# Patient Record
Sex: FEMALE | Race: BLACK OR AFRICAN AMERICAN | Employment: OTHER | URBAN - METROPOLITAN AREA
[De-identification: names, ages, dates, MRNs, and addresses within clinical notes are randomized per-mention and may not be internally consistent; named-entity substitution may affect disease eponyms.]

---

## 2017-04-13 ENCOUNTER — APPOINTMENT (OUTPATIENT)
Dept: GENERAL RADIOLOGY | Age: 52
End: 2017-04-13
Attending: EMERGENCY MEDICINE
Payer: MEDICARE

## 2017-04-13 LAB
ALBUMIN SERPL BCP-MCNC: 4 G/DL (ref 3.5–5)
ALBUMIN/GLOB SERPL: 1.1 {RATIO} (ref 1.2–3.5)
ALP SERPL-CCNC: 74 U/L (ref 50–136)
ALT SERPL-CCNC: 41 U/L (ref 12–65)
ANION GAP BLD CALC-SCNC: 10 MMOL/L (ref 7–16)
AST SERPL W P-5'-P-CCNC: 19 U/L (ref 15–37)
BASOPHILS # BLD AUTO: 0 K/UL (ref 0–0.2)
BASOPHILS # BLD: 0 % (ref 0–2)
BILIRUB SERPL-MCNC: 0.3 MG/DL (ref 0.2–1.1)
BUN SERPL-MCNC: 21 MG/DL (ref 6–23)
CALCIUM SERPL-MCNC: 9.2 MG/DL (ref 8.3–10.4)
CHLORIDE SERPL-SCNC: 103 MMOL/L (ref 98–107)
CO2 SERPL-SCNC: 27 MMOL/L (ref 21–32)
CREAT SERPL-MCNC: 1.57 MG/DL (ref 0.6–1)
DIFFERENTIAL METHOD BLD: ABNORMAL
EOSINOPHIL # BLD: 0.4 K/UL (ref 0–0.8)
EOSINOPHIL NFR BLD: 4 % (ref 0.5–7.8)
ERYTHROCYTE [DISTWIDTH] IN BLOOD BY AUTOMATED COUNT: 13.7 % (ref 11.9–14.6)
GLOBULIN SER CALC-MCNC: 3.5 G/DL (ref 2.3–3.5)
GLUCOSE SERPL-MCNC: 96 MG/DL (ref 65–100)
HCT VFR BLD AUTO: 41 % (ref 35.8–46.3)
HGB BLD-MCNC: 14.4 G/DL (ref 11.7–15.4)
IMM GRANULOCYTES # BLD: 0 K/UL (ref 0–0.5)
IMM GRANULOCYTES NFR BLD AUTO: 0.4 % (ref 0–5)
LYMPHOCYTES # BLD AUTO: 43 % (ref 13–44)
LYMPHOCYTES # BLD: 4.8 K/UL (ref 0.5–4.6)
MCH RBC QN AUTO: 30.6 PG (ref 26.1–32.9)
MCHC RBC AUTO-ENTMCNC: 35.1 G/DL (ref 31.4–35)
MCV RBC AUTO: 87.2 FL (ref 79.6–97.8)
MONOCYTES # BLD: 0.7 K/UL (ref 0.1–1.3)
MONOCYTES NFR BLD AUTO: 6 % (ref 4–12)
NEUTS SEG # BLD: 5.1 K/UL (ref 1.7–8.2)
NEUTS SEG NFR BLD AUTO: 47 % (ref 43–78)
PLATELET # BLD AUTO: 168 K/UL (ref 150–450)
PMV BLD AUTO: 11.8 FL (ref 10.8–14.1)
POTASSIUM SERPL-SCNC: 3.5 MMOL/L (ref 3.5–5.1)
PROT SERPL-MCNC: 7.5 G/DL (ref 6.3–8.2)
RBC # BLD AUTO: 4.7 M/UL (ref 4.05–5.25)
SODIUM SERPL-SCNC: 140 MMOL/L (ref 136–145)
TROPONIN I SERPL-MCNC: 0.07 NG/ML (ref 0.02–0.05)
WBC # BLD AUTO: 11.1 K/UL (ref 4.3–11.1)

## 2017-04-13 PROCEDURE — 94762 N-INVAS EAR/PLS OXIMTRY CONT: CPT | Performed by: EMERGENCY MEDICINE

## 2017-04-13 PROCEDURE — 96374 THER/PROPH/DIAG INJ IV PUSH: CPT | Performed by: EMERGENCY MEDICINE

## 2017-04-13 PROCEDURE — 85025 COMPLETE CBC W/AUTO DIFF WBC: CPT | Performed by: EMERGENCY MEDICINE

## 2017-04-13 PROCEDURE — 96361 HYDRATE IV INFUSION ADD-ON: CPT | Performed by: EMERGENCY MEDICINE

## 2017-04-13 PROCEDURE — 83880 ASSAY OF NATRIURETIC PEPTIDE: CPT | Performed by: INTERNAL MEDICINE

## 2017-04-13 PROCEDURE — 99285 EMERGENCY DEPT VISIT HI MDM: CPT | Performed by: EMERGENCY MEDICINE

## 2017-04-13 PROCEDURE — 71010 XR CHEST PORT: CPT

## 2017-04-13 PROCEDURE — 84484 ASSAY OF TROPONIN QUANT: CPT | Performed by: EMERGENCY MEDICINE

## 2017-04-13 PROCEDURE — 93005 ELECTROCARDIOGRAM TRACING: CPT | Performed by: EMERGENCY MEDICINE

## 2017-04-13 PROCEDURE — 80053 COMPREHEN METABOLIC PANEL: CPT | Performed by: EMERGENCY MEDICINE

## 2017-04-14 ENCOUNTER — HOSPITAL ENCOUNTER (EMERGENCY)
Age: 52
Discharge: HOME | End: 2017-04-14
Attending: EMERGENCY MEDICINE
Payer: MEDICARE

## 2017-04-14 VITALS
HEIGHT: 59 IN | TEMPERATURE: 98 F | WEIGHT: 234 LBS | RESPIRATION RATE: 17 BRPM | HEART RATE: 60 BPM | OXYGEN SATURATION: 96 % | SYSTOLIC BLOOD PRESSURE: 148 MMHG | BODY MASS INDEX: 47.17 KG/M2 | DIASTOLIC BLOOD PRESSURE: 71 MMHG

## 2017-04-14 DIAGNOSIS — R07.89 ATYPICAL CHEST PAIN: Primary | ICD-10-CM

## 2017-04-14 DIAGNOSIS — R77.8 ELEVATED TROPONIN: ICD-10-CM

## 2017-04-14 LAB
ATRIAL RATE: 62 BPM
ATRIAL RATE: 63 BPM
BNP SERPL-MCNC: 122 PG/ML
CALCULATED P AXIS, ECG09: 100 DEGREES
CALCULATED P AXIS, ECG09: 76 DEGREES
CALCULATED R AXIS, ECG10: 102 DEGREES
CALCULATED R AXIS, ECG10: 63 DEGREES
CALCULATED T AXIS, ECG11: -25 DEGREES
CALCULATED T AXIS, ECG11: 37 DEGREES
CRP SERPL-MCNC: <0.3 MG/DL (ref 0–0.9)
DIAGNOSIS, 93000: NORMAL
DIAGNOSIS, 93000: NORMAL
MAGNESIUM SERPL-MCNC: 1.6 MG/DL (ref 1.8–2.4)
P-R INTERVAL, ECG05: 186 MS
P-R INTERVAL, ECG05: 204 MS
Q-T INTERVAL, ECG07: 452 MS
Q-T INTERVAL, ECG07: 558 MS
QRS DURATION, ECG06: 116 MS
QRS DURATION, ECG06: 118 MS
QTC CALCULATION (BEZET), ECG08: 462 MS
QTC CALCULATION (BEZET), ECG08: 566 MS
TROPONIN I BLD-MCNC: 0 NG/ML (ref 0–0.08)
TROPONIN I BLD-MCNC: 0.02 NG/ML (ref 0–0.08)
TROPONIN I SERPL-MCNC: 0.05 NG/ML (ref 0.02–0.05)
TROPONIN I SERPL-MCNC: 0.06 NG/ML (ref 0.02–0.05)
VENTRICULAR RATE, ECG03: 62 BPM
VENTRICULAR RATE, ECG03: 63 BPM

## 2017-04-14 PROCEDURE — 86140 C-REACTIVE PROTEIN: CPT | Performed by: EMERGENCY MEDICINE

## 2017-04-14 PROCEDURE — 84484 ASSAY OF TROPONIN QUANT: CPT

## 2017-04-14 PROCEDURE — 84484 ASSAY OF TROPONIN QUANT: CPT | Performed by: EMERGENCY MEDICINE

## 2017-04-14 PROCEDURE — 83735 ASSAY OF MAGNESIUM: CPT | Performed by: EMERGENCY MEDICINE

## 2017-04-14 PROCEDURE — 93005 ELECTROCARDIOGRAM TRACING: CPT | Performed by: INTERNAL MEDICINE

## 2017-04-14 PROCEDURE — 74011250636 HC RX REV CODE- 250/636: Performed by: INTERNAL MEDICINE

## 2017-04-14 PROCEDURE — 74011250636 HC RX REV CODE- 250/636: Performed by: EMERGENCY MEDICINE

## 2017-04-14 PROCEDURE — 74011250637 HC RX REV CODE- 250/637: Performed by: INTERNAL MEDICINE

## 2017-04-14 RX ORDER — NITROGLYCERIN 0.4 MG/1
0.4 TABLET SUBLINGUAL
Qty: 25 TAB | Refills: 12 | Status: SHIPPED | OUTPATIENT
Start: 2017-04-14 | End: 2018-04-09

## 2017-04-14 RX ORDER — SODIUM CHLORIDE 9 MG/ML
150 INJECTION, SOLUTION INTRAVENOUS CONTINUOUS
Status: DISCONTINUED | OUTPATIENT
Start: 2017-04-14 | End: 2017-04-14 | Stop reason: HOSPADM

## 2017-04-14 RX ORDER — LANOLIN ALCOHOL/MO/W.PET/CERES
400 CREAM (GRAM) TOPICAL DAILY
Status: DISCONTINUED | OUTPATIENT
Start: 2017-04-14 | End: 2017-04-14 | Stop reason: HOSPADM

## 2017-04-14 RX ORDER — KETOROLAC TROMETHAMINE 30 MG/ML
15 INJECTION, SOLUTION INTRAMUSCULAR; INTRAVENOUS
Status: COMPLETED | OUTPATIENT
Start: 2017-04-14 | End: 2017-04-14

## 2017-04-14 RX ORDER — LANOLIN ALCOHOL/MO/W.PET/CERES
400 CREAM (GRAM) TOPICAL DAILY
Qty: 30 TAB | Refills: 11 | Status: SHIPPED | OUTPATIENT
Start: 2017-04-14 | End: 2017-04-17

## 2017-04-14 RX ADMIN — Medication 400 MG: at 08:55

## 2017-04-14 RX ADMIN — KETOROLAC TROMETHAMINE 15 MG: 30 INJECTION, SOLUTION INTRAMUSCULAR at 08:16

## 2017-04-14 RX ADMIN — SODIUM CHLORIDE 150 ML/HR: 900 INJECTION, SOLUTION INTRAVENOUS at 02:05

## 2017-04-14 NOTE — ED NOTES
I have reviewed discharge instructions with the patient. The patient verbalized understanding. The patient is ambulatory upon exit and is in no acute distress. The patient has discharge instructions in hand.

## 2017-04-14 NOTE — ED PROVIDER NOTES
HPI Comments: Patient presents for some complaining of dizziness onset the day before yesterday she describes the dizziness as a swimmy headedness that comes and goes that dizziness is currently absent yesterday she then developed aches and pains diffusely and also pain centering around the defibrillator in the left pectoralis area and pain radiating down the arms she denies any shortness of breath she denies fever or chills she denies syncope nausea or vomiting review of systems is otherwise negative    Patient is a 46 y.o. female presenting with dizziness. The history is provided by the patient. Dizziness   This is a new problem. The current episode started 2 days ago. The problem has been resolved. There was no focality noted. Pertinent negatives include no focal weakness, no loss of sensation, no loss of balance, no slurred speech, no speech difficulty, no memory loss, no movement disorder, no agitation, no visual change, no auditory change, no mental status change, no unresponsiveness and no disorientation. There has been no fever. Pertinent negatives include no shortness of breath, no chest pain, no vomiting, no altered mental status, no confusion, no headaches, no choking, no nausea, no bowel incontinence and no bladder incontinence. There were no medications administered prior to arrival.        Past Medical History:   Diagnosis Date    Arrhythmia     ablation for svt    Arthritis     Automatic implantable cardioverter-defibrillator in situ 3/30/2016    CAD in native artery 3/30/2016    Chronic systolic heart failure ([facility] 7/18/2013    Diabetes ([facility]     type 2 (oral med controlled)    Diabetes mellitus ([facility] 4/12/2010    Dyslipidemia 2/13/2013    Fibromyalgia     GERD (gastroesophageal reflux disease)     Heart failure (HCC)     chronic systolic with EF 26%; non-ischemic cardiomyopathy    HTN (hypertension) 4/12/2010    Ill-defined condition     Pt denies fibromyalgia.  Pt feels she has neuropathy    Morbid obesity ([facility]     NICM (nonischemic cardiomyopathy) ([facility] [facility] 2/15/2013    Obstructive sleep apnea (adult) (pediatric) 2014    Other ill-defined conditions     svt ablation    Sciatic pain 2016    Tobacco use disorder 2010       Past Surgical History:   Procedure Laterality Date    HX  SECTION      HX GYN      YEIMI    HX HERNIA REPAIR      mild incarceration, no bowel removal    HX IMPLANTABLE CARDIOVERTER DEFIBRILLATOR      HX OTHER SURGICAL      cardiac ablation for svt    HX OTHER SURGICAL      right shoulder surgery    HX PACEMAKER      HX TONSILLECTOMY      [place] LEFT HEART CATH,PERCUTANEOUS  2013    no intervention         Family History:   Problem Relation Age of Onset    Heart Attack Father 48     mi   [place] Stroke Father     Hypertension Father     Diabetes Other     Stroke Mother     Diabetes Brother     Heart Disease Brother     Hypertension Brother        Social History     Social History    Marital status: SINGLE     Spouse name: N/A    Number of children: N/A    Years of education: N/A     Occupational History    Not on file. Social History Main Topics    Smoking status: Light Tobacco Smoker     Packs/day: 0.00     Years: 27.00    Smokeless tobacco: Never Used      Comment: smokes occassionally    Alcohol use Yes      Comment: occaisonally    Drug use: Yes     Special: Marijuana      Comment: 14    Sexual activity: Not on file     Other Topics Concern    Not on file     Social History Narrative         ALLERGIES: Review of patient's allergies indicates no known allergies. Review of Systems   Respiratory: Negative for choking and shortness of breath. Cardiovascular: Negative for chest pain. Gastrointestinal: Negative for bowel incontinence, nausea and vomiting. Genitourinary: Negative for bladder incontinence. Neurological: Positive for dizziness.  Negative for focal weakness, speech difficulty, headaches and loss of balance. Psychiatric/Behavioral: Negative for agitation, confusion and memory loss. All other systems reviewed and are negative. Vitals:    04/13/17 2250   BP: 144/69   Pulse: 60   Resp: 17   Temp: 98 °F (36.7 °C)   SpO2: 97%   Weight: 106.1 kg (234 lb)   Height: 4' 11\" (1.499 m)            Physical Exam   Constitutional: She is oriented to person, place, and time. She appears well-developed and well-nourished. No distress. HENT:   Head: Normocephalic and atraumatic. Mouth/Throat: No oropharyngeal exudate. Eyes: Conjunctivae and EOM are normal. Pupils are equal, round, and reactive to light. Neck: Normal range of motion. Neck supple. Cardiovascular: Normal rate, regular rhythm, normal heart sounds and intact distal pulses. Pulmonary/Chest: Effort normal and breath sounds normal. She exhibits tenderness. Abdominal: Soft. Bowel sounds are normal.   Musculoskeletal: Normal range of motion. She exhibits no edema, tenderness or deformity. Neurological: She is alert and oriented to person, place, and time. Skin: Skin is warm and dry. Psychiatric: She has a normal mood and affect. Her behavior is normal.   Nursing note and vitals reviewed. MDM  Number of Diagnoses or Management Options     Amount and/or Complexity of Data Reviewed  Clinical lab tests: ordered and reviewed  Tests in the radiology section of CPT®: ordered and reviewed  Independent visualization of images, tracings, or specimens: yes (EKG at 2248 hrs. Since an atrial paced rhythm with a right axis deviation and pulmonary disease pattern no significant change from previous EKG)    Risk of Complications, Morbidity, and/or Mortality  Presenting problems: high  Diagnostic procedures: high  Management options: moderate    Patient Progress  Patient progress: stable    ED Course       Procedures    Patient continues to have pain located around her pacemaker/defibrillator.   This is reproducible however the repeat troponin and remains slightly elevated at 0.6 the previous being 0.7 we'll discuss case with cardiology in consultation in the department will be obtained.

## 2017-04-14 NOTE — CONSULTS
[facility] CARDIOLOGY CONSULT NOTE    4/14/2017 8:23 AM    Admit Date: 4/14/2017    Admit Diagnosis: There are no admission diagnoses documented for this encounter. Subjective:   47 yo AA female with known CM presents with 2 week hx of cp variable in occurrence with left shoulder and arm numbness. The pain is not exertionally related . There has been some positional pain as well. Dyspnea has been stable . She has had some weight gain    no orthopnea pnd     No Known Allergies    Past Medical History:   Diagnosis Date    Arrhythmia     ablation for svt    Arthritis     Automatic implantable cardioverter-defibrillator in situ 3/30/2016    CAD in native artery 3/30/2016    Chronic systolic heart failure ([address] 7/18/2013    Diabetes ([facility] [address]     type 2 (oral med controlled)    Diabetes mellitus ([address] 4/12/2010    Dyslipidemia 2/13/2013    Fibromyalgia     GERD (gastroesophageal reflux disease)     Heart failure (HCC)     chronic systolic with EF 03%; non-ischemic cardiomyopathy    HTN (hypertension) 4/12/2010    Ill-defined condition     Pt denies fibromyalgia. Pt feels she has neuropathy    Morbid obesity ([address]     NICM (nonischemic cardiomyopathy) ([facility] [address] 2/15/2013    Obstructive sleep apnea (adult) (pediatric) 7/14/2014    Other ill-defined conditions     svt ablation    Sciatic pain 5/23/2016    Tobacco use disorder 4/12/2010       Family History:  No family history of premature CAD or sudden cardiac death. Social History:  No alcohol, tobacco, or illicit drug use.     Review of Systems:  Constitutional- no recent fever, chills, abnormal weight loss  Eyes- no recent visual changes  Ears- no recent hearing loss  Cardiac- see HPI  Pulmonary- no wheezing or sputum production  Gastrointestinal- no diarrhea or constipation  Genitourinary- no hematuria or dysuria  Neurologic- no history of CVA or seizure disorder  Musculoskeletal- no arthritis or myalgia  Vascular- no claudication or nonhealing ulcers  Dermatologic- no rash or abnormal hair loss    Objective:      Vitals:    04/14/17 0738 04/14/17 0800 04/14/17 0804 04/14/17 0805   BP: 130/60 151/76 (!) 132/92 131/75   Pulse:  60 68 61   Resp:       Temp:       SpO2:  96%  97%   Weight:       Height:           Physical Exam:  Neuro:  Cranial nerves 2-12 intact. Skin: warm and dry  HEENT:  NC/AT, conjunctiva noninjected, sclera anicteric, oropharynx clear  Neck: supple without adenopathy or thyromegaly  CV: regular rate and rhythm, no murmurs, rubs, or gallops  Lungs: clear bilaterally  Abdomen: soft, nontender, nondistended, normal bowel sounds  Extremities: No cyanosis or edema, distal pulses 2+ and symmetric bilaterally  Psychiatric: alert and oriented x 3    [unfilled]  [unfilled]      Data Review:   Recent Labs      04/14/17   0253  04/13/17   2300   NA   --   140   K   --   3.5   MG  1.6*   --    BUN   --   21   CREA   --   1.57*   GLU   --   96   WBC   --   11.1   HGB   --   14.4   HCT   --   41.0   PLT   --   168       Assessment and Plan:     Principal Problem:    Atypical chest pain (5/13/2016)  patient with mm skeletal tenderness . she has hx of chest pain CM .  previous evaluation for cp  cardiac cath 2013 2016 large cors minimal disease      Despite equivocal troponin chest pain clearly non cardiac in nature.   The symptoms have been present a week and she has diffuse chest tenderness to palpation       Active Problems:    Diabetes mellitus ([address] (4/12/2010)  diet and exercise for weight loss     HTN (hypertension) (4/12/2010)      controlled       NICM (nonischemic cardiomyopathy) ([address] (2/15/2013)   stable will need f/u in clinic       Chronic systolic heart failure ([address] (7/18/2013)      Overview: NST 4/15:low risk      Echo 6/2014:EF 40-45%      Echo 5/2013:EF 30-35%      LHC 2/2013: minimal CAD      Automatic implantable cardioverter-defibrillator in situ (3/30/2016)      Overview: [employer] dual ICD 7/2013 1/5 lost brother,11/8 had VF episode with ATP,no shock    we will recheck EKG and bnp and troponin if stable will d/c for outpatient f/u depending on her response to  Toradol      Thank you. Will follow . Please call with any questions or suggestions.    Laci Quintana  [facility] Cardiology

## 2017-04-14 NOTE — ED TRIAGE NOTES
Pt states the past 2 weeks her BP has been high. Pt states it had improved. States she begin feeling lightheaded, mild chest pain, shortness of breath, and bilateral arm numbness x 1 day. Pt alert and oriented x 4. Respirations are even and unlabored. Pt appears in no acute distress at this time.

## 2017-05-02 ENCOUNTER — HOSPITAL ENCOUNTER (OUTPATIENT)
Dept: LAB | Age: 52
Discharge: HOME | End: 2017-05-02
Attending: INTERNAL MEDICINE
Payer: MEDICARE

## 2017-05-02 DIAGNOSIS — I50.22 CHRONIC SYSTOLIC HEART FAILURE (HCC): Chronic | ICD-10-CM

## 2017-05-02 LAB
ANION GAP BLD CALC-SCNC: 8 MMOL/L
BNP SERPL-MCNC: 178 PG/ML
BUN SERPL-MCNC: 18 MG/DL (ref 6–23)
CALCIUM SERPL-MCNC: 9.5 MG/DL (ref 8.3–10.4)
CHLORIDE SERPL-SCNC: 107 MMOL/L (ref 98–107)
CO2 SERPL-SCNC: 29 MMOL/L (ref 21–32)
CREAT SERPL-MCNC: 1.6 MG/DL (ref 0.6–1)
GLUCOSE SERPL-MCNC: 105 MG/DL (ref 65–100)
MAGNESIUM SERPL-MCNC: 1.7 MG/DL (ref 1.8–2.4)
POTASSIUM SERPL-SCNC: 3.8 MMOL/L (ref 3.5–5.1)
SODIUM SERPL-SCNC: 144 MMOL/L (ref 136–145)

## 2017-05-02 PROCEDURE — 83735 ASSAY OF MAGNESIUM: CPT | Performed by: INTERNAL MEDICINE

## 2017-05-02 PROCEDURE — 83880 ASSAY OF NATRIURETIC PEPTIDE: CPT | Performed by: INTERNAL MEDICINE

## 2017-05-02 PROCEDURE — 36415 COLL VENOUS BLD VENIPUNCTURE: CPT | Performed by: INTERNAL MEDICINE

## 2017-05-02 PROCEDURE — 80048 BASIC METABOLIC PNL TOTAL CA: CPT | Performed by: INTERNAL MEDICINE

## 2017-08-14 ENCOUNTER — HOSPITAL ENCOUNTER (OUTPATIENT)
Dept: LAB | Age: 52
Discharge: HOME | End: 2017-08-14
Attending: INTERNAL MEDICINE
Payer: MEDICARE

## 2017-08-14 LAB
ALBUMIN SERPL BCP-MCNC: 4.1 G/DL (ref 3.5–5)
ALBUMIN/GLOB SERPL: 1.1 {RATIO}
ALP SERPL-CCNC: 65 U/L (ref 50–136)
ALT SERPL-CCNC: 39 U/L (ref 12–65)
ANION GAP BLD CALC-SCNC: 8 MMOL/L
AST SERPL W P-5'-P-CCNC: 27 U/L (ref 15–37)
BASOPHILS # BLD AUTO: 0 K/UL (ref 0–0.2)
BASOPHILS # BLD: 1 % (ref 0–2)
BILIRUB SERPL-MCNC: 0.3 MG/DL (ref 0.2–1.1)
BUN SERPL-MCNC: 20 MG/DL (ref 6–23)
CALCIUM SERPL-MCNC: 9.4 MG/DL (ref 8.3–10.4)
CALCIUM SERPL-MCNC: 9.4 MG/DL (ref 8.3–10.4)
CHLORIDE SERPL-SCNC: 106 MMOL/L (ref 98–107)
CO2 SERPL-SCNC: 27 MMOL/L (ref 21–32)
CREAT SERPL-MCNC: 1.5 MG/DL (ref 0.6–1)
CREAT UR-MCNC: <13 MG/DL
DIFFERENTIAL METHOD BLD: ABNORMAL
EOSINOPHIL # BLD: 0.7 K/UL (ref 0–0.8)
EOSINOPHIL NFR BLD: 8 % (ref 0.5–7.8)
ERYTHROCYTE [DISTWIDTH] IN BLOOD BY AUTOMATED COUNT: 13.1 % (ref 11.9–14.6)
GLOBULIN SER CALC-MCNC: 3.9 G/DL
GLUCOSE SERPL-MCNC: 92 MG/DL (ref 65–100)
HCT VFR BLD AUTO: 40.2 % (ref 35.8–46.3)
HGB BLD-MCNC: 13.5 G/DL (ref 11.7–15.4)
LYMPHOCYTES # BLD AUTO: 34 % (ref 13–44)
LYMPHOCYTES # BLD: 3 K/UL (ref 0.5–4.6)
MAGNESIUM SERPL-MCNC: 2.2 MG/DL (ref 1.8–2.4)
MCH RBC QN AUTO: 31.7 PG (ref 26.1–32.9)
MCHC RBC AUTO-ENTMCNC: 33.6 G/DL (ref 31.4–35)
MCV RBC AUTO: 94.4 FL (ref 79.6–97.8)
MONOCYTES # BLD: 0.6 K/UL (ref 0.1–1.3)
MONOCYTES NFR BLD AUTO: 7 % (ref 4–12)
NEUTS SEG # BLD: 4.5 K/UL (ref 1.7–8.2)
NEUTS SEG NFR BLD AUTO: 50 % (ref 43–78)
PHOSPHATE SERPL-MCNC: 3.4 MG/DL (ref 2.4–4.5)
PLATELET # BLD AUTO: 152 K/UL (ref 150–450)
PMV BLD AUTO: 11.8 FL (ref 10.8–14.1)
POTASSIUM SERPL-SCNC: 3.9 MMOL/L (ref 3.5–5.1)
PROT SERPL-MCNC: 8 G/DL (ref 6.3–8.2)
PROT UR-MCNC: <5 MG/DL
PROT/CREAT UR-RTO: NORMAL
PTH-INTACT SERPL-MCNC: 166.4 PG/ML (ref 14–72)
RBC # BLD AUTO: 4.26 M/UL (ref 4.05–5.25)
SODIUM SERPL-SCNC: 141 MMOL/L (ref 136–145)
WBC # BLD AUTO: 8.8 K/UL (ref 4.3–11.1)

## 2017-08-14 PROCEDURE — 84100 ASSAY OF PHOSPHORUS: CPT | Performed by: INTERNAL MEDICINE

## 2017-08-14 PROCEDURE — 80053 COMPREHEN METABOLIC PANEL: CPT | Performed by: INTERNAL MEDICINE

## 2017-08-14 PROCEDURE — 85025 COMPLETE CBC W/AUTO DIFF WBC: CPT | Performed by: INTERNAL MEDICINE

## 2017-08-14 PROCEDURE — 82306 VITAMIN D 25 HYDROXY: CPT | Performed by: INTERNAL MEDICINE

## 2017-08-14 PROCEDURE — 84156 ASSAY OF PROTEIN URINE: CPT | Performed by: INTERNAL MEDICINE

## 2017-08-14 PROCEDURE — 36415 COLL VENOUS BLD VENIPUNCTURE: CPT | Performed by: INTERNAL MEDICINE

## 2017-08-14 PROCEDURE — 83735 ASSAY OF MAGNESIUM: CPT | Performed by: INTERNAL MEDICINE

## 2017-08-14 PROCEDURE — 83970 ASSAY OF PARATHORMONE: CPT | Performed by: INTERNAL MEDICINE

## 2017-08-15 LAB — 25(OH)D3+25(OH)D2 SERPL-MCNC: 27.8 NG/ML (ref 30–100)

## 2017-09-25 ENCOUNTER — HOSPITAL ENCOUNTER (OUTPATIENT)
Dept: MAMMOGRAPHY | Age: 52
Discharge: HOME | End: 2017-09-25
Attending: INTERNAL MEDICINE
Payer: MEDICARE

## 2017-09-25 DIAGNOSIS — Z12.31 VISIT FOR SCREENING MAMMOGRAM: ICD-10-CM

## 2017-09-25 PROCEDURE — 77063 BREAST TOMOSYNTHESIS BI: CPT

## 2017-11-04 ENCOUNTER — HOSPITAL ENCOUNTER (EMERGENCY)
Age: 52
Discharge: HOME | End: 2017-11-04
Attending: EMERGENCY MEDICINE
Payer: MEDICARE

## 2017-11-04 ENCOUNTER — APPOINTMENT (OUTPATIENT)
Dept: GENERAL RADIOLOGY | Age: 52
End: 2017-11-04
Attending: EMERGENCY MEDICINE
Payer: MEDICARE

## 2017-11-04 ENCOUNTER — APPOINTMENT (OUTPATIENT)
Dept: CT IMAGING | Age: 52
End: 2017-11-04
Attending: EMERGENCY MEDICINE
Payer: MEDICARE

## 2017-11-04 VITALS
BODY MASS INDEX: 46.57 KG/M2 | HEIGHT: 59 IN | OXYGEN SATURATION: 98 % | HEART RATE: 75 BPM | RESPIRATION RATE: 18 BRPM | WEIGHT: 231 LBS | DIASTOLIC BLOOD PRESSURE: 100 MMHG | TEMPERATURE: 99.1 F | SYSTOLIC BLOOD PRESSURE: 185 MMHG

## 2017-11-04 DIAGNOSIS — R06.02 SHORTNESS OF BREATH: ICD-10-CM

## 2017-11-04 DIAGNOSIS — I50.22 CHRONIC SYSTOLIC HEART FAILURE (HCC): Primary | ICD-10-CM

## 2017-11-04 LAB
ALBUMIN SERPL-MCNC: 3.7 G/DL (ref 3.5–5)
ALBUMIN/GLOB SERPL: 0.9 {RATIO} (ref 1.2–3.5)
ALP SERPL-CCNC: 59 U/L (ref 50–136)
ALT SERPL-CCNC: 73 U/L (ref 12–65)
ANION GAP SERPL CALC-SCNC: 8 MMOL/L (ref 7–16)
AST SERPL-CCNC: 84 U/L (ref 15–37)
ATRIAL RATE: 60 BPM
BASOPHILS # BLD: 0 K/UL (ref 0–0.2)
BASOPHILS NFR BLD: 1 % (ref 0–2)
BILIRUB SERPL-MCNC: 0.3 MG/DL (ref 0.2–1.1)
BNP SERPL-MCNC: 296 PG/ML
BUN SERPL-MCNC: 18 MG/DL (ref 6–23)
CALCIUM SERPL-MCNC: 8.8 MG/DL (ref 8.3–10.4)
CALCULATED P AXIS, ECG09: 71 DEGREES
CALCULATED R AXIS, ECG10: 75 DEGREES
CALCULATED T AXIS, ECG11: -28 DEGREES
CHLORIDE SERPL-SCNC: 113 MMOL/L (ref 98–107)
CO2 SERPL-SCNC: 23 MMOL/L (ref 21–32)
CREAT SERPL-MCNC: 1.32 MG/DL (ref 0.6–1)
DIAGNOSIS, 93000: NORMAL
DIFFERENTIAL METHOD BLD: ABNORMAL
EOSINOPHIL # BLD: 0.6 K/UL (ref 0–0.8)
EOSINOPHIL NFR BLD: 8 % (ref 0.5–7.8)
ERYTHROCYTE [DISTWIDTH] IN BLOOD BY AUTOMATED COUNT: 14.2 % (ref 11.9–14.6)
GLOBULIN SER CALC-MCNC: 4 G/DL (ref 2.3–3.5)
GLUCOSE SERPL-MCNC: 77 MG/DL (ref 65–100)
HCT VFR BLD AUTO: 37.8 % (ref 35.8–46.3)
HGB BLD-MCNC: 12.7 G/DL (ref 11.7–15.4)
IMM GRANULOCYTES # BLD: 0 K/UL (ref 0–0.5)
IMM GRANULOCYTES NFR BLD: 1 % (ref 0–5)
LYMPHOCYTES # BLD: 2.1 K/UL (ref 0.5–4.6)
LYMPHOCYTES NFR BLD: 26 % (ref 13–44)
MCH RBC QN AUTO: 31.1 PG (ref 26.1–32.9)
MCHC RBC AUTO-ENTMCNC: 33.6 G/DL (ref 31.4–35)
MCV RBC AUTO: 92.4 FL (ref 79.6–97.8)
MONOCYTES # BLD: 0.5 K/UL (ref 0.1–1.3)
MONOCYTES NFR BLD: 6 % (ref 4–12)
NEUTS SEG # BLD: 4.9 K/UL (ref 1.7–8.2)
NEUTS SEG NFR BLD: 58 % (ref 43–78)
P-R INTERVAL, ECG05: 194 MS
PLATELET # BLD AUTO: 159 K/UL (ref 150–450)
PMV BLD AUTO: 11.6 FL (ref 10.8–14.1)
POTASSIUM SERPL-SCNC: 5.6 MMOL/L (ref 3.5–5.1)
PROT SERPL-MCNC: 7.7 G/DL (ref 6.3–8.2)
Q-T INTERVAL, ECG07: 438 MS
QRS DURATION, ECG06: 112 MS
QTC CALCULATION (BEZET), ECG08: 438 MS
RBC # BLD AUTO: 4.09 M/UL (ref 4.05–5.25)
SODIUM SERPL-SCNC: 144 MMOL/L (ref 136–145)
TROPONIN I BLD-MCNC: 0 NG/ML (ref 0.02–0.05)
TROPONIN I SERPL-MCNC: 0.05 NG/ML (ref 0.02–0.05)
VENTRICULAR RATE, ECG03: 60 BPM
WBC # BLD AUTO: 8.2 K/UL (ref 4.3–11.1)

## 2017-11-04 PROCEDURE — 99285 EMERGENCY DEPT VISIT HI MDM: CPT | Performed by: EMERGENCY MEDICINE

## 2017-11-04 PROCEDURE — 71020 XR CHEST PA LAT: CPT

## 2017-11-04 PROCEDURE — 74011250636 HC RX REV CODE- 250/636: Performed by: EMERGENCY MEDICINE

## 2017-11-04 PROCEDURE — 96374 THER/PROPH/DIAG INJ IV PUSH: CPT | Performed by: EMERGENCY MEDICINE

## 2017-11-04 PROCEDURE — 84484 ASSAY OF TROPONIN QUANT: CPT | Performed by: EMERGENCY MEDICINE

## 2017-11-04 PROCEDURE — 74011636320 HC RX REV CODE- 636/320: Performed by: EMERGENCY MEDICINE

## 2017-11-04 PROCEDURE — 85025 COMPLETE CBC W/AUTO DIFF WBC: CPT | Performed by: EMERGENCY MEDICINE

## 2017-11-04 PROCEDURE — 80053 COMPREHEN METABOLIC PANEL: CPT | Performed by: EMERGENCY MEDICINE

## 2017-11-04 PROCEDURE — 74011000258 HC RX REV CODE- 258: Performed by: EMERGENCY MEDICINE

## 2017-11-04 PROCEDURE — 71260 CT THORAX DX C+: CPT

## 2017-11-04 PROCEDURE — 74011250637 HC RX REV CODE- 250/637: Performed by: EMERGENCY MEDICINE

## 2017-11-04 PROCEDURE — 93005 ELECTROCARDIOGRAM TRACING: CPT | Performed by: EMERGENCY MEDICINE

## 2017-11-04 PROCEDURE — 83880 ASSAY OF NATRIURETIC PEPTIDE: CPT | Performed by: EMERGENCY MEDICINE

## 2017-11-04 RX ORDER — FUROSEMIDE 10 MG/ML
60 INJECTION INTRAMUSCULAR; INTRAVENOUS
Status: COMPLETED | OUTPATIENT
Start: 2017-11-04 | End: 2017-11-04

## 2017-11-04 RX ORDER — GUAIFENESIN 100 MG/5ML
324 LIQUID (ML) ORAL
Status: COMPLETED | OUTPATIENT
Start: 2017-11-04 | End: 2017-11-04

## 2017-11-04 RX ORDER — SODIUM CHLORIDE 0.9 % (FLUSH) 0.9 %
10 SYRINGE (ML) INJECTION
Status: COMPLETED | OUTPATIENT
Start: 2017-11-04 | End: 2017-11-04

## 2017-11-04 RX ADMIN — Medication 10 ML: at 18:55

## 2017-11-04 RX ADMIN — FUROSEMIDE 60 MG: 10 INJECTION, SOLUTION INTRAMUSCULAR; INTRAVENOUS at 20:05

## 2017-11-04 RX ADMIN — IOPAMIDOL 100 ML: 755 INJECTION, SOLUTION INTRAVENOUS at 18:55

## 2017-11-04 RX ADMIN — ASPIRIN 81 MG 324 MG: 81 TABLET ORAL at 19:08

## 2017-11-04 RX ADMIN — SODIUM CHLORIDE 100 ML: 900 INJECTION, SOLUTION INTRAVENOUS at 18:55

## 2017-11-04 NOTE — ED PROVIDER NOTES
HPI Comments: 49-year-old female with history of CAD, ICD placement, CHF, HTN, & Fibromyalgia who presents w/ c/o shortness of breath x 2 days. Denies chest pain, fever, chills, nausea, vomiting, diaphoresis, dizziness, hemoptysis, abdominal pain. States he's noticed mild increased swelling of bilateral lower extremities. Denies hx of DVT or PE. Denies defibrillator going off. States she feels \"like her normal self\" currently. Patient is a 45 y.o. female presenting with shortness of breath. The history is provided by the patient. No  was used. Shortness of Breath   This is a new problem. The average episode lasts 2 days. The problem occurs intermittently. The current episode started 2 days ago. The problem has not changed since onset. Associated symptoms include rhinorrhea. Pertinent negatives include no fever, no headaches, no sore throat, no neck pain, no cough, no sputum production, no hemoptysis, no wheezing, no chest pain, no syncope, no vomiting, no abdominal pain, no rash and no leg swelling. She has tried nothing for the symptoms. The treatment provided no relief. Past Medical History:   Diagnosis Date    Arrhythmia     ablation for svt    Arthritis     Automatic implantable cardioverter-defibrillator in situ 3/30/2016    CAD in native artery 3/30/2016    Chronic systolic heart failure ([address] 7/18/2013    Diabetes ([address]     type 2 (oral med controlled)    Diabetes mellitus ([address] 4/12/2010    Dyslipidemia 2/13/2013    Fibromyalgia     GERD (gastroesophageal reflux disease)     Heart failure ([address]     chronic systolic with EF 27%; non-ischemic cardiomyopathy    HTN (hypertension) 4/12/2010    Ill-defined condition     Pt denies fibromyalgia.  Pt feels she has neuropathy    Morbid obesity ([address]     NICM (nonischemic cardiomyopathy) ([address] 2/15/2013    Obstructive sleep apnea (adult) (pediatric) 7/14/2014    Other ill-defined conditions(799.89)     svt ablation    Sciatic pain 2016    Tobacco use disorder 2010       Past Surgical History:   Procedure Laterality Date    HX  SECTION      HX GYN      YEIMI    HX HERNIA REPAIR      mild incarceration, no bowel removal    HX IMPLANTABLE CARDIOVERTER DEFIBRILLATOR      HX OTHER SURGICAL      cardiac ablation for svt    HX OTHER SURGICAL      right shoulder surgery    HX PACEMAKER      HX TONSILLECTOMY      [place] LEFT HEART CATH,PERCUTANEOUS  2013    no intervention         Family History:   Problem Relation Age of Onset    Heart Attack Father 48     mi   Juancarlos Omar Stroke Father     Hypertension Father     Diabetes Other     Stroke Mother     Diabetes Brother     Heart Disease Brother     Hypertension Brother     Breast Cancer Sister 37       Social History     Social History    Marital status: SINGLE     Spouse name: N/A    Number of children: N/A    Years of education: N/A     Occupational History    Not on file. Social History Main Topics    Smoking status: Current Every Day Smoker     Packs/day: 0.25     Years: 27.00    Smokeless tobacco: Never Used      Comment: smokes occassionally    Alcohol use Yes      Comment: occaisonally    Drug use: Yes     Special: Marijuana      Comment: 14    Sexual activity: Not on file     Other Topics Concern    Not on file     Social History Narrative         ALLERGIES: Review of patient's allergies indicates no known allergies. Review of Systems   Constitutional: Negative for chills, fatigue and fever. HENT: Positive for congestion and rhinorrhea. Negative for sore throat. Respiratory: Positive for shortness of breath. Negative for cough, hemoptysis, sputum production and wheezing. Cardiovascular: Negative for chest pain, palpitations, leg swelling and syncope. Gastrointestinal: Negative for abdominal distention, abdominal pain, diarrhea, nausea and vomiting. Genitourinary: Negative for dysuria, flank pain and pelvic pain. Musculoskeletal: Negative for back pain, gait problem, joint swelling, neck pain and neck stiffness. Skin: Negative for pallor and rash. Neurological: Negative for dizziness, syncope, weakness and headaches. Vitals:    11/04/17 1354 11/04/17 1822 11/04/17 1826 11/04/17 2003   BP: 163/84 177/81  (!) 185/100   Pulse: 65   75   Resp: 20   18   Temp: 99.1 °F (37.3 °C)      SpO2: 97%  98%    Weight: 104.8 kg (231 lb)      Height: 4' 11\" (1.499 m)               Physical Exam   Constitutional: She is oriented to person, place, and time. She appears well-developed and well-nourished. No distress. HENT:   Head: Normocephalic and atraumatic. Mouth/Throat: Oropharynx is clear and moist. No oropharyngeal exudate. Eyes: Conjunctivae and EOM are normal. Pupils are equal, round, and reactive to light. Neck: Normal range of motion. No JVD present. No tracheal deviation present. Cardiovascular: Normal rate, regular rhythm, normal heart sounds and intact distal pulses. No murmur heard. Radial pulses 2+ and equal bilaterally    Pulmonary/Chest: Effort normal and breath sounds normal. No respiratory distress. She has no wheezes. She has no rales. She exhibits no tenderness. Abdominal: Soft. She exhibits no distension. There is no tenderness. There is no rebound. Musculoskeletal: Normal range of motion. She exhibits no edema or tenderness. Neurological: She is alert and oriented to person, place, and time. No cranial nerve deficit. Coordination normal.   Skin: Skin is warm and dry. No erythema. Psychiatric: She has a normal mood and affect. Her behavior is normal.   Nursing note and vitals reviewed. MDM  Number of Diagnoses or Management Options  Chronic systolic heart failure ([address]): new and requires workup  Shortness of breath: new and requires workup  Diagnosis management comments: Vital signs stable. Patient well-appearing. CT chest negative for PE. BNP slightly elevated.   Patient given Lasix 60 mg IV. Patient follow up with PCP and cardiologist in 1-2 days. Amount and/or Complexity of Data Reviewed  Clinical lab tests: reviewed and ordered  Tests in the radiology section of CPT®: ordered and reviewed  Tests in the medicine section of CPT®: ordered and reviewed  Review and summarize past medical records: yes  Independent visualization of images, tracings, or specimens: yes    Risk of Complications, Morbidity, and/or Mortality  Presenting problems: moderate  Diagnostic procedures: moderate  Management options: moderate    Patient Progress  Patient progress: stable    ED Course   Comment By Time   CXR Impression: No acute cardiopulmonary abnormality. Melida Lux MD 11/04 1710   CT chest IMPRESSION:  1. No acute pulmonary embolus. 2. Cardiomegaly. Mild dependent atelectasis in the right middle lobe with  subsegmental atelectasis in the left upper lobe. 3. Nonspecific peribronchial interstitial thickening at the bilateral rosalva. Melida Lux MD 11/04 1917   K hemolyzed; confirmed with laboratory. Initial repeat troponin within normal limits. BNP slightly elevated at 296. We will administer Lasix 60 mg IV. Patient currently on Lasix 80 mg 3 times daily. Patient states she is ready for discharge home. Will have patient follow up with PCP and cardiologist within 2-3 days.  Claude Trevino MD 11/04 2000       EKG  Date/Time: 11/4/2017 8:00 PM  Performed by: Ever Rivera  Authorized by: Yajaira Jesus     ECG reviewed by ED Physician in the absence of a cardiologist: yes    Rate:     ECG rate:  60    ECG rate assessment: normal    Rhythm:     Rhythm: paced    Pacing:     Type of pacing:  Atrial  Ectopy:     Ectopy: none    QRS:     QRS axis:  Normal  ST segments:     ST segments:  Normal  T waves:     T waves: normal

## 2017-11-04 NOTE — DISCHARGE INSTRUCTIONS
Learning About a Pacemaker for Heart Failure  What is a pacemaker for heart failure? A pacemaker is a small device that is placed under the skin of your chest. It is powered by batteries. It has thin wires, called leads, that pass through a vein into your heart. A pacemaker for heart failure is a biventricular pacemaker (say \"seymour-oma-TRICK-joni-neelima\"). Treatment that uses this type of pacemaker is called cardiac resynchronization therapy (CRT). When you have heart failure, the lower chambers of your heart may not pump at the same time. The pacemaker sends painless electrical signals to your heart. These signals make the chambers pump at the same time. This can help your heart pump blood better and help you feel better. Your pacemaker may be combined with an ICD, or implantable cardioverter-defibrillator. It can control abnormal heart rhythms. This can prevent sudden death. You may feel worried about having a pacemaker. This is common. It can help if you learn about how the pacemaker helps your heart. Talk to your doctor about your concerns. How is a pacemaker put in place? You will get medicine before the procedure. This helps you relax and helps prevent pain. The doctor makes a cut in the skin just below your collarbone. The cut may be on either side of your chest. The doctor will put the pacemaker leads through the cut. The leads go into a large blood vessel in the upper chest. Then the doctor will guide the leads through the blood vessel into different chambers of the heart. The doctor will place the pacemaker under the skin of your chest. He or she will attach the leads to the pacemaker. Then the cut will be closed with stitches. The procedure usually takes 2 to 3 hours. You may need to spend the night in the hospital.  What can you expect when you have a pacemaker? A pacemaker can help your heart pump blood better. It may help you feel better so you can be more active.  It also may help keep you out of the hospital and help you live longer. You can live a normal, active life with a pacemaker. But you need to avoid strong magnetic and electrical fields. Your doctor or the maker of your pacemaker can give you a full list of things to avoid. But most everyday appliances are safe. Your doctor will check your pacemaker regularly to make sure it's working right. Pacemaker batteries usually last 5 to 15 years before they need to be replaced. Follow-up care is a key part of your treatment and safety. Be sure to make and go to all appointments, and call your doctor if you are having problems. It's also a good idea to know your test results and keep a list of the medicines you take. Where can you learn more? Go to http://jeff-claudine.info/. Enter Y169 in the search box to learn more about \"Learning About a Pacemaker for Heart Failure. \"  Current as of: September 21, 2016  Content Version: 11.4  © 8800-5509 [employer]. Care instructions adapted under license by [employer] (which disclaims liability or warranty for this information). If you have questions about a medical condition or this instruction, always ask your healthcare professional. [place] [address] any warranty or liability for your use of this information. Heart Failure: Care Instructions  Your Care Instructions    Heart failure occurs when your heart does not pump as much blood as the body needs. Failure does not mean that the heart has stopped pumping but rather that it is not pumping as well as it should. Over time, this causes fluid buildup in your lungs and other parts of your body. Fluid buildup can cause shortness of breath, fatigue, swollen ankles, and other problems. By taking medicines regularly, reducing sodium (salt) in your diet, checking your weight every day, and making lifestyle changes, you can feel better and live longer.   Follow-up care is a key part of your treatment and safety. Be sure to make and go to all appointments, and call your doctor if you are having problems. It's also a good idea to know your test results and keep a list of the medicines you take. How can you care for yourself at home? Medicines  ? · Be safe with medicines. Take your medicines exactly as prescribed. Call your doctor if you think you are having a problem with your medicine. ? · Do not take any vitamins, over-the-counter medicine, or herbal products without talking to your doctor first. Maryjo Tobias not take ibuprofen (Advil or Motrin) and naproxen (Aleve) without talking to your doctor first. They could make your heart failure worse. ? · You may be taking some of the following medicine. ¨ Beta-blockers can slow heart rate, decrease blood pressure, and improve your condition. Taking a beta-blocker may lower your chance of needing to be hospitalized. ¨ Angiotensin-converting enzyme inhibitors (ACEIs) reduce the heart's workload, lower blood pressure, and reduce swelling. Taking an ACEI may lower your chance of needing to be hospitalized again. ¨ Angiotensin II receptor blockers (ARBs) work like ACEIs. Your doctor may prescribe them instead of ACEIs. ¨ Diuretics, also called water pills, reduce swelling. ¨ Potassium supplements replace this important mineral, which is sometimes lost with diuretics. ¨ Aspirin and other blood thinners prevent blood clots, which can cause a stroke or heart attack. ? You will get more details on the specific medicines your doctor prescribes. Diet  ? · Your doctor may suggest that you limit sodium to 2,000 milligrams (mg) a day or less. That is less than 1 teaspoon of salt a day, including all the salt you eat in cooking or in packaged foods. People get most of their sodium from processed foods. Fast food and restaurant meals also tend to be very high in sodium. ? · Ask your doctor how much liquid you can drink each day. You may have to limit liquids. ?Weight  ? · Weigh yourself without clothing at the same time each day. Record your weight. Call your doctor if you have a sudden weight gain, such as more than 2 to 3 pounds in a day or 5 pounds in a week. (Your doctor may suggest a different range of weight gain.) A sudden weight gain may mean that your heart failure is getting worse. ? Activity level  ? · Start light exercise (if your doctor says it is okay). Even if you can only do a small amount, exercise will help you get stronger, have more energy, and manage your weight and your stress. Walking is an easy way to get exercise. Start out by walking a little more than you did before. Bit by bit, increase the amount you walk. ? · When you exercise, watch for signs that your heart is working too hard. You are pushing yourself too hard if you cannot talk while you are exercising. If you become short of breath or dizzy or have chest pain, stop, sit down, and rest.   ? · If you feel \"wiped out\" the day after you exercise, walk slower or for a shorter distance until you can work up to a better pace. ? · Get enough rest at night. Sleeping with 1 or 2 pillows under your upper body and head may help you breathe easier. ? Lifestyle changes  ? · Do not smoke. Smoking can make a heart condition worse. If you need help quitting, talk to your doctor about stop-smoking programs and medicines. These can increase your chances of quitting for good. Quitting smoking may be the most important step you can take to protect your heart. ? · Limit alcohol to 2 drinks a day for men and 1 drink a day for women. Too much alcohol can cause health problems. ? · Avoid getting sick from colds and the flu. Get a pneumococcal vaccine shot. If you have had one before, ask your doctor whether you need another dose. Get a flu shot each year. If you must be around people with colds or the flu, wash your hands often. When should you call for help?   Call 911 if you have symptoms of sudden heart failure such as:  ? · You have severe trouble breathing. ? · You cough up pink, foamy mucus. ? · You have a new irregular or rapid heartbeat. ?Call your doctor now or seek immediate medical care if:  ? · You have new or increased shortness of breath. ? · You are dizzy or lightheaded, or you feel like you may faint. ? · You have sudden weight gain, such as more than 2 to 3 pounds in a day or 5 pounds in a week. (Your doctor may suggest a different range of weight gain.)   ? · You have increased swelling in your legs, ankles, or feet. ? · You are suddenly so tired or weak that you cannot do your usual activities. ? Watch closely for changes in your health, and be sure to contact your doctor if you develop new symptoms. Where can you learn more? Go to http://jeff-claudine.info/. Enter W988 in the search box to learn more about \"Heart Failure: Care Instructions. \"  Current as of: September 21, 2016  Content Version: 11.4  © 4211-7927 [employer]. Care instructions adapted under license by [employer] (which disclaims liability or warranty for this information). If you have questions about a medical condition or this instruction, always ask your healthcare professional. [address] any warranty or liability for your use of this information.

## 2017-11-21 ENCOUNTER — HOSPITAL ENCOUNTER (OUTPATIENT)
Dept: LAB | Age: 52
Discharge: HOME | End: 2017-11-21
Payer: MEDICARE

## 2017-11-21 LAB
ALBUMIN SERPL-MCNC: 4 G/DL (ref 3.5–5)
ALBUMIN/GLOB SERPL: 1.2 {RATIO}
ALP SERPL-CCNC: 56 U/L (ref 50–136)
ALT SERPL-CCNC: 40 U/L (ref 12–65)
ANION GAP SERPL CALC-SCNC: 10 MMOL/L
AST SERPL-CCNC: 27 U/L (ref 15–37)
BASOPHILS # BLD: 0 K/UL (ref 0–0.2)
BASOPHILS NFR BLD: 0 % (ref 0–2)
BILIRUB SERPL-MCNC: 0.4 MG/DL (ref 0.2–1.1)
BUN SERPL-MCNC: 24 MG/DL (ref 6–23)
CALCIUM SERPL-MCNC: 9.1 MG/DL (ref 8.3–10.4)
CALCIUM SERPL-MCNC: 9.1 MG/DL (ref 8.3–10.4)
CHLORIDE SERPL-SCNC: 105 MMOL/L (ref 98–107)
CO2 SERPL-SCNC: 29 MMOL/L (ref 21–32)
CREAT SERPL-MCNC: 1.4 MG/DL (ref 0.6–1)
CREAT UR-MCNC: 125 MG/DL
DIFFERENTIAL METHOD BLD: ABNORMAL
EOSINOPHIL # BLD: 0.6 K/UL (ref 0–0.8)
EOSINOPHIL NFR BLD: 8 % (ref 0.5–7.8)
ERYTHROCYTE [DISTWIDTH] IN BLOOD BY AUTOMATED COUNT: 13.3 % (ref 11.9–14.6)
GLOBULIN SER CALC-MCNC: 3.3 G/DL
GLUCOSE SERPL-MCNC: 106 MG/DL (ref 65–100)
HCT VFR BLD AUTO: 40.5 % (ref 35.8–46.3)
HGB BLD-MCNC: 13.3 G/DL (ref 11.7–15.4)
LYMPHOCYTES # BLD: 2.4 K/UL (ref 0.5–4.6)
LYMPHOCYTES NFR BLD: 32 % (ref 13–44)
MAGNESIUM SERPL-MCNC: 2 MG/DL (ref 1.8–2.4)
MCH RBC QN AUTO: 31.2 PG (ref 26.1–32.9)
MCHC RBC AUTO-ENTMCNC: 32.8 G/DL (ref 31.4–35)
MCV RBC AUTO: 95.1 FL (ref 79.6–97.8)
MONOCYTES # BLD: 0.6 K/UL (ref 0.1–1.3)
MONOCYTES NFR BLD: 8 % (ref 4–12)
NEUTS SEG # BLD: 3.9 K/UL (ref 1.7–8.2)
NEUTS SEG NFR BLD: 52 % (ref 43–78)
PHOSPHATE SERPL-MCNC: 2.9 MG/DL (ref 2.4–4.5)
PLATELET # BLD AUTO: 157 K/UL (ref 150–450)
PMV BLD AUTO: 11.8 FL (ref 10.8–14.1)
POTASSIUM SERPL-SCNC: 3.7 MMOL/L (ref 3.5–5.1)
PROT SERPL-MCNC: 7.3 G/DL (ref 6.3–8.2)
PROT UR-MCNC: 27 MG/DL
PROT/CREAT UR-RTO: 0.2
PTH-INTACT SERPL-MCNC: 235.6 PG/ML (ref 14–72)
RBC # BLD AUTO: 4.26 M/UL (ref 4.05–5.25)
SODIUM SERPL-SCNC: 144 MMOL/L (ref 136–145)
WBC # BLD AUTO: 7.6 K/UL (ref 4.3–11.1)

## 2017-11-21 PROCEDURE — 83735 ASSAY OF MAGNESIUM: CPT | Performed by: INTERNAL MEDICINE

## 2017-11-21 PROCEDURE — 80053 COMPREHEN METABOLIC PANEL: CPT | Performed by: INTERNAL MEDICINE

## 2017-11-21 PROCEDURE — 36415 COLL VENOUS BLD VENIPUNCTURE: CPT | Performed by: INTERNAL MEDICINE

## 2017-11-21 PROCEDURE — 82306 VITAMIN D 25 HYDROXY: CPT | Performed by: INTERNAL MEDICINE

## 2017-11-21 PROCEDURE — 83970 ASSAY OF PARATHORMONE: CPT | Performed by: INTERNAL MEDICINE

## 2017-11-21 PROCEDURE — 84100 ASSAY OF PHOSPHORUS: CPT | Performed by: INTERNAL MEDICINE

## 2017-11-21 PROCEDURE — 85025 COMPLETE CBC W/AUTO DIFF WBC: CPT | Performed by: INTERNAL MEDICINE

## 2017-11-21 PROCEDURE — 84156 ASSAY OF PROTEIN URINE: CPT | Performed by: INTERNAL MEDICINE

## 2017-11-22 LAB — 25(OH)D3+25(OH)D2 SERPL-MCNC: 36.5 NG/ML (ref 30–100)

## 2018-02-02 ENCOUNTER — HOSPITAL ENCOUNTER (OUTPATIENT)
Dept: LAB | Age: 53
Discharge: HOME | End: 2018-02-02
Payer: MEDICARE

## 2018-02-02 DIAGNOSIS — I50.22 CHRONIC SYSTOLIC HEART FAILURE (HCC): Chronic | ICD-10-CM

## 2018-02-02 LAB
ALBUMIN SERPL-MCNC: 3.9 G/DL (ref 3.5–5)
ALBUMIN/GLOB SERPL: 1.1 {RATIO}
ALP SERPL-CCNC: 66 U/L (ref 50–136)
ALT SERPL-CCNC: 32 U/L (ref 12–65)
ANION GAP SERPL CALC-SCNC: 5 MMOL/L
AST SERPL-CCNC: 26 U/L (ref 15–37)
BILIRUB SERPL-MCNC: 0.5 MG/DL (ref 0.2–1.1)
BUN SERPL-MCNC: 23 MG/DL (ref 6–23)
CALCIUM SERPL-MCNC: 8.9 MG/DL (ref 8.3–10.4)
CHLORIDE SERPL-SCNC: 108 MMOL/L (ref 98–107)
CO2 SERPL-SCNC: 30 MMOL/L (ref 21–32)
CREAT SERPL-MCNC: 1.5 MG/DL (ref 0.6–1)
GLOBULIN SER CALC-MCNC: 3.7 G/DL
GLUCOSE SERPL-MCNC: 96 MG/DL (ref 65–100)
MAGNESIUM SERPL-MCNC: 2.1 MG/DL (ref 1.8–2.4)
POTASSIUM SERPL-SCNC: 4 MMOL/L (ref 3.5–5.1)
PROT SERPL-MCNC: 7.6 G/DL (ref 6.3–8.2)
SODIUM SERPL-SCNC: 143 MMOL/L (ref 136–145)
TSH SERPL DL<=0.005 MIU/L-ACNC: 0.91 UIU/ML (ref 0.36–3.74)

## 2018-02-02 PROCEDURE — 84443 ASSAY THYROID STIM HORMONE: CPT | Performed by: INTERNAL MEDICINE

## 2018-02-02 PROCEDURE — 36415 COLL VENOUS BLD VENIPUNCTURE: CPT | Performed by: INTERNAL MEDICINE

## 2018-02-02 PROCEDURE — 83735 ASSAY OF MAGNESIUM: CPT | Performed by: INTERNAL MEDICINE

## 2018-02-02 PROCEDURE — 80053 COMPREHEN METABOLIC PANEL: CPT | Performed by: INTERNAL MEDICINE

## 2018-03-02 ENCOUNTER — HOSPITAL ENCOUNTER (OUTPATIENT)
Dept: LAB | Age: 53
Discharge: HOME | End: 2018-03-02
Payer: MEDICARE

## 2018-03-02 LAB
ALBUMIN SERPL-MCNC: 4.2 G/DL (ref 3.5–5)
ALBUMIN/GLOB SERPL: 1 {RATIO}
ALP SERPL-CCNC: 61 U/L (ref 50–136)
ALT SERPL-CCNC: 43 U/L (ref 12–65)
ANION GAP SERPL CALC-SCNC: 6 MMOL/L
AST SERPL-CCNC: 26 U/L (ref 15–37)
BASOPHILS # BLD: 0 K/UL (ref 0–0.2)
BASOPHILS NFR BLD: 0 % (ref 0–2)
BILIRUB SERPL-MCNC: 0.4 MG/DL (ref 0.2–1.1)
BUN SERPL-MCNC: 30 MG/DL (ref 6–23)
CALCIUM SERPL-MCNC: 9.4 MG/DL (ref 8.3–10.4)
CALCIUM SERPL-MCNC: 9.4 MG/DL (ref 8.3–10.4)
CHLORIDE SERPL-SCNC: 105 MMOL/L (ref 98–107)
CO2 SERPL-SCNC: 32 MMOL/L (ref 21–32)
CREAT SERPL-MCNC: 1.6 MG/DL (ref 0.6–1)
CREAT UR-MCNC: 19 MG/DL
DIFFERENTIAL METHOD BLD: NORMAL
EOSINOPHIL # BLD: 0.6 K/UL (ref 0–0.8)
EOSINOPHIL NFR BLD: 7 % (ref 0.5–7.8)
ERYTHROCYTE [DISTWIDTH] IN BLOOD BY AUTOMATED COUNT: 14.3 % (ref 11.9–14.6)
GLOBULIN SER CALC-MCNC: 4.1 G/DL
GLUCOSE SERPL-MCNC: 83 MG/DL (ref 65–100)
HCT VFR BLD AUTO: 42.4 % (ref 35.8–46.3)
HGB BLD-MCNC: 14.2 G/DL (ref 11.7–15.4)
LYMPHOCYTES # BLD: 2.7 K/UL (ref 0.5–4.6)
LYMPHOCYTES NFR BLD: 29 % (ref 13–44)
MAGNESIUM SERPL-MCNC: 2.3 MG/DL (ref 1.8–2.4)
MCH RBC QN AUTO: 31.5 PG (ref 26.1–32.9)
MCHC RBC AUTO-ENTMCNC: 33.5 G/DL (ref 31.4–35)
MCV RBC AUTO: 94 FL (ref 79.6–97.8)
MONOCYTES # BLD: 0.8 K/UL (ref 0.1–1.3)
MONOCYTES NFR BLD: 9 % (ref 4–12)
NEUTS SEG # BLD: 5 K/UL (ref 1.7–8.2)
NEUTS SEG NFR BLD: 55 % (ref 43–78)
PHOSPHATE SERPL-MCNC: 4.9 MG/DL (ref 2.4–4.5)
PLATELET # BLD AUTO: 155 K/UL (ref 150–450)
PMV BLD AUTO: 11.4 FL (ref 10.8–14.1)
POTASSIUM SERPL-SCNC: 4 MMOL/L (ref 3.5–5.1)
PROT SERPL-MCNC: 8.3 G/DL (ref 6.3–8.2)
PROT UR-MCNC: <5 MG/DL
PROT/CREAT UR-RTO: NORMAL
PTH-INTACT SERPL-MCNC: NORMAL PG/ML (ref 14–72)
RBC # BLD AUTO: 4.51 M/UL (ref 4.05–5.25)
SODIUM SERPL-SCNC: 143 MMOL/L (ref 136–145)
WBC # BLD AUTO: 9.2 K/UL (ref 4.3–11.1)

## 2018-03-02 PROCEDURE — 82306 VITAMIN D 25 HYDROXY: CPT | Performed by: INTERNAL MEDICINE

## 2018-03-02 PROCEDURE — 83735 ASSAY OF MAGNESIUM: CPT | Performed by: INTERNAL MEDICINE

## 2018-03-02 PROCEDURE — 80053 COMPREHEN METABOLIC PANEL: CPT | Performed by: INTERNAL MEDICINE

## 2018-03-02 PROCEDURE — 84100 ASSAY OF PHOSPHORUS: CPT | Performed by: INTERNAL MEDICINE

## 2018-03-02 PROCEDURE — 84156 ASSAY OF PROTEIN URINE: CPT | Performed by: INTERNAL MEDICINE

## 2018-03-02 PROCEDURE — 83970 ASSAY OF PARATHORMONE: CPT | Performed by: INTERNAL MEDICINE

## 2018-03-02 PROCEDURE — 85025 COMPLETE CBC W/AUTO DIFF WBC: CPT | Performed by: INTERNAL MEDICINE

## 2018-03-02 PROCEDURE — 36415 COLL VENOUS BLD VENIPUNCTURE: CPT | Performed by: INTERNAL MEDICINE

## 2018-03-03 LAB — 25(OH)D3+25(OH)D2 SERPL-MCNC: 39.7 NG/ML (ref 30–100)

## 2018-04-02 PROBLEM — E11.21 TYPE 2 DIABETES WITH NEPHROPATHY (HCC): Status: ACTIVE | Noted: 2018-04-02

## 2018-05-04 ENCOUNTER — HOSPITAL ENCOUNTER (OUTPATIENT)
Dept: LAB | Age: 53
Discharge: HOME | End: 2018-05-04
Payer: MEDICARE

## 2018-05-04 DIAGNOSIS — I50.22 CHRONIC SYSTOLIC HEART FAILURE (HCC): Chronic | ICD-10-CM

## 2018-05-04 LAB
ALBUMIN SERPL-MCNC: 3.8 G/DL (ref 3.5–5)
ALBUMIN/GLOB SERPL: 1 {RATIO}
ALP SERPL-CCNC: 60 U/L (ref 50–136)
ALT SERPL-CCNC: 40 U/L (ref 12–65)
ANION GAP SERPL CALC-SCNC: 9 MMOL/L
AST SERPL-CCNC: 30 U/L (ref 15–37)
BILIRUB SERPL-MCNC: 0.4 MG/DL (ref 0.2–1.1)
BNP SERPL-MCNC: 198 PG/ML
BUN SERPL-MCNC: 29 MG/DL (ref 6–23)
CALCIUM SERPL-MCNC: 9.4 MG/DL (ref 8.3–10.4)
CHLORIDE SERPL-SCNC: 107 MMOL/L (ref 98–107)
CO2 SERPL-SCNC: 28 MMOL/L (ref 21–32)
CREAT SERPL-MCNC: 1.7 MG/DL (ref 0.6–1)
GLOBULIN SER CALC-MCNC: 3.8 G/DL
GLUCOSE SERPL-MCNC: 102 MG/DL (ref 65–100)
MAGNESIUM SERPL-MCNC: 2.1 MG/DL (ref 1.8–2.4)
POTASSIUM SERPL-SCNC: 3.8 MMOL/L (ref 3.5–5.1)
PROT SERPL-MCNC: 7.6 G/DL (ref 6.3–8.2)
SODIUM SERPL-SCNC: 144 MMOL/L (ref 136–145)
TSH SERPL DL<=0.005 MIU/L-ACNC: 2.31 UIU/ML (ref 0.36–3.74)

## 2018-05-04 PROCEDURE — 83880 ASSAY OF NATRIURETIC PEPTIDE: CPT | Performed by: INTERNAL MEDICINE

## 2018-05-04 PROCEDURE — 36415 COLL VENOUS BLD VENIPUNCTURE: CPT | Performed by: INTERNAL MEDICINE

## 2018-05-04 PROCEDURE — 84443 ASSAY THYROID STIM HORMONE: CPT | Performed by: INTERNAL MEDICINE

## 2018-05-04 PROCEDURE — 83735 ASSAY OF MAGNESIUM: CPT | Performed by: INTERNAL MEDICINE

## 2018-05-04 PROCEDURE — 80053 COMPREHEN METABOLIC PANEL: CPT | Performed by: INTERNAL MEDICINE

## 2018-06-24 ENCOUNTER — APPOINTMENT (OUTPATIENT)
Dept: GENERAL RADIOLOGY | Age: 53
End: 2018-06-24
Attending: EMERGENCY MEDICINE
Payer: MEDICARE

## 2018-06-24 ENCOUNTER — HOSPITAL ENCOUNTER (EMERGENCY)
Age: 53
Discharge: HOME | End: 2018-06-24
Attending: EMERGENCY MEDICINE
Payer: MEDICARE

## 2018-06-24 VITALS
RESPIRATION RATE: 18 BRPM | TEMPERATURE: 98.8 F | OXYGEN SATURATION: 95 % | HEART RATE: 64 BPM | WEIGHT: 218 LBS | HEIGHT: 59 IN | SYSTOLIC BLOOD PRESSURE: 122 MMHG | BODY MASS INDEX: 43.95 KG/M2 | DIASTOLIC BLOOD PRESSURE: 61 MMHG

## 2018-06-24 DIAGNOSIS — R11.2 NON-INTRACTABLE VOMITING WITH NAUSEA, UNSPECIFIED VOMITING TYPE: Primary | ICD-10-CM

## 2018-06-24 DIAGNOSIS — R07.89 ATYPICAL CHEST PAIN: ICD-10-CM

## 2018-06-24 LAB
ALBUMIN SERPL-MCNC: 3.6 G/DL (ref 3.5–5)
ALBUMIN/GLOB SERPL: 0.9 {RATIO} (ref 1.2–3.5)
ALP SERPL-CCNC: 57 U/L (ref 50–136)
ALT SERPL-CCNC: 45 U/L (ref 12–65)
ANION GAP SERPL CALC-SCNC: 11 MMOL/L (ref 7–16)
AST SERPL-CCNC: 40 U/L (ref 15–37)
ATRIAL RATE: 60 BPM
BASOPHILS # BLD: 0 K/UL (ref 0–0.2)
BASOPHILS NFR BLD: 0 % (ref 0–2)
BILIRUB SERPL-MCNC: 0.4 MG/DL (ref 0.2–1.1)
BNP SERPL-MCNC: 81 PG/ML
BUN SERPL-MCNC: 23 MG/DL (ref 6–23)
CALCIUM SERPL-MCNC: 9.3 MG/DL (ref 8.3–10.4)
CALCULATED P AXIS, ECG09: 79 DEGREES
CALCULATED R AXIS, ECG10: 50 DEGREES
CALCULATED T AXIS, ECG11: -6 DEGREES
CHLORIDE SERPL-SCNC: 107 MMOL/L (ref 98–107)
CO2 SERPL-SCNC: 28 MMOL/L (ref 21–32)
CREAT SERPL-MCNC: 1.42 MG/DL (ref 0.6–1)
DIAGNOSIS, 93000: NORMAL
DIFFERENTIAL METHOD BLD: NORMAL
EOSINOPHIL # BLD: 0.4 K/UL (ref 0–0.8)
EOSINOPHIL NFR BLD: 5 % (ref 0.5–7.8)
ERYTHROCYTE [DISTWIDTH] IN BLOOD BY AUTOMATED COUNT: 13.7 % (ref 11.9–14.6)
GLOBULIN SER CALC-MCNC: 4.1 G/DL (ref 2.3–3.5)
GLUCOSE SERPL-MCNC: 79 MG/DL (ref 65–100)
HCT VFR BLD AUTO: 38.8 % (ref 35.8–46.3)
HGB BLD-MCNC: 13.2 G/DL (ref 11.7–15.4)
IMM GRANULOCYTES # BLD: 0 K/UL (ref 0–0.5)
IMM GRANULOCYTES NFR BLD AUTO: 0 % (ref 0–5)
LIPASE SERPL-CCNC: 160 U/L (ref 73–393)
LYMPHOCYTES # BLD: 2 K/UL (ref 0.5–4.6)
LYMPHOCYTES NFR BLD: 26 % (ref 13–44)
MCH RBC QN AUTO: 32.4 PG (ref 26.1–32.9)
MCHC RBC AUTO-ENTMCNC: 34 G/DL (ref 31.4–35)
MCV RBC AUTO: 95.3 FL (ref 79.6–97.8)
MONOCYTES # BLD: 0.7 K/UL (ref 0.1–1.3)
MONOCYTES NFR BLD: 9 % (ref 4–12)
NEUTS SEG # BLD: 4.5 K/UL (ref 1.7–8.2)
NEUTS SEG NFR BLD: 60 % (ref 43–78)
P-R INTERVAL, ECG05: 218 MS
PLATELET # BLD AUTO: 152 K/UL (ref 150–450)
PMV BLD AUTO: 11.6 FL (ref 10.8–14.1)
POTASSIUM SERPL-SCNC: 3.3 MMOL/L (ref 3.5–5.1)
PROT SERPL-MCNC: 7.7 G/DL (ref 6.3–8.2)
Q-T INTERVAL, ECG07: 452 MS
QRS DURATION, ECG06: 120 MS
QTC CALCULATION (BEZET), ECG08: 452 MS
RBC # BLD AUTO: 4.07 M/UL (ref 4.05–5.25)
SODIUM SERPL-SCNC: 146 MMOL/L (ref 136–145)
TROPONIN I BLD-MCNC: 0 NG/ML (ref 0.02–0.05)
TROPONIN I SERPL-MCNC: <0.02 NG/ML (ref 0.02–0.05)
VENTRICULAR RATE, ECG03: 60 BPM
WBC # BLD AUTO: 7.5 K/UL (ref 4.3–11.1)

## 2018-06-24 PROCEDURE — 84484 ASSAY OF TROPONIN QUANT: CPT | Performed by: EMERGENCY MEDICINE

## 2018-06-24 PROCEDURE — 81003 URINALYSIS AUTO W/O SCOPE: CPT | Performed by: EMERGENCY MEDICINE

## 2018-06-24 PROCEDURE — 85025 COMPLETE CBC W/AUTO DIFF WBC: CPT | Performed by: EMERGENCY MEDICINE

## 2018-06-24 PROCEDURE — 96374 THER/PROPH/DIAG INJ IV PUSH: CPT | Performed by: EMERGENCY MEDICINE

## 2018-06-24 PROCEDURE — 96361 HYDRATE IV INFUSION ADD-ON: CPT | Performed by: EMERGENCY MEDICINE

## 2018-06-24 PROCEDURE — 99284 EMERGENCY DEPT VISIT MOD MDM: CPT | Performed by: EMERGENCY MEDICINE

## 2018-06-24 PROCEDURE — 83690 ASSAY OF LIPASE: CPT | Performed by: EMERGENCY MEDICINE

## 2018-06-24 PROCEDURE — 71046 X-RAY EXAM CHEST 2 VIEWS: CPT

## 2018-06-24 PROCEDURE — 93005 ELECTROCARDIOGRAM TRACING: CPT | Performed by: EMERGENCY MEDICINE

## 2018-06-24 PROCEDURE — 83880 ASSAY OF NATRIURETIC PEPTIDE: CPT | Performed by: EMERGENCY MEDICINE

## 2018-06-24 PROCEDURE — 74011250636 HC RX REV CODE- 250/636: Performed by: EMERGENCY MEDICINE

## 2018-06-24 PROCEDURE — 80053 COMPREHEN METABOLIC PANEL: CPT | Performed by: EMERGENCY MEDICINE

## 2018-06-24 RX ORDER — PROMETHAZINE HYDROCHLORIDE 25 MG/1
25 TABLET ORAL
Qty: 12 TAB | Refills: 0 | Status: SHIPPED | OUTPATIENT
Start: 2018-06-24 | End: 2019-01-24

## 2018-06-24 RX ORDER — ONDANSETRON 8 MG/1
8 TABLET, ORALLY DISINTEGRATING ORAL
Qty: 12 TAB | Refills: 1 | Status: SHIPPED | OUTPATIENT
Start: 2018-06-24 | End: 2019-01-25

## 2018-06-24 RX ORDER — ONDANSETRON 2 MG/ML
4 INJECTION INTRAMUSCULAR; INTRAVENOUS
Status: COMPLETED | OUTPATIENT
Start: 2018-06-24 | End: 2018-06-24

## 2018-06-24 RX ADMIN — SODIUM CHLORIDE 1000 ML: 900 INJECTION, SOLUTION INTRAVENOUS at 16:07

## 2018-06-24 RX ADMIN — ONDANSETRON 4 MG: 2 INJECTION INTRAMUSCULAR; INTRAVENOUS at 16:07

## 2018-06-24 NOTE — ED TRIAGE NOTES
Pt to ed with c.o chest tightness, arm pain, vomiting all morning - pt reports that she has a productive cough with white sputum - pt reports nausea - pt reports shob that is worse with movement

## 2018-06-24 NOTE — DISCHARGE INSTRUCTIONS
Nausea and Vomiting: Care Instructions  Your Care Instructions    When you are nauseated, you may feel weak and sweaty and notice a lot of saliva in your mouth. Nausea often leads to vomiting. Most of the time you do not need to worry about nausea and vomiting, but they can be signs of other illnesses. Two common causes of nausea and vomiting are stomach flu and food poisoning. Nausea and vomiting from viral stomach flu will usually start to improve within 24 hours. Nausea and vomiting from food poisoning may last from 12 to 48 hours. The doctor has checked you carefully, but problems can develop later. If you notice any problems or new symptoms, get medical treatment right away. Follow-up care is a key part of your treatment and safety. Be sure to make and go to all appointments, and call your doctor if you are having problems. It's also a good idea to know your test results and keep a list of the medicines you take. How can you care for yourself at home? · To prevent dehydration, drink plenty of fluids, enough so that your urine is light yellow or clear like water. Choose water and other caffeine-free clear liquids until you feel better. If you have kidney, heart, or liver disease and have to limit fluids, talk with your doctor before you increase the amount of fluids you drink. · Rest in bed until you feel better. · When you are able to eat, try clear soups, mild foods, and liquids until all symptoms are gone for 12 to 48 hours. Other good choices include dry toast, crackers, cooked cereal, and gelatin dessert, such as Jell-O. When should you call for help? Call 911 anytime you think you may need emergency care. For example, call if:  ? · You passed out (lost consciousness). ?Call your doctor now or seek immediate medical care if:  ? · You have symptoms of dehydration, such as:  ¨ Dry eyes and a dry mouth. ¨ Passing only a little dark urine.   ¨ Feeling thirstier than usual.   ? · You have new or worsening belly pain. ? · You have a new or higher fever. ? · You vomit blood or what looks like coffee grounds. ? Watch closely for changes in your health, and be sure to contact your doctor if:  ? · You have ongoing nausea and vomiting. ? · Your vomiting is getting worse. ? · Your vomiting lasts longer than 2 days. ? · You are not getting better as expected. Where can you learn more? Go to http://jeff-claudine.info/. Enter 25 [id number] in the search box to learn more about \"Nausea and Vomiting: Care Instructions. \"  Current as of: March 20, 2017  Content Version: 11.4  © 2863-1349 [employer]. Care instructions adapted under license by [employer] (which disclaims liability or warranty for this information). If you have questions about a medical condition or this instruction, always ask your healthcare professional. [place] [address] any warranty or liability for your use of this information. Chest Pain: Care Instructions  Your Care Instructions    There are many things that can cause chest pain. Some are not serious and will get better on their own in a few days. But some kinds of chest pain need more testing and treatment. Your doctor may have recommended a follow-up visit in the next 8 to 12 hours. If you are not getting better, you may need more tests or treatment. Even though your doctor has released you, you still need to watch for any problems. The doctor carefully checked you, but sometimes problems can develop later. If you have new symptoms or if your symptoms do not get better, get medical care right away. If you have worse or different chest pain or pressure that lasts more than 5 minutes or you passed out (lost consciousness), call 911 or seek other emergency help right away. A medical visit is only one step in your treatment.  Even if you feel better, you still need to do what your doctor recommends, such as going to all suggested follow-up appointments and taking medicines exactly as directed. This will help you recover and help prevent future problems. How can you care for yourself at home? · Rest until you feel better. · Take your medicine exactly as prescribed. Call your doctor if you think you are having a problem with your medicine. · Do not drive after taking a prescription pain medicine. When should you call for help? Call 911 if:  ? · You passed out (lost consciousness). ? · You have severe difficulty breathing. ? · You have symptoms of a heart attack. These may include:  ¨ Chest pain or pressure, or a strange feeling in your chest.  ¨ Sweating. ¨ Shortness of breath. ¨ Nausea or vomiting. ¨ Pain, pressure, or a strange feeling in your back, neck, jaw, or upper belly or in one or both shoulders or arms. ¨ Lightheadedness or sudden weakness. ¨ A fast or irregular heartbeat. After you call 911, the  may tell you to chew 1 adult-strength or 2 to 4 low-dose aspirin. Wait for an ambulance. Do not try to drive yourself. ?Call your doctor today if:  ? · You have any trouble breathing. ? · Your chest pain gets worse. ? · You are dizzy or lightheaded, or you feel like you may faint. ? · You are not getting better as expected. ? · You are having new or different chest pain. Where can you learn more? Go to http://jeff-claudine.info/. Enter A120 in the search box to learn more about \"Chest Pain: Care Instructions. \"  Current as of: March 20, 2017  Content Version: 11.4  © 4244-6183 [employer]. Care instructions adapted under license by [employer] (which disclaims liability or warranty for this information). If you have questions about a medical condition or this instruction, always ask your healthcare professional. [address] any warranty or liability for your use of this information.

## 2018-06-24 NOTE — ED PROVIDER NOTES
Patient is a 45 y.o. female presenting with vomiting. The history is provided by the patient. Vomiting    This is a new problem. Episode onset: 9 AM this morning, shortly after eating breakfast. Episode frequency: Vomited 4 or 5 times total is morning. The problem has been gradually improving. The emesis has an appearance of stomach contents. There has been no fever. Associated symptoms include chills. Pertinent negatives include no fever, no abdominal pain, no diarrhea, no headaches, no cough, no URI and no headaches. Associated symptoms comments: 30 minutes of sharp central to left-sided chest pain began after the first spell of vomiting. Mostly resolved at this point. . Her past medical history is significant for DM. Past medical history comments: heart failure with implantable defibrillator. .        Past Medical History:   Diagnosis Date    Arrhythmia     ablation for svt    Arthritis     Automatic implantable cardioverter-defibrillator in situ 3/30/2016    CAD in native artery 3/30/2016    Chronic systolic heart failure ([address] 2013    Diabetes ([address]     type 2 (oral med controlled)    Diabetes mellitus ([address] 2010    Dyslipidemia 2013    Fibromyalgia     GERD (gastroesophageal reflux disease)     Heart failure (HCC)     chronic systolic with EF 36%; non-ischemic cardiomyopathy    HTN (hypertension) 2010    Ill-defined condition     Pt denies fibromyalgia.  Pt feels she has neuropathy    Morbid obesity ([address]     NICM (nonischemic cardiomyopathy) ([address] 2/15/2013    Obstructive sleep apnea (adult) (pediatric) 2014    Other ill-defined conditions(799.89)     svt ablation    Sciatic pain 2016    Tobacco use disorder 2010       Past Surgical History:   Procedure Laterality Date    HX  SECTION      HX GYN      YEIMI-Left ovary intact per pt    HX HERNIA REPAIR      mild incarceration, no bowel removal    HX IMPLANTABLE CARDIOVERTER DEFIBRILLATOR      HX OTHER SURGICAL      cardiac ablation for svt    HX OTHER SURGICAL      right shoulder surgery    HX PACEMAKER      HX TONSILLECTOMY      ME LEFT HEART CATH,PERCUTANEOUS  2/13/2013    no intervention         Family History:   Problem Relation Age of Onset    Heart Attack Father 48     mi   Katiana Cid Stroke Father     Hypertension Father     Diabetes Other     Stroke Mother     Diabetes Brother     Heart Disease Brother     Hypertension Brother     Breast Cancer Sister 37       Social History     Social History    Marital status: SINGLE     Spouse name: N/A    Number of children: N/A    Years of education: N/A     Occupational History    Not on file. Social History Main Topics    Smoking status: Current Every Day Smoker     Packs/day: 0.25     Years: 27.00    Smokeless tobacco: Never Used      Comment: smokes occassionally    Alcohol use Yes      Comment: occaisonally    Drug use: Yes     Special: Marijuana      Comment: Every now and then    Sexual activity: No     Other Topics Concern    Seat Belt Yes     Social History Narrative    Denies physical or sexual abuse         ALLERGIES: Review of patient's allergies indicates no known allergies. Review of Systems   Constitutional: Positive for chills. Negative for fever. HENT: Negative for rhinorrhea and sore throat. Eyes: Negative for discharge and redness. Respiratory: Negative for cough and shortness of breath. Cardiovascular: Positive for chest pain. Negative for palpitations. Gastrointestinal: Positive for nausea and vomiting. Negative for abdominal pain and diarrhea. Genitourinary: Negative for difficulty urinating and frequency. Skin: Negative for rash. Neurological: Negative for dizziness and headaches. All other systems reviewed and are negative.       Vitals:    06/24/18 1321   BP: 130/84   Pulse: 62   Resp: 20   Temp: 98.7 °F (37.1 °C)   SpO2: 96%   Weight: 98.9 kg (218 lb)   Height: 4' 11\" (1.499 m)            Physical Exam   Constitutional: She is oriented to person, place, and time. She appears well-developed and well-nourished. No distress. HENT:   Head: Normocephalic and atraumatic. Eyes: Conjunctivae and EOM are normal. Pupils are equal, round, and reactive to light. Right eye exhibits no discharge. Left eye exhibits no discharge. No scleral icterus. Neck: Normal range of motion. Neck supple. Cardiovascular: Normal rate, regular rhythm and normal heart sounds. Exam reveals no gallop. No murmur heard. Pulmonary/Chest: Effort normal and breath sounds normal. No respiratory distress. She has no wheezes. She has no rales. Abdominal: Soft. Bowel sounds are normal. She exhibits no distension. There is no tenderness. There is no rebound and no guarding. Musculoskeletal: Normal range of motion. Neurological: She is alert and oriented to person, place, and time. She exhibits normal muscle tone. cni 2-12 grossly   Skin: Skin is warm and dry. She is not diaphoretic. Psychiatric: She has a normal mood and affect. Her behavior is normal.   Nursing note and vitals reviewed. MDM  Number of Diagnoses or Management Options  Diagnosis management comments: Medical decision making note:  Patient with fibromyalgia and congestive heart failure status post implantable defibrillator presents with nausea vomiting onset this morning. Patient did develop some chest pain following vomiting. Treatment the nausea which is largely resolved at this point, check EKG and enzymes and other labs. Differential diagnoses to include food poisoning, gastritis, acute MI, pancreatitis, cholecystitis  This concludes the \"medical decision making note\" part of this emergency department visit note.           ED Course       Procedures

## 2018-08-28 ENCOUNTER — APPOINTMENT (OUTPATIENT)
Dept: CT IMAGING | Age: 53
End: 2018-08-28
Payer: MEDICARE

## 2018-08-28 ENCOUNTER — HOSPITAL ENCOUNTER (EMERGENCY)
Age: 53
Discharge: HOME | End: 2018-08-28
Payer: MEDICARE

## 2018-08-28 ENCOUNTER — APPOINTMENT (OUTPATIENT)
Dept: GENERAL RADIOLOGY | Age: 53
End: 2018-08-28
Payer: MEDICARE

## 2018-08-28 VITALS
OXYGEN SATURATION: 96 % | HEART RATE: 61 BPM | TEMPERATURE: 98.1 F | DIASTOLIC BLOOD PRESSURE: 76 MMHG | WEIGHT: 205 LBS | BODY MASS INDEX: 41.33 KG/M2 | SYSTOLIC BLOOD PRESSURE: 133 MMHG | HEIGHT: 59 IN | RESPIRATION RATE: 16 BRPM

## 2018-08-28 DIAGNOSIS — R07.89 ATYPICAL CHEST PAIN: Primary | ICD-10-CM

## 2018-08-28 LAB
ALBUMIN SERPL-MCNC: 3.6 G/DL (ref 3.5–5)
ALBUMIN/GLOB SERPL: 0.9 {RATIO} (ref 1.2–3.5)
ALP SERPL-CCNC: 69 U/L (ref 50–136)
ALT SERPL-CCNC: 32 U/L (ref 12–65)
ANION GAP SERPL CALC-SCNC: 10 MMOL/L (ref 7–16)
AST SERPL-CCNC: 24 U/L (ref 15–37)
ATRIAL RATE: 60 BPM
BASOPHILS # BLD: 0 K/UL (ref 0–0.2)
BASOPHILS NFR BLD: 1 % (ref 0–2)
BILIRUB SERPL-MCNC: 0.3 MG/DL (ref 0.2–1.1)
BNP SERPL-MCNC: 176 PG/ML
BUN SERPL-MCNC: 21 MG/DL (ref 6–23)
CALCIUM SERPL-MCNC: 9.7 MG/DL (ref 8.3–10.4)
CALCULATED P AXIS, ECG09: 81 DEGREES
CALCULATED R AXIS, ECG10: 71 DEGREES
CALCULATED T AXIS, ECG11: 62 DEGREES
CHLORIDE SERPL-SCNC: 109 MMOL/L (ref 98–107)
CO2 SERPL-SCNC: 28 MMOL/L (ref 21–32)
CREAT SERPL-MCNC: 1.44 MG/DL (ref 0.6–1)
D DIMER PPP FEU-MCNC: 0.96 UG/ML(FEU)
DIAGNOSIS, 93000: NORMAL
DIFFERENTIAL METHOD BLD: ABNORMAL
EOSINOPHIL # BLD: 0.5 K/UL (ref 0–0.8)
EOSINOPHIL NFR BLD: 5 % (ref 0.5–7.8)
ERYTHROCYTE [DISTWIDTH] IN BLOOD BY AUTOMATED COUNT: 13.1 %
GLOBULIN SER CALC-MCNC: 3.9 G/DL (ref 2.3–3.5)
GLUCOSE SERPL-MCNC: 114 MG/DL (ref 65–100)
HCT VFR BLD AUTO: 38 % (ref 35.8–46.3)
HGB BLD-MCNC: 12.5 G/DL (ref 11.7–15.4)
IMM GRANULOCYTES # BLD: 0 K/UL (ref 0–0.5)
IMM GRANULOCYTES NFR BLD AUTO: 0 % (ref 0–5)
LYMPHOCYTES # BLD: 2.9 K/UL (ref 0.5–4.6)
LYMPHOCYTES NFR BLD: 35 % (ref 13–44)
MCH RBC QN AUTO: 31.6 PG (ref 26.1–32.9)
MCHC RBC AUTO-ENTMCNC: 32.9 G/DL (ref 31.4–35)
MCV RBC AUTO: 96.2 FL (ref 79.6–97.8)
MONOCYTES # BLD: 0.6 K/UL (ref 0.1–1.3)
MONOCYTES NFR BLD: 7 % (ref 4–12)
NEUTS SEG # BLD: 4.3 K/UL (ref 1.7–8.2)
NEUTS SEG NFR BLD: 52 % (ref 43–78)
NRBC # BLD: 0 K/UL (ref 0–0.2)
P-R INTERVAL, ECG05: 184 MS
PLATELET # BLD AUTO: 134 K/UL (ref 150–450)
PMV BLD AUTO: 12 FL (ref 9.4–12.3)
POTASSIUM SERPL-SCNC: 3.5 MMOL/L (ref 3.5–5.1)
PROT SERPL-MCNC: 7.5 G/DL (ref 6.3–8.2)
Q-T INTERVAL, ECG07: 648 MS
QRS DURATION, ECG06: 120 MS
QTC CALCULATION (BEZET), ECG08: 699 MS
RBC # BLD AUTO: 3.95 M/UL (ref 4.05–5.2)
SODIUM SERPL-SCNC: 147 MMOL/L (ref 136–145)
TROPONIN I BLD-MCNC: 0.01 NG/ML (ref 0.02–0.05)
TROPONIN I BLD-MCNC: 0.01 NG/ML (ref 0.02–0.05)
VENTRICULAR RATE, ECG03: 70 BPM
WBC # BLD AUTO: 8.4 K/UL (ref 4.3–11.1)

## 2018-08-28 PROCEDURE — 74011636320 HC RX REV CODE- 636/320

## 2018-08-28 PROCEDURE — 74011250637 HC RX REV CODE- 250/637

## 2018-08-28 PROCEDURE — 83880 ASSAY OF NATRIURETIC PEPTIDE: CPT

## 2018-08-28 PROCEDURE — 93005 ELECTROCARDIOGRAM TRACING: CPT

## 2018-08-28 PROCEDURE — 85379 FIBRIN DEGRADATION QUANT: CPT

## 2018-08-28 PROCEDURE — 99285 EMERGENCY DEPT VISIT HI MDM: CPT

## 2018-08-28 PROCEDURE — 96361 HYDRATE IV INFUSION ADD-ON: CPT

## 2018-08-28 PROCEDURE — 74011250636 HC RX REV CODE- 250/636

## 2018-08-28 PROCEDURE — 96374 THER/PROPH/DIAG INJ IV PUSH: CPT

## 2018-08-28 PROCEDURE — 74011000258 HC RX REV CODE- 258

## 2018-08-28 PROCEDURE — 84484 ASSAY OF TROPONIN QUANT: CPT

## 2018-08-28 PROCEDURE — 80053 COMPREHEN METABOLIC PANEL: CPT

## 2018-08-28 PROCEDURE — 85025 COMPLETE CBC W/AUTO DIFF WBC: CPT

## 2018-08-28 PROCEDURE — 71260 CT THORAX DX C+: CPT

## 2018-08-28 PROCEDURE — 71045 X-RAY EXAM CHEST 1 VIEW: CPT

## 2018-08-28 RX ORDER — SODIUM CHLORIDE 0.9 % (FLUSH) 0.9 %
10 SYRINGE (ML) INJECTION
Status: COMPLETED | OUTPATIENT
Start: 2018-08-28 | End: 2018-08-28

## 2018-08-28 RX ORDER — GUAIFENESIN 100 MG/5ML
324 LIQUID (ML) ORAL
Status: COMPLETED | OUTPATIENT
Start: 2018-08-28 | End: 2018-08-28

## 2018-08-28 RX ORDER — FUROSEMIDE 10 MG/ML
40 INJECTION INTRAMUSCULAR; INTRAVENOUS
Status: COMPLETED | OUTPATIENT
Start: 2018-08-28 | End: 2018-08-28

## 2018-08-28 RX ADMIN — SODIUM CHLORIDE 100 ML: 900 INJECTION, SOLUTION INTRAVENOUS at 03:36

## 2018-08-28 RX ADMIN — NITROGLYCERIN 1 INCH: 20 OINTMENT TOPICAL at 01:18

## 2018-08-28 RX ADMIN — SODIUM CHLORIDE 500 ML: 900 INJECTION, SOLUTION INTRAVENOUS at 03:22

## 2018-08-28 RX ADMIN — IOPAMIDOL 100 ML: 755 INJECTION, SOLUTION INTRAVENOUS at 03:36

## 2018-08-28 RX ADMIN — ASPIRIN 81 MG 324 MG: 81 TABLET ORAL at 01:18

## 2018-08-28 RX ADMIN — SODIUM CHLORIDE 125 ML/HR: 900 INJECTION, SOLUTION INTRAVENOUS at 03:03

## 2018-08-28 RX ADMIN — FUROSEMIDE 40 MG: 10 INJECTION, SOLUTION INTRAMUSCULAR; INTRAVENOUS at 01:18

## 2018-08-28 RX ADMIN — Medication 10 ML: at 03:36

## 2018-08-28 NOTE — ED TRIAGE NOTES
\"I am having this tightness in my chest and trouble breathing, its been like this for a couple of days\"

## 2018-08-28 NOTE — DISCHARGE INSTRUCTIONS
Chest Pain: Care Instructions  Your Care Instructions    There are many things that can cause chest pain. Some are not serious and will get better on their own in a few days. But some kinds of chest pain need more testing and treatment. Your doctor may have recommended a follow-up visit in the next 8 to 12 hours. If you are not getting better, you may need more tests or treatment. Even though your doctor has released you, you still need to watch for any problems. The doctor carefully checked you, but sometimes problems can develop later. If you have new symptoms or if your symptoms do not get better, get medical care right away. If you have worse or different chest pain or pressure that lasts more than 5 minutes or you passed out (lost consciousness), call 911 or seek other emergency help right away. A medical visit is only one step in your treatment. Even if you feel better, you still need to do what your doctor recommends, such as going to all suggested follow-up appointments and taking medicines exactly as directed. This will help you recover and help prevent future problems. How can you care for yourself at home? · Rest until you feel better. · Take your medicine exactly as prescribed. Call your doctor if you think you are having a problem with your medicine. · Do not drive after taking a prescription pain medicine. When should you call for help? Call 911 if:    · You passed out (lost consciousness).     · You have severe difficulty breathing.     · You have symptoms of a heart attack. These may include:  ¨ Chest pain or pressure, or a strange feeling in your chest.  ¨ Sweating. ¨ Shortness of breath. ¨ Nausea or vomiting. ¨ Pain, pressure, or a strange feeling in your back, neck, jaw, or upper belly or in one or both shoulders or arms. ¨ Lightheadedness or sudden weakness. ¨ A fast or irregular heartbeat.   After you call 911, the  may tell you to chew 1 adult-strength or 2 to 4 low-dose aspirin. Wait for an ambulance. Do not try to drive yourself.    Call your doctor today if:    · You have any trouble breathing.     · Your chest pain gets worse.     · You are dizzy or lightheaded, or you feel like you may faint.     · You are not getting better as expected.     · You are having new or different chest pain. Where can you learn more? Go to http://jeff-claudine.info/. Enter A120 in the search box to learn more about \"Chest Pain: Care Instructions. \"  Current as of: November 20, 2017  Content Version: 11.7  © 8783-0797 [employer]. Care instructions adapted under license by [employer] (which disclaims liability or warranty for this information). If you have questions about a medical condition or this instruction, always ask your healthcare professional. [address] any warranty or liability for your use of this information.

## 2018-08-28 NOTE — ED NOTES
I have reviewed discharge instructions with the patient. The patient verbalized understanding. Patient left ED via Discharge Method: ambulatory to Home with self. Opportunity for questions and clarification provided. Patient given 0 scripts. No e-sign. To continue your aftercare when you leave the hospital, you may receive an automated call from our care team to check in on how you are doing. This is a free service and part of our promise to provide the best care and service to meet your aftercare needs.  If you have questions, or wish to unsubscribe from this service please call [phone]. Thank you for Choosing our [facility] Emergency Department.

## 2018-08-28 NOTE — ED PROVIDER NOTES
HPI Comments: 49-year-old female complaining of chest pain shortness of breath. Patient states that the chest pain started tonight  But this shortness of breath now for over 2 days. Patient has a history of CHF  Cardiac disease with  Pacemaker defibrillator. Patient has an ejection fraction of 39% (modified Arriaza rule). Patient is a 45 y.o. female presenting with chest pain and shortness of breath. The history is provided by the patient. Chest Pain (Angina)    This is a recurrent problem. The current episode started 2 days ago. The problem has been gradually worsening. The problem occurs constantly. The pain is associated with normal activity. The pain is at a severity of 8/10. The pain is moderate. The quality of the pain is described as tightness. The pain does not radiate. Associated symptoms include shortness of breath. Shortness of Breath   Associated symptoms include chest pain. Past Medical History:   Diagnosis Date    Arrhythmia     ablation for svt    Arthritis     Automatic implantable cardioverter-defibrillator in situ 3/30/2016    CAD in native artery 3/30/2016    Chronic systolic heart failure ([address] 2013    Diabetes ([address]     type 2 (oral med controlled)    Diabetes mellitus ([address] 2010    Dyslipidemia 2013    Fibromyalgia     GERD (gastroesophageal reflux disease)     Heart failure ([address]     chronic systolic with EF 48%; non-ischemic cardiomyopathy    HTN (hypertension) 2010    Ill-defined condition     Pt denies fibromyalgia.  Pt feels she has neuropathy    Morbid obesity ([address]     NICM (nonischemic cardiomyopathy) ([address] 2/15/2013    Obstructive sleep apnea (adult) (pediatric) 2014    Other ill-defined conditions(799.89)     svt ablation    Sciatic pain 2016    Tobacco use disorder 2010       Past Surgical History:   Procedure Laterality Date    HX  SECTION      HX GYN      YEIMI-Left ovary intact per pt    HX HERNIA REPAIR      mild incarceration, no bowel removal    HX IMPLANTABLE CARDIOVERTER DEFIBRILLATOR      HX OTHER SURGICAL      cardiac ablation for svt    HX OTHER SURGICAL      right shoulder surgery    HX PACEMAKER      HX TONSILLECTOMY      [place] LEFT HEART CATH,PERCUTANEOUS  2/13/2013    no intervention         Family History:   Problem Relation Age of Onset    Heart Attack Father 48     mi   [de-identified] Stroke Father     Hypertension Father     Diabetes Other     Stroke Mother     Diabetes Brother     Heart Disease Brother     Hypertension Brother     Breast Cancer Sister 37       Social History     Social History    Marital status: SINGLE     Spouse name: N/A    Number of children: N/A    Years of education: N/A     Occupational History    Not on file. Social History Main Topics    Smoking status: Current Every Day Smoker     Packs/day: 0.25     Years: 27.00    Smokeless tobacco: Never Used      Comment: smokes occassionally    Alcohol use Yes      Comment: occaisonally    Drug use: Yes     Special: Marijuana      Comment: Every now and then    Sexual activity: No     Other Topics Concern    Seat Belt Yes     Social History Narrative    Denies physical or sexual abuse         ALLERGIES: Review of patient's allergies indicates no known allergies. Review of Systems   Constitutional: Negative. Negative for activity change. HENT: Negative. Eyes: Negative. Respiratory: Positive for shortness of breath. Cardiovascular: Positive for chest pain. Gastrointestinal: Negative. Genitourinary: Negative. Musculoskeletal: Negative. Skin: Negative. Neurological: Negative. Psychiatric/Behavioral: Negative. All other systems reviewed and are negative.       Vitals:    08/28/18 0042   BP: 151/78   Pulse: 60   Resp: 18   Temp: 98.3 °F (36.8 °C)   SpO2: 100%   Weight: 93 kg (205 lb)   Height: 4' 11\" (1.499 m)            Physical Exam   Constitutional: She is oriented to person, place, and time. She appears well-developed and well-nourished. No distress. HENT:   Head: Normocephalic and atraumatic. Right Ear: External ear normal.   Left Ear: External ear normal.   Nose: Nose normal.   Mouth/Throat: Oropharynx is clear and moist. No oropharyngeal exudate. Eyes: Conjunctivae and EOM are normal. Pupils are equal, round, and reactive to light. Right eye exhibits no discharge. Left eye exhibits no discharge. No scleral icterus. Neck: Normal range of motion. Neck supple. No JVD present. No tracheal deviation present. Cardiovascular: Normal rate, regular rhythm and intact distal pulses. Pulmonary/Chest: Effort normal. No accessory muscle usage or stridor. No respiratory distress. She has no wheezes. She has rales in the right lower field and the left lower field. She exhibits no tenderness. Abdominal: Soft. Bowel sounds are normal. She exhibits no distension and no mass. There is no tenderness. Musculoskeletal: Normal range of motion. She exhibits no edema or tenderness. Neurological: She is alert and oriented to person, place, and time. No cranial nerve deficit. Skin: Skin is warm and dry. No rash noted. She is not diaphoretic. No erythema. No pallor. Psychiatric: She has a normal mood and affect. Her behavior is normal. Thought content normal.   Nursing note and vitals reviewed. MDM  Number of Diagnoses or Management Options  Atypical chest pain: new and requires workup  Diagnosis management comments: CT chest negative labs normaltroponins negative follow-up with primary care physician.        Amount and/or Complexity of Data Reviewed  Clinical lab tests: ordered and reviewed  Tests in the radiology section of CPT®: ordered and reviewed  Tests in the medicine section of CPT®: ordered and reviewed          ED Course       Procedures

## 2018-09-18 ENCOUNTER — HOSPITAL ENCOUNTER (EMERGENCY)
Age: 53
Discharge: HOME | End: 2018-09-18
Attending: EMERGENCY MEDICINE
Payer: MEDICARE

## 2018-09-18 ENCOUNTER — APPOINTMENT (OUTPATIENT)
Dept: CT IMAGING | Age: 53
End: 2018-09-18
Attending: EMERGENCY MEDICINE
Payer: MEDICARE

## 2018-09-18 VITALS
HEART RATE: 67 BPM | SYSTOLIC BLOOD PRESSURE: 142 MMHG | BODY MASS INDEX: 43.14 KG/M2 | TEMPERATURE: 98.1 F | WEIGHT: 214 LBS | RESPIRATION RATE: 18 BRPM | HEIGHT: 59 IN | OXYGEN SATURATION: 98 % | DIASTOLIC BLOOD PRESSURE: 77 MMHG

## 2018-09-18 DIAGNOSIS — N39.0 URINARY TRACT INFECTION WITHOUT HEMATURIA, SITE UNSPECIFIED: ICD-10-CM

## 2018-09-18 DIAGNOSIS — R10.31 ABDOMINAL PAIN, RIGHT LOWER QUADRANT: Primary | ICD-10-CM

## 2018-09-18 LAB
ALBUMIN SERPL-MCNC: 3.6 G/DL (ref 3.5–5)
ALBUMIN/GLOB SERPL: 1 {RATIO} (ref 1.2–3.5)
ALP SERPL-CCNC: 74 U/L (ref 50–136)
ALT SERPL-CCNC: 41 U/L (ref 12–65)
ANION GAP SERPL CALC-SCNC: 6 MMOL/L (ref 7–16)
AST SERPL-CCNC: 33 U/L (ref 15–37)
BACTERIA URNS QL MICRO: NORMAL /HPF
BASOPHILS # BLD: 0 K/UL (ref 0–0.2)
BASOPHILS NFR BLD: 0 % (ref 0–2)
BILIRUB SERPL-MCNC: 0.2 MG/DL (ref 0.2–1.1)
BUN SERPL-MCNC: 18 MG/DL (ref 6–23)
CALCIUM SERPL-MCNC: 8.9 MG/DL (ref 8.3–10.4)
CASTS URNS QL MICRO: NORMAL /LPF
CHLORIDE SERPL-SCNC: 110 MMOL/L (ref 98–107)
CO2 SERPL-SCNC: 30 MMOL/L (ref 21–32)
CREAT SERPL-MCNC: 1.52 MG/DL (ref 0.6–1)
DIFFERENTIAL METHOD BLD: ABNORMAL
EOSINOPHIL # BLD: 0.3 K/UL (ref 0–0.8)
EOSINOPHIL NFR BLD: 4 % (ref 0.5–7.8)
EPI CELLS #/AREA URNS HPF: NORMAL /HPF
ERYTHROCYTE [DISTWIDTH] IN BLOOD BY AUTOMATED COUNT: 13.4 %
GLOBULIN SER CALC-MCNC: 3.7 G/DL (ref 2.3–3.5)
GLUCOSE SERPL-MCNC: 86 MG/DL (ref 65–100)
HCT VFR BLD AUTO: 39.8 % (ref 35.8–46.3)
HGB BLD-MCNC: 12.9 G/DL (ref 11.7–15.4)
IMM GRANULOCYTES # BLD: 0 K/UL (ref 0–0.5)
IMM GRANULOCYTES NFR BLD AUTO: 0 % (ref 0–5)
LIPASE SERPL-CCNC: 161 U/L (ref 73–393)
LYMPHOCYTES # BLD: 2.2 K/UL (ref 0.5–4.6)
LYMPHOCYTES NFR BLD: 27 % (ref 13–44)
MCH RBC QN AUTO: 31.5 PG (ref 26.1–32.9)
MCHC RBC AUTO-ENTMCNC: 32.4 G/DL (ref 31.4–35)
MCV RBC AUTO: 97.3 FL (ref 79.6–97.8)
MONOCYTES # BLD: 0.7 K/UL (ref 0.1–1.3)
MONOCYTES NFR BLD: 8 % (ref 4–12)
NEUTS SEG # BLD: 5 K/UL (ref 1.7–8.2)
NEUTS SEG NFR BLD: 61 % (ref 43–78)
NRBC # BLD: 0 K/UL (ref 0–0.2)
PLATELET # BLD AUTO: 138 K/UL (ref 150–450)
PMV BLD AUTO: 12.2 FL (ref 9.4–12.3)
POTASSIUM SERPL-SCNC: 3.6 MMOL/L (ref 3.5–5.1)
PROT SERPL-MCNC: 7.3 G/DL (ref 6.3–8.2)
RBC # BLD AUTO: 4.09 M/UL (ref 4.05–5.2)
RBC #/AREA URNS HPF: NORMAL /HPF
SODIUM SERPL-SCNC: 146 MMOL/L (ref 136–145)
WBC # BLD AUTO: 8.3 K/UL (ref 4.3–11.1)
WBC URNS QL MICRO: NORMAL /HPF

## 2018-09-18 PROCEDURE — 74176 CT ABD & PELVIS W/O CONTRAST: CPT

## 2018-09-18 PROCEDURE — 83690 ASSAY OF LIPASE: CPT

## 2018-09-18 PROCEDURE — 80053 COMPREHEN METABOLIC PANEL: CPT

## 2018-09-18 PROCEDURE — 74011250637 HC RX REV CODE- 250/637: Performed by: EMERGENCY MEDICINE

## 2018-09-18 PROCEDURE — 81003 URINALYSIS AUTO W/O SCOPE: CPT | Performed by: EMERGENCY MEDICINE

## 2018-09-18 PROCEDURE — 85025 COMPLETE CBC W/AUTO DIFF WBC: CPT

## 2018-09-18 PROCEDURE — 99284 EMERGENCY DEPT VISIT MOD MDM: CPT | Performed by: EMERGENCY MEDICINE

## 2018-09-18 PROCEDURE — 81015 MICROSCOPIC EXAM OF URINE: CPT

## 2018-09-18 RX ORDER — CEPHALEXIN 500 MG/1
500 CAPSULE ORAL 3 TIMES DAILY
Qty: 15 CAP | Refills: 0 | Status: SHIPPED | OUTPATIENT
Start: 2018-09-18 | End: 2018-09-23

## 2018-09-18 RX ORDER — CEPHALEXIN 500 MG/1
500 CAPSULE ORAL
Status: COMPLETED | OUTPATIENT
Start: 2018-09-18 | End: 2018-09-18

## 2018-09-18 RX ORDER — OXYCODONE AND ACETAMINOPHEN 5; 325 MG/1; MG/1
1 TABLET ORAL
Status: COMPLETED | OUTPATIENT
Start: 2018-09-18 | End: 2018-09-18

## 2018-09-18 RX ADMIN — OXYCODONE AND ACETAMINOPHEN 1 TABLET: 5; 325 TABLET ORAL at 19:08

## 2018-09-18 RX ADMIN — CEPHALEXIN 500 MG: 500 CAPSULE ORAL at 19:08

## 2018-09-18 NOTE — ED TRIAGE NOTES
Pt have RLQ pain. Been having pain for last 2 days. Went to urgent care last night and was instructed to come to ER but decided to take OTC med at home instead. Denies N/V/D.

## 2018-09-18 NOTE — ED PROVIDER NOTES
HPI Comments: Here with 2 dqays of mainly right lower abdominal soreness/ no anorexia/ no n-v-d/ no noted urinary sx. No hernia soreness. No fever or chills. No hematuria. Has some baseline renal insufficiency Patient is a 45 y.o. female presenting with abdominal pain. The history is provided by the patient. Abdominal Pain This is a new problem. The current episode started 2 days ago. The problem occurs constantly. The problem has not changed since onset. The pain is located in the RLQ. Pertinent negatives include no fever, no diarrhea, no hematochezia, no melena, no nausea, no vomiting and no constipation. Nothing worsens the pain. The pain is relieved by nothing. Past workup includes no CT scan. Her past medical history is significant for DM. Her past medical history does not include irritable bowel syndrome, pancreatitis or diverticulitis. The patient's surgical history includes hysterectomy. Past Medical History:  
Diagnosis Date  Arrhythmia   
 ablation for svt  Arthritis  Automatic implantable cardioverter-defibrillator in situ 3/30/2016  CAD in native artery 3/30/2016  Chronic systolic heart failure ([address] 2013  Diabetes ([address] type 2 (oral med controlled)  Diabetes mellitus ([address] 2010  Dyslipidemia 2013  Fibromyalgia  GERD (gastroesophageal reflux disease)  Heart failure ([address] chronic systolic with EF 19%; non-ischemic cardiomyopathy  HTN (hypertension) 2010  Ill-defined condition Pt denies fibromyalgia. Pt feels she has neuropathy  Morbid obesity ([address]  NICM (nonischemic cardiomyopathy) ([address] 2/15/2013  Obstructive sleep apnea (adult) (pediatric) 2014  Other ill-defined conditions(799.89)   
 svt ablation  Sciatic pain 2016  Tobacco use disorder 2010 Past Surgical History:  
Procedure Laterality Date  HX  SECTION    
 HX GYN    
 YEIMI-Left ovary intact per pt  HX HERNIA REPAIR    
 mild incarceration, no bowel removal  
 HX IMPLANTABLE CARDIOVERTER DEFIBRILLATOR  HX OTHER SURGICAL    
 cardiac ablation for svt  HX OTHER SURGICAL    
 right shoulder surgery  HX PACEMAKER    
 HX TONSILLECTOMY  [place] LEFT HEART CATH,PERCUTANEOUS  2/13/2013  
 no intervention Family History:  
Problem Relation Age of Onset  Heart Attack Father 48  
  mi  Stroke Father  Hypertension Father  Diabetes Other  Stroke Mother  Diabetes Brother  Heart Disease Brother  Hypertension Brother  Breast Cancer Sister 37 Social History Social History  Marital status: SINGLE Spouse name: N/A  
 Number of children: N/A  
 Years of education: N/A Occupational History  Not on file. Social History Main Topics  Smoking status: Current Every Day Smoker Packs/day: 0.25 Years: 27.00  Smokeless tobacco: Never Used Comment: smokes occassionally  Alcohol use Yes Comment: occaisonally  Drug use: Yes Special: Marijuana Comment: Every now and then  Sexual activity: No  
 
Other Topics Concern Cook Yes Social History Narrative Denies physical or sexual abuse ALLERGIES: Review of patient's allergies indicates no known allergies. Review of Systems Constitutional: Negative for appetite change, chills and fever. Respiratory: Negative. Gastrointestinal: Positive for abdominal pain. Negative for constipation, diarrhea, hematochezia, melena, nausea and vomiting. Genitourinary: Negative for flank pain. Neurological: Negative. All other systems reviewed and are negative. Vitals:  
 09/18/18 1606 BP: 130/77 Pulse: 78 Resp: 17 Temp: 98.1 °F (36.7 °C) SpO2: 92% Weight: 97.1 kg (214 lb) Height: 4' 11\" (1.499 m) Physical Exam  
Constitutional: She appears well-developed and well-nourished. No distress. Pleasant/ NAD HENT:  
 Head: Atraumatic. Eyes: No scleral icterus. Neck: Neck supple. Cardiovascular: Normal rate and intact distal pulses. Pulmonary/Chest: Effort normal. No respiratory distress. She has no wheezes. Abdominal: She exhibits no distension. There is tenderness. There is no rebound and no guarding. No abdominal wall issue No abdominal wall hernia acute issue Obese with slight right soreness poorly localized and lower(some lower than typical appendix) Musculoskeletal: Normal range of motion. Neurological: She is alert. Skin: Skin is warm and dry. No rash noted. No erythema. Psychiatric: Thought content normal.  
Nursing note and vitals reviewed. MDM Number of Diagnoses or Management Options Abdominal pain, right lower quadrant:  
Urinary tract infection without hematuria, site unspecified:  
Diagnosis management comments: Labs and CT done. No definite abnl but +/- some urinary changes but do not match with complaint. No acute surgical finding. Aware faisal ongoing care and work up with continued or worsening Amount and/or Complexity of Data Reviewed Clinical lab tests: ordered and reviewed Tests in the radiology section of CPT®: reviewed and ordered Decide to obtain previous medical records or to obtain history from someone other than the patient: yes Independent visualization of images, tracings, or specimens: yes Risk of Complications, Morbidity, and/or Mortality Presenting problems: high Diagnostic procedures: low Management options: moderate Patient Progress Patient progress: stable ED Course Procedures Recent Results (from the past 12 hour(s)) CBC WITH AUTOMATED DIFF Collection Time: 09/18/18  4:11 PM  
Result Value Ref Range WBC 8.3 4.3 - 11.1 K/uL  
 RBC 4.09 4.05 - 5.2 M/uL  
 HGB 12.9 11.7 - 15.4 g/dL HCT 39.8 35.8 - 46.3 % MCV 97.3 79.6 - 97.8 FL  
 MCH 31.5 26.1 - 32.9 PG  
 MCHC 32.4 31.4 - 35.0 g/dL  
 RDW 13.4 % PLATELET 112 (L) 341 - 450 K/uL MPV 12.2 9.4 - 12.3 FL ABSOLUTE NRBC 0.00 0.0 - 0.2 K/uL  
 DF AUTOMATED NEUTROPHILS 61 43 - 78 % LYMPHOCYTES 27 13 - 44 % MONOCYTES 8 4.0 - 12.0 % EOSINOPHILS 4 0.5 - 7.8 % BASOPHILS 0 0.0 - 2.0 % IMMATURE GRANULOCYTES 0 0.0 - 5.0 %  
 ABS. NEUTROPHILS 5.0 1.7 - 8.2 K/UL  
 ABS. LYMPHOCYTES 2.2 0.5 - 4.6 K/UL  
 ABS. MONOCYTES 0.7 0.1 - 1.3 K/UL  
 ABS. EOSINOPHILS 0.3 0.0 - 0.8 K/UL  
 ABS. BASOPHILS 0.0 0.0 - 0.2 K/UL  
 ABS. IMM. GRANS. 0.0 0.0 - 0.5 K/UL METABOLIC PANEL, COMPREHENSIVE Collection Time: 09/18/18  4:11 PM  
Result Value Ref Range Sodium 146 (H) 136 - 145 mmol/L Potassium 3.6 3.5 - 5.1 mmol/L Chloride 110 (H) 98 - 107 mmol/L  
 CO2 30 21 - 32 mmol/L Anion gap 6 (L) 7 - 16 mmol/L Glucose 86 65 - 100 mg/dL BUN 18 6 - 23 MG/DL Creatinine 1.52 (H) 0.6 - 1.0 MG/DL  
 GFR est AA 46 (L) >60 ml/min/1.73m2 GFR est non-AA 38 (L) >60 ml/min/1.73m2 Calcium 8.9 8.3 - 10.4 MG/DL Bilirubin, total 0.2 0.2 - 1.1 MG/DL  
 ALT (SGPT) 41 12 - 65 U/L  
 AST (SGOT) 33 15 - 37 U/L Alk. phosphatase 74 50 - 136 U/L Protein, total 7.3 6.3 - 8.2 g/dL Albumin 3.6 3.5 - 5.0 g/dL Globulin 3.7 (H) 2.3 - 3.5 g/dL A-G Ratio 1.0 (L) 1.2 - 3.5 LIPASE Collection Time: 09/18/18  4:11 PM  
Result Value Ref Range Lipase 161 73 - 393 U/L  
URINE MICROSCOPIC Collection Time: 09/18/18  6:02 PM  
Result Value Ref Range WBC 10-20 0 /hpf  
 RBC 0-3 0 /hpf Epithelial cells 0-3 0 /hpf Bacteria TRACE 0 /hpf Casts 3-5 0 /lpf  
 
 
 CT UROGRAM WO CONT (Final result) Result time: 09/18/18 17:19:58  
  Final result by Hardeep Kiran MD (09/18/18 17:19:58)  
  Impression:  
  IMPRESSION:  
 
1. No renal or ureteral calculi. 2. Diverticulosis.  
 
3. Multiple anterior abdominal wall hernias. 
 
  
  Narrative:  
  CT ABDOMEN AND PELVIS WITHOUT CONTRAST 9/18/2018 5:07 PM 
 
 History:  Right lower quadrant pain for 2 days. Technique: Noncontrast  axial images were obtained through the abdomen and 
pelvis per the renal stone protocol. Coronal reformatted images were generated. Dose reduction techniques were used such as automated exposure control, 
adjustment of the MA or KV according to patient size, and iterative 
reconstruction. Comparison:  January 15, 2013 Findings: Included portions of the lung bases are clear. ABDOMEN:    There are no renal or ureteral calculi.  There is no hydronephrosis.  A few periureteral phleboliths are present in the right lower quadrant. There 
is a upper pole 1.6 cm left renal cyst. 
 
Evaluation of the abdominal viscera is limited without IV contrast.  Multiple 
colonic diverticula are present. There are multiple ventral abdominal wall 
hernias containing nondilated loops of bowel. These are similar in appearance to 
the previous study. The unenhanced appearance of the gallbladder, liver, pancreas, spleen and 
adrenal glands is normal. 
 
The appendix is normal in appearance. PELVIS: Changes of a hysterectomy are present. The bladder is normal appearance. There is no free pelvic fluid.  Bone windows demonstrated no aggressive osseous 
lesions.  A right gluteal lipoma is present.

## 2018-09-18 NOTE — DISCHARGE INSTRUCTIONS
Abdominal Pain: Care Instructions  Your Care Instructions    Abdominal pain has many possible causes. Some aren't serious and get better on their own in a few days. Others need more testing and treatment. If your pain continues or gets worse, you need to be rechecked and may need more tests to find out what is wrong. You may need surgery to correct the problem. Don't ignore new symptoms, such as fever, nausea and vomiting, urination problems, pain that gets worse, and dizziness. These may be signs of a more serious problem. Your doctor may have recommended a follow-up visit in the next 8 to 12 hours. If you are not getting better, you may need more tests or treatment. The doctor has checked you carefully, but problems can develop later. If you notice any problems or new symptoms, get medical treatment right away. Follow-up care is a key part of your treatment and safety. Be sure to make and go to all appointments, and call your doctor if you are having problems. It's also a good idea to know your test results and keep a list of the medicines you take. How can you care for yourself at home? · Rest until you feel better. · To prevent dehydration, drink plenty of fluids, enough so that your urine is light yellow or clear like water. Choose water and other caffeine-free clear liquids until you feel better. If you have kidney, heart, or liver disease and have to limit fluids, talk with your doctor before you increase the amount of fluids you drink. · If your stomach is upset, eat mild foods, such as rice, dry toast or crackers, bananas, and applesauce. Try eating several small meals instead of two or three large ones. · Wait until 48 hours after all symptoms have gone away before you have spicy foods, alcohol, and drinks that contain caffeine. · Do not eat foods that are high in fat. · Avoid anti-inflammatory medicines such as aspirin, ibuprofen (Advil, Motrin), and naproxen (Aleve).  These can cause stomach upset. Talk to your doctor if you take daily aspirin for another health problem. When should you call for help? Call 911 anytime you think you may need emergency care. For example, call if:    · You passed out (lost consciousness).     · You pass maroon or very bloody stools.     · You vomit blood or what looks like coffee grounds.     · You have new, severe belly pain.    Call your doctor now or seek immediate medical care if:    · Your pain gets worse, especially if it becomes focused in one area of your belly.     · You have a new or higher fever.     · Your stools are black and look like tar, or they have streaks of blood.     · You have unexpected vaginal bleeding.     · You have symptoms of a urinary tract infection. These may include:  ¨ Pain when you urinate. ¨ Urinating more often than usual.  ¨ Blood in your urine.     · You are dizzy or lightheaded, or you feel like you may faint.    Watch closely for changes in your health, and be sure to contact your doctor if:    · You are not getting better after 1 day (24 hours). Where can you learn more? Go to http://jeffShareholder InSiteclaudine.info/. Enter H659 in the search box to learn more about \"Abdominal Pain: Care Instructions. \"  Current as of: November 20, 2017  Content Version: 11.7  © 9452-4248 [employer]. Care instructions adapted under license by [employer] software (which disclaims liability or warranty for this information). If you have questions about a medical condition or this instruction, always ask your healthcare professional. [place] [address] any warranty or liability for your use of this information. Urinary Tract Infection in Women: Care Instructions  Your Care Instructions    A urinary tract infection, or UTI, is a general term for an infection anywhere between the kidneys and the urethra (where urine comes out). Most UTIs are bladder infections.  They often cause pain or burning when you urinate. UTIs are caused by bacteria and can be cured with antibiotics. Be sure to complete your treatment so that the infection goes away. Follow-up care is a key part of your treatment and safety. Be sure to make and go to all appointments, and call your doctor if you are having problems. It's also a good idea to know your test results and keep a list of the medicines you take. How can you care for yourself at home? · Take your antibiotics as directed. Do not stop taking them just because you feel better. You need to take the full course of antibiotics. · Drink extra water and other fluids for the next day or two. This may help wash out the bacteria that are causing the infection. (If you have kidney, heart, or liver disease and have to limit fluids, talk with your doctor before you increase your fluid intake.)  · Avoid drinks that are carbonated or have caffeine. They can irritate the bladder. · Urinate often. Try to empty your bladder each time. · To relieve pain, take a hot bath or lay a heating pad set on low over your lower belly or genital area. Never go to sleep with a heating pad in place. To prevent UTIs  · Drink plenty of water each day. This helps you urinate often, which clears bacteria from your system. (If you have kidney, heart, or liver disease and have to limit fluids, talk with your doctor before you increase your fluid intake.)  · Urinate when you need to. · Urinate right after you have sex. · Change sanitary pads often. · Avoid douches, bubble baths, feminine hygiene sprays, and other feminine hygiene products that have deodorants. · After going to the bathroom, wipe from front to back. When should you call for help? Call your doctor now or seek immediate medical care if:    · Symptoms such as fever, chills, nausea, or vomiting get worse or appear for the first time.     · You have new pain in your back just below your rib cage.  This is called flank pain.     · There is new blood or pus in your urine.     · You have any problems with your antibiotic medicine.    Watch closely for changes in your health, and be sure to contact your doctor if:    · You are not getting better after taking an antibiotic for 2 days.     · Your symptoms go away but then come back. Where can you learn more? Go to http://jeff-claudine.info/. Enter E560 in the search box to learn more about \"Urinary Tract Infection in Women: Care Instructions. \"  Current as of: May 12, 2017  Content Version: 11.7  © 0851-8170 [employer] [place]. Care instructions adapted under license by [employer] (which disclaims liability or warranty for this information). If you have questions about a medical condition or this instruction, always ask your healthcare professional. [address] any warranty or liability for your use of this information.

## 2018-09-18 NOTE — ED NOTES
I have reviewed discharge instructions with the patient. The patient verbalized understanding. Patient left ED via Discharge Method: ambulatory to Home with family. Opportunity for questions and clarification provided. Patient given 1 scripts. To continue your aftercare when you leave the hospital, you may receive an automated call from our care team to check in on how you are doing. This is a free service and part of our promise to provide the best care and service to meet your aftercare needs.  If you have questions, or wish to unsubscribe from this service please call [phone]. Thank you for Choosing our [facility] Emergency Department.

## 2018-10-02 PROBLEM — K43.9 ABDOMINAL WALL HERNIA: Status: ACTIVE | Noted: 2018-10-02

## 2019-01-08 ENCOUNTER — HOSPITAL ENCOUNTER (OUTPATIENT)
Dept: LAB | Age: 54
Discharge: HOME | End: 2019-01-08
Attending: INTERNAL MEDICINE
Payer: MEDICARE

## 2019-01-08 LAB
ALBUMIN SERPL-MCNC: 3.7 G/DL (ref 3.5–5)
ALBUMIN/GLOB SERPL: 1.1 {RATIO}
ALP SERPL-CCNC: 66 U/L (ref 50–136)
ALT SERPL-CCNC: 35 U/L (ref 12–65)
ANION GAP SERPL CALC-SCNC: 8 MMOL/L
AST SERPL-CCNC: 26 U/L (ref 15–37)
BASOPHILS # BLD: 0 K/UL (ref 0–0.2)
BASOPHILS NFR BLD: 0 % (ref 0–2)
BILIRUB SERPL-MCNC: 0.4 MG/DL (ref 0.2–1.1)
BUN SERPL-MCNC: 21 MG/DL (ref 6–23)
CALCIUM SERPL-MCNC: 8.7 MG/DL (ref 8.3–10.4)
CALCIUM SERPL-MCNC: 8.7 MG/DL (ref 8.3–10.4)
CHLORIDE SERPL-SCNC: 108 MMOL/L (ref 98–107)
CO2 SERPL-SCNC: 28 MMOL/L (ref 21–32)
CREAT SERPL-MCNC: 1.4 MG/DL (ref 0.6–1)
CREAT UR-MCNC: 26 MG/DL
DIFFERENTIAL METHOD BLD: NORMAL
EOSINOPHIL # BLD: 0.3 K/UL (ref 0–0.8)
EOSINOPHIL NFR BLD: 3 % (ref 0.5–7.8)
ERYTHROCYTE [DISTWIDTH] IN BLOOD BY AUTOMATED COUNT: 14 % (ref 11.9–14.6)
GLOBULIN SER CALC-MCNC: 3.4 G/DL
GLUCOSE SERPL-MCNC: 101 MG/DL (ref 65–100)
HCT VFR BLD AUTO: 39.1 % (ref 35.8–46.3)
HGB BLD-MCNC: 12.9 G/DL (ref 11.7–15.4)
IMM GRANULOCYTES # BLD: 0 K/UL (ref 0–0.5)
IMM GRANULOCYTES NFR BLD AUTO: 0 % (ref 0–5)
LYMPHOCYTES # BLD: 2.7 K/UL (ref 0.5–4.6)
LYMPHOCYTES NFR BLD: 30 % (ref 13–44)
MAGNESIUM SERPL-MCNC: 2.1 MG/DL (ref 1.8–2.4)
MCH RBC QN AUTO: 31.2 PG (ref 26.1–32.9)
MCHC RBC AUTO-ENTMCNC: 33 G/DL (ref 31.4–35)
MCV RBC AUTO: 94.4 FL (ref 79.6–97.8)
MONOCYTES # BLD: 0.7 K/UL (ref 0.1–1.3)
MONOCYTES NFR BLD: 7 % (ref 4–12)
NEUTS SEG # BLD: 5.2 K/UL (ref 1.7–8.2)
NEUTS SEG NFR BLD: 59 % (ref 43–78)
NRBC # BLD: 0 K/UL (ref 0–0.2)
PHOSPHATE SERPL-MCNC: 3.6 MG/DL (ref 2.4–4.5)
PLATELET # BLD AUTO: 158 K/UL (ref 150–450)
PMV BLD AUTO: 11.7 FL (ref 9.4–12.3)
POTASSIUM SERPL-SCNC: 3.6 MMOL/L (ref 3.5–5.1)
PROT SERPL-MCNC: 7.1 G/DL (ref 6.3–8.2)
PROT UR-MCNC: <5 MG/DL
PROT/CREAT UR-RTO: NORMAL
PTH-INTACT SERPL-MCNC: 392.9 PG/ML (ref 18.5–88)
RBC # BLD AUTO: 4.14 M/UL (ref 4.05–5.2)
SODIUM SERPL-SCNC: 144 MMOL/L (ref 136–145)
WBC # BLD AUTO: 8.9 K/UL (ref 4.3–11.1)

## 2019-01-08 PROCEDURE — 83970 ASSAY OF PARATHORMONE: CPT

## 2019-01-08 PROCEDURE — 84156 ASSAY OF PROTEIN URINE: CPT

## 2019-01-08 PROCEDURE — 36415 COLL VENOUS BLD VENIPUNCTURE: CPT

## 2019-01-08 PROCEDURE — 83735 ASSAY OF MAGNESIUM: CPT

## 2019-01-08 PROCEDURE — 80053 COMPREHEN METABOLIC PANEL: CPT

## 2019-01-08 PROCEDURE — 82306 VITAMIN D 25 HYDROXY: CPT

## 2019-01-08 PROCEDURE — 85025 COMPLETE CBC W/AUTO DIFF WBC: CPT

## 2019-01-08 PROCEDURE — 84100 ASSAY OF PHOSPHORUS: CPT

## 2019-01-09 LAB — 25(OH)D3+25(OH)D2 SERPL-MCNC: 34.1 NG/ML (ref 30–100)

## 2019-01-24 ENCOUNTER — HOSPITAL ENCOUNTER (EMERGENCY)
Age: 54
Discharge: HOME | End: 2019-01-24
Attending: EMERGENCY MEDICINE
Payer: MEDICARE

## 2019-01-24 ENCOUNTER — APPOINTMENT (OUTPATIENT)
Dept: GENERAL RADIOLOGY | Age: 54
End: 2019-01-24
Attending: EMERGENCY MEDICINE
Payer: MEDICARE

## 2019-01-24 VITALS
RESPIRATION RATE: 20 BRPM | OXYGEN SATURATION: 100 % | HEART RATE: 79 BPM | SYSTOLIC BLOOD PRESSURE: 158 MMHG | HEIGHT: 59 IN | WEIGHT: 205 LBS | TEMPERATURE: 98.3 F | BODY MASS INDEX: 41.33 KG/M2 | DIASTOLIC BLOOD PRESSURE: 82 MMHG

## 2019-01-24 DIAGNOSIS — R11.2 NON-INTRACTABLE VOMITING WITH NAUSEA, UNSPECIFIED VOMITING TYPE: Primary | ICD-10-CM

## 2019-01-24 DIAGNOSIS — R19.7 DIARRHEA, UNSPECIFIED TYPE: ICD-10-CM

## 2019-01-24 LAB
ALBUMIN SERPL-MCNC: 3.8 G/DL (ref 3.5–5)
ALBUMIN/GLOB SERPL: 1.1 {RATIO} (ref 1.2–3.5)
ALP SERPL-CCNC: 65 U/L (ref 50–136)
ALT SERPL-CCNC: 65 U/L (ref 12–65)
ANION GAP SERPL CALC-SCNC: 7 MMOL/L (ref 7–16)
AST SERPL-CCNC: 53 U/L (ref 15–37)
ATRIAL RATE: 60 BPM
BASOPHILS # BLD: 0 K/UL (ref 0–0.2)
BASOPHILS NFR BLD: 0 % (ref 0–2)
BILIRUB SERPL-MCNC: 0.5 MG/DL (ref 0.2–1.1)
BUN SERPL-MCNC: 19 MG/DL (ref 6–23)
CALCIUM SERPL-MCNC: 9.3 MG/DL (ref 8.3–10.4)
CALCULATED P AXIS, ECG09: 85 DEGREES
CALCULATED R AXIS, ECG10: 73 DEGREES
CALCULATED T AXIS, ECG11: -70 DEGREES
CHLORIDE SERPL-SCNC: 110 MMOL/L (ref 98–107)
CO2 SERPL-SCNC: 26 MMOL/L (ref 21–32)
CREAT SERPL-MCNC: 1.3 MG/DL (ref 0.6–1)
DIAGNOSIS, 93000: NORMAL
DIFFERENTIAL METHOD BLD: ABNORMAL
EOSINOPHIL # BLD: 0.2 K/UL (ref 0–0.8)
EOSINOPHIL NFR BLD: 2 % (ref 0.5–7.8)
ERYTHROCYTE [DISTWIDTH] IN BLOOD BY AUTOMATED COUNT: 14.3 % (ref 11.9–14.6)
GLOBULIN SER CALC-MCNC: 3.5 G/DL (ref 2.3–3.5)
GLUCOSE SERPL-MCNC: 105 MG/DL (ref 65–100)
HCT VFR BLD AUTO: 40 % (ref 35.8–46.3)
HGB BLD-MCNC: 13.1 G/DL (ref 11.7–15.4)
IMM GRANULOCYTES # BLD AUTO: 0.1 K/UL (ref 0–0.5)
IMM GRANULOCYTES NFR BLD AUTO: 1 % (ref 0–5)
LIPASE SERPL-CCNC: 125 U/L (ref 73–393)
LYMPHOCYTES # BLD: 0.6 K/UL (ref 0.5–4.6)
LYMPHOCYTES NFR BLD: 9 % (ref 13–44)
MCH RBC QN AUTO: 31.6 PG (ref 26.1–32.9)
MCHC RBC AUTO-ENTMCNC: 32.8 G/DL (ref 31.4–35)
MCV RBC AUTO: 96.4 FL (ref 79.6–97.8)
MONOCYTES # BLD: 0.4 K/UL (ref 0.1–1.3)
MONOCYTES NFR BLD: 5 % (ref 4–12)
NEUTS SEG # BLD: 5.7 K/UL (ref 1.7–8.2)
NEUTS SEG NFR BLD: 83 % (ref 43–78)
NRBC # BLD: 0 K/UL (ref 0–0.2)
P-R INTERVAL, ECG05: 204 MS
PLATELET # BLD AUTO: 135 K/UL (ref 150–450)
PMV BLD AUTO: 11.5 FL (ref 9.4–12.3)
POTASSIUM SERPL-SCNC: 4 MMOL/L (ref 3.5–5.1)
PROT SERPL-MCNC: 7.3 G/DL (ref 6.3–8.2)
Q-T INTERVAL, ECG07: 444 MS
QRS DURATION, ECG06: 118 MS
QTC CALCULATION (BEZET), ECG08: 444 MS
RBC # BLD AUTO: 4.15 M/UL (ref 4.05–5.2)
SODIUM SERPL-SCNC: 143 MMOL/L (ref 136–145)
TROPONIN I SERPL-MCNC: 0.03 NG/ML (ref 0.02–0.05)
VENTRICULAR RATE, ECG03: 60 BPM
WBC # BLD AUTO: 7 K/UL (ref 4.3–11.1)

## 2019-01-24 PROCEDURE — 74011250636 HC RX REV CODE- 250/636: Performed by: EMERGENCY MEDICINE

## 2019-01-24 PROCEDURE — 80053 COMPREHEN METABOLIC PANEL: CPT

## 2019-01-24 PROCEDURE — 81003 URINALYSIS AUTO W/O SCOPE: CPT | Performed by: EMERGENCY MEDICINE

## 2019-01-24 PROCEDURE — 85025 COMPLETE CBC W/AUTO DIFF WBC: CPT

## 2019-01-24 PROCEDURE — 93005 ELECTROCARDIOGRAM TRACING: CPT | Performed by: EMERGENCY MEDICINE

## 2019-01-24 PROCEDURE — 83690 ASSAY OF LIPASE: CPT

## 2019-01-24 PROCEDURE — 99284 EMERGENCY DEPT VISIT MOD MDM: CPT | Performed by: EMERGENCY MEDICINE

## 2019-01-24 PROCEDURE — 96374 THER/PROPH/DIAG INJ IV PUSH: CPT | Performed by: EMERGENCY MEDICINE

## 2019-01-24 PROCEDURE — 74022 RADEX COMPL AQT ABD SERIES: CPT

## 2019-01-24 PROCEDURE — 84484 ASSAY OF TROPONIN QUANT: CPT

## 2019-01-24 RX ORDER — PROMETHAZINE HYDROCHLORIDE 25 MG/1
25 TABLET ORAL
Qty: 12 TAB | Refills: 0 | Status: SHIPPED | OUTPATIENT
Start: 2019-01-24 | End: 2019-03-19 | Stop reason: ALTCHOICE

## 2019-01-24 RX ORDER — ONDANSETRON 2 MG/ML
4 INJECTION INTRAMUSCULAR; INTRAVENOUS
Status: COMPLETED | OUTPATIENT
Start: 2019-01-24 | End: 2019-01-24

## 2019-01-24 RX ADMIN — SODIUM CHLORIDE 1000 ML: 900 INJECTION, SOLUTION INTRAVENOUS at 10:18

## 2019-01-24 RX ADMIN — ONDANSETRON 4 MG: 2 INJECTION INTRAMUSCULAR; INTRAVENOUS at 10:18

## 2019-01-24 NOTE — ED TRIAGE NOTES
Pt complains of generalized left sided pain. Reports vomiting diarrhea beginning at 0300 today. Also reports SOB

## 2019-01-24 NOTE — ED NOTES
I have reviewed discharge instructions with the patient. The patient verbalized understanding. Patient left ED via Discharge Method: ambulatory to Home with self Opportunity for questions and clarification provided. Patient given 1 scripts. To continue your aftercare when you leave the hospital, you may receive an automated call from our care team to check in on how you are doing. This is a free service and part of our promise to provide the best care and service to meet your aftercare needs.  If you have questions, or wish to unsubscribe from this service please call [phone]. Thank you for Choosing our [facility] Emergency Department.

## 2019-01-24 NOTE — LETTER
[address] [facility] EMERGENCY DEPT [address] [id number] 
[phone] Work/School Note Date: 1/24/2019 To Whom It May concern: 
 
Miguel Weiss was seen and treated today in the emergency room by the following provider(s): 
Attending Provider: Sofi Hastings MD. Miguel Weiss may return to work on 1/25/19. Sincerely, Colt Haney RN

## 2019-01-24 NOTE — ED PROVIDER NOTES
Patient is a 64-year-old female who is coming in with vomiting. She states it started about 3 AM.  She has had 3 episodes of this since. She also reports 3 episodes of nonbloody diarrhea. She states she's had some left leg pain for a while now started having a little bit of abdominal pain. Last few days and is complaining of left-sided chest pain as well. There is also some lower back pain that she's been having yesterday as well but none currently. She denies any sick contacts or any fever. Past Medical History:  
Diagnosis Date  Arrhythmia   
 ablation for svt  Arthritis  Automatic implantable cardioverter-defibrillator in situ 3/30/2016  CAD in native artery 3/30/2016  Chronic systolic heart failure ([address] 2013  Diabetes ([address] type 2 (oral med controlled)  Diabetes mellitus ([address] 2010  Dyslipidemia 2013  Fibromyalgia  GERD (gastroesophageal reflux disease)  Heart failure ([address] chronic systolic with EF 08%; non-ischemic cardiomyopathy  HTN (hypertension) 2010  Ill-defined condition Pt denies fibromyalgia. Pt feels she has neuropathy  Morbid obesity ([address]  NICM (nonischemic cardiomyopathy) ([address] 2/15/2013  Obstructive sleep apnea (adult) (pediatric) 2014  Other ill-defined conditions(799.89)   
 svt ablation  Sciatic pain 2016  Tobacco use disorder 2010 Past Surgical History:  
Procedure Laterality Date  HX  SECTION    
 HX GYN    
 YEIMI-Left ovary intact per pt  HX HERNIA REPAIR    
 mild incarceration, no bowel removal  
 HX IMPLANTABLE CARDIOVERTER DEFIBRILLATOR  HX OTHER SURGICAL    
 cardiac ablation for svt  HX OTHER SURGICAL    
 right shoulder surgery  HX PACEMAKER    
 HX TONSILLECTOMY  MD LEFT HEART CATH,PERCUTANEOUS  2013  
 no intervention Family History:  
Problem Relation Age of Onset  Heart Attack Father 48  
     mi  
  Stroke Father  Hypertension Father  Diabetes Other  Stroke Mother  Diabetes Brother  Heart Disease Brother  Hypertension Brother  Breast Cancer Sister 37 Social History Socioeconomic History  Marital status: SINGLE Spouse name: Not on file  Number of children: Not on file  Years of education: Not on file  Highest education level: Not on file Social Needs  Financial resource strain: Not on file  Food insecurity - worry: Not on file  Food insecurity - inability: Not on file  Transportation needs - medical: Not on file  Transportation needs - non-medical: Not on file Occupational History  Not on file Tobacco Use  Smoking status: Current Every Day Smoker Packs/day: 0.25 Years: 27.00 Pack years: 6.75  Smokeless tobacco: Never Used  Tobacco comment: smokes occassionally Substance and Sexual Activity  Alcohol use: Yes Comment: occaisonally  Drug use: Yes Types: Marijuana Comment: Every now and then  Sexual activity: No  
Other Topics Concern [address] Service Not Asked  Blood Transfusions Not Asked  Caffeine Concern Not Asked  Occupational Exposure Not Asked Simmie Ericka Hazards Not Asked  Sleep Concern Not Asked  Stress Concern Not Asked  Weight Concern Not Asked  Special Diet Not Asked  Back Care Not Asked  Exercise Not Asked  Bike Helmet Not Asked [address] Yes  Self-Exams Not Asked Social History Narrative Denies physical or sexual abuse ALLERGIES: Patient has no known allergies. Review of Systems Constitutional: Negative for chills and fever. Respiratory: Negative for chest tightness, shortness of breath, wheezing and stridor. Cardiovascular: Positive for chest pain. Negative for palpitations and leg swelling. Gastrointestinal: Positive for diarrhea, nausea and vomiting. Negative for abdominal distention and abdominal pain. Musculoskeletal: Positive for arthralgias. Negative for back pain. Skin: Negative for color change, pallor and wound. Neurological: Negative for weakness and numbness. All other systems reviewed and are negative. Vitals:  
 01/24/19 0943 01/24/19 1000 01/24/19 1002 BP: 160/89 165/74 Pulse: 70 Resp: 20 Temp: 98 °F (36.7 °C) SpO2: 95%  94% Weight: 93 kg (205 lb) Height: 4' 11\" (1.499 m) Physical Exam  
Constitutional: She is oriented to person, place, and time. She appears well-developed and well-nourished. No distress. HENT:  
Head: Normocephalic and atraumatic. Right Ear: External ear normal.  
Left Ear: External ear normal.  
Eyes: Conjunctivae and EOM are normal. Pupils are equal, round, and reactive to light. No scleral icterus. Neck: Normal range of motion. Cardiovascular: Normal rate, regular rhythm, normal heart sounds and intact distal pulses. Exam reveals no gallop and no friction rub. No murmur heard. Pulmonary/Chest: Effort normal and breath sounds normal. No stridor. No respiratory distress. She has no wheezes. She has no rales. She exhibits tenderness. Abdominal: Soft. She exhibits distension. She exhibits no mass. There is tenderness (mild diffuse tenderness). There is no rebound and no guarding. Hyperactive bowel sounds Neurological: She is alert and oriented to person, place, and time. Skin: Capillary refill takes less than 2 seconds. She is not diaphoretic. Psychiatric: She has a normal mood and affect. Her behavior is normal.  
Nursing note and vitals reviewed. MDM Number of Diagnoses or Management Options Diagnosis management comments: Patient feeling better on reevaluation her repeat exam is normal.  I am treating symptomatically. Paulo Hines MD; 1/24/2019 @11:31 AM Voice dictation software was used during the making of this note.   This software is not perfect and grammatical and other typographical errors may be present. This note has not been proofread for errors. 
=================================================================== Amount and/or Complexity of Data Reviewed Clinical lab tests: ordered and reviewed (Results for orders placed or performed during the hospital encounter of 01/24/19 
-CBC WITH AUTOMATED DIFF Result                      Value             Ref Range WBC                         7.0               4.3 - 11.1 K* 
     RBC                         4.15              4.05 - 5.2 M* HGB                         13.1              11.7 - 15.4 * HCT                         40.0              35.8 - 46.3 % MCV                         96.4              79.6 - 97.8 * MCH                         31.6              26.1 - 32.9 * MCHC                        32.8              31.4 - 35.0 * RDW                         14.3              11.9 - 14.6 % PLATELET                    135 (L)           150 - 450 K/* MPV                         11.5              9.4 - 12.3 FL ABSOLUTE NRBC               0.00              0.0 - 0.2 K/* DF                          AUTOMATED NEUTROPHILS                 83 (H)            43 - 78 % LYMPHOCYTES                 9 (L)             13 - 44 % MONOCYTES                   5                 4.0 - 12.0 % EOSINOPHILS                 2                 0.5 - 7.8 % BASOPHILS                   0                 0.0 - 2.0 % IMMATURE GRANULOCYTES       1                 0.0 - 5.0 %   
     ABS. NEUTROPHILS            5.7               1.7 - 8.2 K/* ABS. LYMPHOCYTES            0.6               0.5 - 4.6 K/* ABS. MONOCYTES              0.4               0.1 - 1.3 K/* ABS. EOSINOPHILS            0.2               0.0 - 0.8 K/* ABS. BASOPHILS              0.0               0.0 - 0.2 K/* ABS. IMM. GRANS.            0.1               0.0 - 0.5 K/* 
-METABOLIC PANEL, COMPREHENSIVE Result                      Value             Ref Range Sodium                      143               136 - 145 mm* Potassium                   4.0               3.5 - 5.1 mm* Chloride                    110 (H)           98 - 107 mmo* CO2                         26                21 - 32 mmol* Anion gap                   7                 7 - 16 mmol/L Glucose                     105 (H)           65 - 100 mg/* BUN                         19                6 - 23 MG/DL Creatinine                  1.30 (H)          0.6 - 1.0 MG* 
     GFR est AA                  55 (L)            >60 ml/min/1* GFR est non-AA              45 (L)            >60 ml/min/1* Calcium                     9.3               8.3 - 10.4 M* Bilirubin, total            0.5               0.2 - 1.1 MG* ALT (SGPT)                  65                12 - 65 U/L   
     AST (SGOT)                  53 (H)            15 - 37 U/L Alk. phosphatase            65                50 - 136 U/L Protein, total              7.3               6.3 - 8.2 g/* Albumin                     3.8               3.5 - 5.0 g/* Globulin                    3.5               2.3 - 3.5 g/* A-G Ratio                   1.1 (L)           1.2 - 3.5     
-TROPONIN I Result                      Value             Ref Range Troponin-I, Qt.             0.03              0.02 - 0.05 * 
-LIPASE Result                      Value             Ref Range Lipase                      125               73 - 393 U/L  
-EKG, 12 LEAD, INITIAL Result                      Value             Ref Range      Ventricular Rate            60                BPM           
 Atrial Rate                 60                BPM           
     P-R Interval                204               ms            
     QRS Duration                118               ms Q-T Interval                444               ms            
     QTC Calculation (Bezet)     444               ms            
     Calculated P Axis           85                degrees Calculated R Axis           73                degrees Calculated T Axis           -70               degrees Diagnosis Atrial-paced rhythm Non-specific intra-ventricular conduction delay T wave abnormality, consider inferolateral ischemia Abnormal ECG When compared with ECG of 28-AUG-2018 00:52, Electronic atrial pacemaker has replaced Sinus rhythm T wave inversion now evident in Inferior leads QT has shortened ) Tests in the radiology section of CPT®: ordered and reviewed (Xr Abd Acute W 1 V Chest 
 
Result Date: 1/24/2019 Chest and abdomen 3 view dated 1/24/2019 CLINICAL INFORMATION: Pain and vomiting Single view the chest shows dual-lead pacemaker. Heart is enlarged, mediastinum unremarkable. Lungs clear and pulmonary vascularity is normal. No pleural effusion. No free air under the diaphragm. 2 views the abdomen show a nonspecific gas pattern. No abnormal callus case in the abdomen. There are phleboliths in the pelvis. Mild degenerative changes are noted at the sacroiliac joints bilaterally. IMPRESSION: No acute abnormality 
 ) Independent visualization of images, tracings, or specimens: yes (Normal sinus rhythm, narrow QRS complex, no bundle branch blocks, and no ST segment elevation ) Procedures

## 2019-01-24 NOTE — DISCHARGE INSTRUCTIONS
Patient Education      Patient Education   Vomiting: After Your Visit to the Emergency Room  Your Care Instructions  You were seen in the emergency room because you have been vomiting. The treatment you need depends on the reason why you are vomiting and whether you have other symptoms. Most of the time, vomiting gets better on its own in a few days. But sometimes it can be a sign of a more serious problem. If you are not getting better, you may need more tests or treatment. Two common causes of vomiting are stomach flu caused by a virus and food poisoning. Vomiting from the stomach flu will usually start to get better within 24 hours. Vomiting from food poisoning may last from 12 to 48 hours. Even though you have been released from the emergency room, you still need to watch for any problems. The doctor carefully checked you. But sometimes problems can develop later. If you have new symptoms, or if your symptoms do not get better, return to the emergency room or call your doctor right away. A visit to the emergency room is only one step in your treatment. Even if you feel better, you still need to do what your doctor recommends, such as going to all suggested follow-up appointments and taking medicines exactly as directed. This will help you recover and help prevent future problems. How can you care for yourself at home? · To prevent dehydration, drink plenty of fluids, enough so that your urine is light yellow or clear like water. Choose water and other caffeine-free clear liquids until you feel better. · Rest until you feel better. · When you are able to eat, try clear soups, mild foods, and liquids until all symptoms are gone for 12 to 48 hours. Other good choices include dry toast, crackers, cooked cereal, and gelatin dessert, such as Jell-O. When should you call for help? Call 911 if:  · You passed out (lost consciousness).   Return to the emergency room now if:  · You have signs of needing more fluids. You have sunken eyes and a dry mouth, and you pass only a little dark urine. · You have vomiting with:  ¨ A very painful headache, sleepiness, or a stiff neck. ¨ Chest pain and a fever. · You have a fever and constant pain in your belly or back. · You have a fever with shaking chills. · You vomit blood or what looks like coffee grounds. · The vomiting and fever last longer than 2 days. · You vomit more than 10 times in 1 day or every time you take a drink for more than 12 hours. Call your doctor today if:  · You continue to vomit off and on for more than 1 week. Where can you learn more? Go to [employer]  Enter Z424 in the search box to learn more about \"Vomiting: After Your Visit to the Emergency Room. \"   © 0034-3961 [employer], [employer]. Care instructions adapted under license by [facility] (which disclaims liability or warranty for this information). This care instruction is for use with your licensed healthcare professional. If you have questions about a medical condition or this instruction, always ask your healthcare professional. [place] [address] any warranty or liability for your use of this information. Content Version: 9.3.06707; Last Revised: February 19, 2011         Diarrhea: Care Instructions  Your Care Instructions    Diarrhea is loose, watery stools (bowel movements). The exact cause is often hard to find. Sometimes diarrhea is your body's way of getting rid of what caused an upset stomach. Viruses, food poisoning, and many medicines can cause diarrhea. Some people get diarrhea in response to emotional stress, anxiety, or certain foods. Almost everyone has diarrhea now and then. It usually isn't serious, and your stools will return to normal soon. The important thing to do is replace the fluids you have lost, so you can prevent dehydration. The doctor has checked you carefully, but problems can develop later.  If you notice any problems or new symptoms, get medical treatment right away. Follow-up care is a key part of your treatment and safety. Be sure to make and go to all appointments, and call your doctor if you are having problems. It's also a good idea to know your test results and keep a list of the medicines you take. How can you care for yourself at home? · Watch for signs of dehydration, which means your body has lost too much water. Dehydration is a serious condition and should be treated right away. Signs of dehydration are:  ? Increasing thirst and dry eyes and mouth. ? Feeling faint or lightheaded. ? Darker urine, and a smaller amount of urine than normal.  · To prevent dehydration, drink plenty of fluids, enough so that your urine is light yellow or clear like water. Choose water and other caffeine-free clear liquids until you feel better. If you have kidney, heart, or liver disease and have to limit fluids, talk with your doctor before you increase the amount of fluids you drink. · Begin eating small amounts of mild foods the next day, if you feel like it. ? Try yogurt that has live cultures of Lactobacillus. (Check the label.)  ? Avoid spicy foods, fruits, alcohol, and caffeine until 48 hours after all symptoms are gone. ? Avoid chewing gum that contains sorbitol. ? Avoid dairy products (except for yogurt with Lactobacillus) while you have diarrhea and for 3 days after symptoms are gone. · The doctor may recommend that you take over-the-counter medicine, such as loperamide (Imodium), if you still have diarrhea after 6 hours. Read and follow all instructions on the label. Do not use this medicine if you have bloody diarrhea, a high fever, or other signs of serious illness. Call your doctor if you think you are having a problem with your medicine. When should you call for help? Call 911 anytime you think you may need emergency care.  For example, call if:    · You passed out (lost consciousness).     · Your stools are maroon or very bloody.    Call your doctor now or seek immediate medical care if:    · You are dizzy or lightheaded, or you feel like you may faint.     · Your stools are black and look like tar, or they have streaks of blood.     · You have new or worse belly pain.     · You have symptoms of dehydration, such as:  ? Dry eyes and a dry mouth. ? Passing only a little dark urine. ? Feeling thirstier than usual.     · You have a new or higher fever.    Watch closely for changes in your health, and be sure to contact your doctor if:    · Your diarrhea is getting worse.     · You see pus in the diarrhea.     · You are not getting better after 2 days (48 hours). Where can you learn more? Go to http://jeff-claudine.info/. Enter Z376 in the search box to learn more about \"Diarrhea: Care Instructions. \"  Current as of: September 23, 2018  Content Version: 11.9  © 0013-4623 [employer], [employer]. Care instructions adapted under license by [employer] (which disclaims liability or warranty for this information). If you have questions about a medical condition or this instruction, always ask your healthcare professional. [address] any warranty or liability for your use of this information.

## 2019-01-25 ENCOUNTER — HOSPITAL ENCOUNTER (EMERGENCY)
Age: 54
Discharge: HOME | End: 2019-01-25
Attending: EMERGENCY MEDICINE
Payer: MEDICARE

## 2019-01-25 ENCOUNTER — APPOINTMENT (OUTPATIENT)
Dept: ULTRASOUND IMAGING | Age: 54
End: 2019-01-25
Attending: EMERGENCY MEDICINE
Payer: MEDICARE

## 2019-01-25 VITALS
TEMPERATURE: 98.2 F | BODY MASS INDEX: 41.33 KG/M2 | SYSTOLIC BLOOD PRESSURE: 115 MMHG | HEIGHT: 59 IN | DIASTOLIC BLOOD PRESSURE: 71 MMHG | HEART RATE: 74 BPM | RESPIRATION RATE: 18 BRPM | WEIGHT: 205 LBS | OXYGEN SATURATION: 96 %

## 2019-01-25 DIAGNOSIS — R19.7 ABDOMINAL PAIN, VOMITING, AND DIARRHEA: Primary | ICD-10-CM

## 2019-01-25 DIAGNOSIS — R11.10 ABDOMINAL PAIN, VOMITING, AND DIARRHEA: Primary | ICD-10-CM

## 2019-01-25 DIAGNOSIS — R10.9 ABDOMINAL PAIN, VOMITING, AND DIARRHEA: Primary | ICD-10-CM

## 2019-01-25 LAB
ALBUMIN SERPL-MCNC: 3.8 G/DL (ref 3.5–5)
ALBUMIN/GLOB SERPL: 1.1 {RATIO} (ref 1.2–3.5)
ALP SERPL-CCNC: 76 U/L (ref 50–136)
ALT SERPL-CCNC: 153 U/L (ref 12–65)
ANION GAP SERPL CALC-SCNC: 7 MMOL/L (ref 7–16)
AST SERPL-CCNC: 183 U/L (ref 15–37)
BASOPHILS # BLD: 0 K/UL (ref 0–0.2)
BASOPHILS NFR BLD: 0 % (ref 0–2)
BILIRUB SERPL-MCNC: 0.4 MG/DL (ref 0.2–1.1)
BUN SERPL-MCNC: 12 MG/DL (ref 6–23)
CALCIUM SERPL-MCNC: 8.9 MG/DL (ref 8.3–10.4)
CHLORIDE SERPL-SCNC: 114 MMOL/L (ref 98–107)
CO2 SERPL-SCNC: 25 MMOL/L (ref 21–32)
CREAT SERPL-MCNC: 1.19 MG/DL (ref 0.6–1)
DIFFERENTIAL METHOD BLD: ABNORMAL
EOSINOPHIL # BLD: 0.1 K/UL (ref 0–0.8)
EOSINOPHIL NFR BLD: 2 % (ref 0.5–7.8)
ERYTHROCYTE [DISTWIDTH] IN BLOOD BY AUTOMATED COUNT: 14.6 % (ref 11.9–14.6)
FLUAV AG NPH QL IA: NEGATIVE
FLUBV AG NPH QL IA: NEGATIVE
GLOBULIN SER CALC-MCNC: 3.5 G/DL (ref 2.3–3.5)
GLUCOSE SERPL-MCNC: 99 MG/DL (ref 65–100)
HCT VFR BLD AUTO: 40.2 % (ref 35.8–46.3)
HGB BLD-MCNC: 13.1 G/DL (ref 11.7–15.4)
IMM GRANULOCYTES # BLD AUTO: 0 K/UL (ref 0–0.5)
IMM GRANULOCYTES NFR BLD AUTO: 0 % (ref 0–5)
LIPASE SERPL-CCNC: 172 U/L (ref 73–393)
LYMPHOCYTES # BLD: 0.8 K/UL (ref 0.5–4.6)
LYMPHOCYTES NFR BLD: 11 % (ref 13–44)
MCH RBC QN AUTO: 31.7 PG (ref 26.1–32.9)
MCHC RBC AUTO-ENTMCNC: 32.6 G/DL (ref 31.4–35)
MCV RBC AUTO: 97.3 FL (ref 79.6–97.8)
MONOCYTES # BLD: 0.6 K/UL (ref 0.1–1.3)
MONOCYTES NFR BLD: 8 % (ref 4–12)
NEUTS SEG # BLD: 5.7 K/UL (ref 1.7–8.2)
NEUTS SEG NFR BLD: 79 % (ref 43–78)
NRBC # BLD: 0 K/UL (ref 0–0.2)
PLATELET # BLD AUTO: 133 K/UL (ref 150–450)
PMV BLD AUTO: 12 FL (ref 9.4–12.3)
POTASSIUM SERPL-SCNC: 3.8 MMOL/L (ref 3.5–5.1)
PROT SERPL-MCNC: 7.3 G/DL (ref 6.3–8.2)
RBC # BLD AUTO: 4.13 M/UL (ref 4.05–5.2)
SODIUM SERPL-SCNC: 146 MMOL/L (ref 136–145)
SPECIMEN SOURCE: NORMAL
WBC # BLD AUTO: 7.3 K/UL (ref 4.3–11.1)

## 2019-01-25 PROCEDURE — 76705 ECHO EXAM OF ABDOMEN: CPT

## 2019-01-25 PROCEDURE — 74011250636 HC RX REV CODE- 250/636: Performed by: EMERGENCY MEDICINE

## 2019-01-25 PROCEDURE — 96375 TX/PRO/DX INJ NEW DRUG ADDON: CPT | Performed by: EMERGENCY MEDICINE

## 2019-01-25 PROCEDURE — 83690 ASSAY OF LIPASE: CPT

## 2019-01-25 PROCEDURE — 80053 COMPREHEN METABOLIC PANEL: CPT

## 2019-01-25 PROCEDURE — 96374 THER/PROPH/DIAG INJ IV PUSH: CPT | Performed by: EMERGENCY MEDICINE

## 2019-01-25 PROCEDURE — 85025 COMPLETE CBC W/AUTO DIFF WBC: CPT

## 2019-01-25 PROCEDURE — 99284 EMERGENCY DEPT VISIT MOD MDM: CPT | Performed by: EMERGENCY MEDICINE

## 2019-01-25 PROCEDURE — 87804 INFLUENZA ASSAY W/OPTIC: CPT

## 2019-01-25 RX ORDER — METOCLOPRAMIDE HYDROCHLORIDE 5 MG/ML
5 INJECTION INTRAMUSCULAR; INTRAVENOUS
Status: COMPLETED | OUTPATIENT
Start: 2019-01-25 | End: 2019-01-25

## 2019-01-25 RX ORDER — MORPHINE SULFATE 10 MG/ML
6 INJECTION, SOLUTION INTRAMUSCULAR; INTRAVENOUS
Status: COMPLETED | OUTPATIENT
Start: 2019-01-25 | End: 2019-01-25

## 2019-01-25 RX ORDER — ONDANSETRON 2 MG/ML
4 INJECTION INTRAMUSCULAR; INTRAVENOUS
Status: COMPLETED | OUTPATIENT
Start: 2019-01-25 | End: 2019-01-25

## 2019-01-25 RX ORDER — ONDANSETRON 4 MG/1
4 TABLET, ORALLY DISINTEGRATING ORAL
Qty: 20 TAB | Refills: 0 | Status: SHIPPED | OUTPATIENT
Start: 2019-01-25 | End: 2019-03-19 | Stop reason: ALTCHOICE

## 2019-01-25 RX ORDER — DICYCLOMINE HYDROCHLORIDE 20 MG/1
20 TABLET ORAL EVERY 6 HOURS
Qty: 30 TAB | Refills: 0 | Status: SHIPPED | OUTPATIENT
Start: 2019-01-25 | End: 2019-03-19 | Stop reason: ALTCHOICE

## 2019-01-25 RX ORDER — MORPHINE SULFATE 2 MG/ML
6 INJECTION, SOLUTION INTRAMUSCULAR; INTRAVENOUS
Status: DISCONTINUED | OUTPATIENT
Start: 2019-01-25 | End: 2019-01-25 | Stop reason: SDUPTHER

## 2019-01-25 RX ADMIN — SODIUM CHLORIDE 1000 ML: 900 INJECTION, SOLUTION INTRAVENOUS at 11:14

## 2019-01-25 RX ADMIN — MORPHINE SULFATE 6 MG: 10 INJECTION INTRAVENOUS at 11:15

## 2019-01-25 RX ADMIN — METOCLOPRAMIDE 5 MG: 5 INJECTION, SOLUTION INTRAMUSCULAR; INTRAVENOUS at 13:05

## 2019-01-25 RX ADMIN — ONDANSETRON 4 MG: 2 INJECTION INTRAMUSCULAR; INTRAVENOUS at 11:15

## 2019-01-25 NOTE — ED TRIAGE NOTES
Pt sts abd pain with n/v/d for several days. Pt sts that she was seen yesterday for same and dc'd with zofran. Pt sts continued pain.

## 2019-01-25 NOTE — ED PROVIDER NOTES
52-year-old female presents with 5 days of nausea, vomiting, diarrhea, and abdominal pain. She has had sweats without documented fevers. She reports more diarrhea than vomiting. She denies blood in vomit or stools. No known ill contacts or out of country travel. Symptoms started shortly after eating a sandwich that she thought the bread might be bad. She was seen in the emergency department yesterday for the same. She was prescribed Phenergan, but reports no improvement. She has only been able to eat a small amount of chicken soup. Denies urinary symptoms. She reports diffuse abdominal cramping and left-sided flank pain. The history is provided by the patient. Abdominal Pain Associated symptoms include diarrhea, nausea, vomiting and back pain. Pertinent negatives include no fever, no dysuria, no headaches and no chest pain. Vomiting Associated symptoms include abdominal pain and diarrhea. Pertinent negatives include no chills, no fever, no headaches, no cough and no headaches. Nausea Associated symptoms include abdominal pain and diarrhea. Pertinent negatives include no chills, no fever, no headaches, no cough and no headaches. Diarrhea Associated symptoms include diarrhea, nausea, vomiting and back pain. Pertinent negatives include no fever, no dysuria, no headaches and no chest pain. Past Medical History:  
Diagnosis Date  Arrhythmia   
 ablation for svt  Arthritis  Automatic implantable cardioverter-defibrillator in situ 3/30/2016  CAD in native artery 3/30/2016  Chronic systolic heart failure ([address] 7/18/2013  Diabetes ([address] type 2 (oral med controlled)  Diabetes mellitus ([address] 4/12/2010  Dyslipidemia 2/13/2013  Fibromyalgia  GERD (gastroesophageal reflux disease)  Heart failure ([address] chronic systolic with EF 54%; non-ischemic cardiomyopathy  HTN (hypertension) 4/12/2010  Ill-defined condition Pt denies fibromyalgia. Pt feels she has neuropathy  Morbid obesity [facility]  NICM (nonischemic cardiomyopathy) ([facility] 2/15/2013  Obstructive sleep apnea (adult) (pediatric) 2014  Other ill-defined conditions(799.89)   
 svt ablation  Sciatic pain 2016  Tobacco use disorder 2010 Past Surgical History:  
Procedure Laterality Date  HX  SECTION    
 HX GYN    
 YEIMI-Left ovary intact per pt  HX HERNIA REPAIR    
 mild incarceration, no bowel removal  
 HX IMPLANTABLE CARDIOVERTER DEFIBRILLATOR  HX OTHER SURGICAL    
 cardiac ablation for svt  HX OTHER SURGICAL    
 right shoulder surgery  HX PACEMAKER    
 HX TONSILLECTOMY  MD LEFT HEART CATH,PERCUTANEOUS  2013  
 no intervention Family History:  
Problem Relation Age of Onset  Heart Attack Father 48  
     mi  Stroke Father  Hypertension Father  Diabetes Other  Stroke Mother  Diabetes Brother  Heart Disease Brother  Hypertension Brother  Breast Cancer Sister 37 Social History Socioeconomic History  Marital status: SINGLE Spouse name: Not on file  Number of children: Not on file  Years of education: Not on file  Highest education level: Not on file Social Needs  Financial resource strain: Not on file  Food insecurity - worry: Not on file  Food insecurity - inability: Not on file  Transportation needs - medical: Not on file  Transportation needs - non-medical: Not on file Occupational History  Not on file Tobacco Use  Smoking status: Current Every Day Smoker Packs/day: 0.25 Years: 27.00 Pack years: 6.75  Smokeless tobacco: Never Used  Tobacco comment: smokes occassionally Substance and Sexual Activity  Alcohol use: Yes Comment: occaisonally  Drug use: Yes Types: Marijuana Comment: Every now and then  Sexual activity: No  
Other Topics Concern [address] Service Not Asked  Blood Transfusions Not Asked  Caffeine Concern Not Asked  Occupational Exposure Not Asked [place] Hazards Not Asked  Sleep Concern Not Asked  Stress Concern Not Asked  Weight Concern Not Asked  Special Diet Not Asked  Back Care Not Asked  Exercise Not Asked  Bike Helmet Not Asked [address] Yes  Self-Exams Not Asked Social History Narrative Denies physical or sexual abuse ALLERGIES: Patient has no known allergies. Review of Systems Constitutional: Positive for diaphoresis and fatigue. Negative for chills and fever. HENT: Negative for hearing loss. Eyes: Negative for visual disturbance. Respiratory: Negative for cough and shortness of breath. Cardiovascular: Negative for chest pain and palpitations. Gastrointestinal: Positive for abdominal pain, diarrhea, nausea and vomiting. Negative for blood in stool. Genitourinary: Positive for flank pain. Negative for dysuria. Musculoskeletal: Positive for back pain. Skin: Negative for rash. Neurological: Negative for weakness and headaches. Psychiatric/Behavioral: Negative for confusion. Vitals:  
 01/25/19 0322 BP: 141/90 Pulse: 69 Resp: 18 Temp: 98.2 °F (36.8 °C) SpO2: 96% Weight: 93 kg (205 lb) Height: 4' 11\" (1.499 m) Physical Exam  
Constitutional: She appears well-developed and well-nourished. HENT:  
Head: Normocephalic and atraumatic. Right Ear: External ear normal.  
Left Ear: External ear normal.  
Nose: Nose normal.  
Mouth/Throat: Oropharynx is clear and moist.  
Eyes: Conjunctivae are normal. Pupils are equal, round, and reactive to light. Neck: Normal range of motion. Neck supple. Cardiovascular: Regular rhythm, normal heart sounds and intact distal pulses. Pulmonary/Chest: Effort normal and breath sounds normal. No respiratory distress. She has no wheezes. Abdominal: Soft. Bowel sounds are normal. She exhibits no distension. There is generalized tenderness. Musculoskeletal: Normal range of motion. She exhibits no edema. Neurological: She is alert. Skin: Skin is warm and dry. Psychiatric: Judgment normal.  
Nursing note and vitals reviewed. MDM Number of Diagnoses or Management Options Diagnosis management comments: Parts of this document were created using dragon voice recognition software. The chart has been reviewed but errors may still be present. 12:40 PM 
Mild elevation in LFT's with normal bili. Had abdominal xray yesterday. No symptoms to suggest SBO or perforation. Benign abdominal exam. RUQ US unremarkable. She has persistent nausea. Possible viral. Will await urine and finish fluids. 2:06 PM 
ua negative for infection. Pt hungry. Will dc. I discussed the results of all labs, procedures, radiographs, and treatments with the patient and available family. Treatment plan is agreed upon and the patient is ready for discharge. Questions about treatment in the ED and differential diagnosis of presenting condition were answered. Patient was given verbal discharge instructions including, but not limited to, importance of returning to the emergency department for any concern of worsening or continued symptoms. Instructions were given to follow up with a primary care provider or specialist within 1-2 days. Adverse effects of medications, if prescribed, were discussed and patient was advised to refrain from significant physical activity until followed up by primary care physician and to not drive or operate heavy machinery after taking any sedating substances. Amount and/or Complexity of Data Reviewed Clinical lab tests: reviewed and ordered (Results for orders placed or performed during the hospital encounter of 01/25/19 -INFLUENZA A & B AG (RAPID TEST) Result                      Value             Ref Range Influenza A Ag              NEGATIVE          NEG Influenza B Ag              NEGATIVE          NEG Source NASOPHARYNGEAL 
-CBC WITH AUTOMATED DIFF Result                      Value             Ref Range WBC                         7.3               4.3 - 11.1 K* 
     RBC                         4.13              4.05 - 5.2 M* HGB                         13.1              11.7 - 15.4 * HCT                         40.2              35.8 - 46.3 % MCV                         97.3              79.6 - 97.8 * MCH                         31.7              26.1 - 32.9 * MCHC                        32.6              31.4 - 35.0 * RDW                         14.6              11.9 - 14.6 % PLATELET                    133 (L)           150 - 450 K/* MPV                         12.0              9.4 - 12.3 FL ABSOLUTE NRBC               0.00              0.0 - 0.2 K/* DF                          AUTOMATED NEUTROPHILS                 79 (H)            43 - 78 % LYMPHOCYTES                 11 (L)            13 - 44 % MONOCYTES                   8                 4.0 - 12.0 % EOSINOPHILS                 2                 0.5 - 7.8 % BASOPHILS                   0                 0.0 - 2.0 % IMMATURE GRANULOCYTES       0                 0.0 - 5.0 %   
     ABS. NEUTROPHILS            5.7               1.7 - 8.2 K/* ABS. LYMPHOCYTES            0.8               0.5 - 4.6 K/* ABS. MONOCYTES              0.6               0.1 - 1.3 K/* ABS. EOSINOPHILS            0.1               0.0 - 0.8 K/* ABS. BASOPHILS              0.0               0.0 - 0.2 K/* ABS. IMM.  GRANS.            0.0               0.0 - 0.5 K/* 
-METABOLIC PANEL, COMPREHENSIVE 
 Result                      Value             Ref Range Sodium                      146 (H)           136 - 145 mm* Potassium                   3.8               3.5 - 5.1 mm* Chloride                    114 (H)           98 - 107 mmo* CO2                         25                21 - 32 mmol* Anion gap                   7                 7 - 16 mmol/L Glucose                     99                65 - 100 mg/* BUN                         12                6 - 23 MG/DL Creatinine                  1.19 (H)          0.6 - 1.0 MG* 
     GFR est AA                  >60               >60 ml/min/1* GFR est non-AA              50 (L)            >60 ml/min/1* Calcium                     8.9               8.3 - 10.4 M* Bilirubin, total            0.4               0.2 - 1.1 MG* ALT (SGPT)                  153 (H)           12 - 65 U/L   
     AST (SGOT)                  183 (H)           15 - 37 U/L Alk. phosphatase            76                50 - 136 U/L Protein, total              7.3               6.3 - 8.2 g/* Albumin                     3.8               3.5 - 5.0 g/* Globulin                    3.5               2.3 - 3.5 g/* A-G Ratio                   1.1 (L)           1.2 - 3.5     
-LIPASE Result                      Value             Ref Range Lipase                      172               73 - 393 U/L  
) Tests in the radiology section of CPT®: ordered and reviewed ([address] Result Date: 1/25/2019 Limited ultrasound abdomen Indication: Right upper quadrant mild abdominal pain, elevated liver function tests Comparison: Renal ultrasound 5/23/2016 Technique: Sonographic grayscale imaging of the abdomen was performed. Findings: The liver is normal in echotexture without discrete lesion. The liver measures 18.2 cm. Flow is documented within the portal vein.  The gallbladder is mildly distended without stone or sludge. There is no evidence intra/extrahepatic biliary ductal dilatation identified. The common duct is normal in caliber measuring 3 mm. No pericholecystic fluid. Negative Sonographic Gan's. The right kidney measures 10.1 cm without hydronephrosis or mass. The pancreas is unremarkable. The IVC and aorta are unremarkable. No ascites. Impression: 1. Normal gallbladder ultrasound. ) Tests in the medicine section of CPT®: ordered and reviewed Procedures

## 2019-01-25 NOTE — DISCHARGE INSTRUCTIONS
Push fluids. Dickinson diet. Return for worsening or concerning symptoms. Follow-up with your doctor to recheck liver tests within 1 week.

## 2019-01-25 NOTE — ED NOTES
I have reviewed discharge instructions with the patient. The patient verbalized understanding. Patient left ED via Discharge Method: ambulatory to Home with self Opportunity for questions and clarification provided. Patient given 2 scripts. To continue your aftercare when you leave the hospital, you may receive an automated call from our care team to check in on how you are doing. This is a free service and part of our promise to provide the best care and service to meet your aftercare needs.  If you have questions, or wish to unsubscribe from this service please call [phone]. Thank you for Choosing our [facility] Emergency Department.

## 2019-01-25 NOTE — ED NOTES
Pt resting in bed,resp easy,VSS, belongs in reach. Offered restroom which she ambulated to without assistance and change in position.

## 2019-02-09 ENCOUNTER — HOSPITAL ENCOUNTER (OUTPATIENT)
Dept: MAMMOGRAPHY | Age: 54
Discharge: HOME | End: 2019-02-09
Attending: INTERNAL MEDICINE
Payer: MEDICARE

## 2019-02-09 DIAGNOSIS — Z12.31 VISIT FOR SCREENING MAMMOGRAM: ICD-10-CM

## 2019-02-09 PROCEDURE — 77063 BREAST TOMOSYNTHESIS BI: CPT

## 2019-02-26 ENCOUNTER — HOSPITAL ENCOUNTER (OUTPATIENT)
Dept: CT IMAGING | Age: 54
Discharge: HOME | End: 2019-02-26
Attending: INTERNAL MEDICINE
Payer: MEDICARE

## 2019-02-26 DIAGNOSIS — R10.30 LOWER ABDOMINAL PAIN: ICD-10-CM

## 2019-02-26 LAB — CREAT BLD-MCNC: 1.4 MG/DL (ref 0.8–1.5)

## 2019-02-26 PROCEDURE — 82565 ASSAY OF CREATININE: CPT

## 2019-02-26 PROCEDURE — 74177 CT ABD & PELVIS W/CONTRAST: CPT

## 2019-02-26 PROCEDURE — 74011636320 HC RX REV CODE- 636/320: Performed by: INTERNAL MEDICINE

## 2019-02-26 PROCEDURE — 74011000258 HC RX REV CODE- 258: Performed by: INTERNAL MEDICINE

## 2019-02-26 RX ORDER — SODIUM CHLORIDE 0.9 % (FLUSH) 0.9 %
10 SYRINGE (ML) INJECTION
Status: COMPLETED | OUTPATIENT
Start: 2019-02-26 | End: 2019-02-26

## 2019-02-26 RX ADMIN — IOPAMIDOL 100 ML: 755 INJECTION, SOLUTION INTRAVENOUS at 14:42

## 2019-02-26 RX ADMIN — DIATRIZOATE MEGLUMINE AND DIATRIZOATE SODIUM 15 ML: 660; 100 LIQUID ORAL; RECTAL at 14:42

## 2019-02-26 RX ADMIN — SODIUM CHLORIDE 100 ML: 900 INJECTION, SOLUTION INTRAVENOUS at 14:42

## 2019-02-26 RX ADMIN — Medication 10 ML: at 14:42

## 2019-02-26 NOTE — PROGRESS NOTES
Please call patient, CT scan of the abdomen is stable. Nevertheless, I would like to have surgery given opinion about the multiple hernias. Referral was made.

## 2019-03-12 ENCOUNTER — HOSPITAL ENCOUNTER (OUTPATIENT)
Dept: LAB | Age: 54
Discharge: HOME | End: 2019-03-12
Payer: MEDICARE

## 2019-03-12 DIAGNOSIS — E78.5 DYSLIPIDEMIA: ICD-10-CM

## 2019-03-12 LAB
CHOLEST SERPL-MCNC: 194 MG/DL
HDLC SERPL-MCNC: 84 MG/DL (ref 40–60)
HDLC SERPL: 2.3 {RATIO}
LDLC SERPL CALC-MCNC: 95.8 MG/DL
LIPID PROFILE,FLP: ABNORMAL
TRIGL SERPL-MCNC: 71 MG/DL (ref 35–150)
VLDLC SERPL CALC-MCNC: 14.2 MG/DL (ref 6–23)

## 2019-03-12 PROCEDURE — 36415 COLL VENOUS BLD VENIPUNCTURE: CPT

## 2019-03-12 PROCEDURE — 80061 LIPID PANEL: CPT

## 2019-03-19 ENCOUNTER — HOSPITAL ENCOUNTER (OUTPATIENT)
Dept: LAB | Age: 54
Discharge: HOME | End: 2019-03-19
Payer: MEDICARE

## 2019-03-19 DIAGNOSIS — I50.22 CHRONIC SYSTOLIC HEART FAILURE (HCC): Chronic | ICD-10-CM

## 2019-03-19 LAB
ALBUMIN SERPL-MCNC: 3.5 G/DL (ref 3.5–5)
ALBUMIN/GLOB SERPL: 1 {RATIO}
ALP SERPL-CCNC: 78 U/L (ref 50–136)
ALT SERPL-CCNC: 58 U/L (ref 12–65)
ANION GAP SERPL CALC-SCNC: 5 MMOL/L
AST SERPL-CCNC: 59 U/L (ref 15–37)
BILIRUB SERPL-MCNC: 0.3 MG/DL (ref 0.2–1.1)
BUN SERPL-MCNC: 22 MG/DL (ref 6–23)
CALCIUM SERPL-MCNC: 9.2 MG/DL (ref 8.3–10.4)
CHLORIDE SERPL-SCNC: 113 MMOL/L (ref 98–107)
CO2 SERPL-SCNC: 28 MMOL/L (ref 21–32)
CREAT SERPL-MCNC: 1.4 MG/DL (ref 0.6–1)
GLOBULIN SER CALC-MCNC: 3.5 G/DL
GLUCOSE SERPL-MCNC: 109 MG/DL (ref 65–100)
POTASSIUM SERPL-SCNC: 3.7 MMOL/L (ref 3.5–5.1)
PROT SERPL-MCNC: 7 G/DL (ref 6.3–8.2)
SODIUM SERPL-SCNC: 146 MMOL/L (ref 136–145)
TSH SERPL DL<=0.005 MIU/L-ACNC: 1.35 UIU/ML (ref 0.36–3.74)

## 2019-03-19 PROCEDURE — 36415 COLL VENOUS BLD VENIPUNCTURE: CPT

## 2019-03-19 PROCEDURE — 84443 ASSAY THYROID STIM HORMONE: CPT

## 2019-03-19 PROCEDURE — 80053 COMPREHEN METABOLIC PANEL: CPT

## 2019-03-19 NOTE — H&P (VIEW-ONLY)
Tamy Erazo MD, [address] [facility], [address] [place] Phone [phone]   Fax [phone] Date of visit: 3/19/2019 Primary/Requesting provider: Stanton Roy MD 
 
Chief Complaint Patient presents with  New Patient  
  abdominal wall hernias HISTORY OF PRESENT ILLNESS Freya Culver is a 52 y.o. female. New Patient Associated symptoms include abdominal pain, headaches and shortness of breath. Patient is a 52 y.o. female who presents for evaluation of a hernia. She is extremely pleasant, but is a limited historian. By her report, her abdominal surgical history includes a , then a hysterectomy. About 2 months after the hysterectomy she reports having an urgent/emergent operation for what sounds like an incarcerated and obstructing incisional hernia which was repaired and she does not recall ever being told that mesh was used. She reports noting this recurrent hernia about 3-4 years ago but it was only mildly troubling. It has enlarged somewhat, and for the past 3 months or so has become more symptomatic. She is noting intermittent episodes of \"balling up\" which seems to include hardening of one side of the anterior abdomen or the other, usually with nausea and sometimes vomiting. This happens at least daily. She also reports a sensation of needing to have a bowel movement but when she tries to go, sometimes she cannot. When she has a BM, however, it is unremarkable with no blood or melena. She is eating well, not losing weight. She does smoke, usually about 2 cigarettes per day, but does admit that since the hernia has worsened she has been smoking more due to her nerves. She is scheduled to see her cardiologist Mally Wilcox) later today and will discuss ASA with him. Medications:  
Current Outpatient Medications Medication Sig  
 metFORMIN (GLUCOPHAGE) 500 mg tablet Take 1 Tab by mouth two (2) times daily (with meals).  magnesium oxide (MAG-OX) 400 mg tablet Take 1 Tab by mouth two (2) times a day.  amiodarone (CORDARONE) 200 mg tablet Take 1 Tab by mouth two (2) times a day.  Insulin Needles, Disposable, (FABIOLA PEN NEEDLE) 32 gauge x 5/32\" ndle 1 Each by Does Not Apply route daily as needed. E11.9  
 lancets (ONETOUCH SURESOFT LANCING DEV) misc Check blood sugar daily, E11.9  pravastatin (PRAVACHOL) 80 mg tablet Take 1 Tab by mouth nightly.  isosorbide mononitrate ER (IMDUR) 30 mg tablet Take 1 Tab by mouth every morning.  carvedilol (COREG) 25 mg tablet Take 1 Tab by mouth two (2) times daily (with meals).  furosemide (LASIX) 80 mg tablet Taking 1 tablet in the morning and 1/2 tablet in the afternoon  insulin glargine (LANTUS SOLOSTAR U-100 INSULIN) 100 unit/mL (3 mL) inpn 35 Units by SubCUTAneous route daily.  glucose blood VI test strips (ONETOUCH ULTRA TEST) strip Check blood sugar daily, E11.9  
 nitroglycerin (NITROSTAT) 0.4 mg SL tablet 0.4 mg by SubLINGual route every five (5) minutes as needed for Chest Pain. Up to 3 doses.  DUPIXENT 300 mg/2 mL syrg syringe  triamcinolone acetonide (KENALOG) 0.1 % topical cream   
 albuterol (PROVENTIL HFA, VENTOLIN HFA, PROAIR HFA) 90 mcg/actuation inhaler Take 1 Puff by inhalation every four (4) hours as needed for Wheezing.  cholecalciferol (VITAMIN D3) 1,000 unit cap Take  by mouth daily.  Blood-Glucose Meter (ONETOUCH ULTRA2) monitoring kit Check blood sugar daily, E11.9  cpap machine kit by Does Not Apply route. 11 cm  aspirin (ASPIRIN) 325 mg tablet Take 1 Tab by mouth daily.  gabapentin (NEURONTIN) 300 mg capsule Take 1 Cap by mouth three (3) times daily.  clotrimazole-betamethasone (LOTRISONE) topical cream Apply  to affected area two (2) times a day. No current facility-administered medications for this visit. Allergies: No Known Allergies Past History: 
Past Medical History: Diagnosis Date  Arrhythmia   
 ablation for svt  Arthritis  Automatic implantable cardioverter-defibrillator in situ 3/30/2016  CAD in native artery 3/30/2016  Chronic systolic heart failure ([facility] 2013  Diabetes ([facility] type 2 (oral med controlled)  Diabetes mellitus ([address] 2010  Dyslipidemia 2013  Fibromyalgia  GERD (gastroesophageal reflux disease)  Headache   
 Heart failure ([facility] chronic systolic with EF 35%; non-ischemic cardiomyopathy  HTN (hypertension) 2010  Hypercholesterolemia  Ill-defined condition Pt denies fibromyalgia. Pt feels she has neuropathy  Morbid obesity ([facility]  NICM (nonischemic cardiomyopathy) ([facility] 2/15/2013  Obstructive sleep apnea (adult) (pediatric) 2014  Other ill-defined conditions(799.89)   
 svt ablation  Sciatic pain 2016  Tobacco use disorder 2010 Past Surgical History:  
Procedure Laterality Date  HX  SECTION    
 HX GYN    
 YEIMI-Left ovary intact per pt  HX HERNIA REPAIR    
 mild incarceration, no bowel removal  
 HX IMPLANTABLE CARDIOVERTER DEFIBRILLATOR  HX OTHER SURGICAL    
 cardiac ablation for svt  HX OTHER SURGICAL    
 right shoulder surgery  HX PACEMAKER    
 HX TONSILLECTOMY  MI LEFT HEART CATH,PERCUTANEOUS  2013  
 no intervention Family and Social History: 
Family History Problem Relation Age of Onset  Heart Attack Father 48  
     mi  Stroke Father  Hypertension Father  Heart Disease Father  Diabetes Other  Stroke Mother  Diabetes Brother  Heart Disease Brother  Hypertension Brother  Breast Cancer Sister 37 Social History Socioeconomic History  Marital status: SINGLE Spouse name: Not on file  Number of children: Not on file  Years of education: Not on file  Highest education level: Not on file Social Needs  Financial resource strain: Not on file  Food insecurity - worry: Not on file  Food insecurity - inability: Not on file  Transportation needs - medical: Not on file  Transportation needs - non-medical: Not on file Occupational History  Not on file Tobacco Use  Smoking status: Current Every Day Smoker Packs/day: 0.25 Years: 27.00 Pack years: 6.75  Smokeless tobacco: Never Used  Tobacco comment: smokes occassionally Substance and Sexual Activity  Alcohol use: Yes Comment: occaisonally  Drug use: Yes Types: Marijuana Comment: Every now and then  Sexual activity: No  
Other Topics Concern [address] Service Not Asked  Blood Transfusions Not Asked  Caffeine Concern Not Asked  Occupational Exposure Not Asked [employer] Hazards Not Asked  Sleep Concern Not Asked  Stress Concern Not Asked  Weight Concern Not Asked  Special Diet Not Asked  Back Care Not Asked  Exercise Not Asked  Bike Helmet Not Asked [address] Yes  Self-Exams Not Asked Social History Narrative Denies physical or sexual abuse Review of Systems Constitutional: Positive for chills. HENT: Negative. Eyes:  
     Wears glasses. Respiratory: Positive for shortness of breath and wheezing. Cardiovascular:  
     CAD, HTN, defibrillator, dyslipidemia Gastrointestinal: Positive for abdominal pain. Genitourinary: Negative. Musculoskeletal: Positive for back pain. Skin:  
     eczema Neurological: Positive for headaches. Endo/Heme/Allergies: Bruises/bleeds easily. Psychiatric/Behavioral: Negative. Physical Exam  
Constitutional: She is oriented to person, place, and time. She appears well-developed and well-nourished. No distress. Morbidly obese Eyes: No scleral icterus. Neck: No JVD present. No tracheal deviation present. Cardiovascular: Normal rate and normal heart sounds. No murmur heard. Pulmonary/Chest: Effort normal and breath sounds normal. No respiratory distress. She has no wheezes. She has no rales. Abdominal: Soft. Bowel sounds are normal. She exhibits no distension. There is tenderness. There is guarding. There is no rebound. Severe obesity Lower midline scar Tender, incompletely reducible mass at upper aspect of scar. Probable second site at/below umbilicus c/w incarcerated hernia Neurological: She is alert and oriented to person, place, and time. No cranial nerve deficit. Skin: Skin is warm and dry. Psychiatric: She has a normal mood and affect. Her behavior is normal.  
Vitals reviewed. CT Results (most recent): 
Results from [facility] Encounter encounter on 02/26/19 CT ABD PELV W CONT Narrative CT abdomen and pelvis dated 2/26/2019 CLINICAL INFORMATION: History of small bowel adhesions and hernias. Vomiting 
this morning Spiral images of the abdomen and pelvis were obtained during intravenous 
injection of 100 cc nonionic contrast. 
 
This study requested and performed no oral contrast. Please note this limits 
evaluation of bowel. CT ABDOMEN: 
Upper images show heart to be enlarged. There is mild atelectatic change right 
middle and left lower lobe. Spleen is normal. 5 mm and 3 mm cyst right lobe of the liver. No calcified 
stones in the gallbladder. The pancreas is unremarkable. Adrenals are normal. 
There are small bilateral renal cysts. No hydronephrosis. Abdominal aorta is 
normal in size. No adenopathy. There is a broad-based umbilical hernia containing multiple loops of 
nonobstructed small bowel. Second hernia is noted in the midline at the level of 
the umbilicus and also contains nonobstructed small bowel. CT PELVIS: 
No mass, adenopathy or abnormal fluid collection. There is colonic diverticulosis. No evidence diverticulitis. No evidence of 
bowel obstruction.  
  
 Impression IMPRESSION:  
 1. Anterior abdominal wall hernias containing nonobstructed small bowel. 2. Diverticulosis Recent Results (from the past 12 hour(s)) METABOLIC PANEL, COMPREHENSIVE Collection Time: 03/19/19  2:14 PM  
Result Value Ref Range Sodium 146 (H) 136 - 145 mmol/L Potassium 3.7 3.5 - 5.1 mmol/L Chloride 113 (H) 98 - 107 mmol/L  
 CO2 28 21 - 32 mmol/L Anion gap 5 mmol/L Glucose 109 (H) 65 - 100 mg/dL BUN 22 6 - 23 MG/DL Creatinine 1.40 (H) 0.6 - 1.0 MG/DL  
 GFR est AA 50 ml/min/1.73m2 GFR est non-AA 41 ml/min/1.73m2 Calcium 9.2 8.3 - 10.4 MG/DL Bilirubin, total 0.3 0.2 - 1.1 MG/DL  
 ALT (SGPT) 58 12 - 65 U/L  
 AST (SGOT) 59 (H) 15 - 37 U/L Alk. phosphatase 78 50 - 136 U/L Protein, total 7.0 6.3 - 8.2 g/dL Albumin 3.5 3.5 - 5.0 g/dL Globulin 3.5 g/dL A-G Ratio 1.0 TSH 3RD GENERATION Collection Time: 03/19/19  2:14 PM  
Result Value Ref Range TSH 1.350 0.358 - 3.740 uIU/mL  
  
ASSESSMENT and PLAN Encounter Diagnoses Name Primary?  Recurrent incisional hernia with incarceration Yes Given increasing symptoms, which may be consistent with low-grade obstruction from the hernia, repair is indicated. Her perioperative risks are definitely increased due to her comorbid conditions, including cardiac disease and diabetes. Will proceed with incarcerated, recurrent,  incisional hernia repair with mesh. Technical details of the procedure are reviewed. Risks reviewed include risks of anesthesia, bleeding, infection, visceral injury, persistent post-operative pain, and hernia recurrence. All questions are answered. She is counseled that this operation is usually associated with significant pain, which is likely to persist for at least several weeks.

## 2019-03-19 NOTE — PROGRESS NOTES
Please call her, good news. AST and ALT (liver) better on labs. TSH normal.  Needs to drink some more water. But, labs are better. Remain on meds, see me as planned.  Thanks

## 2019-03-20 ENCOUNTER — HOSPITAL ENCOUNTER (OUTPATIENT)
Dept: SURGERY | Age: 54
Discharge: HOME | End: 2019-03-20
Attending: SURGERY

## 2019-03-20 VITALS
RESPIRATION RATE: 14 BRPM | OXYGEN SATURATION: 97 % | TEMPERATURE: 97.4 F | SYSTOLIC BLOOD PRESSURE: 138 MMHG | HEIGHT: 59 IN | WEIGHT: 205.5 LBS | HEART RATE: 60 BPM | BODY MASS INDEX: 41.43 KG/M2 | DIASTOLIC BLOOD PRESSURE: 79 MMHG

## 2019-03-20 RX ORDER — CETIRIZINE HCL 10 MG
10 TABLET ORAL AS NEEDED
Status: ON HOLD | COMMUNITY
End: 2019-03-26

## 2019-03-20 NOTE — PERIOP NOTES
Patient verified name and     Order for consent NOT found in EMR- unable to determine if it matches case posting; patient verified. Type 2 surgery, PAT assessment complete. Labs per surgeon: no orders in EMR at this time  Labs per anesthesia protocol: no hgb needed per Dr Ladena Favre' anesthesia protocol; CMP collected yesterday 3/19/19- results within anesthesia guidelines except for elevated sodium- will have anesthesia review  EKG: done yesterday 3/19/19 at [facility]- will have anesthesia review per protocol    Pt with hx of CHF, NICM and SVT- presence of cardiac defibrillator since . Pt denies any \"shocks\" delivered recently or in past. Cardiac records in EMR for anesthesia review- ECHO 2017 (EF 39%), most recent remote pacemaker check 19, cardiac clearance telephone encounter 3/19/19 states: Can reduce ASA to 81mg, but remain on ASA. Fine for surgery, Avg risk. \" Cardiac office visit note 3/19/19 states:    1. CSHF/NICM: Doing well on meds. Remain on lasix - followed by renal every 3 mos. Reduce ASA to 81mg today. Remain on coreg, no Ace or aldactone with prior ARF, CRI. No echo needed now. Follow for more angina or HF.      The patient has been instructed to call with any angina or equivalent as reviewed today. All questions were answered with the patient voicing complete understanding.       2. VT:  Remain on amio and Mg for VT, follow for toxicity on these meds. AST and ALT higher, re-check today.      Stress seems to drive much of the VT. This is better now. Follow for toxicity per protocol on meds.      3. CRI:  Off aldactone and Ace now, seeing renal for CRI.         4. ICD: Follow for now. No VT. Remain on Amio, follow for toxicity on meds.       5. BA:  On CPAP, seems compliant now.          Call to [employer] pacemaker rep ([phone]) to report pt's upcoming surgery 3/26/19- rep states she will make a note in the chart and will call preop DOS to discuss possible need for rep to be present based on anesthesiologist's request.     Hibiclens and instructions given per hospital policy. Patient provided with and instructed on educational handouts including Guide to Surgery, Pain Management, Hand Hygiene, Blood Transfusion Education, and [facility] Brochure. Patient answered medical/surgical history questions at their best of ability. All prior to admission medications documented in [facility]. Original medication prescription bottles NOT visualized during patient appointment. Patient instructed to hold all vitamins 7 days prior to surgery and NSAIDS 5 days prior to surgery, patient verbalized understanding. Prescription medications to hold: lasix DOS, metformin DOS. Patient instructed to continue previous medications as prescribed prior to surgery and to take the following medications the day of surgery according to anesthesia guidelines with a small sip of water: albuterol inhaler PRN- bring DOS, amiodarone, asa 81mg, coreg, imdur; lantus insulin 28units night before surgery. Patient teach back successful and patient demonstrates knowledge of instructions.

## 2019-03-21 RX ORDER — CEFAZOLIN SODIUM 1 G/3ML
2-3 INJECTION, POWDER, FOR SOLUTION INTRAMUSCULAR; INTRAVENOUS
Status: CANCELLED | OUTPATIENT
Start: 2019-03-21 | End: 2019-03-21

## 2019-03-25 ENCOUNTER — ANESTHESIA EVENT (OUTPATIENT)
Dept: SURGERY | Age: 54
DRG: 353 | End: 2019-03-25
Payer: MEDICARE

## 2019-03-26 ENCOUNTER — ANESTHESIA (OUTPATIENT)
Dept: SURGERY | Age: 54
DRG: 353 | End: 2019-03-26
Payer: MEDICARE

## 2019-03-26 ENCOUNTER — HOSPITAL ENCOUNTER (INPATIENT)
Age: 54
LOS: 29 days | Discharge: NURSING, REHAB OR LONG-TERM CARE | DRG: 353 | End: 2019-04-26
Attending: SURGERY | Admitting: SURGERY
Payer: MEDICARE

## 2019-03-26 DIAGNOSIS — K43.9 ABDOMINAL WALL HERNIA: ICD-10-CM

## 2019-03-26 DIAGNOSIS — Z99.89 OSA ON CPAP: Chronic | ICD-10-CM

## 2019-03-26 DIAGNOSIS — E87.6 HYPOKALEMIA: ICD-10-CM

## 2019-03-26 DIAGNOSIS — F17.200 TOBACCO USE DISORDER: Chronic | ICD-10-CM

## 2019-03-26 DIAGNOSIS — B96.5 PSEUDOMONAS URINARY TRACT INFECTION: ICD-10-CM

## 2019-03-26 DIAGNOSIS — K43.0 INCARCERATED INCISIONAL HERNIA: ICD-10-CM

## 2019-03-26 DIAGNOSIS — R07.89 ATYPICAL CHEST PAIN: ICD-10-CM

## 2019-03-26 DIAGNOSIS — N39.0 PSEUDOMONAS URINARY TRACT INFECTION: ICD-10-CM

## 2019-03-26 DIAGNOSIS — E87.3: ICD-10-CM

## 2019-03-26 DIAGNOSIS — J80 ARDS (ADULT RESPIRATORY DISTRESS SYNDROME) (HCC): ICD-10-CM

## 2019-03-26 DIAGNOSIS — E66.01 MORBID OBESITY (HCC): Chronic | ICD-10-CM

## 2019-03-26 DIAGNOSIS — G47.33 OSA ON CPAP: Chronic | ICD-10-CM

## 2019-03-26 DIAGNOSIS — I50.22 CHRONIC SYSTOLIC HEART FAILURE (HCC): Chronic | ICD-10-CM

## 2019-03-26 DIAGNOSIS — J96.01 ACUTE HYPOXEMIC RESPIRATORY FAILURE (HCC): ICD-10-CM

## 2019-03-26 DIAGNOSIS — E11.21 TYPE 2 DIABETES WITH NEPHROPATHY (HCC): ICD-10-CM

## 2019-03-26 DIAGNOSIS — I42.8 NICM (NONISCHEMIC CARDIOMYOPATHY) (HCC): Chronic | ICD-10-CM

## 2019-03-26 PROCEDURE — 77030012414: Performed by: SURGERY

## 2019-03-26 PROCEDURE — 74011636637 HC RX REV CODE- 636/637: Performed by: SURGERY

## 2019-03-26 PROCEDURE — C1713 ANCHOR/SCREW BN/BN,TIS/BN: HCPCS | Performed by: SURGERY

## 2019-03-26 PROCEDURE — 76210000016 HC OR PH I REC 1 TO 1.5 HR: Performed by: SURGERY

## 2019-03-26 PROCEDURE — 74011250636 HC RX REV CODE- 250/636: Performed by: ANESTHESIOLOGY

## 2019-03-26 PROCEDURE — 76010000131 HC OR TIME 2 TO 2.5 HR: Performed by: SURGERY

## 2019-03-26 PROCEDURE — 74011000250 HC RX REV CODE- 250: Performed by: SURGERY

## 2019-03-26 PROCEDURE — 77030018836 HC SOL IRR NACL ICUM -A: Performed by: SURGERY

## 2019-03-26 PROCEDURE — 77030031139 HC SUT VCRL2 J&J -A: Performed by: SURGERY

## 2019-03-26 PROCEDURE — 77030008477 HC STYL SATN SLP COVD -A: Performed by: ANESTHESIOLOGY

## 2019-03-26 PROCEDURE — 77030013567 HC DRN WND RESERV BARD -A: Performed by: SURGERY

## 2019-03-26 PROCEDURE — 77030039425 HC BLD LARYNG TRULITE DISP TELE -A: Performed by: ANESTHESIOLOGY

## 2019-03-26 PROCEDURE — 74011000250 HC RX REV CODE- 250

## 2019-03-26 PROCEDURE — 99218 HC RM OBSERVATION: CPT

## 2019-03-26 PROCEDURE — 74011250637 HC RX REV CODE- 250/637: Performed by: SURGERY

## 2019-03-26 PROCEDURE — 77030002986 HC SUT PROL J&J -A: Performed by: SURGERY

## 2019-03-26 PROCEDURE — 74011250636 HC RX REV CODE- 250/636

## 2019-03-26 PROCEDURE — 64425 NJX AA&/STRD II IH NERVES: CPT | Performed by: SURGERY

## 2019-03-26 PROCEDURE — 77030008703 HC TU ET UNCUF COVD -A: Performed by: ANESTHESIOLOGY

## 2019-03-26 PROCEDURE — 76060000035 HC ANESTHESIA 2 TO 2.5 HR: Performed by: SURGERY

## 2019-03-26 PROCEDURE — 74011250636 HC RX REV CODE- 250/636: Performed by: SURGERY

## 2019-03-26 PROCEDURE — 77030020782 HC GWN BAIR PAWS FLX 3M -B: Performed by: ANESTHESIOLOGY

## 2019-03-26 PROCEDURE — 0WUF0JZ SUPPLEMENT ABDOMINAL WALL WITH SYNTHETIC SUBSTITUTE, OPEN APPROACH: ICD-10-PCS | Performed by: SURGERY

## 2019-03-26 PROCEDURE — 77030002916 HC SUT ETHLN J&J -A: Performed by: SURGERY

## 2019-03-26 PROCEDURE — C1781 MESH (IMPLANTABLE): HCPCS | Performed by: SURGERY

## 2019-03-26 PROCEDURE — 77030008468 HC STPLR SKN VISIS TELE -A: Performed by: SURGERY

## 2019-03-26 PROCEDURE — 74011250637 HC RX REV CODE- 250/637: Performed by: ANESTHESIOLOGY

## 2019-03-26 DEVICE — MESH HERN L W5.4XL7IN POLYPR EPTFE OVL SELF EXP PTCH FOR: Type: IMPLANTABLE DEVICE | Site: ABDOMEN | Status: FUNCTIONAL

## 2019-03-26 RX ORDER — NEOSTIGMINE METHYLSULFATE 1 MG/ML
INJECTION, SOLUTION INTRAVENOUS AS NEEDED
Status: DISCONTINUED | OUTPATIENT
Start: 2019-03-26 | End: 2019-03-26 | Stop reason: HOSPADM

## 2019-03-26 RX ORDER — DEXAMETHASONE SODIUM PHOSPHATE 4 MG/ML
INJECTION, SOLUTION INTRA-ARTICULAR; INTRALESIONAL; INTRAMUSCULAR; INTRAVENOUS; SOFT TISSUE
Status: DISCONTINUED | OUTPATIENT
Start: 2019-03-26 | End: 2019-03-26 | Stop reason: HOSPADM

## 2019-03-26 RX ORDER — OXYCODONE HYDROCHLORIDE 5 MG/1
10 TABLET ORAL
Status: DISCONTINUED | OUTPATIENT
Start: 2019-03-26 | End: 2019-03-26 | Stop reason: HOSPADM

## 2019-03-26 RX ORDER — CEFAZOLIN SODIUM/WATER 2 G/20 ML
2 SYRINGE (ML) INTRAVENOUS ONCE
Status: COMPLETED | OUTPATIENT
Start: 2019-03-26 | End: 2019-03-26

## 2019-03-26 RX ORDER — PROPOFOL 10 MG/ML
INJECTION, EMULSION INTRAVENOUS AS NEEDED
Status: DISCONTINUED | OUTPATIENT
Start: 2019-03-26 | End: 2019-03-26 | Stop reason: HOSPADM

## 2019-03-26 RX ORDER — LANOLIN ALCOHOL/MO/W.PET/CERES
400 CREAM (GRAM) TOPICAL 2 TIMES DAILY
Status: DISCONTINUED | OUTPATIENT
Start: 2019-03-26 | End: 2019-04-26 | Stop reason: HOSPADM

## 2019-03-26 RX ORDER — ROPIVACAINE HYDROCHLORIDE 2 MG/ML
INJECTION, SOLUTION EPIDURAL; INFILTRATION; PERINEURAL
Status: DISCONTINUED | OUTPATIENT
Start: 2019-03-26 | End: 2019-03-26 | Stop reason: HOSPADM

## 2019-03-26 RX ORDER — SODIUM CHLORIDE 0.9 % (FLUSH) 0.9 %
5-40 SYRINGE (ML) INJECTION EVERY 8 HOURS
Status: DISCONTINUED | OUTPATIENT
Start: 2019-03-26 | End: 2019-04-26 | Stop reason: HOSPADM

## 2019-03-26 RX ORDER — ISOSORBIDE MONONITRATE 30 MG/1
30 TABLET, EXTENDED RELEASE ORAL
Status: DISCONTINUED | OUTPATIENT
Start: 2019-03-27 | End: 2019-04-03

## 2019-03-26 RX ORDER — SODIUM CHLORIDE, SODIUM LACTATE, POTASSIUM CHLORIDE, CALCIUM CHLORIDE 600; 310; 30; 20 MG/100ML; MG/100ML; MG/100ML; MG/100ML
100 INJECTION, SOLUTION INTRAVENOUS CONTINUOUS
Status: DISCONTINUED | OUTPATIENT
Start: 2019-03-26 | End: 2019-03-26 | Stop reason: HOSPADM

## 2019-03-26 RX ORDER — ROCURONIUM BROMIDE 10 MG/ML
INJECTION, SOLUTION INTRAVENOUS AS NEEDED
Status: DISCONTINUED | OUTPATIENT
Start: 2019-03-26 | End: 2019-03-26 | Stop reason: HOSPADM

## 2019-03-26 RX ORDER — ALBUTEROL SULFATE 0.83 MG/ML
2.5 SOLUTION RESPIRATORY (INHALATION)
Status: DISCONTINUED | OUTPATIENT
Start: 2019-03-26 | End: 2019-04-26 | Stop reason: HOSPADM

## 2019-03-26 RX ORDER — AMIODARONE HYDROCHLORIDE 200 MG/1
200 TABLET ORAL DAILY
Status: DISCONTINUED | OUTPATIENT
Start: 2019-03-27 | End: 2019-04-03

## 2019-03-26 RX ORDER — KETOROLAC TROMETHAMINE 15 MG/ML
15 INJECTION, SOLUTION INTRAMUSCULAR; INTRAVENOUS EVERY 6 HOURS
Status: COMPLETED | OUTPATIENT
Start: 2019-03-26 | End: 2019-03-31

## 2019-03-26 RX ORDER — ONDANSETRON 2 MG/ML
INJECTION INTRAMUSCULAR; INTRAVENOUS AS NEEDED
Status: DISCONTINUED | OUTPATIENT
Start: 2019-03-26 | End: 2019-03-26 | Stop reason: HOSPADM

## 2019-03-26 RX ORDER — FUROSEMIDE 40 MG/1
40 TABLET ORAL 2 TIMES DAILY
Status: DISCONTINUED | OUTPATIENT
Start: 2019-03-26 | End: 2019-03-26

## 2019-03-26 RX ORDER — CARVEDILOL 6.25 MG/1
12.5 TABLET ORAL 2 TIMES DAILY WITH MEALS
Status: DISCONTINUED | OUTPATIENT
Start: 2019-03-26 | End: 2019-04-03

## 2019-03-26 RX ORDER — ACETAMINOPHEN 500 MG
1000 TABLET ORAL ONCE
Status: COMPLETED | OUTPATIENT
Start: 2019-03-26 | End: 2019-03-26

## 2019-03-26 RX ORDER — SODIUM CHLORIDE 0.9 % (FLUSH) 0.9 %
5-40 SYRINGE (ML) INJECTION AS NEEDED
Status: DISCONTINUED | OUTPATIENT
Start: 2019-03-26 | End: 2019-04-26 | Stop reason: HOSPADM

## 2019-03-26 RX ORDER — HYDROMORPHONE HYDROCHLORIDE 2 MG/ML
0.5 INJECTION, SOLUTION INTRAMUSCULAR; INTRAVENOUS; SUBCUTANEOUS
Status: COMPLETED | OUTPATIENT
Start: 2019-03-26 | End: 2019-03-26

## 2019-03-26 RX ORDER — AMIODARONE HYDROCHLORIDE 200 MG/1
200 TABLET ORAL 2 TIMES DAILY
Status: DISCONTINUED | OUTPATIENT
Start: 2019-03-26 | End: 2019-03-26

## 2019-03-26 RX ORDER — DEXTROSE, SODIUM CHLORIDE, AND POTASSIUM CHLORIDE 5; .45; .15 G/100ML; G/100ML; G/100ML
125 INJECTION INTRAVENOUS CONTINUOUS
Status: DISCONTINUED | OUTPATIENT
Start: 2019-03-26 | End: 2019-03-31

## 2019-03-26 RX ORDER — GLYCOPYRROLATE 0.2 MG/ML
INJECTION INTRAMUSCULAR; INTRAVENOUS AS NEEDED
Status: DISCONTINUED | OUTPATIENT
Start: 2019-03-26 | End: 2019-03-26 | Stop reason: HOSPADM

## 2019-03-26 RX ORDER — HYDROMORPHONE HYDROCHLORIDE 2 MG/ML
.5-2 INJECTION, SOLUTION INTRAMUSCULAR; INTRAVENOUS; SUBCUTANEOUS
Status: DISCONTINUED | OUTPATIENT
Start: 2019-03-26 | End: 2019-03-28

## 2019-03-26 RX ORDER — SUCCINYLCHOLINE CHLORIDE 20 MG/ML
INJECTION INTRAMUSCULAR; INTRAVENOUS AS NEEDED
Status: DISCONTINUED | OUTPATIENT
Start: 2019-03-26 | End: 2019-03-26 | Stop reason: HOSPADM

## 2019-03-26 RX ORDER — FENTANYL CITRATE 50 UG/ML
INJECTION, SOLUTION INTRAMUSCULAR; INTRAVENOUS AS NEEDED
Status: DISCONTINUED | OUTPATIENT
Start: 2019-03-26 | End: 2019-03-26 | Stop reason: HOSPADM

## 2019-03-26 RX ORDER — LIDOCAINE HYDROCHLORIDE 10 MG/ML
0.3 INJECTION INFILTRATION; PERINEURAL ONCE
Status: COMPLETED | OUTPATIENT
Start: 2019-03-26 | End: 2019-03-26

## 2019-03-26 RX ORDER — METFORMIN HYDROCHLORIDE 500 MG/1
500 TABLET ORAL 2 TIMES DAILY WITH MEALS
Status: DISCONTINUED | OUTPATIENT
Start: 2019-03-27 | End: 2019-03-31

## 2019-03-26 RX ORDER — ALBUTEROL SULFATE 90 UG/1
1 AEROSOL, METERED RESPIRATORY (INHALATION)
Status: DISCONTINUED | OUTPATIENT
Start: 2019-03-26 | End: 2019-03-26 | Stop reason: CLARIF

## 2019-03-26 RX ORDER — MIDAZOLAM HYDROCHLORIDE 1 MG/ML
2 INJECTION, SOLUTION INTRAMUSCULAR; INTRAVENOUS
Status: DISCONTINUED | OUTPATIENT
Start: 2019-03-26 | End: 2019-03-26 | Stop reason: HOSPADM

## 2019-03-26 RX ORDER — MELATONIN
1000 DAILY
Status: DISCONTINUED | OUTPATIENT
Start: 2019-03-27 | End: 2019-04-03

## 2019-03-26 RX ORDER — ACETAMINOPHEN 10 MG/ML
INJECTION, SOLUTION INTRAVENOUS AS NEEDED
Status: DISCONTINUED | OUTPATIENT
Start: 2019-03-26 | End: 2019-03-26 | Stop reason: HOSPADM

## 2019-03-26 RX ORDER — BUPIVACAINE HYDROCHLORIDE AND EPINEPHRINE 2.5; 5 MG/ML; UG/ML
INJECTION, SOLUTION EPIDURAL; INFILTRATION; INTRACAUDAL; PERINEURAL AS NEEDED
Status: DISCONTINUED | OUTPATIENT
Start: 2019-03-26 | End: 2019-03-26 | Stop reason: HOSPADM

## 2019-03-26 RX ORDER — ENOXAPARIN SODIUM 100 MG/ML
40 INJECTION SUBCUTANEOUS EVERY 12 HOURS
Status: DISCONTINUED | OUTPATIENT
Start: 2019-03-27 | End: 2019-04-26 | Stop reason: HOSPADM

## 2019-03-26 RX ORDER — NITROGLYCERIN 0.4 MG/1
0.4 TABLET SUBLINGUAL
Status: DISCONTINUED | OUTPATIENT
Start: 2019-03-26 | End: 2019-04-26 | Stop reason: HOSPADM

## 2019-03-26 RX ORDER — ROCURONIUM BROMIDE 10 MG/ML
INJECTION, SOLUTION INTRAVENOUS AS NEEDED
Status: DISCONTINUED | OUTPATIENT
Start: 2019-03-26 | End: 2019-03-26

## 2019-03-26 RX ORDER — PRAVASTATIN SODIUM 80 MG/1
80 TABLET ORAL
Status: DISCONTINUED | OUTPATIENT
Start: 2019-03-26 | End: 2019-04-17

## 2019-03-26 RX ORDER — INSULIN GLARGINE 100 [IU]/ML
35 INJECTION, SOLUTION SUBCUTANEOUS
Status: DISCONTINUED | OUTPATIENT
Start: 2019-03-26 | End: 2019-03-31

## 2019-03-26 RX ORDER — DIPHENHYDRAMINE HYDROCHLORIDE 50 MG/ML
12.5 INJECTION, SOLUTION INTRAMUSCULAR; INTRAVENOUS
Status: DISCONTINUED | OUTPATIENT
Start: 2019-03-26 | End: 2019-04-26 | Stop reason: HOSPADM

## 2019-03-26 RX ORDER — ONDANSETRON 2 MG/ML
4 INJECTION INTRAMUSCULAR; INTRAVENOUS
Status: DISCONTINUED | OUTPATIENT
Start: 2019-03-26 | End: 2019-04-26 | Stop reason: HOSPADM

## 2019-03-26 RX ORDER — LIDOCAINE HCL/PF 100 MG/5ML
SYRINGE (ML) INTRAVENOUS AS NEEDED
Status: DISCONTINUED | OUTPATIENT
Start: 2019-03-26 | End: 2019-03-26 | Stop reason: HOSPADM

## 2019-03-26 RX ORDER — FAMOTIDINE 20 MG/1
20 TABLET, FILM COATED ORAL ONCE
Status: COMPLETED | OUTPATIENT
Start: 2019-03-26 | End: 2019-03-26

## 2019-03-26 RX ORDER — FUROSEMIDE 20 MG/1
20 TABLET ORAL 2 TIMES DAILY
Status: DISCONTINUED | OUTPATIENT
Start: 2019-03-27 | End: 2019-04-03

## 2019-03-26 RX ADMIN — CARVEDILOL 12.5 MG: 25 TABLET, FILM COATED ORAL at 20:44

## 2019-03-26 RX ADMIN — HYDROMORPHONE HYDROCHLORIDE 0.5 MG: 2 INJECTION, SOLUTION INTRAMUSCULAR; INTRAVENOUS; SUBCUTANEOUS at 16:34

## 2019-03-26 RX ADMIN — INSULIN GLARGINE 35 UNITS: 100 INJECTION, SOLUTION SUBCUTANEOUS at 23:06

## 2019-03-26 RX ADMIN — FENTANYL CITRATE 50 MCG: 50 INJECTION, SOLUTION INTRAMUSCULAR; INTRAVENOUS at 14:31

## 2019-03-26 RX ADMIN — HYDROMORPHONE HYDROCHLORIDE 0.5 MG: 2 INJECTION, SOLUTION INTRAMUSCULAR; INTRAVENOUS; SUBCUTANEOUS at 16:35

## 2019-03-26 RX ADMIN — HYDROMORPHONE HYDROCHLORIDE 1 MG: 2 INJECTION, SOLUTION INTRAMUSCULAR; INTRAVENOUS; SUBCUTANEOUS at 22:18

## 2019-03-26 RX ADMIN — Medication 400 MG: at 19:00

## 2019-03-26 RX ADMIN — ROCURONIUM BROMIDE 25 MG: 10 INJECTION, SOLUTION INTRAVENOUS at 14:40

## 2019-03-26 RX ADMIN — ROPIVACAINE HYDROCHLORIDE 15 MG: 2 INJECTION, SOLUTION EPIDURAL; INFILTRATION; PERINEURAL at 17:24

## 2019-03-26 RX ADMIN — SODIUM CHLORIDE, SODIUM LACTATE, POTASSIUM CHLORIDE, AND CALCIUM CHLORIDE 100 ML/HR: 600; 310; 30; 20 INJECTION, SOLUTION INTRAVENOUS at 12:00

## 2019-03-26 RX ADMIN — ROCURONIUM BROMIDE 5 MG: 10 INJECTION, SOLUTION INTRAVENOUS at 14:31

## 2019-03-26 RX ADMIN — DEXAMETHASONE SODIUM PHOSPHATE 4 MG: 4 INJECTION, SOLUTION INTRA-ARTICULAR; INTRALESIONAL; INTRAMUSCULAR; INTRAVENOUS; SOFT TISSUE at 17:26

## 2019-03-26 RX ADMIN — LIDOCAINE HYDROCHLORIDE 0.3 ML: 10 INJECTION, SOLUTION INFILTRATION; PERINEURAL at 12:00

## 2019-03-26 RX ADMIN — KETOROLAC TROMETHAMINE 15 MG: 15 INJECTION, SOLUTION INTRAMUSCULAR; INTRAVENOUS at 19:52

## 2019-03-26 RX ADMIN — DEXAMETHASONE SODIUM PHOSPHATE 4 MG: 4 INJECTION, SOLUTION INTRA-ARTICULAR; INTRALESIONAL; INTRAMUSCULAR; INTRAVENOUS; SOFT TISSUE at 17:24

## 2019-03-26 RX ADMIN — ONDANSETRON 4 MG: 2 INJECTION INTRAMUSCULAR; INTRAVENOUS at 19:54

## 2019-03-26 RX ADMIN — ACETAMINOPHEN 1000 MG: 500 TABLET, FILM COATED ORAL at 11:58

## 2019-03-26 RX ADMIN — FENTANYL CITRATE 50 MCG: 50 INJECTION, SOLUTION INTRAMUSCULAR; INTRAVENOUS at 14:24

## 2019-03-26 RX ADMIN — HYDROMORPHONE HYDROCHLORIDE 0.5 MG: 2 INJECTION, SOLUTION INTRAMUSCULAR; INTRAVENOUS; SUBCUTANEOUS at 18:38

## 2019-03-26 RX ADMIN — DIPHENHYDRAMINE HYDROCHLORIDE 12.5 MG: 50 INJECTION, SOLUTION INTRAMUSCULAR; INTRAVENOUS at 19:54

## 2019-03-26 RX ADMIN — ROPIVACAINE HYDROCHLORIDE 15 MG: 2 INJECTION, SOLUTION EPIDURAL; INFILTRATION; PERINEURAL at 17:26

## 2019-03-26 RX ADMIN — Medication 10 ML: at 23:08

## 2019-03-26 RX ADMIN — Medication 60 MG: at 14:31

## 2019-03-26 RX ADMIN — HYDROMORPHONE HYDROCHLORIDE 0.5 MG: 2 INJECTION, SOLUTION INTRAMUSCULAR; INTRAVENOUS; SUBCUTANEOUS at 16:40

## 2019-03-26 RX ADMIN — PROPOFOL 150 MG: 10 INJECTION, EMULSION INTRAVENOUS at 14:31

## 2019-03-26 RX ADMIN — HYDROMORPHONE HYDROCHLORIDE 0.5 MG: 2 INJECTION, SOLUTION INTRAMUSCULAR; INTRAVENOUS; SUBCUTANEOUS at 17:12

## 2019-03-26 RX ADMIN — ROCURONIUM BROMIDE 20 MG: 10 INJECTION, SOLUTION INTRAVENOUS at 15:17

## 2019-03-26 RX ADMIN — ACETAMINOPHEN 1000 MG: 10 INJECTION, SOLUTION INTRAVENOUS at 16:14

## 2019-03-26 RX ADMIN — NEOSTIGMINE METHYLSULFATE 4 MG: 1 INJECTION, SOLUTION INTRAVENOUS at 16:10

## 2019-03-26 RX ADMIN — Medication 2 G: at 14:24

## 2019-03-26 RX ADMIN — FAMOTIDINE 20 MG: 20 TABLET, FILM COATED ORAL at 11:59

## 2019-03-26 RX ADMIN — DEXTROSE MONOHYDRATE, SODIUM CHLORIDE, AND POTASSIUM CHLORIDE 100 ML/HR: 50; 4.5; 1.49 INJECTION, SOLUTION INTRAVENOUS at 18:36

## 2019-03-26 RX ADMIN — ONDANSETRON 4 MG: 2 INJECTION INTRAMUSCULAR; INTRAVENOUS at 15:24

## 2019-03-26 RX ADMIN — HYDROMORPHONE HYDROCHLORIDE 0.5 MG: 2 INJECTION, SOLUTION INTRAMUSCULAR; INTRAVENOUS; SUBCUTANEOUS at 17:03

## 2019-03-26 RX ADMIN — GLYCOPYRROLATE 0.6 MG: 0.2 INJECTION INTRAMUSCULAR; INTRAVENOUS at 16:10

## 2019-03-26 RX ADMIN — HYDROMORPHONE HYDROCHLORIDE 0.5 MG: 2 INJECTION, SOLUTION INTRAMUSCULAR; INTRAVENOUS; SUBCUTANEOUS at 16:45

## 2019-03-26 RX ADMIN — PRAVASTATIN SODIUM 80 MG: 80 TABLET ORAL at 23:05

## 2019-03-26 RX ADMIN — SUCCINYLCHOLINE CHLORIDE 120 MG: 20 INJECTION INTRAMUSCULAR; INTRAVENOUS at 14:31

## 2019-03-26 NOTE — PROGRESS NOTES
Patient received from PACU. Vitals stable. Admission assessment completed. MD orders verified and initiated. Patient currently resting in bed. Will continue to monitor.

## 2019-03-26 NOTE — ANESTHESIA PROCEDURE NOTES
Peripheral Block Start time: 3/26/2019 5:22 PM 
End time: 3/26/2019 5:24 PM 
Performed by: Tomas Cline MD 
Authorized by: Tomas Cline MD  
 
 
Pre-procedure: Indications: at surgeon's request and post-op pain management Preanesthetic Checklist: patient identified, risks and benefits discussed, site marked, timeout performed, anesthesia consent given and patient being monitored Timeout Time: 17:22 Block Type:  
Block Type:  Rectus sheath Laterality:  Left Monitoring:  Standard ASA monitoring, responsive to questions, continuous pulse ox, oxygen, frequent vital sign checks and heart rate Injection Technique:  Single shot Procedures: ultrasound guided Patient Position: supine Prep: chlorhexidine Location:  Abdominal 
Needle Type:  Stimuplex Needle Gauge:  22 G Needle Localization:  Ultrasound guidance Assessment: 
Number of attempts:  1 Injection Assessment:  Incremental injection every 5 mL, negative aspiration for CSF, ultrasound image on chart, no paresthesia, local visualized surrounding nerve on ultrasound, negative aspiration for blood and no intravascular symptoms Patient tolerance:  Patient tolerated the procedure well with no immediate complications

## 2019-03-26 NOTE — ANESTHESIA PROCEDURE NOTES
Peripheral Block Start time: 3/26/2019 5:24 PM 
End time: 3/26/2019 5:26 PM 
Performed by: Maryellen Ayala MD 
Authorized by: Maryellen Ayala MD  
 
 
Pre-procedure: Indications: at surgeon's request and post-op pain management Preanesthetic Checklist: patient identified, risks and benefits discussed, site marked, timeout performed, anesthesia consent given and patient being monitored Timeout Time: 17:24 Block Type:  
Block Type:  Rectus sheath Laterality:  Right Monitoring:  Standard ASA monitoring, responsive to questions, continuous pulse ox, oxygen, frequent vital sign checks and heart rate Injection Technique:  Single shot Procedures: ultrasound guided Patient Position: supine Prep: chlorhexidine Location:  Abdominal 
Needle Type:  Stimuplex Needle Gauge:  22 G Needle Localization:  Ultrasound guidance Assessment: 
Number of attempts:  1 Injection Assessment:  Incremental injection every 5 mL, negative aspiration for CSF, ultrasound image on chart, no paresthesia, local visualized surrounding nerve on ultrasound, negative aspiration for blood and no intravascular symptoms Patient tolerance:  Patient tolerated the procedure well with no immediate complications

## 2019-03-26 NOTE — PROGRESS NOTES
03/26/19 1815 Dual Skin Pressure Injury Assessment Dual Skin Pressure Injury Assessment WDL Second Care Provider (Based on [address] [place] [address]) Daina Cardoso RN Skin Integumentary Skin Integumentary (WDL) X Skin Integrity Incision (comment) 
(abdominal incision )

## 2019-03-26 NOTE — ANESTHESIA POSTPROCEDURE EVALUATION
Procedure(s): OPEN INCARCERATED INCISIONAL HERNIA REPAIR. general 
 
Anesthesia Post Evaluation Multimodal analgesia: multimodal analgesia used between 6 hours prior to anesthesia start to PACU discharge Patient location during evaluation: bedside Patient participation: complete - patient participated Level of consciousness: awake and alert Pain score: 4 Pain management: adequate Airway patency: patent Anesthetic complications: no 
Cardiovascular status: acceptable Respiratory status: acceptable Hydration status: acceptable Comments: Patient doing well. Continue care on floor. Post anesthesia nausea and vomiting:  none Vitals Value Taken Time /59 3/26/2019  4:55 PM  
Temp 36.1 °C (97 °F) 3/26/2019  4:26 PM  
Pulse 60 3/26/2019  4:55 PM  
Resp 18 3/26/2019  4:55 PM  
SpO2 95 % 3/26/2019  4:55 PM

## 2019-03-26 NOTE — INTERVAL H&P NOTE
H&P Update: 
Calvin Lima was seen and examined. History and physical has been reviewed. The patient has been examined.  There have been no significant clinical changes since the completion of the originally dated History and Physical. 
 
Signed By: Renetta Mc MD   
 March 26, 2019 2:06 PM

## 2019-03-26 NOTE — ANESTHESIA PREPROCEDURE EVALUATION
Relevant Problems No relevant active problems Anesthetic History No history of anesthetic complications Review of Systems / Medical History Patient summary reviewed and pertinent labs reviewed Pulmonary Sleep apnea: CPAP Smoker (cigarettes and marijuana) Neuro/Psych Within defined limits Cardiovascular Hypertension Dysrhythmias : SVT Pacemaker (Icd that has never shocked her) Exercise tolerance: >4 METS Comments: Nonischemic cardiomyopathy history and ICD in 2013, cathed in 2016 with normal coronary arteries and more recent ECHO with normal LV function GI/Hepatic/Renal 
  
 
 
 
 
 
 Endo/Other Diabetes: well controlled, type 2, using insulin Morbid obesity Other Findings Physical Exam 
 
Airway Mallampati: II 
TM Distance: 4 - 6 cm Neck ROM: normal range of motion Mouth opening: Normal 
 
 Cardiovascular Rhythm: regular Rate: normal 
 
 
 
 Dental 
 
Dentition: Caps/crowns Pulmonary Breath sounds clear to auscultation Abdominal 
 
 
 
 Other Findings Anesthetic Plan ASA: 3 Anesthesia type: general 
 
 
 
 
Induction: Intravenous Anesthetic plan and risks discussed with: Patient

## 2019-03-26 NOTE — ROUTINE PROCESS
TRANSFER - OUT REPORT: 
 
Verbal report given to Chau Alejandro RN on Julita Fierro  being transferred to MED/SURG(unit) for routine post - op Report consisted of patients Situation, Background, Assessment and  
Recommendations(SBAR). Information from the following report(s) SBAR, Kardex, OR Summary, Procedure Summary, Intake/Output, MAR and Cardiac Rhythm SINUS RHYTHM, PACED was reviewed with the receiving nurse. Lines:  
Peripheral IV 03/26/19 Left;Posterior Hand (Active) Site Assessment Clean, dry, & intact 3/26/2019  4:27 PM  
Phlebitis Assessment 0 3/26/2019  4:27 PM  
Infiltration Assessment 0 3/26/2019  4:27 PM  
Dressing Status Clean, dry, & intact 3/26/2019  4:27 PM  
Dressing Type Transparent;Tape 3/26/2019  4:27 PM  
Hub Color/Line Status Green;Patent; Infusing 3/26/2019  4:27 PM  
  
 
Opportunity for questions and clarification was provided. Patient transported with: 
 O2 @ 3 liters

## 2019-03-26 NOTE — OP NOTES
Operative Report    Patient: Kala Gan MRN: [id number]     YOB: 1965  Age: 52 y.o. Sex: female       Date of Surgery: 3/26/2019     Preoperative Diagnosis: Incisional hernia, without obstruction or gangrene, incarcerated    Postoperative Diagnosis: Incisional hernia, without obstruction or gangrene, incarcerated    Procedure:  Procedure(s): HERNIA INCISIONAL REPAIR WITH MESH    Anesthesia: General     Complications: none    Indications:  As outlined in History and Physical.    Procedure Details   Informed consent was obtained and the patient was brought to the operating room and placed on the table in a supine position. After successful induction of anesthesia, the abdomen was prepped and draped in the standard fashion. A vertical midline incision was made in the old scar and was carried down through the subcutaneous tissues with cautery until the hernia sac was identified at the upper pole of the scar. The  sac was dissected from the surrounding structures with cautery and it was incised circumferentially at its base and entered. There were suprisingly few adhesions of the omentum to the midline fascia which were lysed with cautery. There were numerous additional fascial defects noted in the surrounding fascia, 8 defects in all. Interval fascial bridges were incised to create a single defect, essentially encompassing the entirety of the prior scar. The subcutaneous tissue was mobilized off of the fascia for several centimeters all the way around the defect allowing excision of scar and weakened and attenuated fascia. The final defect measured 11 by 10 cm. A  14 x 18cm Ventrio  mesh was brought to the field and inked along its major axes. It was inserted into the abdomen and unfurled, taking care to assure no entrapped omentum or bowel. The  positioning pouches were secured to the superior and inferior apices with a fire fo the [address] [place] [address].  A series of 0-prolene sutures were then placed in an interrupted, figure-of-eight fashion along the fascial defect and were pulled snug but not tied. The SecureStrap was then used to anchor the mesh to the anterior wall at approximately 1-1.5cm intervals around the periphery of the positioning pouches. The prolene sutures were then tied. The area was irrigated and confirmed to be hemostatic and was infiltrated with local.  A Mando drain was placed over the fascia. The wound was then closed with  3-0 vicryl in the deep space and the skin with staples. A gauze dressing was applied. The patient tolerated the procedure well and was awakened from anesthesia and taken to recovery in satisfactory condition. Sponge, needle, and instrument counts were correct as reported to me.     * No specimens in log *    Gayathri Kumar MD  March 26, 2019

## 2019-03-26 NOTE — PROGRESS NOTES
's pre-op visit and prayer with patient as requested. Neisha Subramanian MDiv, BS Board Certified [address] [place] [address]

## 2019-03-27 LAB
ANION GAP SERPL CALC-SCNC: 7 MMOL/L
BUN SERPL-MCNC: 15 MG/DL (ref 6–23)
CALCIUM SERPL-MCNC: 8.8 MG/DL (ref 8.3–10.4)
CHLORIDE SERPL-SCNC: 106 MMOL/L (ref 98–107)
CO2 SERPL-SCNC: 26 MMOL/L (ref 21–32)
CREAT SERPL-MCNC: 1.19 MG/DL (ref 0.6–1)
ERYTHROCYTE [DISTWIDTH] IN BLOOD BY AUTOMATED COUNT: 13.4 % (ref 11.9–14.6)
GLUCOSE BLD STRIP.AUTO-MCNC: 127 MG/DL (ref 65–100)
GLUCOSE SERPL-MCNC: 147 MG/DL (ref 65–100)
HCT VFR BLD AUTO: 34.2 % (ref 35.8–46.3)
HGB BLD-MCNC: 11.2 G/DL (ref 11.7–15.4)
MCH RBC QN AUTO: 32 PG (ref 26.1–32.9)
MCHC RBC AUTO-ENTMCNC: 32.7 G/DL (ref 31.4–35)
MCV RBC AUTO: 97.7 FL (ref 79.6–97.8)
NRBC # BLD: 0 K/UL (ref 0–0.2)
PLATELET # BLD AUTO: 128 K/UL (ref 150–450)
PMV BLD AUTO: 12 FL (ref 9.4–12.3)
POTASSIUM SERPL-SCNC: 4.4 MMOL/L (ref 3.5–5.1)
RBC # BLD AUTO: 3.5 M/UL (ref 4.05–5.2)
SODIUM SERPL-SCNC: 139 MMOL/L (ref 136–145)
WBC # BLD AUTO: 9.8 K/UL (ref 4.3–11.1)

## 2019-03-27 PROCEDURE — 80048 BASIC METABOLIC PNL TOTAL CA: CPT

## 2019-03-27 PROCEDURE — 82962 GLUCOSE BLOOD TEST: CPT

## 2019-03-27 PROCEDURE — 85027 COMPLETE CBC AUTOMATED: CPT

## 2019-03-27 PROCEDURE — 74011636637 HC RX REV CODE- 636/637: Performed by: SURGERY

## 2019-03-27 PROCEDURE — 36415 COLL VENOUS BLD VENIPUNCTURE: CPT

## 2019-03-27 PROCEDURE — 0T9B70Z DRAINAGE OF BLADDER WITH DRAINAGE DEVICE, VIA NATURAL OR ARTIFICIAL OPENING: ICD-10-PCS | Performed by: SURGERY

## 2019-03-27 PROCEDURE — 77010033678 HC OXYGEN DAILY

## 2019-03-27 PROCEDURE — 74011250636 HC RX REV CODE- 250/636: Performed by: SURGERY

## 2019-03-27 PROCEDURE — 77030034849

## 2019-03-27 PROCEDURE — 77030011943

## 2019-03-27 PROCEDURE — 94760 N-INVAS EAR/PLS OXIMETRY 1: CPT

## 2019-03-27 PROCEDURE — 97530 THERAPEUTIC ACTIVITIES: CPT

## 2019-03-27 PROCEDURE — 97161 PT EVAL LOW COMPLEX 20 MIN: CPT

## 2019-03-27 PROCEDURE — 99218 HC RM OBSERVATION: CPT

## 2019-03-27 PROCEDURE — 77030018846 HC SOL IRR STRL H20 ICUM -A

## 2019-03-27 PROCEDURE — 74011250637 HC RX REV CODE- 250/637: Performed by: SURGERY

## 2019-03-27 RX ORDER — ACETAMINOPHEN 500 MG
1000 TABLET ORAL EVERY 6 HOURS
Status: DISCONTINUED | OUTPATIENT
Start: 2019-03-27 | End: 2019-04-03

## 2019-03-27 RX ADMIN — VITAMIN D, TAB 1000IU (100/BT) 1000 UNITS: 25 TAB at 08:37

## 2019-03-27 RX ADMIN — KETOROLAC TROMETHAMINE 15 MG: 15 INJECTION, SOLUTION INTRAMUSCULAR; INTRAVENOUS at 17:11

## 2019-03-27 RX ADMIN — HYDROMORPHONE HYDROCHLORIDE 0.5 MG: 2 INJECTION, SOLUTION INTRAMUSCULAR; INTRAVENOUS; SUBCUTANEOUS at 01:34

## 2019-03-27 RX ADMIN — KETOROLAC TROMETHAMINE 15 MG: 15 INJECTION, SOLUTION INTRAMUSCULAR; INTRAVENOUS at 00:31

## 2019-03-27 RX ADMIN — INSULIN GLARGINE 35 UNITS: 100 INJECTION, SOLUTION SUBCUTANEOUS at 22:38

## 2019-03-27 RX ADMIN — HYDROMORPHONE HYDROCHLORIDE 1 MG: 2 INJECTION, SOLUTION INTRAMUSCULAR; INTRAVENOUS; SUBCUTANEOUS at 15:10

## 2019-03-27 RX ADMIN — ONDANSETRON 4 MG: 2 INJECTION INTRAMUSCULAR; INTRAVENOUS at 01:34

## 2019-03-27 RX ADMIN — Medication 5 ML: at 05:15

## 2019-03-27 RX ADMIN — HYDROMORPHONE HYDROCHLORIDE 2 MG: 2 INJECTION, SOLUTION INTRAMUSCULAR; INTRAVENOUS; SUBCUTANEOUS at 08:32

## 2019-03-27 RX ADMIN — ENOXAPARIN SODIUM 40 MG: 40 INJECTION SUBCUTANEOUS at 08:38

## 2019-03-27 RX ADMIN — DIPHENHYDRAMINE HYDROCHLORIDE 12.5 MG: 50 INJECTION, SOLUTION INTRAMUSCULAR; INTRAVENOUS at 01:34

## 2019-03-27 RX ADMIN — DEXTROSE MONOHYDRATE, SODIUM CHLORIDE, AND POTASSIUM CHLORIDE 100 ML/HR: 50; 4.5; 1.49 INJECTION, SOLUTION INTRAVENOUS at 05:11

## 2019-03-27 RX ADMIN — HYDROMORPHONE HYDROCHLORIDE 1 MG: 2 INJECTION, SOLUTION INTRAMUSCULAR; INTRAVENOUS; SUBCUTANEOUS at 05:35

## 2019-03-27 RX ADMIN — PRAVASTATIN SODIUM 80 MG: 80 TABLET ORAL at 21:15

## 2019-03-27 RX ADMIN — ENOXAPARIN SODIUM 40 MG: 40 INJECTION SUBCUTANEOUS at 21:15

## 2019-03-27 RX ADMIN — HYDROMORPHONE HYDROCHLORIDE 2 MG: 2 INJECTION, SOLUTION INTRAMUSCULAR; INTRAVENOUS; SUBCUTANEOUS at 18:42

## 2019-03-27 RX ADMIN — KETOROLAC TROMETHAMINE 15 MG: 15 INJECTION, SOLUTION INTRAMUSCULAR; INTRAVENOUS at 05:11

## 2019-03-27 RX ADMIN — HYDROMORPHONE HYDROCHLORIDE 1.5 MG: 2 INJECTION, SOLUTION INTRAMUSCULAR; INTRAVENOUS; SUBCUTANEOUS at 22:29

## 2019-03-27 RX ADMIN — KETOROLAC TROMETHAMINE 15 MG: 15 INJECTION, SOLUTION INTRAMUSCULAR; INTRAVENOUS at 11:58

## 2019-03-27 RX ADMIN — METFORMIN HYDROCHLORIDE 500 MG: 500 TABLET ORAL at 17:12

## 2019-03-27 RX ADMIN — METFORMIN HYDROCHLORIDE 500 MG: 500 TABLET ORAL at 08:37

## 2019-03-27 RX ADMIN — Medication 400 MG: at 17:12

## 2019-03-27 RX ADMIN — ACETAMINOPHEN 1000 MG: 500 TABLET, FILM COATED ORAL at 17:12

## 2019-03-27 RX ADMIN — DIPHENHYDRAMINE HYDROCHLORIDE 12.5 MG: 50 INJECTION, SOLUTION INTRAMUSCULAR; INTRAVENOUS at 21:10

## 2019-03-27 RX ADMIN — CARVEDILOL 12.5 MG: 25 TABLET, FILM COATED ORAL at 17:11

## 2019-03-27 RX ADMIN — FUROSEMIDE 20 MG: 20 TABLET ORAL at 08:37

## 2019-03-27 RX ADMIN — Medication 400 MG: at 08:37

## 2019-03-27 RX ADMIN — FUROSEMIDE 20 MG: 20 TABLET ORAL at 17:12

## 2019-03-27 RX ADMIN — AMIODARONE HYDROCHLORIDE 200 MG: 200 TABLET ORAL at 08:37

## 2019-03-27 RX ADMIN — CARVEDILOL 12.5 MG: 25 TABLET, FILM COATED ORAL at 08:37

## 2019-03-27 RX ADMIN — DIPHENHYDRAMINE HYDROCHLORIDE 12.5 MG: 50 INJECTION, SOLUTION INTRAMUSCULAR; INTRAVENOUS at 12:06

## 2019-03-27 RX ADMIN — DEXTROSE MONOHYDRATE, SODIUM CHLORIDE, AND POTASSIUM CHLORIDE 100 ML/HR: 50; 4.5; 1.49 INJECTION, SOLUTION INTRAVENOUS at 15:10

## 2019-03-27 RX ADMIN — Medication 10 ML: at 21:18

## 2019-03-27 NOTE — PROGRESS NOTES
Order to straight cath patient received due to two failed attempts to void post -op, if no improvement in 4-6 hours, reinsert denis. 700ml of yellow urine via straight cath. Pt tolerated well. Will continue to monitor.

## 2019-03-27 NOTE — PROGRESS NOTES
Pt c/o abdominal pain 9/10. PRN Dilaudid 1mg IVP slow administered per MD order. Will continue to monitor.

## 2019-03-27 NOTE — PROGRESS NOTES
Pt resting in bed and is alert and oriented x 4. She c/o abdominal pain 10/10, dilaudid 2 mg given  and is on 1 L NC. RR even and unlabored. IVF infusing. Abdominal drsg c/d/i with binder in place. JNENIFER patent and draining sanguinous drainage. Clarke patent and draining. IS at bedside. Call light in reach and pt instructed to call for assistance if needed. Will monitor.

## 2019-03-27 NOTE — PROGRESS NOTES
Patient coughed up a scant amount of bright red blood in her mucus. Dr. Tk brown. DELFINA brewer for general surgery returned page and ordered to continue to monitor and if she coughs up more or larger amounts to let them know.

## 2019-03-27 NOTE — PROGRESS NOTES
Problem: Mobility Impaired (Adult and Pediatric) Goal: *Acute Goals and Plan of Care (Insert Text) Description STG: 
(1.)Ms. Katie Sommer will move from supine to sit and sit to supine  with MINIMAL ASSIST within 3 treatment day(s). (2.)Ms. Katie Sommer will transfer from bed to chair and chair to bed with CONTACT GUARD ASSIST using the least restrictive device within 3 treatment day(s). (3.)Ms. Katie Sommer will ambulate with CONTACT GUARD ASSIST for 50 feet with the least restrictive device within 3 treatment day(s). LTG: 
(1.)Ms. Katie Sommer will move from supine to sit and sit to supine  in bed with STAND BY ASSIST within 7 treatment day(s). (2.)Ms. Katie Sommer will transfer from bed to chair and chair to bed with STAND BY ASSIST using the least restrictive device within 7 treatment day(s). (3.)Ms. Katie Sommer will ambulate with STAND BY ASSIST for 200 feet with the least restrictive device within 7 treatment day(s). (4)Ms. Katie Sommer will go up 4 steps min assist with device as needed in 7 days. (5)Ms. Katie Sommer will perform HEP with cues and assistance to increase safety on her feet in 7 days. ________________________________________________________________________________________________ Outcome: Progressing Towards Goal 
  
PHYSICAL THERAPY: Initial Assessment and PM 3/27/2019 OBSERVATION: PT Visit Days : 1 [place] / [place] B / Product Type: Medicare /   
  
NAME/AGE/GENDER: Reji Leigh is a 52 y.o. female PRIMARY DIAGNOSIS: Incisional hernia, without obstruction or gangrene [K43.2] Incarcerated incisional hernia [K43.0] Incarcerated incisional hernia Incarcerated incisional hernia Procedure(s) (LRB): OPEN INCARCERATED INCISIONAL HERNIA REPAIR (N/A) 1 Day Post-Op ICD-10: Treatment Diagnosis:  
 · Generalized Muscle Weakness (M62.81) · Other abnormalities of gait and mobility (R26.89) Precaution/Allergies: 
Grass pollen ASSESSMENT:  
 Ms. Edilberto Cortes presents with general decreased in functional mobility and gait s/p OPEN INCARCERATED [address] on 3/26/19. She needed assistance with bed mobility and transfers to the chair this afternoon. She rated her abdominal pain 9/10 and was tearful throughout. She plans to go home and may need HHPT. Will follow. This section established at most recent assessment PROBLEM LIST (Impairments causing functional limitations): 1. Decreased Strength 2. Decreased ADL/Functional Activities 3. Decreased Transfer Abilities 4. Decreased Ambulation Ability/Technique 5. Decreased Balance 6. Increased Pain 7. Decreased Activity Tolerance 8. Decreased Flexibility/Joint Mobility 9. Decreased Dixie with Home Exercise Program 
 INTERVENTIONS PLANNED: (Benefits and precautions of physical therapy have been discussed with the patient.) 1. Balance Exercise 2. Bed Mobility 3. Family Education 4. Gait Training 5. Home Exercise Program (HEP) 6. Therapeutic Activites 7. Therapeutic Exercise/Strengthening 8. Transfer Training TREATMENT PLAN: Frequency/Duration: daily for duration of hospital stay Rehabilitation Potential For Stated Goals: Good RECOMMENDED REHABILITATION/EQUIPMENT: (at time of discharge pending progress): Due to the probability of continued deficits (see above) this patient will likely need continued skilled physical therapy after discharge. Equipment: ? May need a RW   
    
 
 
 
HISTORY:  
History of Present Injury/Illness (Reason for Referral): S/p OPEN INCARCERATED INCISIONAL HERNIA REPAIR 3/26/19 Past Medical History/Comorbidities: Ms. Edilberto Cortes  has a past medical history of Allergic rhinitis, Arthritis, Automatic implantable cardioverter-defibrillator in situ (3/30/2016), CAD in native artery (7/84/5395), Chronic systolic heart failure ([facility] (07/18/2013), CKD (chronic kidney disease), Diabetes ([facility] Diabetes mellitus ([facility] (2010), Dyslipidemia (2013), Eczema, Fibromyalgia, GERD (gastroesophageal reflux disease), Headache, Heart failure ([facility] HTN (hypertension) (2010), Hypercholesterolemia, Morbid obesity ([facility] Neuropathy, N[facility] (nonischemic [facility]) [facility] (2/15/2013), Obesity, Obstructive sleep apnea (adult) (pediatric) (2014), Sciatic pain (2016), SVT (supraventricular tachycardia) ([facility] and Tobacco use disorder (2010). She also has no past medical history of Dementia, Gastrointestinal disorder, Infectious disease, or Unspecified adverse effect of anesthesia. Ms. Tori Paul  has a past surgical history that includes hx tonsillectomy; hx implantable cardioverter defibrillator (2013); hx  section; hx hernia repair; pr left heart cath,percutaneous (2013); hx svt ablation (\"years ago\"); hx rotator cuff repair (Right); and hx hysterectomy. Social History/Living Environment:  
Home Environment: Private residence # Steps to Enter: 4 Rails to Enter: No 
One/Two Story Residence: One story Living Alone: No 
Support Systems: Friends \ neighbors Patient Expects to be Discharged to[de-identified] Private residence Current DME Used/Available at Home: Glucometer, CPAP Prior Level of Function/Work/Activity: 
independent Number of Personal Factors/Comorbidities that affect the Plan of Care: 1-2: MODERATE COMPLEXITY EXAMINATION:  
Most Recent Physical Functioning:  
Gross Assessment: 
AROM: Generally decreased, functional(Ue and LE, limited by abdominal pain) Strength: Generally decreased, functional 
         
  
Posture: 
Posture (WDL): Exceptions to [facility] Posture Assessment: Trunk flexion, Rounded shoulders Balance: 
Sitting: Intact; High guard Standing: With support;Pull to stand Bed Mobility: 
Supine to Sit: Maximum assistance; Moderate assistance Wheelchair Mobility: 
  
Transfers: 
Sit to Stand: Minimum assistance Stand to Sit: Minimum assistance Bed to Chair: Minimum assistance Duration: 10 Minutes Gait: 
  
Speed/Cristy: Delayed Step Length: Left shortened;Right shortened Gait Abnormalities: Decreased step clearance Distance (ft): 5 Feet (ft) Assistive Device: Walker, rolling Ambulation - Level of Assistance: Minimal assistance Interventions: Manual cues; Safety awareness training; Tactile cues; Verbal cues Body Structures Involved: 1. Bones 2. Joints 3. Muscles 4. Ligaments Body Functions Affected: 1. Movement Related Activities and Participation Affected: 1. Mobility Number of elements that affect the Plan of Care: 3: MODERATE COMPLEXITY CLINICAL PRESENTATION:  
Presentation: Stable and uncomplicated: LOW COMPLEXITY CLINICAL DECISION MAKING:  
[facility] AM-PAC 6 Clicks Basic Mobility Inpatient Short Form How much difficulty does the patient currently have. .. Unable A Lot A Little None 1. Turning over in bed (including adjusting bedclothes, sheets and blankets)? ? 1   ? 2   ? 3   ? 4  
2. Sitting down on and standing up from a chair with arms ( e.g., wheelchair, bedside commode, etc.)   ? 1   ? 2   ? 3   ? 4  
3. Moving from lying on back to sitting on the side of the bed?   ? 1   ? 2   ? 3   ? 4 How much help from another person does the patient currently need. .. Total A Lot A Little None 4. Moving to and from a bed to a chair (including a wheelchair)? ? 1   ? 2   ? 3   ? 4  
5. Need to walk in hospital room? ? 1   ? 2   ? 3   ? 4  
6. Climbing 3-5 steps with a railing? ? 1   ? 2   ? 3   ? 4  
© 2007, Trustees of [facility], under license to [employer]. All rights reserved Score:  Initial: 14 Most Recent: X (Date: -- ) Interpretation of Tool:  Represents activities that are increasingly more difficult (i.e. Bed mobility, Transfers, Gait).  
 
Medical Necessity:    
· Patient is expected to demonstrate progress in strength, range of motion, balance, coordination and functional technique ·  to decrease assistance required with theraputic exercises and functional mobility · . Reason for Services/Other Comments: 
· Patient continues to require present interventions due to patient's inability to perform theraputic exercises and functional mobility · . Use of outcome tool(s) and clinical judgement create a POC that gives a: Clear prediction of patient's progress: LOW COMPLEXITY  
  
 
 
 
TREATMENT:  
(In addition to Assessment/Re-Assessment sessions the following treatments were rendered) Pre-treatment Symptoms/Complaints:  abdominal pain 
Pain: Initial: 9 Post Session:  9 Therapeutic Activity: (  10 Minutes ):  Therapeutic activities including Bed transfers, Chair transfers, Ambulation on level ground and gait to chair  to improve mobility and strength. Required minimal Manual cues; Safety awareness training; Tactile cues; Verbal cues to promote static and dynamic balance in standing. She did 10 reps gentle bilateral of ankle pumps, quad sets, glut sets Braces/Orthotics/Lines/Etc:  
· IV 
· denis catheter · drain celeste  
· O2 Device: Nasal cannula Treatment/Session Assessment:   
· Response to Treatment:  Pt. With limited mobility seemingly due to pain, unable to walk further. Instructed in importance of mobilizing · Interdisciplinary Collaboration:  
o Registered Nurse 
o Rehabilitation Attendant · After treatment position/precautions:  
o Up in chair 
o Bed/Chair-wheels locked 
o Bed in low position 
o Call light within reach 
o RN notified · Compliance with Program/Exercises: Will assess as treatment progresses  no questions. · Recommendations/Intent for next treatment session: \"Next visit will focus on advancements to more challenging activities and reduction in assistance provided\". Total Treatment Duration: PT Patient Time In/Time Out Time In: 1440 Time Out: 9919 Caroline Ramey, PT

## 2019-03-27 NOTE — PROGRESS NOTES
Shift assessment completed via doc flow sheet. Pt A & O x 4. Lungs CTA, HR WNL, NSR. Abdomen semi-soft and tender. c/o abdominal pain 10/10, nausea, and itching. Scheduled Toradol 15mg IVP administered. IVS c/d/i, no s/sx of infection/infiltration noted. Pt able to make needs known. No concerns at this time. Bed low and locked. Call light within reach. Will continue to monitor.

## 2019-03-27 NOTE — PROGRESS NOTES
Pt c/o abdominal pain 8/10. PRN Dilaudid 1mg IVP slow administered per MD order. Will continue to monitor.

## 2019-03-27 NOTE — PROGRESS NOTES
Pt c/o abdominal pain 8/10, nausea, and itching. PRN Dilaudid 0.5mg, Zofran 4mg, and Benadryl 12.5mg IVP administered per MD order. Will continue to monitor.

## 2019-03-27 NOTE — PROGRESS NOTES
Admit Date: 3/26/2019 POD 1 Day Post-Op Procedure:  Procedure(s): OPEN INCARCERATED INCISIONAL HERNIA REPAIR Subjective:  
 
Patient has complaints of pain. Pain control has been fair. Had bloody phlegm x 1 earlier. Objective:  
 
Visit Vitals /86 (BP 1 Location: Left arm, BP Patient Position: At rest) Pulse 60 Temp 97.5 °F (36.4 °C) Resp 18 Ht 4' 11\" (1.499 m) Wt 205 lb 8 oz (93.2 kg) SpO2 90% Breastfeeding? No  
BMI 41.51 kg/m² Temp (24hrs), Av.3 °F (36.3 °C), Min:96.8 °F (36 °C), Max:97.8 °F (36.6 °C) Mabeline Sink I&O reviewed as documented. Physical Exam:  
 General-in no distress. Frequent sniffing/snorting. Lungs- clear apices but decreased effort CV-reg Abdomen- Wound dresing intact. JENNIFER blood-tinged; large volumes today,  and pt has moderate to severe but appropriately distributed tenderness centrally. Labs:  
Recent Results (from the past 24 hour(s)) CBC W/O DIFF Collection Time: 19  5:48 AM  
Result Value Ref Range WBC 9.8 4.3 - 11.1 K/uL  
 RBC 3.50 (L) 4.05 - 5.2 M/uL  
 HGB 11.2 (L) 11.7 - 15.4 g/dL HCT 34.2 (L) 35.8 - 46.3 % MCV 97.7 79.6 - 97.8 FL  
 MCH 32.0 26.1 - 32.9 PG  
 MCHC 32.7 31.4 - 35.0 g/dL  
 RDW 13.4 11.9 - 14.6 % PLATELET 232 (L) 139 - 450 K/uL MPV 12.0 9.4 - 12.3 FL ABSOLUTE NRBC 0.00 0.0 - 0.2 K/uL METABOLIC PANEL, BASIC Collection Time: 19  5:48 AM  
Result Value Ref Range Sodium 139 136 - 145 mmol/L Potassium 4.4 3.5 - 5.1 mmol/L Chloride 106 98 - 107 mmol/L  
 CO2 26 21 - 32 mmol/L Anion gap 7 mmol/L Glucose 147 (H) 65 - 100 mg/dL BUN 15 6 - 23 MG/DL Creatinine 1.19 (H) 0.6 - 1.0 MG/DL  
 GFR est AA >60 >60 ml/min/1.73m2 GFR est non-AA 50 ml/min/1.73m2 Calcium 8.8 8.3 - 10.4 MG/DL Assessment:  
 
Principal Problem: 
  Incarcerated incisional hernia (3/26/2019) Plan/Recommendations/Medical Decision Making:  
 
Advance to full liquids Add tylenol; if analgesia still inadequate tomorrow may try switching dilaudid to morphine Needs to mobilize- high risk for DVT or pneumonia.

## 2019-03-28 LAB
ANION GAP SERPL CALC-SCNC: 8 MMOL/L
BUN SERPL-MCNC: 14 MG/DL (ref 6–23)
CALCIUM SERPL-MCNC: 8.9 MG/DL (ref 8.3–10.4)
CHLORIDE SERPL-SCNC: 105 MMOL/L (ref 98–107)
CO2 SERPL-SCNC: 26 MMOL/L (ref 21–32)
CREAT SERPL-MCNC: 1.36 MG/DL (ref 0.6–1)
ERYTHROCYTE [DISTWIDTH] IN BLOOD BY AUTOMATED COUNT: 13.6 % (ref 11.9–14.6)
GLUCOSE BLD STRIP.AUTO-MCNC: 106 MG/DL (ref 65–100)
GLUCOSE BLD STRIP.AUTO-MCNC: 127 MG/DL (ref 65–100)
GLUCOSE BLD STRIP.AUTO-MCNC: 158 MG/DL (ref 65–100)
GLUCOSE BLD STRIP.AUTO-MCNC: 93 MG/DL (ref 65–100)
GLUCOSE SERPL-MCNC: 89 MG/DL (ref 65–100)
HCT VFR BLD AUTO: 34.1 % (ref 35.8–46.3)
HGB BLD-MCNC: 10.9 G/DL (ref 11.7–15.4)
MCH RBC QN AUTO: 31.1 PG (ref 26.1–32.9)
MCHC RBC AUTO-ENTMCNC: 32 G/DL (ref 31.4–35)
MCV RBC AUTO: 97.2 FL (ref 79.6–97.8)
NRBC # BLD: 0 K/UL (ref 0–0.2)
PLATELET # BLD AUTO: 119 K/UL (ref 150–450)
PMV BLD AUTO: 11.8 FL (ref 9.4–12.3)
POTASSIUM SERPL-SCNC: 4.3 MMOL/L (ref 3.5–5.1)
RBC # BLD AUTO: 3.51 M/UL (ref 4.05–5.2)
SODIUM SERPL-SCNC: 139 MMOL/L (ref 136–145)
WBC # BLD AUTO: 6.6 K/UL (ref 4.3–11.1)

## 2019-03-28 PROCEDURE — 97530 THERAPEUTIC ACTIVITIES: CPT

## 2019-03-28 PROCEDURE — 97110 THERAPEUTIC EXERCISES: CPT

## 2019-03-28 PROCEDURE — 80048 BASIC METABOLIC PNL TOTAL CA: CPT

## 2019-03-28 PROCEDURE — 36415 COLL VENOUS BLD VENIPUNCTURE: CPT

## 2019-03-28 PROCEDURE — 99218 HC RM OBSERVATION: CPT

## 2019-03-28 PROCEDURE — 74011250636 HC RX REV CODE- 250/636: Performed by: SURGERY

## 2019-03-28 PROCEDURE — 85027 COMPLETE CBC AUTOMATED: CPT

## 2019-03-28 PROCEDURE — 74011636637 HC RX REV CODE- 636/637: Performed by: SURGERY

## 2019-03-28 PROCEDURE — 65270000029 HC RM PRIVATE

## 2019-03-28 PROCEDURE — 82962 GLUCOSE BLOOD TEST: CPT

## 2019-03-28 PROCEDURE — 74011250637 HC RX REV CODE- 250/637: Performed by: SURGERY

## 2019-03-28 RX ORDER — MORPHINE SULFATE 10 MG/ML
0.1 INJECTION, SOLUTION INTRAMUSCULAR; INTRAVENOUS
Status: DISCONTINUED | OUTPATIENT
Start: 2019-03-28 | End: 2019-03-29

## 2019-03-28 RX ADMIN — METFORMIN HYDROCHLORIDE 500 MG: 500 TABLET ORAL at 17:38

## 2019-03-28 RX ADMIN — HYDROMORPHONE HYDROCHLORIDE 2 MG: 2 INJECTION, SOLUTION INTRAMUSCULAR; INTRAVENOUS; SUBCUTANEOUS at 08:51

## 2019-03-28 RX ADMIN — VITAMIN D, TAB 1000IU (100/BT) 1000 UNITS: 25 TAB at 08:49

## 2019-03-28 RX ADMIN — ENOXAPARIN SODIUM 40 MG: 40 INJECTION SUBCUTANEOUS at 08:49

## 2019-03-28 RX ADMIN — ACETAMINOPHEN 1000 MG: 500 TABLET, FILM COATED ORAL at 12:02

## 2019-03-28 RX ADMIN — AMIODARONE HYDROCHLORIDE 200 MG: 200 TABLET ORAL at 08:49

## 2019-03-28 RX ADMIN — ONDANSETRON 4 MG: 2 INJECTION INTRAMUSCULAR; INTRAVENOUS at 15:26

## 2019-03-28 RX ADMIN — CARVEDILOL 12.5 MG: 25 TABLET, FILM COATED ORAL at 17:37

## 2019-03-28 RX ADMIN — ACETAMINOPHEN 1000 MG: 500 TABLET, FILM COATED ORAL at 05:45

## 2019-03-28 RX ADMIN — DIPHENHYDRAMINE HYDROCHLORIDE 12.5 MG: 50 INJECTION, SOLUTION INTRAMUSCULAR; INTRAVENOUS at 12:13

## 2019-03-28 RX ADMIN — ENOXAPARIN SODIUM 40 MG: 40 INJECTION SUBCUTANEOUS at 20:46

## 2019-03-28 RX ADMIN — ACETAMINOPHEN 1000 MG: 500 TABLET, FILM COATED ORAL at 00:39

## 2019-03-28 RX ADMIN — FUROSEMIDE 20 MG: 20 TABLET ORAL at 08:48

## 2019-03-28 RX ADMIN — DEXTROSE MONOHYDRATE, SODIUM CHLORIDE, AND POTASSIUM CHLORIDE 100 ML/HR: 50; 4.5; 1.49 INJECTION, SOLUTION INTRAVENOUS at 17:48

## 2019-03-28 RX ADMIN — Medication 10 ML: at 05:48

## 2019-03-28 RX ADMIN — KETOROLAC TROMETHAMINE 15 MG: 15 INJECTION, SOLUTION INTRAMUSCULAR; INTRAVENOUS at 00:40

## 2019-03-28 RX ADMIN — FUROSEMIDE 20 MG: 20 TABLET ORAL at 17:37

## 2019-03-28 RX ADMIN — KETOROLAC TROMETHAMINE 15 MG: 15 INJECTION, SOLUTION INTRAMUSCULAR; INTRAVENOUS at 12:02

## 2019-03-28 RX ADMIN — MORPHINE SULFATE 9.3 MG: 10 INJECTION, SOLUTION INTRAMUSCULAR; INTRAVENOUS at 22:29

## 2019-03-28 RX ADMIN — KETOROLAC TROMETHAMINE 15 MG: 15 INJECTION, SOLUTION INTRAMUSCULAR; INTRAVENOUS at 17:36

## 2019-03-28 RX ADMIN — CARVEDILOL 12.5 MG: 25 TABLET, FILM COATED ORAL at 08:49

## 2019-03-28 RX ADMIN — METFORMIN HYDROCHLORIDE 500 MG: 500 TABLET ORAL at 08:49

## 2019-03-28 RX ADMIN — Medication 400 MG: at 08:49

## 2019-03-28 RX ADMIN — MORPHINE SULFATE 9.3 MG: 10 INJECTION, SOLUTION INTRAMUSCULAR; INTRAVENOUS at 17:46

## 2019-03-28 RX ADMIN — ACETAMINOPHEN 1000 MG: 500 TABLET, FILM COATED ORAL at 17:37

## 2019-03-28 RX ADMIN — INSULIN GLARGINE 35 UNITS: 100 INJECTION, SOLUTION SUBCUTANEOUS at 22:43

## 2019-03-28 RX ADMIN — PRAVASTATIN SODIUM 80 MG: 80 TABLET ORAL at 22:30

## 2019-03-28 RX ADMIN — DEXTROSE MONOHYDRATE, SODIUM CHLORIDE, AND POTASSIUM CHLORIDE 100 ML/HR: 50; 4.5; 1.49 INJECTION, SOLUTION INTRAVENOUS at 02:49

## 2019-03-28 RX ADMIN — Medication 10 ML: at 22:46

## 2019-03-28 RX ADMIN — ISOSORBIDE MONONITRATE 30 MG: 30 TABLET, EXTENDED RELEASE ORAL at 08:48

## 2019-03-28 RX ADMIN — KETOROLAC TROMETHAMINE 15 MG: 15 INJECTION, SOLUTION INTRAMUSCULAR; INTRAVENOUS at 05:45

## 2019-03-28 RX ADMIN — Medication 400 MG: at 17:37

## 2019-03-28 NOTE — PROGRESS NOTES
Admit Date: 3/26/2019 POD 2 Days Post-Op Procedure:  Procedure(s): OPEN INCARCERATED INCISIONAL HERNIA REPAIR Subjective:  
 
Patient has complaints of pain and nausea. Pain control has been fair- dilaudid not lasting 4 hours. (+) flatus. Objective:  
 
Visit Vitals /74 (BP 1 Location: Right arm, BP Patient Position: At rest;Head of bed elevated (Comment degrees)) Pulse 80 Temp 98.6 °F (37 °C) Resp 20 Ht 4' 11\" (1.499 m) Wt 205 lb 8 oz (93.2 kg) SpO2 92% Breastfeeding? No  
BMI 41.51 kg/m² Temp (24hrs), Av.4 °F (36.9 °C), Min:97 °F (36.1 °C), Max:99.1 °F (37.3 °C) Shamar Armenta I&O reviewed as documented. Physical Exam:  
 General-in mild distress- looks uncomfortable Lungs-decreased effort, clear apices CV-reg Abdomen- Wound clean, dry and pt has moderate appropriately distributed tenderness centrally.  (+)BS. JENNIFER clearing. Labs:  
Recent Results (from the past 24 hour(s)) GLUCOSE, POC Collection Time: 19  9:59 PM  
Result Value Ref Range Glucose (POC) 127 (H) 65 - 100 mg/dL CBC W/O DIFF Collection Time: 19  6:24 AM  
Result Value Ref Range WBC 6.6 4.3 - 11.1 K/uL  
 RBC 3.51 (L) 4.05 - 5.2 M/uL  
 HGB 10.9 (L) 11.7 - 15.4 g/dL HCT 34.1 (L) 35.8 - 46.3 % MCV 97.2 79.6 - 97.8 FL  
 MCH 31.1 26.1 - 32.9 PG  
 MCHC 32.0 31.4 - 35.0 g/dL  
 RDW 13.6 11.9 - 14.6 % PLATELET 658 (L) 379 - 450 K/uL MPV 11.8 9.4 - 12.3 FL ABSOLUTE NRBC 0.00 0.0 - 0.2 K/uL METABOLIC PANEL, BASIC Collection Time: 19  6:24 AM  
Result Value Ref Range Sodium 139 136 - 145 mmol/L Potassium 4.3 3.5 - 5.1 mmol/L Chloride 105 98 - 107 mmol/L  
 CO2 26 21 - 32 mmol/L Anion gap 8 mmol/L Glucose 89 65 - 100 mg/dL BUN 14 6 - 23 MG/DL Creatinine 1.36 (H) 0.6 - 1.0 MG/DL  
 GFR est AA 52 (L) >60 ml/min/1.73m2 GFR est non-AA 43 ml/min/1.73m2 Calcium 8.9 8.3 - 10.4 MG/DL  
GLUCOSE, POC  Collection Time: 19  7:43 AM  
 Result Value Ref Range Glucose (POC) 93 65 - 100 mg/dL GLUCOSE, POC Collection Time: 03/28/19 11:19 AM  
Result Value Ref Range Glucose (POC) 106 (H) 65 - 100 mg/dL Assessment:  
 
Principal Problem: 
  Incarcerated incisional hernia (3/26/2019) Plan/Recommendations/Medical Decision Making:  
 
pt wants to try changing dilaudid to morphine She wants to get out of bed later Will bump diet back down to clear liquids due to nausea; could advance back to full liquids tomorrow if better

## 2019-03-28 NOTE — PROGRESS NOTES
Shift assessment complete via flowsheet. A&O. Resting quietly in bed. JENNIFER drain intact; charged and patent with sanguinous drainage. F/C in place and draining yellow urine to BSB. Encouraged to call for assistance/needs. Call light in reach.

## 2019-03-28 NOTE — PROGRESS NOTES
Shift assessment complete. Pt resting quietly in bed. No signs of acute distress. Resp even and unlabored. Abdominal binder in place. Lower abdominal incision is c/d/i. JENNIFER draining charged and draining. Call light within reach. Pt instructed to call for assistance.

## 2019-03-28 NOTE — PROGRESS NOTES
Shift assessment complete. Respirations present. Even and unlabored. No s/s of distress. Zero c/o pain at this time. Call light within reach. Encouraged patient to call for assistance. Patient verbalizes understanding. See Doc Flowsheet for assessment details. Patient resting in bed. Abdomen binder in place. Incision intact. . Saline well intact to left arm with fluids infusing. Nausea noted. Clarke to bedside drain. JENNIFER charged.

## 2019-03-28 NOTE — PHYSICIAN ADVISORY
Letter of Determination: Upgrade from Observation to Inpatient Status This patient was originally hospitalized as Outpatient Status with Observation Services on 3/26/2019 for scheduled incisional hernia repair. This patient now meets for Inpatient Admission in accordance with CMS regulation Section 43 .3. Specifically, patient's stay is now over Two Midnights and was medically necessary. The patient's stay was medically necessary based on continued decreased oral intake with clear liquid diet, and more than four doses of intravenous dilaudid for pain control every 24 hours. Consistent with CMS guidelines, patient meets for inpatient status. It is our recommendation that this patient's hospitalization status should be upgraded from OBSERVATION to INPATIENT status.  
  
The final decision regarding the patient's hospitalization status depends on the attending physician's judgement. Allison Davenport MD, JOVANNY, Physician Advisor [id number] [facility].

## 2019-03-29 LAB
ANION GAP SERPL CALC-SCNC: 7 MMOL/L
BUN SERPL-MCNC: 16 MG/DL (ref 6–23)
CALCIUM SERPL-MCNC: 8.8 MG/DL (ref 8.3–10.4)
CHLORIDE SERPL-SCNC: 107 MMOL/L (ref 98–107)
CO2 SERPL-SCNC: 23 MMOL/L (ref 21–32)
CREAT SERPL-MCNC: 1.28 MG/DL (ref 0.6–1)
GLUCOSE BLD STRIP.AUTO-MCNC: 113 MG/DL (ref 65–100)
GLUCOSE BLD STRIP.AUTO-MCNC: 120 MG/DL (ref 65–100)
GLUCOSE BLD STRIP.AUTO-MCNC: 140 MG/DL (ref 65–100)
GLUCOSE BLD STRIP.AUTO-MCNC: 149 MG/DL (ref 65–100)
GLUCOSE SERPL-MCNC: 106 MG/DL (ref 65–100)
POTASSIUM SERPL-SCNC: 4.7 MMOL/L (ref 3.5–5.1)
SODIUM SERPL-SCNC: 137 MMOL/L (ref 136–145)

## 2019-03-29 PROCEDURE — 74011636637 HC RX REV CODE- 636/637: Performed by: SURGERY

## 2019-03-29 PROCEDURE — 80048 BASIC METABOLIC PNL TOTAL CA: CPT

## 2019-03-29 PROCEDURE — 82962 GLUCOSE BLOOD TEST: CPT

## 2019-03-29 PROCEDURE — 65270000029 HC RM PRIVATE

## 2019-03-29 PROCEDURE — 74011250636 HC RX REV CODE- 250/636: Performed by: NURSE PRACTITIONER

## 2019-03-29 PROCEDURE — 74011250637 HC RX REV CODE- 250/637: Performed by: SURGERY

## 2019-03-29 PROCEDURE — 74011000250 HC RX REV CODE- 250: Performed by: NURSE PRACTITIONER

## 2019-03-29 PROCEDURE — 74011250636 HC RX REV CODE- 250/636: Performed by: SURGERY

## 2019-03-29 PROCEDURE — 36415 COLL VENOUS BLD VENIPUNCTURE: CPT

## 2019-03-29 RX ORDER — HYDROMORPHONE HYDROCHLORIDE 2 MG/ML
.5-2 INJECTION, SOLUTION INTRAMUSCULAR; INTRAVENOUS; SUBCUTANEOUS
Status: DISCONTINUED | OUTPATIENT
Start: 2019-03-29 | End: 2019-04-25

## 2019-03-29 RX ADMIN — METFORMIN HYDROCHLORIDE 500 MG: 500 TABLET ORAL at 10:56

## 2019-03-29 RX ADMIN — CARVEDILOL 12.5 MG: 25 TABLET, FILM COATED ORAL at 10:56

## 2019-03-29 RX ADMIN — HYDROMORPHONE HYDROCHLORIDE 2 MG: 2 INJECTION, SOLUTION INTRAMUSCULAR; INTRAVENOUS; SUBCUTANEOUS at 20:52

## 2019-03-29 RX ADMIN — ENOXAPARIN SODIUM 40 MG: 40 INJECTION SUBCUTANEOUS at 10:57

## 2019-03-29 RX ADMIN — KETOROLAC TROMETHAMINE 15 MG: 15 INJECTION, SOLUTION INTRAMUSCULAR; INTRAVENOUS at 07:06

## 2019-03-29 RX ADMIN — ACETAMINOPHEN 1000 MG: 500 TABLET, FILM COATED ORAL at 17:55

## 2019-03-29 RX ADMIN — KETOROLAC TROMETHAMINE 15 MG: 15 INJECTION, SOLUTION INTRAMUSCULAR; INTRAVENOUS at 17:54

## 2019-03-29 RX ADMIN — DEXTROSE MONOHYDRATE, SODIUM CHLORIDE, AND POTASSIUM CHLORIDE 100 ML/HR: 50; 4.5; 1.49 INJECTION, SOLUTION INTRAVENOUS at 10:55

## 2019-03-29 RX ADMIN — ONDANSETRON 4 MG: 2 INJECTION INTRAMUSCULAR; INTRAVENOUS at 07:08

## 2019-03-29 RX ADMIN — FUROSEMIDE 20 MG: 20 TABLET ORAL at 10:56

## 2019-03-29 RX ADMIN — KETOROLAC TROMETHAMINE 15 MG: 15 INJECTION, SOLUTION INTRAMUSCULAR; INTRAVENOUS at 13:14

## 2019-03-29 RX ADMIN — ACETAMINOPHEN 1000 MG: 500 TABLET, FILM COATED ORAL at 13:14

## 2019-03-29 RX ADMIN — PRAVASTATIN SODIUM 80 MG: 80 TABLET ORAL at 21:58

## 2019-03-29 RX ADMIN — MORPHINE SULFATE 9.3 MG: 10 INJECTION, SOLUTION INTRAMUSCULAR; INTRAVENOUS at 12:13

## 2019-03-29 RX ADMIN — ENOXAPARIN SODIUM 40 MG: 40 INJECTION SUBCUTANEOUS at 21:58

## 2019-03-29 RX ADMIN — ONDANSETRON 4 MG: 2 INJECTION INTRAMUSCULAR; INTRAVENOUS at 22:51

## 2019-03-29 RX ADMIN — AMIODARONE HYDROCHLORIDE 200 MG: 200 TABLET ORAL at 10:56

## 2019-03-29 RX ADMIN — KETOROLAC TROMETHAMINE 15 MG: 15 INJECTION, SOLUTION INTRAMUSCULAR; INTRAVENOUS at 00:35

## 2019-03-29 RX ADMIN — METFORMIN HYDROCHLORIDE 500 MG: 500 TABLET ORAL at 17:55

## 2019-03-29 RX ADMIN — MORPHINE SULFATE 9.3 MG: 10 INJECTION, SOLUTION INTRAMUSCULAR; INTRAVENOUS at 09:57

## 2019-03-29 RX ADMIN — PROMETHAZINE HYDROCHLORIDE 25 MG: 25 INJECTION INTRAMUSCULAR; INTRAVENOUS at 10:50

## 2019-03-29 RX ADMIN — INSULIN GLARGINE 35 UNITS: 100 INJECTION, SOLUTION SUBCUTANEOUS at 22:52

## 2019-03-29 RX ADMIN — PROMETHAZINE HYDROCHLORIDE 25 MG: 25 INJECTION INTRAMUSCULAR; INTRAVENOUS at 20:21

## 2019-03-29 RX ADMIN — CARVEDILOL 12.5 MG: 25 TABLET, FILM COATED ORAL at 17:55

## 2019-03-29 RX ADMIN — MORPHINE SULFATE 9.3 MG: 10 INJECTION, SOLUTION INTRAMUSCULAR; INTRAVENOUS at 15:34

## 2019-03-29 RX ADMIN — ONDANSETRON 4 MG: 2 INJECTION INTRAMUSCULAR; INTRAVENOUS at 15:34

## 2019-03-29 RX ADMIN — FUROSEMIDE 20 MG: 20 TABLET ORAL at 17:55

## 2019-03-29 RX ADMIN — ONDANSETRON 4 MG: 2 INJECTION INTRAMUSCULAR; INTRAVENOUS at 00:35

## 2019-03-29 RX ADMIN — DEXTROSE MONOHYDRATE, SODIUM CHLORIDE, AND POTASSIUM CHLORIDE 100 ML/HR: 50; 4.5; 1.49 INJECTION, SOLUTION INTRAVENOUS at 21:56

## 2019-03-29 NOTE — PROGRESS NOTES
Pt c/o nausea and 10/10 abdominal/back pain. Administered 9.3 mg Morphine IV and 4 mg Zofran IV per MD order. Will continue to monitor

## 2019-03-29 NOTE — PROGRESS NOTES
Pt requesting nausea medication. Upon entry into room, patient is projectile vomiting large amounts of orange emesis on bed, floor and wall. Charge RN asked to contact MD Ino Javed for nausea medication, as it was not time for Zofran. Patient moved to chair and cleaned up, bed sheets changed, housekeeping contacted. Pt c/o 10/10 abdominal and back pain, restless and requesting to move back and forth from chair to bed. Administered 9.3 mg Morphine IV per MD order. Awaiting phone call from MD Ino Javed to update on patient's condition. Meds held at this time and patient encouraged not to eat/drink at this time, as it increases nausea.

## 2019-03-29 NOTE — ROUTINE PROCESS
RN requested to hold PT today due to severe nausea & vomiting. Therapy will follow up as indicated.  Maurice Rodas, PT, TATE

## 2019-03-29 NOTE — PROGRESS NOTES
Pt with persistent vomiting despite antiemetics, called placed to Dr Renee Martel to notify. Waiting call back.

## 2019-03-29 NOTE — PROGRESS NOTES
Shift assessment completed. Pt alert and oriented x4. Lungs CTA with even and unlabored respirations. Heart sounds regular. Hypoactive bowel sounds with distended and tender abdomen. Low transverse incision with binder in place. JENNIFER drain patent and charged. Clarke catheter care completed and draining. IV c/d and infusing. No other needs at this time. Bed locked in lowest position with call light in reach.

## 2019-03-29 NOTE — PROGRESS NOTES
Shift assessment completed via doc flow sheet. Pt is alert and oriented x 4. No acute distress noted at this time. Low transverse abdominal incision intact with dry dressing and binder. JENNIFER patent and  intact draining serosanguinous fluid. Clarke cath intact draining clear yellow urine. Pt c/o pain of 8/10. Will medicate later. Call light within reach and pt is instructed to call for assistance.

## 2019-03-29 NOTE — PROGRESS NOTES
Admit Date: 3/26/2019 POD 3 Days Post-Op Procedure:  Procedure(s): OPEN INCARCERATED INCISIONAL HERNIA REPAIR Subjective:  
 
Patient has complaints of n/v. Much worse than yesterday.  (+)BMs earlier today. Pain not any better, also not worse. Objective:  
 
Visit Vitals /63 Pulse 60 Temp 98.1 °F (36.7 °C) Resp 20 Ht 4' 11\" (1.499 m) Wt 205 lb 8 oz (93.2 kg) SpO2 100% Breastfeeding? No  
BMI 41.51 kg/m² Temp (24hrs), Av.1 °F (36.7 °C), Min:97.5 °F (36.4 °C), Max:99.2 °F (37.3 °C) [place] Mellow I&O reviewed as documented. Physical Exam:  
 General-in mild distress. Lungs-CTA bilaterally without rales, rhonchi, or wheezes. Bases are diminished. Respiratory effort is Decreased Heart- Regular rate and rhythm Abdomen- Incision is/(trocar sites are) clean, dry, no drainage and pt has moderate appropriately distributed  hi-incisional tenderness. Bowel sounds are present and normal.   
 
Labs:  
Recent Results (from the past 24 hour(s)) GLUCOSE, POC Collection Time: 19  5:25 PM  
Result Value Ref Range Glucose (POC) 127 (H) 65 - 100 mg/dL GLUCOSE, POC Collection Time: 19  9:47 PM  
Result Value Ref Range Glucose (POC) 158 (H) 65 - 100 mg/dL METABOLIC PANEL, BASIC Collection Time: 19  6:25 AM  
Result Value Ref Range Sodium 137 136 - 145 mmol/L Potassium 4.7 3.5 - 5.1 mmol/L Chloride 107 98 - 107 mmol/L  
 CO2 23 21 - 32 mmol/L Anion gap 7 mmol/L Glucose 106 (H) 65 - 100 mg/dL BUN 16 6 - 23 MG/DL Creatinine 1.28 (H) 0.6 - 1.0 MG/DL  
 GFR est AA 56 (L) >60 ml/min/1.73m2 GFR est non-AA 46 ml/min/1.73m2 Calcium 8.8 8.3 - 10.4 MG/DL  
GLUCOSE, POC Collection Time: 19  7:37 AM  
Result Value Ref Range Glucose (POC) 120 (H) 65 - 100 mg/dL GLUCOSE, POC Collection Time: 19 12:11 PM  
Result Value Ref Range Glucose (POC) 140 (H) 65 - 100 mg/dL Data Review - Assessment: Principal Problem: 
  Incarcerated incisional hernia (3/26/2019) Plan/Recommendations/Medical Decision Making: Suspect worsening n/v is from switch to morphine, so will switch back to dilaudid However, given the type of surgical procedure, will proceed with CT in am to r/o acute SBO or a hernia failure- her obesity and pain limit a truly detailed abdominal wall exam.

## 2019-03-29 NOTE — PROGRESS NOTES
Dressing change to incision and JENNIFER drain completed. Gauze removed with old blood drainage and new gauze applied. Abdominal binder back in place. Patient tolerated well

## 2019-03-30 ENCOUNTER — APPOINTMENT (OUTPATIENT)
Dept: CT IMAGING | Age: 54
DRG: 353 | End: 2019-03-30
Attending: SURGERY
Payer: MEDICARE

## 2019-03-30 LAB
ANION GAP SERPL CALC-SCNC: 6 MMOL/L
BUN SERPL-MCNC: 13 MG/DL (ref 6–23)
CALCIUM SERPL-MCNC: 8.5 MG/DL (ref 8.3–10.4)
CHLORIDE SERPL-SCNC: 105 MMOL/L (ref 98–107)
CO2 SERPL-SCNC: 25 MMOL/L (ref 21–32)
CREAT SERPL-MCNC: 1.22 MG/DL (ref 0.6–1)
GLUCOSE BLD STRIP.AUTO-MCNC: 111 MG/DL (ref 65–100)
GLUCOSE BLD STRIP.AUTO-MCNC: 120 MG/DL (ref 65–100)
GLUCOSE BLD STRIP.AUTO-MCNC: 99 MG/DL (ref 65–100)
GLUCOSE SERPL-MCNC: 107 MG/DL (ref 65–100)
POTASSIUM SERPL-SCNC: 5.8 MMOL/L (ref 3.5–5.1)
SODIUM SERPL-SCNC: 136 MMOL/L (ref 136–145)

## 2019-03-30 PROCEDURE — 74011000258 HC RX REV CODE- 258: Performed by: SURGERY

## 2019-03-30 PROCEDURE — 97110 THERAPEUTIC EXERCISES: CPT

## 2019-03-30 PROCEDURE — 82962 GLUCOSE BLOOD TEST: CPT

## 2019-03-30 PROCEDURE — 65270000029 HC RM PRIVATE

## 2019-03-30 PROCEDURE — 80048 BASIC METABOLIC PNL TOTAL CA: CPT

## 2019-03-30 PROCEDURE — 94762 N-INVAS EAR/PLS OXIMTRY CONT: CPT

## 2019-03-30 PROCEDURE — 36415 COLL VENOUS BLD VENIPUNCTURE: CPT

## 2019-03-30 PROCEDURE — 74011250636 HC RX REV CODE- 250/636: Performed by: INTERNAL MEDICINE

## 2019-03-30 PROCEDURE — 74011250636 HC RX REV CODE- 250/636: Performed by: NURSE PRACTITIONER

## 2019-03-30 PROCEDURE — 74011250637 HC RX REV CODE- 250/637: Performed by: SURGERY

## 2019-03-30 PROCEDURE — 77030008771 HC TU NG SALEM SUMP -A

## 2019-03-30 PROCEDURE — 0D9670Z DRAINAGE OF STOMACH WITH DRAINAGE DEVICE, VIA NATURAL OR ARTIFICIAL OPENING: ICD-10-PCS | Performed by: SURGERY

## 2019-03-30 PROCEDURE — 74177 CT ABD & PELVIS W/CONTRAST: CPT

## 2019-03-30 PROCEDURE — 74011000250 HC RX REV CODE- 250: Performed by: NURSE PRACTITIONER

## 2019-03-30 PROCEDURE — 74011250636 HC RX REV CODE- 250/636: Performed by: SURGERY

## 2019-03-30 PROCEDURE — 74011636320 HC RX REV CODE- 636/320: Performed by: SURGERY

## 2019-03-30 RX ORDER — METOCLOPRAMIDE HYDROCHLORIDE 5 MG/ML
10 INJECTION INTRAMUSCULAR; INTRAVENOUS
Status: DISCONTINUED | OUTPATIENT
Start: 2019-03-30 | End: 2019-04-26 | Stop reason: HOSPADM

## 2019-03-30 RX ORDER — SODIUM CHLORIDE 0.9 % (FLUSH) 0.9 %
10 SYRINGE (ML) INJECTION
Status: COMPLETED | OUTPATIENT
Start: 2019-03-30 | End: 2019-03-30

## 2019-03-30 RX ORDER — METOCLOPRAMIDE HYDROCHLORIDE 5 MG/ML
10 INJECTION INTRAMUSCULAR; INTRAVENOUS EVERY 8 HOURS
Status: DISCONTINUED | OUTPATIENT
Start: 2019-03-30 | End: 2019-03-30

## 2019-03-30 RX ADMIN — Medication 10 ML: at 11:10

## 2019-03-30 RX ADMIN — AMIODARONE HYDROCHLORIDE 200 MG: 200 TABLET ORAL at 08:23

## 2019-03-30 RX ADMIN — ACETAMINOPHEN 1000 MG: 500 TABLET, FILM COATED ORAL at 00:29

## 2019-03-30 RX ADMIN — DEXTROSE MONOHYDRATE, SODIUM CHLORIDE, AND POTASSIUM CHLORIDE 100 ML/HR: 50; 4.5; 1.49 INJECTION, SOLUTION INTRAVENOUS at 21:49

## 2019-03-30 RX ADMIN — KETOROLAC TROMETHAMINE 15 MG: 15 INJECTION, SOLUTION INTRAMUSCULAR; INTRAVENOUS at 02:44

## 2019-03-30 RX ADMIN — METOCLOPRAMIDE 10 MG: 5 INJECTION, SOLUTION INTRAMUSCULAR; INTRAVENOUS at 11:36

## 2019-03-30 RX ADMIN — ONDANSETRON 4 MG: 2 INJECTION INTRAMUSCULAR; INTRAVENOUS at 20:54

## 2019-03-30 RX ADMIN — CARVEDILOL 12.5 MG: 25 TABLET, FILM COATED ORAL at 08:23

## 2019-03-30 RX ADMIN — DIATRIZOATE MEGLUMINE AND DIATRIZOATE SODIUM 15 ML: 660; 100 LIQUID ORAL; RECTAL at 10:02

## 2019-03-30 RX ADMIN — METFORMIN HYDROCHLORIDE 500 MG: 500 TABLET ORAL at 08:23

## 2019-03-30 RX ADMIN — ACETAMINOPHEN 1000 MG: 500 TABLET, FILM COATED ORAL at 07:29

## 2019-03-30 RX ADMIN — HYDROMORPHONE HYDROCHLORIDE 1 MG: 2 INJECTION, SOLUTION INTRAMUSCULAR; INTRAVENOUS; SUBCUTANEOUS at 10:15

## 2019-03-30 RX ADMIN — HYDROMORPHONE HYDROCHLORIDE 0.5 MG: 2 INJECTION, SOLUTION INTRAMUSCULAR; INTRAVENOUS; SUBCUTANEOUS at 20:54

## 2019-03-30 RX ADMIN — HYDROMORPHONE HYDROCHLORIDE 0.5 MG: 2 INJECTION, SOLUTION INTRAMUSCULAR; INTRAVENOUS; SUBCUTANEOUS at 05:47

## 2019-03-30 RX ADMIN — FUROSEMIDE 20 MG: 20 TABLET ORAL at 08:22

## 2019-03-30 RX ADMIN — HYDROMORPHONE HYDROCHLORIDE 0.5 MG: 2 INJECTION, SOLUTION INTRAMUSCULAR; INTRAVENOUS; SUBCUTANEOUS at 23:10

## 2019-03-30 RX ADMIN — PROMETHAZINE HYDROCHLORIDE 25 MG: 25 INJECTION INTRAMUSCULAR; INTRAVENOUS at 05:41

## 2019-03-30 RX ADMIN — IOPAMIDOL 100 ML: 755 INJECTION, SOLUTION INTRAVENOUS at 11:11

## 2019-03-30 RX ADMIN — ENOXAPARIN SODIUM 40 MG: 40 INJECTION SUBCUTANEOUS at 21:49

## 2019-03-30 RX ADMIN — ISOSORBIDE MONONITRATE 30 MG: 30 TABLET, EXTENDED RELEASE ORAL at 08:22

## 2019-03-30 RX ADMIN — KETOROLAC TROMETHAMINE 15 MG: 15 INJECTION, SOLUTION INTRAMUSCULAR; INTRAVENOUS at 22:13

## 2019-03-30 RX ADMIN — Medication 400 MG: at 08:22

## 2019-03-30 RX ADMIN — ONDANSETRON 4 MG: 2 INJECTION INTRAMUSCULAR; INTRAVENOUS at 08:22

## 2019-03-30 RX ADMIN — VITAMIN D, TAB 1000IU (100/BT) 1000 UNITS: 25 TAB at 08:22

## 2019-03-30 RX ADMIN — ENOXAPARIN SODIUM 40 MG: 40 INJECTION SUBCUTANEOUS at 08:22

## 2019-03-30 RX ADMIN — SODIUM CHLORIDE 100 ML: 900 INJECTION, SOLUTION INTRAVENOUS at 11:10

## 2019-03-30 RX ADMIN — KETOROLAC TROMETHAMINE 15 MG: 15 INJECTION, SOLUTION INTRAMUSCULAR; INTRAVENOUS at 15:28

## 2019-03-30 RX ADMIN — KETOROLAC TROMETHAMINE 15 MG: 15 INJECTION, SOLUTION INTRAMUSCULAR; INTRAVENOUS at 08:22

## 2019-03-30 RX ADMIN — DEXTROSE MONOHYDRATE, SODIUM CHLORIDE, AND POTASSIUM CHLORIDE 100 ML/HR: 50; 4.5; 1.49 INJECTION, SOLUTION INTRAVENOUS at 10:06

## 2019-03-30 NOTE — PROGRESS NOTES
Called [facility] again, message left with answering service.

## 2019-03-30 NOTE — PROGRESS NOTES
Pt continues to complain of nausea and vomiting. Expressed concern to hospitalist regarding inability to manage pts symptoms. Verbal orders received from Dr. Ritika Mantilla for Reglan 10 mg Q8 hours PRN.

## 2019-03-30 NOTE — PROGRESS NOTES
Shift assessment complete. Pt alert and oreinted x4. Respirations even and unlabored. Lung sounds diminished on supplemental 02 via NC. HR regular. Abdomen distended, tender, with hypoactive bowel sounds heard in all four quadrants. Abdominal dressing c-d-I. Abdominal binder in place. JENNIFER drain emptied, 100 mL serosanguinous fluid. JENNIFER patent and charged. Pt complains of nausea and vomiting, will medicate. Clarke catheter in place per MD order, draining analisa urine. Trace edema noted to BLE. Bilateral SCDs in place. IV patent and infusing. Jose fall total score 3. Fall risk bracelet, socks, and sign in place. Safety measures in place, call light within reach. Will continue to monitor.

## 2019-03-30 NOTE — PROGRESS NOTES
Spoke with Gurwinder Marc at [id number] ([place] [address]) answering service. NP to call back regarding CT results.

## 2019-03-30 NOTE — PROGRESS NOTES
[address], [address] [place], [address] PLAN:Await CT this morning. Continue current care ASSESSMENT:  Admit Date: 3/26/2019  
4 Days Post-Op  Procedure(s): OPEN INCARCERATED INCISIONAL HERNIA REPAIR Principal Problem: 
  Incarcerated incisional hernia (3/26/2019) SUBJECTIVE:Somnolent. No complaints. Denies BM but reports flatus. 2 BMs charted yesterday. CT pending. Emesis 50 cc yesterday. JENNIFER drain serous. Lizeth Emiliano in place. OBJECTIVE: 
Constitutional: Alert oriented cooperative patient in no acute distress; appears stated age Visit Vitals /75 Pulse 60 Temp 98.6 °F (37 °C) Resp 16 Ht 4' 11\" (1.499 m) Wt 205 lb 8 oz (93.2 kg) SpO2 96% Breastfeeding? No  
BMI 41.51 kg/m² Eyes:Sclera are clear. ENMT: no external lesions gross hearing normal; no obvious neck masses, no ear or lip lesions CV: RRR. Normal perfusion Resp: No JVD. Breathing is  non-labored; no audible wheezing. GI: Obese. Decreased BS. JENNIFER drain in place. Musculoskeletal: unremarkable with normal function. No embolic signs or cyanosis. Neuro:  Oriented; moves all 4; no focal deficits Psychiatric: normal affect and mood, no memory impairment Patient Vitals for the past 24 hrs: 
 BP Temp Pulse Resp SpO2  
03/30/19 0734 111/75 98.6 °F (37 °C) 60 16 96 % 03/30/19 0307 116/56 98.9 °F (37.2 °C) 60 16 99 % 03/29/19 2345 125/80 96.6 °F (35.9 °C) 69 18 90 % 03/29/19 2021     95 % 03/29/19 1958 155/80 99 °F (37.2 °C) 78 22 (!) 87 % 03/29/19 1642 125/63 98.1 °F (36.7 °C) 60 20 100 % 03/29/19 1144 138/69 97.7 °F (36.5 °C) 60 20 99 % 03/29/19 0813 131/75 97.5 °F (36.4 °C) 60 18 93 % Labs:   
Recent Labs  
  03/29/19 
7635 03/28/19 
6920 WBC  --  6.6 HGB  --  10.9* PLT  --  119*  139  
K 4.7 4.3  105 CO2 23 26 BUN 16 14 CREA 1.28* 1.36* * Roberth Solorio DO

## 2019-03-30 NOTE — PROGRESS NOTES
Spoke with Dr. Lionel Golden regarding NGT output, urinary output, and JENNIFER output, as well as temperature and BP. No new orders received at this time. Will continue to monitor.

## 2019-03-30 NOTE — PROGRESS NOTES
Spoke with Francisco Adkins at [id number] ([place] [address]) answering service. NP to call back regarding elevated temperature and BP.

## 2019-03-30 NOTE — PROGRESS NOTES
Problem: Mobility Impaired (Adult and Pediatric) Goal: *Acute Goals and Plan of Care (Insert Text) Description STG: 
(1.)Ms. Amanda Aguilera will move from supine to sit and sit to supine  with MINIMAL ASSIST within 3 treatment day(s). (2.)Ms. Amanda Aguilera will transfer from bed to chair and chair to bed with CONTACT GUARD ASSIST using the least restrictive device within 3 treatment day(s). (3.)Ms. Amanda Aguilera will ambulate with CONTACT GUARD ASSIST for 50 feet with the least restrictive device within 3 treatment day(s). LTG: 
(1.)Ms. Amanda Aguilera will move from supine to sit and sit to supine  in bed with STAND BY ASSIST within 7 treatment day(s). (2.)Ms. Amanda Aguilera will transfer from bed to chair and chair to bed with STAND BY ASSIST using the least restrictive device within 7 treatment day(s). (3.)Ms. Amanda Aguilera will ambulate with STAND BY ASSIST for 200 feet with the least restrictive device within 7 treatment day(s). (4)Ms. Amanda Aguilera will go up 4 steps min assist with device as needed in 7 days. (5)Ms. Amanda Aguilera will perform HEP with cues and assistance to increase safety on her feet in 7 days. ________________________________________________________________________________________________ Outcome: Progressing Towards Goal 
  
PHYSICAL THERAPY: Daily Note and AM 3/30/2019 INPATIENT: PT Visit Days : 2 [place] / [place] B / Product Type: Medicare /   
  
NAME/AGE/GENDER: Gustavo Hernandez is a 52 y.o. female PRIMARY DIAGNOSIS: Incisional hernia, without obstruction or gangrene [K43.2] Incarcerated incisional hernia [K43.0] Incarcerated incisional hernia [K43.0] Incarcerated incisional hernia Incarcerated incisional hernia Procedure(s) (LRB): OPEN INCARCERATED INCISIONAL HERNIA REPAIR (N/A) 4 Days Post-Op ICD-10: Treatment Diagnosis:  
 · Generalized Muscle Weakness (M62.81) · Other abnormalities of gait and mobility (R26.89) Precaution/Allergies: 
Grass pollen ASSESSMENT:  
 Ms. Taty Mclaughlin presents with general decreased in functional mobility and gait s/p OPEN INCARCERATED [address] on 3/26/19. Pt. Agreeable to get up this am. She reports pain 10 with rest and movement. She got out of bed with assistance and took some steps to the chair with RW. She did some theraputic exercises in the chair for LE's. Stressed to patient the importance of mobility and gait to prevent further issues. Pain limiting all therapeutic exercise, activities and gait this morning. Able to perform limited therapeutic exercise in bed but did not tolerate EOB or standing. This section established at most recent assessment PROBLEM LIST (Impairments causing functional limitations): 1. Decreased Strength 2. Decreased ADL/Functional Activities 3. Decreased Transfer Abilities 4. Decreased Ambulation Ability/Technique 5. Decreased Balance 6. Increased Pain 7. Decreased Activity Tolerance 8. Decreased Flexibility/Joint Mobility 9. Decreased [place] with Home Exercise Program 
 INTERVENTIONS PLANNED: (Benefits and precautions of physical therapy have been discussed with the patient.) 1. Balance Exercise 2. Bed Mobility 3. Family Education 4. Gait Training 5. Home Exercise Program (HEP) 6. Therapeutic Activites 7. Therapeutic Exercise/Strengthening 8. Transfer Training TREATMENT PLAN: Frequency/Duration: daily for duration of hospital stay Rehabilitation Potential For Stated Goals: Good RECOMMENDED REHABILITATION/EQUIPMENT: (at time of discharge pending progress): Due to the probability of continued deficits (see above) this patient will likely need continued skilled physical therapy after discharge. Equipment: ? May need a RW   
    
 
 
 
HISTORY:  
History of Present Injury/Illness (Reason for Referral): S/p OPEN INCARCERATED INCISIONAL HERNIA REPAIR 3/26/19 Past Medical History/Comorbidities: Ms. Tiffanie Stanley  has a past medical history of Allergic rhinitis, Arthritis, Automatic implantable cardioverter-defibrillator in situ (3/30/2016), CAD in native artery (6/15/4479), Chronic systolic heart failure ([address] (2013), CKD (chronic kidney disease), Diabetes ([address] Diabetes mellitus ([address] (2010), Dyslipidemia (2013), Eczema, Fibromyalgia, GERD (gastroesophageal reflux disease), Headache, Heart failure ([address] HTN (hypertension) (2010), Hypercholesterolemia, Morbid obesity ([address] Neuropathy, NICM (nonischemic cardiomyopathy) ([address] (2/15/2013), Obesity, Obstructive sleep apnea (adult) (pediatric) (2014), Sciatic pain (2016), SVT (supraventricular tachycardia) ([address] and Tobacco use disorder (2010). She also has no past medical history of Dementia, Gastrointestinal disorder, Infectious disease, or Unspecified adverse effect of anesthesia. Ms. Tiffanie Stanley  has a past surgical history that includes hx tonsillectomy; hx implantable cardioverter defibrillator (2013); hx  section; hx hernia repair; pr left heart cath,percutaneous (2013); hx svt ablation (\"years ago\"); hx rotator cuff repair (Right); and hx hysterectomy. Social History/Living Environment:  
Home Environment: Private residence # Steps to Enter: 4 Rails to Enter: No 
One/Two Story Residence: One story Living Alone: No 
Support Systems: Friends \ neighbors Patient Expects to be Discharged to[de-identified] Private residence Current DME Used/Available at Home: Glucometer, CPAP Prior Level of Function/Work/Activity: 
independent Number of Personal Factors/Comorbidities that affect the Plan of Care: 1-2: MODERATE COMPLEXITY EXAMINATION:  
Most Recent Physical Functioning:  
Gross Assessment: 
  
         
  
Posture: 
  
Balance: 
  Bed Mobility: 
  
Wheelchair Mobility: 
  
Transfers: 
  
Gait: 
  
   
  
Body Structures Involved: 1. Bones 2. Joints 3. Muscles 4. Ligaments Body Functions Affected: 1. Movement Related Activities and Participation Affected: 1. Mobility Number of elements that affect the Plan of Care: 3: MODERATE COMPLEXITY CLINICAL PRESENTATION:  
Presentation: Stable and uncomplicated: LOW COMPLEXITY CLINICAL DECISION MAKING:  
[employer] AM-PAC 6 Clicks Basic Mobility Inpatient Short Form How much difficulty does the patient currently have. .. Unable A Lot A Little None 1. Turning over in bed (including adjusting bedclothes, sheets and blankets)? ? 1   ? 2   ? 3   ? 4  
2. Sitting down on and standing up from a chair with arms ( e.g., wheelchair, bedside commode, etc.)   ? 1   ? 2   ? 3   ? 4  
3. Moving from lying on back to sitting on the side of the bed?   ? 1   ? 2   ? 3   ? 4 How much help from another person does the patient currently need. .. Total A Lot A Little None 4. Moving to and from a bed to a chair (including a wheelchair)? ? 1   ? 2   ? 3   ? 4  
5. Need to walk in hospital room? ? 1   ? 2   ? 3   ? 4  
6. Climbing 3-5 steps with a railing? ? 1   ? 2   ? 3   ? 4  
© 2007, Trustees of [facility] [employer], under license to [employer]. All rights reserved Score:  Initial: 14 Most Recent: X (Date: -- ) Interpretation of Tool:  Represents activities that are increasingly more difficult (i.e. Bed mobility, Transfers, Gait). Medical Necessity:    
· Patient is expected to demonstrate progress in strength, range of motion, balance, coordination and functional technique ·  to decrease assistance required with theraputic exercises and functional mobility · . Reason for Services/Other Comments: 
· Patient continues to require present interventions due to patient's inability to perform theraputic exercises and functional mobility · . Use of outcome tool(s) and clinical judgement create a POC that gives a: Clear prediction of patient's progress: LOW COMPLEXITY  
  
 
 
 
TREATMENT:  
 (In addition to Assessment/Re-Assessment sessions the following treatments were rendered) Pre-treatment Symptoms/Complaints:  abdominal pain 
Pain: Initial: 10 Pain Intensity 1: 7 Pain Location 1: (Abdomen, back)  Post Session:  10 Therapeutic Activity: (    ):  Therapeutic activities including Bed transfers, Chair transfers, Ambulation on level ground and gait to chair  to improve mobility and strength. Required minimal   to promote static and dynamic balance in standing. Therapeutic Exercise: (12 Minutes):  Exercises per grid below to improve mobility and strength. Required minimal visual, verbal and manual cues to promote proper body alignment, promote proper body posture, promote proper body mechanics and promote proper body breathing techniques. Progressed range and repetitions as indicated. Date: 
3/28 Date: 
03/30/19 Date: Activity/Exercise Parameters Parameters Parameters Ankle pumps 15 20 reps Quad sets 15 10x5 sec hold Glut sets 15 10x5 sec hold Hip abduction 10aarom 15 reps Heel slides  15 reps Braces/Orthotics/Lines/Etc:  
· IV 
· denis catheter · drain celeste  
· O2 Device: Nasal cannula Treatment/Session Assessment:   
· Response to Treatment:  Pt. Did not tolerate any standing · Interdisciplinary Collaboration:  
o Registered Nurse 
o Rehabilitation Attendant · After treatment position/precautions:  
o Up in chair 
o Bed/Chair-wheels locked 
o Bed in low position 
o Call light within reach 
o RN notified · Compliance with Program/Exercises: Will assess as treatment progresses  no questions. · Recommendations/Intent for next treatment session: \"Next visit will focus on advancements to more challenging activities and reduction in assistance provided\". Total Treatment Duration: PT Patient Time In/Time Out Time In: 0660 Time Out: 0672 Raghu Dotson PT

## 2019-03-30 NOTE — PROGRESS NOTES
Awake with c/o nausea, vomiting 50 ml liquid, yellow emesis. Phenergan 25 mg given IVP slowly. Cool, wet cloth to face, head of bed remaining elevated. Skin warm, dry, Resp even, unlab. Abdom soft with hypoactive bowels sounds, dsg clean, dry, intact, reinforced with 4 x 4 gauze over lightly shadowed drainage. JENNIFER intact, emptied, recharged, output serosanguinous. Assessment noted.

## 2019-03-30 NOTE — PROGRESS NOTES
Midline dressing saturated. Gown and sheets soiled. Dressing reinforced per MD order, abdominal binder remains in place. Pt tolerated well. Safety measures in place, call light within reach. Will continue to monitor.

## 2019-03-30 NOTE — PROGRESS NOTES
Pt vomiting, 100 mL yellow emesis noted. PRN Zofran 4 mg given slow, IVP. Safety measures in place, call light within reach. Will continue to monitor.

## 2019-03-30 NOTE — PROGRESS NOTES
New IV site placed by Mack Guillermo RN, after numerous failed attempts to restart by nurse and Jesu Schmitt RN Supervisor. Existing site left arm, infiltrated.

## 2019-03-30 NOTE — PROGRESS NOTES
2400 mL total NGT output since insertion at 1330. NGT to low intermittent suction. Pt resting quietly with no signs of pain or distress.

## 2019-03-30 NOTE — PROGRESS NOTES
Emptied additional 180 ml clear, yellow urine from denis. JENNIFER drain emptied of 95 ml serosanguinous drainage, recharged, dsg clean, dry, intact.

## 2019-03-30 NOTE — PROGRESS NOTES
Resting quietly, resp even, unlab. With O2 at 2L N/C. Eyes closed with relaxed facial expression. No distress noted.

## 2019-03-30 NOTE — PROGRESS NOTES
Dilaudid 2 mg given IVP slowly for c/o pain. Continuous pulse oximeter applied by RT Nohelia, per protocol r/t IV med dose.

## 2019-03-30 NOTE — PROGRESS NOTES
Pt complains of abdominal pain 8/10. PRN Dilaudid 1 mg given slow, IVP per MD order. Safety measures in place, call light within reach. Will continue to monitor.

## 2019-03-30 NOTE — PROGRESS NOTES
Pt continues to complain of nausea. PRN Reglan 10 mg given slow, IVP per MD order. Safety measures in place, call light within reach, door remaining open. Will continue to monitor.

## 2019-03-30 NOTE — PROGRESS NOTES
NGT inserted per MD order. Francois Arredondo and MELLO Feldman assisting at bedside. Pt poorly tolerated. NGT to low intermittent suction with tan output. Safety measures in place, call light within reach. Will continue to monitor.

## 2019-03-30 NOTE — PROGRESS NOTES
Pt continues to experience nausea and vomiting. 100 mL yellow emesis measured. Pt unable to finish PO contrast.  
Epimenio Ormond in CT notified, plan to attempt CT at 1100. Will continue to monitor.

## 2019-03-30 NOTE — PROGRESS NOTES
Spoke with Hiwot Carroll NP at [facility] regarding CT results. Per Hiwot Carroll, Dr. Chou Challenger to return to floor today, will wait for orders.

## 2019-03-30 NOTE — PROGRESS NOTES
NGT to low intermittent suction. 1000 mL yellow-tan fluid removed. Canister full, canister replaced. Will continue to monitor.

## 2019-03-31 ENCOUNTER — ANESTHESIA EVENT (OUTPATIENT)
Dept: SURGERY | Age: 54
DRG: 353 | End: 2019-03-31
Payer: MEDICARE

## 2019-03-31 ENCOUNTER — ANESTHESIA (OUTPATIENT)
Dept: SURGERY | Age: 54
DRG: 353 | End: 2019-03-31
Payer: MEDICARE

## 2019-03-31 LAB
ANION GAP SERPL CALC-SCNC: 6 MMOL/L
BUN SERPL-MCNC: 16 MG/DL (ref 6–23)
CALCIUM SERPL-MCNC: 8.8 MG/DL (ref 8.3–10.4)
CHLORIDE SERPL-SCNC: 107 MMOL/L (ref 98–107)
CO2 SERPL-SCNC: 25 MMOL/L (ref 21–32)
CREAT SERPL-MCNC: 1.32 MG/DL (ref 0.6–1)
ERYTHROCYTE [DISTWIDTH] IN BLOOD BY AUTOMATED COUNT: 13.3 % (ref 11.9–14.6)
GLUCOSE BLD STRIP.AUTO-MCNC: 113 MG/DL (ref 65–100)
GLUCOSE BLD STRIP.AUTO-MCNC: 88 MG/DL (ref 65–100)
GLUCOSE SERPL-MCNC: 94 MG/DL (ref 65–100)
HCT VFR BLD AUTO: 34.7 % (ref 35.8–46.3)
HGB BLD-MCNC: 11.2 G/DL (ref 11.7–15.4)
MCH RBC QN AUTO: 31.3 PG (ref 26.1–32.9)
MCHC RBC AUTO-ENTMCNC: 32.3 G/DL (ref 31.4–35)
MCV RBC AUTO: 96.9 FL (ref 79.6–97.8)
NRBC # BLD: 0 K/UL (ref 0–0.2)
PLATELET # BLD AUTO: 138 K/UL (ref 150–450)
PMV BLD AUTO: 12 FL (ref 9.4–12.3)
POTASSIUM SERPL-SCNC: 5 MMOL/L (ref 3.5–5.1)
RBC # BLD AUTO: 3.58 M/UL (ref 4.05–5.2)
SODIUM SERPL-SCNC: 138 MMOL/L (ref 136–145)
WBC # BLD AUTO: 7 K/UL (ref 4.3–11.1)

## 2019-03-31 PROCEDURE — 77030013567 HC DRN WND RESERV BARD -A: Performed by: SURGERY

## 2019-03-31 PROCEDURE — 76210000006 HC OR PH I REC 0.5 TO 1 HR: Performed by: SURGERY

## 2019-03-31 PROCEDURE — 77030002986 HC SUT PROL J&J -A: Performed by: SURGERY

## 2019-03-31 PROCEDURE — 82962 GLUCOSE BLOOD TEST: CPT

## 2019-03-31 PROCEDURE — 36415 COLL VENOUS BLD VENIPUNCTURE: CPT

## 2019-03-31 PROCEDURE — 74011250636 HC RX REV CODE- 250/636

## 2019-03-31 PROCEDURE — 74011250636 HC RX REV CODE- 250/636: Performed by: SURGERY

## 2019-03-31 PROCEDURE — 77030021917 HC STPL TCKR ABSRB COVD -E: Performed by: SURGERY

## 2019-03-31 PROCEDURE — 77030020256 HC SOL INJ NACL 0.9%  500ML

## 2019-03-31 PROCEDURE — 74011000250 HC RX REV CODE- 250

## 2019-03-31 PROCEDURE — 80048 BASIC METABOLIC PNL TOTAL CA: CPT

## 2019-03-31 PROCEDURE — 76060000034 HC ANESTHESIA 1.5 TO 2 HR: Performed by: SURGERY

## 2019-03-31 PROCEDURE — C1781 MESH (IMPLANTABLE): HCPCS | Performed by: SURGERY

## 2019-03-31 PROCEDURE — 77030018836 HC SOL IRR NACL ICUM -A: Performed by: SURGERY

## 2019-03-31 PROCEDURE — 94762 N-INVAS EAR/PLS OXIMTRY CONT: CPT

## 2019-03-31 PROCEDURE — 65270000029 HC RM PRIVATE

## 2019-03-31 PROCEDURE — 77030039425 HC BLD LARYNG TRULITE DISP TELE -A: Performed by: NURSE ANESTHETIST, CERTIFIED REGISTERED

## 2019-03-31 PROCEDURE — 85027 COMPLETE CBC AUTOMATED: CPT

## 2019-03-31 PROCEDURE — 77030012414: Performed by: SURGERY

## 2019-03-31 PROCEDURE — 74011250636 HC RX REV CODE- 250/636: Performed by: INTERNAL MEDICINE

## 2019-03-31 PROCEDURE — 77030031139 HC SUT VCRL2 J&J -A: Performed by: SURGERY

## 2019-03-31 PROCEDURE — 77030036554: Performed by: SURGERY

## 2019-03-31 PROCEDURE — 74011000250 HC RX REV CODE- 250: Performed by: SURGERY

## 2019-03-31 PROCEDURE — 77010033678 HC OXYGEN DAILY

## 2019-03-31 PROCEDURE — 0WUF0JZ SUPPLEMENT ABDOMINAL WALL WITH SYNTHETIC SUBSTITUTE, OPEN APPROACH: ICD-10-PCS | Performed by: SURGERY

## 2019-03-31 PROCEDURE — 77030008467 HC STPLR SKN COVD -B: Performed by: SURGERY

## 2019-03-31 PROCEDURE — 77030002916 HC SUT ETHLN J&J -A: Performed by: SURGERY

## 2019-03-31 PROCEDURE — 74011250636 HC RX REV CODE- 250/636: Performed by: ANESTHESIOLOGY

## 2019-03-31 PROCEDURE — 77030008703 HC TU ET UNCUF COVD -A: Performed by: NURSE ANESTHETIST, CERTIFIED REGISTERED

## 2019-03-31 PROCEDURE — 77030008477 HC STYL SATN SLP COVD -A: Performed by: NURSE ANESTHETIST, CERTIFIED REGISTERED

## 2019-03-31 PROCEDURE — 76010000162 HC OR TIME 1.5 TO 2 HR INTENSV-TIER 1: Performed by: SURGERY

## 2019-03-31 DEVICE — MESH HERN W6XL6IN INGUINAL POLYPR MFIL SQ NONABSORBABLE: Type: IMPLANTABLE DEVICE | Site: ABDOMEN | Status: FUNCTIONAL

## 2019-03-31 RX ORDER — NEOSTIGMINE METHYLSULFATE 1 MG/ML
INJECTION INTRAVENOUS AS NEEDED
Status: DISCONTINUED | OUTPATIENT
Start: 2019-03-31 | End: 2019-03-31 | Stop reason: HOSPADM

## 2019-03-31 RX ORDER — ROCURONIUM BROMIDE 10 MG/ML
INJECTION, SOLUTION INTRAVENOUS AS NEEDED
Status: DISCONTINUED | OUTPATIENT
Start: 2019-03-31 | End: 2019-03-31 | Stop reason: HOSPADM

## 2019-03-31 RX ORDER — NALOXONE HYDROCHLORIDE 0.4 MG/ML
0.2 INJECTION, SOLUTION INTRAMUSCULAR; INTRAVENOUS; SUBCUTANEOUS AS NEEDED
Status: DISCONTINUED | OUTPATIENT
Start: 2019-03-31 | End: 2019-03-31 | Stop reason: HOSPADM

## 2019-03-31 RX ORDER — SODIUM CHLORIDE 9 MG/ML
125 INJECTION, SOLUTION INTRAVENOUS CONTINUOUS
Status: DISCONTINUED | OUTPATIENT
Start: 2019-04-01 | End: 2019-04-01

## 2019-03-31 RX ORDER — FENTANYL CITRATE 50 UG/ML
INJECTION, SOLUTION INTRAMUSCULAR; INTRAVENOUS AS NEEDED
Status: DISCONTINUED | OUTPATIENT
Start: 2019-03-31 | End: 2019-03-31 | Stop reason: HOSPADM

## 2019-03-31 RX ORDER — GLYCOPYRROLATE 0.2 MG/ML
INJECTION INTRAMUSCULAR; INTRAVENOUS AS NEEDED
Status: DISCONTINUED | OUTPATIENT
Start: 2019-03-31 | End: 2019-03-31 | Stop reason: HOSPADM

## 2019-03-31 RX ORDER — SUCCINYLCHOLINE CHLORIDE 20 MG/ML
INJECTION INTRAMUSCULAR; INTRAVENOUS AS NEEDED
Status: DISCONTINUED | OUTPATIENT
Start: 2019-03-31 | End: 2019-03-31 | Stop reason: HOSPADM

## 2019-03-31 RX ORDER — PROPOFOL 10 MG/ML
INJECTION, EMULSION INTRAVENOUS AS NEEDED
Status: DISCONTINUED | OUTPATIENT
Start: 2019-03-31 | End: 2019-03-31 | Stop reason: HOSPADM

## 2019-03-31 RX ORDER — DEXAMETHASONE SODIUM PHOSPHATE 4 MG/ML
INJECTION, SOLUTION INTRA-ARTICULAR; INTRALESIONAL; INTRAMUSCULAR; INTRAVENOUS; SOFT TISSUE AS NEEDED
Status: DISCONTINUED | OUTPATIENT
Start: 2019-03-31 | End: 2019-03-31 | Stop reason: HOSPADM

## 2019-03-31 RX ORDER — SODIUM CHLORIDE, SODIUM LACTATE, POTASSIUM CHLORIDE, CALCIUM CHLORIDE 600; 310; 30; 20 MG/100ML; MG/100ML; MG/100ML; MG/100ML
INJECTION, SOLUTION INTRAVENOUS
Status: DISCONTINUED | OUTPATIENT
Start: 2019-03-31 | End: 2019-03-31 | Stop reason: HOSPADM

## 2019-03-31 RX ORDER — CEFAZOLIN SODIUM 1 G/3ML
INJECTION, POWDER, FOR SOLUTION INTRAMUSCULAR; INTRAVENOUS AS NEEDED
Status: DISCONTINUED | OUTPATIENT
Start: 2019-03-31 | End: 2019-03-31 | Stop reason: HOSPADM

## 2019-03-31 RX ORDER — SODIUM CHLORIDE, SODIUM LACTATE, POTASSIUM CHLORIDE, CALCIUM CHLORIDE 600; 310; 30; 20 MG/100ML; MG/100ML; MG/100ML; MG/100ML
75 INJECTION, SOLUTION INTRAVENOUS CONTINUOUS
Status: DISCONTINUED | OUTPATIENT
Start: 2019-03-31 | End: 2019-03-31 | Stop reason: HOSPADM

## 2019-03-31 RX ORDER — ONDANSETRON 2 MG/ML
INJECTION INTRAMUSCULAR; INTRAVENOUS AS NEEDED
Status: DISCONTINUED | OUTPATIENT
Start: 2019-03-31 | End: 2019-03-31 | Stop reason: HOSPADM

## 2019-03-31 RX ORDER — HYDROMORPHONE HYDROCHLORIDE 2 MG/ML
0.5 INJECTION, SOLUTION INTRAMUSCULAR; INTRAVENOUS; SUBCUTANEOUS
Status: COMPLETED | OUTPATIENT
Start: 2019-03-31 | End: 2019-03-31

## 2019-03-31 RX ORDER — ACETAMINOPHEN 10 MG/ML
INJECTION, SOLUTION INTRAVENOUS AS NEEDED
Status: DISCONTINUED | OUTPATIENT
Start: 2019-03-31 | End: 2019-03-31 | Stop reason: HOSPADM

## 2019-03-31 RX ORDER — BUPIVACAINE HYDROCHLORIDE AND EPINEPHRINE 2.5; 5 MG/ML; UG/ML
INJECTION, SOLUTION EPIDURAL; INFILTRATION; INTRACAUDAL; PERINEURAL AS NEEDED
Status: DISCONTINUED | OUTPATIENT
Start: 2019-03-31 | End: 2019-03-31 | Stop reason: HOSPADM

## 2019-03-31 RX ORDER — CEFAZOLIN SODIUM/WATER 2 G/20 ML
2 SYRINGE (ML) INTRAVENOUS EVERY 8 HOURS
Status: DISCONTINUED | OUTPATIENT
Start: 2019-04-01 | End: 2019-04-03

## 2019-03-31 RX ORDER — SODIUM CHLORIDE 9 MG/ML
INJECTION, SOLUTION INTRAVENOUS
Status: DISCONTINUED | OUTPATIENT
Start: 2019-03-31 | End: 2019-03-31 | Stop reason: HOSPADM

## 2019-03-31 RX ORDER — PHENYLEPHRINE HYDROCHLORIDE 10 MG/ML
INJECTION INTRAVENOUS AS NEEDED
Status: DISCONTINUED | OUTPATIENT
Start: 2019-03-31 | End: 2019-03-31 | Stop reason: HOSPADM

## 2019-03-31 RX ORDER — EPHEDRINE SULFATE 50 MG/ML
INJECTION, SOLUTION INTRAVENOUS AS NEEDED
Status: DISCONTINUED | OUTPATIENT
Start: 2019-03-31 | End: 2019-03-31 | Stop reason: HOSPADM

## 2019-03-31 RX ORDER — OXYCODONE HYDROCHLORIDE 5 MG/1
5 TABLET ORAL
Status: DISCONTINUED | OUTPATIENT
Start: 2019-03-31 | End: 2019-03-31 | Stop reason: HOSPADM

## 2019-03-31 RX ORDER — HYDROMORPHONE HYDROCHLORIDE 2 MG/ML
INJECTION, SOLUTION INTRAMUSCULAR; INTRAVENOUS; SUBCUTANEOUS AS NEEDED
Status: DISCONTINUED | OUTPATIENT
Start: 2019-03-31 | End: 2019-03-31 | Stop reason: HOSPADM

## 2019-03-31 RX ADMIN — SODIUM CHLORIDE: 9 INJECTION, SOLUTION INTRAVENOUS at 21:06

## 2019-03-31 RX ADMIN — SODIUM CHLORIDE 125 ML/HR: 900 INJECTION, SOLUTION INTRAVENOUS at 23:45

## 2019-03-31 RX ADMIN — HYDROMORPHONE HYDROCHLORIDE 1 MG: 2 INJECTION, SOLUTION INTRAMUSCULAR; INTRAVENOUS; SUBCUTANEOUS at 09:00

## 2019-03-31 RX ADMIN — SODIUM CHLORIDE 500 ML: 900 INJECTION, SOLUTION INTRAVENOUS at 17:33

## 2019-03-31 RX ADMIN — HYDROMORPHONE HYDROCHLORIDE 0.5 MG: 2 INJECTION, SOLUTION INTRAMUSCULAR; INTRAVENOUS; SUBCUTANEOUS at 22:27

## 2019-03-31 RX ADMIN — KETOROLAC TROMETHAMINE 15 MG: 15 INJECTION, SOLUTION INTRAMUSCULAR; INTRAVENOUS at 05:00

## 2019-03-31 RX ADMIN — METOCLOPRAMIDE 10 MG: 5 INJECTION, SOLUTION INTRAMUSCULAR; INTRAVENOUS at 15:52

## 2019-03-31 RX ADMIN — ROCURONIUM BROMIDE 10 MG: 10 INJECTION, SOLUTION INTRAVENOUS at 20:53

## 2019-03-31 RX ADMIN — NEOSTIGMINE METHYLSULFATE 4 MG: 1 INJECTION INTRAVENOUS at 21:50

## 2019-03-31 RX ADMIN — CEFAZOLIN SODIUM 2 G: 1 INJECTION, POWDER, FOR SOLUTION INTRAMUSCULAR; INTRAVENOUS at 20:26

## 2019-03-31 RX ADMIN — SODIUM CHLORIDE 500 ML: 900 INJECTION, SOLUTION INTRAVENOUS at 13:57

## 2019-03-31 RX ADMIN — KETOROLAC TROMETHAMINE 15 MG: 15 INJECTION, SOLUTION INTRAMUSCULAR; INTRAVENOUS at 17:02

## 2019-03-31 RX ADMIN — PROPOFOL 200 MG: 10 INJECTION, EMULSION INTRAVENOUS at 20:21

## 2019-03-31 RX ADMIN — ONDANSETRON 4 MG: 2 INJECTION INTRAMUSCULAR; INTRAVENOUS at 05:10

## 2019-03-31 RX ADMIN — PHENYLEPHRINE HYDROCHLORIDE 100 MCG: 10 INJECTION INTRAVENOUS at 20:56

## 2019-03-31 RX ADMIN — FENTANYL CITRATE 100 MCG: 50 INJECTION, SOLUTION INTRAMUSCULAR; INTRAVENOUS at 20:21

## 2019-03-31 RX ADMIN — ROCURONIUM BROMIDE 40 MG: 10 INJECTION, SOLUTION INTRAVENOUS at 20:25

## 2019-03-31 RX ADMIN — GLYCOPYRROLATE 0.6 MG: 0.2 INJECTION INTRAMUSCULAR; INTRAVENOUS at 21:51

## 2019-03-31 RX ADMIN — HYDROMORPHONE HYDROCHLORIDE 1 MG: 2 INJECTION, SOLUTION INTRAMUSCULAR; INTRAVENOUS; SUBCUTANEOUS at 12:37

## 2019-03-31 RX ADMIN — SODIUM CHLORIDE, SODIUM LACTATE, POTASSIUM CHLORIDE, CALCIUM CHLORIDE: 600; 310; 30; 20 INJECTION, SOLUTION INTRAVENOUS at 20:10

## 2019-03-31 RX ADMIN — SODIUM CHLORIDE 500 ML: 900 INJECTION, SOLUTION INTRAVENOUS at 00:45

## 2019-03-31 RX ADMIN — HYDROMORPHONE HYDROCHLORIDE 2 MG: 2 INJECTION, SOLUTION INTRAMUSCULAR; INTRAVENOUS; SUBCUTANEOUS at 05:16

## 2019-03-31 RX ADMIN — HYDROMORPHONE HYDROCHLORIDE 1.5 MG: 2 INJECTION, SOLUTION INTRAMUSCULAR; INTRAVENOUS; SUBCUTANEOUS at 15:52

## 2019-03-31 RX ADMIN — ONDANSETRON 4 MG: 2 INJECTION INTRAMUSCULAR; INTRAVENOUS at 20:26

## 2019-03-31 RX ADMIN — Medication 10 ML: at 23:45

## 2019-03-31 RX ADMIN — ONDANSETRON 4 MG: 2 INJECTION INTRAMUSCULAR; INTRAVENOUS at 09:00

## 2019-03-31 RX ADMIN — DEXTROSE MONOHYDRATE, SODIUM CHLORIDE, AND POTASSIUM CHLORIDE 100 ML/HR: 50; 4.5; 1.49 INJECTION, SOLUTION INTRAVENOUS at 09:03

## 2019-03-31 RX ADMIN — Medication 5 ML: at 05:09

## 2019-03-31 RX ADMIN — DIPHENHYDRAMINE HYDROCHLORIDE 12.5 MG: 50 INJECTION, SOLUTION INTRAMUSCULAR; INTRAVENOUS at 11:19

## 2019-03-31 RX ADMIN — ACETAMINOPHEN 1000 MG: 10 INJECTION, SOLUTION INTRAVENOUS at 21:32

## 2019-03-31 RX ADMIN — EPHEDRINE SULFATE 5 MG: 50 INJECTION, SOLUTION INTRAVENOUS at 20:58

## 2019-03-31 RX ADMIN — DEXAMETHASONE SODIUM PHOSPHATE 5 MG: 4 INJECTION, SOLUTION INTRA-ARTICULAR; INTRALESIONAL; INTRAMUSCULAR; INTRAVENOUS; SOFT TISSUE at 20:26

## 2019-03-31 RX ADMIN — ONDANSETRON 4 MG: 2 INJECTION INTRAMUSCULAR; INTRAVENOUS at 12:43

## 2019-03-31 RX ADMIN — EPHEDRINE SULFATE 5 MG: 50 INJECTION, SOLUTION INTRAVENOUS at 20:33

## 2019-03-31 RX ADMIN — HYDROMORPHONE HYDROCHLORIDE 0.4 MG: 2 INJECTION, SOLUTION INTRAMUSCULAR; INTRAVENOUS; SUBCUTANEOUS at 21:07

## 2019-03-31 RX ADMIN — KETOROLAC TROMETHAMINE 15 MG: 15 INJECTION, SOLUTION INTRAMUSCULAR; INTRAVENOUS at 10:39

## 2019-03-31 RX ADMIN — HYDROMORPHONE HYDROCHLORIDE 0.5 MG: 2 INJECTION, SOLUTION INTRAMUSCULAR; INTRAVENOUS; SUBCUTANEOUS at 22:32

## 2019-03-31 RX ADMIN — PHENYLEPHRINE HYDROCHLORIDE 100 MCG: 10 INJECTION INTRAVENOUS at 20:32

## 2019-03-31 RX ADMIN — HYDROMORPHONE HYDROCHLORIDE 0.6 MG: 2 INJECTION, SOLUTION INTRAMUSCULAR; INTRAVENOUS; SUBCUTANEOUS at 21:53

## 2019-03-31 RX ADMIN — SUCCINYLCHOLINE CHLORIDE 160 MG: 20 INJECTION INTRAMUSCULAR; INTRAVENOUS at 20:21

## 2019-03-31 NOTE — PROGRESS NOTES
Abdominal dressing saturated. Soiled dressing removed and reinforced per MD order. Abdominal binder remains in place. Pt fairly tolerated. Safety measures in place, call light within reach, door remaining open. Will continue to monitor.

## 2019-03-31 NOTE — ANESTHESIA PREPROCEDURE EVALUATION
Relevant Problems No relevant active problems Anesthetic History No history of anesthetic complications Review of Systems / Medical History Patient summary reviewed and pertinent labs reviewed Pulmonary Sleep apnea: CPAP Smoker (cigarettes and marijuana) Neuro/Psych Within defined limits Cardiovascular Hypertension Dysrhythmias : SVT Pacemaker (Icd that has never shocked her) Exercise tolerance: >4 METS Comments: Nonischemic cardiomyopathy history and ICD in 2013, cathed in 2016 with normal coronary arteries and more recent ECHO with normal LV function GI/Hepatic/Renal 
  
GERD: well controlled Endo/Other Diabetes: well controlled, type 2, using insulin Morbid obesity Other Findings Physical Exam 
 
Airway Mallampati: II 
TM Distance: 4 - 6 cm Neck ROM: normal range of motion Mouth opening: Normal 
 
 Cardiovascular Rhythm: regular Rate: normal 
 
 
 
 Dental 
 
Dentition: Caps/crowns Pulmonary Breath sounds clear to auscultation Abdominal 
 
 
 
 Other Findings Anesthetic Plan ASA: 3, emergent Anesthesia type: general 
 
 
 
 
Induction: Intravenous and RSI Anesthetic plan and risks discussed with: Patient

## 2019-03-31 NOTE — PROGRESS NOTES
Shift assessment complete. Pt alert and oriented x4. Respirations even and unlabored. Lung sounds diminished on 2L 02 via NC. HR regular. Abdomen obese, distended, rigid, tender. Bowel sounds not heard on auscultation. Abdominal dressing c-d-I. Abdominal binder in place per MD order. Pt complains of abdominal pain and nausea, will medicate. JENNIFER drain patent, charged, with 50 mL sanguinous output. Clarke catheter in place draining to gravity. Minimal analisa urine noted. NGT in place per MD order to LIS, canister at 800 mL of tan output. Trace edema noted to BLE. Bilateral SCDs in place. Jose fall total score 3. Fall risk bracelet, socks, and signs in place. Call light within reach, door remaining open. Will continue to monitor.

## 2019-03-31 NOTE — ROUTINE PROCESS
RN requested to hold therapy, pt is experiencing a failed hernia repair, currently on complete bed rest pending surgery in am 4/1/19. PT will follow up only with new Rx.  
Garry Lee, PT, TATE

## 2019-03-31 NOTE — PROGRESS NOTES
Reported to Dr. Rodrigo Samuels, pt's low uop less than 30 ml/hr, NG output 725 in last 6 hrs, resting with no distress, BP down from 149/93 to 106/47, fever 100.3, now 99.5; orders received.

## 2019-03-31 NOTE — PROGRESS NOTES
Awake with c/o left sided back pain and nausea. Scheduled Toradol given at 0500. Zofran 4 mg given IVP slowly for nausea, no emesis. Dilaudid 2 mg given IVP slowly for c/o continued pain.

## 2019-03-31 NOTE — PROGRESS NOTES
One time NS Bolus 500 mL complete. 150 mL brown, cloudy urine emptied from denis catheter. BP 98/63. Pt does not appear to be in distress. Will notify MD. Safety measures in place, call light within reach. Will continue to monitor.

## 2019-03-31 NOTE — PROGRESS NOTES
03/30/19 2121 Oxygen Therapy O2 Sat (%) 97 % Pulse via Oximetry 60 beats per minute O2 Device Nasal cannula O2 Flow Rate (L/min) 2 l/min Pt wears Home BIPAP. Left on NC 2lpm at this time. Pt has NG tube in,place. Continuous sat monitor on. O2 sat 97% and HR 60. Pt resting quietly.

## 2019-03-31 NOTE — PROGRESS NOTES
1237: Pt complains of back and abdominal pain 8/10. PRN Dilaudid 1 mg given slow, IVP per MD order. 1243: Pt complains of nausea. PRN Zofran 4 mg given slow, IVP. Safety measures in place, call light within reach, door remaining open. Will continue to monitor.

## 2019-03-31 NOTE — PROGRESS NOTES
Dr. Rodrick Reyes on floor. Informed MD of absent bowel sounds, decreased urine output, and chills. Verbal orders received to hold scheduled Lovenox, administer 500 cc NS bolus, and BNP scheduled for tomorrow morning. Will continue to monitor.

## 2019-03-31 NOTE — PROGRESS NOTES
Resting quietly, awake, resp even, unlab with O2 intact. NG intact to left nare to LIS, output brown, tubing re-taped to stabilize. Head of bed elevated. Abdom firm, distended, tender, moist abdom dsg removed, re-applying new 5 x 9 abd dsgs. JENNIFER intact, charged with sanguinous output noted, dsg clean, dry, intact. Assessed as noted. Extra blanket given for c/o feeling cold.

## 2019-03-31 NOTE — PROGRESS NOTES
Resting quietly, awake, resp even, unlab with O2 intact. Skin warm, moist. Head of bed adjusted to facilitate comfort during bedside report given to Francesca Hopkins RN.

## 2019-03-31 NOTE — PROGRESS NOTES
Continues to rest quietly, resp even, unlab with O2 intact, snoring audible.  ml bolus started. No distress noted.

## 2019-03-31 NOTE — PROGRESS NOTES
Pt complains of itching. PRN Benadryl 12.5 mg given slow, IVP. Safety measures in place, call light within reach. Will continue to monitor.

## 2019-03-31 NOTE — PROGRESS NOTES
Nutrition: 
Assessment based on LOS. Assessment: 52year old diabetic female s/p hernia repair on 3/26. After surgery was advanced to clear liquids on 3/26, then full liquids on 3/27 during which time pt experienced nausea and emesis. Diet was then down graded to clear liquids then NPO on 3/30/19 for CT. CT results showed that the repair did not hold together. NG placed yesterday for decompression, 1275ml output recorded. She remains NPO for redo surgery tomorrow. Pertinent Medications/Drips: 
D5 1/2 NS running at 100ml/hr, meeting hydration needs and providing 480kcal/ 120g CHO. On antiemetics, basal insulin held today. Anthropometrics: 
 Height : 4' 11\" (1.499 m). Weight 93.2 kg (205 lb 8 oz).; IBW (+10%)= ~105# BMI: 41.51 kg/m²; BMI classification obese class II. Macronutrient Needs: 
Estimated calorie needs  5629-2284 [place]/day; 15-18kcal/kg actual BW 
 
Estimated protein needs - 47-57gm pro/day (1-1.2 gm pro/kgIBW/day) Max CHO/day: 187.5 gm CHO/day Intake/Comparative Standards: 
Intake has not been documented since admission. Presently dextrose providing 34% of caloric needs/ 0% of protein needs. Diet: NPO (on 3/29/19) Pertinent Labs/Hemodynamic Parameters: 
3/31/19 , GFR 54 (L),  
 
Diagnosis (Nutrition): Inadequate energy intake related to NPO for hernia repair evidenced by nutrition assessment . Recommendation: if unable to advance diet to full liquids in 24-48 hours, place PICC line and consult clinical nutrition to initiate TPN. Coordination of Nutrition Care by a Nutrition Professional: collaboration with RN, and transfer of care to RD. Nutrition Discharge Plan: Too soon to determine. Monitoring/Evaluation:  
RD to Monitor clinical course,Follow-up based on standards of care.  
 
Jamie Vigil, MS MARCE LD

## 2019-03-31 NOTE — PROGRESS NOTES
Pt complains of abdominal and back pain 9/10. PRN Dilaudid 1.5 mg given slow, IVP per MD order. Pt complains of nausea. PRN Reglan 10 mg given slow, IVP. Safety measures in place, call light within reach, family at bedside. Will continue to monitor.

## 2019-03-31 NOTE — PROGRESS NOTES
Resting quietly, awake. During dsg change by Venkat Hernández RN and myself, abdom incision noted to be open, dehisced.   Call placed to Dr. Alek Govea.

## 2019-03-31 NOTE — PROGRESS NOTES
Pt complains of nausea. PRN Zofran 4 mg given slow, IVP. Pt complains of abdominal pain 8/10. PRN Dilaudid 1 mg given slow, IVP per MD order. Safety measures in place, call light within reach, door remaining open. Will continue to monitor.

## 2019-03-31 NOTE — PROGRESS NOTES
Reported to Dr. Steven Cline pt's abdom incision dehiscence. Site covered with sterile saline soaked dsgs by Alicia Ross RN and Milla Alcaraz RN. Michelle Sagastume RN Supervisor notified.

## 2019-03-31 NOTE — PROGRESS NOTES
NS bolus infusing. Total NGT output for shift: 425 mL tan fluid. Total JENNIFER output for shift: 110 mL sanguineous fluid. Total urine output: 200 mL dark analisa/brown cloudy.

## 2019-03-31 NOTE — PROGRESS NOTES
NGT to LIS per MD order. Canister full at 1000 mL tan output. Canister changed. 325 mL total NGT output since 0600. Will continue to monitor.

## 2019-03-31 NOTE — PROGRESS NOTES
[address] [facility], [address] [place], [address] PLAN: Continue n.p.o. Status Plans for surgery tomorrow to redo hernia repair 500 cc normal saline bolus Repeat CBC and BMP in the morning Hold Lovenox in case of wound dehiscence and evisceration until surgery tomorrow ASSESSMENT:  Admit Date: 3/26/2019  
5 Days Post-Op  Procedure(s): OPEN INCARCERATED INCISIONAL HERNIA REPAIR Principal Problem: 
  Incarcerated incisional hernia (3/26/2019) SUBJECTIVE: Patient is doing okay. She denies significant pain. She has had marginal urine output since yesterday. She has had 2 fluid boluses. Her creatinine is 1.3 which is slightly elevated from before. She has only had 50 cc out for today shift. She denies significant pain. She has had some low-grade temps but no significant fever. Her vital signs are stable and she remains normotensive. The NG tube is in place. I discussed the CAT scan findings with the patient today and informed her that her hernia repair had not stay together. I informed her that surgery was planned for tomorrow. OBJECTIVE: 
Constitutional: Alert oriented cooperative patient in no acute distress; appears stated age Visit Vitals /69 (BP 1 Location: Left arm, BP Patient Position: At rest) Pulse 61 Temp 97.5 °F (36.4 °C) Resp 18 Ht 4' 11\" (1.499 m) Wt 205 lb 8 oz (93.2 kg) SpO2 97% Breastfeeding? No  
BMI 41.51 kg/m² Eyes:Sclera are clear. ENMT: no external lesions gross hearing normal; no obvious neck masses, no ear or lip lesions CV: RRR. Normal perfusion Resp: No JVD. Breathing is  non-labored; no audible wheezing. GI: Abdominal binder is in place. There is no tenderness to palpation. Musculoskeletal: unremarkable with normal function. No embolic signs or cyanosis. Neuro:  Oriented; moves all 4; no focal deficits Psychiatric: normal affect and mood, no memory impairment Patient Vitals for the past 24 hrs: 
 BP Temp Pulse Resp SpO2  
03/31/19 1245  97.5 °F (36.4 °C)     
03/31/19 1211 134/69 98.1 °F (36.7 °C) 61 18 97 % 03/31/19 1035  97.9 °F (36.6 °C)     
03/31/19 0753     97 % 03/31/19 0743 112/64 98.8 °F (37.1 °C) 73 18 96 % 03/31/19 0315 109/50 99.8 °F (37.7 °C) 60 20 97 % 03/31/19 0000 106/47 99.5 °F (37.5 °C) 60 16 99 % 03/30/19 2238 119/56 98.9 °F (37.2 °C) 64 20 100 % 03/30/19 2121     97 % 03/30/19 1939 127/67 100.3 °F (37.9 °C) 64 20 98 % 03/30/19 1615 (!) 149/93 100 °F (37.8 °C) 60 20 99 % Labs:   
Recent Labs  
  03/31/19 
0554 WBC 7.0 HGB 11.2*  
*   
K 5.0  
 CO2 25 BUN 16  
CREA 1.32* GLU 94 Julia Mac, DO

## 2019-03-31 NOTE — PROGRESS NOTES
Aroused briefly, stating \"I was dreaming\". No c/o. No distress noted. NS bolus complete; D5 1/2 NS with KCL resumed.

## 2019-03-31 NOTE — PROGRESS NOTES
Cool, wet cloth to face, room temp adjusted for c/o feeling hot. Reports pain improved. Mouth swabs given, used, teeth brushed, remaining NPO. Head of bed elevated. NG remains intact to left nare to LIS, output brown. No distress noted.

## 2019-04-01 LAB
ANION GAP SERPL CALC-SCNC: 4 MMOL/L
BASOPHILS # BLD: 0 K/UL (ref 0–0.2)
BASOPHILS NFR BLD: 0 % (ref 0–2)
BNP SERPL-MCNC: 129 PG/ML
BUN SERPL-MCNC: 13 MG/DL (ref 6–23)
CALCIUM SERPL-MCNC: 8 MG/DL (ref 8.3–10.4)
CHLORIDE SERPL-SCNC: 111 MMOL/L (ref 98–107)
CO2 SERPL-SCNC: 24 MMOL/L (ref 21–32)
CREAT SERPL-MCNC: 1.12 MG/DL (ref 0.6–1)
DIFFERENTIAL METHOD BLD: ABNORMAL
EOSINOPHIL # BLD: 0.4 K/UL (ref 0–0.8)
EOSINOPHIL NFR BLD: 5 % (ref 0.5–7.8)
ERYTHROCYTE [DISTWIDTH] IN BLOOD BY AUTOMATED COUNT: 13.3 % (ref 11.9–14.6)
GLUCOSE BLD STRIP.AUTO-MCNC: 100 MG/DL (ref 65–100)
GLUCOSE BLD STRIP.AUTO-MCNC: 109 MG/DL (ref 65–100)
GLUCOSE BLD STRIP.AUTO-MCNC: 113 MG/DL (ref 65–100)
GLUCOSE BLD STRIP.AUTO-MCNC: 158 MG/DL (ref 65–100)
GLUCOSE BLD STRIP.AUTO-MCNC: 187 MG/DL (ref 65–100)
GLUCOSE BLD STRIP.AUTO-MCNC: 63 MG/DL (ref 65–100)
GLUCOSE BLD STRIP.AUTO-MCNC: 69 MG/DL (ref 65–100)
GLUCOSE BLD STRIP.AUTO-MCNC: 74 MG/DL (ref 65–100)
GLUCOSE BLD STRIP.AUTO-MCNC: 83 MG/DL (ref 65–100)
GLUCOSE SERPL-MCNC: 69 MG/DL (ref 65–100)
HCT VFR BLD AUTO: 36.3 % (ref 35.8–46.3)
HGB BLD-MCNC: 11.7 G/DL (ref 11.7–15.4)
IMM GRANULOCYTES # BLD AUTO: 0.2 K/UL (ref 0–0.5)
IMM GRANULOCYTES NFR BLD AUTO: 2 % (ref 0–5)
LYMPHOCYTES # BLD: 1 K/UL (ref 0.5–4.6)
LYMPHOCYTES NFR BLD: 12 % (ref 13–44)
MCH RBC QN AUTO: 31.2 PG (ref 26.1–32.9)
MCHC RBC AUTO-ENTMCNC: 32.2 G/DL (ref 31.4–35)
MCV RBC AUTO: 96.8 FL (ref 79.6–97.8)
MONOCYTES # BLD: 0.9 K/UL (ref 0.1–1.3)
MONOCYTES NFR BLD: 11 % (ref 4–12)
NEUTS SEG # BLD: 5.3 K/UL (ref 1.7–8.2)
NEUTS SEG NFR BLD: 69 % (ref 43–78)
NRBC # BLD: 0 K/UL (ref 0–0.2)
PLATELET # BLD AUTO: 145 K/UL (ref 150–450)
PMV BLD AUTO: 11.5 FL (ref 9.4–12.3)
POTASSIUM SERPL-SCNC: 5.3 MMOL/L (ref 3.5–5.1)
RBC # BLD AUTO: 3.75 M/UL (ref 4.05–5.2)
SODIUM SERPL-SCNC: 139 MMOL/L (ref 136–145)
WBC # BLD AUTO: 7.8 K/UL (ref 4.3–11.1)

## 2019-04-01 PROCEDURE — 74011250637 HC RX REV CODE- 250/637: Performed by: SURGERY

## 2019-04-01 PROCEDURE — 77030026884 HC HLDR DRN BLB BCRO -A

## 2019-04-01 PROCEDURE — 74011250636 HC RX REV CODE- 250/636: Performed by: SURGERY

## 2019-04-01 PROCEDURE — 74011000250 HC RX REV CODE- 250

## 2019-04-01 PROCEDURE — 85025 COMPLETE CBC W/AUTO DIFF WBC: CPT

## 2019-04-01 PROCEDURE — 77010033678 HC OXYGEN DAILY

## 2019-04-01 PROCEDURE — 94762 N-INVAS EAR/PLS OXIMTRY CONT: CPT

## 2019-04-01 PROCEDURE — 77030020263 HC SOL INJ SOD CL0.9% LFCR 1000ML

## 2019-04-01 PROCEDURE — 83880 ASSAY OF NATRIURETIC PEPTIDE: CPT

## 2019-04-01 PROCEDURE — 74011000258 HC RX REV CODE- 258: Performed by: NURSE PRACTITIONER

## 2019-04-01 PROCEDURE — 82962 GLUCOSE BLOOD TEST: CPT

## 2019-04-01 PROCEDURE — 74011000250 HC RX REV CODE- 250: Performed by: SURGERY

## 2019-04-01 PROCEDURE — 74011636637 HC RX REV CODE- 636/637: Performed by: SURGERY

## 2019-04-01 PROCEDURE — 77030020245 HC SOL INJ 5% D/0.9%NACL

## 2019-04-01 PROCEDURE — 80048 BASIC METABOLIC PNL TOTAL CA: CPT

## 2019-04-01 PROCEDURE — 65270000029 HC RM PRIVATE

## 2019-04-01 RX ORDER — DEXTROSE 50 % IN WATER (D50W) INTRAVENOUS SYRINGE
25 AS NEEDED
Status: DISCONTINUED | OUTPATIENT
Start: 2019-04-01 | End: 2019-04-26 | Stop reason: HOSPADM

## 2019-04-01 RX ORDER — DEXTROSE MONOHYDRATE AND SODIUM CHLORIDE 5; .9 G/100ML; G/100ML
100 INJECTION, SOLUTION INTRAVENOUS CONTINUOUS
Status: DISCONTINUED | OUTPATIENT
Start: 2019-04-01 | End: 2019-04-03

## 2019-04-01 RX ORDER — KETOROLAC TROMETHAMINE 15 MG/ML
15 INJECTION, SOLUTION INTRAMUSCULAR; INTRAVENOUS ONCE
Status: COMPLETED | OUTPATIENT
Start: 2019-04-01 | End: 2019-04-01

## 2019-04-01 RX ORDER — DEXTROSE 50 % IN WATER (D50W) INTRAVENOUS SYRINGE
Status: COMPLETED
Start: 2019-04-01 | End: 2019-04-01

## 2019-04-01 RX ADMIN — DEXTROSE MONOHYDRATE AND SODIUM CHLORIDE 100 ML/HR: 5; .9 INJECTION, SOLUTION INTRAVENOUS at 21:27

## 2019-04-01 RX ADMIN — ONDANSETRON 4 MG: 2 INJECTION INTRAMUSCULAR; INTRAVENOUS at 08:00

## 2019-04-01 RX ADMIN — Medication 2 G: at 10:13

## 2019-04-01 RX ADMIN — METOCLOPRAMIDE 10 MG: 5 INJECTION, SOLUTION INTRAMUSCULAR; INTRAVENOUS at 05:45

## 2019-04-01 RX ADMIN — HYDROMORPHONE HYDROCHLORIDE 2 MG: 2 INJECTION, SOLUTION INTRAMUSCULAR; INTRAVENOUS; SUBCUTANEOUS at 16:01

## 2019-04-01 RX ADMIN — ONDANSETRON 4 MG: 2 INJECTION INTRAMUSCULAR; INTRAVENOUS at 12:20

## 2019-04-01 RX ADMIN — ONDANSETRON 4 MG: 2 INJECTION INTRAMUSCULAR; INTRAVENOUS at 23:32

## 2019-04-01 RX ADMIN — HYDROMORPHONE HYDROCHLORIDE 2 MG: 2 INJECTION, SOLUTION INTRAMUSCULAR; INTRAVENOUS; SUBCUTANEOUS at 05:53

## 2019-04-01 RX ADMIN — DEXTROSE MONOHYDRATE 12.5 G: 500 INJECTION PARENTERAL at 02:49

## 2019-04-01 RX ADMIN — KETOROLAC TROMETHAMINE 15 MG: 15 INJECTION, SOLUTION INTRAMUSCULAR; INTRAVENOUS at 18:06

## 2019-04-01 RX ADMIN — PROMETHAZINE HYDROCHLORIDE 25 MG: 25 INJECTION INTRAMUSCULAR; INTRAVENOUS at 15:02

## 2019-04-01 RX ADMIN — METOCLOPRAMIDE 10 MG: 5 INJECTION, SOLUTION INTRAMUSCULAR; INTRAVENOUS at 18:11

## 2019-04-01 RX ADMIN — ONDANSETRON 4 MG: 2 INJECTION INTRAMUSCULAR; INTRAVENOUS at 02:16

## 2019-04-01 RX ADMIN — DIPHENHYDRAMINE HYDROCHLORIDE 12.5 MG: 50 INJECTION, SOLUTION INTRAMUSCULAR; INTRAVENOUS at 12:42

## 2019-04-01 RX ADMIN — ENOXAPARIN SODIUM 40 MG: 40 INJECTION SUBCUTANEOUS at 21:31

## 2019-04-01 RX ADMIN — Medication 2 G: at 02:18

## 2019-04-01 RX ADMIN — HYDROMORPHONE HYDROCHLORIDE 2 MG: 2 INJECTION, SOLUTION INTRAMUSCULAR; INTRAVENOUS; SUBCUTANEOUS at 09:16

## 2019-04-01 RX ADMIN — Medication 2 G: at 17:33

## 2019-04-01 RX ADMIN — Medication 5 ML: at 06:23

## 2019-04-01 RX ADMIN — DEXTROSE MONOHYDRATE 12.5 G: 500 INJECTION PARENTERAL at 09:42

## 2019-04-01 RX ADMIN — HYDROMORPHONE HYDROCHLORIDE 2 MG: 2 INJECTION, SOLUTION INTRAMUSCULAR; INTRAVENOUS; SUBCUTANEOUS at 19:53

## 2019-04-01 RX ADMIN — HYDROMORPHONE HYDROCHLORIDE 2 MG: 2 INJECTION, SOLUTION INTRAMUSCULAR; INTRAVENOUS; SUBCUTANEOUS at 12:20

## 2019-04-01 RX ADMIN — HYDROMORPHONE HYDROCHLORIDE 2 MG: 2 INJECTION, SOLUTION INTRAMUSCULAR; INTRAVENOUS; SUBCUTANEOUS at 23:32

## 2019-04-01 RX ADMIN — HYDROMORPHONE HYDROCHLORIDE 1 MG: 2 INJECTION, SOLUTION INTRAMUSCULAR; INTRAVENOUS; SUBCUTANEOUS at 02:08

## 2019-04-01 RX ADMIN — ONDANSETRON 4 MG: 2 INJECTION INTRAMUSCULAR; INTRAVENOUS at 19:53

## 2019-04-01 RX ADMIN — DEXTROSE MONOHYDRATE AND SODIUM CHLORIDE 100 ML/HR: 5; .9 INJECTION, SOLUTION INTRAVENOUS at 11:06

## 2019-04-01 RX ADMIN — HUMAN INSULIN 2 UNITS: 100 INJECTION, SOLUTION SUBCUTANEOUS at 17:33

## 2019-04-01 NOTE — PROGRESS NOTES
Nutrition follow-up: 
Reason for assessment: 
Assessment: Pt with abdominal wound dehiscence yesterday after initial RD visit necessitating scheduling urgently abdominal washout and closure of fascia. Initial hernia incisional repair with mesh repair 3/26/19. NGT 250ml out overnight, plan to clamp tonight noted. JENNIFER x 2. Diet order(s): NPO Food/Nutrition History:  Pt has been npo/cld majority of stay with brief period of full liquids. Overnight low blood glucose times 2 fluids changed to D5NS @100ml/hr today (NS @125ml/hr post op prior). Pt with two peripheral sites, one in each hand bilaterally. Anthropometrics: Height: 4' 11\" (149.9 cm), Weight: 93.2 kg (205 lb 8 oz), unspecified, Body mass index is 41.51 kg/m². BMI class of morbid obesity. Macronutrient needs: 93kg EER:  0265-0155 kcal /day (15-18 kcal/kg current BW) EPR:  74-93 grams protein/day (0.8-1 grams/kg current BW) Max CHO:  253 grams/day (4mg/kg IBW/min) Intake/Comparative Standards:  
Current NPO does not meet kcal or protein needs Current IVF provide 480 nonprotein kcal/day. Nutrition Diagnosis: Inadequate oral intake r/t inability to consume sufficient oral intake as evidenced by diet limited to nutritionally inadequate clear liquid diet or NPO status d/t wound dehiscence and reoperation, NPO/CLD day # 6. Intervention: 
Meals and snacks:  Await initiation and/or progression of p.o. diet as GI status allows. Nutrition supplement therapy: Ensure clear tid with clear liquids, then Glucerna tid once diet is progressed to at least full liquids. PN: If it is expected that patient will be unable to tolerate p.o. diet greater than 50% of full liquid diet or greater within 2-3 days, consider TPN. If TPN is pursued pt will require a central line, please order nutrition consult for TPN management. Coordination of Nutrition Care: Afshan Giron RN Discharge nutrition plan: Too soon to determine.  
 
Ross Enciso MA, RD, LD 
 [phone]

## 2019-04-01 NOTE — ANESTHESIA POSTPROCEDURE EVALUATION
Procedure(s): 
ABDOMINAL DEBRIDEMENT, Repair of Abdominal Wound. general 
 
Anesthesia Post Evaluation Multimodal analgesia: multimodal analgesia used between 6 hours prior to anesthesia start to PACU discharge Patient location during evaluation: bedside Patient participation: complete - patient participated Level of consciousness: awake and alert Pain score: 2 Pain management: adequate Airway patency: patent Anesthetic complications: no 
Cardiovascular status: acceptable Respiratory status: acceptable and nasal cannula Hydration status: acceptable Comments: Patient doing well. Continue care on floor. Vitals Value Taken Time /55 3/31/2019 10:36 PM  
Temp 38.1 °C (100.6 °F) 3/31/2019 10:06 PM  
Pulse 60 3/31/2019 10:36 PM  
Resp 16 3/31/2019 10:36 PM  
SpO2 97 % 3/31/2019 10:36 PM

## 2019-04-01 NOTE — PROGRESS NOTES
Pt c/o itching. Applied moisture barrier cream to itchy areas and administered 12.5 mg Benadryl IV per MD order. Will continue to monitor

## 2019-04-01 NOTE — PROGRESS NOTES
TRANSFER - IN REPORT: 
 
Verbal report received from Vasu Melgar RN on [employer]  being received from [facility]) for routine post - op Report consisted of patients Situation, Background, Assessment and  
Recommendations(SBAR). Information from the following report(s) Kardex, OR Summary, Procedure Summary and MAR was reviewed with the receiving nurse. Opportunity for questions and clarification was provided. Assessment to be completed upon patients arrival to unit and care to be assumed.

## 2019-04-01 NOTE — PROGRESS NOTES
Notified MD Jyoti Preston of pt c/o persistent pain after 2 mg Dilaudid IV @ 1601. One time order for 15 mg Toradol IV given

## 2019-04-01 NOTE — PROGRESS NOTES
Arrived from PACU via bed. Awake. Resp even, unlab with O2, skin warm, dry. NG intact to left nare to LIS, output brown. Mid abdom dsg clean, dry, intact. JPs right and left, charged, patent with serosanguinous output. Clarke intact with analisa urine noted. Rosario care given, repositioned to facilitate comfort. O2 sat 87% on 2L N/C; 92% on 5L N/C.

## 2019-04-01 NOTE — PROGRESS NOTES
Surgical findings and procedure reviewed. She endorses pain, but it feels significantly different than prior to re-operation. 2 episodes of hypoglycemia overnight- D5 added to ivf 
 
abd soft. Central tenderness as expected JPs both blood-tinged. Recent Results (from the past 12 hour(s)) GLUCOSE, POC Collection Time: 04/01/19  2:40 AM  
Result Value Ref Range Glucose (POC) 69 65 - 100 mg/dL GLUCOSE, POC Collection Time: 04/01/19  3:05 AM  
Result Value Ref Range Glucose (POC) 158 (H) 65 - 100 mg/dL METABOLIC PANEL, BASIC Collection Time: 04/01/19  6:09 AM  
Result Value Ref Range Sodium 139 136 - 145 mmol/L Potassium 5.3 (H) 3.5 - 5.1 mmol/L Chloride 111 (H) 98 - 107 mmol/L  
 CO2 24 21 - 32 mmol/L Anion gap 4 mmol/L Glucose 69 65 - 100 mg/dL BUN 13 6 - 23 MG/DL Creatinine 1.12 (H) 0.6 - 1.0 MG/DL  
 GFR est AA >60 >60 ml/min/1.73m2 GFR est non-AA 54 ml/min/1.73m2 Calcium 8.0 (L) 8.3 - 10.4 MG/DL  
BNP Collection Time: 04/01/19  6:10 AM  
Result Value Ref Range  pg/mL CBC WITH AUTOMATED DIFF Collection Time: 04/01/19  6:10 AM  
Result Value Ref Range WBC 7.8 4.3 - 11.1 K/uL  
 RBC 3.75 (L) 4.05 - 5.2 M/uL  
 HGB 11.7 11.7 - 15.4 g/dL HCT 36.3 35.8 - 46.3 % MCV 96.8 79.6 - 97.8 FL  
 MCH 31.2 26.1 - 32.9 PG  
 MCHC 32.2 31.4 - 35.0 g/dL  
 RDW 13.3 11.9 - 14.6 % PLATELET 471 (L) 589 - 450 K/uL MPV 11.5 9.4 - 12.3 FL ABSOLUTE NRBC 0.00 0.0 - 0.2 K/uL  
 DF AUTOMATED NEUTROPHILS 69 43 - 78 % LYMPHOCYTES 12 (L) 13 - 44 % MONOCYTES 11 4.0 - 12.0 % EOSINOPHILS 5 0.5 - 7.8 % BASOPHILS 0 0.0 - 2.0 % IMMATURE GRANULOCYTES 2 0.0 - 5.0 %  
 ABS. NEUTROPHILS 5.3 1.7 - 8.2 K/UL  
 ABS. LYMPHOCYTES 1.0 0.5 - 4.6 K/UL  
 ABS. MONOCYTES 0.9 0.1 - 1.3 K/UL  
 ABS. EOSINOPHILS 0.4 0.0 - 0.8 K/UL  
 ABS. BASOPHILS 0.0 0.0 - 0.2 K/UL  
 ABS. IMM. GRANS. 0.2 0.0 - 0.5 K/UL GLUCOSE, POC  Collection Time: 04/01/19  7:34 AM  
 Result Value Ref Range Glucose (POC) 74 65 - 100 mg/dL GLUCOSE, POC Collection Time: 04/01/19  9:39 AM  
Result Value Ref Range Glucose (POC) 63 (L) 65 - 100 mg/dL GLUCOSE, POC Collection Time: 04/01/19 10:12 AM  
Result Value Ref Range Glucose (POC) 113 (H) 65 - 100 mg/dL Plan- 
Start to mobilize Clamp NGT overnight

## 2019-04-01 NOTE — PROGRESS NOTES
Pt asked RN to re-take blood sugar due to shakiness. BS of 63 taken, D50W syringe 25 mL administered IVP. Will recheck BS in 15 minutes

## 2019-04-01 NOTE — PROGRESS NOTES
3-person assist patient to recliner. Pt de-satted from 73-96% during process. Pt reminded to breathe through pain and movement. O2 increased to 4L via NC. Notified respiratory of saturations, RT to send new probe to ensure accurate readings.  Pt is currently resting quietly in chair on her phone at SaO2 of 90% on 4L

## 2019-04-01 NOTE — PERIOP NOTES
TRANSFER - OUT REPORT: 
 
Verbal report given to [address] [place] [address] on Idris Weiss  being transferred to [facility] for routine progression of care Report consisted of patients Situation, Background, Assessment and  
Recommendations(SBAR). Information from the following report(s) SBAR was reviewed with the receiving nurse. Lines:  
Peripheral IV 03/30/19 Right Hand (Active) Site Assessment Clean, dry, & intact 3/31/2019  9:00 AM  
Phlebitis Assessment 0 3/31/2019  9:00 AM  
Infiltration Assessment 0 3/31/2019  9:00 AM  
Dressing Status Clean, dry, & intact 3/31/2019  9:00 AM  
Dressing Type Tape;Transparent 3/31/2019  9:00 AM  
Hub Color/Line Status Infusing 3/31/2019  9:00 AM  
   
Peripheral IV 03/31/19 Left Hand (Active) Site Assessment Clean, dry, & intact 3/31/2019 10:00 PM  
Phlebitis Assessment 0 3/31/2019 10:00 PM  
Infiltration Assessment 0 3/31/2019 10:00 PM  
Dressing Status Clean, dry, & intact 3/31/2019 10:00 PM  
Dressing Type Tape;Transparent 3/31/2019 10:00 PM  
  
 
Opportunity for questions and clarification was provided. Patient transported with: 
 O2 @ 2 liters

## 2019-04-01 NOTE — PROGRESS NOTES
Resting quietly, arousing easily with no c/o during bedside report given to Rebel Clay RN. No distress.

## 2019-04-01 NOTE — PROGRESS NOTES
Resting quietly, awake with no c/o. Resp even, unlab with O2 intact. Family at bedside. Waiting for transfer to OR.

## 2019-04-01 NOTE — PROGRESS NOTES
04/01/19 0818 Oxygen Therapy O2 Sat (%) 95 % Pulse via Oximetry 72 beats per minute O2 Device Nasal cannula O2 Flow Rate (L/min) 4 l/min 
(weaned to 2 ) Patient nauseated at this time and unable to do IS. Continuous sat at bedside for pain management.

## 2019-04-01 NOTE — PROGRESS NOTES
Notified DELFINA Esparza from general surgery of patient's hypoglycemic episodes. PA to put in orders

## 2019-04-01 NOTE — PROGRESS NOTES
Shift assessment completed. Pt alert and oriented x4. Lungs CTA with even and unlabored respirations, on 4L NC. Heart sounds regular. Active bowel sounds with semi-soft, distended and tender abdomen. Skin warm and dry. NGT to left nare to low intermittent suction with brown output. Midline abdominal incision c/d/i. JENNIFER drain sites c/d/i, charged, and patent. Denis catheter care completed and denis draining. IV c/d and infusing. Pt reports pain, however not time for pain medication. Pt states she can wait but will call if needed sooner. No other needs at this time. Bed locked in lowest position with call light in reach and no other needs at this time.

## 2019-04-01 NOTE — PROGRESS NOTES
Pt c/o 8/10 abdominal pain and nausea. Administered 1 mg Dilaudid and 4 mg Zofran IV per MD order. Will continue to monitor

## 2019-04-01 NOTE — PROGRESS NOTES
Received call from on call MD regarding being informed of wound dehiscence. Pt scheduled urgently for abdominal washout and closure of fascia. Failure of underlay mesh repair last week documented on CT 3/30, with tentative plan for repair tomorrow after reviewing images and clinical condition with on-call surgeon yesterday. However, wound dehiscence this evening renders urgent repair imperative. Discussed need for repair and conversion from underlay to onlay mesh with pt prior to her going into the OR. She expressed understanding and desire to proceed.

## 2019-04-01 NOTE — OP NOTES
Operative Report    Patient: Florentin Bang MRN: [id number]      YOB: 1965  Age: 52 y.o. Sex: female       Date of Surgery: 3/31/2019     Preoperative Diagnosis: Dehiscense of Abdominal wound     Postoperative Diagnosis: Dehiscense of Abdominal wound with hernia recurrence    Procedure: Incisional hernia repair with mesh    Anesthesia: General     Complications: none    Indications:  As outlined in History and Physical.    Procedure Details   Informed consent was urgently obtained and the patient was brought to the operating room and placed on the table in a supine position. After successful induction of anesthesia, the saline soaked gauze cover of the eviscerated omentum was removed, staples were removed, then the abdomen was prepped and draped in the standard fashion. The exposed viscera, almost entirely omentum with a few short small bowel segments, was washed then returned to the peritoneal cavity. The previously placed mesh underlay patch was identified still affixed to the left half of the fascia but completely  from the right half. In addition, all of the prolene fascial sutures had been ruptured- none were found intact as though pulled through the fascia. The mesh was pulled from its fixation to the left half of the abdominal wound then both sides of the anterior wall were examined to be sure all tacks from the [address] [place] [address] were removed. The fascia appeared to be in good health, at least as compared to the initial procedure 5 days earlier, with no evidence of ischemia, infection, or necrosis. It was thus closed in a running fashion with #1 prolene suture. A polypropylene onlay patch was placed using a 15cm square placed in a quincy configuration, which gave 4-5cm overlap at the upper and lower poles. The lateral apices were trimmed to allow a good fit over the previously cleared fascia where subcutaneous fat had been elevated.   Each quadrant was then sutured with running sutures of 0-prolene from apex to apex. The absorbatack device was then used to placed tacks within the body of the mesh to improve contact with the fascia where the natural convolutions produced gaps. The subcutaneous space was copiously irrigated. A second drain was placed so that one could be directed along each lateral aspect of the subcutaneous space. Again the area was irrigated and confirmed to be hemostatic and the fascia was infiltrated with local.  The wound was then closed with  2-0 vicryl in the deep space,  3-0 Vicryl  in the superficial subcutaneous layers and the skin with staples. A gauze dressing was applied. The patient tolerated the procedure well and was awakened from anesthesia and taken to recovery in satisfactory condition. Sponge, needle, and instrument counts were correct as reported to me.     Vero Valentin MD  March 31, 2019

## 2019-04-01 NOTE — PROGRESS NOTES
TRANSFER - OUT REPORT: 
 
Verbal report given to Rosales Solomon RN on Alethea Antoine  being transferred to OR(unit) for urgent transfer Report consisted of patients Situation, Background, Assessment and  
Recommendations(SBAR). Information from the following report(s) SBAR, Kardex, Intake/Output, MAR and Recent Results was reviewed with the receiving nurse. Lines:  
Peripheral IV 03/30/19 Right Hand (Active) Site Assessment Clean, dry, & intact 3/31/2019  9:00 AM  
Phlebitis Assessment 0 3/31/2019  9:00 AM  
Infiltration Assessment 0 3/31/2019  9:00 AM  
Dressing Status Clean, dry, & intact 3/31/2019  9:00 AM  
Dressing Type Tape;Transparent 3/31/2019  9:00 AM  
Hub Color/Line Status Infusing 3/31/2019  9:00 AM  
  
 
Opportunity for questions and clarification was provided. Patient transported with: 
 O2 @ 3 liters, NG tube.

## 2019-04-01 NOTE — PROGRESS NOTES
Dozing at intervals. C/o uncomfortable bed, repositioned with pillows to support. Head of bed remains elevated.

## 2019-04-01 NOTE — PROGRESS NOTES
Minimal urine output noted. 50 mL total urine output since start of shift. Urine dark analisa in color.  Will notify MD.

## 2019-04-02 ENCOUNTER — PATIENT OUTREACH (OUTPATIENT)
Dept: CASE MANAGEMENT | Age: 54
End: 2019-04-02

## 2019-04-02 LAB
APPEARANCE UR: ABNORMAL
BACTERIA URNS QL MICRO: ABNORMAL /HPF
BILIRUB UR QL: ABNORMAL
CASTS URNS QL MICRO: ABNORMAL /LPF
COLOR UR: ABNORMAL
GLUCOSE BLD STRIP.AUTO-MCNC: 124 MG/DL (ref 65–100)
GLUCOSE BLD STRIP.AUTO-MCNC: 127 MG/DL (ref 65–100)
GLUCOSE BLD STRIP.AUTO-MCNC: 135 MG/DL (ref 65–100)
GLUCOSE BLD STRIP.AUTO-MCNC: 137 MG/DL (ref 65–100)
GLUCOSE BLD STRIP.AUTO-MCNC: 291 MG/DL (ref 65–100)
GLUCOSE UR STRIP.AUTO-MCNC: NEGATIVE MG/DL
HGB UR QL STRIP: ABNORMAL
KETONES UR QL STRIP.AUTO: 15 MG/DL
LEUKOCYTE ESTERASE UR QL STRIP.AUTO: ABNORMAL
NITRITE UR QL STRIP.AUTO: POSITIVE
OTHER OBSERVATIONS,UCOM: ABNORMAL
PH UR STRIP: 6 [PH] (ref 5–9)
PROT UR STRIP-MCNC: 100 MG/DL
RBC #/AREA URNS HPF: >100 /HPF
SP GR UR REFRACTOMETRY: 1.03 (ref 1–1.02)
UROBILINOGEN UR QL STRIP.AUTO: 2 EU/DL (ref 0.2–1)
WBC URNS QL MICRO: ABNORMAL /HPF

## 2019-04-02 PROCEDURE — 87088 URINE BACTERIA CULTURE: CPT

## 2019-04-02 PROCEDURE — 77030021668 HC NEB PREFIL KT VYRM -A

## 2019-04-02 PROCEDURE — 82962 GLUCOSE BLOOD TEST: CPT

## 2019-04-02 PROCEDURE — 74011250636 HC RX REV CODE- 250/636: Performed by: SURGERY

## 2019-04-02 PROCEDURE — 65270000029 HC RM PRIVATE

## 2019-04-02 PROCEDURE — 94762 N-INVAS EAR/PLS OXIMTRY CONT: CPT

## 2019-04-02 PROCEDURE — 87086 URINE CULTURE/COLONY COUNT: CPT

## 2019-04-02 PROCEDURE — 74011636637 HC RX REV CODE- 636/637: Performed by: SURGERY

## 2019-04-02 PROCEDURE — 87186 SC STD MICRODIL/AGAR DIL: CPT

## 2019-04-02 PROCEDURE — 81001 URINALYSIS AUTO W/SCOPE: CPT

## 2019-04-02 PROCEDURE — 77010033678 HC OXYGEN DAILY

## 2019-04-02 PROCEDURE — 74011000258 HC RX REV CODE- 258: Performed by: NURSE PRACTITIONER

## 2019-04-02 RX ADMIN — HYDROMORPHONE HYDROCHLORIDE 1 MG: 2 INJECTION, SOLUTION INTRAMUSCULAR; INTRAVENOUS; SUBCUTANEOUS at 20:06

## 2019-04-02 RX ADMIN — ENOXAPARIN SODIUM 40 MG: 40 INJECTION SUBCUTANEOUS at 09:12

## 2019-04-02 RX ADMIN — HUMAN INSULIN 6 UNITS: 100 INJECTION, SOLUTION SUBCUTANEOUS at 11:30

## 2019-04-02 RX ADMIN — ONDANSETRON 4 MG: 2 INJECTION INTRAMUSCULAR; INTRAVENOUS at 08:14

## 2019-04-02 RX ADMIN — HYDROMORPHONE HYDROCHLORIDE 2 MG: 2 INJECTION, SOLUTION INTRAMUSCULAR; INTRAVENOUS; SUBCUTANEOUS at 16:14

## 2019-04-02 RX ADMIN — Medication 10 ML: at 05:37

## 2019-04-02 RX ADMIN — ONDANSETRON 4 MG: 2 INJECTION INTRAMUSCULAR; INTRAVENOUS at 20:07

## 2019-04-02 RX ADMIN — DIPHENHYDRAMINE HYDROCHLORIDE 12.5 MG: 50 INJECTION, SOLUTION INTRAMUSCULAR; INTRAVENOUS at 21:48

## 2019-04-02 RX ADMIN — HYDROMORPHONE HYDROCHLORIDE 2 MG: 2 INJECTION, SOLUTION INTRAMUSCULAR; INTRAVENOUS; SUBCUTANEOUS at 07:57

## 2019-04-02 RX ADMIN — DIPHENHYDRAMINE HYDROCHLORIDE 12.5 MG: 50 INJECTION, SOLUTION INTRAMUSCULAR; INTRAVENOUS at 04:20

## 2019-04-02 RX ADMIN — HYDROMORPHONE HYDROCHLORIDE 2 MG: 2 INJECTION, SOLUTION INTRAMUSCULAR; INTRAVENOUS; SUBCUTANEOUS at 03:19

## 2019-04-02 RX ADMIN — ONDANSETRON 4 MG: 2 INJECTION INTRAMUSCULAR; INTRAVENOUS at 16:24

## 2019-04-02 RX ADMIN — ENOXAPARIN SODIUM 40 MG: 40 INJECTION SUBCUTANEOUS at 21:48

## 2019-04-02 RX ADMIN — Medication 2 G: at 17:22

## 2019-04-02 RX ADMIN — Medication 2 G: at 03:19

## 2019-04-02 RX ADMIN — HYDROMORPHONE HYDROCHLORIDE 2 MG: 2 INJECTION, SOLUTION INTRAMUSCULAR; INTRAVENOUS; SUBCUTANEOUS at 12:04

## 2019-04-02 RX ADMIN — DEXTROSE MONOHYDRATE AND SODIUM CHLORIDE 100 ML/HR: 5; .9 INJECTION, SOLUTION INTRAVENOUS at 05:38

## 2019-04-02 RX ADMIN — Medication 2 G: at 08:04

## 2019-04-02 NOTE — PROGRESS NOTES
Pt stated that when I hooked her NGT back to intermittent suction pt states she felt relief from pressure and no more nausea. Pt is now in the chair.

## 2019-04-02 NOTE — PROGRESS NOTES
Patient ambulated from recliner to bed with staff assistance. Resting in bed. Respirations present, non-labored. 4 LPM oxygen via NC, continuous pulse ox in place. NGT with LIS - green output. Midline dressing to abdomen with binder in place, JENNIFER charged and draining. Clarke draining at bedside. PIV infusing w/o difficulty. C/O of pain, see EMAR. Will continue to monitor.

## 2019-04-02 NOTE — PROGRESS NOTES
Pt c/o nausea administered zofran 4 mg Iv per MD order, MD notified of nausea received orders to hold PO medications at this time and hook NGT back to intermittent suction. Will continue to monitor.

## 2019-04-02 NOTE — PROGRESS NOTES
Pt assessment completed. Alert and oriented. Lungs diminished bilaterally. Heart sounds regular. Abdomen soft and tender with active bowel sounds. IV present and infusing without difficulty. NGT present and clamped. Clarke present and draining brown cloudy drainage. Midline incision present to abdomen with ABD binder. 2 JENNIFER drains present and draining. Pt c/o pain and nausea, medications administered. All needs met, will continue to monitor.

## 2019-04-02 NOTE — PROGRESS NOTES
Had nausea earlier, NGT re-connected. She reports this wasn't really nausea but \"pressure\" and she got scared and so reported it to nurse. She currently denies nausea. C/o sore throat. Still quite sore, but insists she is better than prior to re-operation. No flatus. Visit Vitals /68 (BP 1 Location: Left arm, BP Patient Position: At rest;Supine) Pulse 85 Temp 99.4 °F (37.4 °C) Resp 16 Ht 4' 11\" (1.499 m) Wt 205 lb 8 oz (93.2 kg) SpO2 93% Breastfeeding? No  
BMI 41.51 kg/m² In NAD Lungs-clear apices CV-reg 
abd-no bowel sounds appreciated. JPs clearing. Tender centrally, consistent with mesh distribution. Urine in denis is dark brown, somewhat cloudy. Plan- 
reclamp NGT overnight tonight Check urine- she's on empiric Ancef after dehiscence (urine doesn't look like cephalosporin) Recheck lytes tomorrow.

## 2019-04-03 ENCOUNTER — APPOINTMENT (OUTPATIENT)
Dept: GENERAL RADIOLOGY | Age: 54
DRG: 353 | End: 2019-04-03
Attending: HOSPITALIST
Payer: MEDICARE

## 2019-04-03 ENCOUNTER — APPOINTMENT (OUTPATIENT)
Dept: CT IMAGING | Age: 54
DRG: 353 | End: 2019-04-03
Attending: INTERNAL MEDICINE
Payer: MEDICARE

## 2019-04-03 ENCOUNTER — APPOINTMENT (OUTPATIENT)
Dept: GENERAL RADIOLOGY | Age: 54
DRG: 353 | End: 2019-04-03
Attending: INTERNAL MEDICINE
Payer: MEDICARE

## 2019-04-03 PROBLEM — J80 ARDS (ADULT RESPIRATORY DISTRESS SYNDROME) (HCC): Status: ACTIVE | Noted: 2019-04-03

## 2019-04-03 PROBLEM — J96.01 ACUTE HYPOXEMIC RESPIRATORY FAILURE (HCC): Status: ACTIVE | Noted: 2019-04-03

## 2019-04-03 LAB
ALBUMIN SERPL-MCNC: 2 G/DL (ref 3.5–5)
ALBUMIN/GLOB SERPL: 0.5 {RATIO}
ALP SERPL-CCNC: 92 U/L (ref 50–130)
ALT SERPL-CCNC: 63 U/L (ref 12–65)
ANION GAP SERPL CALC-SCNC: 5 MMOL/L
ARTERIAL PATENCY WRIST A: YES
AST SERPL-CCNC: 131 U/L (ref 15–37)
BASE DEFICIT BLD-SCNC: 5 MMOL/L
BASOPHILS # BLD: 0 K/UL (ref 0–0.2)
BASOPHILS NFR BLD: 0 % (ref 0–2)
BDY SITE: ABNORMAL
BILIRUB DIRECT SERPL-MCNC: 0.2 MG/DL
BILIRUB SERPL-MCNC: 0.5 MG/DL (ref 0.2–1.1)
BNP SERPL-MCNC: 519 PG/ML
BODY TEMPERATURE: 98.6
BUN SERPL-MCNC: 13 MG/DL (ref 6–23)
CA-I BLD-MCNC: 1.26 MMOL/L (ref 1.12–1.32)
CALCIUM SERPL-MCNC: 8.5 MG/DL (ref 8.3–10.4)
CHLORIDE SERPL-SCNC: 110 MMOL/L (ref 98–107)
CO2 SERPL-SCNC: 24 MMOL/L (ref 21–32)
CREAT SERPL-MCNC: 1.09 MG/DL (ref 0.6–1)
DIFFERENTIAL METHOD BLD: ABNORMAL
EOSINOPHIL # BLD: 0.7 K/UL (ref 0–0.8)
EOSINOPHIL NFR BLD: 4 % (ref 0.5–7.8)
ERYTHROCYTE [DISTWIDTH] IN BLOOD BY AUTOMATED COUNT: 13.7 % (ref 11.9–14.6)
GAS FLOW.O2 O2 DELIVERY SYS: ABNORMAL L/MIN
GLOBULIN SER CALC-MCNC: 4.1 G/DL (ref 2.3–3.5)
GLUCOSE BLD STRIP.AUTO-MCNC: 143 MG/DL (ref 65–100)
GLUCOSE BLD STRIP.AUTO-MCNC: 143 MG/DL (ref 65–100)
GLUCOSE BLD STRIP.AUTO-MCNC: 166 MG/DL (ref 65–100)
GLUCOSE BLD STRIP.AUTO-MCNC: 225 MG/DL (ref 65–100)
GLUCOSE SERPL-MCNC: 146 MG/DL (ref 65–100)
HCO3 BLD-SCNC: 19.8 MMOL/L (ref 22–26)
HCT VFR BLD AUTO: 31.5 % (ref 35.8–46.3)
HGB BLD-MCNC: 10.2 G/DL (ref 11.7–15.4)
IMM GRANULOCYTES # BLD AUTO: 0.7 K/UL (ref 0–0.5)
IMM GRANULOCYTES NFR BLD AUTO: 4 % (ref 0–5)
LACTATE SERPL-SCNC: 1.3 MMOL/L (ref 0.4–2)
LYMPHOCYTES # BLD: 2.1 K/UL (ref 0.5–4.6)
LYMPHOCYTES NFR BLD: 12 % (ref 13–44)
MCH RBC QN AUTO: 31.8 PG (ref 26.1–32.9)
MCHC RBC AUTO-ENTMCNC: 32.4 G/DL (ref 31.4–35)
MCV RBC AUTO: 98.1 FL (ref 79.6–97.8)
MONOCYTES # BLD: 1.9 K/UL (ref 0.1–1.3)
MONOCYTES NFR BLD: 11 % (ref 4–12)
NEUTS SEG # BLD: 11.8 K/UL (ref 1.7–8.2)
NEUTS SEG NFR BLD: 69 % (ref 43–78)
NRBC # BLD: 0.02 K/UL (ref 0–0.2)
O2/TOTAL GAS SETTING VFR VENT: 100 %
PCO2 BLD: 33 MMHG (ref 35–45)
PH BLD: 7.38 [PH] (ref 7.35–7.45)
PLATELET # BLD AUTO: 187 K/UL (ref 150–450)
PLATELET COMMENTS,PCOM: ADEQUATE
PMV BLD AUTO: 11.3 FL (ref 9.4–12.3)
PO2 BLD: 67 MMHG (ref 75–100)
POTASSIUM BLD-SCNC: 4.2 MMOL/L (ref 3.5–5.1)
POTASSIUM SERPL-SCNC: 4.8 MMOL/L (ref 3.5–5.1)
PROCALCITONIN SERPL-MCNC: 0.6 NG/ML
PROT SERPL-MCNC: 6.1 G/DL
RBC # BLD AUTO: 3.21 M/UL (ref 4.05–5.2)
RBC MORPH BLD: ABNORMAL
SAO2 % BLD: 93 % (ref 95–98)
SERVICE CMNT-IMP: ABNORMAL
SODIUM BLD-SCNC: 138 MMOL/L (ref 136–145)
SODIUM SERPL-SCNC: 139 MMOL/L (ref 136–145)
SPECIMEN TYPE: ABNORMAL
WBC # BLD AUTO: 17.2 K/UL (ref 4.3–11.1)
WBC MORPH BLD: ABNORMAL

## 2019-04-03 PROCEDURE — 94002 VENT MGMT INPAT INIT DAY: CPT

## 2019-04-03 PROCEDURE — 77010033678 HC OXYGEN DAILY

## 2019-04-03 PROCEDURE — 65610000001 HC ROOM ICU GENERAL

## 2019-04-03 PROCEDURE — 36415 COLL VENOUS BLD VENIPUNCTURE: CPT

## 2019-04-03 PROCEDURE — 83605 ASSAY OF LACTIC ACID: CPT

## 2019-04-03 PROCEDURE — 99291 CRITICAL CARE FIRST HOUR: CPT | Performed by: INTERNAL MEDICINE

## 2019-04-03 PROCEDURE — 80076 HEPATIC FUNCTION PANEL: CPT

## 2019-04-03 PROCEDURE — 94660 CPAP INITIATION&MGMT: CPT

## 2019-04-03 PROCEDURE — 84145 PROCALCITONIN (PCT): CPT

## 2019-04-03 PROCEDURE — 86580 TB INTRADERMAL TEST: CPT | Performed by: SURGERY

## 2019-04-03 PROCEDURE — 31500 INSERT EMERGENCY AIRWAY: CPT | Performed by: INTERNAL MEDICINE

## 2019-04-03 PROCEDURE — 36600 WITHDRAWAL OF ARTERIAL BLOOD: CPT

## 2019-04-03 PROCEDURE — 5A1945Z RESPIRATORY VENTILATION, 24-96 CONSECUTIVE HOURS: ICD-10-PCS | Performed by: INTERNAL MEDICINE

## 2019-04-03 PROCEDURE — 74011000302 HC RX REV CODE- 302: Performed by: SURGERY

## 2019-04-03 PROCEDURE — 77030021668 HC NEB PREFIL KT VYRM -A

## 2019-04-03 PROCEDURE — 85025 COMPLETE CBC W/AUTO DIFF WBC: CPT

## 2019-04-03 PROCEDURE — 74011636637 HC RX REV CODE- 636/637: Performed by: INTERNAL MEDICINE

## 2019-04-03 PROCEDURE — 74011250636 HC RX REV CODE- 250/636: Performed by: SURGERY

## 2019-04-03 PROCEDURE — 80048 BASIC METABOLIC PNL TOTAL CA: CPT

## 2019-04-03 PROCEDURE — 77030020250 HC SOL INJ D 5% LFCR 1000ML BG LF

## 2019-04-03 PROCEDURE — P9047 ALBUMIN (HUMAN), 25%, 50ML: HCPCS | Performed by: INTERNAL MEDICINE

## 2019-04-03 PROCEDURE — 74011636637 HC RX REV CODE- 636/637: Performed by: SURGERY

## 2019-04-03 PROCEDURE — 74011000258 HC RX REV CODE- 258: Performed by: INTERNAL MEDICINE

## 2019-04-03 PROCEDURE — 82803 BLOOD GASES ANY COMBINATION: CPT

## 2019-04-03 PROCEDURE — 0BH17EZ INSERTION OF ENDOTRACHEAL AIRWAY INTO TRACHEA, VIA NATURAL OR ARTIFICIAL OPENING: ICD-10-PCS | Performed by: INTERNAL MEDICINE

## 2019-04-03 PROCEDURE — 74011250636 HC RX REV CODE- 250/636

## 2019-04-03 PROCEDURE — 94762 N-INVAS EAR/PLS OXIMTRY CONT: CPT

## 2019-04-03 PROCEDURE — 82962 GLUCOSE BLOOD TEST: CPT

## 2019-04-03 PROCEDURE — 77030020245 HC SOL INJ 5% D/0.9%NACL

## 2019-04-03 PROCEDURE — 74011250636 HC RX REV CODE- 250/636: Performed by: INTERNAL MEDICINE

## 2019-04-03 PROCEDURE — C1751 CATH, INF, PER/CENT/MIDLINE: HCPCS

## 2019-04-03 PROCEDURE — 74011250637 HC RX REV CODE- 250/637: Performed by: SURGERY

## 2019-04-03 PROCEDURE — 82947 ASSAY GLUCOSE BLOOD QUANT: CPT

## 2019-04-03 PROCEDURE — 93005 ELECTROCARDIOGRAM TRACING: CPT | Performed by: HOSPITALIST

## 2019-04-03 PROCEDURE — 77030031476 HC EXCH HEAT MOISTW FLTR HALY -A

## 2019-04-03 PROCEDURE — 83880 ASSAY OF NATRIURETIC PEPTIDE: CPT

## 2019-04-03 PROCEDURE — 77030015718 HC BLD LRYGSC MAC SIMS -A

## 2019-04-03 PROCEDURE — 71045 X-RAY EXAM CHEST 1 VIEW: CPT

## 2019-04-03 PROCEDURE — 74011000250 HC RX REV CODE- 250

## 2019-04-03 PROCEDURE — 87040 BLOOD CULTURE FOR BACTERIA: CPT

## 2019-04-03 PROCEDURE — 36556 INSERT NON-TUNNEL CV CATH: CPT | Performed by: INTERNAL MEDICINE

## 2019-04-03 PROCEDURE — 77030013032 HC MSK BPAP/CPAP FISP -B

## 2019-04-03 PROCEDURE — 02HV33Z INSERTION OF INFUSION DEVICE INTO SUPERIOR VENA CAVA, PERCUTANEOUS APPROACH: ICD-10-PCS | Performed by: INTERNAL MEDICINE

## 2019-04-03 PROCEDURE — 74011000250 HC RX REV CODE- 250: Performed by: INTERNAL MEDICINE

## 2019-04-03 RX ORDER — DEXTROSE MONOHYDRATE AND SODIUM CHLORIDE 5; .9 G/100ML; G/100ML
100 INJECTION, SOLUTION INTRAVENOUS CONTINUOUS
Status: DISCONTINUED | OUTPATIENT
Start: 2019-04-03 | End: 2019-04-03

## 2019-04-03 RX ORDER — SUCCINYLCHOLINE CHLORIDE 20 MG/ML INJECTION SOLUTION
SOLUTION
Status: COMPLETED
Start: 2019-04-03 | End: 2019-04-03

## 2019-04-03 RX ORDER — SUCCINYLCHOLINE CHLORIDE 20 MG/ML INJECTION SOLUTION
100 SOLUTION
Status: COMPLETED | OUTPATIENT
Start: 2019-04-03 | End: 2019-04-03

## 2019-04-03 RX ORDER — VANCOMYCIN/0.9 % SOD CHLORIDE 1.5G/250ML
1500 PLASTIC BAG, INJECTION (ML) INTRAVENOUS EVERY 12 HOURS
Status: DISCONTINUED | OUTPATIENT
Start: 2019-04-03 | End: 2019-04-05

## 2019-04-03 RX ORDER — SODIUM CHLORIDE 0.9 % (FLUSH) 0.9 %
10 SYRINGE (ML) INJECTION
Status: ACTIVE | OUTPATIENT
Start: 2019-04-03 | End: 2019-04-03

## 2019-04-03 RX ORDER — NOREPINEPHRINE BITARTRATE/D5W 4MG/250ML
0-16 PLASTIC BAG, INJECTION (ML) INTRAVENOUS
Status: DISCONTINUED | OUTPATIENT
Start: 2019-04-03 | End: 2019-04-06

## 2019-04-03 RX ORDER — MIDAZOLAM HYDROCHLORIDE 1 MG/ML
4 INJECTION, SOLUTION INTRAMUSCULAR; INTRAVENOUS ONCE
Status: COMPLETED | OUTPATIENT
Start: 2019-04-03 | End: 2019-04-03

## 2019-04-03 RX ORDER — VANCOMYCIN 2 GRAM/500 ML IN 0.9 % SODIUM CHLORIDE INTRAVENOUS
2000 ONCE
Status: COMPLETED | OUTPATIENT
Start: 2019-04-03 | End: 2019-04-03

## 2019-04-03 RX ORDER — AMIODARONE HYDROCHLORIDE 200 MG/1
200 TABLET ORAL DAILY
Status: DISCONTINUED | OUTPATIENT
Start: 2019-04-04 | End: 2019-04-26 | Stop reason: HOSPADM

## 2019-04-03 RX ORDER — FENTANYL CITRATE-0.9 % NACL/PF 25 MCG/ML
0-200 PLASTIC BAG, INJECTION (ML) INJECTION
Status: DISCONTINUED | OUTPATIENT
Start: 2019-04-03 | End: 2019-04-06

## 2019-04-03 RX ORDER — MIDAZOLAM HYDROCHLORIDE 1 MG/ML
INJECTION, SOLUTION INTRAMUSCULAR; INTRAVENOUS
Status: COMPLETED
Start: 2019-04-03 | End: 2019-04-03

## 2019-04-03 RX ORDER — ETOMIDATE 2 MG/ML
INJECTION INTRAVENOUS
Status: COMPLETED
Start: 2019-04-03 | End: 2019-04-03

## 2019-04-03 RX ORDER — FENTANYL CITRATE 50 UG/ML
100 INJECTION, SOLUTION INTRAMUSCULAR; INTRAVENOUS ONCE
Status: COMPLETED | OUTPATIENT
Start: 2019-04-03 | End: 2019-04-03

## 2019-04-03 RX ORDER — FENTANYL CITRATE-0.9 % NACL/PF 25 MCG/ML
0-200 PLASTIC BAG, INJECTION (ML) INJECTION
Status: DISCONTINUED | OUTPATIENT
Start: 2019-04-03 | End: 2019-04-03 | Stop reason: SDUPTHER

## 2019-04-03 RX ORDER — ETOMIDATE 2 MG/ML
20 INJECTION INTRAVENOUS ONCE
Status: COMPLETED | OUTPATIENT
Start: 2019-04-03 | End: 2019-04-03

## 2019-04-03 RX ORDER — DEXTROSE MONOHYDRATE 50 MG/ML
25 INJECTION, SOLUTION INTRAVENOUS CONTINUOUS
Status: DISCONTINUED | OUTPATIENT
Start: 2019-04-03 | End: 2019-04-06

## 2019-04-03 RX ORDER — ACETAMINOPHEN 325 MG/1
650 TABLET ORAL
Status: DISCONTINUED | OUTPATIENT
Start: 2019-04-03 | End: 2019-04-26 | Stop reason: HOSPADM

## 2019-04-03 RX ORDER — FUROSEMIDE 10 MG/ML
40 INJECTION INTRAMUSCULAR; INTRAVENOUS ONCE
Status: COMPLETED | OUTPATIENT
Start: 2019-04-03 | End: 2019-04-03

## 2019-04-03 RX ORDER — DEXTROSE MONOHYDRATE 50 MG/ML
25 INJECTION, SOLUTION INTRAVENOUS CONTINUOUS
Status: DISCONTINUED | OUTPATIENT
Start: 2019-04-03 | End: 2019-04-03

## 2019-04-03 RX ORDER — FENTANYL CITRATE 50 UG/ML
INJECTION, SOLUTION INTRAMUSCULAR; INTRAVENOUS
Status: COMPLETED
Start: 2019-04-03 | End: 2019-04-03

## 2019-04-03 RX ORDER — CARVEDILOL 6.25 MG/1
12.5 TABLET ORAL 2 TIMES DAILY WITH MEALS
Status: DISCONTINUED | OUTPATIENT
Start: 2019-04-03 | End: 2019-04-26 | Stop reason: HOSPADM

## 2019-04-03 RX ORDER — ALBUMIN HUMAN 250 G/1000ML
25 SOLUTION INTRAVENOUS 2 TIMES DAILY
Status: DISCONTINUED | OUTPATIENT
Start: 2019-04-03 | End: 2019-04-06

## 2019-04-03 RX ADMIN — HYDROMORPHONE HYDROCHLORIDE 2 MG: 2 INJECTION, SOLUTION INTRAMUSCULAR; INTRAVENOUS; SUBCUTANEOUS at 00:21

## 2019-04-03 RX ADMIN — ACETAMINOPHEN 1000 MG: 500 TABLET, FILM COATED ORAL at 06:25

## 2019-04-03 RX ADMIN — ETOMIDATE 20 MG: 2 INJECTION INTRAVENOUS at 09:45

## 2019-04-03 RX ADMIN — Medication 10 ML: at 00:29

## 2019-04-03 RX ADMIN — Medication 50 MCG/HR: at 10:21

## 2019-04-03 RX ADMIN — Medication 2 G: at 02:53

## 2019-04-03 RX ADMIN — DEXTROSE MONOHYDRATE AND SODIUM CHLORIDE 100 ML/HR: 5; .9 INJECTION, SOLUTION INTRAVENOUS at 10:24

## 2019-04-03 RX ADMIN — Medication 10 ML: at 13:30

## 2019-04-03 RX ADMIN — HUMAN INSULIN 4 UNITS: 100 INJECTION, SOLUTION SUBCUTANEOUS at 18:23

## 2019-04-03 RX ADMIN — ENOXAPARIN SODIUM 40 MG: 40 INJECTION SUBCUTANEOUS at 21:33

## 2019-04-03 RX ADMIN — ALBUMIN (HUMAN) 25 G: 0.25 INJECTION, SOLUTION INTRAVENOUS at 16:58

## 2019-04-03 RX ADMIN — FUROSEMIDE 40 MG: 10 INJECTION, SOLUTION INTRAMUSCULAR; INTRAVENOUS at 11:37

## 2019-04-03 RX ADMIN — VITAMIN D, TAB 1000IU (100/BT) 1000 UNITS: 25 TAB at 08:13

## 2019-04-03 RX ADMIN — HUMAN INSULIN 2 UNITS: 100 INJECTION, SOLUTION SUBCUTANEOUS at 08:12

## 2019-04-03 RX ADMIN — DEXMEDETOMIDINE HYDROCHLORIDE 0.4 MCG/KG/HR: 100 INJECTION, SOLUTION INTRAVENOUS at 10:13

## 2019-04-03 RX ADMIN — HYDROMORPHONE HYDROCHLORIDE 0.5 MG: 2 INJECTION, SOLUTION INTRAMUSCULAR; INTRAVENOUS; SUBCUTANEOUS at 06:24

## 2019-04-03 RX ADMIN — VANCOMYCIN HYDROCHLORIDE 1500 MG: 10 INJECTION, POWDER, LYOPHILIZED, FOR SOLUTION INTRAVENOUS at 21:38

## 2019-04-03 RX ADMIN — AMIODARONE HYDROCHLORIDE 200 MG: 200 TABLET ORAL at 08:13

## 2019-04-03 RX ADMIN — ISOSORBIDE MONONITRATE 30 MG: 30 TABLET, EXTENDED RELEASE ORAL at 06:25

## 2019-04-03 RX ADMIN — ENOXAPARIN SODIUM 40 MG: 40 INJECTION SUBCUTANEOUS at 08:12

## 2019-04-03 RX ADMIN — DEXMEDETOMIDINE HYDROCHLORIDE 0.4 MCG/KG/HR: 100 INJECTION, SOLUTION INTRAVENOUS at 18:24

## 2019-04-03 RX ADMIN — ONDANSETRON 4 MG: 2 INJECTION INTRAMUSCULAR; INTRAVENOUS at 00:28

## 2019-04-03 RX ADMIN — MIDAZOLAM HYDROCHLORIDE 4 MG: 1 INJECTION, SOLUTION INTRAMUSCULAR; INTRAVENOUS at 10:02

## 2019-04-03 RX ADMIN — FENTANYL CITRATE 100 MCG: 50 INJECTION, SOLUTION INTRAMUSCULAR; INTRAVENOUS at 09:45

## 2019-04-03 RX ADMIN — ACETAMINOPHEN 1000 MG: 500 TABLET, FILM COATED ORAL at 00:29

## 2019-04-03 RX ADMIN — FENTANYL CITRATE 100 MCG: 50 INJECTION INTRAMUSCULAR; INTRAVENOUS at 09:45

## 2019-04-03 RX ADMIN — PIPERACILLIN SODIUM,TAZOBACTAM SODIUM 4.5 G: 4; .5 INJECTION, POWDER, FOR SOLUTION INTRAVENOUS at 18:14

## 2019-04-03 RX ADMIN — FUROSEMIDE 20 MG: 20 TABLET ORAL at 08:12

## 2019-04-03 RX ADMIN — Medication 100 MG: at 09:45

## 2019-04-03 RX ADMIN — PIPERACILLIN SODIUM,TAZOBACTAM SODIUM 4.5 G: 4; .5 INJECTION, POWDER, FOR SOLUTION INTRAVENOUS at 12:07

## 2019-04-03 RX ADMIN — SUCCINYLCHOLINE CHLORIDE 20 MG/ML INJECTION SOLUTION 100 MG: SOLUTION at 09:45

## 2019-04-03 RX ADMIN — DEXTROSE MONOHYDRATE 25 ML/HR: 5 INJECTION, SOLUTION INTRAVENOUS at 19:57

## 2019-04-03 RX ADMIN — Medication 10 ML: at 21:34

## 2019-04-03 RX ADMIN — VANCOMYCIN HYDROCHLORIDE 2000 MG: 10 INJECTION, POWDER, LYOPHILIZED, FOR SOLUTION INTRAVENOUS at 10:21

## 2019-04-03 RX ADMIN — FENTANYL CITRATE 100 MCG: 50 INJECTION INTRAMUSCULAR; INTRAVENOUS at 10:14

## 2019-04-03 RX ADMIN — MIDAZOLAM 4 MG: 1 INJECTION INTRAMUSCULAR; INTRAVENOUS at 10:02

## 2019-04-03 RX ADMIN — FENTANYL CITRATE 100 MCG: 50 INJECTION, SOLUTION INTRAMUSCULAR; INTRAVENOUS at 10:14

## 2019-04-03 RX ADMIN — CARVEDILOL 12.5 MG: 25 TABLET, FILM COATED ORAL at 08:13

## 2019-04-03 RX ADMIN — TUBERCULIN PURIFIED PROTEIN DERIVATIVE 5 UNITS: 5 INJECTION, SOLUTION INTRADERMAL at 21:32

## 2019-04-03 NOTE — INTERVAL H&P NOTE
H&P Update: 
Neda Solano was seen and examined. History and physical has been reviewed. The patient has been examined.  There have been no significant clinical changes since the completion of the originally dated History and Physical. 
 
Signed By: Vita Multani MD   
 April 3, 2019 9:37 AM

## 2019-04-03 NOTE — PROGRESS NOTES
Patient awakens to voice, follows commands. Blood pressure trending with MAPs below 65 this afternoon. Dr. Nicole Cam notified via telephone and new orders received for Albumin and Levophed drip.

## 2019-04-03 NOTE — PROGRESS NOTES
Pt now in ICU, intubated Appears comfortable on vent. Denies pain, nods yes that she feels more relaxed and not SOB 
 
abd- 
Soft. Incision noncellulitic.  JPs both less blood-tinged, nonpurulent, nonfeculent Urine now clear, with increased UOP after lasix Labs c/w volume overload/CHF Pt has been started on vanc/zosyn, presumably due to a sepsis protocol. She has no signs or findings c/w an infectious source. While I will with much appreciation defer ICU and critical care mgmt to the intensivist service, I see no indication at this time for broad spectrum abx.

## 2019-04-03 NOTE — PROGRESS NOTES
Pharmacokinetic Consult to Pharmacist 
 
Wilda Walker is a 52 y.o. female being treated for HAP with zosyn and vancomycin. Height: 4' 11\" (149.9 cm)  Weight: 93.2 kg (205 lb 8 oz) Lab Results Component Value Date/Time BUN 13 04/03/2019 05:40 AM  
 Creatinine 1.09 (H) 04/03/2019 05:40 AM  
 WBC 7.8 04/01/2019 06:10 AM  
 Lactic acid 1.4 04/12/2010 12:20 PM  
  
Estimated Creatinine Clearance: 61.3 mL/min (A) (based on SCr of 1.09 mg/dL (H)). CULTURES: 
All Micro Results Procedure Component Value Units Date/Time CULTURE, RESPIRATORY/SPUTUM/BRONCH Araceli Zhu STAIN [[id number]] Order Status:  Sent Specimen:  Sputum CULTURE, BLOOD [[id number]] Order Status:  Sent Specimen:  Blood CULTURE, BLOOD [[id number]] Order Status:  Sent Specimen:  Blood CULTURE, URINE [[id number]] Collected:  04/02/19 1650 Order Status:  Completed Specimen:  Urine from Clarke Specimen Updated:  04/03/19 1090 Special Requests: URINE Culture result:    
  NO GROWTH AFTER SHORT PERIOD OF INCUBATION. FURTHER RESULTS TO FOLLOW AFTER OVERNIGHT INCUBATION. Lab Results Component Value Date/Time Vancomycin,trough 16.1 04/15/2010 09:03 AM  
 
 
Day 1 of vancomycin. Goal trough is 15-20. We will initiate therapy with vancomycin 2g x 1 followed by 1500mg q12h. . We will continue to follow the patient and adjust the dose as necessary per guidelines. Thank you, Arash Tsai, PharmD

## 2019-04-03 NOTE — PROGRESS NOTES
TRANSFER - OUT REPORT: 
 
Verbal report given to Elisha Cuba RN(name) on Wilda Walker  being transferred to ICU(unit) for urgent transfer Report consisted of patients Situation, Background, Assessment and  
Recommendations(SBAR). Information from the following report(s) SBAR was reviewed with the receiving nurse. Lines:    
 
Opportunity for questions and clarification was provided. Patient transported with: 
 O2 @ 12 liters

## 2019-04-03 NOTE — PROGRESS NOTES
Dual skin assessment complete. Abdominal incision midline with staples, 4x4s, abd pad, and abdominal binder changed. Wound cleanser and xeroform placed right of incision on skin tears. Ecchymosis to right flank area and left upper thigh. Allevyn placed to sacrum.

## 2019-04-03 NOTE — PROGRESS NOTES
Responded to Rapid Response. Staff working with patient. Patient transferred to ICU. Sister came. Chaplains remain available for support. Treva Haque M.Div.

## 2019-04-03 NOTE — INTERVAL H&P NOTE
H&P Update: 
Gila Kent was seen and examined. History and physical has been reviewed. The patient has been examined.  There have been no significant clinical changes since the completion of the originally dated History and Physical. 
 
Signed By: Camelia Liriano MD   
 April 3, 2019 9:54 AM

## 2019-04-03 NOTE — PROGRESS NOTES
Rapid response called Pt progressively hypoxic overnight with increasing FIO2 requirement. RN noted progressive tachypnea this morning. Pt denies chest pain, just dyspnea. Continues to want NGT out. 
 
abd exam difficult due to rapid respiration. JPs unchanges from yesterday afternoon NG reconnected to suction- scant output (<20cc) Urine remains analisa/brown, but clearer than yesterday afternoon Recent Results (from the past 12 hour(s)) GLUCOSE, POC Collection Time: 04/02/19  9:30 PM  
Result Value Ref Range Glucose (POC) 124 (H) 65 - 100 mg/dL METABOLIC PANEL, BASIC Collection Time: 04/03/19  5:40 AM  
Result Value Ref Range Sodium 139 136 - 145 mmol/L Potassium 4.8 3.5 - 5.1 mmol/L Chloride 110 (H) 98 - 107 mmol/L  
 CO2 24 21 - 32 mmol/L Anion gap 5 mmol/L Glucose 146 (H) 65 - 100 mg/dL BUN 13 6 - 23 MG/DL Creatinine 1.09 (H) 0.6 - 1.0 MG/DL  
 GFR est AA >60 >60 ml/min/1.73m2 GFR est non-AA 55 ml/min/1.73m2 Calcium 8.5 8.3 - 10.4 MG/DL  
GLUCOSE, POC Collection Time: 04/03/19  8:01 AM  
Result Value Ref Range Glucose (POC) 166 (H) 65 - 100 mg/dL POC CG8I Collection Time: 04/03/19  8:44 AM  
Result Value Ref Range Device: Non rebreather FIO2 (POC) 100 % pH (POC) 7.385 7.35 - 7.45    
 pCO2 (POC) 33.0 (L) 35 - 45 MMHG  
 pO2 (POC) 67 (L) 75 - 100 MMHG  
 HCO3 (POC) 19.8 (L) 22 - 26 MMOL/L  
 sO2 (POC) 93 (L) 95 - 98 % Base deficit (POC) 5 mmol/L Allens test (POC) YES Site LEFT RADIAL Patient temp. 98.6 Specimen type (POC) ARTERIAL Sodium,  136 - 145 MMOL/L Potassium, POC 4.2 3.5 - 5.1 MMOL/L Glucose,  (H) 65 - 100 MG/DL Calcium, ionized (POC) 1.26 1.12 - 1.32 mmol/L Performed by ReedJacquelineRT Plan- Hospitalist/pulmonary involved due to rapid response Defer hypoxia management to them. She is being transferred to ICU for further management. ?PE Will return NGT to suction until respiratory status controlled

## 2019-04-03 NOTE — H&P (VIEW-ONLY)
CONSULT NOTE Catalino Izaguirre 4/3/2019 Date of Admission:  3/26/2019 The patient's chart is reviewed and the patient is discussed with the staff. Subjective:  
 
Patient is a 52 y.o.  female seen and evaluated at the request of Dr. Ino Javed. She was admitted 3/26 for abdominal hernia repair, complicated by abdominal wall dehiscence and return to OR on 3/31 for hernia repair with mesh. She has continued to have abdominal pain and had multiple episodes of nausea yesterday and NGT had to be placed back to suction. She has had O2 requirement for several days, but worsened over the past 24 hours from 5L to HF 11 and then was hypoxemic on this and tachypneic to 40s RR this morning when a RRT was called. She was placed on NRB, moved to ICU and then placed on BIPAP. In ICU she had initial temp of 100.9. Urinalysis and Urine culture was sent yesterday due to analisa color with no growth to date. She reports she has been having more and more difficulty breathing overnight. Feels she is very dehydrated and mouth has been dry. She hasn't noted any fevers. She didn't want to speak much more due to dyspnea and RR on BIPAP was in mid 40s to 50s. Review of Systems A comprehensive review of systems was negative except for that written in the HPI. Patient Active Problem List  
Diagnosis Code  Long-term insulin use in type 2 diabetes ([facility] E11.9, Z79.4  Tobacco use disorder F17.200  
 HTN (hypertension) I10  
 Dyslipidemia E78.5  NICM (nonischemic cardiomyopathy) ([facility] I42.8  Chronic systolic heart failure (HCC) I50.22  
 BA on CPAP G47.33, Z99.89  
 Morbid obesity (HCC) E66.01  
 CAD in native artery I25.10  Automatic implantable cardioverter-defibrillator in situ Z95.810  Ventricular tachycardia (HCC) I47.2  Atypical chest pain R07.89  Chronic right-sided thoracic back pain M54.6, G89.29  
  Chronic left-sided low back pain with sciatica M54.40, G89.29  Type 2 diabetes with nephropathy (HCC) E11.21  
 Abdominal wall hernia K43.9  Incarcerated incisional hernia K43.0  Acute hypoxemic respiratory failure (HCC) J96.01  
 ARDS (adult respiratory distress syndrome) [facility] J80 Prior to Admission Medications Prescriptions Last Dose Informant Patient Reported? Taking? Blood-Glucose Meter (ONETOUCH ULTRA2) monitoring kit 3/26/2019 at Unknown time  No Yes Sig: Check blood sugar daily, E11.9 DUPIXENT 300 mg/2 mL syrg syringe 3/25/2019 at Unknown time  Yes Yes Sig: by SubCUTAneous route every fourteen (14) days. Insulin Needles, Disposable, (FABIOLA PEN NEEDLE) 32 gauge x 532\" ndle 3/25/2019 at Unknown time  No Yes Si Each by Does Not Apply route daily as needed. E11.9  
albuterol (PROVENTIL HFA, VENTOLIN HFA, PROAIR HFA) 90 mcg/actuation inhaler   No Yes Sig: Take 1 Puff by inhalation every four (4) hours as needed for Wheezing. amiodarone (CORDARONE) 200 mg tablet 3/26/2019 at Unknown time  No Yes Sig: Take 1 Tab by mouth two (2) times a day. Patient taking differently: Take 200 mg by mouth daily. aspirin (ASPIRIN) 325 mg tablet 3/25/2019 at Unknown time  No Yes Sig: Take 1 Tab by mouth daily. Patient taking differently: Take 81 mg by mouth daily. carvedilol (COREG) 25 mg tablet 3/26/2019 at Unknown time  No Yes Sig: Take 1 Tab by mouth two (2) times daily (with meals). Patient taking differently: Take 12.5 mg by mouth two (2) times daily (with meals). cholecalciferol (VITAMIN D3) 1,000 unit cap 3/19/2019 at Unknown time  Yes Yes Sig: Take  by mouth daily. clotrimazole-betamethasone (LOTRISONE) topical cream Not Taking at Unknown time  No No  
Sig: Apply  to affected area two (2) times a day. Patient not taking: Reported on 3/20/2019  
cpap machine kit 3/25/2019 at Unknown time  Yes Yes Sig: by Does Not Apply route.  11 cm  
 furosemide (LASIX) 80 mg tablet 3/19/2019 at Unknown time  No Yes Sig: Taking 1 tablet in the morning and 1/2 tablet in the afternoon Patient taking differently: Take 40 mg by mouth two (2) times a day. Taking 1 tablet in the morning and 1/2 tablet in the afternoon  
glucose blood VI test strips (ONETOUCH ULTRA TEST) strip 3/26/2019 at Unknown time  No Yes Sig: Check blood sugar daily, E11.9  
insulin glargine (LANTUS SOLOSTAR U-100 INSULIN) 100 unit/mL (3 mL) inpn 3/25/2019 at Unknown time  No Yes Si Units by SubCUTAneous route daily. Patient taking differently: 35 Units by SubCUTAneous route nightly. isosorbide mononitrate ER (IMDUR) 30 mg tablet 3/26/2019 at Unknown time  No Yes Sig: Take 1 Tab by mouth every morning. lancets (ONETOUCH SURESOFT LANCING DEV) misc 3/26/2019 at Unknown time  No Yes Sig: Check blood sugar daily, E11.9  
magnesium oxide (MAG-OX) 400 mg tablet 3/26/2019 at Unknown time  No Yes Sig: Take 1 Tab by mouth two (2) times a day. metFORMIN (GLUCOPHAGE) 500 mg tablet 3/19/2019 at Unknown time  No Yes Sig: Take 1 Tab by mouth two (2) times daily (with meals). nitroglycerin (NITROSTAT) 0.4 mg SL tablet   Yes Yes Si.4 mg by SubLINGual route every five (5) minutes as needed for Chest Pain. Up to 3 doses. pravastatin (PRAVACHOL) 80 mg tablet 3/19/2019 at Unknown time  No Yes Sig: Take 1 Tab by mouth nightly. triamcinolone acetonide (KENALOG) 0.1 % topical cream   Yes Yes Sig: as needed. Facility-Administered Medications: None Past Medical History:  
Diagnosis Date  Allergic rhinitis   
 followed by allergist; allergic to grass- has rescue inhaler PRN  
 Arthritis  Automatic implantable cardioverter-defibrillator in situ 3/30/2016  CAD in native artery 3/30/2016  Chronic systolic heart failure ([facility] 2013 ECHO 2017- EF 39%; managed with medications; followed by Dr Leonor Downs [facility] cardio)  CKD (chronic kidney disease) [facility] follows  Diabetes ([facility] type 2 (on insulin); FBS AM range- , hypo s/s <70, hgba1c- 5.8 (2018)  Diabetes mellitus ([facility] 2010  Dyslipidemia 2013  Eczema  Fibromyalgia  GERD (gastroesophageal reflux disease)   
 hx of- no meds currently  Headache   
 Heart failure ([facility] chronic systolic with EF 32%; non-ischemic cardiomyopathy  HTN (hypertension) 2010  Hypercholesterolemia  Morbid obesity ([facility]  Neuropathy   
 bilateral feet and tips of fingers  NICM (nonischemic cardiomyopathy) [facility] 2/15/2013  Obesity   
 bmi- 41  
 Obstructive sleep apnea (adult) (pediatric) 2014  
 uses cpap qhs  
 Sciatic pain 2016  SVT [facility]supraventricular tachycardia) ([facility])   
 ablation for svt  Tobacco use disorder 2010 Past Surgical History:  
Procedure Laterality Date  HX  SECTION    
 x1  
 HX HERNIA REPAIR    
 mild incarceration, no bowel removal  
 HX HYSTERECTOMY    
 YEIMI-Left ovary intact per pt  HX IMPLANTABLE CARDIOVERTER DEFIBRILLATOR  2013  HX ROTATOR CUFF REPAIR Right  HX SVT ABLATION  \"years ago\"  HX TONSILLECTOMY  MS LEFT HEART CATH,PERCUTANEOUS  2013  
 no intervention Social History Socioeconomic History  Marital status: SINGLE Spouse name: Not on file  Number of children: Not on file  Years of education: Not on file  Highest education level: Not on file Occupational History  Not on file Social Needs  Financial resource strain: Not on file  Food insecurity:  
  Worry: Not on file Inability: Not on file  Transportation needs:  
  Medical: Not on file Non-medical: Not on file Tobacco Use  Smoking status: Current Every Day Smoker Packs/day: 0.25 Years: 27.00 Pack years: 6.75  Smokeless tobacco: Never Used  Tobacco comment: \"every now and then\" Substance and Sexual Activity  Alcohol use:  Yes  
 Alcohol/week: 0.6 oz Types: 1 Glasses of wine per week Comment: occas  Drug use: Yes Types: Marijuana Comment: every day  Sexual activity: Never Lifestyle  Physical activity:  
  Days per week: Not on file Minutes per session: Not on file  Stress: Not on file Relationships  Social connections:  
  Talks on phone: Not on file Gets together: Not on file Attends Jain service: Not on file Active member of club or organization: Not on file Attends meetings of clubs or organizations: Not on file Relationship status: Not on file  Intimate partner violence:  
  Fear of current or ex partner: Not on file Emotionally abused: Not on file Physically abused: Not on file Forced sexual activity: Not on file Other Topics Concern [address] Service Not Asked  Blood Transfusions Not Asked  Caffeine Concern Not Asked  Occupational Exposure Not Asked Helyn Emily Hazards Not Asked  Sleep Concern Not Asked  Stress Concern Not Asked  Weight Concern Not Asked  Special Diet Not Asked  Back Care Not Asked  Exercise Not Asked  Bike Helmet Not Asked [address] Yes  Self-Exams Not Asked Social History Narrative Denies physical or sexual abuse Family History Problem Relation Age of Onset  Heart Attack Father 48  
     mi  Stroke Father  Hypertension Father  Heart Disease Father  Diabetes Other  Stroke Mother  Diabetes Brother  Heart Disease Brother  Hypertension Brother  Breast Cancer Sister 37 Allergies Allergen Reactions  Grass Pollen Hives and Shortness of Breath \"inhaler PRN\" Current Facility-Administered Medications Medication Dose Route Frequency  tuberculin injection 5 Units  5 Units IntraDERMal ONCE  piperacillin-tazobactam (ZOSYN) 4.5 g in 0.9% sodium chloride (MBP/ADV) 100 mL  4.5 g IntraVENous Q8H  
  dextrose 5% infusion  25 mL/hr IntraVENous CONTINUOUS  
 vancomycin (VANCOCIN) 2000 mg in  ml infusion  2,000 mg IntraVENous ONCE  
 vancomycin (VANCOCIN) 1500 mg in  ml infusion  1,500 mg IntraVENous Q12H  
 dextrose (D50W) injection syrg 12.5 g  25 mL IntraVENous PRN  
 insulin regular (NOVOLIN R, HUMULIN R) injection   SubCUTAneous AC&HS  
 metoclopramide HCl (REGLAN) injection 10 mg  10 mg IntraVENous Q8H PRN  promethazine (PHENERGAN) with saline injection 25 mg  25 mg IntraVENous Q6H PRN  
 HYDROmorphone (PF) (DILAUDID) injection 0.5-2 mg  0.5-2 mg IntraVENous Q3H PRN  
 acetaminophen (TYLENOL) tablet 1,000 mg  1,000 mg Oral Q6H  
 cholecalciferol (VITAMIN D3) tablet 1,000 Units  1,000 Units Oral DAILY  nitroglycerin (NITROSTAT) tablet 0.4 mg  0.4 mg SubLINGual Q5MIN PRN  
 isosorbide mononitrate ER (IMDUR) tablet 30 mg  30 mg Oral 7am  
 carvedilol (COREG) tablet 12.5 mg  12.5 mg Oral BID WITH MEALS  
 [Held by provider] pravastatin (PRAVACHOL) tablet 80 mg  80 mg Oral QHS  [Held by provider] magnesium oxide (MAG-OX) tablet 400 mg  400 mg Oral BID  sodium chloride (NS) flush 5-40 mL  5-40 mL IntraVENous Q8H  
 sodium chloride (NS) flush 5-40 mL  5-40 mL IntraVENous PRN  
 diphenhydrAMINE (BENADRYL) injection 12.5 mg  12.5 mg IntraVENous Q4H PRN  
 ondansetron (ZOFRAN) injection 4 mg  4 mg IntraVENous Q4H PRN  
 enoxaparin (LOVENOX) injection 40 mg  40 mg SubCUTAneous Q12H  
 albuterol (PROVENTIL VENTOLIN) nebulizer solution 2.5 mg  2.5 mg Nebulization Q4H PRN  
 dupilumab (DUPIXENT) 300 mg/2 mL syringe  300 mg SubCUTAneous Q7D  
 amiodarone (CORDARONE) tablet 200 mg  200 mg Oral DAILY  furosemide (LASIX) tablet 20 mg  20 mg Oral BID Objective:  
 
Vitals:  
 04/03/19 5411 04/03/19 7907 04/03/19 7350 04/03/19 8046 BP:  147/75  119/84 Pulse:  91  99 Resp: (!) 46   (!) 45 Temp:    (!) 100.9 °F (38.3 °C) SpO2:   100% 100% Weight:      
Height: PHYSICAL EXAM  
 
Constitutional:  the patient is well developed and in no acute distress EENMT:  Sclera clear, pupils equal, oral mucosa moist 
Respiratory: rales bilaterally mostly in bases Cardiovascular:  RRR without M,G,R 
Gastrointestinal: soft and non-tender; with positive bowel sounds. Musculoskeletal: warm without cyanosis. There is trace lower leg edema. Skin:  no jaundice or rashes, abdominal wound with foul drainage Neurologic: no gross neuro deficits Psychiatric:  alert and oriented x 3 CXR:  Low lung volumes, bilateral infiltrates Recent Labs 04/01/19 
2401 WBC 7.8 HGB 11.7 HCT 36.3 * Recent Labs 04/03/19 
0540 04/01/19 
7731  139  
K 4.8 5.3*  
* 111* * 69  
CO2 24 24 BUN 13 13 CREA 1.09* 1.12* CA 8.5 8.0* No results for input(s): PH, PCO2, PO2, HCO3 in the last 72 hours. No results for input(s): LCAD, LAC in the last 72 hours. Assessment:  (Medical Decision Making) Hospital Problems  Date Reviewed: 3/26/2019 Codes Class Noted POA Acute hypoxemic respiratory failure ([facility] ICD-10-CM: J96.01 
ICD-9-CM: 518.81  4/3/2019 Unknown ARDS (adult respiratory distress syndrome) ([facility] ICD-10-CM: Z33 
ICD-9-CM: 518.52  4/3/2019 Unknown * (Principal) Incarcerated incisional hernia ICD-10-CM: K43.0 ICD-9-CM: 552.21  3/26/2019 Yes BA on CPAP (Chronic) ICD-10-CM: G47.33, Z99.89 ICD-9-CM: 327.23, V46.8  7/14/2014 Yes Morbid obesity ([facility] (Chronic) ICD-10-CM: E66.01 
ICD-9-CM: 278.01  7/14/2014 Yes Chronic systolic heart failure (HCC) (Chronic) ICD-10-CM: C91.80 ICD-9-CM: 428.22  7/18/2013 Yes Overview Signed 3/30/2016  8:11 AM by Monie ROMERO 4/15:low risk Echo 6/2014:EF 40-45% Echo 5/2013:EF 30-35% LHC 2/2013: minimal CAD Long-term insulin use in type 2 diabetes ([facility] ICD-10-CM: E11.9, Z79.4 ICD-9-CM: 250.00, V58.67  4/12/2010 Yes 51 y/o female with incarcerated hernia and dehiscense, complicated hospital course now with acute hypoxemic respiratory failure which appears to be ARDS from pulmonary or abdominal source. Plan:  (Medical Decision Making) NEURO:  
1. Sedation: Precedex, wean down propofol for BP 2. Analgesia: Fentanyl gtt CV: 1. NICM: monitor I/O, will place central line and monitor CVP, appears dry clinically PULM:  
1. ARDS w/ Acute hypoxemic/hypercapneic respiratory failure: tolerating PRVC well, wean O2 and PEEP as able. Titrate Min Vent to pH/CO2. Low Tidal Volume ventilation. 2. Pneumonia vs ARDS: get respiratory culture, start broad antibiotics RENAL: 
1. Dark urine: urine culture pending, continue IVF, reportedly improved from yesterday GI: NPO, PPI prophy, 
1. Nutrition: remains NPO, per surgery 2. Hernia repair: per surgery 3. Abdominal drainage: ?abdominal source of infection, consider CT scan HEME: no acute issues ID: WBC elevated, afebrile. 1. Sepsis: lung vs abdomen. Cultures pending, broad spectrum antibiotics, source control ENDO: 
1. DM: had episodes of hypoglycemia. Continue D5 with fluid. Monitor FS Q6H Skin: no decub, turns, preventive care Prophy: Lovenox, PPI Full Code The patient is critically ill with respiratory failure, circulatory failure and requires high complexity decision making for assessment and support including frequent ventilator adjustment , frequent evaluation and titration of therapies , application of advanced monitoring technologies and extensive interpretation of multiple databases Time devoted to patient care services described in this note- 20 min face to face/ 25 min total evaluation time Cumulative time devoted to patient care services by me for day of service -45 min Ban Patiño MD

## 2019-04-03 NOTE — PROCEDURES
Procedure:  Central Line Insertion  CPT - 41363    Indication:  Acute hypoxemic respiratory failure, poor IV access, multiple IV meds, gtts    Chlorohexidine Skin Antiseptic: Yes  Hand Hygiene: Yes  Maximal Barrier Precautions: Yes  Optimal Catheter Site Selection: Yes  Sterile Ultrasound Technique: Yes    Location:  right  internal jugular    Time out done and correct patient/location for procedure. Area prepped and sterilized with chlorhexedine. Sterile drapes applied. Patient given local lidocane for anesthesia. Using Seldinger technique quad lumen lumen catheter inserted. Guidewire removed. All ports with blood return and lines flushed. Line sutured in place. Patient tolerated procedure well, no complications.    Estimated Blood loss: 2cc    Varun Vigil MD

## 2019-04-03 NOTE — PROGRESS NOTES
TRANSFER - IN REPORT: 
 
Verbal report received from [address] [place] [address] RN(name) on Shawn Heal  being received from 3rd(unit) for change in patient condition(Respiratory distress) Report consisted of patients Situation, Background, Assessment and  
Recommendations(SBAR). Information from the following report(s) SBAR, Kardex, OR Summary, Procedure Summary, Intake/Output, MAR, Accordion, Recent Results, Med Rec Status and Cardiac Rhythm SR was reviewed with the receiving nurse. Opportunity for questions and clarification was provided. Assessment completed upon patients arrival to unit and care assumed.

## 2019-04-03 NOTE — PROGRESS NOTES
During patient assessment and medication administration this AM at 0815 patient was noted to be tachypnic and her O2 was occasionally dropping into the 80's. Patient was sitting on the side of the bed. Assessed respirations to be 46/min. Asked patient if she was having chest pain and she stated she had had some overnight but that she wasn't having any right now. Lung sounds were clear but diminished. Assisted patient back into the bed and noted one JENNIFER bulb to be detached. Patient had thought she was laying on it so patient rolled to right and left side but JENNIFER drain was not found. Patient had increased dyspnea with this exertion and more frequent desaturations. I called for assistance and with Gaston SARKAR we repositioned and pulled patient up in the bed. Patient became increasingly short of breath and anxious upon laying flat to be pulled up in the bed and stated \"I can't breathe. \" Instructed patient to take slow deep breaths and she relaxed some and was pulled up and sat up in the bed. Requested nursing supervisor Severo Sport RN come to bedside to assist with JENNIFER drain and to bring supplies for IV access for patient. After several minutes patient's RR rate and work of breathing was not improving despite being at rest and patient began to Cubikal. RR called and patient transferred to ICU.

## 2019-04-03 NOTE — PROGRESS NOTES
Patient awakens to voice and follows commands. BP 91/43. Fentanyl drip paused for hypotension. Lasix given and patient diuresing.

## 2019-04-03 NOTE — PROGRESS NOTES
Bedside, verbal, and written report received from [facility]. Patient resting quietly in bed, writes legibly and nods appropriately on ventilator. Denies pain. Fentanyl gtt dual verified: 75 mcg/hr Precedex @ 0.4 mcg/kg/hr NGT to LIS. Abdominal dressing and binder c/d/i, dual skin assessment completed. Patient repositioned for comfort. Axillary temp 100.8

## 2019-04-03 NOTE — PROGRESS NOTES
Patient was intubated with a number 8.0 ET Tube. Tube placement verified by auscultation and ETCO2 monitor. ET Tube is secured at the 21 cm viola at the lip and on the right side. Patient was intubated by  on the 1 attempt. Breath sounds are clear. Patient is Negative for subcutaneous air and chest excursion is symmetric. Trachea is midline. Patient is also Negative for cyanosis and is Negative for pitting edema. Patient placed on ventilator on documented settings. All alarms are set and audible. Resuscitation bag is at the head of the bed. Ventilator Settings Mode FIO2 Rate Tidal Volume Pressure PEEP I:E Ratio PRVC  60 %   14 400 ml     8 cm H20 Peak airway pressure: 28 cm H2O Minute ventilation: 5.7 l/min ABG: No results for input(s): PH, PCO2, PO2, HCO3 in the last 72 hours.

## 2019-04-03 NOTE — CONSULTS
CONSULT NOTE    Jeffery Ruvalcaba    4/3/2019    Date of Admission:  3/26/2019    The patient's chart is reviewed and the patient is discussed with the staff. Subjective:     Patient is a 52 y.o.  female seen and evaluated at the request of Dr. Jason Gann. She was admitted 3/26 for abdominal hernia repair, complicated by abdominal wall dehiscence and return to OR on 3/31 for hernia repair with mesh. She has continued to have abdominal pain and had multiple episodes of nausea yesterday and NGT had to be placed back to suction. She has had O2 requirement for several days, but worsened over the past 24 hours from 5L to HF 11 and then was hypoxemic on this and tachypneic to 40s RR this morning when a RRT was called. She was placed on NRB, moved to ICU and then placed on BIPAP. In ICU she had initial temp of 100.9. Urinalysis and Urine culture was sent yesterday due to analisa color with no growth to date. She reports she has been having more and more difficulty breathing overnight. Feels she is very dehydrated and mouth has been dry. She hasn't noted any fevers. She didn't want to speak much more due to dyspnea and RR on BIPAP was in mid 40s to 50s. Review of Systems  A comprehensive review of systems was negative except for that written in the HPI.     Patient Active Problem List   Diagnosis Code    Long-term insulin use in type 2 diabetes ([facility] E11.9, Z79.4    Tobacco use disorder F17.200    HTN (hypertension) I10    Dyslipidemia E78.5    NICM (nonischemic cardiomyopathy) ([facility] I42.8    Chronic systolic heart failure (HCC) I50.22    BA on CPAP G47.33, Z99.89    Morbid obesity (HCC) E66.01    CAD in native artery I25.10    Automatic implantable cardioverter-defibrillator in situ Z95.810    Ventricular tachycardia (HCC) I47.2    Atypical chest pain R07.89    Chronic right-sided thoracic back pain M54.6, G89.29    Chronic left-sided low back pain with sciatica M54.40, G89.29    Type 2 diabetes with nephropathy (HCC) E11.21    Abdominal wall hernia K43.9    Incarcerated incisional hernia K43.0    Acute hypoxemic respiratory failure ([facility]) J96.01    ARDS (adult respiratory distress syndrome) ([facility] J80     Prior to Admission Medications   Prescriptions Last Dose Informant Patient Reported? Taking? Blood-Glucose Meter (ONETOUCH ULTRA2) monitoring kit 3/26/2019 at Unknown time  No Yes   Sig: Check blood sugar daily, E11.9   DUPIXENT 300 mg/2 mL syrg syringe 3/25/2019 at Unknown time  Yes Yes   Sig: by SubCUTAneous route every fourteen (14) days. Insulin Needles, Disposable, (FABIOLA PEN NEEDLE) 32 gauge x \" ndle 3/25/2019 at Unknown time  No Yes   Si Each by Does Not Apply route daily as needed. E11.9   albuterol (PROVENTIL HFA, VENTOLIN HFA, PROAIR HFA) 90 mcg/actuation inhaler   No Yes   Sig: Take 1 Puff by inhalation every four (4) hours as needed for Wheezing. amiodarone (CORDARONE) 200 mg tablet 3/26/2019 at Unknown time  No Yes   Sig: Take 1 Tab by mouth two (2) times a day. Patient taking differently: Take 200 mg by mouth daily. aspirin (ASPIRIN) 325 mg tablet 3/25/2019 at Unknown time  No Yes   Sig: Take 1 Tab by mouth daily. Patient taking differently: Take 81 mg by mouth daily. carvedilol (COREG) 25 mg tablet 3/26/2019 at Unknown time  No Yes   Sig: Take 1 Tab by mouth two (2) times daily (with meals). Patient taking differently: Take 12.5 mg by mouth two (2) times daily (with meals). cholecalciferol (VITAMIN D3) 1,000 unit cap 3/19/2019 at Unknown time  Yes Yes   Sig: Take  by mouth daily. clotrimazole-betamethasone (LOTRISONE) topical cream Not Taking at Unknown time  No No   Sig: Apply  to affected area two (2) times a day. Patient not taking: Reported on 3/20/2019   cpap machine kit 3/25/2019 at Unknown time  Yes Yes   Sig: by Does Not Apply route.  11 cm   furosemide (LASIX) 80 mg tablet 3/19/2019 at Unknown time  No Yes   Sig: Taking 1 tablet in the morning and 1/2 tablet in the afternoon   Patient taking differently: Take 40 mg by mouth two (2) times a day. Taking 1 tablet in the morning and 1/2 tablet in the afternoon   glucose blood VI test strips (ONETOUCH ULTRA TEST) strip 3/26/2019 at Unknown time  No Yes   Sig: Check blood sugar daily, E11.9   insulin glargine (LANTUS SOLOSTAR U-100 INSULIN) 100 unit/mL (3 mL) inpn 3/25/2019 at Unknown time  No Yes   Si Units by SubCUTAneous route daily. Patient taking differently: 35 Units by SubCUTAneous route nightly. isosorbide mononitrate ER (IMDUR) 30 mg tablet 3/26/2019 at Unknown time  No Yes   Sig: Take 1 Tab by mouth every morning. lancets (ONETOUCH SURESOFT LANCING DEV) misc 3/26/2019 at Unknown time  No Yes   Sig: Check blood sugar daily, E11.9   magnesium oxide (MAG-OX) 400 mg tablet 3/26/2019 at Unknown time  No Yes   Sig: Take 1 Tab by mouth two (2) times a day. metFORMIN (GLUCOPHAGE) 500 mg tablet 3/19/2019 at Unknown time  No Yes   Sig: Take 1 Tab by mouth two (2) times daily (with meals). nitroglycerin (NITROSTAT) 0.4 mg SL tablet   Yes Yes   Si.4 mg by SubLINGual route every five (5) minutes as needed for Chest Pain. Up to 3 doses. pravastatin (PRAVACHOL) 80 mg tablet 3/19/2019 at Unknown time  No Yes   Sig: Take 1 Tab by mouth nightly. triamcinolone acetonide (KENALOG) 0.1 % topical cream   Yes Yes   Sig: as needed.       Facility-Administered Medications: None       Past Medical History:   Diagnosis Date    Allergic rhinitis     followed by allergist; allergic to grass- has rescue inhaler PRN    Arthritis     Automatic implantable cardioverter-defibrillator in situ 3/30/2016    CAD in native artery 3/30/2016    Chronic systolic heart failure ([facility] 2013    ECHO 2017- EF 39%; managed with medications; [place] [facility]    CKD (chronic kidney disease)     [address] follows    Diabetes ([facility]     type 2 (on insulin); FBS AM range- , hypo s/s <70, hgba1c- 5.8 (2018)    Diabetes mellitus ([facility] 2010    Dyslipidemia 2013    Eczema     Fibromyalgia     GERD (gastroesophageal reflux disease)     hx of- no meds currently    Headache     Heart failure (HCC)     chronic systolic with EF 52%; non-ischemic cardiomyopathy    HTN (hypertension) 2010    Hypercholesterolemia     Morbid obesity (HCC)     Neuropathy     bilateral feet and tips of fingers    NICM (nonischemic cardiomyopathy) ([facility] 2/15/2013    Obesity     bmi- 39    Obstructive sleep apnea (adult) (pediatric) 2014    uses cpap qhs    Sciatic pain 2016    [facility]) [facility]     ablation for svt    Tobacco use disorder 2010     Past Surgical History:   Procedure Laterality Date    HX  SECTION      x1    HX HERNIA REPAIR      mild incarceration, no bowel removal    HX HYSTERECTOMY      YEIMI-Left ovary intact per pt    HX IMPLANTABLE CARDIOVERTER DEFIBRILLATOR  2013    HX ROTATOR CUFF REPAIR Right     HX SVT ABLATION  \"years ago\"    HX TONSILLECTOMY      [place] LEFT HEART CATH,PERCUTANEOUS  2013    no intervention     Social History     Socioeconomic History    Marital status: SINGLE     Spouse name: Not on file    Number of children: Not on file    Years of education: Not on file    Highest education level: Not on file   Occupational History    Not on file   Social Needs    Financial resource strain: Not on file    Food insecurity:     Worry: Not on file     Inability: Not on file    Transportation needs:     Medical: Not on file     Non-medical: Not on file   Tobacco Use    Smoking status: Current Every Day Smoker     Packs/day: 0.25     Years: 27.00     Pack years: 6.75    Smokeless tobacco: Never Used    Tobacco comment: \"every now and then\"   Substance and Sexual Activity    Alcohol use:  Yes     Alcohol/week: 0.6 oz     Types: 1 Glasses of wine per week Comment: occas    Drug use: Yes     Types: Marijuana     Comment: every day    Sexual activity: Never   Lifestyle    Physical activity:     Days per week: Not on file     Minutes per session: Not on file    Stress: Not on file   Relationships    Social connections:     Talks on phone: Not on file     Gets together: Not on file     Attends Mu-ism service: Not on file     Active member of club or organization: Not on file     Attends meetings of clubs or organizations: Not on file     Relationship status: Not on file    Intimate partner violence:     Fear of current or ex partner: Not on file     Emotionally abused: Not on file     Physically abused: Not on file     Forced sexual activity: Not on file   Other Topics Concern     Service Not Asked    Blood Transfusions Not Asked    Caffeine Concern Not Asked    Occupational Exposure Not Asked    Hobby Hazards Not Asked    Sleep Concern Not Asked    Stress Concern Not Asked    Weight Concern Not Asked    Special Diet Not Asked    Back Care Not Asked    Exercise Not Asked    Bike Helmet Not Asked   [address] Yes    Self-Exams Not Asked   Social History Narrative    Denies physical or sexual abuse     Family History   Problem Relation Age of Onset    Heart Attack Father 48        mi    Stroke Father     Hypertension Father     Heart Disease Father     Diabetes Other     Stroke Mother     Diabetes Brother     Heart Disease Brother     Hypertension Brother     Breast Cancer Sister 37     Allergies   Allergen Reactions    Grass Pollen Hives and Shortness of Breath     \"inhaler PRN\"     Current Facility-Administered Medications   Medication Dose Route Frequency    tuberculin injection 5 Units  5 Units IntraDERMal ONCE    piperacillin-tazobactam (ZOSYN) 4.5 g in 0.9% sodium chloride (MBP/ADV) 100 mL  4.5 g IntraVENous Q8H    dextrose 5% infusion  25 mL/hr IntraVENous CONTINUOUS    vancomycin (VANCOCIN) 2000 mg in  ml infusion 2,000 mg IntraVENous ONCE    vancomycin (VANCOCIN) 1500 mg in  ml infusion  1,500 mg IntraVENous Q12H    dextrose (D50W) injection syrg 12.5 g  25 mL IntraVENous PRN    insulin regular (NOVOLIN R, HUMULIN R) injection   SubCUTAneous AC&HS    metoclopramide HCl (REGLAN) injection 10 mg  10 mg IntraVENous Q8H PRN    promethazine (PHENERGAN) with saline injection 25 mg  25 mg IntraVENous Q6H PRN    HYDROmorphone (PF) (DILAUDID) injection 0.5-2 mg  0.5-2 mg IntraVENous Q3H PRN    acetaminophen (TYLENOL) tablet 1,000 mg  1,000 mg Oral Q6H    cholecalciferol (VITAMIN D3) tablet 1,000 Units  1,000 Units Oral DAILY    nitroglycerin (NITROSTAT) tablet 0.4 mg  0.4 mg SubLINGual Q5MIN PRN    isosorbide mononitrate ER (IMDUR) tablet 30 mg  30 mg Oral 7am    carvedilol (COREG) tablet 12.5 mg  12.5 mg Oral BID WITH MEALS    [Held by provider] pravastatin (PRAVACHOL) tablet 80 mg  80 mg Oral QHS    [Held by provider] magnesium oxide (MAG-OX) tablet 400 mg  400 mg Oral BID    sodium chloride (NS) flush 5-40 mL  5-40 mL IntraVENous Q8H    sodium chloride (NS) flush 5-40 mL  5-40 mL IntraVENous PRN    diphenhydrAMINE (BENADRYL) injection 12.5 mg  12.5 mg IntraVENous Q4H PRN    ondansetron (ZOFRAN) injection 4 mg  4 mg IntraVENous Q4H PRN    enoxaparin (LOVENOX) injection 40 mg  40 mg SubCUTAneous Q12H    albuterol (PROVENTIL VENTOLIN) nebulizer solution 2.5 mg  2.5 mg Nebulization Q4H PRN    dupilumab (DUPIXENT) 300 mg/2 mL syringe  300 mg SubCUTAneous Q7D    amiodarone (CORDARONE) tablet 200 mg  200 mg Oral DAILY    furosemide (LASIX) tablet 20 mg  20 mg Oral BID     Objective:     Vitals:    04/03/19 0836 04/03/19 0836 04/03/19 0858 04/03/19 0859   BP:  147/75  119/84   Pulse:  91  99   Resp: (!) 46   (!) 45   Temp:    (!) 100.9 °F (38.3 °C)   SpO2:   100% 100%   Weight:       Height:         PHYSICAL EXAM     Constitutional:  the patient is well developed and in no acute distress  EENMT:  Sclera clear, pupils equal, oral mucosa moist  Respiratory: rales bilaterally mostly in bases  Cardiovascular:  RRR without M,G,R  Gastrointestinal: soft and non-tender; with positive bowel sounds. Musculoskeletal: warm without cyanosis. There is trace lower leg edema. Skin:  no jaundice or rashes, abdominal wound with foul drainage   Neurologic: no gross neuro deficits     Psychiatric:  alert and oriented x 3    CXR:  Low lung volumes, bilateral infiltrates      Recent Labs     04/01/19  0610   WBC 7.8   HGB 11.7   HCT 36.3   *     Recent Labs     04/03/19  0540 04/01/19  0609    139   K 4.8 5.3*   * 111*   * 69   CO2 24 24   BUN 13 13   CREA 1.09* 1.12*   CA 8.5 8.0*     No results for input(s): PH, PCO2, PO2, HCO3 in the last 72 hours. No results for input(s): LCAD, LAC in the last 72 hours.     Assessment:  (Medical Decision Making)     Hospital Problems  Date Reviewed: 3/26/2019          Codes Class Noted POA    Acute hypoxemic respiratory failure [facility]) ICD-10-CM: J96.01  ICD-9-CM: 518.81  4/3/2019 Unknown        ARDS (adult respiratory distress syndrome) ([facility] ICD-10-CM: J80  ICD-9-CM: 518.52  4/3/2019 Unknown        * (Principal) Incarcerated incisional hernia ICD-10-CM: K43.0  ICD-9-CM: 552.21  3/26/2019 Yes        BA on CPAP (Chronic) ICD-10-CM: G47.33, Z99.89  ICD-9-CM: 327.23, V46.8  7/14/2014 Yes        Morbid obesity (HCC) (Chronic) ICD-10-CM: E66.01  ICD-9-CM: 278.01  7/14/2014 Yes        Chronic systolic heart failure (HCC) (Chronic) ICD-10-CM: Q52.01  ICD-9-CM: 428.22  7/18/2013 Yes    Overview Signed 3/30/2016  8:11 AM by Urban Rodriguez     NST 4/15:low risk  Echo 6/2014:EF 40-45%  Echo 5/2013:EF 30-35%  LHC 2/2013: minimal CAD             Long-term insulin use in type 2 diabetes [facility]) ICD-10-CM: E11.9, Z79.4  ICD-9-CM: 250.00, V58.67  4/12/2010 Yes            51 y/o female with incarcerated hernia and dehiscense, complicated hospital course now with acute hypoxemic respiratory failure which appears to be ARDS from pulmonary or abdominal source. Plan:  (Medical Decision Making)     NEURO:   1. Sedation: Precedex, wean down propofol for BP  2. Analgesia: Fentanyl gtt  CV:   1. NICM: monitor I/O, will place central line and monitor CVP, appears dry clinically  PULM:   1. ARDS w/ Acute hypoxemic/hypercapneic respiratory failure: tolerating PRVC well, wean O2 and PEEP as able. Titrate Min Vent to pH/CO2. Low Tidal Volume ventilation. 2. Pneumonia vs ARDS: get respiratory culture, start broad antibiotics, WBC elevated, temp climbing. Low suspicion for PE given the above, abnormal CXR and has been on Lovenox. RENAL:  1. Dark urine: urine culture pending, continue IVF, reportedly improved from yesterday  GI: NPO, PPI prophy,  1. Nutrition: remains NPO, per surgery  2. Hernia repair: per surgery  3. Abdominal drainage: ?abdominal source of infection, consider CT scan  HEME: no acute issues  ID: WBC elevated, afebrile. 1. Sepsis: lung vs abdomen. Cultures pending, broad spectrum antibiotics, source control  ENDO:  1. DM: had episodes of hypoglycemia. Continue D5 with fluid.  Monitor FS Q6H  Skin: no decub, turns, preventive care  Prophy: Lovenox, PPI    Full Code    The patient is critically ill with respiratory failure, circulatory failure and requires high complexity decision making for assessment and support including frequent ventilator adjustment , frequent evaluation and titration of therapies , application of advanced monitoring technologies and extensive interpretation of multiple databases  Time devoted to patient care services described in this note- 20 min face to face/ 25 min total evaluation time    Cumulative time devoted to patient care services by me for day of service -39 min     Tara Mcclellan MD

## 2019-04-03 NOTE — PROGRESS NOTES
Bedside shift change report received by Manoj Sanchez RN (offgoing nurse) Report included the following information SBAR. Patient currently sitting on side of bed. Continuous O2 SAT monitor in place. Patient door left open. Will continue to monitor with hourly rounding.

## 2019-04-03 NOTE — PROGRESS NOTES
Ventilator check complete; patient has a #8. 0 ET tube secured at the 21 at the teeth. Patient is not sedated. Patient is able to follow commands. Breath sounds are clear. Trachea is midline, Negative for subcutaneous air, and chest excursion is symmetric. Patient is also Negative for cyanosis and is Negative for pitting edema. All alarms are set and audible. Resuscitation bag is at the head of the bed. Ventilator Settings Mode FIO2 Rate Tidal Volume Pressure PEEP I:E Ratio PRVC  45 %    400 ml     8 cm H20 Peak airway pressure: 23 cm H2O Minute ventilation: 10.3 l/min ABG: No results for input(s): PH, PCO2, PO2, HCO3 in the last 72 hours.  
 
 
Larry Rascon, RT

## 2019-04-03 NOTE — PROGRESS NOTES
Care Management Interventions PCP Verified by CM: Yes Mode of Transport at Discharge: Other (see comment) Transition of Care Consult (CM Consult): Other Current Support Network: Own Home Confirm Follow Up Transport: Family Plan discussed with Pt/Family/Caregiver: Yes Freedom of Choice Offered: Yes Discharge Location Discharge Placement: Unable to determine at this time Pt transferred to the ICU for low O2 SAT's, per pt request pt sister was called and is on her way in. Will continue to follow and visit pt and family when her condition is more stable. PPD placed. [phone] with sister/[place], she was at pt's bedside and we stepped out to ICU lobby b/c Pulm was prepping to intubate the pt. Sister states that pt lives alone/has a boyfriend/wokrs in / she is inde/ if she needs to the pt can stay with the sister after d/c if she needs any help. 4/8 PT recommending STR, and pt is very agreeable, she lives alone and is interested [facility], [facility]. PPD order placed 4/9 Per [facility] note form 4/8 @ approx 2230. ... Continue present treatment She is on zosyn- she clearly has pseudomonal wound colonization; her previous UTI was also pseudomonas, in addition to E coli. Given persistent brownish drainage from wound and extrafascial position of mesh, I recommend wound debridement/washout tomorrow to prevent mesh infection. This was mentioned yesterday.   She is in agreement.

## 2019-04-03 NOTE — PROCEDURES
Procedure: Emergent intubation (CPT 00855)    Indication: acute respiratory failure with hypoxia    Anesthesia:  Fentanyl 100mcg, Etomidate 20mg, Succ 100mg     After assessing the airway, the patient underwent preoxygenation with 100% FiO2 for 5 min. Fentanyl and etomidate were given sequentially. After adequate sedation intubation was performed a 3.0 MAC blade laryngoscope was used to visualize the epiglottis and vocal chords. After positive identification of the vocal chords, a 8.0 ET tube was placed into the trachea with direct visualization. The tube was seen passing through the vocal chords without difficulty. CO2 colorimetry was employed immediately to verify tube in airway with appropriate color change indicating detection of CO2. Water vapor was seen within the ET tube, and auscultation of the abdomen revealed no bubbling sounds. Auscultation and inspection of the chest after intubation showed symmetric chest excursion and symmetric air entry bilaterally. The tube was secured at 21cm at the lip. Chest X-ray has been ordered and is pending. The patient has been placed on a mechanical ventilator. There were no complications.     Rubén Zhu MD

## 2019-04-03 NOTE — PROGRESS NOTES
Nutrition follow-up: 
Reason for initial assessment:  Length of stay. Assessment: 
Diet Order:  NPO; pt has been NPO / clear liquids majority of stay with brief period on full liquids. Pt admitted 3/26 for abdominal hernia repair, complicated by abdominal wall dehiscence and return to OR 3/31 for hernia repair with mesh. Pt with acute hypoxic respiratory failure 4/03; transferred to [address] intubation 4/03. Pt had a [employer] on 4/03-R IJ. Abdomen:  Obese, tender. Midline incision. Last documented BM 3/29. Extremities:   LLE 1+ edema, RLE 1+ edema. Pertinent Labs: Glucose 146 mg/dl on AM BMP. POC glucose range today:  166-143 mg/dl Pertinent Rx:  IVF: D5% 0.9% sodium chloride at 100 ml/hr provides 408 non protein kcal/day, insulin Fentanyl, precedex, Anthropometrics: Height: 4' 11\" (149.9 cm), Weight: 100.4 kg (221 lb 4.8 oz), unspecified, Body mass index is 44.7 kg/m². BMI class of morbid obesity. Macronutrient needs: EER:  8829-5901 kcal /day (11-14 kcal/kg BW) EPR:  ~111gm/day (2.5 gm/kg/IBW) GFR >60 Max CHO:  256 grams/day (4mg/kg IBW/min) Intake/Comparative Standards:  
Current NPO does not meet kcal or protein needs Current IVF provide 480 nonprotein kcal/day. Nutrition Diagnosis: Inadequate oral intake r/t inability to consume sufficient oral intake as evidenced by diet limited to nutritionally inadequate clear liquid diet or NPO status day 7, emergent intubation 4/03. Intervention: 
Meals and Snacks:  NPO It is reasonable to wait 7-10 days before initiating TPN in a patient with no or low nutrition risk. Will await start or progression of po diet as medical condition dictates. Otherwise, F/U will be per standard of care or by MD consult if prior. Discharge nutrition plan: Too soon to determine. Alfonso Byrne, MPH, [address] [place] [address],  
[phone]

## 2019-04-04 ENCOUNTER — APPOINTMENT (OUTPATIENT)
Dept: GENERAL RADIOLOGY | Age: 54
DRG: 353 | End: 2019-04-04
Attending: INTERNAL MEDICINE
Payer: MEDICARE

## 2019-04-04 LAB
ANION GAP SERPL CALC-SCNC: 7 MMOL/L
ARTERIAL PATENCY WRIST A: YES
BASE DEFICIT BLD-SCNC: 3 MMOL/L
BASOPHILS # BLD: 0 K/UL (ref 0–0.2)
BASOPHILS NFR BLD: 0 % (ref 0–2)
BDY SITE: ABNORMAL
BODY TEMPERATURE: 99.6
BUN SERPL-MCNC: 14 MG/DL (ref 6–23)
CALCIUM SERPL-MCNC: 8.6 MG/DL (ref 8.3–10.4)
CHLORIDE SERPL-SCNC: 111 MMOL/L (ref 98–107)
CO2 BLD-SCNC: 23 MMOL/L
CO2 SERPL-SCNC: 25 MMOL/L (ref 21–32)
COLLECT TIME,HTIME: 310
CREAT SERPL-MCNC: 1.01 MG/DL (ref 0.6–1)
DIFFERENTIAL METHOD BLD: ABNORMAL
EOSINOPHIL # BLD: 0.7 K/UL (ref 0–0.8)
EOSINOPHIL NFR BLD: 5 % (ref 0.5–7.8)
ERYTHROCYTE [DISTWIDTH] IN BLOOD BY AUTOMATED COUNT: 14.2 % (ref 11.9–14.6)
EXHALED MINUTE VOLUME, VE: 8 L/MIN
GAS FLOW.O2 O2 DELIVERY SYS: ABNORMAL L/MIN
GAS FLOW.O2 SETTING OXYMISER: 12 BPM
GLUCOSE BLD STRIP.AUTO-MCNC: 107 MG/DL (ref 65–100)
GLUCOSE BLD STRIP.AUTO-MCNC: 116 MG/DL (ref 65–100)
GLUCOSE BLD STRIP.AUTO-MCNC: 122 MG/DL (ref 65–100)
GLUCOSE BLD STRIP.AUTO-MCNC: 133 MG/DL (ref 65–100)
GLUCOSE SERPL-MCNC: 114 MG/DL (ref 65–100)
HCO3 BLD-SCNC: 21.7 MMOL/L (ref 22–26)
HCT VFR BLD AUTO: 25.2 % (ref 35.8–46.3)
HGB BLD-MCNC: 8.2 G/DL (ref 11.7–15.4)
IMM GRANULOCYTES # BLD AUTO: 0.4 K/UL (ref 0–0.5)
IMM GRANULOCYTES NFR BLD AUTO: 3 % (ref 0–5)
INSPIRATION.DURATION SETTING TIME VENT: 0.9 SEC
LYMPHOCYTES # BLD: 2.5 K/UL (ref 0.5–4.6)
LYMPHOCYTES NFR BLD: 17 % (ref 13–44)
MAGNESIUM SERPL-MCNC: 1.9 MG/DL (ref 1.8–2.4)
MCH RBC QN AUTO: 32 PG (ref 26.1–32.9)
MCHC RBC AUTO-ENTMCNC: 32.5 G/DL (ref 31.4–35)
MCV RBC AUTO: 98.4 FL (ref 79.6–97.8)
MM INDURATION POC: 0 MM (ref 0–5)
MONOCYTES # BLD: 1.6 K/UL (ref 0.1–1.3)
MONOCYTES NFR BLD: 11 % (ref 4–12)
NEUTS SEG # BLD: 9.3 K/UL (ref 1.7–8.2)
NEUTS SEG NFR BLD: 64 % (ref 43–78)
NRBC # BLD: 0.04 K/UL (ref 0–0.2)
O2/TOTAL GAS SETTING VFR VENT: 45 %
PCO2 BLD: 35.7 MMHG (ref 35–45)
PEEP RESPIRATORY: 8 CMH2O
PH BLD: 7.39 [PH] (ref 7.35–7.45)
PLATELET # BLD AUTO: 163 K/UL (ref 150–450)
PMV BLD AUTO: 11.5 FL (ref 9.4–12.3)
PO2 BLD: 100 MMHG (ref 75–100)
POTASSIUM SERPL-SCNC: 4 MMOL/L (ref 3.5–5.1)
PPD POC: NEGATIVE NEGATIVE
RBC # BLD AUTO: 2.56 M/UL (ref 4.05–5.2)
SAO2 % BLD: 98 % (ref 95–98)
SERVICE CMNT-IMP: ABNORMAL
SERVICE CMNT-IMP: ABNORMAL
SODIUM SERPL-SCNC: 143 MMOL/L (ref 136–145)
SPECIMEN TYPE: ABNORMAL
VANCOMYCIN TROUGH SERPL-MCNC: 20.5 UG/ML (ref 5–20)
VENTILATION MODE VENT: ABNORMAL
VT SETTING VENT: 400 ML
WBC # BLD AUTO: 14.5 K/UL (ref 4.3–11.1)

## 2019-04-04 PROCEDURE — 85025 COMPLETE CBC W/AUTO DIFF WBC: CPT

## 2019-04-04 PROCEDURE — P9047 ALBUMIN (HUMAN), 25%, 50ML: HCPCS | Performed by: INTERNAL MEDICINE

## 2019-04-04 PROCEDURE — 94003 VENT MGMT INPAT SUBQ DAY: CPT

## 2019-04-04 PROCEDURE — 82962 GLUCOSE BLOOD TEST: CPT

## 2019-04-04 PROCEDURE — 36600 WITHDRAWAL OF ARTERIAL BLOOD: CPT

## 2019-04-04 PROCEDURE — 74011250636 HC RX REV CODE- 250/636: Performed by: INTERNAL MEDICINE

## 2019-04-04 PROCEDURE — 74011000258 HC RX REV CODE- 258: Performed by: INTERNAL MEDICINE

## 2019-04-04 PROCEDURE — 74011250637 HC RX REV CODE- 250/637: Performed by: SURGERY

## 2019-04-04 PROCEDURE — 80202 ASSAY OF VANCOMYCIN: CPT

## 2019-04-04 PROCEDURE — 82803 BLOOD GASES ANY COMBINATION: CPT

## 2019-04-04 PROCEDURE — 99291 CRITICAL CARE FIRST HOUR: CPT | Performed by: INTERNAL MEDICINE

## 2019-04-04 PROCEDURE — 71045 X-RAY EXAM CHEST 1 VIEW: CPT

## 2019-04-04 PROCEDURE — 80048 BASIC METABOLIC PNL TOTAL CA: CPT

## 2019-04-04 PROCEDURE — 74011250636 HC RX REV CODE- 250/636: Performed by: SURGERY

## 2019-04-04 PROCEDURE — 65610000001 HC ROOM ICU GENERAL

## 2019-04-04 PROCEDURE — 74011000250 HC RX REV CODE- 250: Performed by: INTERNAL MEDICINE

## 2019-04-04 PROCEDURE — 94760 N-INVAS EAR/PLS OXIMETRY 1: CPT

## 2019-04-04 PROCEDURE — 83735 ASSAY OF MAGNESIUM: CPT

## 2019-04-04 RX ORDER — FUROSEMIDE 10 MG/ML
40 INJECTION INTRAMUSCULAR; INTRAVENOUS ONCE
Status: COMPLETED | OUTPATIENT
Start: 2019-04-04 | End: 2019-04-04

## 2019-04-04 RX ADMIN — VANCOMYCIN HYDROCHLORIDE 1500 MG: 10 INJECTION, POWDER, LYOPHILIZED, FOR SOLUTION INTRAVENOUS at 11:00

## 2019-04-04 RX ADMIN — PIPERACILLIN SODIUM,TAZOBACTAM SODIUM 4.5 G: 4; .5 INJECTION, POWDER, FOR SOLUTION INTRAVENOUS at 09:15

## 2019-04-04 RX ADMIN — DEXMEDETOMIDINE HYDROCHLORIDE 0.6 MCG/KG/HR: 100 INJECTION, SOLUTION INTRAVENOUS at 03:06

## 2019-04-04 RX ADMIN — CARVEDILOL 12.5 MG: 6.25 TABLET, FILM COATED ORAL at 09:13

## 2019-04-04 RX ADMIN — Medication 125 MCG/HR: at 12:40

## 2019-04-04 RX ADMIN — AMIODARONE HYDROCHLORIDE 200 MG: 200 TABLET ORAL at 09:13

## 2019-04-04 RX ADMIN — VANCOMYCIN HYDROCHLORIDE 1500 MG: 10 INJECTION, POWDER, LYOPHILIZED, FOR SOLUTION INTRAVENOUS at 21:16

## 2019-04-04 RX ADMIN — ALBUMIN (HUMAN) 25 G: 0.25 INJECTION, SOLUTION INTRAVENOUS at 18:08

## 2019-04-04 RX ADMIN — PIPERACILLIN SODIUM,TAZOBACTAM SODIUM 4.5 G: 4; .5 INJECTION, POWDER, FOR SOLUTION INTRAVENOUS at 18:09

## 2019-04-04 RX ADMIN — ENOXAPARIN SODIUM 40 MG: 40 INJECTION SUBCUTANEOUS at 09:13

## 2019-04-04 RX ADMIN — ENOXAPARIN SODIUM 40 MG: 40 INJECTION SUBCUTANEOUS at 21:05

## 2019-04-04 RX ADMIN — DEXMEDETOMIDINE HYDROCHLORIDE 0.6 MCG/KG/HR: 100 INJECTION, SOLUTION INTRAVENOUS at 11:56

## 2019-04-04 RX ADMIN — DEXMEDETOMIDINE HYDROCHLORIDE 0.6 MCG/KG/HR: 100 INJECTION, SOLUTION INTRAVENOUS at 19:33

## 2019-04-04 RX ADMIN — Medication 40 ML: at 18:28

## 2019-04-04 RX ADMIN — Medication 10 ML: at 06:08

## 2019-04-04 RX ADMIN — PIPERACILLIN SODIUM,TAZOBACTAM SODIUM 4.5 G: 4; .5 INJECTION, POWDER, FOR SOLUTION INTRAVENOUS at 03:08

## 2019-04-04 RX ADMIN — CARVEDILOL 12.5 MG: 6.25 TABLET, FILM COATED ORAL at 18:08

## 2019-04-04 RX ADMIN — FUROSEMIDE 40 MG: 10 INJECTION, SOLUTION INTRAMUSCULAR; INTRAVENOUS at 18:28

## 2019-04-04 NOTE — PROGRESS NOTES
PROGRESS NOTE Gustavo Hernandez 4/4/2019 Date of Admission:  3/26/2019 The patient's chart is reviewed and the patient is discussed with the staff. Patient is a 52 y.o.  female seen and evaluated at the request of Dr. Alexandra Daly. She was admitted 3/26 for abdominal hernia repair, complicated by abdominal wall dehiscence and return to OR on 3/31 for hernia repair with mesh. She has continued to have abdominal pain and had multiple episodes of nausea yesterday and NGT had to be placed back to suction. She has had O2 requirement for several days, but worsened over the past 24 hours from 5L to HF 11 and then was hypoxemic on this and tachypneic to 40s RR this morning when a RRT was called. She was placed on NRB, moved to ICU and then placed on BIPAP. In ICU she had initial temp of 100.9. Urinalysis and Urine culture was sent yesterday due to analisa color with no growth to date. She reports she has been having more and more difficulty breathing overnight. Feels she is very dehydrated and mouth has been dry. She hasn't noted any fevers. She didn't want to speak much more due to dyspnea and RR on BIPAP was in mid 40s to 50s. She was intubated on 4/3/19. Subjective: No events overnight, awake and alert on ventilator. Review of Systems A comprehensive review of systems was negative except for that written in the HPI. Patient Active Problem List  
Diagnosis Code  Long-term insulin use in type 2 diabetes ([facility] E11.9, Z79.4  Tobacco use disorder F17.200  
 HTN (hypertension) I10  
 Dyslipidemia E78.5  NICM (nonischemic cardiomyopathy) ([facility] I42.8  Chronic systolic heart failure (HCC) I50.22  
 BA on CPAP G47.33, Z99.89  
 Morbid obesity (HCC) E66.01  
 CAD in native artery I25.10  Automatic implantable cardioverter-defibrillator in situ Z95.810  Ventricular tachycardia (HCC) I47.2  Atypical chest pain R07.89  
  Chronic right-sided thoracic back pain M54.6, G89.29  Chronic left-sided low back pain with sciatica M54.40, G89.29  Type 2 diabetes with nephropathy (HCC) E11.21  
 Abdominal wall hernia K43.9  Incarcerated incisional hernia K43.0  Acute hypoxemic respiratory failure (HCC) J96.01  
 ARDS (adult respiratory distress syndrome) ([facility] J80 Prior to Admission Medications Prescriptions Last Dose Informant Patient Reported? Taking? Blood-Glucose Meter (ONETOUCH ULTRA2) monitoring kit 3/26/2019 at Unknown time  No Yes Sig: Check blood sugar daily, E11.9 DUPIXENT 300 mg/2 mL syrg syringe 3/25/2019 at Unknown time  Yes Yes Sig: by SubCUTAneous route every fourteen (14) days. Insulin Needles, Disposable, (FABIOLA PEN NEEDLE) 32 gauge x 5/32\" ndle 3/25/2019 at Unknown time  No Yes Si Each by Does Not Apply route daily as needed. E11.9  
albuterol (PROVENTIL HFA, VENTOLIN HFA, PROAIR HFA) 90 mcg/actuation inhaler   No Yes Sig: Take 1 Puff by inhalation every four (4) hours as needed for Wheezing. amiodarone (CORDARONE) 200 mg tablet 3/26/2019 at Unknown time  No Yes Sig: Take 1 Tab by mouth two (2) times a day. Patient taking differently: Take 200 mg by mouth daily. aspirin (ASPIRIN) 325 mg tablet 3/25/2019 at Unknown time  No Yes Sig: Take 1 Tab by mouth daily. Patient taking differently: Take 81 mg by mouth daily. carvedilol (COREG) 25 mg tablet 3/26/2019 at Unknown time  No Yes Sig: Take 1 Tab by mouth two (2) times daily (with meals). Patient taking differently: Take 12.5 mg by mouth two (2) times daily (with meals). cholecalciferol (VITAMIN D3) 1,000 unit cap 3/19/2019 at Unknown time  Yes Yes Sig: Take  by mouth daily. clotrimazole-betamethasone (LOTRISONE) topical cream Not Taking at Unknown time  No No  
Sig: Apply  to affected area two (2) times a day. Patient not taking: Reported on 3/20/2019  
cpap machine kit 3/25/2019 at Unknown time  Yes Yes Sig: by Does Not Apply route. 11 cm  
furosemide (LASIX) 80 mg tablet 3/19/2019 at Unknown time  No Yes Sig: Taking 1 tablet in the morning and 1/2 tablet in the afternoon Patient taking differently: Take 40 mg by mouth two (2) times a day. Taking 1 tablet in the morning and 1/2 tablet in the afternoon  
glucose blood VI test strips (ONETOUCH ULTRA TEST) strip 3/26/2019 at Unknown time  No Yes Sig: Check blood sugar daily, E11.9  
insulin glargine (LANTUS SOLOSTAR U-100 INSULIN) 100 unit/mL (3 mL) inpn 3/25/2019 at Unknown time  No Yes Si Units by SubCUTAneous route daily. Patient taking differently: 35 Units by SubCUTAneous route nightly. isosorbide mononitrate ER (IMDUR) 30 mg tablet 3/26/2019 at Unknown time  No Yes Sig: Take 1 Tab by mouth every morning. lancets (ONETOUCH SURESOFT LANCING DEV) mis 3/26/2019 at Unknown time  No Yes Sig: Check blood sugar daily, E11.9  
magnesium oxide (MAG-OX) 400 mg tablet 3/26/2019 at Unknown time  No Yes Sig: Take 1 Tab by mouth two (2) times a day. metFORMIN (GLUCOPHAGE) 500 mg tablet 3/19/2019 at Unknown time  No Yes Sig: Take 1 Tab by mouth two (2) times daily (with meals). nitroglycerin (NITROSTAT) 0.4 mg SL tablet   Yes Yes Si.4 mg by SubLINGual route every five (5) minutes as needed for Chest Pain. Up to 3 doses. pravastatin (PRAVACHOL) 80 mg tablet 3/19/2019 at Unknown time  No Yes Sig: Take 1 Tab by mouth nightly. triamcinolone acetonide (KENALOG) 0.1 % topical cream   Yes Yes Sig: as needed. Facility-Administered Medications: None Past Medical History:  
Diagnosis Date  Allergic rhinitis   
 followed by allergist; allergic to grass- has rescue inhaler PRN  
 Arthritis  Automatic implantable cardioverter-defibrillator in situ 3/30/2016  CAD in native artery 3/30/2016  Chronic systolic heart failure ([address] 2013  ECHO 2017- EF 39%; managed with medications; [place]Bll [place] [facility]  CKD (chronic kidney disease) [facility] follows  Diabetes ([facility] type 2 (on insulin); FBS AM range- , hypo s/s <70, hgba1c- 5.8 (2018)  Diabetes mellitus ([facility] 2010  Dyslipidemia 2013  Eczema  Fibromyalgia  GERD (gastroesophageal reflux disease)   
 hx of- no meds currently  Headache   
 Heart failure ([facility] chronic systolic with EF 97%; non-ischemic cardiomyopathy  HTN (hypertension) 2010  Hypercholesterolemia  Morbid obesity ([facility]  Neuropathy   
 bilateral feet and tips of fingers  NICM (nonischemic cardiomyopathy) ([facility] 2/15/2013  Obesity   
 bmi- 41  
 Obstructive sleep apnea (adult) (pediatric) 2014  
 uses cpap qhs  
 Sciatic pain 2016  SVT (supraventricular tachycardia) (HCC)   
 ablation for svt  Tobacco use disorder 2010 Past Surgical History:  
Procedure Laterality Date  HX  SECTION    
 x1  
 HX HERNIA REPAIR    
 mild incarceration, no bowel removal  
 HX HYSTERECTOMY    
 YEIMI-Left ovary intact per pt  HX IMPLANTABLE CARDIOVERTER DEFIBRILLATOR  2013 Biotronik dual chamber ICD by Dr. Gregory Sainz  HX IMPLANTABLE CARDIOVERTER DEFIBRILLATOR  2013 Biotronik dual chamber ICD by Dr. Gregory Sainz  HX ROTATOR CUFF REPAIR Right  HX SVT ABLATION  \"years ago\"  HX TONSILLECTOMY  MT LEFT HEART CATH,PERCUTANEOUS  2013  
 no intervention Social History Socioeconomic History  Marital status: SINGLE Spouse name: Not on file  Number of children: Not on file  Years of education: Not on file  Highest education level: Not on file Occupational History  Not on file Social Needs  Financial resource strain: Not on file  Food insecurity:  
  Worry: Not on file Inability: Not on file  Transportation needs:  
  Medical: Not on file Non-medical: Not on file Tobacco Use  
  Smoking status: Current Every Day Smoker Packs/day: 0.25 Years: 27.00 Pack years: 6.75  Smokeless tobacco: Never Used  Tobacco comment: \"every now and then\" Substance and Sexual Activity  Alcohol use: Yes Alcohol/week: 0.6 oz Types: 1 Glasses of wine per week Comment: occas  Drug use: Yes Types: Marijuana Comment: every day  Sexual activity: Never Lifestyle  Physical activity:  
  Days per week: Not on file Minutes per session: Not on file  Stress: Not on file Relationships  Social connections:  
  Talks on phone: Not on file Gets together: Not on file Attends Buddhist service: Not on file Active member of club or organization: Not on file Attends meetings of clubs or organizations: Not on file Relationship status: Not on file  Intimate partner violence:  
  Fear of current or ex partner: Not on file Emotionally abused: Not on file Physically abused: Not on file Forced sexual activity: Not on file Other Topics Concern [address] Service Not Asked  Blood Transfusions Not Asked  Caffeine Concern Not Asked  Occupational Exposure Not Asked Ellender Share Hazards Not Asked  Sleep Concern Not Asked  Stress Concern Not Asked  Weight Concern Not Asked  Special Diet Not Asked  Back Care Not Asked  Exercise Not Asked  Bike Helmet Not Asked [address] Yes  Self-Exams Not Asked Social History Narrative Denies physical or sexual abuse Family History Problem Relation Age of Onset  Heart Attack Father 48  
     mi  Stroke Father  Hypertension Father  Heart Disease Father  Diabetes Other  Stroke Mother  Diabetes Brother  Heart Disease Brother  Hypertension Brother  Breast Cancer Sister 37 Allergies Allergen Reactions  Grass Pollen Hives and Shortness of Breath \"inhaler PRN\" Current Facility-Administered Medications Medication Dose Route Frequency  piperacillin-tazobactam (ZOSYN) 4.5 g in 0.9% sodium chloride (MBP/ADV) 100 mL  4.5 g IntraVENous Q8H  
 vancomycin (VANCOCIN) 1500 mg in  ml infusion  1,500 mg IntraVENous Q12H  
 dexmedeTOMidine (PRECEDEX) 400 mcg in 0.9% sodium chloride 100 mL infusion  0.2-0.7 mcg/kg/hr IntraVENous TITRATE  fentaNYL in normal saline (pf) 25 mcg/mL infusion  0-200 mcg/hr IntraVENous TITRATE  insulin regular (NOVOLIN R, HUMULIN R) injection   SubCUTAneous Q6H  
 tuberculin injection 5 Units  5 Units IntraDERMal ONCE  
 acetaminophen (TYLENOL) tablet 650 mg  650 mg Per NG tube Q6H PRN  
 amiodarone (CORDARONE) tablet 200 mg  200 mg Per NG tube DAILY  carvedilol (COREG) tablet 12.5 mg  12.5 mg Per NG tube BID WITH MEALS  
 NOREPINephrine (LEVOPHED) 4 mg in 5% dextrose 250 mL infusion  0-16 mcg/min IntraVENous TITRATE  albumin human 25% (BUMINATE) solution 25 g  25 g IntraVENous BID  dextrose 5% infusion  25 mL/hr IntraVENous CONTINUOUS  
 dextrose (D50W) injection syrg 12.5 g  25 mL IntraVENous PRN  
 metoclopramide HCl (REGLAN) injection 10 mg  10 mg IntraVENous Q8H PRN  promethazine (PHENERGAN) with saline injection 25 mg  25 mg IntraVENous Q6H PRN  
 HYDROmorphone (PF) (DILAUDID) injection 0.5-2 mg  0.5-2 mg IntraVENous Q3H PRN  
 nitroglycerin (NITROSTAT) tablet 0.4 mg  0.4 mg SubLINGual Q5MIN PRN  
 [Held by provider] pravastatin (PRAVACHOL) tablet 80 mg  80 mg Oral QHS  [Held by provider] magnesium oxide (MAG-OX) tablet 400 mg  400 mg Oral BID  sodium chloride (NS) flush 5-40 mL  5-40 mL IntraVENous Q8H  
 sodium chloride (NS) flush 5-40 mL  5-40 mL IntraVENous PRN  
 diphenhydrAMINE (BENADRYL) injection 12.5 mg  12.5 mg IntraVENous Q4H PRN  
 ondansetron (ZOFRAN) injection 4 mg  4 mg IntraVENous Q4H PRN  
 enoxaparin (LOVENOX) injection 40 mg  40 mg SubCUTAneous Q12H  
 albuterol (PROVENTIL VENTOLIN) nebulizer solution 2.5 mg  2.5 mg Nebulization Q4H PRN  
 dupilumab (DUPIXENT) 300 mg/2 mL syringe  300 mg SubCUTAneous Q7D Objective:  
 
Vitals:  
 04/04/19 4347 04/04/19 0777 04/04/19 8985 04/04/19 0750 BP: 161/86  167/86 Pulse: 67 60 60 60 Resp: (!) 32 28 24 27 Temp:      
SpO2: 98% 99% 99% 99% Weight:      
Height: PHYSICAL EXAM  
Ventilator Settings Mode FIO2 Rate Tidal Volume Pressure PEEP Pressure support  40 %    0 ml  16 cm H2O  8 cm H20 Peak airway pressure: 25 cm H2O Minute ventilation: 12 l/min Constitutional:  the patient is well developed and in no acute distress on ventilator EENMT:  Sclera clear, pupils equal, oral mucosa moist 
Respiratory: CTA Cardiovascular:  RRR without M,G,R 
Gastrointestinal: soft and non-tender; with positive bowel sounds. Musculoskeletal: warm without cyanosis. There is 1+ lower leg edema. Skin:  no jaundice or rashes, abdominal wound with foul drainage Neurologic: no gross neuro deficits Psychiatric:  alert and oriented x 3 CXR:  Low lung volumes, bilateral infiltrates Recent Labs 04/04/19 
0150 04/03/19 
0935 WBC 14.5* 17.2* HGB 8.2* 10.2* HCT 25.2* 31.5*  187 Recent Labs 04/04/19 
1367 04/03/19 
0935 04/03/19 
0540   --  139  
K 4.0  --  4.8  
*  --  110* *  --  146* CO2 25  --  24 BUN 14  --  13  
CREA 1.01*  --  1.09* MG 1.9  --   --   
CA 8.6  --  8.5 ALB  --  2.0*  --   
TBILI  --  0.5  --   
ALT  --  63  --   
SGOT  --  131*  -- No results for input(s): PH, PCO2, PO2, HCO3 in the last 72 hours. Recent Labs 04/03/19 
6678 LAC 1.3 Assessment:  (Medical Decision Making) Hospital Problems  Date Reviewed: 3/26/2019 Codes Class Noted POA Acute hypoxemic respiratory failure ([facility] ICD-10-CM: J96.01 
ICD-9-CM: 518.81  4/3/2019 Unknown ARDS (adult respiratory distress syndrome) ([facility] ICD-10-CM: J56 
ICD-9-CM: 518.52  4/3/2019 Unknown * (Principal) Incarcerated incisional hernia ICD-10-CM: K43.0 ICD-9-CM: 552.21  3/26/2019 Yes BA on CPAP (Chronic) ICD-10-CM: G47.33, Z99.89 ICD-9-CM: 327.23, V46.8  7/14/2014 Yes Morbid obesity ([facility] (Chronic) ICD-10-CM: E66.01 
ICD-9-CM: 278.01  7/14/2014 Yes Chronic systolic heart failure (HCC) (Chronic) ICD-10-CM: V99.51 ICD-9-CM: 428.22  7/18/2013 Yes Overview Signed 3/30/2016  8:11 AM by Guerline Casey NST 4/15:low risk Echo 6/2014:EF 40-45% Echo 5/2013:EF 30-35% LHC 2/2013: minimal CAD Long-term insulin use in type 2 diabetes ([facility] ICD-10-CM: E11.9, Z79.4 ICD-9-CM: 250.00, V58.67  4/12/2010 Yes 51 y/o female with incarcerated hernia and dehiscense, complicated hospital course now with acute hypoxemic respiratory failure which appears to be ARDS from pulmonary or abdominal source. Plan:  (Medical Decision Making) NEURO:  
1. Sedation: Precedex, propofol- currently hypertensive 2. Analgesia: Fentanyl gtt Patient awake and alert and communicating via writing- minimize sedation as tolerated CV: 1. NICM: monitor I/O,  central line in place CVP-22- give one dose of lasix IV PULM:  
1. ARDS w/ Acute hypoxemic/hypercapneic respiratory failure: tolerating PRVC well, wean O2 and PEEP as able. Titrate Min Vent to pH/CO2. Low Tidal Volume ventilation. 2. Pneumonia vs ARDS: get respiratory culture, on Vanc and Zosyn day#2 WBC down T max in past 24h 100.8- afebrile this AM . Results for Cookie Solis (MRN [id number]) as of 4/4/2019 08:23 Ref. Range 4/4/2019 03:24 Exhaled minute volume Latest Units: L/min 8.00 Critical value read back Unknown 03:23  
pH (POC) Latest Ref Range: 7.35 - 7.45   7.393 pCO2 (POC) Latest Ref Range: 35 - 45 MMHG 35.7  
pO2 (POC) Latest Ref Range: 75 - 100 MMHG 100 HCO3 (POC) Latest Ref Range: 22 - 26 MMOL/L 21.7 (L)  
sO2 (POC) Latest Ref Range: 95 - 98 % 98 Base deficit (POC) Latest Units: mmol/L 3 FIO2 (POC) Latest Units: % 45 Patient temp. Latest Units:   99.6 Specimen type (POC) Latest Units:   ARTERIAL Set Rate Latest Units: bpm 12 Site Latest Units:   RIGHT RADIAL Device: Latest Units:   VENT Mode Latest Units:   Pressure regulate. .. Tidal volume Latest Units: ml 400 PEEP/CPAP (POC) Latest Units: cmH2O 8 Allens test (POC) Latest Units:   YES Inspiratory Time Latest Units: sec 0.9 Assess for extubation in AM 
 
RENAL: 
1. Dark urine: urine culture pending, continue IVF, reportedly improved from yesterday Creatinine normal 
GI: NPO, PPI prophy, 
1. Nutrition: remains NPO, per surgery 2. Hernia repair: per surgery 3. Abdominal drainage: ?abdominal source of infection, consider CT scan HEME: no acute issues ID: WBC elevated, afebrile. 1. Sepsis: lung vs abdomen. Cultures pending, broad spectrum antibiotics, source control- see above ENDO: 
1. DM: had episodes of hypoglycemia. Continue D5 with fluid. Monitor FS Q6H Skin: no decub, turns, preventive care Prophy: Lovenox, PPI Full Code Total cc time spent 35min Jaswant Charles MD.

## 2019-04-04 NOTE — PROGRESS NOTES
Weaned patient from [facility] to PS. The PS of 16 was chosen  to equal the same minute volume as when on PRVC. Patient is tolerating well.

## 2019-04-04 NOTE — PROGRESS NOTES
Seen in ICU Sedated-precedex, fentanyl drips 
arousable 
abd soft. JPs both clearing- nonpurulent Labs reviewed. On abx for presumed pneumonia. Note made of UA with >100k GNR Plan- 
Per intensivist;  Appreciate mgmt.

## 2019-04-04 NOTE — PROGRESS NOTES
Ventilator check complete; patient has a #8. 0 ET tube secured at the 21 at the teeth. Patient is not sedated. Patient is able to follow commands. Breath sounds are clear. Trachea is midline, Negative for subcutaneous air, and chest excursion is symmetric. Patient is also Negative for cyanosis and is Negative for pitting edema. All alarms are set and audible. Resuscitation bag is at the head of the bed. Ventilator Settings Mode FIO2 Rate Tidal Volume Pressure PEEP I:E Ratio PRVC  45 %    400 ml     8 cm H20  1:1.7 Peak airway pressure: 25 cm H2O Minute ventilation: 8 l/min ABG: No results for input(s): PH, PCO2, PO2, HCO3 in the last 72 hours. ABG Results: 7.39 / 34.9 / 97 / HCO3 21.7   Drawn at 03:10 Sheila Srinivasan RT

## 2019-04-04 NOTE — PROGRESS NOTES
Ventilator check complete; patient has a #8. 0 ET tube secured at the 21 at the teeth. Patient is not sedated. Patient is able to follow commands. Breath sounds are clear. Trachea is midline, Negative for subcutaneous air, and chest excursion is symmetric. Patient is also Negative for cyanosis and is Negative for pitting edema. All alarms are set and audible. Resuscitation bag is at the head of the bed. Ventilator Settings Mode FIO2 Rate Tidal Volume Pressure PEEP I:E Ratio PRVC  45 %    400 ml     8 cm H20 Peak airway pressure: 26 cm H2O Minute ventilation: 8.7 l/min ABG: No results for input(s): PH, PCO2, PO2, HCO3 in the last 72 hours.  
 
 
Roberta Clark, RT

## 2019-04-04 NOTE — PROGRESS NOTES
Bedside, Verbal and Written shift change report given to Jonnathan Fischer RN (oncoming nurse) by Vonnie Nicholson RN (offgoing nurse). Report included the following information SBAR, Kardex, ED Summary, Intake/Output, MAR, Recent Results, Cardiac Rhythm paced and Alarm Parameters .

## 2019-04-05 ENCOUNTER — APPOINTMENT (OUTPATIENT)
Dept: GENERAL RADIOLOGY | Age: 54
DRG: 353 | End: 2019-04-05
Attending: INTERNAL MEDICINE
Payer: MEDICARE

## 2019-04-05 PROBLEM — N39.0 PSEUDOMONAS URINARY TRACT INFECTION: Status: ACTIVE | Noted: 2019-04-05

## 2019-04-05 PROBLEM — B96.5 PSEUDOMONAS URINARY TRACT INFECTION: Status: ACTIVE | Noted: 2019-04-05

## 2019-04-05 LAB
ANION GAP SERPL CALC-SCNC: 7 MMOL/L
ARTERIAL PATENCY WRIST A: YES
ATRIAL RATE: 60 BPM
BASE DEFICIT BLD-SCNC: 1 MMOL/L
BASOPHILS # BLD: 0.1 K/UL (ref 0–0.2)
BASOPHILS NFR BLD: 0 % (ref 0–2)
BDY SITE: ABNORMAL
BODY TEMPERATURE: 98.6
BUN SERPL-MCNC: 12 MG/DL (ref 6–23)
CALCIUM SERPL-MCNC: 9 MG/DL (ref 8.3–10.4)
CALCULATED P AXIS, ECG09: 75 DEGREES
CALCULATED R AXIS, ECG10: 20 DEGREES
CALCULATED T AXIS, ECG11: 32 DEGREES
CHLORIDE SERPL-SCNC: 105 MMOL/L (ref 98–107)
CO2 BLD-SCNC: 25 MMOL/L
CO2 SERPL-SCNC: 27 MMOL/L (ref 21–32)
COLLECT TIME,HTIME: 500
CREAT SERPL-MCNC: 0.94 MG/DL (ref 0.6–1)
DIAGNOSIS, 93000: NORMAL
DIFFERENTIAL METHOD BLD: ABNORMAL
EOSINOPHIL # BLD: 0.8 K/UL (ref 0–0.8)
EOSINOPHIL NFR BLD: 5 % (ref 0.5–7.8)
ERYTHROCYTE [DISTWIDTH] IN BLOOD BY AUTOMATED COUNT: 13.7 % (ref 11.9–14.6)
EXHALED MINUTE VOLUME, VE: 8.2 L/MIN
GAS FLOW.O2 O2 DELIVERY SYS: ABNORMAL L/MIN
GAS FLOW.O2 SETTING OXYMISER: 14 BPM
GLUCOSE BLD STRIP.AUTO-MCNC: 117 MG/DL (ref 65–100)
GLUCOSE BLD STRIP.AUTO-MCNC: 129 MG/DL (ref 65–100)
GLUCOSE BLD STRIP.AUTO-MCNC: 130 MG/DL (ref 65–100)
GLUCOSE BLD STRIP.AUTO-MCNC: 189 MG/DL (ref 65–100)
GLUCOSE SERPL-MCNC: 131 MG/DL (ref 65–100)
HCO3 BLD-SCNC: 23.7 MMOL/L (ref 22–26)
HCT VFR BLD AUTO: 25.3 % (ref 35.8–46.3)
HGB BLD-MCNC: 8.2 G/DL (ref 11.7–15.4)
IMM GRANULOCYTES # BLD AUTO: 0.5 K/UL (ref 0–0.5)
IMM GRANULOCYTES NFR BLD AUTO: 3 % (ref 0–5)
INSPIRATION.DURATION SETTING TIME VENT: 0.9 SEC
LYMPHOCYTES # BLD: 2.4 K/UL (ref 0.5–4.6)
LYMPHOCYTES NFR BLD: 15 % (ref 13–44)
MAGNESIUM SERPL-MCNC: 1.7 MG/DL (ref 1.8–2.4)
MCH RBC QN AUTO: 31.4 PG (ref 26.1–32.9)
MCHC RBC AUTO-ENTMCNC: 32.4 G/DL (ref 31.4–35)
MCV RBC AUTO: 96.9 FL (ref 79.6–97.8)
MM INDURATION POC: 0 MM (ref 0–5)
MONOCYTES # BLD: 1.4 K/UL (ref 0.1–1.3)
MONOCYTES NFR BLD: 9 % (ref 4–12)
NEUTS SEG # BLD: 10.6 K/UL (ref 1.7–8.2)
NEUTS SEG NFR BLD: 67 % (ref 43–78)
NRBC # BLD: 0.04 K/UL (ref 0–0.2)
O2/TOTAL GAS SETTING VFR VENT: 45 %
P-R INTERVAL, ECG05: 150 MS
PCO2 BLD: 39.2 MMHG (ref 35–45)
PEEP RESPIRATORY: 8 CMH2O
PH BLD: 7.39 [PH] (ref 7.35–7.45)
PLATELET # BLD AUTO: 174 K/UL (ref 150–450)
PMV BLD AUTO: 11 FL (ref 9.4–12.3)
PO2 BLD: 106 MMHG (ref 75–100)
POTASSIUM SERPL-SCNC: 3.4 MMOL/L (ref 3.5–5.1)
PPD POC: NORMAL NEGATIVE
Q-T INTERVAL, ECG07: 490 MS
QRS DURATION, ECG06: 114 MS
QTC CALCULATION (BEZET), ECG08: 490 MS
RBC # BLD AUTO: 2.61 M/UL (ref 4.05–5.2)
SAO2 % BLD: 98 % (ref 95–98)
SERVICE CMNT-IMP: ABNORMAL
SODIUM SERPL-SCNC: 139 MMOL/L (ref 136–145)
SPECIMEN TYPE: ABNORMAL
VENTILATION MODE VENT: ABNORMAL
VENTRICULAR RATE, ECG03: 60 BPM
VT SETTING VENT: 400 ML
WBC # BLD AUTO: 15.8 K/UL (ref 4.3–11.1)

## 2019-04-05 PROCEDURE — 77030020256 HC SOL INJ NACL 0.9%  500ML

## 2019-04-05 PROCEDURE — 74011250636 HC RX REV CODE- 250/636: Performed by: INTERNAL MEDICINE

## 2019-04-05 PROCEDURE — 74011000250 HC RX REV CODE- 250: Performed by: INTERNAL MEDICINE

## 2019-04-05 PROCEDURE — 77030032490 HC SLV COMPR SCD KNE COVD -B

## 2019-04-05 PROCEDURE — 74011000258 HC RX REV CODE- 258: Performed by: INTERNAL MEDICINE

## 2019-04-05 PROCEDURE — 94664 DEMO&/EVAL PT USE INHALER: CPT

## 2019-04-05 PROCEDURE — 74011000250 HC RX REV CODE- 250: Performed by: SURGERY

## 2019-04-05 PROCEDURE — 71045 X-RAY EXAM CHEST 1 VIEW: CPT

## 2019-04-05 PROCEDURE — 83735 ASSAY OF MAGNESIUM: CPT

## 2019-04-05 PROCEDURE — 93005 ELECTROCARDIOGRAM TRACING: CPT | Performed by: INTERNAL MEDICINE

## 2019-04-05 PROCEDURE — 80048 BASIC METABOLIC PNL TOTAL CA: CPT

## 2019-04-05 PROCEDURE — 99291 CRITICAL CARE FIRST HOUR: CPT | Performed by: INTERNAL MEDICINE

## 2019-04-05 PROCEDURE — P9047 ALBUMIN (HUMAN), 25%, 50ML: HCPCS | Performed by: INTERNAL MEDICINE

## 2019-04-05 PROCEDURE — 74011250636 HC RX REV CODE- 250/636: Performed by: SURGERY

## 2019-04-05 PROCEDURE — 77030013794 HC KT TRNSDUC BLD EDWD -B

## 2019-04-05 PROCEDURE — 65610000001 HC ROOM ICU GENERAL

## 2019-04-05 PROCEDURE — 74011250637 HC RX REV CODE- 250/637: Performed by: SURGERY

## 2019-04-05 PROCEDURE — 74011636637 HC RX REV CODE- 636/637: Performed by: INTERNAL MEDICINE

## 2019-04-05 PROCEDURE — 74011250637 HC RX REV CODE- 250/637: Performed by: INTERNAL MEDICINE

## 2019-04-05 PROCEDURE — 36600 WITHDRAWAL OF ARTERIAL BLOOD: CPT

## 2019-04-05 PROCEDURE — 74011000250 HC RX REV CODE- 250

## 2019-04-05 PROCEDURE — 85025 COMPLETE CBC W/AUTO DIFF WBC: CPT

## 2019-04-05 PROCEDURE — 82803 BLOOD GASES ANY COMBINATION: CPT

## 2019-04-05 PROCEDURE — 82962 GLUCOSE BLOOD TEST: CPT

## 2019-04-05 PROCEDURE — 94640 AIRWAY INHALATION TREATMENT: CPT

## 2019-04-05 PROCEDURE — 87070 CULTURE OTHR SPECIMN AEROBIC: CPT

## 2019-04-05 PROCEDURE — 77030020250 HC SOL INJ D 5% LFCR 1000ML BG LF

## 2019-04-05 PROCEDURE — 94660 CPAP INITIATION&MGMT: CPT

## 2019-04-05 RX ORDER — ALPRAZOLAM 0.5 MG/1
0.5 TABLET ORAL
Status: DISCONTINUED | OUTPATIENT
Start: 2019-04-05 | End: 2019-04-25

## 2019-04-05 RX ORDER — POTASSIUM CHLORIDE 14.9 MG/ML
20 INJECTION INTRAVENOUS
Status: COMPLETED | OUTPATIENT
Start: 2019-04-05 | End: 2019-04-05

## 2019-04-05 RX ORDER — FUROSEMIDE 10 MG/ML
40 INJECTION INTRAMUSCULAR; INTRAVENOUS EVERY 12 HOURS
Status: COMPLETED | OUTPATIENT
Start: 2019-04-05 | End: 2019-04-06

## 2019-04-05 RX ORDER — DEXAMETHASONE SODIUM PHOSPHATE 100 MG/10ML
8 INJECTION INTRAMUSCULAR; INTRAVENOUS EVERY 6 HOURS
Status: COMPLETED | OUTPATIENT
Start: 2019-04-05 | End: 2019-04-06

## 2019-04-05 RX ORDER — VANCOMYCIN HYDROCHLORIDE
1250 EVERY 12 HOURS
Status: DISCONTINUED | OUTPATIENT
Start: 2019-04-05 | End: 2019-04-06

## 2019-04-05 RX ADMIN — ENOXAPARIN SODIUM 40 MG: 40 INJECTION SUBCUTANEOUS at 08:29

## 2019-04-05 RX ADMIN — Medication 125 MCG/HR: at 06:44

## 2019-04-05 RX ADMIN — POTASSIUM CHLORIDE 20 MEQ: 200 INJECTION, SOLUTION INTRAVENOUS at 05:17

## 2019-04-05 RX ADMIN — HYDROMORPHONE HYDROCHLORIDE 1 MG: 2 INJECTION, SOLUTION INTRAMUSCULAR; INTRAVENOUS; SUBCUTANEOUS at 13:41

## 2019-04-05 RX ADMIN — PRAVASTATIN SODIUM 80 MG: 80 TABLET ORAL at 21:30

## 2019-04-05 RX ADMIN — FUROSEMIDE 40 MG: 10 INJECTION, SOLUTION INTRAMUSCULAR; INTRAVENOUS at 09:41

## 2019-04-05 RX ADMIN — POTASSIUM CHLORIDE 20 MEQ: 200 INJECTION, SOLUTION INTRAVENOUS at 07:25

## 2019-04-05 RX ADMIN — FUROSEMIDE 40 MG: 10 INJECTION, SOLUTION INTRAMUSCULAR; INTRAVENOUS at 20:58

## 2019-04-05 RX ADMIN — HUMAN INSULIN 2 UNITS: 100 INJECTION, SOLUTION SUBCUTANEOUS at 23:38

## 2019-04-05 RX ADMIN — DEXAMETHASONE SODIUM PHOSPHATE 8 MG: 10 INJECTION INTRAMUSCULAR; INTRAVENOUS at 16:54

## 2019-04-05 RX ADMIN — ALBUMIN (HUMAN) 25 G: 0.25 INJECTION, SOLUTION INTRAVENOUS at 16:15

## 2019-04-05 RX ADMIN — DEXTROSE MONOHYDRATE 25 ML/HR: 5 INJECTION, SOLUTION INTRAVENOUS at 21:37

## 2019-04-05 RX ADMIN — ALBUTEROL SULFATE 2.5 MG: 2.5 SOLUTION RESPIRATORY (INHALATION) at 16:03

## 2019-04-05 RX ADMIN — PIPERACILLIN SODIUM,TAZOBACTAM SODIUM 4.5 G: 4; .5 INJECTION, POWDER, FOR SOLUTION INTRAVENOUS at 18:22

## 2019-04-05 RX ADMIN — HYDROMORPHONE HYDROCHLORIDE 0.5 MG: 2 INJECTION, SOLUTION INTRAMUSCULAR; INTRAVENOUS; SUBCUTANEOUS at 19:12

## 2019-04-05 RX ADMIN — VANCOMYCIN HYDROCHLORIDE 1500 MG: 10 INJECTION, POWDER, LYOPHILIZED, FOR SOLUTION INTRAVENOUS at 09:51

## 2019-04-05 RX ADMIN — AMIODARONE HYDROCHLORIDE 200 MG: 200 TABLET ORAL at 08:29

## 2019-04-05 RX ADMIN — Medication 20 ML: at 13:43

## 2019-04-05 RX ADMIN — CARVEDILOL 12.5 MG: 6.25 TABLET, FILM COATED ORAL at 16:15

## 2019-04-05 RX ADMIN — DEXMEDETOMIDINE HYDROCHLORIDE 0.5 MCG/KG/HR: 100 INJECTION, SOLUTION INTRAVENOUS at 12:03

## 2019-04-05 RX ADMIN — ALBUMIN (HUMAN) 25 G: 0.25 INJECTION, SOLUTION INTRAVENOUS at 05:17

## 2019-04-05 RX ADMIN — PIPERACILLIN SODIUM,TAZOBACTAM SODIUM 4.5 G: 4; .5 INJECTION, POWDER, FOR SOLUTION INTRAVENOUS at 09:41

## 2019-04-05 RX ADMIN — DEXAMETHASONE SODIUM PHOSPHATE 8 MG: 10 INJECTION INTRAMUSCULAR; INTRAVENOUS at 23:39

## 2019-04-05 RX ADMIN — HYDROMORPHONE HYDROCHLORIDE 0.5 MG: 2 INJECTION, SOLUTION INTRAMUSCULAR; INTRAVENOUS; SUBCUTANEOUS at 23:47

## 2019-04-05 RX ADMIN — PIPERACILLIN SODIUM,TAZOBACTAM SODIUM 4.5 G: 4; .5 INJECTION, POWDER, FOR SOLUTION INTRAVENOUS at 01:45

## 2019-04-05 RX ADMIN — Medication 40 ML: at 21:36

## 2019-04-05 RX ADMIN — RACEPINEPHRINE HYDROCHLORIDE 0.5 ML: 11.25 SOLUTION RESPIRATORY (INHALATION) at 16:04

## 2019-04-05 RX ADMIN — VANCOMYCIN HYDROCHLORIDE 1250 MG: 10 INJECTION, POWDER, LYOPHILIZED, FOR SOLUTION INTRAVENOUS at 21:31

## 2019-04-05 RX ADMIN — Medication 400 MG: at 18:23

## 2019-04-05 RX ADMIN — ENOXAPARIN SODIUM 40 MG: 40 INJECTION SUBCUTANEOUS at 20:58

## 2019-04-05 RX ADMIN — DEXMEDETOMIDINE HYDROCHLORIDE 0.6 MCG/KG/HR: 100 INJECTION, SOLUTION INTRAVENOUS at 02:34

## 2019-04-05 RX ADMIN — HYDROMORPHONE HYDROCHLORIDE 2 MG: 2 INJECTION, SOLUTION INTRAMUSCULAR; INTRAVENOUS; SUBCUTANEOUS at 08:17

## 2019-04-05 RX ADMIN — CARVEDILOL 12.5 MG: 6.25 TABLET, FILM COATED ORAL at 08:30

## 2019-04-05 RX ADMIN — ALPRAZOLAM 0.5 MG: 0.5 TABLET ORAL at 16:15

## 2019-04-05 NOTE — PROGRESS NOTES
Nutrition follow-up: 
Reason for initial assessment:  Length of stay. Assessment: 
Diet Order:  NPO; pt has been NPO / clear liquids majority of stay with brief period on full liquids. Pt admitted 3/26 for abdominal hernia repair, complicated by abdominal wall dehiscence and return to OR 3/31 for hernia repair with mesh. Pt with acute hypoxic respiratory failure 4/03; transferred to [facility] intubation 4/03. Pt had a [facility] [employer] on 4/03-R IJ. Pt extubated 4/05. Abdomen:  Obese, tender, semi soft. Midline incision.-abdominal binder in place Last documented BM 3/29. Currently -flatus, -BM Extremities:   Trace generalized edema. Pertinent Labs: Glucose 131 mg/dl on AM BMP. POC glucose range today:  1 30-129mg/dl, hypomagenesemia-bolus held per provider, hypokalemia-received K bolus Pertinent Rx:  IVF: D5% 25  ml/hr provides 102 non protein kcal/day, insulin-regular, buminate, Lasix, Vanc Anthropometrics: Height: 4' 11\" (149.9 cm), Weight: 100.4 kg (221 lb 4.8 oz), unspecified, Body mass index is 44.7 kg/m². BMI class of morbid obesity. Macronutrient needs: EER:  6114-7450 kcal /day (11-14 kcal/kg BW) EPR:  ~111gm/day (2.5 gm/kg/IBW) GFR >60 Max CHO:  256 grams/day (4mg/kg IBW/min) Intake/Comparative Standards:  
Current NPO does not meet kcal or protein needs Current IVF provide 102 nonprotein kcal/day. Intervention: 
Meals and Snacks:  NPO It is reasonable to wait 7-10 days before initiating TPN in a patient with no or low nutrition risk. Will await start or progression of po diet as medical condition dictates. Otherwise, F/U will be per standard of care or by MD consult if prior. Collaboration of Nutrition Services:  Spoke with Jazmyn Wilcox RN r/t plan of care; pt with minimal po intake past 10 days; states plan of care per surgeon; she will address with surgeon. Discharge nutrition plan: Too soon to determine. Froylan Velez, MPH, [facility],  
[phone]

## 2019-04-05 NOTE — PROGRESS NOTES
Ventilator check complete; patient has a #8. 0 ET tube secured at the 21 at the teeth. Patient is not sedated. Patient is able to follow commands. Breath sounds are clear. Trachea is midline, Negative for subcutaneous air, and chest excursion is symmetric. Patient is also Negative for cyanosis and is Negative for pitting edema. All alarms are set and audible. Resuscitation bag is at the head of the bed. Ventilator Settings Mode FIO2 Rate Tidal Volume Pressure PEEP I:E Ratio PRVC  40 %    400 ml  16 cm H2O  8 cm H20  1:1.7 Peak airway pressure: 24 cm H2O Minute ventilation: 6.6 l/min ABG: No results for input(s): PH, PCO2, PO2, HCO3 in the last 72 hours.  
 
 
Ashley Kim, RT

## 2019-04-05 NOTE — PROGRESS NOTES
Dr. Patterson updated post-extubation. VSS; SAT remains 90's-100's on 4L O2 via n/c; RR 20-30. MD notified of patient to severe anxiety. States she \"can't feel the oxygen. \" Emotional support provided. MD order for 0.5 mg Xanax Q8H PRN; will continue to monitor.

## 2019-04-05 NOTE — PROGRESS NOTES
Pt extubated per RT; RN at bedside; tolerated well. RR 22 post extubation; O2  on 4L O2 via n/c. Will continue to monitor.

## 2019-04-05 NOTE — PROGRESS NOTES
Bedside shift report received from Earl Mcqueen RN. Pt resting on ventilator at this time; FiO2 40%. Abdominal dressing saturated/ intact; changed at this time. R CVC dressing loose; changed at this time. NGT to suction started per orders. Bed alarm on, L/L, SR up x3, personal items/ call light within reach. Will continue to monitor.

## 2019-04-05 NOTE — PROGRESS NOTES
Seen in ICU Alert and sitting in recliner. Answers questions appropriately. Abd soft, Abd binder in place. Dressing c/d/i. JENNIFER drain x 2. Both with nonpurulent output. Left 80mL 24hr output, Right 15mL 24hr output NG tube patent - approx 200mL 24hour output 
-flatus/ -BM Clarke patent - 4050mL 24hr output Max Temp 101.2 WBC 15.8 CXR today - IMPRESSION: Bibasilar pneumonia unchanged. O2 @ 4L via NC Plan- 
Per intensivist;  Eyal Montemayor IV Abx - Vanc and Zosyn day#3 Jazmine Lewis NP

## 2019-04-05 NOTE — PROGRESS NOTES
Returned patient to [facility] for resting.

## 2019-04-05 NOTE — PROGRESS NOTES
Pt unable to tolerate PS mode on vent. Ventilator began alarming and went into back up mode. RT at bedside. Pt put back on PRVC. Will continue to wean sedation and monitor pt closely.

## 2019-04-05 NOTE — PROGRESS NOTES
Pt remains up in chair resting; 5L O2 n/c; VSS. Clarke draining and patent. 3500mL analisa UOP with sediment this shift. NGT clamped at this time post-med admin to be restarted to low int suction; night shift RN aware.  
 
15mL serosanguineous output from R JENNIFER; 45mL serosanguineous output from L JENNIFER; both charged and draining to gravity at this time. Central line dressing/ abdominal dressing remain cdi. Call light/ personal items within reach. Bedside shift report given to [facility], RN.

## 2019-04-05 NOTE — PROGRESS NOTES
PROGRESS NOTE Susan Dewitt 
 
4/5/2019 Date of Admission:  3/26/2019 The patient's chart is reviewed and the patient is discussed with the staff. Patient is a 52 y.o.  female seen and evaluated at the request of Dr. Sara Ragsdale. She was admitted 3/26 for abdominal hernia repair, complicated by abdominal wall dehiscence and return to OR on 3/31 for hernia repair with mesh. She has continued to have abdominal pain and had multiple episodes of nausea yesterday and NGT had to be placed back to suction. She has had O2 requirement for several days, but worsened over the past 24 hours from 5L to HF 11 and then was hypoxemic on this and tachypneic to 40s RR this morning when a RRT was called. She was placed on NRB, moved to ICU and then placed on BIPAP. In ICU she had initial temp of 100.9. Urinalysis and Urine culture was sent yesterday due to analisa color with no growth to date. She reports she has been having more and more difficulty breathing overnight. Feels she is very dehydrated and mouth has been dry. She hasn't noted any fevers. She didn't want to speak much more due to dyspnea and RR on BIPAP was in mid 40s to 50s. She was intubated on 4/3/19. Subjective: No events overnight, awake and alert on ventilator. Diuresed 1.6L with one dose of lasix yesterday, CXR today, more congested still with elevated Aa gradient, tachypneic on PSV 10/8. Review of Systems A comprehensive review of systems was negative except for that written in the HPI. Patient Active Problem List  
Diagnosis Code  Long-term insulin use in type 2 diabetes ([facility] E11.9, Z79.4  Tobacco use disorder F17.200  
 HTN (hypertension) I10  
 Dyslipidemia E78.5  NICM (nonischemic cardiomyopathy) ([facility] I42.8  Chronic systolic heart failure (HCC) I50.22  
 BA on CPAP G47.33, Z99.89  
 Morbid obesity (HCC) E66.01  
 CAD in native artery I25.10  Automatic implantable cardioverter-defibrillator in situ Z95.810  Ventricular tachycardia (HCC) I47.2  Atypical chest pain R07.89  Chronic right-sided thoracic back pain M54.6, G89.29  Chronic left-sided low back pain with sciatica M54.40, G89.29  Type 2 diabetes with nephropathy (HCC) E11.21  
 Abdominal wall hernia K43.9  Incarcerated incisional hernia K43.0  Acute hypoxemic respiratory failure (HCC) J96.01  
 ARDS (adult respiratory distress syndrome) ([facility] J80 Prior to Admission Medications Prescriptions Last Dose Informant Patient Reported? Taking? Blood-Glucose Meter (ONETOUCH ULTRA2) monitoring kit 3/26/2019 at Unknown time  No Yes Sig: Check blood sugar daily, E11.9 DUPIXENT 300 mg/2 mL syrg syringe 3/25/2019 at Unknown time  Yes Yes Sig: by SubCUTAneous route every fourteen (14) days. Insulin Needles, Disposable, (FABIOLA PEN NEEDLE) 32 gauge x 5/32\" ndle 3/25/2019 at Unknown time  No Yes Si Each by Does Not Apply route daily as needed. E11.9  
albuterol (PROVENTIL HFA, VENTOLIN HFA, PROAIR HFA) 90 mcg/actuation inhaler   No Yes Sig: Take 1 Puff by inhalation every four (4) hours as needed for Wheezing. amiodarone (CORDARONE) 200 mg tablet 3/26/2019 at Unknown time  No Yes Sig: Take 1 Tab by mouth two (2) times a day. Patient taking differently: Take 200 mg by mouth daily. aspirin (ASPIRIN) 325 mg tablet 3/25/2019 at Unknown time  No Yes Sig: Take 1 Tab by mouth daily. Patient taking differently: Take 81 mg by mouth daily. carvedilol (COREG) 25 mg tablet 3/26/2019 at Unknown time  No Yes Sig: Take 1 Tab by mouth two (2) times daily (with meals). Patient taking differently: Take 12.5 mg by mouth two (2) times daily (with meals). cholecalciferol (VITAMIN D3) 1,000 unit cap 3/19/2019 at Unknown time  Yes Yes Sig: Take  by mouth daily.   
clotrimazole-betamethasone (LOTRISONE) topical cream Not Taking at Unknown time  No No  
 Sig: Apply  to affected area two (2) times a day. Patient not taking: Reported on 3/20/2019  
cpap machine kit 3/25/2019 at Unknown time  Yes Yes Sig: by Does Not Apply route. 11 cm  
furosemide (LASIX) 80 mg tablet 3/19/2019 at Unknown time  No Yes Sig: Taking 1 tablet in the morning and 1/2 tablet in the afternoon Patient taking differently: Take 40 mg by mouth two (2) times a day. Taking 1 tablet in the morning and 1/2 tablet in the afternoon  
glucose blood VI test strips (ONETOUCH ULTRA TEST) strip 3/26/2019 at Unknown time  No Yes Sig: Check blood sugar daily, E11.9  
insulin glargine (LANTUS SOLOSTAR U-100 INSULIN) 100 unit/mL (3 mL) inpn 3/25/2019 at Unknown time  No Yes Si Units by SubCUTAneous route daily. Patient taking differently: 35 Units by SubCUTAneous route nightly. isosorbide mononitrate ER (IMDUR) 30 mg tablet 3/26/2019 at Unknown time  No Yes Sig: Take 1 Tab by mouth every morning. lancets (ONETOUCH SURESOFT LANCING DEV) misc 3/26/2019 at Unknown time  No Yes Sig: Check blood sugar daily, E11.9  
magnesium oxide (MAG-OX) 400 mg tablet 3/26/2019 at Unknown time  No Yes Sig: Take 1 Tab by mouth two (2) times a day. metFORMIN (GLUCOPHAGE) 500 mg tablet 3/19/2019 at Unknown time  No Yes Sig: Take 1 Tab by mouth two (2) times daily (with meals). nitroglycerin (NITROSTAT) 0.4 mg SL tablet   Yes Yes Si.4 mg by SubLINGual route every five (5) minutes as needed for Chest Pain. Up to 3 doses. pravastatin (PRAVACHOL) 80 mg tablet 3/19/2019 at Unknown time  No Yes Sig: Take 1 Tab by mouth nightly. triamcinolone acetonide (KENALOG) 0.1 % topical cream   Yes Yes Sig: as needed. Facility-Administered Medications: None Past Medical History:  
Diagnosis Date  Allergic rhinitis   
 followed by allergist; allergic to grass- has rescue inhaler PRN  
 Arthritis  Automatic implantable cardioverter-defibrillator in situ 3/30/2016  CAD in native artery 3/30/2016  Chronic systolic heart failure ([facility] 2013 ECHO 2017- EF 39%; managed with medications; followed by Dr Scott Ragsdale [facility])  CKD (chronic kidney disease) [facility] follows  Diabetes ([facility] [address] type 2 (on insulin); FBS AM range- , hypo s/s <70, hgba1c- 5.8 (2018)  Diabetes mellitus ([facility] [address] [facility] 2010  Dyslipidemia 2013  Eczema  Fibromyalgia  GERD (gastroesophageal reflux disease)   
 hx of- no meds currently  Headache   
 Heart failure ([facility] [address] [facility] chronic systolic with EF 79%; non-ischemic cardiomyopathy  HTN (hypertension) 2010  Hypercholesterolemia  Morbid obesity ([facility]  Neuropathy   
 bilateral feet and tips of fingers  NICM [facility]nonischemic cardiomyopathy) [facility] 2/15/2013  Obesity   
 bmi- 41  
 Obstructive sleep apnea (adult) (pediatric) 2014  
 uses cpap qhs  
 Sciatic pain 2016  SVT (supraventricular tachycardia) (HCC)   
 ablation for svt  Tobacco use disorder 2010 Past Surgical History:  
Procedure Laterality Date  HX  SECTION    
 x1  
 HX HERNIA REPAIR    
 mild incarceration, no bowel removal  
 HX HYSTERECTOMY    
 YEIMI-Left ovary intact per pt  HX IMPLANTABLE CARDIOVERTER DEFIBRILLATOR  2013 Biotronik dual chamber ICD by Dr. Mani Pereira  HX IMPLANTABLE CARDIOVERTER DEFIBRILLATOR  2013 Biotronik dual chamber ICD by Dr. Mani Pereira  HX ROTATOR CUFF REPAIR Right  HX SVT ABLATION  \"years ago\"  HX TONSILLECTOMY  CT LEFT HEART CATH,PERCUTANEOUS  2013  
 no intervention Social History Socioeconomic History  Marital status: SINGLE Spouse name: Not on file  Number of children: Not on file  Years of education: Not on file  Highest education level: Not on file Occupational History  Not on file Social Needs  Financial resource strain: Not on file  Food insecurity: Worry: Not on file Inability: Not on file  Transportation needs:  
  Medical: Not on file Non-medical: Not on file Tobacco Use  Smoking status: Current Every Day Smoker Packs/day: 0.25 Years: 27.00 Pack years: 6.75  Smokeless tobacco: Never Used  Tobacco comment: \"every now and then\" Substance and Sexual Activity  Alcohol use: Yes Alcohol/week: 0.6 oz Types: 1 Glasses of wine per week Comment: occas  Drug use: Yes Types: Marijuana Comment: every day  Sexual activity: Never Lifestyle  Physical activity:  
  Days per week: Not on file Minutes per session: Not on file  Stress: Not on file Relationships  Social connections:  
  Talks on phone: Not on file Gets together: Not on file Attends Zoroastrianism service: Not on file Active member of club or organization: Not on file Attends meetings of clubs or organizations: Not on file Relationship status: Not on file  Intimate partner violence:  
  Fear of current or ex partner: Not on file Emotionally abused: Not on file Physically abused: Not on file Forced sexual activity: Not on file Other Topics Concern [employer] Service Not Asked  Blood Transfusions Not Asked  Caffeine Concern Not Asked  Occupational Exposure Not Asked Dasie Hanks Hazards Not Asked  Sleep Concern Not Asked  Stress Concern Not Asked  Weight Concern Not Asked  Special Diet Not Asked  Back Care Not Asked  Exercise Not Asked  Bike Helmet Not Asked [address] Yes  Self-Exams Not Asked Social History Narrative Denies physical or sexual abuse Family History Problem Relation Age of Onset  Heart Attack Father 48  
     mi  Stroke Father  Hypertension Father  Heart Disease Father  Diabetes Other  Stroke Mother  Diabetes Brother  Heart Disease Brother  Hypertension Brother  Breast Cancer Sister 37 Allergies Allergen Reactions  Grass Pollen Hives and Shortness of Breath \"inhaler PRN\" Current Facility-Administered Medications Medication Dose Route Frequency  NUTRITIONAL SUPPORT ELECTROLYTE PRN ORDERS   Does Not Apply PRN  potassium chloride 20 mEq in 100 ml IVPB  20 mEq IntraVENous Q2H  piperacillin-tazobactam (ZOSYN) 4.5 g in 0.9% sodium chloride (MBP/ADV) 100 mL  4.5 g IntraVENous Q8H  
 vancomycin (VANCOCIN) 1500 mg in  ml infusion  1,500 mg IntraVENous Q12H  
 dexmedeTOMidine (PRECEDEX) 400 mcg in 0.9% sodium chloride 100 mL infusion  0.2-0.7 mcg/kg/hr IntraVENous TITRATE  fentaNYL in normal saline (pf) 25 mcg/mL infusion  0-200 mcg/hr IntraVENous TITRATE  insulin regular (NOVOLIN R, HUMULIN R) injection   SubCUTAneous Q6H  
 acetaminophen (TYLENOL) tablet 650 mg  650 mg Per NG tube Q6H PRN  
 amiodarone (CORDARONE) tablet 200 mg  200 mg Per NG tube DAILY  carvedilol (COREG) tablet 12.5 mg  12.5 mg Per NG tube BID WITH MEALS  
 NOREPINephrine (LEVOPHED) 4 mg in 5% dextrose 250 mL infusion  0-16 mcg/min IntraVENous TITRATE  albumin human 25% (BUMINATE) solution 25 g  25 g IntraVENous BID  dextrose 5% infusion  25 mL/hr IntraVENous CONTINUOUS  
 dextrose (D50W) injection syrg 12.5 g  25 mL IntraVENous PRN  
 metoclopramide HCl (REGLAN) injection 10 mg  10 mg IntraVENous Q8H PRN  promethazine (PHENERGAN) with saline injection 25 mg  25 mg IntraVENous Q6H PRN  
 HYDROmorphone (PF) (DILAUDID) injection 0.5-2 mg  0.5-2 mg IntraVENous Q3H PRN  
 nitroglycerin (NITROSTAT) tablet 0.4 mg  0.4 mg SubLINGual Q5MIN PRN  
 [Held by provider] pravastatin (PRAVACHOL) tablet 80 mg  80 mg Oral QHS  [Held by provider] magnesium oxide (MAG-OX) tablet 400 mg  400 mg Oral BID  sodium chloride (NS) flush 5-40 mL  5-40 mL IntraVENous Q8H  
 sodium chloride (NS) flush 5-40 mL  5-40 mL IntraVENous PRN  
 diphenhydrAMINE (BENADRYL) injection 12.5 mg  12.5 mg IntraVENous Q4H PRN  
  ondansetron (ZOFRAN) injection 4 mg  4 mg IntraVENous Q4H PRN  
 enoxaparin (LOVENOX) injection 40 mg  40 mg SubCUTAneous Q12H  
 albuterol (PROVENTIL VENTOLIN) nebulizer solution 2.5 mg  2.5 mg Nebulization Q4H PRN  
 dupilumab (DUPIXENT) 300 mg/2 mL syringe  300 mg SubCUTAneous Q7D Objective:  
 
Vitals:  
 04/05/19 1822 04/05/19 0958 04/05/19 0809 04/05/19 6593 BP:    158/74 Pulse: 60 60 69 60 Resp: 23 22 17 Temp:      
SpO2: 100% 100% 100% Weight:      
Height: PHYSICAL EXAM  
Ventilator Settings Mode FIO2 Rate Tidal Volume Pressure PEEP Pressure support  30 %    400 ml  10 cm H2O  8 cm H20 Peak airway pressure: 19 cm H2O Minute ventilation: 11.1 l/min RR 35 BPM 
 
Constitutional:  the patient is well developed and in no acute distress on ventilator awake and alert EENMT:  Sclera clear, pupils equal, oral mucosa moist 
Respiratory: CTA Cardiovascular:  RRR without M,G,R 
Gastrointestinal: soft and non-tender; with positive bowel sounds. Musculoskeletal: warm without cyanosis. There is 1+ lower leg edema. Skin:  no jaundice or rashes, abdominal wound with foul drainage Neurologic: no gross neuro deficits Psychiatric:  alert and oriented x 3 CXR:  Low lung volumes, bilateral infiltrates Recent Labs 04/05/19 
7678 04/04/19 
5611 04/03/19 
9707 WBC 15.8* 14.5* 17.2* HGB 8.2* 8.2* 10.2* HCT 25.3* 25.2* 31.5*  163 187 Recent Labs 04/05/19 
3072 04/04/19 
7223 04/03/19 
0935 04/03/19 
0540  143  --  139  
K 3.4* 4.0  --  4.8  
 111*  --  110* * 114*  --  146* CO2 27 25  --  24 BUN 12 14  --  13  
CREA 0.94 1.01*  --  1.09* MG 1.7* 1.9  --   --   
CA 9.0 8.6  --  8.5 ALB  --   --  2.0*  --   
TBILI  --   --  0.5  --   
ALT  --   --  63  --   
SGOT  --   --  131*  -- No results for input(s): PH, PCO2, PO2, HCO3 in the last 72 hours. Recent Labs 04/03/19 
9918 LAC 1.3 Results for Jovani Newman (MRN [id number]) as of 4/5/2019 08:39 Ref. Range 4/3/2019 08:44 4/4/2019 03:24 4/5/2019 05:08  
pH (POC) Latest Ref Range: 7.35 - 7.45   7.385 7.393 7.389  
pCO2 (POC) Latest Ref Range: 35 - 45 MMHG 33.0 (L) 35.7 39.2 pO2 (POC) Latest Ref Range: 75 - 100 MMHG 67 (L) 100 106 (H) HCO3 (POC) Latest Ref Range: 22 - 26 MMOL/L 19.8 (L) 21.7 (L) 23.7  
sO2 (POC) Latest Ref Range: 95 - 98 % 93 (L) 98 98 Base deficit (POC) Latest Units: mmol/L 5 3 1 FIO2 (POC) Latest Units: % 100 45 45 Patient temp. Latest Units:   98.6 99.6 98.6 Specimen type (POC) Latest Units:   ARTERIAL ARTERIAL ARTERIAL Set Rate Latest Units: bpm  12 14 Site Latest Units:   LEFT RADIAL RIGHT RADIAL RIGHT RADIAL Device: Latest Units:   Non rebreather VENT VENT Mode Latest Units:    Pressure regulate. .. Pressure regulate. .. Tidal volume Latest Units: ml  400 400 PEEP/CPAP (POC) Latest Units: cmH2O  8 8 Allens test (POC) Latest Units:   YES YES YES Inspiratory Time Latest Units: sec  0.9 0.9 Assessment:  (Medical Decision Making) Hospital Problems  Date Reviewed: 3/26/2019 Codes Class Noted POA Acute hypoxemic respiratory failure ([facility] ICD-10-CM: J96.01 
ICD-9-CM: 518.81  4/3/2019 Unknown ARDS (adult respiratory distress syndrome) ([facility] ICD-10-CM: U84 
ICD-9-CM: 518.52  4/3/2019 Unknown * (Principal) Incarcerated incisional hernia ICD-10-CM: K43.0 ICD-9-CM: 552.21  3/26/2019 Yes BA on CPAP (Chronic) ICD-10-CM: G47.33, Z99.89 ICD-9-CM: 327.23, V46.8  7/14/2014 Yes Morbid obesity ([facility] (Chronic) ICD-10-CM: E66.01 
ICD-9-CM: 278.01  7/14/2014 Yes Chronic systolic heart failure (HCC) (Chronic) ICD-10-CM: U10.51 ICD-9-CM: 428.22  7/18/2013 Yes Overview Signed 3/30/2016  8:11 AM by Benita Rodriguez NST 4/15:low risk Echo 6/2014:EF 40-45% Echo 5/2013:EF 30-35% LHC 2/2013: minimal CAD 
  
  
   
 Long-term insulin use in type 2 diabetes ([facility] ICD-10-CM: E11.9, Z79.4 ICD-9-CM: 250.00, V58.67  4/12/2010 Yes 51 y/o female with incarcerated hernia and dehiscense, complicated hospital course now with acute hypoxemic respiratory failure which appears to be ARDS from pulmonary or abdominal source. Plan:  (Medical Decision Making) NEURO:  
1. Sedation: Precedex, propofol- currently hypertensive 2. Analgesia: Fentanyl gtt Patient awake and alert and communicating via writing- minimize sedation as tolerated CV: 1. NICM: monitor I/O,  central line in place CVP- 14 today PULM:  
1. ARDS w/ Acute hypoxemic/hypercapneic respiratory failure: tolerating PRVC well, wean O2 and PEEP as able. Titrate Min Vent to pH/CO2. Low Tidal Volume ventilation. 2. Pneumonia vs ARDS: get respiratory culture, on Vanc and Zosyn day#3 WBC down T max in past 24h 100.8- afebrile this AM . Results for Prasanth Connor (MRN [id number]) as of 4/4/2019 08:23 Now seems volume overloaded - still with increased A a gradient- add lasix 40 mg IV q 12h x 4 doses. Lasix given yesterday x 1- 1.8L -ve in past 24h RENAL: 
1. Dark urine: urine culture pending, continue IVF, reportedly improved from yesterday Creatinine normal 
GI: NPO, PPI prophy, 
1. Nutrition: remains NPO, per surgery 2. Hernia repair: per surgery 3. Abdominal drainage: ?abdominal source of infection, consider CT scan HEME: no acute issues ID: WBC elevated, afebrile. 1. Sepsis: lung vs abdomen. Cultures pending, broad spectrum antibiotics, source control- see above ENDO: 
1. DM: had episodes of hypoglycemia. Continue D5 with fluid. Monitor FS Q6H Results for Prasanth Connor (MRN [id number]) as of 4/5/2019 08:39 Ref. Range 4/4/2019 18:35 4/4/2019 21:10 4/4/2019 23:26 4/5/2019 05:08 4/5/2019 05:35 GLUCOSE,FAST - POC Latest Ref Range: 65 - 100 mg/dL 122 (H)  133 (H)  130 (H) Skin: no decub, turns, preventive care Prophy: Lovenox, PPI 
 Full Code Total cc time spent 32 min Sheron Lora MD.

## 2019-04-05 NOTE — PROGRESS NOTES
Pt with audible inspiratory/ expiratory wheezing at rest on 4L n/c. Upper airway course; bilateral lung sounds remain clear, diminished. Possible stridor. Instructed on deep breathing. Rt notified and will see pt at bedside. Racemic Epi administered per RT at bedside at 1555.

## 2019-04-05 NOTE — PROGRESS NOTES
Ventilator check complete; patient has a #8. 0 ET tube secured at the 21 at the lip. Patient is not sedated. Patient is able to follow commands. Breath sounds are diminished. Trachea is midline, Negative for subcutaneous air, and chest excursion is symmetric. Patient is also Negative for cyanosis and is Negative for pitting edema. All alarms are set and audible. Resuscitation bag is at the head of the bed. Ventilator Settings Mode FIO2 Rate Tidal Volume Pressure PEEP I:E Ratio Pressure support  30 %    400 ml  10 cm H2O  8 cm H20  1:1.7 Peak airway pressure: 19 cm H2O Minute ventilation: 11.1 l/min ABG: No results for input(s): PH, PCO2, PO2, HCO3 in the last 72 hours.  
 
 
Deysi Leavitt, RT

## 2019-04-05 NOTE — PROGRESS NOTES
Audible wheezing improving post Racemic; no acute distress; upper airway sounds remain course. SAT WNL; RR 32. PRN Xanax administered for anxiety. Dr. Milan Marco notified; order for 8mg IV Decadron Q6H i2vgujo and CPAP if needed. Will continue to monitor.

## 2019-04-05 NOTE — PROGRESS NOTES
Respiratory Mechanics completed and are as follows: 
Weaning Parameters Spontaneous Breathing Trial Complete: Yes Resp Rate Observed: 27 Ve: 9.1 VT: 410 RSBI: 65 NIF: -20 Tabor Agitation Sedation Scale (RASS): Alert and calm Patient extubated to a 3L NC. Patient is able to communicate and is negative for stridor. Breath sounds are clear and diminished. No complications with extubation.   
 
Alysa Salazar, RT

## 2019-04-05 NOTE — PROGRESS NOTES
Vancomycin Consult MD ordering: Theresa CRUZ following? no Indication: HAP 
DOT:  7  days Goal level(s): 15 - 20 Ht Readings from Last 1 Encounters:  
19 4' 11\" (1.499 m) Wt Readings from Last 1 Encounters:  
19 100.4 kg (221 lb 4.8 oz) Temp (24hrs), Av.4 °F (38 °C), Min:99.9 °F (37.7 °C), Max:100.7 °F (38.2 °C) Dosing weight: 100 kg 
52 y.o. Date:  Dose/Freq Admin Times Level/Time:  
/3 2 g IV x 1 dose 1021   
/3 1500 mg IV q12h 2138 4/4 1500 mg IV q12h 1100, 2116 Tr @ 2105 = 20.5  
4/5 1500 mg IV q12h 0951   
4/5 1250 mg IV q12h (2200) 4/6 1250 mg IV q12h (1000) Recent Labs 19 
0347 19 
2957 19 
7575 19 
0935 19 
0540 BUN 12 14  --   --  13  
CREA 0.94 1.01*  --   --  1.09* WBC 15.8* 14.5*  --  17.2*  --   
PCT  --   --   --  0.6  --   
LAC  --   --  1.3  --   --   
 
Estimated Creatinine Clearance: 74.1 mL/min (based on SCr of 0.94 mg/dL). Day 3 Assessment and Plan: 
Based on trough level, changed dose to Vancomycin 1250 mg IV every 12 hours. Next dose is due tonight at 2200. Will continue to follow. Thank you, Shaheed St, PharmD

## 2019-04-05 NOTE — PROGRESS NOTES
Ventilator check complete; patient has a #8. 0 ET tube secured at the 21 at the teeth. Patient is not sedated. Patient is able to follow commands. Breath sounds are clear. Trachea is midline, Negative for subcutaneous air, and chest excursion is symmetric. Patient is also Negative for cyanosis and is Negative for pitting edema. All alarms are set and audible. Resuscitation bag is at the head of the bed. Ventilator Settings Mode FIO2 Rate Tidal Volume Pressure PEEP I:E Ratio PRVC  40 %    400 ml  16 cm H2O  8 cm H20  1:1.7 Peak airway pressure: 25 cm H2O Minute ventilation: 8.2 l/min ABG: No results for input(s): PH, PCO2, PO2, HCO3 in the last 72 hours.  
 
 
Avis Flaherty, RT

## 2019-04-05 NOTE — PROGRESS NOTES
Ventilator check complete; patient has a #8. 0 ET tube secured at the 21 at the teeth. Patient is not sedated. Patient is able to follow commands. Breath sounds are clear. Trachea is midline, Negative for subcutaneous air, and chest excursion is symmetric. Patient is also Negative for cyanosis and is Negative for pitting edema. All alarms are set and audible. Resuscitation bag is at the head of the bed. Ventilator Settings Mode FIO2 Rate Tidal Volume Pressure PEEP I:E Ratio Pressure support  40 %    0 ml  16 cm H2O  8 cm H20  0 Peak airway pressure: 24 cm H2O Minute ventilation: 7.9 l/min ABG: No results for input(s): PH, PCO2, PO2, HCO3 in the last 72 hours.  
 
 
Lemuel Calixto, RT

## 2019-04-06 LAB
ANION GAP SERPL CALC-SCNC: 11 MMOL/L
BUN SERPL-MCNC: 16 MG/DL (ref 6–23)
CALCIUM SERPL-MCNC: 9.3 MG/DL (ref 8.3–10.4)
CHLORIDE SERPL-SCNC: 99 MMOL/L (ref 98–107)
CO2 SERPL-SCNC: 29 MMOL/L (ref 21–32)
CREAT SERPL-MCNC: 0.95 MG/DL (ref 0.6–1)
GLUCOSE BLD STRIP.AUTO-MCNC: 197 MG/DL (ref 65–100)
GLUCOSE BLD STRIP.AUTO-MCNC: 250 MG/DL (ref 65–100)
GLUCOSE SERPL-MCNC: 174 MG/DL (ref 65–100)
MAGNESIUM SERPL-MCNC: 1.9 MG/DL (ref 1.8–2.4)
POTASSIUM SERPL-SCNC: 3.4 MMOL/L (ref 3.5–5.1)
SODIUM SERPL-SCNC: 139 MMOL/L (ref 136–145)

## 2019-04-06 PROCEDURE — 74011250636 HC RX REV CODE- 250/636: Performed by: INTERNAL MEDICINE

## 2019-04-06 PROCEDURE — 74011000258 HC RX REV CODE- 258: Performed by: INTERNAL MEDICINE

## 2019-04-06 PROCEDURE — 77030027138 HC INCENT SPIROMETER -A

## 2019-04-06 PROCEDURE — 94760 N-INVAS EAR/PLS OXIMETRY 1: CPT

## 2019-04-06 PROCEDURE — 74011250636 HC RX REV CODE- 250/636: Performed by: SURGERY

## 2019-04-06 PROCEDURE — 74011636637 HC RX REV CODE- 636/637: Performed by: INTERNAL MEDICINE

## 2019-04-06 PROCEDURE — 77010033678 HC OXYGEN DAILY

## 2019-04-06 PROCEDURE — P9047 ALBUMIN (HUMAN), 25%, 50ML: HCPCS | Performed by: INTERNAL MEDICINE

## 2019-04-06 PROCEDURE — 97164 PT RE-EVAL EST PLAN CARE: CPT

## 2019-04-06 PROCEDURE — 74011250637 HC RX REV CODE- 250/637: Performed by: SURGERY

## 2019-04-06 PROCEDURE — 97530 THERAPEUTIC ACTIVITIES: CPT

## 2019-04-06 PROCEDURE — 80048 BASIC METABOLIC PNL TOTAL CA: CPT

## 2019-04-06 PROCEDURE — 82962 GLUCOSE BLOOD TEST: CPT

## 2019-04-06 PROCEDURE — 65270000029 HC RM PRIVATE

## 2019-04-06 PROCEDURE — 74011000258 HC RX REV CODE- 258: Performed by: SURGERY

## 2019-04-06 PROCEDURE — 97535 SELF CARE MNGMENT TRAINING: CPT

## 2019-04-06 PROCEDURE — 83735 ASSAY OF MAGNESIUM: CPT

## 2019-04-06 PROCEDURE — 97166 OT EVAL MOD COMPLEX 45 MIN: CPT

## 2019-04-06 RX ORDER — SODIUM CHLORIDE, SODIUM LACTATE, POTASSIUM CHLORIDE, CALCIUM CHLORIDE 600; 310; 30; 20 MG/100ML; MG/100ML; MG/100ML; MG/100ML
50 INJECTION, SOLUTION INTRAVENOUS CONTINUOUS
Status: DISCONTINUED | OUTPATIENT
Start: 2019-04-06 | End: 2019-04-08

## 2019-04-06 RX ADMIN — SODIUM CHLORIDE, SODIUM LACTATE, POTASSIUM CHLORIDE, AND CALCIUM CHLORIDE 50 ML/HR: 600; 310; 30; 20 INJECTION, SOLUTION INTRAVENOUS at 16:45

## 2019-04-06 RX ADMIN — FUROSEMIDE 40 MG: 10 INJECTION, SOLUTION INTRAMUSCULAR; INTRAVENOUS at 10:27

## 2019-04-06 RX ADMIN — AMIODARONE HYDROCHLORIDE 200 MG: 200 TABLET ORAL at 10:27

## 2019-04-06 RX ADMIN — HYDROMORPHONE HYDROCHLORIDE 2 MG: 2 INJECTION, SOLUTION INTRAMUSCULAR; INTRAVENOUS; SUBCUTANEOUS at 12:26

## 2019-04-06 RX ADMIN — CHLORASEPTIC 1 SPRAY: 1.5 LIQUID ORAL at 20:11

## 2019-04-06 RX ADMIN — Medication 400 MG: at 10:27

## 2019-04-06 RX ADMIN — DEXAMETHASONE SODIUM PHOSPHATE 8 MG: 10 INJECTION INTRAMUSCULAR; INTRAVENOUS at 12:30

## 2019-04-06 RX ADMIN — FUROSEMIDE 40 MG: 10 INJECTION, SOLUTION INTRAMUSCULAR; INTRAVENOUS at 21:15

## 2019-04-06 RX ADMIN — PIPERACILLIN SODIUM,TAZOBACTAM SODIUM 4.5 G: 4; .5 INJECTION, POWDER, FOR SOLUTION INTRAVENOUS at 10:27

## 2019-04-06 RX ADMIN — PIPERACILLIN SODIUM,TAZOBACTAM SODIUM 4.5 G: 4; .5 INJECTION, POWDER, FOR SOLUTION INTRAVENOUS at 01:42

## 2019-04-06 RX ADMIN — CARVEDILOL 12.5 MG: 6.25 TABLET, FILM COATED ORAL at 11:48

## 2019-04-06 RX ADMIN — ENOXAPARIN SODIUM 40 MG: 40 INJECTION SUBCUTANEOUS at 10:27

## 2019-04-06 RX ADMIN — HUMAN INSULIN 2 UNITS: 100 INJECTION, SOLUTION SUBCUTANEOUS at 12:32

## 2019-04-06 RX ADMIN — HYDROMORPHONE HYDROCHLORIDE 1 MG: 2 INJECTION, SOLUTION INTRAMUSCULAR; INTRAVENOUS; SUBCUTANEOUS at 22:37

## 2019-04-06 RX ADMIN — HYDROMORPHONE HYDROCHLORIDE 2 MG: 2 INJECTION, SOLUTION INTRAMUSCULAR; INTRAVENOUS; SUBCUTANEOUS at 20:11

## 2019-04-06 RX ADMIN — Medication 10 ML: at 22:22

## 2019-04-06 RX ADMIN — VANCOMYCIN HYDROCHLORIDE 1250 MG: 10 INJECTION, POWDER, LYOPHILIZED, FOR SOLUTION INTRAVENOUS at 11:49

## 2019-04-06 RX ADMIN — PIPERACILLIN SODIUM,TAZOBACTAM SODIUM 4.5 G: 4; .5 INJECTION, POWDER, FOR SOLUTION INTRAVENOUS at 20:00

## 2019-04-06 RX ADMIN — ALBUMIN (HUMAN) 25 G: 0.25 INJECTION, SOLUTION INTRAVENOUS at 05:04

## 2019-04-06 RX ADMIN — ENOXAPARIN SODIUM 40 MG: 40 INJECTION SUBCUTANEOUS at 21:15

## 2019-04-06 RX ADMIN — DEXAMETHASONE SODIUM PHOSPHATE 8 MG: 10 INJECTION INTRAMUSCULAR; INTRAVENOUS at 05:01

## 2019-04-06 RX ADMIN — HUMAN INSULIN 2 UNITS: 100 INJECTION, SOLUTION SUBCUTANEOUS at 06:00

## 2019-04-06 RX ADMIN — Medication 40 ML: at 14:00

## 2019-04-06 RX ADMIN — HYDROMORPHONE HYDROCHLORIDE 1.5 MG: 2 INJECTION, SOLUTION INTRAMUSCULAR; INTRAVENOUS; SUBCUTANEOUS at 04:59

## 2019-04-06 RX ADMIN — Medication 400 MG: at 20:00

## 2019-04-06 RX ADMIN — PRAVASTATIN SODIUM 80 MG: 80 TABLET ORAL at 22:15

## 2019-04-06 RX ADMIN — Medication 10 ML: at 05:02

## 2019-04-06 RX ADMIN — CARVEDILOL 12.5 MG: 6.25 TABLET, FILM COATED ORAL at 20:00

## 2019-04-06 NOTE — PROGRESS NOTES
Patient received to room 362. Assessment completed as charted in flow sheet. Patient oriented to room and call light. IS at bedside, demonstrates proper use.

## 2019-04-06 NOTE — PROGRESS NOTES
Pt stated she wanted try to sit on commode. Assisted pt to [facility]. Pt only passed flatus. Stated she wanted to sleep in recliner. Assisted pt to chair. JENNIFER drain dsg loosed. Changed. Moderate amount of serosanguinous drainage on dsg. Drains patent and charged. Chair locked. Call light within reach.

## 2019-04-06 NOTE — PROGRESS NOTES
Problem: Self Care Deficits Care Plan (Adult) Goal: *Acute Goals and Plan of Care (Insert Text) Description Patient will complete lower body dressing with minimal assist to increase self care independence. Patient will complete bathing with minimal assist to increase self care independence. Patient will improve static standing at edge of bed for 5 minutes to improve independence with transfers and self cares. Patient will tolerate 38 minutes of OT treatment with self incorporated rest breaks to increase activity tolerance to enhance participation in hobbies. Patient will complete all functional transfers with contact guard assist using adaptive equipment as needed. Patient will complete UE exercises with supervision to increase overall activity tolerance and strength. Timeframe: 7 visits OCCUPATIONAL THERAPY: Initial Assessment and Daily Note 4/6/2019 INPATIENT:   
[place] / Product Type: Medicare /  
  
NAME/AGE/GENDER: Idris Weiss is a 52 y.o. female PRIMARY DIAGNOSIS:  Incisional hernia, without obstruction or gangrene [K43.2] Incarcerated incisional hernia [K43.0] Incarcerated incisional hernia [K43.0] Incarcerated incisional hernia Incarcerated incisional hernia Procedure(s) (LRB): ABDOMINAL DEBRIDEMENT, Repair of Abdominal Wound (N/A) 6 Days Post-Op ICD-10: Treatment Diagnosis:  
 Generalized Muscle Weakness (M62.81) Difficulty in walking, Not elsewhere classified (R26.2) Precautions/Allergies: 
   Grass pollen ASSESSMENT:  
Ms. Nancie Schwab presents in ICU s/p hernia repair with complications 4/16, intubated and now extubated 4/4/19. Just got to back to bed from recliner with nursing but still agreeable to evaluation. Patient limited by lines but able to complete bed mobility with moderate assist and stand with CGA using a RW. UE ROM WFL.  Lower body adl's are max assist at this time and complex transfers during ADL's are min/moderate assist. She was working part time, driving, and independent with ADL's IADL's, walking prior to hospitalization. Has potential for quick recovery due to high level of PLOF. Difficult at this time to predict home vs rehab. Initiate OT POC. This section established at most recent assessment PROBLEM LIST (Impairments causing functional limitations): 
Decreased Strength Decreased ADL/Functional Activities Decreased Transfer Abilities Decreased Balance Increased Pain Decreased Activity Tolerance INTERVENTIONS PLANNED: (Benefits and precautions of occupational therapy have been discussed with the patient.) Activities of daily living training Adaptive equipment training Balance training Clothing management Neuromuscular re-eduation Therapeutic activity Therapeutic exercise TREATMENT PLAN: Frequency/Duration: Follow patient 3x a week to address above goals. Rehabilitation Potential For Stated Goals: Good RECOMMENDED REHABILITATION/EQUIPMENT: (at time of discharge pending progress): Due to the probability of continued deficits (see above) this patient will not likely need continued skilled occupational therapy after discharge. Equipment:  
None at this time OCCUPATIONAL PROFILE AND HISTORY:  
History of Present Injury/Illness (Reason for Referral): 
See H&P. Past Medical History/Comorbidities: Ms. Juanjose Arora  has a past medical history of Allergic rhinitis, Arthritis, Automatic implantable cardioverter-defibrillator in situ (3/30/2016), CAD in native artery (4/10/6597), Chronic systolic heart failure ([address] (07/18/2013), CKD (chronic kidney disease), Diabetes ([address] Diabetes mellitus ([address] (4/12/2010), Dyslipidemia (2/13/2013), Eczema, Fibromyalgia, GERD (gastroesophageal reflux disease), Headache, Heart failure ([address] HTN (hypertension) (4/12/2010), Hypercholesterolemia, Morbid obesity ([facility] Neuropathy, NICM (nonischemic cardiomyopathy) ([facility] (2/15/2013), Obesity, Obstructive sleep apnea (adult) (pediatric) (2014), Pseudomonas urinary tract infection (2019), Sciatic pain (2016), SVT (supraventricular tachycardia) ([facility] and Tobacco use disorder (2010). She also has no past medical history of Dementia, Gastrointestinal disorder, or Unspecified adverse effect of anesthesia. Ms. Lev Perze  has a past surgical history that includes hx tonsillectomy; hx implantable cardioverter defibrillator (2013); hx  section; hx hernia repair; pr left heart cath,percutaneous (2013); hx svt ablation (\"years ago\"); hx rotator cuff repair (Right); hx hysterectomy; and hx implantable cardioverter defibrillator (2013). Social History/Living Environment:  
Home Environment: Private residence # Steps to Enter: 4(than an additional 3 inside) Rails to Enter: No 
One/Two Story Residence: One story Living Alone: No 
Support Systems: Friends \ neighbors Patient Expects to be Discharged to[de-identified] Private residence Current DME Used/Available at Home: Glucometer, CPAP Prior Level of Function/Work/Activity: She was working part time, driving, and independent with ADL's IADL's, walking prior to hospitalization. Number of Personal Factors/Comorbidities that affect the Plan of Care: Expanded review of therapy/medical records (1-2):  MODERATE COMPLEXITY ASSESSMENT OF OCCUPATIONAL PERFORMANCE[de-identified]  
Activities of Daily Living:  
Basic ADLs (From Assessment) Complex ADLs (From Assessment) Feeding: Supervision Oral Facial Hygiene/Grooming: Setup Bathing: Minimum assistance Upper Body Dressing: Minimum assistance Lower Body Dressing: Maximum assistance Toileting: Moderate assistance Grooming/Bathing/Dressing Activities of Daily Living Functional Transfers Toilet Transfer : Moderate assistance Shower Transfer: Moderate assistance Bed/Mat Mobility Supine to Sit: Moderate assistance Sit to Stand: Contact guard assistance Stand to Sit: Contact guard assistance Scooting: Minimum assistance Most Recent Physical Functioning:  
Gross Assessment: 
  
         
  
Posture: 
Posture (WDL): Exceptions to [facility] ([place], [place]) Posture Assessment: Trunk flexion, Rounded shoulders Balance: 
Sitting: Intact Standing: With support Bed Mobility: 
Supine to Sit: Moderate assistance Scooting: Minimum assistance Wheelchair Mobility: 
  
Transfers: 
Sit to Stand: Contact guard assistance Stand to Sit: Contact guard assistance Patient Vitals for the past 6 hrs: 
 BP SpO2 Pulse 19 0901 165/72 92 % 65  
19 0913 150/80 93 % 60  
19 1001 136/86 98 % 60  
19 1101 142/77 94 % (!) 59  
19 1201 163/70 93 % 60  
19 1301 143/68 95 % 62  
19 1407 114/70 94 % 73 Mental Status Neurologic State: Alert Orientation Level: Oriented X4 Cognition: Appropriate decision making Perception: Appears intact Physical Skills Involved: 
Range of Motion Balance Strength Activity Tolerance Gross Motor Control Pain (acute) Edema Cognitive Skills Affected (resulting in the inability to perform in a timely and safe manner): [facility]  Psychosocial Skills Affected: WFL Number of elements that affect the Plan of Care: 3-5:  MODERATE COMPLEXITY CLINICAL DECISION MAKIN [facility] [address] [address] AM-PAC? ?6 Clicks? Daily Activity Inpatient Short Form How much help from another person does the patient currently need. .. Total A Lot A Little None 1. Putting on and taking off regular lower body clothing? ? 1   ? 2   ? 3   ? 4  
2. Bathing (including washing, rinsing, drying)? ? 1   ? 2   ? 3   ? 4  
3. Toileting, which includes using toilet, bedpan or urinal?   ? 1   ? 2   ? 3   ? 4  
4. Putting on and taking off regular upper body clothing?    ? 1   ? 2   ? 3   ? 4  
 5. Taking care of personal grooming such as brushing teeth? ? 1   ? 2   ? 3   ? 4  
6. Eating meals? ? 1   ? 2   ? 3   ? 4  
© 2007, [employer] [place], under license to [employer]. All rights reserved Score:  Initial: 14 Most Recent: X (Date: -- ) Interpretation of Tool:  Represents activities that are increasingly more difficult (i.e. Bed mobility, Transfers, Gait). Medical Necessity:    
Skilled intervention continues to be required due to Deficits noted above. Reason for Services/Other Comments: 
Patient continues to require skilled intervention due to debility and s/p hernia repair Roberta Samson Use of outcome tool(s) and clinical judgement create a POC that gives a: MODERATE COMPLEXITY  
 
 
 
TREATMENT:  
(In addition to Assessment/Re-Assessment sessions the following treatments were rendered) Pre-treatment Symptoms/Complaints:   
Pain: Initial:  
Pain Intensity 1: 4  Post Session:  4 Self Care: (10): Procedure(s) (per grid) utilized to improve and/or restore self-care/home management as related to dressing, bathing, toileting and grooming. Required minimal verbal cueing to facilitate activities of daily living skills. Initial evaluation 10 minutes. Braces/Orthotics/Lines/Etc:  
O2 Device: Nasal cannula Treatment/Session Assessment:   
Response to Treatment:  Good, supine in bed Interdisciplinary Collaboration:  
Physical Therapist 
Occupational Therapist 
Registered Nurse After treatment position/precautions:  
Up in chair Bed/Chair-wheels locked Bed in low position Call light within reach RN notified Side rails x 3 Compliance with Program/Exercises: Compliant all of the time, Will assess as treatment progresses. Recommendations/Intent for next treatment session: \"Next visit will focus on advancements to more challenging activities\". Total Treatment Duration: OT Patient Time In/Time Out Time In: 1340 Time Out: 1400 Patrice Olson OT

## 2019-04-06 NOTE — PROGRESS NOTES
Speech therapy note Attempted to see pt this pm, however, pt unavailable.  
 
 Diana Bae MA/CCC/SLP

## 2019-04-06 NOTE — PROGRESS NOTES
During pt's bath noted excoriated areas on lower back in skin folds on R and L sides. Several open areas. Cleaned with soap and water. Applied zinc based cream. Added wounds to wound flow sheet.

## 2019-04-06 NOTE — PROGRESS NOTES
04/06/19 0801 Oxygen Therapy O2 Sat (%) 94 % Pulse via Oximetry 61 beats per minute O2 Device Nasal cannula O2 Flow Rate (L/min) 4 l/min O2 Temperature (no value) FIO2 (%) 36 %

## 2019-04-06 NOTE — PROGRESS NOTES
Patient assisted back to bed with minimal assistance. Tolerated well. Dilaudid 1 mg given for 8/10 pain. Will continue to monitor closely.

## 2019-04-06 NOTE — PROGRESS NOTES
Problem: Mobility Impaired (Adult and Pediatric) Goal: *Acute Goals and Plan of Care (Insert Text) Description STG: 
(1.)Ms. Lev Perez will move from supine to sit and sit to supine  with MINIMAL ASSIST within 3 treatment day(s). (2.)Ms. Lev Perez will transfer from bed to chair and chair to bed with CONTACT GUARD ASSIST using the least restrictive device within 3 treatment day(s). (3.)Ms. Lev Perez will ambulate with CONTACT GUARD ASSIST for 50 feet with the least restrictive device within 3 treatment day(s). LTG: 
(1.)Ms. Lev Perez will move from supine to sit and sit to supine  in bed with STAND BY ASSIST within 7 treatment day(s). (2.)Ms. Lev Perez will transfer from bed to chair and chair to bed with STAND BY ASSIST using the least restrictive device within 7 treatment day(s). (3.)Ms. Lev Perez will ambulate with STAND BY ASSIST for 200 feet with the least restrictive device within 7 treatment day(s). (4)Ms. Lev Perez will go up 4 steps min assist with device as needed in 7 days. (5)Ms. Lev Perez will perform HEP with cues and assistance to increase safety on her feet in 7 days. Above goals ongoing 4/6 
________________________________________________________________________________________________ Outcome: Progressing Towards Goal 
  
PHYSICAL THERAPY: Re-evaluation and AM 4/6/2019 INPATIENT: PT Visit Days : 2 [place] / Product Type: Medicare /   
  
NAME/AGE/GENDER: Julian Dorsey is a 52 y.o. female PRIMARY DIAGNOSIS: Incisional hernia, without obstruction or gangrene [K43.2] Incarcerated incisional hernia [K43.0] Incarcerated incisional hernia [K43.0] Incarcerated incisional hernia Incarcerated incisional hernia Procedure(s) (LRB): ABDOMINAL DEBRIDEMENT, Repair of Abdominal Wound (N/A) 6 Days Post-Op ICD-10: Treatment Diagnosis:  
 · Generalized Muscle Weakness (M62.81) · Other abnormalities of gait and mobility (R26.89) Precaution/Allergies: Grass pollen ASSESSMENT:  
Ms. Amanda Aguilera presents with general decreased in functional mobility and gait s/p OPEN INCARCERATED INCISIONAL HERNIA REPAIR on 3/26/19. Pt. Agreeable to get up this am. She reports pain 10 with rest and movement. She got out of bed with assistance and took some steps to the chair with RW. She did some theraputic exercises in the chair for LE's. Stressed to patient the importance of mobility and gait to prevent further issues. Pain limiting gait this am.  Will follow. 4/6--Pt up in bedside chair. Pt performed sit to stand. Pt sat to rest. Pt performed sit to stand again. Pt in bedside chair with needs in reach. This section established at most recent assessment PROBLEM LIST (Impairments causing functional limitations): 1. Decreased Strength 2. Decreased ADL/Functional Activities 3. Decreased Transfer Abilities 4. Decreased Ambulation Ability/Technique 5. Decreased Balance 6. Increased Pain 7. Decreased Activity Tolerance 8. Decreased Flexibility/Joint Mobility 9. Decreased [place] with Home Exercise Program 
 INTERVENTIONS PLANNED: (Benefits and precautions of physical therapy have been discussed with the patient.) 1. Balance Exercise 2. Bed Mobility 3. Family Education 4. Gait Training 5. Home Exercise Program (HEP) 6. Therapeutic Activites 7. Therapeutic Exercise/Strengthening 8. Transfer Training TREATMENT PLAN: Frequency/Duration: daily for duration of hospital stay Rehabilitation Potential For Stated Goals: Good RECOMMENDED REHABILITATION/EQUIPMENT: (at time of discharge pending progress): Due to the probability of continued deficits (see above) this patient will likely need continued skilled physical therapy after discharge. Equipment: ? May need a RW   
    
 
 
 
HISTORY:  
History of Present Injury/Illness (Reason for Referral): S/p OPEN INCARCERATED INCISIONAL HERNIA REPAIR 3/26/19 Past Medical History/Comorbidities: Ms. Kelly Gee  has a past medical history of Allergic rhinitis, Arthritis, Automatic implantable cardioverter-defibrillator in situ (3/30/2016), CAD in native artery (4/10/0037), Chronic systolic heart failure ([address] (2013), CKD (chronic kidney disease), Diabetes ([address] Diabetes mellitus ([address] (2010), Dyslipidemia (2013), Eczema, Fibromyalgia, GERD (gastroesophageal reflux disease), Headache, Heart failure ([address] HTN (hypertension) (2010), Hypercholesterolemia, Morbid obesity ([address] Neuropathy, NICM (nonischemic cardiomyopathy) ([address] (2/15/2013), Obesity, Obstructive sleep apnea (adult) (pediatric) (2014), Pseudomonas urinary tract infection (2019), Sciatic pain (2016), SVT (supraventricular tachycardia) ([address] and Tobacco use disorder (2010). She also has no past medical history of Dementia, Gastrointestinal disorder, or Unspecified adverse effect of anesthesia. Ms. Kelly Gee  has a past surgical history that includes hx tonsillectomy; hx implantable cardioverter defibrillator (2013); hx  section; hx hernia repair; pr left heart cath,percutaneous (2013); hx svt ablation (\"years ago\"); hx rotator cuff repair (Right); hx hysterectomy; and hx implantable cardioverter defibrillator (2013). Social History/Living Environment:  
Home Environment: Private residence # Steps to Enter: 4 Rails to Enter: No 
One/Two Story Residence: One story Living Alone: No 
Support Systems: Friends \ neighbors Patient Expects to be Discharged to[de-identified] Private residence Current DME Used/Available at Home: Glucometer, CPAP Prior Level of Function/Work/Activity: 
independent Number of Personal Factors/Comorbidities that affect the Plan of Care: 1-2: MODERATE COMPLEXITY EXAMINATION:  
Most Recent Physical Functioning:  
Gross Assessment: 
  
         
  
Posture: 
  
Balance: 
Sitting: Intact Standing: Pull to stand; With support Bed Mobility: 
  
Wheelchair Mobility: Transfers: 
Sit to Stand: Contact guard assistance Stand to Sit: Contact guard assistance Gait: 
  
Distance (ft): 1 Feet (ft) Assistive Device: Walker, rolling Ambulation - Level of Assistance: Contact guard assistance;Minimal assistance Interventions: Safety awareness training;Verbal cues Body Structures Involved: 1. Bones 2. Joints 3. Muscles 4. Ligaments Body Functions Affected: 1. Movement Related Activities and Participation Affected: 1. Mobility Number of elements that affect the Plan of Care: 3: MODERATE COMPLEXITY CLINICAL PRESENTATION:  
Presentation: Stable and uncomplicated: LOW COMPLEXITY CLINICAL DECISION MAKIN[address] [place] [address] AM-PAC 6 Clicks Basic Mobility Inpatient Short Form How much difficulty does the patient currently have. .. Unable A Lot A Little None 1. Turning over in bed (including adjusting bedclothes, sheets and blankets)? ? 1   ? 2   ? 3   ? 4  
2. Sitting down on and standing up from a chair with arms ( e.g., wheelchair, bedside commode, etc.)   ? 1   ? 2   ? 3   ? 4  
3. Moving from lying on back to sitting on the side of the bed?   ? 1   ? 2   ? 3   ? 4 How much help from another person does the patient currently need. .. Total A Lot A Little None 4. Moving to and from a bed to a chair (including a wheelchair)? ? 1   ? 2   ? 3   ? 4  
5. Need to walk in hospital room? ? 1   ? 2   ? 3   ? 4  
6. Climbing 3-5 steps with a railing? ? 1   ? 2   ? 3   ? 4  
© , Trustees of [address] [place] [address], under license to [employer]. All rights reserved Score:  Initial: 14 Most Recent: X (Date: -- ) Interpretation of Tool:  Represents activities that are increasingly more difficult (i.e. Bed mobility, Transfers, Gait). Medical Necessity:    
· Patient is expected to demonstrate progress in strength, range of motion, balance, coordination and functional technique ·  to decrease assistance required with theraputic exercises and functional mobility · . Reason for Services/Other Comments: 
· Patient continues to require present interventions due to patient's inability to perform theraputic exercises and functional mobility · . Use of outcome tool(s) and clinical judgement create a POC that gives a: Clear prediction of patient's progress: LOW COMPLEXITY  
  
 
 
 
TREATMENT:  
(In addition to Assessment/Re-Assessment sessions the following treatments were rendered) Pre-treatment Symptoms/Complaints:   
Pain: Initial: 
  pt states her head, stomach and back hurt Post Session:  pt states her head, stomach and back hurt Therapeutic Activity: (   15 minutes ):  Therapeutic activities including Bed transfers, Chair transfers, Ambulation on level ground and gait to chair  to improve mobility and strength. Required minimal Safety awareness training;Verbal cues to promote static and dynamic balance in standing. Therapeutic Exercise: ( ):  Exercises per grid below to improve mobility and strength. Required minimal visual, verbal and manual cues to promote proper body alignment, promote proper body posture, promote proper body mechanics and promote proper body breathing techniques. Progressed range and repetitions as indicated. Date: 
3/28 Date: 
 Date: Activity/Exercise Parameters Parameters Parameters Ankle pumps 15 Quad sets 15 Glut sets 15 Hip abduction 10aarom Braces/Orthotics/Lines/Etc:  
· IV 
· denis catheter · drain celeste  
· O2 Device: Nasal cannula Treatment/Session Assessment:   
· Response to Treatment:  Pt agreeable to therapy, despite pain. Ambulation limited by tubes. · Interdisciplinary Collaboration:  
o Physical Therapist 
o Registered Nurse · After treatment position/precautions:  
o Up in chair 
o Bed/Chair-wheels locked 
o Bed in low position 
o Call light within reach 
o RN notified · Compliance with Program/Exercises:  Will assess as treatment progresses no questions. · Recommendations/Intent for next treatment session: \"Next visit will focus on advancements to more challenging activities and reduction in assistance provided\". Total Treatment Duration: PT Patient Time In/Time Out Time In: 0830 Time Out: 6007 Tito Velazquez, PT

## 2019-04-06 NOTE — PROGRESS NOTES
TRANSFER - OUT REPORT: 
 
Verbal report given to Candis Melchor RN(name) on Yana Jimenez  being transferred to Med-Surg(unit) for routine progression of care Report consisted of patients Situation, Background, Assessment and  
Recommendations(SBAR). Information from the following report(s) SBAR, Kardex, Procedure Summary, Intake/Output and MAR was reviewed with the receiving nurse. Lines:  
Quad Lumen  Right Internal jugular (Active) Central Line Being Utilized Yes 4/6/2019  1:12 PM  
Criteria for Appropriate Use Hemodynamically unstable, requiring monitoring lines, vasopressors, or volume resuscitation 4/6/2019  1:12 PM  
Site Assessment Clean, dry, & intact 4/6/2019  1:12 PM  
Infiltration Assessment 0 4/6/2019  1:12 PM  
Affected Extremity/Extremities Color distal to insertion site pink (or appropriate for race) 4/6/2019  1:12 PM  
Date of Last Dressing Change 07/05/19 4/5/2019  7:12 PM  
Dressing Status Clean, dry, & intact 4/6/2019  1:12 PM  
Dressing Type Disk with Chlorhexadine gluconate (CHG) 4/5/2019  7:12 PM  
Proximal Hub Color/Line Status Infusing;Flushed 4/6/2019  1:12 PM  
Positive Blood Return (Medial Site) Yes 4/6/2019  1:12 PM  
Medial 1 Hub Color/Line Status Flushed 4/6/2019  1:12 PM  
Positive Blood Return (Lateral Site) No 4/5/2019 11:34 AM  
Medial 2 Hub Color/Line Status Flushed 4/6/2019  1:12 PM  
Positive Blood Return (Site #3) Yes 4/6/2019  1:12 PM  
Distal Hub Color/Line Status Flushed 4/6/2019  1:12 PM  
Positive Blood Return (Site #4) Yes 4/5/2019 11:34 AM  
  
 
Opportunity for questions and clarification was provided. Patient transported with: 
 O2 @ 4 liters

## 2019-04-06 NOTE — PROGRESS NOTES
Bedside report received from Patti Mcqueen, [facility]. Reviewed chart and assumed care of Pt. Pt is awake and oriented x 4. Voice is soft but clear. RR 24, shallow, and regular. Pt states she feels like her breathing is improved. Spo2 94% on 4L via NC. Lung sounds clear and diminished andin bases. Upper airways slightly coarse. Non-productive, weak cough. Pt unable to produce sputum so unable to send ordered culture. Abd inc CDI. JENNIFER drains patent  And charged with serosangunious drainage. C/o pain at [address] [place] [address] sites. Medicated per PRN order. See complete assessment as charted.

## 2019-04-07 LAB
BACTERIA SPEC CULT: ABNORMAL
BACTERIA SPEC CULT: ABNORMAL
BACTERIA SPEC CULT: NORMAL
BACTERIA SPEC CULT: NORMAL
GLUCOSE BLD STRIP.AUTO-MCNC: 217 MG/DL (ref 65–100)
GLUCOSE BLD STRIP.AUTO-MCNC: 218 MG/DL (ref 65–100)
GLUCOSE BLD STRIP.AUTO-MCNC: 294 MG/DL (ref 65–100)
GLUCOSE BLD STRIP.AUTO-MCNC: 309 MG/DL (ref 65–100)
GRAM STN SPEC: NORMAL
SERVICE CMNT-IMP: ABNORMAL
SERVICE CMNT-IMP: NORMAL

## 2019-04-07 PROCEDURE — 74011636637 HC RX REV CODE- 636/637: Performed by: SURGERY

## 2019-04-07 PROCEDURE — 92610 EVALUATE SWALLOWING FUNCTION: CPT

## 2019-04-07 PROCEDURE — 74011250636 HC RX REV CODE- 250/636: Performed by: SURGERY

## 2019-04-07 PROCEDURE — 74011250637 HC RX REV CODE- 250/637: Performed by: SURGERY

## 2019-04-07 PROCEDURE — 82962 GLUCOSE BLOOD TEST: CPT

## 2019-04-07 PROCEDURE — 77030020255 HC SOL INJ LR 1000ML BG

## 2019-04-07 PROCEDURE — 65270000029 HC RM PRIVATE

## 2019-04-07 PROCEDURE — 97530 THERAPEUTIC ACTIVITIES: CPT

## 2019-04-07 PROCEDURE — 74011000258 HC RX REV CODE- 258: Performed by: SURGERY

## 2019-04-07 RX ADMIN — HYDROMORPHONE HYDROCHLORIDE 1 MG: 2 INJECTION, SOLUTION INTRAMUSCULAR; INTRAVENOUS; SUBCUTANEOUS at 20:31

## 2019-04-07 RX ADMIN — CARVEDILOL 12.5 MG: 6.25 TABLET, FILM COATED ORAL at 09:20

## 2019-04-07 RX ADMIN — Medication 10 ML: at 06:27

## 2019-04-07 RX ADMIN — Medication 400 MG: at 09:21

## 2019-04-07 RX ADMIN — HUMAN INSULIN 6 UNITS: 100 INJECTION, SOLUTION SUBCUTANEOUS at 01:07

## 2019-04-07 RX ADMIN — CARVEDILOL 12.5 MG: 6.25 TABLET, FILM COATED ORAL at 16:11

## 2019-04-07 RX ADMIN — HUMAN INSULIN 4 UNITS: 100 INJECTION, SOLUTION SUBCUTANEOUS at 07:30

## 2019-04-07 RX ADMIN — PRAVASTATIN SODIUM 80 MG: 80 TABLET ORAL at 20:43

## 2019-04-07 RX ADMIN — ENOXAPARIN SODIUM 40 MG: 40 INJECTION SUBCUTANEOUS at 20:42

## 2019-04-07 RX ADMIN — ONDANSETRON 4 MG: 2 INJECTION INTRAMUSCULAR; INTRAVENOUS at 20:42

## 2019-04-07 RX ADMIN — PIPERACILLIN SODIUM,TAZOBACTAM SODIUM 4.5 G: 4; .5 INJECTION, POWDER, FOR SOLUTION INTRAVENOUS at 17:45

## 2019-04-07 RX ADMIN — ENOXAPARIN SODIUM 40 MG: 40 INJECTION SUBCUTANEOUS at 09:20

## 2019-04-07 RX ADMIN — HYDROMORPHONE HYDROCHLORIDE 2 MG: 2 INJECTION, SOLUTION INTRAMUSCULAR; INTRAVENOUS; SUBCUTANEOUS at 02:31

## 2019-04-07 RX ADMIN — Medication 400 MG: at 17:44

## 2019-04-07 RX ADMIN — Medication 10 ML: at 22:00

## 2019-04-07 RX ADMIN — PIPERACILLIN SODIUM,TAZOBACTAM SODIUM 4.5 G: 4; .5 INJECTION, POWDER, FOR SOLUTION INTRAVENOUS at 09:27

## 2019-04-07 RX ADMIN — PIPERACILLIN SODIUM,TAZOBACTAM SODIUM 4.5 G: 4; .5 INJECTION, POWDER, FOR SOLUTION INTRAVENOUS at 02:39

## 2019-04-07 RX ADMIN — HUMAN INSULIN 8 UNITS: 100 INJECTION, SOLUTION SUBCUTANEOUS at 18:00

## 2019-04-07 RX ADMIN — HUMAN INSULIN 6 UNITS: 100 INJECTION, SOLUTION SUBCUTANEOUS at 12:04

## 2019-04-07 RX ADMIN — AMIODARONE HYDROCHLORIDE 200 MG: 200 TABLET ORAL at 09:21

## 2019-04-07 RX ADMIN — HYDROMORPHONE HYDROCHLORIDE 2 MG: 2 INJECTION, SOLUTION INTRAMUSCULAR; INTRAVENOUS; SUBCUTANEOUS at 13:49

## 2019-04-07 RX ADMIN — HYDROMORPHONE HYDROCHLORIDE 2 MG: 2 INJECTION, SOLUTION INTRAMUSCULAR; INTRAVENOUS; SUBCUTANEOUS at 09:42

## 2019-04-07 NOTE — PROGRESS NOTES
Shift Assessment - Patient is alert and oriented x4. Respirations are even and unlabored. Lung sounds clear. Bowel sounds hypoactive x4. Currently resting in a low, locked bed. Call light in reach.

## 2019-04-07 NOTE — PROGRESS NOTES
No new events- sitting up in chair Tolerating clear liquids wiiht NGT clamped; flatus, but no BM Visit Vitals /68 (BP 1 Location: Right arm, BP Patient Position: At rest;Head of bed elevated (Comment degrees)) Pulse 60 Temp 96.3 °F (35.7 °C) Resp 20 Ht 4' 11\" (1.499 m) Wt 221 lb 4.8 oz (100.4 kg) SpO2 97% Breastfeeding? No  
BMI 44.70 kg/m² Lungs-clear apices, diminshed bases likely due to habitus 
cv-reg 
abd- hypoactive BS noted. Incision unchanged from yesterday with focus of skin necrosis below umbilicus on right. Some tan drainage, oily, consistent with necrosis of subcutaneous fat. JPs nonpurulent, also with liquified adipose Recent Results (from the past 12 hour(s)) GLUCOSE, POC Collection Time: 04/07/19  7:08 AM  
Result Value Ref Range Glucose (POC) 218 (H) 65 - 100 mg/dL GLUCOSE, POC Collection Time: 04/07/19  7:34 AM  
Result Value Ref Range Glucose (POC) 217 (H) 65 - 100 mg/dL GLUCOSE, POC Collection Time: 04/07/19 11:41 AM  
Result Value Ref Range Glucose (POC) 294 (H) 65 - 100 mg/dL Plan- 
Advance to full liquids Continue zosyn May need wound debridement/cleanout in OR, but will watch for at least another 24 hours; pt and sister understand rationale.

## 2019-04-07 NOTE — PROGRESS NOTES
Problem: Mobility Impaired (Adult and Pediatric) Goal: *Acute Goals and Plan of Care (Insert Text) Description STG: 
(1.)Ms. Noman Portillo will move from supine to sit and sit to supine  with MINIMAL ASSIST within 3 treatment day(s). (2.)Ms. Noman Portillo will transfer from bed to chair and chair to bed with CONTACT GUARD ASSIST using the least restrictive device within 3 treatment day(s). (3.)Ms. Noman Portillo will ambulate with CONTACT GUARD ASSIST for 50 feet with the least restrictive device within 3 treatment day(s). LTG: 
(1.)Ms. Noman Portillo will move from supine to sit and sit to supine  in bed with STAND BY ASSIST within 7 treatment day(s). (2.)Ms. Noman Portillo will transfer from bed to chair and chair to bed with STAND BY ASSIST using the least restrictive device within 7 treatment day(s). (3.)Ms. Noman Portillo will ambulate with STAND BY ASSIST for 200 feet with the least restrictive device within 7 treatment day(s). (4)Ms. Noman Portillo will go up 4 steps min assist with device as needed in 7 days. (5)Ms. Noman Portillo will perform HEP with cues and assistance to increase safety on her feet in 7 days. Above goals ongoing 4/6 
________________________________________________________________________________________________ Outcome: Progressing Towards Goal 
  
PHYSICAL THERAPY: Daily Note and PM 4/7/2019 INPATIENT: PT Visit Days : 2 [place] / [place] / Product Type: Medicare /   
  
NAME/AGE/GENDER: Holger Dumont is a 52 y.o. female PRIMARY DIAGNOSIS: Incisional hernia, without obstruction or gangrene [K43.2] Incarcerated incisional hernia [K43.0] Incarcerated incisional hernia [K43.0] Incarcerated incisional hernia Incarcerated incisional hernia Procedure(s) (LRB): ABDOMINAL DEBRIDEMENT, Repair of Abdominal Wound (N/A) 7 Days Post-Op ICD-10: Treatment Diagnosis:  
 · Generalized Muscle Weakness (M62.81) · Other abnormalities of gait and mobility (R26.89) Precaution/Allergies: Grass pollen ASSESSMENT:  
Ms. Lisy Tan presents with general decreased in functional mobility and gait s/p OPEN INCARCERATED INCISIONAL HERNIA REPAIR on 3/26/19. Patient underwent second pocedure 3/31/19 secondary to dehiscense and hernia recurrence. Patient participated well this afternoon. Anxious to get out of bed but mobility limited by pain. RN notified of patient's request for pain medications. Patient requiring most assist for bed mobility and unsteady of her feet with the walker. Patient was independent prior to surgery and would benefit from therapy to facilitate a return to prior level of function. Unsure of patient's discharge plans/needs at this time. She is motivated and works hard. May benefit from STR/SNF if discharged in next couple of days; otherwise may be able to return home if she continues to progress. This section established at most recent assessment PROBLEM LIST (Impairments causing functional limitations): 1. Decreased Strength 2. Decreased ADL/Functional Activities 3. Decreased Transfer Abilities 4. Decreased Ambulation Ability/Technique 5. Decreased Balance 6. Increased Pain 7. Decreased Activity Tolerance 8. Decreased Flexibility/Joint Mobility 9. Decreased [place] with Home Exercise Program 
 INTERVENTIONS PLANNED: (Benefits and precautions of physical therapy have been discussed with the patient.) 1. Balance Exercise 2. Bed Mobility 3. Family Education 4. Gait Training 5. Home Exercise Program (HEP) 6. Therapeutic Activites 7. Therapeutic Exercise/Strengthening 8. Transfer Training TREATMENT PLAN: Frequency/Duration: daily for duration of hospital stay Rehabilitation Potential For Stated Goals: Good RECOMMENDED REHABILITATION/EQUIPMENT: (at time of discharge pending progress): Due to the probability of continued deficits (see above) this patient will likely need continued skilled physical therapy after discharge. Equipment: ? May need a RW   
    
 
 
 
HISTORY:  
History of Present Injury/Illness (Reason for Referral): S/p OPEN INCARCERATED INCISIONAL HERNIA REPAIR 3/26/19 Past Medical History/Comorbidities: Ms. Costa Peralta  has a past medical history of Allergic rhinitis, Arthritis, Automatic implantable cardioverter-defibrillator in situ (3/30/2016), CAD in native artery (), Chronic systolic heart failure ([address] (2013), CKD (chronic kidney disease), Diabetes ([address] Diabetes mellitus ([address] (2010), Dyslipidemia (2013), Eczema, Fibromyalgia, GERD (gastroesophageal reflux disease), Headache, Heart failure ([address] HTN (hypertension) (2010), Hypercholesterolemia, Morbid obesity ([address] Neuropathy, NICM (nonischemic cardiomyopathy) ([address] (2/15/2013), Obesity, Obstructive sleep apnea (adult) (pediatric) (2014), Pseudomonas urinary tract infection (2019), Sciatic pain (2016), SVT (supraventricular tachycardia) ([address] and Tobacco use disorder (2010). She also has no past medical history of Dementia, Gastrointestinal disorder, or Unspecified adverse effect of anesthesia. Ms. Costa Peralta  has a past surgical history that includes hx tonsillectomy; hx implantable cardioverter defibrillator (2013); hx  section; hx hernia repair; pr left heart cath,percutaneous (2013); hx svt ablation (\"years ago\"); hx rotator cuff repair (Right); hx hysterectomy; and hx implantable cardioverter defibrillator (2013). Social History/Living Environment:  
Home Environment: Private residence # Steps to Enter: 4(than an additional 3 inside) Rails to Enter: No 
One/Two Story Residence: One story Living Alone: No 
Support Systems: Friends \ neighbors Patient Expects to be Discharged to[de-identified] Private residence Current DME Used/Available at Home: Glucometer, CPAP Prior Level of Function/Work/Activity: 
independent Number of Personal Factors/Comorbidities that affect the Plan of Care: 1-2: MODERATE COMPLEXITY EXAMINATION:  
Most Recent Physical Functioning:  
Gross Assessment: 
AROM: Generally decreased, functional 
Strength: Generally decreased, functional 
         
  
Posture: 
  
Balance: 
Sitting: Intact Standing: Pull to stand; With support Bed Mobility: 
Supine to Sit: Minimum assistance; Moderate assistance Scooting: Minimum assistance Wheelchair Mobility: 
  
Transfers: 
Sit to Stand: Contact guard assistance;Minimum assistance Stand to Sit: Contact guard assistance Bed to Chair: Contact guard assistance;Minimum assistance Gait: 
  
Speed/Cristy: Delayed Step Length: Left shortened;Right shortened Gait Abnormalities: Decreased step clearance Distance (ft): 4 Feet (ft) Assistive Device: Walker, rolling Ambulation - Level of Assistance: Contact guard assistance;Minimal assistance Interventions: Safety awareness training; Tactile cues; Verbal cues Body Structures Involved: 1. Bones 2. Joints 3. Muscles 4. Ligaments Body Functions Affected: 1. Movement Related Activities and Participation Affected: 1. Mobility Number of elements that affect the Plan of Care: 3: MODERATE COMPLEXITY CLINICAL PRESENTATION:  
Presentation: Stable and uncomplicated: LOW COMPLEXITY CLINICAL DECISION MAKING:  
[employer] [facility] 6 Clicks Basic Mobility Inpatient Short Form How much difficulty does the patient currently have. .. Unable A Lot A Little None 1. Turning over in bed (including adjusting bedclothes, sheets and blankets)? ? 1   ? 2   ? 3   ? 4  
2. Sitting down on and standing up from a chair with arms ( e.g., wheelchair, bedside commode, etc.)   ? 1   ? 2   ? 3   ? 4  
3. Moving from lying on back to sitting on the side of the bed?   ? 1   ? 2   ? 3   ? 4 How much help from another person does the patient currently need. .. Total A Lot A Little None 4. Moving to and from a bed to a chair (including a wheelchair)?    ? 1   ? 2   ? 3   ? 4  
 5.  Need to walk in hospital room? ? 1   ? 2   ? 3   ? 4  
6. Climbing 3-5 steps with a railing? ? 1   ? 2   ? 3   ? 4  
© 2007, Trustees of [address] [place] [address] [address] [id number], under license to [employer]. All rights reserved Score:  Initial: 14 Most Recent: X (Date: -- ) Interpretation of Tool:  Represents activities that are increasingly more difficult (i.e. Bed mobility, Transfers, Gait). Medical Necessity:    
· Patient is expected to demonstrate progress in strength, range of motion, balance, coordination and functional technique ·  to decrease assistance required with theraputic exercises and functional mobility · . Reason for Services/Other Comments: 
· Patient continues to require present interventions due to patient's inability to perform theraputic exercises and functional mobility · . Use of outcome tool(s) and clinical judgement create a POC that gives a: Clear prediction of patient's progress: LOW COMPLEXITY  
  
 
 
 
TREATMENT:  
(In addition to Assessment/Re-Assessment sessions the following treatments were rendered) Pre-treatment Symptoms/Complaints:  Patient ready to get out of bed. Pain: Initial: 
Pain Intensity 1: 9 Pain Location 1: Abdomen Pain Intervention(s) 1: Nurse notified, Repositioned  Post Session:  10/10 Therapeutic Activity: (   30 minutes ):  Therapeutic activities including supine to sit, scooting, sit <> stand, standing balance, ambulation with RW and chair transfers to improve mobility and strength. Required minimal Safety awareness training; Tactile cues; Verbal cues to promote static and dynamic balance in standing. Therapeutic Exercise: ( ):  Exercises per grid below to improve mobility and strength. Required minimal visual, verbal and manual cues to promote proper body alignment, promote proper body posture, promote proper body mechanics and promote proper body breathing techniques. Progressed range and repetitions as indicated. Date: 
3/28 Date: Date: Activity/Exercise Parameters Parameters Parameters Ankle pumps 15 Quad sets 15 Glut sets 15 Hip abduction 10aarom Braces/Orthotics/Lines/Etc:  
· IV 
· denis catheter · drain JENNIFER x 2 Treatment/Session Assessment:   
· Response to Treatment:  Patient participated well. Mobility limited by pain but motivated and should progress. MD requested PT remove NG tube with MD present. Patient tolerated well and happy to have NG tube removed. · Interdisciplinary Collaboration:  
o Physical Therapist 
o Registered Nurse 
o Physician · After treatment position/precautions:  
o Up in chair 
o Bed/Chair-wheels locked 
o Bed in low position 
o Call light within reach 
o RN notified 
o Family at bedside · Compliance with Program/Exercises: Will assess as treatment progresses. · Recommendations/Intent for next treatment session: \"Next visit will focus on advancements to more challenging activities and reduction in assistance provided\". Total Treatment Duration: PT Patient Time In/Time Out Time In: 1310 Time Out: 1340 Constantine Gopaling, PT

## 2019-04-07 NOTE — PROGRESS NOTES
Problem: Dysphagia (Adult) Goal: *Acute Goals and Plan of Care (Insert Text) Description STG: Pt will demonstrate zero signs/sx of aspiration with full liquids with 90% accuracy during all meals. STG Pt will tolerated trials of chewable textures with no overt signs/sx of aspiration. LTG: Pt will demonstrate zero signs/sx of aspiration with least restrictive diet with 100% accuracy during all meals. Outcome: Progressing Towards Goal 
 SPEECH LANGUAGE PATHOLOGY: BEDSIDE SWALLOW NOTE: Initial Assessment NAME/AGE/GENDER: Loren Paul is a 52 y.o. female DATE: 4/7/2019 PRIMARY DIAGNOSIS: Incisional hernia, without obstruction or gangrene [K43.2] Incarcerated incisional hernia [K43.0] Incarcerated incisional hernia [K43.0] Procedure(s) (LRB): ABDOMINAL DEBRIDEMENT, Repair of Abdominal Wound (N/A) 7 Days Post-Op ICD-10: Treatment Diagnosis: R13.12 oropharyngeal phase dysphagia INTERDISCIPLINARY COLLABORATION: Registered Nurse PRECAUTIONS/ALLERGIES: Grass pollen ASSESSMENT:  
Based on the objective data described below, Ms. Skip Skaggs presents with no overt signs/sx of aspiration with thin liquids via cup and straw and pureed. No other PO assessed on this date secondary to pt only being on full liquid per MD. Will follow for chewable PO trials when pt's diet is upgraded. Patient will benefit from skilled intervention to address the below impairments. ?????? ? ? This section established at most recent assessment?????????? 
PROBLEM LIST (Impairments causing functional limitations): 
dysphagia REHABILITATION POTENTIAL FOR STATED GOALS: Excellent PLAN OF CARE:  
Patient will benefit from skilled intervention to address the following impairments. RECOMMENDATIONS AND PLANNED INTERVENTIONS (Benefits and precautions of therapy have been discussed with the patient.): 
Liquids:  regular thin MEDICATIONS: 
With liquid ASPIRATION PRECAUTIONS: 
Slow rate of intake Small bites/sips Upright at 90 degrees during meal 
COMPENSATORY STRATEGIES/MODIFICATIONS INCLUDING: 
None OTHER RECOMMENDATIONS (including follow up treatment recommendations):  
Patient education RECOMMENDED DIET MODIFICATIONS DISCUSSED WITH: 
Nursing Patient FREQUENCY/DURATION: Continue to follow patient 1 time for diet tolerance when diet upgraded per MD  
RECOMMENDED REHABILITATION/EQUIPMENT: (at time of discharge pending progress): Due to the probability of continued deficits (see above) this patient will likely need continued skilled speech therapy after discharge. SUBJECTIVE:  
alert History of Present Injury/Illness: Ms. Kelly Gee  has a past medical history of Allergic rhinitis, Arthritis, Automatic implantable cardioverter-defibrillator in situ (3/30/2016), CAD in native artery (), Chronic systolic heart failure ([address] (2013), CKD (chronic kidney disease), Diabetes ([address] Diabetes mellitus ([address] (2010), Dyslipidemia (2013), Eczema, Fibromyalgia, GERD (gastroesophageal reflux disease), Headache, Heart failure ([address] HTN (hypertension) (2010), Hypercholesterolemia, Morbid obesity ([address] Neuropathy, NICM (nonischemic cardiomyopathy) ([address] (2/15/2013), Obesity, Obstructive sleep apnea (adult) (pediatric) (2014), Pseudomonas urinary tract infection (2019), Sciatic pain (2016), SVT (supraventricular tachycardia) ([address] and Tobacco use disorder (2010). She also has no past medical history of Dementia, Gastrointestinal disorder, or Unspecified adverse effect of anesthesia. She also  has a past surgical history that includes hx tonsillectomy; hx implantable cardioverter defibrillator (2013); hx  section; hx hernia repair; pr left heart cath,percutaneous (2013); hx svt ablation (\"years ago\"); hx rotator cuff repair (Right); hx hysterectomy; and hx implantable cardioverter defibrillator (2013).   
Present Symptoms:  
Pain Scale 1: Visual 
 Pain Intensity 1: 0 Pain Location 1: Abdomen Pain Intervention(s) 1: Medication (see MAR) Current Medications: No current facility-administered medications on file prior to encounter. Current Outpatient Medications on File Prior to Encounter Medication Sig Dispense Refill  
 metFORMIN (GLUCOPHAGE) 500 mg tablet Take 1 Tab by mouth two (2) times daily (with meals). 60 Tab 5  
 magnesium oxide (MAG-OX) 400 mg tablet Take 1 Tab by mouth two (2) times a day. 180 Tab 3  
 amiodarone (CORDARONE) 200 mg tablet Take 1 Tab by mouth two (2) times a day. (Patient taking differently: Take 200 mg by mouth daily.) 60 Tab 11 Insulin Needles, Disposable, (FABIOLA PEN NEEDLE) 32 gauge x 5/32\" ndle 1 Each by Does Not Apply route daily as needed. E11.9 90 Pen Needle 3  
 lancets (ONETOUCH SURESOFT LANCING DEV) misc Check blood sugar daily, E11.9 50 Each 11  
 pravastatin (PRAVACHOL) 80 mg tablet Take 1 Tab by mouth nightly. 90 Tab 3  
 isosorbide mononitrate ER (IMDUR) 30 mg tablet Take 1 Tab by mouth every morning. 30 Tab 11  
 carvedilol (COREG) 25 mg tablet Take 1 Tab by mouth two (2) times daily (with meals). (Patient taking differently: Take 12.5 mg by mouth two (2) times daily (with meals). ) 180 Tab 1  
 furosemide (LASIX) 80 mg tablet Taking 1 tablet in the morning and 1/2 tablet in the afternoon (Patient taking differently: Take 40 mg by mouth two (2) times a day. Taking 1 tablet in the morning and 1/2 tablet in the afternoon) 180 Tab 1  
 insulin glargine (LANTUS SOLOSTAR U-100 INSULIN) 100 unit/mL (3 mL) inpn 35 Units by SubCUTAneous route daily. (Patient taking differently: 35 Units by SubCUTAneous route nightly.) 15 mL 3  
 glucose blood VI test strips (ONETOUCH ULTRA TEST) strip Check blood sugar daily, E11.9 50 Strip 11  
 nitroglycerin (NITROSTAT) 0.4 mg SL tablet 0.4 mg by SubLINGual route every five (5) minutes as needed for Chest Pain. Up to 3 doses. DUPIXENT 300 mg/2 mL syrg syringe by SubCUTAneous route every fourteen (14) days. triamcinolone acetonide (KENALOG) 0.1 % topical cream as needed. 5  
 albuterol (PROVENTIL HFA, VENTOLIN HFA, PROAIR HFA) 90 mcg/actuation inhaler Take 1 Puff by inhalation every four (4) hours as needed for Wheezing. 1 Inhaler 0  
 cholecalciferol (VITAMIN D3) 1,000 unit cap Take  by mouth daily. Blood-Glucose Meter (ONETOUCH ULTRA2) monitoring kit Check blood sugar daily, E11.9 1 Kit 0  
 cpap machine kit by Does Not Apply route. 11 cm    
 aspirin (ASPIRIN) 325 mg tablet Take 1 Tab by mouth daily. (Patient taking differently: Take 81 mg by mouth daily.) 30 Tab 11  
 clotrimazole-betamethasone (LOTRISONE) topical cream Apply  to affected area two (2) times a day. (Patient not taking: Reported on 3/20/2019) 45 g 0 Current Dietary Status:   
Full liquid Social History/Home Situation:  
Home Environment: Private residence # Steps to Enter: 4(than an additional 3 inside) Rails to Enter: No 
One/Two Story Residence: One story Living Alone: No 
Support Systems: Friends \ neighbors Patient Expects to be Discharged to[de-identified] Private residence Current DME Used/Available at Home: Glucometer, CPAP OBJECTIVE:  
 
Oral Motor Structure/Speech:  Oral-Motor Structure/Motor Speech Labial: No impairment Oral Hygiene: adequate Lingual: No impairment Cognitive and Communication Status: 
Neurologic State: Alert Orientation Level: Oriented X4 Cognition: Appropriate decision making Perception: Appears intact Perseveration: No perseveration noted Safety/Judgement: Fall prevention BEDSIDE SWALLOW EVALUATION Oral Assessment: 
Oral Assessment Labial: No impairment Oral Hygiene: adequate Lingual: No impairment P.O. Trials: 
Patient Position: upright in bed The patient was given teaspoon amounts of the following:  
Consistency Presented: Puree How Presented: Spoon;Straw;Self-fed/presented;Cup/sip ORAL PHASE: 
 Bolus Acceptance: No impairment Bolus Formation/Control: No impairment Propulsion: No impairment Oral Residue: None PHARYNGEAL PHASE: 
Initiation of Swallow: No impairment Aspiration Signs/Symptoms: None Vocal Quality: No impairment Pharyngeal Phase Characteristics: No impairment, issues, or problems OTHER OBSERVATIONS: 
Rate/bite size: WNL Endurance: WNL Comments: Tool Used: Dysphagia Outcome and Severity Scale (TAWANDA) Score Comments Normal Diet  ? 7 With no strategies or extra time needed Functional Swallow  ? 6 May have mild oral or pharyngeal delay Mild Dysphagia ? 5 Which may require one diet consistency restricted (those who demonstrate penetration which is entirely cleared on MBS would be included) Mild-Moderate Dysphagia  ? 4 With 1-2 diet consistencies restricted Moderate Dysphagia  ? 3 With 2 or more diet consistencies restricted Moderately Severe Dysphagia  ? 2 With partial PO strategies (trials with ST only) Severe Dysphagia  ? 1 With inability to tolerate any PO safely Score:  Initial: 4 Most Recent: X (Date: --) Interpretation of Tool: The Dysphagia Outcome and Severity Scale (TAWANDA) is a simple, easy-to-use, 7-point scale developed to systematically rate the functional severity of dysphagia based on objective assessment and make recommendations for diet level, independence level, and type of nutrition. [place] / [place] B / Product Type: Medicare /  
 
TREATMENT:  
 (In addition to Assessment/Re-Assessment sessions the following treatments were rendered) Assessment/Reassessment only, no treatment provided today MODALITIES:  
 
ORAL MOTOR  EXERCISES: 
 
LARYNGEAL / PHARYNGEAL EXERCISES: 
 
                                __________________________________________________________________________________________________ Safety: After treatment position/precautions: 
Up in chair Call light within reach RN notified Family at bedside Treatment Assessment:  
Progression/Medical Necessity:  
Patient demonstrates good 
 rehab potential due to higher previous functional level. Compliance with Program/Exercises: Will assess as treatment progresses Reason for Continuation of Services/Other Comments: 
Patient continues to require skilled intervention due to dysphagia Mateo Bond Recommendations/Intent for next treatment session: \"Treatment next visit will focus on chewable trials when MD upgrades \". Total Treatment Duration: 
Time In: 5627 Time Out: 1523  Morales Granado MA/DAX/SLP

## 2019-04-08 ENCOUNTER — APPOINTMENT (OUTPATIENT)
Dept: GENERAL RADIOLOGY | Age: 54
DRG: 353 | End: 2019-04-08
Attending: INTERNAL MEDICINE
Payer: MEDICARE

## 2019-04-08 LAB
ERYTHROCYTE [DISTWIDTH] IN BLOOD BY AUTOMATED COUNT: 13.2 % (ref 11.9–14.6)
GLUCOSE BLD STRIP.AUTO-MCNC: 175 MG/DL (ref 65–100)
GLUCOSE BLD STRIP.AUTO-MCNC: 199 MG/DL (ref 65–100)
GLUCOSE BLD STRIP.AUTO-MCNC: 213 MG/DL (ref 65–100)
GLUCOSE BLD STRIP.AUTO-MCNC: 242 MG/DL (ref 65–100)
GLUCOSE BLD STRIP.AUTO-MCNC: 255 MG/DL (ref 65–100)
HCT VFR BLD AUTO: 29 % (ref 35.8–46.3)
HGB BLD-MCNC: 9.6 G/DL (ref 11.7–15.4)
MCH RBC QN AUTO: 31.2 PG (ref 26.1–32.9)
MCHC RBC AUTO-ENTMCNC: 33.1 G/DL (ref 31.4–35)
MCV RBC AUTO: 94.2 FL (ref 79.6–97.8)
NRBC # BLD: 0.08 K/UL (ref 0–0.2)
PLATELET # BLD AUTO: 280 K/UL (ref 150–450)
PMV BLD AUTO: 11.2 FL (ref 9.4–12.3)
RBC # BLD AUTO: 3.08 M/UL (ref 4.05–5.2)
WBC # BLD AUTO: 18 K/UL (ref 4.3–11.1)

## 2019-04-08 PROCEDURE — 74011000250 HC RX REV CODE- 250: Performed by: SURGERY

## 2019-04-08 PROCEDURE — 82962 GLUCOSE BLOOD TEST: CPT

## 2019-04-08 PROCEDURE — 74011250637 HC RX REV CODE- 250/637: Performed by: SURGERY

## 2019-04-08 PROCEDURE — C9399 UNCLASSIFIED DRUGS OR BIOLOG: HCPCS | Performed by: SURGERY

## 2019-04-08 PROCEDURE — 74011636637 HC RX REV CODE- 636/637: Performed by: SURGERY

## 2019-04-08 PROCEDURE — 99232 SBSQ HOSP IP/OBS MODERATE 35: CPT | Performed by: INTERNAL MEDICINE

## 2019-04-08 PROCEDURE — 86580 TB INTRADERMAL TEST: CPT | Performed by: SURGERY

## 2019-04-08 PROCEDURE — 85027 COMPLETE CBC AUTOMATED: CPT

## 2019-04-08 PROCEDURE — 74011250636 HC RX REV CODE- 250/636: Performed by: SURGERY

## 2019-04-08 PROCEDURE — 74011250636 HC RX REV CODE- 250/636: Performed by: INTERNAL MEDICINE

## 2019-04-08 PROCEDURE — 97110 THERAPEUTIC EXERCISES: CPT

## 2019-04-08 PROCEDURE — 74011000302 HC RX REV CODE- 302: Performed by: SURGERY

## 2019-04-08 PROCEDURE — 74011000258 HC RX REV CODE- 258: Performed by: SURGERY

## 2019-04-08 PROCEDURE — 65270000029 HC RM PRIVATE

## 2019-04-08 PROCEDURE — 36415 COLL VENOUS BLD VENIPUNCTURE: CPT

## 2019-04-08 PROCEDURE — 77030020255 HC SOL INJ LR 1000ML BG

## 2019-04-08 PROCEDURE — 71046 X-RAY EXAM CHEST 2 VIEWS: CPT

## 2019-04-08 RX ORDER — FUROSEMIDE 10 MG/ML
40 INJECTION INTRAMUSCULAR; INTRAVENOUS EVERY 12 HOURS
Status: COMPLETED | OUTPATIENT
Start: 2019-04-08 | End: 2019-04-09

## 2019-04-08 RX ADMIN — PIPERACILLIN SODIUM,TAZOBACTAM SODIUM 4.5 G: 4; .5 INJECTION, POWDER, FOR SOLUTION INTRAVENOUS at 02:59

## 2019-04-08 RX ADMIN — HUMAN INSULIN 4 UNITS: 100 INJECTION, SOLUTION SUBCUTANEOUS at 06:01

## 2019-04-08 RX ADMIN — Medication 400 MG: at 17:06

## 2019-04-08 RX ADMIN — HYDROMORPHONE HYDROCHLORIDE 1 MG: 2 INJECTION, SOLUTION INTRAMUSCULAR; INTRAVENOUS; SUBCUTANEOUS at 05:59

## 2019-04-08 RX ADMIN — ONDANSETRON 4 MG: 2 INJECTION INTRAMUSCULAR; INTRAVENOUS at 17:05

## 2019-04-08 RX ADMIN — PROMETHAZINE HYDROCHLORIDE 25 MG: 25 INJECTION INTRAMUSCULAR; INTRAVENOUS at 05:59

## 2019-04-08 RX ADMIN — DUPILUMAB 300 MG: 300 INJECTION, SOLUTION SUBCUTANEOUS at 09:00

## 2019-04-08 RX ADMIN — ENOXAPARIN SODIUM 40 MG: 40 INJECTION SUBCUTANEOUS at 08:27

## 2019-04-08 RX ADMIN — TUBERCULIN PURIFIED PROTEIN DERIVATIVE 5 UNITS: 5 INJECTION, SOLUTION INTRADERMAL at 20:55

## 2019-04-08 RX ADMIN — HYDROMORPHONE HYDROCHLORIDE 1.5 MG: 2 INJECTION, SOLUTION INTRAMUSCULAR; INTRAVENOUS; SUBCUTANEOUS at 12:18

## 2019-04-08 RX ADMIN — ONDANSETRON 4 MG: 2 INJECTION INTRAMUSCULAR; INTRAVENOUS at 09:27

## 2019-04-08 RX ADMIN — PRAVASTATIN SODIUM 80 MG: 80 TABLET ORAL at 20:56

## 2019-04-08 RX ADMIN — HYDROMORPHONE HYDROCHLORIDE 2 MG: 2 INJECTION, SOLUTION INTRAMUSCULAR; INTRAVENOUS; SUBCUTANEOUS at 21:50

## 2019-04-08 RX ADMIN — HUMAN INSULIN 2 UNITS: 100 INJECTION, SOLUTION SUBCUTANEOUS at 12:13

## 2019-04-08 RX ADMIN — FUROSEMIDE 40 MG: 10 INJECTION, SOLUTION INTRAMUSCULAR; INTRAVENOUS at 09:28

## 2019-04-08 RX ADMIN — DIPHENHYDRAMINE HYDROCHLORIDE 12.5 MG: 50 INJECTION, SOLUTION INTRAMUSCULAR; INTRAVENOUS at 20:50

## 2019-04-08 RX ADMIN — Medication 5 ML: at 20:56

## 2019-04-08 RX ADMIN — AMIODARONE HYDROCHLORIDE 200 MG: 200 TABLET ORAL at 08:28

## 2019-04-08 RX ADMIN — Medication 10 ML: at 06:09

## 2019-04-08 RX ADMIN — HYDROMORPHONE HYDROCHLORIDE 1 MG: 2 INJECTION, SOLUTION INTRAMUSCULAR; INTRAVENOUS; SUBCUTANEOUS at 09:27

## 2019-04-08 RX ADMIN — PIPERACILLIN SODIUM,TAZOBACTAM SODIUM 4.5 G: 4; .5 INJECTION, POWDER, FOR SOLUTION INTRAVENOUS at 09:28

## 2019-04-08 RX ADMIN — Medication 10 ML: at 14:00

## 2019-04-08 RX ADMIN — HUMAN INSULIN 6 UNITS: 100 INJECTION, SOLUTION SUBCUTANEOUS at 00:59

## 2019-04-08 RX ADMIN — ENOXAPARIN SODIUM 40 MG: 40 INJECTION SUBCUTANEOUS at 20:58

## 2019-04-08 RX ADMIN — Medication 400 MG: at 08:28

## 2019-04-08 RX ADMIN — HYDROMORPHONE HYDROCHLORIDE 2 MG: 2 INJECTION, SOLUTION INTRAMUSCULAR; INTRAVENOUS; SUBCUTANEOUS at 17:05

## 2019-04-08 RX ADMIN — FUROSEMIDE 40 MG: 10 INJECTION, SOLUTION INTRAMUSCULAR; INTRAVENOUS at 20:50

## 2019-04-08 RX ADMIN — PIPERACILLIN SODIUM,TAZOBACTAM SODIUM 4.5 G: 4; .5 INJECTION, POWDER, FOR SOLUTION INTRAVENOUS at 17:03

## 2019-04-08 NOTE — PROGRESS NOTES
Therapist check on pt this morning and she was very tearful and ask me to come back this afternoon. Therapist check on pt again and she was still tearful and not feeling well. Will check on pt tomorrow.

## 2019-04-08 NOTE — PROGRESS NOTES
PROGRESS NOTE Yana Jimenez 4/8/2019 Date of Admission:  3/26/2019 The patient's chart is reviewed and the patient is discussed with the staff. Patient is a 52 y.o.  female seen and evaluated at the request of Dr. Ko Cordon. She was admitted 3/26 for abdominal hernia repair, complicated by abdominal wall dehiscence and return to OR on 3/31 for hernia repair with mesh. She has continued to have abdominal pain and had multiple episodes of nausea yesterday and NGT had to be placed back to suction. She has had O2 requirement for several days, but worsened over the past 24 hours from 5L to HF 11 and then was hypoxemic on this and tachypneic to 40s RR this morning when a RRT was called. She was placed on NRB, moved to ICU and then placed on BIPAP. In ICU she had initial temp of 100.9. Urinalysis and Urine culture was sent yesterday due to analisa color with no growth to date. She reports she has been having more and more difficulty breathing overnight. Feels she is very dehydrated and mouth has been dry. She hasn't noted any fevers. She didn't want to speak much more due to dyspnea and RR on BIPAP was in mid 40s to 50s. She was intubated on 4/3/19. Subjective: No events overnight. Weaned off O2 over the weekend. Feels breathing is much improved. Passing gas, but having abdominal pain with straining for BMs. Eating this morning and tolerating. Afebrile. Diuresing rather well with 800cc negative. Review of Systems A comprehensive review of systems was negative except for that written in the HPI. Patient Active Problem List  
Diagnosis Code  Long-term insulin use in type 2 diabetes ([facility] E11.9, Z79.4  Tobacco use disorder F17.200  
 HTN (hypertension) I10  
 Dyslipidemia E78.5  NICM (nonischemic cardiomyopathy) ([facility] I42.8  Chronic systolic heart failure (HCC) I50.22  
 BA on CPAP G47.33, Z99.89  
 Morbid obesity (HCC) E66.01  
  CAD in native artery I25.10  Automatic implantable cardioverter-defibrillator in situ Z95.810  Ventricular tachycardia (HCC) I47.2  Atypical chest pain R07.89  Chronic right-sided thoracic back pain M54.6, G89.29  Chronic left-sided low back pain with sciatica M54.40, G89.29  Type 2 diabetes with nephropathy (HCC) E11.21  
 Abdominal wall hernia K43.9  Incarcerated incisional hernia K43.0  Acute hypoxemic respiratory failure (HCC) J96.01  
 ARDS (adult respiratory distress syndrome) ([facility] J80  Pseudomonas urinary tract infection N39.0, B96.5 Prior to Admission Medications Prescriptions Last Dose Informant Patient Reported? Taking? Blood-Glucose Meter (ONETOUCH ULTRA2) monitoring kit 3/26/2019 at Unknown time  No Yes Sig: Check blood sugar daily, E11.9 DUPIXENT 300 mg/2 mL syrg syringe 3/25/2019 at Unknown time  Yes Yes Sig: by SubCUTAneous route every fourteen (14) days. Insulin Needles, Disposable, (FABIOLA PEN NEEDLE) 32 gauge x 5/32\" ndle 3/25/2019 at Unknown time  No Yes Si Each by Does Not Apply route daily as needed. E11.9  
albuterol (PROVENTIL HFA, VENTOLIN HFA, PROAIR HFA) 90 mcg/actuation inhaler   No Yes Sig: Take 1 Puff by inhalation every four (4) hours as needed for Wheezing. amiodarone (CORDARONE) 200 mg tablet 3/26/2019 at Unknown time  No Yes Sig: Take 1 Tab by mouth two (2) times a day. Patient taking differently: Take 200 mg by mouth daily. aspirin (ASPIRIN) 325 mg tablet 3/25/2019 at Unknown time  No Yes Sig: Take 1 Tab by mouth daily. Patient taking differently: Take 81 mg by mouth daily. carvedilol (COREG) 25 mg tablet 3/26/2019 at Unknown time  No Yes Sig: Take 1 Tab by mouth two (2) times daily (with meals). Patient taking differently: Take 12.5 mg by mouth two (2) times daily (with meals). cholecalciferol (VITAMIN D3) 1,000 unit cap 3/19/2019 at Unknown time  Yes Yes Sig: Take  by mouth daily. clotrimazole-betamethasone (LOTRISONE) topical cream Not Taking at Unknown time  No No  
Sig: Apply  to affected area two (2) times a day. Patient not taking: Reported on 3/20/2019  
cpap machine kit 3/25/2019 at Unknown time  Yes Yes Sig: by Does Not Apply route. 11 cm  
furosemide (LASIX) 80 mg tablet 3/19/2019 at Unknown time  No Yes Sig: Taking 1 tablet in the morning and 1/2 tablet in the afternoon Patient taking differently: Take 40 mg by mouth two (2) times a day. Taking 1 tablet in the morning and 1/2 tablet in the afternoon  
glucose blood VI test strips (ONETOUCH ULTRA TEST) strip 3/26/2019 at Unknown time  No Yes Sig: Check blood sugar daily, E11.9  
insulin glargine (LANTUS SOLOSTAR U-100 INSULIN) 100 unit/mL (3 mL) inpn 3/25/2019 at Unknown time  No Yes Si Units by SubCUTAneous route daily. Patient taking differently: 35 Units by SubCUTAneous route nightly. isosorbide mononitrate ER (IMDUR) 30 mg tablet 3/26/2019 at Unknown time  No Yes Sig: Take 1 Tab by mouth every morning. lancets (ONETOUCH SURESOFT LANCING DEV) misc 3/26/2019 at Unknown time  No Yes Sig: Check blood sugar daily, E11.9  
magnesium oxide (MAG-OX) 400 mg tablet 3/26/2019 at Unknown time  No Yes Sig: Take 1 Tab by mouth two (2) times a day. metFORMIN (GLUCOPHAGE) 500 mg tablet 3/19/2019 at Unknown time  No Yes Sig: Take 1 Tab by mouth two (2) times daily (with meals). nitroglycerin (NITROSTAT) 0.4 mg SL tablet   Yes Yes Si.4 mg by SubLINGual route every five (5) minutes as needed for Chest Pain. Up to 3 doses. pravastatin (PRAVACHOL) 80 mg tablet 3/19/2019 at Unknown time  No Yes Sig: Take 1 Tab by mouth nightly. triamcinolone acetonide (KENALOG) 0.1 % topical cream   Yes Yes Sig: as needed. Facility-Administered Medications: None Past Medical History:  
Diagnosis Date  Allergic rhinitis   
 followed by allergist; allergic to grass- has rescue inhaler PRN  
 Arthritis  Automatic implantable cardioverter-defibrillator in situ 3/30/2016  CAD in native artery 3/30/2016  Chronic systolic heart failure ([facility] 2013 ECHO 2017- EF 39%; managed with medications; followed by Dr Sah[facility])  CKD (chronic kidney disease) [facility] follows  Diabetes ([facility] type 2 (on insulin); FBS AM range- , hypo s/s <70, hgba1c- 5.8 (2018)  Diabetes mellitus ([facility] 2010  Dyslipidemia 2013  Eczema  Fibromyalgia  GERD (gastroesophageal reflux disease)   
 hx of- no meds currently  Headache   
 Heart failure ([facility] chronic systolic with EF 18%; non-ischemic cardiomyopathy  HTN (hypertension) 2010  Hypercholesterolemia  Morbid obesity ([facility]  Neuropathy   
 bilateral feet and tips of fingers  NICM (nonischemic cardiomyopathy) ([facility] 2/15/2013  Obesity   
 bmi- 41  
 Obstructive sleep apnea (adult) (pediatric) 2014  
 uses cpap qhs  Pseudomonas urinary tract infection 2019  Sciatic pain 2016  SVT (supraventricular tachycardia) (HCC)   
 ablation for svt  Tobacco use disorder 2010 Past Surgical History:  
Procedure Laterality Date  HX  SECTION    
 x1  
 HX HERNIA REPAIR    
 mild incarceration, no bowel removal  
 HX HYSTERECTOMY    
 YEIMI-Left ovary intact per pt  HX IMPLANTABLE CARDIOVERTER DEFIBRILLATOR  2013 Biotronik dual chamber ICD by Dr. Halina Ortega  HX IMPLANTABLE CARDIOVERTER DEFIBRILLATOR  2013 Biotronik dual chamber ICD by Dr. Halina Ortega  HX ROTATOR CUFF REPAIR Right  HX SVT ABLATION  \"years ago\"  HX TONSILLECTOMY  MS LEFT HEART CATH,PERCUTANEOUS  2013  
 no intervention Social History Socioeconomic History  Marital status: SINGLE Spouse name: Not on file  Number of children: Not on file  Years of education: Not on file  Highest education level: Not on file Occupational History  Not on file Social Needs  Financial resource strain: Not on file  Food insecurity:  
  Worry: Not on file Inability: Not on file  Transportation needs:  
  Medical: Not on file Non-medical: Not on file Tobacco Use  Smoking status: Current Every Day Smoker Packs/day: 0.25 Years: 27.00 Pack years: 6.75  Smokeless tobacco: Never Used  Tobacco comment: \"every now and then\" Substance and Sexual Activity  Alcohol use: Yes Alcohol/week: 0.6 oz Types: 1 Glasses of wine per week Comment: occas  Drug use: Yes Types: Marijuana Comment: every day  Sexual activity: Never Lifestyle  Physical activity:  
  Days per week: Not on file Minutes per session: Not on file  Stress: Not on file Relationships  Social connections:  
  Talks on phone: Not on file Gets together: Not on file Attends Rastafari service: Not on file Active member of club or organization: Not on file Attends meetings of clubs or organizations: Not on file Relationship status: Not on file  Intimate partner violence:  
  Fear of current or ex partner: Not on file Emotionally abused: Not on file Physically abused: Not on file Forced sexual activity: Not on file Other Topics Concern [address] Service Not Asked  Blood Transfusions Not Asked  Caffeine Concern Not Asked  Occupational Exposure Not Asked Michele Backer Hazards Not Asked  Sleep Concern Not Asked  Stress Concern Not Asked  Weight Concern Not Asked  Special Diet Not Asked  Back Care Not Asked  Exercise Not Asked  Bike Helmet Not Asked [address] Yes  Self-Exams Not Asked Social History Narrative Denies physical or sexual abuse Family History Problem Relation Age of Onset  Heart Attack Father 48  
     mi  Stroke Father  Hypertension Father  Heart Disease Father  Diabetes Other  Stroke Mother  Diabetes Brother  Heart Disease Brother  Hypertension Brother  Breast Cancer Sister 37 Allergies Allergen Reactions  Grass Pollen Hives and Shortness of Breath \"inhaler PRN\" Current Facility-Administered Medications Medication Dose Route Frequency  lactated Ringers infusion  50 mL/hr IntraVENous CONTINUOUS  phenol throat spray (CHLORASEPTIC) 1 Spray  1 Spray Oral PRN  
 NUTRITIONAL SUPPORT ELECTROLYTE PRN ORDERS   Does Not Apply PRN  
 ALPRAZolam (XANAX) tablet 0.5 mg  0.5 mg Oral Q8H PRN  piperacillin-tazobactam (ZOSYN) 4.5 g in 0.9% sodium chloride (MBP/ADV) 100 mL  4.5 g IntraVENous Q8H  
 insulin regular (NOVOLIN R, HUMULIN R) injection   SubCUTAneous Q6H  
 acetaminophen (TYLENOL) tablet 650 mg  650 mg Per NG tube Q6H PRN  
 amiodarone (CORDARONE) tablet 200 mg  200 mg Per NG tube DAILY  carvedilol (COREG) tablet 12.5 mg  12.5 mg Per NG tube BID WITH MEALS  dextrose (D50W) injection syrg 12.5 g  25 mL IntraVENous PRN  
 metoclopramide HCl (REGLAN) injection 10 mg  10 mg IntraVENous Q8H PRN  promethazine (PHENERGAN) with saline injection 25 mg  25 mg IntraVENous Q6H PRN  
 HYDROmorphone (PF) (DILAUDID) injection 0.5-2 mg  0.5-2 mg IntraVENous Q3H PRN  
 nitroglycerin (NITROSTAT) tablet 0.4 mg  0.4 mg SubLINGual Q5MIN PRN  pravastatin (PRAVACHOL) tablet 80 mg  80 mg Oral QHS  magnesium oxide (MAG-OX) tablet 400 mg  400 mg Oral BID  sodium chloride (NS) flush 5-40 mL  5-40 mL IntraVENous Q8H  
 sodium chloride (NS) flush 5-40 mL  5-40 mL IntraVENous PRN  
 diphenhydrAMINE (BENADRYL) injection 12.5 mg  12.5 mg IntraVENous Q4H PRN  
 ondansetron (ZOFRAN) injection 4 mg  4 mg IntraVENous Q4H PRN  
 enoxaparin (LOVENOX) injection 40 mg  40 mg SubCUTAneous Q12H  
 albuterol (PROVENTIL VENTOLIN) nebulizer solution 2.5 mg  2.5 mg Nebulization Q4H PRN  
 dupilumab (DUPIXENT) 300 mg/2 mL syringe  300 mg SubCUTAneous Q7D Objective:  
 
Vitals: 04/08/19 2532 04/08/19 3795 04/08/19 0167 04/08/19 2295 BP: 126/76 120/70 111/67 Pulse: 66 60 60 (!) 58 Resp: 22 20 18 Temp: 97.5 °F (36.4 °C) 98 °F (36.7 °C) 97.1 °F (36.2 °C) SpO2: 96% 94% 94% Weight:      
Height: PHYSICAL EXAM  
 
Constitutional:  the patient is well developed and in no acute distress on ventilator awake and alert EENMT:  Sclera clear, pupils equal, oral mucosa moist 
Respiratory: CTA, diminished in bases Cardiovascular:  RRR without M,G,R 
Gastrointestinal: soft and non-tender; with positive bowel sounds. Musculoskeletal: warm without cyanosis. There is 1+ lower leg edema. Skin:  no jaundice or rashes, abdominal wound with foul drainage Neurologic: no gross neuro deficits Psychiatric:  alert and oriented x 3 CXR:  4/5: ongoing atelectasis, infiltrates or edema primarily in bases bilaterally Recent Labs 04/08/19 
3960 WBC 18.0* HGB 9.6* HCT 29.0*  
 Recent Labs 04/06/19 
0441   
K 3.4*  
CL 99 * CO2 29 BUN 16  
CREA 0.95  
MG 1.9  
CA 9.3 No results for input(s): PH, PCO2, PO2, HCO3 in the last 72 hours. No results for input(s): LCAD, LAC in the last 72 hours. Results for Yosef Kruger (MRN [id number]) as of 4/5/2019 08:39 Ref. Range 4/3/2019 08:44 4/4/2019 03:24 4/5/2019 05:08  
pH (POC) Latest Ref Range: 7.35 - 7.45   7.385 7.393 7.389  
pCO2 (POC) Latest Ref Range: 35 - 45 MMHG 33.0 (L) 35.7 39.2 pO2 (POC) Latest Ref Range: 75 - 100 MMHG 67 (L) 100 106 (H) HCO3 (POC) Latest Ref Range: 22 - 26 MMOL/L 19.8 (L) 21.7 (L) 23.7  
sO2 (POC) Latest Ref Range: 95 - 98 % 93 (L) 98 98 Base deficit (POC) Latest Units: mmol/L 5 3 1 FIO2 (POC) Latest Units: % 100 45 45 Patient temp. Latest Units:   98.6 99.6 98.6 Specimen type (POC) Latest Units:   ARTERIAL ARTERIAL ARTERIAL Set Rate Latest Units: bpm  12 14 Site Latest Units:   LEFT RADIAL RIGHT RADIAL RIGHT RADIAL  
 Device: Latest Units:   Non rebreather VENT VENT Mode Latest Units:    Pressure regulate. .. Pressure regulate. .. Tidal volume Latest Units: ml  400 400 PEEP/CPAP (POC) Latest Units: cmH2O  8 8 Allens test (POC) Latest Units:   YES YES YES Inspiratory Time Latest Units: sec  0.9 0.9 Assessment:  (Medical Decision Making) Hospital Problems  Date Reviewed: 3/26/2019 Codes Class Noted POA Pseudomonas urinary tract infection ICD-10-CM: N39.0, B96.5 ICD-9-CM: 599.0, 041.7  4/5/2019 Yes Acute hypoxemic respiratory failure ([facility] ICD-10-CM: J96.01 
ICD-9-CM: 518.81  4/3/2019 Unknown ARDS (adult respiratory distress syndrome) ([facility] ICD-10-CM: J57 
ICD-9-CM: 518.52  4/3/2019 Unknown * (Principal) Incarcerated incisional hernia ICD-10-CM: K43.0 ICD-9-CM: 552.21  3/26/2019 Yes BA on CPAP (Chronic) ICD-10-CM: G47.33, Z99.89 ICD-9-CM: 327.23, V46.8  7/14/2014 Yes Morbid obesity ([facility] (Chronic) ICD-10-CM: E66.01 
ICD-9-CM: 278.01  7/14/2014 Yes Chronic systolic heart failure (HCC) (Chronic) ICD-10-CM: U80.52 ICD-9-CM: 428.22  7/18/2013 Yes Overview Signed 3/30/2016  8:11 AM by Benita Rodriguez NST 4/15:low risk Echo 6/2014:EF 40-45% Echo 5/2013:EF 30-35% LHC 2/2013: minimal CAD Long-term insulin use in type 2 diabetes ([facility] ICD-10-CM: E11.9, Z79.4 ICD-9-CM: 250.00, V58.67  4/12/2010 Yes 51 y/o female with incarcerated hernia and dehiscense, complicated hospital course now with acute hypoxemic respiratory failure which appears to be ARDS from pulmonary or abdominal source. Plan:  (Medical Decision Making) --continue diuresis, tolerating well --Day 5/7 of Zosyn 
--abdomen and pain per surgery 
--off O2, continue IS and improve mobility 
--PA/Lat CXR in AM 
--stop LR 
--Lasix 40 IV Q12 x 4 doses Full Code Prasanth Goss MD

## 2019-04-08 NOTE — PROGRESS NOTES
Nutrition follow-up: 
Reason for initial assessment:  Length of stay. Assessment: DIET FULL LIQUID Pt admitted 3/26 for abdominal hernia repair, complicated by abdominal wall dehiscence and return to OR 3/31 for hernia repair with mesh. Pt with acute hypoxic respiratory failure 4/03; transferred to [facility] intubation 4/03. [facility] on 4/03-R IJ. Pt extubated 4/05. Diet advanced to full liquids 4/7, noted potential need to return to OR for wound debridement/washout. Sitting up in chair at RD visit. Tearful , reports she is tired and her abdomen hurts. Pt reports BM starting 4/7 c/o abdominal pain and discomfort. She recalls consuming majority of meals yesterday including ensure enlive. This morning she consumed ~50% plus one full serving of ensure enlive. At my visit she saved the things from her lunch tray she \"may eat\" which includes apple juice, gelatin and ensure enlive. She is agreeable to sip on ensure during my visit. Discussed the importance of nutrition and optimizing selections, consuming ensure enlive first for protein contributions. SLP assessed yesterday, cleared for current diet no solid foods tested.   
  
Abdomen:  +flatus, hypoactive bowel sounds, formed stool recorded 4/7. Extremities:   1+ generalized edema. Pertinent Labs: Glucose 174 mg/dl on AM BMP. POC glucose 4/7 218-309 mg/dl, 4/8 175-225 mg/dl, K 3.4. Pertinent Rx:  lasix, mg ox, dilaudid, SSI, zofran, zosyn, phenergan Anthropometrics: Height: 4' 11\" (149.9 cm), Weight: 100.4 kg (221 lb 4.8 oz), unspecified, Body mass index is 44.7 kg/m². BMI class of morbid obesity.  
 Macronutrient needs: EER:  3797-3229 kcal /day (11-14 kcal/kg BW) EPR:  ~111gm/day (2.5 gm/kg/IBW) GFR >60 Max CHO:  256 grams/day (4mg/kg IBW/min) Intake/Comparative Standards:  
  
Intervention: 
Meals and Snacks: Fulls per surgery, progress as medically appropriate. Nutrition Supplement Therapy:   Ensure Enlive TID - primary source of nutrition to provide 1050 kcal(95% EEN), 60 grams protein (54% EPN), 132 grams CHO. Consider change to Ensure High Protein for lower cho formula if blood glucose elevates. Consider nutrition support for primary needs if pt unable to meet >50% needs orally. Collaboration of Nutrition Services:  Jesi Lentz RN. Discharge nutrition plan: Too soon to determine. [place], [address], [facility]

## 2019-04-09 ENCOUNTER — ANESTHESIA (OUTPATIENT)
Dept: SURGERY | Age: 54
DRG: 353 | End: 2019-04-09
Payer: MEDICARE

## 2019-04-09 ENCOUNTER — ANESTHESIA EVENT (OUTPATIENT)
Dept: SURGERY | Age: 54
DRG: 353 | End: 2019-04-09
Payer: MEDICARE

## 2019-04-09 LAB
BACTERIA SPEC CULT: NORMAL
BACTERIA SPEC CULT: NORMAL
GLUCOSE BLD STRIP.AUTO-MCNC: 176 MG/DL (ref 65–100)
GLUCOSE BLD STRIP.AUTO-MCNC: 214 MG/DL (ref 65–100)
GLUCOSE BLD STRIP.AUTO-MCNC: 257 MG/DL (ref 65–100)
MM INDURATION POC: 0 MM (ref 0–5)
PPD POC: NEGATIVE NEGATIVE
SERVICE CMNT-IMP: NORMAL
SERVICE CMNT-IMP: NORMAL

## 2019-04-09 PROCEDURE — 74011250636 HC RX REV CODE- 250/636

## 2019-04-09 PROCEDURE — 99232 SBSQ HOSP IP/OBS MODERATE 35: CPT | Performed by: INTERNAL MEDICINE

## 2019-04-09 PROCEDURE — 77030031139 HC SUT VCRL2 J&J -A: Performed by: SURGERY

## 2019-04-09 PROCEDURE — 74011000250 HC RX REV CODE- 250

## 2019-04-09 PROCEDURE — 76010000138 HC OR TIME 0.5 TO 1 HR: Performed by: SURGERY

## 2019-04-09 PROCEDURE — 74011000258 HC RX REV CODE- 258: Performed by: SURGERY

## 2019-04-09 PROCEDURE — 77030039425 HC BLD LARYNG TRULITE DISP TELE -A: Performed by: ANESTHESIOLOGY

## 2019-04-09 PROCEDURE — 74011250636 HC RX REV CODE- 250/636: Performed by: SURGERY

## 2019-04-09 PROCEDURE — 0JB80ZZ EXCISION OF ABDOMEN SUBCUTANEOUS TISSUE AND FASCIA, OPEN APPROACH: ICD-10-PCS | Performed by: SURGERY

## 2019-04-09 PROCEDURE — 76210000006 HC OR PH I REC 0.5 TO 1 HR: Performed by: SURGERY

## 2019-04-09 PROCEDURE — 76060000033 HC ANESTHESIA 1 TO 1.5 HR: Performed by: SURGERY

## 2019-04-09 PROCEDURE — 94760 N-INVAS EAR/PLS OXIMETRY 1: CPT

## 2019-04-09 PROCEDURE — 77030008703 HC TU ET UNCUF COVD -A: Performed by: ANESTHESIOLOGY

## 2019-04-09 PROCEDURE — 77030008468 HC STPLR SKN VISIS TELE -A: Performed by: SURGERY

## 2019-04-09 PROCEDURE — 74011250636 HC RX REV CODE- 250/636: Performed by: INTERNAL MEDICINE

## 2019-04-09 PROCEDURE — 74011250636 HC RX REV CODE- 250/636: Performed by: ANESTHESIOLOGY

## 2019-04-09 PROCEDURE — 77030019605

## 2019-04-09 PROCEDURE — 74011000250 HC RX REV CODE- 250: Performed by: SURGERY

## 2019-04-09 PROCEDURE — 74011250637 HC RX REV CODE- 250/637: Performed by: SURGERY

## 2019-04-09 PROCEDURE — 77030008477 HC STYL SATN SLP COVD -A: Performed by: ANESTHESIOLOGY

## 2019-04-09 PROCEDURE — 77010033678 HC OXYGEN DAILY

## 2019-04-09 PROCEDURE — 82962 GLUCOSE BLOOD TEST: CPT

## 2019-04-09 PROCEDURE — 74011636637 HC RX REV CODE- 636/637: Performed by: SURGERY

## 2019-04-09 PROCEDURE — 65270000029 HC RM PRIVATE

## 2019-04-09 RX ORDER — SODIUM CHLORIDE, SODIUM LACTATE, POTASSIUM CHLORIDE, CALCIUM CHLORIDE 600; 310; 30; 20 MG/100ML; MG/100ML; MG/100ML; MG/100ML
75 INJECTION, SOLUTION INTRAVENOUS CONTINUOUS
Status: DISCONTINUED | OUTPATIENT
Start: 2019-04-09 | End: 2019-04-09 | Stop reason: HOSPADM

## 2019-04-09 RX ORDER — OXYCODONE HYDROCHLORIDE 5 MG/1
5 TABLET ORAL
Status: DISCONTINUED | OUTPATIENT
Start: 2019-04-09 | End: 2019-04-09 | Stop reason: HOSPADM

## 2019-04-09 RX ORDER — PROPOFOL 10 MG/ML
INJECTION, EMULSION INTRAVENOUS AS NEEDED
Status: DISCONTINUED | OUTPATIENT
Start: 2019-04-09 | End: 2019-04-09 | Stop reason: HOSPADM

## 2019-04-09 RX ORDER — LIDOCAINE HYDROCHLORIDE 20 MG/ML
INJECTION, SOLUTION EPIDURAL; INFILTRATION; INTRACAUDAL; PERINEURAL AS NEEDED
Status: DISCONTINUED | OUTPATIENT
Start: 2019-04-09 | End: 2019-04-09 | Stop reason: HOSPADM

## 2019-04-09 RX ORDER — ROCURONIUM BROMIDE 10 MG/ML
INJECTION, SOLUTION INTRAVENOUS AS NEEDED
Status: DISCONTINUED | OUTPATIENT
Start: 2019-04-09 | End: 2019-04-09 | Stop reason: HOSPADM

## 2019-04-09 RX ORDER — SODIUM CHLORIDE, SODIUM LACTATE, POTASSIUM CHLORIDE, CALCIUM CHLORIDE 600; 310; 30; 20 MG/100ML; MG/100ML; MG/100ML; MG/100ML
INJECTION, SOLUTION INTRAVENOUS
Status: DISCONTINUED | OUTPATIENT
Start: 2019-04-09 | End: 2019-04-09 | Stop reason: HOSPADM

## 2019-04-09 RX ORDER — ONDANSETRON 2 MG/ML
INJECTION INTRAMUSCULAR; INTRAVENOUS AS NEEDED
Status: DISCONTINUED | OUTPATIENT
Start: 2019-04-09 | End: 2019-04-09 | Stop reason: HOSPADM

## 2019-04-09 RX ORDER — SUCCINYLCHOLINE CHLORIDE 20 MG/ML
INJECTION INTRAMUSCULAR; INTRAVENOUS AS NEEDED
Status: DISCONTINUED | OUTPATIENT
Start: 2019-04-09 | End: 2019-04-09 | Stop reason: HOSPADM

## 2019-04-09 RX ORDER — HYDROMORPHONE HYDROCHLORIDE 2 MG/ML
0.5 INJECTION, SOLUTION INTRAMUSCULAR; INTRAVENOUS; SUBCUTANEOUS
Status: DISCONTINUED | OUTPATIENT
Start: 2019-04-09 | End: 2019-04-09 | Stop reason: HOSPADM

## 2019-04-09 RX ORDER — OXYCODONE HYDROCHLORIDE 5 MG/1
10 TABLET ORAL
Status: DISCONTINUED | OUTPATIENT
Start: 2019-04-09 | End: 2019-04-09 | Stop reason: HOSPADM

## 2019-04-09 RX ADMIN — DIPHENHYDRAMINE HYDROCHLORIDE 12.5 MG: 50 INJECTION, SOLUTION INTRAMUSCULAR; INTRAVENOUS at 21:51

## 2019-04-09 RX ADMIN — Medication 40 ML: at 05:44

## 2019-04-09 RX ADMIN — PROPOFOL 100 MG: 10 INJECTION, EMULSION INTRAVENOUS at 17:02

## 2019-04-09 RX ADMIN — ROCURONIUM BROMIDE 5 MG: 10 INJECTION, SOLUTION INTRAVENOUS at 17:02

## 2019-04-09 RX ADMIN — PROPOFOL 30 MG: 10 INJECTION, EMULSION INTRAVENOUS at 17:11

## 2019-04-09 RX ADMIN — PRAVASTATIN SODIUM 80 MG: 80 TABLET ORAL at 21:51

## 2019-04-09 RX ADMIN — LIDOCAINE HYDROCHLORIDE 100 MG: 20 INJECTION, SOLUTION EPIDURAL; INFILTRATION; INTRACAUDAL; PERINEURAL at 17:02

## 2019-04-09 RX ADMIN — FUROSEMIDE 40 MG: 10 INJECTION, SOLUTION INTRAMUSCULAR; INTRAVENOUS at 08:36

## 2019-04-09 RX ADMIN — HUMAN INSULIN 2 UNITS: 100 INJECTION, SOLUTION SUBCUTANEOUS at 12:00

## 2019-04-09 RX ADMIN — Medication 400 MG: at 08:35

## 2019-04-09 RX ADMIN — HUMAN INSULIN 4 UNITS: 100 INJECTION, SOLUTION SUBCUTANEOUS at 05:39

## 2019-04-09 RX ADMIN — SODIUM CHLORIDE, SODIUM LACTATE, POTASSIUM CHLORIDE, CALCIUM CHLORIDE: 600; 310; 30; 20 INJECTION, SOLUTION INTRAVENOUS at 16:44

## 2019-04-09 RX ADMIN — ONDANSETRON 4 MG: 2 INJECTION INTRAMUSCULAR; INTRAVENOUS at 17:20

## 2019-04-09 RX ADMIN — AMIODARONE HYDROCHLORIDE 200 MG: 200 TABLET ORAL at 08:35

## 2019-04-09 RX ADMIN — ENOXAPARIN SODIUM 40 MG: 40 INJECTION SUBCUTANEOUS at 20:59

## 2019-04-09 RX ADMIN — Medication 10 ML: at 22:34

## 2019-04-09 RX ADMIN — HUMAN INSULIN 6 UNITS: 100 INJECTION, SOLUTION SUBCUTANEOUS at 00:02

## 2019-04-09 RX ADMIN — PIPERACILLIN SODIUM,TAZOBACTAM SODIUM 4.5 G: 4; .5 INJECTION, POWDER, FOR SOLUTION INTRAVENOUS at 01:48

## 2019-04-09 RX ADMIN — SUCCINYLCHOLINE CHLORIDE 180 MG: 20 INJECTION INTRAMUSCULAR; INTRAVENOUS at 17:02

## 2019-04-09 RX ADMIN — HYDROMORPHONE HYDROCHLORIDE 1 MG: 2 INJECTION, SOLUTION INTRAMUSCULAR; INTRAVENOUS; SUBCUTANEOUS at 20:49

## 2019-04-09 RX ADMIN — HYDROMORPHONE HYDROCHLORIDE 0.5 MG: 2 INJECTION, SOLUTION INTRAMUSCULAR; INTRAVENOUS; SUBCUTANEOUS at 01:48

## 2019-04-09 RX ADMIN — HYDROMORPHONE HYDROCHLORIDE 1 MG: 2 INJECTION, SOLUTION INTRAMUSCULAR; INTRAVENOUS; SUBCUTANEOUS at 15:13

## 2019-04-09 RX ADMIN — HYDROMORPHONE HYDROCHLORIDE 0.5 MG: 2 INJECTION, SOLUTION INTRAMUSCULAR; INTRAVENOUS; SUBCUTANEOUS at 18:30

## 2019-04-09 RX ADMIN — FUROSEMIDE 40 MG: 10 INJECTION, SOLUTION INTRAMUSCULAR; INTRAVENOUS at 20:52

## 2019-04-09 RX ADMIN — HYDROMORPHONE HYDROCHLORIDE 0.5 MG: 2 INJECTION, SOLUTION INTRAMUSCULAR; INTRAVENOUS; SUBCUTANEOUS at 18:25

## 2019-04-09 RX ADMIN — PIPERACILLIN SODIUM,TAZOBACTAM SODIUM 4.5 G: 4; .5 INJECTION, POWDER, FOR SOLUTION INTRAVENOUS at 10:01

## 2019-04-09 RX ADMIN — HYDROMORPHONE HYDROCHLORIDE 1 MG: 2 INJECTION, SOLUTION INTRAMUSCULAR; INTRAVENOUS; SUBCUTANEOUS at 08:32

## 2019-04-09 NOTE — PROGRESS NOTES
TRANSFER - IN REPORT: 
 
Verbal report received from Yessica(name) on [employer]  being received from [employer]) for routine post - op Report consisted of patients Situation, Background, Assessment and  
Recommendations(SBAR). Information from the following report(s) SBAR was reviewed with the receiving nurse. Opportunity for questions and clarification was provided. Assessment completed upon patients arrival to unit and care assumed.

## 2019-04-09 NOTE — PROGRESS NOTES
Shift assessment complete. Patient A&O x 4. Respirations present, even and unlabored. Breath sounds dimished. Patient on 3L NC. Heart sounds regular. Abdomen soft and tender. Abdominal binder in place, Abdominal dressing noted to midline with old drainage. 2 JENNIFER drains noted to abdomen. Clarke in place and draining to gravity. Patient needs met. No distress noted. Bed low and locked. Call light within reach. Will continue to monitor hourly during shift.

## 2019-04-09 NOTE — OP NOTES
Operative Report    Patient: Holger Dumont MRN: [id number]     YOB: 1965  Age: 52 y.o. Sex: female       Date of Surgery: 4/9/2019     Preoperative Diagnosis: ABDOMINAL WOUND     Postoperative Diagnosis: ABDOMINAL WOUND     NAME OF PROCEDURE:  Procedure(s):  ABDOMINAL DEBRIDEMENT. SURGEON: Tiffanie James MD    Anesthesia: General     Complications: none      Procedure Details       Informed consent was obtained and the patient was brought to the operating room and placed supine. After induction of a general anesthetic, the abdomen was prepped and draped sterilely. The staples were removed and the midline wound opened. There was no purulence but nonviable subcutaneous fat was noted. The prior subcutaneous closure was fully opened. Scissors were used to excise a roughly 11 x 3cm wide section of necrotic infraumbilical skin on the right side of the wound. Cautery was then used to excise nonviable or marginally viable subcutaneous fat on the right, until at least some bleeding was encountered. The left side showed good adherence to the mesh with no significant sites of nonviable tissue. The wound was irrigated with  . 25% Dakin's solution then saline. The subcutaneous tissue was approximated in the midline with 2-0 vicryl, tacking it to the fascia. The skin was closed at wide spaces with staples, with the gaps packed with Dakins-soaked 1-inch vaginal pack. A sterile dressing was applied. The patient tolerated the procedure well with no apparent complications and was awakened from anesthesia and extubated in the operating room and taken to the recovery room in satisfactory condition.      Estimated Blood Loss: per anesthesia           Specimens: * No specimens in log *        Signed By:  Jae Flores MD     April 9, 2019

## 2019-04-09 NOTE — ANESTHESIA POSTPROCEDURE EVALUATION
Procedure(s): 
ABDOMINAL DEBRIDEMENT. general 
 
Anesthesia Post Evaluation Multimodal analgesia: multimodal analgesia not used between 6 hours prior to anesthesia start to PACU discharge Patient location during evaluation: PACU Patient participation: complete - patient participated Level of consciousness: awake and alert Pain management: adequate Airway patency: patent Anesthetic complications: no 
Cardiovascular status: hemodynamically stable Respiratory status: acceptable Hydration status: acceptable Vitals Value Taken Time /59 4/9/2019  6:52 PM  
Temp Pulse 60 4/9/2019  6:52 PM  
Resp 16 4/9/2019  6:52 PM  
SpO2 97 % 4/9/2019  6:52 PM

## 2019-04-09 NOTE — PROGRESS NOTES
Pt was supine in bed. Pt is waiting to have a procedure done and is NPO. Pt politely decline to work with OT this morning. Will attempt at another time date as schedule permits. Thank you JUSTYN Woo/TUCKER

## 2019-04-09 NOTE — PROGRESS NOTES
TRANSFER - OUT REPORT: 
 
Verbal report given to [facility] RN(name) on Kerrie Martell  being transferred to Preop(unit) for ordered procedure Report consisted of patients Situation, Background, Assessment and  
Recommendations(SBAR). Information from the following report(s) SBAR, Kardex and MAR was reviewed with the receiving nurse. Lines:  
Quad Lumen  Right Internal jugular (Active) Central Line Being Utilized Yes 4/9/2019  8:34 AM  
Criteria for Appropriate Use Limited/no vessel suitable for conventional peripheral access 4/9/2019  8:34 AM  
Site Assessment Clean, dry, & intact 4/9/2019  8:34 AM  
Infiltration Assessment 0 4/9/2019  8:34 AM  
Affected Extremity/Extremities Color distal to insertion site pink (or appropriate for race) 4/9/2019  8:34 AM  
Date of Last Dressing Change 04/05/19 4/9/2019  8:34 AM  
Dressing Status Clean, dry, & intact 4/9/2019  8:34 AM  
Dressing Type Disk with Chlorhexadine gluconate (CHG) 4/9/2019  8:34 AM  
Proximal Hub Color/Line Status White;Flushed 4/9/2019  8:34 AM  
Positive Blood Return (Medial Site) Yes 4/9/2019  8:34 AM  
Medial 1 Hub Color/Line Status Gray;Capped 4/9/2019  8:34 AM  
Positive Blood Return (Lateral Site) Yes 4/9/2019  8:34 AM  
Medial 2 Hub Color/Line Status Blue;Capped 4/9/2019  8:34 AM  
Positive Blood Return (Site #3) Yes 4/9/2019  8:34 AM  
Distal Hub Color/Line Status Brown;Capped 4/9/2019  8:34 AM  
Positive Blood Return (Site #4) Yes 4/9/2019  8:34 AM  
Alcohol Cap Used No 4/9/2019  5:45 AM  
  
 
Opportunity for questions and clarification was provided. Patient transported with: 
 O2 @ 4 liters

## 2019-04-09 NOTE — PROGRESS NOTES
Problem: Falls - Risk of 
Goal: *Absence of Falls Description Document Rylan Torres Fall Risk and appropriate interventions in the flowsheet. Outcome: Progressing Towards Goal 
  
Problem: Patient Education: Go to Patient Education Activity Goal: Patient/Family Education Outcome: Progressing Towards Goal 
  
Problem: Pressure Injury - Risk of 
Goal: *Prevention of pressure injury Description Document Juice Scale and appropriate interventions in the flowsheet. Outcome: Progressing Towards Goal 
  
Problem: Patient Education: Go to Patient Education Activity Goal: Patient/Family Education Outcome: Progressing Towards Goal 
  
Problem: Pain Goal: *Control of Pain Outcome: Progressing Towards Goal 
Goal: *PALLIATIVE CARE:  Alleviation of Pain Outcome: Progressing Towards Goal

## 2019-04-09 NOTE — PROGRESS NOTES
Admit Date: 3/26/2019 POD 8 Days Post-Op Procedure:  Procedure(s): 
ABDOMINAL DEBRIDEMENT, Repair of Abdominal Wound Subjective:  
 
Patient has complaints of gas pains earlier today, fairly severe. Pain control has been otherwise ok. Pains began as she was starting to have BMs. Has been up in chair most of day. No n/v.  
 
Objective:  
 
Visit Vitals BP 99/52 (BP Patient Position: Sitting) Pulse 60 Temp 98.1 °F (36.7 °C) Resp 16 Ht 4' 11\" (1.499 m) Wt 221 lb 4.8 oz (100.4 kg) SpO2 95% Breastfeeding? No  
BMI 44.70 kg/m² Temp (24hrs), Av.3 °F (36.3 °C), Min:96.1 °F (35.6 °C), Max:98.1 °F (36.7 °C) Lou Emmanuel I&O reviewed as documented. Physical Exam:  
 General-in no distress. Lungs-unlabored CV-reg Abdomen- Wound with mild brownish drainage on gauze with classic pyocyanin staining. No cellulitic appearance,  and pt has mild appropriately distributed tenderness. celeste c/w old blood and liquified fat. Labs:  
Recent Results (from the past 24 hour(s)) GLUCOSE, POC Collection Time: 19  1:13 AM  
Result Value Ref Range Glucose (POC) 255 (H) 65 - 100 mg/dL GLUCOSE, POC Collection Time: 19  5:46 AM  
Result Value Ref Range Glucose (POC) 213 (H) 65 - 100 mg/dL CBC W/O DIFF Collection Time: 19  6:58 AM  
Result Value Ref Range WBC 18.0 (H) 4.3 - 11.1 K/uL  
 RBC 3.08 (L) 4.05 - 5.2 M/uL HGB 9.6 (L) 11.7 - 15.4 g/dL HCT 29.0 (L) 35.8 - 46.3 % MCV 94.2 79.6 - 97.8 FL  
 MCH 31.2 26.1 - 32.9 PG  
 MCHC 33.1 31.4 - 35.0 g/dL  
 RDW 13.2 11.9 - 14.6 % PLATELET 211 163 - 392 K/uL MPV 11.2 9.4 - 12.3 FL ABSOLUTE NRBC 0.08 0.0 - 0.2 K/uL GLUCOSE, POC Collection Time: 19 11:56 AM  
Result Value Ref Range Glucose (POC) 175 (H) 65 - 100 mg/dL GLUCOSE, POC Collection Time: 19  6:36 PM  
Result Value Ref Range Glucose (POC) 199 (H) 65 - 100 mg/dL GLUCOSE, POC  Collection Time: 19 10:03 PM  
 Result Value Ref Range Glucose (POC) 242 (H) 65 - 100 mg/dL Assessment:  
 
Principal Problem: 
  Incarcerated incisional hernia (3/26/2019) Active Problems: 
  Long-term insulin use in type 2 diabetes ([address] (4/12/2010) Chronic systolic heart failure ([address] (7/18/2013) Overview: NST 4/15:low risk Echo 6/2014:EF 40-45% Echo 5/2013:EF 30-35% LHC 2/2013: minimal CAD 
 
  BA on CPAP (7/14/2014) Morbid obesity ([address] (7/14/2014) Acute hypoxemic respiratory failure ([address] (4/3/2019) ARDS (adult respiratory distress syndrome) ([address] (4/3/2019) Pseudomonas urinary tract infection (4/5/2019) Plan/Recommendations/Medical Decision Making:  
 
Continue present treatment She is on zosyn- she clearly has pseudomonal wound colonization; her previous UTI was also pseudomonas, in addition to E coli. Given persistent brownish drainage from wound and extrafascial position of mesh, I recommend wound debridement/washout tomorrow to prevent mesh infection. This was mentioned yesterday. She is in agreement.

## 2019-04-09 NOTE — ROUTINE PROCESS
TRANSFER - OUT REPORT: 
 
Verbal report given to david(name) on Idris Weiss  being transferred to med/surg(unit) for routine post - op Report consisted of patients Situation, Background, Assessment and  
Recommendations(SBAR). Information from the following report(s) SBAR, Kardex, OR Summary, Intake/Output, MAR and Cardiac Rhythm paced was reviewed with the receiving nurse. Lines:  
Quad Lumen  Right Internal jugular (Active) Central Line Being Utilized Yes 4/9/2019  5:54 PM  
Criteria for Appropriate Use Limited/no vessel suitable for conventional peripheral access 4/9/2019  5:54 PM  
Site Assessment Drainage (comment) 4/9/2019  5:54 PM  
Infiltration Assessment 0 4/9/2019  5:54 PM  
Affected Extremity/Extremities Color distal to insertion site pink (or appropriate for race); Range of motion performed 4/9/2019  5:54 PM  
Date of Last Dressing Change 04/05/19 4/9/2019  5:54 PM  
Dressing Status Clean, dry, & intact 4/9/2019  5:54 PM  
Dressing Type Disk with Chlorhexadine gluconate (CHG); Transparent 4/9/2019  5:54 PM  
Proximal Hub Color/Line Status Flushed;Patent 4/9/2019  5:54 PM  
Positive Blood Return (Medial Site) Yes 4/9/2019  5:54 PM  
Medial 1 Hub Color/Line Status Gray;Capped; Patent 4/9/2019  5:54 PM  
Positive Blood Return (Lateral Site) Yes 4/9/2019  5:54 PM  
Medial 2 Hub Color/Line Status Blue;Patent;Capped 4/9/2019  5:54 PM  
Positive Blood Return (Site #3) Yes 4/9/2019  5:54 PM  
Distal Hub Color/Line Status Brown;Patent;Capped 4/9/2019  5:54 PM  
Positive Blood Return (Site #4) Yes 4/9/2019  5:54 PM  
Alcohol Cap Used No 4/9/2019  5:54 PM  
  
 
Opportunity for questions and clarification was provided. Patient transported with: 
 O2 @ 4 liters

## 2019-04-09 NOTE — PERIOP NOTES
TRANSFER - IN REPORT: 
 
Verbal report received from [facility] (name) on Duricky Drop  being received from med surg (unit) for routine progression of care Report consisted of patients Situation, Background, Assessment and  
Recommendations(SBAR). Information from the following report(s) Intake/Output, MAR, Pre Procedure Checklist, Procedure Verification and Quality Measures was reviewed with the receiving nurse. Opportunity for questions and clarification was provided. Assessment completed upon patients arrival to unit and care assumed.

## 2019-04-09 NOTE — PROGRESS NOTES
Assessment done via doc flow sheet. Pt up in recliner at bedside, alert & oriented x4. Respirations easy & regular, lung sounds diminished,  O2 @ 4L per nasal cannula, sat 94%. Abdomen soft, with dressings intact and abdominal binder in place. Clarke patent, draining clear, yellow urine. Complained of abd pain 8/10. Dilaudid 1 mg IVP given as ordered. Will Monitor. Chair locked, call light within reach, pt instructed to call for assistance as needed.

## 2019-04-09 NOTE — PROGRESS NOTES
Cam Singh Admission Date: 3/26/2019 Daily Progress Note: 4/9/2019 The patient's chart is reviewed and the patient is discussed with the staff. Cam Singh is a 45yoF admitted 3/26 with abd hernia repair complicated by dehiscence and repair with mesh on 3/31 who has had abd pain/nausea and acute hypoxic respiratory failure complicating her course. Subjective: Today she requires 4lpm and is planned to return to OR for debridement per her report, at 2pm.  Her leukocytosis is rising. CXR yesterday suggested improvement in possible pneumonia. She is drawing 750ml with IS and has decreased bS, positive flatus and bowel movements, albeit painful. Fluid balance was +1.6L yesterday Not wearing CPAP consistently here Current Facility-Administered Medications Medication Dose Route Frequency  furosemide (LASIX) injection 40 mg  40 mg IntraVENous Q12H  
 tuberculin injection 5 Units  5 Units IntraDERMal ONCE  phenol throat spray (CHLORASEPTIC) 1 Spray  1 Spray Oral PRN  
 NUTRITIONAL SUPPORT ELECTROLYTE PRN ORDERS   Does Not Apply PRN  
 ALPRAZolam (XANAX) tablet 0.5 mg  0.5 mg Oral Q8H PRN  piperacillin-tazobactam (ZOSYN) 4.5 g in 0.9% sodium chloride (MBP/ADV) 100 mL  4.5 g IntraVENous Q8H  
 insulin regular (NOVOLIN R, HUMULIN R) injection   SubCUTAneous Q6H  
 acetaminophen (TYLENOL) tablet 650 mg  650 mg Per NG tube Q6H PRN  
 amiodarone (CORDARONE) tablet 200 mg  200 mg Per NG tube DAILY  carvedilol (COREG) tablet 12.5 mg  12.5 mg Per NG tube BID WITH MEALS  dextrose (D50W) injection syrg 12.5 g  25 mL IntraVENous PRN  
 metoclopramide HCl (REGLAN) injection 10 mg  10 mg IntraVENous Q8H PRN  promethazine (PHENERGAN) with saline injection 25 mg  25 mg IntraVENous Q6H PRN  
 HYDROmorphone (PF) (DILAUDID) injection 0.5-2 mg  0.5-2 mg IntraVENous Q3H PRN  
 nitroglycerin (NITROSTAT) tablet 0.4 mg  0.4 mg SubLINGual Q5MIN PRN  
  pravastatin (PRAVACHOL) tablet 80 mg  80 mg Oral QHS  magnesium oxide (MAG-OX) tablet 400 mg  400 mg Oral BID  sodium chloride (NS) flush 5-40 mL  5-40 mL IntraVENous Q8H  
 sodium chloride (NS) flush 5-40 mL  5-40 mL IntraVENous PRN  
 diphenhydrAMINE (BENADRYL) injection 12.5 mg  12.5 mg IntraVENous Q4H PRN  
 ondansetron (ZOFRAN) injection 4 mg  4 mg IntraVENous Q4H PRN  
 enoxaparin (LOVENOX) injection 40 mg  40 mg SubCUTAneous Q12H  
 albuterol (PROVENTIL VENTOLIN) nebulizer solution 2.5 mg  2.5 mg Nebulization Q4H PRN  
 dupilumab (DUPIXENT) 300 mg/2 mL syringe  300 mg SubCUTAneous Q7D Review of Systems Constitutional: negative for fever, chills, sweats Cardiovascular: negative for chest pain, palpitations, syncope, edema Gastrointestinal:  negative for dysphagia, reflux, vomiting, diarrhea, abdominal pain, or melena Neurologic:  negative for focal weakness, numbness, headache Objective:  
 
Vitals:  
 04/08/19 1946 04/09/19 0007 04/09/19 8850 04/09/19 6815 BP: 99/52 99/67 109/73 Pulse: 60 60 (!) 55 Resp: 16 17 16 Temp: 98.1 °F (36.7 °C) 98.2 °F (36.8 °C) 98.6 °F (37 °C) SpO2: 95% 99%  100% Weight:      
Height:      
 
Intake and Output:  
04/07 1901 - 04/09 0700 In: 2693 [I.V.:2693] Out: 1155 [Urine:950; Drains:205] No intake/output data recorded. Physical Exam:  
Constitution:  the patient is well developed and in no acute distress EENMT:  Sclera clear, pupils equal, oral mucosa moist 
Respiratory: clear anteriorly Cardiovascular:  RRR without M,G,R 
Gastrointestinal: soft and non-tender; with positive bowel sounds. Musculoskeletal: warm without cyanosis. There is no lower leg edema. Skin:  no jaundice or rashes, abd wound dressed under binder (not examineD) Neurologic: no gross neuro deficits Psychiatric:  alert and oriented x 3 CXR:  
 
 
LAB Recent Labs 04/09/19 
0539 04/09/19 
0001 04/08/19 
2203 04/08/19 
1836 04/08/19 
1156 GLUCPOC 214* 257* 242* 199* 175* Recent Labs 04/08/19 
6442 WBC 18.0* HGB 9.6* HCT 29.0*  
 No results for input(s): NA, K, CL, CO2, GLU, BUN, CREA, MG, CA, PHOS, TROIQ, ALB, TBIL, TBILI, GPT, ALT, SGOT, BNPP in the last 72 hours. No lab exists for component: TROIP No results for input(s): PH, PCO2, PO2, HCO3, PHI, PCO2I, PO2I, HCO3I in the last 72 hours. No results for input(s): LCAD, LAC in the last 72 hours. Assessment:  (Medical Decision Making) Hospital Problems  Date Reviewed: 3/26/2019 Codes Class Noted POA Pseudomonas urinary tract infection ICD-10-CM: N39.0, B96.5 ICD-9-CM: 599.0, 041.7  4/5/2019 Yes On zosyn Acute hypoxemic respiratory failure ([facility] ICD-10-CM: J96.01 
ICD-9-CM: 518.81  4/3/2019 Unknown Worsened today with positive fluid balance. ARDS (adult respiratory distress syndrome) ([facility] ICD-10-CM: D13 
ICD-9-CM: 518.52  4/3/2019 Unknown Vs noncardiogenic pulmonary edema * (Principal) Incarcerated incisional hernia ICD-10-CM: K43.0 ICD-9-CM: 552.21  3/26/2019 Yes BA on CPAP (Chronic) ICD-10-CM: G47.33, Z99.89 ICD-9-CM: 327.23, V46.8  7/14/2014 Yes Not wearing CPAP Morbid obesity ([facility] (Chronic) ICD-10-CM: E66.01 
ICD-9-CM: 278.01  7/14/2014 Yes NICM with Chronic systolic heart failure (HCC) (Chronic) ICD-10-CM: I67.36 ICD-9-CM: 428.22  7/18/2013 Yes Overview Signed 3/30/2016  8:11 AM by Nicole LOCKET 4/15:low risk Echo 6/2014:EF 40-45% Echo 5/2013:EF 30-35% LHC 2/2013: minimal CAD Continue diuretics Long-term insulin use in type 2 diabetes ([facility] ICD-10-CM: E11.9, Z79.4 ICD-9-CM: 250.00, V58.67  4/12/2010 Yes Plan:  (Medical Decision Making) --f/u after return to OR today for debridement 
--day 6/7 zosyn. Course to be determined depending upon OR findings.  
--Agree with continued diuresis in setting of positive fluid balance associated with worsening hypoxia. No s/sx of sepsis currently and if these develop due to intrabd infection may need to stop diuretics. --CPAP nightly is recommended More than 50% of the time documented was spent in face-to-face contact with the patient and in the care of the patient on the floor/unit where the patient is located.  
 
Ceferino Vargas MD

## 2019-04-10 LAB
GLUCOSE BLD STRIP.AUTO-MCNC: 175 MG/DL (ref 65–100)
GLUCOSE BLD STRIP.AUTO-MCNC: 234 MG/DL (ref 65–100)
GLUCOSE BLD STRIP.AUTO-MCNC: 238 MG/DL (ref 65–100)
GLUCOSE BLD STRIP.AUTO-MCNC: 239 MG/DL (ref 65–100)
MM INDURATION POC: 0 MM (ref 0–5)
PPD POC: NEGATIVE NEGATIVE

## 2019-04-10 PROCEDURE — 97110 THERAPEUTIC EXERCISES: CPT

## 2019-04-10 PROCEDURE — 82962 GLUCOSE BLOOD TEST: CPT

## 2019-04-10 PROCEDURE — 99232 SBSQ HOSP IP/OBS MODERATE 35: CPT | Performed by: INTERNAL MEDICINE

## 2019-04-10 PROCEDURE — 94760 N-INVAS EAR/PLS OXIMETRY 1: CPT

## 2019-04-10 PROCEDURE — 74011250636 HC RX REV CODE- 250/636: Performed by: SURGERY

## 2019-04-10 PROCEDURE — 77010033678 HC OXYGEN DAILY

## 2019-04-10 PROCEDURE — 92526 ORAL FUNCTION THERAPY: CPT

## 2019-04-10 PROCEDURE — 74011636637 HC RX REV CODE- 636/637: Performed by: SURGERY

## 2019-04-10 PROCEDURE — 74011250637 HC RX REV CODE- 250/637: Performed by: SURGERY

## 2019-04-10 PROCEDURE — 74011000258 HC RX REV CODE- 258: Performed by: SURGERY

## 2019-04-10 PROCEDURE — 74011250636 HC RX REV CODE- 250/636: Performed by: INTERNAL MEDICINE

## 2019-04-10 PROCEDURE — 65270000029 HC RM PRIVATE

## 2019-04-10 RX ORDER — FUROSEMIDE 10 MG/ML
20 INJECTION INTRAMUSCULAR; INTRAVENOUS ONCE
Status: COMPLETED | OUTPATIENT
Start: 2019-04-10 | End: 2019-04-10

## 2019-04-10 RX ORDER — HYDROCODONE BITARTRATE AND ACETAMINOPHEN 5; 325 MG/1; MG/1
1 TABLET ORAL
Status: DISCONTINUED | OUTPATIENT
Start: 2019-04-10 | End: 2019-04-13 | Stop reason: SDUPTHER

## 2019-04-10 RX ORDER — HYDROCODONE BITARTRATE AND ACETAMINOPHEN 5; 325 MG/1; MG/1
2 TABLET ORAL
Status: DISCONTINUED | OUTPATIENT
Start: 2019-04-10 | End: 2019-04-13 | Stop reason: SDUPTHER

## 2019-04-10 RX ADMIN — FUROSEMIDE 20 MG: 10 INJECTION, SOLUTION INTRAMUSCULAR; INTRAVENOUS at 08:33

## 2019-04-10 RX ADMIN — Medication 10 ML: at 14:14

## 2019-04-10 RX ADMIN — HYDROMORPHONE HYDROCHLORIDE 0.5 MG: 2 INJECTION, SOLUTION INTRAMUSCULAR; INTRAVENOUS; SUBCUTANEOUS at 17:10

## 2019-04-10 RX ADMIN — HYDROMORPHONE HYDROCHLORIDE 1 MG: 2 INJECTION, SOLUTION INTRAMUSCULAR; INTRAVENOUS; SUBCUTANEOUS at 08:37

## 2019-04-10 RX ADMIN — HUMAN INSULIN 2 UNITS: 100 INJECTION, SOLUTION SUBCUTANEOUS at 05:54

## 2019-04-10 RX ADMIN — Medication 400 MG: at 08:26

## 2019-04-10 RX ADMIN — HYDROMORPHONE HYDROCHLORIDE 1 MG: 2 INJECTION, SOLUTION INTRAMUSCULAR; INTRAVENOUS; SUBCUTANEOUS at 00:28

## 2019-04-10 RX ADMIN — PIPERACILLIN SODIUM,TAZOBACTAM SODIUM 4.5 G: 4; .5 INJECTION, POWDER, FOR SOLUTION INTRAVENOUS at 01:33

## 2019-04-10 RX ADMIN — ALPRAZOLAM 0.5 MG: 0.5 TABLET ORAL at 18:03

## 2019-04-10 RX ADMIN — Medication 400 MG: at 17:10

## 2019-04-10 RX ADMIN — ONDANSETRON 4 MG: 2 INJECTION INTRAMUSCULAR; INTRAVENOUS at 17:10

## 2019-04-10 RX ADMIN — ENOXAPARIN SODIUM 40 MG: 40 INJECTION SUBCUTANEOUS at 21:27

## 2019-04-10 RX ADMIN — Medication 10 ML: at 05:14

## 2019-04-10 RX ADMIN — PRAVASTATIN SODIUM 80 MG: 80 TABLET ORAL at 21:27

## 2019-04-10 RX ADMIN — ENOXAPARIN SODIUM 40 MG: 40 INJECTION SUBCUTANEOUS at 08:24

## 2019-04-10 RX ADMIN — CARVEDILOL 12.5 MG: 6.25 TABLET, FILM COATED ORAL at 16:59

## 2019-04-10 RX ADMIN — CARVEDILOL 12.5 MG: 6.25 TABLET, FILM COATED ORAL at 08:31

## 2019-04-10 RX ADMIN — HYDROMORPHONE HYDROCHLORIDE 1 MG: 2 INJECTION, SOLUTION INTRAMUSCULAR; INTRAVENOUS; SUBCUTANEOUS at 12:42

## 2019-04-10 RX ADMIN — HUMAN INSULIN 6 UNITS: 100 INJECTION, SOLUTION SUBCUTANEOUS at 00:28

## 2019-04-10 RX ADMIN — Medication 10 ML: at 21:27

## 2019-04-10 RX ADMIN — HUMAN INSULIN 4 UNITS: 100 INJECTION, SOLUTION SUBCUTANEOUS at 12:21

## 2019-04-10 RX ADMIN — HUMAN INSULIN 4 UNITS: 100 INJECTION, SOLUTION SUBCUTANEOUS at 18:02

## 2019-04-10 RX ADMIN — AMIODARONE HYDROCHLORIDE 200 MG: 200 TABLET ORAL at 08:26

## 2019-04-10 RX ADMIN — HYDROMORPHONE HYDROCHLORIDE 1 MG: 2 INJECTION, SOLUTION INTRAMUSCULAR; INTRAVENOUS; SUBCUTANEOUS at 21:55

## 2019-04-10 RX ADMIN — ONDANSETRON 4 MG: 2 INJECTION INTRAMUSCULAR; INTRAVENOUS at 21:55

## 2019-04-10 NOTE — PROGRESS NOTES
Problem: Dysphagia (Adult) Goal: *Acute Goals and Plan of Care (Insert Text) Description STG: Pt will demonstrate zero signs/sx of aspiration with full liquids with 90% accuracy during all meals. STG Pt will tolerated trials of chewable textures with no overt signs/sx of aspiration. LTG: Pt will demonstrate zero signs/sx of aspiration with least restrictive diet with 100% accuracy during all meals. Outcome: Progressing Towards Goal 
 SPEECH LANGUAGE PATHOLOGY: BEDSIDE SWALLOW NOTE: Daily Note and Discharge NAME/AGE/GENDER: Vivian Way is a 52 y.o. female DATE: 4/10/2019 PRIMARY DIAGNOSIS: Incisional hernia, without obstruction or gangrene [K43.2] Incarcerated incisional hernia [K43.0] Incarcerated incisional hernia [K43.0] Procedure(s) (LRB): ABDOMINAL DEBRIDEMENT (N/A) 1 Day Post-Op ICD-10: Treatment Diagnosis: R13.12 oropharyngeal phase dysphagia INTERDISCIPLINARY COLLABORATION: Registered Nurse PRECAUTIONS/ALLERGIES: Grass pollen ASSESSMENT:  
 Ms. Zander Keys presents with no overt signs/sx of aspiration with thin liquids via cup and straw, pureed, mixed and solid. No further ST indicated. · PO:  Regular · Liquids:  regular thin MEDICATIONS: 
· With liquid SUBJECTIVE:  
alert History of Present Injury/Illness: Ms. Zander Keys  has a past medical history of Allergic rhinitis, Arthritis, Automatic implantable cardioverter-defibrillator in situ (3/30/2016), CAD in native artery (1/26/1595), Chronic systolic heart failure ([address] (07/18/2013), CKD (chronic kidney disease), Diabetes ([address] Diabetes mellitus ([address] (4/12/2010), Dyslipidemia (2/13/2013), Eczema, Fibromyalgia, GERD (gastroesophageal reflux disease), Headache, Heart failure ([address] HTN (hypertension) (4/12/2010), Hypercholesterolemia, Morbid obesity ([address] Neuropathy, NICM (nonischemic cardiomyopathy) ([address] (2/15/2013), Obesity, Obstructive sleep apnea (adult) (pediatric) (07/14/2014), Pseudomonas urinary tract infection (2019), Sciatic pain (2016), SVT (supraventricular tachycardia) ([facility] and Tobacco use disorder (2010). She also has no past medical history of Dementia, Gastrointestinal disorder, or Unspecified adverse effect of anesthesia. She also  has a past surgical history that includes hx tonsillectomy; hx implantable cardioverter defibrillator (2013); hx  section; hx hernia repair; pr left heart cath,percutaneous (2013); hx svt ablation (\"years ago\"); hx rotator cuff repair (Right); hx hysterectomy; and hx implantable cardioverter defibrillator (2013). Present Symptoms:  
Pain Scale 1: Numeric (0 - 10) Pain Intensity 1: 8 Pain Location 1: Abdomen Pain Intervention(s) 1: Medication (see MAR) Current Medications: No current facility-administered medications on file prior to encounter. Current Outpatient Medications on File Prior to Encounter Medication Sig Dispense Refill  metFORMIN (GLUCOPHAGE) 500 mg tablet Take 1 Tab by mouth two (2) times daily (with meals). 60 Tab 5  
 magnesium oxide (MAG-OX) 400 mg tablet Take 1 Tab by mouth two (2) times a day. 180 Tab 3  
 amiodarone (CORDARONE) 200 mg tablet Take 1 Tab by mouth two (2) times a day. (Patient taking differently: Take 200 mg by mouth daily.) 60 Tab 11  Insulin Needles, Disposable, (FABIOLA PEN NEEDLE) 32 gauge x 5/32\" ndle 1 Each by Does Not Apply route daily as needed. E11.9 90 Pen Needle 3  
 lancets (ONETOUCH SURESOFT LANCING DEV) misc Check blood sugar daily, E11.9 50 Each 11  
 pravastatin (PRAVACHOL) 80 mg tablet Take 1 Tab by mouth nightly. 90 Tab 3  
 isosorbide mononitrate ER (IMDUR) 30 mg tablet Take 1 Tab by mouth every morning. 30 Tab 11  
 carvedilol (COREG) 25 mg tablet Take 1 Tab by mouth two (2) times daily (with meals). (Patient taking differently: Take 12.5 mg by mouth two (2) times daily (with meals). ) 180 Tab 1  
  furosemide (LASIX) 80 mg tablet Taking 1 tablet in the morning and 1/2 tablet in the afternoon (Patient taking differently: Take 40 mg by mouth two (2) times a day. Taking 1 tablet in the morning and 1/2 tablet in the afternoon) 180 Tab 1  
 insulin glargine (LANTUS SOLOSTAR U-100 INSULIN) 100 unit/mL (3 mL) inpn 35 Units by SubCUTAneous route daily. (Patient taking differently: 35 Units by SubCUTAneous route nightly.) 15 mL 3  
 glucose blood VI test strips (ONETOUCH ULTRA TEST) strip Check blood sugar daily, E11.9 50 Strip 11  
 nitroglycerin (NITROSTAT) 0.4 mg SL tablet 0.4 mg by SubLINGual route every five (5) minutes as needed for Chest Pain. Up to 3 doses.  DUPIXENT 300 mg/2 mL syrg syringe by SubCUTAneous route every fourteen (14) days.  triamcinolone acetonide (KENALOG) 0.1 % topical cream as needed. 5  
 albuterol (PROVENTIL HFA, VENTOLIN HFA, PROAIR HFA) 90 mcg/actuation inhaler Take 1 Puff by inhalation every four (4) hours as needed for Wheezing. 1 Inhaler 0  
 cholecalciferol (VITAMIN D3) 1,000 unit cap Take  by mouth daily.  Blood-Glucose Meter (ONETOUCH ULTRA2) monitoring kit Check blood sugar daily, E11.9 1 Kit 0  
 cpap machine kit by Does Not Apply route. 11 cm  aspirin (ASPIRIN) 325 mg tablet Take 1 Tab by mouth daily. (Patient taking differently: Take 81 mg by mouth daily.) 30 Tab 11  
 clotrimazole-betamethasone (LOTRISONE) topical cream Apply  to affected area two (2) times a day. (Patient not taking: Reported on 3/20/2019) 45 g 0 Current Dietary Status:   
Full liquid Social History/Home Situation:  
Home Environment: Private residence # Steps to Enter: 4(than an additional 3 inside) Rails to Enter: No 
One/Two Story Residence: One story Living Alone: No 
Support Systems: Friends \ neighbors Patient Expects to be Discharged to[de-identified] Private residence Current DME Used/Available at Home: Glucometer, CPAP OBJECTIVE:  
 
 Oral Motor Structure/Speech:  Oral-Motor Structure/Motor Speech Labial: No impairment Oral Hygiene: adequate Lingual: No impairment Cognitive and Communication Status: 
Neurologic State: Alert Orientation Level: Oriented X4 Cognition: Follows commands Perception: Appears intact Perseveration: No perseveration noted Safety/Judgement: Fall prevention BEDSIDE SWALLOW EVALUATION Oral Assessment: 
Oral Assessment Labial: No impairment Oral Hygiene: adequate Lingual: No impairment P.O. Trials: 
Patient Position: up in chair The patient was given teaspoon amounts of the following:  
Consistency Presented: Thin liquid;Mixed consistency; Solid;Puree How Presented: Self-fed/presented;Straw;Spoon;Cup/sip ORAL PHASE: 
Bolus Acceptance: No impairment Bolus Formation/Control: No impairment Propulsion: No impairment Oral Residue: None PHARYNGEAL PHASE: 
Initiation of Swallow: No impairment Aspiration Signs/Symptoms: None Vocal Quality: No impairment Pharyngeal Phase Characteristics: No impairment, issues, or problems OTHER OBSERVATIONS: 
Rate/bite size: WNL Endurance: WNL Comments: Tool Used: Dysphagia Outcome and Severity Scale (TAWANDA) Score Comments Normal Diet  ? 7 With no strategies or extra time needed Functional Swallow  ? 6 May have mild oral or pharyngeal delay Mild Dysphagia ? 5 Which may require one diet consistency restricted (those who demonstrate penetration which is entirely cleared on MBS would be included) Mild-Moderate Dysphagia  ? 4 With 1-2 diet consistencies restricted Moderate Dysphagia  ? 3 With 2 or more diet consistencies restricted Moderately Severe Dysphagia  ? 2 With partial PO strategies (trials with ST only) Severe Dysphagia  ? 1 With inability to tolerate any PO safely Score:  Initial: 4 Most Recent: X (Date: --) Interpretation of Tool: The Dysphagia Outcome and Severity Scale (TAWANDA) is a simple, easy-to-use, 7-point scale developed to systematically rate the functional severity of dysphagia based on objective assessment and make recommendations for diet level, independence level, and type of nutrition. [place] / [place] / Product Type: Medicare /  
 
TREATMENT:  
 (In addition to Assessment/Re-Assessment sessions the following treatments were rendered) Dysphagia Activities: Activities/Procedures listed utilized to improve progress in diet tolerance. Required no cueing to improve swallow safety. MODALITIES:  
 
ORAL MOTOR  EXERCISES: 
 
LARYNGEAL / PHARYNGEAL EXERCISES: 
 
__________________________________________________________________________________________________ Safety: After treatment position/precautions: · Up in chair · Call light within reach · RN notified No further ST indicated. Time In: 200 Time Out: 4046  Alirio Joyce MA/CCC/SLP

## 2019-04-10 NOTE — PROGRESS NOTES
Pt with no new complaints. States the pain is mostly right side, and the dilaudid has been helping every few hours. Appetite is fair; nimo some soft food and fluids. Continues to have loose bm['s. Dressings and binder intact. JENNIFER drains are draining but are not maintaining suction. Surgeon is aware of this.

## 2019-04-10 NOTE — PROGRESS NOTES
Surgical findings yesterday reviewed, including potential for further debridements She seems appropriately down Visit Vitals /63 (BP 1 Location: Right arm, BP Patient Position: At rest;Supine) Pulse 61 Temp 98.4 °F (36.9 °C) Resp 18 Ht 4' 11\" (1.499 m) Wt 221 lb 4.8 oz (100.4 kg) SpO2 95% Breastfeeding? No  
BMI 44.70 kg/m² Lungs-unlabored CV-reg 
abd-dressings intact JPs nonpurulent, scant new output as they are not holding suction Recent Results (from the past 12 hour(s)) GLUCOSE, POC Collection Time: 04/10/19  5:43 AM  
Result Value Ref Range Glucose (POC) 175 (H) 65 - 100 mg/dL GLUCOSE, POC Collection Time: 04/10/19 10:47 AM  
Result Value Ref Range Glucose (POC) 238 (H) 65 - 100 mg/dL Plan- 
Will keep denis at least overnight due to ongoing forced diuresis Hold diet at full liquids Continue zosyn day7 Change wound dressings tomorrow

## 2019-04-10 NOTE — PROGRESS NOTES
Problem: Falls - Risk of 
Goal: *Absence of Falls Description Document Doris Santillan Fall Risk and appropriate interventions in the flowsheet. Outcome: Progressing Towards Goal 
  
Problem: Patient Education: Go to Patient Education Activity Goal: Patient/Family Education Outcome: Progressing Towards Goal 
  
Problem: Patient Education: Go to Patient Education Activity Goal: Patient/Family Education Outcome: Progressing Towards Goal 
  
Problem: Nutrition Deficit Goal: *Optimize nutritional status Description Diet advancement and tolerance. Outcome: Progressing Towards Goal 
  
Problem: Pressure Injury - Risk of 
Goal: *Prevention of pressure injury Description Document Juice Scale and appropriate interventions in the flowsheet. Outcome: Progressing Towards Goal 
  
Problem: Patient Education: Go to Patient Education Activity Goal: Patient/Family Education Outcome: Progressing Towards Goal 
  
Problem: Pain Goal: *Control of Pain Outcome: Progressing Towards Goal 
Goal: *PALLIATIVE CARE:  Alleviation of Pain Outcome: Progressing Towards Goal 
  
Problem: Patient Education: Go to Patient Education Activity Goal: Patient/Family Education Outcome: Progressing Towards Goal

## 2019-04-10 NOTE — PROGRESS NOTES
Problem: Mobility Impaired (Adult and Pediatric) Goal: *Acute Goals and Plan of Care (Insert Text) Description STG: 
(1.)Ms. Tori Paul will move from supine to sit and sit to supine  with MINIMAL ASSIST within 3 treatment day(s). (2.)Ms. Tori Paul will transfer from bed to chair and chair to bed with CONTACT GUARD ASSIST using the least restrictive device within 3 treatment day(s). (3.)Ms. Tori Paul will ambulate with CONTACT GUARD ASSIST for 50 feet with the least restrictive device within 3 treatment day(s). LTG: 
(1.)Ms. Tori Paul will move from supine to sit and sit to supine  in bed with STAND BY ASSIST within 7 treatment day(s). (2.)Ms. Tori Paul will transfer from bed to chair and chair to bed with STAND BY ASSIST using the least restrictive device within 7 treatment day(s). (3.)Ms. Tori Paul will ambulate with STAND BY ASSIST for 200 feet with the least restrictive device within 7 treatment day(s). (4)Ms. Tori Paul will go up 4 steps min assist with device as needed in 7 days. (5)Ms. Tori Paul will perform HEP with cues and assistance to increase safety on her feet in 7 days. Above goals ongoing 4/6 
________________________________________________________________________________________________ Outcome: Progressing Towards Goal 
  
PHYSICAL THERAPY: Daily Note and AM 4/10/2019 INPATIENT: PT Visit Days : 3 [place] / Product Type: Medicare /   
  
NAME/AGE/GENDER: Ruben Rizo is a 52 y.o. female PRIMARY DIAGNOSIS: Incisional hernia, without obstruction or gangrene [K43.2] Incarcerated incisional hernia [K43.0] Incarcerated incisional hernia [K43.0] Incarcerated incisional hernia Incarcerated incisional hernia Procedure(s) (LRB): ABDOMINAL DEBRIDEMENT (N/A) 1 Day Post-Op ICD-10: Treatment Diagnosis:  
 · Generalized Muscle Weakness (M62.81) · Other abnormalities of gait and mobility (R26.89) Precaution/Allergies: 
Grass pollen ASSESSMENT:  
Ms. Harlo Nissen presents with general decreased in functional mobility and gait s/p OPEN INCARCERATED INCISIONAL HERNIA REPAIR on 3/26/19. Patient underwent second pocedure 3/31/19 secondary to dehiscense and hernia recurrence. Patient participated well this afternoon. Anxious to get out of bed but mobility limited by pain. RN notified of patient's request for pain medications. Patient requiring most assist for bed mobility and unsteady of her feet with the walker. Patient was independent prior to surgery and would benefit from therapy to facilitate a return to prior level of function. Unsure of patient's discharge plans/needs at this time. She is motivated and works hard. May benefit from STR/SNF if discharged in next couple of days; otherwise may be able to return home if she continues to progress. 4/10 she is sitting in the chair upon arrival.  She is feeling better. She agree to do exercises, but not walking. She remain in the chair with call light near. This section established at most recent assessment PROBLEM LIST (Impairments causing functional limitations): 1. Decreased Strength 2. Decreased ADL/Functional Activities 3. Decreased Transfer Abilities 4. Decreased Ambulation Ability/Technique 5. Decreased Balance 6. Increased Pain 7. Decreased Activity Tolerance 8. Decreased Flexibility/Joint Mobility 9. Decreased [place] with Home Exercise Program 
 INTERVENTIONS PLANNED: (Benefits and precautions of physical therapy have been discussed with the patient.) 1. Balance Exercise 2. Bed Mobility 3. Family Education 4. Gait Training 5. Home Exercise Program (HEP) 6. Therapeutic Activites 7. Therapeutic Exercise/Strengthening 8. Transfer Training TREATMENT PLAN: Frequency/Duration: daily for duration of hospital stay Rehabilitation Potential For Stated Goals: Good RECOMMENDED REHABILITATION/EQUIPMENT: (at time of discharge pending progress): Due to the probability of continued deficits (see above) this patient will likely need continued skilled physical therapy after discharge. Equipment: ? May need a RW   
    
 
 
 
HISTORY:  
History of Present Injury/Illness (Reason for Referral): S/p OPEN INCARCERATED INCISIONAL HERNIA REPAIR 3/26/19 Past Medical History/Comorbidities: Ms. Vandana Harry  has a past medical history of Allergic rhinitis, Arthritis, Automatic implantable cardioverter-defibrillator in situ (3/30/2016), CAD in native artery (5/15/9187), Chronic systolic heart failure ([address] (2013), CKD (chronic kidney disease), Diabetes ([address] Diabetes mellitus ([address] (2010), Dyslipidemia (2013), Eczema, Fibromyalgia, GERD (gastroesophageal reflux disease), Headache, Heart failure ([address] HTN (hypertension) (2010), Hypercholesterolemia, Morbid obesity ([address] Neuropathy, NICM (nonischemic cardiomyopathy) ([address] (2/15/2013), Obesity, Obstructive sleep apnea (adult) (pediatric) (2014), Pseudomonas urinary tract infection (2019), Sciatic pain (2016), SVT (supraventricular tachycardia) ([address] and Tobacco use disorder (2010). She also has no past medical history of Dementia, Gastrointestinal disorder, or Unspecified adverse effect of anesthesia. Ms. Vandana Harry  has a past surgical history that includes hx tonsillectomy; hx implantable cardioverter defibrillator (2013); hx  section; hx hernia repair; pr left heart cath,percutaneous (2013); hx svt ablation (\"years ago\"); hx rotator cuff repair (Right); hx hysterectomy; and hx implantable cardioverter defibrillator (2013). Social History/Living Environment:  
Home Environment: Private residence # Steps to Enter: 4(than an additional 3 inside) Rails to Enter: No 
One/Two Story Residence: One story Living Alone: No 
Support Systems: Friends \ neighbors Patient Expects to be Discharged to[de-identified] Private residence Current DME Used/Available at Home: Glucometer, CPAP Prior Level of Function/Work/Activity: 
independent Number of Personal Factors/Comorbidities that affect the Plan of Care: 1-2: MODERATE COMPLEXITY EXAMINATION:  
Most Recent Physical Functioning:  
Gross Assessment: 
  
         
  
Posture: 
  
Balance: 
  Bed Mobility: 
  
Wheelchair Mobility: 
  
Transfers: 
  
Gait: 
  
   
  
Body Structures Involved: 1. Bones 2. Joints 3. Muscles 4. Ligaments Body Functions Affected: 1. Movement Related Activities and Participation Affected: 1. Mobility Number of elements that affect the Plan of Care: 3: MODERATE COMPLEXITY CLINICAL PRESENTATION:  
Presentation: Stable and uncomplicated: LOW COMPLEXITY CLINICAL DECISION MAKING:  
[facility] AM-PAC 6 Clicks Basic Mobility Inpatient Short Form How much difficulty does the patient currently have. .. Unable A Lot A Little None 1. Turning over in bed (including adjusting bedclothes, sheets and blankets)? ? 1   ? 2   ? 3   ? 4  
2. Sitting down on and standing up from a chair with arms ( e.g., wheelchair, bedside commode, etc.)   ? 1   ? 2   ? 3   ? 4  
3. Moving from lying on back to sitting on the side of the bed?   ? 1   ? 2   ? 3   ? 4 How much help from another person does the patient currently need. .. Total A Lot A Little None 4. Moving to and from a bed to a chair (including a wheelchair)? ? 1   ? 2   ? 3   ? 4  
5. Need to walk in hospital room? ? 1   ? 2   ? 3   ? 4  
6. Climbing 3-5 steps with a railing? ? 1   ? 2   ? 3   ? 4  
© 2007, Trustees of [facility], under license to [employer]. All rights reserved Score:  Initial: 14 Most Recent: X (Date: -- ) Interpretation of Tool:  Represents activities that are increasingly more difficult (i.e. Bed mobility, Transfers, Gait).  
 
Medical Necessity:    
· Patient is expected to demonstrate progress in strength, range of motion, balance, coordination and functional technique ·  to decrease assistance required with theraputic exercises and functional mobility · . Reason for Services/Other Comments: 
· Patient continues to require present interventions due to patient's inability to perform theraputic exercises and functional mobility · . Use of outcome tool(s) and clinical judgement create a POC that gives a: Clear prediction of patient's progress: LOW COMPLEXITY  
  
 
 
 
TREATMENT:  
(In addition to Assessment/Re-Assessment sessions the following treatments were rendered) Pre-treatment Symptoms/Complaints:  I am better Pain: Initial: 
Pain Intensity 1: 5  Post Session:    
 
Therapeutic Activity: (    ):  Therapeutic activities including supine to sit, scooting, sit <> stand, standing balance, ambulation with RW and chair transfers to improve mobility and strength. Required minimal   to promote static and dynamic balance in standing. Therapeutic Exercise: (15 Minutes):  Exercises per grid below to improve mobility and strength. Required minimal visual, verbal and manual cues to promote proper body alignment, promote proper body posture, promote proper body mechanics and promote proper body breathing techniques. Progressed range and repetitions as indicated. Date: 
3/28 Date: 
4/10 Date: Activity/Exercise Parameters Parameters Parameters Ankle pumps 15 12 Quad sets 15 12 Glut sets 15 12 Hip abduction 10aarom 12 aa   
heelslides  12aa Braces/Orthotics/Lines/Etc:  
· IV 
· denis catheter · drain JENNIFER x 2 Treatment/Session Assessment:   
· Response to Treatment:  Patient continue to make slow and steady progress. · Interdisciplinary Collaboration:  
o Physical Therapist 
o Registered Nurse 
o Physician · After treatment position/precautions:  
o Up in chair 
o Bed/Chair-wheels locked 
o Bed in low position 
o Call light within reach 
o RN notified 
o Family at bedside · Compliance with Program/Exercises: Will assess as treatment progresses. · Recommendations/Intent for next treatment session: \"Next visit will focus on advancements to more challenging activities and reduction in assistance provided\". Total Treatment Duration: PT Patient Time In/Time Out Time In: 1130 Time Out: 1145 Olivia Judd, PTA

## 2019-04-11 ENCOUNTER — APPOINTMENT (OUTPATIENT)
Dept: GENERAL RADIOLOGY | Age: 54
DRG: 353 | End: 2019-04-11
Attending: INTERNAL MEDICINE
Payer: MEDICARE

## 2019-04-11 PROBLEM — E87.6 HYPOKALEMIA: Status: ACTIVE | Noted: 2019-04-11

## 2019-04-11 PROBLEM — E87.3 CHLORIDE-RESPONSIVE METABOLIC ALKALOSIS: Status: ACTIVE | Noted: 2019-04-11

## 2019-04-11 LAB
ALBUMIN SERPL-MCNC: 2.5 G/DL (ref 3.5–5)
ALBUMIN/GLOB SERPL: 0.7 {RATIO}
ALP SERPL-CCNC: 76 U/L (ref 50–136)
ALT SERPL-CCNC: 71 U/L (ref 12–65)
ANION GAP SERPL CALC-SCNC: 4 MMOL/L
AST SERPL-CCNC: 67 U/L (ref 15–37)
BASOPHILS # BLD: 0.1 K/UL (ref 0–0.2)
BASOPHILS NFR BLD: 0 % (ref 0–2)
BILIRUB SERPL-MCNC: 0.5 MG/DL (ref 0.2–1.1)
BUN SERPL-MCNC: 14 MG/DL (ref 6–23)
CALCIUM SERPL-MCNC: 8.7 MG/DL (ref 8.3–10.4)
CHLORIDE SERPL-SCNC: 93 MMOL/L (ref 98–107)
CO2 SERPL-SCNC: 37 MMOL/L (ref 21–32)
CREAT SERPL-MCNC: 0.96 MG/DL (ref 0.6–1)
DIFFERENTIAL METHOD BLD: ABNORMAL
EOSINOPHIL # BLD: 0.4 K/UL (ref 0–0.8)
EOSINOPHIL NFR BLD: 2 % (ref 0.5–7.8)
ERYTHROCYTE [DISTWIDTH] IN BLOOD BY AUTOMATED COUNT: 13.2 % (ref 11.9–14.6)
GLOBULIN SER CALC-MCNC: 3.8 G/DL (ref 2.3–3.5)
GLUCOSE BLD STRIP.AUTO-MCNC: 175 MG/DL (ref 65–100)
GLUCOSE BLD STRIP.AUTO-MCNC: 201 MG/DL (ref 65–100)
GLUCOSE BLD STRIP.AUTO-MCNC: 316 MG/DL (ref 65–100)
GLUCOSE BLD STRIP.AUTO-MCNC: 334 MG/DL (ref 65–100)
GLUCOSE SERPL-MCNC: 171 MG/DL (ref 65–100)
HCT VFR BLD AUTO: 29.1 % (ref 35.8–46.3)
HGB BLD-MCNC: 9.3 G/DL (ref 11.7–15.4)
IMM GRANULOCYTES # BLD AUTO: 0.4 K/UL (ref 0–0.5)
IMM GRANULOCYTES NFR BLD AUTO: 3 % (ref 0–5)
LYMPHOCYTES # BLD: 2.3 K/UL (ref 0.5–4.6)
LYMPHOCYTES NFR BLD: 13 % (ref 13–44)
MCH RBC QN AUTO: 30.4 PG (ref 26.1–32.9)
MCHC RBC AUTO-ENTMCNC: 32 G/DL (ref 31.4–35)
MCV RBC AUTO: 95.1 FL (ref 79.6–97.8)
MONOCYTES # BLD: 1.2 K/UL (ref 0.1–1.3)
MONOCYTES NFR BLD: 7 % (ref 4–12)
NEUTS SEG # BLD: 13.2 K/UL (ref 1.7–8.2)
NEUTS SEG NFR BLD: 75 % (ref 43–78)
NRBC # BLD: 0.09 K/UL (ref 0–0.2)
PLATELET # BLD AUTO: 249 K/UL (ref 150–450)
PMV BLD AUTO: 11.4 FL (ref 9.4–12.3)
POTASSIUM SERPL-SCNC: 2.8 MMOL/L (ref 3.5–5.1)
PROT SERPL-MCNC: 6.3 G/DL
RBC # BLD AUTO: 3.06 M/UL (ref 4.05–5.2)
SODIUM SERPL-SCNC: 134 MMOL/L (ref 136–145)
WBC # BLD AUTO: 17.6 K/UL (ref 4.3–11.1)

## 2019-04-11 PROCEDURE — 82962 GLUCOSE BLOOD TEST: CPT

## 2019-04-11 PROCEDURE — 80053 COMPREHEN METABOLIC PANEL: CPT

## 2019-04-11 PROCEDURE — 77010033678 HC OXYGEN DAILY

## 2019-04-11 PROCEDURE — 36415 COLL VENOUS BLD VENIPUNCTURE: CPT

## 2019-04-11 PROCEDURE — 85025 COMPLETE CBC W/AUTO DIFF WBC: CPT

## 2019-04-11 PROCEDURE — 77030019895 HC PCKNG STRP IODO -A

## 2019-04-11 PROCEDURE — 71046 X-RAY EXAM CHEST 2 VIEWS: CPT

## 2019-04-11 PROCEDURE — 97530 THERAPEUTIC ACTIVITIES: CPT

## 2019-04-11 PROCEDURE — 74011250636 HC RX REV CODE- 250/636: Performed by: SURGERY

## 2019-04-11 PROCEDURE — 99232 SBSQ HOSP IP/OBS MODERATE 35: CPT | Performed by: INTERNAL MEDICINE

## 2019-04-11 PROCEDURE — 74011250636 HC RX REV CODE- 250/636: Performed by: INTERNAL MEDICINE

## 2019-04-11 PROCEDURE — 65270000029 HC RM PRIVATE

## 2019-04-11 PROCEDURE — 74011636637 HC RX REV CODE- 636/637: Performed by: SURGERY

## 2019-04-11 PROCEDURE — 74011250637 HC RX REV CODE- 250/637: Performed by: INTERNAL MEDICINE

## 2019-04-11 PROCEDURE — 94760 N-INVAS EAR/PLS OXIMETRY 1: CPT

## 2019-04-11 PROCEDURE — 97110 THERAPEUTIC EXERCISES: CPT

## 2019-04-11 PROCEDURE — 74011250637 HC RX REV CODE- 250/637: Performed by: SURGERY

## 2019-04-11 RX ORDER — POTASSIUM CHLORIDE 1.5 G/1.77G
20 POWDER, FOR SOLUTION ORAL 2 TIMES DAILY WITH MEALS
Status: DISCONTINUED | OUTPATIENT
Start: 2019-04-11 | End: 2019-04-11

## 2019-04-11 RX ORDER — POTASSIUM CHLORIDE 14.9 MG/ML
20 INJECTION INTRAVENOUS ONCE
Status: COMPLETED | OUTPATIENT
Start: 2019-04-11 | End: 2019-04-11

## 2019-04-11 RX ORDER — POTASSIUM CHLORIDE 1.5 G/1.77G
40 POWDER, FOR SOLUTION ORAL
Status: DISCONTINUED | OUTPATIENT
Start: 2019-04-11 | End: 2019-04-13

## 2019-04-11 RX ADMIN — HYDROCODONE BITARTRATE AND ACETAMINOPHEN 2 TABLET: 5; 325 TABLET ORAL at 06:17

## 2019-04-11 RX ADMIN — Medication 10 ML: at 05:26

## 2019-04-11 RX ADMIN — ENOXAPARIN SODIUM 40 MG: 40 INJECTION SUBCUTANEOUS at 08:22

## 2019-04-11 RX ADMIN — Medication 400 MG: at 08:20

## 2019-04-11 RX ADMIN — PRAVASTATIN SODIUM 80 MG: 80 TABLET ORAL at 21:50

## 2019-04-11 RX ADMIN — Medication 400 MG: at 17:34

## 2019-04-11 RX ADMIN — POTASSIUM CHLORIDE 20 MEQ: 200 INJECTION, SOLUTION INTRAVENOUS at 10:17

## 2019-04-11 RX ADMIN — HUMAN INSULIN 2 UNITS: 100 INJECTION, SOLUTION SUBCUTANEOUS at 06:25

## 2019-04-11 RX ADMIN — HUMAN INSULIN 8 UNITS: 100 INJECTION, SOLUTION SUBCUTANEOUS at 12:02

## 2019-04-11 RX ADMIN — Medication 5 ML: at 21:57

## 2019-04-11 RX ADMIN — Medication 10 ML: at 13:36

## 2019-04-11 RX ADMIN — HYDROCODONE BITARTRATE AND ACETAMINOPHEN 2 TABLET: 5; 325 TABLET ORAL at 12:02

## 2019-04-11 RX ADMIN — HYDROMORPHONE HYDROCHLORIDE 0.5 MG: 2 INJECTION, SOLUTION INTRAMUSCULAR; INTRAVENOUS; SUBCUTANEOUS at 08:15

## 2019-04-11 RX ADMIN — ONDANSETRON 4 MG: 2 INJECTION INTRAMUSCULAR; INTRAVENOUS at 19:54

## 2019-04-11 RX ADMIN — POTASSIUM CHLORIDE 20 MEQ: 1.5 POWDER, FOR SOLUTION ORAL at 08:50

## 2019-04-11 RX ADMIN — CARVEDILOL 12.5 MG: 6.25 TABLET, FILM COATED ORAL at 08:20

## 2019-04-11 RX ADMIN — AMIODARONE HYDROCHLORIDE 200 MG: 200 TABLET ORAL at 08:20

## 2019-04-11 RX ADMIN — HUMAN INSULIN 4 UNITS: 100 INJECTION, SOLUTION SUBCUTANEOUS at 00:23

## 2019-04-11 RX ADMIN — HYDROMORPHONE HYDROCHLORIDE 0.5 MG: 2 INJECTION, SOLUTION INTRAMUSCULAR; INTRAVENOUS; SUBCUTANEOUS at 15:21

## 2019-04-11 RX ADMIN — CARVEDILOL 12.5 MG: 6.25 TABLET, FILM COATED ORAL at 17:34

## 2019-04-11 RX ADMIN — ENOXAPARIN SODIUM 40 MG: 40 INJECTION SUBCUTANEOUS at 21:50

## 2019-04-11 RX ADMIN — ONDANSETRON 4 MG: 2 INJECTION INTRAMUSCULAR; INTRAVENOUS at 08:16

## 2019-04-11 RX ADMIN — HUMAN INSULIN 8 UNITS: 100 INJECTION, SOLUTION SUBCUTANEOUS at 18:38

## 2019-04-11 RX ADMIN — POTASSIUM CHLORIDE 40 MEQ: 1.5 POWDER, FOR SOLUTION ORAL at 17:33

## 2019-04-11 RX ADMIN — HYDROCODONE BITARTRATE AND ACETAMINOPHEN 2 TABLET: 5; 325 TABLET ORAL at 21:50

## 2019-04-11 NOTE — PROGRESS NOTES
Problem: Mobility Impaired (Adult and Pediatric) Goal: *Acute Goals and Plan of Care (Insert Text) Description STG: 
(1.)Ms. Daphne Bustos will move from supine to sit and sit to supine  with MINIMAL ASSIST within 3 treatment day(s). (2.)Ms. Daphne Bustos will transfer from bed to chair and chair to bed with CONTACT GUARD ASSIST using the least restrictive device within 3 treatment day(s). (3.)Ms. Daphne Bustos will ambulate with CONTACT GUARD ASSIST for 50 feet with the least restrictive device within 3 treatment day(s). LTG: 
(1.)Ms. Daphne Bustos will move from supine to sit and sit to supine  in bed with STAND BY ASSIST within 7 treatment day(s). (2.)Ms. Dpahne Bustos will transfer from bed to chair and chair to bed with STAND BY ASSIST using the least restrictive device within 7 treatment day(s). (3.)Ms. Daphne Bustos will ambulate with STAND BY ASSIST for 200 feet with the least restrictive device within 7 treatment day(s). (4)Ms. Daphne Bustos will go up 4 steps min assist with device as needed in 7 days. (5)Ms. Daphne Bustos will perform HEP with cues and assistance to increase safety on her feet in 7 days. Above goals ongoing 4/6 
________________________________________________________________________________________________ Outcome: Progressing Towards Goal 
  
PHYSICAL THERAPY: Daily Note and AM 4/11/2019 INPATIENT: PT Visit Days : 4 [place] / Product Type: Medicare /   
  
NAME/AGE/GENDER: Florentin Bang is a 52 y.o. female PRIMARY DIAGNOSIS: Incisional hernia, without obstruction or gangrene [K43.2] Incarcerated incisional hernia [K43.0] Incarcerated incisional hernia [K43.0] Incarcerated incisional hernia Incarcerated incisional hernia Procedure(s) (LRB): ABDOMINAL DEBRIDEMENT (N/A) 2 Days Post-Op ICD-10: Treatment Diagnosis:  
 · Generalized Muscle Weakness (M62.81) · Other abnormalities of gait and mobility (R26.89) Precaution/Allergies: 
Grass pollen ASSESSMENT:  
Ms. Harlo Nissen presents with general decreased in functional mobility and gait s/p OPEN INCARCERATED INCISIONAL HERNIA REPAIR on 3/26/19. Patient underwent second pocedure 3/31/19 secondary to dehiscense and hernia recurrence. Patient participated well this afternoon. Anxious to get out of bed but mobility limited by pain. RN notified of patient's request for pain medications. Patient requiring most assist for bed mobility and unsteady of her feet with the walker. Patient was independent prior to surgery and would benefit from therapy to facilitate a return to prior level of function. Unsure of patient's discharge plans/needs at this time. She is motivated and works hard. May benefit from STR/SNF if discharged in next couple of days; otherwise may be able to return home if she continues to progress. 4/11 am she is sitting in the chair upon arrival.  Work on sit>stand x 3. Then ambulated 30 ft to restroom, therapist help pt get clean up. Then walk another 30 ft back to the chair. Left in chair with call light near. This section established at most recent assessment PROBLEM LIST (Impairments causing functional limitations): 1. Decreased Strength 2. Decreased ADL/Functional Activities 3. Decreased Transfer Abilities 4. Decreased Ambulation Ability/Technique 5. Decreased Balance 6. Increased Pain 7. Decreased Activity Tolerance 8. Decreased Flexibility/Joint Mobility 9. Decreased [place] with Home Exercise Program 
 INTERVENTIONS PLANNED: (Benefits and precautions of physical therapy have been discussed with the patient.) 1. Balance Exercise 2. Bed Mobility 3. Family Education 4. Gait Training 5. Home Exercise Program (HEP) 6. Therapeutic Activites 7. Therapeutic Exercise/Strengthening 8. Transfer Training TREATMENT PLAN: Frequency/Duration: daily for duration of hospital stay Rehabilitation Potential For Stated Goals: Good RECOMMENDED REHABILITATION/EQUIPMENT: (at time of discharge pending progress): Due to the probability of continued deficits (see above) this patient will likely need continued skilled physical therapy after discharge. Equipment: ? May need a RW   
    
 
 
 
HISTORY:  
History of Present Injury/Illness (Reason for Referral): S/p OPEN INCARCERATED INCISIONAL HERNIA REPAIR 3/26/19 Past Medical History/Comorbidities: Ms. Amanda Aguilera  has a past medical history of Allergic rhinitis, Arthritis, Automatic implantable cardioverter-defibrillator in situ (3/30/2016), CAD in native artery (), Chronic systolic heart failure ([address] (2013), CKD (chronic kidney disease), Diabetes ([address] Diabetes mellitus ([address] (2010), Dyslipidemia (2013), Eczema, Fibromyalgia, GERD (gastroesophageal reflux disease), Headache, Heart failure ([address] HTN (hypertension) (2010), Hypercholesterolemia, Morbid obesity ([address] Neuropathy, NICM (nonischemic cardiomyopathy) ([address] (2/15/2013), Obesity, Obstructive sleep apnea (adult) (pediatric) (2014), Pseudomonas urinary tract infection (2019), Sciatic pain (2016), SVT (supraventricular tachycardia) ([address] and Tobacco use disorder (2010). She also has no past medical history of Dementia, Gastrointestinal disorder, or Unspecified adverse effect of anesthesia. Ms. Amanda Aguilera  has a past surgical history that includes hx tonsillectomy; hx implantable cardioverter defibrillator (2013); hx  section; hx hernia repair; pr left heart cath,percutaneous (2013); hx svt ablation (\"years ago\"); hx rotator cuff repair (Right); hx hysterectomy; and hx implantable cardioverter defibrillator (2013). Social History/Living Environment:  
Home Environment: Private residence # Steps to Enter: 4(than an additional 3 inside) Rails to Enter: No 
One/Two Story Residence: One story Living Alone: No 
Support Systems: Friends \ neighbors Patient Expects to be Discharged to[de-identified] Private residence Current DME Used/Available at Home: Glucometer, CPAP Prior Level of Function/Work/Activity: 
independent Number of Personal Factors/Comorbidities that affect the Plan of Care: 1-2: MODERATE COMPLEXITY EXAMINATION:  
Most Recent Physical Functioning:  
Gross Assessment: 
  
         
  
Posture: 
  
Balance: 
  Bed Mobility: 
  
Wheelchair Mobility: 
  
Transfers: 
Sit to Stand: Contact guard assistance Stand to Sit: Contact guard assistance Bed to Chair: Contact guard assistance Duration: 23 Minutes Gait: 
  
Speed/Cristy: Delayed Step Length: Left shortened;Right shortened Gait Abnormalities: Decreased step clearance Distance (ft): 30 Feet (ft)(x 2) Assistive Device: Walker, rolling Ambulation - Level of Assistance: Contact guard assistance Interventions: Safety awareness training Body Structures Involved: 1. Bones 2. Joints 3. Muscles 4. Ligaments Body Functions Affected: 1. Movement Related Activities and Participation Affected: 1. Mobility Number of elements that affect the Plan of Care: 3: MODERATE COMPLEXITY CLINICAL PRESENTATION:  
Presentation: Stable and uncomplicated: LOW COMPLEXITY CLINICAL DECISION MAKING:  
[facility] 6 Clicks Basic Mobility Inpatient Short Form How much difficulty does the patient currently have. .. Unable A Lot A Little None 1. Turning over in bed (including adjusting bedclothes, sheets and blankets)? ? 1   ? 2   ? 3   ? 4  
2. Sitting down on and standing up from a chair with arms ( e.g., wheelchair, bedside commode, etc.)   ? 1   ? 2   ? 3   ? 4  
3. Moving from lying on back to sitting on the side of the bed?   ? 1   ? 2   ? 3   ? 4 How much help from another person does the patient currently need. .. Total A Lot A Little None 4. Moving to and from a bed to a chair (including a wheelchair)?    ? 1   ? 2   ? 3   ? 4  
 5.  Need to walk in hospital room? ? 1   ? 2   ? 3   ? 4  
6. Climbing 3-5 steps with a railing? ? 1   ? 2   ? 3   ? 4  
© 2007, Trustees of [address] [place] [address] [address] [id number], under license to [employer]. All rights reserved Score:  Initial: 14 Most Recent: X (Date: -- ) Interpretation of Tool:  Represents activities that are increasingly more difficult (i.e. Bed mobility, Transfers, Gait). Medical Necessity:    
· Patient is expected to demonstrate progress in strength, range of motion, balance, coordination and functional technique ·  to decrease assistance required with theraputic exercises and functional mobility · . Reason for Services/Other Comments: 
· Patient continues to require present interventions due to patient's inability to perform theraputic exercises and functional mobility · . Use of outcome tool(s) and clinical judgement create a POC that gives a: Clear prediction of patient's progress: LOW COMPLEXITY  
  
 
 
 
TREATMENT:  
(In addition to Assessment/Re-Assessment sessions the following treatments were rendered) Pre-treatment Symptoms/Complaints:  Hurting bad 
Pain: Initial: 
Pain Intensity 1: 5  Post Session:    
 
Therapeutic Activity: (  23 Minutes ):  Therapeutic activities including supine to sit, scooting, sit <> stand, standing balance, ambulation with RW and chair transfers to improve mobility and strength. Required minimal Safety awareness training to promote static and dynamic balance in standing. Therapeutic Exercise: (0 Minutes):  Exercises per grid below to improve mobility and strength. Required minimal visual, verbal and manual cues to promote proper body alignment, promote proper body posture, promote proper body mechanics and promote proper body breathing techniques. Progressed range and repetitions as indicated. Date: 
3/28 Date: 
4/10 Date: Activity/Exercise Parameters Parameters Parameters Ankle pumps 15 12 Quad sets 15 12 Glut sets 15 12 Hip abduction 10aarom 12 aa   
heelslides  12aa Braces/Orthotics/Lines/Etc:  
· IV 
· denis catheter · drain JENNIFER x 2 Treatment/Session Assessment:   
· Response to Treatment:  Patient still making slow and steady progress. · Interdisciplinary Collaboration:  
o Physical Therapy Assistant 
o Registered Nurse · After treatment position/precautions:  
o Up in chair 
o Bed/Chair-wheels locked 
o Bed in low position 
o Call light within reach 
o RN notified 
o Family at bedside · Compliance with Program/Exercises: Will assess as treatment progresses. · Recommendations/Intent for next treatment session: \"Next visit will focus on advancements to more challenging activities and reduction in assistance provided\". Total Treatment Duration: PT Patient Time In/Time Out Time In: 0745 Time Out: [id number] Olivia Judd, PTA

## 2019-04-11 NOTE — PROGRESS NOTES
Pt nimo po and IV potassium replacement. Drank ensure with much encouragement. Appetite fair, but reports intermittent pain and nausea as noted. Drains are still with brown-serosang drainage, sediment and very oily.

## 2019-04-11 NOTE — PROGRESS NOTES
Problem: Falls - Risk of 
Goal: *Absence of Falls Description Document Zafar Trinidad Fall Risk and appropriate interventions in the flowsheet. Outcome: Progressing Towards Goal 
  
Problem: Patient Education: Go to Patient Education Activity Goal: Patient/Family Education Outcome: Progressing Towards Goal 
  
Problem: Patient Education: Go to Patient Education Activity Goal: Patient/Family Education Outcome: Progressing Towards Goal 
  
Problem: Surgical Pathway Day of Surgery Goal: Off Pathway (Use only if patient is Off Pathway) Outcome: Progressing Towards Goal 
Goal: Respiratory Outcome: Progressing Towards Goal 
Goal: *Demonstrates progressive activity Outcome: Progressing Towards Goal 
  
Problem: Surgical Pathway Post-Op Day 1 Goal: Off Pathway (Use only if patient is Off Pathway) Outcome: Progressing Towards Goal 
Goal: Respiratory Outcome: Progressing Towards Goal 
Goal: *Hemodynamically stable Outcome: Progressing Towards Goal 
Goal: *Demonstrates progressive activity Outcome: Progressing Towards Goal 
Goal: *Lungs clear or at baseline Outcome: Progressing Towards Goal 
  
Problem: Nutrition Deficit Goal: *Optimize nutritional status Description Diet advancement and tolerance. Outcome: Progressing Towards Goal 
  
Problem: Pressure Injury - Risk of 
Goal: *Prevention of pressure injury Description Document Juice Scale and appropriate interventions in the flowsheet. Outcome: Progressing Towards Goal 
  
Problem: Patient Education: Go to Patient Education Activity Goal: Patient/Family Education Outcome: Progressing Towards Goal 
  
Problem: Pain Goal: *Control of Pain Outcome: Progressing Towards Goal 
Goal: *PALLIATIVE CARE:  Alleviation of Pain Outcome: Progressing Towards Goal 
  
Problem: Patient Education: Go to Patient Education Activity Goal: Patient/Family Education Outcome: Progressing Towards Goal

## 2019-04-11 NOTE — PROGRESS NOTES
Loren Paul Admission Date: 3/26/2019 Daily Progress Note: 4/11/2019 The patient's chart is reviewed and the patient is discussed with the staff. Loren Paul is a 45yoF admitted 3/26 with abd hernia repair complicated by dehiscence and repair with mesh on 3/31 who has had abd pain/nausea and acute hypoxic respiratory failure complicating her course. Subjective:  
 
Up in chair and off O2 Has surgical pain Not using CPAP with sleep Current Facility-Administered Medications Medication Dose Route Frequency  potassium chloride (KLOR-CON) packet for solution 20 mEq  20 mEq Oral BID WITH MEALS  
 HYDROcodone-acetaminophen (NORCO) 5-325 mg per tablet 1 Tab  1 Tab Oral Q6H PRN Or  
 HYDROcodone-acetaminophen (NORCO) 5-325 mg per tablet 2 Tab  2 Tab Oral Q6H PRN  phenol throat spray (CHLORASEPTIC) 1 Spray  1 Spray Oral PRN  
 NUTRITIONAL SUPPORT ELECTROLYTE PRN ORDERS   Does Not Apply PRN  
 ALPRAZolam (XANAX) tablet 0.5 mg  0.5 mg Oral Q8H PRN  
 insulin regular (NOVOLIN R, HUMULIN R) injection   SubCUTAneous Q6H  
 acetaminophen (TYLENOL) tablet 650 mg  650 mg Per NG tube Q6H PRN  
 amiodarone (CORDARONE) tablet 200 mg  200 mg Per NG tube DAILY  carvedilol (COREG) tablet 12.5 mg  12.5 mg Per NG tube BID WITH MEALS  dextrose (D50W) injection syrg 12.5 g  25 mL IntraVENous PRN  
 metoclopramide HCl (REGLAN) injection 10 mg  10 mg IntraVENous Q8H PRN  promethazine (PHENERGAN) with saline injection 25 mg  25 mg IntraVENous Q6H PRN  
 HYDROmorphone (PF) (DILAUDID) injection 0.5-2 mg  0.5-2 mg IntraVENous Q3H PRN  
 nitroglycerin (NITROSTAT) tablet 0.4 mg  0.4 mg SubLINGual Q5MIN PRN  pravastatin (PRAVACHOL) tablet 80 mg  80 mg Oral QHS  magnesium oxide (MAG-OX) tablet 400 mg  400 mg Oral BID  sodium chloride (NS) flush 5-40 mL  5-40 mL IntraVENous Q8H  
 sodium chloride (NS) flush 5-40 mL  5-40 mL IntraVENous PRN  
  diphenhydrAMINE (BENADRYL) injection 12.5 mg  12.5 mg IntraVENous Q4H PRN  
 ondansetron (ZOFRAN) injection 4 mg  4 mg IntraVENous Q4H PRN  
 enoxaparin (LOVENOX) injection 40 mg  40 mg SubCUTAneous Q12H  
 albuterol (PROVENTIL VENTOLIN) nebulizer solution 2.5 mg  2.5 mg Nebulization Q4H PRN  
 dupilumab (DUPIXENT) 300 mg/2 mL syringe  300 mg SubCUTAneous Q7D Review of Systems Constitutional: negative for fever, chills, sweats Cardiovascular: negative for chest pain, palpitations, syncope, edema Gastrointestinal:  negative for dysphagia, reflux, vomiting, diarrhea, abdominal pain, or melena Neurologic:  negative for focal weakness, numbness, headache Objective:  
 
Vitals:  
 04/10/19 2002 04/10/19 2036 04/11/19 0006 04/11/19 0401 BP:  106/65 115/73 110/62 Pulse:  62 60 65 Resp:  18 18 18 Temp:  98.6 °F (37 °C) 98.3 °F (36.8 °C) 98.5 °F (36.9 °C) SpO2: 95% 95% 94% 93% Weight:      
Height:      
 
Intake and Output:  
04/09 1901 - 04/11 0700 In: 700 [P.O.:700] Out: 9849 [Urine:1700; Drains:165] No intake/output data recorded. Physical Exam:  
Constitution:  the patient is well developed and in no acute distress EENMT:  Sclera clear, pupils equal, oral mucosa moist 
Respiratory: clear anteriorly Cardiovascular:  RRR without M,G,R 
Gastrointestinal: soft and non-tender; with positive bowel sounds. Musculoskeletal: warm without cyanosis. There is no lower leg edema. Skin:  no jaundice or rashes, abd wound dressed under binder (not examined) Neurologic: no gross neuro deficits Psychiatric:  alert and oriented x 3 CXR: none today LAB Recent Labs 04/11/19 
0622 04/11/19 
0018 04/10/19 
1630 04/10/19 
1047 04/10/19 
0543 GLUCPOC 175* 201* 234* 238* 175* Recent Labs 04/11/19 
5734 WBC 17.6* HGB 9.3* HCT 29.1*  
 Recent Labs 04/11/19 
4961 * K 2.8*  
CL 93* CO2 37* * BUN 14  
CREA 0.96  
CA 8.7 ALB 2.5*  
 TBILI 0.5 ALT 71* SGOT 67* No results for input(s): PH, PCO2, PO2, HCO3, PHI, PCO2I, PO2I, HCO3I in the last 72 hours. No results for input(s): LCAD, LAC in the last 72 hours. Assessment:  (Medical Decision Making) Hospital Problems  Date Reviewed: 3/26/2019 Codes Class Noted POA Pseudomonas urinary tract infection ICD-10-CM: N39.0, B96.5 ICD-9-CM: 599.0, 041.7  4/5/2019 Yes On zosyn Acute hypoxemic respiratory failure ([facility] ICD-10-CM: J96.01 
ICD-9-CM: 518.81  4/3/2019 Unknown Worsened today with positive fluid balance. ARDS (adult respiratory distress syndrome) ([facility] ICD-10-CM: B63 
ICD-9-CM: 518.52  4/3/2019 Unknown Vs noncardiogenic pulmonary edema * (Principal) Incarcerated incisional hernia ICD-10-CM: K43.0 ICD-9-CM: 552.21  3/26/2019 Yes BA on CPAP (Chronic) ICD-10-CM: G47.33, Z99.89 ICD-9-CM: 327.23, V46.8  7/14/2014 Yes Not wearing CPAP Morbid obesity ([facility] (Chronic) ICD-10-CM: E66.01 
ICD-9-CM: 278.01  7/14/2014 Yes NICM with Chronic systolic heart failure (HCC) (Chronic) ICD-10-CM: G31.60 ICD-9-CM: 428.22  7/18/2013 Yes Overview Signed 3/30/2016  8:11 AM by Juan ROMERO 4/15:low risk Echo 6/2014:EF 40-45% Echo 5/2013:EF 30-35% LHC 2/2013: minimal CAD Continue diuretics Long-term insulin use in type 2 diabetes ([facility] ICD-10-CM: E11.9, Z79.4 ICD-9-CM: 250.00, V58.67  4/12/2010 Yes Metabolic alkalosis with hypokalemia, Need to correct K to correct alkalosis Plan:  (Medical Decision Making) --completed antibiotics --cont. IS and pulmonary toilet 
--replace K orally and IV till K > 4.0 
--CPAP nightly and with naps ( she is on CPAP 12 cm ) -- CXR today 
--am labs BMP, Phos and Mag 
 
More than 50% of the time documented was spent in face-to-face contact with the patient and in the care of the patient on the floor/unit where the patient is located.  
 
Francisco Coronado MD

## 2019-04-11 NOTE — PROGRESS NOTES
Problem: Self Care Deficits Care Plan (Adult) Goal: *Acute Goals and Plan of Care (Insert Text) Description Patient will complete lower body dressing with minimal assist to increase self care independence. Patient will complete bathing with minimal assist to increase self care independence. Patient will improve static standing at edge of bed for 5 minutes to improve independence with transfers and self cares. Patient will tolerate 38 minutes of OT treatment with self incorporated rest breaks to increase activity tolerance to enhance participation in hobbies. Patient will complete all functional transfers with contact guard assist using adaptive equipment as needed. Patient will complete UE exercises with supervision to increase overall activity tolerance and strength. Timeframe: 7 visits OCCUPATIONAL THERAPY: Daily Note and AM 4/11/2019 INPATIENT: OT Visit Days: 3 [place] / [place] / Product Type: Medicare /  
  
NAME/AGE/GENDER: Loren Paul is a 52 y.o. female PRIMARY DIAGNOSIS:  Incisional hernia, without obstruction or gangrene [K43.2] Incarcerated incisional hernia [K43.0] Incarcerated incisional hernia [K43.0] Incarcerated incisional hernia Incarcerated incisional hernia Procedure(s) (LRB): ABDOMINAL DEBRIDEMENT (N/A) 2 Days Post-Op ICD-10: Treatment Diagnosis:  
 · Generalized Muscle Weakness (M62.81) · Difficulty in walking, Not elsewhere classified (R26.2) Precautions/Allergies: 
   Grass pollen ASSESSMENT:  
Ms. Skip Skaggs presents in chair. Pt completed the exercises below on B UE's with rest breaks. Pt is progressing towards goals. Continue POC. This section established at most recent assessment PROBLEM LIST (Impairments causing functional limitations): 1. Decreased Strength 2. Decreased ADL/Functional Activities 3. Decreased Transfer Abilities 4. Decreased Balance 5. Increased Pain 6. Decreased Activity Tolerance INTERVENTIONS PLANNED: (Benefits and precautions of occupational therapy have been discussed with the patient.) 1. Activities of daily living training 2. Adaptive equipment training 3. Balance training 4. Clothing management 5. Neuromuscular re-eduation 6. Therapeutic activity 7. Therapeutic exercise TREATMENT PLAN: Frequency/Duration: Follow patient 3x a week to address above goals. Rehabilitation Potential For Stated Goals: Good RECOMMENDED REHABILITATION/EQUIPMENT: (at time of discharge pending progress): Due to the probability of continued deficits (see above) this patient will not likely need continued skilled occupational therapy after discharge. Equipment:  
? None at this time OCCUPATIONAL PROFILE AND HISTORY:  
History of Present Injury/Illness (Reason for Referral): 
See H&P. Past Medical History/Comorbidities: Ms. Bentley Ng  has a past medical history of Allergic rhinitis, Arthritis, Automatic implantable cardioverter-defibrillator in situ (3/30/2016), CAD in native artery (9/16/1593), Chronic systolic heart failure ([address] (07/18/2013), CKD (chronic kidney disease), Diabetes ([address] Diabetes mellitus ([address] (4/12/2010), Dyslipidemia (2/13/2013), Eczema, Fibromyalgia, GERD (gastroesophageal reflux disease), Headache, Heart failure ([address] HTN (hypertension) (4/12/2010), Hypercholesterolemia, Morbid obesity ([address] Neuropathy, NICM (nonischemic cardiomyopathy) ([address] (2/15/2013), Obesity, Obstructive sleep apnea (adult) (pediatric) (07/14/2014), Pseudomonas urinary tract infection (4/5/2019), Sciatic pain (5/23/2016), SVT (supraventricular tachycardia) ([address] and Tobacco use disorder (4/12/2010). She also has no past medical history of Dementia, Gastrointestinal disorder, or Unspecified adverse effect of anesthesia.   Ms. Bentley Ng  has a past surgical history that includes hx tonsillectomy; hx implantable cardioverter defibrillator (2013); hx  section; hx hernia repair; pr left heart cath,percutaneous (2013); hx svt ablation (\"years ago\"); hx rotator cuff repair (Right); hx hysterectomy; and hx implantable cardioverter defibrillator (2013). Social History/Living Environment:  
Home Environment: Private residence # Steps to Enter: 4(than an additional 3 inside) Rails to Enter: No 
One/Two Story Residence: One story Living Alone: No 
Support Systems: Friends \ neighbors Patient Expects to be Discharged to[de-identified] Private residence Current DME Used/Available at Home: Glucometer, CPAP Prior Level of Function/Work/Activity: She was working part time, driving, and independent with ADL's IADL's, walking prior to hospitalization. Number of Personal Factors/Comorbidities that affect the Plan of Care: Expanded review of therapy/medical records (1-2):  MODERATE COMPLEXITY ASSESSMENT OF OCCUPATIONAL PERFORMANCE[de-identified]  
Activities of Daily Living:  
Basic ADLs (From Assessment) Complex ADLs (From Assessment) Feeding: Supervision Oral Facial Hygiene/Grooming: Setup Bathing: Minimum assistance Upper Body Dressing: Minimum assistance Lower Body Dressing: Maximum assistance Toileting: Moderate assistance Grooming/Bathing/Dressing Activities of Daily Living Cognitive Retraining Safety/Judgement: Fall prevention Bed/Mat Mobility Sit to Stand: Contact guard assistance Stand to Sit: Contact guard assistance Bed to Chair: Contact guard assistance Most Recent Physical Functioning:  
Gross Assessment: 
  
         
  
Posture: 
Posture (WDL): Exceptions to [facility] Posture Assessment: Trunk flexion, Rounded shoulders Balance: 
  Bed Mobility: 
  
Wheelchair Mobility: 
  
Transfers: 
Sit to Stand: Contact guard assistance Stand to Sit: Contact guard assistance Bed to Chair: Contact guard assistance Duration: 23 Minutes Patient Vitals for the past 6 hrs: 
 BP BP Patient Position SpO2 O2 Flow Rate (L/min) Pulse 04/11/19 0818 102/59 At rest;Supine 96 %  64  
04/11/19 0857   98 % 2 l/min  Mental Status Neurologic State: Alert Orientation Level: Oriented X4 Cognition: Appropriate decision making, Appropriate for age attention/concentration Perception: Appears intact Perseveration: No perseveration noted Safety/Judgement: Fall prevention Physical Skills Involved: 1. Range of Motion 2. Balance 3. Strength 4. Activity Tolerance 5. Gross Motor Control 6. Pain (acute) 7. Edema Cognitive Skills Affected (resulting in the inability to perform in a timely and safe manner): 
1. [facility]  Psychosocial Skills Affected: 
1. WFL Number of elements that affect the Plan of Care: 3-5:  MODERATE COMPLEXITY CLINICAL DECISION MAKING:  
[employer] AM-PAC 6 Clicks Daily Activity Inpatient Short Form How much help from another person does the patient currently need. .. Total A Lot A Little None 1. Putting on and taking off regular lower body clothing? ? 1   ? 2   ? 3   ? 4  
2. Bathing (including washing, rinsing, drying)? ? 1   ? 2   ? 3   ? 4  
3. Toileting, which includes using toilet, bedpan or urinal?   ? 1   ? 2   ? 3   ? 4  
4. Putting on and taking off regular upper body clothing? ? 1   ? 2   ? 3   ? 4  
5. Taking care of personal grooming such as brushing teeth? ? 1   ? 2   ? 3   ? 4  
6. Eating meals? ? 1   ? 2   ? 3   ? 4  
© 2007, [employer] of [facility] [employer], under license to [employer]. All rights reserved Score:  Initial: 14 Most Recent: X (Date: -- ) Interpretation of Tool:  Represents activities that are increasingly more difficult (i.e. Bed mobility, Transfers, Gait). Medical Necessity:    
· Skilled intervention continues to be required due to Deficits noted above. Reason for Services/Other Comments: · Patient continues to require skilled intervention due to debility and s/p hernia repair · . Use of outcome tool(s) and clinical judgement create a POC that gives a: MODERATE COMPLEXITY  
 
 
 
TREATMENT:  
(In addition to Assessment/Re-Assessment sessions the following treatments were rendered) Pre-treatment Symptoms/Complaints:   
Pain: Initial:  
   Post Session:  4 Therapeutic Exercise: (10 minutes):  Exercises per grid below to improve strength and coordination. Required minimal visual and verbal cues to promote proper body alignment, promote proper body posture and promote proper body mechanics. Progressed resistance, range and repetitions as indicated. B UE's  Date: 
4/8/19 Date: 
4/11/19 Date: Activity/Exercise Parameters Parameters Parameters Shoulder flexion/ex 1 set of 8 10 reps Shoulder abduction/add 1 set of 8  10 reps Elbow flexion/ex  10 reps Punches   10 reps Wrist flexion/ex   10 reps Braces/Orthotics/Lines/Etc:  
· O2 Device: Nasal cannula Treatment/Session Assessment:   
· Response to Treatment:  Good, supine in bed · Interdisciplinary Collaboration:  
o Physical Therapist 
o Certified Occupational Therapy Assistant 
o Registered Nurse · After treatment position/precautions:  
o Up in chair 
o Bed/Chair-wheels locked 
o Bed in low position 
o Call light within reach 
o RN notified · Compliance with Program/Exercises: Compliant all of the time, Will assess as treatment progresses. · Recommendations/Intent for next treatment session: \"Next visit will focus on advancements to more challenging activities\". Total Treatment Duration: OT Patient Time In/Time Out Time In: 1609 Time Out: 1006 Dante Ramirez

## 2019-04-11 NOTE — PROGRESS NOTES
Written report from nurse guzman which states pt had bed offer from [facility]. Per surgeon pt is not medically ready. DYANA spoke with [facility] in admissions at [facility] to make him aware pt will not be ready before Monday. [facility] states we can reeval on Monday. DYANA following until dc. Lionel Bell

## 2019-04-11 NOTE — PROGRESS NOTES
Admit Date: 3/26/2019 POD 2 Days Post-Op Procedure:  Procedure(s): 
ABDOMINAL DEBRIDEMENT Subjective:  
 
Patient has complaints of pain. Pain control has been adequate. Sullen affect. Objective:  
 
Visit Vitals /50 (BP 1 Location: Left arm, BP Patient Position: At rest;Supine) Pulse 60 Temp 98.2 °F (36.8 °C) Resp 16 Ht 4' 11\" (1.499 m) Wt 221 lb 4.8 oz (100.4 kg) SpO2 99% Breastfeeding? No  
BMI 44.70 kg/m² Temp (24hrs), Av.3 °F (36.8 °C), Min:97.4 °F (36.3 °C), Max:98.6 °F (37 °C) Supportie Westfall I&O reviewed as documented. Physical Exam:  
 General-in no distress. Abdomen- Wound clean and pt has moderate appropriately distributed tenderness in midline. JPs unchanged- old blood, liquified fat. Some early granulation along ruben. [place] Labs:  
Recent Results (from the past 24 hour(s)) GLUCOSE, POC Collection Time: 04/10/19  4:30 PM  
Result Value Ref Range Glucose (POC) 234 (H) 65 - 100 mg/dL PLEASE READ & DOCUMENT PPD TEST IN 48 HRS Collection Time: 04/10/19  9:00 PM  
Result Value Ref Range PPD Negative Negative  
 mm Induration 0 0 - 5 mm GLUCOSE, POC Collection Time: 19 12:18 AM  
Result Value Ref Range Glucose (POC) 201 (H) 65 - 100 mg/dL GLUCOSE, POC Collection Time: 19  6:22 AM  
Result Value Ref Range Glucose (POC) 175 (H) 65 - 100 mg/dL CBC WITH AUTOMATED DIFF Collection Time: 19  6:57 AM  
Result Value Ref Range WBC 17.6 (H) 4.3 - 11.1 K/uL  
 RBC 3.06 (L) 4.05 - 5.2 M/uL HGB 9.3 (L) 11.7 - 15.4 g/dL HCT 29.1 (L) 35.8 - 46.3 % MCV 95.1 79.6 - 97.8 FL  
 MCH 30.4 26.1 - 32.9 PG  
 MCHC 32.0 31.4 - 35.0 g/dL  
 RDW 13.2 11.9 - 14.6 % PLATELET 118 764 - 676 K/uL MPV 11.4 9.4 - 12.3 FL ABSOLUTE NRBC 0.09 0.0 - 0.2 K/uL  
 DF AUTOMATED NEUTROPHILS 75 43 - 78 % LYMPHOCYTES 13 13 - 44 % MONOCYTES 7 4.0 - 12.0 % EOSINOPHILS 2 0.5 - 7.8 %  BASOPHILS 0 0.0 - 2.0 %  
 IMMATURE GRANULOCYTES 3 0.0 - 5.0 %  
 ABS. NEUTROPHILS 13.2 (H) 1.7 - 8.2 K/UL  
 ABS. LYMPHOCYTES 2.3 0.5 - 4.6 K/UL  
 ABS. MONOCYTES 1.2 0.1 - 1.3 K/UL  
 ABS. EOSINOPHILS 0.4 0.0 - 0.8 K/UL  
 ABS. BASOPHILS 0.1 0.0 - 0.2 K/UL  
 ABS. IMM. GRANS. 0.4 0.0 - 0.5 K/UL METABOLIC PANEL, COMPREHENSIVE Collection Time: 04/11/19  6:57 AM  
Result Value Ref Range Sodium 134 (L) 136 - 145 mmol/L Potassium 2.8 (LL) 3.5 - 5.1 mmol/L Chloride 93 (L) 98 - 107 mmol/L  
 CO2 37 (H) 21 - 32 mmol/L Anion gap 4 mmol/L Glucose 171 (H) 65 - 100 mg/dL BUN 14 6 - 23 MG/DL Creatinine 0.96 0.6 - 1.0 MG/DL  
 GFR est AA >60 >60 ml/min/1.73m2 GFR est non-AA >60 ml/min/1.73m2 Calcium 8.7 8.3 - 10.4 MG/DL Bilirubin, total 0.5 0.2 - 1.1 MG/DL  
 ALT (SGPT) 71 (H) 12 - 65 U/L  
 AST (SGOT) 67 (H) 15 - 37 U/L Alk. phosphatase 76 50 - 136 U/L Protein, total 6.3 g/dL Albumin 2.5 (L) 3.5 - 5.0 g/dL Globulin 3.8 (H) 2.3 - 3.5 g/dL A-G Ratio 0.7 GLUCOSE, POC Collection Time: 04/11/19 11:47 AM  
Result Value Ref Range Glucose (POC) 316 (H) 65 - 100 mg/dL Assessment:  
 
Principal Problem: 
  Incarcerated incisional hernia (3/26/2019) Active Problems: 
  Long-term insulin use in type 2 diabetes ([facility] (4/12/2010) [facility] (7/18/2013) Overview: NST 4/15:low risk Echo 6/2014:EF 40-45% Echo 5/2013:EF 30-35% LHC 2/2013: minimal CAD 
 
  BA on CPAP (7/14/2014) Morbid obesity ([facility] (7/14/2014) Acute hypoxemic respiratory failure ([facility] (4/3/2019) ARDS (adult respiratory distress syndrome) ([facility] (4/3/2019) Pseudomonas urinary tract infection (4/5/2019) Chloride-responsive metabolic alkalosis (4/24/0851) Hypokalemia (4/11/2019) Plan/Recommendations/Medical Decision Making:  
 
Start changing wound ruben 
abx D/c denis Apparently, plan in place for SNF after discharge- this is not likely any sooner than early next week. Replace K- hypokalemia from lasix diuresis

## 2019-04-11 NOTE — PROGRESS NOTES
04/11/19 4612 Oxygen Therapy O2 Sat (%) 98 % Pulse via Oximetry 62 beats per minute O2 Device Nasal cannula O2 Flow Rate (L/min) 2 l/min  
o2 decreased to 2l , no distress noted

## 2019-04-11 NOTE — PROGRESS NOTES
Nutrition follow-up: 
Reason for initial assessment:  Length of stay. Assessment:  
Diet:  GI soft effective 4/10 Nutrition Supplement:  Glucerna Shake supplement 1 X daily; Ensure High Protein 1 X daily. Pt admitted 3/26 for abdominal hernia repair, complicated by abdominal wall dehiscence and return to OR 3/31 for hernia repair with mesh. Pt with acute hypoxic respiratory failure 4/03; transferred to [facility] intubation 4/03. Pt had a [facility] on 4/03-R IJ. Pt extubated 4/05. Diet advanced to full liquids 4/7. Pt 2 days post op abdominal debridement. Pt sitting in bed at RD visit; on phone ordering supper meal.  Diet advanced to GI soft on 4/10. Pt states she is tolerating solid food with c/o nausea due to surgery and post po intake. Pt drank the Ensure Enlive at breakfast and lunch today. Abdomen: obese, tender with active bowel sounds. Most recent documented BM 4/11. Pertinent Labs: Glucose 171 mg/dl on AM BMP. POC glucose 4/11:  201, 175, 316 mg/dl. Hypokalemia-lasix diuresis, hyponatremia Pertinent Rx:  KCl,  SSI, mag-ox Anthropometrics: Height: 4' 11\" (149.9 cm), Weight: 100.4 kg (221 lb 4.8 oz), Bed weight on 4/3, Body mass index is 44.7 kg/m². BMI class of morbid obesity.  
 Macronutrient needs: EER:  0127-6083 kcal /day (11-14 kcal/kg BW) EPR:   gm/day (2-2.5 gm/kg/IBW) GFR >60 Max CHO:  176 grams/day (50% estimated kcal/day) Intake/Comparative Standards: 4 recorded intake since initiation of GI soft diet with average intake of 20%. Pt verbalized >75% breakfast meal today and ~ 50% lunch meal today + Ensure Enlive at breakfast and lunch today. Pt potentially meets 100% estimated kcal needs and >80% estimated protein needs.  Intervention: 
Meals and Snacks:  GI soft diet; encouraged patient to choose sugar free soft drinks vs the high sugar drinks she has been ordering. Nutrition Supplement Therapy:   Discontinued Ensure Enlive. Initiated Ensure High Protein (vanilla flavored) 1 X day and Glucerna Shake supplement 1 X day (strawberry flavored) per patient preference Discharge nutrition plan: Too soon to determine. Court Cevallos, MPH, [facility],  
[phone]

## 2019-04-12 LAB
ANION GAP SERPL CALC-SCNC: 4 MMOL/L
BUN SERPL-MCNC: 12 MG/DL (ref 6–23)
CALCIUM SERPL-MCNC: 8.9 MG/DL (ref 8.3–10.4)
CHLORIDE SERPL-SCNC: 95 MMOL/L (ref 98–107)
CO2 SERPL-SCNC: 35 MMOL/L (ref 21–32)
CREAT SERPL-MCNC: 0.89 MG/DL (ref 0.6–1)
GLUCOSE BLD STRIP.AUTO-MCNC: 248 MG/DL (ref 65–100)
GLUCOSE BLD STRIP.AUTO-MCNC: 258 MG/DL (ref 65–100)
GLUCOSE BLD STRIP.AUTO-MCNC: 260 MG/DL (ref 65–100)
GLUCOSE SERPL-MCNC: 239 MG/DL (ref 65–100)
MAGNESIUM SERPL-MCNC: 1.9 MG/DL (ref 1.8–2.4)
PHOSPHATE SERPL-MCNC: 2.4 MG/DL (ref 2.5–4.5)
POTASSIUM SERPL-SCNC: 3.5 MMOL/L (ref 3.5–5.1)
SODIUM SERPL-SCNC: 134 MMOL/L (ref 136–145)

## 2019-04-12 PROCEDURE — 99232 SBSQ HOSP IP/OBS MODERATE 35: CPT | Performed by: INTERNAL MEDICINE

## 2019-04-12 PROCEDURE — 74011250637 HC RX REV CODE- 250/637: Performed by: SURGERY

## 2019-04-12 PROCEDURE — 74011250636 HC RX REV CODE- 250/636: Performed by: SURGERY

## 2019-04-12 PROCEDURE — 80048 BASIC METABOLIC PNL TOTAL CA: CPT

## 2019-04-12 PROCEDURE — 84100 ASSAY OF PHOSPHORUS: CPT

## 2019-04-12 PROCEDURE — 97530 THERAPEUTIC ACTIVITIES: CPT

## 2019-04-12 PROCEDURE — 82962 GLUCOSE BLOOD TEST: CPT

## 2019-04-12 PROCEDURE — 77030027688 HC DRSG MEPILEX 16-48IN NO BORD MOLN -A

## 2019-04-12 PROCEDURE — 94760 N-INVAS EAR/PLS OXIMETRY 1: CPT

## 2019-04-12 PROCEDURE — 36415 COLL VENOUS BLD VENIPUNCTURE: CPT

## 2019-04-12 PROCEDURE — 77010033678 HC OXYGEN DAILY

## 2019-04-12 PROCEDURE — 74011636637 HC RX REV CODE- 636/637: Performed by: SURGERY

## 2019-04-12 PROCEDURE — 74011250637 HC RX REV CODE- 250/637: Performed by: INTERNAL MEDICINE

## 2019-04-12 PROCEDURE — 83735 ASSAY OF MAGNESIUM: CPT

## 2019-04-12 PROCEDURE — 77030019895 HC PCKNG STRP IODO -A

## 2019-04-12 PROCEDURE — 65270000029 HC RM PRIVATE

## 2019-04-12 RX ORDER — BISACODYL 5 MG
5 TABLET, DELAYED RELEASE (ENTERIC COATED) ORAL DAILY PRN
Status: DISCONTINUED | OUTPATIENT
Start: 2019-04-12 | End: 2019-04-26 | Stop reason: HOSPADM

## 2019-04-12 RX ORDER — INSULIN GLARGINE 100 [IU]/ML
35 INJECTION, SOLUTION SUBCUTANEOUS DAILY
Status: DISCONTINUED | OUTPATIENT
Start: 2019-04-13 | End: 2019-04-26 | Stop reason: HOSPADM

## 2019-04-12 RX ORDER — GUAIFENESIN 100 MG/5ML
81 LIQUID (ML) ORAL DAILY
Status: DISCONTINUED | OUTPATIENT
Start: 2019-04-13 | End: 2019-04-26 | Stop reason: HOSPADM

## 2019-04-12 RX ORDER — ADHESIVE BANDAGE
30 BANDAGE TOPICAL DAILY PRN
Status: DISCONTINUED | OUTPATIENT
Start: 2019-04-12 | End: 2019-04-26 | Stop reason: HOSPADM

## 2019-04-12 RX ADMIN — HYDROMORPHONE HYDROCHLORIDE 2 MG: 2 INJECTION, SOLUTION INTRAMUSCULAR; INTRAVENOUS; SUBCUTANEOUS at 13:45

## 2019-04-12 RX ADMIN — POTASSIUM CHLORIDE 40 MEQ: 1.5 POWDER, FOR SOLUTION ORAL at 17:11

## 2019-04-12 RX ADMIN — ENOXAPARIN SODIUM 40 MG: 40 INJECTION SUBCUTANEOUS at 21:31

## 2019-04-12 RX ADMIN — HUMAN INSULIN 6 UNITS: 100 INJECTION, SOLUTION SUBCUTANEOUS at 06:26

## 2019-04-12 RX ADMIN — ONDANSETRON 4 MG: 2 INJECTION INTRAMUSCULAR; INTRAVENOUS at 03:05

## 2019-04-12 RX ADMIN — PRAVASTATIN SODIUM 80 MG: 80 TABLET ORAL at 21:30

## 2019-04-12 RX ADMIN — Medication 400 MG: at 12:04

## 2019-04-12 RX ADMIN — HYDROCODONE BITARTRATE AND ACETAMINOPHEN 2 TABLET: 5; 325 TABLET ORAL at 06:26

## 2019-04-12 RX ADMIN — Medication 40 ML: at 14:00

## 2019-04-12 RX ADMIN — POTASSIUM CHLORIDE 40 MEQ: 1.5 POWDER, FOR SOLUTION ORAL at 10:05

## 2019-04-12 RX ADMIN — ENOXAPARIN SODIUM 40 MG: 40 INJECTION SUBCUTANEOUS at 12:00

## 2019-04-12 RX ADMIN — Medication 5 ML: at 06:26

## 2019-04-12 RX ADMIN — HUMAN INSULIN 4 UNITS: 100 INJECTION, SOLUTION SUBCUTANEOUS at 18:00

## 2019-04-12 RX ADMIN — HYDROMORPHONE HYDROCHLORIDE 1 MG: 2 INJECTION, SOLUTION INTRAMUSCULAR; INTRAVENOUS; SUBCUTANEOUS at 22:36

## 2019-04-12 RX ADMIN — CARVEDILOL 12.5 MG: 6.25 TABLET, FILM COATED ORAL at 12:20

## 2019-04-12 RX ADMIN — POTASSIUM CHLORIDE 40 MEQ: 1.5 POWDER, FOR SOLUTION ORAL at 21:29

## 2019-04-12 RX ADMIN — Medication 400 MG: at 18:00

## 2019-04-12 RX ADMIN — DIPHENHYDRAMINE HYDROCHLORIDE 12.5 MG: 50 INJECTION, SOLUTION INTRAMUSCULAR; INTRAVENOUS at 22:43

## 2019-04-12 RX ADMIN — CARVEDILOL 12.5 MG: 6.25 TABLET, FILM COATED ORAL at 16:46

## 2019-04-12 RX ADMIN — AMIODARONE HYDROCHLORIDE 200 MG: 200 TABLET ORAL at 12:10

## 2019-04-12 RX ADMIN — Medication 5 ML: at 21:32

## 2019-04-12 RX ADMIN — MAGNESIUM HYDROXIDE 30 ML: 400 SUSPENSION ORAL at 12:09

## 2019-04-12 RX ADMIN — HUMAN INSULIN 4 UNITS: 100 INJECTION, SOLUTION SUBCUTANEOUS at 12:00

## 2019-04-12 NOTE — PROGRESS NOTES
Vivian Way Admission Date: 3/26/2019 Daily Progress Note: 4/12/2019 The patient's chart is reviewed and the patient is discussed with the staff. Vivian Way is a 45yoF admitted 3/26 with abd hernia repair complicated by dehiscence and repair with mesh on 3/31 who has had abd pain/nausea and acute hypoxic respiratory failure complicating her course. Subjective:  
 
Still complaining of post op pain. Did not wear CPAP last night again- states she could not reach it. Current Facility-Administered Medications Medication Dose Route Frequency  potassium chloride (KLOR-CON) packet for solution 40 mEq  40 mEq Oral TID WITH MEALS  
 HYDROcodone-acetaminophen (NORCO) 5-325 mg per tablet 1 Tab  1 Tab Oral Q6H PRN Or  
 HYDROcodone-acetaminophen (NORCO) 5-325 mg per tablet 2 Tab  2 Tab Oral Q6H PRN  phenol throat spray (CHLORASEPTIC) 1 Spray  1 Spray Oral PRN  
 NUTRITIONAL SUPPORT ELECTROLYTE PRN ORDERS   Does Not Apply PRN  
 ALPRAZolam (XANAX) tablet 0.5 mg  0.5 mg Oral Q8H PRN  
 insulin regular (NOVOLIN R, HUMULIN R) injection   SubCUTAneous Q6H  
 acetaminophen (TYLENOL) tablet 650 mg  650 mg Per NG tube Q6H PRN  
 amiodarone (CORDARONE) tablet 200 mg  200 mg Per NG tube DAILY  carvedilol (COREG) tablet 12.5 mg  12.5 mg Per NG tube BID WITH MEALS  dextrose (D50W) injection syrg 12.5 g  25 mL IntraVENous PRN  
 metoclopramide HCl (REGLAN) injection 10 mg  10 mg IntraVENous Q8H PRN  promethazine (PHENERGAN) with saline injection 25 mg  25 mg IntraVENous Q6H PRN  
 HYDROmorphone (PF) (DILAUDID) injection 0.5-2 mg  0.5-2 mg IntraVENous Q3H PRN  
 nitroglycerin (NITROSTAT) tablet 0.4 mg  0.4 mg SubLINGual Q5MIN PRN  pravastatin (PRAVACHOL) tablet 80 mg  80 mg Oral QHS  magnesium oxide (MAG-OX) tablet 400 mg  400 mg Oral BID  sodium chloride (NS) flush 5-40 mL  5-40 mL IntraVENous Q8H  
  sodium chloride (NS) flush 5-40 mL  5-40 mL IntraVENous PRN  
 diphenhydrAMINE (BENADRYL) injection 12.5 mg  12.5 mg IntraVENous Q4H PRN  
 ondansetron (ZOFRAN) injection 4 mg  4 mg IntraVENous Q4H PRN  
 enoxaparin (LOVENOX) injection 40 mg  40 mg SubCUTAneous Q12H  
 albuterol (PROVENTIL VENTOLIN) nebulizer solution 2.5 mg  2.5 mg Nebulization Q4H PRN  
 dupilumab (DUPIXENT) 300 mg/2 mL syringe  300 mg SubCUTAneous Q7D Review of Systems Constitutional: negative for fever, chills, sweats Cardiovascular: negative for chest pain, palpitations, syncope, edema Gastrointestinal:  negative for dysphagia, reflux, vomiting, diarrhea, abdominal pain, or melena Neurologic:  negative for focal weakness, numbness, headache Objective:  
 
Vitals:  
 04/12/19 0022 04/12/19 8776 04/12/19 0294 04/12/19 6423 BP: 111/58 98/52 106/63 Pulse: 61 63 62 Resp: 18 20 19 Temp: 99.2 °F (37.3 °C) 97.5 °F (36.4 °C) 99.5 °F (37.5 °C) SpO2: 92% 96% 95% 97% Weight:      
Height:      
 
Intake and Output:  
04/10 1901 - 04/12 0700 In: 480 [P.O.:480] Out: 0847 [Urine:1225; Drains:50] No intake/output data recorded. Physical Exam:  
Constitution:  the patient is morbidly obese and in no acute distress EENMT:  Sclera clear, pupils equal, oral mucosa moist 
Respiratory: clear anteriorly Cardiovascular:  RRR without M,G,R 
Gastrointestinal: soft and non-tender; with positive bowel sounds. Musculoskeletal: warm without cyanosis. There is no lower leg edema. Skin:  no jaundice or rashes, abd wound dressed under binder (not examined) Neurologic: no gross neuro deficits Psychiatric:  alert and oriented x 3 CXR: 
 
 
 
LAB Recent Labs 04/12/19 
9694 04/11/19 
1830 04/11/19 
1147 04/11/19 
0622 04/11/19 
0018 GLUCPOC 260* 334* 316* 175* 201* Recent Labs 04/11/19 
1147 WBC 17.6* HGB 9.3* HCT 29.1*  
 Recent Labs 04/12/19 
3334 04/11/19 
9556 * 134* K 3.5 2.8*  
 CL 95* 93* CO2 35* 37* * 171* BUN 12 14 CREA 0.89 0.96  
MG 1.9  --   
CA 8.9 8.7 PHOS 2.4*  --   
ALB  --  2.5* TBILI  --  0.5 ALT  --  71* SGOT  --  67* No results for input(s): PH, PCO2, PO2, HCO3, PHI, PCO2I, PO2I, HCO3I in the last 72 hours. No results for input(s): LCAD, LAC in the last 72 hours. Assessment:  (Medical Decision Making) Hospital Problems  Date Reviewed: 3/26/2019 Codes Class Noted POA Pseudomonas urinary tract infection ICD-10-CM: N39.0, B96.5 ICD-9-CM: 599.0, 041.7  4/5/2019 Yes On zosyn Acute hypoxemic respiratory failure ([facility] ICD-10-CM: J96.01 
ICD-9-CM: 518.81  4/3/2019 Unknown Better ARDS (adult respiratory distress syndrome) ([address] ICD-10-CM: N85 
ICD-9-CM: 518.52  4/3/2019 Unknown Atelectasis on CXR today- needs to use IS  
 * (Principal) Incarcerated incisional hernia ICD-10-CM: K43.0 ICD-9-CM: 552.21  3/26/2019 Yes BA on CPAP (Chronic) ICD-10-CM: G47.33, Z99.89 ICD-9-CM: 327.23, V46.8  7/14/2014 Yes Not wearing CPAP- counseled Morbid obesity ([facility] (Chronic) ICD-10-CM: E66.01 
ICD-9-CM: 278.01  7/14/2014 Yes NICM with Chronic systolic heart failure (HCC) (Chronic) ICD-10-CM: F07.79 ICD-9-CM: 428.22  7/18/2013 Yes Overview Signed 3/30/2016  8:11 AM by Nurys Machuca NST 4/15:low risk Echo 6/2014:EF 40-45% Echo 5/2013:EF 30-35% LHC 2/2013: minimal CAD Continue diuretics Long-term insulin use in type 2 diabetes ([facility] ICD-10-CM: E11.9, Z79.4 ICD-9-CM: 250.00, V58.67  4/12/2010 Yes Metabolic alkalosis with hypokalemia, 
 
resolved Plan:  (Medical Decision Making) --completed antibiotics --cont. IS and pulmonary toilet 
--CPAP nightly and with naps ( she is on CPAP 12 cm ) 
-- mobilize 
--control pain 
--patient complains of constipation- needs laxatives if approved by surgeon Nothing to add, CCRx will see as needed and sign off for now. More than 50% of the time documented was spent in face-to-face contact with the patient and in the care of the patient on the floor/unit where the patient is located.  
 
Edgar Gustafson MD

## 2019-04-12 NOTE — PROGRESS NOTES
Admit Date: 3/26/2019 POD 3 Days Post-Op Procedure:  Procedure(s): 
ABDOMINAL DEBRIDEMENT Subjective:  
 
Patient has complaints of gas. Pain control overall ok. Objective:  
 
Visit Vitals /67 (BP 1 Location: Right arm, BP Patient Position: At rest;Supine) Pulse 60 Temp 98.8 °F (37.1 °C) Resp 20 Ht 4' 11\" (1.499 m) Wt 221 lb 4.8 oz (100.4 kg) SpO2 97% Breastfeeding? No  
BMI 44.70 kg/m² Temp (24hrs), Av.7 °F (37.1 °C), Min:97.5 °F (36.4 °C), Max:99.5 °F (37.5 °C) [facility] I&O reviewed as documented. Physical Exam:  
 General-in no distress. Lungs-  Respiratory effort is Normal 
 
Heart- Regular rate and rhythm Abdomen-Incision is wicked. Pseudomonal staining. Drains with turbid, oily drainage c/w ongoing fat liquifaction. Does not appear purulent. Labs:  
Recent Results (from the past 24 hour(s)) GLUCOSE, POC Collection Time: 19  6:30 PM  
Result Value Ref Range Glucose (POC) 334 (H) 65 - 100 mg/dL METABOLIC PANEL, BASIC Collection Time: 19  6:18 AM  
Result Value Ref Range Sodium 134 (L) 136 - 145 mmol/L Potassium 3.5 3.5 - 5.1 mmol/L Chloride 95 (L) 98 - 107 mmol/L  
 CO2 35 (H) 21 - 32 mmol/L Anion gap 4 mmol/L Glucose 239 (H) 65 - 100 mg/dL BUN 12 6 - 23 MG/DL Creatinine 0.89 0.6 - 1.0 MG/DL  
 GFR est AA >60 >60 ml/min/1.73m2 GFR est non-AA >60 ml/min/1.73m2 Calcium 8.9 8.3 - 10.4 MG/DL  
PHOSPHORUS Collection Time: 19  6:18 AM  
Result Value Ref Range Phosphorus 2.4 (L) 2.5 - 4.5 MG/DL MAGNESIUM Collection Time: 19  6:18 AM  
Result Value Ref Range Magnesium 1.9 1.8 - 2.4 mg/dL GLUCOSE, POC Collection Time: 19  6:21 AM  
Result Value Ref Range Glucose (POC) 260 (H) 65 - 100 mg/dL GLUCOSE, POC Collection Time: 19 11:46 AM  
Result Value Ref Range Glucose (POC) 248 (H) 65 - 100 mg/dL Assessment:  
 
Principal Problem: Incarcerated incisional hernia (3/26/2019) Active Problems: 
  Long-term insulin use in type 2 diabetes ([address] (4/12/2010) Chronic systolic heart failure ([address] (7/18/2013) Overview: NST 4/15:low risk Echo 6/2014:EF 40-45% Echo 5/2013:EF 30-35% LHC 2/2013: minimal CAD 
 
  BA on CPAP (7/14/2014) Morbid obesity ([address] (7/14/2014) Acute hypoxemic respiratory failure ([address] (4/3/2019) ARDS (adult respiratory distress syndrome) ([address] (4/3/2019) Pseudomonas urinary tract infection (4/5/2019) Chloride-responsive metabolic alkalosis (2/35/5959) Hypokalemia (4/11/2019) Plan/Recommendations/Medical Decision Making:  
 
Continue present treatment Remove CVL if peripheral access available Increase frequency of wick changes to BID, need to ensure drains remain patent Continue zosyn Resume home lantus, in addition to SSI, with regular diet (diabetic) Laxative prn May need additional wound washout/debridement- I have informed pt about cross-coverage for the next 10 days

## 2019-04-12 NOTE — PROGRESS NOTES
Nutrition follow-up: 
Reason for initial assessment:  Length of stay. Assessment:  
Diet:  GI soft effective 4/10 Nutrition Supplement:  Glucerna Shake supplement 1 X daily; Ensure High Protein 2 X daily. Pt admitted 3/26 for abdominal hernia repair, complicated by abdominal wall dehiscence and return to OR 3/31 for hernia repair with mesh. Pt with acute hypoxic respiratory failure 4/03; transferred to [facility] [facility] intubation 4/03. Pt had a [facility] on 4/03-R IJ. Pt extubated 4/05. Diet advanced to full liquids 4/7. Pt 3 days post op abdominal debridement. Pt sitting in chair at RD visit; ate 100% breakfast of eggs, deluna and 2 apple juice; tolerated breakfast with no nausea; states the burger she ate last night \"got caught in her chest\"; states she doesn't tolerate beef very well; enjoyed the Glucerna Shake supplement. Abdomen: obese, tender with active bowel sounds. Most recent documented BM 4/11. Pertinent Labs: Glucose 239 mg/dl on AM BMP. POC glucose 4/12:  260 mg/dl. Hypokalemia-resolved, hyponatremia-stable Pertinent Rx:  KCl,  SSI, mag-ox Anthropometrics: Height: 4' 11\" (149.9 cm), Weight: 100.4 kg (221 lb 4.8 oz), Bed weight on 4/3, Body mass index is 44.7 kg/m². BMI class of morbid obesity.  
 Macronutrient needs: EER:  8716-4922 kcal /day (11-14 kcal/kg BW) EPR:   gm/day (2-2.5 gm/kg/IBW) GFR >60 Max CHO:  176 grams/day (50% estimated kcal/day) Intake/Comparative Standards: RD meal rounds: 100% breakfast this am. No recorded intake since last RD assessment. Pt potentially meets 100% estimated kcal needs and >90% estimated protein needs.  Intervention: 
Meals and Snacks:  GI soft diet; encouraged patient to choose sugar free soft drinks vs the high sugar drinks she has been ordering; avoid beef if she is intolerant to it. Check with MD r/t adding Consistent CHO to current GI soft diet. Nutrition Supplement Therapy:   Continue Ensure High Protein (vanilla flavored) 2 X day and Glucerna Shake supplement 1 X day (strawberry flavored) per patient preference Discharge nutrition plan: Too soon to determine. Kassidy Vizcarra, MPH, [address] [place] [address],  
[phone] Addendum:  MD to change diet to Consistent CHO; no longer needs GI soft restriction. Kassidy Vizcarra MPH, [address] [place] [address], [place] 
[phone]

## 2019-04-12 NOTE — PROGRESS NOTES
Problem: Mobility Impaired (Adult and Pediatric) Goal: *Acute Goals and Plan of Care (Insert Text) Description STG: 
(1.)Ms. Apple Traylor will move from supine to sit and sit to supine  with MINIMAL ASSIST within 3 treatment day(s). (2.)Ms. Apple Traylor will transfer from bed to chair and chair to bed with CONTACT GUARD ASSIST using the least restrictive device within 3 treatment day(s). Met 4/12 
(3.)Ms. Apple Traylor will ambulate with CONTACT GUARD ASSIST for 50 feet with the least restrictive device within 3 treatment day(s). Met 4/12 LTG: 
(1.)Ms. Apple Traylor will move from supine to sit and sit to supine  in bed with STAND BY ASSIST within 7 treatment day(s). (2.)Ms. Apple Traylor will transfer from bed to chair and chair to bed with STAND BY ASSIST using the least restrictive device within 7 treatment day(s). (3.)Ms. Apple Traylor will ambulate with STAND BY ASSIST for 200 feet with the least restrictive device within 7 treatment day(s). (4)Ms. Apple Traylor will go up 4 steps min assist with device as needed in 7 days. (5)Ms. Apple Traylor will perform HEP with cues and assistance to increase safety on her feet in 7 days. Above goals ongoing 4/6 
________________________________________________________________________________________________ Outcome: Progressing Towards Goal 
  
PHYSICAL THERAPY: Daily Note and AM 4/12/2019 INPATIENT: PT Visit Days : 5 [place] / [place] / Product Type: Medicare /   
  
NAME/AGE/GENDER: Monica Corey is a 52 y.o. female PRIMARY DIAGNOSIS: Incisional hernia, without obstruction or gangrene [K43.2] Incarcerated incisional hernia [K43.0] Incarcerated incisional hernia [K43.0] Incarcerated incisional hernia Incarcerated incisional hernia Procedure(s) (LRB): ABDOMINAL DEBRIDEMENT (N/A) 3 Days Post-Op ICD-10: Treatment Diagnosis:  
 · Generalized Muscle Weakness (M62.81) · Other abnormalities of gait and mobility (R26.89) Precaution/Allergies: Grass pollen ASSESSMENT:  
Ms. Juan C Hardin presents with general decreased in functional mobility and gait s/p OPEN INCARCERATED INCISIONAL HERNIA REPAIR on 3/26/19. Patient underwent second pocedure 3/31/19 secondary to dehiscense and hernia recurrence. This pt is steadily progressing with transfers & gait using a rolling walker. , currently at a CGA level. This pt still will need SNF rehab on DC This section established at most recent assessment PROBLEM LIST (Impairments causing functional limitations): 1. Decreased Strength 2. Decreased ADL/Functional Activities 3. Decreased Transfer Abilities 4. Decreased Ambulation Ability/Technique 5. Decreased Balance 6. Increased Pain 7. Decreased Activity Tolerance 8. Decreased Flexibility/Joint Mobility 9. Decreased [place] with Home Exercise Program 
 INTERVENTIONS PLANNED: (Benefits and precautions of physical therapy have been discussed with the patient.) 1. Balance Exercise 2. Bed Mobility 3. Family Education 4. Gait Training 5. Home Exercise Program (HEP) 6. Therapeutic Activites 7. Therapeutic Exercise/Strengthening 8. Transfer Training TREATMENT PLAN: Frequency/Duration: daily for duration of hospital stay Rehabilitation Potential For Stated Goals: Good RECOMMENDED REHABILITATION/EQUIPMENT: (at time of discharge pending progress): Due to the probability of continued deficits (see above) this patient will likely need continued skilled physical therapy after discharge. Equipment: ? May need a RW   
    
 
 
 
HISTORY:  
History of Present Injury/Illness (Reason for Referral): S/p OPEN INCARCERATED INCISIONAL HERNIA REPAIR 3/26/19 Past Medical History/Comorbidities: Ms. Juan C Hardin  has a past medical history of Allergic rhinitis, Arthritis, Automatic implantable cardioverter-defibrillator in situ (3/30/2016), CAD in native artery (5/95/7299), Chronic systolic heart failure ([facility] (2013), CKD (chronic kidney disease), Diabetes ([facility] Diabetes mellitus ([facility] (2010), Dyslipidemia (2013), Eczema, Fibromyalgia, GERD (gastroesophageal reflux disease), Headache, Heart failure ([facility] HTN (hypertension) (2010), Hypercholesterolemia, Morbid obesity ([facility] Neuropathy, NICM (nonischemic cardiomyopathy) ([facility] (2/15/2013), Obesity, Obstructive sleep apnea (adult) (pediatric) (2014), Pseudomonas urinary tract infection (2019), Sciatic pain (2016), SVT (supraventricular tachycardia) ([facility] and Tobacco use disorder (2010). She also has no past medical history of Dementia, Gastrointestinal disorder, or Unspecified adverse effect of anesthesia. Ms. Daphne Bustos  has a past surgical history that includes hx tonsillectomy; hx implantable cardioverter defibrillator (2013); hx  section; hx hernia repair; pr left heart cath,percutaneous (2013); hx svt ablation (\"years ago\"); hx rotator cuff repair (Right); hx hysterectomy; and hx implantable cardioverter defibrillator (2013). Social History/Living Environment:  
Home Environment: Private residence # Steps to Enter: 4(than an additional 3 inside) Rails to Enter: No 
One/Two Story Residence: One story Living Alone: No 
Support Systems: Friends \ neighbors Patient Expects to be Discharged to[de-identified] Private residence Current DME Used/Available at Home: Glucometer, CPAP Prior Level of Function/Work/Activity: 
independent Number of Personal Factors/Comorbidities that affect the Plan of Care: 1-2: MODERATE COMPLEXITY EXAMINATION:  
Most Recent Physical Functioning:  
Gross Assessment: 3 to 3-/5 throughout Balance: 
Sitting: Intact; Without support Standing: Impaired; With support(walker) Bed Mobility: 
Supine to Sit: (NT) Sit to Supine: (NT) Scooting: Contact guard assistance Transfers: 
Sit to Stand: Contact guard assistance Stand to Sit: Contact guard assistance Bed to Chair: Contact guard assistance Duration: 23 Minutes(extra time to work through activity noted) Gait: 
  
Speed/Cristy: Delayed Step Length: Left shortened;Right shortened Gait Abnormalities: Decreased step clearance;Shuffling gait Distance (ft): 80 Feet (ft) Assistive Device: Walker, rolling Ambulation - Level of Assistance: Contact guard assistance Interventions: Safety awareness training;Verbal cues Body Structures Involved: 1. Bones 2. Joints 3. Muscles 4. Ligaments Body Functions Affected: 1. Movement Related Activities and Participation Affected: 1. Mobility Number of elements that affect the Plan of Care: 3: MODERATE COMPLEXITY CLINICAL PRESENTATION:  
Presentation: Stable and uncomplicated: LOW COMPLEXITY CLINICAL DECISION MAKIN[address] [place] [address] AM-PAC 6 Clicks Basic Mobility Inpatient Short Form How much difficulty does the patient currently have. .. Unable A Lot A Little None 1. Turning over in bed (including adjusting bedclothes, sheets and blankets)? ? 1   ? 2   ? 3   ? 4  
2. Sitting down on and standing up from a chair with arms ( e.g., wheelchair, bedside commode, etc.)   ? 1   ? 2   ? 3   ? 4  
3. Moving from lying on back to sitting on the side of the bed?   ? 1   ? 2   ? 3   ? 4 How much help from another person does the patient currently need. .. Total A Lot A Little None 4. Moving to and from a bed to a chair (including a wheelchair)? ? 1   ? 2   ? 3   ? 4  
5. Need to walk in hospital room? ? 1   ? 2   ? 3   ? 4  
6. Climbing 3-5 steps with a railing? ? 1   ? 2   ? 3   ? 4  
© , Trustees of [address] [place] [address], under license to [employer]. All rights reserved Score:  Initial: 14 Most Recent: X (Date: -- ) Interpretation of Tool:  Represents activities that are increasingly more difficult (i.e. Bed mobility, Transfers, Gait).  
 
Medical Necessity:    
· Patient is expected to demonstrate progress in strength, range of motion, balance, coordination and functional technique ·  to decrease assistance required with theraputic exercises and functional mobility · . Reason for Services/Other Comments: 
· Patient continues to require present interventions due to patient's inability to perform theraputic exercises and functional mobility · . Use of outcome tool(s) and clinical judgement create a POC that gives a: Clear prediction of patient's progress: LOW COMPLEXITY  
  
 
 
 
TREATMENT:  
(In addition to Assessment/Re-Assessment sessions the following treatments were rendered) Pre-treatment Symptoms/Complaints:  Pt was agreeable Pain: Initial:numeric scale Pain Intensity 1: 3 Pain Location 1: Abdomen Pain Orientation 1: Mid 
Pain Intervention(s) 1: Ambulation/Increased Activity  Post Session:  3/10 Therapeutic Activity: (  23 Minutes(extra time to work through activity noted) ):  Therapeutic activities including supine to sit, scooting, sit <> stand, standing balance, ambulation with RW and chair transfers to improve mobility and strength. Required minimal Safety awareness training;Verbal cues to promote static and dynamic balance in standing. Braces/Orthotics/Lines/Etc:  
· IV 
· denis catheter · drain JENNIFER x 2 Treatment/Session Assessment:   
· Response to Treatment:  Patient is cooperative & hard working · Interdisciplinary Collaboration:  
o Registered Nurse 
o Rehabilitation Attendant · After treatment position/precautions:  
o Up in chair 
o Bed/Chair-wheels locked 
o Call light within reach 
o RN notified · Compliance with Program/Exercises: Will assess as treatment progresses. · Recommendations/Intent for next treatment session: \"Next visit will focus on advancements to more challenging activities and reduction in assistance provided\". Total Treatment Duration: PT Patient Time In/Time Out Time In: 1109 Time Out: 1132 Maria Nur PT

## 2019-04-12 NOTE — PROGRESS NOTES
OOB in recliner, SOB at rest and on exertion, no acute distress, neurovascular checks WDL, abd binder in place, distal end of incision line dark drainage.

## 2019-04-13 LAB
GLUCOSE BLD STRIP.AUTO-MCNC: 211 MG/DL (ref 65–100)
GLUCOSE BLD STRIP.AUTO-MCNC: 240 MG/DL (ref 65–100)
GLUCOSE BLD STRIP.AUTO-MCNC: 321 MG/DL (ref 65–100)
GLUCOSE BLD STRIP.AUTO-MCNC: 326 MG/DL (ref 65–100)
POTASSIUM SERPL-SCNC: 5.5 MMOL/L (ref 3.5–5.1)

## 2019-04-13 PROCEDURE — 94760 N-INVAS EAR/PLS OXIMETRY 1: CPT

## 2019-04-13 PROCEDURE — 74011636637 HC RX REV CODE- 636/637: Performed by: SURGERY

## 2019-04-13 PROCEDURE — 74011250637 HC RX REV CODE- 250/637: Performed by: SURGERY

## 2019-04-13 PROCEDURE — 97535 SELF CARE MNGMENT TRAINING: CPT

## 2019-04-13 PROCEDURE — 84132 ASSAY OF SERUM POTASSIUM: CPT

## 2019-04-13 PROCEDURE — 97530 THERAPEUTIC ACTIVITIES: CPT

## 2019-04-13 PROCEDURE — 36415 COLL VENOUS BLD VENIPUNCTURE: CPT

## 2019-04-13 PROCEDURE — 74011250636 HC RX REV CODE- 250/636: Performed by: NURSE PRACTITIONER

## 2019-04-13 PROCEDURE — 77010033678 HC OXYGEN DAILY

## 2019-04-13 PROCEDURE — 74011000258 HC RX REV CODE- 258: Performed by: NURSE PRACTITIONER

## 2019-04-13 PROCEDURE — 74011250636 HC RX REV CODE- 250/636: Performed by: SURGERY

## 2019-04-13 PROCEDURE — 93005 ELECTROCARDIOGRAM TRACING: CPT | Performed by: NURSE PRACTITIONER

## 2019-04-13 PROCEDURE — 77030019895 HC PCKNG STRP IODO -A

## 2019-04-13 PROCEDURE — 74011250637 HC RX REV CODE- 250/637: Performed by: INTERNAL MEDICINE

## 2019-04-13 PROCEDURE — 65270000029 HC RM PRIVATE

## 2019-04-13 PROCEDURE — 82962 GLUCOSE BLOOD TEST: CPT

## 2019-04-13 RX ORDER — HYDROCODONE BITARTRATE AND ACETAMINOPHEN 5; 325 MG/1; MG/1
1 TABLET ORAL
Status: DISCONTINUED | OUTPATIENT
Start: 2019-04-13 | End: 2019-04-26 | Stop reason: HOSPADM

## 2019-04-13 RX ORDER — HYDROCODONE BITARTRATE AND ACETAMINOPHEN 5; 325 MG/1; MG/1
2 TABLET ORAL
Status: DISCONTINUED | OUTPATIENT
Start: 2019-04-13 | End: 2019-04-26 | Stop reason: HOSPADM

## 2019-04-13 RX ORDER — DOCUSATE SODIUM 100 MG/1
100 CAPSULE, LIQUID FILLED ORAL 2 TIMES DAILY
Status: DISCONTINUED | OUTPATIENT
Start: 2019-04-13 | End: 2019-04-26 | Stop reason: HOSPADM

## 2019-04-13 RX ADMIN — HYDROMORPHONE HYDROCHLORIDE 0.5 MG: 2 INJECTION, SOLUTION INTRAMUSCULAR; INTRAVENOUS; SUBCUTANEOUS at 13:38

## 2019-04-13 RX ADMIN — PIPERACILLIN AND TAZOBACTAM 3.38 G: 3; .375 INJECTION, POWDER, FOR SOLUTION INTRAVENOUS at 15:46

## 2019-04-13 RX ADMIN — Medication 5 ML: at 06:49

## 2019-04-13 RX ADMIN — PIPERACILLIN AND TAZOBACTAM 3.38 G: 3; .375 INJECTION, POWDER, FOR SOLUTION INTRAVENOUS at 22:52

## 2019-04-13 RX ADMIN — HYDROCODONE BITARTRATE AND ACETAMINOPHEN 2 TABLET: 5; 325 TABLET ORAL at 17:53

## 2019-04-13 RX ADMIN — HUMAN INSULIN 4 UNITS: 100 INJECTION, SOLUTION SUBCUTANEOUS at 06:48

## 2019-04-13 RX ADMIN — ASPIRIN 81 MG 81 MG: 81 TABLET ORAL at 08:37

## 2019-04-13 RX ADMIN — ENOXAPARIN SODIUM 40 MG: 40 INJECTION SUBCUTANEOUS at 21:26

## 2019-04-13 RX ADMIN — CARVEDILOL 12.5 MG: 6.25 TABLET, FILM COATED ORAL at 08:37

## 2019-04-13 RX ADMIN — HYDROMORPHONE HYDROCHLORIDE 1 MG: 2 INJECTION, SOLUTION INTRAMUSCULAR; INTRAVENOUS; SUBCUTANEOUS at 09:54

## 2019-04-13 RX ADMIN — ONDANSETRON 4 MG: 2 INJECTION INTRAMUSCULAR; INTRAVENOUS at 09:54

## 2019-04-13 RX ADMIN — Medication 400 MG: at 08:36

## 2019-04-13 RX ADMIN — HYDROMORPHONE HYDROCHLORIDE 0.5 MG: 2 INJECTION, SOLUTION INTRAMUSCULAR; INTRAVENOUS; SUBCUTANEOUS at 20:59

## 2019-04-13 RX ADMIN — PRAVASTATIN SODIUM 80 MG: 80 TABLET ORAL at 22:39

## 2019-04-13 RX ADMIN — DIPHENHYDRAMINE HYDROCHLORIDE 12.5 MG: 50 INJECTION, SOLUTION INTRAMUSCULAR; INTRAVENOUS at 18:41

## 2019-04-13 RX ADMIN — HUMAN INSULIN 4 UNITS: 100 INJECTION, SOLUTION SUBCUTANEOUS at 00:09

## 2019-04-13 RX ADMIN — Medication 5 ML: at 21:03

## 2019-04-13 RX ADMIN — POTASSIUM CHLORIDE 40 MEQ: 1.5 POWDER, FOR SOLUTION ORAL at 08:37

## 2019-04-13 RX ADMIN — INSULIN GLARGINE 35 UNITS: 100 INJECTION, SOLUTION SUBCUTANEOUS at 08:41

## 2019-04-13 RX ADMIN — Medication 5 ML: at 03:20

## 2019-04-13 RX ADMIN — AMIODARONE HYDROCHLORIDE 200 MG: 200 TABLET ORAL at 08:36

## 2019-04-13 RX ADMIN — HYDROMORPHONE HYDROCHLORIDE 0.5 MG: 2 INJECTION, SOLUTION INTRAMUSCULAR; INTRAVENOUS; SUBCUTANEOUS at 03:12

## 2019-04-13 RX ADMIN — HUMAN INSULIN 8 UNITS: 100 INJECTION, SOLUTION SUBCUTANEOUS at 11:56

## 2019-04-13 RX ADMIN — Medication 400 MG: at 17:54

## 2019-04-13 RX ADMIN — DOCUSATE SODIUM 100 MG: 100 CAPSULE, LIQUID FILLED ORAL at 11:56

## 2019-04-13 RX ADMIN — ONDANSETRON 4 MG: 2 INJECTION INTRAMUSCULAR; INTRAVENOUS at 03:12

## 2019-04-13 RX ADMIN — ENOXAPARIN SODIUM 40 MG: 40 INJECTION SUBCUTANEOUS at 09:54

## 2019-04-13 RX ADMIN — HUMAN INSULIN 8 UNITS: 100 INJECTION, SOLUTION SUBCUTANEOUS at 17:44

## 2019-04-13 RX ADMIN — DOCUSATE SODIUM 100 MG: 100 CAPSULE, LIQUID FILLED ORAL at 17:53

## 2019-04-13 NOTE — PROGRESS NOTES
Pt assisted back to bed after BR. C/o chest pain. - \"more like tightness\". See flowsheet for VS.  Call placed to oncall MD/NP.  02 sat was 90-91% on RA. O2 placed on at 1l/nc.  02 sat increased to 94%

## 2019-04-13 NOTE — PROGRESS NOTES
Admit Date: 3/26/2019 POD 4 Days Post-Op Procedure:  Procedure(s): 
ABDOMINAL DEBRIDEMENT Subjective:  
 
Patient feels better than in the last 2 days. No vomiting. No BM. Objective:  
 
Visit Vitals /73 (BP 1 Location: Right arm, BP Patient Position: At rest) Pulse 62 Temp 98.7 °F (37.1 °C) Resp 18 Ht 4' 11\" (1.499 m) Wt 221 lb 4.8 oz (100.4 kg) SpO2 99% Breastfeeding? No  
BMI 44.70 kg/m² Temp (24hrs), Av.6 °F (37 °C), Min:98 °F (36.7 °C), Max:98.9 °F (37.2 °C) [facility] I&O reviewed as documented. Physical Exam:  
 General-in no distress. Lungs-  Respiratory effort is Normal 
 
Heart- Regular rate and rhythm Abdomen-Incision is wicked. Drains with turbid, oily drainage c/w ongoing fat liquifaction. Does not appear purulent. Labs:  
Recent Results (from the past 24 hour(s)) GLUCOSE, POC Collection Time: 19 11:46 AM  
Result Value Ref Range Glucose (POC) 248 (H) 65 - 100 mg/dL GLUCOSE, POC Collection Time: 19  5:03 PM  
Result Value Ref Range Glucose (POC) 258 (H) 65 - 100 mg/dL GLUCOSE, POC Collection Time: 19 11:48 PM  
Result Value Ref Range Glucose (POC) 240 (H) 65 - 100 mg/dL GLUCOSE, POC Collection Time: 19  6:42 AM  
Result Value Ref Range Glucose (POC) 211 (H) 65 - 100 mg/dL Assessment:  
 
Principal Problem: 
  Incarcerated incisional hernia (3/26/2019) Active Problems: 
  Long-term insulin use in type 2 diabetes ([address] (2010) Chronic systolic heart failure ([address] (2013) Overview: NST 4/15:low risk Echo 2014:EF 40-45% Echo 2013:EF 30-35% LHC 2013: minimal CAD 
 
  BA on CPAP (2014) Morbid obesity ([address] (2014) Acute hypoxemic respiratory failure ([facility] (4/3/2019) ARDS (adult respiratory distress syndrome) ([facility] (4/3/2019) Pseudomonas urinary tract infection (2019) Chloride-responsive metabolic alkalosis (3/39/3912) Hypokalemia (4/11/2019) Plan/Recommendations/Medical Decision Making:  
 
Continue present treatment CBC tomorrow, no fever Cont wick changes to BID, need to ensure drains remain patent Continue zosyn Tolerating diet. Resume home lantus, in addition to SSI, with regular diet (diabetic) Laxative prn. Colace BID. Will monitor wound closely in case additional debridement needed. Julita Price MD 
Bariatric & Minimally Invasive Surgery [facility] 4/13/2019 9:13 AM

## 2019-04-13 NOTE — PROGRESS NOTES
DSg change to abd. Old ribbons of dsg removed. Yellowish and bloody drainage noted. 3 open areas in between staples packed with Dakins solution soaked iodiform ribbon guaze. Covered with guaze 4 by 4's and abd pads secured with tape and abd pad. Pt tolerated well.

## 2019-04-13 NOTE — PROGRESS NOTES
Resting quietly in bed, resp even, unlab with O2 intact at 2L N/C. Skin warm, dry. AP 62, regular, lungs sounds clear. Abdom soft, tender with active bowel sounds. Mid abdom dsg changed, with purulent drainage noted. Wick dsgs removed. Staples intact with openings in between packed with iodaform gauze dsgs wet with 1/4% Dakins solution, covering with 4x4 sponge dsgs and large abd dsg, securing with tape, tolerating well. JPs x 2 with opaque, serosanguinous drainage noted. Right neck central line removed, catheter tip intact, site clear with 4 x 4 pressure dsg applied, securing with tape. Pressure held for 5 min with no bleeding noted. Up to bathroom with asst, voiding without difficulty and to recliner. Mepilex dsgs applied to open cracked areas to left and right skin folds, posteriorly.

## 2019-04-13 NOTE — PROGRESS NOTES
After returning from bathroom with asst, Dilaudid 0.5 mg given with Zofran 4 mg IVP slowly for c/o pain and nausea.

## 2019-04-13 NOTE — PROGRESS NOTES
Resting quietly, CPAP intact. Awoke easily. Regular insulin 4 units given SC for SQBS 240. No c/o. No distress.

## 2019-04-13 NOTE — PROGRESS NOTES
Amador Lei NP notified of Pt's JENNIFER drains not holding a charge, of pt not being on Zosyn though was mentioned in MD's progress noted. Also of pt's desire to not have to take Potassium supplement.

## 2019-04-13 NOTE — PROGRESS NOTES
Resting quietly, resp even, unlab with O2 intact  Eyes closed with relaxed facial expression. No distress noted.

## 2019-04-13 NOTE — PROGRESS NOTES
Pt resting in recliner. States that pain pills really didn't help her pain but can wait until later to get more medicine. Report given to oncoming RN.

## 2019-04-13 NOTE — PROGRESS NOTES
Resting quietly, resp even, unlab with O2, arousing easily during bedside report given to Eulalio Garcia RN. No c/o. No distress.

## 2019-04-13 NOTE — PROGRESS NOTES
Problem: Self Care Deficits Care Plan (Adult) Goal: *Acute Goals and Plan of Care (Insert Text) Description Patient will complete lower body dressing with minimal assist to increase self care independence. Patient will complete bathing with minimal assist to increase self care independence. Patient will improve static standing at edge of bed for 5 minutes to improve independence with transfers and self cares. Patient will tolerate 38 minutes of OT treatment with self incorporated rest breaks to increase activity tolerance to enhance participation in hobbies. Patient will complete all functional transfers with contact guard assist using adaptive equipment as needed. Patient will complete UE exercises with supervision to increase overall activity tolerance and strength. Timeframe: 7 visits OCCUPATIONAL THERAPY: Daily Note and AM 4/13/2019 INPATIENT: OT Visit Days: 4 [place] / Product Type: Medicare /  
  
NAME/AGE/GENDER: Donta Jaramillo is a 52 y.o. female PRIMARY DIAGNOSIS:  Incisional hernia, without obstruction or gangrene [K43.2] Incarcerated incisional hernia [K43.0] Incarcerated incisional hernia [K43.0] Incarcerated incisional hernia Incarcerated incisional hernia Procedure(s) (LRB): ABDOMINAL DEBRIDEMENT (N/A) 4 Days Post-Op ICD-10: Treatment Diagnosis:  
 · Generalized Muscle Weakness (M62.81) · Difficulty in walking, Not elsewhere classified (R26.2) Precautions/Allergies: 
   Grass pollen ASSESSMENT:  
Ms. Juanjose Arora presents in chair. Pt completed functional mobility with rolling walker (CGA) to sink to complete grooming with supervision. Pt is progressing towards goals. Continue POC. This section established at most recent assessment PROBLEM LIST (Impairments causing functional limitations): 1. Decreased Strength 2. Decreased ADL/Functional Activities 3. Decreased Transfer Abilities 4. Decreased Balance 5. Increased Pain 6. Decreased Activity Tolerance INTERVENTIONS PLANNED: (Benefits and precautions of occupational therapy have been discussed with the patient.) 1. Activities of daily living training 2. Adaptive equipment training 3. Balance training 4. Clothing management 5. Neuromuscular re-eduation 6. Therapeutic activity 7. Therapeutic exercise TREATMENT PLAN: Frequency/Duration: Follow patient 3x a week to address above goals. Rehabilitation Potential For Stated Goals: Good RECOMMENDED REHABILITATION/EQUIPMENT: (at time of discharge pending progress): Due to the probability of continued deficits (see above) this patient will not likely need continued skilled occupational therapy after discharge. Equipment:  
? None at this time OCCUPATIONAL PROFILE AND HISTORY:  
History of Present Injury/Illness (Reason for Referral): 
See H&P. Past Medical History/Comorbidities: Ms. Jeane Clark  has a past medical history of Allergic rhinitis, Arthritis, Automatic implantable cardioverter-defibrillator in situ (3/30/2016), CAD in native artery (1/40/5524), Chronic systolic heart failure ([address] (07/18/2013), CKD (chronic kidney disease), Diabetes ([address] Diabetes mellitus ([address] (4/12/2010), Dyslipidemia (2/13/2013), Eczema, Fibromyalgia, GERD (gastroesophageal reflux disease), Headache, Heart failure ([address] HTN (hypertension) (4/12/2010), Hypercholesterolemia, Morbid obesity ([address] Neuropathy, NICM (nonischemic cardiomyopathy) ([address] (2/15/2013), Obesity, Obstructive sleep apnea (adult) (pediatric) (07/14/2014), Pseudomonas urinary tract infection (4/5/2019), Sciatic pain (5/23/2016), SVT (supraventricular tachycardia) ([address] and Tobacco use disorder (4/12/2010). She also has no past medical history of Dementia, Gastrointestinal disorder, or Unspecified adverse effect of anesthesia.   Ms. Jeane Clark  has a past surgical history that includes hx tonsillectomy; hx implantable cardioverter defibrillator (2013); hx  section; hx hernia repair; pr left heart cath,percutaneous (2013); hx svt ablation (\"years ago\"); hx rotator cuff repair (Right); hx hysterectomy; and hx implantable cardioverter defibrillator (2013). Social History/Living Environment:  
Home Environment: Private residence # Steps to Enter: 4(than an additional 3 inside) Rails to Enter: No 
One/Two Story Residence: One story Living Alone: No 
Support Systems: Friends \ neighbors Patient Expects to be Discharged to[de-identified] Private residence Current DME Used/Available at Home: Glucometer, CPAP Prior Level of Function/Work/Activity: She was working part time, driving, and independent with ADL's IADL's, walking prior to hospitalization. Number of Personal Factors/Comorbidities that affect the Plan of Care: Expanded review of therapy/medical records (1-2):  MODERATE COMPLEXITY ASSESSMENT OF OCCUPATIONAL PERFORMANCE[de-identified]  
Activities of Daily Living:  
Basic ADLs (From Assessment) Complex ADLs (From Assessment) Feeding: Supervision Oral Facial Hygiene/Grooming: Setup Bathing: Minimum assistance Upper Body Dressing: Minimum assistance Lower Body Dressing: Maximum assistance Toileting: Moderate assistance Grooming/Bathing/Dressing Activities of Daily Living Grooming Washing Face: Supervision/set-up Brushing Teeth: Supervision/set-up Cognitive Retraining Safety/Judgement: Fall prevention Bed/Mat Mobility Sit to Stand: Supervision Stand to Sit: Supervision Most Recent Physical Functioning:  
Gross Assessment: 
  
         
  
Posture: 
Posture (WDL): Exceptions to [facility] Posture Assessment: Trunk flexion, Rounded shoulders Balance: 
Sitting: Intact Standing: With support Bed Mobility: 
  
Wheelchair Mobility: 
  
Transfers: 
Sit to Stand: Supervision Stand to Sit: Supervision Patient Vitals for the past 6 hrs: BP BP Patient Position SpO2 O2 Flow Rate (L/min) Pulse 04/13/19 0749 120/73 At rest 96 %  62  
04/13/19 0906   99 % 2 l/min  Mental Status Neurologic State: Alert Orientation Level: Oriented to person Cognition: Appropriate for age attention/concentration, Appropriate decision making Perception: Appears intact Perseveration: No perseveration noted Safety/Judgement: Fall prevention Physical Skills Involved: 1. Range of Motion 2. Balance 3. Strength 4. Activity Tolerance 5. Gross Motor Control 6. Pain (acute) 7. Edema Cognitive Skills Affected (resulting in the inability to perform in a timely and safe manner): 
1. [facility]  Psychosocial Skills Affected: 
1. WFL Number of elements that affect the Plan of Care: 3-5:  MODERATE COMPLEXITY CLINICAL DECISION MAKING:  
[facility] [employer] AM-PAC 6 Clicks Daily Activity Inpatient Short Form How much help from another person does the patient currently need. .. Total A Lot A Little None 1. Putting on and taking off regular lower body clothing? ? 1   ? 2   ? 3   ? 4  
2. Bathing (including washing, rinsing, drying)? ? 1   ? 2   ? 3   ? 4  
3. Toileting, which includes using toilet, bedpan or urinal?   ? 1   ? 2   ? 3   ? 4  
4. Putting on and taking off regular upper body clothing? ? 1   ? 2   ? 3   ? 4  
5. Taking care of personal grooming such as brushing teeth? ? 1   ? 2   ? 3   ? 4  
6. Eating meals? ? 1   ? 2   ? 3   ? 4  
© 2007, Trustees of [facility] [employer], under license to [employer]. All rights reserved Score:  Initial: 14 Most Recent: X (Date: -- ) Interpretation of Tool:  Represents activities that are increasingly more difficult (i.e. Bed mobility, Transfers, Gait). Medical Necessity:    
· Skilled intervention continues to be required due to Deficits noted above.  
Reason for Services/Other Comments: 
· Patient continues to require skilled intervention due to debility and s/p hernia repair · . Use of outcome tool(s) and clinical judgement create a POC that gives a: MODERATE COMPLEXITY  
 
 
 
TREATMENT:  
(In addition to Assessment/Re-Assessment sessions the following treatments were rendered) Pre-treatment Symptoms/Complaints:   
Pain: Initial:  
Pain Intensity 1: 0  Post Session:  4 Self Care: (10): Procedure(s) (per grid) utilized to improve and/or restore self-care/home management as related to grooming. Required min verbal cueing to facilitate activities of daily living skills. B UE's  Date: 
4/8/19 Date: 
4/11/19 Date: Activity/Exercise Parameters Parameters Parameters Shoulder flexion/ex 1 set of 8 10 reps Shoulder abduction/add 1 set of 8  10 reps Elbow flexion/ex  10 reps Punches   10 reps Wrist flexion/ex   10 reps Braces/Orthotics/Lines/Etc:  
· O2 Device: Nasal cannula Treatment/Session Assessment:   
· Response to Treatment:  Good, supine in bed · Interdisciplinary Collaboration:  
o Physical Therapist 
o Certified Occupational Therapy Assistant 
o Registered Nurse · After treatment position/precautions:  
o Up in chair 
o Bed/Chair-wheels locked 
o Bed in low position 
o Call light within reach 
o RN notified · Compliance with Program/Exercises: Compliant all of the time, Will assess as treatment progresses. · Recommendations/Intent for next treatment session: \"Next visit will focus on advancements to more challenging activities\". Total Treatment Duration: OT Patient Time In/Time Out Time In: 1120 Time Out: 1130 Lexi Davidson [address]

## 2019-04-13 NOTE — PROGRESS NOTES
Dilaudid 1 mg given IVP slowly for c/o pain, followed with Benadryl 12.5 mg IVP slowly for c/o itching. Holley Kramer, RT, notified that pt ready for CPAP placement.

## 2019-04-13 NOTE — PROGRESS NOTES
Problem: Mobility Impaired (Adult and Pediatric) Goal: *Acute Goals and Plan of Care (Insert Text) Description STG: 
(1.)Ms. Shahana Park will move from supine to sit and sit to supine  with MINIMAL ASSIST within 3 treatment day(s). (2.)Ms. Shahana Park will transfer from bed to chair and chair to bed with CONTACT GUARD ASSIST using the least restrictive device within 3 treatment day(s). Met 4/12 
(3.)Ms. Shahana Park will ambulate with CONTACT GUARD ASSIST for 50 feet with the least restrictive device within 3 treatment day(s). Met 4/12 GOALS RE-ASSESSED 4/13/19 : 
(1.)Ms. Shahana Park will transfer from bed to chair and chair to bed with STAND BY ASSIST using the least restrictive device. (2.)Ms. Shahana Park will ambulate with STAND BY ASSIST for 200 feet with the least restrictive device within 7 treatment day(s). (3)Ms. Shahana Park will go up 4 steps with min assist & device as needed. (4)Ms. Shahana Park will perform HEP with cues & written guidelines, to increase safety on her feet 
 
 
________________________________________________________________________________________________ Outcome: Progressing Towards Goal 
  
PHYSICAL THERAPY: Re-evaluation and PM 4/13/2019 INPATIENT: PT Visit Days : 1 [place] / Product Type: Medicare /   
  
NAME/AGE/GENDER: Jeffery Ruvalcaba is a 52 y.o. female PRIMARY DIAGNOSIS: Incisional hernia, without obstruction or gangrene [K43.2] Incarcerated incisional hernia [K43.0] Incarcerated incisional hernia [K43.0] Incarcerated incisional hernia Incarcerated incisional hernia Procedure(s) (LRB): ABDOMINAL DEBRIDEMENT (N/A) 4 Days Post-Op ICD-10: Treatment Diagnosis:  
 · Generalized Muscle Weakness (M62.81) · Other abnormalities of gait and mobility (R26.89) Precaution/Allergies: 
Grass pollen ASSESSMENT:  
Ms. Shahana Park presents with general decreased in functional mobility and gait s/p OPEN INCARCERATED INCISIONAL HERNIA REPAIR on 3/26/19. Patient underwent second pocedure 3/31/19 secondary to dehiscense and hernia recurrence. This pt was more tearful today but participated well. RT present to spot check 02 sats during gait. Pre-gait on RA was 94%, during gait 92% & post gait 93%. Pt did not walk as far today but performed warm-up exercises prior to ambulation which may have caused her to fatigue more quickly. This section established at most recent assessment PROBLEM LIST (Impairments causing functional limitations): 1. Decreased Strength 2. Decreased ADL/Functional Activities 3. Decreased Transfer Abilities 4. Decreased Ambulation Ability/Technique 5. Decreased Balance 6. Increased Pain 7. Decreased Activity Tolerance 8. Decreased Flexibility/Joint Mobility 9. Decreased [place] with Home Exercise Program 
 INTERVENTIONS PLANNED: (Benefits and precautions of physical therapy have been discussed with the patient.) 1. Balance Exercise 2. Bed Mobility 3. Family Education 4. Gait Training 5. Home Exercise Program (HEP) 6. Therapeutic Activites 7. Therapeutic Exercise/Strengthening 8. Transfer Training TREATMENT PLAN: Frequency/Duration: daily for duration of hospital stay Rehabilitation Potential For Stated Goals: Good RECOMMENDED REHABILITATION/EQUIPMENT: (at time of discharge pending progress): Due to the probability of continued deficits (see above) this patient will likely need continued skilled physical therapy after discharge. Equipment: ? May need a RW   
    
 
 
 
HISTORY:  
History of Present Injury/Illness (Reason for Referral): S/p OPEN INCARCERATED INCISIONAL HERNIA REPAIR 3/26/19 Past Medical History/Comorbidities: Ms. Roswell Bumpers  has a past medical history of Allergic rhinitis, Arthritis, Automatic implantable cardioverter-defibrillator in situ (3/30/2016), CAD in native artery (2/45/5904), Chronic systolic heart failure [facility] (2013), CKD (chronic kidney disease), Diabetes [address] Diabetes mellitus ([facility] (2010), Dyslipidemia (2013), Eczema, Fibromyalgia, GERD (gastroesophageal reflux disease), Headache, Heart failure ([facility] HTN (hypertension) (2010), Hypercholesterolemia, Morbid obesity ([facility] Neuropathy, NICM (nonischemic cardiomyopathy) ([facility] (2/15/2013), Obesity, Obstructive sleep apnea (adult) (pediatric) (2014), Pseudomonas urinary tract infection (2019), Sciatic pain (2016), SVT (supraventricular tachycardia) ([facility] and Tobacco use disorder (2010). She also has no past medical history of Dementia, Gastrointestinal disorder, or Unspecified adverse effect of anesthesia. Ms. Renetta Smith  has a past surgical history that includes hx tonsillectomy; hx implantable cardioverter defibrillator (2013); hx  section; hx hernia repair; pr left heart cath,percutaneous (2013); hx svt ablation (\"years ago\"); hx rotator cuff repair (Right); hx hysterectomy; and hx implantable cardioverter defibrillator (2013). Social History/Living Environment:  
Home Environment: Private residence # Steps to Enter: 4(than an additional 3 inside) Rails to Enter: No 
One/Two Story Residence: One story Living Alone: No 
Support Systems: Friends \ neighbors Patient Expects to be Discharged to[de-identified] Private residence Current DME Used/Available at Home: Glucometer, CPAP Prior Level of Function/Work/Activity: 
independent Number of Personal Factors/Comorbidities that affect the Plan of Care: 1-2: MODERATE COMPLEXITY EXAMINATION:  
Most Recent Physical Functioning:  
Gross Assessment: 3 to 3-/5 throughout Balance: 
Sitting: Intact; Without support Standing: Impaired; With support(walker) Bed Mobility: 
Supine to Sit: (NT) Sit to Supine: (NT) Scooting: Contact guard assistance Transfers: 
Sit to Stand: Contact guard assistance Stand to Sit: Contact guard assistance Bed to Chair: Contact guard assistance(with walker) Duration: 25 Minutes(extra time to work through activity noted) Gait: 
  
Speed/Cristy: Delayed Step Length: Left shortened;Right shortened Gait Abnormalities: Decreased step clearance;Shuffling gait Distance (ft): 40 Feet (ft) Assistive Device: Walker, rolling Ambulation - Level of Assistance: Contact guard assistance Interventions: Safety awareness training;Verbal cues Body Structures Involved: 1. Bones 2. Joints 3. Muscles 4. Ligaments Body Functions Affected: 1. Movement Related Activities and Participation Affected: 1. Mobility Number of elements that affect the Plan of Care: 3: MODERATE COMPLEXITY CLINICAL PRESENTATION:  
Presentation: Stable and uncomplicated: LOW COMPLEXITY CLINICAL DECISION MAKING:  
[facility] [place] [place] [employer] AM-PAC 6 Clicks Basic Mobility Inpatient Short Form How much difficulty does the patient currently have. .. Unable A Lot A Little None 1. Turning over in bed (including adjusting bedclothes, sheets and blankets)? ? 1   ? 2   ? 3   ? 4  
2. Sitting down on and standing up from a chair with arms ( e.g., wheelchair, bedside commode, etc.)   ? 1   ? 2   ? 3   ? 4  
3. Moving from lying on back to sitting on the side of the bed?   ? 1   ? 2   ? 3   ? 4 How much help from another person does the patient currently need. .. Total A Lot A Little None 4. Moving to and from a bed to a chair (including a wheelchair)? ? 1   ? 2   ? 3   ? 4  
5. Need to walk in hospital room? ? 1   ? 2   ? 3   ? 4  
6. Climbing 3-5 steps with a railing? ? 1   ? 2   ? 3   ? 4  
© 2007, Trustees of [facility] [place] [place] [employer], under license to [employer] All rights reserved Score:  Initial: 14 Most Recent: X (Date: -- ) Interpretation of Tool:  Represents activities that are increasingly more difficult (i.e. Bed mobility, Transfers, Gait). Medical Necessity: · Patient is expected to demonstrate progress in strength, range of motion, balance, coordination and functional technique ·  to decrease assistance required with theraputic exercises and functional mobility · . Reason for Services/Other Comments: 
· Patient continues to require present interventions due to patient's inability to perform theraputic exercises and functional mobility · . Use of outcome tool(s) and clinical judgement create a POC that gives a: Clear prediction of patient's progress: LOW COMPLEXITY  
  
 
 
 
TREATMENT:  
(In addition to Assessment/Re-Assessment sessions the following treatments were rendered) Pre-treatment Symptoms/Complaints:  Pt was mostly non-verbal throuhgout Pain: Initial:numeric scale Pain Intensity 1: 4 Pain Location 1: Abdomen Pain Orientation 1: Mid 
Pain Intervention(s) 1: Ambulation/Increased Activity  Post Session:  3/10 Therapeutic Activity: (  25 Minutes(extra time to work through activity noted) ):  Therapeutic activities including warm up exercises : overhead reach, UE push pull, LAQ's, hip flex, ankle pumps, then scooting, sit <> stand, standing balance, ambulation with RW and chair transfers to improve mobility and strength. Required minimal Safety awareness training;Verbal cues to promote static and dynamic balance in standing. Braces/Orthotics/Lines/Etc:  
· IV 
· drain celeste Treatment/Session Assessment:   
· Response to Treatment:  Patient needed more encouragement today · Interdisciplinary Collaboration:  
o Registered Nurse · After treatment position/precautions:  
o Up in chair 
o Bed/Chair-wheels locked 
o Call light within reach 
o RN notified · Compliance with Program/Exercises: Will assess as treatment progresses. · Recommendations/Intent for next treatment session: \"Next visit will focus on advancements to more challenging activities and reduction in assistance provided\". Total Treatment Duration: PT Patient Time In/Time Out Time In: 5714 Time Out: 9495 Vincent Madera, PT

## 2019-04-14 LAB
ATRIAL RATE: 62 BPM
BASOPHILS # BLD: 0.1 K/UL (ref 0–0.2)
BASOPHILS NFR BLD: 0 % (ref 0–2)
CALCULATED P AXIS, ECG09: 70 DEGREES
CALCULATED R AXIS, ECG10: 12 DEGREES
CALCULATED T AXIS, ECG11: -3 DEGREES
DIAGNOSIS, 93000: NORMAL
DIFFERENTIAL METHOD BLD: ABNORMAL
EOSINOPHIL # BLD: 0.6 K/UL (ref 0–0.8)
EOSINOPHIL NFR BLD: 4 % (ref 0.5–7.8)
ERYTHROCYTE [DISTWIDTH] IN BLOOD BY AUTOMATED COUNT: 13.9 % (ref 11.9–14.6)
GLUCOSE BLD STRIP.AUTO-MCNC: 166 MG/DL (ref 65–100)
GLUCOSE BLD STRIP.AUTO-MCNC: 241 MG/DL (ref 65–100)
GLUCOSE BLD STRIP.AUTO-MCNC: 248 MG/DL (ref 65–100)
GLUCOSE BLD STRIP.AUTO-MCNC: 274 MG/DL (ref 65–100)
HCT VFR BLD AUTO: 28.3 % (ref 35.8–46.3)
HGB BLD-MCNC: 9 G/DL (ref 11.7–15.4)
IMM GRANULOCYTES # BLD AUTO: 0.2 K/UL (ref 0–0.5)
IMM GRANULOCYTES NFR BLD AUTO: 2 % (ref 0–5)
LYMPHOCYTES # BLD: 3.1 K/UL (ref 0.5–4.6)
LYMPHOCYTES NFR BLD: 23 % (ref 13–44)
MCH RBC QN AUTO: 30.8 PG (ref 26.1–32.9)
MCHC RBC AUTO-ENTMCNC: 31.8 G/DL (ref 31.4–35)
MCV RBC AUTO: 96.9 FL (ref 79.6–97.8)
MONOCYTES # BLD: 1.3 K/UL (ref 0.1–1.3)
MONOCYTES NFR BLD: 10 % (ref 4–12)
NEUTS SEG # BLD: 8.3 K/UL (ref 1.7–8.2)
NEUTS SEG NFR BLD: 61 % (ref 43–78)
NRBC # BLD: 0.03 K/UL (ref 0–0.2)
P-R INTERVAL, ECG05: 182 MS
PLATELET # BLD AUTO: 245 K/UL (ref 150–450)
PMV BLD AUTO: 11.4 FL (ref 9.4–12.3)
Q-T INTERVAL, ECG07: 432 MS
QRS DURATION, ECG06: 128 MS
QTC CALCULATION (BEZET), ECG08: 438 MS
RBC # BLD AUTO: 2.92 M/UL (ref 4.05–5.2)
VENTRICULAR RATE, ECG03: 62 BPM
WBC # BLD AUTO: 13.5 K/UL (ref 4.3–11.1)

## 2019-04-14 PROCEDURE — 85025 COMPLETE CBC W/AUTO DIFF WBC: CPT

## 2019-04-14 PROCEDURE — 74011250637 HC RX REV CODE- 250/637: Performed by: SURGERY

## 2019-04-14 PROCEDURE — 74011636637 HC RX REV CODE- 636/637: Performed by: SURGERY

## 2019-04-14 PROCEDURE — 82962 GLUCOSE BLOOD TEST: CPT

## 2019-04-14 PROCEDURE — 74011000258 HC RX REV CODE- 258: Performed by: NURSE PRACTITIONER

## 2019-04-14 PROCEDURE — 65270000029 HC RM PRIVATE

## 2019-04-14 PROCEDURE — 97530 THERAPEUTIC ACTIVITIES: CPT

## 2019-04-14 PROCEDURE — 74011250636 HC RX REV CODE- 250/636: Performed by: NURSE PRACTITIONER

## 2019-04-14 PROCEDURE — 36415 COLL VENOUS BLD VENIPUNCTURE: CPT

## 2019-04-14 PROCEDURE — 74011250636 HC RX REV CODE- 250/636: Performed by: SURGERY

## 2019-04-14 RX ADMIN — Medication 5 ML: at 15:10

## 2019-04-14 RX ADMIN — ONDANSETRON 4 MG: 2 INJECTION INTRAMUSCULAR; INTRAVENOUS at 01:38

## 2019-04-14 RX ADMIN — DOCUSATE SODIUM 100 MG: 100 CAPSULE, LIQUID FILLED ORAL at 17:53

## 2019-04-14 RX ADMIN — DIPHENHYDRAMINE HYDROCHLORIDE 12.5 MG: 50 INJECTION, SOLUTION INTRAMUSCULAR; INTRAVENOUS at 21:11

## 2019-04-14 RX ADMIN — DIPHENHYDRAMINE HYDROCHLORIDE 12.5 MG: 50 INJECTION, SOLUTION INTRAMUSCULAR; INTRAVENOUS at 11:42

## 2019-04-14 RX ADMIN — HYDROMORPHONE HYDROCHLORIDE 0.5 MG: 2 INJECTION, SOLUTION INTRAMUSCULAR; INTRAVENOUS; SUBCUTANEOUS at 18:18

## 2019-04-14 RX ADMIN — HUMAN INSULIN 4 UNITS: 100 INJECTION, SOLUTION SUBCUTANEOUS at 12:37

## 2019-04-14 RX ADMIN — PIPERACILLIN AND TAZOBACTAM 3.38 G: 3; .375 INJECTION, POWDER, FOR SOLUTION INTRAVENOUS at 23:00

## 2019-04-14 RX ADMIN — DIPHENHYDRAMINE HYDROCHLORIDE 12.5 MG: 50 INJECTION, SOLUTION INTRAMUSCULAR; INTRAVENOUS at 01:39

## 2019-04-14 RX ADMIN — ENOXAPARIN SODIUM 40 MG: 40 INJECTION SUBCUTANEOUS at 08:53

## 2019-04-14 RX ADMIN — Medication 5 ML: at 23:02

## 2019-04-14 RX ADMIN — HYDROMORPHONE HYDROCHLORIDE 0.5 MG: 2 INJECTION, SOLUTION INTRAMUSCULAR; INTRAVENOUS; SUBCUTANEOUS at 01:23

## 2019-04-14 RX ADMIN — HUMAN INSULIN 4 UNITS: 100 INJECTION, SOLUTION SUBCUTANEOUS at 00:43

## 2019-04-14 RX ADMIN — DOCUSATE SODIUM 100 MG: 100 CAPSULE, LIQUID FILLED ORAL at 08:54

## 2019-04-14 RX ADMIN — CARVEDILOL 12.5 MG: 6.25 TABLET, FILM COATED ORAL at 17:53

## 2019-04-14 RX ADMIN — HYDROMORPHONE HYDROCHLORIDE 0.5 MG: 2 INJECTION, SOLUTION INTRAMUSCULAR; INTRAVENOUS; SUBCUTANEOUS at 09:04

## 2019-04-14 RX ADMIN — ASPIRIN 81 MG 81 MG: 81 TABLET ORAL at 08:54

## 2019-04-14 RX ADMIN — ENOXAPARIN SODIUM 40 MG: 40 INJECTION SUBCUTANEOUS at 21:10

## 2019-04-14 RX ADMIN — AMIODARONE HYDROCHLORIDE 200 MG: 200 TABLET ORAL at 08:57

## 2019-04-14 RX ADMIN — HUMAN INSULIN 6 UNITS: 100 INJECTION, SOLUTION SUBCUTANEOUS at 17:52

## 2019-04-14 RX ADMIN — PIPERACILLIN AND TAZOBACTAM 3.38 G: 3; .375 INJECTION, POWDER, FOR SOLUTION INTRAVENOUS at 06:49

## 2019-04-14 RX ADMIN — HYDROMORPHONE HYDROCHLORIDE 0.5 MG: 2 INJECTION, SOLUTION INTRAMUSCULAR; INTRAVENOUS; SUBCUTANEOUS at 22:15

## 2019-04-14 RX ADMIN — HUMAN INSULIN 2 UNITS: 100 INJECTION, SOLUTION SUBCUTANEOUS at 07:36

## 2019-04-14 RX ADMIN — PIPERACILLIN AND TAZOBACTAM 3.38 G: 3; .375 INJECTION, POWDER, FOR SOLUTION INTRAVENOUS at 15:09

## 2019-04-14 RX ADMIN — Medication 5 ML: at 22:19

## 2019-04-14 RX ADMIN — CARVEDILOL 12.5 MG: 6.25 TABLET, FILM COATED ORAL at 08:55

## 2019-04-14 RX ADMIN — Medication 400 MG: at 17:53

## 2019-04-14 RX ADMIN — PRAVASTATIN SODIUM 80 MG: 80 TABLET ORAL at 22:16

## 2019-04-14 RX ADMIN — Medication 400 MG: at 08:54

## 2019-04-14 RX ADMIN — INSULIN GLARGINE 35 UNITS: 100 INJECTION, SOLUTION SUBCUTANEOUS at 08:53

## 2019-04-14 RX ADMIN — ONDANSETRON 4 MG: 2 INJECTION INTRAMUSCULAR; INTRAVENOUS at 22:58

## 2019-04-14 RX ADMIN — HYDROMORPHONE HYDROCHLORIDE 0.5 MG: 2 INJECTION, SOLUTION INTRAMUSCULAR; INTRAVENOUS; SUBCUTANEOUS at 12:41

## 2019-04-14 NOTE — PROGRESS NOTES
Admit Date: 3/26/2019 POD 4 Days Post-Op Procedure:  Procedure(s): 
ABDOMINAL DEBRIDEMENT Subjective:  
 
Patient feels depressed this AM. She denies nausea or vomiting. Objective:  
 
Visit Vitals /46 (BP 1 Location: Right arm, BP Patient Position: Sitting) Pulse 60 Temp 97.3 °F (36.3 °C) Resp 18 Ht 4' 11\" (1.499 m) Wt 221 lb 4.8 oz (100.4 kg) SpO2 98% Breastfeeding? No  
BMI 44.70 kg/m² Temp (24hrs), Av.5 °F (36.9 °C), Min:97.3 °F (36.3 °C), Max:98.9 °F (37.2 °C) Rose Marie  I&O reviewed as documented. Physical Exam:  
 General-in no distress. Lungs-  Respiratory effort is Normal 
 
Heart- Regular rate and rhythm Abdomen-Incision is wicked. Patrizia removed. There is small area of yellowish.greenish stain. Some emaceration of skin around wound. Drains with turbid, oily drainage c/w ongoing fat liquifaction. Does not appear purulent. Labs:  
Recent Results (from the past 24 hour(s)) GLUCOSE, POC Collection Time: 19 11:36 AM  
Result Value Ref Range Glucose (POC) 321 (H) 65 - 100 mg/dL POTASSIUM Collection Time: 19  3:51 PM  
Result Value Ref Range Potassium 5.5 (H) 3.5 - 5.1 mmol/L  
GLUCOSE, POC Collection Time: 19  5:26 PM  
Result Value Ref Range Glucose (POC) 326 (H) 65 - 100 mg/dL GLUCOSE, POC Collection Time: 19 12:21 AM  
Result Value Ref Range Glucose (POC) 241 (H) 65 - 100 mg/dL CBC WITH AUTOMATED DIFF Collection Time: 19  5:12 AM  
Result Value Ref Range WBC 13.5 (H) 4.3 - 11.1 K/uL  
 RBC 2.92 (L) 4.05 - 5.2 M/uL HGB 9.0 (L) 11.7 - 15.4 g/dL HCT 28.3 (L) 35.8 - 46.3 % MCV 96.9 79.6 - 97.8 FL  
 MCH 30.8 26.1 - 32.9 PG  
 MCHC 31.8 31.4 - 35.0 g/dL  
 RDW 13.9 11.9 - 14.6 % PLATELET 839 799 - 914 K/uL MPV 11.4 9.4 - 12.3 FL ABSOLUTE NRBC 0.03 0.0 - 0.2 K/uL  
 DF AUTOMATED NEUTROPHILS 61 43 - 78 % LYMPHOCYTES 23 13 - 44 %  MONOCYTES 10 4.0 - 12.0 %  
 EOSINOPHILS 4 0.5 - 7.8 % BASOPHILS 0 0.0 - 2.0 % IMMATURE GRANULOCYTES 2 0.0 - 5.0 %  
 ABS. NEUTROPHILS 8.3 (H) 1.7 - 8.2 K/UL  
 ABS. LYMPHOCYTES 3.1 0.5 - 4.6 K/UL  
 ABS. MONOCYTES 1.3 0.1 - 1.3 K/UL  
 ABS. EOSINOPHILS 0.6 0.0 - 0.8 K/UL  
 ABS. BASOPHILS 0.1 0.0 - 0.2 K/UL  
 ABS. IMM. GRANS. 0.2 0.0 - 0.5 K/UL GLUCOSE, POC Collection Time: 04/14/19  7:27 AM  
Result Value Ref Range Glucose (POC) 166 (H) 65 - 100 mg/dL Assessment:  
 
Principal Problem: 
  Incarcerated incisional hernia (3/26/2019) Active Problems: 
  Long-term insulin use in type 2 diabetes ([address] (4/12/2010) Chronic systolic heart failure ([address] (7/18/2013) Overview: NST 4/15:low risk Echo 6/2014:EF 40-45% Echo 5/2013:EF 30-35% LHC 2/2013: minimal CAD 
 
  BA on CPAP (7/14/2014) Morbid obesity ([address] (7/14/2014) Acute hypoxemic respiratory failure ([address] (4/3/2019) ARDS (adult respiratory distress syndrome) ([address] (4/3/2019) Pseudomonas urinary tract infection (4/5/2019) Chloride-responsive metabolic alkalosis (4/32/2564) Hypokalemia (4/11/2019) Plan/Recommendations/Medical Decision Making:  
 
Continue present treatment WBC 13.5 - improving from 17.6, no fever Cont wick changes to BID, need to ensure drains remain patent. Talk to nurse about keeping the skin clean at all times Continue zosyn Tolerating diet but not eating much. Protein shakes ordered TID. Resume home lantus, in addition to SSI, with regular diet (diabetic) Laxative prn. Colace BID. Will monitor wound closely in case additional debridement needed. Corby Pereyra MD 
Bariatric & Minimally Invasive Surgery [facility] 4/14/2019 8:26 AM

## 2019-04-14 NOTE — PROGRESS NOTES
Assessment completed and documented. Lung sounds clear. Heart sounds regular. No edema noted. Abdomen tender. Hypoactive bowel sounds. Patient reports passing flatus but no BM. Dressing changed per order and is clean/dry/intact. Voiding without difficulty. Medicated for pain. Patient currently resting up in recliner. Will continue to monitor with hourly rounding.

## 2019-04-14 NOTE — PROGRESS NOTES
Continues to arouse at intervals, voiding with asst and use of walker to and from bathroom, back to recliner. Voided, passed gas, No BM. No c/o. No distress.

## 2019-04-14 NOTE — PROGRESS NOTES
Zofran 4 mg given IVP slowly for c/o nausea, no emesis followed by Benadryl 12.5 mg given IVP slowly for c/o itching.

## 2019-04-14 NOTE — PROGRESS NOTES
Resting quietly, resp even, unlab with O2 intact, facial expression relaxed, comfortable. No distress noted.

## 2019-04-14 NOTE — PROGRESS NOTES
Problem: Mobility Impaired (Adult and Pediatric) Goal: *Acute Goals and Plan of Care (Insert Text) Description STG: 
(1.)Ms. Monie Ramsey will move from supine to sit and sit to supine  with MINIMAL ASSIST within 3 treatment day(s). (2.)Ms. Monie Ramsey will transfer from bed to chair and chair to bed with CONTACT GUARD ASSIST using the least restrictive device within 3 treatment day(s). Met 4/12 
(3.)Ms. Monie Ramsey will ambulate with CONTACT GUARD ASSIST for 50 feet with the least restrictive device within 3 treatment day(s). Met 4/12 GOALS RE-ASSESSED 4/13/19 : 
(1.)Ms. Monie Ramsey will transfer from bed to chair and chair to bed with STAND BY ASSIST using the least restrictive device. Met 4/14 
(2.)Ms. Monie Ramsey will ambulate with STAND BY ASSIST for 200 feet with the least restrictive device within 7 treatment day(s). (3)Ms. Monie Ramsey will go up 4 steps with min assist & device as needed. (4)Ms. Monie Ramsey will perform HEP with cues & written guidelines, to increase safety on her feet 
 
 
________________________________________________________________________________________________ Outcome: Progressing Towards Goal 
  
PHYSICAL THERAPY: Daily Note and AM 4/14/2019 INPATIENT: PT Visit Days : 2 [place] / Plan[place] PART [place] B / Product Type: Medicare /   
  
NAME/AGE/GENDER: Abigail Patel is a 52 y.o. female PRIMARY DIAGNOSIS: Incisional hernia, without obstruction or gangrene [K43.2] Incarcerated incisional hernia [K43.0] Incarcerated incisional hernia [K43.0] Incarcerated incisional hernia Incarcerated incisional hernia Procedure(s) (LRB): ABDOMINAL DEBRIDEMENT (N/A) 5 Days Post-Op ICD-10: Treatment Diagnosis:  
 · Generalized Muscle Weakness (M62.81) · Other abnormalities of gait and mobility (R26.89) Precaution/Allergies: 
Grass pollen ASSESSMENT:  
Ms. Lomax Ng presents with general decreased in functional mobility and gait s/p OPEN INCARCERATED INCISIONAL HERNIA REPAIR on 3/26/19. Patient underwent second pocedure 3/31/19 secondary to dehiscense and hernia recurrence. This pt is showing steady gains with functional mobility & with tolerance to exercises along with progressive activity. This pt will need SNF on hospital DC. This section established at most recent assessment PROBLEM LIST (Impairments causing functional limitations): 1. Decreased Strength 2. Decreased ADL/Functional Activities 3. Decreased Transfer Abilities 4. Decreased Ambulation Ability/Technique 5. Decreased Balance 6. Increased Pain 7. Decreased Activity Tolerance 8. Decreased Flexibility/Joint Mobility 9. Decreased Boel[place] with Home Exercise Program 
 INTERVENTIONS PLANNED: (Benefits and precautions of physical therapy have been discussed with the patient.) 1. Balance Exercise 2. Bed Mobility 3. Family Education 4. Gait Training 5. Home Exercise Program (HEP) 6. Therapeutic Activites 7. Therapeutic Exercise/Strengthening 8. Transfer Training TREATMENT PLAN: Frequency/Duration: daily for duration of hospital stay Rehabilitation Potential For Stated Goals: Good RECOMMENDED REHABILITATION/EQUIPMENT: (at time of discharge pending progress): Due to the probability of continued deficits (see above) this patient will likely need continued skilled physical therapy after discharge. Equipment: ? May need a RW   
    
 
 
 
HISTORY:  
History of Present Injury/Illness (Reason for Referral): S/p OPEN INCARCERATED INCISIONAL HERNIA REPAIR 3/26/19 Past Medical History/Comorbidities: Ms. Ronnell Avina  has a past medical history of Allergic rhinitis, Arthritis, Automatic implantable cardioverter-defibrillator in situ (3/30/2016), CAD in native artery (2/81/8781), Chronic systolic heart failure ([facility] (07/18/2013), CKD (chronic kidney disease), Diabetes ([facility] Diabetes mellitus ([facility] (4/12/2010), Dyslipidemia (2/13/2013), Eczema, Fibromyalgia, GERD (gastroesophageal reflux disease), Headache, Heart failure ([facility] HTN (hypertension) (2010), Hypercholesterolemia, Morbid obesity ([facility] Neuropathy, NICM (nonischemic cardiomyopathy) ([facility] (2/15/2013), Obesity, Obstructive sleep apnea (adult) (pediatric) (2014), Pseudomonas urinary tract infection (2019), Sciatic pain (2016), SVT (supraventricular tachycardia) ([facility] and Tobacco use disorder (2010). She also has no past medical history of Dementia, Gastrointestinal disorder, or Unspecified adverse effect of anesthesia. Ms. Harlo Nissen  has a past surgical history that includes hx tonsillectomy; hx implantable cardioverter defibrillator (2013); hx  section; hx hernia repair; pr left heart cath,percutaneous (2013); hx svt ablation (\"years ago\"); hx rotator cuff repair (Right); hx hysterectomy; and hx implantable cardioverter defibrillator (2013). Social History/Living Environment:  
Home Environment: Private residence # Steps to Enter: 4(than an additional 3 inside) Rails to Enter: No 
One/Two Story Residence: One story Living Alone: No 
Support Systems: Friends \ neighbors Patient Expects to be Discharged to[de-identified] Private residence Current DME Used/Available at Home: Glucometer, CPAP Prior Level of Function/Work/Activity: 
independent Number of Personal Factors/Comorbidities that affect the Plan of Care: 1-2: MODERATE COMPLEXITY EXAMINATION:  
Most Recent Physical Functioning:  
Gross Assessment: 3 to 3-/5 throughout Balance: 
Sitting: Intact; Without support Standing: Impaired; With support(walker) Bed Mobility: 
Supine to Sit: (NT) Sit to Supine: (NT) Transfers: 
Sit to Stand: Stand-by assistance Stand to Sit: Stand-by assistance Bed to Chair: Stand-by assistance(with walker) Duration: 25 Minutes(extra time to work through activity noted) Gait: 
  
Speed/Cristy: Delayed Step Length: Left shortened;Right shortened Gait Abnormalities: Decreased step clearance Distance (ft): 160 Feet (ft) Assistive Device: Walker, rolling Ambulation - Level of Assistance: Stand-by assistance Interventions: Safety awareness training;Verbal cues Body Structures Involved: 1. Bones 2. Joints 3. Muscles 4. Ligaments Body Functions Affected: 1. Movement Related Activities and Participation Affected: 1. Mobility Number of elements that affect the Plan of Care: 3: MODERATE COMPLEXITY CLINICAL PRESENTATION:  
Presentation: Stable and uncomplicated: LOW COMPLEXITY CLINICAL DECISION MAKING:  
[facility] AM-PAC 6 Clicks Basic Mobility Inpatient Short Form How much difficulty does the patient currently have. .. Unable A Lot A Little None 1. Turning over in bed (including adjusting bedclothes, sheets and blankets)? ? 1   ? 2   ? 3   ? 4  
2. Sitting down on and standing up from a chair with arms ( e.g., wheelchair, bedside commode, etc.)   ? 1   ? 2   ? 3   ? 4  
3. Moving from lying on back to sitting on the side of the bed?   ? 1   ? 2   ? 3   ? 4 How much help from another person does the patient currently need. .. Total A Lot A Little None 4. Moving to and from a bed to a chair (including a wheelchair)? ? 1   ? 2   ? 3   ? 4  
5. Need to walk in hospital room? ? 1   ? 2   ? 3   ? 4  
6. Climbing 3-5 steps with a railing? ? 1   ? 2   ? 3   ? 4  
© 2007, Trustees of [facility], under license to [employer]. All rights reserved Score:  Initial: 14 Most Recent: X (Date: -- ) Interpretation of Tool:  Represents activities that are increasingly more difficult (i.e. Bed mobility, Transfers, Gait). Medical Necessity:    
· Patient is expected to demonstrate progress in strength, range of motion, balance, coordination and functional technique ·  to decrease assistance required with theraputic exercises and functional mobility · . Reason for Services/Other Comments: 
· Patient continues to require present interventions due to patient's inability to perform theraputic exercises and functional mobility · . Use of outcome tool(s) and clinical judgement create a POC that gives a: Clear prediction of patient's progress: LOW COMPLEXITY  
  
 
 
 
TREATMENT:  
(In addition to Assessment/Re-Assessment sessions the following treatments were rendered) Pre-treatment Symptoms/Complaints:  Pt was agreeable Pain: Initial:numeric scale Pain Intensity 1: 3 Pain Location 1: Abdomen Pain Orientation 1: Mid 
Pain Intervention(s) 1: Ambulation/Increased Activity  Post Session:  3/10 Therapeutic Activity: (  25 Minutes(extra time to work through activity noted) ):  Therapeutic activities including  Scooting, transfers ,sit <> stand, standing static balance, progressive ambulation with RW to improve mobility and strength. Pt cooled down wit LE AROM from chair. Required minimal Safety awareness training;Verbal cues to promote static and dynamic balance in standing. Braces/Orthotics/Lines/Etc:  
· IV 
· drain celeste Treatment/Session Assessment:   
· Response to Treatment:  Patient in better spirits today · Interdisciplinary Collaboration:  
o Registered Nurse · After treatment position/precautions:  
o Up in chair 
o Bed/Chair-wheels locked 
o Call light within reach 
o RN notified · Compliance with Program/Exercises: Will assess as treatment progresses. · Recommendations/Intent for next treatment session: \"Next visit will focus on advancements to more challenging activities and reduction in assistance provided\". Total Treatment Duration: PT Patient Time In/Time Out Time In: 1 Time Out: 1030 Xavi Jefferson, PT

## 2019-04-14 NOTE — PROGRESS NOTES
04/14/19 1645 Incentive Spirometry Treatment Level of Service Independent Actual Volume (ml) 750 ml Pulmonary Toilet Pulmonary Toilet Incentive Spirometry

## 2019-04-14 NOTE — PROGRESS NOTES
Resting quietly, in recliner with no c/o during report given to Ankita Horvath RN. No distress noted.

## 2019-04-14 NOTE — PROGRESS NOTES
Awake, sitting quietly in recliner, resp even, unlab with O2 at 1L N/C. Skin warm, dry. AP 60, regular, lungs sounds clear. Abdom soft, tender with active bowel sounds. Mid abdom dsg with purulent drainage noted at lower aspect, removed, incision with four open areas, wick dsgs present, between staples. JPs x 2 intact, emptied of 5 ml cloudy, yellow drainage, will not maintain charge. Riya Foley dsgs removed, sites packed with iodaform dsgs, one in each opening, soaked in 1/4% Dakins solution, covered with gauze and abd dsg, maintaining sterile technique. Dsgs applied to JENNIFER sites, tolerating well. Mepilex dsgs clean, dry, intact to bilateral, posterior flank areas of cracked skin, no bleeding or drainage. Gauze dsgs applied under irritated breasts from abdominal binder refastened.

## 2019-04-15 LAB
GLUCOSE BLD STRIP.AUTO-MCNC: 159 MG/DL (ref 65–100)
GLUCOSE BLD STRIP.AUTO-MCNC: 162 MG/DL (ref 65–100)
GLUCOSE BLD STRIP.AUTO-MCNC: 180 MG/DL (ref 65–100)
GLUCOSE BLD STRIP.AUTO-MCNC: 249 MG/DL (ref 65–100)
GLUCOSE BLD STRIP.AUTO-MCNC: 260 MG/DL (ref 65–100)

## 2019-04-15 PROCEDURE — 74011636637 HC RX REV CODE- 636/637: Performed by: SURGERY

## 2019-04-15 PROCEDURE — 77010033678 HC OXYGEN DAILY

## 2019-04-15 PROCEDURE — 74011250636 HC RX REV CODE- 250/636: Performed by: SURGERY

## 2019-04-15 PROCEDURE — 74011000258 HC RX REV CODE- 258: Performed by: NURSE PRACTITIONER

## 2019-04-15 PROCEDURE — 74011250636 HC RX REV CODE- 250/636: Performed by: NURSE PRACTITIONER

## 2019-04-15 PROCEDURE — 82962 GLUCOSE BLOOD TEST: CPT

## 2019-04-15 PROCEDURE — 74011250637 HC RX REV CODE- 250/637: Performed by: SURGERY

## 2019-04-15 PROCEDURE — 97530 THERAPEUTIC ACTIVITIES: CPT

## 2019-04-15 PROCEDURE — 74011000250 HC RX REV CODE- 250: Performed by: SURGERY

## 2019-04-15 PROCEDURE — 65270000029 HC RM PRIVATE

## 2019-04-15 PROCEDURE — 94760 N-INVAS EAR/PLS OXIMETRY 1: CPT

## 2019-04-15 PROCEDURE — 77030019895 HC PCKNG STRP IODO -A

## 2019-04-15 RX ADMIN — CARVEDILOL 12.5 MG: 6.25 TABLET, FILM COATED ORAL at 09:28

## 2019-04-15 RX ADMIN — DOCUSATE SODIUM 100 MG: 100 CAPSULE, LIQUID FILLED ORAL at 17:14

## 2019-04-15 RX ADMIN — PIPERACILLIN AND TAZOBACTAM 3.38 G: 3; .375 INJECTION, POWDER, FOR SOLUTION INTRAVENOUS at 22:49

## 2019-04-15 RX ADMIN — DOCUSATE SODIUM 100 MG: 100 CAPSULE, LIQUID FILLED ORAL at 09:28

## 2019-04-15 RX ADMIN — PIPERACILLIN AND TAZOBACTAM 3.38 G: 3; .375 INJECTION, POWDER, FOR SOLUTION INTRAVENOUS at 14:57

## 2019-04-15 RX ADMIN — ASPIRIN 81 MG 81 MG: 81 TABLET ORAL at 09:28

## 2019-04-15 RX ADMIN — PIPERACILLIN AND TAZOBACTAM 3.38 G: 3; .375 INJECTION, POWDER, FOR SOLUTION INTRAVENOUS at 06:43

## 2019-04-15 RX ADMIN — ENOXAPARIN SODIUM 40 MG: 40 INJECTION SUBCUTANEOUS at 09:26

## 2019-04-15 RX ADMIN — PRAVASTATIN SODIUM 80 MG: 80 TABLET ORAL at 21:39

## 2019-04-15 RX ADMIN — HYDROMORPHONE HYDROCHLORIDE 0.5 MG: 2 INJECTION, SOLUTION INTRAMUSCULAR; INTRAVENOUS; SUBCUTANEOUS at 09:34

## 2019-04-15 RX ADMIN — SODIUM HYPOCHLORITE: 1.25 SOLUTION TOPICAL at 21:39

## 2019-04-15 RX ADMIN — Medication 400 MG: at 09:28

## 2019-04-15 RX ADMIN — BISACODYL 5 MG: 5 TABLET, COATED ORAL at 02:37

## 2019-04-15 RX ADMIN — DIPHENHYDRAMINE HYDROCHLORIDE 12.5 MG: 50 INJECTION, SOLUTION INTRAMUSCULAR; INTRAVENOUS at 10:46

## 2019-04-15 RX ADMIN — Medication 400 MG: at 17:15

## 2019-04-15 RX ADMIN — AMIODARONE HYDROCHLORIDE 200 MG: 200 TABLET ORAL at 09:26

## 2019-04-15 RX ADMIN — HUMAN INSULIN 6 UNITS: 100 INJECTION, SOLUTION SUBCUTANEOUS at 12:13

## 2019-04-15 RX ADMIN — HYDROMORPHONE HYDROCHLORIDE 2 MG: 2 INJECTION, SOLUTION INTRAMUSCULAR; INTRAVENOUS; SUBCUTANEOUS at 15:12

## 2019-04-15 RX ADMIN — HUMAN INSULIN 4 UNITS: 100 INJECTION, SOLUTION SUBCUTANEOUS at 23:30

## 2019-04-15 RX ADMIN — INSULIN GLARGINE 35 UNITS: 100 INJECTION, SOLUTION SUBCUTANEOUS at 09:34

## 2019-04-15 RX ADMIN — HUMAN INSULIN 2 UNITS: 100 INJECTION, SOLUTION SUBCUTANEOUS at 18:52

## 2019-04-15 RX ADMIN — HYDROMORPHONE HYDROCHLORIDE 1 MG: 2 INJECTION, SOLUTION INTRAMUSCULAR; INTRAVENOUS; SUBCUTANEOUS at 12:23

## 2019-04-15 RX ADMIN — ENOXAPARIN SODIUM 40 MG: 40 INJECTION SUBCUTANEOUS at 21:38

## 2019-04-15 RX ADMIN — HYDROMORPHONE HYDROCHLORIDE 0.5 MG: 2 INJECTION, SOLUTION INTRAMUSCULAR; INTRAVENOUS; SUBCUTANEOUS at 02:37

## 2019-04-15 RX ADMIN — ONDANSETRON 4 MG: 2 INJECTION INTRAMUSCULAR; INTRAVENOUS at 02:37

## 2019-04-15 RX ADMIN — CARVEDILOL 12.5 MG: 6.25 TABLET, FILM COATED ORAL at 17:15

## 2019-04-15 RX ADMIN — HUMAN INSULIN 2 UNITS: 100 INJECTION, SOLUTION SUBCUTANEOUS at 01:00

## 2019-04-15 RX ADMIN — HYDROMORPHONE HYDROCHLORIDE 0.5 MG: 2 INJECTION, SOLUTION INTRAMUSCULAR; INTRAVENOUS; SUBCUTANEOUS at 06:43

## 2019-04-15 RX ADMIN — HUMAN INSULIN 2 UNITS: 100 INJECTION, SOLUTION SUBCUTANEOUS at 06:33

## 2019-04-15 RX ADMIN — ONDANSETRON 4 MG: 2 INJECTION INTRAMUSCULAR; INTRAVENOUS at 06:43

## 2019-04-15 RX ADMIN — DIPHENHYDRAMINE HYDROCHLORIDE 12.5 MG: 50 INJECTION, SOLUTION INTRAMUSCULAR; INTRAVENOUS at 02:37

## 2019-04-15 NOTE — PROGRESS NOTES
Resting quietly in recliner, resp even, unlab with CPAP intact, arousing easily. Reports nausea relieved. No distress.

## 2019-04-15 NOTE — PROGRESS NOTES
Admit Date: 3/26/2019 POD 4 Days Post-Op Procedure:  Procedure(s): 
ABDOMINAL DEBRIDEMENT Subjective:  
 
Patient had low grade fever last night. No nausea or vomiting. No eating much. Objective:  
 
Visit Vitals /63 Pulse (!) 59 Temp 98.9 °F (37.2 °C) Resp 18 Ht 4' 11\" (1.499 m) Wt 221 lb 4.8 oz (100.4 kg) SpO2 98% Breastfeeding? No  
BMI 44.70 kg/m² Temp (24hrs), Av.6 °F (37 °C), Min:97.6 °F (36.4 °C), Max:100.1 °F (37.8 °C) Rose Marie  I&O reviewed as documented. Physical Exam:  
 General-in no distress. Lungs-  Respiratory effort is Normal 
 
Heart- Regular rate and rhythm Abdomen-Incision is wicked. Patrizia removed. There is small area of yellowish, greenish stain. Some emaceration of skin around wound improving. Drains with turbid, oily drainage c/w ongoing fat liquifaction. Does not appear purulent. Labs:  
Recent Results (from the past 24 hour(s)) GLUCOSE, POC Collection Time: 19 12:17 PM  
Result Value Ref Range Glucose (POC) 248 (H) 65 - 100 mg/dL GLUCOSE, POC Collection Time: 19  5:03 PM  
Result Value Ref Range Glucose (POC) 274 (H) 65 - 100 mg/dL GLUCOSE, POC Collection Time: 19 11:52 PM  
Result Value Ref Range Glucose (POC) 162 (H) 65 - 100 mg/dL GLUCOSE, POC Collection Time: 04/15/19  5:42 AM  
Result Value Ref Range Glucose (POC) 159 (H) 65 - 100 mg/dL Assessment:  
 
Principal Problem: 
  Incarcerated incisional hernia (3/26/2019) Active Problems: 
  Long-term insulin use in type 2 diabetes ([facility] (2010) Chronic systolic heart failure ([facility] (2013) Overview: NST 4/15:low risk Echo 2014:EF 40-45% Echo 2013:EF 30-35% LHC 2013: minimal CAD 
 
  BA on CPAP (2014) Morbid obesity ([facility] (2014) Acute hypoxemic respiratory failure ([facility] (4/3/2019) ARDS (adult respiratory distress syndrome) ([facility] (4/3/2019) Pseudomonas urinary tract infection (4/5/2019) Chloride-responsive metabolic alkalosis (6/30/9448) Hypokalemia (4/11/2019) Plan/Recommendations/Medical Decision Making:  
 
Continue present treatment WBC 13.5 - improving from 17.6, no fever- repeat tomorrow Cont wick changes to BID, need to ensure drains remain patent. Talked to nurse about keeping the skin clean at all times Continue zosyn Tolerating diet but not eating much. Protein shakes ordered TID. Resume home lantus, in addition to SSI, with regular diet (diabetic) Laxative prn. Colace BID. Will likely need wound debridement tomorrow. NPO after midnight. Ana Rosa Hickman MD 
Bariatric & Minimally Invasive Surgery [address] [facility] 4/15/2019 8:26 AM

## 2019-04-15 NOTE — PROGRESS NOTES
Itching relieved. Abdom dsgs removed. Maintaining sterile technique, open areas between abdom incision staples packed with iodaform gauze dsgs x4, moistened with 1/4% Dakins solution, covering with 4 x 4 gauze, and abd dsg, securing with paper tape, tolerating well.

## 2019-04-15 NOTE — PROGRESS NOTES
Problem: Mobility Impaired (Adult and Pediatric) Goal: *Acute Goals and Plan of Care (Insert Text) Description STG: 
(1.)Ms. Lisy Tan will move from supine to sit and sit to supine  with MINIMAL ASSIST within 3 treatment day(s). (2.)Ms. Lisy Tan will transfer from bed to chair and chair to bed with CONTACT GUARD ASSIST using the least restrictive device within 3 treatment day(s). Met 4/12 
(3.)Ms. Lisy Tan will ambulate with CONTACT GUARD ASSIST for 50 feet with the least restrictive device within 3 treatment day(s). Met 4/12 GOALS RE-ASSESSED 4/13/19 : 
(1.)Ms. Lisy Tan will transfer from bed to chair and chair to bed with STAND BY ASSIST using the least restrictive device. Met 4/14 
(2.)Ms. Lisy Tan will ambulate with STAND BY ASSIST for 200 feet with the least restrictive device within 7 treatment day(s). (3)Ms. Lisy Tan will go up 4 steps with min assist & device as needed. (4)Ms. Lisy Tan will perform HEP with cues & written guidelines, to increase safety on her feet 
 
 
________________________________________________________________________________________________ Outcome: Progressing Towards Goal 
  
PHYSICAL THERAPY: Daily Note and PM 4/15/2019 INPATIENT: PT Visit Days : 3 [place] / [place] B / Product Type: Medicare /   
  
NAME/AGE/GENDER: Twyla Lira is a 52 y.o. female PRIMARY DIAGNOSIS: Incisional hernia, without obstruction or gangrene [K43.2] Incarcerated incisional hernia [K43.0] Incarcerated incisional hernia [K43.0] Incarcerated incisional hernia Incarcerated incisional hernia Procedure(s) (LRB): ABDOMINAL DEBRIDEMENT (N/A) 6 Days Post-Op ICD-10: Treatment Diagnosis:  
 · Generalized Muscle Weakness (M62.81) · Other abnormalities of gait and mobility (R26.89) Precaution/Allergies: 
Grass pollen ASSESSMENT:  
Ms. Lisy Tan presents with general decreased in functional mobility and gait s/p OPEN INCARCERATED INCISIONAL HERNIA REPAIR on 3/26/19. Patient underwent second pocedure 3/31/19 secondary to dehiscense and hernia recurrence. 4/15 pm she is sitting in the chair upon arrival.  She goes from sit>stand x 2 then walks 170 ft with couple of rest breaks. She return to the chair with call light near. This section established at most recent assessment PROBLEM LIST (Impairments causing functional limitations): 1. Decreased Strength 2. Decreased ADL/Functional Activities 3. Decreased Transfer Abilities 4. Decreased Ambulation Ability/Technique 5. Decreased Balance 6. Increased Pain 7. Decreased Activity Tolerance 8. Decreased Flexibility/Joint Mobility 9. Decreased [place] with Home Exercise Program 
 INTERVENTIONS PLANNED: (Benefits and precautions of physical therapy have been discussed with the patient.) 1. Balance Exercise 2. Bed Mobility 3. Family Education 4. Gait Training 5. Home Exercise Program (HEP) 6. Therapeutic Activites 7. Therapeutic Exercise/Strengthening 8. Transfer Training TREATMENT PLAN: Frequency/Duration: daily for duration of hospital stay Rehabilitation Potential For Stated Goals: Good RECOMMENDED REHABILITATION/EQUIPMENT: (at time of discharge pending progress): Due to the probability of continued deficits (see above) this patient will likely need continued skilled physical therapy after discharge. Equipment: ? May need a RW   
    
 
 
 
HISTORY:  
History of Present Injury/Illness (Reason for Referral): S/p OPEN INCARCERATED INCISIONAL HERNIA REPAIR 3/26/19 Past Medical History/Comorbidities: Ms. Flaca Cardenas  has a past medical history of Allergic rhinitis, Arthritis, Automatic implantable cardioverter-defibrillator in situ (3/30/2016), CAD in native artery (2/09/2220), Chronic systolic heart failure ([facility] [address] (07/18/2013), CKD (chronic kidney disease), Diabetes ([facility] [address] Diabetes mellitus ([facility] [address] (4/12/2010), Dyslipidemia (2/13/2013), Eczema, Fibromyalgia, GERD (gastroesophageal reflux disease), Headache, Heart failure ([facility] [address] HTN (hypertension) (2010), Hypercholesterolemia, Morbid obesity ([facility] [address] Neuropathy, NICM (nonischemic cardiomyopathy) ([facility] [address] (2/15/2013), Obesity, Obstructive sleep apnea (adult) (pediatric) (2014), Pseudomonas urinary tract infection (2019), Sciatic pain (2016), SVT (supraventricular tachycardia) ([facility] [address] and Tobacco use disorder (2010). She also has no past medical history of Dementia, Gastrointestinal disorder, or Unspecified adverse effect of anesthesia. Ms. Flaca Cardenas  has a past surgical history that includes hx tonsillectomy; hx implantable cardioverter defibrillator (2013); hx  section; hx hernia repair; pr left heart cath,percutaneous (2013); hx svt ablation (\"years ago\"); hx rotator cuff repair (Right); hx hysterectomy; and hx implantable cardioverter defibrillator (2013). Social History/Living Environment:  
Home Environment: Private residence # Steps to Enter: 4(than an additional 3 inside) Rails to Enter: No 
One/Two Story Residence: One story Living Alone: No 
Support Systems: Friends \ neighbors Patient Expects to be Discharged to[de-identified] Private residence Current DME Used/Available at Home: Glucometer, CPAP Prior Level of Function/Work/Activity: 
independent Number of Personal Factors/Comorbidities that affect the Plan of Care: 1-2: MODERATE COMPLEXITY EXAMINATION:  
Most Recent Physical Functioning:  
Gross Assessment: 3 to 3-/5 throughout Balance: 
  Bed Mobility: 
Supine to Sit: (up in chair) Sit to Supine: (up in chair) Transfers: 
Sit to Stand: Stand-by assistance Stand to Sit: Stand-by assistance Bed to Chair: Stand-by assistance Duration: 23 Minutes Gait: 
  
Speed/Cristy: Delayed Step Length: Left shortened;Right shortened Gait Abnormalities: Decreased step clearance Distance (ft): 170 Feet (ft)(with couple breaks) Assistive Device: Walker, rolling Ambulation - Level of Assistance: Stand-by assistance Interventions: Safety awareness training Body Structures Involved: 1. Bones 2. Joints 3. Muscles 4. Ligaments Body Functions Affected: 1. Movement Related Activities and Participation Affected: 1. Mobility Number of elements that affect the Plan of Care: 3: MODERATE COMPLEXITY CLINICAL PRESENTATION:  
Presentation: Stable and uncomplicated: LOW COMPLEXITY CLINICAL DECISION MAKING:  
[facility] AM-PAC 6 Clicks Basic Mobility Inpatient Short Form How much difficulty does the patient currently have. .. Unable A Lot A Little None 1. Turning over in bed (including adjusting bedclothes, sheets and blankets)? ? 1   ? 2   ? 3   ? 4  
2. Sitting down on and standing up from a chair with arms ( e.g., wheelchair, bedside commode, etc.)   ? 1   ? 2   ? 3   ? 4  
3. Moving from lying on back to sitting on the side of the bed?   ? 1   ? 2   ? 3   ? 4 How much help from another person does the patient currently need. .. Total A Lot A Little None 4. Moving to and from a bed to a chair (including a wheelchair)? ? 1   ? 2   ? 3   ? 4  
5. Need to walk in hospital room? ? 1   ? 2   ? 3   ? 4  
6. Climbing 3-5 steps with a railing? ? 1   ? 2   ? 3   ? 4  
© 2007, Trustees of [facility], under license to [employer]. All rights reserved Score:  Initial: 14 Most Recent: X (Date: -- ) Interpretation of Tool:  Represents activities that are increasingly more difficult (i.e. Bed mobility, Transfers, Gait). Medical Necessity:    
· Patient is expected to demonstrate progress in strength, range of motion, balance, coordination and functional technique ·  to decrease assistance required with theraputic exercises and functional mobility · . Reason for Services/Other Comments: 
· Patient continues to require present interventions due to patient's inability to perform theraputic exercises and functional mobility · . Use of outcome tool(s) and clinical judgement create a POC that gives a: Clear prediction of patient's progress: LOW COMPLEXITY  
  
 
 
 
TREATMENT:  
(In addition to Assessment/Re-Assessment sessions the following treatments were rendered) Pre-treatment Symptoms/Complaints:  Pt was agreeable Pain: Initial:numeric scale Pain Intensity 1: 0  Post Session:    
 
Therapeutic Activity: (  23 Minutes ):  Therapeutic activities including  Scooting, transfers ,sit <> stand, standing static balance, progressive ambulation with RW to improve mobility and strength. Pt cooled down wit LE AROM from chair. Required minimal Safety awareness training to promote static and dynamic balance in standing. Braces/Orthotics/Lines/Etc:  
· IV 
· drain celeste Treatment/Session Assessment:   
· Response to Treatment:  Patient is making steady progress. · Interdisciplinary Collaboration:  
o Registered Nurse · After treatment position/precautions:  
o Up in chair 
o Bed/Chair-wheels locked 
o Call light within reach 
o RN notified · Compliance with Program/Exercises: Will assess as treatment progresses. · Recommendations/Intent for next treatment session: \"Next visit will focus on advancements to more challenging activities and reduction in assistance provided\". Total Treatment Duration: PT Patient Time In/Time Out Time In: 1320 Time Out: 9031 Olivia Judd, PTA

## 2019-04-15 NOTE — PROGRESS NOTES
Resting quietly in recliner. CPAP removed, pt unable to tolerate at this time. O2 applied at 2L N/C. Regular insulin 2 units given SC for SQBS 162. No c/o. No distress.

## 2019-04-15 NOTE — PROGRESS NOTES
Assessment completed and documented. Lung sounds clear. Heart sounds regular with possible murmur noted. Abdomen soft and tender with active bowel sounds. Dressing to abdomen changed per order. Small amount of drainage around JENNIFER sites. Neither JENNIFER drains will hold a charge. Purulent drainage noted in drains. Abdominal binder in place. +1 edema to BLE. Patient currently resting in bed. Will continue to monitor with hourly rounding.

## 2019-04-15 NOTE — PROGRESS NOTES
Patient received sitting up in bedside recliner, offers no complaints. Shift assessment completed. Will monitor.

## 2019-04-15 NOTE — PROGRESS NOTES
Continues to void without difficulty with asst and use of walker. Dulcolax 5 mg tab given PO for c/o constipation. Dilaudid 0.5 mg given with Zofran 4 mg  IVP slowly for c/o pain and nausea. Benadryl 12.5 mg given IVP slowly for c/o itching no rash noted. Agrees to continue to call for asst to be standing and ambulating.

## 2019-04-15 NOTE — PROGRESS NOTES
Pain relieved. Zofran 4 mg given IVP slowly for c/o nausea. Cool, wet cloth to face to facilitate comfort. No emesis.

## 2019-04-15 NOTE — PROGRESS NOTES
Resting quietly, in recliner, awake. Resp even, unlab, skin warm, dry. Abdom dsg with tan drainage noted at lower aspect. Pt informed that nurse will remove and apply new dsgs. JPs x2 intact, drainage tan. Assessed as noted. Up to bathroom with asst and use of walker, voiding without difficulty and back to chair. No distress noted.

## 2019-04-16 LAB
ANION GAP SERPL CALC-SCNC: 9 MMOL/L
BASOPHILS # BLD: 0.1 K/UL (ref 0–0.2)
BASOPHILS NFR BLD: 1 % (ref 0–2)
BUN SERPL-MCNC: 12 MG/DL (ref 6–23)
CALCIUM SERPL-MCNC: 8.7 MG/DL (ref 8.3–10.4)
CHLORIDE SERPL-SCNC: 104 MMOL/L (ref 98–107)
CO2 SERPL-SCNC: 24 MMOL/L (ref 21–32)
CREAT SERPL-MCNC: 0.97 MG/DL (ref 0.6–1)
DIFFERENTIAL METHOD BLD: ABNORMAL
EOSINOPHIL # BLD: 0.4 K/UL (ref 0–0.8)
EOSINOPHIL NFR BLD: 4 % (ref 0.5–7.8)
ERYTHROCYTE [DISTWIDTH] IN BLOOD BY AUTOMATED COUNT: 14.3 % (ref 11.9–14.6)
GLUCOSE BLD STRIP.AUTO-MCNC: 117 MG/DL (ref 65–100)
GLUCOSE BLD STRIP.AUTO-MCNC: 160 MG/DL (ref 65–100)
GLUCOSE BLD STRIP.AUTO-MCNC: 162 MG/DL (ref 65–100)
GLUCOSE SERPL-MCNC: 140 MG/DL (ref 65–100)
HCT VFR BLD AUTO: 28.4 % (ref 35.8–46.3)
HGB BLD-MCNC: 9 G/DL (ref 11.7–15.4)
IMM GRANULOCYTES # BLD AUTO: 0.1 K/UL (ref 0–0.5)
IMM GRANULOCYTES NFR BLD AUTO: 1 % (ref 0–5)
LYMPHOCYTES # BLD: 2.2 K/UL (ref 0.5–4.6)
LYMPHOCYTES NFR BLD: 21 % (ref 13–44)
MCH RBC QN AUTO: 30.6 PG (ref 26.1–32.9)
MCHC RBC AUTO-ENTMCNC: 31.7 G/DL (ref 31.4–35)
MCV RBC AUTO: 96.6 FL (ref 79.6–97.8)
MONOCYTES # BLD: 1.1 K/UL (ref 0.1–1.3)
MONOCYTES NFR BLD: 11 % (ref 4–12)
NEUTS SEG # BLD: 6.7 K/UL (ref 1.7–8.2)
NEUTS SEG NFR BLD: 63 % (ref 43–78)
NRBC # BLD: 0.03 K/UL (ref 0–0.2)
PLATELET # BLD AUTO: 240 K/UL (ref 150–450)
PMV BLD AUTO: 11.4 FL (ref 9.4–12.3)
POTASSIUM SERPL-SCNC: 4.4 MMOL/L (ref 3.5–5.1)
RBC # BLD AUTO: 2.94 M/UL (ref 4.05–5.2)
SODIUM SERPL-SCNC: 137 MMOL/L (ref 136–145)
WBC # BLD AUTO: 10.6 K/UL (ref 4.3–11.1)

## 2019-04-16 PROCEDURE — 82962 GLUCOSE BLOOD TEST: CPT

## 2019-04-16 PROCEDURE — 77030019895 HC PCKNG STRP IODO -A

## 2019-04-16 PROCEDURE — 77030027688 HC DRSG MEPILEX 16-48IN NO BORD MOLN -A

## 2019-04-16 PROCEDURE — 74011250636 HC RX REV CODE- 250/636: Performed by: NURSE PRACTITIONER

## 2019-04-16 PROCEDURE — 74011636637 HC RX REV CODE- 636/637: Performed by: SURGERY

## 2019-04-16 PROCEDURE — 74011000258 HC RX REV CODE- 258: Performed by: NURSE PRACTITIONER

## 2019-04-16 PROCEDURE — 80048 BASIC METABOLIC PNL TOTAL CA: CPT

## 2019-04-16 PROCEDURE — 74011250637 HC RX REV CODE- 250/637: Performed by: SURGERY

## 2019-04-16 PROCEDURE — 74011250636 HC RX REV CODE- 250/636: Performed by: SURGERY

## 2019-04-16 PROCEDURE — 77010033678 HC OXYGEN DAILY

## 2019-04-16 PROCEDURE — 85025 COMPLETE CBC W/AUTO DIFF WBC: CPT

## 2019-04-16 PROCEDURE — 36415 COLL VENOUS BLD VENIPUNCTURE: CPT

## 2019-04-16 PROCEDURE — 65270000029 HC RM PRIVATE

## 2019-04-16 RX ADMIN — PRAVASTATIN SODIUM 80 MG: 80 TABLET ORAL at 21:06

## 2019-04-16 RX ADMIN — DOCUSATE SODIUM 100 MG: 100 CAPSULE, LIQUID FILLED ORAL at 08:04

## 2019-04-16 RX ADMIN — HUMAN INSULIN 4 UNITS: 100 INJECTION, SOLUTION SUBCUTANEOUS at 23:54

## 2019-04-16 RX ADMIN — HYDROCODONE BITARTRATE AND ACETAMINOPHEN 1 TABLET: 5; 325 TABLET ORAL at 00:33

## 2019-04-16 RX ADMIN — CARVEDILOL 12.5 MG: 6.25 TABLET, FILM COATED ORAL at 17:00

## 2019-04-16 RX ADMIN — DOCUSATE SODIUM 100 MG: 100 CAPSULE, LIQUID FILLED ORAL at 17:00

## 2019-04-16 RX ADMIN — ENOXAPARIN SODIUM 40 MG: 40 INJECTION SUBCUTANEOUS at 08:04

## 2019-04-16 RX ADMIN — HYDROMORPHONE HYDROCHLORIDE 0.5 MG: 2 INJECTION, SOLUTION INTRAMUSCULAR; INTRAVENOUS; SUBCUTANEOUS at 03:11

## 2019-04-16 RX ADMIN — AMIODARONE HYDROCHLORIDE 200 MG: 200 TABLET ORAL at 08:05

## 2019-04-16 RX ADMIN — DIPHENHYDRAMINE HYDROCHLORIDE 12.5 MG: 50 INJECTION, SOLUTION INTRAMUSCULAR; INTRAVENOUS at 00:32

## 2019-04-16 RX ADMIN — INSULIN GLARGINE 35 UNITS: 100 INJECTION, SOLUTION SUBCUTANEOUS at 08:04

## 2019-04-16 RX ADMIN — Medication 400 MG: at 17:00

## 2019-04-16 RX ADMIN — ASPIRIN 81 MG 81 MG: 81 TABLET ORAL at 08:04

## 2019-04-16 RX ADMIN — HYDROMORPHONE HYDROCHLORIDE 1 MG: 2 INJECTION, SOLUTION INTRAMUSCULAR; INTRAVENOUS; SUBCUTANEOUS at 21:06

## 2019-04-16 RX ADMIN — ENOXAPARIN SODIUM 40 MG: 40 INJECTION SUBCUTANEOUS at 21:06

## 2019-04-16 RX ADMIN — HYDROCODONE BITARTRATE AND ACETAMINOPHEN 2 TABLET: 5; 325 TABLET ORAL at 18:06

## 2019-04-16 RX ADMIN — HUMAN INSULIN 2 UNITS: 100 INJECTION, SOLUTION SUBCUTANEOUS at 18:02

## 2019-04-16 RX ADMIN — PIPERACILLIN AND TAZOBACTAM 3.38 G: 3; .375 INJECTION, POWDER, FOR SOLUTION INTRAVENOUS at 15:58

## 2019-04-16 RX ADMIN — HYDROMORPHONE HYDROCHLORIDE 2 MG: 2 INJECTION, SOLUTION INTRAMUSCULAR; INTRAVENOUS; SUBCUTANEOUS at 12:31

## 2019-04-16 RX ADMIN — Medication 400 MG: at 08:04

## 2019-04-16 RX ADMIN — CARVEDILOL 12.5 MG: 6.25 TABLET, FILM COATED ORAL at 08:05

## 2019-04-16 RX ADMIN — PIPERACILLIN AND TAZOBACTAM 3.38 G: 3; .375 INJECTION, POWDER, FOR SOLUTION INTRAVENOUS at 23:50

## 2019-04-16 RX ADMIN — Medication 10 ML: at 21:07

## 2019-04-16 RX ADMIN — ONDANSETRON 4 MG: 2 INJECTION INTRAMUSCULAR; INTRAVENOUS at 15:59

## 2019-04-16 RX ADMIN — PIPERACILLIN AND TAZOBACTAM 3.38 G: 3; .375 INJECTION, POWDER, FOR SOLUTION INTRAVENOUS at 08:00

## 2019-04-16 NOTE — PROGRESS NOTES
[address] [facility], [address] [place], [address] PLAN:Resume regular diet Continue wound care with Iodoform packing Possible wound debridement on Thursday ASSESSMENT:  Admit Date: 3/26/2019  
7 Days Post-Op  Procedure(s): 
ABDOMINAL DEBRIDEMENT Principal Problem: 
  Incarcerated incisional hernia (3/26/2019) Active Problems: 
  Long-term insulin use in type 2 diabetes ([address] (4/12/2010) Chronic systolic heart failure ([address] (7/18/2013) Overview: NST 4/15:low risk Echo 6/2014:EF 40-45% Echo 5/2013:EF 30-35% LHC 2/2013: minimal CAD 
 
  BA on CPAP (7/14/2014) Morbid obesity ([address] (7/14/2014) Acute hypoxemic respiratory failure ([address] (4/3/2019) ARDS (adult respiratory distress syndrome) ([address] (4/3/2019) Pseudomonas urinary tract infection (4/5/2019) Chloride-responsive metabolic alkalosis (2/63/0189) Hypokalemia (4/11/2019) [address] well. NPO for possible surgery today. No complaints. OBJECTIVE: 
Constitutional: Alert oriented cooperative patient in no acute distress; appears stated age Visit Vitals /79 (BP 1 Location: Right arm, BP Patient Position: At rest) Pulse 81 Temp 98.6 °F (37 °C) Resp 16 Ht 4' 11\" (1.499 m) Wt 221 lb 4.8 oz (100.4 kg) SpO2 90% Breastfeeding? No  
BMI 44.70 kg/m² Eyes:Sclera are clear. ENMT: no external lesions gross hearing normal; no obvious neck masses, no ear or lip lesions CV: RRR. Normal perfusion Resp: No JVD. Breathing is  non-labored; no audible wheezing. GI: Dressing removed. Iodoform removed. Small areas of skin necrosis. Overall the wound is clean with some tunneling beneath skin bridges. No foul odor. JENNIFER drains intact. Musculoskeletal: unremarkable with normal function. No embolic signs or cyanosis. Neuro:  Oriented; moves all 4; no focal deficits Psychiatric: normal affect and mood, no memory impairment Patient Vitals for the past 24 hrs: 
 BP Temp Pulse Resp SpO2  
04/16/19 1150 119/79 98.6 °F (37 °C) 81 16 90 % 04/16/19 0747 108/65 98.9 °F (37.2 °C) 60 18 99 % 04/16/19 0253 120/75 98.6 °F (37 °C) 60 16 97 % 04/16/19 0016 129/72 97.3 °F (36.3 °C) 66 20 97 % 04/15/19 2014     96 % 04/15/19 1916 118/55 99.3 °F (37.4 °C) 62 18 93 % 04/15/19 1555 106/49 98.3 °F (36.8 °C) 60 18 92 % Labs:   
Recent Labs 04/16/19 
8675 WBC 10.6 HGB 9.0*  
   
K 4.4  
 CO2 24 BUN 12  
CREA 0.97 * Diana Mac, DO

## 2019-04-16 NOTE — PROGRESS NOTES
Given Dilaudid 2 mg  IV for pain abdominal, ABD binder in place, distal end of incision serosanguinous dry drainage, dressing intact.

## 2019-04-16 NOTE — PROGRESS NOTES
Dr. Sharyle Goldstein made aware that neither JENNIFER drain will hold a charge. He stated to just try to charge them each shift. Will continue to monitor.

## 2019-04-16 NOTE — PROGRESS NOTES
Abdominal binder and old dressing removed and patient given Hibiclens bath. Then removed old packing and repacked and redressed wound using sterile technique. Patient tolerated well. Abdominal binder replaced. Gown and linens changed. Patient now up in chair. Will continue to monitor with hourly rounding.

## 2019-04-16 NOTE — PROGRESS NOTES
Patient transferred with assist x1 and use of walker to bed. Dressing change to abdomen completed as per MD order. Patient assisted x1 back to recliner. Repositioned for comfort. Will monitor.

## 2019-04-16 NOTE — PROGRESS NOTES
OOB in recliner previously ambulated to bathroom, returned to recliner, SOB, lungs scattered crackles, course bilateral A/P, nasal O2 2 liters and SOB at rest, LE edema, pitting +2, no acute distress.

## 2019-04-16 NOTE — PROGRESS NOTES
Patient sitting up in bedside recliner this AM. Hebecleanse bath completed this AM. Remains npo for possible OR visit later today. Will monitor.

## 2019-04-17 ENCOUNTER — ANESTHESIA EVENT (OUTPATIENT)
Dept: SURGERY | Age: 54
DRG: 353 | End: 2019-04-17
Payer: MEDICARE

## 2019-04-17 LAB
GLUCOSE BLD STRIP.AUTO-MCNC: 131 MG/DL (ref 65–100)
GLUCOSE BLD STRIP.AUTO-MCNC: 158 MG/DL (ref 65–100)
GLUCOSE BLD STRIP.AUTO-MCNC: 227 MG/DL (ref 65–100)
GLUCOSE BLD STRIP.AUTO-MCNC: 244 MG/DL (ref 65–100)

## 2019-04-17 PROCEDURE — 97530 THERAPEUTIC ACTIVITIES: CPT

## 2019-04-17 PROCEDURE — 74011000258 HC RX REV CODE- 258: Performed by: NURSE PRACTITIONER

## 2019-04-17 PROCEDURE — 74011636637 HC RX REV CODE- 636/637: Performed by: SURGERY

## 2019-04-17 PROCEDURE — 74011250636 HC RX REV CODE- 250/636: Performed by: NURSE PRACTITIONER

## 2019-04-17 PROCEDURE — 74011250637 HC RX REV CODE- 250/637: Performed by: SURGERY

## 2019-04-17 PROCEDURE — 65270000029 HC RM PRIVATE

## 2019-04-17 PROCEDURE — 74011250636 HC RX REV CODE- 250/636: Performed by: SURGERY

## 2019-04-17 RX ORDER — PRAVASTATIN SODIUM 80 MG/1
80 TABLET ORAL DAILY
Status: DISCONTINUED | OUTPATIENT
Start: 2019-04-18 | End: 2019-04-26 | Stop reason: HOSPADM

## 2019-04-17 RX ADMIN — ENOXAPARIN SODIUM 40 MG: 40 INJECTION SUBCUTANEOUS at 09:24

## 2019-04-17 RX ADMIN — ONDANSETRON 4 MG: 2 INJECTION INTRAMUSCULAR; INTRAVENOUS at 09:31

## 2019-04-17 RX ADMIN — HYDROMORPHONE HYDROCHLORIDE 0.5 MG: 2 INJECTION, SOLUTION INTRAMUSCULAR; INTRAVENOUS; SUBCUTANEOUS at 16:01

## 2019-04-17 RX ADMIN — Medication 10 ML: at 22:03

## 2019-04-17 RX ADMIN — INSULIN GLARGINE 35 UNITS: 100 INJECTION, SOLUTION SUBCUTANEOUS at 09:20

## 2019-04-17 RX ADMIN — ONDANSETRON 4 MG: 2 INJECTION INTRAMUSCULAR; INTRAVENOUS at 17:20

## 2019-04-17 RX ADMIN — DOCUSATE SODIUM 100 MG: 100 CAPSULE, LIQUID FILLED ORAL at 17:11

## 2019-04-17 RX ADMIN — Medication 10 ML: at 06:25

## 2019-04-17 RX ADMIN — Medication 400 MG: at 17:11

## 2019-04-17 RX ADMIN — ASPIRIN 81 MG 81 MG: 81 TABLET ORAL at 09:23

## 2019-04-17 RX ADMIN — ONDANSETRON 4 MG: 2 INJECTION INTRAMUSCULAR; INTRAVENOUS at 02:18

## 2019-04-17 RX ADMIN — HYDROMORPHONE HYDROCHLORIDE 1 MG: 2 INJECTION, SOLUTION INTRAMUSCULAR; INTRAVENOUS; SUBCUTANEOUS at 01:03

## 2019-04-17 RX ADMIN — DIPHENHYDRAMINE HYDROCHLORIDE 12.5 MG: 50 INJECTION, SOLUTION INTRAMUSCULAR; INTRAVENOUS at 20:58

## 2019-04-17 RX ADMIN — AMIODARONE HYDROCHLORIDE 200 MG: 200 TABLET ORAL at 09:23

## 2019-04-17 RX ADMIN — PIPERACILLIN AND TAZOBACTAM 3.38 G: 3; .375 INJECTION, POWDER, FOR SOLUTION INTRAVENOUS at 07:49

## 2019-04-17 RX ADMIN — Medication 400 MG: at 09:23

## 2019-04-17 RX ADMIN — ENOXAPARIN SODIUM 40 MG: 40 INJECTION SUBCUTANEOUS at 20:41

## 2019-04-17 RX ADMIN — DIPHENHYDRAMINE HYDROCHLORIDE 12.5 MG: 50 INJECTION, SOLUTION INTRAMUSCULAR; INTRAVENOUS at 02:18

## 2019-04-17 RX ADMIN — HUMAN INSULIN 2 UNITS: 100 INJECTION, SOLUTION SUBCUTANEOUS at 18:00

## 2019-04-17 RX ADMIN — HUMAN INSULIN 4 UNITS: 100 INJECTION, SOLUTION SUBCUTANEOUS at 12:08

## 2019-04-17 RX ADMIN — PIPERACILLIN AND TAZOBACTAM 3.38 G: 3; .375 INJECTION, POWDER, FOR SOLUTION INTRAVENOUS at 20:41

## 2019-04-17 RX ADMIN — DOCUSATE SODIUM 100 MG: 100 CAPSULE, LIQUID FILLED ORAL at 09:23

## 2019-04-17 RX ADMIN — Medication 10 ML: at 13:20

## 2019-04-17 RX ADMIN — HYDROMORPHONE HYDROCHLORIDE 2 MG: 2 INJECTION, SOLUTION INTRAMUSCULAR; INTRAVENOUS; SUBCUTANEOUS at 12:30

## 2019-04-17 NOTE — PROGRESS NOTES
Report received from Rosario, [facility]. A&Ox4. Respirations present, even, unlabored. Lung sounds diminished on auscultation. Midline abdominal incision c/d/i. JENNIFER drain patent and charged. Call light within reach, side rails x3, encouraged to call for help when needed.

## 2019-04-17 NOTE — PROGRESS NOTES
Shift assessment complete. Pt resting quietly in bed. No signs of acute distress. Resp even and unlabored. Alert and oriented x3. Abdominal binder in place. JENNIFER drains with tan output. One on right is charged and draining. Pt c/o nausea. Will medicate. Call light within reach. Pt instructed to call for assistance.

## 2019-04-17 NOTE — PROGRESS NOTES
Problem: Mobility Impaired (Adult and Pediatric) Goal: *Acute Goals and Plan of Care (Insert Text) Description STG: 
(1.)Ms. Harlo Nissen will move from supine to sit and sit to supine  with MINIMAL ASSIST within 3 treatment day(s). (2.)Ms. Harlo Nissen will transfer from bed to chair and chair to bed with CONTACT GUARD ASSIST using the least restrictive device within 3 treatment day(s). Met 4/12 
(3.)Ms. Harlo Nissen will ambulate with CONTACT GUARD ASSIST for 50 feet with the least restrictive device within 3 treatment day(s). Met 4/12 GOALS RE-ASSESSED 4/13/19 : 
(1.)Ms. Harlo Nissen will transfer from bed to chair and chair to bed with STAND BY ASSIST using the least restrictive device. Met 4/14 
(2.)Ms. Harlo Nissen will ambulate with STAND BY ASSIST for 200 feet with the least restrictive device within 7 treatment day(s). (3)Ms. Harlo Nissen will go up 4 steps with min assist & device as needed. (4)Ms. Harlo Nissen will perform HEP with cues & written guidelines, to increase safety on her feet 
 
 
________________________________________________________________________________________________ Outcome: Progressing Towards Goal 
  
PHYSICAL THERAPY: Daily Note and PM 4/17/2019 INPATIENT: PT Visit Days : 4 [place] / [place] B / Product Type: Medicare /   
  
NAME/AGE/GENDER: Wilda Walker is a 52 y.o. female PRIMARY DIAGNOSIS: Incisional hernia, without obstruction or gangrene [K43.2] Incarcerated incisional hernia [K43.0] Incarcerated incisional hernia [K43.0] Incarcerated incisional hernia Incarcerated incisional hernia Procedure(s) (LRB): ABDOMINAL DEBRIDEMENT (N/A) 8 Days Post-Op ICD-10: Treatment Diagnosis:  
  · Generalized Muscle Weakness (M62.81) · Other abnormalities of gait and mobility (R26.89) Precaution/Allergies: 
Grass pollen ASSESSMENT:  
Ms. Harlo Nissen presents with general decreased in functional mobility and gait s/p OPEN INCARCERATED INCISIONAL HERNIA REPAIR on 3/26/19. Patient underwent second pocedure 3/31/19 secondary to dehiscense and hernia recurrence. 4/17 pm she is feeling better today. She walks 170 ft using RW with SBA and no LOB. She return to her chair with call light near. She need several breaks. This section established at most recent assessment PROBLEM LIST (Impairments causing functional limitations): 1. Decreased Strength 2. Decreased ADL/Functional Activities 3. Decreased Transfer Abilities 4. Decreased Ambulation Ability/Technique 5. Decreased Balance 6. Increased Pain 7. Decreased Activity Tolerance 8. Decreased Flexibility/Joint Mobility 9. Decreased [place] with Home Exercise Program 
 INTERVENTIONS PLANNED: (Benefits and precautions of physical therapy have been discussed with the patient.) 1. Balance Exercise 2. Bed Mobility 3. Family Education 4. Gait Training 5. Home Exercise Program (HEP) 6. Therapeutic Activites 7. Therapeutic Exercise/Strengthening 8. Transfer Training TREATMENT PLAN: Frequency/Duration: daily for duration of hospital stay Rehabilitation Potential For Stated Goals: Good RECOMMENDED REHABILITATION/EQUIPMENT: (at time of discharge pending progress): Due to the probability of continued deficits (see above) this patient will likely need continued skilled physical therapy after discharge. Equipment: ? May need a RW   
    
 
 
 
HISTORY:  
History of Present Injury/Illness (Reason for Referral): S/p OPEN INCARCERATED INCISIONAL HERNIA REPAIR 3/26/19 Past Medical History/Comorbidities: Ms. Nancie Schwab  has a past medical history of Allergic rhinitis, Arthritis, Automatic implantable cardioverter-defibrillator in situ (3/30/2016), CAD in native artery (7/78/6726), Chronic systolic heart failure ([facility] [address] (07/18/2013), CKD (chronic kidney disease), Diabetes ([facility] [address] Diabetes mellitus ([facility] [address] (4/12/2010), Dyslipidemia (2/13/2013), Eczema, Fibromyalgia, GERD (gastroesophageal reflux disease), Headache, Heart failure ([facility] [address] HTN (hypertension) (2010), Hypercholesterolemia, Morbid obesity ([facility] [address] Neuropathy, NICM (nonischemic cardiomyopathy) ([facility] [address] (2/15/2013), Obesity, Obstructive sleep apnea (adult) (pediatric) (2014), Pseudomonas urinary tract infection (2019), Sciatic pain (2016), SVT (supraventricular tachycardia) ([facility] [address] and Tobacco use disorder (2010). She also has no past medical history of Dementia, Gastrointestinal disorder, or Unspecified adverse effect of anesthesia. Ms. Flaca Cardenas  has a past surgical history that includes hx tonsillectomy; hx implantable cardioverter defibrillator (2013); hx  section; hx hernia repair; pr left heart cath,percutaneous (2013); hx svt ablation (\"years ago\"); hx rotator cuff repair (Right); hx hysterectomy; and hx implantable cardioverter defibrillator (2013). Social History/Living Environment:  
Home Environment: Private residence # Steps to Enter: 4(than an additional 3 inside) Rails to Enter: No 
One/Two Story Residence: One story Living Alone: No 
Support Systems: Friends \ neighbors Patient Expects to be Discharged to[de-identified] Private residence Current DME Used/Available at Home: Glucometer, CPAP Prior Level of Function/Work/Activity: 
independent Number of Personal Factors/Comorbidities that affect the Plan of Care: 1-2: MODERATE COMPLEXITY EXAMINATION:  
Most Recent Physical Functioning:  
Gross Assessment: 3 to 3-/5 throughout Balance: 
  Bed Mobility: 
  
 
  
Transfers: 
Sit to Stand: Stand-by assistance Stand to Sit: Stand-by assistance Bed to Chair: Stand-by assistance Duration: 23 Minutes Gait: 
  
Speed/Cristy: Delayed Distance (ft): 170 Feet (ft) Assistive Device: Walker, rolling Ambulation - Level of Assistance: Stand-by assistance Interventions: Safety awareness training Body Structures Involved: 1. Bones 2. Joints 3. Muscles 4. Ligaments Body Functions Affected: 1. Movement Related Activities and Participation Affected: 1. Mobility Number of elements that affect the Plan of Care: 3: MODERATE COMPLEXITY CLINICAL PRESENTATION:  
Presentation: Stable and uncomplicated: LOW COMPLEXITY CLINICAL DECISION MAKING:  
[facility] AM-PAC 6 Clicks Basic Mobility Inpatient Short Form How much difficulty does the patient currently have. .. Unable A Lot A Little None 1. Turning over in bed (including adjusting bedclothes, sheets and blankets)? ? 1   ? 2   ? 3   ? 4  
2. Sitting down on and standing up from a chair with arms ( e.g., wheelchair, bedside commode, etc.)   ? 1   ? 2   ? 3   ? 4  
3. Moving from lying on back to sitting on the side of the bed?   ? 1   ? 2   ? 3   ? 4 How much help from another person does the patient currently need. .. Total A Lot A Little None 4. Moving to and from a bed to a chair (including a wheelchair)? ? 1   ? 2   ? 3   ? 4  
5. Need to walk in hospital room? ? 1   ? 2   ? 3   ? 4  
6. Climbing 3-5 steps with a railing? ? 1   ? 2   ? 3   ? 4  
© 2007, Trustees of [facility], under license to [employer]. All rights reserved Score:  Initial: 14 Most Recent: X (Date: -- ) Interpretation of Tool:  Represents activities that are increasingly more difficult (i.e. Bed mobility, Transfers, Gait). Medical Necessity:    
· Patient is expected to demonstrate progress in strength, range of motion, balance, coordination and functional technique ·  to decrease assistance required with theraputic exercises and functional mobility · . Reason for Services/Other Comments: 
· Patient continues to require present interventions due to patient's inability to perform theraputic exercises and functional mobility · . Use of outcome tool(s) and clinical judgement create a POC that gives a: Clear prediction of patient's progress: LOW COMPLEXITY TREATMENT:  
(In addition to Assessment/Re-Assessment sessions the following treatments were rendered) Pre-treatment Symptoms/Complaints:  Pt was agreeable Pain: Initial:numeric scale Pain Intensity 1: 0  Post Session:    
 
Therapeutic Activity: (  23 Minutes ):  Therapeutic activities including  Scooting, transfers ,sit <> stand, standing static balance, progressive ambulation with RW to improve mobility and strength. Pt cooled down wit LE AROM from chair. Required minimal Safety awareness training to promote static and dynamic balance in standing. Braces/Orthotics/Lines/Etc:  
· IV 
· drain celeste Treatment/Session Assessment:   
· Response to Treatment:  Patient is making improvement each day. · Interdisciplinary Collaboration:  
o Registered Nurse · After treatment position/precautions:  
o Up in chair 
o Bed/Chair-wheels locked 
o Call light within reach 
o RN notified · Compliance with Program/Exercises: Will assess as treatment progresses. · Recommendations/Intent for next treatment session: \"Next visit will focus on advancements to more challenging activities and reduction in assistance provided\". Total Treatment Duration: PT Patient Time In/Time Out Time In: 1330 Time Out: 1353 Olivia Judd, PTA

## 2019-04-17 NOTE — PROGRESS NOTES
Shift assessment complete. Patient A&O x 4. Respirations present, even and unlabored. Breath sounds dimished. Patient on 2L NC. Heart sounds regular. ABD binder noted, 2 JENNIFER drains noted. Patient needs met. No distress noted. Bed low and locked. Call light within reach. Will continue to monitor hourly during shift.

## 2019-04-17 NOTE — PROGRESS NOTES
[address] [facility], [address] [place], [address] PLAN:Debridement of abdominal wound Consent for surgery NPO at midnight ASSESSMENT:  Admit Date: 3/26/2019  
8 Days Post-Op  Procedure(s): 
ABDOMINAL DEBRIDEMENT Principal Problem: 
  Incarcerated incisional hernia (3/26/2019) Active Problems: 
  Long-term insulin use in type 2 diabetes ([address] (4/12/2010) Chronic systolic heart failure ([address] (7/18/2013) Overview: NST 4/15:low risk Echo 6/2014:EF 40-45% Echo 5/2013:EF 30-35% LHC 2/2013: minimal CAD 
 
  BA on CPAP (7/14/2014) Morbid obesity ([address] (7/14/2014) Acute hypoxemic respiratory failure ([address] (4/3/2019) ARDS (adult respiratory distress syndrome) ([address] (4/3/2019) Pseudomonas urinary tract infection (4/5/2019) Chloride-responsive metabolic alkalosis (9/88/5842) Hypokalemia (4/11/2019) SUBJECTIVE:Wound with purulent drainage. Dressing removed and replaced. VSS afebrile. Will plan for debridement and wound vac tomorrow. OBJECTIVE: 
Constitutional: Alert oriented cooperative patient in no acute distress; appears stated age Visit Vitals /57 (BP 1 Location: Right arm, BP Patient Position: At rest;Sitting) Pulse 60 Temp 98.4 °F (36.9 °C) Resp 18 Ht 4' 11\" (1.499 m) Wt 221 lb 4.8 oz (100.4 kg) SpO2 97% Breastfeeding? No  
BMI 44.70 kg/m² Eyes:Sclera are clear. ENMT: no external lesions gross hearing normal; no obvious neck masses, no ear or lip lesions CV: RRR. Normal perfusion Resp: No JVD. Breathing is  non-labored; no audible wheezing. GI: abdominal wound with purulent drainage. Staple intact. JENNIFER drains intact. Musculoskeletal: unremarkable with normal function. No embolic signs or cyanosis. Neuro:  Oriented; moves all 4; no focal deficits Psychiatric: normal affect and mood, no memory impairment Patient Vitals for the past 24 hrs: BP Temp Pulse Resp SpO2  
04/17/19 1124 127/57 98.4 °F (36.9 °C) 60 18 97 % 04/17/19 0736 124/46 98.4 °F (36.9 °C) 60 18 97 % 04/17/19 0441 126/66 98.8 °F (37.1 °C) 72 17 95 % 04/17/19 0103 98/60 98.4 °F (36.9 °C) 61 16   
04/16/19 2011 106/56 98.8 °F (37.1 °C) 61 17 99 % 04/16/19 1929     93 % 04/16/19 1558 118/68 97.5 °F (36.4 °C) (!) 59 18 98 % Labs:   
Recent Labs 04/16/19 
4076 WBC 10.6 HGB 9.0*  
   
K 4.4  
 CO2 24 BUN 12  
CREA 0.97 * Celestia Mccarty Estefania, DO

## 2019-04-17 NOTE — PROGRESS NOTES
Pt requested Zofran and benadryl. PRN Benadryl IV 12.5 and PRN Zofran IV 4 mg administered per MAR. Will continue to monitor.

## 2019-04-18 ENCOUNTER — ANESTHESIA (OUTPATIENT)
Dept: SURGERY | Age: 54
DRG: 353 | End: 2019-04-18
Payer: MEDICARE

## 2019-04-18 LAB
ANION GAP SERPL CALC-SCNC: 9 MMOL/L
BUN SERPL-MCNC: 8 MG/DL (ref 6–23)
CALCIUM SERPL-MCNC: 8.8 MG/DL (ref 8.3–10.4)
CHLORIDE SERPL-SCNC: 106 MMOL/L (ref 98–107)
CO2 SERPL-SCNC: 25 MMOL/L (ref 21–32)
CREAT SERPL-MCNC: 0.84 MG/DL (ref 0.6–1)
ERYTHROCYTE [DISTWIDTH] IN BLOOD BY AUTOMATED COUNT: 14.6 % (ref 11.9–14.6)
GLUCOSE BLD STRIP.AUTO-MCNC: 170 MG/DL (ref 65–100)
GLUCOSE BLD STRIP.AUTO-MCNC: 172 MG/DL (ref 65–100)
GLUCOSE BLD STRIP.AUTO-MCNC: 191 MG/DL (ref 65–100)
GLUCOSE BLD STRIP.AUTO-MCNC: 94 MG/DL (ref 65–100)
GLUCOSE BLD STRIP.AUTO-MCNC: 96 MG/DL (ref 65–100)
GLUCOSE SERPL-MCNC: 75 MG/DL (ref 65–100)
HCT VFR BLD AUTO: 26.5 % (ref 35.8–46.3)
HGB BLD-MCNC: 8.4 G/DL (ref 11.7–15.4)
MCH RBC QN AUTO: 30.7 PG (ref 26.1–32.9)
MCHC RBC AUTO-ENTMCNC: 31.7 G/DL (ref 31.4–35)
MCV RBC AUTO: 96.7 FL (ref 79.6–97.8)
NRBC # BLD: 0.02 K/UL (ref 0–0.2)
PLATELET # BLD AUTO: 248 K/UL (ref 150–450)
PMV BLD AUTO: 11 FL (ref 9.4–12.3)
POTASSIUM SERPL-SCNC: 4.5 MMOL/L (ref 3.5–5.1)
RBC # BLD AUTO: 2.74 M/UL (ref 4.05–5.2)
SODIUM SERPL-SCNC: 140 MMOL/L (ref 136–145)
WBC # BLD AUTO: 10 K/UL (ref 4.3–11.1)

## 2019-04-18 PROCEDURE — 77030019952 HC CANSTR VAC ASST KCON -B: Performed by: SURGERY

## 2019-04-18 PROCEDURE — 82962 GLUCOSE BLOOD TEST: CPT

## 2019-04-18 PROCEDURE — 87186 SC STD MICRODIL/AGAR DIL: CPT

## 2019-04-18 PROCEDURE — 74750000023 HC WOUND THERAPY

## 2019-04-18 PROCEDURE — 74011250637 HC RX REV CODE- 250/637: Performed by: SURGERY

## 2019-04-18 PROCEDURE — 74011250636 HC RX REV CODE- 250/636: Performed by: NURSE PRACTITIONER

## 2019-04-18 PROCEDURE — 36415 COLL VENOUS BLD VENIPUNCTURE: CPT

## 2019-04-18 PROCEDURE — 74011250636 HC RX REV CODE- 250/636

## 2019-04-18 PROCEDURE — 80048 BASIC METABOLIC PNL TOTAL CA: CPT

## 2019-04-18 PROCEDURE — 74011250636 HC RX REV CODE- 250/636: Performed by: SURGERY

## 2019-04-18 PROCEDURE — 77030013708 HC HNDPC SUC IRR PULS STRY –B: Performed by: SURGERY

## 2019-04-18 PROCEDURE — 76010000149 HC OR TIME 1 TO 1.5 HR: Performed by: SURGERY

## 2019-04-18 PROCEDURE — 94760 N-INVAS EAR/PLS OXIMETRY 1: CPT

## 2019-04-18 PROCEDURE — 87075 CULTR BACTERIA EXCEPT BLOOD: CPT

## 2019-04-18 PROCEDURE — 74011636637 HC RX REV CODE- 636/637: Performed by: SURGERY

## 2019-04-18 PROCEDURE — 77030018836 HC SOL IRR NACL ICUM -A: Performed by: SURGERY

## 2019-04-18 PROCEDURE — 77030020255 HC SOL INJ LR 1000ML BG

## 2019-04-18 PROCEDURE — 74011000258 HC RX REV CODE- 258: Performed by: NURSE PRACTITIONER

## 2019-04-18 PROCEDURE — 0JB80ZZ EXCISION OF ABDOMEN SUBCUTANEOUS TISSUE AND FASCIA, OPEN APPROACH: ICD-10-PCS | Performed by: SURGERY

## 2019-04-18 PROCEDURE — 77030018717 HC DRSG GRNUFM KCON -B: Performed by: SURGERY

## 2019-04-18 PROCEDURE — 77010033678 HC OXYGEN DAILY

## 2019-04-18 PROCEDURE — 88305 TISSUE EXAM BY PATHOLOGIST: CPT

## 2019-04-18 PROCEDURE — 74011250636 HC RX REV CODE- 250/636: Performed by: ANESTHESIOLOGY

## 2019-04-18 PROCEDURE — 85027 COMPLETE CBC AUTOMATED: CPT

## 2019-04-18 PROCEDURE — 65270000029 HC RM PRIVATE

## 2019-04-18 PROCEDURE — 76060000033 HC ANESTHESIA 1 TO 1.5 HR: Performed by: SURGERY

## 2019-04-18 PROCEDURE — 87205 SMEAR GRAM STAIN: CPT

## 2019-04-18 PROCEDURE — 76210000006 HC OR PH I REC 0.5 TO 1 HR: Performed by: SURGERY

## 2019-04-18 PROCEDURE — 87077 CULTURE AEROBIC IDENTIFY: CPT

## 2019-04-18 RX ORDER — ROCURONIUM BROMIDE 10 MG/ML
INJECTION, SOLUTION INTRAVENOUS AS NEEDED
Status: DISCONTINUED | OUTPATIENT
Start: 2019-04-18 | End: 2019-04-18 | Stop reason: HOSPADM

## 2019-04-18 RX ORDER — ONDANSETRON 2 MG/ML
INJECTION INTRAMUSCULAR; INTRAVENOUS AS NEEDED
Status: DISCONTINUED | OUTPATIENT
Start: 2019-04-18 | End: 2019-04-18 | Stop reason: HOSPADM

## 2019-04-18 RX ORDER — EPHEDRINE SULFATE 50 MG/ML
INJECTION, SOLUTION INTRAVENOUS AS NEEDED
Status: DISCONTINUED | OUTPATIENT
Start: 2019-04-18 | End: 2019-04-18 | Stop reason: HOSPADM

## 2019-04-18 RX ORDER — ONDANSETRON 2 MG/ML
4 INJECTION INTRAMUSCULAR; INTRAVENOUS
Status: DISCONTINUED | OUTPATIENT
Start: 2019-04-18 | End: 2019-04-18 | Stop reason: HOSPADM

## 2019-04-18 RX ORDER — GLYCOPYRROLATE 0.2 MG/ML
INJECTION INTRAMUSCULAR; INTRAVENOUS AS NEEDED
Status: DISCONTINUED | OUTPATIENT
Start: 2019-04-18 | End: 2019-04-18 | Stop reason: HOSPADM

## 2019-04-18 RX ORDER — SUCCINYLCHOLINE CHLORIDE 20 MG/ML
INJECTION INTRAMUSCULAR; INTRAVENOUS AS NEEDED
Status: DISCONTINUED | OUTPATIENT
Start: 2019-04-18 | End: 2019-04-18 | Stop reason: HOSPADM

## 2019-04-18 RX ORDER — LIDOCAINE HYDROCHLORIDE 20 MG/ML
INJECTION, SOLUTION EPIDURAL; INFILTRATION; INTRACAUDAL; PERINEURAL AS NEEDED
Status: DISCONTINUED | OUTPATIENT
Start: 2019-04-18 | End: 2019-04-18 | Stop reason: HOSPADM

## 2019-04-18 RX ORDER — LIDOCAINE HYDROCHLORIDE 10 MG/ML
0.1 INJECTION INFILTRATION; PERINEURAL AS NEEDED
Status: DISCONTINUED | OUTPATIENT
Start: 2019-04-18 | End: 2019-04-26 | Stop reason: HOSPADM

## 2019-04-18 RX ORDER — PROPOFOL 10 MG/ML
INJECTION, EMULSION INTRAVENOUS AS NEEDED
Status: DISCONTINUED | OUTPATIENT
Start: 2019-04-18 | End: 2019-04-18 | Stop reason: HOSPADM

## 2019-04-18 RX ORDER — NEOSTIGMINE METHYLSULFATE 1 MG/ML
INJECTION INTRAVENOUS AS NEEDED
Status: DISCONTINUED | OUTPATIENT
Start: 2019-04-18 | End: 2019-04-18 | Stop reason: HOSPADM

## 2019-04-18 RX ORDER — FENTANYL CITRATE 50 UG/ML
INJECTION, SOLUTION INTRAMUSCULAR; INTRAVENOUS AS NEEDED
Status: DISCONTINUED | OUTPATIENT
Start: 2019-04-18 | End: 2019-04-18 | Stop reason: HOSPADM

## 2019-04-18 RX ORDER — ALBUTEROL SULFATE 0.83 MG/ML
2.5 SOLUTION RESPIRATORY (INHALATION) AS NEEDED
Status: DISCONTINUED | OUTPATIENT
Start: 2019-04-18 | End: 2019-04-18 | Stop reason: HOSPADM

## 2019-04-18 RX ORDER — SODIUM CHLORIDE, SODIUM LACTATE, POTASSIUM CHLORIDE, CALCIUM CHLORIDE 600; 310; 30; 20 MG/100ML; MG/100ML; MG/100ML; MG/100ML
1000 INJECTION, SOLUTION INTRAVENOUS CONTINUOUS
Status: DISPENSED | OUTPATIENT
Start: 2019-04-18 | End: 2019-04-19

## 2019-04-18 RX ORDER — HYDROMORPHONE HYDROCHLORIDE 2 MG/ML
0.5 INJECTION, SOLUTION INTRAMUSCULAR; INTRAVENOUS; SUBCUTANEOUS
Status: DISCONTINUED | OUTPATIENT
Start: 2019-04-18 | End: 2019-04-18 | Stop reason: HOSPADM

## 2019-04-18 RX ADMIN — ASPIRIN 81 MG 81 MG: 81 TABLET ORAL at 10:53

## 2019-04-18 RX ADMIN — DIPHENHYDRAMINE HYDROCHLORIDE 12.5 MG: 50 INJECTION, SOLUTION INTRAMUSCULAR; INTRAVENOUS at 20:37

## 2019-04-18 RX ADMIN — FENTANYL CITRATE 50 MCG: 50 INJECTION, SOLUTION INTRAMUSCULAR; INTRAVENOUS at 12:07

## 2019-04-18 RX ADMIN — Medication 10 ML: at 06:02

## 2019-04-18 RX ADMIN — ONDANSETRON 4 MG: 2 INJECTION INTRAMUSCULAR; INTRAVENOUS at 23:35

## 2019-04-18 RX ADMIN — HYDROMORPHONE HYDROCHLORIDE 1 MG: 2 INJECTION, SOLUTION INTRAMUSCULAR; INTRAVENOUS; SUBCUTANEOUS at 05:56

## 2019-04-18 RX ADMIN — ONDANSETRON 4 MG: 2 INJECTION INTRAMUSCULAR; INTRAVENOUS at 12:39

## 2019-04-18 RX ADMIN — BISACODYL 5 MG: 5 TABLET, COATED ORAL at 23:35

## 2019-04-18 RX ADMIN — HUMAN INSULIN 2 UNITS: 100 INJECTION, SOLUTION SUBCUTANEOUS at 23:35

## 2019-04-18 RX ADMIN — ONDANSETRON 4 MG: 2 INJECTION INTRAMUSCULAR; INTRAVENOUS at 18:35

## 2019-04-18 RX ADMIN — SODIUM CHLORIDE, SODIUM LACTATE, POTASSIUM CHLORIDE, AND CALCIUM CHLORIDE 1000 ML: 600; 310; 30; 20 INJECTION, SOLUTION INTRAVENOUS at 15:23

## 2019-04-18 RX ADMIN — PIPERACILLIN AND TAZOBACTAM 3.38 G: 3; .375 INJECTION, POWDER, FOR SOLUTION INTRAVENOUS at 12:45

## 2019-04-18 RX ADMIN — GLYCOPYRROLATE 0.4 MG: 0.2 INJECTION INTRAMUSCULAR; INTRAVENOUS at 12:45

## 2019-04-18 RX ADMIN — LIDOCAINE HYDROCHLORIDE 100 MG: 20 INJECTION, SOLUTION EPIDURAL; INFILTRATION; INTRACAUDAL; PERINEURAL at 12:07

## 2019-04-18 RX ADMIN — HYDROMORPHONE HYDROCHLORIDE 1 MG: 2 INJECTION, SOLUTION INTRAMUSCULAR; INTRAVENOUS; SUBCUTANEOUS at 18:32

## 2019-04-18 RX ADMIN — EPHEDRINE SULFATE 10 MG: 50 INJECTION, SOLUTION INTRAVENOUS at 12:08

## 2019-04-18 RX ADMIN — Medication 400 MG: at 17:31

## 2019-04-18 RX ADMIN — ONDANSETRON 4 MG: 2 INJECTION INTRAMUSCULAR; INTRAVENOUS at 08:56

## 2019-04-18 RX ADMIN — ONDANSETRON 4 MG: 2 INJECTION INTRAMUSCULAR; INTRAVENOUS at 06:01

## 2019-04-18 RX ADMIN — DOCUSATE SODIUM 100 MG: 100 CAPSULE, LIQUID FILLED ORAL at 17:31

## 2019-04-18 RX ADMIN — HYDROMORPHONE HYDROCHLORIDE 2 MG: 2 INJECTION, SOLUTION INTRAMUSCULAR; INTRAVENOUS; SUBCUTANEOUS at 23:35

## 2019-04-18 RX ADMIN — HUMAN INSULIN 2 UNITS: 100 INJECTION, SOLUTION SUBCUTANEOUS at 01:37

## 2019-04-18 RX ADMIN — HYDROCODONE BITARTRATE AND ACETAMINOPHEN 2 TABLET: 5; 325 TABLET ORAL at 08:45

## 2019-04-18 RX ADMIN — CARVEDILOL 12.5 MG: 6.25 TABLET, FILM COATED ORAL at 08:44

## 2019-04-18 RX ADMIN — NEOSTIGMINE METHYLSULFATE 3 MG: 1 INJECTION INTRAVENOUS at 12:45

## 2019-04-18 RX ADMIN — EPHEDRINE SULFATE 10 MG: 50 INJECTION, SOLUTION INTRAVENOUS at 12:24

## 2019-04-18 RX ADMIN — HYDROMORPHONE HYDROCHLORIDE 2 MG: 2 INJECTION, SOLUTION INTRAMUSCULAR; INTRAVENOUS; SUBCUTANEOUS at 01:37

## 2019-04-18 RX ADMIN — CARVEDILOL 12.5 MG: 6.25 TABLET, FILM COATED ORAL at 17:31

## 2019-04-18 RX ADMIN — DIPHENHYDRAMINE HYDROCHLORIDE 12.5 MG: 50 INJECTION, SOLUTION INTRAMUSCULAR; INTRAVENOUS at 04:08

## 2019-04-18 RX ADMIN — HYDROMORPHONE HYDROCHLORIDE 2 MG: 2 INJECTION, SOLUTION INTRAMUSCULAR; INTRAVENOUS; SUBCUTANEOUS at 10:26

## 2019-04-18 RX ADMIN — ALPRAZOLAM 0.5 MG: 0.5 TABLET ORAL at 10:26

## 2019-04-18 RX ADMIN — Medication 10 ML: at 14:35

## 2019-04-18 RX ADMIN — PROPOFOL 150 MG: 10 INJECTION, EMULSION INTRAVENOUS at 12:07

## 2019-04-18 RX ADMIN — HYDROMORPHONE HYDROCHLORIDE 0.5 MG: 2 INJECTION, SOLUTION INTRAMUSCULAR; INTRAVENOUS; SUBCUTANEOUS at 13:32

## 2019-04-18 RX ADMIN — HYDROCODONE BITARTRATE AND ACETAMINOPHEN 2 TABLET: 5; 325 TABLET ORAL at 21:03

## 2019-04-18 RX ADMIN — PIPERACILLIN AND TAZOBACTAM 3.38 G: 3; .375 INJECTION, POWDER, FOR SOLUTION INTRAVENOUS at 04:08

## 2019-04-18 RX ADMIN — HUMAN INSULIN 2 UNITS: 100 INJECTION, SOLUTION SUBCUTANEOUS at 18:32

## 2019-04-18 RX ADMIN — SUCCINYLCHOLINE CHLORIDE 140 MG: 20 INJECTION INTRAMUSCULAR; INTRAVENOUS at 12:07

## 2019-04-18 RX ADMIN — ROCURONIUM BROMIDE 5 MG: 10 INJECTION, SOLUTION INTRAVENOUS at 12:07

## 2019-04-18 RX ADMIN — AMIODARONE HYDROCHLORIDE 200 MG: 200 TABLET ORAL at 08:42

## 2019-04-18 RX ADMIN — ROCURONIUM BROMIDE 20 MG: 10 INJECTION, SOLUTION INTRAVENOUS at 12:23

## 2019-04-18 NOTE — PROGRESS NOTES
PT/OT Pt is going to OR today. Will resume therapies tomorrow. Claudia Lynn [place] Cathay Opitz, OT

## 2019-04-18 NOTE — INTERVAL H&P NOTE
H&P Update: 
Reji Leigh was seen and examined. History and physical has been reviewed. Significant clinical changes have occurred as noted: The patient has an open abdominal wound with staples in between. She has some necrotic change and purulent discharge. She is consented for Excisional debridement of abdominal wound with placement of wound vac.

## 2019-04-18 NOTE — PROGRESS NOTES
TRANSFER - IN REPORT: 
 
Verbal report received from [facility] (name) on Florentin Bang  being received from PACU (unit) for routine progression of care Report consisted of patients Situation, Background, Assessment and  
Recommendations(SBAR). Information from the following report(s) SBAR was reviewed with the receiving nurse. Opportunity for questions and clarification was provided. Assessment completed upon patients arrival to unit and care assumed. Patient coming back with a wound vac and may have a regular diet after 1400.

## 2019-04-18 NOTE — ANESTHESIA PREPROCEDURE EVALUATION
Relevant Problems   No relevant active problems       Anesthetic History   No history of anesthetic complications            Review of Systems / Medical History  Patient summary reviewed and pertinent labs reviewed    Pulmonary        Sleep apnea: CPAP  Smoker (cigarettes and marijuana)         Neuro/Psych   Within defined limits           Cardiovascular    Hypertension        Dysrhythmias : SVT  Pacemaker (Icd that has never shocked her)    Exercise tolerance: >4 METS  Comments: Nonischemic cardiomyopathy history and ICD in 2013, cathed in 2016 with normal coronary arteries and Last EF 33% on Nuc stress with poor images. No recent cardiac images.    GI/Hepatic/Renal     GERD: well controlled           Endo/Other    Diabetes: well controlled, type 2, using insulin    Morbid obesity     Other Findings              Physical Exam    Airway  Mallampati: II  TM Distance: 4 - 6 cm  Neck ROM: normal range of motion   Mouth opening: Normal     Cardiovascular    Rhythm: regular  Rate: normal  Peripheral edema (mild)    Pertinent negatives: No murmur   Dental    Dentition: Caps/crowns     Pulmonary  Breath sounds clear to auscultation               Abdominal         Other Findings            Anesthetic Plan    ASA: 3, emergent  Anesthesia type: general          Induction: Intravenous and RSI  Anesthetic plan and risks discussed with: Patient

## 2019-04-18 NOTE — PROGRESS NOTES
Shift assessment complete. Pt A&O x 4. RR present even and unlabored. Heart sounds regular. Patient needs met. No distress noted. Bed low and locked. Call light within reach. Will continue to monitor hourly during shift.

## 2019-04-18 NOTE — PROGRESS NOTES
TRANSFER - OUT REPORT: 
 
Verbal report given to Radha (name) on Yana Jimenez  being transferred to preop (unit) for ordered procedure Report consisted of patients Situation, Background, Assessment and  
Recommendations(SBAR). Information from the following report(s) SBAR was reviewed with the receiving nurse. Lines:  
Peripheral IV 04/18/19 Distal;Left;Posterior Forearm (Active) Site Assessment Clean, dry, & intact 4/18/2019  7:00 AM  
Phlebitis Assessment 0 4/18/2019  7:00 AM  
Infiltration Assessment 0 4/18/2019  7:00 AM  
Dressing Status Clean, dry, & intact 4/18/2019  7:00 AM  
Dressing Type Transparent 4/18/2019  7:00 AM  
Hub Color/Line Status Blue;Patent; Flushed 4/18/2019  7:00 AM  
Alcohol Cap Used No 4/18/2019  7:00 AM  
  
 
Opportunity for questions and clarification was provided. Patient transported with: 
 O2 @ 2 liters

## 2019-04-18 NOTE — PROGRESS NOTES
TRANSFER - IN REPORT: 
 
Verbal report received from Kassie Lea (name) on Florentin Bang  being received from PACU (unit) for ordered procedure Report consisted of patients Situation, Background, Assessment and  
Recommendations(SBAR). Information from the following report(s) SBAR was reviewed with the receiving nurse. Opportunity for questions and clarification was provided. Assessment completed upon patients arrival to unit and care assumed. WOUND VAC PLACED IN MID ABD CONTINIOUS SUCTION @125

## 2019-04-18 NOTE — PROGRESS NOTES
Problem: Falls - Risk of 
Goal: *Absence of Falls Description Document Megjosh Mj Fall Risk and appropriate interventions in the flowsheet. Outcome: Progressing Towards Goal 
  
Problem: Patient Education: Go to Patient Education Activity Goal: Patient/Family Education Outcome: Progressing Towards Goal 
  
Problem: Patient Education: Go to Patient Education Activity Goal: Patient/Family Education Outcome: Progressing Towards Goal

## 2019-04-18 NOTE — BRIEF OP NOTE
BRIEF OPERATIVE NOTE Date of Procedure: 4/18/2019 Preoperative Diagnosis: ABDOMINAL INFECTION Postoperative Diagnosis: ABDOMINAL INFECTION Procedure(s): 
Sharp excision and sharp debridement of abdominal wound with #15 scalpel blade and sharp scissors down to abdominal fascia 15cm by 15cm by 10cm/placement of wound vac Surgeon(s) and Role: * Bhavana Mac, DO - Primary Surgical Assistant: none Surgical Staff: 
Circ-1: Monie Eason RN Scrub Tech-1: Will Rod Event Time In Time Out Incision Start 04/18/2019 1222 Incision Close 04/18/2019 1241 Anesthesia: General  
Estimated Blood Loss: Minimal 
Specimens:  
ID Type Source Tests Collected by Time Destination 1 : necrotic tissue Fresh Abdomen  Fern Muller, DO 4/18/2019 1231 Pathology 1 : abdominal wound Wound Abdomen CULTURE, ANAEROBIC, CULTURE, WOUND W GRAM STAIN Fern Muller,  4/18/2019 1225 Microbiology Findings: Necrotic skin and subcutaneous fat down to fascia. Mesh intact. Wound 15 by 15 by 10. Wound vac applied. Cultures sent. Complications: None Implants: * No implants in log * Bhavana Alejandro [address], [place] [address]

## 2019-04-18 NOTE — PROGRESS NOTES
Report received from off going RN. Patient resting comfortably in room. No distress observed. A?O x4  
 
Pain. Jeffrey Plough Jeffrey Pllatia Diaz 7/10  Right side abdomen. Position. ..up in chair. Potty. .... Jeffrey Diaz ambulates to bathroom. [employer] Student today to do bath and preop checklist.  
Left JENNIFER Drain with air leak. ABD BINDER IN PLACE.

## 2019-04-18 NOTE — PROGRESS NOTES
Called Dr. Marley's office again to see if he wants RN to give am meds including Lantus. Do De La Cruz NP called back and said to hold the pts Lantus, and blood thinners.

## 2019-04-19 LAB
GLUCOSE BLD STRIP.AUTO-MCNC: 126 MG/DL (ref 65–100)
GLUCOSE BLD STRIP.AUTO-MCNC: 142 MG/DL (ref 65–100)
GLUCOSE BLD STRIP.AUTO-MCNC: 160 MG/DL (ref 65–100)
GLUCOSE BLD STRIP.AUTO-MCNC: 197 MG/DL (ref 65–100)
GLUCOSE BLD STRIP.AUTO-MCNC: 206 MG/DL (ref 65–100)

## 2019-04-19 PROCEDURE — 82962 GLUCOSE BLOOD TEST: CPT

## 2019-04-19 PROCEDURE — 74750000023 HC WOUND THERAPY

## 2019-04-19 PROCEDURE — 65270000029 HC RM PRIVATE

## 2019-04-19 PROCEDURE — 74011250636 HC RX REV CODE- 250/636: Performed by: SURGERY

## 2019-04-19 PROCEDURE — 74011250637 HC RX REV CODE- 250/637: Performed by: SURGERY

## 2019-04-19 PROCEDURE — 97530 THERAPEUTIC ACTIVITIES: CPT

## 2019-04-19 PROCEDURE — 74011636637 HC RX REV CODE- 636/637: Performed by: SURGERY

## 2019-04-19 PROCEDURE — 74011000250 HC RX REV CODE- 250: Performed by: SURGERY

## 2019-04-19 PROCEDURE — 97606 NEG PRS WND THER DME>50 SQCM: CPT

## 2019-04-19 PROCEDURE — 97110 THERAPEUTIC EXERCISES: CPT

## 2019-04-19 PROCEDURE — 77030019934 HC DRSG VAC ASST KCON -B

## 2019-04-19 PROCEDURE — 77010033678 HC OXYGEN DAILY

## 2019-04-19 PROCEDURE — 94760 N-INVAS EAR/PLS OXIMETRY 1: CPT

## 2019-04-19 RX ADMIN — DOCUSATE SODIUM 100 MG: 100 CAPSULE, LIQUID FILLED ORAL at 18:04

## 2019-04-19 RX ADMIN — INSULIN GLARGINE 35 UNITS: 100 INJECTION, SOLUTION SUBCUTANEOUS at 09:00

## 2019-04-19 RX ADMIN — HYDROCODONE BITARTRATE AND ACETAMINOPHEN 2 TABLET: 5; 325 TABLET ORAL at 12:54

## 2019-04-19 RX ADMIN — HYDROCODONE BITARTRATE AND ACETAMINOPHEN 2 TABLET: 5; 325 TABLET ORAL at 05:52

## 2019-04-19 RX ADMIN — CARVEDILOL 12.5 MG: 6.25 TABLET, FILM COATED ORAL at 07:40

## 2019-04-19 RX ADMIN — PRAVASTATIN SODIUM 80 MG: 80 TABLET ORAL at 10:29

## 2019-04-19 RX ADMIN — Medication 10 ML: at 06:28

## 2019-04-19 RX ADMIN — HYDROMORPHONE HYDROCHLORIDE 1 MG: 2 INJECTION, SOLUTION INTRAMUSCULAR; INTRAVENOUS; SUBCUTANEOUS at 16:10

## 2019-04-19 RX ADMIN — HYDROMORPHONE HYDROCHLORIDE 2 MG: 2 INJECTION, SOLUTION INTRAMUSCULAR; INTRAVENOUS; SUBCUTANEOUS at 07:40

## 2019-04-19 RX ADMIN — PROMETHAZINE HYDROCHLORIDE 25 MG: 25 INJECTION INTRAMUSCULAR; INTRAVENOUS at 12:55

## 2019-04-19 RX ADMIN — DOCUSATE SODIUM 100 MG: 100 CAPSULE, LIQUID FILLED ORAL at 10:28

## 2019-04-19 RX ADMIN — Medication 400 MG: at 18:04

## 2019-04-19 RX ADMIN — ALPRAZOLAM 0.5 MG: 0.5 TABLET ORAL at 04:01

## 2019-04-19 RX ADMIN — HYDROMORPHONE HYDROCHLORIDE 2 MG: 2 INJECTION, SOLUTION INTRAMUSCULAR; INTRAVENOUS; SUBCUTANEOUS at 19:55

## 2019-04-19 RX ADMIN — ASPIRIN 81 MG 81 MG: 81 TABLET ORAL at 10:29

## 2019-04-19 RX ADMIN — Medication 10 ML: at 18:05

## 2019-04-19 RX ADMIN — CARVEDILOL 12.5 MG: 6.25 TABLET, FILM COATED ORAL at 16:11

## 2019-04-19 RX ADMIN — Medication 10 ML: at 22:24

## 2019-04-19 RX ADMIN — HUMAN INSULIN 2 UNITS: 100 INJECTION, SOLUTION SUBCUTANEOUS at 12:00

## 2019-04-19 RX ADMIN — Medication 10 ML: at 00:50

## 2019-04-19 RX ADMIN — AMIODARONE HYDROCHLORIDE 200 MG: 200 TABLET ORAL at 10:29

## 2019-04-19 RX ADMIN — ONDANSETRON 4 MG: 2 INJECTION INTRAMUSCULAR; INTRAVENOUS at 07:58

## 2019-04-19 RX ADMIN — HUMAN INSULIN 4 UNITS: 100 INJECTION, SOLUTION SUBCUTANEOUS at 22:22

## 2019-04-19 RX ADMIN — Medication 400 MG: at 10:29

## 2019-04-19 RX ADMIN — ONDANSETRON 4 MG: 2 INJECTION INTRAMUSCULAR; INTRAVENOUS at 18:04

## 2019-04-19 NOTE — PROGRESS NOTES
Shift Assessment - Patient is alert and oriented x4. Respirations even and unlabored. Abdomen soft and tender. Midline abd incision with wound vac. Currently sitting in a locked recliner. Call light in reach. Complaint of nausea. Zofran given.

## 2019-04-19 NOTE — OP NOTES
[place]  OPERATIVE REPORT    Name:  Clive Brown  MR#:  [id number]  :  1965  ACCOUNT #:  [de-identified]  DATE OF SERVICE:  2019      PREOPERATIVE DIAGNOSIS:  Abdominal wound infection with dehiscence. POSTOPERATIVE DIAGNOSES:  Abdominal wound infection with necrotic skin and fat. PROCEDURE PERFORMED:  1.  Sharp excisional debridement of necrotic skin and subcutaneous fat down to the fascia. A 15 cm x 15 cm x 10 cm wound debrided with 15 scalpel blade and sharp scissors. 2.  Application of wound VAC. SURGEON:  Patricia Mahoney DO    ASSISTANT:  None. ANESTHESIA:  General endotracheal.    COMPLICATIONS:  None. COUNTS:  Sponge count, needle count and instrument count all correct x3. SPECIMENS REMOVED:  1. Wound cultures. 2.  Necrotic skin and fat. IMPLANTS:  Wound VAC. ESTIMATED BLOOD LOSS:  Minimal.    DESCRIPTION OF THE PROCEDURE:  This is a 25-year-old female patient of Dr. Bony Will with history of a ventral hernia repair with mesh and then wound dehiscence with evisceration and subsequent abdominal hernia repair with onlay mesh, now with a dehiscence of her abdominal wound with necrotic skin and fat and drainage. She is prepared for excisional sharp debridement of abdominal wound with application of wound VAC. Consent was obtained by describing the procedure to the patient including potential complications to include infection, bleeding and wound VAC. Consent was signed and placed in the final chart. She was administered Zosyn 3.375 mg IV preoperatively. She was taken to the operating room, laid in a supine position and general anesthesia was initiated without complications. She was prepped and draped in the usual sterile fashion. A time-out was taken to confirm the patient and proper procedure. Prior to prepping and draping, the staples were removed revealing a 15 cm x 15 cm x 10 cm deep wound. The drains had also been removed.   We prepped her with Betadine solution. This was all irrigated free. We then explored the wound, identifying skin necrosis for about 3 cm to the right of the midline incision. There was necrotic fat extending down to the fascia. On the right side, there was also tunneling along the fascial edge for a distance of about 15 cm. There did not appear to be any necrotic fat beyond the 3-4 cm that will be resected. Using a 15 scalpel blade, we incised the skin and removed a 4 cm rim of necrotic skin and necrotic fat and carried this out down to the fascia using sharp scissors. Bovie cauterization was then used to control hemostasis. Specimen was sent as necrotic skin and fat. We then obtained aerobic and anaerobic wound cultures and sent these to microbiology for incubation. Along the left edge of the subcutaneous tissue, there was some necrotic fat that was excised using sharp debridement. This was also sent to pathology. At this point, we copiously irrigated with saline until clear. We irrigated with a Pulsavac using 3 liters of normal saline. All tissues appeared viable circumferentially. The mesh was observed to be intact with no evidence of recurrent hernia. At this point, we obtained a wound VAC, and we placed two 15 x 15 x 10 cm pieces to fill the wound. The wound VAC was then sealed using the adhesive and then connected with good seal and good contraction of the wound. At this point, we concluded. She tolerated the procedure well without immediate complications. FINDINGS:  A 47-year-old female with wound dehiscence and necrotic skin. Sharp debridement and excision of necrotic skin and fat was performed with a 15 scalpel blade and sharp scissors down to the fascia. Cultures were obtained and a wound VAC was applied. She tolerated the procedure well.       [address], DO      [id number]  D:  04/18/2019 12:56  T:  04/18/2019 14:09  JOB #:  [id number]

## 2019-04-19 NOTE — PROGRESS NOTES
Problem: Mobility Impaired (Adult and Pediatric) Goal: *Acute Goals and Plan of Care (Insert Text) Description STG: 
(1.)Ms. Lisy Tan will move from supine to sit and sit to supine  with MINIMAL ASSIST within 3 treatment day(s). (pt sleeps in recliner) (2.)Ms. iLsy Tan will transfer from bed to chair and chair to bed with CONTACT GUARD ASSIST using the least restrictive device within 3 treatment day(s). Met 4/12 
(3.)Ms. Lisy Tan will ambulate with CONTACT GUARD ASSIST for 50 feet with the least restrictive device within 3 treatment day(s). Met 4/12 GOALS RE-ASSESSED 4/13/19 : 
(1.)Ms. Lisy Tan will transfer from bed to chair and chair to bed with STAND BY ASSIST using the least restrictive device. Met 4/14 ADDENDUM GOALS 4/19/19 : 
1) pt will transfer with cane or no device & SBA. 
(2.)Ms. Lisy Tan will ambulate with STAND BY ASSIST for 200 feet with a cane or no device. (3)Ms. Lisy Tan will go up 4 steps with CGA assist & rail. 
(4)Ms. Lisy Tan will perform HEP with cues & written guidelines, to increase safety on her feet 
 
 
________________________________________________________________________________________________ Outcome: Progressing Towards Goal 
  
PHYSICAL THERAPY: Re-evaluation and AM 4/19/2019 INPATIENT: PT Visit Days : 1 [place] / [place] / Product Type: Medicare /   
  
NAME/AGE/GENDER: Twyla Lira is a 52 y.o. female PRIMARY DIAGNOSIS: Incisional hernia, without obstruction or gangrene [K43.2] Incarcerated incisional hernia [K43.0] Incarcerated incisional hernia [K43.0] Incarcerated incisional hernia Incarcerated incisional hernia Procedure(s) (LRB): ABDOMINAL INFECTION EXCISION AND PLACEMENT OF WOUND VAC (N/A) 1 Day Post-Op ICD-10: Treatment Diagnosis:  
  · Generalized Muscle Weakness (M62.81) · Other abnormalities of gait and mobility (R26.89) Precaution/Allergies: 
Grass pollen ASSESSMENT:  
 Ms. Renetta Smith presents with general decreased in functional mobility and gait s/p OPEN INCARCERATED [address] on 3/26/19. Patient underwent second pocedure 3/31/19 secondary to dehiscense and hernia recurrence. Pt also had debridement of infected tissue around surgical wound on 4/18/19, wound vac placed. Pt did well with transfers & gait , beginning to prep pt to WB without an assistive device. Goals modified . 4 This section established at most recent assessment PROBLEM LIST (Impairments causing functional limitations): 1. Decreased Strength 2. Decreased ADL/Functional Activities 3. Decreased Transfer Abilities 4. Decreased Ambulation Ability/Technique 5. Decreased Balance 6. Increased Pain 7. Decreased Activity Tolerance 8. Decreased Flexibility/Joint Mobility 9. Decreased [place] with Home Exercise Program 
 INTERVENTIONS PLANNED: (Benefits and precautions of physical therapy have been discussed with the patient.) 1. Balance Exercise 2. Bed Mobility 3. Family Education 4. Gait Training 5. Home Exercise Program (HEP) 6. Therapeutic Activites 7. Therapeutic Exercise/Strengthening 8. Transfer Training TREATMENT PLAN: Frequency/Duration: daily for duration of hospital stay Rehabilitation Potential For Stated Goals: Good RECOMMENDED REHABILITATION/EQUIPMENT: (at time of discharge pending progress): Due to the probability of continued deficits (see above) this patient will likely need continued skilled physical therapy after discharge. Equipment:  
? to be determined HISTORY:  
History of Present Injury/Illness (Reason for Referral): S/p OPEN INCARCERATED INCISIONAL HERNIA REPAIR 3/26/19 Past Medical History/Comorbidities: Ms. Renetta Smith  has a past medical history of Allergic rhinitis, Arthritis, Automatic implantable cardioverter-defibrillator in situ (3/30/2016), CAD in native artery (7/02/0881), Chronic systolic heart failure ([facility] (2013), CKD (chronic kidney disease), Diabetes ([facility] Diabetes mellitus ([facility] (2010), Dyslipidemia (2013), Eczema, Fibromyalgia, GERD (gastroesophageal reflux disease), Headache, Heart failure ([facility] [facility] HTN (hypertension) (2010), Hypercholesterolemia, Morbid obesity ([facility] [facility] Neuropathy, NICM (nonischemic cardiomyopathy) ([facility] (2/15/2013), Obesity, Obstructive sleep apnea (adult) (pediatric) (2014), Pseudomonas urinary tract infection (2019), Sciatic pain (2016), SVT (supraventricular tachycardia) ([facility] and Tobacco use disorder (2010). She also has no past medical history of Dementia, Gastrointestinal disorder, or Unspecified adverse effect of anesthesia. Ms. Monie Ramsey  has a past surgical history that includes hx tonsillectomy; hx implantable cardioverter defibrillator (2013); hx  section; hx hernia repair; pr left heart cath,percutaneous (2013); hx svt ablation (\"years ago\"); hx rotator cuff repair (Right); hx hysterectomy; and hx implantable cardioverter defibrillator (2013). Social History/Living Environment:  
Home Environment: Private residence # Steps to Enter: 4(than an additional 3 inside) Rails to Enter: No 
One/Two Story Residence: One story Living Alone: No 
Support Systems: Friends \ neighbors Patient Expects to be Discharged to[de-identified] Private residence Current DME Used/Available at Home: Glucometer, CPAP Prior Level of Function/Work/Activity: 
independent Number of Personal Factors/Comorbidities that affect the Plan of Care: 1-2: MODERATE COMPLEXITY EXAMINATION:  
Most Recent Physical Functioning:  
Gross Assessment: 3 to 3-/5 throughout Balance: 
Sitting: Intact; Without support Standing: Impaired; With support(walker) Bed Mobility: 
Supine to Sit: (NT) Sit to Supine: (NT) Scooting: Stand-by assistance Transfers: 
Sit to Stand: Stand-by assistance Stand to Sit: Stand-by assistance Bed to Chair: Stand-by assistance Duration: 23 Minutes Gait: 
  
Speed/Cristy: Delayed Step Length: Left shortened;Right shortened Gait Abnormalities: Decreased step clearance Distance (ft): 140 Feet (ft) Assistive Device: Walker, rolling Ambulation - Level of Assistance: Stand-by assistance Interventions: Safety awareness training;Verbal cues Body Structures Involved: 1. Bones 2. Joints 3. Muscles 4. Ligaments Body Functions Affected: 1. Movement Related Activities and Participation Affected: 1. Mobility Number of elements that affect the Plan of Care: 3: MODERATE COMPLEXITY CLINICAL PRESENTATION:  
Presentation: Stable and uncomplicated: LOW COMPLEXITY CLINICAL DECISION MAKIN[address] [place] [address] AM-PAC 6 Clicks Basic Mobility Inpatient Short Form How much difficulty does the patient currently have. .. Unable A Lot A Little None 1. Turning over in bed (including adjusting bedclothes, sheets and blankets)? ? 1   ? 2   ? 3   ? 4  
2. Sitting down on and standing up from a chair with arms ( e.g., wheelchair, bedside commode, etc.)   ? 1   ? 2   ? 3   ? 4  
3. Moving from lying on back to sitting on the side of the bed?   ? 1   ? 2   ? 3   ? 4 How much help from another person does the patient currently need. .. Total A Lot A Little None 4. Moving to and from a bed to a chair (including a wheelchair)? ? 1   ? 2   ? 3   ? 4  
5. Need to walk in hospital room? ? 1   ? 2   ? 3   ? 4  
6. Climbing 3-5 steps with a railing? ? 1   ? 2   ? 3   ? 4  
© , Trustees of [address] [place] [address], under license to [employer]. All rights reserved Score:  Initial: 14 Most Recent: X (Date: -- ) Interpretation of Tool:  Represents activities that are increasingly more difficult (i.e. Bed mobility, Transfers, Gait). Medical Necessity:    
· Patient is expected to demonstrate progress in strength, range of motion, balance, coordination and functional technique ·  to decrease assistance required with theraputic exercises and functional mobility · . Reason for Services/Other Comments: 
· Patient continues to require present interventions due to patient's inability to perform theraputic exercises and functional mobility · . Use of outcome tool(s) and clinical judgement create a POC that gives a: Clear prediction of patient's progress: LOW COMPLEXITY  
  
 
 
 
TREATMENT:  
(In addition to Assessment/Re-Assessment sessions the following treatments were rendered) Pre-treatment Symptoms/Complaints:  none Pain: Initial:numeric scale Pain Intensity 1: 3 Pain Location 1: Abdomen Pain Orientation 1: Mid 
Pain Intervention(s) 1: Ambulation/Increased Activity  Post Session:  3/10 Therapeutic Activity: (  23 Minutes ):  Therapeutic activities including : transfers, wt shift without UE support, pre-gait exercise without UE support & pt repeated forward  backward stepping without UE support along with progressive ambulation using rolling walker. to improve mobility and strength. Braces/Orthotics/Lines/Etc:  
· IV 
· drain celeste Treatment/Session Assessment:   
· Response to Treatment:  Patient appears depressed despite progress made · Interdisciplinary Collaboration:  
o Registered Nurse · After treatment position/precautions:  
o Up in chair 
o Bed/Chair-wheels locked 
o Call light within reach 
o RN notified · Compliance with Program/Exercises: Will assess as treatment progresses. · Recommendations/Intent for next treatment session: \"Next visit will focus on advancements to more challenging activities and reduction in assistance provided\". Total Treatment Duration: PT Patient Time In/Time Out Time In: 1 Time Out: 8474 Alejandra Pandya, PT

## 2019-04-19 NOTE — PROGRESS NOTES
Problem: Self Care Deficits Care Plan (Adult) Goal: *Acute Goals and Plan of Care (Insert Text) Description Patient will complete lower body dressing with minimal assist to increase self care independence. Patient will complete bathing with minimal assist to increase self care independence. Patient will improve static standing at edge of bed for 5 minutes to improve independence with transfers and self cares. Patient will tolerate 38 minutes of OT treatment with self incorporated rest breaks to increase activity tolerance to enhance participation in hobbies. Progressing 4/19/19 Patient will complete all functional transfers with contact guard assist using adaptive equipment as needed. Patient will complete UE exercises with supervision to increase overall activity tolerance and strength. Progressing 4/19/19 Timeframe: 7 visits OCCUPATIONAL THERAPY: Daily Note and AM 4/19/2019 INPATIENT: OT Visit Days: 4 [place] / Product Type: Medicare /  
  
NAME/AGE/GENDER: Gustavo Hernandez is a 52 y.o. female PRIMARY DIAGNOSIS:  Incisional hernia, without obstruction or gangrene [K43.2] Incarcerated incisional hernia [K43.0] Incarcerated incisional hernia [K43.0] Incarcerated incisional hernia Incarcerated incisional hernia Procedure(s) (LRB): ABDOMINAL INFECTION EXCISION AND PLACEMENT OF WOUND VAC (N/A) 1 Day Post-Op ICD-10: Treatment Diagnosis:  
 · Generalized Muscle Weakness (M62.81) · Difficulty in walking, Not elsewhere classified (R26.2) Precautions/Allergies: 
   Grass pollen ASSESSMENT:  
Ms. Amanda Aguilera presents in chair. Pt completed functional mobility with rolling walker (CGA) to sink to complete grooming with supervision. Pt is progressing towards goals. Continue POC.  
 
4/19/19 Ms. Amanda Aguilera was seated upright in recliner upon arrival and was agreeable to therapy.  She reports sharp pain 3/10, and discomfort with movement. She reports walking with PT earlier this AM and willing to complete UE exercises. Patient completed all UE exercises in chair with good effort. Plan to continue with skilled services for improved functional performance and independence. This section established at most recent assessment PROBLEM LIST (Impairments causing functional limitations): 1. Decreased Strength 2. Decreased ADL/Functional Activities 3. Decreased Transfer Abilities 4. Decreased Balance 5. Increased Pain 6. Decreased Activity Tolerance INTERVENTIONS PLANNED: (Benefits and precautions of occupational therapy have been discussed with the patient.) 1. Activities of daily living training 2. Adaptive equipment training 3. Balance training 4. Clothing management 5. Neuromuscular re-eduation 6. Therapeutic activity 7. Therapeutic exercise TREATMENT PLAN: Frequency/Duration: Follow patient 3x a week to address above goals. Rehabilitation Potential For Stated Goals: Good RECOMMENDED REHABILITATION/EQUIPMENT: (at time of discharge pending progress): Due to the probability of continued deficits (see above) this patient will not likely need continued skilled occupational therapy after discharge. Equipment:  
? None at this time OCCUPATIONAL PROFILE AND HISTORY:  
History of Present Injury/Illness (Reason for Referral): 
See H&P. Past Medical History/Comorbidities: Ms. Mae Butcher  has a past medical history of Allergic rhinitis, Arthritis, Automatic implantable cardioverter-defibrillator in situ (3/30/2016), CAD in native artery (4/22/6906), Chronic systolic heart failure ([address] (07/18/2013), CKD (chronic kidney disease), Diabetes ([address] Diabetes mellitus ([address] (4/12/2010), Dyslipidemia (2/13/2013), Eczema, Fibromyalgia, GERD (gastroesophageal reflux disease), Headache, Heart failure ([address] HTN (hypertension) (4/12/2010), Hypercholesterolemia, Morbid obesity ([facility] Neuropathy, NICM (nonischemic cardiomyopathy) ([facility] (2/15/2013), Obesity, Obstructive sleep apnea (adult) (pediatric) (2014), Pseudomonas urinary tract infection (2019), Sciatic pain (2016), SVT (supraventricular tachycardia) ([facility] [facility] and Tobacco use disorder (2010). She also has no past medical history of Dementia, Gastrointestinal disorder, or Unspecified adverse effect of anesthesia. Ms. Oxana Canada  has a past surgical history that includes hx tonsillectomy; hx implantable cardioverter defibrillator (2013); hx  section; hx hernia repair; pr left heart cath,percutaneous (2013); hx svt ablation (\"years ago\"); hx rotator cuff repair (Right); hx hysterectomy; and hx implantable cardioverter defibrillator (2013). Social History/Living Environment:  
Home Environment: Private residence # Steps to Enter: 4(than an additional 3 inside) Rails to Enter: No 
One/Two Story Residence: One story Living Alone: No 
Support Systems: Friends \ neighbors Patient Expects to be Discharged to[de-identified] Private residence Current DME Used/Available at Home: Glucometer, CPAP Prior Level of Function/Work/Activity: She was working part time, driving, and independent with ADL's IADL's, walking prior to hospitalization. Number of Personal Factors/Comorbidities that affect the Plan of Care: Expanded review of therapy/medical records (1-2):  MODERATE COMPLEXITY ASSESSMENT OF OCCUPATIONAL PERFORMANCE[de-identified]  
Activities of Daily Living:  
Basic ADLs (From Assessment) Complex ADLs (From Assessment) Feeding: Supervision Oral Facial Hygiene/Grooming: Setup Bathing: Minimum assistance Upper Body Dressing: Minimum assistance Lower Body Dressing: Maximum assistance Toileting: Moderate assistance Grooming/Bathing/Dressing Activities of Daily Living Bed/Mat Mobility Supine to Sit: (NT) Sit to Supine: (NT) Sit to Stand: Stand-by assistance Stand to Sit: Stand-by assistance Bed to Chair: Stand-by assistance Scooting: Stand-by assistance Most Recent Physical Functioning:  
Gross Assessment: 
  
         
  
Posture: 
Posture (WDL): Exceptions to [facility] Posture Assessment: Trunk flexion, Rounded shoulders Balance: 
Sitting: Intact; Without support Standing: Impaired; With support(walker) Bed Mobility: 
Supine to Sit: (NT) Sit to Supine: (NT) Scooting: Stand-by assistance Wheelchair Mobility: 
  
Transfers: 
Sit to Stand: Stand-by assistance Stand to Sit: Stand-by assistance Bed to Chair: Stand-by assistance Duration: 23 Minutes Patient Vitals for the past 6 hrs: 
 BP BP Patient Position SpO2 Pulse 04/19/19 0730 110/49 Sitting 94 % 60  
04/19/19 0740 110/49  94 % 60  
04/19/19 0857   100 %  Mental Status Neurologic State: Alert Orientation Level: Oriented X4 Cognition: Follows commands Perception: Appears intact Perseveration: No perseveration noted Safety/Judgement: Fall prevention LLE Assessment LLE Assessment (WDL): Exception to [facility] RLE Assessment RLE Assessment (WDL): Exceptions to [facility] Physical Skills Involved: 1. Range of Motion 2. Balance 3. Strength 4. Activity Tolerance 5. Gross Motor Control 6. Pain (acute) 7. Edema Cognitive Skills Affected (resulting in the inability to perform in a timely and safe manner): 
1. [facility]  Psychosocial Skills Affected: 
1. WFL Number of elements that affect the Plan of Care: 3-5:  MODERATE COMPLEXITY CLINICAL DECISION MAKING:  
[employer] AM-PAC 6 Clicks Daily Activity Inpatient Short Form How much help from another person does the patient currently need. .. Total A Lot A Little None 1. Putting on and taking off regular lower body clothing? ? 1   ? 2   ? 3   ? 4  
2. Bathing (including washing, rinsing, drying)? ? 1   ? 2   ? 3   ? 4  
3.   Toileting, which includes using toilet, bedpan or urinal?   ? 1   ? 2   ? 3   ? 4  
 4.  Putting on and taking off regular upper body clothing? ? 1   ? 2   ? 3   ? 4  
5. Taking care of personal grooming such as brushing teeth? ? 1   ? 2   ? 3   ? 4  
6. Eating meals? ? 1   ? 2   ? 3   ? 4  
© 2007, Trustees of [address] [place] [address] [address] [address], under license to [employer]. All rights reserved Score:  Initial: 14 Most Recent: X (Date: -- ) Interpretation of Tool:  Represents activities that are increasingly more difficult (i.e. Bed mobility, Transfers, Gait). Medical Necessity:    
· Skilled intervention continues to be required due to Deficits noted above. Reason for Services/Other Comments: 
· Patient continues to require skilled intervention due to debility and s/p hernia repair · . Use of outcome tool(s) and clinical judgement create a POC that gives a: MODERATE COMPLEXITY  
 
 
 
TREATMENT:  
(In addition to Assessment/Re-Assessment sessions the following treatments were rendered) Pre-treatment Symptoms/Complaints:   
Pain: Initial:  
Pain Intensity 1: 3 Pain Location 1: Abdomen Pain Orientation 1: Mid  Post Session:  3 Therapeutic Exercises: (17): Procedure(s) (per grid) utilized to improve and/or restore self-care/home management as related to functional mobility and increased independence with ADLs. Required no verbal cueing to facilitate UE exercises. B UE's  Date: 
4/8/19 Date: 
4/11/19 Date: 
4/19/19 Activity/Exercise Parameters Parameters Parameters Shoulder flexion/ex 1 set of 8 10 reps  2 sets of 10 Shoulder abduction/add 1 set of 8  10 reps Elbow flexion/ex  10 reps  2 sets of 10 Punches   10 reps Wrist flexion/ex   10 reps  2 sets of 10 Grasping   2 sets of 10 Shoulder Elevation and Depresion   2 sets of 10 Braces/Orthotics/Lines/Etc:  
· Room [place] Treatment/Session Assessment:   
· Response to Treatment:  Tolerated session well · Interdisciplinary Collaboration:  
o Occupational Therapist 
o Registered Nurse · After treatment position/precautions:  
o Up in chair 
o Bed/Chair-wheels locked 
o Bed in low position 
o Call light within reach 
o RN notified · Compliance with Program/Exercises: Compliant all of the time, Will assess as treatment progresses. · Recommendations/Intent for next treatment session: \"Next visit will focus on advancements to more challenging activities\". Total Treatment Duration: 17 minutes OT Patient Time In/Time Out Time In: 46 Time Out: 1100 MELISSA Barnes, OTR/L 
4/19/19

## 2019-04-19 NOTE — WOUND CARE
Wound VAC dressing change to lower abdomen wound wound base with granulation, adipose exposed blue mesh noted in areas of base, tunnel to 10 o'clock of 5cm wound is [id number]. 3 pieces of foam in wound used  (nearly 2 full medium dressings). Plan is for discharge to rehab at some point, will need vac at rehab. Will monitor.

## 2019-04-19 NOTE — PROGRESS NOTES
Shift assessment complete. Patient A&O x 4. Respirations present, even and unlabored. Breath sounds diminished. Patient educated on and encouraged to use incentive spirometer. Heart sounds regular. Abdomen soft and tender. Midline abdominal incision noted with wound vac @ 125. Assisted patient to restroom and back to chair. Patient tolerated well. Patient needs met. No distress noted. Bed low and locked. Call light within reach. Will continue to monitor hourly during shift.

## 2019-04-19 NOTE — PROGRESS NOTES
[address] [facility], [address] [place], [address] PLAN:Continue wound vac Consult wound care nurse Regular diet ASSESSMENT:  Admit Date: 3/26/2019  
1 Day Post-Op  Procedure(s): 
ABDOMINAL INFECTION EXCISION AND PLACEMENT OF WOUND VAC Principal Problem: 
  Incarcerated incisional hernia (3/26/2019) Active Problems: 
  Long-term insulin use in type 2 diabetes ([address] (4/12/2010) Chronic systolic heart failure ([address] (7/18/2013) Overview: NST 4/15:low risk Echo 6/2014:EF 40-45% Echo 5/2013:EF 30-35% LHC 2/2013: minimal CAD 
 
  BA on CPAP (7/14/2014) Morbid obesity ([address] (7/14/2014) Acute hypoxemic respiratory failure ([address] (4/3/2019) ARDS (adult respiratory distress syndrome) ([address] (4/3/2019) Pseudomonas urinary tract infection (4/5/2019) Chloride-responsive metabolic alkalosis (8/05/7074) Hypokalemia (4/11/2019) SUBJECTIVE:Complains of pain. Wound vac in place. Tolerating diet. Wound nurse consulted. OBJECTIVE: 
Constitutional: Alert oriented cooperative patient in no acute distress; appears stated age Visit Vitals /64 (BP 1 Location: Right arm, BP Patient Position: At rest) Pulse 60 Temp 98.9 °F (37.2 °C) Resp 16 Ht 4' 11\" (1.499 m) Wt 221 lb 4.8 oz (100.4 kg) SpO2 97% Breastfeeding? No  
BMI 44.70 kg/m² Eyes:Sclera are clear. ENMT: no external lesions gross hearing normal; no obvious neck masses, no ear or lip lesions CV: RRR. Normal perfusion Resp: No JVD. Breathing is  non-labored; no audible wheezing. GI: soft. Wound vac in place. Good seal.     
Musculoskeletal: unremarkable with normal function. No embolic signs or cyanosis. Neuro:  Oriented; moves all 4; no focal deficits Psychiatric: normal affect and mood, no memory impairment Patient Vitals for the past 24 hrs: 
 BP Temp Pulse Resp SpO2 04/19/19 1156 105/64 98.9 °F (37.2 °C) 60 16 97 % 04/19/19 0857     100 % 04/19/19 0740 110/49 99.4 °F (37.4 °C) 60 18 94 % 04/19/19 0730 110/49 99.4 °F (37.4 °C) 60 18 94 % 04/19/19 0329 110/72 98.7 °F (37.1 °C) 62 16 100 % 04/18/19 2331 131/64 98.5 °F (36.9 °C) 85 18 97 % 04/18/19 2135     99 % 04/18/19 1943 106/66 98.8 °F (37.1 °C) 66 18 96 % 04/18/19 1732 119/68 97.7 °F (36.5 °C) (!) 59 18 95 % 04/18/19 1501     99 % 04/18/19 1404 114/58  60 20 95 % 04/18/19 1349 112/58  60 20 95 % Labs:   
Recent Labs 04/18/19 
4527 WBC 10.0 HGB 8.4*    
K 4.5  
 CO2 25 BUN 8  
CREA 0.84 GLU 75 Er[place] Fruit Estefania, DO

## 2019-04-19 NOTE — PROGRESS NOTES
Nutrition follow-up: 
Reason for initial assessment:  Length of stay. Assessment:  
Diet:  Consistent CHO Nutrition Supplement:  Glucerna Shake supplement Pt admitted 3/26 for abdominal hernia repair, complicated by abdominal wall dehiscence and return to OR 3/31 for hernia repair with mesh. Pt with acute hypoxic respiratory failure 4/03; transferred to [facility] intubation 4/03. Pt had a [employer] on 4/03-R IJ. Pt extubated 4/05. Pt s/p day 1abdominal infection excision-wound vac placement. Pt sitting in chair at RD visit; pt c/o abdominal pain and nausea-states the nausea medicine helps. Pt verbalizes eating 50-75% most meals with breakfast being the meal with best intake; states she ate all her deluna and eggs this morning and ~ 1/2 cheese grits, 100% Glucerna Shake supplement. Jazzmine James Abdomen: obese, tender with wound vac in place. Most recent documented BM 4/16. Pertinent Labs: POC glucose 4/19:  142, 160, 197 mg/dl Pertinent Rx:  Lantus, SSI,  mag-ox Anthropometrics: Height: 4' 11\" (149.9 cm), Weight: 100.4 kg (221 lb 4.8 oz), Bed weight on 4/3, Body mass index is 44.7 kg/m². BMI class of morbid obesity.  
 Macronutrient needs: EER:  7176-3569 kcal /day (15-18  kcal/kg BW) EPR:   gm/day (2-2.5 gm/kg/IBW) GFR >60 Max CHO:  226 grams/day (50% estimated kcal/day) Intake/Comparative Standards:  1 recorded intake since last RD visit with 0% recorded (pt had been NPO) pt verbalizes 50-75% most meals + 3 Glucerna Shake supplement/day. Pt potentially meets 100% estimated kcal needs and >85% estimated protein needs.  Intervention: 
Meals and Snacks:  Continue Consistent CHO diet. Nutrition Supplement Therapy:   Discontinued Ensure High Protein X 2 per day and increased Glucerna Shake supplement to 3 X day; flavor preference noted. Discharge nutrition plan: Too soon to determine. Kenia Tripp, MPH, [address] [place] [address],  
[phone]

## 2019-04-20 LAB
GLUCOSE BLD STRIP.AUTO-MCNC: 138 MG/DL (ref 65–100)
GLUCOSE BLD STRIP.AUTO-MCNC: 149 MG/DL (ref 65–100)
GLUCOSE BLD STRIP.AUTO-MCNC: 171 MG/DL (ref 65–100)
GLUCOSE BLD STRIP.AUTO-MCNC: 209 MG/DL (ref 65–100)

## 2019-04-20 PROCEDURE — 74011250637 HC RX REV CODE- 250/637: Performed by: SURGERY

## 2019-04-20 PROCEDURE — 74750000023 HC WOUND THERAPY

## 2019-04-20 PROCEDURE — 74011250636 HC RX REV CODE- 250/636: Performed by: SURGERY

## 2019-04-20 PROCEDURE — 82962 GLUCOSE BLOOD TEST: CPT

## 2019-04-20 PROCEDURE — 97535 SELF CARE MNGMENT TRAINING: CPT

## 2019-04-20 PROCEDURE — 94760 N-INVAS EAR/PLS OXIMETRY 1: CPT

## 2019-04-20 PROCEDURE — 97530 THERAPEUTIC ACTIVITIES: CPT

## 2019-04-20 PROCEDURE — 74011636637 HC RX REV CODE- 636/637: Performed by: SURGERY

## 2019-04-20 PROCEDURE — 65270000029 HC RM PRIVATE

## 2019-04-20 RX ADMIN — DOCUSATE SODIUM 100 MG: 100 CAPSULE, LIQUID FILLED ORAL at 09:14

## 2019-04-20 RX ADMIN — HYDROMORPHONE HYDROCHLORIDE 2 MG: 2 INJECTION, SOLUTION INTRAMUSCULAR; INTRAVENOUS; SUBCUTANEOUS at 05:54

## 2019-04-20 RX ADMIN — HYDROMORPHONE HYDROCHLORIDE 2 MG: 2 INJECTION, SOLUTION INTRAMUSCULAR; INTRAVENOUS; SUBCUTANEOUS at 21:08

## 2019-04-20 RX ADMIN — DOCUSATE SODIUM 100 MG: 100 CAPSULE, LIQUID FILLED ORAL at 17:48

## 2019-04-20 RX ADMIN — Medication 400 MG: at 09:14

## 2019-04-20 RX ADMIN — ASPIRIN 81 MG 81 MG: 81 TABLET ORAL at 09:14

## 2019-04-20 RX ADMIN — AMIODARONE HYDROCHLORIDE 200 MG: 200 TABLET ORAL at 09:14

## 2019-04-20 RX ADMIN — ONDANSETRON 4 MG: 2 INJECTION INTRAMUSCULAR; INTRAVENOUS at 17:48

## 2019-04-20 RX ADMIN — ONDANSETRON 4 MG: 2 INJECTION INTRAMUSCULAR; INTRAVENOUS at 05:59

## 2019-04-20 RX ADMIN — INSULIN GLARGINE 35 UNITS: 100 INJECTION, SOLUTION SUBCUTANEOUS at 09:00

## 2019-04-20 RX ADMIN — CARVEDILOL 12.5 MG: 6.25 TABLET, FILM COATED ORAL at 17:48

## 2019-04-20 RX ADMIN — PRAVASTATIN SODIUM 80 MG: 80 TABLET ORAL at 09:14

## 2019-04-20 RX ADMIN — METOCLOPRAMIDE 10 MG: 5 INJECTION, SOLUTION INTRAMUSCULAR; INTRAVENOUS at 21:08

## 2019-04-20 RX ADMIN — HUMAN INSULIN 2 UNITS: 100 INJECTION, SOLUTION SUBCUTANEOUS at 21:39

## 2019-04-20 RX ADMIN — METOCLOPRAMIDE 10 MG: 5 INJECTION, SOLUTION INTRAMUSCULAR; INTRAVENOUS at 00:19

## 2019-04-20 RX ADMIN — Medication 400 MG: at 17:48

## 2019-04-20 RX ADMIN — Medication 10 ML: at 05:55

## 2019-04-20 RX ADMIN — HYDROMORPHONE HYDROCHLORIDE 2 MG: 2 INJECTION, SOLUTION INTRAMUSCULAR; INTRAVENOUS; SUBCUTANEOUS at 00:19

## 2019-04-20 RX ADMIN — HYDROMORPHONE HYDROCHLORIDE 2 MG: 2 INJECTION, SOLUTION INTRAMUSCULAR; INTRAVENOUS; SUBCUTANEOUS at 15:17

## 2019-04-20 RX ADMIN — HYDROMORPHONE HYDROCHLORIDE 2 MG: 2 INJECTION, SOLUTION INTRAMUSCULAR; INTRAVENOUS; SUBCUTANEOUS at 10:34

## 2019-04-20 RX ADMIN — CARVEDILOL 12.5 MG: 6.25 TABLET, FILM COATED ORAL at 09:14

## 2019-04-20 RX ADMIN — HUMAN INSULIN 4 UNITS: 100 INJECTION, SOLUTION SUBCUTANEOUS at 11:30

## 2019-04-20 RX ADMIN — Medication 10 ML: at 21:39

## 2019-04-20 NOTE — PROGRESS NOTES
[address], [address] [place], [address] PLAN:Continue wound vac Consult wound care nurse Regular diet Dr Shelly Mcdonough returns Monday ASSESSMENT:  Admit Date: 3/26/2019 2 Day Post-Op  Procedure(s): 
ABDOMINAL INFECTION EXCISION AND PLACEMENT OF WOUND VAC Principal Problem: 
  Incarcerated incisional hernia (3/26/2019) Active Problems: 
  Long-term insulin use in type 2 diabetes ([address] (4/12/2010) Chronic systolic heart failure ([address] (7/18/2013) Overview: NST 4/15:low risk Echo 6/2014:EF 40-45% Echo 5/2013:EF 30-35% LHC 2/2013: minimal CAD 
 
  BA on CPAP (7/14/2014) Morbid obesity ([address] (7/14/2014) Acute hypoxemic respiratory failure ([address] (4/3/2019) ARDS (adult respiratory distress syndrome) ([address] (4/3/2019) Pseudomonas urinary tract infection (4/5/2019) Chloride-responsive metabolic alkalosis (3/29/8006) Hypokalemia (4/11/2019) SUBJECTIVE:Complains of pain. Wound vac in place. Tolerating diet. Wound nurse consulted. OBJECTIVE: 
Constitutional: Alert oriented cooperative patient in no acute distress; appears stated age Visit Vitals /70 (BP 1 Location: Right arm, BP Patient Position: At rest) Pulse 65 Temp 98.2 °F (36.8 °C) Resp 18 Ht 4' 11\" (1.499 m) Wt 221 lb 4.8 oz (100.4 kg) SpO2 95% Breastfeeding? No  
BMI 44.70 kg/m² Eyes:Sclera are clear. ENMT: no external lesions gross hearing normal; no obvious neck masses, no ear or lip lesions CV: RRR. Normal perfusion Resp: No JVD. Breathing is  non-labored; no audible wheezing. GI: soft. Wound vac in place. Good seal.    No surrounding cellulitis Musculoskeletal: unremarkable with normal function. No embolic signs or cyanosis. Neuro:  Oriented; moves all 4; no focal deficits Psychiatric: normal affect and mood, no memory impairment Patient Vitals for the past 24 hrs: BP Temp Pulse Resp SpO2  
04/20/19 0352 110/70 98.2 °F (36.8 °C) 65 18 95 % 04/20/19 0019 112/70 98.6 °F (37 °C) 65 20 94 % 04/19/19 2017     94 % 04/19/19 2000 110/68 98.9 °F (37.2 °C) 65 20 95 % 04/19/19 1534 106/66 99.8 °F (37.7 °C) 60 30 96 % 04/19/19 1156 105/64 98.9 °F (37.2 °C) 60 16 97 % 04/19/19 0857     100 % 04/19/19 0740 110/49 99.4 °F (37.4 °C) 60 18 94 % 04/19/19 0730 110/49 99.4 °F (37.4 °C) 60 18 94 % Labs:   
Recent Labs 04/18/19 
2648 WBC 10.0 HGB 8.4*    
K 4.5  
 CO2 25 BUN 8  
CREA 0.84 GLU 75 Ren Hyde MD

## 2019-04-20 NOTE — PROGRESS NOTES
Problem: Mobility Impaired (Adult and Pediatric) Goal: *Acute Goals and Plan of Care (Insert Text) Description STG: 
(1.)Ms. Autumn Mckenna will move from supine to sit and sit to supine  with MINIMAL ASSIST within 3 treatment day(s). (2.)Ms. Autumn Mckenna will transfer from bed to chair and chair to bed with CONTACT GUARD ASSIST using the least restrictive device within 3 treatment day(s). Met 4/12 
(3.)Ms. Autumn Mckenna will ambulate with CONTACT GUARD ASSIST for 50 feet with the least restrictive device within 3 treatment day(s). Met 4/12 GOALS RE-ASSESSED 4/13/19 : 
(1.)Ms. Autumn Mckenna will transfer from bed to chair and chair to bed with STAND BY ASSIST using the least restrictive device. Met 4/14 
  
ADDENDUM GOALS 4/19/19 : 
1) pt will transfer with cane or no device & SBA. 
(2.)Ms. Autumn Mckenna will ambulate with STAND BY ASSIST for 200 feet with a cane or no device. (3)Ms. Autumn Mckenna will go up 4 steps with CGA assist & rail. 
(4)Ms. Autumn Mckenna will perform HEP with cues & written guidelines, to increase safety on her feet 
  
 
 
________________________________________________________________________________________________ Outcome: Progressing Towards Goal 
  
PHYSICAL THERAPY: Daily Note and AM 4/20/2019 INPATIENT: PT Visit Days : 2 [place] / [place] B / Product Type: Medicare /   
  
NAME/AGE/GENDER: Franco Gibbons is a 52 y.o. female PRIMARY DIAGNOSIS: Incisional hernia, without obstruction or gangrene [K43.2] Incarcerated incisional hernia [K43.0] Incarcerated incisional hernia [K43.0] Incarcerated incisional hernia Incarcerated incisional hernia Procedure(s) (LRB): ABDOMINAL INFECTION EXCISION AND PLACEMENT OF WOUND VAC (N/A) 2 Days Post-Op ICD-10: Treatment Diagnosis:  
  · Generalized Muscle Weakness (M62.81) · Other abnormalities of gait and mobility (R26.89) Precaution/Allergies: 
Grass pollen ASSESSMENT:  
 Ms. Taty Mclaughlin presents with general decreased in functional mobility and gait s/p OPEN INCARCERATED [address] on 3/26/19. Patient underwent second pocedure 3/31/19 secondary to dehiscense and hernia recurrence. PT tried to progress pt to a cane with some difficulty, pt still easily SOB with exertion, frequent breaks needed. Pt needs to be on a HEP to build activity level. This section established at most recent assessment PROBLEM LIST (Impairments causing functional limitations): 1. Decreased Strength 2. Decreased ADL/Functional Activities 3. Decreased Transfer Abilities 4. Decreased Ambulation Ability/Technique 5. Decreased Balance 6. Increased Pain 7. Decreased Activity Tolerance 8. Decreased Flexibility/Joint Mobility 9. Decreased [place] with Home Exercise Program 
 INTERVENTIONS PLANNED: (Benefits and precautions of physical therapy have been discussed with the patient.) 1. Balance Exercise 2. Bed Mobility 3. Family Education 4. Gait Training 5. Home Exercise Program (HEP) 6. Therapeutic Activites 7. Therapeutic Exercise/Strengthening 8. Transfer Training TREATMENT PLAN: Frequency/Duration: daily for duration of hospital stay Rehabilitation Potential For Stated Goals: Good RECOMMENDED REHABILITATION/EQUIPMENT: (at time of discharge pending progress): Due to the probability of continued deficits (see above) this patient will likely need continued skilled physical therapy after discharge. Equipment: ? May need a RW   
    
 
 
 
HISTORY:  
History of Present Injury/Illness (Reason for Referral): S/p OPEN INCARCERATED INCISIONAL HERNIA REPAIR 3/26/19 Past Medical History/Comorbidities: Ms. Taty Mclaughlin  has a past medical history of Allergic rhinitis, Arthritis, Automatic implantable cardioverter-defibrillator in situ (3/30/2016), CAD in native artery (6/41/2923), Chronic systolic heart failure ([facility] (2013), CKD (chronic kidney disease), Diabetes ([facility] Diabetes mellitus [facility] (2010), Dyslipidemia (2013), Eczema, Fibromyalgia, GERD (gastroesophageal reflux disease), Headache, Heart failure ([facility] HTN (hypertension) (2010), Hypercholesterolemia, Morbid obesity ([facility] Neuropathy, NICM (nonischemic cardiomyopathy) ([facility] (2/15/2013), Obesity, Obstructive sleep apnea (adult) (pediatric) (2014), Pseudomonas urinary tract infection (2019), Sciatic pain (2016), SVT (supraventricular tachycardia) ([facility] and Tobacco use disorder (2010). She also has no past medical history of Dementia, Gastrointestinal disorder, or Unspecified adverse effect of anesthesia. Ms. Autumn Mckenna  has a past surgical history that includes hx tonsillectomy; hx implantable cardioverter defibrillator (2013); hx  section; hx hernia repair; pr left heart cath,percutaneous (2013); hx svt ablation (\"years ago\"); hx rotator cuff repair (Right); hx hysterectomy; and hx implantable cardioverter defibrillator (2013). Social History/Living Environment:  
Home Environment: Private residence # Steps to Enter: 4(than an additional 3 inside) Rails to Enter: No 
One/Two Story Residence: One story Living Alone: No 
Support Systems: Friends \ neighbors Patient Expects to be Discharged to[de-identified] Private residence Current DME Used/Available at Home: Glucometer, CPAP Prior Level of Function/Work/Activity: 
independent Number of Personal Factors/Comorbidities that affect the Plan of Care: 1-2: MODERATE COMPLEXITY EXAMINATION:  
Most Recent Physical Functioning:  
Gross Assessment: 3 to 3-/5 throughout Balance: 
Sitting: Intact; Without support Standing: Impaired; With support(cane or walker) Bed Mobility: 
Supine to Sit: (NT) Sit to Supine: (NT) Transfers: 
Sit to Stand: Stand-by assistance Stand to Sit: Stand-by assistance Bed to Chair: Stand-by assistance(with walker, (with cane, min A)) Duration: 24 Minutes Gait: 
  
Speed/Cristy: Delayed Step Length: Left shortened;Right shortened Gait Abnormalities: Decreased step clearance Distance (ft): 55 Feet (ft) Assistive Device: Walker, rolling(pt went 55 ft & 50 ft with cane & CGA) Ambulation - Level of Assistance: Stand-by assistance Interventions: Safety awareness training;Verbal cues Body Structures Involved: 1. Bones 2. Joints 3. Muscles 4. Ligaments Body Functions Affected: 1. Movement Related Activities and Participation Affected: 1. Mobility Number of elements that affect the Plan of Care: 3: MODERATE COMPLEXITY CLINICAL PRESENTATION:  
Presentation: Stable and uncomplicated: LOW COMPLEXITY CLINICAL DECISION [address] [place] [id number] AM-PAC 6 Clicks Basic Mobility Inpatient Short Form How much difficulty does the patient currently have. .. Unable A Lot A Little None 1. Turning over in bed (including adjusting bedclothes, sheets and blankets)? ? 1   ? 2   ? 3   ? 4  
2. Sitting down on and standing up from a chair with arms ( e.g., wheelchair, bedside commode, etc.)   ? 1   ? 2   ? 3   ? 4  
3. Moving from lying on back to sitting on the side of the bed?   ? 1   ? 2   ? 3   ? 4 How much help from another person does the patient currently need. .. Total A Lot A Little None 4. Moving to and from a bed to a chair (including a wheelchair)? ? 1   ? 2   ? 3   ? 4  
5. Need to walk in hospital room? ? 1   ? 2   ? 3   ? 4  
6. Climbing 3-5 steps with a railing? ? 1   ? 2   ? 3   ? 4  
© , Trustees of [address] [place] [id number], under license to [employer]. All rights reserved Score:  Initial: 14 Most Recent: X (Date: -- ) Interpretation of Tool:  Represents activities that are increasingly more difficult (i.e. Bed mobility, Transfers, Gait).  
 
Medical Necessity:    
· Patient is expected to demonstrate progress in strength, range of motion, balance, coordination and functional technique ·  to decrease assistance required with theraputic exercises and functional mobility · . Reason for Services/Other Comments: 
· Patient continues to require present interventions due to patient's inability to perform theraputic exercises and functional mobility · . Use of outcome tool(s) and clinical judgement create a POC that gives a: Clear prediction of patient's progress: LOW COMPLEXITY  
  
 
 
 
TREATMENT:  
(In addition to Assessment/Re-Assessment sessions the following treatments were rendered) Pre-treatment Symptoms/Complaints:  Pt fatigued Pain: Initial:numeric scale Pain Intensity 1: 3 Pain Location 1: Abdomen Pain Orientation 1: Mid 
Pain Intervention(s) 1: Ambulation/Increased Activity  Post Session:  3/10 Therapeutic Activity: (  24 Minutes ):  Therapeutic activities including  Scooting, transfers ,sit <> stand, standing  Balance exercise without UE support : trunk rotation, overhead reach & alternating punching in front along with progressing gait to unilateral support then back to rolling walker to improve mobility and strength. Required minimal Safety awareness training;Verbal cues to promote static and dynamic balance in standing. Braces/Orthotics/Lines/Etc:  
· IV 
· drain celeste Treatment/Session Assessment:   
· Response to Treatment:   Pt needs to be doing exercise or HEP with all her other inactive time to build endurance. · Interdisciplinary Collaboration:  
o Registered Nurse · After treatment position/precautions:  
o Up in chair 
o Bed/Chair-wheels locked 
o Call light within reach 
o RN notified · Compliance with Program/Exercises: Will assess as treatment progresses. · Recommendations/Intent for next treatment session: \"Next visit will focus on advancements to more challenging activities and reduction in assistance provided\". Total Treatment Duration: PT Patient Time In/Time Out Time In: 9872 Time Out: 2937 Carmen Anderson, PT

## 2019-04-20 NOTE — PROGRESS NOTES
Problem: Self Care Deficits Care Plan (Adult) Goal: *Acute Goals and Plan of Care (Insert Text) Description Patient will complete lower body dressing with minimal assist to increase self care independence. Patient will complete bathing with minimal assist to increase self care independence. Patient will improve static standing at edge of bed for 5 minutes to improve independence with transfers and self cares. Patient will tolerate 38 minutes of OT treatment with self incorporated rest breaks to increase activity tolerance to enhance participation in hobbies. Progressing 4/19/19 Patient will complete all functional transfers with contact guard assist using adaptive equipment as needed. Patient will complete UE exercises with supervision to increase overall activity tolerance and strength. Progressing 4/19/19 Timeframe: 7 visits OCCUPATIONAL THERAPY: Daily Note and AM 4/20/2019 INPATIENT: OT Visit Days: 6 [place] / Product Type: Medicare /  
  
NAME/AGE/GENDER: Kala Gan is a 52 y.o. female PRIMARY DIAGNOSIS:  Incisional hernia, without obstruction or gangrene [K43.2] Incarcerated incisional hernia [K43.0] Incarcerated incisional hernia [K43.0] Incarcerated incisional hernia Incarcerated incisional hernia Procedure(s) (LRB): ABDOMINAL INFECTION EXCISION AND PLACEMENT OF WOUND VAC (N/A) 2 Days Post-Op ICD-10: Treatment Diagnosis:  
 · Generalized Muscle Weakness (M62.81) · Difficulty in walking, Not elsewhere classified (R26.2) Precautions/Allergies: 
   Grass pollen ASSESSMENT:  
Ms. Taty Mclaughlin presents in chair. Pt completed functional mobility with rolling walker (CGA) to sink to complete grooming with supervision. Pt is progressing towards goals. Continue POC.  
 
4/19/19 Ms. Taty Mclaughlin was seated upright in recliner upon arrival and was agreeable to therapy.  She reports sharp pain 3/10, and discomfort with movement. She reports walking with PT earlier this AM and willing to complete UE exercises. Patient completed all UE exercises in chair with good effort. Plan to continue with skilled services for improved functional performance and independence. 4/20/19 Pt was sitting in chair upon arrival. Pt completed toileting and grooming with supervision while standing at sink. Pt is progressing towards goals. Continue POC. This section established at most recent assessment PROBLEM LIST (Impairments causing functional limitations): 1. Decreased Strength 2. Decreased ADL/Functional Activities 3. Decreased Transfer Abilities 4. Decreased Balance 5. Increased Pain 6. Decreased Activity Tolerance INTERVENTIONS PLANNED: (Benefits and precautions of occupational therapy have been discussed with the patient.) 1. Activities of daily living training 2. Adaptive equipment training 3. Balance training 4. Clothing management 5. Neuromuscular re-eduation 6. Therapeutic activity 7. Therapeutic exercise TREATMENT PLAN: Frequency/Duration: Follow patient 3x a week to address above goals. Rehabilitation Potential For Stated Goals: Good RECOMMENDED REHABILITATION/EQUIPMENT: (at time of discharge pending progress): Due to the probability of continued deficits (see above) this patient will not likely need continued skilled occupational therapy after discharge. Equipment:  
? None at this time OCCUPATIONAL PROFILE AND HISTORY:  
History of Present Injury/Illness (Reason for Referral): 
See H&P. Past Medical History/Comorbidities: Ms. Taty Mclaughlin  has a past medical history of Allergic rhinitis, Arthritis, Automatic implantable cardioverter-defibrillator in situ (3/30/2016), CAD in native artery (7/51/3931), Chronic systolic heart failure ([address] (07/18/2013), CKD (chronic kidney disease), Diabetes ([address] Diabetes mellitus ([address] (4/12/2010), Dyslipidemia (2/13/2013), Eczema, Fibromyalgia, GERD (gastroesophageal reflux disease), Headache, Heart failure ([facility] HTN (hypertension) (2010), Hypercholesterolemia, Morbid obesity ([facility] Neuropathy, NICM (nonischemic cardiomyopathy) ([facility] (2/15/2013), Obesity, Obstructive sleep apnea (adult) (pediatric) (2014), Pseudomonas urinary tract infection (2019), Sciatic pain (2016), SVT (supraventricular tachycardia) ([facility] and Tobacco use disorder (2010). She also has no past medical history of Dementia, Gastrointestinal disorder, or Unspecified adverse effect of anesthesia. Ms. Kelly Gee  has a past surgical history that includes hx tonsillectomy; hx implantable cardioverter defibrillator (2013); hx  section; hx hernia repair; pr left heart cath,percutaneous (2013); hx svt ablation (\"years ago\"); hx rotator cuff repair (Right); hx hysterectomy; and hx implantable cardioverter defibrillator (2013). Social History/Living Environment:  
Home Environment: Private residence # Steps to Enter: 4(than an additional 3 inside) Rails to Enter: No 
One/Two Story Residence: One story Living Alone: No 
Support Systems: Friends \ neighbors Patient Expects to be Discharged to[de-identified] Private residence Current DME Used/Available at Home: Glucometer, CPAP Prior Level of Function/Work/Activity: She was working part time, driving, and independent with ADL's IADL's, walking prior to hospitalization. Number of Personal Factors/Comorbidities that affect the Plan of Care: Expanded review of therapy/medical records (1-2):  MODERATE COMPLEXITY ASSESSMENT OF OCCUPATIONAL PERFORMANCE[de-identified]  
Activities of Daily Living:  
Basic ADLs (From Assessment) Complex ADLs (From Assessment) Feeding: Supervision Oral Facial Hygiene/Grooming: Setup Bathing: Minimum assistance Upper Body Dressing: Minimum assistance Lower Body Dressing: Maximum assistance Toileting: Moderate assistance Grooming/Bathing/Dressing Activities of Daily Living Grooming Washing Hands: Independent Brushing Teeth: Independent Cognitive Retraining Safety/Judgement: Fall prevention Toileting Toileting Assistance: Modified independent Bladder Hygiene: Independent Bowel Hygiene: Independent Clothing Management: Independent Functional Transfers Toilet Transfer : Supervision Bed/Mat Mobility Supine to Sit: (NT) Sit to Supine: (NT) Sit to Stand: Supervision Stand to Sit: Supervision Bed to Chair: Stand-by assistance(with walker, (with cane, min A)) Most Recent Physical Functioning:  
Gross Assessment: 
  
         
  
Posture: 
Posture (WDL): Exceptions to [facility] Posture Assessment: Trunk flexion, Rounded shoulders Balance: 
Sitting: Intact Standing: Intact Bed Mobility: 
Supine to Sit: (NT) Sit to Supine: (NT) Wheelchair Mobility: 
  
Transfers: 
Sit to Stand: Supervision Stand to Sit: Supervision Bed to Chair: Stand-by assistance(with walker, (with cane, min A)) Duration: 24 Minutes Patient Vitals for the past 6 hrs: 
 BP BP Patient Position SpO2 Pulse 04/20/19 0809 107/65 At rest;Head of bed elevated (Comment degrees) 93 % 66  
04/20/19 1148 113/56 At rest;Sitting 97 % 62 Mental Status Neurologic State: Alert, Sleeping Orientation Level: Appropriate for age, Disoriented to person Cognition: Appropriate decision making Perception: Appears intact Perseveration: No perseveration noted Safety/Judgement: Fall prevention LLE Assessment LLE Assessment (WDL): Exception to [facility] RLE Assessment RLE Assessment (WDL): Exceptions to [facility] Physical Skills Involved: 1. Range of Motion 2. Balance 3. Strength 4. Activity Tolerance 5. Gross Motor Control 6. Pain (acute) 7. Edema Cognitive Skills Affected (resulting in the inability to perform in a timely and safe manner): 
1. [facility]  Psychosocial Skills Affected: 
1.  WFL   
 Number of elements that affect the Plan of Care: 3-5:  MODERATE COMPLEXITY CLINICAL DECISION MAKIN[address] [place] [address] AM-PAC 6 Clicks Daily Activity Inpatient Short Form How much help from another person does the patient currently need. .. Total A Lot A Little None 1. Putting on and taking off regular lower body clothing? ? 1   ? 2   ? 3   ? 4  
2. Bathing (including washing, rinsing, drying)? ? 1   ? 2   ? 3   ? 4  
3. Toileting, which includes using toilet, bedpan or urinal?   ? 1   ? 2   ? 3   ? 4  
4. Putting on and taking off regular upper body clothing? ? 1   ? 2   ? 3   ? 4  
5. Taking care of personal grooming such as brushing teeth? ? 1   ? 2   ? 3   ? 4  
6. Eating meals? ? 1   ? 2   ? 3   ? 4  
© , Trustees of [address] [place] [address], under license to [employer]. All rights reserved Score:  Initial: 14 Most Recent: X (Date: -- ) Interpretation of Tool:  Represents activities that are increasingly more difficult (i.e. Bed mobility, Transfers, Gait). Medical Necessity:    
· Skilled intervention continues to be required due to Deficits noted above. Reason for Services/Other Comments: 
· Patient continues to require skilled intervention due to debility and s/p hernia repair · . Use of outcome tool(s) and clinical judgement create a POC that gives a: MODERATE COMPLEXITY  
 
 
 
TREATMENT:  
(In addition to Assessment/Re-Assessment sessions the following treatments were rendered) Pre-treatment Symptoms/Complaints:   
Pain: Initial:  
Pain Intensity 1: 0  Post Session:  3 Self Care: (26): Procedure(s) (per grid) utilized to improve and/or restore self-care/home management as related to toileting and grooming. Required no cueing to facilitate activities of daily living skills. B UE's  Date: 
19 Date: 
19 Date: 
19 Activity/Exercise Parameters Parameters Parameters Shoulder flexion/ex 1 set of 8 10 reps  2 sets of 10  
 Shoulder abduction/add 1 set of 8  10 reps Elbow flexion/ex  10 reps  2 sets of 10 Punches   10 reps Wrist flexion/ex   10 reps  2 sets of 10 Grasping   2 sets of 10 Shoulder Elevation and Depresion   2 sets of 10 Braces/Orthotics/Lines/Etc:  
· Room [place] Treatment/Session Assessment:   
· Response to Treatment:  Tolerated session well · Interdisciplinary Collaboration:  
o Certified Occupational Therapy Assistant 
o Registered Nurse · After treatment position/precautions:  
o Up in chair 
o Bed/Chair-wheels locked 
o Bed in low position 
o Call light within reach 
o RN notified · Compliance with Program/Exercises: Compliant all of the time, Will assess as treatment progresses. · Recommendations/Intent for next treatment session: \"Next visit will focus on advancements to more challenging activities\". Total Treatment Duration: OT Patient Time In/Time Out Time In: 0269 Time Out: 1000 JUSTYN Galindo/TUCKER

## 2019-04-20 NOTE — PROGRESS NOTES
Shift Assessment - Patient is alert and oriented x4. Respirations even and unlabored. Abdomen soft and tender. Midline abd incision with wound vac. Currently sitting in a locked recliner. Call light in reach. No complaint of nausea at this time. Bilateral IV's had to be removed. Unsuccessful attempts to replace IV. No IV access at this time.

## 2019-04-21 LAB
BACTERIA SPEC CULT: ABNORMAL
GLUCOSE BLD STRIP.AUTO-MCNC: 118 MG/DL (ref 65–100)
GLUCOSE BLD STRIP.AUTO-MCNC: 140 MG/DL (ref 65–100)
GLUCOSE BLD STRIP.AUTO-MCNC: 191 MG/DL (ref 65–100)
GLUCOSE BLD STRIP.AUTO-MCNC: 225 MG/DL (ref 65–100)
GRAM STN SPEC: ABNORMAL
GRAM STN SPEC: ABNORMAL
SERVICE CMNT-IMP: ABNORMAL

## 2019-04-21 PROCEDURE — 65270000029 HC RM PRIVATE

## 2019-04-21 PROCEDURE — 74011636637 HC RX REV CODE- 636/637: Performed by: SURGERY

## 2019-04-21 PROCEDURE — 74011250637 HC RX REV CODE- 250/637: Performed by: SURGERY

## 2019-04-21 PROCEDURE — 74011250636 HC RX REV CODE- 250/636: Performed by: SURGERY

## 2019-04-21 PROCEDURE — 74750000023 HC WOUND THERAPY

## 2019-04-21 PROCEDURE — 97116 GAIT TRAINING THERAPY: CPT

## 2019-04-21 PROCEDURE — 82962 GLUCOSE BLOOD TEST: CPT

## 2019-04-21 PROCEDURE — 97530 THERAPEUTIC ACTIVITIES: CPT

## 2019-04-21 RX ADMIN — METOCLOPRAMIDE 10 MG: 5 INJECTION, SOLUTION INTRAMUSCULAR; INTRAVENOUS at 22:04

## 2019-04-21 RX ADMIN — CARVEDILOL 12.5 MG: 6.25 TABLET, FILM COATED ORAL at 08:57

## 2019-04-21 RX ADMIN — ONDANSETRON 4 MG: 2 INJECTION INTRAMUSCULAR; INTRAVENOUS at 01:22

## 2019-04-21 RX ADMIN — Medication 400 MG: at 18:02

## 2019-04-21 RX ADMIN — Medication 10 ML: at 06:00

## 2019-04-21 RX ADMIN — HYDROCODONE BITARTRATE AND ACETAMINOPHEN 2 TABLET: 5; 325 TABLET ORAL at 16:49

## 2019-04-21 RX ADMIN — ONDANSETRON 4 MG: 2 INJECTION INTRAMUSCULAR; INTRAVENOUS at 09:53

## 2019-04-21 RX ADMIN — Medication 10 ML: at 22:04

## 2019-04-21 RX ADMIN — HUMAN INSULIN 4 UNITS: 100 INJECTION, SOLUTION SUBCUTANEOUS at 23:22

## 2019-04-21 RX ADMIN — PRAVASTATIN SODIUM 80 MG: 80 TABLET ORAL at 08:57

## 2019-04-21 RX ADMIN — HUMAN INSULIN 2 UNITS: 100 INJECTION, SOLUTION SUBCUTANEOUS at 11:30

## 2019-04-21 RX ADMIN — HYDROMORPHONE HYDROCHLORIDE 2 MG: 2 INJECTION, SOLUTION INTRAMUSCULAR; INTRAVENOUS; SUBCUTANEOUS at 01:22

## 2019-04-21 RX ADMIN — Medication 10 ML: at 14:00

## 2019-04-21 RX ADMIN — Medication 400 MG: at 08:57

## 2019-04-21 RX ADMIN — DOCUSATE SODIUM 100 MG: 100 CAPSULE, LIQUID FILLED ORAL at 08:57

## 2019-04-21 RX ADMIN — CARVEDILOL 12.5 MG: 6.25 TABLET, FILM COATED ORAL at 18:02

## 2019-04-21 RX ADMIN — HYDROCODONE BITARTRATE AND ACETAMINOPHEN 2 TABLET: 5; 325 TABLET ORAL at 09:53

## 2019-04-21 RX ADMIN — DOCUSATE SODIUM 100 MG: 100 CAPSULE, LIQUID FILLED ORAL at 18:02

## 2019-04-21 RX ADMIN — AMIODARONE HYDROCHLORIDE 200 MG: 200 TABLET ORAL at 08:57

## 2019-04-21 RX ADMIN — HYDROMORPHONE HYDROCHLORIDE 2 MG: 2 INJECTION, SOLUTION INTRAMUSCULAR; INTRAVENOUS; SUBCUTANEOUS at 22:04

## 2019-04-21 RX ADMIN — ASPIRIN 81 MG 81 MG: 81 TABLET ORAL at 08:57

## 2019-04-21 RX ADMIN — INSULIN GLARGINE 35 UNITS: 100 INJECTION, SOLUTION SUBCUTANEOUS at 09:00

## 2019-04-21 NOTE — PROGRESS NOTES
Problem: Mobility Impaired (Adult and Pediatric) Goal: *Acute Goals and Plan of Care (Insert Text) Description STG: 
(1.)Ms. Mae Butcher will move from supine to sit and sit to supine  with MINIMAL ASSIST within 3 treatment day(s). (2.)Ms. Mae Butcher will transfer from bed to chair and chair to bed with CONTACT GUARD ASSIST using the least restrictive device within 3 treatment day(s). Met 4/12 
(3.)Ms. Mae Butcher will ambulate with CONTACT GUARD ASSIST for 50 feet with the least restrictive device within 3 treatment day(s). Met 4/12 GOALS RE-ASSESSED 4/13/19 : 
(1.)Ms. Mae Butcher will transfer from bed to chair and chair to bed with STAND BY ASSIST using the least restrictive device. Met 4/14 
  
ADDENDUM GOALS 4/19/19 : 
1) pt will transfer with cane or no device & SBA. 
(2.)Ms. Mae Butcher will ambulate with STAND BY ASSIST for 200 feet with a cane or no device. Progressing 4/21 
(3)Ms. Mae Butcher will go up 4 steps with CGA assist & rail. 
(4)Ms. Mae Butcher will perform HEP with cues & written guidelines, to increase safety on her feet 
  
 
 
________________________________________________________________________________________________ Outcome: Progressing Towards Goal 
  
PHYSICAL THERAPY: Daily Note and AM 4/21/2019 INPATIENT: PT Visit Days : 3 [place] / Product Type: Medicare /   
  
NAME/AGE/GENDER: Taty Renae is a 52 y.o. female PRIMARY DIAGNOSIS: Incisional hernia, without obstruction or gangrene [K43.2] Incarcerated incisional hernia [K43.0] Incarcerated incisional hernia [K43.0] Incarcerated incisional hernia Incarcerated incisional hernia Procedure(s) (LRB): ABDOMINAL INFECTION EXCISION AND PLACEMENT OF WOUND VAC (N/A) 3 Days Post-Op ICD-10: Treatment Diagnosis:  
  · Generalized Muscle Weakness (M62.81) · Other abnormalities of gait and mobility (R26.89) Precaution/Allergies: 
Grass pollen ASSESSMENT:  
 Ms. Edilberto Cortes presents with general decreased in functional mobility and gait s/p OPEN INCARCERATED [address] on 3/26/19. Patient underwent second pocedure 3/31/19 secondary to dehiscense and hernia recurrence. Pt cont to become SOB with gait training. Able to increase ambulation distance using RW - pt declined cane today due to fatigue. Worked on transfers including sit to stand from recliner, chair, and toilet. Pt performed toileting and self care tasks with supervision. This section established at most recent assessment PROBLEM LIST (Impairments causing functional limitations): 1. Decreased Strength 2. Decreased ADL/Functional Activities 3. Decreased Transfer Abilities 4. Decreased Ambulation Ability/Technique 5. Decreased Balance 6. Increased Pain 7. Decreased Activity Tolerance 8. Decreased Flexibility/Joint Mobility 9. Decreased [employer] with Home Exercise Program 
 INTERVENTIONS PLANNED: (Benefits and precautions of physical therapy have been discussed with the patient.) 1. Balance Exercise 2. Bed Mobility 3. Family Education 4. Gait Training 5. Home Exercise Program (HEP) 6. Therapeutic Activites 7. Therapeutic Exercise/Strengthening 8. Transfer Training TREATMENT PLAN: Frequency/Duration: daily for duration of hospital stay Rehabilitation Potential For Stated Goals: Good RECOMMENDED REHABILITATION/EQUIPMENT: (at time of discharge pending progress): Due to the probability of continued deficits (see above) this patient will likely need continued skilled physical therapy after discharge. Equipment: ? May need a RW   
    
 
 
 
HISTORY:  
History of Present Injury/Illness (Reason for Referral): S/p OPEN INCARCERATED INCISIONAL HERNIA REPAIR 3/26/19 Past Medical History/Comorbidities: Ms. Edilberto Cortes  has a past medical history of Allergic rhinitis, Arthritis, Automatic implantable cardioverter-defibrillator in situ (3/30/2016), CAD in native artery (), Chronic systolic heart failure ([facility] (2013), CKD (chronic kidney disease), Diabetes ([address] Diabetes mellitus ([address] (2010), Dyslipidemia (2013), Eczema, Fibromyalgia, GERD (gastroesophageal reflux disease), Headache, Heart failure ([address] HTN (hypertension) (2010), Hypercholesterolemia, Morbid obesity ([address] Neuropathy, NICM (nonischemic cardiomyopathy) ([facility] (2/15/2013), Obesity, Obstructive sleep apnea (adult) (pediatric) (2014), Pseudomonas urinary tract infection (2019), Sciatic pain (2016), SVT (supraventricular tachycardia) ([facility] and Tobacco use disorder (2010). She also has no past medical history of Dementia, Gastrointestinal disorder, or Unspecified adverse effect of anesthesia. Ms. Tori Paul  has a past surgical history that includes hx tonsillectomy; hx implantable cardioverter defibrillator (2013); hx  section; hx hernia repair; pr left heart cath,percutaneous (2013); hx svt ablation (\"years ago\"); hx rotator cuff repair (Right); hx hysterectomy; and hx implantable cardioverter defibrillator (2013). Social History/Living Environment:  
Home Environment: Private residence # Steps to Enter: 4(than an additional 3 inside) Rails to Enter: No 
One/Two Story Residence: One story Living Alone: No 
Support Systems: Friends \ neighbors Patient Expects to be Discharged to[de-identified] Private residence Current DME Used/Available at Home: Glucometer, CPAP Prior Level of Function/Work/Activity: 
independent Number of Personal Factors/Comorbidities that affect the Plan of Care: 1-2: MODERATE COMPLEXITY EXAMINATION:  
Most Recent Physical Functioning:  
Gross Assessment: 3 to 3-/5 throughout AROM: Generally decreased, functional 
Strength: Generally decreased, functional 
         
  
 
  
Balance: 
Sitting: Intact Standing: Intact Bed Mobility: 
  
 
  
Transfers: 
Sit to Stand: Supervision Stand to Sit: Supervision Gait: 
 Speed/Cristy: Slow;Pace decreased (<100 feet/min) Step Length: Right shortened;Left shortened Distance (ft): 70 Feet (ft)(x4 (total 280 feet)) Assistive Device: Walker, rolling Ambulation - Level of Assistance: Stand-by assistance Interventions: Verbal cues; Safety awareness training Duration: 20 Minutes Body Structures Involved: 1. Bones 2. Joints 3. Muscles 4. Ligaments Body Functions Affected: 1. Movement Related Activities and Participation Affected: 1. Mobility Number of elements that affect the Plan of Care: 3: MODERATE COMPLEXITY CLINICAL PRESENTATION:  
Presentation: Stable and uncomplicated: LOW COMPLEXITY CLINICAL DECISION MAKIN[address] [place] [address] AM-PAC 6 Clicks Basic Mobility Inpatient Short Form How much difficulty does the patient currently have. .. Unable A Lot A Little None 1. Turning over in bed (including adjusting bedclothes, sheets and blankets)? ? 1   ? 2   ? 3   ? 4  
2. Sitting down on and standing up from a chair with arms ( e.g., wheelchair, bedside commode, etc.)   ? 1   ? 2   ? 3   ? 4  
3. Moving from lying on back to sitting on the side of the bed?   ? 1   ? 2   ? 3   ? 4 How much help from another person does the patient currently need. .. Total A Lot A Little None 4. Moving to and from a bed to a chair (including a wheelchair)? ? 1   ? 2   ? 3   ? 4  
5. Need to walk in hospital room? ? 1   ? 2   ? 3   ? 4  
6. Climbing 3-5 steps with a railing? ? 1   ? 2   ? 3   ? 4  
© , Trustees of [address] [place] [address], under license to [employer]. All rights reserved Score:  Initial: 14 Most Recent: X (Date: -- ) Interpretation of Tool:  Represents activities that are increasingly more difficult (i.e. Bed mobility, Transfers, Gait). Medical Necessity:    
· Patient is expected to demonstrate progress in strength, range of motion, balance, coordination and functional technique ·  to decrease assistance required with theraputic exercises and functional mobility · . Reason for Services/Other Comments: 
· Patient continues to require present interventions due to patient's inability to perform theraputic exercises and functional mobility · . Use of outcome tool(s) and clinical judgement create a POC that gives a: Clear prediction of patient's progress: LOW COMPLEXITY  
  
 
 
 
TREATMENT:  
(In addition to Assessment/Re-Assessment sessions the following treatments were rendered) Pre-treatment Symptoms/Complaints:  Ready to walk Pain: Initial:numeric scale Post Session:  2/10 Therapeutic Activity: (   15 ):  Therapeutic activities including sit to stand, toilet transfers, chair transfers, bathroom hygiene activities to improve mobility and strength. Required minimal Verbal cues; Safety awareness training to promote static and dynamic balance in standing. Gait Training (20 Minutes):  Gait training to improve and/or restore physical functioning as related to mobility, strength and balance. Ambulated 70 Feet (ft)(x4 (total 280 feet)) with supervision/set-up and minimal verbal cues related to their heel strike and breathing techniqueto promote proper body alignment and promote proper body breathing techniques. Assistive Device: Walker, rolling Ambulation - Level of Assistance: Stand-by assistance Distance (ft): 70 Feet (ft)(x4 (total 280 feet)) Interventions: Verbal cues, Safety awareness training Duration: 20 Minutes Braces/Orthotics/Lines/Etc:  
· drain celeste Treatment/Session Assessment:   
· Response to Treatment:   Improved gait distance, still SOB · Interdisciplinary Collaboration:  
o Physical Therapist 
o Registered Nurse · After treatment position/precautions:  
o Up in chair 
o Bed/Chair-wheels locked 
o Call light within reach 
o RN notified · Compliance with Program/Exercises: Will assess as treatment progresses. · Recommendations/Intent for next treatment session: \"Next visit will focus on advancements to more challenging activities and reduction in assistance provided\". Total Treatment Duration: PT Patient Time In/Time Out Time In: 0900 Time Out: 9911 Dar Chopra PT

## 2019-04-21 NOTE — PROGRESS NOTES
Shift Assessment - Patient is alert and oriented x4.  Respirations even and unlabored. Abdomen soft and tender.  Midline abd incision with wound vac.  Currently sitting in a locked recliner.  Call light in reach.  No complaint of nausea at this time.

## 2019-04-21 NOTE — PROGRESS NOTES
[address], [address] [place], [address] PLAN:Continue wound vac Consult wound care nurse Regular diet Dr Misa Sainz returns Monday ASSESSMENT:  Admit Date: 3/26/2019 2 Day Post-Op  Procedure(s): 
ABDOMINAL INFECTION EXCISION AND PLACEMENT OF WOUND VAC Principal Problem: 
  Incarcerated incisional hernia (3/26/2019) Active Problems: 
  Long-term insulin use in type 2 diabetes ([address] (4/12/2010) Chronic systolic heart failure ([address] (7/18/2013) Overview: NST 4/15:low risk Echo 6/2014:EF 40-45% Echo 5/2013:EF 30-35% LHC 2/2013: minimal CAD 
 
  BA on CPAP (7/14/2014) Morbid obesity ([address] (7/14/2014) Acute hypoxemic respiratory failure ([address] (4/3/2019) ARDS (adult respiratory distress syndrome) ([address] (4/3/2019) Pseudomonas urinary tract infection (4/5/2019) Chloride-responsive metabolic alkalosis (8/54/7528) Hypokalemia (4/11/2019) SUBJECTIVE:Complains of pain. Wound vac in place. Tolerating diet. Wound nurse consulted. OBJECTIVE: 
Constitutional: Alert oriented cooperative patient in no acute distress; appears stated age Visit Vitals /56 (BP 1 Location: Right arm, BP Patient Position: At rest) Pulse 60 Temp 98.1 °F (36.7 °C) Resp 18 Ht 4' 11\" (1.499 m) Wt 221 lb 4.8 oz (100.4 kg) SpO2 97% Breastfeeding? No  
BMI 44.70 kg/m² Eyes:Sclera are clear. ENMT: no external lesions gross hearing normal; no obvious neck masses, no ear or lip lesions CV: RRR. Normal perfusion Resp: No JVD. Breathing is  non-labored; no audible wheezing. GI: soft. Wound vac in place. Good seal.    No surrounding cellulitis Musculoskeletal: unremarkable with normal function. No embolic signs or cyanosis. Neuro:  Oriented; moves all 4; no focal deficits Psychiatric: normal affect and mood, no memory impairment Patient Vitals for the past 24 hrs: BP Temp Pulse Resp SpO2  
04/21/19 0406 115/56 98.1 °F (36.7 °C) 60 18 97 % 04/21/19 0043 110/65 98.6 °F (37 °C) 68 18 96 % 04/20/19 2004 112/69 98.5 °F (36.9 °C) 65 18 95 % 04/20/19 2000     96 % 04/20/19 1644 111/60 98.8 °F (37.1 °C) 60 18 97 % 04/20/19 1148 113/56 98.1 °F (36.7 °C) 62 18 97 % 04/20/19 0809 107/65 97.8 °F (36.6 °C) 66 18 93 % Labs:   
Recent Labs 04/18/19 
0634 WBC 10.0 HGB 8.4*    
K 4.5  
 CO2 25 BUN 8  
CREA 0.84 GLU 75 Clay Marcum MD

## 2019-04-22 LAB
GLUCOSE BLD STRIP.AUTO-MCNC: 133 MG/DL (ref 65–100)
GLUCOSE BLD STRIP.AUTO-MCNC: 138 MG/DL (ref 65–100)
GLUCOSE BLD STRIP.AUTO-MCNC: 143 MG/DL (ref 65–100)
GLUCOSE BLD STRIP.AUTO-MCNC: 185 MG/DL (ref 65–100)

## 2019-04-22 PROCEDURE — 77030019934 HC DRSG VAC ASST KCON -B

## 2019-04-22 PROCEDURE — 74011250636 HC RX REV CODE- 250/636: Performed by: SURGERY

## 2019-04-22 PROCEDURE — 74011636637 HC RX REV CODE- 636/637: Performed by: SURGERY

## 2019-04-22 PROCEDURE — 97535 SELF CARE MNGMENT TRAINING: CPT

## 2019-04-22 PROCEDURE — 82962 GLUCOSE BLOOD TEST: CPT

## 2019-04-22 PROCEDURE — 97605 NEG PRS WND THER DME<=50SQCM: CPT

## 2019-04-22 PROCEDURE — 97530 THERAPEUTIC ACTIVITIES: CPT

## 2019-04-22 PROCEDURE — 74011250637 HC RX REV CODE- 250/637: Performed by: SURGERY

## 2019-04-22 PROCEDURE — 65270000029 HC RM PRIVATE

## 2019-04-22 PROCEDURE — 74750000023 HC WOUND THERAPY

## 2019-04-22 PROCEDURE — 77030019952 HC CANSTR VAC ASST KCON -B

## 2019-04-22 PROCEDURE — 94760 N-INVAS EAR/PLS OXIMETRY 1: CPT

## 2019-04-22 RX ADMIN — INSULIN GLARGINE 35 UNITS: 100 INJECTION, SOLUTION SUBCUTANEOUS at 09:00

## 2019-04-22 RX ADMIN — ONDANSETRON 4 MG: 2 INJECTION INTRAMUSCULAR; INTRAVENOUS at 16:30

## 2019-04-22 RX ADMIN — PRAVASTATIN SODIUM 80 MG: 80 TABLET ORAL at 09:12

## 2019-04-22 RX ADMIN — AMIODARONE HYDROCHLORIDE 200 MG: 200 TABLET ORAL at 09:12

## 2019-04-22 RX ADMIN — Medication 10 ML: at 06:12

## 2019-04-22 RX ADMIN — CARVEDILOL 12.5 MG: 6.25 TABLET, FILM COATED ORAL at 16:31

## 2019-04-22 RX ADMIN — HUMAN INSULIN 2 UNITS: 100 INJECTION, SOLUTION SUBCUTANEOUS at 12:19

## 2019-04-22 RX ADMIN — DOCUSATE SODIUM 100 MG: 100 CAPSULE, LIQUID FILLED ORAL at 09:12

## 2019-04-22 RX ADMIN — ASPIRIN 81 MG 81 MG: 81 TABLET ORAL at 09:12

## 2019-04-22 RX ADMIN — HYDROCODONE BITARTRATE AND ACETAMINOPHEN 2 TABLET: 5; 325 TABLET ORAL at 01:49

## 2019-04-22 RX ADMIN — CARVEDILOL 12.5 MG: 6.25 TABLET, FILM COATED ORAL at 09:12

## 2019-04-22 RX ADMIN — HYDROMORPHONE HYDROCHLORIDE 2 MG: 2 INJECTION, SOLUTION INTRAMUSCULAR; INTRAVENOUS; SUBCUTANEOUS at 21:10

## 2019-04-22 RX ADMIN — HYDROMORPHONE HYDROCHLORIDE 2 MG: 2 INJECTION, SOLUTION INTRAMUSCULAR; INTRAVENOUS; SUBCUTANEOUS at 05:46

## 2019-04-22 RX ADMIN — ONDANSETRON 4 MG: 2 INJECTION INTRAMUSCULAR; INTRAVENOUS at 05:46

## 2019-04-22 RX ADMIN — DOCUSATE SODIUM 100 MG: 100 CAPSULE, LIQUID FILLED ORAL at 16:31

## 2019-04-22 RX ADMIN — Medication 400 MG: at 09:12

## 2019-04-22 RX ADMIN — ALPRAZOLAM 0.5 MG: 0.5 TABLET ORAL at 19:50

## 2019-04-22 RX ADMIN — Medication 10 ML: at 14:29

## 2019-04-22 RX ADMIN — Medication 5 ML: at 21:09

## 2019-04-22 RX ADMIN — Medication 400 MG: at 16:31

## 2019-04-22 RX ADMIN — HYDROMORPHONE HYDROCHLORIDE 2 MG: 2 INJECTION, SOLUTION INTRAMUSCULAR; INTRAVENOUS; SUBCUTANEOUS at 16:30

## 2019-04-22 NOTE — WOUND CARE
Wound VAC dressing change wound base pink granular and with slough along right side from 6-10 o'clock in exposed adipose. Drainage is seropurulent with sanguinous mix. Drain site left lower quadrant with green drainage, foul odor to drainage. Concern for continued infection. Dr. Bony Will called to notify, left message. Will monitor.

## 2019-04-22 NOTE — PROGRESS NOTES
Problem: Mobility Impaired (Adult and Pediatric) Goal: *Acute Goals and Plan of Care (Insert Text) Description STG: 
(1.)Ms. Jazlyn Goff will move from supine to sit and sit to supine  with MINIMAL ASSIST within 3 treatment day(s). (2.)Ms. Jazlyn Goff will transfer from bed to chair and chair to bed with CONTACT GUARD ASSIST using the least restrictive device within 3 treatment day(s). Met 4/12 
(3.)Ms. Jazlyn Goff will ambulate with CONTACT GUARD ASSIST for 50 feet with the least restrictive device within 3 treatment day(s). Met 4/12 GOALS RE-ASSESSED 4/13/19 : 
(1.)Ms. Jazlyn Goff will transfer from bed to chair and chair to bed with STAND BY ASSIST using the least restrictive device. Met 4/14 
  
ADDENDUM GOALS 4/19/19 : 
1) pt will transfer with cane or no device & SBA. 
(2.)Ms. Jazlyn Goff will ambulate with STAND BY ASSIST for 200 feet with a cane or no device. Progressing 4/21 
(3)Ms. Jazlyn Goff will go up 4 steps with CGA assist & rail. 
(4)Ms. Jazlyn Goff will perform HEP with cues & written guidelines, to increase safety on her feet 
  
 
 
________________________________________________________________________________________________ Outcome: Progressing Towards Goal 
  
PHYSICAL THERAPY: Daily Note and PM 4/22/2019 INPATIENT: PT Visit Days : 4 [place] / Product Type: Medicare /   
  
NAME/AGE/GENDER: Luisa Garcia is a 52 y.o. female PRIMARY DIAGNOSIS: Incisional hernia, without obstruction or gangrene [K43.2] Incarcerated incisional hernia [K43.0] Incarcerated incisional hernia [K43.0] Incarcerated incisional hernia Incarcerated incisional hernia Procedure(s) (LRB): ABDOMINAL INFECTION EXCISION AND PLACEMENT OF WOUND VAC (N/A) 4 Days Post-Op ICD-10: Treatment Diagnosis:  
  · Generalized Muscle Weakness (M62.81) · Other abnormalities of gait and mobility (R26.89) Precaution/Allergies: 
Grass pollen ASSESSMENT:  
 Ms. Katie Sommer presents with general decreased in functional mobility and gait s/p OPEN INCARCERATED [address] on 3/26/19. Patient underwent second pocedure 3/31/19 secondary to dehiscense and hernia recurrence. She continue to make progress with gait and bed mobility. This section established at most recent assessment PROBLEM LIST (Impairments causing functional limitations): 1. Decreased Strength 2. Decreased ADL/Functional Activities 3. Decreased Transfer Abilities 4. Decreased Ambulation Ability/Technique 5. Decreased Balance 6. Increased Pain 7. Decreased Activity Tolerance 8. Decreased Flexibility/Joint Mobility 9. Decreased [place] with Home Exercise Program 
 INTERVENTIONS PLANNED: (Benefits and precautions of physical therapy have been discussed with the patient.) 1. Balance Exercise 2. Bed Mobility 3. Family Education 4. Gait Training 5. Home Exercise Program (HEP) 6. Therapeutic Activites 7. Therapeutic Exercise/Strengthening 8. Transfer Training TREATMENT PLAN: Frequency/Duration: daily for duration of hospital stay Rehabilitation Potential For Stated Goals: Good RECOMMENDED REHABILITATION/EQUIPMENT: (at time of discharge pending progress): Due to the probability of continued deficits (see above) this patient will likely need continued skilled physical therapy after discharge. Equipment: ? May need a RW   
    
 
 
 
HISTORY:  
History of Present Injury/Illness (Reason for Referral): S/p OPEN INCARCERATED INCISIONAL HERNIA REPAIR 3/26/19 Past Medical History/Comorbidities: Ms. Katie Sommer  has a past medical history of Allergic rhinitis, Arthritis, Automatic implantable cardioverter-defibrillator in situ (3/30/2016), CAD in native artery (6/38/7918), Chronic systolic heart failure ([facility] [address] (07/18/2013), CKD (chronic kidney disease), Diabetes ([facility] [address] Diabetes mellitus ([facility] [address] (4/12/2010), Dyslipidemia (2/13/2013), Eczema, Fibromyalgia, GERD (gastroesophageal reflux disease), Headache, Heart failure ([facility] [address] HTN (hypertension) (2010), Hypercholesterolemia, Morbid obesity ([facility] [address] Neuropathy, NICM (nonischemic cardiomyopathy) ([facility] [address] (2/15/2013), Obesity, Obstructive sleep apnea (adult) (pediatric) (2014), Pseudomonas urinary tract infection (2019), Sciatic pain (2016), SVT (supraventricular tachycardia) ([facility] [address] and Tobacco use disorder (2010). She also has no past medical history of Dementia, Gastrointestinal disorder, or Unspecified adverse effect of anesthesia. Ms. Andrea Larson  has a past surgical history that includes hx tonsillectomy; hx implantable cardioverter defibrillator (2013); hx  section; hx hernia repair; pr left heart cath,percutaneous (2013); hx svt ablation (\"years ago\"); hx rotator cuff repair (Right); hx hysterectomy; and hx implantable cardioverter defibrillator (2013). Social History/Living Environment:  
Home Environment: Private residence # Steps to Enter: 4(than an additional 3 inside) Rails to Enter: No 
One/Two Story Residence: One story Living Alone: No 
Support Systems: Friends \ neighbors Patient Expects to be Discharged to[de-identified] Private residence Current DME Used/Available at Home: Glucometer, CPAP Prior Level of Function/Work/Activity: 
independent Number of Personal Factors/Comorbidities that affect the Plan of Care: 1-2: MODERATE COMPLEXITY EXAMINATION:  
Most Recent Physical Functioning:  
Gross Assessment: 3 to 3-/5 throughout Balance: 
  Bed Mobility: 
Supine to Sit: Stand-by assistance Sit to Supine: (left up in chair) Transfers: 
Sit to Stand: Supervision Bed to Chair: Stand-by assistance Duration: 23 Minutes Gait: 
  
Speed/Cristy: Pace decreased (<100 feet/min) Step Length: Left shortened;Right shortened Distance (ft): 200 Feet (ft)(then another 80 ft) Assistive Device: Walker, rolling Ambulation - Level of Assistance: Stand-by assistance Interventions: Safety awareness training Duration: 0 Minutes Body Structures Involved: 1. Bones 2. Joints 3. Muscles 4. Ligaments Body Functions Affected: 1. Movement Related Activities and Participation Affected: 1. Mobility Number of elements that affect the Plan of Care: 3: MODERATE COMPLEXITY CLINICAL PRESENTATION:  
Presentation: Stable and uncomplicated: LOW COMPLEXITY CLINICAL DECISION MAKING:  
[facility] [employer] AM-PAC 6 Clicks Basic Mobility Inpatient Short Form How much difficulty does the patient currently have. .. Unable A Lot A Little None 1. Turning over in bed (including adjusting bedclothes, sheets and blankets)? ? 1   ? 2   ? 3   ? 4  
2. Sitting down on and standing up from a chair with arms ( e.g., wheelchair, bedside commode, etc.)   ? 1   ? 2   ? 3   ? 4  
3. Moving from lying on back to sitting on the side of the bed?   ? 1   ? 2   ? 3   ? 4 How much help from another person does the patient currently need. .. Total A Lot A Little None 4. Moving to and from a bed to a chair (including a wheelchair)? ? 1   ? 2   ? 3   ? 4  
5. Need to walk in hospital room? ? 1   ? 2   ? 3   ? 4  
6. Climbing 3-5 steps with a railing? ? 1   ? 2   ? 3   ? 4  
© 2007, Trustees of [facility] [employer], under license to [employer]. All rights reserved Score:  Initial: 14 Most Recent: X (Date: -- ) Interpretation of Tool:  Represents activities that are increasingly more difficult (i.e. Bed mobility, Transfers, Gait). Medical Necessity:    
· Patient is expected to demonstrate progress in strength, range of motion, balance, coordination and functional technique ·  to decrease assistance required with theraputic exercises and functional mobility · . Reason for Services/Other Comments: 
· Patient continues to require present interventions due to patient's inability to perform theraputic exercises and functional mobility · . Use of outcome tool(s) and clinical judgement create a POC that gives a: Clear prediction of patient's progress: LOW COMPLEXITY  
  
 
 
 
TREATMENT:  
(In addition to Assessment/Re-Assessment sessions the following treatments were rendered) Pre-treatment Symptoms/Complaints:  Ready to walk Pain: Initial:numeric scale Pain Intensity 1: 0  Post Session:    
 
Therapeutic Activity: (  23 Minutes: Therapeutic activities including sit to stand, toilet transfers, chair transfers, bathroom hygiene activities to improve mobility and strength. Required minimal Safety awareness training to promote static and dynamic balance in standing. Gait Training (0 Minutes):  Gait training to improve and/or restore physical functioning as related to mobility, strength and balance. Ambulated 200 Feet (ft)(then another 80 ft) with supervision/set-up and minimal verbal cues related to their heel strike and breathing techniqueto promote proper body alignment and promote proper body breathing techniques. Assistive Device: Walker, rolling Ambulation - Level of Assistance: Stand-by assistance Distance (ft): 200 Feet (ft)(then another 80 ft) Interventions: Safety awareness training Duration: 0 Minutes Braces/Orthotics/Lines/Etc:  
· drain celeste Treatment/Session Assessment:   
· Response to Treatment: she continue to make good progress. · Interdisciplinary Collaboration:  
o Physical Therapist 
o Registered Nurse · After treatment position/precautions:  
o Up in chair 
o Bed/Chair-wheels locked 
o Call light within reach 
o RN notified · Compliance with Program/Exercises: Will assess as treatment progresses. · Recommendations/Intent for next treatment session: \"Next visit will focus on advancements to more challenging activities and reduction in assistance provided\". Total Treatment Duration: PT Patient Time In/Time Out Time In: 0925 Time Out: 6520 Olivia Judd, PTA

## 2019-04-22 NOTE — PROGRESS NOTES
Spiritual Care Visit, initial visit. Request by patient. Visited with patient at bedside. Prayed for patient's healing and health. Visit by Kristy Massey, Staff .  Jorge., Tamera.B., B.A.

## 2019-04-22 NOTE — PROGRESS NOTES
During PM assessment at [address] [place] [address], helped pt ambulate to bathroom. When pt got up, noticed there was drainage in the bed, drainage dripping onto the floor and her gown was soiled with drainage. Abdomen was assessed and site around wound vac was completely soiled and leaking. Wound Vac was completely full at 500mL and beeping saying \"change canister, suction interrupted. \" When pt was asked how long it had been beeping, she replied since 0200 which means her wound vac had not been suctioning. Pt was cleaned up, new tegaderm applied to all leaking areas, wound vac restarted at continuous suction, all linens/gown replaced.

## 2019-04-22 NOTE — PROGRESS NOTES
Problem: Mobility Impaired (Adult and Pediatric) Goal: *Acute Goals and Plan of Care (Insert Text) Description STG: 
(1.)Ms. Edilberto Cortes will move from supine to sit and sit to supine  with MINIMAL ASSIST within 3 treatment day(s). (2.)Ms. Edilberto Cortes will transfer from bed to chair and chair to bed with CONTACT GUARD ASSIST using the least restrictive device within 3 treatment day(s). Met 4/12 
(3.)Ms. Edilberto Cortes will ambulate with CONTACT GUARD ASSIST for 50 feet with the least restrictive device within 3 treatment day(s). Met 4/12 GOALS RE-ASSESSED 4/13/19 : 
(1.)Ms. Edilberto Cortes will transfer from bed to chair and chair to bed with STAND BY ASSIST using the least restrictive device. Met 4/14 
  
ADDENDUM GOALS 4/19/19 : 
1) pt will transfer with cane or no device & SBA. 
(2.)Ms. Edilberto Cortes will ambulate with STAND BY ASSIST for 200 feet with a cane or no device. Progressing 4/21 
(3)Ms. Edilberto Cortes will go up 4 steps with CGA assist & rail. 
(4)Ms. Edilberto Cortes will perform HEP with cues & written guidelines, to increase safety on her feet 
  
 
 
________________________________________________________________________________________________ Outcome: Progressing Towards Goal 
  
PHYSICAL THERAPY: Daily Note and AM 4/22/2019 INPATIENT: PT Visit Days : 4 [place] / Product Type: Medicare /   
  
NAME/AGE/GENDER: Alethea Antoine is a 52 y.o. female PRIMARY DIAGNOSIS: Incisional hernia, without obstruction or gangrene [K43.2] Incarcerated incisional hernia [K43.0] Incarcerated incisional hernia [K43.0] Incarcerated incisional hernia Incarcerated incisional hernia Procedure(s) (LRB): ABDOMINAL INFECTION EXCISION AND PLACEMENT OF WOUND VAC (N/A) 4 Days Post-Op ICD-10: Treatment Diagnosis:  
  · Generalized Muscle Weakness (M62.81) · Other abnormalities of gait and mobility (R26.89) Precaution/Allergies: 
Grass pollen ASSESSMENT:  
 Ms. Apple Traylor presents with general decreased in functional mobility and gait s/p OPEN INCARCERATED [address] on 3/26/19. Patient underwent second pocedure 3/31/19 secondary to dehiscense and hernia recurrence. Pt is sitting in the chair upon arrival.  She goes from sit>stand with supervision. She walk 150 ft using RW with SBA, then takes a break and walks another 80 ft. She wears 02 through out tx @ 3 L. She return to chair with call light near. Candance Andrew This section established at most recent assessment PROBLEM LIST (Impairments causing functional limitations): 1. Decreased Strength 2. Decreased ADL/Functional Activities 3. Decreased Transfer Abilities 4. Decreased Ambulation Ability/Technique 5. Decreased Balance 6. Increased Pain 7. Decreased Activity Tolerance 8. Decreased Flexibility/Joint Mobility 9. Decreased [place] with Home Exercise Program 
 INTERVENTIONS PLANNED: (Benefits and precautions of physical therapy have been discussed with the patient.) 1. Balance Exercise 2. Bed Mobility 3. Family Education 4. Gait Training 5. Home Exercise Program (HEP) 6. Therapeutic Activites 7. Therapeutic Exercise/Strengthening 8. Transfer Training TREATMENT PLAN: Frequency/Duration: daily for duration of hospital stay Rehabilitation Potential For Stated Goals: Good RECOMMENDED REHABILITATION/EQUIPMENT: (at time of discharge pending progress): Due to the probability of continued deficits (see above) this patient will likely need continued skilled physical therapy after discharge. Equipment: ? May need a RW   
    
 
 
 
HISTORY:  
History of Present Injury/Illness (Reason for Referral): S/p OPEN INCARCERATED INCISIONAL HERNIA REPAIR 3/26/19 Past Medical History/Comorbidities: Ms. Apple Traylor  has a past medical history of Allergic rhinitis, Arthritis, Automatic implantable cardioverter-defibrillator in situ (3/30/2016), CAD in native artery (), Chronic systolic heart failure ([facility] [address] (2013), CKD (chronic kidney disease), Diabetes ([facility] [address] Diabetes mellitus ([facility] [address] (2010), Dyslipidemia (2013), Eczema, Fibromyalgia, GERD (gastroesophageal reflux disease), Headache, Heart failure ([facility] [address] HTN (hypertension) (2010), Hypercholesterolemia, Morbid obesity ([address] Neuropathy, NICM (nonischemic cardiomyopathy) ([facility] [address] (2/15/2013), Obesity, Obstructive sleep apnea (adult) (pediatric) (2014), Pseudomonas urinary tract infection (2019), Sciatic pain (2016), SVT (supraventricular tachycardia) ([facility] [address] and Tobacco use disorder (2010). She also has no past medical history of Dementia, Gastrointestinal disorder, or Unspecified adverse effect of anesthesia. Ms. Zander Keys  has a past surgical history that includes hx tonsillectomy; hx implantable cardioverter defibrillator (2013); hx  section; hx hernia repair; pr left heart cath,percutaneous (2013); hx svt ablation (\"years ago\"); hx rotator cuff repair (Right); hx hysterectomy; and hx implantable cardioverter defibrillator (2013). Social History/Living Environment:  
Home Environment: Private residence # Steps to Enter: 4(than an additional 3 inside) Rails to Enter: No 
One/Two Story Residence: One story Living Alone: No 
Support Systems: Friends \ neighbors Patient Expects to be Discharged to[de-identified] Private residence Current DME Used/Available at Home: Glucometer, CPAP Prior Level of Function/Work/Activity: 
independent Number of Personal Factors/Comorbidities that affect the Plan of Care: 1-2: MODERATE COMPLEXITY EXAMINATION:  
Most Recent Physical Functioning:  
Gross Assessment: 3 to 3-/5 throughout Balance: 
  Bed Mobility: 
  
 
  
Transfers: 
Sit to Stand: Supervision Bed to Chair: Stand-by assistance Duration: 23 Minutes Gait: 
  
Speed/Cristy: Pace decreased (<100 feet/min); Slow Step Length: Left shortened;Right shortened Distance (ft): 150 Feet (ft)(then another 80 ft) Assistive Device: Walker, rolling Ambulation - Level of Assistance: Stand-by assistance Interventions: Safety awareness training Duration: 0 Minutes Body Structures Involved: 1. Bones 2. Joints 3. Muscles 4. Ligaments Body Functions Affected: 1. Movement Related Activities and Participation Affected: 1. Mobility Number of elements that affect the Plan of Care: 3: MODERATE COMPLEXITY CLINICAL PRESENTATION:  
Presentation: Stable and uncomplicated: LOW COMPLEXITY CLINICAL DECISION MAKIN[address] [place] [address] AM-PAC 6 Clicks Basic Mobility Inpatient Short Form How much difficulty does the patient currently have. .. Unable A Lot A Little None 1. Turning over in bed (including adjusting bedclothes, sheets and blankets)? ? 1   ? 2   ? 3   ? 4  
2. Sitting down on and standing up from a chair with arms ( e.g., wheelchair, bedside commode, etc.)   ? 1   ? 2   ? 3   ? 4  
3. Moving from lying on back to sitting on the side of the bed?   ? 1   ? 2   ? 3   ? 4 How much help from another person does the patient currently need. .. Total A Lot A Little None 4. Moving to and from a bed to a chair (including a wheelchair)? ? 1   ? 2   ? 3   ? 4  
5. Need to walk in hospital room? ? 1   ? 2   ? 3   ? 4  
6. Climbing 3-5 steps with a railing? ? 1   ? 2   ? 3   ? 4  
© , Trustees of [address] [place] [address], under license to [employer]. All rights reserved Score:  Initial: 14 Most Recent: X (Date: -- ) Interpretation of Tool:  Represents activities that are increasingly more difficult (i.e. Bed mobility, Transfers, Gait). Medical Necessity:    
· Patient is expected to demonstrate progress in strength, range of motion, balance, coordination and functional technique ·  to decrease assistance required with theraputic exercises and functional mobility · . Reason for Services/Other Comments: 
· Patient continues to require present interventions due to patient's inability to perform theraputic exercises and functional mobility · . Use of outcome tool(s) and clinical judgement create a POC that gives a: Clear prediction of patient's progress: LOW COMPLEXITY  
  
 
 
 
TREATMENT:  
(In addition to Assessment/Re-Assessment sessions the following treatments were rendered) Pre-treatment Symptoms/Complaints:  Ready to walk Pain: Initial:numeric scale Pain Intensity 1: 4  Post Session:  2/10 Therapeutic Activity: (  23 Minutes: Therapeutic activities including sit to stand, toilet transfers, chair transfers, bathroom hygiene activities to improve mobility and strength. Required minimal Safety awareness training to promote static and dynamic balance in standing. Gait Training (0 Minutes):  Gait training to improve and/or restore physical functioning as related to mobility, strength and balance. Ambulated 150 Feet (ft)(then another 80 ft) with supervision/set-up and minimal verbal cues related to their heel strike and breathing techniqueto promote proper body alignment and promote proper body breathing techniques. Assistive Device: Walker, rolling Ambulation - Level of Assistance: Stand-by assistance Distance (ft): 150 Feet (ft)(then another 80 ft) Interventions: Safety awareness training Duration: 0 Minutes Braces/Orthotics/Lines/Etc:  
· drain celeste Treatment/Session Assessment:   
· Response to Treatment: she did well · Interdisciplinary Collaboration:  
o Physical Therapist 
o Registered Nurse · After treatment position/precautions:  
o Up in chair 
o Bed/Chair-wheels locked 
o Call light within reach 
o RN notified · Compliance with Program/Exercises: Will assess as treatment progresses. · Recommendations/Intent for next treatment session:   \"Next visit will focus on advancements to more challenging activities and reduction in assistance provided\". Total Treatment Duration: PT Patient Time In/Time Out Time In: 0930 Time Out: 6934 Olivia Judd, PTA

## 2019-04-22 NOTE — PROGRESS NOTES
[address] [employer] [facility] up dated on pt's progress, room is still available and they will order wound vac for pt. Waiting on surgery to lock in on a d/c date.

## 2019-04-22 NOTE — PROGRESS NOTES
Shift assessment complete. Pt resting quietly in bedside recliner. No signs of acute distress. Alert and oriented x4. Abdomen wound connected to cont suction wound vac and dressing is c/d/i. Pt does not c/o pain at this time. Resp even and unlabored. Call light within reach. Pt instructed to call for assistance.

## 2019-04-22 NOTE — PROGRESS NOTES
Problem: Self Care Deficits Care Plan (Adult) Goal: *Acute Goals and Plan of Care (Insert Text) Description Patient will complete lower body dressing with minimal assist to increase self care independence. Patient will complete bathing with minimal assist to increase self care independence. Patient will improve static standing at edge of bed for 5 minutes to improve independence with transfers and self cares. Patient will tolerate 38 minutes of OT treatment with self incorporated rest breaks to increase activity tolerance to enhance participation in hobbies. Progressing 4/19/19 Patient will complete all functional transfers with contact guard assist using adaptive equipment as needed. Patient will complete UE exercises with supervision to increase overall activity tolerance and strength. Progressing 4/19/19 Timeframe: 7 visits OCCUPATIONAL THERAPY: Daily Note and AM 4/22/2019 INPATIENT: OT Visit Days: 6 [place] / Product Type: Medicare /  
  
NAME/AGE/GENDER: Neda Solano is a 52 y.o. female PRIMARY DIAGNOSIS:  Incisional hernia, without obstruction or gangrene [K43.2] Incarcerated incisional hernia [K43.0] Incarcerated incisional hernia [K43.0] Incarcerated incisional hernia Incarcerated incisional hernia Procedure(s) (LRB): ABDOMINAL INFECTION EXCISION AND PLACEMENT OF WOUND VAC (N/A) 4 Days Post-Op ICD-10: Treatment Diagnosis:  
 · Generalized Muscle Weakness (M62.81) · Difficulty in walking, Not elsewhere classified (R26.2) Precautions/Allergies: 
   Grass pollen ASSESSMENT:  
Ms. Renetta Smith presents in chair. Pt completed functional mobility with rolling walker (CGA) to sink to complete grooming with supervision. Pt is progressing towards goals. Continue POC.  
 
4/19/19 Ms. Renetta Smith was seated upright in recliner upon arrival and was agreeable to therapy.  She reports sharp pain 3/10, and discomfort with movement. She reports walking with PT earlier this AM and willing to complete UE exercises. Patient completed all UE exercises in chair with good effort. Plan to continue with skilled services for improved functional performance and independence. 4/20/19 Pt was sitting in chair upon arrival. Pt completed toileting and grooming with supervision while standing at sink. Pt is progressing towards goals. Continue POC.  
 
4/22/19 Pt found seated in recliner and agreeable to treatment. Pt performed bathing with minimal assist using bath cloths. Pt unable to get in the shower at this time due to wound vac. Pt performed toileting and grooming with supervision and dressing with minimal assist. Pt requiring rest breaks as increased breathing noted with exertion. Pt left in sitting with needs in reach. Pt worked well with OT and is progressing toward POC. This section established at most recent assessment PROBLEM LIST (Impairments causing functional limitations): 1. Decreased Strength 2. Decreased ADL/Functional Activities 3. Decreased Transfer Abilities 4. Decreased Balance 5. Increased Pain 6. Decreased Activity Tolerance INTERVENTIONS PLANNED: (Benefits and precautions of occupational therapy have been discussed with the patient.) 1. Activities of daily living training 2. Adaptive equipment training 3. Balance training 4. Clothing management 5. Neuromuscular re-eduation 6. Therapeutic activity 7. Therapeutic exercise TREATMENT PLAN: Frequency/Duration: Follow patient 3x a week to address above goals. Rehabilitation Potential For Stated Goals: Good RECOMMENDED REHABILITATION/EQUIPMENT: (at time of discharge pending progress): Due to the probability of continued deficits (see above) this patient will not likely need continued skilled occupational therapy after discharge. Equipment:  
? None at this time OCCUPATIONAL PROFILE AND HISTORY:  
 History of Present Injury/Illness (Reason for Referral): 
See H&P. Past Medical History/Comorbidities: Ms. Selene Ayala  has a past medical history of Allergic rhinitis, Arthritis, Automatic implantable cardioverter-defibrillator in situ (3/30/2016), CAD in native artery (), Chronic systolic heart failure ([address] (2013), CKD (chronic kidney disease), Diabetes ([address] Diabetes mellitus ([address] (2010), Dyslipidemia (2013), Eczema, Fibromyalgia, GERD (gastroesophageal reflux disease), Headache, Heart failure ([address] HTN (hypertension) (2010), Hypercholesterolemia, Morbid obesity ([address] Neuropathy, NICM (nonischemic cardiomyopathy) ([address] (2/15/2013), Obesity, Obstructive sleep apnea (adult) (pediatric) (2014), Pseudomonas urinary tract infection (2019), Sciatic pain (2016), SVT (supraventricular tachycardia) ([address] and Tobacco use disorder (2010). She also has no past medical history of Dementia, Gastrointestinal disorder, or Unspecified adverse effect of anesthesia. Ms. Selene Ayala  has a past surgical history that includes hx tonsillectomy; hx implantable cardioverter defibrillator (2013); hx  section; hx hernia repair; pr left heart cath,percutaneous (2013); hx svt ablation (\"years ago\"); hx rotator cuff repair (Right); hx hysterectomy; and hx implantable cardioverter defibrillator (2013). Social History/Living Environment:  
Home Environment: Private residence # Steps to Enter: 4(than an additional 3 inside) Rails to Enter: No 
One/Two Story Residence: One story Living Alone: No 
Support Systems: Friends \ neighbors Patient Expects to be Discharged to[de-identified] Private residence Current DME Used/Available at Home: Glucometer, CPAP Prior Level of Function/Work/Activity: She was working part time, driving, and independent with ADL's IADL's, walking prior to hospitalization.  
  
Number of Personal Factors/Comorbidities that affect the Plan of Care: Expanded review of therapy/medical records (1-2):  MODERATE COMPLEXITY ASSESSMENT OF OCCUPATIONAL PERFORMANCE[de-identified]  
Activities of Daily Living:  
Basic ADLs (From Assessment) Complex ADLs (From Assessment) Feeding: Supervision Oral Facial Hygiene/Grooming: Setup Bathing: Minimum assistance Upper Body Dressing: Minimum assistance Lower Body Dressing: Maximum assistance Toileting: Moderate assistance Grooming/Bathing/Dressing Activities of Daily Living Grooming Grooming Assistance: Supervision Washing Face: Supervision/set-up Cognitive Retraining Safety/Judgement: Awareness of environment; Fall prevention Upper Body Bathing Bathing Assistance: Minimum assistance Position Performed: Seated in chair Lower Body Bathing Bathing Assistance: Minimum assistance Perineal  : Contact guard assistance Position Performed: Standing Lower Body : Minimum assistance Position Performed: Seated in chair Toileting Toileting Assistance: Supervision Bladder Hygiene: Supervision/set-up Upper Body Dressing Assistance Dressing Assistance: Minimum assistance Hospital Gown: Minimum  assistance Lower Body Dressing Assistance Dressing Assistance: Minimum assistance Socks: Minimum assistance Bed/Mat Mobility Sit to Stand: Supervision Bed to Chair: Stand-by assistance Most Recent Physical Functioning:  
Gross Assessment: 
  
         
  
Posture: 
Posture (WDL): Exceptions to [facility] Posture Assessment: Trunk flexion, Rounded shoulders Balance: 
  Bed Mobility: 
  
Wheelchair Mobility: 
  
Transfers: 
Sit to Stand: Supervision Bed to Chair: Stand-by assistance Duration: 23 Minutes Patient Vitals for the past 6 hrs: 
 BP BP Patient Position SpO2 Pulse 04/22/19 0845 129/55 At rest;Sitting 93 % 73  
04/22/19 1216 157/68 At rest;Sitting 100 % 60 Mental Status Neurologic State: Alert Orientation Level: Oriented X4 Cognition: Appropriate decision making, Appropriate for age attention/concentration, Appropriate safety awareness, Follows commands Perception: Appears intact Perseveration: No perseveration noted Safety/Judgement: Awareness of environment, Fall prevention Physical Skills Involved: 1. Range of Motion 2. Balance 3. Strength 4. Activity Tolerance 5. Gross Motor Control 6. Pain (acute) 7. Edema Cognitive Skills Affected (resulting in the inability to perform in a timely and safe manner): 
1. [facility]  Psychosocial Skills Affected: 
1. WFL Number of elements that affect the Plan of Care: 3-5:  MODERATE COMPLEXITY CLINICAL DECISION MAKIN[address] [place] [address] AM-PAC 6 Clicks Daily Activity Inpatient Short Form How much help from another person does the patient currently need. .. Total A Lot A Little None 1. Putting on and taking off regular lower body clothing? ? 1   ? 2   ? 3   ? 4  
2. Bathing (including washing, rinsing, drying)? ? 1   ? 2   ? 3   ? 4  
3. Toileting, which includes using toilet, bedpan or urinal?   ? 1   ? 2   ? 3   ? 4  
4. Putting on and taking off regular upper body clothing? ? 1   ? 2   ? 3   ? 4  
5. Taking care of personal grooming such as brushing teeth? ? 1   ? 2   ? 3   ? 4  
6. Eating meals? ? 1   ? 2   ? 3   ? 4  
© , Trustees of [address] [place] [address], under license to [employer]. All rights reserved Score:  Initial: 14 Most Recent: X (Date: -- ) Interpretation of Tool:  Represents activities that are increasingly more difficult (i.e. Bed mobility, Transfers, Gait). Medical Necessity:    
· Skilled intervention continues to be required due to Deficits noted above. Reason for Services/Other Comments: 
· Patient continues to require skilled intervention due to debility and s/p hernia repair · . Use of outcome tool(s) and clinical judgement create a POC that gives a: MODERATE COMPLEXITY  
 
 
 
TREATMENT:  
 (In addition to Assessment/Re-Assessment sessions the following treatments were rendered) Pre-treatment Symptoms/Complaints:   
Pain: Initial:  
Pain Intensity 1: 0  Post Session:  3 Self Care: (30): Procedure(s) (per grid) utilized to improve and/or restore self-care/home management as related to dressing, bathing, toileting and grooming. Required no cueing to facilitate activities of daily living skills. Braces/Orthotics/Lines/Etc:  
· Room [place] Treatment/Session Assessment:   
· Response to Treatment:  Tolerated session well · Interdisciplinary Collaboration:  
o Certified Occupational Therapy Assistant 
o Registered Nurse · After treatment position/precautions:  
o Up in chair 
o Bed/Chair-wheels locked 
o Bed in low position 
o Call light within reach 
o RN notified · Compliance with Program/Exercises: Compliant all of the time, Will assess as treatment progresses. · Recommendations/Intent for next treatment session: \"Next visit will focus on advancements to more challenging activities\". Total Treatment Duration: OT Patient Time In/Time Out Time In: 1120 Time Out: 1150  
30 mins self care Marilynn Arreguin OT/TUCKER

## 2019-04-23 LAB
GLUCOSE BLD STRIP.AUTO-MCNC: 147 MG/DL (ref 65–100)
GLUCOSE BLD STRIP.AUTO-MCNC: 162 MG/DL (ref 65–100)
GLUCOSE BLD STRIP.AUTO-MCNC: 221 MG/DL (ref 65–100)
GLUCOSE BLD STRIP.AUTO-MCNC: 91 MG/DL (ref 65–100)

## 2019-04-23 PROCEDURE — 94760 N-INVAS EAR/PLS OXIMETRY 1: CPT

## 2019-04-23 PROCEDURE — 74011636637 HC RX REV CODE- 636/637: Performed by: SURGERY

## 2019-04-23 PROCEDURE — 82962 GLUCOSE BLOOD TEST: CPT

## 2019-04-23 PROCEDURE — 74011250637 HC RX REV CODE- 250/637: Performed by: SURGERY

## 2019-04-23 PROCEDURE — 97530 THERAPEUTIC ACTIVITIES: CPT

## 2019-04-23 PROCEDURE — 74011250636 HC RX REV CODE- 250/636: Performed by: SURGERY

## 2019-04-23 PROCEDURE — 65270000029 HC RM PRIVATE

## 2019-04-23 PROCEDURE — 77010033678 HC OXYGEN DAILY

## 2019-04-23 PROCEDURE — 74750000023 HC WOUND THERAPY

## 2019-04-23 RX ADMIN — HYDROCODONE BITARTRATE AND ACETAMINOPHEN 2 TABLET: 5; 325 TABLET ORAL at 18:21

## 2019-04-23 RX ADMIN — HYDROMORPHONE HYDROCHLORIDE 1 MG: 2 INJECTION, SOLUTION INTRAMUSCULAR; INTRAVENOUS; SUBCUTANEOUS at 21:08

## 2019-04-23 RX ADMIN — PROMETHAZINE HYDROCHLORIDE 25 MG: 25 INJECTION INTRAMUSCULAR; INTRAVENOUS at 00:24

## 2019-04-23 RX ADMIN — ONDANSETRON 4 MG: 2 INJECTION INTRAMUSCULAR; INTRAVENOUS at 21:09

## 2019-04-23 RX ADMIN — HUMAN INSULIN 4 UNITS: 100 INJECTION, SOLUTION SUBCUTANEOUS at 22:00

## 2019-04-23 RX ADMIN — DOCUSATE SODIUM 100 MG: 100 CAPSULE, LIQUID FILLED ORAL at 08:57

## 2019-04-23 RX ADMIN — ONDANSETRON 4 MG: 2 INJECTION INTRAMUSCULAR; INTRAVENOUS at 09:04

## 2019-04-23 RX ADMIN — HYDROMORPHONE HYDROCHLORIDE 1 MG: 2 INJECTION, SOLUTION INTRAMUSCULAR; INTRAVENOUS; SUBCUTANEOUS at 14:21

## 2019-04-23 RX ADMIN — BISACODYL 5 MG: 5 TABLET, COATED ORAL at 21:22

## 2019-04-23 RX ADMIN — PRAVASTATIN SODIUM 80 MG: 80 TABLET ORAL at 08:57

## 2019-04-23 RX ADMIN — HYDROCODONE BITARTRATE AND ACETAMINOPHEN 2 TABLET: 5; 325 TABLET ORAL at 03:10

## 2019-04-23 RX ADMIN — DOCUSATE SODIUM 100 MG: 100 CAPSULE, LIQUID FILLED ORAL at 17:27

## 2019-04-23 RX ADMIN — HYDROMORPHONE HYDROCHLORIDE 1 MG: 2 INJECTION, SOLUTION INTRAMUSCULAR; INTRAVENOUS; SUBCUTANEOUS at 09:04

## 2019-04-23 RX ADMIN — CARVEDILOL 12.5 MG: 6.25 TABLET, FILM COATED ORAL at 08:57

## 2019-04-23 RX ADMIN — AMIODARONE HYDROCHLORIDE 200 MG: 200 TABLET ORAL at 08:57

## 2019-04-23 RX ADMIN — Medication 10 ML: at 14:00

## 2019-04-23 RX ADMIN — ASPIRIN 81 MG 81 MG: 81 TABLET ORAL at 08:57

## 2019-04-23 RX ADMIN — HYDROMORPHONE HYDROCHLORIDE 1 MG: 2 INJECTION, SOLUTION INTRAMUSCULAR; INTRAVENOUS; SUBCUTANEOUS at 00:24

## 2019-04-23 RX ADMIN — Medication 400 MG: at 08:57

## 2019-04-23 RX ADMIN — HUMAN INSULIN 2 UNITS: 100 INJECTION, SOLUTION SUBCUTANEOUS at 17:27

## 2019-04-23 RX ADMIN — CARVEDILOL 12.5 MG: 6.25 TABLET, FILM COATED ORAL at 17:27

## 2019-04-23 RX ADMIN — INSULIN GLARGINE 35 UNITS: 100 INJECTION, SOLUTION SUBCUTANEOUS at 09:05

## 2019-04-23 RX ADMIN — Medication 10 ML: at 06:43

## 2019-04-23 RX ADMIN — ONDANSETRON 4 MG: 2 INJECTION INTRAMUSCULAR; INTRAVENOUS at 14:21

## 2019-04-23 RX ADMIN — Medication 400 MG: at 17:27

## 2019-04-23 NOTE — PROGRESS NOTES
Admit Date: 3/26/2019 POD 5 Days Post-Op Procedure:  Procedure(s): 
ABDOMINAL INFECTION EXCISION AND PLACEMENT OF WOUND VAC Subjective:  
 
Patient has complaints of pain. Chicho Thurman Objective:  
 
Visit Vitals /68 (BP 1 Location: Left arm, BP Patient Position: At rest) Pulse 67 Temp 99.1 °F (37.3 °C) Resp 16 Ht 4' 11\" (1.499 m) Wt 221 lb 4.8 oz (100.4 kg) SpO2 99% Breastfeeding? No  
BMI 44.70 kg/m² Temp (24hrs), Av.8 °F (36.6 °C), Min:96.7 °F (35.9 °C), Max:99.1 °F (37.3 °C) Dipakjuana Olman I&O reviewed as documented. Physical Exam:  
 General-in no distress. Sitting up in chair, as usual 
 
 
Abdomen- Wound with VAC in place. Fluid in cannister non-purulent. Labs:  
Recent Results (from the past 24 hour(s)) GLUCOSE, POC Collection Time: 19  9:08 PM  
Result Value Ref Range Glucose (POC) 143 (H) 65 - 100 mg/dL GLUCOSE, POC Collection Time: 19  7:46 AM  
Result Value Ref Range Glucose (POC) 91 65 - 100 mg/dL GLUCOSE, POC Collection Time: 19 12:37 PM  
Result Value Ref Range Glucose (POC) 147 (H) 65 - 100 mg/dL GLUCOSE, POC Collection Time: 19  5:05 PM  
Result Value Ref Range Glucose (POC) 162 (H) 65 - 100 mg/dL Assessment:  
 
Principal Problem: 
  Incarcerated incisional hernia (3/26/2019) Active Problems: 
  Long-term insulin use in type 2 diabetes ([address] (2010) Chronic systolic heart failure ([address] (2013) Overview: NST 4/15:low risk Echo 2014:EF 40-45% Echo 2013:EF 30-35% LHC 2013: minimal CAD 
 
  BA on CPAP (2014) Morbid obesity ([address] (2014) Acute hypoxemic respiratory failure ([address] (4/3/2019) ARDS (adult respiratory distress syndrome) ([facility] (4/3/2019) Pseudomonas urinary tract infection (2019) Chloride-responsive metabolic alkalosis (4/15/9582) Hypokalemia (2019) Plan/Recommendations/Medical Decision Making: Continue present treatment 
will view wound tomorrow with VAC change- need to decide on closure plan- granulation, granulation + STSG, or flap closure. Pt should be ok for placement soon, depending on how wound looks tomorrow.

## 2019-04-23 NOTE — PROGRESS NOTES
Problem: Mobility Impaired (Adult and Pediatric) Goal: *Acute Goals and Plan of Care (Insert Text) Description STG: 
(1.)Ms. Tiffanie Stanley will move from supine to sit and sit to supine  with MINIMAL ASSIST within 3 treatment day(s).  4/23 
(2.)Ms. Tiffanie Stanley will transfer from bed to chair and chair to bed with CONTACT GUARD ASSIST using the least restrictive device within 3 treatment day(s). Met 4/12 
(3.)Ms. Tiffanie Stanley will ambulate with CONTACT GUARD ASSIST for 50 feet with the least restrictive device within 3 treatment day(s). Met 4/12 GOALS RE-ASSESSED 4/13/19 : 
(1.)Ms. Tiffanie Stanley will transfer from bed to chair and chair to bed with STAND BY ASSIST using the least restrictive device. Met 4/14 
  
ADDENDUM GOALS 4/19/19 : 
1) pt will transfer with cane or no device & SBA. 
(2.)Ms. Tiffanie Stanley will ambulate with STAND BY ASSIST for 200 feet with a cane or no device. Progressing 4/21 
(3)Ms. Tiffanie Stanley will go up 4 steps with CGA assist & rail. 
(4)Ms. Tiffanie Stanley will perform HEP with cues & written guidelines, to increase safety on her feet 
  
 
 
________________________________________________________________________________________________ Outcome: Progressing Towards Goal 
  
PHYSICAL THERAPY: Daily Note and PM 4/23/2019 INPATIENT: PT Visit Days : 5 [place] / Product Type: Medicare /   
  
NAME/AGE/GENDER: Elizabeth Roman is a 52 y.o. female PRIMARY DIAGNOSIS: Incisional hernia, without obstruction or gangrene [K43.2] Incarcerated incisional hernia [K43.0] Incarcerated incisional hernia [K43.0] Incarcerated incisional hernia Incarcerated incisional hernia Procedure(s) (LRB): ABDOMINAL INFECTION EXCISION AND PLACEMENT OF WOUND VAC (N/A) 5 Days Post-Op ICD-10: Treatment Diagnosis:  
  · Generalized Muscle Weakness (M62.81) · Other abnormalities of gait and mobility (R26.89) Precaution/Allergies: 
Grass pollen ASSESSMENT:  
 Ms. Nancie Schwab presents with general decreased in functional mobility and gait s/p OPEN INCARCERATED [address] on 3/26/19. Patient underwent second pocedure 3/31/19 secondary to dehiscense and hernia recurrence. She continue to make good progress with gait. This section established at most recent assessment PROBLEM LIST (Impairments causing functional limitations): 1. Decreased Strength 2. Decreased ADL/Functional Activities 3. Decreased Transfer Abilities 4. Decreased Ambulation Ability/Technique 5. Decreased Balance 6. Increased Pain 7. Decreased Activity Tolerance 8. Decreased Flexibility/Joint Mobility 9. Decreased [place] with Home Exercise Program 
 INTERVENTIONS PLANNED: (Benefits and precautions of physical therapy have been discussed with the patient.) 1. Balance Exercise 2. Bed Mobility 3. Family Education 4. Gait Training 5. Home Exercise Program (HEP) 6. Therapeutic Activites 7. Therapeutic Exercise/Strengthening 8. Transfer Training TREATMENT PLAN: Frequency/Duration: daily for duration of hospital stay Rehabilitation Potential For Stated Goals: Good RECOMMENDED REHABILITATION/EQUIPMENT: (at time of discharge pending progress): Due to the probability of continued deficits (see above) this patient will likely need continued skilled physical therapy after discharge. Equipment: ? May need a RW   
    
 
 
 
HISTORY:  
History of Present Injury/Illness (Reason for Referral): S/p OPEN INCARCERATED INCISIONAL HERNIA REPAIR 3/26/19 Past Medical History/Comorbidities: Ms. Nancie Schwab  has a past medical history of Allergic rhinitis, Arthritis, Automatic implantable cardioverter-defibrillator in situ (3/30/2016), CAD in native artery (3/05/0982), Chronic systolic heart failure ([address] (07/18/2013), CKD (chronic kidney disease), Diabetes ([facility] Diabetes mellitus ([facility] (4/12/2010), Dyslipidemia (2/13/2013), Eczema, Fibromyalgia, GERD (gastroesophageal reflux disease), Headache, Heart failure ([facility] HTN (hypertension) (2010), Hypercholesterolemia, Morbid obesity ([facility] Neuropathy, NICM (nonischemic cardiomyopathy) ([facility] (2/15/2013), Obesity, Obstructive sleep apnea (adult) (pediatric) (2014), Pseudomonas urinary tract infection (2019), Sciatic pain (2016), SVT (supraventricular tachycardia) ([facility] and Tobacco use disorder (2010). She also has no past medical history of Dementia, Gastrointestinal disorder, or Unspecified adverse effect of anesthesia. Ms. Oxana Canada  has a past surgical history that includes hx tonsillectomy; hx implantable cardioverter defibrillator (2013); hx  section; hx hernia repair; pr left heart cath,percutaneous (2013); hx svt ablation (\"years ago\"); hx rotator cuff repair (Right); hx hysterectomy; and hx implantable cardioverter defibrillator (2013). Social History/Living Environment:  
Home Environment: Private residence # Steps to Enter: 4(than an additional 3 inside) Rails to Enter: No 
One/Two Story Residence: One story Living Alone: No 
Support Systems: Friends \ neighbors Patient Expects to be Discharged to[de-identified] Private residence Current DME Used/Available at Home: Glucometer, CPAP Prior Level of Function/Work/Activity: 
independent Number of Personal Factors/Comorbidities that affect the Plan of Care: 1-2: MODERATE COMPLEXITY EXAMINATION:  
Most Recent Physical Functioning:  
Gross Assessment: 3 to 3-/5 throughout Balance: 
  Bed Mobility: 
  
 
  
Transfers: 
Sit to Stand: Supervision Bed to Chair: Stand-by assistance Duration: 25 Minutes Gait: 
  
Speed/Cristy: Pace decreased (<100 feet/min) Step Length: Left shortened;Right shortened Distance (ft): 150 Feet (ft)(then rest another 175 ft) Assistive Device: Walker, rolling Ambulation - Level of Assistance: Stand-by assistance Interventions: Safety awareness training Duration: 0 Minutes Body Structures Involved: 1. Bones 2. Joints 3. Muscles 4. Ligaments Body Functions Affected: 1. Movement Related Activities and Participation Affected: 1. Mobility Number of elements that affect the Plan of Care: 3: MODERATE COMPLEXITY CLINICAL PRESENTATION:  
Presentation: Stable and uncomplicated: LOW COMPLEXITY CLINICAL DECISION MAKIN[address] [place] [address] AM-PAC 6 Clicks Basic Mobility Inpatient Short Form How much difficulty does the patient currently have. .. Unable A Lot A Little None 1. Turning over in bed (including adjusting bedclothes, sheets and blankets)? ? 1   ? 2   ? 3   ? 4  
2. Sitting down on and standing up from a chair with arms ( e.g., wheelchair, bedside commode, etc.)   ? 1   ? 2   ? 3   ? 4  
3. Moving from lying on back to sitting on the side of the bed?   ? 1   ? 2   ? 3   ? 4 How much help from another person does the patient currently need. .. Total A Lot A Little None 4. Moving to and from a bed to a chair (including a wheelchair)? ? 1   ? 2   ? 3   ? 4  
5. Need to walk in hospital room? ? 1   ? 2   ? 3   ? 4  
6. Climbing 3-5 steps with a railing? ? 1   ? 2   ? 3   ? 4  
© , Trustees of [address] [place] [address], under license to [employer]. All rights reserved Score:  Initial: 14 Most Recent: X (Date: -- ) Interpretation of Tool:  Represents activities that are increasingly more difficult (i.e. Bed mobility, Transfers, Gait). Medical Necessity:    
· Patient is expected to demonstrate progress in strength, range of motion, balance, coordination and functional technique ·  to decrease assistance required with theraputic exercises and functional mobility · . Reason for Services/Other Comments: 
· Patient continues to require present interventions due to patient's inability to perform theraputic exercises and functional mobility · . Use of outcome tool(s) and clinical judgement create a POC that gives a: Clear prediction of patient's progress: LOW COMPLEXITY  
  
 
 
 
TREATMENT:  
(In addition to Assessment/Re-Assessment sessions the following treatments were rendered) Pre-treatment Symptoms/Complaints:  Ready to walk Pain: Initial:numeric scale Pain Intensity 1: 0  Post Session:    
 
Therapeutic Activity: (  25 Minutes: Therapeutic activities including sit to stand, toilet transfers, chair transfers, bathroom hygiene activities to improve mobility and strength. Required minimal Safety awareness training to promote static and dynamic balance in standing. Gait Training (0 Minutes):  Gait training to improve and/or restore physical functioning as related to mobility, strength and balance. Ambulated 150 Feet (ft)(then rest another 175 ft) with supervision/set-up and minimal verbal cues related to their heel strike and breathing techniqueto promote proper body alignment and promote proper body breathing techniques. Assistive Device: Walker, rolling Ambulation - Level of Assistance: Stand-by assistance Distance (ft): 150 Feet (ft)(then rest another 175 ft) Interventions: Safety awareness training Duration: 0 Minutes Braces/Orthotics/Lines/Etc:  
· drain celeste Treatment/Session Assessment:   
· Response to Treatment: she continue to make good progress. · Interdisciplinary Collaboration:  
o Physical Therapist 
o Registered Nurse · After treatment position/precautions:  
o Up in chair 
o Bed/Chair-wheels locked 
o Call light within reach 
o RN notified · Compliance with Program/Exercises: Will assess as treatment progresses. · Recommendations/Intent for next treatment session: \"Next visit will focus on advancements to more challenging activities and reduction in assistance provided\". Total Treatment Duration: PT Patient Time In/Time Out Time In: 6387 Time Out: 1440 Olivia Judd, PTA

## 2019-04-24 LAB
GLUCOSE BLD STRIP.AUTO-MCNC: 138 MG/DL (ref 65–100)
GLUCOSE BLD STRIP.AUTO-MCNC: 150 MG/DL (ref 65–100)
GLUCOSE BLD STRIP.AUTO-MCNC: 212 MG/DL (ref 65–100)
GLUCOSE BLD STRIP.AUTO-MCNC: 93 MG/DL (ref 65–100)

## 2019-04-24 PROCEDURE — 94760 N-INVAS EAR/PLS OXIMETRY 1: CPT

## 2019-04-24 PROCEDURE — 74011250636 HC RX REV CODE- 250/636: Performed by: SURGERY

## 2019-04-24 PROCEDURE — 97606 NEG PRS WND THER DME>50 SQCM: CPT

## 2019-04-24 PROCEDURE — 77010033678 HC OXYGEN DAILY

## 2019-04-24 PROCEDURE — 65270000029 HC RM PRIVATE

## 2019-04-24 PROCEDURE — 74011250637 HC RX REV CODE- 250/637: Performed by: SURGERY

## 2019-04-24 PROCEDURE — 77030018846 HC SOL IRR STRL H20 ICUM -A

## 2019-04-24 PROCEDURE — 74011636637 HC RX REV CODE- 636/637: Performed by: SURGERY

## 2019-04-24 PROCEDURE — 82962 GLUCOSE BLOOD TEST: CPT

## 2019-04-24 PROCEDURE — 74011000250 HC RX REV CODE- 250: Performed by: SURGERY

## 2019-04-24 PROCEDURE — 74750000023 HC WOUND THERAPY

## 2019-04-24 PROCEDURE — 97530 THERAPEUTIC ACTIVITIES: CPT

## 2019-04-24 PROCEDURE — 97535 SELF CARE MNGMENT TRAINING: CPT

## 2019-04-24 PROCEDURE — 77030019934 HC DRSG VAC ASST KCON -B

## 2019-04-24 RX ADMIN — ONDANSETRON 4 MG: 2 INJECTION INTRAMUSCULAR; INTRAVENOUS at 23:40

## 2019-04-24 RX ADMIN — HYDROMORPHONE HYDROCHLORIDE 1 MG: 2 INJECTION, SOLUTION INTRAMUSCULAR; INTRAVENOUS; SUBCUTANEOUS at 00:31

## 2019-04-24 RX ADMIN — HYDROCODONE BITARTRATE AND ACETAMINOPHEN 2 TABLET: 5; 325 TABLET ORAL at 15:52

## 2019-04-24 RX ADMIN — DOCUSATE SODIUM 100 MG: 100 CAPSULE, LIQUID FILLED ORAL at 08:44

## 2019-04-24 RX ADMIN — HYDROCODONE BITARTRATE AND ACETAMINOPHEN 2 TABLET: 5; 325 TABLET ORAL at 08:44

## 2019-04-24 RX ADMIN — CARVEDILOL 12.5 MG: 6.25 TABLET, FILM COATED ORAL at 08:44

## 2019-04-24 RX ADMIN — HYDROCODONE BITARTRATE AND ACETAMINOPHEN 1 TABLET: 5; 325 TABLET ORAL at 23:51

## 2019-04-24 RX ADMIN — Medication 400 MG: at 17:50

## 2019-04-24 RX ADMIN — PRAVASTATIN SODIUM 80 MG: 80 TABLET ORAL at 08:44

## 2019-04-24 RX ADMIN — INSULIN GLARGINE 35 UNITS: 100 INJECTION, SOLUTION SUBCUTANEOUS at 08:55

## 2019-04-24 RX ADMIN — HYDROCODONE BITARTRATE AND ACETAMINOPHEN 1 TABLET: 5; 325 TABLET ORAL at 23:41

## 2019-04-24 RX ADMIN — Medication 5 ML: at 12:06

## 2019-04-24 RX ADMIN — HUMAN INSULIN 2 UNITS: 100 INJECTION, SOLUTION SUBCUTANEOUS at 23:35

## 2019-04-24 RX ADMIN — HUMAN INSULIN 4 UNITS: 100 INJECTION, SOLUTION SUBCUTANEOUS at 12:47

## 2019-04-24 RX ADMIN — CARVEDILOL 12.5 MG: 6.25 TABLET, FILM COATED ORAL at 17:50

## 2019-04-24 RX ADMIN — HYDROMORPHONE HYDROCHLORIDE 1 MG: 2 INJECTION, SOLUTION INTRAMUSCULAR; INTRAVENOUS; SUBCUTANEOUS at 04:16

## 2019-04-24 RX ADMIN — HYDROMORPHONE HYDROCHLORIDE 1 MG: 2 INJECTION, SOLUTION INTRAMUSCULAR; INTRAVENOUS; SUBCUTANEOUS at 12:05

## 2019-04-24 RX ADMIN — ONDANSETRON 4 MG: 2 INJECTION INTRAMUSCULAR; INTRAVENOUS at 01:33

## 2019-04-24 RX ADMIN — DOCUSATE SODIUM 100 MG: 100 CAPSULE, LIQUID FILLED ORAL at 17:50

## 2019-04-24 RX ADMIN — PROMETHAZINE HYDROCHLORIDE 25 MG: 25 INJECTION INTRAMUSCULAR; INTRAVENOUS at 08:57

## 2019-04-24 RX ADMIN — Medication 10 ML: at 22:00

## 2019-04-24 RX ADMIN — Medication 400 MG: at 08:44

## 2019-04-24 RX ADMIN — AMIODARONE HYDROCHLORIDE 200 MG: 200 TABLET ORAL at 08:44

## 2019-04-24 RX ADMIN — ASPIRIN 81 MG 81 MG: 81 TABLET ORAL at 08:44

## 2019-04-24 NOTE — ROUTINE PROCESS
Pt continues to complain of intermittent nausea/pain. Norco and phenergan given with relief noted. VAC intact. Pt is nimo supplements and diet otherwise.  Ambulatory with assist.

## 2019-04-24 NOTE — PROGRESS NOTES
Seen with wound care team.  She has no new complaints. Visit Vitals /78 (BP 1 Location: Left arm, BP Patient Position: At rest) Pulse 60 Temp 98.8 °F (37.1 °C) Resp 18 Ht 4' 11\" (1.499 m) Wt 221 lb 4.8 oz (100.4 kg) SpO2 98% Breastfeeding? No  
BMI 44.70 kg/m² Wound with good granulation in areas. Left wall of wound and right lower/outer wall with ischemic fat. No purulence or signs of infection. Slight right upper/outer tunneling. Will try to induce a transverse closure with VAC application- the lower aspect of the pannus readily folds cephalad to close the defect. Would not want to force this mechanically (surgically) due to known wound colonization. Will keep inpatient until next VAC change, to ensure viability of this plan. Anticipate transfer to [facility] Friday.

## 2019-04-24 NOTE — PROGRESS NOTES
Report received from Rosario, [address] [place] [address]. PM assessment completed. PT is AAO x 4 with dysnpea upon exertion, on 2L O2 via NC. IV site is CDI and saline locked. Pt states she feels constipated but would like to try and use the bathroom, assisted PT to bathroom and back to bed. Wound vac is on continuous suction. Pt states pain is 8/10 in her abdomen, (see mar). Bed is low and locked with call light in reach. Will continue to monitor.

## 2019-04-24 NOTE — PROGRESS NOTES
Problem: Self Care Deficits Care Plan (Adult) Goal: *Acute Goals and Plan of Care (Insert Text) Description Patient will complete lower body dressing with minimal assist to increase self care independence. Patient will complete bathing with minimal assist to increase self care independence. Patient will improve static standing at edge of bed for 5 minutes to improve independence with transfers and self cares. GOAL MET 4/24/2019 Patient will tolerate 38 minutes of OT treatment with self incorporated rest breaks to increase activity tolerance to enhance participation in hobbies. Progressing 4/19/19 Patient will complete all functional transfers with contact guard assist using adaptive equipment as needed GOAL MET 4/24/2019 Patient will complete UE exercises with supervision to increase overall activity tolerance and strength. Progressing 4/19/19 New 4/24/2019 Patient will perform shower transfer with CGA. Patient will participate in 30 + minutes of ADL/ therapeutic exercise/therapeutic activity with min rest breaks to increase activity tolerance for self care. Goals to be achieved in 7 days. Timeframe: 7 visits OCCUPATIONAL THERAPY: Daily Note and AM 4/24/2019 INPATIENT: OT Visit Days: 7 [place] / Product Type: Medicare /  
  
NAME/AGE/GENDER: Shawn Boyer is a 52 y.o. female PRIMARY DIAGNOSIS:  Incisional hernia, without obstruction or gangrene [K43.2] Incarcerated incisional hernia [K43.0] Incarcerated incisional hernia [K43.0] Incarcerated incisional hernia Incarcerated incisional hernia Procedure(s) (LRB): ABDOMINAL INFECTION EXCISION AND PLACEMENT OF WOUND VAC (N/A) 6 Days Post-Op ICD-10: Treatment Diagnosis:  
 · Generalized Muscle Weakness (M62.81) · Difficulty in walking, Not elsewhere classified (R26.2) Precautions/Allergies: 
   Grass pollen ASSESSMENT:  
 Ms. Katie Sommer presents in chair. Pt completed functional mobility with rolling walker (CGA) to sink to complete grooming with supervision. Pt is progressing towards goals. Continue POC.  
 
4/19/19 Ms. Katie Sommer was seated upright in recliner upon arrival and was agreeable to therapy. She reports sharp pain 3/10, and discomfort with movement. She reports walking with PT earlier this AM and willing to complete UE exercises. Patient completed all UE exercises in chair with good effort. Plan to continue with skilled services for improved functional performance and independence. 4/20/19 Pt was sitting in chair upon arrival. Pt completed toileting and grooming with supervision while standing at sink. Pt is progressing towards goals. Continue POC.  
 
4/22/19 Pt found seated in recliner and agreeable to treatment. Pt performed bathing with minimal assist using bath cloths. Pt unable to get in the shower at this time due to wound vac. Pt performed toileting and grooming with supervision and dressing with minimal assist. Pt requiring rest breaks as increased breathing noted with exertion. Pt left in sitting with needs in reach. Pt worked well with OT and is progressing toward POC. 
 
4/24/2019 Pt seen in room. Supine in bed upon entry. Pt able to get out of bed and to bathroom for toileting with assistance for wound vac only. Pt with decreased activity tolerance. New goals set and will continue plan of care This section established at most recent assessment PROBLEM LIST (Impairments causing functional limitations): 1. Decreased Strength 2. Decreased ADL/Functional Activities 3. Decreased Transfer Abilities 4. Decreased Balance 5. Increased Pain 6. Decreased Activity Tolerance INTERVENTIONS PLANNED: (Benefits and precautions of occupational therapy have been discussed with the patient.) 1. Activities of daily living training 2. Adaptive equipment training 3. Balance training 4. Clothing management 5. Neuromuscular re-eduation 6. Therapeutic activity 7. Therapeutic exercise TREATMENT PLAN: Frequency/Duration: Follow patient 3x a week to address above goals. Rehabilitation Potential For Stated Goals: Good RECOMMENDED REHABILITATION/EQUIPMENT: (at time of discharge pending progress): Due to the probability of continued deficits (see above) this patient will not likely need continued skilled occupational therapy after discharge. Equipment:  
? None at this time OCCUPATIONAL PROFILE AND HISTORY:  
History of Present Injury/Illness (Reason for Referral): 
See H&P. Past Medical History/Comorbidities: Ms. Monie Ramsey  has a past medical history of Allergic rhinitis, Arthritis, Automatic implantable cardioverter-defibrillator in situ (3/30/2016), CAD in native artery (7828), Chronic systolic heart failure ([address] (2013), CKD (chronic kidney disease), Diabetes ([address] Diabetes mellitus ([address] (2010), Dyslipidemia (2013), Eczema, Fibromyalgia, GERD (gastroesophageal reflux disease), Headache, Heart failure ([address] HTN (hypertension) (2010), Hypercholesterolemia, Morbid obesity ([address] Neuropathy, NICM (nonischemic cardiomyopathy) ([address] (2/15/2013), Obesity, Obstructive sleep apnea (adult) (pediatric) (2014), Pseudomonas urinary tract infection (2019), Sciatic pain (2016), SVT (supraventricular tachycardia) ([address] and Tobacco use disorder (2010). She also has no past medical history of Dementia, Gastrointestinal disorder, or Unspecified adverse effect of anesthesia. Ms. Monie Ramsey  has a past surgical history that includes hx tonsillectomy; hx implantable cardioverter defibrillator (2013); hx  section; hx hernia repair; pr left heart cath,percutaneous (2013); hx svt ablation (\"years ago\"); hx rotator cuff repair (Right); hx hysterectomy; and hx implantable cardioverter defibrillator (2013). Social History/Living Environment: Home Environment: Private residence # Steps to Enter: 4(than an additional 3 inside) Rails to Enter: No 
One/Two Story Residence: One story Living Alone: No 
Support Systems: Friends \ neighbors Patient Expects to be Discharged to[de-identified] Private residence Current DME Used/Available at Home: Glucometer, CPAP Prior Level of Function/Work/Activity: She was working part time, driving, and independent with ADL's IADL's, walking prior to hospitalization. Number of Personal Factors/Comorbidities that affect the Plan of Care: Expanded review of therapy/medical records (1-2):  MODERATE COMPLEXITY ASSESSMENT OF OCCUPATIONAL PERFORMANCE[de-identified]  
Activities of Daily Living:  
Basic ADLs (From Assessment) Complex ADLs (From Assessment) Feeding: Supervision Oral Facial Hygiene/Grooming: Setup Bathing: Minimum assistance Upper Body Dressing: Minimum assistance Lower Body Dressing: Maximum assistance Toileting: Moderate assistance Grooming/Bathing/Dressing Activities of Daily Living Toileting Toileting Assistance: Supervision Bladder Hygiene: Supervision/set-up Clothing Management: Supervision/set-up(hospital gown) Functional Transfers Bathroom Mobility: Stand-by assistance(rolling walker, wound vac mainagement) Toilet Transfer : Stand-by assistance Adaptive Equipment: Grab bars Bed/Mat Mobility Supine to Sit: Contact guard assistance Sit to Supine: Contact guard assistance Sit to Stand: Stand-by assistance Stand to Sit: Stand-by assistance Bed to Chair: Stand-by assistance Most Recent Physical Functioning:  
Gross Assessment: 
  
         
  
Posture: 
Posture (WDL): Exceptions to [facility] Posture Assessment: Trunk flexion, Rounded shoulders Balance: 
Sitting: Intact Standing: Intact Bed Mobility: 
Supine to Sit: Contact guard assistance Sit to Supine: Contact guard assistance Wheelchair Mobility: 
  
Transfers: 
Sit to Stand: Stand-by assistance Stand to Sit: Stand-by assistance Bed to Chair: Stand-by assistance Duration: 25 Minutes Patient Vitals for the past 6 hrs: 
 BP BP Patient Position SpO2 O2 Flow Rate (L/min) Pulse 19 1131 144/78 At rest 98 %  60  
19 1254   99 % 2 l/min   
19 1304   98 %   Mental Status Neurologic State: Alert Orientation Level: Oriented X4 Cognition: Appropriate decision making Perception: Appears intact Perseveration: No perseveration noted Safety/Judgement: Awareness of environment, Fall prevention Physical Skills Involved: 1. Range of Motion 2. Balance 3. Strength 4. Activity Tolerance 5. Gross Motor Control 6. Pain (acute) 7. Edema Cognitive Skills Affected (resulting in the inability to perform in a timely and safe manner): 
1. [facility]  Psychosocial Skills Affected: 
1. WFL Number of elements that affect the Plan of Care: 3-5:  MODERATE COMPLEXITY CLINICAL DECISION MAKIN[address] [place] [id number] AM-PAC 6 Clicks Daily Activity Inpatient Short Form How much help from another person does the patient currently need. .. Total A Lot A Little None 1. Putting on and taking off regular lower body clothing? ? 1   ? 2   ? 3   ? 4  
2. Bathing (including washing, rinsing, drying)? ? 1   ? 2   ? 3   ? 4  
3. Toileting, which includes using toilet, bedpan or urinal?   ? 1   ? 2   ? 3   ? 4  
4. Putting on and taking off regular upper body clothing? ? 1   ? 2   ? 3   ? 4  
5. Taking care of personal grooming such as brushing teeth? ? 1   ? 2   ? 3   ? 4  
6. Eating meals? ? 1   ? 2   ? 3   ? 4  
© , Trustees of [address] [place] [id number], under license to [employer] All rights reserved Score:  Initial: 14 Most Recent: X (Date: -- ) Interpretation of Tool:  Represents activities that are increasingly more difficult (i.e. Bed mobility, Transfers, Gait). Medical Necessity: · Skilled intervention continues to be required due to Deficits noted above. Reason for Services/Other Comments: 
· Patient continues to require skilled intervention due to debility and s/p hernia repair · . Use of outcome tool(s) and clinical judgement create a POC that gives a: MODERATE COMPLEXITY  
 
 
 
TREATMENT:  
(In addition to Assessment/Re-Assessment sessions the following treatments were rendered) Pre-treatment Symptoms/Complaints:   
Pain: Initial:  
   Post Session:  3 Self Care: (15): Procedure(s) (per grid) utilized to improve and/or restore self-care/home management as related to toileting. Required no cueing to facilitate activities of daily living skills. Braces/Orthotics/Lines/Etc:  
· Room [place] Treatment/Session Assessment:   
· Response to Treatment:  Tolerated session well · Interdisciplinary Collaboration:  
o Certified Occupational Therapy Assistant 
o Registered Nurse · After treatment position/precautions:  
o Supine in bed 
o Bed/Chair-wheels locked 
o Bed in low position 
o Call light within reach 
o RN notified · Compliance with Program/Exercises: Compliant all of the time, Will assess as treatment progresses. · Recommendations/Intent for next treatment session: \"Next visit will focus on advancements to more challenging activities\". Total Treatment Duration: OT Patient Time In/Time Out Time In: 5103 Time Out: 1350  
30 mins self care Pito Wilcox OT/TUCKER

## 2019-04-24 NOTE — PROGRESS NOTES
04/23/19 2320 Oxygen Therapy O2 Sat (%) 98 % Pulse via Oximetry 68 beats per minute O2 Device Nasal cannula 
(Home CPAP set up at bedside. Pt awake, watching TV.) O2 Flow Rate (L/min) 2 l/min

## 2019-04-24 NOTE — PROGRESS NOTES
Nutrition follow-up: 
Reason for initial assessment:  Length of stay. Assessment:  
Diet:  Consistent CHO Nutrition Supplement:  Glucerna Shake or Ensure High Protein  supplement on all meal trays. Pt admitted 3/26 for abdominal hernia repair, complicated by abdominal wall dehiscence and return to OR 3/31 for hernia repair with mesh. Pt with acute hypoxic respiratory failure 4/03; transferred to [address] intubation 4/03. Pt had a [facility] on 4/03-R IJ. Pt extubated 4/05. Pt s/p day 6 abdominal infection excision-wound vac placement. Pt lying in bed; c/o abdominal pain after wound dressing changed. Lunch meal tray in room; pt ate 100% of lunch consisting of 2 hard boiled eggs, corn, salad and Glucerna Shake supplement. Pt endorses breakfast is her best meal; typically eats eggs, deluna, grits, muffin and juice + nutrition supplement. Abdomen: soft, obese, tender, active bowel sounds with wound vac in place. Pt with c/o constipation; most recent documented BM 4/23. Pt has milk of magnesia and dulcolax ordered Anthropometrics: Height: 4' 11\" (149.9 cm), Weight: 100.4 kg (221 lb 4.8 oz), Bed weight on 4/3, Body mass index is 44.7 kg/m². BMI class of morbid obesity. No weight since 4/03 
 Macronutrient needs: EER:  3135-4619 kcal /day (15-18  kcal/kg BW) EPR:   gm/day (2-2.5 gm/kg/IBW) GFR >60 Max CHO:  226 grams/day (50% estimated kcal/day) Intake/Comparative Standards: RD rounds:  100% lunch today. 2 recorded intake since last RD visit with average intake of 50%; pt verbalizes drinking Ensure High Protein or Glucerna Shake supplement all meals. Pt potentially meets 100% estimated kcal needs and >90% estimated protein needs.  Intervention: 
Meals and Snacks:  Continue Consistent CHO diet. Assisted with supper menu selection.  
Nutrition Supplement Therapy:   Send Ensure High Protein all meal trays-vanilla preference per patient request.  Discontinued the Glucerna Shake supplements Ordered weight Discharge nutrition plan: Too soon to determine. Mita Desai, MPH, [address] [place] [address],  
[phone]

## 2019-04-24 NOTE — WOUND CARE
Wound VAC dressing change, Dr. Margie Otero in to assess wound. Wound base with muscle, mesh and adipose exposed. Areas of adipose are necrosing, granulation on portions as well. Wound VAC sponge applied vertically to allow the bottom of the wound to nearly meet the top using strips of drape as suggested by Dr. Margie Otero. Will monitor.

## 2019-04-24 NOTE — PROGRESS NOTES
Problem: Mobility Impaired (Adult and Pediatric) Goal: *Acute Goals and Plan of Care (Insert Text) Description STG: 
(1.)Ms. Autumn Mckenna will move from supine to sit and sit to supine  with MINIMAL ASSIST within 3 treatment day(s).  4/23 
(2.)Ms. Autumn Mckenna will transfer from bed to chair and chair to bed with CONTACT GUARD ASSIST using the least restrictive device within 3 treatment day(s). Met 4/12 
(3.)Ms. Autumn Mckenna will ambulate with CONTACT GUARD ASSIST for 50 feet with the least restrictive device within 3 treatment day(s). Met 4/12 GOALS RE-ASSESSED 4/13/19 : 
(1.)Ms. Autumn Mckenna will transfer from bed to chair and chair to bed with STAND BY ASSIST using the least restrictive device. Met 4/14 
  
ADDENDUM GOALS 4/19/19 : 
1) pt will transfer with cane or no device & SBA. 
(2.)Ms. Autumn Mckenna will ambulate with STAND BY ASSIST for 200 feet with a cane or no device. Progressing 4/21 
(3)Ms. Autumn Mckenna will go up 4 steps with CGA assist & rail. 
(4)Ms. Autumn Mckenna will perform HEP with cues & written guidelines, to increase safety on her feet 
  
 
 
________________________________________________________________________________________________ Outcome: Progressing Towards Goal 
  
PHYSICAL THERAPY: Daily Note and AM 4/24/2019 INPATIENT: PT Visit Days : 5 [place] / Product Type: Medicare /   
  
NAME/AGE/GENDER: Franco Gibbons is a 52 y.o. female PRIMARY DIAGNOSIS: Incisional hernia, without obstruction or gangrene [K43.2] Incarcerated incisional hernia [K43.0] Incarcerated incisional hernia [K43.0] Incarcerated incisional hernia Incarcerated incisional hernia Procedure(s) (LRB): ABDOMINAL INFECTION EXCISION AND PLACEMENT OF WOUND VAC (N/A) 6 Days Post-Op ICD-10: Treatment Diagnosis:  
  · Generalized Muscle Weakness (M62.81) · Other abnormalities of gait and mobility (R26.89) Precaution/Allergies: 
Grass pollen ASSESSMENT:  
 Ms. Alyssa Dubon presents with general decreased in functional mobility and gait s/p OPEN INCARCERATED [address] on 3/26/19. Patient underwent second pocedure 3/31/19 secondary to dehiscense and hernia recurrence. 4/24 am  She is sitting chair upon arrival.  She walk 100 ft using RW with SBA, then rested and walk another 250 ft and return to the chair with call light near. This section established at most recent assessment PROBLEM LIST (Impairments causing functional limitations): 1. Decreased Strength 2. Decreased ADL/Functional Activities 3. Decreased Transfer Abilities 4. Decreased Ambulation Ability/Technique 5. Decreased Balance 6. Increased Pain 7. Decreased Activity Tolerance 8. Decreased Flexibility/Joint Mobility 9. Decreased [place] with Home Exercise Program 
 INTERVENTIONS PLANNED: (Benefits and precautions of physical therapy have been discussed with the patient.) 1. Balance Exercise 2. Bed Mobility 3. Family Education 4. Gait Training 5. Home Exercise Program (HEP) 6. Therapeutic Activites 7. Therapeutic Exercise/Strengthening 8. Transfer Training TREATMENT PLAN: Frequency/Duration: daily for duration of hospital stay Rehabilitation Potential For Stated Goals: Good RECOMMENDED REHABILITATION/EQUIPMENT: (at time of discharge pending progress): Due to the probability of continued deficits (see above) this patient will likely need continued skilled physical therapy after discharge. Equipment: ? May need a RW   
    
 
 
 
HISTORY:  
History of Present Injury/Illness (Reason for Referral): S/p OPEN INCARCERATED INCISIONAL HERNIA REPAIR 3/26/19 Past Medical History/Comorbidities: Ms. Alyssa Dubon  has a past medical history of Allergic rhinitis, Arthritis, Automatic implantable cardioverter-defibrillator in situ (3/30/2016), CAD in native artery (9/82/1119), Chronic systolic heart failure ([facility] (2013), CKD (chronic kidney disease), Diabetes ([facility] Diabetes mellitus ([facility] (2010), Dyslipidemia (2013), Eczema, Fibromyalgia, GERD (gastroesophageal reflux disease), Headache, Heart failure ([facility] HTN (hypertension) (2010), Hypercholesterolemia, Morbid obesity ([facility] Neuropathy, NICM (nonischemic cardiomyopathy) ([facility] (2/15/2013), Obesity, Obstructive sleep apnea (adult) (pediatric) (2014), Pseudomonas urinary tract infection (2019), Sciatic pain (2016), SVT (supraventricular tachycardia) ([facility] and Tobacco use disorder (2010). She also has no past medical history of Dementia, Gastrointestinal disorder, or Unspecified adverse effect of anesthesia. Ms. Binh Nieves  has a past surgical history that includes hx tonsillectomy; hx implantable cardioverter defibrillator (2013); hx  section; hx hernia repair; pr left heart cath,percutaneous (2013); hx svt ablation (\"years ago\"); hx rotator cuff repair (Right); hx hysterectomy; and hx implantable cardioverter defibrillator (2013). Social History/Living Environment:  
Home Environment: Private residence # Steps to Enter: 4(than an additional 3 inside) Rails to Enter: No 
One/Two Story Residence: One story Living Alone: No 
Support Systems: Friends \ neighbors Patient Expects to be Discharged to[de-identified] Private residence Current DME Used/Available at Home: Glucometer, CPAP Prior Level of Function/Work/Activity: 
independent Number of Personal Factors/Comorbidities that affect the Plan of Care: 1-2: MODERATE COMPLEXITY EXAMINATION:  
Most Recent Physical Functioning:  
Gross Assessment: 3 to 3-/5 throughout Balance: 
  Bed Mobility: 
  
 
  
Transfers: 
Sit to Stand: Supervision Bed to Chair: Stand-by assistance Duration: 25 Minutes Gait: 
  
Speed/Cristy: Pace decreased (<100 feet/min) Step Length: Left shortened;Right shortened Distance (ft): 100 Feet (ft)(rest then another 250 ft) Assistive Device: Walker, rolling Ambulation - Level of Assistance: Stand-by assistance Interventions: Safety awareness training Body Structures Involved: 1. Bones 2. Joints 3. Muscles 4. Ligaments Body Functions Affected: 1. Movement Related Activities and Participation Affected: 1. Mobility Number of elements that affect the Plan of Care: 3: MODERATE COMPLEXITY CLINICAL PRESENTATION:  
Presentation: Stable and uncomplicated: LOW COMPLEXITY CLINICAL DECISION MAKING:  
[facility] AM-PAC 6 Clicks Basic Mobility Inpatient Short Form How much difficulty does the patient currently have. .. Unable A Lot A Little None 1. Turning over in bed (including adjusting bedclothes, sheets and blankets)? ? 1   ? 2   ? 3   ? 4  
2. Sitting down on and standing up from a chair with arms ( e.g., wheelchair, bedside commode, etc.)   ? 1   ? 2   ? 3   ? 4  
3. Moving from lying on back to sitting on the side of the bed?   ? 1   ? 2   ? 3   ? 4 How much help from another person does the patient currently need. .. Total A Lot A Little None 4. Moving to and from a bed to a chair (including a wheelchair)? ? 1   ? 2   ? 3   ? 4  
5. Need to walk in hospital room? ? 1   ? 2   ? 3   ? 4  
6. Climbing 3-5 steps with a railing? ? 1   ? 2   ? 3   ? 4  
© 2007, Trustees of [facility], under license to [employer]. All rights reserved Score:  Initial: 14 Most Recent: X (Date: -- ) Interpretation of Tool:  Represents activities that are increasingly more difficult (i.e. Bed mobility, Transfers, Gait). Medical Necessity:    
· Patient is expected to demonstrate progress in strength, range of motion, balance, coordination and functional technique ·  to decrease assistance required with theraputic exercises and functional mobility · . Reason for Services/Other Comments: · Patient continues to require present interventions due to patient's inability to perform theraputic exercises and functional mobility · . Use of outcome tool(s) and clinical judgement create a POC that gives a: Clear prediction of patient's progress: LOW COMPLEXITY  
  
 
 
 
TREATMENT:  
(In addition to Assessment/Re-Assessment sessions the following treatments were rendered) Pre-treatment Symptoms/Complaints:  Ready to walk Pain: Initial:numeric scale Pain Intensity 1: 0  Post Session:    
 
Therapeutic Activity: (  25 Minutes: Therapeutic activities including sit to stand, toilet transfers, chair transfers, bathroom hygiene activities to improve mobility and strength. Required minimal Safety awareness training to promote static and dynamic balance in standing. Gait Training (0 Minutes):  Gait training to improve and/or restore physical functioning as related to mobility, strength and balance. Ambulated 100 Feet (ft)(rest then another 250 ft) with supervision/set-up and minimal verbal cues related to their heel strike and breathing techniqueto promote proper body alignment and promote proper body breathing techniques. Assistive Device: Walker, rolling Ambulation - Level of Assistance: Stand-by assistance Distance (ft): 100 Feet (ft)(rest then another 250 ft) Interventions: Safety awareness training Duration: 0 Minutes Braces/Orthotics/Lines/Etc:  
· drain celeste Treatment/Session Assessment:   
· Response to Treatment: she continue to make good progress · Interdisciplinary Collaboration:  
o Physical Therapy Assistant 
o Registered Nurse · After treatment position/precautions:  
o Up in chair 
o Bed/Chair-wheels locked 
o Call light within reach 
o RN notified · Compliance with Program/Exercises: Will assess as treatment progresses. · Recommendations/Intent for next treatment session:   \"Next visit will focus on advancements to more challenging activities and reduction in assistance provided\". Total Treatment Duration: PT Patient Time In/Time Out Time In: 1100 Time Out: 1125 Olivia Judd, PTA

## 2019-04-24 NOTE — PROGRESS NOTES
Per Dr. Delfin Barthel note form 4/23 pm..... Plan/Recommendations/Medical Decision Making:  
  
Continue present treatment 
will view wound tomorrow with VAC change- need to decide on closure plan- granulation, granulation + STSG, or flap closure. Pt should be ok for placement soon, depending on how wound looks tomorrow. [facility] made aware of progress. 4/25 @ 1115, Per Surgical PA. ....... PLAN: 
Continue present care ADA diet Pain/Nausea control Change Xanax to scheduled TID Continue Colace BID Add Miralax 
  
Will try to induce a transverse closure with VAC application- the lower aspect of the pannus readily folds cephalad to close the defect.  Would not want to force this mechanically (surgically) due to known wound colonization. 
  
Will keep inpatient until next VAC change, to ensure viability of this plan.  Anticipate transfer to [facility] Friday. STR made aware

## 2019-04-24 NOTE — PROGRESS NOTES
Report received from JACOB, [facility]. PM assessment completed. Pt is AAO x 4 with respirations present on 2L O2 via NC. Wound vac is intact and on continuous suction. Pt states pain is 7/10 in abdomen. IV sites are CDI. No needs at this time. Bed is low and locked with call light in reach, will continue to monitor.

## 2019-04-25 LAB
APPEARANCE UR: CLEAR
BACTERIA URNS QL MICRO: 0 /HPF
BILIRUB UR QL: NEGATIVE
COLOR UR: YELLOW
EPI CELLS #/AREA URNS HPF: ABNORMAL /HPF
GLUCOSE BLD STRIP.AUTO-MCNC: 164 MG/DL (ref 65–100)
GLUCOSE BLD STRIP.AUTO-MCNC: 171 MG/DL (ref 65–100)
GLUCOSE BLD STRIP.AUTO-MCNC: 186 MG/DL (ref 65–100)
GLUCOSE BLD STRIP.AUTO-MCNC: 90 MG/DL (ref 65–100)
GLUCOSE UR STRIP.AUTO-MCNC: NEGATIVE MG/DL
HGB UR QL STRIP: NEGATIVE
KETONES UR QL STRIP.AUTO: NEGATIVE MG/DL
LEUKOCYTE ESTERASE UR QL STRIP.AUTO: NEGATIVE
NITRITE UR QL STRIP.AUTO: NEGATIVE
PH UR STRIP: 6 [PH] (ref 5–9)
PROT UR STRIP-MCNC: ABNORMAL MG/DL
SP GR UR REFRACTOMETRY: 1.03 (ref 1–1.02)
UROBILINOGEN UR QL STRIP.AUTO: 1 EU/DL (ref 0.2–1)
WBC URNS QL MICRO: ABNORMAL /HPF

## 2019-04-25 PROCEDURE — 74011636637 HC RX REV CODE- 636/637: Performed by: SURGERY

## 2019-04-25 PROCEDURE — 74011250637 HC RX REV CODE- 250/637: Performed by: SURGERY

## 2019-04-25 PROCEDURE — 74750000023 HC WOUND THERAPY

## 2019-04-25 PROCEDURE — 82962 GLUCOSE BLOOD TEST: CPT

## 2019-04-25 PROCEDURE — 74011250637 HC RX REV CODE- 250/637: Performed by: NURSE PRACTITIONER

## 2019-04-25 PROCEDURE — 65270000029 HC RM PRIVATE

## 2019-04-25 PROCEDURE — 94760 N-INVAS EAR/PLS OXIMETRY 1: CPT

## 2019-04-25 PROCEDURE — 77010033678 HC OXYGEN DAILY

## 2019-04-25 PROCEDURE — 74011250636 HC RX REV CODE- 250/636: Performed by: SURGERY

## 2019-04-25 PROCEDURE — 97530 THERAPEUTIC ACTIVITIES: CPT

## 2019-04-25 PROCEDURE — 81003 URINALYSIS AUTO W/O SCOPE: CPT

## 2019-04-25 RX ORDER — ALPRAZOLAM 0.5 MG/1
0.5 TABLET ORAL
Status: DISCONTINUED | OUTPATIENT
Start: 2019-04-25 | End: 2019-04-25

## 2019-04-25 RX ORDER — HYDROCODONE BITARTRATE AND ACETAMINOPHEN 5; 325 MG/1; MG/1
TABLET ORAL
Qty: 30 TAB | Refills: 0 | Status: SHIPPED | OUTPATIENT
Start: 2019-04-25 | End: 2019-04-29

## 2019-04-25 RX ORDER — POLYETHYLENE GLYCOL 3350 17 G/17G
17 POWDER, FOR SOLUTION ORAL DAILY
Status: DISCONTINUED | OUTPATIENT
Start: 2019-04-25 | End: 2019-04-26 | Stop reason: HOSPADM

## 2019-04-25 RX ORDER — ALPRAZOLAM 0.5 MG/1
0.5 TABLET ORAL 2 TIMES DAILY
Status: DISCONTINUED | OUTPATIENT
Start: 2019-04-25 | End: 2019-04-25

## 2019-04-25 RX ORDER — HYDROMORPHONE HYDROCHLORIDE 2 MG/ML
0.5 INJECTION, SOLUTION INTRAMUSCULAR; INTRAVENOUS; SUBCUTANEOUS DAILY PRN
Status: DISCONTINUED | OUTPATIENT
Start: 2019-04-25 | End: 2019-04-26 | Stop reason: HOSPADM

## 2019-04-25 RX ORDER — ALPRAZOLAM 0.5 MG/1
0.5 TABLET ORAL 3 TIMES DAILY
Status: DISCONTINUED | OUTPATIENT
Start: 2019-04-25 | End: 2019-04-26 | Stop reason: HOSPADM

## 2019-04-25 RX ADMIN — METOCLOPRAMIDE 10 MG: 5 INJECTION, SOLUTION INTRAMUSCULAR; INTRAVENOUS at 12:46

## 2019-04-25 RX ADMIN — ALPRAZOLAM 0.5 MG: 0.5 TABLET ORAL at 16:59

## 2019-04-25 RX ADMIN — ONDANSETRON 4 MG: 2 INJECTION INTRAMUSCULAR; INTRAVENOUS at 06:30

## 2019-04-25 RX ADMIN — HYDROCODONE BITARTRATE AND ACETAMINOPHEN 2 TABLET: 5; 325 TABLET ORAL at 23:31

## 2019-04-25 RX ADMIN — AMIODARONE HYDROCHLORIDE 200 MG: 200 TABLET ORAL at 08:59

## 2019-04-25 RX ADMIN — DOCUSATE SODIUM 100 MG: 100 CAPSULE, LIQUID FILLED ORAL at 08:59

## 2019-04-25 RX ADMIN — Medication 10 ML: at 23:39

## 2019-04-25 RX ADMIN — HUMAN INSULIN 2 UNITS: 100 INJECTION, SOLUTION SUBCUTANEOUS at 12:00

## 2019-04-25 RX ADMIN — Medication 400 MG: at 08:59

## 2019-04-25 RX ADMIN — CARVEDILOL 12.5 MG: 6.25 TABLET, FILM COATED ORAL at 08:59

## 2019-04-25 RX ADMIN — INSULIN GLARGINE 35 UNITS: 100 INJECTION, SOLUTION SUBCUTANEOUS at 09:01

## 2019-04-25 RX ADMIN — PRAVASTATIN SODIUM 80 MG: 80 TABLET ORAL at 08:59

## 2019-04-25 RX ADMIN — Medication 5 ML: at 12:05

## 2019-04-25 RX ADMIN — HYDROCODONE BITARTRATE AND ACETAMINOPHEN 2 TABLET: 5; 325 TABLET ORAL at 12:42

## 2019-04-25 RX ADMIN — HUMAN INSULIN 2 UNITS: 100 INJECTION, SOLUTION SUBCUTANEOUS at 22:00

## 2019-04-25 RX ADMIN — POLYETHYLENE GLYCOL (3350) 17 G: 17 POWDER, FOR SOLUTION ORAL at 12:01

## 2019-04-25 RX ADMIN — ASPIRIN 81 MG 81 MG: 81 TABLET ORAL at 08:59

## 2019-04-25 RX ADMIN — Medication 10 ML: at 23:31

## 2019-04-25 RX ADMIN — ALPRAZOLAM 0.5 MG: 0.5 TABLET ORAL at 01:15

## 2019-04-25 RX ADMIN — ALPRAZOLAM 0.5 MG: 0.5 TABLET ORAL at 23:31

## 2019-04-25 RX ADMIN — Medication 400 MG: at 17:00

## 2019-04-25 RX ADMIN — CARVEDILOL 12.5 MG: 6.25 TABLET, FILM COATED ORAL at 16:59

## 2019-04-25 RX ADMIN — ONDANSETRON 4 MG: 2 INJECTION INTRAMUSCULAR; INTRAVENOUS at 23:37

## 2019-04-25 RX ADMIN — HYDROCODONE BITARTRATE AND ACETAMINOPHEN 2 TABLET: 5; 325 TABLET ORAL at 06:30

## 2019-04-25 RX ADMIN — HUMAN INSULIN 2 UNITS: 100 INJECTION, SOLUTION SUBCUTANEOUS at 16:52

## 2019-04-25 RX ADMIN — DIPHENHYDRAMINE HYDROCHLORIDE 12.5 MG: 50 INJECTION, SOLUTION INTRAMUSCULAR; INTRAVENOUS at 23:38

## 2019-04-25 NOTE — PROGRESS NOTES
PLAN: 
Continue present care ADA diet Pain/Nausea control Change Xanax to scheduled TID Continue Colace BID Add Miralax Will try to induce a transverse closure with VAC application- the lower aspect of the pannus readily folds cephalad to close the defect. Would not want to force this mechanically (surgically) due to known wound colonization. 
  
Will keep inpatient until next VAC change, to ensure viability of this plan. Anticipate transfer to [facility] Friday. ASSESSMENT:  Admit Date: 3/26/2019  
7 Days Post-Op  Procedure(s): 
ABDOMINAL INFECTION EXCISION AND PLACEMENT OF WOUND VAC Principal Problem: 
  Incarcerated incisional hernia (3/26/2019) Active Problems: 
  Long-term insulin use in type 2 diabetes ([address] (2010) Chronic systolic heart failure ([address] (2013) Overview: NST 4/15:low risk Echo 2014:EF 40-45% Echo 2013:EF 30-35% LHC 2013: minimal CAD 
 
  BA on CPAP (2014) Morbid obesity ([address] (2014) Acute hypoxemic respiratory failure ([address] (4/3/2019) ARDS (adult respiratory distress syndrome) ([address] (4/3/2019) Pseudomonas urinary tract infection (2019) Chloride-responsive metabolic alkalosis () Hypokalemia (2019) HPI:Patient is a 52 y.o. female who presents for evaluation of a hernia. She is extremely pleasant, but is a limited historian. By her report, her abdominal surgical history includes a , then a hysterectomy. About 2 months after the hysterectomy she reports having an urgent/emergent operation for what sounds like an incarcerated and obstructing incisional hernia which was repaired and she does not recall ever being told that mesh was used. She reports noting this recurrent hernia about 3-4 years ago but it was only mildly troubling.   It has enlarged somewhat, and for the past 3 months or so has become more symptomatic. She is noting intermittent episodes of \"balling up\" which seems to include hardening of one side of the anterior abdomen or the other, usually with nausea and sometimes vomiting. This happens at least daily. She also reports a sensation of needing to have a bowel movement but when she tries to go, sometimes she cannot. When she has a BM, however, it is unremarkable with no blood or melena. She is eating well, not losing weight. She does smoke, usually about 2 cigarettes per day, but does admit that since the hernia has worsened she has been smoking more due to her nerves. 
  
She is scheduled to see her cardiologist Derwin Schwab) later today and will discuss ASA with him. SUBJECTIVE: 
Up in chair. Is tearful and anxious r/t condition. Reports nausea. Denies vomiting. AF, VSS. No recent labs. No BM for 3 days. Intake/Output Summary (Last 24 hours) at 4/25/2019 1039 Last data filed at 4/24/2019 1304 Gross per 24 hour Intake 360 ml Output  Net 360 ml OBJECTIVE: 
Constitutional: Alert oriented cooperative patient in no acute distress; appears stated age Visit Vitals /76 (BP 1 Location: Left arm, BP Patient Position: At rest) Pulse 60 Temp 98.6 °F (37 °C) Resp 20 Ht 4' 11\" (1.499 m) Wt 213 lb 12.8 oz (97 kg) SpO2 98% Breastfeeding? No  
BMI 43.18 kg/m² Eyes:Sclera are clear. ENMT: no external lesions gross hearing normal; no obvious neck masses, no ear or lip lesions CV: RRR. Normal perfusion Resp: No JVD. Breathing is  non-labored; no audible wheezing. GI: soft and non-distended, on assessment 4/24 Wound with good granulation in areas. Left wall of wound and right lower/outer wall with ischemic fat. No purulence or signs of infection. Slight right upper/outer tunneling. Vac intact today     
Musculoskeletal: unremarkable with normal function. No embolic signs or cyanosis. Neuro:  Oriented; moves all 4; no focal deficits Psychiatric: normal affect and mood, no memory impairment Patient Vitals for the past 24 hrs: 
 BP Temp Pulse Resp SpO2 Weight 04/25/19 0724     98 %   
04/25/19 0723 132/76 98.6 °F (37 °C) 60 20 98 %   
04/25/19 0346 118/77 99 °F (37.2 °C) 62 18 95 %   
04/25/19 0110  97 °F (36.1 °C)      
04/24/19 2354 137/56 99.7 °F (37.6 °C) 60 16 91 %   
04/24/19 1920 123/77 99.7 °F (37.6 °C) 61 18 98 %   
04/24/19 1821      213 lb 12.8 oz (97 kg) 04/24/19 1626 129/78 99.4 °F (37.4 °C) 60 18 98 %   
04/24/19 1304     98 %   
04/24/19 1254     99 %   
04/24/19 1131 144/78 98.8 °F (37.1 °C) 60 18 98 %  Labs:  No results for input(s): WBC, HGB, PLT, NA, K, CL, CO2, BUN, CREA, GLU, PTP, INR, APTT, TBIL, TBILI, CBIL, SGOT, GPT, ALT, AP, AML, LPSE, LCAD, NH4, TROPT, TROIQ, HGBEXT, PLTEXT in the last 72 hours. No lab exists for component: INREXT Signed:  Wallace Mckinley NP

## 2019-04-25 NOTE — PROGRESS NOTES
Problem: Mobility Impaired (Adult and Pediatric) Goal: *Acute Goals and Plan of Care (Insert Text) Description STG: 
(1.)Ms. Jasmeet Myers will move from supine to sit and sit to supine  with MINIMAL ASSIST within 3 treatment day(s).  4/23 
(2.)Ms. Jasmeet Myers will transfer from bed to chair and chair to bed with CONTACT GUARD ASSIST using the least restrictive device within 3 treatment day(s). Met 4/12 
(3.)Ms. Jasmeet Myers will ambulate with CONTACT GUARD ASSIST for 50 feet with the least restrictive device within 3 treatment day(s). Met 4/12 GOALS RE-ASSESSED 4/13/19 : 
(1.)Ms. Jasmeet Myers will transfer from bed to chair and chair to bed with STAND BY ASSIST using the least restrictive device. Met 4/14 
  
ADDENDUM GOALS 4/19/19 : 
1) pt will transfer with cane or no device & SBA. 
(2.)Ms. Jasmeet Myers will ambulate with STAND BY ASSIST for 200 feet with a cane or no device. Progressing 4/21 
(3)Ms. Jasmeet Myers will go up 4 steps with CGA assist & rail. 
(4)Ms. Jasmeet Myers will perform HEP with cues & written guidelines, to increase safety on her feet 
  
 
 
________________________________________________________________________________________________ Outcome: Progressing Towards Goal 
  
PHYSICAL THERAPY: Daily Note and PM 4/25/2019 INPATIENT: PT Visit Days : 5 [place] / [place] B / Product Type: Medicare /   
  
NAME/AGE/GENDER: Freya Culver is a 52 y.o. female PRIMARY DIAGNOSIS: Incisional hernia, without obstruction or gangrene [K43.2] Incarcerated incisional hernia [K43.0] Incarcerated incisional hernia [K43.0] Incarcerated incisional hernia Incarcerated incisional hernia Procedure(s) (LRB): ABDOMINAL INFECTION EXCISION AND PLACEMENT OF WOUND VAC (N/A) 7 Days Post-Op ICD-10: Treatment Diagnosis:  
  · Generalized Muscle Weakness (M62.81) · Other abnormalities of gait and mobility (R26.89) Precaution/Allergies: 
Grass pollen ASSESSMENT:  
 Ms. Harlo Nissen presents with general decreased in functional mobility and gait s/p OPEN INCARCERATED [address] on 3/26/19. Patient underwent second pocedure 3/31/19 secondary to dehiscense and hernia recurrence. 4/25 pm she walks 200 ft rested another 80 ft then rest and another 200 ft. She remain in the bed with call light. This section established at most recent assessment PROBLEM LIST (Impairments causing functional limitations): 1. Decreased Strength 2. Decreased ADL/Functional Activities 3. Decreased Transfer Abilities 4. Decreased Ambulation Ability/Technique 5. Decreased Balance 6. Increased Pain 7. Decreased Activity Tolerance 8. Decreased Flexibility/Joint Mobility 9. Decreased [place] with Home Exercise Program 
 INTERVENTIONS PLANNED: (Benefits and precautions of physical therapy have been discussed with the patient.) 1. Balance Exercise 2. Bed Mobility 3. Family Education 4. Gait Training 5. Home Exercise Program (HEP) 6. Therapeutic Activites 7. Therapeutic Exercise/Strengthening 8. Transfer Training TREATMENT PLAN: Frequency/Duration: daily for duration of hospital stay Rehabilitation Potential For Stated Goals: Good RECOMMENDED REHABILITATION/EQUIPMENT: (at time of discharge pending progress): Due to the probability of continued deficits (see above) this patient will likely need continued skilled physical therapy after discharge. Equipment: ? May need a RW   
    
 
 
 
HISTORY:  
History of Present Injury/Illness (Reason for Referral): S/p OPEN INCARCERATED INCISIONAL HERNIA REPAIR 3/26/19 Past Medical History/Comorbidities: Ms. Harlo Nissen  has a past medical history of Allergic rhinitis, Arthritis, Automatic implantable cardioverter-defibrillator in situ (3/30/2016), CAD in native artery (9/06/2716), Chronic systolic heart failure ([address] (07/18/2013), CKD (chronic kidney disease), Diabetes ([address] Diabetes mellitus ([facility] (2010), Dyslipidemia (2013), Eczema, Fibromyalgia, GERD (gastroesophageal reflux disease), Headache, Heart failure ([facility] HTN (hypertension) (2010), Hypercholesterolemia, Morbid obesity ([facility] Neuropathy, NICM (nonischemic cardiomyopathy) ([facility] (2/15/2013), Obesity, Obstructive sleep apnea (adult) (pediatric) (2014), Pseudomonas urinary tract infection (2019), Sciatic pain (2016), SVT (supraventricular tachycardia) ([facility] and Tobacco use disorder (2010). She also has no past medical history of Dementia, Gastrointestinal disorder, or Unspecified adverse effect of anesthesia. Ms. Noman Portillo  has a past surgical history that includes hx tonsillectomy; hx implantable cardioverter defibrillator (2013); hx  section; hx hernia repair; pr left heart cath,percutaneous (2013); hx svt ablation (\"years ago\"); hx rotator cuff repair (Right); hx hysterectomy; and hx implantable cardioverter defibrillator (2013). Social History/Living Environment:  
Home Environment: Private residence # Steps to Enter: 4(than an additional 3 inside) Rails to Enter: No 
One/Two Story Residence: One story Living Alone: No 
Support Systems: Friends \ neighbors Patient Expects to be Discharged to[de-identified] Private residence Current DME Used/Available at Home: Glucometer, CPAP Prior Level of Function/Work/Activity: 
independent Number of Personal Factors/Comorbidities that affect the Plan of Care: 1-2: MODERATE COMPLEXITY EXAMINATION:  
Most Recent Physical Functioning:  
Gross Assessment: 3 to 3-/5 throughout Balance: 
  Bed Mobility: 
  
 
  
Transfers: 
Sit to Stand: Supervision Stand to Sit: Supervision Bed to Chair: Supervision Duration: 25 Minutes Gait: 
  
Speed/Cristy: Pace decreased (<100 feet/min) Step Length: Left shortened;Right shortened Distance (ft): 200 Feet (ft)(rested,went another 80 ft rested another 200 ft) Assistive Device: Walker, rolling Ambulation - Level of Assistance: Stand-by assistance Interventions: Safety awareness training Body Structures Involved: 1. Bones 2. Joints 3. Muscles 4. Ligaments Body Functions Affected: 1. Movement Related Activities and Participation Affected: 1. Mobility Number of elements that affect the Plan of Care: 3: MODERATE COMPLEXITY CLINICAL PRESENTATION:  
Presentation: Stable and uncomplicated: LOW COMPLEXITY CLINICAL DECISION MAKIN[address] [place] [address] AM-PAC 6 Clicks Basic Mobility Inpatient Short Form How much difficulty does the patient currently have. .. Unable A Lot A Little None 1. Turning over in bed (including adjusting bedclothes, sheets and blankets)? ? 1   ? 2   ? 3   ? 4  
2. Sitting down on and standing up from a chair with arms ( e.g., wheelchair, bedside commode, etc.)   ? 1   ? 2   ? 3   ? 4  
3. Moving from lying on back to sitting on the side of the bed?   ? 1   ? 2   ? 3   ? 4 How much help from another person does the patient currently need. .. Total A Lot A Little None 4. Moving to and from a bed to a chair (including a wheelchair)? ? 1   ? 2   ? 3   ? 4  
5. Need to walk in hospital room? ? 1   ? 2   ? 3   ? 4  
6. Climbing 3-5 steps with a railing? ? 1   ? 2   ? 3   ? 4  
© , Trustees of [address] [place] [address], under license to [employer]. All rights reserved Score:  Initial: 14 Most Recent: X (Date: -- ) Interpretation of Tool:  Represents activities that are increasingly more difficult (i.e. Bed mobility, Transfers, Gait). Medical Necessity:    
· Patient is expected to demonstrate progress in strength, range of motion, balance, coordination and functional technique ·  to decrease assistance required with theraputic exercises and functional mobility · . Reason for Services/Other Comments: 
· Patient continues to require present interventions due to patient's inability to perform theraputic exercises and functional mobility · . Use of outcome tool(s) and clinical judgement create a POC that gives a: Clear prediction of patient's progress: LOW COMPLEXITY  
  
 
 
 
TREATMENT:  
(In addition to Assessment/Re-Assessment sessions the following treatments were rendered) Pre-treatment Symptoms/Complaints:  Ready to walk Pain: Initial:numeric scale Pain Intensity 1: 0(0/10 after therapy)  Post Session:    
 
Therapeutic Activity: (  25 Minutes: Therapeutic activities including sit to stand, toilet transfers, chair transfers, bathroom hygiene activities to improve mobility and strength. Required minimal Safety awareness training to promote static and dynamic balance in standing. Gait Training (0 Minutes):  Gait training to improve and/or restore physical functioning as related to mobility, strength and balance. Ambulated 200 Feet (ft)(rested,went another 80 ft rested another 200 ft) with supervision/set-up and minimal verbal cues related to their heel strike and breathing techniqueto promote proper body alignment and promote proper body breathing techniques. Assistive Device: Walker, rolling Ambulation - Level of Assistance: Stand-by assistance Distance (ft): 200 Feet (ft)(rested,went another 80 ft rested another 200 ft) Interventions: Safety awareness training Duration: 0 Minutes Braces/Orthotics/Lines/Etc:  
· drain celsete Treatment/Session Assessment:   
· Response to Treatment: she will continue to make progress. · Interdisciplinary Collaboration:  
o Physical Therapy Assistant 
o Registered Nurse · After treatment position/precautions:  
o Up in chair 
o Bed/Chair-wheels locked 
o Call light within reach 
o RN notified · Compliance with Program/Exercises: Will assess as treatment progresses. · Recommendations/Intent for next treatment session:   \"Next visit will focus on advancements to more challenging activities and reduction in assistance provided\". Total Treatment Duration: PT Patient Time In/Time Out Time In: 4105 Time Out: 1400 Olivia Judd, PTA

## 2019-04-25 NOTE — PROGRESS NOTES
Spiritual Care follow-up visit made by Bela Owens. Prayer and support given. Woodard Najjar, M. Div / Bereavement Coordinator

## 2019-04-26 VITALS
HEIGHT: 59 IN | WEIGHT: 213.8 LBS | TEMPERATURE: 98.4 F | OXYGEN SATURATION: 99 % | DIASTOLIC BLOOD PRESSURE: 73 MMHG | BODY MASS INDEX: 43.1 KG/M2 | RESPIRATION RATE: 16 BRPM | SYSTOLIC BLOOD PRESSURE: 114 MMHG | HEART RATE: 61 BPM

## 2019-04-26 LAB
BACTERIA SPEC CULT: NORMAL
GLUCOSE BLD STRIP.AUTO-MCNC: 111 MG/DL (ref 65–100)
GLUCOSE BLD STRIP.AUTO-MCNC: 80 MG/DL (ref 65–100)
SERVICE CMNT-IMP: NORMAL

## 2019-04-26 PROCEDURE — 74011250636 HC RX REV CODE- 250/636: Performed by: NURSE PRACTITIONER

## 2019-04-26 PROCEDURE — 74750000023 HC WOUND THERAPY

## 2019-04-26 PROCEDURE — 74011250637 HC RX REV CODE- 250/637: Performed by: NURSE PRACTITIONER

## 2019-04-26 PROCEDURE — 82962 GLUCOSE BLOOD TEST: CPT

## 2019-04-26 PROCEDURE — 77030019934 HC DRSG VAC ASST KCON -B

## 2019-04-26 PROCEDURE — 74011250636 HC RX REV CODE- 250/636: Performed by: SURGERY

## 2019-04-26 PROCEDURE — 74011250637 HC RX REV CODE- 250/637: Performed by: SURGERY

## 2019-04-26 PROCEDURE — 74011636637 HC RX REV CODE- 636/637: Performed by: SURGERY

## 2019-04-26 PROCEDURE — 97605 NEG PRS WND THER DME<=50SQCM: CPT

## 2019-04-26 RX ADMIN — HYDROMORPHONE HYDROCHLORIDE 0.5 MG: 2 INJECTION INTRAMUSCULAR; INTRAVENOUS; SUBCUTANEOUS at 08:59

## 2019-04-26 RX ADMIN — POLYETHYLENE GLYCOL (3350) 17 G: 17 POWDER, FOR SOLUTION ORAL at 08:39

## 2019-04-26 RX ADMIN — PRAVASTATIN SODIUM 80 MG: 80 TABLET ORAL at 08:25

## 2019-04-26 RX ADMIN — ONDANSETRON 4 MG: 2 INJECTION INTRAMUSCULAR; INTRAVENOUS at 06:24

## 2019-04-26 RX ADMIN — Medication 400 MG: at 08:25

## 2019-04-26 RX ADMIN — DOCUSATE SODIUM 100 MG: 100 CAPSULE, LIQUID FILLED ORAL at 08:25

## 2019-04-26 RX ADMIN — ASPIRIN 81 MG 81 MG: 81 TABLET ORAL at 08:25

## 2019-04-26 RX ADMIN — CARVEDILOL 12.5 MG: 6.25 TABLET, FILM COATED ORAL at 08:25

## 2019-04-26 RX ADMIN — INSULIN GLARGINE 35 UNITS: 100 INJECTION, SOLUTION SUBCUTANEOUS at 08:37

## 2019-04-26 RX ADMIN — Medication 10 ML: at 06:00

## 2019-04-26 RX ADMIN — DIPHENHYDRAMINE HYDROCHLORIDE 12.5 MG: 50 INJECTION, SOLUTION INTRAMUSCULAR; INTRAVENOUS at 06:34

## 2019-04-26 RX ADMIN — HYDROCODONE BITARTRATE AND ACETAMINOPHEN 2 TABLET: 5; 325 TABLET ORAL at 12:54

## 2019-04-26 RX ADMIN — ONDANSETRON 4 MG: 2 INJECTION INTRAMUSCULAR; INTRAVENOUS at 09:14

## 2019-04-26 RX ADMIN — HYDROCODONE BITARTRATE AND ACETAMINOPHEN 2 TABLET: 5; 325 TABLET ORAL at 06:24

## 2019-04-26 RX ADMIN — DIPHENHYDRAMINE HYDROCHLORIDE 12.5 MG: 50 INJECTION, SOLUTION INTRAMUSCULAR; INTRAVENOUS at 12:57

## 2019-04-26 RX ADMIN — AMIODARONE HYDROCHLORIDE 200 MG: 200 TABLET ORAL at 08:25

## 2019-04-26 RX ADMIN — ALPRAZOLAM 0.5 MG: 0.5 TABLET ORAL at 08:24

## 2019-04-26 NOTE — PROGRESS NOTES
Problem: Falls - Risk of 
Goal: *Absence of Falls Description Document Shazia Hawkins Fall Risk and appropriate interventions in the flowsheet. Outcome: Progressing Towards Goal 
  
Problem: Patient Education: Go to Patient Education Activity Goal: Patient/Family Education Outcome: Progressing Towards Goal 
  
Problem: Patient Education: Go to Patient Education Activity Goal: Patient/Family Education Outcome: Progressing Towards Goal 
  
Problem: Pain Goal: *Control of Pain Outcome: Progressing Towards Goal

## 2019-04-26 NOTE — WOUND CARE
Pt seen with wound care nurse [facility]. Removed old wound vac dressing. Noted foul. Cleansed wound with dermal wound cleanser. Applied skin prep to hi wound. Noted adipose and tissue and necrotic tissue in wound bed. Wound measuring 18 X 8.3 x 8 cm. Moderate amount of serosanguinous drainage. Applied new wound vac dressing, then steri stripped the edges of wound together and protected hi wound skin with drape and applied incisional like wound vac dressing on top of steri strips. Attached to wound vac machine, no leaks noted. Estelle wound care nurse notified primary RN to have the pt leave with just the cannister. Pt to be transferred to SNF today. Wound care will continue to follow while in acute care setting.

## 2019-04-26 NOTE — PROGRESS NOTES
Spoke with Estelle/Wound care nurse, she has changed the dressing, Dr. Lisandro Elizalde to come/eval and hopefully d[facility]. 
 
1215 [id number]  ,Patient and family aware, RN given # to call report. [facility] ambulance scheduled for  @1250 . Packet on desk, including specific wound care orders.

## 2019-04-26 NOTE — ROUTINE PROCESS
PT deferred today's activity due to pt being DC'ed to SNF. PT will follow up if DC held.  Loretta Schumacher, PT, TATE

## 2019-04-26 NOTE — PROGRESS NOTES
EMS here to get pt. Wound vac clamped and container removed from wound vac machine and sent with pt.   Wound vac machine remains here at hospital.

## 2019-04-26 NOTE — PROGRESS NOTES
Norco 2 tabs given po for pain and prior to transfer to [facility] home rehab

## 2019-04-26 NOTE — DISCHARGE SUMMARY
Physician Discharge Summary     Patient ID:  Alethea Antoine  [id number]  52 y.o.  1965    Allergies: Grass pollen    Admit Date: 3/26/2019    Discharge Date: 4/26/2019    * Admission Diagnoses: Incisional hernia, without obstruction or gangrene [K43.2]; Incarcerated incisional hernia [K43.0]; Incarcerated incisional hernia [K43.0]    * Discharge Diagnoses:    Hospital Problems as of 4/26/2019 Date Reviewed: 3/26/2019          Codes Class Noted - Resolved POA    Chloride-responsive metabolic alkalosis [id number]: E87.3  ICD-9-CM: 276.3  4/11/2019 - Present No        Hypokalemia ICD-10-CM: E87.6  ICD-9-CM: 276.8  4/11/2019 - Present No        Pseudomonas urinary tract infection ICD-10-CM: N39.0, B96.5  ICD-9-CM: 599.0, 041.7  4/5/2019 - Present Yes        Acute hypoxemic respiratory failure (HCC) ICD-10-CM: J96.01  ICD-9-CM: 518.81  4/3/2019 - Present Yes        ARDS (adult respiratory distress syndrome) ([facility] ICD-10-CM: J80  ICD-9-CM: 518.52  4/3/2019 - Present Yes        * (Principal) Incarcerated incisional hernia ICD-10-CM: K43.0  ICD-9-CM: 552.21  3/26/2019 - Present Yes        BA on CPAP (Chronic) ICD-10-CM: G47.33, Z99.89  ICD-9-CM: 327.23, V46.8  7/14/2014 - Present Yes        Morbid obesity (HCC) (Chronic) ICD-10-CM: E66.01  ICD-9-CM: 278.01  7/14/2014 - Present Yes        Chronic systolic heart failure (HCC) (Chronic) ICD-10-CM: I50.22  ICD-9-CM: 428.22  7/18/2013 - Present Yes    Overview Signed 3/30/2016  8:11 AM by Michelle Huffman     NST 4/15:low risk  Echo 6/2014:EF 40-45%  Echo 5/2013:EF 30-35%  LHC 2/2013: minimal CAD             Long-term insulin use in type 2 diabetes [facility]) ICD-10-CM: E11.9, Z79.4  ICD-9-CM: 250.00, V58.67  4/12/2010 - Present Yes               Admission Condition: Good    * Discharge Condition: fair    * Procedures: Procedure(s):  ABDOMINAL INFECTION EXCISION AND PLACEMENT OF WOUND SPECTRUM [facility]    * Hospital Course:   Pt admitted for incisional hernia repair.   This was complicated by repair failure with dehiscence of fascia prompting return to OR for onlay patch closure. This was complicated by brief episode of CHF, for which she was reintubated and treated for a few days in ICU. She also developed UTI. She required 2 additional surgical procedures for debridement of necrosis of abdominal wall subcutaneous adipose. Currently, wound is managed with VAC with some residual necrosis of fat which is being allowed to autolyze and demarcate viability spontaneously. Consults: ICU    Significant Diagnostic Studies: -    * Disposition: [place] ([facility])    Discharge Medications:   Current Discharge Medication List      START taking these medications    Details   HYDROcodone-acetaminophen (NORCO) 5-325 mg per tablet May take 1 Tab by mouth every four (4) hours as needed for Pain. May also take 2 Tabs every six (6) hours as needed. Do all this for 7 days. Max Daily Amount: 14 Tabs. Qty: 30 Tab, Refills: 0    Associated Diagnoses: Abdominal wall hernia         CONTINUE these medications which have NOT CHANGED    Details   metFORMIN (GLUCOPHAGE) 500 mg tablet Take 1 Tab by mouth two (2) times daily (with meals). Qty: 60 Tab, Refills: 5    Associated Diagnoses: Type 2 diabetes mellitus with diabetic dermatitis, with long-term current use of insulin ([facility])      magnesium oxide (MAG-OX) 400 mg tablet Take 1 Tab by mouth two (2) times a day. Qty: 180 Tab, Refills: 3      amiodarone (CORDARONE) 200 mg tablet Take 1 Tab by mouth two (2) times a day. Qty: 60 Tab, Refills: 11      Insulin Needles, Disposable, (FABIOLA PEN NEEDLE) 32 gauge x 5/32\" ndle 1 Each by Does Not Apply route daily as needed. E11.9  Qty: 90 Pen Needle, Refills: 3      lancets (ONETOUCH SURESOFT LANCING DEV) misc Check blood sugar daily, E11.9  Qty: 50 Each, Refills: 11    Associated Diagnoses: Diabetes mellitus without complication ([facility])      pravastatin (PRAVACHOL) 80 mg tablet Take 1 Tab by mouth nightly.   Qty: 90 Tab, Refills: 3    Associated Diagnoses: Diabetes mellitus without complication (HCC)      isosorbide mononitrate ER (IMDUR) 30 mg tablet Take 1 Tab by mouth every morning. Qty: 30 Tab, Refills: 11      carvedilol (COREG) 25 mg tablet Take 1 Tab by mouth two (2) times daily (with meals). Qty: 180 Tab, Refills: 1      furosemide (LASIX) 80 mg tablet Taking 1 tablet in the morning and 1/2 tablet in the afternoon  Qty: 180 Tab, Refills: 1      insulin glargine (LANTUS SOLOSTAR U-100 INSULIN) 100 unit/mL (3 mL) inpn 35 Units by SubCUTAneous route daily. Qty: 15 mL, Refills: 3    Associated Diagnoses: Diabetes mellitus without complication (HCC)      glucose blood VI test strips (ONETOUCH ULTRA TEST) strip Check blood sugar daily, E11.9  Qty: 50 Strip, Refills: 11    Associated Diagnoses: Diabetes mellitus without complication (HCC)      nitroglycerin (NITROSTAT) 0.4 mg SL tablet 0.4 mg by SubLINGual route every five (5) minutes as needed for Chest Pain. Up to 3 doses. DUPIXENT 300 mg/2 mL syrg syringe by SubCUTAneous route every fourteen (14) days. triamcinolone acetonide (KENALOG) 0.1 % topical cream as needed. Refills: 5      albuterol (PROVENTIL HFA, VENTOLIN HFA, PROAIR HFA) 90 mcg/actuation inhaler Take 1 Puff by inhalation every four (4) hours as needed for Wheezing. Qty: 1 Inhaler, Refills: 0    Associated Diagnoses: Chronic systolic heart failure (HCC)      cholecalciferol (VITAMIN D3) 1,000 unit cap Take  by mouth daily. Blood-Glucose Meter (ONETOUCH ULTRA2) monitoring kit Check blood sugar daily, E11.9  Qty: 1 Kit, Refills: 0    Associated Diagnoses: Type 2 diabetes mellitus without complication (HCC)      cpap machine kit by Does Not Apply route. 11 cm      aspirin (ASPIRIN) 325 mg tablet Take 1 Tab by mouth daily. Qty: 30 Tab, Refills: 11      clotrimazole-betamethasone (LOTRISONE) topical cream Apply  to affected area two (2) times a day.   Qty: 45 g, Refills: 0    Associated Diagnoses: Dermatitis             * Follow-up Care/Patient Instructions:   Activity: Activity as tolerated  Diet: Regular Diet  Wound Care: wound VAC- orient wound horizontally    Follow-up Information     Follow up With Specialties Details Why Contact Info    [facility] [facility][note 3] [facility] [address] [facility]  [phone]          Follow-up tests/labs none    Signed:  Roby Panchal MD  4/26/2019  12:15 PM

## 2019-04-26 NOTE — ROUTINE PROCESS
Shift assessment complete. A&OX4. Lungs diminished and pt. SOA with exertion. Respirations present. Even and unlabored. No s/s of distress. No c/o pain at this time. Call light within reach. Encouraged patient to call for assistance. Patient verbalizes understanding. See Doc Flowsheet for assessment details. Patient resting in bed. Will continue to monitor.

## 2019-04-26 NOTE — PROGRESS NOTES
Assisted pt OOB to recliner. Tolerated well. Wound vac in place. Personal items and call light within reach. Will continue to monitor.

## 2019-04-29 ENCOUNTER — HOSPITAL ENCOUNTER (EMERGENCY)
Age: 54
Discharge: HOME | End: 2019-04-29
Attending: EMERGENCY MEDICINE
Payer: MEDICARE

## 2019-04-29 ENCOUNTER — PATIENT OUTREACH (OUTPATIENT)
Dept: CASE MANAGEMENT | Age: 54
End: 2019-04-29

## 2019-04-29 VITALS
BODY MASS INDEX: 41.98 KG/M2 | WEIGHT: 213.8 LBS | DIASTOLIC BLOOD PRESSURE: 60 MMHG | HEIGHT: 60 IN | HEART RATE: 60 BPM | OXYGEN SATURATION: 100 % | SYSTOLIC BLOOD PRESSURE: 122 MMHG | RESPIRATION RATE: 18 BRPM | TEMPERATURE: 98.4 F

## 2019-04-29 DIAGNOSIS — R10.9 ABDOMINAL WALL PAIN: Primary | ICD-10-CM

## 2019-04-29 LAB
ALBUMIN SERPL-MCNC: 2.2 G/DL (ref 3.5–5)
ALBUMIN/GLOB SERPL: 0.4 {RATIO}
ALP SERPL-CCNC: 90 U/L (ref 50–136)
ALT SERPL-CCNC: 58 U/L (ref 12–65)
ANION GAP SERPL CALC-SCNC: 7 MMOL/L
AST SERPL-CCNC: 64 U/L (ref 15–37)
BASOPHILS # BLD: 0 K/UL (ref 0–0.2)
BASOPHILS NFR BLD: 0 % (ref 0–2)
BILIRUB SERPL-MCNC: 0.4 MG/DL (ref 0.2–1.1)
BUN SERPL-MCNC: 10 MG/DL (ref 6–23)
CALCIUM SERPL-MCNC: 9.2 MG/DL (ref 8.3–10.4)
CHLORIDE SERPL-SCNC: 100 MMOL/L (ref 98–107)
CO2 SERPL-SCNC: 30 MMOL/L (ref 21–32)
CREAT SERPL-MCNC: 1.03 MG/DL (ref 0.6–1)
DIFFERENTIAL METHOD BLD: ABNORMAL
EOSINOPHIL # BLD: 0.8 K/UL (ref 0–0.8)
EOSINOPHIL NFR BLD: 7 % (ref 0.5–7.8)
ERYTHROCYTE [DISTWIDTH] IN BLOOD BY AUTOMATED COUNT: 15.2 % (ref 11.9–14.6)
GLOBULIN SER CALC-MCNC: 5.2 G/DL (ref 2.3–3.5)
GLUCOSE SERPL-MCNC: 93 MG/DL (ref 65–100)
HCT VFR BLD AUTO: 28.2 % (ref 35.8–46.3)
HGB BLD-MCNC: 9.1 G/DL (ref 11.7–15.4)
IMM GRANULOCYTES # BLD AUTO: 0.1 K/UL (ref 0–0.5)
IMM GRANULOCYTES NFR BLD AUTO: 1 % (ref 0–5)
LACTATE BLD-SCNC: 1.28 MMOL/L (ref 0.5–1.9)
LYMPHOCYTES # BLD: 2.3 K/UL (ref 0.5–4.6)
LYMPHOCYTES NFR BLD: 20 % (ref 13–44)
MCH RBC QN AUTO: 30.4 PG (ref 26.1–32.9)
MCHC RBC AUTO-ENTMCNC: 32.3 G/DL (ref 31.4–35)
MCV RBC AUTO: 94.3 FL (ref 79.6–97.8)
MONOCYTES # BLD: 1.1 K/UL (ref 0.1–1.3)
MONOCYTES NFR BLD: 10 % (ref 4–12)
NEUTS SEG # BLD: 7.1 K/UL (ref 1.7–8.2)
NEUTS SEG NFR BLD: 62 % (ref 43–78)
NRBC # BLD: 0 K/UL (ref 0–0.2)
PLATELET # BLD AUTO: 395 K/UL (ref 150–450)
PMV BLD AUTO: 11.1 FL (ref 9.4–12.3)
POTASSIUM SERPL-SCNC: 5.2 MMOL/L (ref 3.5–5.1)
PROT SERPL-MCNC: 7.4 G/DL
RBC # BLD AUTO: 2.99 M/UL (ref 4.05–5.2)
SODIUM SERPL-SCNC: 137 MMOL/L (ref 136–145)
WBC # BLD AUTO: 11.5 K/UL (ref 4.3–11.1)

## 2019-04-29 PROCEDURE — 83605 ASSAY OF LACTIC ACID: CPT

## 2019-04-29 PROCEDURE — 96375 TX/PRO/DX INJ NEW DRUG ADDON: CPT | Performed by: EMERGENCY MEDICINE

## 2019-04-29 PROCEDURE — 85025 COMPLETE CBC W/AUTO DIFF WBC: CPT

## 2019-04-29 PROCEDURE — 99284 EMERGENCY DEPT VISIT MOD MDM: CPT | Performed by: EMERGENCY MEDICINE

## 2019-04-29 PROCEDURE — 96361 HYDRATE IV INFUSION ADD-ON: CPT | Performed by: EMERGENCY MEDICINE

## 2019-04-29 PROCEDURE — 96374 THER/PROPH/DIAG INJ IV PUSH: CPT | Performed by: EMERGENCY MEDICINE

## 2019-04-29 PROCEDURE — 80053 COMPREHEN METABOLIC PANEL: CPT

## 2019-04-29 PROCEDURE — 74011250636 HC RX REV CODE- 250/636: Performed by: EMERGENCY MEDICINE

## 2019-04-29 RX ORDER — ONDANSETRON 2 MG/ML
4 INJECTION INTRAMUSCULAR; INTRAVENOUS
Status: COMPLETED | OUTPATIENT
Start: 2019-04-29 | End: 2019-04-29

## 2019-04-29 RX ORDER — MORPHINE SULFATE 4 MG/ML
4 INJECTION INTRAVENOUS
Status: COMPLETED | OUTPATIENT
Start: 2019-04-29 | End: 2019-04-29

## 2019-04-29 RX ORDER — SODIUM CHLORIDE 9 MG/ML
125 INJECTION, SOLUTION INTRAVENOUS CONTINUOUS
Status: DISCONTINUED | OUTPATIENT
Start: 2019-04-29 | End: 2019-04-29 | Stop reason: HOSPADM

## 2019-04-29 RX ORDER — PROMETHAZINE HYDROCHLORIDE 25 MG/1
25 TABLET ORAL
Qty: 12 TAB | Refills: 0 | Status: SHIPPED | OUTPATIENT
Start: 2019-04-29 | End: 2019-10-02 | Stop reason: ALTCHOICE

## 2019-04-29 RX ORDER — OXYCODONE HYDROCHLORIDE 5 MG/1
5 TABLET ORAL
Qty: 13 TAB | Refills: 0 | Status: SHIPPED | OUTPATIENT
Start: 2019-04-29 | End: 2019-05-02

## 2019-04-29 RX ADMIN — ONDANSETRON 4 MG: 2 INJECTION INTRAMUSCULAR; INTRAVENOUS at 14:54

## 2019-04-29 RX ADMIN — MORPHINE SULFATE 4 MG: 4 INJECTION INTRAVENOUS at 14:56

## 2019-04-29 RX ADMIN — SODIUM CHLORIDE 125 ML/HR: 900 INJECTION, SOLUTION INTRAVENOUS at 14:53

## 2019-04-29 NOTE — DISCHARGE INSTRUCTIONS
Return with any fevers, bloody vomit, worsening symptoms, or additional concerns. Follow up with your surgeon and your primary care doctor as scheduled.

## 2019-04-29 NOTE — Clinical Note
Dc 4.26.19 [facility] dx:  Incarcerated incisional herniaPatient was linked with CCM prior to dc

## 2019-04-29 NOTE — ED TRIAGE NOTES
Pt brought in by [facility] from [facility] for abdominal pain and a leaking wound vac. It was scheduled to be changed today but pt did not want to wait on them to do it, she requested to come to ER because she was also vomiting.

## 2019-04-29 NOTE — ED NOTES
I have reviewed discharge instructions with the patient. The patient verbalized understanding. Patient left ED via Discharge Method: stretcher to Rehab with [employer]. Opportunity for questions and clarification provided. Patient given 2 scripts. To continue your aftercare when you leave the hospital, you may receive an automated call from our care team to check in on how you are doing. This is a free service and part of our promise to provide the best care and service to meet your aftercare needs.  If you have questions, or wish to unsubscribe from this service please call [phone]. Thank you for Choosing our [facility] Emergency Department.

## 2019-04-29 NOTE — ED PROVIDER NOTES
58-year-old lady presents with nausea and pain in the area of her wound VAC. Patient has a history of complicated abdominal surgeries related to a ventral hernia. She currently has a wound VAC. Patient came to the emergency department because she had some pain and nausea the nursing home that was unrelieved by her normal nor coag and Zofran. Therefore, she was sent here for further evaluation. Elements of this note were created using speech recognition software. As such, errors of speech recognition may be present. Past Medical History:  
Diagnosis Date  Allergic rhinitis   
 followed by allergist; allergic to grass- has rescue inhaler PRN  
 Arthritis  Automatic implantable cardioverter-defibrillator in situ 3/30/2016  CAD in native artery 3/30/2016  Chronic systolic heart failure ([facility] 2013 ECHO 2017- EF 39%; managed with medications; followed by [facility])  CKD (chronic kidney disease) [facility] follows  Diabetes ([address] type 2 (on insulin); FBS AM range- , hypo s/s <70, hgba1c- 5.8 (2018)  Diabetes mellitus ([address] 2010  Dyslipidemia 2013  Eczema  Fibromyalgia  GERD (gastroesophageal reflux disease)   
 hx of- no meds currently  Headache   
 Heart failure ([address] chronic systolic with EF 44%; non-ischemic cardiomyopathy  HTN (hypertension) 2010  Hypercholesterolemia  Morbid obesity ([address]  Neuropathy   
 bilateral feet and tips of fingers  NICM (nonischemic cardiomyopathy) ([facility] 2/15/2013  Obesity   
 bmi- 41  
 Obstructive sleep apnea (adult) (pediatric) 2014  
 uses cpap qhs  Pseudomonas urinary tract infection 2019  Sciatic pain 2016  SVT (supraventricular tachycardia) (HCC)   
 ablation for svt  Tobacco use disorder 2010 Past Surgical History:  
Procedure Laterality Date  HX  SECTION    
 x1  
  HX HERNIA REPAIR    
 mild incarceration, no bowel removal  
 HX HYSTERECTOMY    
 YEIMI-Left ovary intact per pt  HX IMPLANTABLE CARDIOVERTER DEFIBRILLATOR  07/18/2013 Biotronik dual chamber ICD by Dr. Halina Ortega  HX IMPLANTABLE CARDIOVERTER DEFIBRILLATOR  07/18/2013 Biotronik dual chamber ICD by Dr. Halina Ortega  HX ROTATOR CUFF REPAIR Right  HX SVT ABLATION  \"years ago\"  HX TONSILLECTOMY  NE LEFT HEART CATH,PERCUTANEOUS  2/13/2013  
 no intervention Family History:  
Problem Relation Age of Onset  Heart Attack Father 48  
     mi  Stroke Father  Hypertension Father  Heart Disease Father  Diabetes Other  Stroke Mother  Diabetes Brother  Heart Disease Brother  Hypertension Brother  Breast Cancer Sister 37 Social History Socioeconomic History  Marital status: SINGLE Spouse name: Not on file  Number of children: Not on file  Years of education: Not on file  Highest education level: Not on file Occupational History  Not on file Social Needs  Financial resource strain: Not on file  Food insecurity:  
  Worry: Not on file Inability: Not on file  Transportation needs:  
  Medical: Not on file Non-medical: Not on file Tobacco Use  Smoking status: Current Every Day Smoker Packs/day: 0.25 Years: 27.00 Pack years: 6.75  Smokeless tobacco: Never Used  Tobacco comment: \"every now and then\" Substance and Sexual Activity  Alcohol use: Yes Alcohol/week: 0.6 oz Types: 1 Glasses of wine per week Comment: occas  Drug use: Yes Types: Marijuana Comment: every day  Sexual activity: Never Lifestyle  Physical activity:  
  Days per week: Not on file Minutes per session: Not on file  Stress: Not on file Relationships  Social connections:  
  Talks on phone: Not on file Gets together: Not on file Attends Taoist service: Not on file Active member of club or organization: Not on file Attends meetings of clubs or organizations: Not on file Relationship status: Not on file  Intimate partner violence:  
  Fear of current or ex partner: Not on file Emotionally abused: Not on file Physically abused: Not on file Forced sexual activity: Not on file Other Topics Concern [address] Service Not Asked  Blood Transfusions Not Asked  Caffeine Concern Not Asked  Occupational Exposure Not Asked Bekah [employer]estly Hazards Not Asked  Sleep Concern Not Asked  Stress Concern Not Asked  Weight Concern Not Asked  Special Diet Not Asked  Back Care Not Asked  Exercise Not Asked  Bike Helmet Not Asked [address] Yes  Self-Exams Not Asked Social History Narrative Denies physical or sexual abuse ALLERGIES: Grass pollen Review of Systems Constitutional: Negative for chills, diaphoresis and fever. HENT: Negative for congestion, rhinorrhea and sore throat. Eyes: Negative for redness and visual disturbance. Respiratory: Negative for cough, chest tightness, shortness of breath and wheezing. Cardiovascular: Negative for chest pain and palpitations. Gastrointestinal: Positive for abdominal pain and nausea. Negative for blood in stool, diarrhea and vomiting. Endocrine: Negative for polydipsia and polyuria. Genitourinary: Negative for dysuria and hematuria. Musculoskeletal: Negative for arthralgias, myalgias and neck stiffness. Skin: Negative for rash. Allergic/Immunologic: Negative for environmental allergies and food allergies. Neurological: Negative for dizziness, weakness and headaches. Hematological: Negative for adenopathy. Does not bruise/bleed easily. Psychiatric/Behavioral: Negative for confusion and sleep disturbance. The patient is not nervous/anxious. Vitals:  
 04/29/19 1321 04/29/19 1530 BP: 134/84 118/60 Pulse: 62 60 Resp: 16   
 Temp: 98.4 °F (36.9 °C) SpO2: 100% 99% Weight: 97 kg (213 lb 12.8 oz) Height: 5' (1.524 m) Physical Exam  
Constitutional: She appears well-developed and well-nourished. HENT:  
Head: Normocephalic and atraumatic. Cardiovascular: Normal rate and regular rhythm. Pulmonary/Chest: Effort normal and breath sounds normal.  
Abdominal: Soft. Bowel sounds are normal.  
Skin: Skin is warm. Patient does have some drainage and odor around her wound VAC. Nursing note and vitals reviewed. MDM Number of Diagnoses or Management Options Diagnosis management comments: 4:23 PM 
I discussed the case with Dr. Devan Joyce, who was present in the sperm in seeing another patient, he advised that the current condition of her wound VAC in the odor is normal for the patient. No further intervention needed at this time. I will discharge her home. Procedures

## 2019-04-29 NOTE — PROGRESS NOTES
This note will not be viewable in [address] Patient discharged to [facility] on 4/26/19. Patient discharged to a [facility] facility. Patient will be included in weekly care coordination calls. Information forwarded to Melina Pandya RN, [facility] RN Care Manager.

## 2019-04-29 NOTE — ED NOTES
TRANSFER - OUT REPORT: 
 
Verbal report given to [facility] RN(name) on Twyla Lira  being transferred to [facility](unit) for routine progression of care Report consisted of patients Situation, Background, Assessment and  
Recommendations(SBAR). Information from the following report(s) ED Summary was reviewed with the receiving nurse. Lines:  
Peripheral IV 04/29/19 Left Wrist (Active) Site Assessment Clean, dry, & intact 4/29/2019  2:18 PM  
Phlebitis Assessment 0 4/29/2019  2:18 PM  
Infiltration Assessment 0 4/29/2019  2:18 PM  
Dressing Status Clean, dry, & intact 4/29/2019  2:18 PM  
Dressing Type Tape;Transparent 4/29/2019  2:18 PM  
Hub Color/Line Status Blue 4/29/2019  2:18 PM  
Action Taken Blood drawn 4/29/2019  2:18 PM  
  
 
Opportunity for questions and clarification was provided. Patient transported with: 
 O2 @ 2 liters [employer]

## 2019-05-02 ENCOUNTER — PATIENT OUTREACH (OUTPATIENT)
Dept: CASE MANAGEMENT | Age: 54
End: 2019-05-02

## 2019-05-02 NOTE — PROGRESS NOTES
Notified by SNF liasion of patient's discharge to [facility] - patient discharge from there is still to be determined. This note will not be viewable in [address]

## 2019-05-02 NOTE — PROGRESS NOTES
Community Care Team documentation for patient in [facility] The information below provided by:[facility] 
PT Update: 50FT/MIN A/RW - MOD A ADL'S Nursing Update:out to ER per resident choice 4/29/19 Discharge Date:TBD Assign to Bernardino Page

## 2019-05-10 ENCOUNTER — PATIENT OUTREACH (OUTPATIENT)
Dept: CASE MANAGEMENT | Age: 54
End: 2019-05-10

## 2019-05-10 NOTE — PROGRESS NOTES
Community Care Team documentation for patient in [facility] The information below provided by:[facility] PT Update: SBA TO MOD I ADL'S - 125T/RW/SBA PT/OT 5/20 Nursing Update:continues with M-W-F wound vac, very poor healing, odor, >50% necrotic tissue, see surgeon on 5/9. Discharge Date:5/16/19 W/ [facility] Assign to [employer] Care Manager: Dylan Dewitt

## 2019-05-13 ENCOUNTER — HOME HEALTH ADMISSION (OUTPATIENT)
Dept: HOME HEALTH SERVICES | Facility: HOME HEALTH | Age: 54
End: 2019-05-13
Payer: MEDICARE

## 2019-05-16 ENCOUNTER — APPOINTMENT (OUTPATIENT)
Dept: GENERAL RADIOLOGY | Age: 54
End: 2019-05-16
Attending: EMERGENCY MEDICINE
Payer: MEDICARE

## 2019-05-16 VITALS
DIASTOLIC BLOOD PRESSURE: 58 MMHG | TEMPERATURE: 98.7 F | RESPIRATION RATE: 20 BRPM | WEIGHT: 213 LBS | OXYGEN SATURATION: 96 % | BODY MASS INDEX: 41.82 KG/M2 | SYSTOLIC BLOOD PRESSURE: 131 MMHG | HEIGHT: 60 IN | HEART RATE: 62 BPM

## 2019-05-16 LAB
ALBUMIN SERPL-MCNC: 2.2 G/DL (ref 3.5–5)
ALBUMIN/GLOB SERPL: 0.4 {RATIO} (ref 1.2–3.5)
ALP SERPL-CCNC: 102 U/L (ref 50–136)
ALT SERPL-CCNC: 35 U/L (ref 12–65)
ANION GAP SERPL CALC-SCNC: 4 MMOL/L (ref 7–16)
AST SERPL-CCNC: 46 U/L (ref 15–37)
BASOPHILS # BLD: 0 K/UL (ref 0–0.2)
BASOPHILS NFR BLD: 0 % (ref 0–2)
BILIRUB SERPL-MCNC: 0.2 MG/DL (ref 0.2–1.1)
BUN SERPL-MCNC: 13 MG/DL (ref 6–23)
CALCIUM SERPL-MCNC: 8.7 MG/DL (ref 8.3–10.4)
CHLORIDE SERPL-SCNC: 103 MMOL/L (ref 98–107)
CO2 SERPL-SCNC: 33 MMOL/L (ref 21–32)
CREAT SERPL-MCNC: 1.46 MG/DL (ref 0.6–1)
DIFFERENTIAL METHOD BLD: ABNORMAL
EOSINOPHIL # BLD: 0.8 K/UL (ref 0–0.8)
EOSINOPHIL NFR BLD: 7 % (ref 0.5–7.8)
ERYTHROCYTE [DISTWIDTH] IN BLOOD BY AUTOMATED COUNT: 14.8 % (ref 11.9–14.6)
GLOBULIN SER CALC-MCNC: 5.2 G/DL (ref 2.3–3.5)
GLUCOSE SERPL-MCNC: 148 MG/DL (ref 65–100)
HCT VFR BLD AUTO: 29 % (ref 35.8–46.3)
HGB BLD-MCNC: 8.8 G/DL (ref 11.7–15.4)
IMM GRANULOCYTES # BLD AUTO: 0.1 K/UL (ref 0–0.5)
IMM GRANULOCYTES NFR BLD AUTO: 1 % (ref 0–5)
LYMPHOCYTES # BLD: 2.8 K/UL (ref 0.5–4.6)
LYMPHOCYTES NFR BLD: 25 % (ref 13–44)
MCH RBC QN AUTO: 29.1 PG (ref 26.1–32.9)
MCHC RBC AUTO-ENTMCNC: 30.3 G/DL (ref 31.4–35)
MCV RBC AUTO: 96 FL (ref 79.6–97.8)
MONOCYTES # BLD: 1 K/UL (ref 0.1–1.3)
MONOCYTES NFR BLD: 9 % (ref 4–12)
NEUTS SEG # BLD: 6.5 K/UL (ref 1.7–8.2)
NEUTS SEG NFR BLD: 58 % (ref 43–78)
NRBC # BLD: 0 K/UL (ref 0–0.2)
PLATELET # BLD AUTO: 276 K/UL (ref 150–450)
PMV BLD AUTO: 10.1 FL (ref 9.4–12.3)
POTASSIUM SERPL-SCNC: 4.4 MMOL/L (ref 3.5–5.1)
PROT SERPL-MCNC: 7.4 G/DL (ref 6.3–8.2)
RBC # BLD AUTO: 3.02 M/UL (ref 4.05–5.2)
SODIUM SERPL-SCNC: 140 MMOL/L (ref 136–145)
TROPONIN I SERPL-MCNC: <0.02 NG/ML (ref 0.02–0.05)
WBC # BLD AUTO: 11.1 K/UL (ref 4.3–11.1)

## 2019-05-16 PROCEDURE — 84484 ASSAY OF TROPONIN QUANT: CPT

## 2019-05-16 PROCEDURE — 71046 X-RAY EXAM CHEST 2 VIEWS: CPT

## 2019-05-16 PROCEDURE — 80053 COMPREHEN METABOLIC PANEL: CPT

## 2019-05-16 PROCEDURE — 85025 COMPLETE CBC W/AUTO DIFF WBC: CPT

## 2019-05-16 PROCEDURE — 99284 EMERGENCY DEPT VISIT MOD MDM: CPT | Performed by: EMERGENCY MEDICINE

## 2019-05-17 ENCOUNTER — HOME CARE VISIT (OUTPATIENT)
Dept: SCHEDULING | Facility: HOME HEALTH | Age: 54
End: 2019-05-17
Payer: MEDICARE

## 2019-05-17 ENCOUNTER — PATIENT OUTREACH (OUTPATIENT)
Dept: CASE MANAGEMENT | Age: 54
End: 2019-05-17

## 2019-05-17 ENCOUNTER — HOSPITAL ENCOUNTER (EMERGENCY)
Age: 54
Discharge: HOME | End: 2019-05-17
Attending: EMERGENCY MEDICINE
Payer: MEDICARE

## 2019-05-17 DIAGNOSIS — Z51.89 ENCOUNTER FOR WOUND CARE: Primary | ICD-10-CM

## 2019-05-17 LAB
ATRIAL RATE: 60 BPM
CALCULATED P AXIS, ECG09: 95 DEGREES
CALCULATED R AXIS, ECG10: 88 DEGREES
CALCULATED T AXIS, ECG11: 39 DEGREES
DIAGNOSIS, 93000: NORMAL
P-R INTERVAL, ECG05: 176 MS
Q-T INTERVAL, ECG07: 450 MS
QRS DURATION, ECG06: 118 MS
QTC CALCULATION (BEZET), ECG08: 450 MS
VENTRICULAR RATE, ECG03: 60 BPM

## 2019-05-17 PROCEDURE — 400013 HH SOC

## 2019-05-17 PROCEDURE — 74011250637 HC RX REV CODE- 250/637: Performed by: EMERGENCY MEDICINE

## 2019-05-17 PROCEDURE — G0299 HHS/HOSPICE OF RN EA 15 MIN: HCPCS

## 2019-05-17 PROCEDURE — 3331090002 HH PPS REVENUE DEBIT

## 2019-05-17 PROCEDURE — 3331090001 HH PPS REVENUE CREDIT

## 2019-05-17 RX ORDER — CLINDAMYCIN HYDROCHLORIDE 300 MG/1
300 CAPSULE ORAL 3 TIMES DAILY
Qty: 15 CAP | Refills: 0 | Status: SHIPPED | OUTPATIENT
Start: 2019-05-17 | End: 2019-05-22

## 2019-05-17 RX ORDER — HYDROCODONE BITARTRATE AND ACETAMINOPHEN 5; 325 MG/1; MG/1
1 TABLET ORAL
Status: COMPLETED | OUTPATIENT
Start: 2019-05-17 | End: 2019-05-17

## 2019-05-17 RX ORDER — CLINDAMYCIN HYDROCHLORIDE 150 MG/1
300 CAPSULE ORAL
Status: COMPLETED | OUTPATIENT
Start: 2019-05-17 | End: 2019-05-17

## 2019-05-17 RX ADMIN — CLINDAMYCIN HYDROCHLORIDE 300 MG: 150 CAPSULE ORAL at 05:04

## 2019-05-17 RX ADMIN — HYDROCODONE BITARTRATE AND ACETAMINOPHEN 1 TABLET: 5; 325 TABLET ORAL at 05:04

## 2019-05-17 NOTE — ED TRIAGE NOTES
Patient states that she was told to come here to have her wound vac removed because it is full and no one can get to her house until the morning. Patient also states that she is having some shortness of breath. States this started as she was leaving her house. States that she was also lightheaded.

## 2019-05-17 NOTE — DISCHARGE INSTRUCTIONS
Patient Education        Vacuum-Assisted Closure for Wound Healing: Care Instructions  Your Care Instructions  When you have a wound that is hard to close your doctor may treat it with vacuum-assisted closure (VAC). VAC uses negative pressure (suction) to help bring the edges of your wound together. It also removes fluid and dead tissue from the wound area. In VAC:  · A special piece of foam or cotton gauze fits over your wound. This covers and protects the wound. A clear bandage (film dressing) goes several inches beyond the foam or gauze dressing to create a seal for the vacuum. · A tube connects the foam to a small machine called the therapy unit. The therapy unit creates the suction. · The [facility] system may be carried around (portable) or may stay in one place (stationary). VAC does not hurt. You may feel a mild pulling on the wound when treatment first starts. How long you need VAC depends on the size and type of wound you have and how well the [facility] works. You will be limited in what you can do while the wound heals. You will use VAC 24 hours a day. Follow-up care is a key part of your treatment and safety. Be sure to make and go to all appointments, and call your doctor if you are having problems. It's also a good idea to know your test results and keep a list of the medicines you take. How can you care for yourself at home? · A home health care worker will come to your home a few times a week to change the bandage and check the machine. You may need it changed more often if there is a lot of drainage. · Your doctor will give you information on what you can and can't do. This depends on where your wound is located. Your activities may be limited during the time you're using VAC. · You will be able to take sponge baths. Don't shower or take baths unless your doctor says it is okay. · Take pain medicines exactly as directed.   ? If the doctor gave you a prescription medicine for pain, take it as prescribed. ? If you are not taking a prescription pain medicine, ask your doctor if you can take an over-the-counter medicine. · If your doctor prescribed antibiotics, take them as directed. Do not stop taking them just because you feel better. You need to take the full course of antibiotics. When should you call for help? Call 911 anytime you think you may need emergency care. For example, call if:    · You have a lot of bleeding or see a sudden change in the color or texture of the drainage.     · The wound splits open and organs under the skin can be seen (evisceration).    Call your doctor now or seek immediate medical care if:    · The wound starts bleeding.     · The bandage comes off. Cover the area with a sterile bandage until you can see your doctor or your home health care worker comes by.     · You have signs of infection, such as:  ? Increased pain, swelling, warmth, or redness around the wound. ? Red streaks leading from the wound. ? Pus draining from the wound. ? A fever.    Watch closely for changes in your health, and be sure to contact your doctor if:    · The noise the machine makes changes or gets very loud. This may mean the seal is broken or the machine is not producing enough suction. Where can you learn more? Go to http://jeff-claudine.info/. Enter J294 in the search box to learn more about \"Vacuum-Assisted Closure for Wound Healing: Care Instructions. \"  Current as of: September 26, 2018  Content Version: 11.9  © 7697-8804 [employer]. Care instructions adapted under license by [employer] (which disclaims liability or warranty for this information). If you have questions about a medical condition or this instruction, always ask your healthcare professional. [place] [address] any warranty or liability for your use of this information.

## 2019-05-17 NOTE — ED PROVIDER NOTES
49-year-old -American female had hernia surgery and now has a wound VAC on her lower abdomen. She reports that it stopped draining because the canister is full. She was advised to come to the emergency department to have the wound VAC removed. She reports that while she was getting ready to come in she felt short of breath And lightheaded. These symptoms have resolved. She is complaining of some discomfort in the lower abdomen. The history is provided by the patient. Other Associated symptoms include abdominal pain and shortness of breath. Pertinent negatives include no chest pain and no headaches. Shortness of Breath Associated symptoms include abdominal pain. Pertinent negatives include no fever, no headaches, no neck pain, no chest pain, no vomiting and no rash. Past Medical History:  
Diagnosis Date  Allergic rhinitis   
 followed by allergist; allergic to grass- has rescue inhaler PRN  
 Arthritis  Automatic implantable cardioverter-defibrillator in situ 3/30/2016  CAD in native artery 3/30/2016  Chronic systolic heart failure ([address] 07/18/2013 ECHO 2017- EF 39%; managed with medications; followed by Dr Mcne[facility] cardio)  CKD (chronic kidney disease) [facility] follows  Diabetes ([address] type 2 (on insulin); FBS AM range- , hypo s/s <70, hgba1c- 5.8 (9/2018)  Diabetes mellitus ([address] 4/12/2010  Dyslipidemia 2/13/2013  Eczema  Fibromyalgia  GERD (gastroesophageal reflux disease)   
 hx of- no meds currently  Headache   
 Heart failure ([address] chronic systolic with EF 71%; non-ischemic cardiomyopathy  HTN (hypertension) 4/12/2010  Hypercholesterolemia  Morbid obesity ([address]  Neuropathy   
 bilateral feet and tips of fingers  NICM (nonischemic cardiomyopathy) ([address] 2/15/2013  Obesity   
 bmi- 41  
 Obstructive sleep apnea (adult) (pediatric) 07/14/2014  
 uses cpap qhs  
  Pseudomonas urinary tract infection 2019  Sciatic pain 2016  SVT (supraventricular tachycardia) (HCC)   
 ablation for svt  Tobacco use disorder 2010 Past Surgical History:  
Procedure Laterality Date  HX  SECTION    
 x1  
 HX HERNIA REPAIR    
 mild incarceration, no bowel removal  
 HX HYSTERECTOMY    
 YEIMI-Left ovary intact per pt  HX IMPLANTABLE CARDIOVERTER DEFIBRILLATOR  2013 Bi[employer] dual chamber ICD by Dr. Sharon Thomason  HX IMPLANTABLE CARDIOVERTER DEFIBRILLATOR  2013 Biotronik dual chamber ICD by Dr. Sharon Thomason  HX ROTATOR CUFF REPAIR Right  HX SVT ABLATION  \"years ago\"  HX TONSILLECTOMY  IN LEFT HEART CATH,PERCUTANEOUS  2013  
 no intervention Family History:  
Problem Relation Age of Onset  Heart Attack Father 48  
     mi  Stroke Father  Hypertension Father  Heart Disease Father  Diabetes Other  Stroke Mother  Diabetes Brother  Heart Disease Brother  Hypertension Brother  Breast Cancer Sister 37 Social History Socioeconomic History  Marital status: SINGLE Spouse name: Not on file  Number of children: Not on file  Years of education: Not on file  Highest education level: Not on file Occupational History  Not on file Social Needs  Financial resource strain: Not on file  Food insecurity:  
  Worry: Not on file Inability: Not on file  Transportation needs:  
  Medical: Not on file Non-medical: Not on file Tobacco Use  Smoking status: Current Every Day Smoker Packs/day: 0.25 Years: 27.00 Pack years: 6.75  Smokeless tobacco: Never Used  Tobacco comment: \"every now and then\" Substance and Sexual Activity  Alcohol use: Yes Alcohol/week: 0.6 oz Types: 1 Glasses of wine per week Comment: occas  Drug use: Yes Types: Marijuana Comment: every day  Sexual activity: Never Lifestyle  Physical activity:  
  Days per week: Not on file Minutes per session: Not on file  Stress: Not on file Relationships  Social connections:  
  Talks on phone: Not on file Gets together: Not on file Attends [employer] service: Not on file Active member of club or organization: Not on file Attends meetings of clubs or organizations: Not on file Relationship status: Not on file  Intimate partner violence:  
  Fear of current or ex partner: Not on file Emotionally abused: Not on file Physically abused: Not on file Forced sexual activity: Not on file Other Topics Concern [address] Service Not Asked  Blood Transfusions Not Asked  Caffeine Concern Not Asked  Occupational Exposure Not Asked [employer] Hazards Not Asked  Sleep Concern Not Asked  Stress Concern Not Asked  Weight Concern Not Asked  Special Diet Not Asked  Back Care Not Asked  Exercise Not Asked  Bike Helmet Not Asked [address] Yes  Self-Exams Not Asked Social History Narrative Denies physical or sexual abuse ALLERGIES: Grass pollen Review of Systems Constitutional: Negative for fever. HENT: Negative for congestion. Respiratory: Positive for shortness of breath. Cardiovascular: Negative for chest pain. Gastrointestinal: Positive for abdominal pain. Negative for vomiting. Genitourinary: Negative for dysuria. Musculoskeletal: Negative for back pain and neck pain. Skin: Negative for rash. Neurological: Negative for headaches. Vitals:  
 05/16/19 2251 BP: 131/58 Pulse: 62 Resp: 20 Temp: 98.7 °F (37.1 °C) SpO2: 96% Weight: 96.6 kg (213 lb) Height: 5' (1.524 m) Physical Exam  
Constitutional: She appears well-developed and well-nourished. She does not appear ill. HENT:  
Head: Normocephalic and atraumatic. Eyes: Pupils are equal, round, and reactive to light.  Conjunctivae are normal.  
 Neck: Normal range of motion. Neck supple. Cardiovascular: Normal rate and regular rhythm. Pulmonary/Chest: Effort normal and breath sounds normal. She has no wheezes. She has no rales. Abdominal: Soft. Wound VAC lower abdomen. No erythema. No discharge. Neurological: She is alert. Skin: Skin is warm and dry. Psychiatric: She has a normal mood and affect. Nursing note and vitals reviewed. MDM Number of Diagnoses or Management Options Diagnosis management comments: Lab work unremarkable. Patient's wound VAC cannot be replaced at this time and therefore the foam has been removed And replaced with wet-to-dry dressing. Home health will be visiting the patient today to replace the wound VAC. Amount and/or Complexity of Data Reviewed Clinical lab tests: ordered and reviewed Tests in the medicine section of CPT®: ordered and reviewed Risk of Complications, Morbidity, and/or Mortality Presenting problems: low Diagnostic procedures: low Management options: low Procedures

## 2019-05-17 NOTE — ED NOTES
I have reviewed discharge instructions with the patient. The patient verbalized understanding. Patient left ED via Discharge Method: ambulatory to Home with (insert name of family/friend, self, transport family). Opportunity for questions and clarification provided. Patient given 2 scripts. To continue your aftercare when you leave the hospital, you may receive an automated call from our care team to check in on how you are doing. This is a free service and part of our promise to provide the best care and service to meet your aftercare needs.  If you have questions, or wish to unsubscribe from this service please call [phone]. Thank you for Choosing our [facility] Emergency Department.

## 2019-05-17 NOTE — PROGRESS NOTES
Date/Time of Call: 
 5/17/19 @ 10:30am   
What was the patient hospitalized for? Open wound of abdominal wall Does the patient understand his/her diagnosis and/or treatment and what happened during the hospitalization? Yes Did the patient receive discharge instructions? Yes  
CM Assessed Risk for Readmission:  
 
 
Patient stated Risk for Readmission:  
 
 Yes No stated risk for readmission Review any discharge instructions (see discharge instructions/AVS in [facility]). Ask patient if they understand these. Do they have any questions? Voices understanding of discharge instructions Were home services ordered (nursing, PT, OT, ST, etc.)? Yes If so, has the first visit occurred? If not, why? (Assist with coordination of services if necessary.) No. Expected today Was any DME ordered? No DME ordered at this time If so, has it been received? If not, why?  (Assist patient in obtaining DME orders &/or equipment if necessary.)  
 
N/A Complete a review of all medications (new, continued and discontinued meds per the D/C instructions and medication tab in [facility]). Medications reviewed. Were all new prescriptions filled? If not, why?  (Assist patient in obtaining medications if necessary  escalate for CCM &/or SW if ongoing issues are verbalized by pt or anticipated) Yes Does the patient understand the purpose and dosing instructions for all medications? (If patient has questions, provide explanation and education.) Yes Does the patient have any problems in performing ADLs? (If patient is unable to perform ADLs  what is the limiting factor(s)? Do they have a support system that can assist? If no support system is present, discuss possible assistance that they may be able to obtain. Escalate for CCM/SW if ongoing issues are verbalized by pt or anticipated) Denies needing assist at this time Does the patient have all follow-up appointments scheduled? 7 day f/up with PCP?  
(f/up with PCP may be w/in 14 days if patient has a f/up with their specialist w/in 7 days) 7-14 day f/up with specialist?  
(or per discharge instructions) If f/up has not been made  what actions has the care coordinator made to accomplish this? Has transportation been arranged? May 30-with Surgeon May 30 with Marialuisa Almonte on 5/30/19 Patient will have transportation Any other questions or concerns expressed by the patient? Schedule next appointment with ALESIA Villareal or refer to RN Case Manager/ per the workflow guidelines. When is care coordinators next follow-up call scheduled? If referred for CCM  what RN care manager was the referral assigned? CM will follow for case management VINNIE Call Completed By:  
Isabella Pérez RN This note will not be viewable in [address]

## 2019-05-18 ENCOUNTER — HOME CARE VISIT (OUTPATIENT)
Dept: SCHEDULING | Facility: HOME HEALTH | Age: 54
End: 2019-05-18
Payer: MEDICARE

## 2019-05-18 VITALS
HEART RATE: 75 BPM | DIASTOLIC BLOOD PRESSURE: 72 MMHG | TEMPERATURE: 98.3 F | RESPIRATION RATE: 19 BRPM | OXYGEN SATURATION: 97 % | SYSTOLIC BLOOD PRESSURE: 140 MMHG

## 2019-05-18 PROCEDURE — G0299 HHS/HOSPICE OF RN EA 15 MIN: HCPCS

## 2019-05-18 PROCEDURE — 3331090002 HH PPS REVENUE DEBIT

## 2019-05-18 PROCEDURE — 3331090001 HH PPS REVENUE CREDIT

## 2019-05-19 VITALS
OXYGEN SATURATION: 98 % | RESPIRATION RATE: 4 BRPM | TEMPERATURE: 98 F | HEART RATE: 77 BPM | DIASTOLIC BLOOD PRESSURE: 89 MMHG | SYSTOLIC BLOOD PRESSURE: 133 MMHG

## 2019-05-19 PROCEDURE — 3331090001 HH PPS REVENUE CREDIT

## 2019-05-19 PROCEDURE — 3331090002 HH PPS REVENUE DEBIT

## 2019-05-20 ENCOUNTER — HOME CARE VISIT (OUTPATIENT)
Dept: SCHEDULING | Facility: HOME HEALTH | Age: 54
End: 2019-05-20
Payer: MEDICARE

## 2019-05-20 ENCOUNTER — HOME CARE VISIT (OUTPATIENT)
Dept: HOME HEALTH SERVICES | Facility: HOME HEALTH | Age: 54
End: 2019-05-20
Payer: MEDICARE

## 2019-05-20 VITALS
HEART RATE: 60 BPM | DIASTOLIC BLOOD PRESSURE: 64 MMHG | SYSTOLIC BLOOD PRESSURE: 118 MMHG | TEMPERATURE: 97.8 F | OXYGEN SATURATION: 98 % | RESPIRATION RATE: 18 BRPM

## 2019-05-20 PROCEDURE — G0299 HHS/HOSPICE OF RN EA 15 MIN: HCPCS

## 2019-05-20 PROCEDURE — 3331090001 HH PPS REVENUE CREDIT

## 2019-05-20 PROCEDURE — 3331090002 HH PPS REVENUE DEBIT

## 2019-05-20 PROCEDURE — G0151 HHCP-SERV OF PT,EA 15 MIN: HCPCS

## 2019-05-21 PROCEDURE — 3331090002 HH PPS REVENUE DEBIT

## 2019-05-21 PROCEDURE — 3331090001 HH PPS REVENUE CREDIT

## 2019-05-22 ENCOUNTER — HOME CARE VISIT (OUTPATIENT)
Dept: SCHEDULING | Facility: HOME HEALTH | Age: 54
End: 2019-05-22
Payer: MEDICARE

## 2019-05-22 VITALS
DIASTOLIC BLOOD PRESSURE: 60 MMHG | TEMPERATURE: 98.2 F | OXYGEN SATURATION: 98 % | RESPIRATION RATE: 18 BRPM | HEART RATE: 66 BPM | SYSTOLIC BLOOD PRESSURE: 102 MMHG

## 2019-05-22 PROCEDURE — 3331090002 HH PPS REVENUE DEBIT

## 2019-05-22 PROCEDURE — 3331090001 HH PPS REVENUE CREDIT

## 2019-05-22 PROCEDURE — G0299 HHS/HOSPICE OF RN EA 15 MIN: HCPCS

## 2019-05-23 ENCOUNTER — HOME CARE VISIT (OUTPATIENT)
Dept: SCHEDULING | Facility: HOME HEALTH | Age: 54
End: 2019-05-23
Payer: MEDICARE

## 2019-05-23 VITALS
SYSTOLIC BLOOD PRESSURE: 110 MMHG | DIASTOLIC BLOOD PRESSURE: 64 MMHG | OXYGEN SATURATION: 95 % | RESPIRATION RATE: 16 BRPM | HEART RATE: 64 BPM | TEMPERATURE: 96.6 F

## 2019-05-23 PROCEDURE — G0157 HHC PT ASSISTANT EA 15: HCPCS

## 2019-05-23 PROCEDURE — 3331090002 HH PPS REVENUE DEBIT

## 2019-05-23 PROCEDURE — 3331090001 HH PPS REVENUE CREDIT

## 2019-05-24 ENCOUNTER — HOME CARE VISIT (OUTPATIENT)
Dept: SCHEDULING | Facility: HOME HEALTH | Age: 54
End: 2019-05-24
Payer: MEDICARE

## 2019-05-24 ENCOUNTER — PATIENT OUTREACH (OUTPATIENT)
Dept: CASE MANAGEMENT | Age: 54
End: 2019-05-24

## 2019-05-24 VITALS
DIASTOLIC BLOOD PRESSURE: 62 MMHG | HEART RATE: 72 BPM | TEMPERATURE: 97.8 F | RESPIRATION RATE: 18 BRPM | OXYGEN SATURATION: 98 % | SYSTOLIC BLOOD PRESSURE: 100 MMHG

## 2019-05-24 PROCEDURE — G0299 HHS/HOSPICE OF RN EA 15 MIN: HCPCS

## 2019-05-24 PROCEDURE — 3331090002 HH PPS REVENUE DEBIT

## 2019-05-24 PROCEDURE — 3331090001 HH PPS REVENUE CREDIT

## 2019-05-24 NOTE — PROGRESS NOTES
Community Care Management  Follow up Outreach Note   Outreach type:    Phone visit   Date/Time of Outreach: 5/24/19 @ 4:50 pm      Reason for follow-up:   CCM s/p discharge from [facility] and long hospitalization for an open wound of abdominal wall    Disease specific complaints/issues: Abdominal pain,  weakness     Patient progress towards goals set from last contact:   Roberth Monroy has been visiting patient   Has patient attended any PCP or specialist follow-up appointments since last contact? What was outcome of appointment? When is next follow-up scheduled? None    Upcoming-Surgeon on 5/30/19    IM scheduled for 5/30/19-unable to attend  IM appointment changed to July 1, 2019   Review medications. Any medication changes since last outreach? Does patient have any questions or issues related to their medications? No changes   Home health active? If yes  any issue? Progress? Yes   Referrals needed?  (SW, Diabetes education, HH, etc. ) None at this time   Other issues/Miscellaneous? (Transportation, access to meals, ability to perform ADLs, adequate caregiver support, etc.)   None     Next Outreach Scheduled: None     Next Steps/Goals:     Confer with Roberth Monroy   Encourage and support patient as needed. Provide resource information if needed for patient  Goal-self management of chronic disease     Community Care Manager: Jackie Rios RN           Reports that she has home health services in place. She is participating in therapy. Wound is being monitored and dressing changes done by [facility] [place]. BP today is 110/64. Recent h/x of recorded blood pressure during [facility] therapy. Team was able to help her raise her BP with interventions. Reviewed medications. Patient is symptomatic when she has low BP. She will raise her lower legs and drink fluids. She has my contact information to call me if she needs to. This note will not be viewable in [address]

## 2019-05-25 PROCEDURE — 3331090002 HH PPS REVENUE DEBIT

## 2019-05-25 PROCEDURE — 3331090001 HH PPS REVENUE CREDIT

## 2019-05-26 PROCEDURE — 3331090002 HH PPS REVENUE DEBIT

## 2019-05-26 PROCEDURE — 3331090001 HH PPS REVENUE CREDIT

## 2019-05-27 ENCOUNTER — HOME CARE VISIT (OUTPATIENT)
Dept: SCHEDULING | Facility: HOME HEALTH | Age: 54
End: 2019-05-27
Payer: MEDICARE

## 2019-05-27 PROCEDURE — 3331090002 HH PPS REVENUE DEBIT

## 2019-05-27 PROCEDURE — 3331090001 HH PPS REVENUE CREDIT

## 2019-05-27 PROCEDURE — G0299 HHS/HOSPICE OF RN EA 15 MIN: HCPCS

## 2019-05-28 ENCOUNTER — HOME CARE VISIT (OUTPATIENT)
Dept: SCHEDULING | Facility: HOME HEALTH | Age: 54
End: 2019-05-28
Payer: MEDICARE

## 2019-05-28 VITALS
SYSTOLIC BLOOD PRESSURE: 120 MMHG | TEMPERATURE: 97.7 F | DIASTOLIC BLOOD PRESSURE: 80 MMHG | HEART RATE: 62 BPM | OXYGEN SATURATION: 93 % | RESPIRATION RATE: 18 BRPM

## 2019-05-28 VITALS
RESPIRATION RATE: 20 BRPM | OXYGEN SATURATION: 97 % | SYSTOLIC BLOOD PRESSURE: 140 MMHG | HEART RATE: 84 BPM | TEMPERATURE: 97.1 F | DIASTOLIC BLOOD PRESSURE: 82 MMHG

## 2019-05-28 PROCEDURE — 3331090001 HH PPS REVENUE CREDIT

## 2019-05-28 PROCEDURE — G0157 HHC PT ASSISTANT EA 15: HCPCS

## 2019-05-28 PROCEDURE — 3331090002 HH PPS REVENUE DEBIT

## 2019-05-29 ENCOUNTER — HOME CARE VISIT (OUTPATIENT)
Dept: SCHEDULING | Facility: HOME HEALTH | Age: 54
End: 2019-05-29
Payer: MEDICARE

## 2019-05-29 VITALS
OXYGEN SATURATION: 96 % | HEART RATE: 61 BPM | TEMPERATURE: 97.7 F | DIASTOLIC BLOOD PRESSURE: 60 MMHG | SYSTOLIC BLOOD PRESSURE: 100 MMHG | RESPIRATION RATE: 18 BRPM

## 2019-05-29 PROCEDURE — 3331090001 HH PPS REVENUE CREDIT

## 2019-05-29 PROCEDURE — G0299 HHS/HOSPICE OF RN EA 15 MIN: HCPCS

## 2019-05-29 PROCEDURE — 3331090002 HH PPS REVENUE DEBIT

## 2019-05-30 PROCEDURE — 3331090002 HH PPS REVENUE DEBIT

## 2019-05-30 PROCEDURE — 3331090001 HH PPS REVENUE CREDIT

## 2019-05-31 ENCOUNTER — HOME CARE VISIT (OUTPATIENT)
Dept: SCHEDULING | Facility: HOME HEALTH | Age: 54
End: 2019-05-31
Payer: MEDICARE

## 2019-05-31 VITALS
RESPIRATION RATE: 18 BRPM | HEART RATE: 60 BPM | OXYGEN SATURATION: 98 % | SYSTOLIC BLOOD PRESSURE: 110 MMHG | TEMPERATURE: 98.2 F | DIASTOLIC BLOOD PRESSURE: 62 MMHG

## 2019-05-31 VITALS
OXYGEN SATURATION: 98 % | SYSTOLIC BLOOD PRESSURE: 118 MMHG | TEMPERATURE: 98.4 F | DIASTOLIC BLOOD PRESSURE: 64 MMHG | HEART RATE: 70 BPM | RESPIRATION RATE: 18 BRPM

## 2019-05-31 PROCEDURE — G0299 HHS/HOSPICE OF RN EA 15 MIN: HCPCS

## 2019-05-31 PROCEDURE — 3331090002 HH PPS REVENUE DEBIT

## 2019-05-31 PROCEDURE — 3331090001 HH PPS REVENUE CREDIT

## 2019-05-31 PROCEDURE — G0157 HHC PT ASSISTANT EA 15: HCPCS

## 2019-06-01 PROCEDURE — 3331090002 HH PPS REVENUE DEBIT

## 2019-06-01 PROCEDURE — 3331090001 HH PPS REVENUE CREDIT

## 2019-06-02 PROCEDURE — 3331090001 HH PPS REVENUE CREDIT

## 2019-06-02 PROCEDURE — 3331090002 HH PPS REVENUE DEBIT

## 2019-06-03 ENCOUNTER — PATIENT OUTREACH (OUTPATIENT)
Dept: CASE MANAGEMENT | Age: 54
End: 2019-06-03

## 2019-06-03 ENCOUNTER — HOME CARE VISIT (OUTPATIENT)
Dept: SCHEDULING | Facility: HOME HEALTH | Age: 54
End: 2019-06-03
Payer: MEDICARE

## 2019-06-03 VITALS
DIASTOLIC BLOOD PRESSURE: 70 MMHG | RESPIRATION RATE: 20 BRPM | HEART RATE: 87 BPM | SYSTOLIC BLOOD PRESSURE: 120 MMHG | OXYGEN SATURATION: 93 % | TEMPERATURE: 97.2 F

## 2019-06-03 PROCEDURE — G0299 HHS/HOSPICE OF RN EA 15 MIN: HCPCS

## 2019-06-03 PROCEDURE — 3331090002 HH PPS REVENUE DEBIT

## 2019-06-03 PROCEDURE — 3331090001 HH PPS REVENUE CREDIT

## 2019-06-03 NOTE — PROGRESS NOTES
Community Care Management  Follow up Outreach Note   Outreach type:    Phone visit   Date/Time of Outreach: 6/3/19 @ 12:50 pm      Reason for follow-up:   CCM s/p discharge from [facility] and long hospitalization for an open wound of abdominal wall    Disease specific complaints/issues: Abdominal pain,  weakness, nausea     Patient progress towards goals set from last contact:   Roberth Monroy has been visiting patient   Has patient attended any PCP or specialist follow-up appointments since last contact? What was outcome of appointment? When is next follow-up scheduled? None    IM appointment changed to July 1, 2019     Review medications. Any medication changes since last outreach? Does patient have any questions or issues related to their medications? Surgeon visit on 5/30/19   Home health active? If yes  any issue? Progress? Yes   Referrals needed?  (SW, Diabetes education, HH, etc. ) None at this time   Other issues/Miscellaneous? (Transportation, access to meals, ability to perform ADLs, adequate caregiver support, etc.)   None     Next Outreach Scheduled: None     Next Steps/Goals:     Confer with Roberth Monroy   Encourage and support patient as needed. Provide resource information if needed for patient  Goal-self management of chronic disease     Community Care Manager: Dylan Dewitt, MELLO           Reports that she has home health services in place. She is participating in therapy. Wound is being monitored and dressing changes done by [place]. States that she has N,V today even after taking promethazine. She is having pulling in the mesh causing pain. Requested CM to call her surgeons office to request pain medication and antiemetic. CHRISTINA spoke with surgeons office re: patient requests. Surgeons office will contact patient for follow up. This note will not be viewable in [address]

## 2019-06-04 ENCOUNTER — HOME CARE VISIT (OUTPATIENT)
Dept: SCHEDULING | Facility: HOME HEALTH | Age: 54
End: 2019-06-04
Payer: MEDICARE

## 2019-06-04 VITALS
TEMPERATURE: 95.9 F | RESPIRATION RATE: 14 BRPM | HEART RATE: 62 BPM | DIASTOLIC BLOOD PRESSURE: 64 MMHG | SYSTOLIC BLOOD PRESSURE: 104 MMHG | OXYGEN SATURATION: 96 %

## 2019-06-04 PROCEDURE — 3331090002 HH PPS REVENUE DEBIT

## 2019-06-04 PROCEDURE — G0157 HHC PT ASSISTANT EA 15: HCPCS

## 2019-06-04 PROCEDURE — 3331090001 HH PPS REVENUE CREDIT

## 2019-06-05 ENCOUNTER — HOME CARE VISIT (OUTPATIENT)
Dept: SCHEDULING | Facility: HOME HEALTH | Age: 54
End: 2019-06-05
Payer: MEDICARE

## 2019-06-05 PROCEDURE — 3331090001 HH PPS REVENUE CREDIT

## 2019-06-05 PROCEDURE — 3331090002 HH PPS REVENUE DEBIT

## 2019-06-05 PROCEDURE — G0299 HHS/HOSPICE OF RN EA 15 MIN: HCPCS

## 2019-06-06 ENCOUNTER — HOME CARE VISIT (OUTPATIENT)
Dept: SCHEDULING | Facility: HOME HEALTH | Age: 54
End: 2019-06-06
Payer: MEDICARE

## 2019-06-06 VITALS
OXYGEN SATURATION: 96 % | SYSTOLIC BLOOD PRESSURE: 110 MMHG | DIASTOLIC BLOOD PRESSURE: 70 MMHG | HEART RATE: 74 BPM | TEMPERATURE: 98.1 F | RESPIRATION RATE: 16 BRPM

## 2019-06-06 PROCEDURE — 3331090001 HH PPS REVENUE CREDIT

## 2019-06-06 PROCEDURE — G0151 HHCP-SERV OF PT,EA 15 MIN: HCPCS

## 2019-06-06 PROCEDURE — 3331090002 HH PPS REVENUE DEBIT

## 2019-06-07 ENCOUNTER — HOME CARE VISIT (OUTPATIENT)
Dept: SCHEDULING | Facility: HOME HEALTH | Age: 54
End: 2019-06-07
Payer: MEDICARE

## 2019-06-07 PROCEDURE — 3331090002 HH PPS REVENUE DEBIT

## 2019-06-07 PROCEDURE — 3331090001 HH PPS REVENUE CREDIT

## 2019-06-07 PROCEDURE — G0299 HHS/HOSPICE OF RN EA 15 MIN: HCPCS

## 2019-06-08 PROCEDURE — 3331090002 HH PPS REVENUE DEBIT

## 2019-06-08 PROCEDURE — 3331090001 HH PPS REVENUE CREDIT

## 2019-06-09 VITALS
OXYGEN SATURATION: 96 % | RESPIRATION RATE: 16 BRPM | HEART RATE: 60 BPM | TEMPERATURE: 98.1 F | DIASTOLIC BLOOD PRESSURE: 60 MMHG | SYSTOLIC BLOOD PRESSURE: 97 MMHG

## 2019-06-09 VITALS
RESPIRATION RATE: 18 BRPM | HEART RATE: 99 BPM | OXYGEN SATURATION: 98 % | SYSTOLIC BLOOD PRESSURE: 102 MMHG | DIASTOLIC BLOOD PRESSURE: 62 MMHG | TEMPERATURE: 98.6 F

## 2019-06-09 PROCEDURE — 3331090002 HH PPS REVENUE DEBIT

## 2019-06-09 PROCEDURE — 3331090001 HH PPS REVENUE CREDIT

## 2019-06-10 ENCOUNTER — HOME CARE VISIT (OUTPATIENT)
Dept: SCHEDULING | Facility: HOME HEALTH | Age: 54
End: 2019-06-10
Payer: MEDICARE

## 2019-06-10 VITALS
DIASTOLIC BLOOD PRESSURE: 62 MMHG | SYSTOLIC BLOOD PRESSURE: 110 MMHG | TEMPERATURE: 97.7 F | RESPIRATION RATE: 18 BRPM | HEART RATE: 64 BPM | OXYGEN SATURATION: 98 %

## 2019-06-10 PROCEDURE — G0299 HHS/HOSPICE OF RN EA 15 MIN: HCPCS

## 2019-06-10 PROCEDURE — 3331090002 HH PPS REVENUE DEBIT

## 2019-06-10 PROCEDURE — 3331090001 HH PPS REVENUE CREDIT

## 2019-06-11 PROCEDURE — 3331090002 HH PPS REVENUE DEBIT

## 2019-06-11 PROCEDURE — 3331090001 HH PPS REVENUE CREDIT

## 2019-06-12 ENCOUNTER — HOME CARE VISIT (OUTPATIENT)
Dept: SCHEDULING | Facility: HOME HEALTH | Age: 54
End: 2019-06-12
Payer: MEDICARE

## 2019-06-12 VITALS
TEMPERATURE: 98.4 F | RESPIRATION RATE: 18 BRPM | OXYGEN SATURATION: 98 % | DIASTOLIC BLOOD PRESSURE: 64 MMHG | HEART RATE: 66 BPM | SYSTOLIC BLOOD PRESSURE: 110 MMHG

## 2019-06-12 PROCEDURE — G0299 HHS/HOSPICE OF RN EA 15 MIN: HCPCS

## 2019-06-12 PROCEDURE — 3331090001 HH PPS REVENUE CREDIT

## 2019-06-12 PROCEDURE — 3331090002 HH PPS REVENUE DEBIT

## 2019-06-13 ENCOUNTER — PATIENT OUTREACH (OUTPATIENT)
Dept: CASE MANAGEMENT | Age: 54
End: 2019-06-13

## 2019-06-13 PROCEDURE — 3331090002 HH PPS REVENUE DEBIT

## 2019-06-13 PROCEDURE — 3331090001 HH PPS REVENUE CREDIT

## 2019-06-13 NOTE — PROGRESS NOTES
Community Care Management  Follow up Outreach Note   Outreach type:    Phone visit   Date/Time of Outreach: 6/13/19 @ 12:50 pm      Reason for follow-up:   CCM s/p discharge from [place] and long hospitalization for an open wound of abdominal wall    Disease specific complaints/issues: Abdominal pain,  weakness, nausea     Patient progress towards goals set from last contact:   [facility] has been visiting patient   Has patient attended any PCP or specialist follow-up appointments since last contact? What was outcome of appointment? When is next follow-up scheduled? None    IM appointment changed to July 1, 2019     Review medications. Any medication changes since last outreach? Does patient have any questions or issues related to their medications? No medication changes since last follow up   Home health active? If yes  any issue? Progress? Yes   Referrals needed?  (, Diabetes education, HH, etc. ) None at this time   Other issues/Miscellaneous? (Transportation, access to meals, ability to perform ADLs, adequate caregiver support, etc.)   None     Next Outreach Scheduled: None     Next Steps/Goals:     Confer with [facility]   Encourage and support patient as needed. Provide resource information if needed for patient  Goal-self management of chronic disease     Community Care Manager: Angie Tavera RN           Continues to have home health in place. Wound vac is being monitored and dressing changes done by [facility]. Plan: wound vac will be discontinued and she will be taught wound care. Continues to have some pain in abdomen but not like it was wound has odor that made her nauseous and that has improved as well. Reviewed s/sx of wound that change and she agreed that she will contact someone if need be. Progressing to self-management. Patient has my contact information if she needs to call. This note will not be viewable in [address]

## 2019-06-14 ENCOUNTER — HOME CARE VISIT (OUTPATIENT)
Dept: SCHEDULING | Facility: HOME HEALTH | Age: 54
End: 2019-06-14
Payer: MEDICARE

## 2019-06-14 VITALS
OXYGEN SATURATION: 96 % | RESPIRATION RATE: 18 BRPM | TEMPERATURE: 98.2 F | SYSTOLIC BLOOD PRESSURE: 112 MMHG | DIASTOLIC BLOOD PRESSURE: 64 MMHG | HEART RATE: 60 BPM

## 2019-06-14 PROCEDURE — G0299 HHS/HOSPICE OF RN EA 15 MIN: HCPCS

## 2019-06-14 PROCEDURE — 3331090002 HH PPS REVENUE DEBIT

## 2019-06-14 PROCEDURE — 3331090001 HH PPS REVENUE CREDIT

## 2019-06-15 PROCEDURE — 3331090001 HH PPS REVENUE CREDIT

## 2019-06-15 PROCEDURE — 3331090002 HH PPS REVENUE DEBIT

## 2019-06-16 PROCEDURE — 3331090001 HH PPS REVENUE CREDIT

## 2019-06-16 PROCEDURE — 3331090002 HH PPS REVENUE DEBIT

## 2019-06-17 ENCOUNTER — HOME CARE VISIT (OUTPATIENT)
Dept: SCHEDULING | Facility: HOME HEALTH | Age: 54
End: 2019-06-17
Payer: MEDICARE

## 2019-06-17 VITALS
HEART RATE: 64 BPM | OXYGEN SATURATION: 98 % | TEMPERATURE: 98.2 F | SYSTOLIC BLOOD PRESSURE: 102 MMHG | DIASTOLIC BLOOD PRESSURE: 62 MMHG | RESPIRATION RATE: 18 BRPM

## 2019-06-17 PROCEDURE — A4452 WATERPROOF TAPE: HCPCS

## 2019-06-17 PROCEDURE — 3331090002 HH PPS REVENUE DEBIT

## 2019-06-17 PROCEDURE — 3331090001 HH PPS REVENUE CREDIT

## 2019-06-17 PROCEDURE — G0299 HHS/HOSPICE OF RN EA 15 MIN: HCPCS

## 2019-06-17 PROCEDURE — A9270 NON-COVERED ITEM OR SERVICE: HCPCS

## 2019-06-17 PROCEDURE — 3331090003 HH PPS REVENUE ADJ

## 2019-06-17 PROCEDURE — A6252 ABSORPT DRG >16 <=48 W/O BDR: HCPCS

## 2019-06-17 PROCEDURE — A6402 STERILE GAUZE <= 16 SQ IN: HCPCS

## 2019-06-21 PROCEDURE — A6252 ABSORPT DRG >16 <=48 W/O BDR: HCPCS

## 2019-06-21 PROCEDURE — A4452 WATERPROOF TAPE: HCPCS

## 2019-06-24 ENCOUNTER — HOSPITAL ENCOUNTER (OUTPATIENT)
Dept: LAB | Age: 54
Discharge: HOME | End: 2019-06-24
Attending: INTERNAL MEDICINE
Payer: MEDICARE

## 2019-06-24 DIAGNOSIS — I50.22 CHRONIC SYSTOLIC HEART FAILURE (HCC): Chronic | ICD-10-CM

## 2019-06-24 LAB
ALBUMIN SERPL-MCNC: 3.4 G/DL (ref 3.5–5)
ALBUMIN/GLOB SERPL: 0.7 {RATIO} (ref 1.2–3.5)
ALP SERPL-CCNC: 100 U/L (ref 50–136)
ALT SERPL-CCNC: 17 U/L (ref 12–65)
ANION GAP SERPL CALC-SCNC: 6 MMOL/L (ref 7–16)
AST SERPL-CCNC: 20 U/L (ref 15–37)
BASOPHILS # BLD: 0 K/UL (ref 0–0.2)
BASOPHILS NFR BLD: 0 % (ref 0–2)
BILIRUB SERPL-MCNC: 0.2 MG/DL (ref 0.2–1.1)
BNP SERPL-MCNC: 288 PG/ML
BUN SERPL-MCNC: 14 MG/DL (ref 6–23)
CALCIUM SERPL-MCNC: 9.3 MG/DL (ref 8.3–10.4)
CHLORIDE SERPL-SCNC: 110 MMOL/L (ref 98–107)
CO2 SERPL-SCNC: 27 MMOL/L (ref 21–32)
CREAT SERPL-MCNC: 1.3 MG/DL (ref 0.6–1)
DIFFERENTIAL METHOD BLD: ABNORMAL
EOSINOPHIL # BLD: 0.3 K/UL (ref 0–0.8)
EOSINOPHIL NFR BLD: 3 % (ref 0.5–7.8)
ERYTHROCYTE [DISTWIDTH] IN BLOOD BY AUTOMATED COUNT: 15.1 % (ref 11.9–14.6)
GLOBULIN SER CALC-MCNC: 4.9 G/DL (ref 2.3–3.5)
GLUCOSE SERPL-MCNC: 93 MG/DL (ref 65–100)
HCT VFR BLD AUTO: 36.5 % (ref 35.8–46.3)
HGB BLD-MCNC: 11.4 G/DL (ref 11.7–15.4)
IMM GRANULOCYTES # BLD AUTO: 0 K/UL (ref 0–0.5)
IMM GRANULOCYTES NFR BLD AUTO: 0 % (ref 0–5)
LYMPHOCYTES # BLD: 2.2 K/UL (ref 0.5–4.6)
LYMPHOCYTES NFR BLD: 25 % (ref 13–44)
MAGNESIUM SERPL-MCNC: 1.9 MG/DL (ref 1.8–2.4)
MCH RBC QN AUTO: 28.1 PG (ref 26.1–32.9)
MCHC RBC AUTO-ENTMCNC: 31.2 G/DL (ref 31.4–35)
MCV RBC AUTO: 89.9 FL (ref 79.6–97.8)
MONOCYTES # BLD: 0.5 K/UL (ref 0.1–1.3)
MONOCYTES NFR BLD: 6 % (ref 4–12)
NEUTS SEG # BLD: 6 K/UL (ref 1.7–8.2)
NEUTS SEG NFR BLD: 66 % (ref 43–78)
NRBC # BLD: 0 K/UL (ref 0–0.2)
PLATELET # BLD AUTO: 207 K/UL (ref 150–450)
PMV BLD AUTO: 11 FL (ref 9.4–12.3)
POTASSIUM SERPL-SCNC: 3.6 MMOL/L (ref 3.5–5.1)
PROT SERPL-MCNC: 8.3 G/DL (ref 6.3–8.2)
RBC # BLD AUTO: 4.06 M/UL (ref 4.05–5.2)
SODIUM SERPL-SCNC: 143 MMOL/L (ref 136–145)
TSH SERPL DL<=0.005 MIU/L-ACNC: 1.14 UIU/ML (ref 0.36–3.74)
WBC # BLD AUTO: 9 K/UL (ref 4.3–11.1)

## 2019-06-24 PROCEDURE — 84443 ASSAY THYROID STIM HORMONE: CPT

## 2019-06-24 PROCEDURE — 83880 ASSAY OF NATRIURETIC PEPTIDE: CPT

## 2019-06-24 PROCEDURE — 85025 COMPLETE CBC W/AUTO DIFF WBC: CPT

## 2019-06-24 PROCEDURE — 80053 COMPREHEN METABOLIC PANEL: CPT

## 2019-06-24 PROCEDURE — 83735 ASSAY OF MAGNESIUM: CPT

## 2019-06-24 PROCEDURE — 36415 COLL VENOUS BLD VENIPUNCTURE: CPT

## 2019-06-24 NOTE — PROGRESS NOTES
Please call her, her labs look very good. She needs back to PCP about the nausea. I would make no changes in lasix, other meds. Mg good as well. See me in 4 weeks as planned.   Thanks

## 2019-07-01 PROBLEM — J80 ARDS (ADULT RESPIRATORY DISTRESS SYNDROME) (HCC): Status: RESOLVED | Noted: 2019-04-03 | Resolved: 2019-07-01

## 2019-07-01 PROBLEM — E87.6 HYPOKALEMIA: Status: RESOLVED | Noted: 2019-04-11 | Resolved: 2019-07-01

## 2019-07-01 PROBLEM — N39.0 PSEUDOMONAS URINARY TRACT INFECTION: Status: RESOLVED | Noted: 2019-04-05 | Resolved: 2019-07-01

## 2019-07-01 PROBLEM — E87.3 CHLORIDE-RESPONSIVE METABOLIC ALKALOSIS: Status: RESOLVED | Noted: 2019-04-11 | Resolved: 2019-07-01

## 2019-07-01 PROBLEM — B96.5 PSEUDOMONAS URINARY TRACT INFECTION: Status: RESOLVED | Noted: 2019-04-05 | Resolved: 2019-07-01

## 2019-07-01 PROBLEM — J96.01 ACUTE HYPOXEMIC RESPIRATORY FAILURE (HCC): Status: RESOLVED | Noted: 2019-04-03 | Resolved: 2019-07-01

## 2019-07-30 ENCOUNTER — PATIENT OUTREACH (OUTPATIENT)
Dept: CASE MANAGEMENT | Age: 54
End: 2019-07-30

## 2019-10-02 PROBLEM — K43.9 ABDOMINAL WALL HERNIA: Status: RESOLVED | Noted: 2018-10-02 | Resolved: 2019-10-02

## 2019-10-02 PROBLEM — K43.0 INCARCERATED INCISIONAL HERNIA: Status: RESOLVED | Noted: 2019-03-26 | Resolved: 2019-10-02

## 2019-10-07 ENCOUNTER — HOSPITAL ENCOUNTER (OUTPATIENT)
Dept: CT IMAGING | Age: 54
Discharge: HOME | End: 2019-10-07
Attending: INTERNAL MEDICINE

## 2019-10-07 DIAGNOSIS — M54.2 NECK PAIN: ICD-10-CM

## 2019-10-08 NOTE — PROGRESS NOTES
Please call patient, ct of cervical spine without significant findings. Only mild degenerative disc disease which is not unexpected. No evidence of fractures or spinal stenosis.   If she is no better, I can get her into see a pain management specialist.

## 2019-12-11 ENCOUNTER — PATIENT OUTREACH (OUTPATIENT)
Dept: CASE MANAGEMENT | Age: 54
End: 2019-12-11

## 2019-12-13 NOTE — PROGRESS NOTES
RNCM calls to patient, messages left, no return calls. Plan to f/u next week. This note will not be viewable in [address]

## 2019-12-17 ENCOUNTER — PATIENT OUTREACH (OUTPATIENT)
Dept: CASE MANAGEMENT | Age: 54
End: 2019-12-17

## 2019-12-17 NOTE — PROGRESS NOTES
Agreeable to [facility] outreach 12/17/19, 7882   Referral Source & Reason High risk for admission   Primary concerns per patient and/or pts family/caregiver SOB X 1 month   Recent Hospitalization(s)/ED Visits None   Current with Home Health?  - Agency? No   Social Needs:  - Able to afford medications? - Financial assessment?  - Access to care? Transportation? Meds are affordable    Uses bus transportation   Nutritional Assessment  - Appetite? - Obesity?  - Failure to Thrive? - Bowels ? Interested in losing weight, plan to discuss at future contacts   Cognitive Assessment  - History of dementia  - Health literacy    Fair health literacy, aware of upcoming appmts   Mobility/Activity Assessment  - Bed/chair bound? - Make use of assistive devices? - Does patient still drive? No assistive devices    Uses public transportation   Plan/Interventions/Education  - Follow up Appointments  - Referrals 12/30 Pain Management, Dr Kishan Bautista  12/31 PCP, Labs  1/8 PCP, Dr Ajay Sultana call to [facility] per pt request to make f/u e/ Dr Saroj iLlly. Left message re pt c/o SOB X 1 month, office RN will call pt back directly. Discussed PCP/ Urgent Care/ ED if symptoms worsen. Plan to f/u next week. This note will not be viewable in [address]

## 2019-12-26 ENCOUNTER — HOSPITAL ENCOUNTER (OUTPATIENT)
Dept: CARDIAC CATH/INVASIVE PROCEDURES | Age: 54
Discharge: HOME | End: 2019-12-26
Attending: INTERNAL MEDICINE | Admitting: INTERNAL MEDICINE
Payer: MEDICARE

## 2019-12-26 VITALS
WEIGHT: 202 LBS | RESPIRATION RATE: 16 BRPM | DIASTOLIC BLOOD PRESSURE: 71 MMHG | BODY MASS INDEX: 40.72 KG/M2 | HEIGHT: 59 IN | SYSTOLIC BLOOD PRESSURE: 129 MMHG | OXYGEN SATURATION: 98 % | HEART RATE: 60 BPM

## 2019-12-26 LAB
ANION GAP SERPL CALC-SCNC: 5 MMOL/L (ref 7–16)
ATRIAL RATE: 60 BPM
BUN SERPL-MCNC: 31 MG/DL (ref 6–23)
CALCIUM SERPL-MCNC: 9.9 MG/DL (ref 8.3–10.4)
CALCULATED P AXIS, ECG09: 92 DEGREES
CALCULATED R AXIS, ECG10: 48 DEGREES
CALCULATED T AXIS, ECG11: -7 DEGREES
CHLORIDE SERPL-SCNC: 109 MMOL/L (ref 98–107)
CO2 SERPL-SCNC: 30 MMOL/L (ref 21–32)
CREAT SERPL-MCNC: 1.44 MG/DL (ref 0.6–1)
DIAGNOSIS, 93000: NORMAL
ERYTHROCYTE [DISTWIDTH] IN BLOOD BY AUTOMATED COUNT: 13 % (ref 11.9–14.6)
GLUCOSE SERPL-MCNC: 112 MG/DL (ref 65–100)
HCT VFR BLD AUTO: 40.5 % (ref 35.8–46.3)
HGB BLD-MCNC: 13 G/DL (ref 11.7–15.4)
INR PPP: 0.9
MAGNESIUM SERPL-MCNC: 2.3 MG/DL (ref 1.8–2.4)
MCH RBC QN AUTO: 31.6 PG (ref 26.1–32.9)
MCHC RBC AUTO-ENTMCNC: 32.1 G/DL (ref 31.4–35)
MCV RBC AUTO: 98.3 FL (ref 79.6–97.8)
NRBC # BLD: 0 K/UL (ref 0–0.2)
P-R INTERVAL, ECG05: 234 MS
PLATELET # BLD AUTO: 166 K/UL (ref 150–450)
PMV BLD AUTO: 11.4 FL (ref 9.4–12.3)
POTASSIUM SERPL-SCNC: 4.1 MMOL/L (ref 3.5–5.1)
PROTHROMBIN TIME: 12.4 SEC (ref 11.7–14.5)
Q-T INTERVAL, ECG07: 464 MS
QRS DURATION, ECG06: 116 MS
QTC CALCULATION (BEZET), ECG08: 464 MS
RBC # BLD AUTO: 4.12 M/UL (ref 4.05–5.2)
SODIUM SERPL-SCNC: 144 MMOL/L (ref 136–145)
VENTRICULAR RATE, ECG03: 60 BPM
WBC # BLD AUTO: 7.5 K/UL (ref 4.3–11.1)

## 2019-12-26 PROCEDURE — 80048 BASIC METABOLIC PNL TOTAL CA: CPT

## 2019-12-26 PROCEDURE — 93005 ELECTROCARDIOGRAM TRACING: CPT | Performed by: INTERNAL MEDICINE

## 2019-12-26 PROCEDURE — 85610 PROTHROMBIN TIME: CPT

## 2019-12-26 PROCEDURE — 77030015766

## 2019-12-26 PROCEDURE — 83735 ASSAY OF MAGNESIUM: CPT

## 2019-12-26 PROCEDURE — 77030019569 HC BND COMPR RAD TERU -B

## 2019-12-26 PROCEDURE — 74011250636 HC RX REV CODE- 250/636: Performed by: INTERNAL MEDICINE

## 2019-12-26 PROCEDURE — 74011250637 HC RX REV CODE- 250/637: Performed by: INTERNAL MEDICINE

## 2019-12-26 PROCEDURE — 99153 MOD SED SAME PHYS/QHP EA: CPT

## 2019-12-26 PROCEDURE — C1769 GUIDE WIRE: HCPCS

## 2019-12-26 PROCEDURE — 99152 MOD SED SAME PHYS/QHP 5/>YRS: CPT

## 2019-12-26 PROCEDURE — 74011636320 HC RX REV CODE- 636/320: Performed by: INTERNAL MEDICINE

## 2019-12-26 PROCEDURE — 93458 L HRT ARTERY/VENTRICLE ANGIO: CPT

## 2019-12-26 PROCEDURE — 77030004534 HC CATH ANGI DX INFN CARD -A

## 2019-12-26 PROCEDURE — 85027 COMPLETE CBC AUTOMATED: CPT

## 2019-12-26 PROCEDURE — C1894 INTRO/SHEATH, NON-LASER: HCPCS

## 2019-12-26 PROCEDURE — 74011000250 HC RX REV CODE- 250: Performed by: INTERNAL MEDICINE

## 2019-12-26 RX ORDER — MIDAZOLAM HYDROCHLORIDE 1 MG/ML
.5-2 INJECTION, SOLUTION INTRAMUSCULAR; INTRAVENOUS
Status: DISCONTINUED | OUTPATIENT
Start: 2019-12-26 | End: 2019-12-26 | Stop reason: HOSPADM

## 2019-12-26 RX ORDER — GUAIFENESIN 100 MG/5ML
324 LIQUID (ML) ORAL
Status: DISCONTINUED | OUTPATIENT
Start: 2019-12-26 | End: 2019-12-26 | Stop reason: HOSPADM

## 2019-12-26 RX ORDER — LIDOCAINE HYDROCHLORIDE 10 MG/ML
2-20 INJECTION INFILTRATION; PERINEURAL
Status: DISCONTINUED | OUTPATIENT
Start: 2019-12-26 | End: 2019-12-26 | Stop reason: HOSPADM

## 2019-12-26 RX ORDER — NITROGLYCERIN 0.4 MG/1
0.4 TABLET SUBLINGUAL AS NEEDED
Status: DISCONTINUED | OUTPATIENT
Start: 2019-12-26 | End: 2019-12-26 | Stop reason: HOSPADM

## 2019-12-26 RX ORDER — DIAZEPAM 5 MG/1
5 TABLET ORAL ONCE
Status: COMPLETED | OUTPATIENT
Start: 2019-12-26 | End: 2019-12-26

## 2019-12-26 RX ORDER — SODIUM CHLORIDE 9 MG/ML
75 INJECTION, SOLUTION INTRAVENOUS CONTINUOUS
Status: DISCONTINUED | OUTPATIENT
Start: 2019-12-26 | End: 2019-12-26 | Stop reason: HOSPADM

## 2019-12-26 RX ORDER — FENTANYL CITRATE 50 UG/ML
25-100 INJECTION, SOLUTION INTRAMUSCULAR; INTRAVENOUS
Status: DISCONTINUED | OUTPATIENT
Start: 2019-12-26 | End: 2019-12-26 | Stop reason: HOSPADM

## 2019-12-26 RX ORDER — SODIUM CHLORIDE 9 MG/ML
1 INJECTION, SOLUTION INTRAVENOUS AS NEEDED
Status: DISCONTINUED | OUTPATIENT
Start: 2019-12-26 | End: 2019-12-26 | Stop reason: HOSPADM

## 2019-12-26 RX ORDER — HEPARIN SODIUM 200 [USP'U]/100ML
2 INJECTION, SOLUTION INTRAVENOUS CONTINUOUS
Status: DISCONTINUED | OUTPATIENT
Start: 2019-12-26 | End: 2019-12-26 | Stop reason: HOSPADM

## 2019-12-26 RX ADMIN — IOPAMIDOL 85 ML: 755 INJECTION, SOLUTION INTRAVENOUS at 10:43

## 2019-12-26 RX ADMIN — NITROGLYCERIN 0.4 MG: 0.4 TABLET SUBLINGUAL at 07:30

## 2019-12-26 RX ADMIN — DIAZEPAM 5 MG: 5 TABLET ORAL at 07:48

## 2019-12-26 RX ADMIN — NITROGLYCERIN 0.4 MG: 0.4 TABLET SUBLINGUAL at 07:20

## 2019-12-26 RX ADMIN — NITROGLYCERIN 0.4 MG: 0.4 TABLET SUBLINGUAL at 07:12

## 2019-12-26 RX ADMIN — SODIUM CHLORIDE 1 ML/KG/HR: 900 INJECTION, SOLUTION INTRAVENOUS at 07:21

## 2019-12-26 RX ADMIN — MIDAZOLAM 2 MG: 1 INJECTION INTRAMUSCULAR; INTRAVENOUS at 10:20

## 2019-12-26 RX ADMIN — LIDOCAINE HYDROCHLORIDE 4 ML: 10 INJECTION, SOLUTION INFILTRATION; PERINEURAL at 10:34

## 2019-12-26 RX ADMIN — HEPARIN SODIUM 2 ML: 10000 INJECTION, SOLUTION INTRAVENOUS; SUBCUTANEOUS at 10:35

## 2019-12-26 RX ADMIN — FENTANYL CITRATE 50 MCG: 50 INJECTION, SOLUTION INTRAMUSCULAR; INTRAVENOUS at 10:20

## 2019-12-26 RX ADMIN — HEPARIN SODIUM 2 ML/HR: 200 INJECTION, SOLUTION INTRAVENOUS at 10:00

## 2019-12-26 NOTE — PROGRESS NOTES
Patient received to [address] [place] [address] room # 12  Ambulatory from [facility]. Patient scheduled for [facility] today with Dr Rock Payment. Procedure reviewed & questions answered, voiced good understanding consent obtained & placed on chart. All medications and medical history reviewed. Will prep patient per orders. Patient & family updated on plan of care. The patient has a fraility score of 4-VULNERABLE, based on WHITING and chest pain. Pt took ASA 81 mg x 4 at 0500.

## 2019-12-26 NOTE — PROCEDURES
Brief Cardiac Procedure Note    Patient: Arianna Hightower MRN: [id number]  SSN: [id number]    YOB: 1965  Age: 62 y.o. 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     Signed By: Joslyn Goetz MD     December 26, 2019

## 2019-12-26 NOTE — PROGRESS NOTES
[facility] Procedural findings communicated. Intra procedural  medication administration reviewed. Progression of care discussed.      Patient received into [address] 2 post sheath removal.     Right Radial access site without bleeding or swelling     TR band dry and intact     Patient instructed to limit movement to right upper extremity    Routine post procedural vital signs and site assessment initiated

## 2019-12-26 NOTE — DISCHARGE INSTRUCTIONS
HEART CATHETERIZATION/ANGIOGRAPHY DISCHARGE INSTRUCTIONS    1. Check puncture site frequently for swelling or bleeding. If there is any bleeding, apply pressure over the area with a clean towel or washcloth. If you are unable to stop the bleeding in 15-20 minutes call 911. Notify your doctor for any redness, swelling, drainage, or oozing from the puncture site. Notify your doctor for any fever, chills or other signs of infection. 2. If the extremity becomes cold, numb, or painful call [facility] at [phone].  3. Activity should be limited for the next 48 hours. Avoid pushing, pulling, or strenuous activity for 48 hours. No heavy lifting (anything over 5 pounds) for 3 days. No driving for 48 hours. 4. You may resume your usual diet. Drink more fluids than usual, water is best.  5. Have a responsible person drive you home and stay with you for at least 24 hours after your heart catheterization/angiography. 6. You may remove bandage from your right wrist in 24 hours. You may shower in 24 hours. No tub baths, hot tubs, or swimming for 1 week. Do not wash dishes for 1 week. Do not place any lotions, creams, powders, or ointments over puncture site for 1 week. You may place a clean band-aid over the puncture site each day for 5 days. Change daily. I have read the above instructions and have had the opportunity to ask questions.

## 2019-12-26 NOTE — PROCEDURES
[facility]  CARDIAC CATH    Name:  Radha Begum  MR#:  [id number]  :  1965  ACCOUNT #:  [de-identified]  DATE OF SERVICE:  2019    PRIMARY CARDIOLOGIST:  Adriana Barakat DO    PRIMARY CARE PHYSICIAN:  Damari Molina MD    BRIEF HISTORY:  The patient is a 76-year-old female with history of nonischemic cardiomyopathy and chronic recurrent chest discomfort, referred now for repeat coronary angiography due to ongoing chest pain. She presents today with 10/10 chest pain, responding to Valium and morphine. There are no acute EKG changes. She was brought back urgently for cardiac catheterization. PREOPERATIVE DIAGNOSIS:  Atypical chest pain. POSTOPERATIVE DIAGNOSIS:  Noncardiac chest pain. PROCEDURES PERFORMED:  Bilateral selective coronary angiography, left ventriculography. SURGEON:  Vy Barnett MD    ASSISTANT:  None. COMPLICATIONS:  None. ANESTHESIA:  Conscious sedation. Start time 10:20, end time 10:46. MEDICATIONS:  2 mg of Versed and 50 mcg of fentanyl    MONITORING RN:  John Paul Rondon    SPECIMENS REMOVED:  None. IMPLANTS:  None. ESTIMATED BLOOD LOSS:  Less than 5 mL. PROCEDURE:  After informed consent, she was prepped and draped in usual sterile fashion. The right wrist was infiltrated with lidocaine. The right radial artery was accessed. A 6-Burmese sheath was advanced. A 5-Burmese Tiger catheter was utilized for left and right coronary artery injections and a 5-Burmese angled pigtail for left ventriculography. Isovue contrast utilized. FINDINGS:  1. Left ventricle:  Left ventricle is moderate to severely dilated with severe LV systolic dysfunction. Ejection fraction 20%. There is late filling of what appears to be an inferobasal or posterior basal aneurysm. Given the unique appearance of this, a repeat echocardiogram if she has not had an echocardiogram recently.   We may be consider a dedicated cardiac CT or MRI to further define LV anatomy. Left ventricular end-diastolic pressure is relatively normal at 17 mmHg. There was no aortic valve gradient. 2.  Left main:  Left main is large, bifurcates into LAD and circumflex systems. Appears angiographically normal.  3.  Left anterior descending coronary artery: It is a moderate-sized vessel. It gives rise to two moderate diagonals. The entire system appears angiographically normal.  4.  Left circumflex coronary artery: It is a moderate-sized vessel. It gives rise to two moderate-sized OMs and a small distal posterolateral branch. The entire system appears angiographically normal.  5.  Right coronary artery: It is a large anatomically dominant vessel. It gives rise to moderate-sized posterior ascending and posterolateral branches. The entire system appears angiographically normal.    Successful hemostasis with pneumatic radial band. CONCLUSIONS:  1. 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 Recommend additional imaging with echo CT or MRI to be determined per Dr. Bogdan Ceballos. 2.  Normal coronary arteries. RECOMMENDATIONS:  1. Ongoing medical therapy for nonischemic cardiomyopathy. 2.  Considerations for noncardiac source of chest pain. 3.  Additional cardiac imaging with echo CT or MRI to better assess LV anatomy. Thank you for allowing us to participate in the care of this patient. For any questions or concerns, please feel free to contact me.       Meme Renee MD      [id number]  D:  12/26/2019 10:54  T:  12/26/2019 11:40  JOB #:  [id number]  CC:  Kemi Mo MD

## 2019-12-26 NOTE — PROGRESS NOTES
TRANSFER - OUT REPORT:    Verbal report given to [facility] (1-RH) RN(name) on Maile Vasquez  being transferred to CPRU(unit) for routine progression of care       Report consisted of patients Situation, Background, Assessment and   Recommendations(SBAR). Information from the following report(s) Procedure Summary, MAR and Cardiac Rhythm A Paced was reviewed with the receiving nurse. Lines:   Peripheral IV 12/26/19 Left Arm (Active)       Peripheral IV 12/26/19 Right Arm (Active)        Opportunity for questions and clarification was provided.       Patient transported with:   O2 @ 4 liters  Registered Nurse     [facility] [address] PATTERSON  Diagnostic only  Right radial access- TR band @ 1048 w/ 12 ml air in band - site C/D/I  Versed 2 mg IV  Fent 50 mcg IV

## 2019-12-27 ENCOUNTER — PATIENT OUTREACH (OUTPATIENT)
Dept: CASE MANAGEMENT | Age: 54
End: 2019-12-27

## 2019-12-27 NOTE — PROGRESS NOTES
Ambulatory Care Management  Follow up Outreach Note   Outreach type: Phone call: Yes     Date/Time of Outreach: 12/27/19, 1200     Reason for follow-up:   Continued outreach   Disease specific complaints/issues: \"They don't know what's causing my SOB\"     Patient progress towards goals set from last contact:   Stable   Has patient attended any PCP or specialist follow-up appointments since last contact? What was outcome of appointment? When is next follow-up scheduled? 12/26 [facility] Heart Cath, Dr Warnell Primrose    12/30 Pain Medicine, Juani Jones MD  12/31 PCP, Labs  1/3 Cardiology, Tiera العراقي MD  1/8 PCP, Dr Hawa Price     Review medications. Any medication changes since last outreach? Does patient have any questions or issues related to their medications? Reports med compliance, aware to hold metformin until tomorrow   Home health active? If yes  any issue? Progress? No   Referrals needed?  (SW, Diabetes education, HH, etc. ) No   Other issues/Miscellaneous? (Transportation, access to meals, ability to perform ADLs, adequate caregiver support, etc.)     None       Next Outreach Scheduled: Next week     Next Steps/Goals:   F/U   Ambulatory Care Manager/ LPN Care Coordinator: Shera Dubin RN         This note will not be viewable in [address]

## 2020-01-06 ENCOUNTER — HOSPITAL ENCOUNTER (OUTPATIENT)
Dept: LAB | Age: 55
Discharge: HOME | End: 2020-01-06
Payer: MEDICARE

## 2020-01-06 DIAGNOSIS — I50.22 CHRONIC SYSTOLIC HEART FAILURE (HCC): Chronic | ICD-10-CM

## 2020-01-06 LAB
ALBUMIN SERPL-MCNC: 3.8 G/DL (ref 3.5–5)
ALBUMIN/GLOB SERPL: 1 {RATIO} (ref 1.2–3.5)
ALP SERPL-CCNC: 81 U/L (ref 50–136)
ALT SERPL-CCNC: 28 U/L (ref 12–65)
ANION GAP SERPL CALC-SCNC: 10 MMOL/L (ref 7–16)
AST SERPL-CCNC: 27 U/L (ref 15–37)
BILIRUB SERPL-MCNC: 0.3 MG/DL (ref 0.2–1.1)
BUN SERPL-MCNC: 32 MG/DL (ref 6–23)
CALCIUM SERPL-MCNC: 9.5 MG/DL (ref 8.3–10.4)
CHLORIDE SERPL-SCNC: 109 MMOL/L (ref 98–107)
CO2 SERPL-SCNC: 26 MMOL/L (ref 21–32)
CREAT SERPL-MCNC: 1.7 MG/DL (ref 0.6–1)
GLOBULIN SER CALC-MCNC: 3.9 G/DL (ref 2.3–3.5)
GLUCOSE SERPL-MCNC: 130 MG/DL (ref 65–100)
MAGNESIUM SERPL-MCNC: 2.3 MG/DL (ref 1.8–2.4)
POTASSIUM SERPL-SCNC: 3.8 MMOL/L (ref 3.5–5.1)
PROT SERPL-MCNC: 7.7 G/DL (ref 6.3–8.2)
SODIUM SERPL-SCNC: 145 MMOL/L (ref 136–145)
TSH SERPL DL<=0.005 MIU/L-ACNC: 1.44 UIU/ML (ref 0.36–3.74)

## 2020-01-06 PROCEDURE — 80053 COMPREHEN METABOLIC PANEL: CPT

## 2020-01-06 PROCEDURE — 82306 VITAMIN D 25 HYDROXY: CPT

## 2020-01-06 PROCEDURE — 36415 COLL VENOUS BLD VENIPUNCTURE: CPT

## 2020-01-06 PROCEDURE — 83735 ASSAY OF MAGNESIUM: CPT

## 2020-01-06 PROCEDURE — 84443 ASSAY THYROID STIM HORMONE: CPT

## 2020-01-06 NOTE — PROGRESS NOTES
Please call her, still waiting on Vit D result. But LFTs and TSH are normal.  Good news. Continue with plan as we discussed today. Let's get back to Dr. Earline Ernandez. Plan on echo and seeing me after. Have her call for issues.   Thanks

## 2020-01-07 LAB — 25(OH)D3+25(OH)D2 SERPL-MCNC: 23.5 NG/ML (ref 30–100)

## 2020-01-07 NOTE — PROGRESS NOTES
Please call her, have her take the weekly prescription Vit D 50k for 8 weeks, then 200 per day OTC. This should help her feel better as well.   Thanks

## 2020-01-09 ENCOUNTER — PATIENT OUTREACH (OUTPATIENT)
Dept: CASE MANAGEMENT | Age: 55
End: 2020-01-09

## 2020-01-09 NOTE — PROGRESS NOTES
Ambulatory Care Management  Follow up Outreach Note   Outreach type: Phone call: Yes     Date/Time of Outreach: 1/9/20, 1501     Reason for follow-up:   Continued outreach   Disease specific complaints/issues: Doing okay - anxious for repeat Echo on 1/27     Patient progress towards goals set from last contact:   Stable   Has patient attended any PCP or specialist follow-up appointments since last contact? What was outcome of appointment? When is next follow-up scheduled? 12/30 Pain Management, Lorenza GREWAL - new RX for Neurontin  1/6 Cardiology, Dr Donna Bettencourt - new RX for Vit D  1/8 PCP, DR Earline Ernandez - new RX for Loprox topical gel   Review medications. Any medication changes since last outreach? Does patient have any questions or issues related to their medications? Reviewed new RX for Vitamin D, dose, schedule. States she is getting new RXs today. Home health active? If yes  any issue? Progress? No   Referrals needed?  (SW, Diabetes education, HH, etc. ) No   Other issues/Miscellaneous? (Transportation, access to meals, ability to perform ADLs, adequate caregiver support, etc.)     Outpt PT for neck pain       Next Outreach Scheduled: Next week     Next Steps/Goals:   F/U   Ambulatory Care Manager/ LPN Care Coordinator: Francisco Bradshaw RN           This note will not be viewable in [address]

## 2020-01-21 ENCOUNTER — PATIENT OUTREACH (OUTPATIENT)
Dept: CASE MANAGEMENT | Age: 55
End: 2020-01-21

## 2020-01-21 NOTE — PROGRESS NOTES
Ambulatory Care Management  Follow up Outreach Note   Outreach type: Phone call: Yes     Date/Time of Outreach: 1/21/20, 1010     Reason for follow-up:   Continued outreach   Disease specific complaints/issues: None     Patient progress towards goals set from last contact:   Stable   Has patient attended any PCP or specialist follow-up appointments since last contact? What was outcome of appointment? When is next follow-up scheduled? 1/27 Cardiology, Echo  2/6 Cardiology, Device check and Dr Yen Hartley  2/10 [facility] Mammo   Review medications. Any medication changes since last outreach? Does patient have any questions or issues related to their medications? Pt reports compliance, no questions. States she is not taking Neurontin as reported at last encounter. Attending outpt PT for neck pain. Home health active? If yes  any issue? Progress? No   Referrals needed?  (SW, Diabetes education, HH, etc. ) No   Other issues/Miscellaneous? (Transportation, access to meals, ability to perform ADLs, adequate caregiver support, etc.)     None       Next Outreach Scheduled: Next week     Next Steps/Goals:   F/U   Ambulatory Care Manager/ LPN Care Coordinator: Serenity Guo RN         This note will not be viewable in [address]

## 2020-02-03 ENCOUNTER — PATIENT OUTREACH (OUTPATIENT)
Dept: CASE MANAGEMENT | Age: 55
End: 2020-02-03

## 2020-02-03 NOTE — PROGRESS NOTES
Ambulatory Care Management  Follow up Outreach Note   Outreach type: Phone call: Yes     Date/Time of Outreach: 2/3/20, 1128     Reason for follow-up:   Continued outreached   Disease specific complaints/issues: Continued neck pain     Patient progress towards goals set from last contact:   Stable   Has patient attended any PCP or specialist follow-up appointments since last contact? What was outcome of appointment? When is next follow-up scheduled? 1/29 Cardiology, Echo    2/6 Cardiology, Device check and Dr Jake Mc  2/10 [facility], Mammo       Review medications. Any medication changes since last outreach? Does patient have any questions or issues related to their medications? Reports med compliance, no questions. Declines to use Neurontin, states \"I tried it last year and it didn't work\". Encouraged to speak w/ Dr Presley Arguelles at next pain management appmt for alternatives. Fasting blood sugars 120-135. Home health active? If yes  any issue? Progress? No   Referrals needed?  (SW, Diabetes education, HH, etc. ) No   Other issues/Miscellaneous? (Transportation, access to meals, ability to perform ADLs, adequate caregiver support, etc.)   Continues w/ outpt PT, 2X/ week now         Next Outreach Scheduled: Next week     Next Steps/Goals:   F/U   Ambulatory Care Manager/ LPN Care Coordinator: Selina Keith RN           This note will not be viewable in [address]

## 2020-02-10 ENCOUNTER — HOSPITAL ENCOUNTER (OUTPATIENT)
Dept: MAMMOGRAPHY | Age: 55
Discharge: HOME | End: 2020-02-10
Attending: INTERNAL MEDICINE
Payer: MEDICARE

## 2020-02-10 ENCOUNTER — PATIENT OUTREACH (OUTPATIENT)
Dept: CASE MANAGEMENT | Age: 55
End: 2020-02-10

## 2020-02-10 DIAGNOSIS — Z12.31 VISIT FOR SCREENING MAMMOGRAM: ICD-10-CM

## 2020-02-10 PROCEDURE — 77063 BREAST TOMOSYNTHESIS BI: CPT

## 2020-02-12 NOTE — PROGRESS NOTES
RNCM calls to pt, messages left, no return calls. Plan to f/u next week. This note will not be viewable in [address]

## 2020-02-14 ENCOUNTER — HOSPITAL ENCOUNTER (EMERGENCY)
Age: 55
Discharge: HOME | End: 2020-02-14
Attending: EMERGENCY MEDICINE
Payer: MEDICARE

## 2020-02-14 ENCOUNTER — APPOINTMENT (OUTPATIENT)
Dept: GENERAL RADIOLOGY | Age: 55
End: 2020-02-14
Attending: EMERGENCY MEDICINE
Payer: MEDICARE

## 2020-02-14 ENCOUNTER — PATIENT OUTREACH (OUTPATIENT)
Dept: CASE MANAGEMENT | Age: 55
End: 2020-02-14

## 2020-02-14 VITALS
TEMPERATURE: 98 F | DIASTOLIC BLOOD PRESSURE: 78 MMHG | RESPIRATION RATE: 18 BRPM | HEART RATE: 81 BPM | SYSTOLIC BLOOD PRESSURE: 128 MMHG | OXYGEN SATURATION: 100 %

## 2020-02-14 DIAGNOSIS — M25.512 PAIN OF BOTH SHOULDER JOINTS: Primary | ICD-10-CM

## 2020-02-14 DIAGNOSIS — M25.511 PAIN OF BOTH SHOULDER JOINTS: Primary | ICD-10-CM

## 2020-02-14 LAB
ALBUMIN SERPL-MCNC: 3.5 G/DL (ref 3.5–5)
ALBUMIN/GLOB SERPL: 0.9 {RATIO} (ref 1.2–3.5)
ALP SERPL-CCNC: 72 U/L (ref 50–136)
ALT SERPL-CCNC: 27 U/L (ref 12–65)
ANION GAP SERPL CALC-SCNC: 7 MMOL/L (ref 7–16)
AST SERPL-CCNC: 28 U/L (ref 15–37)
ATRIAL RATE: 82 BPM
BASOPHILS # BLD: 0 K/UL (ref 0–0.2)
BASOPHILS NFR BLD: 1 % (ref 0–2)
BILIRUB SERPL-MCNC: 0.3 MG/DL (ref 0.2–1.1)
BUN SERPL-MCNC: 25 MG/DL (ref 6–23)
CALCIUM SERPL-MCNC: 9 MG/DL (ref 8.3–10.4)
CALCULATED P AXIS, ECG09: 83 DEGREES
CALCULATED R AXIS, ECG10: 75 DEGREES
CALCULATED T AXIS, ECG11: -24 DEGREES
CHLORIDE SERPL-SCNC: 108 MMOL/L (ref 98–107)
CO2 SERPL-SCNC: 26 MMOL/L (ref 21–32)
CREAT SERPL-MCNC: 1.67 MG/DL (ref 0.6–1)
DIAGNOSIS, 93000: NORMAL
DIFFERENTIAL METHOD BLD: ABNORMAL
EOSINOPHIL # BLD: 0.4 K/UL (ref 0–0.8)
EOSINOPHIL NFR BLD: 5 % (ref 0.5–7.8)
ERYTHROCYTE [DISTWIDTH] IN BLOOD BY AUTOMATED COUNT: 13.3 % (ref 11.9–14.6)
GLOBULIN SER CALC-MCNC: 3.8 G/DL (ref 2.3–3.5)
GLUCOSE SERPL-MCNC: 198 MG/DL (ref 65–100)
HCT VFR BLD AUTO: 38.1 % (ref 35.8–46.3)
HGB BLD-MCNC: 11.9 G/DL (ref 11.7–15.4)
IMM GRANULOCYTES # BLD AUTO: 0 K/UL (ref 0–0.5)
IMM GRANULOCYTES NFR BLD AUTO: 0 % (ref 0–5)
LYMPHOCYTES # BLD: 2.2 K/UL (ref 0.5–4.6)
LYMPHOCYTES NFR BLD: 28 % (ref 13–44)
MCH RBC QN AUTO: 30.2 PG (ref 26.1–32.9)
MCHC RBC AUTO-ENTMCNC: 31.2 G/DL (ref 31.4–35)
MCV RBC AUTO: 96.7 FL (ref 79.6–97.8)
MONOCYTES # BLD: 0.6 K/UL (ref 0.1–1.3)
MONOCYTES NFR BLD: 8 % (ref 4–12)
NEUTS SEG # BLD: 4.6 K/UL (ref 1.7–8.2)
NEUTS SEG NFR BLD: 59 % (ref 43–78)
NRBC # BLD: 0 K/UL (ref 0–0.2)
P-R INTERVAL, ECG05: 268 MS
PLATELET # BLD AUTO: 158 K/UL (ref 150–450)
PMV BLD AUTO: 12 FL (ref 9.4–12.3)
POTASSIUM SERPL-SCNC: 3.5 MMOL/L (ref 3.5–5.1)
PROT SERPL-MCNC: 7.3 G/DL (ref 6.3–8.2)
Q-T INTERVAL, ECG07: 412 MS
QRS DURATION, ECG06: 126 MS
QTC CALCULATION (BEZET), ECG08: 481 MS
RBC # BLD AUTO: 3.94 M/UL (ref 4.05–5.2)
SODIUM SERPL-SCNC: 141 MMOL/L (ref 136–145)
TROPONIN I SERPL-MCNC: 0.03 NG/ML (ref 0.02–0.05)
VENTRICULAR RATE, ECG03: 82 BPM
WBC # BLD AUTO: 7.8 K/UL (ref 4.3–11.1)

## 2020-02-14 PROCEDURE — 74011250636 HC RX REV CODE- 250/636: Performed by: EMERGENCY MEDICINE

## 2020-02-14 PROCEDURE — 71046 X-RAY EXAM CHEST 2 VIEWS: CPT

## 2020-02-14 PROCEDURE — 74011636637 HC RX REV CODE- 636/637: Performed by: EMERGENCY MEDICINE

## 2020-02-14 PROCEDURE — 99284 EMERGENCY DEPT VISIT MOD MDM: CPT

## 2020-02-14 PROCEDURE — 85025 COMPLETE CBC W/AUTO DIFF WBC: CPT

## 2020-02-14 PROCEDURE — 80053 COMPREHEN METABOLIC PANEL: CPT

## 2020-02-14 PROCEDURE — 96374 THER/PROPH/DIAG INJ IV PUSH: CPT

## 2020-02-14 PROCEDURE — 84484 ASSAY OF TROPONIN QUANT: CPT

## 2020-02-14 PROCEDURE — 93005 ELECTROCARDIOGRAM TRACING: CPT | Performed by: EMERGENCY MEDICINE

## 2020-02-14 RX ORDER — PREDNISONE 10 MG/1
40 TABLET ORAL
Status: COMPLETED | OUTPATIENT
Start: 2020-02-14 | End: 2020-02-14

## 2020-02-14 RX ORDER — MORPHINE SULFATE 4 MG/ML
4 INJECTION INTRAVENOUS
Status: COMPLETED | OUTPATIENT
Start: 2020-02-14 | End: 2020-02-14

## 2020-02-14 RX ADMIN — MORPHINE SULFATE 4 MG: 4 INJECTION INTRAVENOUS at 18:05

## 2020-02-14 RX ADMIN — PREDNISONE 40 MG: 10 TABLET ORAL at 18:05

## 2020-02-14 NOTE — ED PROVIDER NOTES
Patient states pain in left chest/shoulder and back. States pain has been present for the past 2 months however states has worsened. States associated with some shortness of breath as well. States pain is worsened with certain positions and movements as well. States seen by her physical therapist yesterday who encouraged her to go to the emergency department to be assessed. States pain 8/10. Patient seen by me briefly in triage to begin workup until further evaluation and management by another provider once a room becomes available. Here with soreness across her shoulders somewhat more right than left. Is been ongoing now for multiple months. She has been at one point referred to pain management. She was seen with similar pain when she saw her primary physician Dr. Jimmy Rodríguez in early January. She had a cardiac cath done on 26 December that was normal.  Known to have a degree of renal insufficiency. Type II diabetic by her description. The history is provided by the patient. Shoulder Pain    There was no injury mechanism. Both shoulders are affected. The pain has been constant since onset. There is no history of shoulder injury. She has no other injuries. There is no history of shoulder surgery. Past Medical History:   Diagnosis Date    Abdominal wall hernia 10/2/2018    Acute hypoxemic respiratory failure ([address] 4/3/2019    Allergic rhinitis     followed by allergist; allergic to grass- has rescue inhaler PRN    ARDS (adult respiratory distress syndrome) ([facility] 4/3/2019    Arthritis     Automatic implantable cardioverter-defibrillator in situ 3/30/2016    CAD in native artery 3/30/2016    Chronic kidney disease     Chronic systolic heart failure ([facility] 07/18/2013    ECHO 2017- EF 39%; managed with medications; [place] [facility]    CKD (chronic kidney disease)     [address] follows    Diabetes ([facility]     type 2 (on insulin);  FBS AM range- , hypo s/s <70, hgba1c- 5.8 (2018)    Diabetes mellitus ([facility] 2010    Dyslipidemia 2013    Eczema     Fibromyalgia     GERD (gastroesophageal reflux disease)     hx of- no meds currently    Headache     Heart failure (HCC)     chronic systolic with EF 73%; non-ischemic cardiomyopathy    HTN (hypertension) 2010    Hypercholesterolemia     Incarcerated incisional hernia 3/26/2019    Morbid obesity ([facility]     Neuropathy     bilateral feet and tips of fingers    NICM (nonischemic cardiomyopathy) ([facility] 2/15/2013    Obesity     bmi- 39    Obstructive sleep apnea (adult) (pediatric) 2014    uses cpap qhs    Pseudomonas urinary tract infection 2019    Sciatic pain 2016    SVT [facility]raventricular tachycardia) ([facility])     ablation for svt    Tobacco use disorder 2010       Past Surgical History:   Procedure Laterality Date    HX  SECTION      x1    HX HERNIA REPAIR  2019    mild incarceration, no bowel removal    HX HYSTERECTOMY      YEIMI-Left ovary intact per pt    HX IMPLANTABLE CARDIOVERTER DEFIBRILLATOR  2013    Biotronik dual chamber ICD by Dr. Ro Burgos  [address]  2013    Biotronik dual chamber ICD by Dr. Patt Garcia Right     HX SVT ABLATION  \"years ago\"    HX TONSILLECTOMY      HI LEFT HEART CATH,PERCUTANEOUS  2013    no intervention         Family History:   Problem Relation Age of Onset    Heart Attack Father 48        mi   [address] [facility] [address] Stroke Father     Hypertension Father     Heart Disease Father     Diabetes Other     Stroke Mother     Diabetes Brother     Heart Disease Brother     Hypertension Brother     Breast Cancer Sister 37       Social History     Socioeconomic History    Marital status: SINGLE     Spouse name: Not on file    Number of children: Not on file    Years of education: Not on file    Highest education level: Not on file   Occupational History  Not on file   Social Needs    Financial resource strain: Not on file    Food insecurity:     Worry: Not on file     Inability: Not on file    Transportation needs:     Medical: Not on file     Non-medical: Not on file   Tobacco Use    Smoking status: Former Smoker     Packs/day: 0.25     Years: 27.00     Pack years: 6.75    Smokeless tobacco: Never Used    Tobacco comment: \"every now and then\"   Substance and Sexual Activity    Alcohol use: Yes     Alcohol/week: 1.0 standard drinks     Types: 1 Glasses of wine per week     Comment: occas    Drug use: Yes     Types: Marijuana     Comment: every day    Sexual activity: Never   Lifestyle    Physical activity:     Days per week: Not on file     Minutes per session: Not on file    Stress: Not on file   Relationships    Social connections:     Talks on phone: Not on file     Gets together: Not on file     Attends Spiritism service: Not on file     Active member of club or organization: Not on file     Attends meetings of clubs or organizations: Not on file     Relationship status: Not on file    Intimate partner violence:     Fear of current or ex partner: Not on file     Emotionally abused: Not on file     Physically abused: Not on file     Forced sexual activity: Not on file   Other Topics Concern     Service Not Asked    Blood Transfusions Not Asked    Caffeine Concern Not Asked    Occupational Exposure Not Asked   [place] New Hazards Not Asked    Sleep Concern Not Asked    Stress Concern Not Asked    Weight Concern Not Asked    Special Diet Not Asked    Back Care Not Asked    Exercise Not Asked    Bike Helmet Not Asked    Seat Belt Yes    Self-Exams Not Asked   Social History Narrative    Denies physical or sexual abuse         ALLERGIES: Grass pollen    Review of Systems   Constitutional: Negative for chills and fever. Respiratory: Negative for shortness of breath and wheezing. Cardiovascular: Negative. Negative for chest pain. Psychiatric/Behavioral: Negative for confusion and decreased concentration. All other systems reviewed and are negative. There were no vitals filed for this visit. Physical Exam  Vitals signs and nursing note reviewed. Constitutional:       General: She is not in acute distress. Appearance: She is well-developed. She is not ill-appearing or toxic-appearing. Comments: Relaxed/ comfortable   HENT:      Head: Atraumatic. Nose: Nose normal. No congestion. Eyes:      General: No scleral icterus. Neck:      Musculoskeletal: Neck supple. Cardiovascular:      Rate and Rhythm: Normal rate. Pulmonary:      Effort: Pulmonary effort is normal. No respiratory distress. Abdominal:      General: Abdomen is flat. Musculoskeletal: Normal range of motion. General: Tenderness present. Skin:     General: Skin is warm and dry. Neurological:      General: No focal deficit present. Mental Status: She is alert. Psychiatric:         Thought Content: Thought content normal.          MDM  Number of Diagnoses or Management Options  Pain of both shoulder joints:   Diagnosis management comments: Sore across shoulders and very tender to palpation. Not candidate for NSAIDs po.  In pain management       Amount and/or Complexity of Data Reviewed  Decide to obtain previous medical records or to obtain history from someone other than the patient: yes    Risk of Complications, Morbidity, and/or Mortality  Presenting problems: moderate  Diagnostic procedures: minimal  Management options: moderate    Patient Progress  Patient progress: improved         Procedures

## 2020-02-14 NOTE — ED TRIAGE NOTES
Patient here via EMS for shortness of breath, bilateral shoulder pain and pain between her shoulder blades. States that she has a implanted pacemaker and defibrillator. States that movement makes it worse. Patient was told by physical therapist to come be evaluated at the ER, .

## 2020-02-14 NOTE — PROGRESS NOTES
RNCM received call from pt, she reports walking to [facility] near [facility], wishes this RN to call an ambulance to take her to [address] [place] [address]. She has severe neck pain, \"It might be pinched nerve\". Also pt is markedly SOB while talking. Called 911, updated on pt situation, ambulance being dispatched. Pt's telephone # given to , she will call pt while ambulance en route. Plan to f/u Monday. This note will not be viewable in [address]

## 2020-02-15 NOTE — ED NOTES
I have reviewed discharge instructions with the patient. The patient verbalized understanding. Patient left ED via Discharge Method: ambulatory to Home with (insert name of family/friend, self, transport family). Opportunity for questions and clarification provided. Patient given 1 scripts. To continue your aftercare when you leave the hospital, you may receive an automated call from our care team to check in on how you are doing. This is a free service and part of our promise to provide the best care and service to meet your aftercare needs.  If you have questions, or wish to unsubscribe from this service please call [phone]. Thank you for Choosing our [facility] Emergency Department.

## 2020-02-15 NOTE — DISCHARGE INSTRUCTIONS
Patient Education     Trial on topical patches to each shoulder to see if this assist with your pain  Recheck with worsening with Dr. Marlen Tinsley and/or pain management  Musculoskeletal Pain: Care Instructions  Your Care Instructions  Different problems with the bones, muscles, nerves, ligaments, and tendons in the body can cause pain. One or more areas of your body may ache or burn. Or they may feel tired, stiff, or sore. The medical term for this type of pain is musculoskeletal pain. It can have many different causes. Sometimes the pain is caused by an injury such as a strain or sprain. Or you might have pain from using one part of your body in the same way over and over again. This is called overuse. In some cases, the cause of the pain is another health problem such as arthritis or fibromyalgia. The doctor will examine you and ask you questions about your health to help find the cause of your pain. Blood tests or imaging tests like an X-ray may also be helpful. But sometimes doctors can't find a cause of the pain. Treatment depends on your symptoms and the cause of the pain, if known. The doctor has checked you carefully, but problems can develop later. If you notice any problems or new symptoms, get medical treatment right away. Follow-up care is a key part of your treatment and safety. Be sure to make and go to all appointments, and call your doctor if you are having problems. It's also a good idea to know your test results and keep a list of the medicines you take. How can you care for yourself at home? Rest until you feel better. Do not do anything that makes the pain worse. Return to exercise gradually if you feel better and your doctor says it's okay. Be safe with medicines. Read and follow all instructions on the label. If the doctor gave you a prescription medicine for pain, take it as prescribed.   If you are not taking a prescription pain medicine, ask your doctor if you can take an over-the-counter medicine. Put ice or a cold pack on the area for 10 to 20 minutes at a time to ease pain. Put a thin cloth between the ice and your skin. When should you call for help? Call your doctor now or seek immediate medical care if:  You have new pain, or your pain gets worse. You have new symptoms such as a fever, a rash, or chills. Watch closely for changes in your health, and be sure to contact your doctor if:  You do not get better as expected. Where can you learn more? Go to [email or web address]  Enter Q624 in the search box to learn more about \"Musculoskeletal Pain: Care Instructions. \"   © 5037-5302 [employer]wise, [employer]orporated. Care instructions adapted under license by [employer] (which disclaims liability or warranty for this information). This care instruction is for use with your licensed healthcare professional. If you have questions about a medical condition or this instruction, always ask your healthcare professional. [place] [address] any warranty or liability for your use of this information.   Content Version: 65.5.174860; Current as of: November 20, 2015

## 2020-02-17 ENCOUNTER — PATIENT OUTREACH (OUTPATIENT)
Dept: CASE MANAGEMENT | Age: 55
End: 2020-02-17

## 2020-02-17 NOTE — PROGRESS NOTES
Ambulatory Care Management  Follow up Outreach Note   Outreach type: Phone call: Yes     Date/Time of Outreach: 2/17/20, [phone]     Reason for follow-up:   Continued outreach, ED f/u   Disease specific complaints/issues: Feeling a bit better today     Patient progress towards goals set from last contact:   Stable   Has patient attended any PCP or specialist follow-up appointments since last contact? What was outcome of appointment? When is next follow-up scheduled? 2/14 [facility] ED - SOB, neck pain    2/21 PCP, [facility] NP           Review medications. Any medication changes since last outreach? Does patient have any questions or issues related to their medications? Pt reports she will  new Lidocaine patches today. Reports compliance w/ other meds. Home health active? If yes  any issue? Progress? No   Referrals needed?  (SW, Diabetes education, HH, etc. ) No   Other issues/Miscellaneous? (Transportation, access to meals, ability to perform ADLs, adequate caregiver support, etc.)   None         Next Outreach Scheduled: Next week     Next Steps/Goals:   F/U   Ambulatory Care Manager/ LPN Care Coordinator: Abigail Schuster RN     Call to [facility] to schedule ED f/u visit w/ Dr Gilford Ort. Message left. Made appmt for pt, called her to update. This note will not be viewable in [address]

## 2020-02-24 ENCOUNTER — PATIENT OUTREACH (OUTPATIENT)
Dept: CASE MANAGEMENT | Age: 55
End: 2020-02-24

## 2020-02-24 NOTE — PROGRESS NOTES
Ambulatory Care Management  Follow up Outreach Note   Outreach type: Phone call: Yes     Date/Time of Outreach: 2/24/20, 4343     Reason for follow-up:   Continued outreach   Disease specific complaints/issues: Doing okay     Patient progress towards goals set from last contact:   Stable   Has patient attended any PCP or specialist follow-up appointments since last contact? What was outcome of appointment? When is next follow-up scheduled? 2/21 PCP, Jete Knox NP - Xrays ordered    2/25 SFD Xrays  2/25 Pulmonary, PFTs    3/4 Pain Management, Lorenza GREWAL   Review medications. Any medication changes since last outreach? Does patient have any questions or issues related to their medications? Reports med compliance, states blood sugars good, denies any lows. Reports feeling \"better\" since [employer] started. Home health active? If yes  any issue? Progress? No   Referrals needed?  (SW, Diabetes education, HH, etc. ) No   Other issues/Miscellaneous? (Transportation, access to meals, ability to perform ADLs, adequate caregiver support, etc.)   Pt reports PT postponed until pain managed better. Discussed Dr Donna Bettencourt wanting labs checked 1 month from last appmt. She plans to have labs checked at next Nephrology appmt - they will forward to Dr Donna Bettencourt. Next Outreach Scheduled: Next week     Next Steps/Goals:   F/U   Ambulatory Care Manager/ LPN Care Coordinator: Francisco Bradshaw RN         This note will not be viewable in [address]

## 2020-02-25 ENCOUNTER — HOSPITAL ENCOUNTER (OUTPATIENT)
Dept: GENERAL RADIOLOGY | Age: 55
Discharge: HOME | End: 2020-02-25
Payer: MEDICARE

## 2020-02-25 DIAGNOSIS — I50.22 CHRONIC SYSTOLIC HEART FAILURE (HCC): Chronic | ICD-10-CM

## 2020-02-25 PROCEDURE — 71046 X-RAY EXAM CHEST 2 VIEWS: CPT

## 2020-02-25 PROCEDURE — 73030 X-RAY EXAM OF SHOULDER: CPT

## 2020-02-25 PROCEDURE — 72040 X-RAY EXAM NECK SPINE 2-3 VW: CPT

## 2020-02-27 ENCOUNTER — PATIENT OUTREACH (OUTPATIENT)
Dept: CASE MANAGEMENT | Age: 55
End: 2020-02-27

## 2020-02-27 NOTE — PROGRESS NOTES
Call from pt, she reports notified by [facility] that a PA is needed for [facility]. Requests RN call Cardiology about it. Confirms she has current supply of Entresto samples. Reports pain is better today. Plan to f/u next week. Call to [facility] Cardiology, message left re PA for [facility]. This note will not be viewable in [address]

## 2020-03-05 ENCOUNTER — PATIENT OUTREACH (OUTPATIENT)
Dept: CASE MANAGEMENT | Age: 55
End: 2020-03-05

## 2020-03-05 NOTE — PROGRESS NOTES
Ambulatory Care Management  Follow up Outreach Note   Outreach type: Phone call: Yes     Date/Time of Outreach: 3/5/20, 1001     Reason for follow-up:   Continued outreach   Disease specific complaints/issues: C/O pain     Patient progress towards goals set from last contact:   Stable   Has patient attended any PCP or specialist follow-up appointments since last contact? What was outcome of appointment? When is next follow-up scheduled? 3/4 Pain Management - CT scan to be ordered, refusing to take new RX Lyrica until okay from Dr Kemi Carmona   Review medications. Any medication changes since last outreach? Does patient have any questions or issues related to their medications? Confirms compliance w/ meds, new RX for Lyrica. Home health active? If yes  any issue? Progress? No   Referrals needed?  (SW, Diabetes education, HH, etc. ) No   Other issues/Miscellaneous? (Transportation, access to meals, ability to perform ADLs, adequate caregiver support, etc.)   Call to [facility] Cardiology to check on PA for [facility], spoke w/ Meg. He is submitting today. Also pt requests okay from Dr Kemi Carmona to start Lyrica, RX'd by Pain Management. Discussed possible Cardiac Rehab referral, prefers to discuss w/ Dr Kemi Carmona at next appmt. Next Outreach Scheduled: Next week     Next Steps/Goals:   F/U   Ambulatory Care Manager/ LPN Care Coordinator: John Paul Baxter RN           This note will not be viewable in [address]

## 2020-03-09 ENCOUNTER — PATIENT OUTREACH (OUTPATIENT)
Dept: CASE MANAGEMENT | Age: 55
End: 2020-03-09

## 2020-03-09 NOTE — PROGRESS NOTES
RNCM call to pt. Updated that Dr Valerio Robins for pain and PA for Sarah Alvarado was approved by Garrett Good. Pt appreciative, reports she will  Lyrica RX later today. Having some pain today, but looking forward to CT scan w/ contrast next week helping diagnose her pain. Plan to f/u next week. This note will not be viewable in [address]

## 2020-03-12 ENCOUNTER — HOSPITAL ENCOUNTER (OUTPATIENT)
Dept: LAB | Age: 55
Discharge: HOME | End: 2020-03-12
Payer: MEDICARE

## 2020-03-12 DIAGNOSIS — I50.22 CHRONIC SYSTOLIC HEART FAILURE (HCC): Chronic | ICD-10-CM

## 2020-03-12 LAB
ANION GAP SERPL CALC-SCNC: 7 MMOL/L (ref 7–16)
ANION GAP SERPL CALC-SCNC: 8 MMOL/L (ref 7–16)
BNP SERPL-MCNC: 523 PG/ML (ref 5–125)
BUN SERPL-MCNC: 28 MG/DL (ref 6–23)
BUN SERPL-MCNC: 29 MG/DL (ref 6–23)
CALCIUM SERPL-MCNC: 9.1 MG/DL (ref 8.3–10.4)
CHLORIDE SERPL-SCNC: 108 MMOL/L (ref 98–107)
CHLORIDE SERPL-SCNC: 108 MMOL/L (ref 98–107)
CO2 SERPL-SCNC: 26 MMOL/L (ref 21–32)
CO2 SERPL-SCNC: 27 MMOL/L (ref 21–32)
CREAT SERPL-MCNC: 1.4 MG/DL (ref 0.6–1)
CREAT SERPL-MCNC: 1.4 MG/DL (ref 0.6–1)
GLUCOSE SERPL-MCNC: 71 MG/DL (ref 65–100)
GLUCOSE SERPL-MCNC: 73 MG/DL (ref 65–100)
MAGNESIUM SERPL-MCNC: 2.2 MG/DL (ref 1.8–2.4)
MAGNESIUM SERPL-MCNC: 2.2 MG/DL (ref 1.8–2.4)
PHOSPHATE SERPL-MCNC: 3.7 MG/DL (ref 2.4–4.5)
POTASSIUM SERPL-SCNC: 3.7 MMOL/L (ref 3.5–5.1)
POTASSIUM SERPL-SCNC: 3.7 MMOL/L (ref 3.5–5.1)
PTH-INTACT SERPL-MCNC: 253.6 PG/ML (ref 18.5–88)
SODIUM SERPL-SCNC: 142 MMOL/L (ref 136–145)
SODIUM SERPL-SCNC: 142 MMOL/L (ref 136–145)

## 2020-03-12 PROCEDURE — 80048 BASIC METABOLIC PNL TOTAL CA: CPT

## 2020-03-12 PROCEDURE — 83970 ASSAY OF PARATHORMONE: CPT

## 2020-03-12 PROCEDURE — 84100 ASSAY OF PHOSPHORUS: CPT

## 2020-03-12 PROCEDURE — 83735 ASSAY OF MAGNESIUM: CPT

## 2020-03-12 PROCEDURE — 82306 VITAMIN D 25 HYDROXY: CPT

## 2020-03-12 PROCEDURE — 83880 ASSAY OF NATRIURETIC PEPTIDE: CPT

## 2020-03-12 PROCEDURE — 36415 COLL VENOUS BLD VENIPUNCTURE: CPT

## 2020-03-13 LAB — 25(OH)D3+25(OH)D2 SERPL-MCNC: 34.3 NG/ML (ref 30–100)

## 2020-03-13 NOTE — PROGRESS NOTES
From: Shy Byers  To: Lissett Davila MD  Sent: 3/11/2020 6:24 PM CDT  Subject: Non-Urgent Medical Question    Hi again, I looked up liquid tirosint. It may be a good idea, do they have coupons?  Do you want to wait until I see you in May?  Thank you   This has been fully explained to the patient, who indicates understanding. Patient have a appt. W/ surgeon March 19, 2019.  BRISA

## 2020-03-16 ENCOUNTER — PATIENT OUTREACH (OUTPATIENT)
Dept: CASE MANAGEMENT | Age: 55
End: 2020-03-16

## 2020-03-16 NOTE — PROGRESS NOTES
RNCM received call from pt. She reports CT scan today cancelled due to not being able to lay prone. States \"I couldn't breathe\". Not sure when it may be rescheduled. Also reports pain continues, \"I will probably go to the ED tomorrow for a shot\". Encouraged to call Pain Management MD addison alternatives. Encouraged to stay away from crowds, use good infection control. Plan to f/u next week. This note will not be viewable in [address]

## 2020-03-27 ENCOUNTER — PATIENT OUTREACH (OUTPATIENT)
Dept: CASE MANAGEMENT | Age: 55
End: 2020-03-27

## 2020-03-27 NOTE — PROGRESS NOTES
Ambulatory Care Management  Follow up Outreach Note   Outreach type: Phone call: Yes     Date/Time of Outreach: 3/27/20, 3007     Reason for follow-up:   Continued outreach   Disease specific complaints/issues: Continued pain, but better     Patient progress towards goals set from last contact:   Stable   Has patient attended any PCP or specialist follow-up appointments since last contact? What was outcome of appointment? When is next follow-up scheduled? Nephrology this week - \"my kidney function is better\"    4/1 Pain Management - encouraged to call before she goes to that [facility] in case they are doing virtual/ phone visits   Review medications. Any medication changes since last outreach? Does patient have any questions or issues related to their medications? Reports med compliance, reports Dr Fredis Mims told her to stay on Entresto 49/51 until next visit   Home health active? If yes  any issue? Progress? No   Referrals needed?  (SW, Diabetes education, HH, etc. ) No   Other issues/Miscellaneous? (Transportation, access to meals, ability to perform ADLs, adequate caregiver support, etc.)   Reviewed social distancing, hand hygiene. Pt continues to work at [employer].          Next Outreach Scheduled: 1-2 weeks     Next Steps/Goals:   F/U   Ambulatory Care Manager/ LPN Care Coordinator: Waqas Mayen RN

## 2020-04-08 PROBLEM — J98.4 RESTRICTIVE LUNG DISEASE: Status: ACTIVE | Noted: 2020-04-08

## 2020-04-09 ENCOUNTER — PATIENT OUTREACH (OUTPATIENT)
Dept: CASE MANAGEMENT | Age: 55
End: 2020-04-09

## 2020-04-15 ENCOUNTER — PATIENT OUTREACH (OUTPATIENT)
Dept: CASE MANAGEMENT | Age: 55
End: 2020-04-15

## 2020-04-17 ENCOUNTER — HOSPITAL ENCOUNTER (INPATIENT)
Age: 55
LOS: 6 days | Discharge: HOME | DRG: 189 | End: 2020-04-23
Attending: INTERNAL MEDICINE | Admitting: INTERNAL MEDICINE
Payer: MEDICARE

## 2020-04-17 ENCOUNTER — PATIENT OUTREACH (OUTPATIENT)
Dept: CASE MANAGEMENT | Age: 55
End: 2020-04-17

## 2020-04-17 ENCOUNTER — APPOINTMENT (OUTPATIENT)
Dept: CT IMAGING | Age: 55
DRG: 189 | End: 2020-04-17
Attending: EMERGENCY MEDICINE
Payer: MEDICARE

## 2020-04-17 ENCOUNTER — HOSPITAL ENCOUNTER (EMERGENCY)
Age: 55
Discharge: ANOTHER HOSPITAL | DRG: 189 | End: 2020-04-17
Attending: EMERGENCY MEDICINE
Payer: MEDICARE

## 2020-04-17 ENCOUNTER — APPOINTMENT (OUTPATIENT)
Dept: GENERAL RADIOLOGY | Age: 55
DRG: 189 | End: 2020-04-17
Attending: EMERGENCY MEDICINE
Payer: MEDICARE

## 2020-04-17 VITALS
WEIGHT: 215 LBS | HEIGHT: 59 IN | HEART RATE: 68 BPM | BODY MASS INDEX: 43.34 KG/M2 | SYSTOLIC BLOOD PRESSURE: 155 MMHG | TEMPERATURE: 98.2 F | OXYGEN SATURATION: 98 % | DIASTOLIC BLOOD PRESSURE: 78 MMHG | RESPIRATION RATE: 22 BRPM

## 2020-04-17 DIAGNOSIS — R06.02 SHORTNESS OF BREATH: Primary | ICD-10-CM

## 2020-04-17 DIAGNOSIS — R05.9 COUGH: ICD-10-CM

## 2020-04-17 PROBLEM — Z20.822 SUSPECTED COVID-19 VIRUS INFECTION: Status: ACTIVE | Noted: 2020-04-17

## 2020-04-17 PROBLEM — J96.01 ACUTE RESPIRATORY FAILURE WITH HYPOXIA (HCC): Status: ACTIVE | Noted: 2020-04-17

## 2020-04-17 LAB
ALBUMIN SERPL-MCNC: 3.6 G/DL (ref 3.5–5)
ALBUMIN/GLOB SERPL: 0.9 {RATIO} (ref 1.2–3.5)
ALP SERPL-CCNC: 80 U/L (ref 50–130)
ALT SERPL-CCNC: 73 U/L (ref 12–65)
ANION GAP SERPL CALC-SCNC: 7 MMOL/L (ref 7–16)
AST SERPL-CCNC: 72 U/L (ref 15–37)
BASOPHILS # BLD: 0 K/UL (ref 0–0.2)
BASOPHILS NFR BLD: 0 % (ref 0–2)
BILIRUB SERPL-MCNC: 0.6 MG/DL (ref 0.2–1.1)
BNP SERPL-MCNC: 2939 PG/ML (ref 5–125)
BUN SERPL-MCNC: 17 MG/DL (ref 6–23)
CALCIUM SERPL-MCNC: 9.1 MG/DL (ref 8.3–10.4)
CHLORIDE SERPL-SCNC: 114 MMOL/L (ref 98–107)
CO2 SERPL-SCNC: 23 MMOL/L (ref 21–32)
CREAT SERPL-MCNC: 1.09 MG/DL (ref 0.6–1)
D DIMER PPP FEU-MCNC: 1.13 UG/ML(FEU)
DIFFERENTIAL METHOD BLD: ABNORMAL
EOSINOPHIL # BLD: 0.4 K/UL (ref 0–0.8)
EOSINOPHIL NFR BLD: 4 % (ref 0.5–7.8)
ERYTHROCYTE [DISTWIDTH] IN BLOOD BY AUTOMATED COUNT: 14.8 % (ref 11.9–14.6)
GLOBULIN SER CALC-MCNC: 4 G/DL (ref 2.3–3.5)
GLUCOSE BLD STRIP.AUTO-MCNC: 112 MG/DL (ref 65–100)
GLUCOSE BLD STRIP.AUTO-MCNC: 128 MG/DL (ref 65–100)
GLUCOSE SERPL-MCNC: 96 MG/DL (ref 65–100)
HCT VFR BLD AUTO: 39 % (ref 35.8–46.3)
HGB BLD-MCNC: 12.5 G/DL (ref 11.7–15.4)
IMM GRANULOCYTES # BLD AUTO: 0.1 K/UL (ref 0–0.5)
IMM GRANULOCYTES NFR BLD AUTO: 1 % (ref 0–5)
LACTATE SERPL-SCNC: 1 MMOL/L (ref 0.4–2)
LYMPHOCYTES # BLD: 2.3 K/UL (ref 0.5–4.6)
LYMPHOCYTES NFR BLD: 24 % (ref 13–44)
MAGNESIUM SERPL-MCNC: 2.2 MG/DL (ref 1.8–2.4)
MCH RBC QN AUTO: 30.4 PG (ref 26.1–32.9)
MCHC RBC AUTO-ENTMCNC: 32.1 G/DL (ref 31.4–35)
MCV RBC AUTO: 94.9 FL (ref 79.6–97.8)
MONOCYTES # BLD: 0.6 K/UL (ref 0.1–1.3)
MONOCYTES NFR BLD: 6 % (ref 4–12)
NEUTS SEG # BLD: 6.2 K/UL (ref 1.7–8.2)
NEUTS SEG NFR BLD: 65 % (ref 43–78)
NRBC # BLD: 0 K/UL (ref 0–0.2)
PHOSPHATE SERPL-MCNC: 2.5 MG/DL (ref 2.5–4.5)
PLATELET # BLD AUTO: 166 K/UL (ref 150–450)
PMV BLD AUTO: 11.5 FL (ref 9.4–12.3)
POTASSIUM SERPL-SCNC: 3.9 MMOL/L (ref 3.5–5.1)
PROT SERPL-MCNC: 7.6 G/DL (ref 6.3–8.2)
RBC # BLD AUTO: 4.11 M/UL (ref 4.05–5.2)
SODIUM SERPL-SCNC: 144 MMOL/L (ref 136–145)
TROPONIN I SERPL-MCNC: 0.05 NG/ML (ref 0.02–0.05)
WBC # BLD AUTO: 9.5 K/UL (ref 4.3–11.1)

## 2020-04-17 PROCEDURE — 83880 ASSAY OF NATRIURETIC PEPTIDE: CPT

## 2020-04-17 PROCEDURE — 94760 N-INVAS EAR/PLS OXIMETRY 1: CPT

## 2020-04-17 PROCEDURE — 71260 CT THORAX DX C+: CPT

## 2020-04-17 PROCEDURE — 96365 THER/PROPH/DIAG IV INF INIT: CPT

## 2020-04-17 PROCEDURE — 82962 GLUCOSE BLOOD TEST: CPT

## 2020-04-17 PROCEDURE — 74011636320 HC RX REV CODE- 636/320: Performed by: EMERGENCY MEDICINE

## 2020-04-17 PROCEDURE — 65270000029 HC RM PRIVATE

## 2020-04-17 PROCEDURE — 74011250636 HC RX REV CODE- 250/636

## 2020-04-17 PROCEDURE — 74011636637 HC RX REV CODE- 636/637: Performed by: INTERNAL MEDICINE

## 2020-04-17 PROCEDURE — 84484 ASSAY OF TROPONIN QUANT: CPT

## 2020-04-17 PROCEDURE — 80053 COMPREHEN METABOLIC PANEL: CPT

## 2020-04-17 PROCEDURE — 94640 AIRWAY INHALATION TREATMENT: CPT

## 2020-04-17 PROCEDURE — 84100 ASSAY OF PHOSPHORUS: CPT

## 2020-04-17 PROCEDURE — 71045 X-RAY EXAM CHEST 1 VIEW: CPT

## 2020-04-17 PROCEDURE — 99285 EMERGENCY DEPT VISIT HI MDM: CPT

## 2020-04-17 PROCEDURE — 77010033678 HC OXYGEN DAILY

## 2020-04-17 PROCEDURE — 36415 COLL VENOUS BLD VENIPUNCTURE: CPT

## 2020-04-17 PROCEDURE — 74011250636 HC RX REV CODE- 250/636: Performed by: INTERNAL MEDICINE

## 2020-04-17 PROCEDURE — 83735 ASSAY OF MAGNESIUM: CPT

## 2020-04-17 PROCEDURE — 74011250637 HC RX REV CODE- 250/637: Performed by: EMERGENCY MEDICINE

## 2020-04-17 PROCEDURE — 93005 ELECTROCARDIOGRAM TRACING: CPT | Performed by: INTERNAL MEDICINE

## 2020-04-17 PROCEDURE — 74011000258 HC RX REV CODE- 258: Performed by: EMERGENCY MEDICINE

## 2020-04-17 PROCEDURE — 74011000250 HC RX REV CODE- 250: Performed by: EMERGENCY MEDICINE

## 2020-04-17 PROCEDURE — 74011250636 HC RX REV CODE- 250/636: Performed by: EMERGENCY MEDICINE

## 2020-04-17 PROCEDURE — 83605 ASSAY OF LACTIC ACID: CPT

## 2020-04-17 PROCEDURE — 87040 BLOOD CULTURE FOR BACTERIA: CPT

## 2020-04-17 PROCEDURE — 87635 SARS-COV-2 COVID-19 AMP PRB: CPT

## 2020-04-17 PROCEDURE — 85379 FIBRIN DEGRADATION QUANT: CPT

## 2020-04-17 PROCEDURE — 74011250637 HC RX REV CODE- 250/637: Performed by: INTERNAL MEDICINE

## 2020-04-17 PROCEDURE — 94664 DEMO&/EVAL PT USE INHALER: CPT

## 2020-04-17 PROCEDURE — 85025 COMPLETE CBC W/AUTO DIFF WBC: CPT

## 2020-04-17 RX ORDER — UREA 10 %
220 LOTION (ML) TOPICAL DAILY
Status: DISCONTINUED | OUTPATIENT
Start: 2020-04-17 | End: 2020-04-20

## 2020-04-17 RX ORDER — DOXYCYCLINE 100 MG/1
100 CAPSULE ORAL 2 TIMES DAILY
Status: COMPLETED | OUTPATIENT
Start: 2020-04-17 | End: 2020-04-22

## 2020-04-17 RX ORDER — ENOXAPARIN SODIUM 100 MG/ML
40 INJECTION SUBCUTANEOUS EVERY 24 HOURS
Status: DISCONTINUED | OUTPATIENT
Start: 2020-04-17 | End: 2020-04-20

## 2020-04-17 RX ORDER — AMIODARONE HYDROCHLORIDE 200 MG/1
200 TABLET ORAL 2 TIMES DAILY
Status: DISCONTINUED | OUTPATIENT
Start: 2020-04-17 | End: 2020-04-23 | Stop reason: HOSPADM

## 2020-04-17 RX ORDER — IPRATROPIUM BROMIDE AND ALBUTEROL SULFATE 2.5; .5 MG/3ML; MG/3ML
3 SOLUTION RESPIRATORY (INHALATION)
Status: COMPLETED | OUTPATIENT
Start: 2020-04-17 | End: 2020-04-17

## 2020-04-17 RX ORDER — ONDANSETRON 2 MG/ML
4 INJECTION INTRAMUSCULAR; INTRAVENOUS
Status: DISCONTINUED | OUTPATIENT
Start: 2020-04-17 | End: 2020-04-17

## 2020-04-17 RX ORDER — LANOLIN ALCOHOL/MO/W.PET/CERES
400 CREAM (GRAM) TOPICAL 2 TIMES DAILY
Status: DISCONTINUED | OUTPATIENT
Start: 2020-04-17 | End: 2020-04-23 | Stop reason: HOSPADM

## 2020-04-17 RX ORDER — CARVEDILOL 6.25 MG/1
6.25 TABLET ORAL 2 TIMES DAILY WITH MEALS
Status: DISCONTINUED | OUTPATIENT
Start: 2020-04-17 | End: 2020-04-18

## 2020-04-17 RX ORDER — INSULIN LISPRO 100 [IU]/ML
INJECTION, SOLUTION INTRAVENOUS; SUBCUTANEOUS
Status: DISCONTINUED | OUTPATIENT
Start: 2020-04-17 | End: 2020-04-23 | Stop reason: HOSPADM

## 2020-04-17 RX ORDER — ACETAMINOPHEN 325 MG/1
650 TABLET ORAL
Status: DISCONTINUED | OUTPATIENT
Start: 2020-04-17 | End: 2020-04-17 | Stop reason: HOSPADM

## 2020-04-17 RX ORDER — ACETAMINOPHEN 650 MG/1
650 SUPPOSITORY RECTAL
Status: DISCONTINUED | OUTPATIENT
Start: 2020-04-17 | End: 2020-04-17 | Stop reason: HOSPADM

## 2020-04-17 RX ORDER — SODIUM CHLORIDE 0.9 % (FLUSH) 0.9 %
10 SYRINGE (ML) INJECTION
Status: COMPLETED | OUTPATIENT
Start: 2020-04-17 | End: 2020-04-17

## 2020-04-17 RX ORDER — PRAVASTATIN SODIUM 20 MG/1
80 TABLET ORAL
Status: DISCONTINUED | OUTPATIENT
Start: 2020-04-17 | End: 2020-04-23 | Stop reason: HOSPADM

## 2020-04-17 RX ORDER — ACETAMINOPHEN 325 MG/1
650 TABLET ORAL
Status: DISCONTINUED | OUTPATIENT
Start: 2020-04-17 | End: 2020-04-23 | Stop reason: HOSPADM

## 2020-04-17 RX ORDER — FUROSEMIDE 10 MG/ML
40 INJECTION INTRAMUSCULAR; INTRAVENOUS EVERY 12 HOURS
Status: DISCONTINUED | OUTPATIENT
Start: 2020-04-17 | End: 2020-04-18

## 2020-04-17 RX ORDER — ISOSORBIDE MONONITRATE 30 MG/1
30 TABLET, EXTENDED RELEASE ORAL
Status: DISCONTINUED | OUTPATIENT
Start: 2020-04-18 | End: 2020-04-23 | Stop reason: HOSPADM

## 2020-04-17 RX ORDER — ASCORBIC ACID 500 MG
1000 TABLET ORAL DAILY
Status: DISCONTINUED | OUTPATIENT
Start: 2020-04-17 | End: 2020-04-20

## 2020-04-17 RX ORDER — INSULIN GLARGINE 100 [IU]/ML
35 INJECTION, SOLUTION SUBCUTANEOUS
Status: DISCONTINUED | OUTPATIENT
Start: 2020-04-17 | End: 2020-04-23 | Stop reason: HOSPADM

## 2020-04-17 RX ORDER — FUROSEMIDE 10 MG/ML
INJECTION INTRAMUSCULAR; INTRAVENOUS
Status: COMPLETED
Start: 2020-04-17 | End: 2020-04-17

## 2020-04-17 RX ORDER — ASPIRIN 81 MG/1
81 TABLET ORAL DAILY
Status: DISCONTINUED | OUTPATIENT
Start: 2020-04-18 | End: 2020-04-23 | Stop reason: HOSPADM

## 2020-04-17 RX ORDER — ALBUTEROL SULFATE 90 UG/1
2 AEROSOL, METERED RESPIRATORY (INHALATION)
Status: DISCONTINUED | OUTPATIENT
Start: 2020-04-17 | End: 2020-04-20

## 2020-04-17 RX ORDER — TRAMADOL HYDROCHLORIDE 50 MG/1
50 TABLET ORAL
Status: DISCONTINUED | OUTPATIENT
Start: 2020-04-17 | End: 2020-04-18

## 2020-04-17 RX ADMIN — ACETAMINOPHEN 650 MG: 325 TABLET, FILM COATED ORAL at 21:03

## 2020-04-17 RX ADMIN — AMIODARONE HYDROCHLORIDE 200 MG: 200 TABLET ORAL at 16:43

## 2020-04-17 RX ADMIN — Medication 10 ML: at 10:44

## 2020-04-17 RX ADMIN — CARVEDILOL 6.25 MG: 6.25 TABLET, FILM COATED ORAL at 16:43

## 2020-04-17 RX ADMIN — ENOXAPARIN SODIUM 40 MG: 40 INJECTION SUBCUTANEOUS at 16:44

## 2020-04-17 RX ADMIN — OXYCODONE HYDROCHLORIDE AND ACETAMINOPHEN 1000 MG: 500 TABLET ORAL at 20:58

## 2020-04-17 RX ADMIN — INSULIN GLARGINE 35 UNITS: 100 INJECTION, SOLUTION SUBCUTANEOUS at 21:00

## 2020-04-17 RX ADMIN — SACUBITRIL AND VALSARTAN 1 TABLET: 49; 51 TABLET, FILM COATED ORAL at 16:44

## 2020-04-17 RX ADMIN — Medication 400 MG: at 16:44

## 2020-04-17 RX ADMIN — FUROSEMIDE 40 MG: 40 INJECTION, SOLUTION INTRAMUSCULAR; INTRAVENOUS at 18:18

## 2020-04-17 RX ADMIN — CEFTRIAXONE 1 G: 1 INJECTION, POWDER, FOR SOLUTION INTRAMUSCULAR; INTRAVENOUS at 09:40

## 2020-04-17 RX ADMIN — DOXYCYCLINE HYCLATE 100 MG: 100 CAPSULE ORAL at 16:43

## 2020-04-17 RX ADMIN — SODIUM CHLORIDE 100 ML: 900 INJECTION, SOLUTION INTRAVENOUS at 10:44

## 2020-04-17 RX ADMIN — TRAMADOL HYDROCHLORIDE 50 MG: 50 TABLET ORAL at 16:43

## 2020-04-17 RX ADMIN — ACETAMINOPHEN 650 MG: 325 TABLET, FILM COATED ORAL at 08:57

## 2020-04-17 RX ADMIN — IPRATROPIUM BROMIDE AND ALBUTEROL SULFATE 3 ML: .5; 3 SOLUTION RESPIRATORY (INHALATION) at 08:48

## 2020-04-17 RX ADMIN — FUROSEMIDE 40 MG: 10 INJECTION INTRAMUSCULAR; INTRAVENOUS at 18:18

## 2020-04-17 RX ADMIN — PRAVASTATIN SODIUM 80 MG: 20 TABLET ORAL at 20:59

## 2020-04-17 RX ADMIN — IOPAMIDOL 100 ML: 755 INJECTION, SOLUTION INTRAVENOUS at 10:44

## 2020-04-17 RX ADMIN — ALBUTEROL SULFATE 2 PUFF: 90 AEROSOL, METERED RESPIRATORY (INHALATION) at 20:00

## 2020-04-17 NOTE — ED TRIAGE NOTES
Pt in states shortness of breath since December. States cough and wheezing. Audible wheezing on triage. States fever at home. Denies nausea or vomiting.

## 2020-04-17 NOTE — PROGRESS NOTES
This RN called and spoke to Bryan in lab to request phlebotomist to draw labs. Hard stick form filled out and sent to lab via tube system. Accessed patient's chart to assist primary RN.

## 2020-04-17 NOTE — PROGRESS NOTES
TRANSFER - IN REPORT:    Verbal report received from Otilia RN(name) on [employer]  being received from [facility] [address] (unit) for routine progression of care      Report consisted of patients Situation, Background, Assessment and   Recommendations(SBAR). Information from the following report(s) SBAR was reviewed with the receiving nurse. Opportunity for questions and clarification was provided. Assessment completed upon patients arrival to unit and care assumed.

## 2020-04-17 NOTE — ED NOTES
On arrival to ED, patient was tachypenic and tachycardic. Patient placed in room 11 on 2 liters nasal canula. After approximately 5 minutes of supplemental nasal oxygen, patient respiratory rate slowed and O2 saturation increased to 97% on 2 liters. Oxygen removed and patient maintained O2 saturation.

## 2020-04-17 NOTE — PROGRESS NOTES
Pt having increased SOB and tele called stating pt is running VTAC MD notified new orders received. Pt is stable. Will continue to monitor.

## 2020-04-17 NOTE — ACP (ADVANCE CARE PLANNING)
Advance Care Planning    Conversation Note      Date of ACP Conversation: 4/17/2020    Conversation Conducted with:   Patient with [facility],[address]: verbally Named in Advance Directive or Healthcare Power of [facility] Specialist: Dante Bhatia RN    *When Decision Maker makes decisions on behalf of the incapacitated patient: Decision Maker is asked to consider and make decisions based on patient values, known preferences, or best interests. Current Designated Health Care Decision Maker:   (as entered in [address] field. Validate  this information as still accurate & up-to-date; edit [address] field as needed.)    If no Decision Maker listed above or available through scanned documents, then:    [address]   Who do you trust to make healthcare decisions for you? Name:  Geri Toro   Phone  Number: [phone]  Can this person be reached and be able to respond quickly, such as within a few minutes or hours? YES    Who would be your back-up decision maker?  n/a    For below questions, when conducting conversation with [employer], substitute \"she\" and \"her\" for \"you\" and \"your\". Hospitalization  If your health were to worsen and it became clear that your chance of recovery was unlikely, what would your preferences be regarding hospitalization?:    Choice: The patient would want hospitalization    The patient would prefer comfort-focused treatment without hospitalization. Ventilation  If you were in your present state of health and suddenly became very ill and were unable to breathe on your own, what would your preference be about the use of a ventilator (breathing machine) if it were available to you?       If patient would desire the use of a ventilator (breathing machine), answer \"yes\", if not \"no\":yes    If your health were to worsen and it became clear that your chance of recovery was unlikely, would that change your answer? YES    Resuscitation  CPR works best to restart the heart when there is a sudden event, like a heart attack, in someone who is otherwise healthy. Unfortunately, CPR does not typically restart the heart for people who have serious health conditions or who are very sick. In the event your heart stopped, would you want attempts to restart your heart (answer \"yes\") or would you prefer a natural death (answer \"no\")? yes    If your health were to worsen and it became clear that your chance of recovery was unlikely, would that change your answer? YES     [x]Yes   []No   Educated Patient / Antelmo Basurto regarding differences between Advance Directives and portable DNR orders. Length of ACP Conversation in minutes: 15     Conversation Outcomes:   ACP discussion completed   a copy of Adv.  Directives given to pt  Pastoral Care to continue discussion      Follow-up plan:     Schedule follow-up conversation with Pastoral Care

## 2020-04-17 NOTE — ED NOTES
TRANSFER - OUT REPORT:    Verbal report given to [address] [place] [address]  being transferred to [phone] for routine progression of care       Report consisted of patients Situation, Background, Assessment and   Recommendations(SBAR). Information from the following report(s) SBAR, ED Summary and MAR was reviewed with the receiving nurse. Lines:   Peripheral IV 04/17/20 Left Antecubital (Active)   Site Assessment Clean, dry, & intact 4/17/2020  8:46 AM   Phlebitis Assessment 0 4/17/2020  8:46 AM   Infiltration Assessment 0 4/17/2020  8:46 AM   Dressing Status Clean, dry, & intact 4/17/2020  8:46 AM   Dressing Type Transparent 4/17/2020  8:46 AM   Hub Color/Line Status Pink 4/17/2020  8:46 AM   Action Taken Blood drawn 4/17/2020  8:46 AM        Opportunity for questions and clarification was provided.       Patient transported with:   Monitor

## 2020-04-17 NOTE — ED PROVIDER NOTES
[address] [place] [address] Emergency Department  Arrival Date/Time: 4/17/2020 @ [address] [place] [address]  MRN: [id number]      62 y.o. female    YOB: 1965   [phone] (home) [phone] (work)    [address] [place] [address] DEPT 11/11  Seen on 4/17/2020 @ 8:45 AM      Today's Chief Complaint:   Chief Complaint   Patient presents with    Respiratory Distress     HPI: Pleasant 64-year-old female presents to the emergency department with shortness of breath. Been going on for several weeks but has worsened the last couple days. No alleviating. Breathing is much worse with activity or exertion    She works at [employer]. No lower extremity edema   HPI    Review of Systems: Review of Systems   Constitutional: Negative for activity change, appetite change, chills and fever. HENT: Negative. Respiratory: Positive for cough, shortness of breath and wheezing. Cardiovascular: Positive for chest pain. Gastrointestinal: Negative. Musculoskeletal: Negative. Neurological: Negative. Psychiatric/Behavioral: Negative. Past Medical History: Primary Care Doctor: Verna Haque MD  Meds, PMH, PSHx, SocHx at end of this note     Allergies: Allergies   Allergen Reactions    Grass Pollen Hives and Shortness of Breath     \"inhaler PRN\"         Key Anti-Platelet Anticoagulant Meds             aspirin delayed-release 81 mg tablet Take  by mouth daily. Physical Exam:  Nursing documentation reviewed. Patient Vitals for the past 24 hrs:   Temp Pulse Resp BP SpO2   04/17/20 1056  65 29  92 %   04/17/20 1049    163/79    04/17/20 0901  66 25  93 %   04/17/20 0848     96 %   04/17/20 0839     97 %   04/17/20 0836 98.1 °F (36.7 °C) 87 (!) 38 (!) 163/97 99 %   04/17/20 0835  92   96 %   04/17/20 0834  (!) 107   90 %   04/17/20 0833    (!) 174/119     Vital signs were reviewed. Physical Exam  Vitals signs and nursing note reviewed.    Constitutional: General: She is not in acute distress. Appearance: Normal appearance. She is not ill-appearing. HENT:      Head: Normocephalic. Nose: Congestion present. Mouth/Throat:      Pharynx: No oropharyngeal exudate. Neck:      Musculoskeletal: Normal range of motion. Cardiovascular:      Rate and Rhythm: Regular rhythm. Tachycardia present. Pulses: Normal pulses. Heart sounds: Normal heart sounds. Pulmonary:      Breath sounds: Wheezing present. Skin:     General: Skin is warm and dry. Capillary Refill: Capillary refill takes less than 2 seconds. Neurological:      Mental Status: She is alert and oriented to person, place, and time. Psychiatric:         Mood and Affect: Mood normal.         MEDICAL DECISION MAKING:   Differential Diagnosis:    MDM  Number of Diagnoses or Management Options  Diagnosis management comments: 24-year-old female presents with increased respiratory rate tachycardia hypoxia    We will get a chest x-ray. Test for coronavirus    She has diffuse inspiratory and expiratory wheezing. We will go ahead and give her a breathing treatment as well.        Amount and/or Complexity of Data Reviewed  Clinical lab tests: ordered  Tests in the radiology section of CPT®: ordered  Tests in the medicine section of CPT®: ordered and reviewed  Independent visualization of images, tracings, or specimens: yes    Risk of Complications, Morbidity, and/or Mortality  Presenting problems: moderate  Diagnostic procedures: minimal  Management options: moderate          Data/Management:    Lab findings during this visit:   Recent Results (from the past 48 hour(s))   CBC WITH AUTOMATED DIFF    Collection Time: 04/17/20  8:43 AM   Result Value Ref Range    WBC 9.5 4.3 - 11.1 K/uL    RBC 4.11 4.05 - 5.2 M/uL    HGB 12.5 11.7 - 15.4 g/dL    HCT 39.0 35.8 - 46.3 %    MCV 94.9 79.6 - 97.8 FL    MCH 30.4 26.1 - 32.9 PG    MCHC 32.1 31.4 - 35.0 g/dL    RDW 14.8 (H) 11.9 - 14.6 %    PLATELET 166 150 - 450 K/uL    MPV 11.5 9.4 - 12.3 FL    ABSOLUTE NRBC 0.00 0.0 - 0.2 K/uL    DF AUTOMATED      NEUTROPHILS 65 43 - 78 %    LYMPHOCYTES 24 13 - 44 %    MONOCYTES 6 4.0 - 12.0 %    EOSINOPHILS 4 0.5 - 7.8 %    BASOPHILS 0 0.0 - 2.0 %    IMMATURE GRANULOCYTES 1 0.0 - 5.0 %    ABS. NEUTROPHILS 6.2 1.7 - 8.2 K/UL    ABS. LYMPHOCYTES 2.3 0.5 - 4.6 K/UL    ABS. MONOCYTES 0.6 0.1 - 1.3 K/UL    ABS. EOSINOPHILS 0.4 0.0 - 0.8 K/UL    ABS. BASOPHILS 0.0 0.0 - 0.2 K/UL    ABS. IMM. GRANS. 0.1 0.0 - 0.5 K/UL   METABOLIC PANEL, COMPREHENSIVE    Collection Time: 04/17/20  8:43 AM   Result Value Ref Range    Sodium 144 136 - 145 mmol/L    Potassium 3.9 3.5 - 5.1 mmol/L    Chloride 114 (H) 98 - 107 mmol/L    CO2 23 21 - 32 mmol/L    Anion gap 7 7 - 16 mmol/L    Glucose 96 65 - 100 mg/dL    BUN 17 6 - 23 MG/DL    Creatinine 1.09 (H) 0.6 - 1.0 MG/DL    GFR est AA >60 >60 ml/min/1.73m2    GFR est non-AA 55 (L) >60 ml/min/1.73m2    Calcium 9.1 8.3 - 10.4 MG/DL    Bilirubin, total 0.6 0.2 - 1.1 MG/DL    ALT (SGPT) 73 (H) 12 - 65 U/L    AST (SGOT) 72 (H) 15 - 37 U/L    Alk. phosphatase 80 50 - 130 U/L    Protein, total 7.6 6.3 - 8.2 g/dL    Albumin 3.6 3.5 - 5.0 g/dL    Globulin 4.0 (H) 2.3 - 3.5 g/dL    A-G Ratio 0.9 (L) 1.2 - 3.5     D DIMER    Collection Time: 04/17/20  8:43 AM   Result Value Ref Range    D DIMER 1.13 (HH) <0.56 ug/ml(FEU)   LACTIC ACID    Collection Time: 04/17/20  8:43 AM   Result Value Ref Range    Lactic acid 1.0 0.4 - 2.0 MMOL/L     Radiology studies during this visit: Ct Chest W Cont    Result Date: 4/17/2020  IMPRESSION: 1. No evidence of pulmonary embolism. 2. Cardiomegaly with particular enlargement of the left ventricle. 3. Small right pleural effusion. 4. Mild patchy areas of atelectasis/infiltrate bilaterally. Xr Chest Port    Result Date: 4/17/2020  IMPRESSION: Lower lobe thickened interstitial markings with retrocardiac atelectasis or consolidation.     Medications given in the ED: Medications   acetaminophen (TYLENOL) tablet 650 mg (650 mg Oral Given 4/17/20 0857)     Or   acetaminophen (TYLENOL) suppository 650 mg ( Rectal See Alternative 4/17/20 0857)   albuterol-ipratropium (DUO-NEB) 2.5 MG-0.5 MG/3 ML (3 mL Nebulization Given 4/17/20 0848)   cefTRIAXone (ROCEPHIN) 1 g in 0.9% sodium chloride (MBP/ADV) 50 mL (1 g IntraVENous New Bag 4/17/20 0940)   iopamidoL (ISOVUE-370) 76 % injection 100 mL (100 mL IntraVENous Given 4/17/20 1044)   sodium chloride 0.9 % bolus infusion 100 mL (100 mL IntraVENous New Bag 4/17/20 1044)   saline peripheral flush soln 10 mL (10 mL InterCATHeter Given 4/17/20 1044)       Recheck and Additional Documentation:  (use .addrecheck  . addsepsis   . addstroke   . addhip  . addhandoff  . addcctime)     Patient feels a little better she still has scattered wheezing particularly on the left    CT of the chest does not show any pulmonary emboli. She does have bilateral atelectasis versus infiltrate    Concerned about the possibility of a coronavirus infection    Patient maintaining oxygen saturations and heart rate while resting but when she got up to use the bedside commode heart rate increased and her oxygen saturations dropped into the upper 80s    Discussed with the hospitalist.  Secondary to co-hoarding guidelines patient will be transferred to the [facility] for management. Procedure Documentation:   Procedures     Other ED Course Notes:     ED Course as of Apr 17 1119 Fri Apr 17, 2020   0919 D DIMER(!!): 1.13 [GH]      ED Course User Index  [GH] Td Vigil MD       I wore appropriate PPE throughout this patient's ED encounter. Assessment and Plan:    Impression: No diagnosis found.     Disposition: Transferred to Another Facility   Follow-up Information     Follow up With Specialties Details Why Contact Info    Valentino Alexandre MD Internal Medicine   [address]   [phone] Current Discharge Medication List          Past Medical History:      Past Medical History:   Diagnosis Date    Abdominal wall hernia 10/2/2018    Acute hypoxemic respiratory failure ([facility] 4/3/2019    Allergic rhinitis     followed by allergist; allergic to grass- has rescue inhaler PRN    ARDS (adult respiratory distress syndrome) ([facility] 4/3/2019    Arthritis     Automatic implantable cardioverter-defibrillator in situ 3/30/2016    CAD in native artery 3/30/2016    Chronic kidney disease     Chronic systolic heart failure ([facility] [address] 2013    ECHO 2017- EF 39%; managed with medications; [place] Dr Kj Liriano [place] [facility]    CKD (chronic kidney disease)     [address] follows    Diabetes ([facility] [address] [facility]     type 2 (on insulin);  FBS AM range- , hypo s/s <70, hgba1c- 5.8 (2018)    Diabetes mellitus ([facility] [address] [facility] 2010    Dyslipidemia 2013    Eczema     Fibromyalgia     GERD (gastroesophageal reflux disease)     hx of- no meds currently    Headache     Heart failure (HCC)     chronic systolic with EF 59%; non-ischemic cardiomyopathy    HTN (hypertension) 2010    Hypercholesterolemia     Incarcerated incisional hernia 3/26/2019    Morbid obesity ([facility]     Neuropathy     bilateral feet and tips of fingers    NICM (nonischemic cardiomyopathy) ([facility] [address] [facility] 2/15/2013    Obesity     bmi- 39    Obstructive sleep apnea (adult) (pediatric) 2014    uses cpap qhs    Pseudomonas urinary tract infection 2019    Sciatic pain 2016    SV[facility]ra[facility]ricular tachycardia) ([facility])     ablation for svt    Tobacco use disorder 2010     Past Surgical History:   Procedure Laterality Date    HX  SECTION      x1    HX HERNIA REPAIR  2019    mild incarceration, no bowel removal    HX HYSTERECTOMY      YEIMI-Left ovary intact per pt    HX IMPLANTABLE CARDIOVERTER DEFIBRILLATOR  2013    Biotronik dual chamber ICD by Dr. Paulo Penaloza DEFIBRILLATOR  2013    [employer]ron[employer] dual chamber ICD by Dr. Camden Brown Right     HX SVT ABLATION  \"years ago\"    HX TONSILLECTOMY      NV LEFT HEART CATH,PERCUTANEOUS  2013    no intervention     Social History     Tobacco Use    Smoking status: Former Smoker     Packs/day: 0.25     Years: 27.00     Pack years: 6.75    Smokeless tobacco: Never Used    Tobacco comment: \"every now and then\"   Substance Use Topics    Alcohol use: Yes     Alcohol/week: 1.0 standard drinks     Types: 1 Glasses of wine per week     Comment: occas    Drug use: Yes     Types: Marijuana     Comment: every day     Prior to Admission Medications   Prescriptions Last Dose Informant Patient Reported? Taking? Blood-Glucose Meter (ONETOUCH ULTRA2 METER) monitoring kit   No No   Sig: Check blood sugar daily, E11.9   Insulin Needles, Disposable, (FABIOLA PEN NEEDLE) 32 gauge x \" ndle   No No   Si Each by Does Not Apply route daily as needed (insulin injection for diabetes 2). E11.9   amiodarone (CORDARONE) 200 mg tablet   No No   Sig: Take 1 Tab by mouth two (2) times a day. aspirin delayed-release 81 mg tablet   Yes No   Sig: Take  by mouth daily. carvediloL (COREG) 6.25 mg tablet   No No   Sig: Take 1 Tab by mouth two (2) times daily (with meals). cholecalciferol (VITAMIN D3) 1,000 unit cap   Yes No   Sig: Take  by mouth daily. cpap machine kit   Yes No   Sig: by Does Not Apply route. 11 cm   furosemide (LASIX) 40 mg tablet   Yes No   Sig: Take 40 mg by mouth daily. glucose blood VI test strips (ONETOUCH ULTRA TEST) strip   No No   Sig: Check blood sugar daily, E11.9   insulin glargine (LANTUS SOLOSTAR U-100 INSULIN) 100 unit/mL (3 mL) inpn   No No   Si Units by SubCUTAneous route nightly. isosorbide mononitrate ER (IMDUR) 30 mg tablet   No No   Sig: Take 1 Tab by mouth every morning.    lancets (ONETOUCH SURESOFT LANCING DEV) misc   No No   Sig: Check blood sugar daily, E11.9 magnesium oxide (MAG-OX) 400 mg tablet   No No   Sig: Take 1 Tab by mouth two (2) times a day. metFORMIN (GLUCOPHAGE) 500 mg tablet   No No   Sig: Take 1 Tab by mouth two (2) times daily (with meals). nitroglycerin (NITROSTAT) 0.4 mg SL tablet   No No   Si Tab by SubLINGual route every five (5) minutes as needed for Chest Pain. Up to 3 doses. pravastatin (PRAVACHOL) 80 mg tablet   No No   Sig: Take 1 Tab by mouth nightly. sacubitril-valsartan (ENTRESTO) 49 mg/51 mg tablet   No No   Sig: Take 1 Tab by mouth two (2) times a day.       Facility-Administered Medications: None

## 2020-04-17 NOTE — PROGRESS NOTES
Pt admitted from [place]. She is alert and oriented dyspnea at rest noted. Lung sounds are course pt is on 5L high flow O2 sat is 100%. Pt states she has increased SOB and pain in her chest. She is able to ambulate with assistance. Skin intact no new issues noted. Dual skin assessment done with Jarret Moctezuma. Safety measures in place will continue to monitor.

## 2020-04-17 NOTE — PROGRESS NOTES
TRANSFER - IN REPORT:    Verbal report received from Howie Anthony RN(name) on [employer]  being received from ER(unit) for routine progression of care      Report consisted of patients Situation, Background, Assessment and   Recommendations(SBAR). Information from the following report(s) SBAR, Kardex, [facility] and Recent Results was reviewed with the receiving nurse. Opportunity for questions and clarification was provided. Report given to Kinjal Krause RN, who will assume primary care on arrival to floor.

## 2020-04-17 NOTE — H&P
Hospitalist Note     Admit Date:  2020  1:43 PM   Name:  Génesis Tripp   Age:  62 y.o.  :  1965   MRN:  [id number]   PCP:  Lillie Cadena MD  Treatment Team: Attending Provider: Divina Castellon MD; Primary Nurse: Patti KAPLAN    HPI/Subjective: The patient is a 24-year-old female with past medical history of chronic systolic heart failure, HTN, BA, COPD morbid obesity, ventricular tachycardia, CAD, AICD, and diabetes mellitus who presents emergency department with shortness of breath. Patient reports she has had chronic shortness of breath for a significant period of time. She reports is been ongoing since December. She reports for the past week is become acutely worse. She reports that she recently had her dose of home Lasix decreased. She reports cough. She reports orthopnea. In late afternoon: Patient developed run of V. tach and chest pain with increased shortness of breath    10 systems reviewed and negative except as noted in HPI. Past Medical History:   Diagnosis Date    Abdominal wall hernia 10/2/2018    Acute hypoxemic respiratory failure ([facility] 4/3/2019    Allergic rhinitis     followed by allergist; allergic to grass- has rescue inhaler PRN    ARDS (adult respiratory distress syndrome) ([facility] [address] 4/3/2019    Arthritis     Automatic implantable cardioverter-defibrillator in situ 3/30/2016    CAD in native artery 3/30/2016    Chronic kidney disease     Chronic systolic heart failure ([facility] [address] 2013    ECHO 2017- EF 39%; managed with medications; [place] [facility]    CKD (chronic kidney disease)     [facility] follows    Diabetes ([facility] [address]     type 2 (on insulin);  FBS AM range- , hypo s/s <70, hgba1c- 5.8 (2018)    Diabetes mellitus ([facility] [address] 2010    Dyslipidemia 2013    Eczema     Fibromyalgia     GERD (gastroesophageal reflux disease)     hx of- no meds currently    Headache     Heart failure (HCC)     chronic systolic with EF 06%; non-ischemic cardiomyopathy    HTN (hypertension) 2010    Hypercholesterolemia     Incarcerated incisional hernia 3/26/2019    Morbid obesity ([address]     Neuropathy     bilateral feet and tips of fingers    NICM (nonischemic cardiomyopathy) ([facility] [address] 2/15/2013    Obesity     bmi- 39    Obstructive sleep apnea (adult) (pediatric) 2014    uses cpap qhs    Pseudomonas urinary tract infection 2019    Sciatic pain 2016    SVT [facility]supraventricular tachycardia) [facility]     ablation for svt    Tobacco use disorder 2010      Past Surgical History:   Procedure Laterality Date    HX  SECTION      x1    HX HERNIA REPAIR  2019    mild incarceration, no bowel removal    HX HYSTERECTOMY      YEIMI-Left ovary intact per pt    HX IMPLANTABLE CARDIOVERTER DEFIBRILLATOR  2013    [employer] dual chamber ICD by Dr. Arnel Feliz  [address]  2013    [employer] dual chamber ICD by Dr. Teresa Manuel Right     HX SVT ABLATION  \"years ago\"    HX TONSILLECTOMY      SC LEFT HEART CATH,PERCUTANEOUS  2013    no intervention      Allergies   Allergen Reactions    Grass Pollen Hives and Shortness of Breath     \"inhaler PRN\"      Social History     Tobacco Use    Smoking status: Former Smoker     Packs/day: 0.25     Years: 27.00     Pack years: 6.75    Smokeless tobacco: Never Used    Tobacco comment: \"every now and then\"   Substance Use Topics    Alcohol use:  Yes     Alcohol/week: 1.0 standard drinks     Types: 1 Glasses of wine per week     Comment: occas      Family History   Problem Relation Age of Onset    Heart Attack Father 48        mi   [facility] Stroke Father     Hypertension Father     Heart Disease Father     Diabetes Other     Stroke Mother     Diabetes Brother     Heart Disease Brother     Hypertension Brother     Breast Cancer Sister 37      Immunization History   Administered Date(s) Administered    Influenza High Dose Vaccine PF 2017, 2017    Influenza Vaccine (Tri) Adjuvanted 10/02/2019    Pneumococcal Conjugate (PCV-13) 2017    Pneumococcal Polysaccharide (PPSV-23) 2019    TB Skin Test (PPD) Intradermal 2019, 2019    Tdap 2020     PTA Medications:  Prior to Admission Medications   Prescriptions Last Dose Informant Patient Reported? Taking? Blood-Glucose Meter (ONETOUCH ULTRA2 METER) monitoring kit   No Yes   Sig: Check blood sugar daily, E11.9   Insulin Needles, Disposable, (FABIOLA PEN NEEDLE) 32 gauge x 32\" ndle   No Yes   Si Each by Does Not Apply route daily as needed (insulin injection for diabetes 2). E11.9   amiodarone (CORDARONE) 200 mg tablet   No Yes   Sig: Take 1 Tab by mouth two (2) times a day. aspirin delayed-release 81 mg tablet   Yes Yes   Sig: Take  by mouth daily. carvediloL (COREG) 6.25 mg tablet   No Yes   Sig: Take 1 Tab by mouth two (2) times daily (with meals). cholecalciferol (VITAMIN D3) 1,000 unit cap   Yes Yes   Sig: Take  by mouth daily. cpap machine kit   Yes Yes   Sig: by Does Not Apply route. 11 cm   furosemide (LASIX) 40 mg tablet   Yes Yes   Sig: Take 40 mg by mouth daily. glucose blood VI test strips (ONETOUCH ULTRA TEST) strip   No Yes   Sig: Check blood sugar daily, E11.9   insulin glargine (LANTUS SOLOSTAR U-100 INSULIN) 100 unit/mL (3 mL) inpn   No Yes   Si Units by SubCUTAneous route nightly. isosorbide mononitrate ER (IMDUR) 30 mg tablet   No Yes   Sig: Take 1 Tab by mouth every morning. lancets (ONETOUCH SURESOFT LANCING DEV) misc   No Yes   Sig: Check blood sugar daily, E11.9   magnesium oxide (MAG-OX) 400 mg tablet   No Yes   Sig: Take 1 Tab by mouth two (2) times a day. metFORMIN (GLUCOPHAGE) 500 mg tablet   No Yes   Sig: Take 1 Tab by mouth two (2) times daily (with meals).    nitroglycerin (NITROSTAT) 0.4 mg SL tablet   No Yes   Si Tab by SubLINGual route every five (5) minutes as needed for Chest Pain. Up to 3 doses. pravastatin (PRAVACHOL) 80 mg tablet   No Yes   Sig: Take 1 Tab by mouth nightly. sacubitril-valsartan (ENTRESTO) 49 mg/51 mg tablet   No Yes   Sig: Take 1 Tab by mouth two (2) times a day. Facility-Administered Medications: None       Objective:     Patient Vitals for the past 24 hrs:   Temp Pulse Resp BP SpO2   04/17/20 1539 98.2 °F (36.8 °C) 80 22 (!) 158/96 94 %   04/17/20 1356 98.2 °F (36.8 °C) 68 20 161/83 100 %     Oxygen Therapy  O2 Sat (%): 94 % (04/17/20 1539)    Estimated body mass index is 43.42 kg/m² as calculated from the following:    Height as of an earlier encounter on 4/17/20: 4' 11\" (1.499 m). Weight as of an earlier encounter on 4/17/20: 97.5 kg (215 lb). No intake or output data in the 24 hours ending 04/17/20 1838    *Note that automatically entered I/Os may not be accurate; dependent on patient compliance with collection and accurate  by assistants. Physical Exam:  General: Morbidly obese female  Eyes: EOMI, nonicteric  ENT: Moist mucous membranes  Cardiovascular: RRR, no significant rubs or murmurs appreciated  Respiratory: Reduced breath sounds bilaterally, crackles at lung bases  Gastrointestinal: Soft, nontender, nondistended, bowel sounds active  Genitourinary: No suprapubic tenderness  Musculoskeletal: No joint swelling appreciated, no pitting edema  Skin: No lesions on examined area  Neurological: Alert and oriented, no focal deficits noted  Psychiatric: Normal mood and affect    I reviewed the labs, imaging, EKGs, telemetry, and other studies done this admission.   Data Review:   Recent Results (from the past 24 hour(s))   CBC WITH AUTOMATED DIFF    Collection Time: 04/17/20  8:43 AM   Result Value Ref Range    WBC 9.5 4.3 - 11.1 K/uL    RBC 4.11 4.05 - 5.2 M/uL    HGB 12.5 11.7 - 15.4 g/dL    HCT 39.0 35.8 - 46.3 %    MCV 94.9 79.6 - 97.8 FL    MCH 30.4 26.1 - 32.9 PG    MCHC 32.1 31.4 - 35.0 g/dL    RDW 14.8 (H) 11.9 - 14.6 %    PLATELET 624 090 - 066 K/uL    MPV 11.5 9.4 - 12.3 FL    ABSOLUTE NRBC 0.00 0.0 - 0.2 K/uL    DF AUTOMATED      NEUTROPHILS 65 43 - 78 %    LYMPHOCYTES 24 13 - 44 %    MONOCYTES 6 4.0 - 12.0 %    EOSINOPHILS 4 0.5 - 7.8 %    BASOPHILS 0 0.0 - 2.0 %    IMMATURE GRANULOCYTES 1 0.0 - 5.0 %    ABS. NEUTROPHILS 6.2 1.7 - 8.2 K/UL    ABS. LYMPHOCYTES 2.3 0.5 - 4.6 K/UL    ABS. MONOCYTES 0.6 0.1 - 1.3 K/UL    ABS. EOSINOPHILS 0.4 0.0 - 0.8 K/UL    ABS. BASOPHILS 0.0 0.0 - 0.2 K/UL    ABS. IMM. GRANS. 0.1 0.0 - 0.5 K/UL   METABOLIC PANEL, COMPREHENSIVE    Collection Time: 04/17/20  8:43 AM   Result Value Ref Range    Sodium 144 136 - 145 mmol/L    Potassium 3.9 3.5 - 5.1 mmol/L    Chloride 114 (H) 98 - 107 mmol/L    CO2 23 21 - 32 mmol/L    Anion gap 7 7 - 16 mmol/L    Glucose 96 65 - 100 mg/dL    BUN 17 6 - 23 MG/DL    Creatinine 1.09 (H) 0.6 - 1.0 MG/DL    GFR est AA >60 >60 ml/min/1.73m2    GFR est non-AA 55 (L) >60 ml/min/1.73m2    Calcium 9.1 8.3 - 10.4 MG/DL    Bilirubin, total 0.6 0.2 - 1.1 MG/DL    ALT (SGPT) 73 (H) 12 - 65 U/L    AST (SGOT) 72 (H) 15 - 37 U/L    Alk.  phosphatase 80 50 - 130 U/L    Protein, total 7.6 6.3 - 8.2 g/dL    Albumin 3.6 3.5 - 5.0 g/dL    Globulin 4.0 (H) 2.3 - 3.5 g/dL    A-G Ratio 0.9 (L) 1.2 - 3.5     D DIMER    Collection Time: 04/17/20  8:43 AM   Result Value Ref Range    D DIMER 1.13 (HH) <0.56 ug/ml(FEU)   LACTIC ACID    Collection Time: 04/17/20  8:43 AM   Result Value Ref Range    Lactic acid 1.0 0.4 - 2.0 MMOL/L   GLUCOSE, POC    Collection Time: 04/17/20  4:06 PM   Result Value Ref Range    Glucose (POC) 112 (H) 65 - 100 mg/dL       All Micro Results     Procedure Component Value Units Date/Time    CULTURE, RESPIRATORY/SPUTUM/BRONCH Monika Carnes [[id number]]     Order Status:  Sent Specimen:  Sputum           Current Facility-Administered Medications   Medication Dose Route Frequency    amiodarone (CORDARONE) tablet 200 mg  200 mg Oral BID    [START ON 4/18/2020] aspirin delayed-release tablet 81 mg  81 mg Oral DAILY    carvediloL (COREG) tablet 6.25 mg  6.25 mg Oral BID WITH MEALS    insulin glargine (LANTUS) injection 35 Units  35 Units SubCUTAneous QHS    [START ON 4/18/2020] isosorbide mononitrate ER (IMDUR) tablet 30 mg  30 mg Oral 6am    magnesium oxide (MAG-OX) tablet 400 mg  400 mg Oral BID    pravastatin (PRAVACHOL) tablet 80 mg  80 mg Oral QHS    sacubitriL-valsartan (ENTRESTO) 49-51 mg tablet 1 Tab  1 Tab Oral BID    enoxaparin (LOVENOX) injection 40 mg  40 mg SubCUTAneous Q24H    albuterol (PROVENTIL HFA, VENTOLIN HFA, PROAIR HFA) inhaler 2 Puff  2 Puff Inhalation Q4H PRN    acetaminophen (TYLENOL) tablet 650 mg  650 mg Oral Q6H PRN    traMADoL (ULTRAM) tablet 50 mg  50 mg Oral Q6H PRN    [START ON 4/18/2020] cefTRIAXone (ROCEPHIN) 2 g in 0.9% sodium chloride (MBP/ADV) 50 mL  2 g IntraVENous Q24H    doxycycline (VIBRAMYCIN) capsule 100 mg  100 mg Oral BID    furosemide (LASIX) injection 40 mg  40 mg IntraVENous Q12H    zinc sulfate tablet 220 mg  220 mg Oral DAILY    ascorbic acid (vitamin C) (VITAMIN C) tablet 1,000 mg  1,000 mg Oral DAILY    tiotropium bromide (SPIRIVA RESPIMAT) 2.5 mcg /actuation  2 Puff Inhalation DAILY       Other Studies:  Ct Chest W Cont    Result Date: 4/17/2020  CT chest with contrast (pulmonary embolism protocol) History: sob, abnormal CXR. Tachypnea, tachycardia Technique: Helically acquired images were obtained from the lung apices to the domes of the diaphragms reconstructed at 2.5mm intervals after the uneventful administration of 100 cc's intravenous [id number]-370 in order to evaluate the pulmonary arteries. Coronal reformatted images were submitted. Radiation dose reduction techniques were used for this study:  Our CT scanners use one or all of the following: Automated exposure control, adjustment of the mA and/or kVp according to patient's size, iterative reconstruction.  Comparison: 08/28/2018 Findings: There is moderate global cardiac enlargement with particular enlargement of the left ventricle. There is a small right pleural effusion. There is no pericardial effusion. There are no filling defects within the pulmonary arteries to suggest pulmonary emboli. There are mild patchy areas of atelectasis/infiltrate within all lobes. No adenopathy is present within the thorax. Imaging of the upper abdomen reveals stable mild thickening of left adrenal gland. Upper pole left renal cyst measures 2.0 cm. IMPRESSION: 1. No evidence of pulmonary embolism. 2. Cardiomegaly with particular enlargement of the left ventricle. 3. Small right pleural effusion. 4. Mild patchy areas of atelectasis/infiltrate bilaterally. Xr Chest Port    Result Date: 4/17/2020  PORTABLE CHEST 1 VIEW HISTORY: Tachypnea and tachycardia. Shortness of breath. Wheezing fever. COVID-19 protocol COMPARISON: 2/25/2020 FINDINGS: A cardiac defibrillator device is present. EKG leads are present. A small amount of fluid or thickening is present along the minor fissure. Retrocardiac atelectasis or consolidation is present. Interstitial markings are thickened and the lung bases. IMPRESSION: Lower lobe thickened interstitial markings with retrocardiac atelectasis or consolidation.       Assessment and Plan:     Hospital Problems as of 4/17/2020 Date Reviewed: 4/14/2020          Codes Class Noted - Resolved POA    Acute respiratory failure with hypoxia (HCC) ICD-10-CM: J96.01  ICD-9-CM: 518.81  4/17/2020 - Present Unknown        Type 2 diabetes with nephropathy (HCC) ICD-10-CM: E11.21  ICD-9-CM: 250.40, 583.81  4/2/2018 - Present Yes        Ventricular tachycardia ([address] ICD-10-CM: I47.2  ICD-9-CM: 427.1  4/18/2016 - Present Yes        CAD in native artery ICD-10-CM: I25.10  ICD-9-CM: 414.01  3/30/2016 - Present Yes        Automatic implantable cardioverter-defibrillator in situ ICD-10-CM: Z95.810  ICD-9-CM: V45.02  3/30/2016 - Present Yes    Overview Signed 3/30/2016  8:13 AM by Linwood Bullock dual ICD 7/2013  1/5 lost brother,11/8 had VF episode with ATP,no shock             AB on CPAP (Chronic) ICD-10-CM: G47.33, Z99.89  ICD-9-CM: 327.23, V46.8  7/14/2014 - Present Yes        Morbid obesity (HCC) (Chronic) ICD-10-CM: E66.01  ICD-9-CM: 278.01  7/14/2014 - Present Yes        Chronic systolic heart failure (HCC) (Chronic) ICD-10-CM: I50.22  ICD-9-CM: 428.22  7/18/2013 - Present Yes    Overview Signed 3/30/2016  8:11 AM by Ambrose Campo     NST 4/15:low risk  Echo 6/2014:EF 40-45%  Echo 5/2013:EF 30-35%  LHC 2/2013: minimal CAD             NICM (nonischemic cardiomyopathy) (HCC) (Chronic) ICD-10-CM: I42.8  ICD-9-CM: 425.4  2/15/2013 - Present Yes        Dyslipidemia ICD-10-CM: E78.5  ICD-9-CM: 272.4  2/13/2013 - Present Yes        HTN (hypertension) (Chronic) ICD-10-CM: I10  ICD-9-CM: 401.9  4/12/2010 - Present Yes              Obese female with several comorbidities presenting for SARS-CoV2 rule out.     Acute on chronic respiratory failure with hypoxia/suspicion for SARS-CoV2  SARS-CoV2 pending  CXR: Atelectasis versus consolidation  CT chest: Mild patchy areas of atelectasis and infiltrate bilaterally, cardiomegaly  WBC: 0.5  Lymphopenia not present    ABX #1  Ceftriaxone and doxycycline    Plan:  -Continue IV antibiotics  -Albuterol every 4 as needed  -Spiriva  -Ascorbic acid  -Zinc    Acute on chronic systolic heart failure  Echo 1/2020: EF 30 to 35%    Plan:  -Continue home medications  -Lasix 40 IV BID  -Strict I and O  -Daily Weights  -Telemetry Monitoring    Chest pain/V. tach  Patient with V. tach on telemetry monitoring in late afternoon  Patient complains of some chest pain and palpitations    Plan:  -Stat EKG  -Stat mag  -Stat troponin    Hypertension  BP stable    Plan:  -Continue home meds    CAD  -Continue Home Meds    Hyperlipidemia  -Continue home medications    BA  -CPAP nightly    Discharge planning: Pending hospital course  DVT ppx ordered  Code status:  Full  Estimated LOS:  Greater than 2 midnights  Risk:  high    Signed:  Harleen Villarreal MD

## 2020-04-17 NOTE — PROGRESS NOTES
HCPOA consult received. Talked with patient over phone and assisted in completing. Answered questions. Marin Wesley

## 2020-04-17 NOTE — PROGRESS NOTES
HCPOA completed. Copy placed on chart and original returned with additional copies. Mac Villalba M.Div.

## 2020-04-18 LAB
ATRIAL RATE: 65 BPM
CALCULATED P AXIS, ECG09: 86 DEGREES
CALCULATED R AXIS, ECG10: 56 DEGREES
CALCULATED T AXIS, ECG11: -77 DEGREES
DIAGNOSIS, 93000: NORMAL
GLUCOSE BLD STRIP.AUTO-MCNC: 100 MG/DL (ref 65–100)
GLUCOSE BLD STRIP.AUTO-MCNC: 123 MG/DL (ref 65–100)
GLUCOSE BLD STRIP.AUTO-MCNC: 149 MG/DL (ref 65–100)
GLUCOSE BLD STRIP.AUTO-MCNC: 95 MG/DL (ref 65–100)
P-R INTERVAL, ECG05: 198 MS
Q-T INTERVAL, ECG07: 484 MS
QRS DURATION, ECG06: 116 MS
QTC CALCULATION (BEZET), ECG08: 503 MS
TROPONIN I SERPL-MCNC: 0.02 NG/ML (ref 0.02–0.05)
VENTRICULAR RATE, ECG03: 65 BPM

## 2020-04-18 PROCEDURE — 74011250637 HC RX REV CODE- 250/637: Performed by: HOSPITALIST

## 2020-04-18 PROCEDURE — 74011636637 HC RX REV CODE- 636/637: Performed by: INTERNAL MEDICINE

## 2020-04-18 PROCEDURE — 82962 GLUCOSE BLOOD TEST: CPT

## 2020-04-18 PROCEDURE — 74011000258 HC RX REV CODE- 258: Performed by: INTERNAL MEDICINE

## 2020-04-18 PROCEDURE — 74011250637 HC RX REV CODE- 250/637: Performed by: INTERNAL MEDICINE

## 2020-04-18 PROCEDURE — 74011250636 HC RX REV CODE- 250/636: Performed by: INTERNAL MEDICINE

## 2020-04-18 PROCEDURE — 94760 N-INVAS EAR/PLS OXIMETRY 1: CPT

## 2020-04-18 PROCEDURE — 36415 COLL VENOUS BLD VENIPUNCTURE: CPT

## 2020-04-18 PROCEDURE — 77010033678 HC OXYGEN DAILY

## 2020-04-18 PROCEDURE — 84484 ASSAY OF TROPONIN QUANT: CPT

## 2020-04-18 PROCEDURE — 74011000250 HC RX REV CODE- 250: Performed by: INTERNAL MEDICINE

## 2020-04-18 PROCEDURE — 65270000029 HC RM PRIVATE

## 2020-04-18 PROCEDURE — 94640 AIRWAY INHALATION TREATMENT: CPT

## 2020-04-18 RX ORDER — OXYCODONE AND ACETAMINOPHEN 5; 325 MG/1; MG/1
1 TABLET ORAL
Status: DISCONTINUED | OUTPATIENT
Start: 2020-04-18 | End: 2020-04-23 | Stop reason: HOSPADM

## 2020-04-18 RX ORDER — ONDANSETRON 2 MG/ML
4 INJECTION INTRAMUSCULAR; INTRAVENOUS
Status: DISCONTINUED | OUTPATIENT
Start: 2020-04-18 | End: 2020-04-23 | Stop reason: HOSPADM

## 2020-04-18 RX ORDER — GUAIFENESIN/DEXTROMETHORPHAN 100-10MG/5
10 SYRUP ORAL
Status: DISCONTINUED | OUTPATIENT
Start: 2020-04-18 | End: 2020-04-23 | Stop reason: HOSPADM

## 2020-04-18 RX ORDER — LATANOPROST 50 UG/ML
1 SOLUTION/ DROPS OPHTHALMIC
Status: DISCONTINUED | OUTPATIENT
Start: 2020-04-18 | End: 2020-04-23 | Stop reason: HOSPADM

## 2020-04-18 RX ORDER — CARVEDILOL 6.25 MG/1
6.25 TABLET ORAL 2 TIMES DAILY
Status: DISCONTINUED | OUTPATIENT
Start: 2020-04-18 | End: 2020-04-23 | Stop reason: HOSPADM

## 2020-04-18 RX ORDER — LATANOPROST 50 UG/ML
1 SOLUTION/ DROPS OPHTHALMIC EVERY EVENING
Status: DISCONTINUED | OUTPATIENT
Start: 2020-04-18 | End: 2020-04-18

## 2020-04-18 RX ORDER — FUROSEMIDE 10 MG/ML
40 INJECTION INTRAMUSCULAR; INTRAVENOUS EVERY 8 HOURS
Status: DISCONTINUED | OUTPATIENT
Start: 2020-04-18 | End: 2020-04-19

## 2020-04-18 RX ADMIN — FUROSEMIDE 40 MG: 40 INJECTION, SOLUTION INTRAMUSCULAR; INTRAVENOUS at 21:37

## 2020-04-18 RX ADMIN — OXYCODONE HYDROCHLORIDE AND ACETAMINOPHEN 1000 MG: 500 TABLET ORAL at 13:47

## 2020-04-18 RX ADMIN — OXYCODONE HYDROCHLORIDE AND ACETAMINOPHEN 1 TABLET: 5; 325 TABLET ORAL at 14:29

## 2020-04-18 RX ADMIN — CEFTRIAXONE 2 G: 2 INJECTION, POWDER, FOR SOLUTION INTRAMUSCULAR; INTRAVENOUS at 09:30

## 2020-04-18 RX ADMIN — ISOSORBIDE MONONITRATE 30 MG: 30 TABLET, EXTENDED RELEASE ORAL at 08:24

## 2020-04-18 RX ADMIN — CARVEDILOL 6.25 MG: 6.25 TABLET, FILM COATED ORAL at 21:38

## 2020-04-18 RX ADMIN — DOXYCYCLINE HYCLATE 100 MG: 100 CAPSULE ORAL at 16:53

## 2020-04-18 RX ADMIN — AMIODARONE HYDROCHLORIDE 200 MG: 200 TABLET ORAL at 16:52

## 2020-04-18 RX ADMIN — ASPIRIN 81 MG: 81 TABLET ORAL at 08:24

## 2020-04-18 RX ADMIN — ENOXAPARIN SODIUM 40 MG: 40 INJECTION SUBCUTANEOUS at 16:50

## 2020-04-18 RX ADMIN — AMIODARONE HYDROCHLORIDE 200 MG: 200 TABLET ORAL at 08:24

## 2020-04-18 RX ADMIN — TIOTROPIUM BROMIDE INHALATION SPRAY 2 PUFF: 3.12 SPRAY, METERED RESPIRATORY (INHALATION) at 08:47

## 2020-04-18 RX ADMIN — TRAMADOL HYDROCHLORIDE 50 MG: 50 TABLET ORAL at 00:29

## 2020-04-18 RX ADMIN — ALBUTEROL SULFATE 2 PUFF: 90 AEROSOL, METERED RESPIRATORY (INHALATION) at 23:34

## 2020-04-18 RX ADMIN — Medication 220 MG: at 21:38

## 2020-04-18 RX ADMIN — OXYCODONE HYDROCHLORIDE AND ACETAMINOPHEN 1 TABLET: 5; 325 TABLET ORAL at 21:47

## 2020-04-18 RX ADMIN — SACUBITRIL AND VALSARTAN 1 TABLET: 49; 51 TABLET, FILM COATED ORAL at 16:52

## 2020-04-18 RX ADMIN — Medication 220 MG: at 00:33

## 2020-04-18 RX ADMIN — INSULIN GLARGINE 35 UNITS: 100 INJECTION, SOLUTION SUBCUTANEOUS at 21:41

## 2020-04-18 RX ADMIN — CARVEDILOL 6.25 MG: 6.25 TABLET, FILM COATED ORAL at 08:23

## 2020-04-18 RX ADMIN — DOXYCYCLINE HYCLATE 100 MG: 100 CAPSULE ORAL at 08:24

## 2020-04-18 RX ADMIN — ONDANSETRON 4 MG: 2 INJECTION INTRAMUSCULAR; INTRAVENOUS at 02:14

## 2020-04-18 RX ADMIN — GUAIFENESIN AND DEXTROMETHORPHAN 10 ML: 100; 10 SYRUP ORAL at 22:18

## 2020-04-18 RX ADMIN — FUROSEMIDE 40 MG: 40 INJECTION, SOLUTION INTRAMUSCULAR; INTRAVENOUS at 13:48

## 2020-04-18 RX ADMIN — Medication 400 MG: at 16:52

## 2020-04-18 RX ADMIN — LATANOPROST 1 DROP: 50 SOLUTION OPHTHALMIC at 22:52

## 2020-04-18 RX ADMIN — ONDANSETRON 4 MG: 2 INJECTION INTRAMUSCULAR; INTRAVENOUS at 21:47

## 2020-04-18 RX ADMIN — Medication 400 MG: at 08:24

## 2020-04-18 RX ADMIN — SACUBITRIL AND VALSARTAN 1 TABLET: 49; 51 TABLET, FILM COATED ORAL at 08:24

## 2020-04-18 RX ADMIN — PRAVASTATIN SODIUM 80 MG: 20 TABLET ORAL at 21:38

## 2020-04-18 RX ADMIN — OXYCODONE HYDROCHLORIDE AND ACETAMINOPHEN 1 TABLET: 5; 325 TABLET ORAL at 09:30

## 2020-04-18 NOTE — PROGRESS NOTES
Patient request Ultram. Administered. States does not want to wear cpap due to mask to tight and already attempted adjustment per Tracie RT. This RN will discuss with RT.

## 2020-04-18 NOTE — ROUTINE PROCESS
Assessment completed, see doc flowsheet. Pt resting with O2 in place. No distress. Aware to call for needs for complaints.

## 2020-04-18 NOTE — PROGRESS NOTES
This RN to bedside for assessment. Pt complains of back pain. Pillows and bed repositioned. Tylenol PRN administered with scheduled medications. Applesauce given per request. Fresh ice and call light in reach.

## 2020-04-18 NOTE — PROGRESS NOTES
Pt states she had requested pain med order change while MD in for assessment. Dr. Wilma Villa on unit and reminded with order received.

## 2020-04-18 NOTE — PROGRESS NOTES
Monitor tech calls states patient having runs Vtach. Patient calm when entering room. Denies feeling ICD responding to v-tach or palpitations. This RN continues to monitor.

## 2020-04-18 NOTE — PROGRESS NOTES
Hospitalist Note     Admit Date:  2020  1:43 PM   Name:  Janette Atkinson   Age:  62 y.o.  :  1965   MRN:  [id number]   PCP:  Jazz Aguilera MD  Treatment Team: Attending Provider: Antonio Gao MD    HPI/Subjective: The patient is a 38-year-old female with past medical history of chronic systolic heart failure, HTN, BA, COPD morbid obesity, ventricular tachycardia, CAD, AICD, and diabetes mellitus who presents emergency department with shortness of breath. Patient reports she has had chronic shortness of breath for a significant period of time. She reports is been ongoing since December. She reports for the past week is become acutely worse. She reports that she recently had her dose of home Lasix decreased. She reports cough. She reports orthopnea. In late afternoon: Patient developed run of V. tach and chest pain with increased shortness of breath    Hospital Course:  -COVID: Pending  -Undergoing Tx with IV abx  -Undergoing Diursesis with IV Lasix      Patient seen and examined at bedside in no acute distress. Patient reports minor improvement in respiratory status. She reports continued cough. She denies any chest pain at time of examination.       Objective:     Patient Vitals for the past 24 hrs:   Temp Pulse Resp BP SpO2   20 0847     100 %   20 0726 97.6 °F (36.4 °C) 75 18 141/86 98 %   20 0330 98.2 °F (36.8 °C) 61 22 126/83 98 %   20 0238     98 %   20 2317 98.1 °F (36.7 °C) 62 22 123/70 98 %   20 2000     96 %   20 1927 98.2 °F (36.8 °C) (!) 59 24 128/70 98 %   20 1539 98.2 °F (36.8 °C) 80 22 (!) 158/96 94 %   20 1356 98.2 °F (36.8 °C) 68 20 161/83 100 %     Oxygen Therapy  O2 Sat (%): 100 % (20)  Pulse via Oximetry: 70 beats per minute (2047)  O2 Device: Nasal cannula (20)  O2 Flow Rate (L/min): 5 l/min (20)    Estimated body mass index is 42.9 kg/m² as calculated from the following:    Height as of an earlier encounter on 4/17/20: 4' 11\" (1.499 m). Weight as of this encounter: 96.3 kg (212 lb 6.4 oz). Intake/Output Summary (Last 24 hours) at 4/18/2020 1059  Last data filed at 4/17/2020 2000  Gross per 24 hour   Intake 600 ml   Output    Net 600 ml       *Note that automatically entered I/Os may not be accurate; dependent on patient compliance with collection and accurate  by techs. General:    Morbidly obese female  CV:   RRR. No murmur, rub, or gallop. Lungs:   CTAB. No wheezing, rhonchi, or rales. Abdomen:   Soft, nontender, nondistended. Extremities: Warm and dry. No cyanosis or edema. Skin:     No rashes or jaundice.    Neuro:  No gross focal deficits    Data Review:  I have reviewed all labs, meds, and studies from the last 24 hours:    Recent Results (from the past 24 hour(s))   GLUCOSE, POC    Collection Time: 04/17/20  4:06 PM   Result Value Ref Range    Glucose (POC) 112 (H) 65 - 100 mg/dL   EKG, 12 LEAD, INITIAL    Collection Time: 04/17/20  6:16 PM   Result Value Ref Range    Ventricular Rate 65 BPM    Atrial Rate 65 BPM    P-R Interval 198 ms    QRS Duration 116 ms    Q-T Interval 484 ms    QTC Calculation (Bezet) 503 ms    Calculated P Axis 86 degrees    Calculated R Axis 56 degrees    Calculated T Axis -77 degrees    Diagnosis       Atrial-paced rhythm with occasional Premature ventricular complexes  Nonspecific ST and T wave abnormality  Abnormal ECG  When compared with ECG of 17-APR-2020 08:35,  QT has lengthened  Confirmed by Tyrone Tyler ([id number]) on 4/18/2020 6:11:54 AM     NT-PRO BNP    Collection Time: 04/17/20  6:17 PM   Result Value Ref Range    NT pro-BNP 2,939 (H) 5 - 125 PG/ML   MAGNESIUM    Collection Time: 04/17/20  6:17 PM   Result Value Ref Range    Magnesium 2.2 1.8 - 2.4 mg/dL   PHOSPHORUS    Collection Time: 04/17/20  6:17 PM   Result Value Ref Range    Phosphorus 2.5 2.5 - 4.5 MG/DL   TROPONIN I Collection Time: 04/17/20  6:17 PM   Result Value Ref Range    Troponin-I, Qt. 0.05 0.02 - 0.05 NG/ML   GLUCOSE, POC    Collection Time: 04/17/20  8:47 PM   Result Value Ref Range    Glucose (POC) 128 (H) 65 - 100 mg/dL   GLUCOSE, POC    Collection Time: 04/18/20  5:34 AM   Result Value Ref Range    Glucose (POC) 100 65 - 100 mg/dL   TROPONIN I    Collection Time: 04/18/20  9:27 AM   Result Value Ref Range    Troponin-I, Qt. 0.02 0.02 - 0.05 NG/ML        All Micro Results     Procedure Component Value Units Date/Time    CULTURE, RESPIRATORY/SPUTUM/BRONCH Tracie Wolf [[id number]]     Order Status:  Sent Specimen:  Sputum           No results found for this visit on 04/17/20.     Current Meds:  Current Facility-Administered Medications   Medication Dose Route Frequency    ondansetron (ZOFRAN) injection 4 mg  4 mg IntraVENous Q6H PRN    furosemide (LASIX) injection 40 mg  40 mg IntraVENous Q8H    oxyCODONE-acetaminophen (PERCOCET) 5-325 mg per tablet 1 Tab  1 Tab Oral Q4H PRN    latanoprost (XALATAN) 0.005 % ophthalmic solution 1 Drop  1 Drop Both Eyes QHS    amiodarone (CORDARONE) tablet 200 mg  200 mg Oral BID    aspirin delayed-release tablet 81 mg  81 mg Oral DAILY    carvediloL (COREG) tablet 6.25 mg  6.25 mg Oral BID WITH MEALS    insulin glargine (LANTUS) injection 35 Units  35 Units SubCUTAneous QHS    isosorbide mononitrate ER (IMDUR) tablet 30 mg  30 mg Oral 6am    magnesium oxide (MAG-OX) tablet 400 mg  400 mg Oral BID    pravastatin (PRAVACHOL) tablet 80 mg  80 mg Oral QHS    sacubitriL-valsartan (ENTRESTO) 49-51 mg tablet 1 Tab  1 Tab Oral BID    enoxaparin (LOVENOX) injection 40 mg  40 mg SubCUTAneous Q24H    albuterol (PROVENTIL HFA, VENTOLIN HFA, PROAIR HFA) inhaler 2 Puff  2 Puff Inhalation Q4H PRN    acetaminophen (TYLENOL) tablet 650 mg  650 mg Oral Q6H PRN    cefTRIAXone (ROCEPHIN) 2 g in 0.9% sodium chloride (MBP/ADV) 50 mL  2 g IntraVENous Q24H    doxycycline (VIBRAMYCIN) capsule 100 mg  100 mg Oral BID    zinc sulfate tablet 220 mg  220 mg Oral DAILY    ascorbic acid (vitamin C) (VITAMIN C) tablet 1,000 mg  1,000 mg Oral DAILY    insulin lispro (HUMALOG) injection   SubCUTAneous AC&HS    tiotropium bromide (SPIRIVA RESPIMAT) 2.5 mcg /actuation  2 Puff Inhalation DAILY       Other Studies (last 24 hours):  No results found.     Assessment and Plan:     Hospital Problems as of 4/18/2020 Date Reviewed: 4/14/2020          Codes Class Noted - Resolved POA    Acute respiratory failure with hypoxia (HCC) ICD-10-CM: J96.01  ICD-9-CM: 518.81  4/17/2020 - Present Unknown        Suspected Covid-19 Virus Infection ICD-10-CM: R68.89  4/17/2020 - Present Unknown        Type 2 diabetes with nephropathy ([facility] ICD-10-CM: E11.21  ICD-9-CM: 250.40, 583.81  4/2/2018 - Present Yes        Ventricular tachycardia ([facility] ICD-10-CM: I47.2  ICD-9-CM: 427.1  4/18/2016 - Present Yes        CAD in native artery ICD-10-CM: I25.10  ICD-9-CM: 414.01  3/30/2016 - Present Yes        Automatic implantable cardioverter-defibrillator in situ ICD-10-CM: Z95.810  ICD-9-CM: V45.02  3/30/2016 - Present Yes    Overview Signed 3/30/2016  8:13 AM by Taty Nye dual ICD 7/2013  1/5 lost brother,11/8 had VF episode with ATP,no shock             BA on CPAP (Chronic) ICD-10-CM: G47.33, Z99.89  ICD-9-CM: 327.23, V46.8  7/14/2014 - Present Yes        Morbid obesity (HCC) (Chronic) ICD-10-CM: E66.01  ICD-9-CM: 278.01  7/14/2014 - Present Yes        Chronic systolic heart failure (HCC) (Chronic) ICD-10-CM: I50.22  ICD-9-CM: 428.22  7/18/2013 - Present Yes    Overview Signed 3/30/2016  8:11 AM by Ramses ROMERO 4/15:low risk  Echo 6/2014:EF 40-45%  Echo 5/2013:EF 30-35%  LHC 2/2013: minimal CAD             NICM (nonischemic cardiomyopathy) (HCC) (Chronic) ICD-10-CM: I42.8  ICD-9-CM: 425.4  2/15/2013 - Present Yes        Dyslipidemia ICD-10-CM: E78.5  ICD-9-CM: 272.4  2/13/2013 - Present Yes        HTN (hypertension) (Chronic) ICD-10-CM: I10  ICD-9-CM: 401.9  4/12/2010 - Present Yes            Patient admitted with acute respiratory failure with hypoxia. Most likely multifactorial in origin. Infectious etiology being ruled out.       Acute on chronic respiratory failure with hypoxia/suspicion for SARS-CoV2  SARS-CoV2 pending  CXR: Atelectasis versus consolidation  CT chest: Mild patchy areas of atelectasis and infiltrate bilaterally, cardiomegaly  WBC: 0.5  Lymphopenia not present     ABX #2  Ceftriaxone and doxycycline     Plan:  -Continue IV antibiotics  -Albuterol every 4 as needed  -Spiriva  -Ascorbic acid  -Zinc     Acute on chronic systolic heart failure  Echo 1/2020: EF 30 to 35%  proBNP> 2900  Remains net positive     Plan:  -Continue home medications  -Continue trial of Lasix 40 IV 3 TIMES daily  -Strict I and O  -Daily Weights  -Telemetry Monitoring     Chest pain/history of V. tach  Patient with episode of V. tach 4/17  EKG without significant ST elevations or depressions  Troponin negative     Plan:  -Monitor electrolytes     Hypertension  BP stable     Plan:  -Continue home meds     CAD  -Continue Home Meds     Hyperlipidemia  -Continue home medications     BA  -CPAP nightly    DC planning/Dispo:        Diet:  DIET CARDIAC  DVT ppx:      Signed:  Haja Owusu MD

## 2020-04-18 NOTE — PROGRESS NOTES
CM made contact with patient via phone to discuss d/c plan. Patient alert/orient to name, place and . Patient states she lives alone in a apartment with three steps to enter into the home. States she's independent with her ADL's and has no DME's in the home. States she works and drive. States she do plan on returning back home. Patient has decline any services at this time. Patient has no needs identified at this time. CM will continue to monitor. Care Management Interventions  PCP Verified by CM: Yes(Jonathon Stahl MD)  Mode of Transport at Discharge:  Other (see comment)(Family)  Transition of Care Consult (CM Consult): Discharge Planning  Discharge Durable Medical Equipment: No  Physical Therapy Consult: No  Occupational Therapy Consult: No  Speech Therapy Consult: No  Current Support Network: Own Home, Lives Alone  Confirm Follow Up Transport: Family  The Patient and/or Patient Representative was Provided with a Choice of Provider and Agrees with the Discharge Plan?: No  Freedom of Choice List was Provided with Basic Dialogue that Supports the Patient's Individualized Plan of Care/Goals, Treatment Preferences and Shares the Quality Data Associated with the Providers?: No  [place] Resource Information Provided?: No  Discharge Location  Discharge Placement: Home

## 2020-04-18 NOTE — PROGRESS NOTES
No further changes. O2 in place 4L HF N/C. Urine output slightly increased post lasix. Pt instructed to limit po fluids. No further changes.

## 2020-04-18 NOTE — PROGRESS NOTES
04/18/20 0238   Oxygen Therapy   O2 Sat (%) 98 %   Pulse via Oximetry 64 beats per minute   O2 Device Nasal cannula   O2 Flow Rate (L/min) 5 l/min   Respiratory   Respiratory (WDL) X   Respiratory Pattern Dyspnea at rest   Breath Sounds Bilateral Scattered wheezing   Cough Non-productive   CPAP/BIPAP   CPAP/BIPAP Start/Stop Off     Patient did not wear night-time CPAP. RT set up CPAP in patient's room at 2200. Patient stated, \"still watching TV and not ready to go to bed yet\". Instructed patient to call respiratory when ready to put on CPAP and made nurse aware to call. Checked on patient again at  and patient stated, \"she's nauseous and can't wear it tonight\".

## 2020-04-19 LAB
ANION GAP SERPL CALC-SCNC: 2 MMOL/L (ref 7–16)
BASOPHILS # BLD: 0 K/UL (ref 0–0.2)
BASOPHILS NFR BLD: 0 % (ref 0–2)
BUN SERPL-MCNC: 22 MG/DL (ref 6–23)
CALCIUM SERPL-MCNC: 8.3 MG/DL (ref 8.3–10.4)
CHLORIDE SERPL-SCNC: 108 MMOL/L (ref 98–107)
CO2 SERPL-SCNC: 31 MMOL/L (ref 21–32)
CREAT SERPL-MCNC: 1.38 MG/DL (ref 0.6–1)
DIFFERENTIAL METHOD BLD: ABNORMAL
EMERGENT DISEASE PANEL, EDPR: NOT DETECTED
EOSINOPHIL # BLD: 0.3 K/UL (ref 0–0.8)
EOSINOPHIL NFR BLD: 5 % (ref 0.5–7.8)
ERYTHROCYTE [DISTWIDTH] IN BLOOD BY AUTOMATED COUNT: 14.9 % (ref 11.9–14.6)
GLUCOSE BLD STRIP.AUTO-MCNC: 101 MG/DL (ref 65–100)
GLUCOSE BLD STRIP.AUTO-MCNC: 104 MG/DL (ref 65–100)
GLUCOSE BLD STRIP.AUTO-MCNC: 114 MG/DL (ref 65–100)
GLUCOSE BLD STRIP.AUTO-MCNC: 93 MG/DL (ref 65–100)
GLUCOSE SERPL-MCNC: 83 MG/DL (ref 65–100)
HCT VFR BLD AUTO: 38.7 % (ref 35.8–46.3)
HGB BLD-MCNC: 12.2 G/DL (ref 11.7–15.4)
IMM GRANULOCYTES # BLD AUTO: 0 K/UL (ref 0–0.5)
IMM GRANULOCYTES NFR BLD AUTO: 0 % (ref 0–5)
LYMPHOCYTES # BLD: 2 K/UL (ref 0.5–4.6)
LYMPHOCYTES NFR BLD: 36 % (ref 13–44)
MAGNESIUM SERPL-MCNC: 2.4 MG/DL (ref 1.8–2.4)
MCH RBC QN AUTO: 31 PG (ref 26.1–32.9)
MCHC RBC AUTO-ENTMCNC: 31.5 G/DL (ref 31.4–35)
MCV RBC AUTO: 98.2 FL (ref 79.6–97.8)
MONOCYTES # BLD: 0.4 K/UL (ref 0.1–1.3)
MONOCYTES NFR BLD: 8 % (ref 4–12)
NEUTS SEG # BLD: 2.8 K/UL (ref 1.7–8.2)
NEUTS SEG NFR BLD: 51 % (ref 43–78)
NRBC # BLD: 0 K/UL (ref 0–0.2)
PHOSPHATE SERPL-MCNC: 4.2 MG/DL (ref 2.5–4.5)
PLATELET # BLD AUTO: 145 K/UL (ref 150–450)
PMV BLD AUTO: 11.7 FL (ref 9.4–12.3)
POTASSIUM SERPL-SCNC: 4.2 MMOL/L (ref 3.5–5.1)
RBC # BLD AUTO: 3.94 M/UL (ref 4.05–5.2)
SODIUM SERPL-SCNC: 141 MMOL/L (ref 136–145)
WBC # BLD AUTO: 5.5 K/UL (ref 4.3–11.1)

## 2020-04-19 PROCEDURE — 74011250637 HC RX REV CODE- 250/637: Performed by: INTERNAL MEDICINE

## 2020-04-19 PROCEDURE — 82962 GLUCOSE BLOOD TEST: CPT

## 2020-04-19 PROCEDURE — 94640 AIRWAY INHALATION TREATMENT: CPT

## 2020-04-19 PROCEDURE — 83735 ASSAY OF MAGNESIUM: CPT

## 2020-04-19 PROCEDURE — 74011000250 HC RX REV CODE- 250: Performed by: INTERNAL MEDICINE

## 2020-04-19 PROCEDURE — 74011250636 HC RX REV CODE- 250/636: Performed by: INTERNAL MEDICINE

## 2020-04-19 PROCEDURE — 80048 BASIC METABOLIC PNL TOTAL CA: CPT

## 2020-04-19 PROCEDURE — 74011000258 HC RX REV CODE- 258: Performed by: INTERNAL MEDICINE

## 2020-04-19 PROCEDURE — 74011636637 HC RX REV CODE- 636/637: Performed by: INTERNAL MEDICINE

## 2020-04-19 PROCEDURE — 65270000029 HC RM PRIVATE

## 2020-04-19 PROCEDURE — 94760 N-INVAS EAR/PLS OXIMETRY 1: CPT

## 2020-04-19 PROCEDURE — 77010033678 HC OXYGEN DAILY

## 2020-04-19 PROCEDURE — 84100 ASSAY OF PHOSPHORUS: CPT

## 2020-04-19 PROCEDURE — 74011250637 HC RX REV CODE- 250/637: Performed by: HOSPITALIST

## 2020-04-19 PROCEDURE — 36415 COLL VENOUS BLD VENIPUNCTURE: CPT

## 2020-04-19 PROCEDURE — 85025 COMPLETE CBC W/AUTO DIFF WBC: CPT

## 2020-04-19 RX ORDER — FUROSEMIDE 10 MG/ML
60 INJECTION INTRAMUSCULAR; INTRAVENOUS EVERY 8 HOURS
Status: DISCONTINUED | OUTPATIENT
Start: 2020-04-19 | End: 2020-04-20

## 2020-04-19 RX ORDER — OXYCODONE AND ACETAMINOPHEN 10; 325 MG/1; MG/1
1 TABLET ORAL
Status: DISCONTINUED | OUTPATIENT
Start: 2020-04-19 | End: 2020-04-23 | Stop reason: HOSPADM

## 2020-04-19 RX ADMIN — INSULIN GLARGINE 35 UNITS: 100 INJECTION, SOLUTION SUBCUTANEOUS at 21:47

## 2020-04-19 RX ADMIN — AMIODARONE HYDROCHLORIDE 200 MG: 200 TABLET ORAL at 17:09

## 2020-04-19 RX ADMIN — OXYCODONE HYDROCHLORIDE AND ACETAMINOPHEN 1 TABLET: 5; 325 TABLET ORAL at 11:21

## 2020-04-19 RX ADMIN — CARVEDILOL 6.25 MG: 6.25 TABLET, FILM COATED ORAL at 21:47

## 2020-04-19 RX ADMIN — ASPIRIN 81 MG: 81 TABLET ORAL at 08:36

## 2020-04-19 RX ADMIN — Medication 400 MG: at 17:09

## 2020-04-19 RX ADMIN — FUROSEMIDE 40 MG: 40 INJECTION, SOLUTION INTRAMUSCULAR; INTRAVENOUS at 06:17

## 2020-04-19 RX ADMIN — GUAIFENESIN AND DEXTROMETHORPHAN 10 ML: 100; 10 SYRUP ORAL at 17:14

## 2020-04-19 RX ADMIN — CARVEDILOL 6.25 MG: 6.25 TABLET, FILM COATED ORAL at 08:36

## 2020-04-19 RX ADMIN — Medication 220 MG: at 21:47

## 2020-04-19 RX ADMIN — OXYCODONE HYDROCHLORIDE AND ACETAMINOPHEN 1000 MG: 500 TABLET ORAL at 17:09

## 2020-04-19 RX ADMIN — ONDANSETRON 4 MG: 2 INJECTION INTRAMUSCULAR; INTRAVENOUS at 21:46

## 2020-04-19 RX ADMIN — SACUBITRIL AND VALSARTAN 1 TABLET: 49; 51 TABLET, FILM COATED ORAL at 17:09

## 2020-04-19 RX ADMIN — ENOXAPARIN SODIUM 40 MG: 40 INJECTION SUBCUTANEOUS at 15:55

## 2020-04-19 RX ADMIN — LATANOPROST 1 DROP: 50 SOLUTION OPHTHALMIC at 22:00

## 2020-04-19 RX ADMIN — ISOSORBIDE MONONITRATE 30 MG: 30 TABLET, EXTENDED RELEASE ORAL at 06:17

## 2020-04-19 RX ADMIN — AMIODARONE HYDROCHLORIDE 200 MG: 200 TABLET ORAL at 08:36

## 2020-04-19 RX ADMIN — FUROSEMIDE 60 MG: 10 INJECTION, SOLUTION INTRAMUSCULAR; INTRAVENOUS at 21:46

## 2020-04-19 RX ADMIN — DOXYCYCLINE HYCLATE 100 MG: 100 CAPSULE ORAL at 08:36

## 2020-04-19 RX ADMIN — CEFTRIAXONE 2 G: 2 INJECTION, POWDER, FOR SOLUTION INTRAMUSCULAR; INTRAVENOUS at 10:51

## 2020-04-19 RX ADMIN — SACUBITRIL AND VALSARTAN 1 TABLET: 49; 51 TABLET, FILM COATED ORAL at 08:36

## 2020-04-19 RX ADMIN — DOXYCYCLINE HYCLATE 100 MG: 100 CAPSULE ORAL at 17:09

## 2020-04-19 RX ADMIN — ONDANSETRON 4 MG: 2 INJECTION INTRAMUSCULAR; INTRAVENOUS at 15:57

## 2020-04-19 RX ADMIN — TIOTROPIUM BROMIDE INHALATION SPRAY 2 PUFF: 3.12 SPRAY, METERED RESPIRATORY (INHALATION) at 08:38

## 2020-04-19 RX ADMIN — Medication 400 MG: at 08:36

## 2020-04-19 RX ADMIN — PRAVASTATIN SODIUM 80 MG: 20 TABLET ORAL at 21:47

## 2020-04-19 NOTE — ROUTINE PROCESS
Pt COVID negative. PT transferred to room 515 with O2 at2L with report called to MELLO Falcon. Telemetry notified that pt now in room 515 for Remote Telemetry.

## 2020-04-19 NOTE — ROUTINE PROCESS
Assessment completed, see doc flowsheet. Pt up sitting at the bedside with O2 in place. Denies needs, pains or complaints at this time.

## 2020-04-19 NOTE — PROGRESS NOTES
END OF SHIFT NOTE:    INTAKE/OUTPUT  04/18 0701 - 04/19 0700  In: 1050 [P.O.:1050]  Out: 1400 [Urine:1400]  Voiding: YES  Catheter: NO  Drain:              Stool:  0 occurrences. Characteristics:  Stool Assessment  Stool Color: Brown  Stool Appearance: Loose  Stool Amount: Medium  Stool Source/Status: Rectum    Emesis: 0 occurrences. Characteristics:        VITAL SIGNS  Patient Vitals for the past 12 hrs:   Temp Pulse Resp BP SpO2   04/19/20 0335 97.8 °F (36.6 °C) 60 18 101/68 96 %   04/18/20 2334     94 %   04/18/20 2221 97.8 °F (36.6 °C) 60 18 112/72 97 %   04/18/20 1927 97.8 °F (36.6 °C) 69 18 123/82 97 %       Pain Assessment  Pain Intensity 1: 0 (04/18/20 2221)  Pain Location 1: Generalized  Pain Intervention(s) 1: Medication (see MAR)  Patient Stated Pain Goal: 2    Ambulating  Yes with O2. Shift report given to oncoming nurse at the bedside.     Simone Burns RN

## 2020-04-19 NOTE — PROGRESS NOTES
is unable to visit patient presently due to covid restrictions      Per assessment of staff: (most recent entry)    Talked with primary nurse  Patient came from 8th floor  chf  Alert      No other concerns noted      If there any immediate needs please call chaplains at [phone].       Lorena Manley, staff

## 2020-04-19 NOTE — PROGRESS NOTES
04/18/20 2334   Oxygen Therapy   O2 Sat (%) 94 %   Pulse via Oximetry 62 beats per minute   O2 Device Hi flow nasal cannula   O2 Flow Rate (L/min) 4 l/min   Respiratory   Respiratory (WDL) X   Respiratory Pattern Dyspnea with exertion   Breath Sounds Bilateral Lower;Diminished;Scattered wheezing   Cough Non-productive   CPAP/BIPAP   CPAP/BIPAP Start/Stop Off  (patient refused to wear)   Total RR (Spontaneous) 20 breaths per minute     RT in room giving PRN Albuterol inhaler. Patient refused to wear night CPAP. Patient encouraged to wear and told to call respiratory if she changes her mind.

## 2020-04-19 NOTE — ROUTINE PROCESS
Pt made aware of MD order for 1800cc/24hr fluid restriction. Pt rolled her eyes stating \" I know all about that. \" States she has been on fluid restrictions in th past. Pt encouraged to be compliant to improve her health status.

## 2020-04-19 NOTE — PROGRESS NOTES
Hospitalist Note     Admit Date:  2020  1:43 PM   Name:  Maximiliano Morgan   Age:  62 y.o.  :  1965   MRN:  [id number]   PCP:  Verna Haque MD  Treatment Team: Attending Provider: Lydia Benitez MD    HPI/Subjective: The patient is a 57-year-old female with past medical history of chronic systolic heart failure, HTN, BA, COPD morbid obesity, ventricular tachycardia, CAD, AICD, and diabetes mellitus who presents emergency department with shortness of breath. Patient reports she has had chronic shortness of breath for a significant period of time. She reports is been ongoing since December. She reports for the past week is become acutely worse. She reports that she recently had her dose of home Lasix decreased. She reports cough. She reports orthopnea. In late afternoon: Patient developed run of V. tach and chest pain with increased shortness of breath    Hospital Course:  -COVID: Negative  -Undergoing Tx with IV abx  -Undergoing Diursesis with IV Lasix      Patient seen and examined at bedside in no acute distress. Patient reports no change in respiratory status since yesterday. She reports continued cough. She denies any chest pain at time of examination.       Objective:     Patient Vitals for the past 24 hrs:   Temp Pulse Resp BP SpO2   20 0838     99 %   20 0836  62  128/70    20 0800 98.4 °F (36.9 °C) 61 18 107/65 98 %   20 0617  69  116/71    20 0335 97.8 °F (36.6 °C) 60 18 101/68 96 %   20 2334     94 %   20 2221 97.8 °F (36.6 °C) 60 18 112/72 97 %   20 1927 97.8 °F (36.6 °C) 69 18 123/82 97 %   20 1512 98.4 °F (36.9 °C) 86 16 136/71 99 %   20 1200 98.1 °F (36.7 °C) 72 16 123/68 98 %     Oxygen Therapy  O2 Sat (%): 99 % (20 0838)  Pulse via Oximetry: 70 beats per minute (20 0838)  O2 Device: Hi flow nasal cannula (20)  O2 Flow Rate (L/min): 4 l/min (20)    Estimated body mass index is 43.57 kg/m² as calculated from the following:    Height as of an earlier encounter on 4/17/20: 4' 11\" (1.499 m). Weight as of this encounter: 97.8 kg (215 lb 11.2 oz). Intake/Output Summary (Last 24 hours) at 4/19/2020 1056  Last data filed at 4/19/2020 0844  Gross per 24 hour   Intake 1050 ml   Output 2600 ml   Net -1550 ml       *Note that automatically entered I/Os may not be accurate; dependent on patient compliance with collection and accurate  by techs. General:    Morbidly obese female  CV:   RRR. No murmur, rub, or gallop. Lungs:   Reduced breath sounds bilaterally  Abdomen:   Soft, nontender, nondistended. Extremities: Warm and dry. No cyanosis or edema. Skin:     No rashes or jaundice.    Neuro:  No gross focal deficits    Data Review:  I have reviewed all labs, meds, and studies from the last 24 hours:    Recent Results (from the past 24 hour(s))   GLUCOSE, POC    Collection Time: 04/18/20 11:45 AM   Result Value Ref Range    Glucose (POC) 95 65 - 100 mg/dL   GLUCOSE, POC    Collection Time: 04/18/20  4:12 PM   Result Value Ref Range    Glucose (POC) 123 (H) 65 - 100 mg/dL   GLUCOSE, POC    Collection Time: 04/18/20  8:08 PM   Result Value Ref Range    Glucose (POC) 149 (H) 65 - 100 mg/dL   GLUCOSE, POC    Collection Time: 04/19/20  5:33 AM   Result Value Ref Range    Glucose (POC) 93 65 - 100 mg/dL   CBC WITH AUTOMATED DIFF    Collection Time: 04/19/20  6:50 AM   Result Value Ref Range    WBC 5.5 4.3 - 11.1 K/uL    RBC 3.94 (L) 4.05 - 5.2 M/uL    HGB 12.2 11.7 - 15.4 g/dL    HCT 38.7 35.8 - 46.3 %    MCV 98.2 (H) 79.6 - 97.8 FL    MCH 31.0 26.1 - 32.9 PG    MCHC 31.5 31.4 - 35.0 g/dL    RDW 14.9 (H) 11.9 - 14.6 %    PLATELET 731 (L) 999 - 450 K/uL    MPV 11.7 9.4 - 12.3 FL    ABSOLUTE NRBC 0.00 0.0 - 0.2 K/uL    DF AUTOMATED      NEUTROPHILS 51 43 - 78 %    LYMPHOCYTES 36 13 - 44 %    MONOCYTES 8 4.0 - 12.0 %    EOSINOPHILS 5 0.5 - 7.8 %    BASOPHILS 0 0.0 - 2.0 %    IMMATURE GRANULOCYTES 0 0.0 - 5.0 %    ABS. NEUTROPHILS 2.8 1.7 - 8.2 K/UL    ABS. LYMPHOCYTES 2.0 0.5 - 4.6 K/UL    ABS. MONOCYTES 0.4 0.1 - 1.3 K/UL    ABS. EOSINOPHILS 0.3 0.0 - 0.8 K/UL    ABS. BASOPHILS 0.0 0.0 - 0.2 K/UL    ABS. IMM. GRANS. 0.0 0.0 - 0.5 K/UL   METABOLIC PANEL, BASIC    Collection Time: 04/19/20  6:50 AM   Result Value Ref Range    Sodium 141 136 - 145 mmol/L    Potassium 4.2 3.5 - 5.1 mmol/L    Chloride 108 (H) 98 - 107 mmol/L    CO2 31 21 - 32 mmol/L    Anion gap 2 (L) 7 - 16 mmol/L    Glucose 83 65 - 100 mg/dL    BUN 22 6 - 23 MG/DL    Creatinine 1.38 (H) 0.6 - 1.0 MG/DL    GFR est AA 51 (L) >60 ml/min/1.73m2    GFR est non-AA 42 (L) >60 ml/min/1.73m2    Calcium 8.3 8.3 - 10.4 MG/DL   PHOSPHORUS    Collection Time: 04/19/20  6:50 AM   Result Value Ref Range    Phosphorus 4.2 2.5 - 4.5 MG/DL   MAGNESIUM    Collection Time: 04/19/20  6:50 AM   Result Value Ref Range    Magnesium 2.4 1.8 - 2.4 mg/dL        All Micro Results     Procedure Component Value Units Date/Time    CULTURE, RESPIRATORY/SPUTUM/BRONCH Ruffus Latrice [[id number]]     Order Status:  Sent Specimen:  Sputum           No results found for this visit on 04/17/20.     Current Meds:  Current Facility-Administered Medications   Medication Dose Route Frequency    furosemide (LASIX) injection 60 mg  60 mg IntraVENous Q8H    ondansetron (ZOFRAN) injection 4 mg  4 mg IntraVENous Q6H PRN    oxyCODONE-acetaminophen (PERCOCET) 5-325 mg per tablet 1 Tab  1 Tab Oral Q4H PRN    latanoprost (XALATAN) 0.005 % ophthalmic solution 1 Drop  1 Drop Both Eyes QHS    carvediloL (COREG) tablet 6.25 mg  6.25 mg Oral BID    guaiFENesin-dextromethorphan (ROBITUSSIN DM) 100-10 mg/5 mL syrup 10 mL  10 mL Oral Q6H PRN    amiodarone (CORDARONE) tablet 200 mg  200 mg Oral BID    aspirin delayed-release tablet 81 mg  81 mg Oral DAILY    insulin glargine (LANTUS) injection 35 Units  35 Units SubCUTAneous QHS    isosorbide mononitrate ER (IMDUR) tablet 30 mg  30 mg Oral 6am    magnesium oxide (MAG-OX) tablet 400 mg  400 mg Oral BID    pravastatin (PRAVACHOL) tablet 80 mg  80 mg Oral QHS    sacubitriL-valsartan (ENTRESTO) 49-51 mg tablet 1 Tab  1 Tab Oral BID    enoxaparin (LOVENOX) injection 40 mg  40 mg SubCUTAneous Q24H    albuterol (PROVENTIL HFA, VENTOLIN HFA, PROAIR HFA) inhaler 2 Puff  2 Puff Inhalation Q4H PRN    acetaminophen (TYLENOL) tablet 650 mg  650 mg Oral Q6H PRN    cefTRIAXone (ROCEPHIN) 2 g in 0.9% sodium chloride (MBP/ADV) 50 mL  2 g IntraVENous Q24H    doxycycline (VIBRAMYCIN) capsule 100 mg  100 mg Oral BID    zinc sulfate tablet 220 mg  220 mg Oral DAILY    ascorbic acid (vitamin C) (VITAMIN C) tablet 1,000 mg  1,000 mg Oral DAILY    insulin lispro (HUMALOG) injection   SubCUTAneous AC&HS    tiotropium bromide (SPIRIVA RESPIMAT) 2.5 mcg /actuation  2 Puff Inhalation DAILY       Other Studies (last 24 hours):  No results found.     Assessment and Plan:     Hospital Problems as of 4/19/2020 Date Reviewed: 4/14/2020          Codes Class Noted - Resolved POA    Acute respiratory failure with hypoxia (HCC) ICD-10-CM: J96.01  ICD-9-CM: 518.81  4/17/2020 - Present Unknown        Suspected Covid-19 Virus Infection ICD-10-CM: R68.89  4/17/2020 - Present Unknown        Type 2 diabetes with nephropathy ([facility] ICD-10-CM: E11.21  ICD-9-CM: 250.40, 583.81  4/2/2018 - Present Yes        Ventricular tachycardia ([facility] ICD-10-CM: I47.2  ICD-9-CM: 427.1  4/18/2016 - Present Yes        CAD in native artery ICD-10-CM: I25.10  ICD-9-CM: 414.01  3/30/2016 - Present Yes        Automatic implantable cardioverter-defibrillator in situ ICD-10-CM: Z95.810  ICD-9-CM: V45.02  3/30/2016 - Present Yes    Overview Signed 3/30/2016  8:13 AM by Tyshawn alexander ICD 7/2013 1/5 lost brother,11/8 had VF episode with ATP,no shock             BA on CPAP (Chronic) ICD-10-CM: G47.33, Z99.89  ICD-9-CM: 327.23, V46.8  7/14/2014 - Present Yes Morbid obesity ([facility] (Chronic) ICD-10-CM: E66.01  ICD-9-CM: 278.01  7/14/2014 - Present Yes        Chronic systolic heart failure (HCC) (Chronic) ICD-10-CM: I50.22  ICD-9-CM: 428.22  7/18/2013 - Present Yes    Overview Signed 3/30/2016  8:11 AM by Shant Euceda     NST 4/15:low risk  Echo 6/2014:EF 40-45%  Echo 5/2013:EF 30-35%  LHC 2/2013: minimal CAD             NICM (nonischemic cardiomyopathy) (HCC) (Chronic) ICD-10-CM: I42.8  ICD-9-CM: 425.4  2/15/2013 - Present Yes        Dyslipidemia ICD-10-CM: E78.5  ICD-9-CM: 272.4  2/13/2013 - Present Yes        HTN (hypertension) (Chronic) ICD-10-CM: I10  ICD-9-CM: 401.9  4/12/2010 - Present Yes            Patient admitted with acute respiratory failure with hypoxia. Most likely multifactorial in origin. Infectious etiology being ruled out. Acute on chronic respiratory failure with hypoxia/suspicion for SARS-CoV2  SARS-CoV2: Negative   CXR: Atelectasis versus consolidation  CT chest: Mild patchy areas of atelectasis and infiltrate bilaterally, cardiomegaly  WBC: 5.5  Lymphopenia not present     ABX #3  Ceftriaxone and doxycycline     Plan:  -Continue IV antibiotics  -Albuterol every 4 as needed  -Spiriva  -Ascorbic acid  -Zinc     Acute on chronic systolic heart failure  Echo 1/2020: EF 30 to 35%  proBNP> 2900  Net -1 L     Plan:  -Continue home medications  -Increase trial of Lasix 60 IV 3 TIMES daily  -Fluid restriction 1800 mL  -Strict I and O  -Daily Weights  -Telemetry Monitoring     Chest pain/history of V. tach  Patient with episode of V.  Tach and chest pain lasting a few seconds 4/17  EKG without significant ST elevations or depressions  Troponin negative  AICD in place  History of Ventricular Arythmias      Plan:  -Monitor electrolytes     Hypertension  BP stable     Plan:  -Continue home meds     CAD  -Continue Home Meds     Hyperlipidemia  -Continue home medications     BA  -CPAP nightly    DC planning/Dispo:        Diet:  DIET CARDIAC  DVT ppx: Lovenox    Signed:  Anabelle Zapata MD

## 2020-04-19 NOTE — ROUTINE PROCESS
Pt sitting at the bedside eating lunch. Pt c/o HA 5/10. Requesting \"pain pill\". Percocet 5-325mg given po.

## 2020-04-20 LAB
ANION GAP SERPL CALC-SCNC: 6 MMOL/L (ref 7–16)
BASOPHILS # BLD: 0 K/UL (ref 0–0.2)
BASOPHILS NFR BLD: 0 % (ref 0–2)
BUN SERPL-MCNC: 26 MG/DL (ref 6–23)
CALCIUM SERPL-MCNC: 8.4 MG/DL (ref 8.3–10.4)
CHLORIDE SERPL-SCNC: 107 MMOL/L (ref 98–107)
CO2 SERPL-SCNC: 28 MMOL/L (ref 21–32)
CREAT SERPL-MCNC: 1.49 MG/DL (ref 0.6–1)
DIFFERENTIAL METHOD BLD: ABNORMAL
EOSINOPHIL # BLD: 0.3 K/UL (ref 0–0.8)
EOSINOPHIL NFR BLD: 5 % (ref 0.5–7.8)
ERYTHROCYTE [DISTWIDTH] IN BLOOD BY AUTOMATED COUNT: 14.6 % (ref 11.9–14.6)
GLUCOSE BLD STRIP.AUTO-MCNC: 125 MG/DL (ref 65–100)
GLUCOSE BLD STRIP.AUTO-MCNC: 136 MG/DL (ref 65–100)
GLUCOSE BLD STRIP.AUTO-MCNC: 138 MG/DL (ref 65–100)
GLUCOSE BLD STRIP.AUTO-MCNC: 93 MG/DL (ref 65–100)
GLUCOSE BLD STRIP.AUTO-MCNC: 96 MG/DL (ref 65–100)
GLUCOSE SERPL-MCNC: 130 MG/DL (ref 65–100)
HCT VFR BLD AUTO: 35.7 % (ref 35.8–46.3)
HGB BLD-MCNC: 11.4 G/DL (ref 11.7–15.4)
IMM GRANULOCYTES # BLD AUTO: 0 K/UL (ref 0–0.5)
IMM GRANULOCYTES NFR BLD AUTO: 0 % (ref 0–5)
LYMPHOCYTES # BLD: 1.7 K/UL (ref 0.5–4.6)
LYMPHOCYTES NFR BLD: 30 % (ref 13–44)
MCH RBC QN AUTO: 30.9 PG (ref 26.1–32.9)
MCHC RBC AUTO-ENTMCNC: 31.9 G/DL (ref 31.4–35)
MCV RBC AUTO: 96.7 FL (ref 79.6–97.8)
MONOCYTES # BLD: 0.4 K/UL (ref 0.1–1.3)
MONOCYTES NFR BLD: 7 % (ref 4–12)
NEUTS SEG # BLD: 3.3 K/UL (ref 1.7–8.2)
NEUTS SEG NFR BLD: 57 % (ref 43–78)
NRBC # BLD: 0 K/UL (ref 0–0.2)
PLATELET # BLD AUTO: 141 K/UL (ref 150–450)
PMV BLD AUTO: 11.7 FL (ref 9.4–12.3)
POTASSIUM SERPL-SCNC: 4 MMOL/L (ref 3.5–5.1)
RBC # BLD AUTO: 3.69 M/UL (ref 4.05–5.2)
SODIUM SERPL-SCNC: 141 MMOL/L (ref 136–145)
WBC # BLD AUTO: 5.7 K/UL (ref 4.3–11.1)

## 2020-04-20 PROCEDURE — 94640 AIRWAY INHALATION TREATMENT: CPT

## 2020-04-20 PROCEDURE — 65270000029 HC RM PRIVATE

## 2020-04-20 PROCEDURE — 85025 COMPLETE CBC W/AUTO DIFF WBC: CPT

## 2020-04-20 PROCEDURE — 74011250637 HC RX REV CODE- 250/637: Performed by: INTERNAL MEDICINE

## 2020-04-20 PROCEDURE — 94760 N-INVAS EAR/PLS OXIMETRY 1: CPT

## 2020-04-20 PROCEDURE — 82962 GLUCOSE BLOOD TEST: CPT

## 2020-04-20 PROCEDURE — 80048 BASIC METABOLIC PNL TOTAL CA: CPT

## 2020-04-20 PROCEDURE — 74011000258 HC RX REV CODE- 258: Performed by: INTERNAL MEDICINE

## 2020-04-20 PROCEDURE — 77010033678 HC OXYGEN DAILY

## 2020-04-20 PROCEDURE — 74011636637 HC RX REV CODE- 636/637: Performed by: INTERNAL MEDICINE

## 2020-04-20 PROCEDURE — 74011250636 HC RX REV CODE- 250/636: Performed by: INTERNAL MEDICINE

## 2020-04-20 RX ORDER — ALBUTEROL SULFATE 0.83 MG/ML
2.5 SOLUTION RESPIRATORY (INHALATION)
Status: DISCONTINUED | OUTPATIENT
Start: 2020-04-20 | End: 2020-04-23 | Stop reason: HOSPADM

## 2020-04-20 RX ORDER — GUAIFENESIN 600 MG/1
600 TABLET, EXTENDED RELEASE ORAL EVERY 12 HOURS
Status: DISCONTINUED | OUTPATIENT
Start: 2020-04-20 | End: 2020-04-23 | Stop reason: HOSPADM

## 2020-04-20 RX ORDER — ENOXAPARIN SODIUM 100 MG/ML
40 INJECTION SUBCUTANEOUS EVERY 12 HOURS
Status: DISCONTINUED | OUTPATIENT
Start: 2020-04-20 | End: 2020-04-23 | Stop reason: HOSPADM

## 2020-04-20 RX ORDER — FUROSEMIDE 40 MG/1
40 TABLET ORAL DAILY
Status: DISCONTINUED | OUTPATIENT
Start: 2020-04-20 | End: 2020-04-20

## 2020-04-20 RX ORDER — FUROSEMIDE 40 MG/1
40 TABLET ORAL DAILY
Status: DISCONTINUED | OUTPATIENT
Start: 2020-04-21 | End: 2020-04-21

## 2020-04-20 RX ADMIN — AMIODARONE HYDROCHLORIDE 200 MG: 200 TABLET ORAL at 17:20

## 2020-04-20 RX ADMIN — FUROSEMIDE 60 MG: 10 INJECTION, SOLUTION INTRAMUSCULAR; INTRAVENOUS at 05:16

## 2020-04-20 RX ADMIN — ISOSORBIDE MONONITRATE 30 MG: 30 TABLET, EXTENDED RELEASE ORAL at 05:16

## 2020-04-20 RX ADMIN — DOXYCYCLINE HYCLATE 100 MG: 100 CAPSULE ORAL at 17:20

## 2020-04-20 RX ADMIN — TIOTROPIUM BROMIDE INHALATION SPRAY 2 PUFF: 3.12 SPRAY, METERED RESPIRATORY (INHALATION) at 08:24

## 2020-04-20 RX ADMIN — Medication 400 MG: at 17:20

## 2020-04-20 RX ADMIN — Medication 400 MG: at 07:18

## 2020-04-20 RX ADMIN — CEFTRIAXONE 2 G: 2 INJECTION, POWDER, FOR SOLUTION INTRAMUSCULAR; INTRAVENOUS at 10:03

## 2020-04-20 RX ADMIN — INSULIN GLARGINE 35 UNITS: 100 INJECTION, SOLUTION SUBCUTANEOUS at 21:32

## 2020-04-20 RX ADMIN — ASPIRIN 81 MG: 81 TABLET ORAL at 07:17

## 2020-04-20 RX ADMIN — OXYCODONE HYDROCHLORIDE AND ACETAMINOPHEN 1 TABLET: 10; 325 TABLET ORAL at 00:09

## 2020-04-20 RX ADMIN — AMIODARONE HYDROCHLORIDE 200 MG: 200 TABLET ORAL at 07:18

## 2020-04-20 RX ADMIN — DOXYCYCLINE HYCLATE 100 MG: 100 CAPSULE ORAL at 07:18

## 2020-04-20 RX ADMIN — CARVEDILOL 6.25 MG: 6.25 TABLET, FILM COATED ORAL at 07:18

## 2020-04-20 RX ADMIN — LATANOPROST 1 DROP: 50 SOLUTION OPHTHALMIC at 22:00

## 2020-04-20 RX ADMIN — OXYCODONE HYDROCHLORIDE AND ACETAMINOPHEN 1 TABLET: 10; 325 TABLET ORAL at 10:20

## 2020-04-20 RX ADMIN — SACUBITRIL AND VALSARTAN 1 TABLET: 49; 51 TABLET, FILM COATED ORAL at 17:20

## 2020-04-20 RX ADMIN — ONDANSETRON 4 MG: 2 INJECTION INTRAMUSCULAR; INTRAVENOUS at 13:19

## 2020-04-20 RX ADMIN — ENOXAPARIN SODIUM 40 MG: 40 INJECTION SUBCUTANEOUS at 16:14

## 2020-04-20 RX ADMIN — GUAIFENESIN 600 MG: 600 TABLET ORAL at 21:30

## 2020-04-20 RX ADMIN — GUAIFENESIN 600 MG: 600 TABLET ORAL at 10:38

## 2020-04-20 RX ADMIN — OXYCODONE HYDROCHLORIDE AND ACETAMINOPHEN 1 TABLET: 10; 325 TABLET ORAL at 17:26

## 2020-04-20 RX ADMIN — CARVEDILOL 6.25 MG: 6.25 TABLET, FILM COATED ORAL at 21:30

## 2020-04-20 RX ADMIN — SACUBITRIL AND VALSARTAN 1 TABLET: 49; 51 TABLET, FILM COATED ORAL at 07:17

## 2020-04-20 RX ADMIN — PRAVASTATIN SODIUM 80 MG: 20 TABLET ORAL at 21:30

## 2020-04-20 NOTE — CONSULTS
[address]    Name:  Erickson Jarquin  MR#:  [id number]  :  1965  ACCOUNT #:  [de-identified]  DATE OF SERVICE:  2020      REASON FOR CONSULTATION:  Heart failure. HISTORY OF PRESENT ILLNESS:  The patient is a 53-year-old female who has had chronic shortness of breath since December with worsening in the last week when she has become more acutely short of breath. She states she has chronic orthopnea and PND that are not significantly changed. Her dyspnea on exertion has worsened. She has had increasing lower extremity edema as well. She denies palpitations or syncope. She has had no chest pain. PAST MEDICAL HISTORY:  Includes hypertension, chronic systolic heart failure, ejection fraction 30-35%, obstructive sleep apnea, COPD, ventricular tachycardia and ICD as well as diabetes mellitus and coronary artery disease. SOCIAL HISTORY:  She quit smoking in the past but is a former smoker. FAMILY HISTORY:  Her father  from an MI at age 48. REVIEW OF SYSTEMS:  GENERAL:  No fevers or chills. HEAD:  No headaches. NOSE:  No rhinorrhea. RESPIRATORY:  No cough. GI:  She has had some mild diarrhea that has not been recurrent over the last few days. :  No dysuria. ENDOCRINE:  No temperature intolerance. MUSCULOSKELETAL:  No myalgias or arthralgias. SKIN:  No rashes. EYES:  No visual changes. NEUROLOGIC:  No CVA symptoms. MEDICATIONS:  Include amiodarone 200 mg b.i.d., aspirin 81 mg a day, Coreg 6.25 mg twice daily, doxycycline 100 mg b.i.d., Lasix 40 mg orally a day, guaifenesin 600 mg twice daily, Imdur 30 mg a day, Mag- mg twice daily, Pravachol 80 mg daily, Entresto 49/51 mg b.i.d., Spiriva two puffs per day. PHYSICAL EXAMINATION:  VITAL SIGNS:  Blood pressure is 103/61, heart rate 71. GENERAL:  Well-developed, well-nourished female in no acute distress. Alert, oriented x3 with appropriate mood and affect.   HEENT:  Sclerae clear.  CARDIOVASCULAR:  S1, S2 is regular. ABDOMEN:  Soft. EXTREMITIES:  1+ edema. SKIN:  Warm, dry. NECK:  Supple. Trachea midline. EKG is reviewed and shows atrial paced rhythm, nonspecific ST-T wave changes. PERTINENT LABORATORY DATA:  White count 5.7, hemoglobin is 11.4, platelets are 147. Potassium 4, creatinine 1.5 which is up from 1.09 on admission. ProBNP 2939. Troponins are negative. ASSESSMENT:  A 28-year-old female with acute-on-chronic systolic heart failure that was improving. I agree with changing to oral Lasix today. She on appropriate medications with appropriate blood pressure control.       Kvng Castellanos MD      [id number]  D:  04/20/2020 16:01  T:  04/20/2020 17:20  JOB #:  [id number]

## 2020-04-20 NOTE — PROGRESS NOTES
Monitor room called stated pt had a run of v-tach. Pt observed with no s/s of distress, VS stable, pt trying to get out of bed at the time. Monitor tech called, stated pt back in NSR at that time.

## 2020-04-20 NOTE — PROGRESS NOTES
Hospitalist Note     Admit Date:  2020  1:43 PM   Name:  Jessica Cowart   Age:  62 y.o.  :  1965   MRN:  [id number]   PCP:  Nola Lynne MD  Treatment Team: Attending Provider: Ghanshyam Mcgarry MD; Primary Nurse: Salvador Wolf Consulting Provider: Delano Haas MD    HPI/Subjective: The patient is a 59-year-old female with past medical history of chronic systolic heart failure, HTN, BA, COPD morbid obesity, ventricular tachycardia, CAD, AICD, and diabetes mellitus who presents emergency department with shortness of breath. Patient reports she has had chronic shortness of breath for a significant period of time. She reports is been ongoing since December. She reports for the past week is become acutely worse. She reports that she recently had her dose of home Lasix decreased. She reports cough. She reports orthopnea. In late afternoon: Patient developed run of V. tach and chest pain with increased shortness of breath    Hospital Course:  -COVID: Negative  -Undergoing Tx with IV abx  -Undergoing Diursesis      Patient seen and examined at bedside in no acute distress. Patient reports no change in respiratory status since yesterday. She reports continued cough. She denies any chest pain at time of examination.       Objective:     Patient Vitals for the past 24 hrs:   Temp Pulse Resp BP SpO2   20     97 %   20 0726 98.6 °F (37 °C) 66 22 109/64 97 %   20 0445 98.4 °F (36.9 °C) 66 20 110/70 98 %   20 0015  84  112/60    20 2004 98.4 °F (36.9 °C) 100 20 112/70 98 %   20 1941     97 %   20 1440 97 °F (36.1 °C) 67 18 116/62 98 %   20 1159 98.2 °F (36.8 °C) 60 18 92/56 98 %     Oxygen Therapy  O2 Sat (%): 97 % (20)  Pulse via Oximetry: 81 beats per minute (20)  O2 Device: Nasal cannula (20)  O2 Flow Rate (L/min): 2 l/min (20)    Estimated body mass index is 43.67 kg/m² as calculated from the following:    Height as of an earlier encounter on 4/17/20: 4' 11\" (1.499 m). Weight as of this encounter: 98.1 kg (216 lb 3.2 oz). Intake/Output Summary (Last 24 hours) at 4/20/2020 0953  Last data filed at 4/20/2020 2352  Gross per 24 hour   Intake 120 ml   Output 700 ml   Net -580 ml       *Note that automatically entered I/Os may not be accurate; dependent on patient compliance with collection and accurate  by techs. General:    Morbidly obese female  CV:   RRR. No murmur, rub, or gallop. Lungs:   Reduced breath sounds bilaterally  Abdomen:   Soft, nontender, nondistended. Extremities: Warm and dry. No cyanosis or edema. Skin:     No rashes or jaundice.    Neuro:  No gross focal deficits    Data Review:  I have reviewed all labs, meds, and studies from the last 24 hours:    Recent Results (from the past 24 hour(s))   GLUCOSE, POC    Collection Time: 04/19/20 11:28 AM   Result Value Ref Range    Glucose (POC) 101 (H) 65 - 100 mg/dL   GLUCOSE, POC    Collection Time: 04/19/20  4:23 PM   Result Value Ref Range    Glucose (POC) 114 (H) 65 - 100 mg/dL   GLUCOSE, POC    Collection Time: 04/19/20  9:19 PM   Result Value Ref Range    Glucose (POC) 104 (H) 65 - 100 mg/dL   GLUCOSE, POC    Collection Time: 04/20/20  7:32 AM   Result Value Ref Range    Glucose (POC) 93 65 - 515 mg/dL   METABOLIC PANEL, BASIC    Collection Time: 04/20/20  8:09 AM   Result Value Ref Range    Sodium 141 136 - 145 mmol/L    Potassium 4.0 3.5 - 5.1 mmol/L    Chloride 107 98 - 107 mmol/L    CO2 28 21 - 32 mmol/L    Anion gap 6 (L) 7 - 16 mmol/L    Glucose 130 (H) 65 - 100 mg/dL    BUN 26 (H) 6 - 23 MG/DL    Creatinine 1.49 (H) 0.6 - 1.0 MG/DL    GFR est AA 47 (L) >60 ml/min/1.73m2    GFR est non-AA 38 (L) >60 ml/min/1.73m2    Calcium 8.4 8.3 - 10.4 MG/DL   CBC WITH AUTOMATED DIFF    Collection Time: 04/20/20  8:09 AM   Result Value Ref Range    WBC 5.7 4.3 - 11.1 K/uL    RBC 3.69 (L) 4.05 - 5.2 M/uL    HGB 11.4 (L) 11.7 - 15.4 g/dL    HCT 35.7 (L) 35.8 - 46.3 %    MCV 96.7 79.6 - 97.8 FL    MCH 30.9 26.1 - 32.9 PG    MCHC 31.9 31.4 - 35.0 g/dL    RDW 14.6 11.9 - 14.6 %    PLATELET 284 (L) 474 - 450 K/uL    MPV 11.7 9.4 - 12.3 FL    ABSOLUTE NRBC 0.00 0.0 - 0.2 K/uL    DF AUTOMATED      NEUTROPHILS 57 43 - 78 %    LYMPHOCYTES 30 13 - 44 %    MONOCYTES 7 4.0 - 12.0 %    EOSINOPHILS 5 0.5 - 7.8 %    BASOPHILS 0 0.0 - 2.0 %    IMMATURE GRANULOCYTES 0 0.0 - 5.0 %    ABS. NEUTROPHILS 3.3 1.7 - 8.2 K/UL    ABS. LYMPHOCYTES 1.7 0.5 - 4.6 K/UL    ABS. MONOCYTES 0.4 0.1 - 1.3 K/UL    ABS. EOSINOPHILS 0.3 0.0 - 0.8 K/UL    ABS. BASOPHILS 0.0 0.0 - 0.2 K/UL    ABS. IMM. GRANS. 0.0 0.0 - 0.5 K/UL        All Micro Results     Procedure Component Value Units Date/Time    CULTURE, RESPIRATORY/SPUTUM/BRONCH Margo Alatorre [[id number]] Collected:  04/17/20 1515    Order Status:  Canceled Specimen:  Sputum           No results found for this visit on 04/17/20.     Current Meds:  Current Facility-Administered Medications   Medication Dose Route Frequency    albuterol (PROVENTIL VENTOLIN) nebulizer solution 2.5 mg  2.5 mg Nebulization Q4H PRN    furosemide (LASIX) tablet 40 mg  40 mg Oral DAILY    oxyCODONE-acetaminophen (PERCOCET 10)  mg per tablet 1 Tab  1 Tab Oral Q6H PRN    ondansetron (ZOFRAN) injection 4 mg  4 mg IntraVENous Q6H PRN    oxyCODONE-acetaminophen (PERCOCET) 5-325 mg per tablet 1 Tab  1 Tab Oral Q4H PRN    latanoprost (XALATAN) 0.005 % ophthalmic solution 1 Drop  1 Drop Both Eyes QHS    carvediloL (COREG) tablet 6.25 mg  6.25 mg Oral BID    guaiFENesin-dextromethorphan (ROBITUSSIN DM) 100-10 mg/5 mL syrup 10 mL  10 mL Oral Q6H PRN    amiodarone (CORDARONE) tablet 200 mg  200 mg Oral BID    aspirin delayed-release tablet 81 mg  81 mg Oral DAILY    insulin glargine (LANTUS) injection 35 Units  35 Units SubCUTAneous QHS    isosorbide mononitrate ER (IMDUR) tablet 30 mg  30 mg Oral 6am    magnesium oxide (MAG-OX) tablet 400 mg  400 mg Oral BID    pravastatin (PRAVACHOL) tablet 80 mg  80 mg Oral QHS    sacubitriL-valsartan (ENTRESTO) 49-51 mg tablet 1 Tab  1 Tab Oral BID    enoxaparin (LOVENOX) injection 40 mg  40 mg SubCUTAneous Q24H    acetaminophen (TYLENOL) tablet 650 mg  650 mg Oral Q6H PRN    cefTRIAXone (ROCEPHIN) 2 g in 0.9% sodium chloride (MBP/ADV) 50 mL  2 g IntraVENous Q24H    doxycycline (VIBRAMYCIN) capsule 100 mg  100 mg Oral BID    zinc sulfate tablet 220 mg  220 mg Oral DAILY    ascorbic acid (vitamin C) (VITAMIN C) tablet 1,000 mg  1,000 mg Oral DAILY    insulin lispro (HUMALOG) injection   SubCUTAneous AC&HS    tiotropium bromide (SPIRIVA RESPIMAT) 2.5 mcg /actuation  2 Puff Inhalation DAILY       Other Studies (last 24 hours):  No results found.     Assessment and Plan:     Hospital Problems as of 4/20/2020 Date Reviewed: 4/14/2020          Codes Class Noted - Resolved POA    Acute respiratory failure with hypoxia (HCC) ICD-10-CM: J96.01  ICD-9-CM: 518.81  4/17/2020 - Present Unknown        Suspected Covid-19 Virus Infection ICD-10-CM: R68.89  4/17/2020 - Present Unknown        Type 2 diabetes with nephropathy ([facility] ICD-10-CM: E11.21  ICD-9-CM: 250.40, 583.81  4/2/2018 - Present Yes        Ventricular tachycardia ([facility] ICD-10-CM: I47.2  ICD-9-CM: 427.1  4/18/2016 - Present Yes        CAD in native artery ICD-10-CM: I25.10  ICD-9-CM: 414.01  3/30/2016 - Present Yes        Automatic implantable cardioverter-defibrillator in situ ICD-10-CM: Z95.810  ICD-9-CM: V45.02  3/30/2016 - Present Yes    Overview Signed 3/30/2016  8:13 AM by Jovanny Fonseca dual ICD 7/2013  1/5 lost brother,11/8 had VF episode with ATP,no shock             BA on CPAP (Chronic) ICD-10-CM: G47.33, Z99.89  ICD-9-CM: 327.23, V46.8  7/14/2014 - Present Yes        Morbid obesity (HCC) (Chronic) ICD-10-CM: E66.01  ICD-9-CM: 278.01  7/14/2014 - Present Yes        Chronic systolic heart failure (HCC) (Chronic) ICD-10-CM: I50.22  ICD-9-CM: 428.22  7/18/2013 - Present Yes    Overview Signed 3/30/2016  8:11 AM by Shant ROMERO 4/15:low risk  Echo 6/2014:EF 40-45%  Echo 5/2013:EF 30-35%  C 2/2013: minimal CAD             NICM (nonischemic cardiomyopathy) (HCC) (Chronic) ICD-10-CM: I42.8  ICD-9-CM: 425.4  2/15/2013 - Present Yes        Dyslipidemia ICD-10-CM: E78.5  ICD-9-CM: 272.4  2/13/2013 - Present Yes        HTN (hypertension) (Chronic) ICD-10-CM: I10  ICD-9-CM: 401.9  4/12/2010 - Present Yes            Patient admitted with acute respiratory failure with hypoxia. Most likely multifactorial in origin. Infectious etiology being ruled out. Acute on chronic respiratory failure with hypoxia  SARS-CoV2: Negative   CXR: Atelectasis versus consolidation  CT chest: Mild patchy areas of atelectasis and infiltrate bilaterally, cardiomegaly     ABX #4  Ceftriaxone and doxycycline     Plan:  -Continue IV antibiotics  -Albuterol every 4 as needed  -Spiriva       Acute on chronic systolic heart failure  Echo 1/2020: EF 30 to 35%  proBNP> 2900  Net -1.2 L  Serum Creatinine Worsening      Plan:  -Continue home medications  -Discontinue IV Lasix given worsening Serum Creatnine   -Oral Lasix  -Fluid restriction 1800 mL  -Strict I and O  -Daily Weights  -Telemetry Monitoring  -Cardiology Consult    PATTI on CKD  Baseline serum creatinine: 1.4  Current serum creatinine: 1.49    Plan:  -Trend BMP  -Avoid nephrotoxic medications     Chest pain/history of V. tach  Patient with episode of V.  Tach and chest pain lasting a few seconds 4/17  EKG without significant ST elevations or depressions  Troponin negative  AICD in place  History of Ventricular Arythmias      Plan:  -Monitor electrolytes     Hypertension  BP stable     Plan:  -Continue home meds     CAD  -Continue Home Meds     Hyperlipidemia  -Continue home medications     BA  -CPAP nightly    DC planning/Dispo: Home      Diet:  DIET CARDIAC  DVT ppx: Lovenox    Signed:  Cecilia Campbell Bhakti Huerta MD

## 2020-04-20 NOTE — PROGRESS NOTES
's visit via telephone call. I conveyed care and concern to patient. No spiritual needs were voiced during the visit.       Papa Jose MDIV, [facility]

## 2020-04-20 NOTE — PROGRESS NOTES
Report given to oncoming RN. PRN Percocet given x 2 for back pain. PRN Zofran x 1 for nausea.  VSS on 2 L NC.

## 2020-04-20 NOTE — PROGRESS NOTES
Chart screened by  for discharge planning. No needs identified at this time. Pt desires to return home at discharge and return to work. Please consult  if any new issues arise.   Dispo Home with no needs

## 2020-04-21 LAB
ANION GAP SERPL CALC-SCNC: 7 MMOL/L (ref 7–16)
BASOPHILS # BLD: 0 K/UL (ref 0–0.2)
BASOPHILS NFR BLD: 1 % (ref 0–2)
BUN SERPL-MCNC: 30 MG/DL (ref 6–23)
CALCIUM SERPL-MCNC: 8.5 MG/DL (ref 8.3–10.4)
CHLORIDE SERPL-SCNC: 105 MMOL/L (ref 98–107)
CO2 SERPL-SCNC: 27 MMOL/L (ref 21–32)
CREAT SERPL-MCNC: 1.4 MG/DL (ref 0.6–1)
DIFFERENTIAL METHOD BLD: ABNORMAL
EOSINOPHIL # BLD: 0.4 K/UL (ref 0–0.8)
EOSINOPHIL NFR BLD: 6 % (ref 0.5–7.8)
ERYTHROCYTE [DISTWIDTH] IN BLOOD BY AUTOMATED COUNT: 14.7 % (ref 11.9–14.6)
GLUCOSE BLD STRIP.AUTO-MCNC: 104 MG/DL (ref 65–100)
GLUCOSE BLD STRIP.AUTO-MCNC: 128 MG/DL (ref 65–100)
GLUCOSE BLD STRIP.AUTO-MCNC: 152 MG/DL (ref 65–100)
GLUCOSE BLD STRIP.AUTO-MCNC: 91 MG/DL (ref 65–100)
GLUCOSE SERPL-MCNC: 118 MG/DL (ref 65–100)
HCT VFR BLD AUTO: 36.1 % (ref 35.8–46.3)
HGB BLD-MCNC: 11.3 G/DL (ref 11.7–15.4)
IMM GRANULOCYTES # BLD AUTO: 0 K/UL (ref 0–0.5)
IMM GRANULOCYTES NFR BLD AUTO: 0 % (ref 0–5)
LYMPHOCYTES # BLD: 2.2 K/UL (ref 0.5–4.6)
LYMPHOCYTES NFR BLD: 36 % (ref 13–44)
MCH RBC QN AUTO: 30.8 PG (ref 26.1–32.9)
MCHC RBC AUTO-ENTMCNC: 31.3 G/DL (ref 31.4–35)
MCV RBC AUTO: 98.4 FL (ref 79.6–97.8)
MONOCYTES # BLD: 0.5 K/UL (ref 0.1–1.3)
MONOCYTES NFR BLD: 8 % (ref 4–12)
NEUTS SEG # BLD: 3.1 K/UL (ref 1.7–8.2)
NEUTS SEG NFR BLD: 50 % (ref 43–78)
NRBC # BLD: 0 K/UL (ref 0–0.2)
PLATELET # BLD AUTO: 135 K/UL (ref 150–450)
PMV BLD AUTO: 11.7 FL (ref 9.4–12.3)
POTASSIUM SERPL-SCNC: 4.6 MMOL/L (ref 3.5–5.1)
RBC # BLD AUTO: 3.67 M/UL (ref 4.05–5.2)
SODIUM SERPL-SCNC: 139 MMOL/L (ref 136–145)
WBC # BLD AUTO: 6.2 K/UL (ref 4.3–11.1)

## 2020-04-21 PROCEDURE — 74011250636 HC RX REV CODE- 250/636: Performed by: INTERNAL MEDICINE

## 2020-04-21 PROCEDURE — 82962 GLUCOSE BLOOD TEST: CPT

## 2020-04-21 PROCEDURE — 65270000029 HC RM PRIVATE

## 2020-04-21 PROCEDURE — 74011636637 HC RX REV CODE- 636/637: Performed by: INTERNAL MEDICINE

## 2020-04-21 PROCEDURE — 74011250637 HC RX REV CODE- 250/637: Performed by: INTERNAL MEDICINE

## 2020-04-21 PROCEDURE — 77010033678 HC OXYGEN DAILY

## 2020-04-21 PROCEDURE — 94760 N-INVAS EAR/PLS OXIMETRY 1: CPT

## 2020-04-21 PROCEDURE — 94640 AIRWAY INHALATION TREATMENT: CPT

## 2020-04-21 PROCEDURE — 74011000250 HC RX REV CODE- 250: Performed by: INTERNAL MEDICINE

## 2020-04-21 PROCEDURE — 80048 BASIC METABOLIC PNL TOTAL CA: CPT

## 2020-04-21 PROCEDURE — 74011000258 HC RX REV CODE- 258: Performed by: INTERNAL MEDICINE

## 2020-04-21 PROCEDURE — 85025 COMPLETE CBC W/AUTO DIFF WBC: CPT

## 2020-04-21 RX ORDER — FUROSEMIDE 10 MG/ML
60 INJECTION INTRAMUSCULAR; INTRAVENOUS 2 TIMES DAILY
Status: DISCONTINUED | OUTPATIENT
Start: 2020-04-21 | End: 2020-04-23

## 2020-04-21 RX ADMIN — ASPIRIN 81 MG: 81 TABLET ORAL at 08:02

## 2020-04-21 RX ADMIN — OXYCODONE HYDROCHLORIDE AND ACETAMINOPHEN 1 TABLET: 10; 325 TABLET ORAL at 00:08

## 2020-04-21 RX ADMIN — SACUBITRIL AND VALSARTAN 1 TABLET: 49; 51 TABLET, FILM COATED ORAL at 08:02

## 2020-04-21 RX ADMIN — LATANOPROST 1 DROP: 50 SOLUTION OPHTHALMIC at 20:50

## 2020-04-21 RX ADMIN — ONDANSETRON 4 MG: 2 INJECTION INTRAMUSCULAR; INTRAVENOUS at 22:46

## 2020-04-21 RX ADMIN — ISOSORBIDE MONONITRATE 30 MG: 30 TABLET, EXTENDED RELEASE ORAL at 05:59

## 2020-04-21 RX ADMIN — AMIODARONE HYDROCHLORIDE 200 MG: 200 TABLET ORAL at 08:02

## 2020-04-21 RX ADMIN — Medication 400 MG: at 08:02

## 2020-04-21 RX ADMIN — DOXYCYCLINE HYCLATE 100 MG: 100 CAPSULE ORAL at 17:21

## 2020-04-21 RX ADMIN — TIOTROPIUM BROMIDE INHALATION SPRAY 2 PUFF: 3.12 SPRAY, METERED RESPIRATORY (INHALATION) at 07:18

## 2020-04-21 RX ADMIN — DOXYCYCLINE HYCLATE 100 MG: 100 CAPSULE ORAL at 08:02

## 2020-04-21 RX ADMIN — ENOXAPARIN SODIUM 40 MG: 40 INJECTION SUBCUTANEOUS at 04:36

## 2020-04-21 RX ADMIN — SACUBITRIL AND VALSARTAN 1 TABLET: 49; 51 TABLET, FILM COATED ORAL at 17:21

## 2020-04-21 RX ADMIN — Medication 400 MG: at 17:21

## 2020-04-21 RX ADMIN — PRAVASTATIN SODIUM 80 MG: 20 TABLET ORAL at 20:47

## 2020-04-21 RX ADMIN — ENOXAPARIN SODIUM 40 MG: 40 INJECTION SUBCUTANEOUS at 17:21

## 2020-04-21 RX ADMIN — AMIODARONE HYDROCHLORIDE 200 MG: 200 TABLET ORAL at 17:21

## 2020-04-21 RX ADMIN — GUAIFENESIN 600 MG: 600 TABLET ORAL at 20:46

## 2020-04-21 RX ADMIN — GUAIFENESIN 600 MG: 600 TABLET ORAL at 08:02

## 2020-04-21 RX ADMIN — FUROSEMIDE 40 MG: 40 TABLET ORAL at 08:02

## 2020-04-21 RX ADMIN — ONDANSETRON 4 MG: 2 INJECTION INTRAMUSCULAR; INTRAVENOUS at 11:21

## 2020-04-21 RX ADMIN — CARVEDILOL 6.25 MG: 6.25 TABLET, FILM COATED ORAL at 09:27

## 2020-04-21 RX ADMIN — CARVEDILOL 6.25 MG: 6.25 TABLET, FILM COATED ORAL at 20:46

## 2020-04-21 RX ADMIN — FUROSEMIDE 60 MG: 10 INJECTION, SOLUTION INTRAMUSCULAR; INTRAVENOUS at 17:21

## 2020-04-21 RX ADMIN — ALBUTEROL SULFATE 2.5 MG: 2.5 SOLUTION RESPIRATORY (INHALATION) at 07:18

## 2020-04-21 RX ADMIN — INSULIN GLARGINE 35 UNITS: 100 INJECTION, SOLUTION SUBCUTANEOUS at 20:47

## 2020-04-21 RX ADMIN — OXYCODONE HYDROCHLORIDE AND ACETAMINOPHEN 1 TABLET: 5; 325 TABLET ORAL at 22:45

## 2020-04-21 RX ADMIN — ONDANSETRON 4 MG: 2 INJECTION INTRAMUSCULAR; INTRAVENOUS at 04:35

## 2020-04-21 RX ADMIN — CEFTRIAXONE 2 G: 2 INJECTION, POWDER, FOR SOLUTION INTRAMUSCULAR; INTRAVENOUS at 09:34

## 2020-04-21 RX ADMIN — INSULIN LISPRO 2 UNITS: 100 INJECTION, SOLUTION INTRAVENOUS; SUBCUTANEOUS at 20:47

## 2020-04-21 NOTE — PROGRESS NOTES
4/21/2020 5:05 PM    Admit Date: 4/17/2020    Admit Diagnosis: Acute respiratory failure with hypoxia (HCC) [J96.01]      Subjective:   No cp and still sob      Objective:      Visit Vitals  /77   Pulse 75   Temp 98.1 °F (36.7 °C)   Resp 18   Ht 4' 11.02\" (1.499 m)   Wt 98.1 kg (216 lb 3.2 oz)   SpO2 96%   BMI 43.64 kg/m²       Physical Exam:  James Newness, Well Nourished, No Acute Distress, Alert & Oriented x 3, appropriate mood. Neck- supple, no JVD  CV- regular rate and rhythm no MRG  Lung- clear bilaterally  Abd- soft, nontender, nondistended  Ext- no edema bilaterally. Skin- warm and dry        Data Review:   Recent Labs     04/21/20  0800      K 4.6   BUN 30*   CREA 1.40*   WBC 6.2   HGB 11.3*   HCT 36.1   *       Assessment/Plan:     Active Problems:    HTN (hypertension) (4/12/2010)Stable. Continue current medical therapy. Dyslipidemia (2/13/2013)Stable. Continue current medical therapy.       NICM (nonischemic cardiomyopathy) ([address] (2/15/2013)      Chronic systolic heart failure ([address] (7/18/2013)- still osb and edema- change to iv lasix- bmp in am      Overview: NST 4/15:low risk      Echo 6/2014:EF 40-45%      Echo 5/2013:EF 30-35%      LHC 2/2013: minimal CAD      BA on CPAP (7/14/2014)      Morbid obesity ([address] (7/14/2014)      CAD in native artery (3/30/2016)      Automatic implantable cardioverter-defibrillator in situ (3/30/2016)      Overview: [employer] dual ICD 7/2013 1/5 lost brother,11/8 had VF episode with ATP,no shock      Ventricular tachycardia ([address] (4/18/2016)      Type 2 diabetes with nephropathy ([address] (4/2/2018)      Acute respiratory failure with hypoxia ([address] (4/17/2020)      Suspected Covid-19 Virus Infection (4/17/2020)

## 2020-04-21 NOTE — PROGRESS NOTES
Received call from monitor room that pt in [facility], 120s. Pt sitting on side of bed talking on phone, no new complaints. VSS on RA.

## 2020-04-21 NOTE — PROGRESS NOTES
Hospitalist Progress Note    Patient: Linh Mail MRN: [id number]  SSN: [id number]    YOB: 1965  Age: 62 y.o. Sex: female      Admit Date: 4/17/2020    LOS: 4 days     Subjective:     57-year-old female with past medical history of chronic systolic heart failure, HTN, BA, COPD morbid obesity, ventricular tachycardia, CAD, AICD, and diabetes mellitus admitted on 4/17/20 due to SOB. COVID was ruled out. She has chronic sCHF so diuresis is being tried. 4/21 - She continues to feel SOB and has orthopnea. Had some chest pressure this afternoon. Denies F/C/N/V. Review of systems negative except stated above. Objective:     Visit Vitals  /77   Pulse 75   Temp 98.1 °F (36.7 °C)   Resp 18   Ht 4' 11.02\" (1.499 m)   Wt 98.1 kg (216 lb 3.2 oz)   SpO2 96%   BMI 43.64 kg/m²      Oxygen Therapy  O2 Sat (%): 96 % (04/21/20 1442)  Pulse via Oximetry: 71 beats per minute (04/21/20 0718)  O2 Device: Room air (04/21/20 0815)  O2 Flow Rate (L/min): 2 l/min (04/21/20 0802)  ETCO2 (mmHg): 97 mmHg (04/20/20 1945)      Intake and Output:     Intake/Output Summary (Last 24 hours) at 4/21/2020 1754  Last data filed at 4/21/2020 1707  Gross per 24 hour   Intake 1820 ml   Output 400 ml   Net 1420 ml         Physical Exam:   GENERAL: alert, cooperative, no distress, appears stated age  EYE: conjunctivae/corneas clear. PERRL. THROAT & NECK: normal and no erythema or exudates noted. LUNG: clear to auscultation bilaterally  HEART: regular rate and rhythm, S1S2, no murmur, no JVD  ABDOMEN: soft, non-tender, non-distended. Bowel sounds normal.   EXTREMITIES: Trace BLE edema, 2+ pedal/radial pulses bilaterally  SKIN: no rash or abnormalities  NEUROLOGIC: A&Ox3. Cranial nerves 2-12 grossly intact.     Lab/Data Review:  Recent Results (from the past 24 hour(s))   GLUCOSE, POC    Collection Time: 04/20/20  9:04 PM   Result Value Ref Range    Glucose (POC) 138 (H) 65 - 100 mg/dL   GLUCOSE, POC Collection Time: 04/20/20 10:37 PM   Result Value Ref Range    Glucose (POC) 136 (H) 65 - 100 mg/dL   GLUCOSE, POC    Collection Time: 04/21/20  7:18 AM   Result Value Ref Range    Glucose (POC) 91 65 - 100 mg/dL   CBC WITH AUTOMATED DIFF    Collection Time: 04/21/20  8:00 AM   Result Value Ref Range    WBC 6.2 4.3 - 11.1 K/uL    RBC 3.67 (L) 4.05 - 5.2 M/uL    HGB 11.3 (L) 11.7 - 15.4 g/dL    HCT 36.1 35.8 - 46.3 %    MCV 98.4 (H) 79.6 - 97.8 FL    MCH 30.8 26.1 - 32.9 PG    MCHC 31.3 (L) 31.4 - 35.0 g/dL    RDW 14.7 (H) 11.9 - 14.6 %    PLATELET 369 (L) 310 - 450 K/uL    MPV 11.7 9.4 - 12.3 FL    ABSOLUTE NRBC 0.00 0.0 - 0.2 K/uL    DF AUTOMATED      NEUTROPHILS 50 43 - 78 %    LYMPHOCYTES 36 13 - 44 %    MONOCYTES 8 4.0 - 12.0 %    EOSINOPHILS 6 0.5 - 7.8 %    BASOPHILS 1 0.0 - 2.0 %    IMMATURE GRANULOCYTES 0 0.0 - 5.0 %    ABS. NEUTROPHILS 3.1 1.7 - 8.2 K/UL    ABS. LYMPHOCYTES 2.2 0.5 - 4.6 K/UL    ABS. MONOCYTES 0.5 0.1 - 1.3 K/UL    ABS. EOSINOPHILS 0.4 0.0 - 0.8 K/UL    ABS. BASOPHILS 0.0 0.0 - 0.2 K/UL    ABS. IMM. GRANS. 0.0 0.0 - 0.5 K/UL   METABOLIC PANEL, BASIC    Collection Time: 04/21/20  8:00 AM   Result Value Ref Range    Sodium 139 136 - 145 mmol/L    Potassium 4.6 3.5 - 5.1 mmol/L    Chloride 105 98 - 107 mmol/L    CO2 27 21 - 32 mmol/L    Anion gap 7 7 - 16 mmol/L    Glucose 118 (H) 65 - 100 mg/dL    BUN 30 (H) 6 - 23 MG/DL    Creatinine 1.40 (H) 0.6 - 1.0 MG/DL    GFR est AA 50 (L) >60 ml/min/1.73m2    GFR est non-AA 41 (L) >60 ml/min/1.73m2    Calcium 8.5 8.3 - 10.4 MG/DL   GLUCOSE, POC    Collection Time: 04/21/20 10:50 AM   Result Value Ref Range    Glucose (POC) 104 (H) 65 - 100 mg/dL   GLUCOSE, POC    Collection Time: 04/21/20  3:57 PM   Result Value Ref Range    Glucose (POC) 128 (H) 65 - 100 mg/dL       Imaging:  Ct Chest W Cont    Result Date: 4/17/2020  CT chest with contrast (pulmonary embolism protocol) History: sob, abnormal CXR.   Tachypnea, tachycardia Technique: Helically acquired images were obtained from the lung apices to the domes of the diaphragms reconstructed at 2.5mm intervals after the uneventful administration of 100 cc's intravenous Isovue-370 in order to evaluate the pulmonary arteries. Coronal reformatted images were submitted. Radiation dose reduction techniques were used for this study:  Our CT scanners use one or all of the following: Automated exposure control, adjustment of the mA and/or kVp according to patient's size, iterative reconstruction. Comparison: 08/28/2018 Findings: There is moderate global cardiac enlargement with particular enlargement of the left ventricle. There is a small right pleural effusion. There is no pericardial effusion. There are no filling defects within the pulmonary arteries to suggest pulmonary emboli. There are mild patchy areas of atelectasis/infiltrate within all lobes. No adenopathy is present within the thorax. Imaging of the upper abdomen reveals stable mild thickening of left adrenal gland. Upper pole left renal cyst measures 2.0 cm. IMPRESSION: 1. No evidence of pulmonary embolism. 2. Cardiomegaly with particular enlargement of the left ventricle. 3. Small right pleural effusion. 4. Mild patchy areas of atelectasis/infiltrate bilaterally. Xr Chest Port    Result Date: 4/17/2020  PORTABLE CHEST 1 VIEW HISTORY: Tachypnea and tachycardia. Shortness of breath. Wheezing fever. COVID-19 protocol COMPARISON: 2/25/2020 FINDINGS: A cardiac defibrillator device is present. EKG leads are present. A small amount of fluid or thickening is present along the minor fissure. Retrocardiac atelectasis or consolidation is present. Interstitial markings are thickened and the lung bases. IMPRESSION: Lower lobe thickened interstitial markings with retrocardiac atelectasis or consolidation. No results found for this visit on 04/17/20. Cultures:   All Micro Results     Procedure Component Value Units Date/Time    CULTURE, RESPIRATORY/SPUTUM/BRONCH Sarah Vilma STAIN [[id number]] Collected:  04/17/20 0500    Order Status:  Canceled Specimen:  Sputum           Assessment/Plan:     Principal Problem:    Acute respiratory failure with hypoxia ([facility] (4/17/2020)  - Likely multifactorial  - PO Lasix changed to IV Lasix  - Continue current inhalers  - Wean oxygen as appropriate    Active Problems:    Type 2 diabetes with nephropathy ([facility] (4/2/2018)  - Stable  - Continue current insulin      HTN (hypertension) (4/12/2010)  - Stable  - Continue current BP meds      Chronic systolic heart failure (HCC) (7/18/2013)  - EF 30%-35%  - PO Lasix changed to IV Lasix  - Continue Coreg  - Continue Entresto      CAD in native artery (3/30/2016)  - Had some chest pressure this afternoon  - Continue ASA  - Continue Statin      Automatic implantable cardioverter-defibrillator in situ (3/30/2016)      Ventricular tachycardia ([facility] (4/18/2016)  - ICD in place      NICM (nonischemic cardiomyopathy) ([facility] (2/15/2013)      Restrictive lung disease (4/8/2020)      Dyslipidemia (2/13/2013)  - Continue Pravachol      BA on CPAP (7/14/2014)      Morbid obesity ([address] (7/14/2014)      Covid-19 Virus not Detected (4/17/2020)    Today's Plan: Change PO Lasix to IV    DIET CARDIAC Regular; Consistent Carb 1800kcal; FR 1800ML    DVT Prophylaxis: Lovenox    Discharge Plan: Home in next 1-2 days      Signed By: Lupis Luque DO     April 21, 2020

## 2020-04-22 LAB
ANION GAP SERPL CALC-SCNC: 5 MMOL/L (ref 7–16)
BUN SERPL-MCNC: 30 MG/DL (ref 6–23)
CALCIUM SERPL-MCNC: 8.9 MG/DL (ref 8.3–10.4)
CHLORIDE SERPL-SCNC: 106 MMOL/L (ref 98–107)
CO2 SERPL-SCNC: 30 MMOL/L (ref 21–32)
CREAT SERPL-MCNC: 1.38 MG/DL (ref 0.6–1)
GLUCOSE BLD STRIP.AUTO-MCNC: 112 MG/DL (ref 65–100)
GLUCOSE BLD STRIP.AUTO-MCNC: 120 MG/DL (ref 65–100)
GLUCOSE BLD STRIP.AUTO-MCNC: 90 MG/DL (ref 65–100)
GLUCOSE BLD STRIP.AUTO-MCNC: 96 MG/DL (ref 65–100)
GLUCOSE SERPL-MCNC: 75 MG/DL (ref 65–100)
POTASSIUM SERPL-SCNC: 4.5 MMOL/L (ref 3.5–5.1)
SODIUM SERPL-SCNC: 141 MMOL/L (ref 136–145)

## 2020-04-22 PROCEDURE — 82962 GLUCOSE BLOOD TEST: CPT

## 2020-04-22 PROCEDURE — 74011250636 HC RX REV CODE- 250/636: Performed by: INTERNAL MEDICINE

## 2020-04-22 PROCEDURE — 74011250637 HC RX REV CODE- 250/637: Performed by: INTERNAL MEDICINE

## 2020-04-22 PROCEDURE — 80048 BASIC METABOLIC PNL TOTAL CA: CPT

## 2020-04-22 PROCEDURE — 65270000029 HC RM PRIVATE

## 2020-04-22 PROCEDURE — 94640 AIRWAY INHALATION TREATMENT: CPT

## 2020-04-22 PROCEDURE — 94760 N-INVAS EAR/PLS OXIMETRY 1: CPT

## 2020-04-22 PROCEDURE — 74011636637 HC RX REV CODE- 636/637: Performed by: INTERNAL MEDICINE

## 2020-04-22 RX ADMIN — FUROSEMIDE 60 MG: 10 INJECTION, SOLUTION INTRAMUSCULAR; INTRAVENOUS at 08:03

## 2020-04-22 RX ADMIN — ENOXAPARIN SODIUM 40 MG: 40 INJECTION SUBCUTANEOUS at 17:18

## 2020-04-22 RX ADMIN — OXYCODONE HYDROCHLORIDE AND ACETAMINOPHEN 1 TABLET: 10; 325 TABLET ORAL at 23:09

## 2020-04-22 RX ADMIN — FUROSEMIDE 60 MG: 10 INJECTION, SOLUTION INTRAMUSCULAR; INTRAVENOUS at 17:15

## 2020-04-22 RX ADMIN — Medication 400 MG: at 08:03

## 2020-04-22 RX ADMIN — ONDANSETRON 4 MG: 2 INJECTION INTRAMUSCULAR; INTRAVENOUS at 17:15

## 2020-04-22 RX ADMIN — CARVEDILOL 6.25 MG: 6.25 TABLET, FILM COATED ORAL at 20:48

## 2020-04-22 RX ADMIN — OXYCODONE HYDROCHLORIDE AND ACETAMINOPHEN 1 TABLET: 10; 325 TABLET ORAL at 17:15

## 2020-04-22 RX ADMIN — CARVEDILOL 6.25 MG: 6.25 TABLET, FILM COATED ORAL at 08:03

## 2020-04-22 RX ADMIN — TIOTROPIUM BROMIDE INHALATION SPRAY 2 PUFF: 3.12 SPRAY, METERED RESPIRATORY (INHALATION) at 08:25

## 2020-04-22 RX ADMIN — SACUBITRIL AND VALSARTAN 1 TABLET: 49; 51 TABLET, FILM COATED ORAL at 17:15

## 2020-04-22 RX ADMIN — PRAVASTATIN SODIUM 80 MG: 20 TABLET ORAL at 20:47

## 2020-04-22 RX ADMIN — INSULIN GLARGINE 35 UNITS: 100 INJECTION, SOLUTION SUBCUTANEOUS at 21:52

## 2020-04-22 RX ADMIN — SACUBITRIL AND VALSARTAN 1 TABLET: 49; 51 TABLET, FILM COATED ORAL at 08:03

## 2020-04-22 RX ADMIN — OXYCODONE HYDROCHLORIDE AND ACETAMINOPHEN 1 TABLET: 10; 325 TABLET ORAL at 10:49

## 2020-04-22 RX ADMIN — DOXYCYCLINE HYCLATE 100 MG: 100 CAPSULE ORAL at 08:03

## 2020-04-22 RX ADMIN — Medication 400 MG: at 17:15

## 2020-04-22 RX ADMIN — ONDANSETRON 4 MG: 2 INJECTION INTRAMUSCULAR; INTRAVENOUS at 23:08

## 2020-04-22 RX ADMIN — ENOXAPARIN SODIUM 40 MG: 40 INJECTION SUBCUTANEOUS at 05:27

## 2020-04-22 RX ADMIN — AMIODARONE HYDROCHLORIDE 200 MG: 200 TABLET ORAL at 17:15

## 2020-04-22 RX ADMIN — ASPIRIN 81 MG: 81 TABLET ORAL at 08:03

## 2020-04-22 RX ADMIN — ISOSORBIDE MONONITRATE 30 MG: 30 TABLET, EXTENDED RELEASE ORAL at 05:27

## 2020-04-22 RX ADMIN — LATANOPROST 1 DROP: 50 SOLUTION OPHTHALMIC at 22:00

## 2020-04-22 RX ADMIN — GUAIFENESIN 600 MG: 600 TABLET ORAL at 08:03

## 2020-04-22 RX ADMIN — ONDANSETRON 4 MG: 2 INJECTION INTRAMUSCULAR; INTRAVENOUS at 10:49

## 2020-04-22 RX ADMIN — AMIODARONE HYDROCHLORIDE 200 MG: 200 TABLET ORAL at 08:03

## 2020-04-22 RX ADMIN — GUAIFENESIN 600 MG: 600 TABLET ORAL at 20:48

## 2020-04-22 NOTE — PROGRESS NOTES
Problem: Falls - Risk of  Goal: *Absence of Falls  Description: Document Jeanette Brizuela Fall Risk and appropriate interventions in the flowsheet.   Outcome: Progressing Towards Goal  Note: Fall Risk Interventions:            Medication Interventions: Teach patient to arise slowly

## 2020-04-22 NOTE — PROGRESS NOTES
's visit via telephone call. I conveyed care and concern to Ms. Irena Rosario. She informed me that she has been experiencing a challenging morning. She stated that fluid is moving very slowly and she would just like to get well. I offered spiritual interventions including emotional support and prayer as requested.      Papa Jose MDIV, [facility]

## 2020-04-22 NOTE — PROGRESS NOTES
Patient sitting on side of bed. Pain medication given with zofran. Patient states that after she eats she gets nauseated. Bed is low and locked. Call light within reach. No needs at this time.

## 2020-04-22 NOTE — PROGRESS NOTES
Monitor room called nurse to inform pt going from v-tach to normal sinus. Dr. Jay Kathleen, cardiology, informed. No orders placed.

## 2020-04-22 NOTE — PROGRESS NOTES
4/22/2020 12:54 PM    Admit Date: 4/17/2020    Admit Diagnosis: Acute respiratory failure with hypoxia (HCC) [J96.01]      Subjective:   Breathing better- occasional cp      Objective:      Visit Vitals  BP 97/76 (BP 1 Location: Left arm, BP Patient Position: At rest)   Pulse 62   Temp 98 °F (36.7 °C)   Resp 20   Ht 4' 11.02\" (1.499 m)   Wt 97.1 kg (214 lb)   SpO2 96%   BMI 43.20 kg/m²       Physical Exam:  General-Well Developed, Well Nourished, No Acute Distress, Alert & Oriented x 3, appropriate mood. Neck- supple, no JVD  CV- regular rate and rhythm no MRG  Lung- clear bilaterally  Abd- soft, nontender, nondistended  Ext- no edema bilaterally. Skin- warm and dry        Data Review:   Recent Labs     04/22/20  0721 04/21/20  0800    139   K 4.5 4.6   BUN 30* 30*   CREA 1.38* 1.40*   WBC  --  6.2   HGB  --  11.3*   HCT  --  36.1   PLT  --  135*       Assessment/Plan:     Principal Problem:    Acute respiratory failure with hypoxia (HCC) (4/17/2020)Improved with current therapy.  Will continue medications      Active Problems:    HTN (hypertension) (4/12/2010)      Dyslipidemia (2/13/2013)      NICM (nonischemic cardiomyopathy) ([address] (2/15/2013)      Chronic systolic heart failure ([address] (7/18/2013)breathing better with iv lasix- continue iv lasix-follow mg and k with nsvt      Overview: NST 4/15:low risk      Echo 6/2014:EF 40-45%      Echo 5/2013:EF 30-35%      LHC 2/2013: minimal CAD      BA on CPAP (7/14/2014)      Morbid obesity ([address] (7/14/2014)      CAD in native artery (3/30/2016)      Automatic implantable cardioverter-defibrillator in situ (3/30/2016)      Overview: [employer] dual ICD 7/2013      1/5 lost brother,11/8 had VF episode with ATP,no shock      Ventricular tachycardia ([address] (4/18/2016)- worse has icd- order daily mg      Type 2 diabetes with nephropathy ([facility] (4/2/2018)      Restrictive lung disease (4/8/2020)      Covid-19 Virus not Detected (4/17/2020)

## 2020-04-22 NOTE — PROGRESS NOTES
Hospitalist Progress Note    Patient: Ann-Marie Purvis MRN: [id number]  SSN: [id number]    YOB: 1965  Age: 62 y.o. Sex: female      Admit Date: 4/17/2020    LOS: 5 days     Subjective:     15-year-old female with past medical history of chronic systolic heart failure, HTN, BA, COPD morbid obesity, ventricular tachycardia, CAD, AICD, and diabetes mellitus admitted on 4/17/20 due to SOB. COVID was ruled out. She has chronic sCHF so diuresis is being tried. 4/22 - Her SOB and orthopnea has improved. Having intermittent chest pressure. Denies F/C/N/V. Review of systems negative except stated above. Objective:     Visit Vitals  BP 97/76 (BP 1 Location: Left arm, BP Patient Position: At rest)   Pulse 62   Temp 98 °F (36.7 °C)   Resp 20   Ht 4' 11.02\" (1.499 m)   Wt 97.1 kg (214 lb)   SpO2 96%   BMI 43.20 kg/m²      Oxygen Therapy  O2 Sat (%): 96 % (04/22/20 1113)  Pulse via Oximetry: 76 beats per minute (04/22/20 0825)  O2 Device: Room air (04/22/20 0825)  O2 Flow Rate (L/min): 0 l/min (04/22/20 0825)  ETCO2 (mmHg): 97 mmHg (04/20/20 1945)      Intake and Output:     Intake/Output Summary (Last 24 hours) at 4/22/2020 1506  Last data filed at 4/22/2020 1056  Gross per 24 hour   Intake 240 ml   Output 2900 ml   Net -2660 ml         Physical Exam:   GENERAL: alert, cooperative, no distress, appears stated age  EYE: conjunctivae/corneas clear. PERRL. THROAT & NECK: normal and no erythema or exudates noted. LUNG: clear to auscultation bilaterally  HEART: regular rate and rhythm, S1S2, no murmur, no JVD  ABDOMEN: soft, non-tender, non-distended. Bowel sounds normal.   EXTREMITIES: Trace BLE edema, 2+ pedal/radial pulses bilaterally  SKIN: no rash or abnormalities  NEUROLOGIC: A&Ox3. Cranial nerves 2-12 grossly intact.     Lab/Data Review:  Recent Results (from the past 24 hour(s))   GLUCOSE, POC    Collection Time: 04/21/20  3:57 PM   Result Value Ref Range    Glucose (POC) 128 (H) 65 - 100 mg/dL   GLUCOSE, POC    Collection Time: 04/21/20  8:07 PM   Result Value Ref Range    Glucose (POC) 152 (H) 65 - 100 mg/dL   GLUCOSE, POC    Collection Time: 04/22/20  6:27 AM   Result Value Ref Range    Glucose (POC) 90 65 - 585 mg/dL   METABOLIC PANEL, BASIC    Collection Time: 04/22/20  7:21 AM   Result Value Ref Range    Sodium 141 136 - 145 mmol/L    Potassium 4.5 3.5 - 5.1 mmol/L    Chloride 106 98 - 107 mmol/L    CO2 30 21 - 32 mmol/L    Anion gap 5 (L) 7 - 16 mmol/L    Glucose 75 65 - 100 mg/dL    BUN 30 (H) 6 - 23 MG/DL    Creatinine 1.38 (H) 0.6 - 1.0 MG/DL    GFR est AA 51 (L) >60 ml/min/1.73m2    GFR est non-AA 42 (L) >60 ml/min/1.73m2    Calcium 8.9 8.3 - 10.4 MG/DL   GLUCOSE, POC    Collection Time: 04/22/20 11:10 AM   Result Value Ref Range    Glucose (POC) 120 (H) 65 - 100 mg/dL       Imaging:  Ct Chest W Cont    Result Date: 4/17/2020  CT chest with contrast (pulmonary embolism protocol) History: sob, abnormal CXR. Tachypnea, tachycardia Technique: Helically acquired images were obtained from the lung apices to the domes of the diaphragms reconstructed at 2.5mm intervals after the uneventful administration of 100 cc's intravenous Isovue-370 in order to evaluate the pulmonary arteries. Coronal reformatted images were submitted. Radiation dose reduction techniques were used for this study:  Our CT scanners use one or all of the following: Automated exposure control, adjustment of the mA and/or kVp according to patient's size, iterative reconstruction. Comparison: 08/28/2018 Findings: There is moderate global cardiac enlargement with particular enlargement of the left ventricle. There is a small right pleural effusion. There is no pericardial effusion. There are no filling defects within the pulmonary arteries to suggest pulmonary emboli. There are mild patchy areas of atelectasis/infiltrate within all lobes. No adenopathy is present within the thorax.  Imaging of the upper abdomen reveals stable mild thickening of left adrenal gland. Upper pole left renal cyst measures 2.0 cm. IMPRESSION: 1. No evidence of pulmonary embolism. 2. Cardiomegaly with particular enlargement of the left ventricle. 3. Small right pleural effusion. 4. Mild patchy areas of atelectasis/infiltrate bilaterally. Xr Chest Port    Result Date: 4/17/2020  PORTABLE CHEST 1 VIEW HISTORY: Tachypnea and tachycardia. Shortness of breath. Wheezing fever. COVID-19 protocol COMPARISON: 2/25/2020 FINDINGS: A cardiac defibrillator device is present. EKG leads are present. A small amount of fluid or thickening is present along the minor fissure. Retrocardiac atelectasis or consolidation is present. Interstitial markings are thickened and the lung bases. IMPRESSION: Lower lobe thickened interstitial markings with retrocardiac atelectasis or consolidation. No results found for this visit on 04/17/20. Cultures:   All Micro Results     Procedure Component Value Units Date/Time    CULTURE, RESPIRATORY/SPUTUM/BRONCH Chinyere Ban [[id number]] Collected:  04/17/20 1515    Order Status:  Canceled Specimen:  Sputum           Assessment/Plan:     Principal Problem:    Acute respiratory failure with hypoxia ([address] (4/17/2020)  - Likely multifactorial  - Continue IV Lasix  - Continue current inhalers  - Wean oxygen as appropriate    Active Problems:    Type 2 diabetes with nephropathy ([address] (4/2/2018)  - Stable  - Continue current insulin      HTN (hypertension) (4/12/2010)  - Stable  - Continue current BP meds      Chronic systolic heart failure (HCC) (7/18/2013)  - EF 30%-35%  - Continue IV Lasix  - Continue Coreg  - Continue Entresto      CAD in native artery (3/30/2016)  - Had some chest pressure this afternoon  - Continue ASA  - Continue Statin      Automatic implantable cardioverter-defibrillator in situ (3/30/2016)      Ventricular tachycardia ([address] (4/18/2016)  - ICD in place      NICM (nonischemic cardiomyopathy) ([address] (2/15/2013) Restrictive lung disease (4/8/2020)      Dyslipidemia (2/13/2013)  - Continue Pravachol      BA on CPAP (7/14/2014)      Morbid obesity ([facility] (7/14/2014)      Covid-19 Virus not Detected (4/17/2020)    Today's Plan: Continue IV Lasix    DIET CARDIAC Regular; Consistent Carb 1800kcal; FR 1800ML    DVT Prophylaxis: Lovenox    Discharge Plan: Home in next 1-2 days      Signed By: Santos Miguel DO     April 22, 2020

## 2020-04-23 VITALS
DIASTOLIC BLOOD PRESSURE: 73 MMHG | HEIGHT: 59 IN | WEIGHT: 214 LBS | TEMPERATURE: 98.5 F | RESPIRATION RATE: 20 BRPM | SYSTOLIC BLOOD PRESSURE: 115 MMHG | OXYGEN SATURATION: 96 % | HEART RATE: 64 BPM | BODY MASS INDEX: 43.14 KG/M2

## 2020-04-23 LAB
BACTERIA SPEC CULT: NORMAL
BACTERIA SPEC CULT: NORMAL
GLUCOSE BLD STRIP.AUTO-MCNC: 101 MG/DL (ref 65–100)
GLUCOSE BLD STRIP.AUTO-MCNC: 92 MG/DL (ref 65–100)
SERVICE CMNT-IMP: NORMAL
SERVICE CMNT-IMP: NORMAL

## 2020-04-23 PROCEDURE — 74011250636 HC RX REV CODE- 250/636: Performed by: INTERNAL MEDICINE

## 2020-04-23 PROCEDURE — 74011250637 HC RX REV CODE- 250/637: Performed by: INTERNAL MEDICINE

## 2020-04-23 PROCEDURE — 94760 N-INVAS EAR/PLS OXIMETRY 1: CPT

## 2020-04-23 PROCEDURE — 94640 AIRWAY INHALATION TREATMENT: CPT

## 2020-04-23 PROCEDURE — 82962 GLUCOSE BLOOD TEST: CPT

## 2020-04-23 RX ORDER — FUROSEMIDE 40 MG/1
40 TABLET ORAL 2 TIMES DAILY
Qty: 60 TAB | Refills: 1 | Status: SHIPPED | OUTPATIENT
Start: 2020-04-23 | End: 2020-10-28 | Stop reason: CLARIF

## 2020-04-23 RX ORDER — FUROSEMIDE 40 MG/1
40 TABLET ORAL
Status: DISCONTINUED | OUTPATIENT
Start: 2020-04-23 | End: 2020-04-23 | Stop reason: HOSPADM

## 2020-04-23 RX ORDER — FUROSEMIDE 40 MG/1
40 TABLET ORAL 2 TIMES DAILY
Qty: 60 TAB | Refills: 1 | Status: SHIPPED | OUTPATIENT
Start: 2020-04-23 | End: 2020-04-23 | Stop reason: SDUPTHER

## 2020-04-23 RX ADMIN — AMIODARONE HYDROCHLORIDE 200 MG: 200 TABLET ORAL at 08:18

## 2020-04-23 RX ADMIN — SACUBITRIL AND VALSARTAN 1 TABLET: 49; 51 TABLET, FILM COATED ORAL at 08:18

## 2020-04-23 RX ADMIN — OXYCODONE HYDROCHLORIDE AND ACETAMINOPHEN 1 TABLET: 10; 325 TABLET ORAL at 11:33

## 2020-04-23 RX ADMIN — ENOXAPARIN SODIUM 40 MG: 40 INJECTION SUBCUTANEOUS at 04:45

## 2020-04-23 RX ADMIN — FUROSEMIDE 60 MG: 10 INJECTION, SOLUTION INTRAMUSCULAR; INTRAVENOUS at 08:17

## 2020-04-23 RX ADMIN — ONDANSETRON 4 MG: 2 INJECTION INTRAMUSCULAR; INTRAVENOUS at 11:33

## 2020-04-23 RX ADMIN — ASPIRIN 81 MG: 81 TABLET ORAL at 08:18

## 2020-04-23 RX ADMIN — GUAIFENESIN 600 MG: 600 TABLET ORAL at 08:18

## 2020-04-23 RX ADMIN — TIOTROPIUM BROMIDE INHALATION SPRAY 2 PUFF: 3.12 SPRAY, METERED RESPIRATORY (INHALATION) at 08:00

## 2020-04-23 RX ADMIN — ONDANSETRON 4 MG: 2 INJECTION INTRAMUSCULAR; INTRAVENOUS at 04:45

## 2020-04-23 RX ADMIN — OXYCODONE HYDROCHLORIDE AND ACETAMINOPHEN 1 TABLET: 10; 325 TABLET ORAL at 04:45

## 2020-04-23 RX ADMIN — CARVEDILOL 6.25 MG: 6.25 TABLET, FILM COATED ORAL at 08:18

## 2020-04-23 RX ADMIN — Medication 400 MG: at 08:18

## 2020-04-23 NOTE — PROGRESS NOTES
Resting comfortably in bed without c/o. Denies needs at present. Non productive cough,breath sounds coarse. Call light within reach.

## 2020-04-23 NOTE — PROGRESS NOTES
Pt is for discharge home today with no needs/supportive care orders received for CM at this time. Pt will have close follow up with PCP    Milestones met    Care Management Interventions  PCP Verified by CM:  Yes  Mode of Transport at Discharge: Self  Transition of Care Consult (CM Consult): Discharge Planning  Discharge Durable Medical Equipment: No  Physical Therapy Consult: No  Occupational Therapy Consult: No  Speech Therapy Consult: No  Current Support Network: Own Home, Family Lives Nearby  Confirm Follow Up Transport: Family  The Plan for Transition of Care is Related to the Following Treatment Goals : no needs  The Patient and/or Patient Representative was Provided with a Choice of Provider and Agrees with the Discharge Plan?: Yes  Name of the Patient Representative Who was Provided with a Choice of Provider and Agrees with the Discharge Plan: pt  Freedom of Choice List was Provided with Basic Dialogue that Supports the Patient's Individualized Plan of Care/Goals, Treatment Preferences and Shares the Quality Data Associated with the Providers?: Yes  Hope Resource Information Provided?: No  Discharge Location  Discharge Placement: Home

## 2020-04-23 NOTE — PROGRESS NOTES
04/22/20 2356   Oxygen Therapy   O2 Sat (%) 97 %   Pulse via Oximetry 78 beats per minute   O2 Device Room air   Respiratory   Respiratory (WDL) X   Respiratory Pattern Dyspnea with exertion   Breath Sounds Bilateral Coarse;Diminished   Cough Non-productive; Congested   CPAP/BIPAP   CPAP/BIPAP Start/Stop Off  (patient refused to wear)     Patient refused to wear night-time CPAP.

## 2020-04-23 NOTE — DISCHARGE INSTRUCTIONS
Patient Education        Heart Failure: Care Instructions  Your Care Instructions    Heart failure occurs when your heart does not pump as much blood as the body needs. Failure does not mean that the heart has stopped pumping but rather that it is not pumping as well as it should. Over time, this causes fluid buildup in your lungs and other parts of your body. Fluid buildup can cause shortness of breath, fatigue, swollen ankles, and other problems. By taking medicines regularly, reducing sodium (salt) in your diet, checking your weight every day, and making lifestyle changes, you can feel better and live longer. Follow-up care is a key part of your treatment and safety. Be sure to make and go to all appointments, and call your doctor if you are having problems. It's also a good idea to know your test results and keep a list of the medicines you take. How can you care for yourself at home? Medicines    · Be safe with medicines. Take your medicines exactly as prescribed. Call your doctor if you think you are having a problem with your medicine.     · Do not take any vitamins, over-the-counter medicine, or herbal products without talking to your doctor first. Tamra Quiñonez not take ibuprofen (Advil or Motrin) and naproxen (Aleve) without talking to your doctor first. They could make your heart failure worse.     · You may take some of the following medicine. ? Angiotensin-converting enzyme inhibitors (ACEIs) or angiotensin II receptor blockers (ARBs) reduce the heart's workload, lower blood pressure, and reduce swelling. Taking an ACEI or ARB may lower your chance of needing to be hospitalized. ? Beta-blockers can slow heart rate, decrease blood pressure, and improve your condition. Taking a beta-blocker may lower your chance of needing to be hospitalized. ? Diuretics, also called water pills, reduce swelling.    You will get more details on the specific medicines your doctor prescribes.   Diet    · Your doctor may suggest that you limit sodium. Your doctor can tell you how much sodium is right for you. An example is less than 3,000 mg a day. This includes all the salt you eat in cooking or in packaged foods. People get most of their sodium from processed foods. Fast food and restaurant meals also tend to be very high in sodium.     · Ask your doctor how much liquid you can drink each day. You may have to limit liquids.    Weight    · Weigh yourself without clothing at the same time each day. Record your weight. Call your doctor if you have a sudden weight gain, such as more than 2 to 3 pounds in a day or 5 pounds in a week. (Your doctor may suggest a different range of weight gain.) A sudden weight gain may mean that your heart failure is getting worse.    Activity level    · Start light exercise (if your doctor says it is okay). Even if you can only do a small amount, exercise will help you get stronger, have more energy, and manage your weight and your stress. Walking is an easy way to get exercise. Start out by walking a little more than you did before. Bit by bit, increase the amount you walk.     · When you exercise, watch for signs that your heart is working too hard. You are pushing yourself too hard if you cannot talk while you are exercising. If you become short of breath or dizzy or have chest pain, stop, sit down, and rest.     · If you feel \"wiped out\" the day after you exercise, walk slower or for a shorter distance until you can work up to a better pace.     · Get enough rest at night. Sleeping with 1 or 2 pillows under your upper body and head may help you breathe easier.    Lifestyle changes    · Do not smoke. Smoking can make a heart condition worse. If you need help quitting, talk to your doctor about stop-smoking programs and medicines. These can increase your chances of quitting for good.  Quitting smoking may be the most important step you can take to protect your heart.     · Limit alcohol to 2 drinks a day for men and 1 drink a day for women. Too much alcohol can cause health problems.     · Avoid getting sick from colds and the flu. Get a pneumococcal vaccine shot. If you have had one before, ask your doctor whether you need another dose. Get a flu shot each year. If you must be around people with colds or the flu, wash your hands often. When should you call for help? Call 911 if you have symptoms of sudden heart failure such as:    · You have severe trouble breathing.     · You cough up pink, foamy mucus.     · You have a new irregular or rapid heartbeat.    Call your doctor now or seek immediate medical care if:    · You have new or increased shortness of breath.     · You are dizzy or lightheaded, or you feel like you may faint.     · You have sudden weight gain, such as more than 2 to 3 pounds in a day or 5 pounds in a week. (Your doctor may suggest a different range of weight gain.)     · You have increased swelling in your legs, ankles, or feet.     · You are suddenly so tired or weak that you cannot do your usual activities.    Watch closely for changes in your health, and be sure to contact your doctor if you develop new symptoms. Where can you learn more? Go to http://jeff-claudine.info/  Enter U072 in the search box to learn more about \"Heart Failure: Care Instructions. \"  Current as of: December 15, 2019Content Version: 12.4  © 2514-1121 [employer]. Care instructions adapted under license by [employer] (which disclaims liability or warranty for this information). If you have questions about a medical condition or this instruction, always ask your healthcare professional. [address] any warranty or liability for your use of this information.

## 2020-04-23 NOTE — DISCHARGE SUMMARY
Hospitalist Discharge Summary     Patient ID:  Ino Izaguirre  [id number]  62 y.o.  1965  Admit date: 4/17/2020  1:43 PM  Discharge date and time: 4/23/2020  Attending: Rajendra Mendoza DO  PCP:  Janette Sumner MD  Treatment Team: Attending Provider: Valerie Rowe; Primary Nurse: Colt Jung Consulting Provider: Donna Steinberg MD; Care Manager: Jose Zaldivar RN    Principal Diagnosis Acute respiratory failure with hypoxia [facility])    Hospital Problems as of 4/23/2020 Date Reviewed: 4/14/2020          Codes Class Noted - Resolved POA    * (Principal) Acute respiratory failure with hypoxia ([facility] ICD-10-CM: J96.01  ICD-9-CM: 518.81  4/17/2020 - Present Yes        Type 2 diabetes with nephropathy ([facility] ICD-10-CM: E11.21  ICD-9-CM: 250.40, 583.81  4/2/2018 - Present Yes        Ventricular tachycardia ([facility] ICD-10-CM: I47.2  ICD-9-CM: 427.1  4/18/2016 - Present Yes        CAD in native artery ICD-10-CM: I25.10  ICD-9-CM: 414.01  3/30/2016 - Present Yes        Automatic implantable cardioverter-defibrillator in situ ICD-10-CM: Z95.810  ICD-9-CM: V45.02  3/30/2016 - Present Yes    Overview Signed 3/30/2016  8:13 AM by Rafaela [place] dual ICD 7/2013 1/5 lost brother,11/8 had VF episode with ATP,no shock             Chronic systolic heart failure ([facility] (Chronic) ICD-10-CM: R02.01  ICD-9-CM: 428.22  7/18/2013 - Present Yes    Overview Signed 3/30/2016  8:11 AM by Brandon ROMERO 4/15:low risk  Echo 6/2014:EF 40-45%  Echo 5/2013:EF 30-35%  LHC 2/2013: minimal CAD             HTN (hypertension) (Chronic) ICD-10-CM: I10  ICD-9-CM: 401.9  4/12/2010 - Present Yes        Restrictive lung disease ICD-10-CM: J98.4  ICD-9-CM: 518.89  4/8/2020 - Present Yes        NICM (nonischemic cardiomyopathy) ([facility] (Chronic) ICD-10-CM: I42.8  ICD-9-CM: 425.4  2/15/2013 - Present Yes        Covid-19 Virus not Detected ICD-10-CM: Lurene Eldred  4/17/2020 - Present Yes        BA on CPAP (Chronic) ICD-10-CM: G47.33, Z99.89  ICD-9-CM: 327.23, V46.8  7/14/2014 - Present Yes        Morbid obesity (HCC) (Chronic) ICD-10-CM: E66.01  ICD-9-CM: 278.01  7/14/2014 - Present Yes        Dyslipidemia ICD-10-CM: E78.5  ICD-9-CM: 272.4  2/13/2013 - Present Yes              Hospital Course:  47-year-old female with past medical history of chronic systolic heart failure, HTN, BA, COPD morbid obesity, ventricular tachycardia, CAD, AICD, and diabetes mellitus admitted on 4/17/20 due to SOB. COVID was ruled out. She has chronic sCHF so diuresis was started. Eventually she was changed to IV Lasix and she started feeling better. She was diuresed and discharged home in stable condition. Significant Diagnostic Studies:    Labs: Results:       Chemistry Recent Labs     04/22/20  0721 04/21/20  0800   GLU 75 118*    139   K 4.5 4.6    105   CO2 30 27   BUN 30* 30*   CREA 1.38* 1.40*   CA 8.9 8.5   AGAP 5* 7      CBC w/Diff Recent Labs     04/21/20  0800   WBC 6.2   RBC 3.67*   HGB 11.3*   HCT 36.1   *   GRANS 50   LYMPH 36   EOS 6      Cardiac Enzymes No results for input(s): CPK, CKND1, CJ in the last 72 hours. No lab exists for component: CKRMB, TROIP   Coagulation No results for input(s): PTP, INR, APTT, INREXT in the last 72 hours. Lipid Panel Lab Results   Component Value Date/Time    Cholesterol, total 193 12/31/2019 07:55 AM    HDL Cholesterol 93 12/31/2019 07:55 AM    LDL, calculated 83 12/31/2019 07:55 AM    VLDL, calculated 17 12/31/2019 07:55 AM    Triglyceride 83 12/31/2019 07:55 AM    CHOL/HDL Ratio 2.3 03/12/2019 09:29 AM      BNP No results for input(s): BNPP in the last 72 hours. Liver Enzymes No results for input(s): TP, ALB, TBIL, AP, SGOT, GPT in the last 72 hours.     No lab exists for component: DBIL   Thyroid Studies Lab Results   Component Value Date/Time    T4, Total 9.9 04/07/2016 12:26 PM    TSH 1.440 01/06/2020 01:51 PM            Imaging:  Ct Chest W Cont    Result Date: 4/17/2020  IMPRESSION: 1. No evidence of pulmonary embolism. 2. Cardiomegaly with particular enlargement of the left ventricle. 3. Small right pleural effusion. 4. Mild patchy areas of atelectasis/infiltrate bilaterally. Xr Chest Port    Result Date: 4/17/2020  IMPRESSION: Lower lobe thickened interstitial markings with retrocardiac atelectasis or consolidation. Microbiology/Cultures: All Micro Results     Procedure Component Value Units Date/Time    CULTURE, RESPIRATORY/SPUTUM/BRONCH Ruffus Latrice [[id number]] Collected:  04/17/20 1515    Order Status:  Canceled Specimen:  Sputum           Discharge Exam:  Visit Vitals  BP (!) 132/94 (BP 1 Location: Right arm, BP Patient Position: At rest)   Pulse 70   Temp 98.3 °F (36.8 °C)   Resp 20   Ht 4' 11.02\" (1.499 m)   Wt 97.1 kg (214 lb)   SpO2 98%   BMI 43.20 kg/m²     General appearance: alert, cooperative, no distress, appears stated age  Lungs: clear to auscultation bilaterally  Heart: regular rate and rhythm, S1, S2 normal, no murmur, click, rub or gallop  Abdomen: soft, non-tender. Bowel sounds normal. No masses,  no organomegaly  Extremities: no cyanosis or edema  Neurologic: Grossly normal    Disposition: home  Discharge Condition: stable  Patient Instructions:   Current Discharge Medication List      CONTINUE these medications which have CHANGED    Details   furosemide (Lasix) 40 mg tablet Take 1 Tab by mouth two (2) times a day. Qty: 60 Tab, Refills: 1         CONTINUE these medications which have NOT CHANGED    Details   amiodarone (CORDARONE) 200 mg tablet Take 1 Tab by mouth two (2) times a day. Qty: 60 Tab, Refills: 11      sacubitril-valsartan (ENTRESTO) 49 mg/51 mg tablet Take 1 Tab by mouth two (2) times a day. Qty: 60 Tab, Refills: 11      insulin glargine (LANTUS SOLOSTAR U-100 INSULIN) 100 unit/mL (3 mL) inpn 35 Units by SubCUTAneous route nightly.   Qty: 5 Adjustable Dose Pre-filled Pen Syringe, Refills: 5    Associated Diagnoses: Diabetes mellitus without complication (HCC)      metFORMIN (GLUCOPHAGE) 500 mg tablet Take 1 Tab by mouth two (2) times daily (with meals). Qty: 60 Tab, Refills: 5    Associated Diagnoses: Type 2 diabetes mellitus with diabetic dermatitis, with long-term current use of insulin (HCC)      carvediloL (COREG) 6.25 mg tablet Take 1 Tab by mouth two (2) times daily (with meals). Qty: 60 Tab, Refills: 5      pravastatin (PRAVACHOL) 80 mg tablet Take 1 Tab by mouth nightly. Qty: 90 Tab, Refills: 3    Associated Diagnoses: Diabetes mellitus without complication (HCC)      isosorbide mononitrate ER (IMDUR) 30 mg tablet Take 1 Tab by mouth every morning. Qty: 30 Tab, Refills: 11      nitroglycerin (NITROSTAT) 0.4 mg SL tablet 1 Tab by SubLINGual route every five (5) minutes as needed for Chest Pain. Up to 3 doses. Qty: 1 Bottle, Refills: 6      aspirin delayed-release 81 mg tablet Take  by mouth daily. Insulin Needles, Disposable, (FABIOLA PEN NEEDLE) 32 gauge x 5/32\" ndle 1 Each by Does Not Apply route daily as needed (insulin injection for diabetes 2). E11.9  Qty: 90 Pen Needle, Refills: 3      glucose blood VI test strips (ONETOUCH ULTRA TEST) strip Check blood sugar daily, E11.9  Qty: 50 Strip, Refills: 11    Associated Diagnoses: Type 2 diabetes with nephropathy (HCC)      lancets (ONETOUCH SURESOFT LANCING DEV) misc Check blood sugar daily, E11.9  Qty: 50 Each, Refills: 11    Associated Diagnoses: Type 2 diabetes with nephropathy (HCC)      Blood-Glucose Meter (ONETOUCH ULTRA2 METER) monitoring kit Check blood sugar daily, E11.9  Qty: 1 Kit, Refills: 0    Associated Diagnoses: Type 2 diabetes with nephropathy (HCC)      magnesium oxide (MAG-OX) 400 mg tablet Take 1 Tab by mouth two (2) times a day. Qty: 180 Tab, Refills: 3      cholecalciferol (VITAMIN D3) 1,000 unit cap Take  by mouth daily. cpap machine kit by Does Not Apply route.  11 cm             Activity: Activity as tolerated  Diet: Cardiac Diet, 2 gram sodium  Wound Care: None needed    Follow-up  · Dr. Emerita Francis in one week  · Dr. Linh Winchester in 2-3 weeks    Time spent to discharge patient 35 minutes  Signed:  Zayda Burger DO  4/23/2020  11:45 AM

## 2020-04-23 NOTE — PROGRESS NOTES
4/23/2020 10:44 AM    Admit Date: 4/17/2020    Admit Diagnosis: Acute respiratory failure with hypoxia (HCC) [J96.01]      Subjective:   No cp or sob      Objective:      Visit Vitals  BP (!) 132/94 (BP 1 Location: Right arm, BP Patient Position: At rest)   Pulse 70   Temp 98.3 °F (36.8 °C)   Resp 20   Ht 4' 11.02\" (1.499 m)   Wt 97.1 kg (214 lb)   SpO2 92%   BMI 43.20 kg/m²       Physical Exam:  [place], Well Nourished, No Acute Distress, Alert & Oriented x 3, appropriate mood. Neck- supple, no JVD  CV- regular rate and rhythm no MRG  Lung- clear bilaterally  Abd- soft, nontender, nondistended  Ext- no edema bilaterally. Skin- warm and dry        Data Review:   Recent Labs     04/22/20  0721 04/21/20  0800    139   K 4.5 4.6   BUN 30* 30*   CREA 1.38* 1.40*   WBC  --  6.2   HGB  --  11.3*   HCT  --  36.1   PLT  --  135*       Assessment/Plan:     Principal Problem:    Acute respiratory failure with hypoxia (HCC) (4/17/2020)    Active Problems:    HTN (hypertension) (4/12/2010)Stable. Continue current medical therapy. Dyslipidemia (2/13/2013)      NICM (nonischemic cardiomyopathy) ([address] (2/15/2013)      Chronic systolic heart failure ([address] (7/18/2013)Improved with current therapy.  Will continue medications   change to po lasix and we will arrange OP fu with Dr Niurka Bustillos        Overview: NST 4/15:low risk      Echo 6/2014:EF 40-45%      Echo 5/2013:EF 30-35%      LHC 2/2013: minimal CAD      BA on CPAP (7/14/2014)      Morbid obesity ([address] (7/14/2014)      CAD in native artery (3/30/2016)      Automatic implantable cardioverter-defibrillator in situ (3/30/2016)      Overview: [employer] dual ICD 7/2013      1/5 lost brother,11/8 had VF episode with ATP,no shock      Ventricular tachycardia ([address] (4/18/2016)      Type 2 diabetes with nephropathy ([facility] (4/2/2018)      Restrictive lung disease (4/8/2020)      Covid-19 Virus not Detected (4/17/2020)

## 2020-04-23 NOTE — PROGRESS NOTES
I have reviewed discharge instructions with the patient. The patient verbalized understanding. Appointments reviewed and already has one with Dr. Dayana Nelson. No needs at this time.

## 2020-04-24 ENCOUNTER — PATIENT OUTREACH (OUTPATIENT)
Dept: CASE MANAGEMENT | Age: 55
End: 2020-04-24

## 2020-04-24 NOTE — PROGRESS NOTES
Patient contacted regarding recent discharge and COVID-19 risk   Care Transition Nurse/ Ambulatory Care Manager contacted the patient by telephone to perform post discharge assessment. Verified name and  with patient as identifiers. Patient has following risk factors of: heart failure, pneumonia, diabetes and HTN. CTN/ACM reviewed discharge instructions, medical action plan and red flags related to discharge diagnosis. Reviewed and educated them on any new and changed medications related to discharge diagnosis. Advised obtaining a 90-day supply of all daily and as-needed medications. Education provided regarding infection prevention, and signs and symptoms of COVID-19 and when to seek medical attention with patient who verbalized understanding. Discussed exposure protocols and quarantine from [address] you at higher risk for severe illness  and given an opportunity for questions and concerns. The patient agrees to contact the COVID-19 hotline [phone] or PCP office for questions related to their healthcare. CTN/ACM provided contact information for future reference. From CDC: Are you at higher risk for severe illness?  Wash your hands often.  Avoid close contact (6 feet, which is about two arm lengths) with people who are sick.  Put distance between yourself and other people if COVID-19 is spreading in your community.  Clean and disinfect frequently touched surfaces.  Avoid all cruise travel and non-essential air travel.  Call your healthcare professional if you have concerns about COVID-19 and your underlying condition or if you are sick.     For more information on steps you can take to protect yourself, see CDC's How to Protect Yourself      Patient/family/caregiver given information for Anup Andrews and agrees to enroll no  Patient's preferred e-mail:  NONE  Patient's preferred phone number: NA  Based on Loop alert triggers, patient will be contacted by nurse care manager for worsening symptoms. Plan for follow-up call in 5-7 days based on severity of symptoms and risk factors. Reviewed red flags to report re pneumonia. Also reviewed daily weights, record for MD review.

## 2020-05-05 ENCOUNTER — PATIENT OUTREACH (OUTPATIENT)
Dept: CASE MANAGEMENT | Age: 55
End: 2020-05-05

## 2020-05-06 NOTE — PROGRESS NOTES
Transition of Care Hospital Discharge Follow-Up      Date/Time:  2020 10:05 AM    Care Transition Nurse (CTN) contacted the patient by telephone to follow up post hospital discharge assessment. Verified name and  with patient as identifiers. CTN reinforced discharge instructions and red flags with patient who verbalized understanding. Patient given an opportunity to ask questions and does not have any further questions or concerns at this time. The patient agrees to contact the PCP office for questions related to their healthcare. Disease Specific:   CHF    Patients top risk factors for readmission:  medical condition    Home Health Active: none  [address]: None    Medication(s):   Medication changes since discharge: None    Current Outpatient Medications   Medication Sig    magnesium oxide (MAG-OX) 400 mg tablet Take 1 Tab by mouth two (2) times a day.  Insulin Needles, Disposable, (Nery Pen Needle) 32 gauge x 5/32\" ndle 1 Each by Does Not Apply route daily as needed (insulin injection for diabetes 2). E11.9    furosemide (Lasix) 40 mg tablet Take 1 Tab by mouth two (2) times a day.  amiodarone (CORDARONE) 200 mg tablet Take 1 Tab by mouth two (2) times a day.  sacubitril-valsartan (ENTRESTO) 49 mg/51 mg tablet Take 1 Tab by mouth two (2) times a day.  insulin glargine (LANTUS SOLOSTAR U-100 INSULIN) 100 unit/mL (3 mL) inpn 35 Units by SubCUTAneous route nightly.  metFORMIN (GLUCOPHAGE) 500 mg tablet Take 1 Tab by mouth two (2) times daily (with meals).  carvediloL (COREG) 6.25 mg tablet Take 1 Tab by mouth two (2) times daily (with meals).  pravastatin (PRAVACHOL) 80 mg tablet Take 1 Tab by mouth nightly.  isosorbide mononitrate ER (IMDUR) 30 mg tablet Take 1 Tab by mouth every morning.  nitroglycerin (NITROSTAT) 0.4 mg SL tablet 1 Tab by SubLINGual route every five (5) minutes as needed for Chest Pain. Up to 3 doses.     aspirin delayed-release 81 mg tablet Take  by mouth daily.  glucose blood VI test strips (ONETOUCH ULTRA TEST) strip Check blood sugar daily, E11.9    lancets (ONETOUCH SURESOFT LANCING DEV) misc Check blood sugar daily, E11.9    Blood-Glucose Meter (ONETOUCH ULTRA2 METER) monitoring kit Check blood sugar daily, E11.9    cholecalciferol (VITAMIN D3) 1,000 unit cap Take  by mouth daily.  cpap machine kit by Does Not Apply route. 11 cm     No current facility-administered medications for this visit. [facility] follow up appointment(s):   Future Appointments   Date Time Provider Brian Hammonds   5/18/2020  8:40 AM GVLLE REMOTE [facility]   5/28/2020  3:45 PM Colon Senior, DO [facility]   6/3/2020  3:50 PM Rosario Broussard NP [facility] PP PP   7/17/2020  3:00 PM Colon Senior, DO [facility]   8/17/2020  9:40 AM MAT LAB [facility] MAT [facility]   8/24/2020 11:20 AM Shanelle Vargas MD [facility]      Non-[facility] follow up appointment(s): None  Patient attended follow up appointments since last contact:   What was the outcome of the appointment:     Other Issues/ Miscellaneous? Reviewed low salt diet  Referrals needed? (SW, Diabetes education, HH, etc.) None      Goals      Instruct on red flags (ie. fever, increased productive cough, SOB, and chest pain) and when to seek urgent medical treatment. Pt will verbalize s/s to report to MD by 5/30. Next Outreach Scheduled: 1-2 weeks      Due to no new or worsening symptoms encounter was not routed to provider for escalation. Education provided regarding infection prevention, and signs and symptoms of COVID-19 and when to seek medical attention with patient who verbalized understanding. Discussed exposure protocols and quarantine from [address] you at higher risk for severe illness 2019 and given an opportunity for questions and concerns. The patient agrees to contact the COVID-19 hotline [phone] or PCP office for questions related to their healthcare. CTN/ACM provided contact information for future reference. From CDC: Are you at higher risk for severe illness?  Wash your hands often.  Avoid close contact (6 feet, which is about two arm lengths) with people who are sick.  Put distance between yourself and other people if COVID-19 is spreading in your community.  Clean and disinfect frequently touched surfaces.  Avoid all cruise travel and non-essential air travel.  Call your healthcare professional if you have concerns about COVID-19 and your underlying condition or if you are sick. For more information on steps you can take to protect yourself, see CDC's How to [address] [place] [address] for follow-up call in 7-14 days based on severity of symptoms and risk factors.

## 2020-05-14 ENCOUNTER — PATIENT OUTREACH (OUTPATIENT)
Dept: CASE MANAGEMENT | Age: 55
End: 2020-05-14

## 2020-05-14 NOTE — PROGRESS NOTES
Patient contacted regarding COVID-19 risk and screening. Care Transition Nurse/ Ambulatory Care Manager contacted the patient by telephone to perform follow-up assessment. Verified name and  with patient as identifiers. Patient has following risk factors of: heart failure, pneumonia, diabetes, chronic kidney disease and HTN. Symptoms reviewed with patient who verbalized the following symptoms: no new symptoms. Due to no new or worsening symptoms encounter was not routed to provider for escalation. Education provided regarding infection prevention, and signs and symptoms of COVID-19 and when to seek medical attention with patient who verbalized understanding. Discussed exposure protocols and quarantine from [address] you at higher risk for severe illness  and given an opportunity for questions and concerns. The patient agrees to contact the COVID-19 hotline [phone] or PCP office for questions related to their healthcare. CTN/ACM provided contact information for future reference. From CDC: Are you at higher risk for severe illness?  Wash your hands often.  Avoid close contact (6 feet, which is about two arm lengths) with people who are sick.  Put distance between yourself and other people if COVID-19 is spreading in your community.  Clean and disinfect frequently touched surfaces.  Avoid all cruise travel and non-essential air travel.  Call your healthcare professional if you have concerns about COVID-19 and your underlying condition or if you are sick. For more information on steps you can take to protect yourself, see CDC's How to [place] for follow-up call in 7-14 days based on severity of symptoms and risk factors. Pt denies any SOB, edema, taking meds as prescribed. Reports back to work at [employer], wearing mask/ gloves. Asking about [employer] transportation benefits, encouraged to call Member services numbers on the back of card.

## 2020-06-01 ENCOUNTER — PATIENT OUTREACH (OUTPATIENT)
Dept: CASE MANAGEMENT | Age: 55
End: 2020-06-01

## 2020-06-03 NOTE — PROGRESS NOTES
RNCM attempts to reach pt unsuccessful, messages left, no return calls. Plan to f/u next week and if no response will d/c from f/u.

## 2020-06-11 ENCOUNTER — PATIENT OUTREACH (OUTPATIENT)
Dept: CASE MANAGEMENT | Age: 55
End: 2020-06-11

## 2020-06-12 ENCOUNTER — HOSPITAL ENCOUNTER (OUTPATIENT)
Dept: GENERAL RADIOLOGY | Age: 55
Discharge: HOME | End: 2020-06-12
Payer: MEDICARE

## 2020-06-12 DIAGNOSIS — M51.26 RLD (RUPTURED LUMBAR DISC): ICD-10-CM

## 2020-06-12 PROBLEM — Z79.899 LONG TERM CURRENT USE OF AMIODARONE: Status: ACTIVE | Noted: 2020-06-12

## 2020-06-12 PROCEDURE — 71046 X-RAY EXAM CHEST 2 VIEWS: CPT

## 2020-06-12 NOTE — PROGRESS NOTES
RNCM multiple attempts to reach pt unsuccessful, messages left, no return calls. Note f/u w/ MDs. No further outreach planned but happy to assist in future.

## 2020-06-18 ENCOUNTER — HOSPITAL ENCOUNTER (OUTPATIENT)
Dept: CT IMAGING | Age: 55
Discharge: HOME | End: 2020-06-18
Attending: NURSE PRACTITIONER

## 2020-06-18 ENCOUNTER — HOSPITAL ENCOUNTER (OUTPATIENT)
Dept: LAB | Age: 55
Discharge: HOME | End: 2020-06-18
Payer: MEDICARE

## 2020-06-18 DIAGNOSIS — J98.4 RESTRICTIVE LUNG DISEASE: ICD-10-CM

## 2020-06-18 DIAGNOSIS — Z79.899 LONG TERM CURRENT USE OF AMIODARONE: ICD-10-CM

## 2020-06-18 DIAGNOSIS — R06.09 DYSPNEA ON EXERTION: ICD-10-CM

## 2020-06-18 LAB
ANION GAP SERPL CALC-SCNC: 6 MMOL/L (ref 7–16)
BUN SERPL-MCNC: 24 MG/DL (ref 6–23)
CALCIUM SERPL-MCNC: 9 MG/DL (ref 8.3–10.4)
CHLORIDE SERPL-SCNC: 110 MMOL/L (ref 98–107)
CO2 SERPL-SCNC: 26 MMOL/L (ref 21–32)
CREAT SERPL-MCNC: 1.34 MG/DL (ref 0.6–1)
GLUCOSE SERPL-MCNC: 76 MG/DL (ref 65–100)
HGB BLD-MCNC: 11.8 G/DL (ref 11.7–15.4)
MAGNESIUM SERPL-MCNC: 2.2 MG/DL (ref 1.8–2.4)
PHOSPHATE SERPL-MCNC: 3 MG/DL (ref 2.5–4.5)
POTASSIUM SERPL-SCNC: 3.9 MMOL/L (ref 3.5–5.1)
SODIUM SERPL-SCNC: 142 MMOL/L (ref 136–145)

## 2020-06-18 PROCEDURE — 80048 BASIC METABOLIC PNL TOTAL CA: CPT

## 2020-06-18 PROCEDURE — 83735 ASSAY OF MAGNESIUM: CPT

## 2020-06-18 PROCEDURE — 84100 ASSAY OF PHOSPHORUS: CPT

## 2020-06-18 PROCEDURE — 36415 COLL VENOUS BLD VENIPUNCTURE: CPT

## 2020-06-18 PROCEDURE — 83970 ASSAY OF PARATHORMONE: CPT

## 2020-06-18 PROCEDURE — 85018 HEMOGLOBIN: CPT

## 2020-06-18 PROCEDURE — 82306 VITAMIN D 25 HYDROXY: CPT

## 2020-06-19 LAB
25(OH)D3+25(OH)D2 SERPL-MCNC: 33.3 NG/ML (ref 30–100)
CALCIUM SERPL-MCNC: 8.9 MG/DL (ref 8.3–10.4)
PTH-INTACT SERPL-MCNC: 197.3 PG/ML (ref 18.5–88)

## 2020-06-19 NOTE — PROGRESS NOTES
Improvement noted, to have CPFT's and appt afterwards 7/6. Will discuss then and will forward to cardiology as well in regards to comment re:  Liver/amio.

## 2020-07-06 PROBLEM — F17.200 NICOTINE DEPENDENCE: Status: ACTIVE | Noted: 2020-07-06

## 2020-07-15 ENCOUNTER — HOSPITAL ENCOUNTER (OUTPATIENT)
Dept: LAB | Age: 55
Discharge: HOME | End: 2020-07-15
Payer: MEDICARE

## 2020-07-15 DIAGNOSIS — I42.8 NICM (NONISCHEMIC CARDIOMYOPATHY) (HCC): Chronic | ICD-10-CM

## 2020-07-15 LAB
ALBUMIN SERPL-MCNC: 3.9 G/DL (ref 3.5–5)
ALBUMIN/GLOB SERPL: 1.1 {RATIO} (ref 1.2–3.5)
ALP SERPL-CCNC: 63 U/L (ref 50–136)
ALT SERPL-CCNC: 39 U/L (ref 12–65)
ANION GAP SERPL CALC-SCNC: 8 MMOL/L (ref 7–16)
AST SERPL-CCNC: 27 U/L (ref 15–37)
BILIRUB SERPL-MCNC: 0.5 MG/DL (ref 0.2–1.1)
BNP SERPL-MCNC: 955 PG/ML (ref 5–125)
BUN SERPL-MCNC: 24 MG/DL (ref 6–23)
CALCIUM SERPL-MCNC: 9.2 MG/DL (ref 8.3–10.4)
CHLORIDE SERPL-SCNC: 110 MMOL/L (ref 98–107)
CO2 SERPL-SCNC: 24 MMOL/L (ref 21–32)
CREAT SERPL-MCNC: 1.66 MG/DL (ref 0.6–1)
GLOBULIN SER CALC-MCNC: 3.7 G/DL (ref 2.3–3.5)
GLUCOSE SERPL-MCNC: 112 MG/DL (ref 65–100)
MAGNESIUM SERPL-MCNC: 2.2 MG/DL (ref 1.8–2.4)
POTASSIUM SERPL-SCNC: 3.7 MMOL/L (ref 3.5–5.1)
PROT SERPL-MCNC: 7.6 G/DL (ref 6.3–8.2)
SODIUM SERPL-SCNC: 142 MMOL/L (ref 136–145)
TSH SERPL DL<=0.005 MIU/L-ACNC: 1.79 UIU/ML (ref 0.36–3.74)

## 2020-07-15 PROCEDURE — 36415 COLL VENOUS BLD VENIPUNCTURE: CPT

## 2020-07-15 PROCEDURE — 80053 COMPREHEN METABOLIC PANEL: CPT

## 2020-07-15 PROCEDURE — 84443 ASSAY THYROID STIM HORMONE: CPT

## 2020-07-15 PROCEDURE — 83880 ASSAY OF NATRIURETIC PEPTIDE: CPT

## 2020-07-15 PROCEDURE — 83735 ASSAY OF MAGNESIUM: CPT

## 2020-08-24 PROBLEM — J45.51 SEVERE PERSISTENT ASTHMA WITH ACUTE EXACERBATION: Status: ACTIVE | Noted: 2020-08-24

## 2020-08-24 PROBLEM — N18.30 CKD (CHRONIC KIDNEY DISEASE) STAGE 3, GFR 30-59 ML/MIN (HCC): Status: ACTIVE | Noted: 2020-08-24

## 2020-11-10 ENCOUNTER — APPOINTMENT (OUTPATIENT)
Dept: GENERAL RADIOLOGY | Age: 55
DRG: 292 | End: 2020-11-10
Attending: EMERGENCY MEDICINE
Payer: MEDICARE

## 2020-11-10 ENCOUNTER — HOSPITAL ENCOUNTER (INPATIENT)
Age: 55
LOS: 4 days | Discharge: HOME | DRG: 292 | End: 2020-11-14
Attending: INTERNAL MEDICINE | Admitting: INTERNAL MEDICINE
Payer: MEDICARE

## 2020-11-10 ENCOUNTER — HOSPITAL ENCOUNTER (EMERGENCY)
Age: 55
Discharge: ANOTHER HOSPITAL | DRG: 292 | End: 2020-11-10
Attending: EMERGENCY MEDICINE
Payer: MEDICARE

## 2020-11-10 ENCOUNTER — APPOINTMENT (OUTPATIENT)
Dept: CT IMAGING | Age: 55
DRG: 292 | End: 2020-11-10
Attending: INTERNAL MEDICINE
Payer: MEDICARE

## 2020-11-10 VITALS
RESPIRATION RATE: 26 BRPM | BODY MASS INDEX: 42.33 KG/M2 | WEIGHT: 210 LBS | DIASTOLIC BLOOD PRESSURE: 81 MMHG | HEART RATE: 89 BPM | TEMPERATURE: 98.8 F | SYSTOLIC BLOOD PRESSURE: 136 MMHG | OXYGEN SATURATION: 100 % | HEIGHT: 59 IN

## 2020-11-10 DIAGNOSIS — I50.23 ACUTE ON CHRONIC SYSTOLIC CONGESTIVE HEART FAILURE (HCC): Primary | ICD-10-CM

## 2020-11-10 DIAGNOSIS — I42.8 NICM (NONISCHEMIC CARDIOMYOPATHY) (HCC): Chronic | ICD-10-CM

## 2020-11-10 DIAGNOSIS — J90 PLEURAL EFFUSION: ICD-10-CM

## 2020-11-10 DIAGNOSIS — E66.01 MORBID OBESITY (HCC): Chronic | ICD-10-CM

## 2020-11-10 DIAGNOSIS — E11.620 TYPE 2 DIABETES MELLITUS WITH DIABETIC DERMATITIS, WITH LONG-TERM CURRENT USE OF INSULIN (HCC): ICD-10-CM

## 2020-11-10 DIAGNOSIS — E11.21 TYPE 2 DIABETES WITH NEPHROPATHY (HCC): Chronic | ICD-10-CM

## 2020-11-10 DIAGNOSIS — R10.30 LOWER ABDOMINAL PAIN: ICD-10-CM

## 2020-11-10 DIAGNOSIS — E11.9 DIABETES MELLITUS WITHOUT COMPLICATION (HCC): ICD-10-CM

## 2020-11-10 DIAGNOSIS — N18.31 STAGE 3A CHRONIC KIDNEY DISEASE (HCC): ICD-10-CM

## 2020-11-10 DIAGNOSIS — I50.43 ACUTE ON CHRONIC COMBINED SYSTOLIC AND DIASTOLIC CONGESTIVE HEART FAILURE (HCC): ICD-10-CM

## 2020-11-10 DIAGNOSIS — I10 ESSENTIAL HYPERTENSION: Chronic | ICD-10-CM

## 2020-11-10 DIAGNOSIS — I50.23 ACUTE ON CHRONIC HFREF (HEART FAILURE WITH REDUCED EJECTION FRACTION) (HCC): ICD-10-CM

## 2020-11-10 DIAGNOSIS — Z79.4 TYPE 2 DIABETES MELLITUS WITH DIABETIC DERMATITIS, WITH LONG-TERM CURRENT USE OF INSULIN (HCC): ICD-10-CM

## 2020-11-10 PROBLEM — I50.9 CHF (CONGESTIVE HEART FAILURE) (HCC): Status: ACTIVE | Noted: 2020-11-10

## 2020-11-10 LAB
ALBUMIN SERPL-MCNC: 3.5 G/DL (ref 3.5–5)
ALBUMIN/GLOB SERPL: 0.9 {RATIO} (ref 1.2–3.5)
ALP SERPL-CCNC: 104 U/L (ref 50–136)
ALT SERPL-CCNC: 82 U/L (ref 12–65)
ANION GAP SERPL CALC-SCNC: 8 MMOL/L (ref 7–16)
AST SERPL-CCNC: 112 U/L (ref 15–37)
ATRIAL RATE: 500 BPM
ATRIAL RATE: 60 BPM
BASOPHILS # BLD: 0 K/UL (ref 0–0.2)
BASOPHILS NFR BLD: 0 % (ref 0–2)
BILIRUB SERPL-MCNC: 0.5 MG/DL (ref 0.2–1.1)
BNP SERPL-MCNC: 2589 PG/ML (ref 5–125)
BUN SERPL-MCNC: 19 MG/DL (ref 6–23)
CALCIUM SERPL-MCNC: 9.3 MG/DL (ref 8.3–10.4)
CALCULATED P AXIS, ECG09: 91 DEGREES
CALCULATED R AXIS, ECG10: 36 DEGREES
CALCULATED R AXIS, ECG10: 84 DEGREES
CALCULATED T AXIS, ECG11: -94 DEGREES
CALCULATED T AXIS, ECG11: 78 DEGREES
CHLORIDE SERPL-SCNC: 107 MMOL/L (ref 98–107)
CO2 SERPL-SCNC: 27 MMOL/L (ref 21–32)
CREAT SERPL-MCNC: 1.4 MG/DL (ref 0.6–1)
DIAGNOSIS, 93000: NORMAL
DIAGNOSIS, 93000: NORMAL
DIFFERENTIAL METHOD BLD: ABNORMAL
EOSINOPHIL # BLD: 0.2 K/UL (ref 0–0.8)
EOSINOPHIL NFR BLD: 3 % (ref 0.5–7.8)
ERYTHROCYTE [DISTWIDTH] IN BLOOD BY AUTOMATED COUNT: 14.1 % (ref 11.9–14.6)
GLOBULIN SER CALC-MCNC: 3.8 G/DL (ref 2.3–3.5)
GLUCOSE BLD STRIP.AUTO-MCNC: 112 MG/DL (ref 65–100)
GLUCOSE BLD STRIP.AUTO-MCNC: 118 MG/DL (ref 65–100)
GLUCOSE BLD STRIP.AUTO-MCNC: 87 MG/DL (ref 65–100)
GLUCOSE BLD STRIP.AUTO-MCNC: 89 MG/DL (ref 65–100)
GLUCOSE SERPL-MCNC: 151 MG/DL (ref 65–100)
HCT VFR BLD AUTO: 38.8 % (ref 35.8–46.3)
HGB BLD-MCNC: 12.3 G/DL (ref 11.7–15.4)
IMM GRANULOCYTES # BLD AUTO: 0 K/UL (ref 0–0.5)
IMM GRANULOCYTES NFR BLD AUTO: 0 % (ref 0–5)
LACTATE SERPL-SCNC: 0.9 MMOL/L (ref 0.4–2)
LIPASE SERPL-CCNC: 116 U/L (ref 73–393)
LYMPHOCYTES # BLD: 1.4 K/UL (ref 0.5–4.6)
LYMPHOCYTES NFR BLD: 24 % (ref 13–44)
MAGNESIUM SERPL-MCNC: 2.2 MG/DL (ref 1.8–2.4)
MCH RBC QN AUTO: 31.1 PG (ref 26.1–32.9)
MCHC RBC AUTO-ENTMCNC: 31.7 G/DL (ref 31.4–35)
MCV RBC AUTO: 98 FL (ref 79.6–97.8)
MONOCYTES # BLD: 0.5 K/UL (ref 0.1–1.3)
MONOCYTES NFR BLD: 8 % (ref 4–12)
NEUTS SEG # BLD: 3.8 K/UL (ref 1.7–8.2)
NEUTS SEG NFR BLD: 64 % (ref 43–78)
NRBC # BLD: 0 K/UL (ref 0–0.2)
P-R INTERVAL, ECG05: 200 MS
PLATELET # BLD AUTO: 159 K/UL (ref 150–450)
PMV BLD AUTO: 11.4 FL (ref 9.4–12.3)
POTASSIUM SERPL-SCNC: 4 MMOL/L (ref 3.5–5.1)
PROT SERPL-MCNC: 7.3 G/DL (ref 6.3–8.2)
Q-T INTERVAL, ECG07: 430 MS
Q-T INTERVAL, ECG07: 512 MS
QRS DURATION, ECG06: 114 MS
QRS DURATION, ECG06: 134 MS
QTC CALCULATION (BEZET), ECG08: 430 MS
QTC CALCULATION (BEZET), ECG08: 622 MS
RBC # BLD AUTO: 3.96 M/UL (ref 4.05–5.2)
SODIUM SERPL-SCNC: 142 MMOL/L (ref 136–145)
TROPONIN-HIGH SENSITIVITY: 116.1 PG/ML (ref 0–14)
TROPONIN-HIGH SENSITIVITY: 74.7 PG/ML (ref 0–14)
VENTRICULAR RATE, ECG03: 60 BPM
VENTRICULAR RATE, ECG03: 89 BPM
WBC # BLD AUTO: 5.9 K/UL (ref 4.3–11.1)

## 2020-11-10 PROCEDURE — 74011250636 HC RX REV CODE- 250/636: Performed by: EMERGENCY MEDICINE

## 2020-11-10 PROCEDURE — 74011250636 HC RX REV CODE- 250/636: Performed by: INTERNAL MEDICINE

## 2020-11-10 PROCEDURE — 99223 1ST HOSP IP/OBS HIGH 75: CPT | Performed by: INTERNAL MEDICINE

## 2020-11-10 PROCEDURE — 96374 THER/PROPH/DIAG INJ IV PUSH: CPT

## 2020-11-10 PROCEDURE — 74177 CT ABD & PELVIS W/CONTRAST: CPT

## 2020-11-10 PROCEDURE — 94640 AIRWAY INHALATION TREATMENT: CPT

## 2020-11-10 PROCEDURE — 74011250637 HC RX REV CODE- 250/637: Performed by: NURSE PRACTITIONER

## 2020-11-10 PROCEDURE — 65660000000 HC RM CCU STEPDOWN

## 2020-11-10 PROCEDURE — 74011000636 HC RX REV CODE- 636: Performed by: INTERNAL MEDICINE

## 2020-11-10 PROCEDURE — 80053 COMPREHEN METABOLIC PANEL: CPT

## 2020-11-10 PROCEDURE — 74011000250 HC RX REV CODE- 250: Performed by: NURSE PRACTITIONER

## 2020-11-10 PROCEDURE — 74011000250 HC RX REV CODE- 250: Performed by: EMERGENCY MEDICINE

## 2020-11-10 PROCEDURE — 71045 X-RAY EXAM CHEST 1 VIEW: CPT

## 2020-11-10 PROCEDURE — 99284 EMERGENCY DEPT VISIT MOD MDM: CPT

## 2020-11-10 PROCEDURE — 83880 ASSAY OF NATRIURETIC PEPTIDE: CPT

## 2020-11-10 PROCEDURE — 83605 ASSAY OF LACTIC ACID: CPT

## 2020-11-10 PROCEDURE — 82962 GLUCOSE BLOOD TEST: CPT

## 2020-11-10 PROCEDURE — 83690 ASSAY OF LIPASE: CPT

## 2020-11-10 PROCEDURE — 93005 ELECTROCARDIOGRAM TRACING: CPT | Performed by: INTERNAL MEDICINE

## 2020-11-10 PROCEDURE — 74011000258 HC RX REV CODE- 258: Performed by: INTERNAL MEDICINE

## 2020-11-10 PROCEDURE — 83735 ASSAY OF MAGNESIUM: CPT

## 2020-11-10 PROCEDURE — 84484 ASSAY OF TROPONIN QUANT: CPT

## 2020-11-10 PROCEDURE — 87635 SARS-COV-2 COVID-19 AMP PRB: CPT

## 2020-11-10 PROCEDURE — 85025 COMPLETE CBC W/AUTO DIFF WBC: CPT

## 2020-11-10 PROCEDURE — 99285 EMERGENCY DEPT VISIT HI MDM: CPT

## 2020-11-10 PROCEDURE — 93005 ELECTROCARDIOGRAM TRACING: CPT | Performed by: EMERGENCY MEDICINE

## 2020-11-10 PROCEDURE — 74011636637 HC RX REV CODE- 636/637: Performed by: NURSE PRACTITIONER

## 2020-11-10 PROCEDURE — 94760 N-INVAS EAR/PLS OXIMETRY 1: CPT

## 2020-11-10 PROCEDURE — 77030013140 HC MSK NEB VYRM -A

## 2020-11-10 PROCEDURE — 36415 COLL VENOUS BLD VENIPUNCTURE: CPT

## 2020-11-10 RX ORDER — ALBUTEROL SULFATE 0.83 MG/ML
2.5 SOLUTION RESPIRATORY (INHALATION)
Status: DISCONTINUED | OUTPATIENT
Start: 2020-11-10 | End: 2020-11-14 | Stop reason: HOSPADM

## 2020-11-10 RX ORDER — SODIUM CHLORIDE 0.9 % (FLUSH) 0.9 %
10 SYRINGE (ML) INJECTION
Status: COMPLETED | OUTPATIENT
Start: 2020-11-10 | End: 2020-11-10

## 2020-11-10 RX ORDER — INSULIN GLARGINE 100 [IU]/ML
35 INJECTION, SOLUTION SUBCUTANEOUS
Status: DISCONTINUED | OUTPATIENT
Start: 2020-11-10 | End: 2020-11-11

## 2020-11-10 RX ORDER — AMIODARONE HYDROCHLORIDE 200 MG/1
200 TABLET ORAL 2 TIMES DAILY
Status: DISCONTINUED | OUTPATIENT
Start: 2020-11-10 | End: 2020-11-14 | Stop reason: HOSPADM

## 2020-11-10 RX ORDER — ASPIRIN 81 MG/1
81 TABLET ORAL DAILY
Status: DISCONTINUED | OUTPATIENT
Start: 2020-11-10 | End: 2020-11-14 | Stop reason: HOSPADM

## 2020-11-10 RX ORDER — SODIUM CHLORIDE 0.9 % (FLUSH) 0.9 %
5-40 SYRINGE (ML) INJECTION AS NEEDED
Status: DISCONTINUED | OUTPATIENT
Start: 2020-11-10 | End: 2020-11-14 | Stop reason: HOSPADM

## 2020-11-10 RX ORDER — ACETAMINOPHEN 325 MG/1
650 TABLET ORAL
Status: DISCONTINUED | OUTPATIENT
Start: 2020-11-10 | End: 2020-11-14 | Stop reason: HOSPADM

## 2020-11-10 RX ORDER — ISOSORBIDE MONONITRATE 30 MG/1
30 TABLET, EXTENDED RELEASE ORAL DAILY
Status: DISCONTINUED | OUTPATIENT
Start: 2020-11-10 | End: 2020-11-14 | Stop reason: HOSPADM

## 2020-11-10 RX ORDER — BUMETANIDE 0.25 MG/ML
1 INJECTION INTRAMUSCULAR; INTRAVENOUS 2 TIMES DAILY
Status: DISCONTINUED | OUTPATIENT
Start: 2020-11-10 | End: 2020-11-13

## 2020-11-10 RX ORDER — CARVEDILOL 6.25 MG/1
6.25 TABLET ORAL 2 TIMES DAILY WITH MEALS
Status: DISCONTINUED | OUTPATIENT
Start: 2020-11-10 | End: 2020-11-14 | Stop reason: HOSPADM

## 2020-11-10 RX ORDER — IPRATROPIUM BROMIDE AND ALBUTEROL SULFATE 2.5; .5 MG/3ML; MG/3ML
3 SOLUTION RESPIRATORY (INHALATION)
Status: COMPLETED | OUTPATIENT
Start: 2020-11-10 | End: 2020-11-10

## 2020-11-10 RX ORDER — HYDROCODONE BITARTRATE AND ACETAMINOPHEN 5; 325 MG/1; MG/1
1 TABLET ORAL
Status: DISCONTINUED | OUTPATIENT
Start: 2020-11-10 | End: 2020-11-13 | Stop reason: SDUPTHER

## 2020-11-10 RX ORDER — INSULIN LISPRO 100 [IU]/ML
INJECTION, SOLUTION INTRAVENOUS; SUBCUTANEOUS
Status: DISCONTINUED | OUTPATIENT
Start: 2020-11-10 | End: 2020-11-13

## 2020-11-10 RX ORDER — NITROGLYCERIN 0.4 MG/1
0.4 TABLET SUBLINGUAL
Status: DISCONTINUED | OUTPATIENT
Start: 2020-11-10 | End: 2020-11-14 | Stop reason: HOSPADM

## 2020-11-10 RX ORDER — PRAVASTATIN SODIUM 80 MG/1
80 TABLET ORAL
Status: DISCONTINUED | OUTPATIENT
Start: 2020-11-10 | End: 2020-11-14 | Stop reason: HOSPADM

## 2020-11-10 RX ORDER — LANOLIN ALCOHOL/MO/W.PET/CERES
400 CREAM (GRAM) TOPICAL 2 TIMES DAILY
Status: DISCONTINUED | OUTPATIENT
Start: 2020-11-10 | End: 2020-11-14 | Stop reason: HOSPADM

## 2020-11-10 RX ORDER — SODIUM CHLORIDE 0.9 % (FLUSH) 0.9 %
5-40 SYRINGE (ML) INJECTION EVERY 8 HOURS
Status: DISCONTINUED | OUTPATIENT
Start: 2020-11-10 | End: 2020-11-14 | Stop reason: HOSPADM

## 2020-11-10 RX ORDER — FUROSEMIDE 10 MG/ML
60 INJECTION INTRAMUSCULAR; INTRAVENOUS
Status: COMPLETED | OUTPATIENT
Start: 2020-11-10 | End: 2020-11-10

## 2020-11-10 RX ADMIN — HYDROCODONE BITARTRATE AND ACETAMINOPHEN 1 TABLET: 5; 325 TABLET ORAL at 21:29

## 2020-11-10 RX ADMIN — ISOSORBIDE MONONITRATE 30 MG: 30 TABLET, EXTENDED RELEASE ORAL at 10:12

## 2020-11-10 RX ADMIN — IPRATROPIUM BROMIDE AND ALBUTEROL SULFATE 3 ML: .5; 3 SOLUTION RESPIRATORY (INHALATION) at 04:30

## 2020-11-10 RX ADMIN — HYDROCODONE BITARTRATE AND ACETAMINOPHEN 1 TABLET: 5; 325 TABLET ORAL at 12:09

## 2020-11-10 RX ADMIN — DIATRIZOATE MEGLUMINE AND DIATRIZOATE SODIUM 15 ML: 600; 100 SOLUTION ORAL; RECTAL at 14:04

## 2020-11-10 RX ADMIN — SODIUM CHLORIDE 100 ML: 900 INJECTION, SOLUTION INTRAVENOUS at 16:01

## 2020-11-10 RX ADMIN — Medication 400 MG: at 10:12

## 2020-11-10 RX ADMIN — AMIODARONE HYDROCHLORIDE 200 MG: 200 TABLET ORAL at 17:43

## 2020-11-10 RX ADMIN — CARVEDILOL 6.25 MG: 6.25 TABLET, FILM COATED ORAL at 17:43

## 2020-11-10 RX ADMIN — AMIODARONE HYDROCHLORIDE 200 MG: 200 TABLET ORAL at 10:12

## 2020-11-10 RX ADMIN — Medication 10 ML: at 22:51

## 2020-11-10 RX ADMIN — PRAVASTATIN SODIUM 80 MG: 80 TABLET ORAL at 21:28

## 2020-11-10 RX ADMIN — SACUBITRIL AND VALSARTAN 1 TABLET: 49; 51 TABLET, FILM COATED ORAL at 10:11

## 2020-11-10 RX ADMIN — IOPAMIDOL 100 ML: 755 INJECTION, SOLUTION INTRAVENOUS at 16:01

## 2020-11-10 RX ADMIN — FUROSEMIDE 60 MG: 10 INJECTION, SOLUTION INTRAMUSCULAR; INTRAVENOUS at 06:00

## 2020-11-10 RX ADMIN — INSULIN GLARGINE 35 UNITS: 100 INJECTION, SOLUTION SUBCUTANEOUS at 21:28

## 2020-11-10 RX ADMIN — Medication 400 MG: at 17:43

## 2020-11-10 RX ADMIN — CARVEDILOL 6.25 MG: 6.25 TABLET, FILM COATED ORAL at 10:12

## 2020-11-10 RX ADMIN — BUMETANIDE 1 MG: 0.25 INJECTION INTRAMUSCULAR; INTRAVENOUS at 17:43

## 2020-11-10 RX ADMIN — ALBUTEROL SULFATE 2.5 MG: 2.5 SOLUTION RESPIRATORY (INHALATION) at 09:27

## 2020-11-10 RX ADMIN — SACUBITRIL AND VALSARTAN 1 TABLET: 49; 51 TABLET, FILM COATED ORAL at 17:43

## 2020-11-10 RX ADMIN — Medication 10 ML: at 16:01

## 2020-11-10 RX ADMIN — ASPIRIN 81 MG: 81 TABLET ORAL at 10:12

## 2020-11-10 RX ADMIN — PIPERACILLIN SODIUM AND TAZOBACTAM SODIUM 3.38 G: 3; .375 INJECTION, POWDER, LYOPHILIZED, FOR SOLUTION INTRAVENOUS at 21:26

## 2020-11-10 NOTE — PROGRESS NOTES
Physician Progress Note      PATIENTDajacob Garcia  [facility] #:                  [id number]  :                       1965  ADMIT DATE:       11/10/2020 8:23 AM  DISCH DATE:  RESPONDING  PROVIDER #:        Tere Bertrand NP          QUERY TEXT:    Pt admitted with acute on chronic systolic CHF. Pt noted to also have CAD, HTN, and NICM. If possible, please document in progress notes and discharge summary the etiology of CHF, if able to be determined. The medical record reflects the following:  Risk Factors: systolic CHF, HTN, CKD, CAD, NICM  Clinical Indicators: A/C systolic CHF with hx of NICM, HTN, CKD, CAD  Treatment: IV Bumex, ASA, Coreg, Entresto  Options provided:  -- CHF due to Hypertensive Heart Disease  -- CHF due to Hypertensive Heart Disease and CAD  -- CHF not due to Hypertension but due to NICM  -- Other - I will add my own diagnosis  -- Disagree - Not applicable / Not valid  -- Disagree - Clinically unable to determine / Unknown  -- Refer to Clinical Documentation Reviewer    PROVIDER RESPONSE TEXT:    This patient has CHF not due to hypertensive heart disease, CHF is due to non ischemic cardiomyopathy. Query created by: Lew Hendrickson on 11/10/2020 10:51 AM      QUERY TEXT:    Patient admitted with acute on chronic systolic CHF, noted to have CKD documented. If possible, please document in progress notes and discharge summary if you are evaluating and/or treating any of the following:     The medical record reflects the following:  Risk Factors: HTN, DM, CAD  Clinical Indicators: GFR 50  Treatment: Daily BMP with diuresis  Options provided:  -- CKD Stage 1 GFR>90  -- CKD Stage 2 GFR 60-90  -- CKD Stage 3a GFR 45-59  -- CKD Stage 3b GFR 30-44  -- CKD Stage 4 GFR 15-29  -- CKD Stage 5 GFR<15  -- ESRD  -- Other - I will add my own diagnosis  -- Disagree - Not applicable / Not valid  -- Disagree - Clinically unable to determine / Unknown  -- Refer to Clinical Documentation Reviewer    PROVIDER RESPONSE TEXT:    This patient has CKD Stage 3a.     Query created by: Ta Hall on 11/10/2020 10:55 AM      Electronically signed by:  Irving Cifuentes NP 11/10/2020 12:26 PM

## 2020-11-10 NOTE — PROGRESS NOTES
CM chart reviewed. Pt presented to the ED complaining of shortness of breath which been present for about a week. Often worse with exertion. Some intermittent wheezing or CP. At this time, pt does not present with any d/c needs, however, CM will continue to follow. 1624-Pt reported that she lives by herself in a first floor apartment. Pt was tearful when CM met with her. CM asked if she was okay, but reported that she was \"fine\" and would not elaborated. Pt stated that she has family nearby for support. She was on 2L of O2 in her room but stated that she is not on home O2. Pt does not use any DMEs to help with ambulation. She does not use a BSC or SS. PCP verified. Will not need assistance with medication, insurance will cover. Pt reports that she will have transportation at d/c. Care Management Interventions  PCP Verified by CM: Yes(Dr. Kash Mabry MD)  Mode of Transport at Discharge:  Other (see comment)(Family)  Transition of Care Consult (CM Consult): Discharge Planning  Current Support Network: Family Lives [place], Own Home  Confirm Follow Up Transport: Family  The Plan for Transition of Care is Related to the Following Treatment Goals : Return to her baseline  Discharge Location  Discharge Placement: Home

## 2020-11-10 NOTE — ED PROVIDER NOTES
Patient is a 49-year-old female who comes to the emergency department [facility] complaining of shortness of breath which been present for about a week. Often worse with exertion. Admits to a nonproductive cough. Some intermittent wheezing. No fever. No chest pain. The history is provided by the patient. Shortness of Breath   This is a new problem. The problem occurs frequently. The current episode started more than 1 week ago. The problem has been gradually worsening. Associated symptoms include cough and wheezing. Pertinent negatives include no fever, no rhinorrhea, no sore throat, no neck pain, no chest pain, no vomiting, no abdominal pain, no rash, no leg pain and no leg swelling. She has tried nothing for the symptoms. Associated medical issues include asthma and heart failure. Associated medical issues do not include CAD. Past Medical History:   Diagnosis Date    Abdominal wall hernia 10/2/2018    Acute hypoxemic respiratory failure ([facility] 4/3/2019    Allergic rhinitis     followed by allergist; allergic to grass- has rescue inhaler PRN    ARDS (adult respiratory distress syndrome) ([facility] 4/3/2019    Arthritis     Automatic implantable cardioverter-defibrillator in situ 3/30/2016    CAD in native artery 3/30/2016    Chronic kidney disease     Chronic systolic heart failure ([facility] 07/18/2013    ECHO 2017- EF 39%; managed with medications; [place] [facility]    CKD (chronic kidney disease)     [address] follows    Diabetes ([facility]     type 2 (on insulin);  FBS AM range- , hypo s/s <70, hgba1c- 5.8 (9/2018)    Diabetes mellitus ([facility] 4/12/2010    Dyslipidemia 2/13/2013    Eczema     Fibromyalgia     GERD (gastroesophageal reflux disease)     hx of- no meds currently    Headache     Heart failure ([address]     chronic systolic with EF 67%; non-ischemic cardiomyopathy    HTN (hypertension) 4/12/2010    Hypercholesterolemia     Incarcerated incisional hernia 3/26/2019    Morbid obesity ([facility]     Neuropathy     bilateral feet and tips of fingers    NICM (nonischemic cardiomyopathy) ([facility] 2/15/2013    Obesity     bmi- 39    Obstructive sleep apnea (adult) (pediatric) 2014    uses cpap qhs    Pseudomonas urinary tract infection 2019    Sciatic pain 2016    Severe persistent asthma with acute exacerbation 2020    SVT (supraventricular tachycardia) ([facility])     ablation for svt    Tobacco use disorder 2010       Past Surgical History:   Procedure Laterality Date    HX  SECTION      x1    HX HERNIA REPAIR  2019    mild incarceration, no bowel removal    HX HYSTERECTOMY      YEIMI-Left ovary intact per pt    HX IMPLANTABLE CARDIOVERTER DEFIBRILLATOR  2013    Biotronik dual chamber ICD by Dr. Pino Haji  [address]  2013    Biotronik dual chamber ICD by Dr. Sarah Beth Henry Right     HX SVT ABLATION  \"years ago\"    HX TONSILLECTOMY      [place] LEFT HEART CATH,PERCUTANEOUS  2013    no intervention         Family History:   Problem Relation Age of Onset    Heart Attack Father 48        mi   Yvonne Kramer Stroke Father     Hypertension Father     Heart Disease Father     Diabetes Other     Stroke Mother     Diabetes Brother     Heart Disease Brother     Hypertension Brother     Breast Cancer Sister 37       Social History     Socioeconomic History    Marital status: SINGLE     Spouse name: Not on file    Number of children: Not on file    Years of education: Not on file    Highest education level: Not on file   Occupational History    Not on file   Social Needs    Financial resource strain: Not on file    Food insecurity     Worry: Not on file     Inability: Not on file    Transportation needs     Medical: Not on file     Non-medical: Not on file   Tobacco Use    Smoking status: Current Every Day Smoker     Packs/day: 0.25     Years: 27.00     Pack years: 6.75    Smokeless tobacco: Never Used    Tobacco comment: \"every now and then\"   Substance and Sexual Activity    Alcohol use: Yes     Alcohol/week: 1.0 standard drinks     Types: 1 Glasses of wine per week     Comment: occas    Drug use: Yes     Types: Marijuana     Comment: every day    Sexual activity: Never   Lifestyle    Physical activity     Days per week: Not on file     Minutes per session: Not on file    Stress: Not on file   Relationships    Social connections     Talks on phone: Not on file     Gets together: Not on file     Attends Christian service: Not on file     Active member of club or organization: Not on file     Attends meetings of clubs or organizations: Not on file     Relationship status: Not on file    Intimate partner violence     Fear of current or ex partner: Not on file     Emotionally abused: Not on file     Physically abused: Not on file     Forced sexual activity: Not on file   Other Topics Concern     Service Not Asked    Blood Transfusions Not Asked    Caffeine Concern Not Asked    Occupational Exposure Not Asked   Sharene Dally Hazards Not Asked    Sleep Concern Not Asked    Stress Concern Not Asked    Weight Concern Not Asked    Special Diet Not Asked    Back Care Not Asked    Exercise Not Asked    Bike Helmet Not Asked    Seat Belt Yes    Self-Exams Not Asked   Social History Narrative    Denies physical or sexual abuse         ALLERGIES: Grass pollen    Review of Systems   Constitutional: Negative for chills, fatigue and fever. HENT: Negative for congestion, rhinorrhea and sore throat. Eyes: Negative for pain, discharge and visual disturbance. Respiratory: Positive for cough, shortness of breath and wheezing. Cardiovascular: Negative for chest pain, palpitations and leg swelling. Gastrointestinal: Negative for abdominal pain, diarrhea, nausea and vomiting. Endocrine: Negative for polydipsia and polyuria.    Genitourinary: Negative for dysuria, frequency and urgency. Musculoskeletal: Negative for back pain and neck pain. Skin: Negative for rash. Neurological: Negative for seizures, syncope and weakness. Hematological: Negative. Vitals:    11/10/20 0423   BP: 138/78   Pulse: 62   Resp: 26   Temp: 98.8 °F (37.1 °C)   SpO2: 95%   Weight: 95.3 kg (210 lb)   Height: 4' 11\" (1.499 m)            Physical Exam  Vitals signs and nursing note reviewed. Constitutional:       Appearance: Normal appearance. She is well-developed. She is obese. She is ill-appearing. HENT:      Head: Normocephalic and atraumatic. Eyes:      Conjunctiva/sclera: Conjunctivae normal.      Pupils: Pupils are equal, round, and reactive to light. Neck:      Musculoskeletal: Normal range of motion and neck supple. Cardiovascular:      Rate and Rhythm: Normal rate and regular rhythm. Pulmonary:      Effort: Pulmonary effort is normal. Tachypnea present. Breath sounds: Decreased air movement present. Wheezing and rales present. Abdominal:      Palpations: Abdomen is soft. Tenderness: There is no abdominal tenderness. There is no guarding or rebound. Musculoskeletal: Normal range of motion. General: No deformity. Right lower leg: Edema present. Left lower leg: Edema present. Lymphadenopathy:      Cervical: No cervical adenopathy. Skin:     General: Skin is warm and dry. Findings: No rash. Neurological:      Mental Status: She is alert and oriented to person, place, and time. GCS: GCS eye subscore is 4. GCS verbal subscore is 5. GCS motor subscore is 6. Cranial Nerves: No cranial nerve deficit. Sensory: No sensory deficit. MDM  Number of Diagnoses or Management Options  Diagnosis management comments: I wore appropriate PPE throughout this patient's ED visit.  Josee Madera MD, 4:29 AM    6:01 AM  EKG reviewed by me shows normal sinus rhythm rate of 62 no acute ischemia  CBC unremarkable  Chemistries show some chronic renal insufficiency but at her baseline  Initial high-sensitivity troponin 74  proBNP elevated at 2500  Portable chest x-ray shows some pulmonary edema, cardiomegaly, right-sided pleural effusion    Records were reviewed and she did have a heart catheterization last in December 2019 which was negative. It showed normal coronary arteries. She has a long history of nonischemic cardiomyopathy and congestive heart failure. Dose of Lasix is ordered IV. We will attempt to diurese here in the ER.      6:31 AM  Patient still very dyspneic. On ambulation her oxygen saturation dropped to 85%. We will consult with cardiology as I feel she needs admission for diuresis and further management. 6:42 AM  I discussed the case with Dr. Ben Humphrey who is on-call for cardiology. He requests that we admit the patient to telemetry third floor at the [facility] for CHF exacerbation, diuresis, further management. Voice dictation software was used during the making of this note. This software is not perfect and grammatical and other typographical errors may be present. This note has been proofread, but may still contain errors.   Judy Edwards MD; 11/10/2020 @6:43 AM   ===================================================================             Amount and/or Complexity of Data Reviewed  Clinical lab tests: ordered and reviewed  Tests in the radiology section of CPT®: ordered and reviewed  Tests in the medicine section of CPT®: ordered and reviewed  Review and summarize past medical records: yes  Discuss the patient with other providers: yes  Independent visualization of images, tracings, or specimens: yes    Risk of Complications, Morbidity, and/or Mortality  Presenting problems: moderate  Diagnostic procedures: moderate  Management options: moderate    Patient Progress  Patient progress: stable         Procedures

## 2020-11-10 NOTE — PROGRESS NOTES
CT department notified that patient completed oral contrast, per tech CT to send for patient in approx 1 hour. Patient notified and verbalized understanding of plan.

## 2020-11-10 NOTE — PROGRESS NOTES
Patient complaining of left lower back back and Left sided chest/breast pain that is sharp stabbing. Pt states pain is new and started after she was coughing. Patient also complaining of groin/lower abdomen pain. MD Jackson at bedside. [place] given per [facility] and stat EKG ordered per chest pain protocol. MD Venecia Hunter made aware of patient's left sided chest pain. EKG obtained and viewed by Noberto Nissen NP. No new orders at this time.

## 2020-11-10 NOTE — PROGRESS NOTES
Patient continues to have congestion and productive cough, patient reports she lost sense of taste and smell yesterday. Benji Gold NP notified. Received order to run [address] [email or web address] placed and called lab to request them send a test to floor. ALTON Polanco notified and patient placed on droplet plus isolation for COVID Rule Out.

## 2020-11-10 NOTE — PROGRESS NOTES
TRANSFER - IN REPORT:    Verbal report received from [facility] RN(name) on [employer]  being received from [facility] ED(unit) for routine progression of care      Report consisted of patients Situation, Background, Assessment and   Recommendations(SBAR). Information from the following report(s) SBAR, Kardex and MAR was reviewed with the receiving nurse. Will assume care when patient arrives to [facility] Telemetry unit.

## 2020-11-10 NOTE — H&P
[address] Cardiology History & Physical      Date of  Admission: 11/10/2020  8:23 AM     Primary Care Physician: Dr. Finn Anderson  Primary Cardiologist: Dr. Tarun Toussaint Physician:Dr. Pushpa Haq    CC: CHF    HPI:  Rosmery Shultz is a 60 y.o. female with prior h/o NICM/HFrEF ([address] [facility] [address] 12/2019 showed normal coronary arteries), NSVT s/p Biotronik dual chamber ICD 2013, BA wears CPAP, CKD, DM, HTN and obesity. Patient presented to ED at [facility] with c/o SOB/WHITING over last week. In ED, labs showed pro BNP 2589 and trop 74.7. CXR with CHF. Dr. Mya Sevilla was called regarding CHF and requested patient to be admitted to tele floor [place] for IV diuresis. Currently on tele floor still with SOB. She also reported weight gain of ~ 10 pounds over last several weeks. She also has lower abd pain that is tender to palpation. Past Medical History:   Diagnosis Date    Abdominal wall hernia 10/2/2018    Acute hypoxemic respiratory failure ([address] 4/3/2019    Allergic rhinitis     followed by allergist; allergic to grass- has rescue inhaler PRN    ARDS (adult respiratory distress syndrome) ([address] 4/3/2019    Arthritis     Automatic implantable cardioverter-defibrillator in situ 3/30/2016    CAD in native artery 3/30/2016    Chronic kidney disease     Chronic systolic heart failure ([address] 07/18/2013    ECHO 2017- EF 39%; managed with medications; [place] [facility]    CKD (chronic kidney disease)     [address] follows    Diabetes ([facility]     type 2 (on insulin);  FBS AM range- , hypo s/s <70, hgba1c- 5.8 (9/2018)    Diabetes mellitus ([address] 4/12/2010    Dyslipidemia 2/13/2013    Eczema     Fibromyalgia     GERD (gastroesophageal reflux disease)     hx of- no meds currently    Headache     Heart failure ([address]     chronic systolic with EF 39%; non-ischemic cardiomyopathy    HTN (hypertension) 4/12/2010    Hypercholesterolemia     Incarcerated incisional hernia 3/26/2019    Morbid obesity [facility]     Neuropathy     bilateral feet and tips of fingers    NICM (nonischemic cardiomyopathy) ([facility] 2/15/2013    Obesity     bmi- 39    Obstructive sleep apnea (adult) (pediatric) 2014    uses cpap qhs    Pseudomonas urinary tract infection 2019    Sciatic pain 2016    Severe persistent asthma with acute exacerbation 2020    SVT (supraventricular tachycardia) ([facility])     ablation for svt    Tobacco use disorder 2010      Past Surgical History:   Procedure Laterality Date    HX  SECTION      x1    HX HERNIA REPAIR  2019    mild incarceration, no bowel removal    HX HYSTERECTOMY      YEIMI-Left ovary intact per pt    HX IMPLANTABLE CARDIOVERTER DEFIBRILLATOR  2013    Biotronik dual chamber ICD by Dr. Reece Ko     [address]  2013    Biotronik dual chamber ICD by Dr. Emmett Healy Right     HX SVT ABLATION  \"years ago\"    HX TONSILLECTOMY      [place] LEFT HEART CATH,PERCUTANEOUS  2013    no intervention       Allergies   Allergen Reactions    Grass Pollen Hives and Shortness of Breath     \"inhaler PRN\"      Social History     Socioeconomic History    Marital status: SINGLE     Spouse name: Not on file    Number of children: Not on file    Years of education: Not on file    Highest education level: Not on file   Occupational History    Not on file   Social Needs    Financial resource strain: Not on file    Food insecurity     Worry: Not on file     Inability: Not on file    Transportation needs     Medical: Not on file     Non-medical: Not on file   Tobacco Use    Smoking status: Current Every Day Smoker     Packs/day: 0.25     Years: 27.00     Pack years: 6.75    Smokeless tobacco: Never Used    Tobacco comment: \"every now and then\"   Substance and Sexual Activity    Alcohol use:  Yes     Alcohol/week: 1.0 standard drinks     Types: 1 Glasses of wine per week Comment: occas    Drug use: Yes     Types: Marijuana     Comment: every day    Sexual activity: Never   Lifestyle    Physical activity     Days per week: Not on file     Minutes per session: Not on file    Stress: Not on file   Relationships    Social connections     Talks on phone: Not on file     Gets together: Not on file     Attends Spiritism service: Not on file     Active member of club or organization: Not on file     Attends meetings of clubs or organizations: Not on file     Relationship status: Not on file    Intimate partner violence     Fear of current or ex partner: Not on file     Emotionally abused: Not on file     Physically abused: Not on file     Forced sexual activity: Not on file   Other Topics Concern     Service Not Asked    Blood Transfusions Not Asked    Caffeine Concern Not Asked    Occupational Exposure Not Asked   Verle Haggis Hazards Not Asked    Sleep Concern Not Asked    Stress Concern Not Asked    Weight Concern Not Asked    Special Diet Not Asked    Back Care Not Asked    Exercise Not Asked    Bike Helmet Not Asked   [address] Yes    Self-Exams Not Asked   Social History Narrative    Denies physical or sexual abuse     Family History   Problem Relation Age of Onset    Heart Attack Father 48        mi    Stroke Father     Hypertension Father     Heart Disease Father     Diabetes Other     Stroke Mother     Diabetes Brother     Heart Disease Brother     Hypertension Brother     Breast Cancer Sister 37        No current facility-administered medications for this encounter. Review of Systems    Review of Systems   Constitution: Positive for weight gain. Negative for diaphoresis and malaise/fatigue. HENT: Negative for congestion. Cardiovascular: Positive for dyspnea on exertion and leg swelling.  Negative for chest pain, claudication, cyanosis, irregular heartbeat, near-syncope, orthopnea, palpitations, paroxysmal nocturnal dyspnea and syncope. Respiratory: Positive for shortness of breath. Negative for cough and wheezing. Endocrine: Negative for cold intolerance and heat intolerance. Hematologic/Lymphatic: Does not bruise/bleed easily. Skin: Negative for nail changes. Neurological: Negative for dizziness, headaches and weakness. Subjective:     Visit Vitals  BP (!) 147/78   Pulse 90   Temp 96.8 °F (36 °C)   Resp 24   Ht 4' 11\" (1.499 m)   Wt 99.3 kg (219 lb)   SpO2 98%   BMI 44.23 kg/m²       No intake/output data recorded. No intake/output data recorded. Physical Exam:  General: Well Developed, Well Nourished, SOB at rest  HEENT: pupils equal and round, no abnormalities noted  Neck: supple, + JVD, no carotid bruits  Heart: S1S2 with RRR without murmurs or gallops  Lungs: Clear throughout auscultation bilaterally without adventitious sounds  Abd: soft, nontender, nondistended, with good bowel sounds  Ext: warm, trace-1+ edema, calves supple/nontender, pulses 2+ bilaterally  Skin: warm and dry  Psychiatric: Normal mood and affect  Neurologic: Alert and oriented X 3    Cardiographics  Telemetry: ordered   ECG: poor tracing likely A. Pacing. Echocardiogram: Jan 2020 showed EF 30-35%    Labs:   Recent Results (from the past 24 hour(s))   CBC WITH AUTOMATED DIFF    Collection Time: 11/10/20  4:29 AM   Result Value Ref Range    WBC 5.9 4.3 - 11.1 K/uL    RBC 3.96 (L) 4.05 - 5.2 M/uL    HGB 12.3 11.7 - 15.4 g/dL    HCT 38.8 35.8 - 46.3 %    MCV 98.0 (H) 79.6 - 97.8 FL    MCH 31.1 26.1 - 32.9 PG    MCHC 31.7 31.4 - 35.0 g/dL    RDW 14.1 11.9 - 14.6 %    PLATELET 459 820 - 987 K/uL    MPV 11.4 9.4 - 12.3 FL    ABSOLUTE NRBC 0.00 0.0 - 0.2 K/uL    DF AUTOMATED      NEUTROPHILS 64 43 - 78 %    LYMPHOCYTES 24 13 - 44 %    MONOCYTES 8 4.0 - 12.0 %    EOSINOPHILS 3 0.5 - 7.8 %    BASOPHILS 0 0.0 - 2.0 %    IMMATURE GRANULOCYTES 0 0.0 - 5.0 %    ABS. NEUTROPHILS 3.8 1.7 - 8.2 K/UL    ABS. LYMPHOCYTES 1.4 0.5 - 4.6 K/UL    ABS. MONOCYTES 0.5 0.1 - 1.3 K/UL    ABS. EOSINOPHILS 0.2 0.0 - 0.8 K/UL    ABS. BASOPHILS 0.0 0.0 - 0.2 K/UL    ABS. IMM. GRANS. 0.0 0.0 - 0.5 K/UL   METABOLIC PANEL, COMPREHENSIVE    Collection Time: 11/10/20  4:29 AM   Result Value Ref Range    Sodium 142 136 - 145 mmol/L    Potassium 4.0 3.5 - 5.1 mmol/L    Chloride 107 98 - 107 mmol/L    CO2 27 21 - 32 mmol/L    Anion gap 8 7 - 16 mmol/L    Glucose 151 (H) 65 - 100 mg/dL    BUN 19 6 - 23 MG/DL    Creatinine 1.40 (H) 0.6 - 1.0 MG/DL    GFR est AA 50 (L) >60 ml/min/1.73m2    GFR est non-AA 41 (L) >60 ml/min/1.73m2    Calcium 9.3 8.3 - 10.4 MG/DL    Bilirubin, total 0.5 0.2 - 1.1 MG/DL    ALT (SGPT) 82 (H) 12 - 65 U/L    AST (SGOT) 112 (H) 15 - 37 U/L    Alk. phosphatase 104 50 - 136 U/L    Protein, total 7.3 6.3 - 8.2 g/dL    Albumin 3.5 3.5 - 5.0 g/dL    Globulin 3.8 (H) 2.3 - 3.5 g/dL    A-G Ratio 0.9 (L) 1.2 - 3.5     NT-PRO BNP    Collection Time: 11/10/20  4:29 AM   Result Value Ref Range    NT pro-BNP 2,589 (H) 5 - 125 PG/ML   EKG, 12 LEAD, INITIAL    Collection Time: 11/10/20  4:50 AM   Result Value Ref Range    Ventricular Rate 89 BPM    Atrial Rate 500 BPM    QRS Duration 134 ms    Q-T Interval 512 ms    QTC Calculation (Bezet) 622 ms    Calculated R Axis 84 degrees    Calculated T Axis 78 degrees    Diagnosis       !! AGE AND GENDER SPECIFIC ECG ANALYSIS !!   Atrial fibrillation with premature ventricular or aberrantly conducted   complexes  Non-specific intra-ventricular conduction block  Nonspecific T wave abnormality , probably digitalis effect  Abnormal ECG  When compared with ECG of 10-NOV-2020 04:48,  Atrial fibrillation has replaced Sinus rhythm  QRS duration has increased  QT has lengthened     TROPONIN-HIGH SENSITIVITY    Collection Time: 11/10/20  5:05 AM   Result Value Ref Range    Troponin-High Sensitivity 74.7 (H) 0 - 14 pg/mL   MAGNESIUM    Collection Time: 11/10/20  5:05 AM   Result Value Ref Range    Magnesium 2.2 1.8 - 2.4 mg/dL       Patient has been seen and examined by Dr. Ana Clark  and he agrees with the following assessment and plan:     Assessment/Plan:       Principal Problem:    Acute on chronic HFrEF (heart failure with reduced ejection fraction) ([facility] (11/10/2020)- DA to tele; will start IV bumex, daily labs, strict I&Os and daily weights. Will continue BB and entresto as well. Poor tracing EKG, will repeat. Active Problems:    HTN (hypertension) (4/12/2010)- controlled; continue home meds       NICM (nonischemic cardiomyopathy) ([facility] (2/15/2013)- see above      BA on CPAP (7/14/2014)      Morbid obesity ([facility] (7/14/2014)      Ventricular tachycardia ([facility] (4/18/2016)- s/p Biotronik dual chamber ICD 2013;  on amiodarone. Will have device  Interrogated       Lower abdominal pain (11/10/2020)- will consult hospitalist       Diabetes- hold metformin, continue lantus and SSI. CKD- follow labs closely with IV diuresis.          Dana Merritt NP  11/10/2020 8:36 AM

## 2020-11-10 NOTE — ROUTINE PROCESS
CHF teaching started post introduction to pt.; aware of diagnosis. Planner/needs scale @ BS and will follow. Smoking/ ETOH/Illicit drug use cessation and maintain a healthy weight covered. Pt. aware that I can not prescribe nor adjust  medications: 15mins Palliative Care score: on hold Refused ACP on admission Start 2L/D Fluid restriction/ cardiac diet CHF teaching continues to pt/family. Emphasis on taking prescription meds as ordered, to keep F/U appts and to call MD STAT if any of the following occur: 
 short of breath. If you can not lay flat without developing short of breath or rapid breathing at night; or if it wakes you up. Develop a cough or wheezing.  
 
Pt. verbalizes understanding, will follow to reinforce teaching skills: 20 mins 
 
l

## 2020-11-10 NOTE — ED NOTES
The pt c/o SOB for 1 week. She stated that the distress will come and go several times a day. sge stated that she also has had a productive cough.

## 2020-11-11 LAB
ANION GAP SERPL CALC-SCNC: 6 MMOL/L (ref 7–16)
BUN SERPL-MCNC: 19 MG/DL (ref 6–23)
CALCIUM SERPL-MCNC: 8.8 MG/DL (ref 8.3–10.4)
CHLORIDE SERPL-SCNC: 106 MMOL/L (ref 98–107)
CO2 SERPL-SCNC: 29 MMOL/L (ref 21–32)
COVID-19 RAPID TEST, COVR: NOT DETECTED
CREAT SERPL-MCNC: 1.34 MG/DL (ref 0.6–1)
GLUCOSE BLD STRIP.AUTO-MCNC: 106 MG/DL (ref 65–100)
GLUCOSE BLD STRIP.AUTO-MCNC: 107 MG/DL (ref 65–100)
GLUCOSE BLD STRIP.AUTO-MCNC: 120 MG/DL (ref 65–100)
GLUCOSE BLD STRIP.AUTO-MCNC: 137 MG/DL (ref 65–100)
GLUCOSE BLD STRIP.AUTO-MCNC: 85 MG/DL (ref 65–100)
GLUCOSE SERPL-MCNC: 101 MG/DL (ref 65–100)
MAGNESIUM SERPL-MCNC: 2.6 MG/DL (ref 1.8–2.4)
POTASSIUM SERPL-SCNC: 3.8 MMOL/L (ref 3.5–5.1)
SARS COV-2, XPGCVT: NEGATIVE
SODIUM SERPL-SCNC: 141 MMOL/L (ref 136–145)
SOURCE, COVRS: NORMAL

## 2020-11-11 PROCEDURE — 74011250637 HC RX REV CODE- 250/637: Performed by: NURSE PRACTITIONER

## 2020-11-11 PROCEDURE — 94760 N-INVAS EAR/PLS OXIMETRY 1: CPT

## 2020-11-11 PROCEDURE — 74011000258 HC RX REV CODE- 258: Performed by: INTERNAL MEDICINE

## 2020-11-11 PROCEDURE — 36415 COLL VENOUS BLD VENIPUNCTURE: CPT

## 2020-11-11 PROCEDURE — 99232 SBSQ HOSP IP/OBS MODERATE 35: CPT | Performed by: INTERNAL MEDICINE

## 2020-11-11 PROCEDURE — 74011000250 HC RX REV CODE- 250: Performed by: INTERNAL MEDICINE

## 2020-11-11 PROCEDURE — 83735 ASSAY OF MAGNESIUM: CPT

## 2020-11-11 PROCEDURE — 2709999900 HC NON-CHARGEABLE SUPPLY

## 2020-11-11 PROCEDURE — 74011250636 HC RX REV CODE- 250/636: Performed by: INTERNAL MEDICINE

## 2020-11-11 PROCEDURE — 94640 AIRWAY INHALATION TREATMENT: CPT

## 2020-11-11 PROCEDURE — 77010033678 HC OXYGEN DAILY

## 2020-11-11 PROCEDURE — 80048 BASIC METABOLIC PNL TOTAL CA: CPT

## 2020-11-11 PROCEDURE — 65660000000 HC RM CCU STEPDOWN

## 2020-11-11 PROCEDURE — 74011250637 HC RX REV CODE- 250/637: Performed by: INTERNAL MEDICINE

## 2020-11-11 PROCEDURE — 82962 GLUCOSE BLOOD TEST: CPT

## 2020-11-11 PROCEDURE — 74011000250 HC RX REV CODE- 250: Performed by: NURSE PRACTITIONER

## 2020-11-11 PROCEDURE — 74011636637 HC RX REV CODE- 636/637: Performed by: INTERNAL MEDICINE

## 2020-11-11 RX ORDER — SENNOSIDES 8.6 MG/1
1 TABLET ORAL DAILY
Status: DISCONTINUED | OUTPATIENT
Start: 2020-11-11 | End: 2020-11-14 | Stop reason: HOSPADM

## 2020-11-11 RX ORDER — INSULIN GLARGINE 100 [IU]/ML
25 INJECTION, SOLUTION SUBCUTANEOUS
Status: DISCONTINUED | OUTPATIENT
Start: 2020-11-11 | End: 2020-11-12

## 2020-11-11 RX ORDER — POLYETHYLENE GLYCOL 3350 17 G/17G
17 POWDER, FOR SOLUTION ORAL DAILY
Status: DISCONTINUED | OUTPATIENT
Start: 2020-11-11 | End: 2020-11-14 | Stop reason: HOSPADM

## 2020-11-11 RX ORDER — ALBUTEROL SULFATE 0.83 MG/ML
2.5 SOLUTION RESPIRATORY (INHALATION)
Status: DISCONTINUED | OUTPATIENT
Start: 2020-11-11 | End: 2020-11-12

## 2020-11-11 RX ORDER — METHOCARBAMOL 500 MG/1
500 TABLET, FILM COATED ORAL
Status: DISCONTINUED | OUTPATIENT
Start: 2020-11-11 | End: 2020-11-14 | Stop reason: HOSPADM

## 2020-11-11 RX ORDER — METHOCARBAMOL 500 MG/1
500 TABLET, FILM COATED ORAL ONCE
Status: COMPLETED | OUTPATIENT
Start: 2020-11-11 | End: 2020-11-11

## 2020-11-11 RX ORDER — LIDOCAINE 4 G/100G
1 PATCH TOPICAL EVERY 24 HOURS
Status: DISCONTINUED | OUTPATIENT
Start: 2020-11-11 | End: 2020-11-12

## 2020-11-11 RX ADMIN — CARVEDILOL 6.25 MG: 6.25 TABLET, FILM COATED ORAL at 16:27

## 2020-11-11 RX ADMIN — ASPIRIN 81 MG: 81 TABLET ORAL at 08:40

## 2020-11-11 RX ADMIN — Medication 400 MG: at 18:40

## 2020-11-11 RX ADMIN — SACUBITRIL AND VALSARTAN 1 TABLET: 49; 51 TABLET, FILM COATED ORAL at 18:40

## 2020-11-11 RX ADMIN — CARVEDILOL 6.25 MG: 6.25 TABLET, FILM COATED ORAL at 08:40

## 2020-11-11 RX ADMIN — Medication 10 ML: at 13:07

## 2020-11-11 RX ADMIN — METHOCARBAMOL TABLETS 500 MG: 500 TABLET, COATED ORAL at 15:16

## 2020-11-11 RX ADMIN — INSULIN GLARGINE 25 UNITS: 100 INJECTION, SOLUTION SUBCUTANEOUS at 21:27

## 2020-11-11 RX ADMIN — SENNOSIDES 8.6 MG: 8.6 TABLET, FILM COATED ORAL at 16:27

## 2020-11-11 RX ADMIN — PRAVASTATIN SODIUM 80 MG: 80 TABLET ORAL at 21:07

## 2020-11-11 RX ADMIN — HYDROCODONE BITARTRATE AND ACETAMINOPHEN 1 TABLET: 5; 325 TABLET ORAL at 08:40

## 2020-11-11 RX ADMIN — ALBUTEROL SULFATE 2.5 MG: 2.5 SOLUTION RESPIRATORY (INHALATION) at 20:04

## 2020-11-11 RX ADMIN — PIPERACILLIN SODIUM AND TAZOBACTAM SODIUM 3.38 G: 3; .375 INJECTION, POWDER, LYOPHILIZED, FOR SOLUTION INTRAVENOUS at 21:27

## 2020-11-11 RX ADMIN — ISOSORBIDE MONONITRATE 30 MG: 30 TABLET, EXTENDED RELEASE ORAL at 08:40

## 2020-11-11 RX ADMIN — Medication 10 ML: at 21:28

## 2020-11-11 RX ADMIN — ALBUTEROL SULFATE 2.5 MG: 2.5 SOLUTION RESPIRATORY (INHALATION) at 09:07

## 2020-11-11 RX ADMIN — AMIODARONE HYDROCHLORIDE 200 MG: 200 TABLET ORAL at 18:40

## 2020-11-11 RX ADMIN — Medication 10 ML: at 05:23

## 2020-11-11 RX ADMIN — AMIODARONE HYDROCHLORIDE 200 MG: 200 TABLET ORAL at 08:40

## 2020-11-11 RX ADMIN — BUMETANIDE 1 MG: 0.25 INJECTION INTRAMUSCULAR; INTRAVENOUS at 18:44

## 2020-11-11 RX ADMIN — POLYETHYLENE GLYCOL 3350 17 G: 17 POWDER, FOR SOLUTION ORAL at 16:27

## 2020-11-11 RX ADMIN — HYDROCODONE BITARTRATE AND ACETAMINOPHEN 1 TABLET: 5; 325 TABLET ORAL at 21:08

## 2020-11-11 RX ADMIN — Medication 400 MG: at 08:40

## 2020-11-11 RX ADMIN — SACUBITRIL AND VALSARTAN 1 TABLET: 49; 51 TABLET, FILM COATED ORAL at 08:40

## 2020-11-11 RX ADMIN — BUMETANIDE 1 MG: 0.25 INJECTION INTRAMUSCULAR; INTRAVENOUS at 08:44

## 2020-11-11 RX ADMIN — PIPERACILLIN SODIUM AND TAZOBACTAM SODIUM 3.38 G: 3; .375 INJECTION, POWDER, LYOPHILIZED, FOR SOLUTION INTRAVENOUS at 05:22

## 2020-11-11 RX ADMIN — PIPERACILLIN SODIUM AND TAZOBACTAM SODIUM 3.38 G: 3; .375 INJECTION, POWDER, LYOPHILIZED, FOR SOLUTION INTRAVENOUS at 12:41

## 2020-11-11 NOTE — ROUTINE PROCESS
Bedside and Verbal shift change report given to self (oncoming nurse) by Ale Brooks. Report included the following information SBAR, Kardex, Intake/Output and MAR.

## 2020-11-11 NOTE — PROGRESS NOTES
Progress Note    Patient: Anna Goodwin MRN: [id number]  SSN: [id number]    YOB: 1965  Age: 60 y.o. Sex: female      Admit Date: 11/10/2020    LOS: 1 day     Subjective:   55F with pmhx of NICM/ NSVT s/p ICD, BA on CPAP, CKD, DM, HTN, obesity presented to ER with report of weight gain and dyspnea. She has been admitted to the cardiology service. She is being diuresed for acute on chronic CHF. The hospitalist team was consulted on 11/10/2020 because the patient had abdominal discomfort. A CT scan of the abdomen reported diverticulitis. She was a started on IV Zosyn. She feels better today    Objective:     Vitals:    11/11/20 0908 11/11/20 0952 11/11/20 1301 11/11/20 1628   BP:  (!) 113/58 128/76 136/66   Pulse:  65 75 76   Resp:  20 20    Temp:  98 °F (36.7 °C) 97.5 °F (36.4 °C)    SpO2: 97% 97% 96%    Weight:       Height:            Intake and Output:  Current Shift: 11/11 0701 - 11/11 1900  In: 1080 [P.O.:1080]  Out: 1700 [Urine:1700]  Last three shifts: 11/09 1901 - 11/11 0700  In: 690 [P.O.:690]  Out: 3100 [Urine:3100]    Physical Exam:   GENERAL: alert, cooperative, uncooperative, appears stated age  EYE: conjunctivae/corneas clear. LYMPHATIC: Cervical, supraclavicular, and axillary nodes normal.   THROAT & NECK: normal and no erythema or exudates noted. LUNG: clear to auscultation bilaterally  HEART: regular rate and rhythm, S1, S2 normal, no murmur, click, rub or gallop  ABDOMEN: Mild to moderate abdominal tenderness  EXTREMITIES:  extremities normal, atraumatic, no cyanosis or edema  SKIN: Normal.  NEUROLOGIC: No focal deficits  PSYCHIATRIC: non focal    Lab/Data Review: All lab results for the last 24 hours reviewed.          Assessment:     Principal Problem:    Acute on chronic HFrEF (heart failure with reduced ejection fraction) ([address] (11/10/2020)    Active Problems:    HTN (hypertension) (4/12/2010)      NICM (nonischemic cardiomyopathy) ([facility] (2/15/2013) BA on CPAP (7/14/2014)      Morbid obesity ([facility] (7/14/2014)      Ventricular tachycardia ([facility] [facility] (4/18/2016)      Type 2 diabetes with nephropathy ([facility] [facility] (4/2/2018)      CKD (chronic kidney disease) stage 3, GFR 30-59 ml/min (8/24/2020)      Lower abdominal pain (11/10/2020)      CHF (congestive heart failure) ([facility] [facility] (11/10/2020)        Plan:     #Acute on chronic CHF exacerbation. Continue management as per cardiology recommendations    #Acute diverticulitis. Continue IV Zosyn. #Moderate to severe back pain. Patient is already on Norco.  Added a low-dose of methocarbamol and a lidocaine patch. # DM type II: Blood sugar slightly on the low side. Will decrease dose of insulin Lantus.     Signed By: Myriam Nava MD     November 11, 2020

## 2020-11-11 NOTE — ROUTINE PROCESS
CHF teaching started post introduction to pt.; aware of diagnosis. Planner/scale @ BS and will follow. Smoking/ ETOH/Illicit drug use cessation and maintain a healthy weight covered. Pt. aware that I can not prescribe nor adjust  medications: 15mins Palliative Care score:  ACP on file Refused ACP on admission Start 2L/D Fluid restriction/ cardiac diet CHF teaching continues to pt. . Emphasis on taking prescription meds as ordered, to keep F/U appts and to call MD STAT if any of the following occur: If you gain 2 lbs in one day or 5 lbs in a week, and short of breath. If you can not lay flat without developing short of breath or rapid breathing at night; or if it wakes you up. Develop a cough or wheezing. If you notice swollen hands/feet/ankles or stomach with a bloated/ full feeling. 
 
. 
Pt. verbalizes understanding, will follow to reinforce teaching skills: 20 mins Gastro appt:

## 2020-11-11 NOTE — CONSULTS
HOSPITALIST H&P/CONSULT  NAME:  Webb Nyhan   Age:  60 y.o.  :   1965   MRN:   [id number]  PCP: Tobias Nance MD  Consulting MD:  Treatment Team: Attending Provider: Brennen Hickman MD; Care Manager: NATASHA Vazquez; Consulting Provider: Katharine Multani MD  HPI:   Patient 55F with pmhx of NICM/ NSVT s/p ICD, BA on CPAP, CKD, DM, HTN, obesity presented to ER with report of weight gain and dyspnea. She has been admitted to the cardiology service. She is being diuresed for acute on chronic CHF. She has been reporting lower abdominal pain and hospitalist consulted to eval.  Pt reports intermittent abdominal pain for few days now. Does not think she has been evacuating her bowels well. Has hx of abdominal surgeries for ventral hernia. Had lose non bloody stool this AM. Passing flatus. No nausea/vomiting. Pain is crampy and intermittent. Complete ROS done and is as stated in HPI or otherwise negative  Past Medical History:   Diagnosis Date    Abdominal wall hernia 10/2/2018    Acute hypoxemic respiratory failure ([facility] 4/3/2019    Allergic rhinitis     followed by allergist; allergic to grass- has rescue inhaler PRN    ARDS (adult respiratory distress syndrome) ([facility] [address] 4/3/2019    Arthritis     Automatic implantable cardioverter-defibrillator in situ 3/30/2016    CAD in native artery 3/30/2016    Chronic kidney disease     Chronic systolic heart failure ([facility] [address] 2013    ECHO 2017- EF 39%; managed with medications; [place] [facility]    CKD (chronic kidney disease)     [address] follows    Diabetes ([facility] [address]     type 2 (on insulin);  FBS AM range- , hypo s/s <70, hgba1c- 5.8 (2018)    Diabetes mellitus ([facility] [facility] 2010    Dyslipidemia 2013    Eczema     Fibromyalgia     GERD (gastroesophageal reflux disease)     hx of- no meds currently    Headache     Heart failure (HCC)     chronic systolic with EF 70%; non-ischemic cardiomyopathy    HTN (hypertension) 2010    Hypercholesterolemia     Incarcerated incisional hernia 3/26/2019    Morbid obesity ([facility]     Neuropathy     bilateral feet and tips of fingers    NICM (nonischemic cardiomyopathy) ([facility] [address] 2/15/2013    Obesity     bmi- 41    Obstructive sleep apnea (adult) (pediatric) 2014    uses cpap qhs    Pseudomonas urinary tract infection 2019    Sciatic pain 2016    Severe persistent asthma with acute exacerbation 2020    [facility]     ablation for svt    Tobacco use disorder 2010      Past Surgical History:   Procedure Laterality Date    HX  SECTION      x1    HX HERNIA REPAIR  2019    mild incarceration, no bowel removal    HX HYSTERECTOMY      YEIMI-Left ovary intact per pt    HX IMPLANTABLE CARDIOVERTER DEFIBRILLATOR  2013    [employer]iotronik dual chamber ICD by Dr. Kaylen Benton  [address]  2013    Biotronik dual chamber ICD by Dr. Bart Benitez Right     HX SVT ABLATION  \"years ago\"    HX TONSILLECTOMY      [facility] LEFT HEART CATH,PERCUTANEOUS  2013    no intervention      Prior to Admission Medications   Prescriptions Last Dose Informant Patient Reported? Taking? Blood-Glucose Meter (ONETOUCH ULTRA2 METER) monitoring kit Unknown at Unknown time  No No   Sig: Check blood sugar daily, E11.9   Dupixent Syringe 300 mg/2 mL syrg syringe Unknown at Unknown time  No No   Si mL by SubCUTAneous route every fourteen (14) days. Insulin Needles, Disposable, (Nery Pen Needle) 32 gauge x 5/32\" ndle Unknown at Unknown time  No No   Si Each by Does Not Apply route daily as needed (insulin injection for diabetes 2).  E11.9   albuterol (PROVENTIL HFA, VENTOLIN HFA, PROAIR HFA) 90 mcg/actuation inhaler Unknown at Unknown time  No No   Si puffs qid prn shortness of breath or wheezing   amiodarone (CORDARONE) 200 mg tablet Unknown at Unknown time  No No   Sig: Take 1 Tab by mouth two (2) times a day. aspirin delayed-release 81 mg tablet Unknown at Unknown time  Yes No   Sig: Take  by mouth daily. carvediloL (COREG) 6.25 mg tablet Unknown at Unknown time  No No   Sig: Take 1 Tab by mouth two (2) times daily (with meals). cholecalciferol (VITAMIN D3) 1,000 unit cap Unknown at Unknown time  Yes No   Sig: Take  by mouth daily. cpap machine kit Unknown at Unknown time  Yes No   Sig: by Does Not Apply route. 11 cm   glucose blood VI test strips (ONETOUCH ULTRA TEST) strip Unknown at Unknown time  No No   Sig: Check blood sugar daily, E11.9   insulin glargine (LANTUS SOLOSTAR U-100 INSULIN) 100 unit/mL (3 mL) inpn Unknown at Unknown time  No No   Si Units by SubCUTAneous route nightly. isosorbide mononitrate ER (IMDUR) 30 mg tablet Unknown at Unknown time  No No   Sig: Take 1 Tab by mouth every morning. lancets (ONETOUCH SURESOFT LANCING DEV) misc Unknown at Unknown time  No No   Sig: Check blood sugar daily, E11.9   magnesium oxide (MAG-OX) 400 mg tablet Unknown at Unknown time  No No   Sig: Take 1 Tab by mouth two (2) times a day. metFORMIN (GLUCOPHAGE) 500 mg tablet Unknown at Unknown time  No No   Sig: Take 1 Tab by mouth two (2) times daily (with meals). nitroglycerin (NITROSTAT) 0.4 mg SL tablet Unknown at Unknown time  No No   Si Tab by SubLINGual route every five (5) minutes as needed for Chest Pain. Up to 3 doses. pravastatin (PRAVACHOL) 80 mg tablet Unknown at Unknown time  No No   Sig: Take 1 Tab by mouth nightly. sacubitril-valsartan (ENTRESTO) 49 mg/51 mg tablet Unknown at Unknown time  No No   Sig: Take 1 Tab by mouth two (2) times a day. torsemide (DEMADEX) 20 mg tablet Unknown at Unknown time  No No   Sig: Take 1 Tab by mouth two (2) times a day.       Facility-Administered Medications: None     Allergies   Allergen Reactions    Grass Pollen Hives and Shortness of Breath     \"inhaler PRN\" Social History     Tobacco Use    Smoking status: Current Every Day Smoker     Packs/day: 0.25     Years: 27.00     Pack years: 6.75    Smokeless tobacco: Never Used    Tobacco comment: \"every now and then\"   Substance Use Topics    Alcohol use: Yes     Alcohol/week: 1.0 standard drinks     Types: 1 Glasses of wine per week     Comment: occas      Family History   Problem Relation Age of Onset    Heart Attack Father 48        mi   [place] Stroke Father     Hypertension Father     Heart Disease Father     Diabetes Other     Stroke Mother     Diabetes Brother     Heart Disease Brother     Hypertension Brother     Breast Cancer Sister 37      Objective:     Visit Vitals  BP (!) 141/80   Pulse 60   Temp 98.7 °F (37.1 °C)   Resp 20   Ht 4' 11\" (1.499 m)   Wt 99.3 kg (219 lb)   SpO2 97%   BMI 44.23 kg/m²      Temp (24hrs), Av.4 °F (36.9 °C), Min:96.8 °F (36 °C), Max:99.2 °F (37.3 °C)    Oxygen Therapy  O2 Sat (%): 97 % (11/10/20 1725)  Pulse via Oximetry: 72 beats per minute (11/10/20 0927)  O2 Device: Nasal cannula (11/10/20 1725)  O2 Flow Rate (L/min): 2 l/min (11/10/20 1725)  FIO2 (%): 21 % (11/10/20 0927)  Physical Exam:  General:    Alert, cooperative, no distress, appears stated age. Head:   Normocephalic, without obvious abnormality, atraumatic. Nose:  Nares normal. No drainage or sinus tenderness. Lungs:   Clear to auscultation bilaterally. No Wheezing or Rhonchi. No rales. Heart:   Regular rate and rhythm,  no murmur, rub or gallop. Abdomen:   Soft, non-tender. Not distended. Bowel sounds normal. TTP of LLQ. Extremities: No cyanosis. No edema. No clubbing  Skin:     Texture, turgor normal. No rashes or lesions.   Not Jaundiced  Neurologic: Alert and oriented x 3, no focal deficits   Data Review:   Recent Results (from the past 24 hour(s))   CBC WITH AUTOMATED DIFF    Collection Time: 11/10/20  4:29 AM   Result Value Ref Range    WBC 5.9 4.3 - 11.1 K/uL    RBC 3.96 (L) 4.05 - 5.2 M/uL HGB 12.3 11.7 - 15.4 g/dL    HCT 38.8 35.8 - 46.3 %    MCV 98.0 (H) 79.6 - 97.8 FL    MCH 31.1 26.1 - 32.9 PG    MCHC 31.7 31.4 - 35.0 g/dL    RDW 14.1 11.9 - 14.6 %    PLATELET 373 269 - 763 K/uL    MPV 11.4 9.4 - 12.3 FL    ABSOLUTE NRBC 0.00 0.0 - 0.2 K/uL    DF AUTOMATED      NEUTROPHILS 64 43 - 78 %    LYMPHOCYTES 24 13 - 44 %    MONOCYTES 8 4.0 - 12.0 %    EOSINOPHILS 3 0.5 - 7.8 %    BASOPHILS 0 0.0 - 2.0 %    IMMATURE GRANULOCYTES 0 0.0 - 5.0 %    ABS. NEUTROPHILS 3.8 1.7 - 8.2 K/UL    ABS. LYMPHOCYTES 1.4 0.5 - 4.6 K/UL    ABS. MONOCYTES 0.5 0.1 - 1.3 K/UL    ABS. EOSINOPHILS 0.2 0.0 - 0.8 K/UL    ABS. BASOPHILS 0.0 0.0 - 0.2 K/UL    ABS. IMM. GRANS. 0.0 0.0 - 0.5 K/UL   METABOLIC PANEL, COMPREHENSIVE    Collection Time: 11/10/20  4:29 AM   Result Value Ref Range    Sodium 142 136 - 145 mmol/L    Potassium 4.0 3.5 - 5.1 mmol/L    Chloride 107 98 - 107 mmol/L    CO2 27 21 - 32 mmol/L    Anion gap 8 7 - 16 mmol/L    Glucose 151 (H) 65 - 100 mg/dL    BUN 19 6 - 23 MG/DL    Creatinine 1.40 (H) 0.6 - 1.0 MG/DL    GFR est AA 50 (L) >60 ml/min/1.73m2    GFR est non-AA 41 (L) >60 ml/min/1.73m2    Calcium 9.3 8.3 - 10.4 MG/DL    Bilirubin, total 0.5 0.2 - 1.1 MG/DL    ALT (SGPT) 82 (H) 12 - 65 U/L    AST (SGOT) 112 (H) 15 - 37 U/L    Alk. phosphatase 104 50 - 136 U/L    Protein, total 7.3 6.3 - 8.2 g/dL    Albumin 3.5 3.5 - 5.0 g/dL    Globulin 3.8 (H) 2.3 - 3.5 g/dL    A-G Ratio 0.9 (L) 1.2 - 3.5     NT-PRO BNP    Collection Time: 11/10/20  4:29 AM   Result Value Ref Range    NT pro-BNP 2,589 (H) 5 - 125 PG/ML   EKG, 12 LEAD, INITIAL    Collection Time: 11/10/20  4:50 AM   Result Value Ref Range    Ventricular Rate 89 BPM    Atrial Rate 500 BPM    QRS Duration 134 ms    Q-T Interval 512 ms    QTC Calculation (Bezet) 622 ms    Calculated R Axis 84 degrees    Calculated T Axis 78 degrees    Diagnosis       !! AGE AND GENDER SPECIFIC ECG ANALYSIS !!   Atrial fibrillation with premature ventricular or aberrantly conducted   complexes  Non-specific intra-ventricular conduction block  Nonspecific T wave abnormality , probably digitalis effect  Abnormal ECG  When compared with ECG of 10-NOV-2020 04:48,  Atrial fibrillation has replaced Sinus rhythm  QRS duration has increased  QT has lengthened  Confirmed by JAYESH GREWAL (), KHOA COELHO ([id number]) on 11/10/2020 9:14:21 AM     TROPONIN-HIGH SENSITIVITY    Collection Time: 11/10/20  5:05 AM   Result Value Ref Range    Troponin-High Sensitivity 74.7 (H) 0 - 14 pg/mL   MAGNESIUM    Collection Time: 11/10/20  5:05 AM   Result Value Ref Range    Magnesium 2.2 1.8 - 2.4 mg/dL   TROPONIN-HIGH SENSITIVITY    Collection Time: 11/10/20  8:53 AM   Result Value Ref Range    Troponin-High Sensitivity 116.1 (HH) 0 - 14 pg/mL   LIPASE    Collection Time: 11/10/20  8:53 AM   Result Value Ref Range    Lipase 116 73 - 393 U/L   GLUCOSE, POC    Collection Time: 11/10/20  9:02 AM   Result Value Ref Range    Glucose (POC) 87 65 - 100 mg/dL   GLUCOSE, POC    Collection Time: 11/10/20 11:35 AM   Result Value Ref Range    Glucose (POC) 112 (H) 65 - 100 mg/dL   EKG, 12 LEAD, SUBSEQUENT    Collection Time: 11/10/20 12:21 PM   Result Value Ref Range    Ventricular Rate 60 BPM    Atrial Rate 60 BPM    P-R Interval 200 ms    QRS Duration 114 ms    Q-T Interval 430 ms    QTC Calculation (Bezet) 430 ms    Calculated P Axis 91 degrees    Calculated R Axis 36 degrees    Calculated T Axis -94 degrees    Diagnosis       Atrial-paced rhythm  ST & T wave abnormality, consider inferior ischemia  Abnormal ECG  When compared with ECG of 10-NOV-2020 12:21,  No significant change was found  Confirmed by Nissa Salinas MD (), ROBINSON JONES ([id number]) on 11/10/2020 5:05:13 PM     GLUCOSE, POC    Collection Time: 11/10/20  4:54 PM   Result Value Ref Range    Glucose (POC) 89 65 - 100 mg/dL   SARS-COV-2    Collection Time: 11/10/20  6:15 PM   Result Value Ref Range    Specimen source NP SWAB     COVID-19 rapid test Not detected NOTD      SARS CoV-2 PENDING      Imaging /Procedures /Studies     Assessment and Plan: Active Hospital Problems    Diagnosis Date Noted    Lower abdominal pain 11/10/2020    Acute on chronic HFrEF (heart failure with reduced ejection fraction) ([facility] 11/10/2020    CHF (congestive heart failure) ([facility] 11/10/2020    CKD (chronic kidney disease) stage 3, GFR 30-59 ml/min 08/24/2020    Type 2 diabetes with nephropathy ([facility] 04/02/2018    [facility] 04/18/2016    BA on CPAP 07/14/2014    Morbid obesity ([facility] 07/14/2014    NICM (nonischemic cardiomyopathy) ([facility] 02/15/2013    HTN (hypertension) 04/12/2010       A/P  - Diverticulitis - as per CT report today. Start zosyn (avoiding FQ in presence of amiodarone). Prn bowel regimen. Likely needs outpatient follow-up with GI to ensure colon ca screening utd. Can discharge with augmentin if clinically improving to complete 7 day course    -  Acute/chronic CHF - continue medical mgmt as per primary service (Iv bumex, coreg, entresto)    - DM2 - controlled, cont current SSI    - CKD/ HTN / BA - stable. Thank you for this consult. Will continue to follow.        Signed By: Radha Bruce DO     November 10, 2020

## 2020-11-11 NOTE — PROGRESS NOTES
Problem: Falls - Risk of  Goal: *Absence of Falls  Description: Document [place] Fall Risk and appropriate interventions in the flowsheet.   Outcome: Progressing Towards Goal  Note: Fall Risk Interventions:            Medication Interventions: Patient to call before getting OOB, Teach patient to arise slowly

## 2020-11-11 NOTE — ROUTINE PROCESS
Respiratory Care Services Policy Number: -[id number] Title: Patient Care Assessment Protocol Effective Date: 01/1999 Revised Date: 05/2014, 04/2018, 12/2018, 07/2019 Reviewed Date: 06/2013/ 03/2015, 03/2016, 06/2017 Overview In an effort to improve quality and reduce costs of respiratory care at [facility], the Respiratory Department has developed a number of Patient Care Protocols. These protocols have been developed according to Diogenes 3 and are utilized for those patients who are ordered respiratory therapy using therapeutic indications and standardized approaches for accomplishing objectives. Patient Care Protocols are intended to improve care by: ? Defining the indications and standards of care agreed upon by the Pulmonary Medicine and [address] [place] [address] of [facility]. ? Training respiratory care practitioners to apply those criteria to individual patients and modify therapy as indicated by the protocols. ? Documenting the indication and care plan as part of the initial ordering process. ? Tapering or discontinuing treatments once the indication for therapy changes. The Patient Care Protocols shall be universally applied throughout the hospital as determined by  
the Pulmonary Medicine and [address] [place] [address]. Rationale for Patient Care Assessment Protocols: 
? Continuous Quality Improvement ? Cost containment ? Standardization of care 
? Enhanced continuity of care ? Utilization review ? Timely intervention The following patient care assessment protocols have been developed: ? Aerosolized Medication Protocol http://spServiceRelatedb/[place] [employer]makers/sunshine/david/policies/Respiratory%20Care%20Services%20Policies/-[id number]. doc ? Bronchial Hygiene Protocol http://spweb/localNorth Asia Resourcesstems/Cont3nt.coml/stfrancis/policies/Respiratory%20Care%20Services%20Policies/-[id number]. doc 
 ? Oxygen Protocolhttp://[facility]/stfrancis/policies/Respiratory Care Services Policies/-[id number]. doc http://[facility]/stfrancis/policies/Respiratory%20Care%20Services%20Policies/-[id number]. doc 
? CVRU Fast Track Weaning Protocol http://[facility]St[facility]/stfrancis/policies/Respiratory%20Care%20Services%20Policies/-[id number]. doc ? Asthma Treatment Protocol ERhttp://[facility]/stfrancis/policies/Respiratory Care Services Policies/-[id number]. doc http://[facility]/stfrancis/policies/Respiratory%20Care%20Services%20Policies/-[id number]. doc ? Pediatric Asthma Treatment Protocol ERhttp://[facility]/stfrancis/policies/Respiratory Care Services Policies/-[id number]. doc http://[facility]St[facility]/stfrancis/policies/Respiratory%20Care%20Services%20Policies/-[id number]. doc ? Alpha-1 Antitrypsin Deficiency Protocolhttp://[facility]/stfrancis/policies/Respiratory Care Services Policies/-[id number]. doc http://[facility]/stfrancis/policies/Respiratory%20Care%20Services%20Policies/-[id number]. doc ? Prone Positioning Protocol http://[facility]St[facility]/stfrancis/policies/Respiratory%20Care%20Services%20Policies/-[id number]. doc 
? COPD Protocol http://spSt. [facility]/stfrancis/policies/Respiratory%20Care%20Services%20Policies/-[id number]. doc 
? Home Oxygen Assessment Protocolhttp://[facility]aSt. [facility]/david/policies/Respiratory Care Services Policies/-[id number]. doc http://[facility]/stancis/policies/Respiratory%20Care%20Services%20Policies/-[id number]. doc 
? Ventilator Weaning Protocol http://[facility]St[facility]/stancis/policies/Respiratory%20Care%20Services%20Policies/-[id number]. doc ? Lung Volume Expansion Protocolhttp://[facility]St. [facility]/stancis/policies/Respiratory Care Services Policies/-[id number]. doc http://[facility]/gvl/stfrancis/policies/Respiratory%20Care%20Services%20Policies/-[id number]. docm The Director of [address] [place] [address] oversees the Patient Care Assessment Program. The Respiratory Educator is responsible for protocol development and training. The Supervisor is responsible for implementation and  activities. Each patient with an order for respiratory treatments will receive an evaluation. Respiratory Care Practitioners (RCP's) will perform the evaluations. The same evaluation tool will be utilized for initial and follow-up assessments. If the patient does not meet criteria for ordered therapy, the therapy will be discontinued. If the patient demonstrates an adverse response to initially ordered therapy, the therapy will be discontinued and the physician will be contacted. Specific physician's orders that deviate from protocols and are deemed \"inappropriate\" or \"unsafe\" will be addressed with ordering physician and/or medical director as required. Respiratory Patient Care Assessment Protocols I. Policy: In an effort to provide quality patient care and effective utilization of services, physicians who order respiratory therapy will have their patients treated via the protocols established (see attached) Respiratory Care Practitioners (RCP's) will complete the initial assessment which will indicate patient needs,  the care plan developed and will performed within 24 hours of admission. Frequency of the therapy will be set according to the results of the respiratory therapy evaluation and frequency guidelines policy. Reassessment will be continued every 48 hours and more frequently as needed for the individual patient. II. Purpose: A.  To provide a process that will allow for ongoing assessment and care plan modification for patients receiving respiratory services based on both objective and or subjective patient responses to interventions. This process of protocol utilization will assist in patient care progression while eliminating the need for the physician to continually update respiratory therapy orders. B. To assure continuity of respiratory care that meets [facility] Clinical Practice Guidelines. III. Initiation:  Implement Respiratory Care Protocols for patients who are ordered by physician  
       to receive respiratory therapy procedures or for ventilator management. IV. Protocol: A. Upon receiving an order for therapy the RCP will review the patient's EMR (electronic medical record) for all pertinent information includin. Physician's order for therapy 2. Patient history and physical examination 3. Physician progress notes 4. Diagnostic. X-rays, PFT's, arterial blood gases etc. 
B. The RCP will perform a respiratory assessment in the following manner: 1. General observations: color, pattern and effort of breathing, chest expansion, (symmetrical and bilateral), level of consciousness and the ability to ambulate. 2. The RCP will assess patient's cough ability and determine if bronchial hygiene is needed. If patient is unable to produce sputum, at that time, the RCP should question the patient with regard to their sputum: production, color consistency, frequency and amount. 3. Auscultation: Using a stethoscope, the RCP will listen and note quality of breath sounds and presence or absence of adventitious breath sounds in all lung fields, both anteriorly and posteriorly. 4. Upon completing the EMR review and physical assessment, the RCP will document findings in the RT Assessment section of the EMR. The score level will be provided and will be used to determine the frequency of therapy. V. Indications: A. Bronchial Hygiene Protocol indications: 1. Potential for or presence of atelectasis. 1. Need for hydration and removal of retained secretions. 1. Need for improvement of cough effectiveness. 1. Presence of conditions associated with disorder of pulmonary clearance: 
a. Cystic fibrosis b. Bronchiectasis 
c. Neuromuscular disease 
d. Obstructive lung diseases 
e. Restrictive lung diseases Aerosolized Medication(s) Protocol indications:Treatment of bronchospasm/wheezing 2. Improvement of mucociliary clearance 3. Treatment of stridor 4. History of Bronchiectasis, Asthma or COPD 
C. Oxygen Therapy Protocol indications: 1. Documented hypoxemia 2. Severe trauma 3. Acute myocardial infarction 4. Short-term therapy (e.g. post anesthesia recovery) D. [facility] Ventilator Weaning Protocol indications: 1. All mechanically ventilated surgical patients unless they have a no wean order. E. Asthma Treatment Protocol ER indications: 1. Patients 15years of age and older that have been triaged or diagnosed with   asthma exacerbation shall be indicated for the ER Asthma Treatment Protocol. A physician order will be required to initiate the protocol. F. Pediatric Asthma protocol in the ER indications: 1. Patients less than 15years old that have been triaged or diagnosed with asthma exacerbation shall be indicated for the Pediatric Asthma protocol. A physician order will be required to initiate the protocol. G. Alpha-1 Antitrypsin Deficiency Testing protocol indications: 1. Patients admitted and diagnosed with COPD. H. Prone Positioning Protocol indications: 
       1. Acute lung injury 2. Acute respiratory distress syndrome (ARDS) I. Respiratory Care COPD Protocol indications: 1. History of COPD in patient's records 2. Smoking history J. Home Oxygen Assessment Protocol indications: 1. Chronic lung disease 2. Cor pulmonale 3. Unable to wean to room air 48 hours prior to anticipated discharge. K.  Ventilator Weaning Protocol indications: 1. Patient's mechanically ventilated 2. Managed by intensivist 
LDaren Lung Volume Expansion Protocol indications: 
      1. Any patient at risk for pulmonary complications. VI. Maintenance: A. Timely patient assessment is an integral part of this protocol therefore the following will be applied: 1. All non- critical care patients will be evaluated upon receiving initial respiratory care orders within 24 hours and re-evaluated within 48 hours (or more as needed). 2. Orders requesting a Respiratory Consult will be responded to in the following manner: 
a. In patient emergency situations, the RCP assigned to the floor will respond immediately to the patient, provide an initial respiratory assessment, and contact the patient's physician as necessary for appropriate orders. b. In non-emergent situations, the RCP assigned to the floor will respond to the patient within 90 minutes and provide an initial respiratory assessment and contact patient's physician as necessary for appropriate orders. c. An RCP will provide a comprehensive assessment as soon as possible. 3. Upon completion of an evaluation, the RCP will complete documentation in the patient's EMR in the RT Assessment section. 4. The RCP who completes the assessment will document orders for therapy in the orders section of the patient's EMR selecting new order. Next, per protocol should be selected indicating it is a protocol order and sign orders should be selected to complete the process. The applicable protocol must be added to the progress note per Joint Commission guidelines. 5. The Pharmacy and Therapeutics (P&T) Committee has mandated that the medication Xopenex may be changed to unit dose albuterol without an order, except for those patients receiving Xopenex due to cardiac arrhythmias. 1. The dosage for these patients should be 0.63 mg. and may be changed from 1.25 mg. to 0.63 mg per P & T Committee by the RCP completing the assessment. 6. Patients who are not experiencing cardiac arrhythmias, and are ordered Xopenex and Atrovent may be changed to Duoneb. VII. Safety: A. The following safety issues shall be monitored: 1. The RCP will perform hand hygiene per hospital policy utilizing Standard Precautions for all patients and following transmission-based isolation as indicated per hospital policy. 2. The RCP must exercise professional judgment in classifying the patient for frequency of therapy. 3. Appropriate classification of the patient will require an evaluation utilizing the Therapy Assessment Protocol Guidelines. 4. The RCP will confer with the physician concerning the care of the patient at any time questions or problems arise. 5. If during therapy, the patient exhibits no improvement, or deterioration in clinical status the RCP will notify the physician and the patient's nurse. VIII. Interventions: A. The patient's nurse is responsible concerning all items related to his/her care. Ongoing communication with nursing is essential to successful protocol management. B. The RCP recognizes the value of the team approach in meeting the patient's needs. Nursing input regarding the patient's pulmonary condition will be sought as needed. IX. Reportable conditions: The RCP will inform the physician if: 1. There are acute changes in patient's respiratory status. 2. The therapist is unable to determine appropriate care plan upon assessment. 3. The patient fails to reach therapeutic objective. 4. A change or additional medication is needed. X.  Patient Education: A. Patient will receive instruction on the followin. The treatment modality, including objectives and proper technique of therapy 2. Respiratory medications B. Documentation shall occur in the patient education section of the patient's EMR. XI. Documentation: Record all findings as described above in the patient's EMR. Related Protocols: A. Aerosolized Medication Protocol B. Bronchial Hygiene C. Oxygen Protocol D. [facility] Fast Track Weaning Protocol E. Asthma Treatment protocol ER 
F. Alpha-1 antitrypsin Deficiency Protocol G. Prone Positioning Protocol H. Respiratory Care COPD Protocol I. Home Oxygen assessment Protocol J. Ventilator Weaning Protocols K. Volume Expansion protocol Indications      Frequency          Level A. Aerosol therapy 1.  q4h     Severe SOB, wheezing, unable to sleep 1 2.  qid, q4 wa or q6h   Moderate SOB, wheezing   2 3.  tid      Hx of asthma, or COPD mild wheezing,  
      or facilitate secretion removal              3 
 4.  bid      Asthma, or COPD, Intermittent wheezing 4 5. PRN, i.e. tid PRN, qid PRN Asthma, or COPD, occasional wheezing 5 B. Bronchopulmonary Hygiene 1. q4h             Copious secretions, SOB, unable to sleep 1 2. qid & PRN            Moderate amounts of secretions   2 3. tid           Small amounts of secretions and poor cough,   
           history of secretions    3 4. PRN, i.e. tid PRN, qid PRN     Breathing exercises, encourage cough only 4  
  
C. Oxygen Therapy     Follow hospital approved Oxygen Protocol Note: 
qid treatments are due 0800, 1200, 1600, and 2000. tid treatments are due 0800, 1400, and 2000 Q6h treatments are due 0800, 1400, 2000, 0200 Q4 wa teatments are due 0800, 1200, 1600, and 2000. Q4h treatments are due  0800, 1200, 1600, 2000, 0000, and 0400. The Level 1-5 rating system is only to be used as criteria for determining appropriate frequency of therapy. References:  
[address] [place] [address] Standard L    Respiratory Care Department Policy, Procedure and Protocol Guideline Manual, 1995, DANNY Alcantara. L  Therapist Driven Respiratory Care Protocols  A Practitioner's Guide for Criteria-Based Respiratory Care by Shalom Sierra M.D., and DANNY Tinsley, AMIE. L  The rationale for therapist-driven protocols: an update. Respiratory Care 1998; 34:843-342 L Therapist Driven Respiratory Care Protocols  A Practitioner's Guide for Criteria-Based Respiratory Care by Radha Donnelly M.D., and ETELVINA Dickinson The rationale for therapist-driven protocols: an update. Respiratory Care 1998; [id number]. N   [facility] Clinical Practice Guidelines. A. The RCP will perform a respiratory assessment in the following manner: 1. Perform hand hygiene per hospital policy utilizing Standard Precautions for all patients and following transmission-based isolation as indicated per policy. 2. Identify patient via ID bracelet verifying patient name and birth date. 3. General observations: color, pattern and effort of breathing, chest expansion, (symmetrical and bilateral), level of consciousness and the ability to ambulate. 4. The RCP will assess patients cough ability and determine if Nasotracheal suctioning is needed. If patient is unable to produce sputum, at that time, the RCP should question the patient with regard to their sputum: production, color consistency, frequency and amount. 5. Auscultation: Using a stethoscope, the RCP will listen and note quality of breath sounds and presence or absence of adventitious breath sounds in all lung fields, both anteriorly and posteriorly. 6. Upon completing the EMR review and physical assessment, the RCP will document findings in the RT Assessment section of the EMR. The score level will be provided and will be used to determine the frequency of therapy. V. Indications: A. Indications for Bronchial Hygiene Protocol will include: 1. Potential for or presence of atelectasis. 2. Need for hydration and removal of retained secretions. 3. Need for improvement of cough effectiveness. 4. Presence of conditions associated with disorder of pulmonary clearance: 
a. Cystic fibrosis b. Bronchiectasis B.  Indications for Aerosolized Medication(s) Protocol should include: 1. Treatment of bronchospasm/wheezing 2. Improvement of mucociliary clearance 3. Treatment of stridor 4. History of Asthma or COPD 
           C.  Indications for Oxygen Therapy Protocol should include: 1. Documented hypoxemia 2. Severe trauma 3. Acute myocardial infarction 4. Short-term therapy (e.g. post anesthesia recovery) VI. Maintenance: A. Timely patient assessment is an integral part of this protocol therefore the following will be applied: 1. All non- critical care patients will be evaluated upon receiving initial respiratory care orders within 24 hours and re-evaluated within 48 hours (or more as needed). 2. Orders requesting a Respiratory Consult will be responded to in the following manner: 
a. In patient emergency situations, the RCP assigned to the floor will respond immediately to the patient, provide an initial respiratory assessment, and contact the patients physician as necessary for appropriate orders. b. In non-emergent situations, the RCP assigned to the floor will respond to the patient within 90 minutes and provide an initial respiratory assessment and contact patients physician as necessary for appropriate orders. c. An RCP will provide a comprehensive assessment as soon as possible. 3. Upon completion of an evaluation, the RCP will complete documentation in the patients EMR in the RT Assessment section. 4. The RCP who completes the assessment will document orders for therapy in the orders section of the patients EMR selecting new order. Next, per protocol should be selected indicating it is a protocol order and sign orders should be selected to complete the process. 5. The Pharmacy and Therapeutics (P&T) Committee has mandated that the medication Xopenex may be changed to unit dose albuterol without an order, except for those patients receiving Xopenex due to cardiac arrhythmias. The dosage for these patients should be 0.63 mg. and may be changed from 1.25 mg. to 0.63 mg per P & T Committee by the RCP completing the assessment. 6. Patients who are not experiencing cardiac arrhythmias, and are ordered Xopenex and Atrovent may be changed to Duoneb. VII. Safety: A. The following safety issues shall be monitored: 1. The RCP will perform hand hygiene per hospital policy utilizing Standard Precautions for all patients and following transmission-based isolation as indicated per hospital policy. 2. The RCP must exercise professional judgment in classifying the patient for frequency of therapy. 3. Appropriate classification of the patient will require an evaluation utilizing the Therapy Assessment Protocol Guidelines. 4. The RCP will confer with the physician concerning the care of the patient at any time questions or problems arise. 5. If during therapy, the patient exhibits no improvement or deterioration in clinical status the RCP will notify the physician and the patients nurse. VIII. Interventions: A. The patients nurse is responsible concerning all items related to his/her care. Ongoing communication with nursing is essential to successful protocol management. B. The RCP recognizes the value of the team approach in meeting the patients needs. Nursing input regarding the patients pulmonary condition will be sought as needed. IX. Reportable conditions: The RCP will inform the physician if: 1. There are acute changes in patients respiratory status. 2. The therapist is unable to determine appropriate care plan upon assessment. 3. The patient fails to reach therapeutic objective. 4. A change or additional medication is needed. X.  Patient Education: A. Patient will receive instruction on the followin. The treatment modality, including objectives and proper technique of therapy 2. Respiratory medications B. Documentation shall occur in the patient education section of the patients EMR. XI. Documentation: Record all findings as described above in the patients EMR. Related Protocols: A. Aerosolized Medication Protocol B. Bronchial Hygiene C. Oxygen Protocol D. Volume Expansion/Secretion Clearance E. Ventilator Weaning Protocols References: 
[address] Standard L    Respiratory Care Department Policy, Procedure and Protocol Guideline Manual, 1995, DANNY CEBALLOS  Therapist Driven Respiratory Care Protocols  A Practitioners Guide for Criteria-Based Respiratory Care by Juju Rosario M.D., and ETELVINA Swanson  The rationale for therapist-driven protocols: an update. Respiratory Care 1998; 13:838-301 N   [facility] Clinical Practice Guidelines. Respiratory Care Services Policy Number: -[id number] Title: Aerosolized Medication Protocol Effective Date: 10/1998 Revised Date: 06/13, 03/16, 11/17, 07/19 Reviewed Date: 05/14/ 03/15 , 06/17, 5/18 I. Policy: The Aerosolized Medication Protocol shall by implemented by Respiratory Care Practitioners (RCP) for patients with orders to receive aerosol therapy with medication. II. Purpose: To open and maintain obstructed airways, the RCP, will utilize the following  
protocol to select the indicated aerosolized medication(s) and determine the most effective method of delivery to the patient. III. Patient Type: All patients who are determined to meet aerosolized medication criteria as  
       outlined in this protocol. IV. Responsibility: Director, [address], registered Respiratory Care Practitioners (RCP's) with documented competency in the performance of respiratory therapeutic techniques. V. Equipment needed: C. Stethoscope D. Pulse oximeter E. AeroEclipse nebulizer F. Dry Powder Inhaler (DPI) VI. Protocol: C. The following conditions are accepted indications for aerosolized medication therapy. 5. Bronchospasm/wheezing 6. Impaired mucociliary clearance 7. Tracheobronchial mucosal congestion/and laryngeal stridor 8. Diseases which commonly require aerosolized medication therapy include, but are not limited to: 
f. Asthma/reactive airway disease 
g. Bronchitis/emphysema (COPD) h. Cystic fibrosis 
i. Severe laryngitis/tracheitis 
j. Bronchiectasis 
k. Smoke inhalation or chemical trauma to the lung or upper airway 
l. Physical trauma to the upper airway 
m. Laryngotracheobronchitis 
n. Bronchiolitis 
o. Non-specific wheezing B. Indications for bronchodilator medications will include: 
d. Bronchospasm/ wheezing 
e. Asthma/reactive airway disease 
f. Chronic obstructive pulmonary disease 
g. Obstructive defect on pulmonary function testing C. Administration of medications 5. If a bronchodilator or any other type of respiratory medication is needed, a physician order must be indicated in the medication section in the patient's EMR. 6. When the physician specifies the medication and dosage at the time of request, the ordered medication will be used as part of the care plan. D. The following guidelines will be utilized in the evaluation and selection of the appropriate delivery device for indicated medication(s): 
1. Unassisted aerosol (UA) is the preferred method of aerosol delivery and indicated if 
c. Ventilation is inadequate 
d. Patient demonstrates wheezing  
e. Routine treatments shall be given via the AeroEclipse nebulizer. f. The Aerogen nebulizer shall be used in the following circumstances: 
i. ER patients and they will continue with this nebulizer if admitted to 8th floor or ICU. 
ii. Patients in ICU  
iii. Patients on 8th floor with severe wheezing (at the RCP's discretion) 2. Dry PowderInhaler (DPI)  
d. Patient should be alert/cooperative 
e. Able to perform 3 second breath hold. f. Patient has used DPI therapy previously, either at home or in the hospital. 
g. Note: The only approved inhaler on formulary is Spiriva. VII. Guidelines:  
Monitor patient's vital signs and evaluate patient's clinical status. The need to change medication and/or modality may be indicated by: 5. A pulse greater than 120 bpm, or if a pulse increase of 20 bpm occurs with bronchodilator medications. 6. Significant worsening of dyspnea or wheezing occurring during or within 30 minutes of discontinuing therapy. 7. Worsening of patient's sensorium (e.g. patient becomes confused or obtunded, and unable to follow directions). 8. Worsening of patient's chest x-ray. 9. Change in sputum (e.g. increased pulmonary infiltrate, which might indicate need for volume expansion therapy). 10. Patient has difficulty coughing up secretions, which might indicate need for acetylcysteine and/or bronchial hygiene therapy. 11. Call physician immediately if dyspnea worsens and is not responsive to modifications allowed by protocol. VIII. Clinical Responsibility: 
2. The therapy assessment guidelines will be used to evaluate all patients receiving aerosolized medications with the exception of critical care areas. 5. RCP's will perform changes in therapy per protocol. 6. It will be the responsibility of RCP to provide instruction regarding respiratory medications, possible side effects, aerosol therapy and proper DPI technique, as well as, spacer usage to patients ordered DPI therapy. 7. Current therapy that is part of a patient's home regimen will not be discontinued. a. Provide spacer and educate patient on proper inhaler technique if needed. IX. Documentation A. Document assessment findings in the respiratory assessment section of the patient's EMR. B. Document changes in therapy per protocol in the respiratory orders section and in the care plan section of the patient's EMR. C. Document patient education in the patient education section of the patient's EMR. X. Outcome Criteria: 
B. Relief of wheezes and obstruction C. Improved cough and sputum color and consistency D. Improved chest x-ray E. Improved arterial oxygen tension and or SaO2 
F. Improved Peak Flow on asthmatic patients XI. Related Protocols: 
B. Respiratory Patient Care Protocols C. Bronchial Hygiene Therapy D. Oxygen Protocol Reference: L - Respiratory Care Department Policy, Procedure and Protocol Guideline Manual, 1995, DANNY Alcantara. L -  Therapist Driven Respiratory Care Protocols  A Practitioner's Guide for Criteria-Based Respiratory Care by Blessing Sharma M.D., and DANNY Wu, AMIE. L - The rationale for therapist-driven protocols: an update. Respiratory Care 1998; [id number]. N -[facility] Clinical Practice Guidelines.

## 2020-11-11 NOTE — ROUTINE PROCESS
Bedside and Verbal shift change report given to self (oncoming nurse) by  Boom Bryant RN (offgoing nurse). Report included the following information SBAR, Kardex, Intake/Output, MAR, and Recent Results.

## 2020-11-11 NOTE — PROGRESS NOTES
[facility] [place] CARDIOLOGY PROGRESS NOTE           11/11/2020 10:22 AM    Admit Date: 11/10/2020         Subjective: Continues to have ABD pain, CT showed diverticulitis. Diuresed with IV bumex, breathing modestly improved. ROS:  Cardiovascular:  As noted above    Objective:      Vitals:    11/11/20 0527 11/11/20 0840 11/11/20 0908 11/11/20 0952   BP: 115/68 (!) 140/72  (!) 113/58   Pulse: 60 72  65   Resp: 20   20   Temp: 98.1 °F (36.7 °C)   98 °F (36.7 °C)   SpO2: 98%  97% 97%   Weight: 218 lb (98.9 kg)      Height:           On telemetry:      Physical Exam:  General: Well Developed, Well Nourished, No Acute Distress, Alert & Oriented x 3, Appropriate mood  Neck: supple, no JVD  Heart: S1S2 with RRR without murmurs or gallops  Lungs: Clear throughout auscultation bilaterally without adventitious sounds  Abd: soft, nontender, nondistended, with good bowel sounds  Ext: no edema bilaterally  Skin: warm and dry      Data Review:   Recent Labs     11/11/20  0306 11/10/20  0505 11/10/20  0429     --  142   K 3.8  --  4.0   MG 2.6* 2.2  --    BUN 19  --  19   CREA 1.34*  --  1.40*   *  --  151*   WBC  --   --  5.9   HGB  --   --  12.3   HCT  --   --  38.8   PLT  --   --  159       No results for input(s): TNIPOC, TROIQ in the last 72 hours.       Assessment/Plan:     Principal Problem:    Acute on chronic HFrEF (heart failure with reduced ejection fraction) ([facility] (11/10/2020)    Active Problems:    HTN (hypertension) (4/12/2010)      NICM (nonischemic cardiomyopathy) ([facility] [address] (2/15/2013)      BA on CPAP (7/14/2014)      Morbid obesity ([facility] [address] (7/14/2014)      [facility] (4/18/2016)      Type 2 diabetes with nephropathy ([facility] [facility] (4/2/2018)      CKD (chronic kidney disease) stage 3, GFR 30-59 ml/min (8/24/2020)      Lower abdominal pain (11/10/2020)      CHF (congestive heart failure) ([facility] (11/10/2020)    A/P  1) sCHF - continue IV bumex and daily BMP  2) Abd pain - diverticular disease - recs per hosp team  3) AoCKD - improved with diuresis    Cheo Price MD  11/11/2020 10:22 AM

## 2020-11-11 NOTE — ROUTINE PROCESS
Bedside and Verbal shift change report given to MELLO Laura (oncoming nurse) by self (offgoing nurse). Report included the following information SBAR, Kardex, Intake/Output, MAR, and Recent Results.

## 2020-11-12 PROBLEM — Z79.899 LONG TERM CURRENT USE OF AMIODARONE: Chronic | Status: ACTIVE | Noted: 2020-06-12

## 2020-11-12 PROBLEM — F17.200 NICOTINE DEPENDENCE: Status: RESOLVED | Noted: 2020-07-06 | Resolved: 2020-11-12

## 2020-11-12 PROBLEM — J98.4 RESTRICTIVE LUNG DISEASE: Chronic | Status: ACTIVE | Noted: 2020-04-08

## 2020-11-12 PROBLEM — Z20.822 COVID-19 VIRUS NOT DETECTED: Status: RESOLVED | Noted: 2020-04-17 | Resolved: 2020-11-12

## 2020-11-12 PROBLEM — J90 PLEURAL EFFUSION ON RIGHT: Status: ACTIVE | Noted: 2020-11-12

## 2020-11-12 PROBLEM — E11.21 TYPE 2 DIABETES WITH NEPHROPATHY (HCC): Chronic | Status: ACTIVE | Noted: 2018-04-02

## 2020-11-12 PROBLEM — N18.30 CKD (CHRONIC KIDNEY DISEASE) STAGE 3, GFR 30-59 ML/MIN (HCC): Chronic | Status: ACTIVE | Noted: 2020-08-24

## 2020-11-12 PROBLEM — J96.01 ACUTE RESPIRATORY FAILURE WITH HYPOXIA (HCC): Status: RESOLVED | Noted: 2020-04-17 | Resolved: 2020-11-12

## 2020-11-12 LAB
ANION GAP SERPL CALC-SCNC: 4 MMOL/L (ref 7–16)
BNP SERPL-MCNC: 605 PG/ML (ref 5–125)
BUN SERPL-MCNC: 22 MG/DL (ref 6–23)
CALCIUM SERPL-MCNC: 8.5 MG/DL (ref 8.3–10.4)
CHLORIDE SERPL-SCNC: 109 MMOL/L (ref 98–107)
CO2 SERPL-SCNC: 30 MMOL/L (ref 21–32)
CREAT SERPL-MCNC: 1.38 MG/DL (ref 0.6–1)
GLUCOSE BLD STRIP.AUTO-MCNC: 121 MG/DL (ref 65–100)
GLUCOSE BLD STRIP.AUTO-MCNC: 151 MG/DL (ref 65–100)
GLUCOSE BLD STRIP.AUTO-MCNC: 86 MG/DL (ref 65–100)
GLUCOSE BLD STRIP.AUTO-MCNC: 90 MG/DL (ref 65–100)
GLUCOSE SERPL-MCNC: 96 MG/DL (ref 65–100)
MAGNESIUM SERPL-MCNC: 2.6 MG/DL (ref 1.8–2.4)
POTASSIUM SERPL-SCNC: 4 MMOL/L (ref 3.5–5.1)
SODIUM SERPL-SCNC: 143 MMOL/L (ref 136–145)

## 2020-11-12 PROCEDURE — 83880 ASSAY OF NATRIURETIC PEPTIDE: CPT

## 2020-11-12 PROCEDURE — 36415 COLL VENOUS BLD VENIPUNCTURE: CPT

## 2020-11-12 PROCEDURE — 74011250636 HC RX REV CODE- 250/636: Performed by: NURSE PRACTITIONER

## 2020-11-12 PROCEDURE — 94640 AIRWAY INHALATION TREATMENT: CPT

## 2020-11-12 PROCEDURE — 74011250637 HC RX REV CODE- 250/637: Performed by: INTERNAL MEDICINE

## 2020-11-12 PROCEDURE — 74011000250 HC RX REV CODE- 250: Performed by: NURSE PRACTITIONER

## 2020-11-12 PROCEDURE — 74011000258 HC RX REV CODE- 258: Performed by: INTERNAL MEDICINE

## 2020-11-12 PROCEDURE — 74011636637 HC RX REV CODE- 636/637: Performed by: NURSE PRACTITIONER

## 2020-11-12 PROCEDURE — 77010033678 HC OXYGEN DAILY

## 2020-11-12 PROCEDURE — 74011000250 HC RX REV CODE- 250: Performed by: INTERNAL MEDICINE

## 2020-11-12 PROCEDURE — 94760 N-INVAS EAR/PLS OXIMETRY 1: CPT

## 2020-11-12 PROCEDURE — 74011250637 HC RX REV CODE- 250/637: Performed by: NURSE PRACTITIONER

## 2020-11-12 PROCEDURE — 99222 1ST HOSP IP/OBS MODERATE 55: CPT | Performed by: INTERNAL MEDICINE

## 2020-11-12 PROCEDURE — 74011636637 HC RX REV CODE- 636/637: Performed by: INTERNAL MEDICINE

## 2020-11-12 PROCEDURE — 76604 US EXAM CHEST: CPT | Performed by: INTERNAL MEDICINE

## 2020-11-12 PROCEDURE — BH4BZZZ ULTRASONOGRAPHY OF CHEST WALL: ICD-10-PCS | Performed by: INTERNAL MEDICINE

## 2020-11-12 PROCEDURE — 65660000000 HC RM CCU STEPDOWN

## 2020-11-12 PROCEDURE — 99232 SBSQ HOSP IP/OBS MODERATE 35: CPT | Performed by: INTERNAL MEDICINE

## 2020-11-12 PROCEDURE — 2709999900 HC NON-CHARGEABLE SUPPLY

## 2020-11-12 PROCEDURE — 74011250636 HC RX REV CODE- 250/636: Performed by: INTERNAL MEDICINE

## 2020-11-12 PROCEDURE — 82962 GLUCOSE BLOOD TEST: CPT

## 2020-11-12 PROCEDURE — 80048 BASIC METABOLIC PNL TOTAL CA: CPT

## 2020-11-12 PROCEDURE — 83735 ASSAY OF MAGNESIUM: CPT

## 2020-11-12 RX ORDER — AMOXICILLIN AND CLAVULANATE POTASSIUM 400; 57 MG/5ML; MG/5ML
800 POWDER, FOR SUSPENSION ORAL EVERY 12 HOURS
Status: DISCONTINUED | OUTPATIENT
Start: 2020-11-13 | End: 2020-11-14 | Stop reason: HOSPADM

## 2020-11-12 RX ORDER — SPIRONOLACTONE 25 MG/1
25 TABLET ORAL DAILY
Status: DISCONTINUED | OUTPATIENT
Start: 2020-11-12 | End: 2020-11-14 | Stop reason: HOSPADM

## 2020-11-12 RX ORDER — DICLOFENAC SODIUM 10 MG/G
2 GEL TOPICAL 2 TIMES DAILY
Status: DISCONTINUED | OUTPATIENT
Start: 2020-11-12 | End: 2020-11-14 | Stop reason: HOSPADM

## 2020-11-12 RX ORDER — ALBUTEROL SULFATE 0.83 MG/ML
2.5 SOLUTION RESPIRATORY (INHALATION)
Status: DISCONTINUED | OUTPATIENT
Start: 2020-11-12 | End: 2020-11-12

## 2020-11-12 RX ORDER — MORPHINE SULFATE 2 MG/ML
2 INJECTION, SOLUTION INTRAMUSCULAR; INTRAVENOUS ONCE
Status: COMPLETED | OUTPATIENT
Start: 2020-11-12 | End: 2020-11-12

## 2020-11-12 RX ORDER — PANTOPRAZOLE SODIUM 40 MG/1
40 TABLET, DELAYED RELEASE ORAL
Status: DISCONTINUED | OUTPATIENT
Start: 2020-11-12 | End: 2020-11-14 | Stop reason: HOSPADM

## 2020-11-12 RX ORDER — INSULIN GLARGINE 100 [IU]/ML
20 INJECTION, SOLUTION SUBCUTANEOUS
Status: DISCONTINUED | OUTPATIENT
Start: 2020-11-12 | End: 2020-11-13

## 2020-11-12 RX ADMIN — SACUBITRIL AND VALSARTAN 1 TABLET: 49; 51 TABLET, FILM COATED ORAL at 17:15

## 2020-11-12 RX ADMIN — SACUBITRIL AND VALSARTAN 1 TABLET: 49; 51 TABLET, FILM COATED ORAL at 09:41

## 2020-11-12 RX ADMIN — HYDROCODONE BITARTRATE AND ACETAMINOPHEN 1 TABLET: 5; 325 TABLET ORAL at 09:35

## 2020-11-12 RX ADMIN — ASPIRIN 81 MG: 81 TABLET ORAL at 09:37

## 2020-11-12 RX ADMIN — AMIODARONE HYDROCHLORIDE 200 MG: 200 TABLET ORAL at 17:15

## 2020-11-12 RX ADMIN — BUMETANIDE 1 MG: 0.25 INJECTION INTRAMUSCULAR; INTRAVENOUS at 09:43

## 2020-11-12 RX ADMIN — AMIODARONE HYDROCHLORIDE 200 MG: 200 TABLET ORAL at 09:37

## 2020-11-12 RX ADMIN — PRAVASTATIN SODIUM 80 MG: 80 TABLET ORAL at 21:23

## 2020-11-12 RX ADMIN — CARVEDILOL 6.25 MG: 6.25 TABLET, FILM COATED ORAL at 09:37

## 2020-11-12 RX ADMIN — CARVEDILOL 6.25 MG: 6.25 TABLET, FILM COATED ORAL at 17:16

## 2020-11-12 RX ADMIN — SPIRONOLACTONE 25 MG: 25 TABLET ORAL at 14:19

## 2020-11-12 RX ADMIN — INSULIN GLARGINE 20 UNITS: 100 INJECTION, SOLUTION SUBCUTANEOUS at 21:23

## 2020-11-12 RX ADMIN — PANTOPRAZOLE SODIUM 40 MG: 40 TABLET, DELAYED RELEASE ORAL at 14:19

## 2020-11-12 RX ADMIN — Medication 400 MG: at 17:15

## 2020-11-12 RX ADMIN — Medication 400 MG: at 09:37

## 2020-11-12 RX ADMIN — PIPERACILLIN SODIUM AND TAZOBACTAM SODIUM 3.38 G: 3; .375 INJECTION, POWDER, LYOPHILIZED, FOR SOLUTION INTRAVENOUS at 05:02

## 2020-11-12 RX ADMIN — MORPHINE SULFATE 2 MG: 2 INJECTION, SOLUTION INTRAMUSCULAR; INTRAVENOUS at 21:23

## 2020-11-12 RX ADMIN — ALBUTEROL SULFATE 2.5 MG: 2.5 SOLUTION RESPIRATORY (INHALATION) at 16:13

## 2020-11-12 RX ADMIN — PIPERACILLIN SODIUM AND TAZOBACTAM SODIUM 3.38 G: 3; .375 INJECTION, POWDER, LYOPHILIZED, FOR SOLUTION INTRAVENOUS at 12:08

## 2020-11-12 RX ADMIN — BUMETANIDE 1 MG: 0.25 INJECTION INTRAMUSCULAR; INTRAVENOUS at 17:15

## 2020-11-12 RX ADMIN — DICLOFENAC 2 G: 10 GEL TOPICAL at 17:17

## 2020-11-12 RX ADMIN — SENNOSIDES 8.6 MG: 8.6 TABLET, FILM COATED ORAL at 09:37

## 2020-11-12 RX ADMIN — ISOSORBIDE MONONITRATE 30 MG: 30 TABLET, EXTENDED RELEASE ORAL at 09:37

## 2020-11-12 RX ADMIN — ALBUTEROL SULFATE 2.5 MG: 2.5 SOLUTION RESPIRATORY (INHALATION) at 07:30

## 2020-11-12 RX ADMIN — Medication 10 ML: at 14:21

## 2020-11-12 RX ADMIN — Medication 10 ML: at 21:36

## 2020-11-12 RX ADMIN — Medication 10 ML: at 05:02

## 2020-11-12 RX ADMIN — POLYETHYLENE GLYCOL 3350 17 G: 17 POWDER, FOR SOLUTION ORAL at 09:37

## 2020-11-12 RX ADMIN — HYDROCODONE BITARTRATE AND ACETAMINOPHEN 1 TABLET: 5; 325 TABLET ORAL at 02:04

## 2020-11-12 RX ADMIN — INSULIN LISPRO 2 UNITS: 100 INJECTION, SOLUTION INTRAVENOUS; SUBCUTANEOUS at 12:08

## 2020-11-12 NOTE — ROUTINE PROCESS
Bedside and Verbal shift change report given to Margarette Leone (oncoming nurse) by Padmaja Pretty RN. Report included the following information SBAR, Kardex, Intake/Output, and MAR.

## 2020-11-12 NOTE — CONSULTS
CONSULT NOTE    Shawnee Nicholson    11/12/2020    Date of Admission:  11/10/2020    The patient's chart is reviewed and the patient is discussed with the staff. Subjective:        Patient is a 60 y.o.  female seen and evaluated at the request of Dr. Ernst Rubinstein. She was admitted with worsening shortness of breath. She states that she has not felt well for months but her breathing is getting worse. She takes lasix BID and denies missing any doses. She is followed by nephrology for chronic kidney disease. She admits to not following her weight. She has noted lower extremity edema and she feels bloated. Since admission, she has been started on IV bumex here and her fluid balance is negative 3.4 liters. Her echo in January showed an EF of 30 to 35% and the RV was normal. Her CXR shows edema with a right pleural effusion. She also has BA and is compliant with nocturnal CPAP. She has restrictive lung disease and was last seen in the office in July. HRCT scan showed improvement in ground glass opacities so ILD was not suspected. She is on Amiodarone and regular follow up was recommended. Her PFTs in July showed improvement in FVC and FEV1 in addition to improved FEF 25-75% from 47 tp 61% suggesting possible small airway disease. PRN Albuterol was recommended. She denies any recent wheezing. She smoked for about 30 years and states that she recently quit. She complained of abdominal pain here and CT scan shows diverticulitis. She is on Zosyn.      Review of Systems  A comprehensive review of systems was negative except for: Constitutional: positive for none  Eyes: positive for none  Ears, nose, mouth, throat, and face: positive for none  Respiratory: positive for dyspnea on exertion  Cardiovascular: positive for lower extremity edema, dyspnea on exertion  Gastrointestinal: positive for abdominal bloating and pain  Genitourinary: positive for none  Integument/breast: positive for none  Hematologic/lymphatic: positive for none  Musculoskeletal: positive for back pain  Neurological: positive for none  Behvioral/Psych: positive for sleep disturbance and tobacco use  Endocrine: positive for none  Allergic/Immunologic: positive for none        Patient Active Problem List   Diagnosis Code    Long-term insulin use in type 2 diabetes ([facility] E11.9, Z79.4    HTN (hypertension) I10    Dyslipidemia E78.5    NICM (nonischemic cardiomyopathy) ([facility] I42.8    Chronic systolic heart failure (HCC) I50.22    BA on CPAP G47.33, Z99.89    Morbid obesity ([facility]) E66.01    CAD in native artery I25.10    Automatic implantable cardioverter-defibrillator in situ Z95.810    Ventricular tachycardia ([facility]) I47.2    Chronic right-sided thoracic back pain M54.6, G89.29    Chronic left-sided low back pain with sciatica M54.40, G89.29    Type 2 diabetes with nephropathy ([facility]) E11.21    Restrictive lung disease J98.4    Long term current use of amiodarone Z79.899    CKD (chronic kidney disease) stage 3, GFR 30-59 ml/min N18.30    Lower abdominal pain R10.30    Acute on chronic HFrEF (heart failure with reduced ejection fraction) ([facility]) I50.23    CHF (congestive heart failure) ([facility]) I50.9    Pleural effusion on right J90         Prior to Admission Medications   Prescriptions Last Dose Informant Patient Reported? Taking? Blood-Glucose Meter (ONETOUCH ULTRA2 METER) monitoring kit Unknown at Unknown time  No No   Sig: Check blood sugar daily, E11.9   Dupixent Syringe 300 mg/2 mL syrg syringe Unknown at Unknown time  No No   Si mL by SubCUTAneous route every fourteen (14) days. Insulin Needles, Disposable, (Nery Pen Needle) 32 gauge x 5/32\" ndle Unknown at Unknown time  No No   Si Each by Does Not Apply route daily as needed (insulin injection for diabetes 2).  E11.9   albuterol (PROVENTIL HFA, VENTOLIN HFA, PROAIR HFA) 90 mcg/actuation inhaler Unknown at Unknown time  No No   Si puffs qid prn shortness of breath or wheezing   amiodarone (CORDARONE) 200 mg tablet Unknown at Unknown time  No No   Sig: Take 1 Tab by mouth two (2) times a day. aspirin delayed-release 81 mg tablet Unknown at Unknown time  Yes No   Sig: Take  by mouth daily. carvediloL (COREG) 6.25 mg tablet Unknown at Unknown time  No No   Sig: Take 1 Tab by mouth two (2) times daily (with meals). cholecalciferol (VITAMIN D3) 1,000 unit cap Unknown at Unknown time  Yes No   Sig: Take  by mouth daily. cpap machine kit Unknown at Unknown time  Yes No   Sig: by Does Not Apply route. 11 cm   glucose blood VI test strips (ONETOUCH ULTRA TEST) strip Unknown at Unknown time  No No   Sig: Check blood sugar daily, E11.9   insulin glargine (LANTUS SOLOSTAR U-100 INSULIN) 100 unit/mL (3 mL) inpn Unknown at Unknown time  No No   Si Units by SubCUTAneous route nightly. isosorbide mononitrate ER (IMDUR) 30 mg tablet Unknown at Unknown time  No No   Sig: Take 1 Tab by mouth every morning. lancets (ONETOUCH SURESOFT LANCING DEV) misc Unknown at Unknown time  No No   Sig: Check blood sugar daily, E11.9   magnesium oxide (MAG-OX) 400 mg tablet Unknown at Unknown time  No No   Sig: Take 1 Tab by mouth two (2) times a day. metFORMIN (GLUCOPHAGE) 500 mg tablet Unknown at Unknown time  No No   Sig: Take 1 Tab by mouth two (2) times daily (with meals). nitroglycerin (NITROSTAT) 0.4 mg SL tablet Unknown at Unknown time  No No   Si Tab by SubLINGual route every five (5) minutes as needed for Chest Pain. Up to 3 doses. pravastatin (PRAVACHOL) 80 mg tablet Unknown at Unknown time  No No   Sig: Take 1 Tab by mouth nightly. sacubitril-valsartan (ENTRESTO) 49 mg/51 mg tablet Unknown at Unknown time  No No   Sig: Take 1 Tab by mouth two (2) times a day. torsemide (DEMADEX) 20 mg tablet Unknown at Unknown time  No No   Sig: Take 1 Tab by mouth two (2) times a day.       Facility-Administered Medications: None       Past Medical History: Diagnosis Date    Abdominal wall hernia 10/2/2018    Acute hypoxemic respiratory failure ([address] 4/3/2019    Allergic rhinitis     followed by allergist; allergic to grass- has rescue inhaler PRN    ARDS (adult respiratory distress syndrome) ([address] 4/3/2019    Arthritis     Automatic implantable cardioverter-defibrillator in situ 3/30/2016    CAD in native artery 3/30/2016    Chronic systolic heart failure ([address] 07/18/2013    ECHO 2017- EF 39%; managed with medications; [place]    CKD (chronic kidney disease)     [address] follows    Diabetes ([address]     type 2 (on insulin);  FBS AM range- , hypo s/s <70, hgba1c- 5.8 (9/2018)    Diabetes mellitus ([address] 4/12/2010    Dyslipidemia 2/13/2013    Eczema     Fibromyalgia     GERD (gastroesophageal reflux disease)     hx of- no meds currently    Headache     Heart failure (HCC)     chronic systolic with EF 81%; non-ischemic cardiomyopathy    HTN (hypertension) 4/12/2010    Hypercholesterolemia     Incarcerated incisional hernia 3/26/2019    Morbid obesity ([address]     Neuropathy     bilateral feet and tips of fingers    NICM (nonischemic cardiomyopathy) ([address] 2/15/2013    Obesity     bmi- 41    Obstructive sleep apnea (adult) (pediatric) 07/14/2014    uses cpap qhs    Pseudomonas urinary tract infection 4/5/2019    Sciatic pain 5/23/2016    Severe persistent asthma with acute exacerbation 8/24/2020    SVT (supraventricular tachycardia) ([facility])     ablation for svt    Tobacco use disorder 4/12/2010     Active Ambulatory Problems     Diagnosis Date Noted    Long-term insulin use in type 2 diabetes ([address] 04/12/2010    HTN (hypertension) 04/12/2010    Dyslipidemia 02/13/2013    NICM (nonischemic cardiomyopathy) ([address] 02/15/2013    Chronic systolic heart failure ([address] 07/18/2013    BA on CPAP 07/14/2014    Morbid obesity ([address] 07/14/2014    CAD in native artery 03/30/2016    Automatic implantable cardioverter-defibrillator in situ 2016    Ventricular tachycardia ([facility] 2016    Chronic right-sided thoracic back pain 2016    Chronic left-sided low back pain with sciatica 2016    Type 2 diabetes with nephropathy ([address] 2018    Restrictive lung disease 2020    Long term current use of amiodarone 2020    CKD (chronic kidney disease) stage 3, GFR 30-59 ml/min 2020     Resolved Ambulatory Problems     Diagnosis Date Noted    Cellulitis 2010    Tobacco use disorder 2010    Leukocytosis 2010    Fever 2010    NSTEMI (non-ST elevated myocardial infarction) ([facility] 2013    Elevated troponin 02/15/2013    Chest pain, unspecified 02/15/2013    Hypoxemia 2014    Atypical chest pain 2016    PATTI (acute kidney injury) ([facility] 2016    Abdominal wall hernia 10/02/2018    Incarcerated incisional hernia 2019    Acute hypoxemic respiratory failure ([address] 2019    ARDS (adult respiratory distress syndrome) ([facility] 2019    Pseudomonas urinary tract infection 2019    Chloride-responsive metabolic alkalosis     Hypokalemia 2019    Acute respiratory failure with hypoxia ([facility] 2020    COVID-19 virus not detected 2020    Nicotine dependence 2020     Past Medical History:   Diagnosis Date    Allergic rhinitis     Arthritis     CKD (chronic kidney disease)     Diabetes ([address]     Diabetes mellitus ([facility] 2010    Eczema     Fibromyalgia     GERD (gastroesophageal reflux disease)     Headache     Heart failure (HCC)     Hypercholesterolemia     Neuropathy     Obesity     Obstructive sleep apnea (adult) (pediatric) 2014    Sciatic pain 2016    Severe persistent asthma with acute exacerbation 2020    SVT (supraventricular tachycardia) ([facility])      Past Surgical History:   Procedure Laterality Date    HX  SECTION      x1    HX HERNIA REPAIR  03/26/2019    mild incarceration, no bowel removal    HX HYSTERECTOMY      YEIMI-Left ovary intact per pt    HX IMPLANTABLE CARDIOVERTER DEFIBRILLATOR  07/18/2013    Biotronik dual chamber ICD by Dr. Mariellen Barthel     [address] [facility] [address]  07/18/2013    Biotronik dual chamber ICD by Dr. Mirta Mendoza Right     HX SVT ABLATION  \"years ago\"    HX TONSILLECTOMY      AR LEFT HEART CATH,PERCUTANEOUS  2/13/2013    no intervention     Social History     Socioeconomic History    Marital status: SINGLE     Spouse name: Not on file    Number of children: Not on file    Years of education: Not on file    Highest education level: Not on file   Occupational History    Not on file   Social Needs    Financial resource strain: Not on file    Food insecurity     Worry: Not on file     Inability: Not on file    Transportation needs     Medical: Not on file     Non-medical: Not on file   Tobacco Use    Smoking status: Current Every Day Smoker     Packs/day: 0.25     Years: 27.00     Pack years: 6.75    Smokeless tobacco: Never Used    Tobacco comment: \"every now and then\"   Substance and Sexual Activity    Alcohol use:  Yes     Alcohol/week: 1.0 standard drinks     Types: 1 Glasses of wine per week     Comment: occas    Drug use: Yes     Types: Marijuana     Comment: every day    Sexual activity: Never   Lifestyle    Physical activity     Days per week: Not on file     Minutes per session: Not on file    Stress: Not on file   Relationships    Social connections     Talks on phone: Not on file     Gets together: Not on file     Attends [employer] service: Not on file     Active member of club or organization: Not on file     Attends meetings of clubs or organizations: Not on file     Relationship status: Not on file    Intimate partner violence     Fear of current or ex partner: Not on file     Emotionally abused: Not on file     Physically abused: Not on file     Forced sexual activity: Not on file   Other Topics Concern     Service Not Asked    Blood Transfusions Not Asked    Caffeine Concern Not Asked    Occupational Exposure Not Asked    Hobby Hazards Not Asked    Sleep Concern Not Asked    Stress Concern Not Asked    Weight Concern Not Asked    Special Diet Not Asked    Back Care Not Asked    Exercise Not Asked    Bike Helmet Not Asked   [address] Yes    Self-Exams Not Asked   Social History Narrative    Denies physical or sexual abuse     Family History   Problem Relation Age of Onset    Heart Attack Father 48        mi    Stroke Father     Hypertension Father     Heart Disease Father     Diabetes Other     Stroke Mother     Diabetes Brother     Heart Disease Brother     Hypertension Brother     Breast Cancer Sister 37     Allergies   Allergen Reactions    Grass Pollen Hives and Shortness of Breath     \"inhaler PRN\"       Current Facility-Administered Medications   Medication Dose Route Frequency    spironolactone (ALDACTONE) tablet 25 mg  25 mg Oral DAILY    diclofenac (VOLTAREN) 1 % topical gel 2 g  2 g Topical BID    pantoprazole (PROTONIX) tablet 40 mg  40 mg Oral ACB    influenza vaccine 2020-21 (6 mos+)(PF) (FLUARIX/FLULAVAL/FLUZONE QUAD) injection 0.5 mL  0.5 mL IntraMUSCular PRIOR TO DISCHARGE    albuterol (PROVENTIL VENTOLIN) nebulizer solution 2.5 mg  2.5 mg Nebulization BID RT    methocarbamoL (ROBAXIN) tablet 500 mg  500 mg Oral BID PRN    polyethylene glycol (MIRALAX) packet 17 g  17 g Oral DAILY    senna (SENOKOT) tablet 8.6 mg  1 Tab Oral DAILY    insulin glargine (LANTUS) injection 25 Units  25 Units SubCUTAneous QHS    albuterol (PROVENTIL VENTOLIN) nebulizer solution 2.5 mg  2.5 mg Nebulization Q6H PRN    amiodarone (CORDARONE) tablet 200 mg  200 mg Oral BID    aspirin delayed-release tablet 81 mg  81 mg Oral DAILY    insulin lispro (HUMALOG) injection   SubCUTAneous AC&HS    carvediloL (COREG) tablet 6.25 mg 6.25 mg Oral BID WITH MEALS    isosorbide mononitrate ER (IMDUR) tablet 30 mg  30 mg Oral DAILY    magnesium oxide (MAG-OX) tablet 400 mg  400 mg Oral BID    nitroglycerin (NITROSTAT) tablet 0.4 mg  0.4 mg SubLINGual Q5MIN PRN    pravastatin (PRAVACHOL) tablet 80 mg  80 mg Oral QHS    sacubitriL-valsartan (ENTRESTO) 49-51 mg tablet 1 Tab  1 Tab Oral BID    bumetanide (BUMEX) injection 1 mg  1 mg IntraVENous BID    sodium chloride (NS) flush 5-40 mL  5-40 mL IntraVENous Q8H    sodium chloride (NS) flush 5-40 mL  5-40 mL IntraVENous PRN    acetaminophen (TYLENOL) tablet 650 mg  650 mg Oral Q4H PRN    HYDROcodone-acetaminophen (NORCO) 5-325 mg per tablet 1 Tab  1 Tab Oral Q4H PRN    piperacillin-tazobactam (ZOSYN) 3.375 g in 0.9% sodium chloride (MBP/ADV) 100 mL MBP  3.375 g IntraVENous Q8H         Objective:     Vitals:    11/12/20 0730 11/12/20 0930 11/12/20 0938 11/12/20 1302   BP:  119/66 120/72 (!) 101/54   Pulse:  63 61 70   Resp:   20 20   Temp:   98.1 °F (36.7 °C) 98 °F (36.7 °C)   SpO2: 96%  95% 98%   Weight:       Height:           PHYSICAL EXAM     Constitutional:  the patient is well developed and in no acute distress  HEENT:  Sclera clear, pupils equal, oral mucosa moist  Lungs: clear but overall decreased. Wearing 2 liter cannula  Cardiovascular:  RRR without M,G,R  Abd/GI: soft and non-tender; with positive bowel sounds. Ext: warm without cyanosis. There is 1+ lower leg edema. Skin:  no jaundice or rashes, no wounds   Neuro: no gross neuro deficits. Alert and oriented  Musculoskeletal: moves all four extremities. No deformities. Psychiatric: calm.  Does not appear anxious or depressed    Chest X-ray:        Recent Labs     11/10/20  0429   WBC 5.9   HGB 12.3   HCT 38.8        Recent Labs     11/12/20  0349 11/11/20  0306 11/10/20  0505 11/10/20  0429    141  --  142   K 4.0 3.8  --  4.0   * 106  --  107   GLU 96 101*  --  151*   CO2 30 29  --  27   BUN 22 19  --  19 CREA 1.38* 1.34*  --  1.40*   MG 2.6* 2.6* 2.2  --    CA 8.5 8.8  --  9.3   ALB  --   --   --  3.5       SPIROMETRY: 2020  FVC 1.25  L (49% predicted). FEV1   1.04  L (49% predicted). FEV1/FVC       83%. FEF 25-75       1.14  L (47% predicted). Assessment:  (Medical Decision Making)     Hospital Problems  Date Reviewed: 10/28/2020          Codes Class Noted POA    Pleural effusion on right ICD-10-CM: J90  ICD-9-CM: 511.9  11/12/2020 Yes        Lower abdominal pain ICD-10-CM: R10.30  ICD-9-CM: 789.09  11/10/2020 Yes        * (Principal) Acute on chronic HFrEF (heart failure with reduced ejection fraction) ([facility] ICD-10-CM: I50.23  ICD-9-CM: 428.23  11/10/2020 Yes        CHF (congestive heart failure) (HCC) ICD-10-CM: I50.9  ICD-9-CM: 428.0  11/10/2020 Yes        CKD (chronic kidney disease) stage 3, GFR 30-59 ml/min (Chronic) ICD-10-CM: N18.30  ICD-9-CM: 585.3  8/24/2020 Yes        Restrictive lung disease (Chronic) ICD-10-CM: J98.4  ICD-9-CM: 518.89  4/8/2020 Yes        Type 2 diabetes with nephropathy (HCC) (Chronic) ICD-10-CM: E11.21  ICD-9-CM: 250.40, 583.81  4/2/2018 Yes        Ventricular tachycardia ([facility] ICD-10-CM: I47.2  ICD-9-CM: 427.1  4/18/2016 Yes        BA on CPAP (Chronic) ICD-10-CM: G47.33, Z99.89  ICD-9-CM: 327.23, V46.8  7/14/2014 Yes        Morbid obesity (HCC) (Chronic) ICD-10-CM: E66.01  ICD-9-CM: 278.01  7/14/2014 Yes        NICM (nonischemic cardiomyopathy) (HCC) (Chronic) ICD-10-CM: I42.8  ICD-9-CM: 425.4  2/15/2013 Yes        HTN (hypertension) (Chronic) ICD-10-CM: I10  ICD-9-CM: 401.9  4/12/2010 Yes              Plan:  (Medical Decision Making)   1. Admitted with worsening dyspnea. CXR with edema and questionable right effusion. On IV Bumex with daily Aldactone and negative 3.4 liters. EF 30 to 35% by history. Chronic kidney disease stable. Can US and tap if needed. 2. BID Albuterol here and prn at home for history of ? Small airway disease per PFTs. No wheezing on exam  3. CPAP with sleep - compliant with (12 cm). Followed in sleep center  4. Zosyn for diverticulitis - hospitalist following    Addison Olivares NP    I have spoken with and examined the patient. I agree with the above assessment and plan as documented. Lungs:decreased in bases  Heart:  RRR with no Murmur/Rubs/Gallops  Abd: NTND  Ext: no edema    --will ultrasound today to assess for pleural effusions amenable to thoracentesis. --reports she feels \"tight\" and will adjust bronchodilators.     Maria D Ugarte MD      More than 50% of time documented was spent face-to-face contact with the patient and in the care of the patient on the floor/unit where the patient is located

## 2020-11-12 NOTE — PROGRESS NOTES
Pt was admitted on 11/10/2020 due to abdominal discomfort. A CT scan of the abdomen reported diverticulitis. CM will verify if pt will require home O2, still on 2L. CM will continue to follow. Care Management Interventions  PCP Verified by CM: Yes  Mode of Transport at Discharge:  Other (see comment)(Family)  Transition of Care Consult (CM Consult): Discharge Planning  Current Support Network: Family Lives [place], Own Home, Lives Alone  Confirm Follow Up Transport: Family  The Plan for Transition of Care is Related to the Following Treatment Goals : Return to her baseline  Discharge Location  Discharge Placement: Home

## 2020-11-12 NOTE — PROGRESS NOTES
Problem: Falls - Risk of  Goal: *Absence of Falls  Description: Document Marcia Tam Fall Risk and appropriate interventions in the flowsheet.   Outcome: Progressing Towards Goal  Note: Fall Risk Interventions:            Medication Interventions: Evaluate medications/consider consulting pharmacy, Patient to call before getting OOB, Teach patient to arise slowly

## 2020-11-12 NOTE — ROUTINE PROCESS
Bedside and Verbal shift change report given to [facility], [facility], RN (oncoming nurse) by self (offgoing nurse). Report included the following information SBAR, Kardex, Intake/Output, MAR, Recent Results

## 2020-11-12 NOTE — ROUTINE PROCESS
Verbal bedside report received from [address] [place] [address], RN. Assumed care of patient.

## 2020-11-12 NOTE — ROUTINE PROCESS
Verbal bedside report given to Bel Marcus oncoming RN. Patient's situation, background, assessment and recommendations provided. Opportunity for questions provided. Oncoming RN assumed care of patient.

## 2020-11-12 NOTE — ROUTINE PROCESS
Bedside and Verbal shift change report given to self (oncoming nurse) by William Aguila RN (offgoing nurse). Report included the following information SBAR, Kardex, ED Summary, Procedure Summary, Intake/Output, MAR, Recent Results

## 2020-11-12 NOTE — PROGRESS NOTES
Progress Note    Patient: Ashok Purcell MRN: [id number]  SSN: [id number]    YOB: 1965  Age: 60 y.o. Sex: female      Admit Date: 11/10/2020    LOS: 2 days     Subjective:   55F with pmhx of NICM/ NSVT s/p ICD, BA on CPAP, CKD, DM, HTN, obesity presented to ER with report of weight gain and dyspnea. She has been admitted to the cardiology service. She is being diuresed for acute on chronic CHF. The hospitalist team was consulted on 11/10/2020 because the patient had abdominal discomfort. A CT scan of the abdomen reported diverticulitis. She was a started on IV Zosyn, her abdominal pain has improved. Her white blood cell count was never elevated. I think is reasonable to switch her to oral antibiotics. Objective:     Vitals:    11/12/20 0930 11/12/20 0938 11/12/20 1302 11/12/20 1613   BP: 119/66 120/72 (!) 101/54    Pulse: 63 61 70    Resp:  20 20    Temp:  98.1 °F (36.7 °C) 98 °F (36.7 °C)    SpO2:  95% 98% 97%   Weight:       Height:            Intake and Output:  Current Shift: 11/12 0701 - 11/12 1900  In: 720 [P.O.:720]  Out: 1200 [Urine:1200]  Last three shifts: 11/10 1901 - 11/12 0700  In: 2010 [P.O.:2010]  Out: 4200 [Urine:4200]    Physical Exam:   GENERAL: alert, cooperative, uncooperative, appears stated age  EYE: conjunctivae/corneas clear. LYMPHATIC: Cervical, supraclavicular, and axillary nodes normal.   THROAT & NECK: normal and no erythema or exudates noted. LUNG: clear to auscultation bilaterally  HEART: regular rate and rhythm, S1, S2 normal, no murmur, click, rub or gallop  ABDOMEN: Mild to moderate abdominal tenderness  EXTREMITIES:  extremities normal, atraumatic, no cyanosis or edema  SKIN: Normal.  NEUROLOGIC: No focal deficits  PSYCHIATRIC: non focal    Lab/Data Review: All lab results for the last 24 hours reviewed.          Assessment:     Principal Problem:    Acute on chronic HFrEF (heart failure with reduced ejection fraction) ([address] (11/10/2020)    Active Problems:    HTN (hypertension) (4/12/2010)      NICM (nonischemic cardiomyopathy) ([address] (2/15/2013)      BA on CPAP (7/14/2014)      Morbid obesity ([address] (7/14/2014)      Ventricular tachycardia ([address] (4/18/2016)      Type 2 diabetes with nephropathy ([address] (4/2/2018)      Restrictive lung disease (4/8/2020)      CKD (chronic kidney disease) stage 3, GFR 30-59 ml/min (8/24/2020)      Lower abdominal pain (11/10/2020)      CHF (congestive heart failure) ([address] (11/10/2020)      Pleural effusion on right (11/12/2020)        Plan:     #Acute on chronic CHF exacerbation. Continue management as per cardiology recommendations    #Acute diverticulitis. She has received 2 days of intravenous Zosyn. I think is reasonable switching her to an oral antibiotic. #Moderate to severe back pain. Patient is already on Norco.  I have ordered topical diclofenac for her lower back. # DM type II: Blood sugar slightly on the low side.   Will decrease dose of insulin Lantus again today    Signed By: Shant Crooks MD     November 12, 2020

## 2020-11-13 LAB
ANION GAP SERPL CALC-SCNC: 4 MMOL/L (ref 7–16)
BASOPHILS # BLD: 0 K/UL (ref 0–0.2)
BASOPHILS NFR BLD: 0 % (ref 0–2)
BUN SERPL-MCNC: 23 MG/DL (ref 6–23)
CALCIUM SERPL-MCNC: 8.3 MG/DL (ref 8.3–10.4)
CHLORIDE SERPL-SCNC: 108 MMOL/L (ref 98–107)
CO2 SERPL-SCNC: 30 MMOL/L (ref 21–32)
CREAT SERPL-MCNC: 1.39 MG/DL (ref 0.6–1)
DIFFERENTIAL METHOD BLD: ABNORMAL
EOSINOPHIL # BLD: 0.2 K/UL (ref 0–0.8)
EOSINOPHIL NFR BLD: 3 % (ref 0.5–7.8)
ERYTHROCYTE [DISTWIDTH] IN BLOOD BY AUTOMATED COUNT: 13.8 % (ref 11.9–14.6)
GLUCOSE BLD STRIP.AUTO-MCNC: 152 MG/DL (ref 65–100)
GLUCOSE BLD STRIP.AUTO-MCNC: 161 MG/DL (ref 65–100)
GLUCOSE BLD STRIP.AUTO-MCNC: 96 MG/DL (ref 65–100)
GLUCOSE BLD STRIP.AUTO-MCNC: 99 MG/DL (ref 65–100)
GLUCOSE SERPL-MCNC: 107 MG/DL (ref 65–100)
HCT VFR BLD AUTO: 36.9 % (ref 35.8–46.3)
HGB BLD-MCNC: 11.7 G/DL (ref 11.7–15.4)
IMM GRANULOCYTES # BLD AUTO: 0 K/UL (ref 0–0.5)
IMM GRANULOCYTES NFR BLD AUTO: 0 % (ref 0–5)
LYMPHOCYTES # BLD: 2 K/UL (ref 0.5–4.6)
LYMPHOCYTES NFR BLD: 34 % (ref 13–44)
MAGNESIUM SERPL-MCNC: 2.7 MG/DL (ref 1.8–2.4)
MCH RBC QN AUTO: 31 PG (ref 26.1–32.9)
MCHC RBC AUTO-ENTMCNC: 31.7 G/DL (ref 31.4–35)
MCV RBC AUTO: 97.6 FL (ref 79.6–97.8)
MONOCYTES # BLD: 0.4 K/UL (ref 0.1–1.3)
MONOCYTES NFR BLD: 8 % (ref 4–12)
NEUTS SEG # BLD: 3.2 K/UL (ref 1.7–8.2)
NEUTS SEG NFR BLD: 55 % (ref 43–78)
NRBC # BLD: 0 K/UL (ref 0–0.2)
PLATELET # BLD AUTO: 161 K/UL (ref 150–450)
PMV BLD AUTO: 11.3 FL (ref 9.4–12.3)
POTASSIUM SERPL-SCNC: 4.4 MMOL/L (ref 3.5–5.1)
RBC # BLD AUTO: 3.78 M/UL (ref 4.05–5.2)
SODIUM SERPL-SCNC: 142 MMOL/L (ref 136–145)
WBC # BLD AUTO: 5.9 K/UL (ref 4.3–11.1)

## 2020-11-13 PROCEDURE — 74011250637 HC RX REV CODE- 250/637: Performed by: NURSE PRACTITIONER

## 2020-11-13 PROCEDURE — 74011250637 HC RX REV CODE- 250/637: Performed by: INTERNAL MEDICINE

## 2020-11-13 PROCEDURE — 36415 COLL VENOUS BLD VENIPUNCTURE: CPT

## 2020-11-13 PROCEDURE — 94660 CPAP INITIATION&MGMT: CPT

## 2020-11-13 PROCEDURE — 83735 ASSAY OF MAGNESIUM: CPT

## 2020-11-13 PROCEDURE — 99232 SBSQ HOSP IP/OBS MODERATE 35: CPT | Performed by: INTERNAL MEDICINE

## 2020-11-13 PROCEDURE — 85025 COMPLETE CBC W/AUTO DIFF WBC: CPT

## 2020-11-13 PROCEDURE — 65660000000 HC RM CCU STEPDOWN

## 2020-11-13 PROCEDURE — 74011000250 HC RX REV CODE- 250: Performed by: NURSE PRACTITIONER

## 2020-11-13 PROCEDURE — 80048 BASIC METABOLIC PNL TOTAL CA: CPT

## 2020-11-13 PROCEDURE — 5A09357 ASSISTANCE WITH RESPIRATORY VENTILATION, LESS THAN 24 CONSECUTIVE HOURS, CONTINUOUS POSITIVE AIRWAY PRESSURE: ICD-10-PCS | Performed by: INTERNAL MEDICINE

## 2020-11-13 PROCEDURE — 82962 GLUCOSE BLOOD TEST: CPT

## 2020-11-13 PROCEDURE — 74011636637 HC RX REV CODE- 636/637: Performed by: INTERNAL MEDICINE

## 2020-11-13 RX ORDER — TRAMADOL HYDROCHLORIDE 50 MG/1
50 TABLET ORAL
Status: DISCONTINUED | OUTPATIENT
Start: 2020-11-13 | End: 2020-11-14 | Stop reason: HOSPADM

## 2020-11-13 RX ORDER — TORSEMIDE 20 MG/1
20 TABLET ORAL 2 TIMES DAILY
Status: DISCONTINUED | OUTPATIENT
Start: 2020-11-13 | End: 2020-11-14 | Stop reason: HOSPADM

## 2020-11-13 RX ORDER — INSULIN GLARGINE 100 [IU]/ML
10 INJECTION, SOLUTION SUBCUTANEOUS
Status: DISCONTINUED | OUTPATIENT
Start: 2020-11-13 | End: 2020-11-14 | Stop reason: HOSPADM

## 2020-11-13 RX ORDER — INSULIN LISPRO 100 [IU]/ML
INJECTION, SOLUTION INTRAVENOUS; SUBCUTANEOUS
Status: DISCONTINUED | OUTPATIENT
Start: 2020-11-13 | End: 2020-11-14 | Stop reason: HOSPADM

## 2020-11-13 RX ADMIN — Medication 400 MG: at 08:15

## 2020-11-13 RX ADMIN — SACUBITRIL AND VALSARTAN 1 TABLET: 49; 51 TABLET, FILM COATED ORAL at 17:18

## 2020-11-13 RX ADMIN — Medication 400 MG: at 17:18

## 2020-11-13 RX ADMIN — ISOSORBIDE MONONITRATE 30 MG: 30 TABLET, EXTENDED RELEASE ORAL at 08:15

## 2020-11-13 RX ADMIN — PRAVASTATIN SODIUM 80 MG: 80 TABLET ORAL at 21:42

## 2020-11-13 RX ADMIN — Medication 10 ML: at 13:19

## 2020-11-13 RX ADMIN — BUMETANIDE 1 MG: 0.25 INJECTION INTRAMUSCULAR; INTRAVENOUS at 08:14

## 2020-11-13 RX ADMIN — SACUBITRIL AND VALSARTAN 1 TABLET: 49; 51 TABLET, FILM COATED ORAL at 08:20

## 2020-11-13 RX ADMIN — AMIODARONE HYDROCHLORIDE 200 MG: 200 TABLET ORAL at 17:18

## 2020-11-13 RX ADMIN — AMIODARONE HYDROCHLORIDE 200 MG: 200 TABLET ORAL at 08:15

## 2020-11-13 RX ADMIN — TORSEMIDE 20 MG: 20 TABLET ORAL at 10:16

## 2020-11-13 RX ADMIN — METHOCARBAMOL TABLETS 500 MG: 500 TABLET, COATED ORAL at 20:18

## 2020-11-13 RX ADMIN — TRAMADOL HYDROCHLORIDE 50 MG: 50 TABLET ORAL at 13:18

## 2020-11-13 RX ADMIN — Medication 10 ML: at 06:14

## 2020-11-13 RX ADMIN — ASPIRIN 81 MG: 81 TABLET ORAL at 08:15

## 2020-11-13 RX ADMIN — CARVEDILOL 6.25 MG: 6.25 TABLET, FILM COATED ORAL at 08:15

## 2020-11-13 RX ADMIN — PANTOPRAZOLE SODIUM 40 MG: 40 TABLET, DELAYED RELEASE ORAL at 08:15

## 2020-11-13 RX ADMIN — DICLOFENAC 2 G: 10 GEL TOPICAL at 09:00

## 2020-11-13 RX ADMIN — POLYETHYLENE GLYCOL 3350 17 G: 17 POWDER, FOR SOLUTION ORAL at 08:14

## 2020-11-13 RX ADMIN — CARVEDILOL 6.25 MG: 6.25 TABLET, FILM COATED ORAL at 17:18

## 2020-11-13 RX ADMIN — TORSEMIDE 20 MG: 20 TABLET ORAL at 17:18

## 2020-11-13 RX ADMIN — SPIRONOLACTONE 25 MG: 25 TABLET ORAL at 08:15

## 2020-11-13 RX ADMIN — INSULIN GLARGINE 10 UNITS: 100 INJECTION, SOLUTION SUBCUTANEOUS at 21:34

## 2020-11-13 RX ADMIN — Medication 10 ML: at 21:42

## 2020-11-13 RX ADMIN — AMOXICILLIN AND CLAVULANATE POTASSIUM 800 MG: 400; 57 POWDER, FOR SUSPENSION ORAL at 08:20

## 2020-11-13 RX ADMIN — SENNOSIDES 8.6 MG: 8.6 TABLET, FILM COATED ORAL at 08:15

## 2020-11-13 RX ADMIN — AMOXICILLIN AND CLAVULANATE POTASSIUM 800 MG: 400; 57 POWDER, FOR SUSPENSION ORAL at 22:08

## 2020-11-13 NOTE — ROUTINE PROCESS
Bedside and Verbal shift change report given to Milton Palmer RN (oncoming nurse) by self (offgoing nurse).  Report included the following information SBAR, Kardex, Intake/Output, MAR, Recent Results and Cardiac Rhythm SR.

## 2020-11-13 NOTE — PROGRESS NOTES
Progress Note    Patient: Henry Vizcarra MRN: [id number]  SSN: [id number]    YOB: 1965  Age: 60 y.o. Sex: female      Admit Date: 11/10/2020    LOS: 3 days     Subjective:   55F with pmhx of NICM/ NSVT s/p ICD, BA on CPAP, CKD, DM, HTN, obesity presented to ER with report of weight gain and dyspnea. She has been admitted to the cardiology service. She is being diuresed for acute on chronic CHF. The hospitalist team was consulted on 11/10/2020 because the patient had abdominal discomfort. A CT scan of the abdomen reported diverticulitis. She was a started on IV Zosyn, her abdominal pain has improved. Her white blood cell count was never elevated. She has been switched to oral Augmentin since 11/12/2020. Cardiology team considering discharging her home tomorrow. Objective:     Vitals:    11/13/20 0514 11/13/20 0814 11/13/20 1020 11/13/20 1255   BP: 109/61 139/65  (!) 112/51   Pulse: 67 69 77 60   Resp: 18 17  16   Temp: 98.1 °F (36.7 °C) 97.9 °F (36.6 °C)  97.7 °F (36.5 °C)   SpO2: 97% 97%  100%   Weight: 99.2 kg (218 lb 12.8 oz)      Height:            Intake and Output:  Current Shift: 11/13 0701 - 11/13 1900  In: 360 [P.O.:360]  Out: 1600 [Urine:1600]  Last three shifts: 11/11 1901 - 11/13 0700  In: 960 [P.O.:960]  Out: 1900 [Urine:1900]    Physical Exam:   GENERAL: alert, cooperative, uncooperative, appears stated age  EYE: conjunctivae/corneas clear. LYMPHATIC: Cervical, supraclavicular, and axillary nodes normal.   THROAT & NECK: normal and no erythema or exudates noted. LUNG: clear to auscultation bilaterally  HEART: regular rate and rhythm, S1, S2 normal, no murmur, click, rub or gallop  ABDOMEN: Mild to moderate abdominal tenderness  EXTREMITIES:  extremities normal, atraumatic, no cyanosis or edema  SKIN: Normal.  NEUROLOGIC: No focal deficits  PSYCHIATRIC: non focal    Lab/Data Review: All lab results for the last 24 hours reviewed.          Assessment: Principal Problem:    Acute on chronic HFrEF (heart failure with reduced ejection fraction) ([address] (11/10/2020)    Active Problems:    HTN (hypertension) (4/12/2010)      NICM (nonischemic cardiomyopathy) ([address] (2/15/2013)      BA on CPAP (7/14/2014)      Morbid obesity ([address] (7/14/2014)      Ventricular tachycardia ([address] (4/18/2016)      Type 2 diabetes with nephropathy ([address] (4/2/2018)      Restrictive lung disease (4/8/2020)      CKD (chronic kidney disease) stage 3, GFR 30-59 ml/min (8/24/2020)      Lower abdominal pain (11/10/2020)      CHF (congestive heart failure) ([address] (11/10/2020)      Pleural effusion on right (11/12/2020)        Plan:     #Acute on chronic CHF exacerbation. Continue management as per cardiology recommendations    #Acute diverticulitis. She completed 2 days of intravenous Zosyn. She has been started on oral Augmentin    #Moderate to severe back pain. Patient is already on Norco.  I have ordered topical diclofenac for her lower back. # DM type II: Blood sugar slightly on the low side.   Will decrease dose of insulin Lantus again today    Signed By: David Valdovinos MD     November 13, 2020

## 2020-11-13 NOTE — ROUTINE PROCESS
Bedside and Verbal shift change report given to Sakshi Jordan RN (oncoming nurse) by self Yamile daniels). Report included the following information SBAR, Kardex, Intake/Output, MAR, Recent Results

## 2020-11-13 NOTE — PROGRESS NOTES
[facility] CARDIOLOGY PROGRESS NOTE         11/13/2020 9:13 AM    Admit Date: 11/10/2020    Subjective:   States some dyspnea this AM, no edema, good UOP. No other complaints.      ROS:  Cardiovascular:  As noted above    Objective:      Vitals:    11/12/20 2055 11/13/20 0132 11/13/20 0514 11/13/20 0814   BP: 96/61 119/79 109/61 139/65   Pulse: 66 62 67 69   Resp: 19 19 18    Temp: 98.2 °F (36.8 °C) 98.4 °F (36.9 °C) 98.1 °F (36.7 °C)    SpO2: 98% 98% 97%    Weight:   218 lb 12.8 oz (99.2 kg)    Height:           Physical Exam:  General-No Acute Distress, tearful   Neck- supple, no JVD  CV- regular rate and rhythm no MRG  Lung- clear bilaterally without wheezes or crackles   Abd- soft, nontender, nondistended  Ext- no edema bilaterally in legs   Skin- warm and dry    Data Review:   Recent Labs     11/13/20  0326 11/12/20  0349    143   K 4.4 4.0   MG 2.7* 2.6*   BUN 23 22   CREA 1.39* 1.38*   * 96   WBC 5.9  --    HGB 11.7  --    HCT 36.9  --      --        Assessment/Plan:     Principal Problem:    Acute on chronic HFrEF (heart failure with reduced ejection fraction) (HCC) (11/10/2020)      NICM (nonischemic cardiomyopathy) ([facility] (2/15/2013)    - appears nearly euvolemic    - change to PO diuretics    - Renal function stable    - not on supplemental oxygen    - CHF meds: Coreg, Imdur    Active Problems:    HTN (hypertension) (4/12/2010)    - controlled      BA on CPAP (7/14/2014)    - PPV as needed      Morbid obesity ([facility] (7/14/2014)    - continue efforts towards dietary changes in an effort to lose weight      Ventricular tachycardia (HCC) (4/18/2016)    - stable electrically and hemodynamically    - on amio      Type 2 diabetes with nephropathy (HCC) (4/2/2018)    - SSI , lantus while inpatient       Restrictive lung disease (4/8/2020)    - per pulm      CKD (chronic kidney disease) stage 3, GFR 30-59 ml/min (8/24/2020)    - stable Cr      Lower abdominal pain (11/10/2020)    - on ABX per medicine       Pleural effusion on right (11/12/2020)    - not seen on US     Change to PO diuretics, if stable today , DC tomorrow.      Manuel Hassan,   11/13/2020 9:13 AM

## 2020-11-13 NOTE — ROUTINE PROCESS
Bedside and Verbal shift change report given to self (oncoming nurse) by Jerome Thomas RN (offgoing nurse). Report included the following information SBAR, Kardex, ED Summary, Procedure Summary, Intake/Output, MAR, Recent Results

## 2020-11-13 NOTE — ROUTINE PROCESS
Bedside and Verbal shift change report given to self (oncoming nurse) by [address], RN (offgoing nurse). Report included the following information SBAR, Kardex, ED Summary, Intake/Output, MAR, Recent Results and Cardiac Rhythm SR. Pt resting in bed; complaining of abd pain.

## 2020-11-13 NOTE — PROGRESS NOTES
Silvina Gomes  Admission Date: 11/10/2020             Daily Progress Note: 11/13/2020    The patient's chart is reviewed and the patient is discussed with the staff. Pt is a 59 [facility] female with a history of BA on CPAP, restrictive lung disease, small airways disease, prior tobacco abuse, CKD, chronic systolic CHF,HTN, NICM s/p BiVICD,prior VT on amio, DM2, and diverticulitis. Pt [facility] the ER at [facility] on 11/10/2020 with shortness of breath. Pt was transferred downtown for admission for acute on chronic CHF by cardiology. The hospitalist were consulted secondary to abd pain. Pt had a CT abd with evidence of diverticulitis and she was started on Zosyn. She continued to have dyspnea and we were consulted after CXR was concerning for R pleural effusion. Pt had a bedside ultrasound without evidence of pleural effusion. She was then changed to Augmentin with last dose 11/17. Subjective:     Pt sitting on the side of the bed on RA. Pt's sat 98%. Pt admits to coughing up beige sputum. She complains of wheezing and feeling short of breath. Her abd pain is better.      Current Facility-Administered Medications   Medication Dose Route Frequency    torsemide (DEMADEX) tablet 20 mg  20 mg Oral BID    traMADoL (ULTRAM) tablet 50 mg  50 mg Oral Q6H PRN    insulin glargine (LANTUS) injection 10 Units  10 Units SubCUTAneous QHS    insulin lispro (HUMALOG) injection   SubCUTAneous TIDAC    spironolactone (ALDACTONE) tablet 25 mg  25 mg Oral DAILY    diclofenac (VOLTAREN) 1 % topical gel 2 g  2 g Topical BID    pantoprazole (PROTONIX) tablet 40 mg  40 mg Oral ACB    amoxicillin-clavulanate (AUGMENTIN) 400-57 mg/5 mL oral suspension 800 mg  800 mg Oral Q12H    influenza vaccine 2020-21 (6 mos+)(PF) (FLUARIX/FLULAVAL/FLUZONE QUAD) injection 0.5 mL  0.5 mL IntraMUSCular PRIOR TO DISCHARGE    methocarbamoL (ROBAXIN) tablet 500 mg  500 mg Oral BID PRN    polyethylene glycol (MIRALAX) packet 17 g  17 g Oral DAILY    senna (SENOKOT) tablet 8.6 mg  1 Tab Oral DAILY    albuterol (PROVENTIL VENTOLIN) nebulizer solution 2.5 mg  2.5 mg Nebulization Q6H PRN    amiodarone (CORDARONE) tablet 200 mg  200 mg Oral BID    aspirin delayed-release tablet 81 mg  81 mg Oral DAILY    carvediloL (COREG) tablet 6.25 mg  6.25 mg Oral BID WITH MEALS    isosorbide mononitrate ER (IMDUR) tablet 30 mg  30 mg Oral DAILY    magnesium oxide (MAG-OX) tablet 400 mg  400 mg Oral BID    nitroglycerin (NITROSTAT) tablet 0.4 mg  0.4 mg SubLINGual Q5MIN PRN    pravastatin (PRAVACHOL) tablet 80 mg  80 mg Oral QHS    sacubitriL-valsartan (ENTRESTO) 49-51 mg tablet 1 Tab  1 Tab Oral BID    sodium chloride (NS) flush 5-40 mL  5-40 mL IntraVENous Q8H    sodium chloride (NS) flush 5-40 mL  5-40 mL IntraVENous PRN    acetaminophen (TYLENOL) tablet 650 mg  650 mg Oral Q4H PRN     Review of Systems  +cough  +wheezing  Constitutional: negative for fever, chills, sweats  Cardiovascular: negative for chest pain, palpitations, syncope, edema  Gastrointestinal:  negative for dysphagia, reflux, vomiting, diarrhea, abdominal pain, or melena  Neurologic:  negative for focal weakness, numbness, headache    Objective:     Vitals:    11/13/20 0132 11/13/20 0514 11/13/20 0814 11/13/20 1020   BP: 119/79 109/61 139/65    Pulse: 62 67 69 77   Resp: 19 18 17    Temp: 98.4 °F (36.9 °C) 98.1 °F (36.7 °C) 97.9 °F (36.6 °C)    SpO2: 98% 97% 97%    Weight:  218 lb 12.8 oz (99.2 kg)     Height:           Intake/Output Summary (Last 24 hours) at 11/13/2020 1310  Last data filed at 11/13/2020 1214  Gross per 24 hour   Intake 600 ml   Output 2800 ml   Net -2200 ml     Physical Exam:   Constitution:  the patient is well developed and in no acute distress, on RA-98%  EENMT:  Sclera clear, pupils equal, oral mucosa moist  Respiratory: distant breath sounds, no wheezing  Cardiovascular:  RRR without M,G,R  Gastrointestinal: soft and non-tender; with positive bowel sounds. Musculoskeletal: warm without cyanosis. There is trace lower extremity edema. Skin:  no jaundice or rashes  Neurologic: no gross neuro deficits     Psychiatric:  alert and oriented x 3    CXR:       LAB  Recent Labs     11/13/20  1120 11/13/20  0606 11/12/20  2123 11/12/20  1626 11/12/20  1022   GLUCPOC 99 96 121* 86 151*      Recent Labs     11/13/20  0326   WBC 5.9   HGB 11.7   HCT 36.9        Recent Labs     11/13/20  0326 11/12/20  0349 11/11/20  0306    143 141   K 4.4 4.0 3.8   * 109* 106   CO2 30 30 29   * 96 101*   BUN 23 22 19   CREA 1.39* 1.38* 1.34*   MG 2.7* 2.6* 2.6*   CA 8.3 8.5 8.8     No results for input(s): PH, PCO2, PO2, HCO3, PHI, PCO2I, PO2I, HCO3I in the last 72 hours.   Recent Labs     11/10/20  2119   LAC 0.9         Assessment:  (Medical Decision Making)     Hospital Problems  Date Reviewed: 11/13/2020          Codes Class Noted POA    Pleural effusion on right ICD-10-CM: J90  ICD-9-CM: 511.9  11/12/2020 Yes    No effusion seen on ultrasound evaluation     Lower abdominal pain ICD-10-CM: R10.30  ICD-9-CM: 789.09  11/10/2020 Yes    Continue augmentin     * (Principal) Acute on chronic HFrEF (heart failure with reduced ejection fraction) ([facility] ICD-10-CM: I50.23  ICD-9-CM: 428.23  11/10/2020 Yes    Per cardiology, on demadex     CHF (congestive heart failure) ([facility] ICD-10-CM: I50.9  ICD-9-CM: 428.0  11/10/2020 Yes        CKD (chronic kidney disease) stage 3, GFR 30-59 ml/min (Chronic) ICD-10-CM: N18.30  ICD-9-CM: 585.3  8/24/2020 Yes    Monitor     Restrictive lung disease (Chronic) ICD-10-CM: J98.4  ICD-9-CM: 518.89  4/8/2020 Yes    Chronic     Type 2 diabetes with nephropathy (HCC) (Chronic) ICD-10-CM: E11.21  ICD-9-CM: 250.40, 583.81  4/2/2018 Yes    SSI    Ventricular tachycardia ([facility] ICD-10-CM: I47.2  ICD-9-CM: 427.1  4/18/2016 Yes    On amio    BA on CPAP (Chronic) ICD-10-CM: G47.33, Z99.89  ICD-9-CM: 327.23, V46.8  7/14/2014 Yes Pt doesn't have home CPAP with her, I will order a hospital CPAP to use tonight     Morbid obesity ([facility] (Chronic) ICD-10-CM: E66.01  ICD-9-CM: 278.01  7/14/2014 Yes    BMI 44    NICM (nonischemic cardiomyopathy) (HCC) (Chronic) ICD-10-CM: I42.8  ICD-9-CM: 425.4  2/15/2013 Yes        HTN (hypertension) (Chronic) ICD-10-CM: I10  ICD-9-CM: 401.9  4/12/2010 Yes    Controlled           Plan:  (Medical Decision Making)     --on RA  --hospital CPAP will be ordered for tonight  --continue augmentin through 11/17  --continue demadex, no effusion seen on bedside ultrasound 11/12  --hopefully home soon    More than 50% of the time documented was spent in face-to-face contact with the patient and in the care of the patient on the floor/unit where the patient is located. SARIKA Crenshaw    Lungs:  Clear, no wheezing, RA  Heart:  RRR with no Murmur/Rubs/Gallops    Additional Comments:  No fluid for thoracentesis. Continue diuresis for sHF, EF 30%. Discussed weight loss and exercise as well. On RA, will sign off. Use CPAP at night, uses home machine. I have spoken with and examined the patient. I agree with the above assessment and plan as documented.     Micheline Osuna MD

## 2020-11-13 NOTE — PROCEDURES
PROCEDURE:  DIAGNOSTIC ULTRASOUND OF CHEST    DIAGNOSIS:  BILATERALPLEURAL EFFUSION    CHEST ULTRASOUND FINDINGS:    A [employer] Dynam[employer] ultrasound was used to image the chest and localize the pleural effusion on the bilateral chest.  No pleural effusions were present.       IMAGE:  Imported MEDIA tab under CHART REVIEW      Chani Christensen MD

## 2020-11-14 VITALS
BODY MASS INDEX: 43.48 KG/M2 | HEIGHT: 59 IN | TEMPERATURE: 97.4 F | HEART RATE: 69 BPM | RESPIRATION RATE: 18 BRPM | WEIGHT: 215.7 LBS | SYSTOLIC BLOOD PRESSURE: 116 MMHG | OXYGEN SATURATION: 96 % | DIASTOLIC BLOOD PRESSURE: 62 MMHG

## 2020-11-14 LAB
ANION GAP SERPL CALC-SCNC: 4 MMOL/L (ref 7–16)
BUN SERPL-MCNC: 27 MG/DL (ref 6–23)
CALCIUM SERPL-MCNC: 8.3 MG/DL (ref 8.3–10.4)
CHLORIDE SERPL-SCNC: 107 MMOL/L (ref 98–107)
CO2 SERPL-SCNC: 30 MMOL/L (ref 21–32)
CREAT SERPL-MCNC: 1.4 MG/DL (ref 0.6–1)
GLUCOSE BLD STRIP.AUTO-MCNC: 134 MG/DL (ref 65–100)
GLUCOSE BLD STRIP.AUTO-MCNC: 85 MG/DL (ref 65–100)
GLUCOSE SERPL-MCNC: 101 MG/DL (ref 65–100)
MAGNESIUM SERPL-MCNC: 2.5 MG/DL (ref 1.8–2.4)
POTASSIUM SERPL-SCNC: 3.9 MMOL/L (ref 3.5–5.1)
SODIUM SERPL-SCNC: 141 MMOL/L (ref 136–145)

## 2020-11-14 PROCEDURE — 99238 HOSP IP/OBS DSCHRG MGMT 30/<: CPT | Performed by: INTERNAL MEDICINE

## 2020-11-14 PROCEDURE — 90471 IMMUNIZATION ADMIN: CPT

## 2020-11-14 PROCEDURE — 74011250637 HC RX REV CODE- 250/637: Performed by: INTERNAL MEDICINE

## 2020-11-14 PROCEDURE — 80048 BASIC METABOLIC PNL TOTAL CA: CPT

## 2020-11-14 PROCEDURE — 36415 COLL VENOUS BLD VENIPUNCTURE: CPT

## 2020-11-14 PROCEDURE — 74011250637 HC RX REV CODE- 250/637: Performed by: NURSE PRACTITIONER

## 2020-11-14 PROCEDURE — 83735 ASSAY OF MAGNESIUM: CPT

## 2020-11-14 PROCEDURE — 90686 IIV4 VACC NO PRSV 0.5 ML IM: CPT | Performed by: INTERNAL MEDICINE

## 2020-11-14 PROCEDURE — 82962 GLUCOSE BLOOD TEST: CPT

## 2020-11-14 PROCEDURE — 74011250636 HC RX REV CODE- 250/636: Performed by: INTERNAL MEDICINE

## 2020-11-14 RX ORDER — METFORMIN HYDROCHLORIDE 500 MG/1
500 TABLET ORAL 2 TIMES DAILY WITH MEALS
Qty: 60 TAB | Refills: 1 | Status: SHIPPED | OUTPATIENT
Start: 2020-11-14 | End: 2020-11-25 | Stop reason: ALTCHOICE

## 2020-11-14 RX ORDER — SPIRONOLACTONE 25 MG/1
25 TABLET ORAL DAILY
Qty: 30 TAB | Refills: 5 | Status: SHIPPED | OUTPATIENT
Start: 2020-11-15 | End: 2021-03-16 | Stop reason: SDUPTHER

## 2020-11-14 RX ORDER — TRAMADOL HYDROCHLORIDE 50 MG/1
50 TABLET ORAL
Qty: 15 TAB | Refills: 0 | Status: SHIPPED | OUTPATIENT
Start: 2020-11-14 | End: 2020-11-19

## 2020-11-14 RX ORDER — AMOXICILLIN AND CLAVULANATE POTASSIUM 400; 57 MG/5ML; MG/5ML
800 POWDER, FOR SUSPENSION ORAL EVERY 12 HOURS
Qty: 140 ML | Refills: 0 | Status: SHIPPED
Start: 2020-11-14 | End: 2020-11-21

## 2020-11-14 RX ORDER — POLYETHYLENE GLYCOL 3350 17 G/17G
17 POWDER, FOR SOLUTION ORAL
Qty: 15 PACKET | Refills: 1 | Status: SHIPPED | OUTPATIENT
Start: 2020-11-14

## 2020-11-14 RX ORDER — AMOXICILLIN AND CLAVULANATE POTASSIUM 400; 57 MG/5ML; MG/5ML
800 POWDER, FOR SUSPENSION ORAL EVERY 12 HOURS
Qty: 140 ML | Refills: 0 | Status: SHIPPED | OUTPATIENT
Start: 2020-11-14 | End: 2020-11-14 | Stop reason: SDUPTHER

## 2020-11-14 RX ORDER — PANTOPRAZOLE SODIUM 40 MG/1
40 TABLET, DELAYED RELEASE ORAL
Qty: 30 TAB | Refills: 1 | Status: SHIPPED | OUTPATIENT
Start: 2020-11-15 | End: 2021-03-16

## 2020-11-14 RX ORDER — ALBUTEROL SULFATE 90 UG/1
AEROSOL, METERED RESPIRATORY (INHALATION)
Qty: 1 INHALER | Refills: 0 | Status: SHIPPED | OUTPATIENT
Start: 2020-11-14

## 2020-11-14 RX ORDER — DICLOFENAC SODIUM 10 MG/G
2 GEL TOPICAL
Qty: 20 G | Refills: 1 | Status: SHIPPED | OUTPATIENT
Start: 2020-11-14 | End: 2021-04-13

## 2020-11-14 RX ORDER — PANTOPRAZOLE SODIUM 40 MG/1
40 TABLET, DELAYED RELEASE ORAL
Qty: 30 TAB | Refills: 1 | Status: SHIPPED | OUTPATIENT
Start: 2020-11-15 | End: 2020-11-14 | Stop reason: SDUPTHER

## 2020-11-14 RX ORDER — SPIRONOLACTONE 25 MG/1
25 TABLET ORAL DAILY
Qty: 30 TAB | Refills: 5 | Status: SHIPPED | OUTPATIENT
Start: 2020-11-15 | End: 2020-11-14

## 2020-11-14 RX ORDER — INSULIN GLARGINE 100 [IU]/ML
10 INJECTION, SOLUTION SUBCUTANEOUS
Qty: 5 ADJUSTABLE DOSE PRE-FILLED PEN SYRINGE | Refills: 5 | Status: SHIPPED | OUTPATIENT
Start: 2020-11-14 | End: 2021-03-16

## 2020-11-14 RX ADMIN — Medication 10 ML: at 05:35

## 2020-11-14 RX ADMIN — ASPIRIN 81 MG: 81 TABLET ORAL at 08:00

## 2020-11-14 RX ADMIN — PANTOPRAZOLE SODIUM 40 MG: 40 TABLET, DELAYED RELEASE ORAL at 08:00

## 2020-11-14 RX ADMIN — SACUBITRIL AND VALSARTAN 1 TABLET: 49; 51 TABLET, FILM COATED ORAL at 08:00

## 2020-11-14 RX ADMIN — CARVEDILOL 6.25 MG: 6.25 TABLET, FILM COATED ORAL at 08:00

## 2020-11-14 RX ADMIN — Medication 400 MG: at 08:00

## 2020-11-14 RX ADMIN — SENNOSIDES 8.6 MG: 8.6 TABLET, FILM COATED ORAL at 08:00

## 2020-11-14 RX ADMIN — AMIODARONE HYDROCHLORIDE 200 MG: 200 TABLET ORAL at 08:00

## 2020-11-14 RX ADMIN — POLYETHYLENE GLYCOL 3350 17 G: 17 POWDER, FOR SOLUTION ORAL at 08:00

## 2020-11-14 RX ADMIN — INFLUENZA VIRUS VACCINE 0.5 ML: 15; 15; 15; 15 SUSPENSION INTRAMUSCULAR at 10:36

## 2020-11-14 RX ADMIN — SPIRONOLACTONE 25 MG: 25 TABLET ORAL at 08:00

## 2020-11-14 RX ADMIN — ISOSORBIDE MONONITRATE 30 MG: 30 TABLET, EXTENDED RELEASE ORAL at 08:00

## 2020-11-14 RX ADMIN — TORSEMIDE 20 MG: 20 TABLET ORAL at 08:00

## 2020-11-14 RX ADMIN — AMOXICILLIN AND CLAVULANATE POTASSIUM 800 MG: 400; 57 POWDER, FOR SUSPENSION ORAL at 10:26

## 2020-11-14 NOTE — DISCHARGE SUMMARY
[facility] Cardiology Discharge Summary     Patient ID:  Denae Harper  [id number]  60 y.o.  1965    Admit date: 11/10/2020    Discharge date:  11/14/20     Admitting Physician: Katherine Napier MD     Discharge Physician: Jerald Suero NP/Dr. Kirsty Patton    Admission Diagnoses: CHF (congestive heart failure) [facility]) [I50.9]    Discharge Diagnoses:    Diagnosis    Pleural effusion on right    Lower abdominal pain    Acute on chronic HFrEF (heart failure with reduced ejection fraction) ([address]    CHF (congestive heart failure) (HCC)    CKD (chronic kidney disease) stage 3, GFR 30-59 ml/min    Long term current use of amiodarone    Restrictive lung disease    Type 2 diabetes with nephropathy (HCC)    Chronic right-sided thoracic back pain    Chronic left-sided low back pain with sciatica    Ventricular tachycardia ([address]    CAD in native artery    Automatic implantable cardioverter-defibrillator in situ    BA on CPAP    Morbid obesity ([address]    Chronic systolic heart failure ([address]    NICM (nonischemic cardiomyopathy) ([address]    Dyslipidemia    Long-term insulin use in type 2 diabetes ([address]    HTN (hypertension)       Cardiology Procedures this admission:  None  Consults: Pulmonary/Intensive care and [facility] Course: Patient presented to the emergency department of [facility] with complaints of progressive shortness of breath and lower extremity edema and weight gain. The patient was also noted to have ABD pain as well. Patient was evaluated and subsequently admitted for further cardiac evaluation and treatment. The patient was started on IV Lasix for diuresis. She diuresed well and felt better with -7.4 L removed this admission. Hospital medicine saw the patient for reports of ABD pain and Diverticulitis found on CT and she was started on IV ABX. With decreased appetite the patient Insulin was adjusted this admission and will be managed by her PCP after discharge. Pulmonology was consulted due to pleural effusion seen by CXR with US of the area showing no space large enough to drain at this time. The patient used CPAP in the hospital and has a CPAP at home. The morning of 11/14/20 patient was up feeling well without any complaints shortness of breath. LE edema was much improved. Patient's labs were stable with creatinine of 1.4 and stable. Patient was seen and examined by Dr. Ryann Chandra and determined stable and ready for discharge. Patient was instructed on the importance of medication compliance, low sodium diet, 2 liter per day fluid restriction and daily weights. For maximized medical therapy of congestive heart failure, patient will continue use of ARNI, Beta Blocker and Diuretic. The patient will also be placed on Aldactone  The patient will have close transitional care follow up with [facility] Cardiology Dr. Iliana Pierson for a TC7 appointment. DISPOSITION: The patient is being discharged home in stable condition on a low saturated fat, low cholesterol and low salt diet. The patient is instructed to advance activities as tolerated to the limit of fatigue or shortness of breath. The patient is informed to monitor daily weights and maintain a 2 liter per day fluid restriction. The patient is instructed to call the office for any shortness of breath, weight gain, or increased peripheral edema. Discharge Exam:   Visit Vitals  /62 (BP 1 Location: Left arm, BP Patient Position: At rest;Sitting)   Pulse 69   Temp 97.4 °F (36.3 °C)   Resp 18   Ht 4' 11\" (1.499 m)   Wt 97.8 kg (215 lb 11.2 oz)   SpO2 96%   BMI 43.57 kg/m²     Patient has been seen by Dr. Ryann Chandra: see his progress note for exam details.     Recent Results (from the past 24 hour(s))   GLUCOSE, POC    Collection Time: 11/13/20 11:20 AM   Result Value Ref Range    Glucose (POC) 99 65 - 100 mg/dL   GLUCOSE, POC    Collection Time: 11/13/20  4:07 PM   Result Value Ref Range    Glucose (POC) 152 (H) 65 - 100 mg/dL   GLUCOSE, POC    Collection Time: 11/13/20  9:32 PM   Result Value Ref Range    Glucose (POC) 161 (H) 65 - 100 mg/dL   MAGNESIUM    Collection Time: 11/14/20  3:32 AM   Result Value Ref Range    Magnesium 2.5 (H) 1.8 - 2.4 mg/dL   METABOLIC PANEL, BASIC    Collection Time: 11/14/20  3:32 AM   Result Value Ref Range    Sodium 141 136 - 145 mmol/L    Potassium 3.9 3.5 - 5.1 mmol/L    Chloride 107 98 - 107 mmol/L    CO2 30 21 - 32 mmol/L    Anion gap 4 (L) 7 - 16 mmol/L    Glucose 101 (H) 65 - 100 mg/dL    BUN 27 (H) 6 - 23 MG/DL    Creatinine 1.40 (H) 0.6 - 1.0 MG/DL    GFR est AA 50 (L) >60 ml/min/1.73m2    GFR est non-AA 41 (L) >60 ml/min/1.73m2    Calcium 8.3 8.3 - 10.4 MG/DL   GLUCOSE, POC    Collection Time: 11/14/20  6:42 AM   Result Value Ref Range    Glucose (POC) 85 65 - 100 mg/dL         Patient Instructions:     Current Discharge Medication List      START taking these medications    Details   amoxicillin-clavulanate (AUGMENTIN) 400-57 mg/5 mL suspension Take 10 mL by mouth every twelve (12) hours for 7 days. Qty: 140 mL, Refills: 0      traMADoL (ULTRAM) 50 mg tablet Take 1 Tab by mouth every eight (8) hours as needed for Pain for up to 5 days. Max Daily Amount: 150 mg.  Qty: 15 Tab, Refills: 0    Associated Diagnoses: Morbid obesity (HCC)      polyethylene glycol (MIRALAX) 17 gram packet Take 1 Packet by mouth daily as needed for Constipation. Qty: 15 Packet, Refills: 1      diclofenac (VOLTAREN) 1 % gel Apply 2 g to affected area three (3) times daily as needed for Pain (lower back pain). Qty: 20 g, Refills: 1      pantoprazole (PROTONIX) 40 mg tablet Take 1 Tab by mouth Daily (before breakfast). Qty: 30 Tab, Refills: 1      spironolactone (ALDACTONE) 25 mg tablet Take 1 Tab by mouth daily. Qty: 30 Tab, Refills: 5         CONTINUE these medications which have CHANGED    Details   metFORMIN (GLUCOPHAGE) 500 mg tablet Take 1 Tab by mouth two (2) times daily (with meals).   Qty: 60 Tab, Refills: 1    Associated Diagnoses: Type 2 diabetes mellitus with diabetic dermatitis, with long-term current use of insulin ([facility])      albuterol (PROVENTIL HFA, VENTOLIN HFA, PROAIR HFA) 90 mcg/actuation inhaler 2 puffs qid prn shortness of breath or wheezing  Qty: 1 Inhaler, Refills: 0      insulin glargine (Lantus Solostar U-100 Insulin) 100 unit/mL (3 mL) inpn 10 Units by SubCUTAneous route nightly. Qty: 5 Adjustable Dose Pre-filled Pen Syringe, Refills: 5    Associated Diagnoses: Diabetes mellitus without complication ([facility]         CONTINUE these medications which have NOT CHANGED    Details   torsemide (DEMADEX) 20 mg tablet Take 1 Tab by mouth two (2) times a day. Qty: 60 Tab, Refills: 11      Dupixent Syringe 300 mg/2 mL syrg syringe 2 mL by SubCUTAneous route every fourteen (14) days. Qty: 2 Syringe, Refills: 5    Associated Diagnoses: Severe persistent asthma with acute exacerbation      magnesium oxide (MAG-OX) 400 mg tablet Take 1 Tab by mouth two (2) times a day. Qty: 60 Tab, Refills: 11      Insulin Needles, Disposable, (Nery Pen Needle) 32 gauge x 5/32\" ndle 1 Each by Does Not Apply route daily as needed (insulin injection for diabetes 2). E11.9  Qty: 100 Pen Needle, Refills: 3      amiodarone (CORDARONE) 200 mg tablet Take 1 Tab by mouth two (2) times a day. Qty: 60 Tab, Refills: 11      sacubitril-valsartan (ENTRESTO) 49 mg/51 mg tablet Take 1 Tab by mouth two (2) times a day. Qty: 60 Tab, Refills: 11      carvediloL (COREG) 6.25 mg tablet Take 1 Tab by mouth two (2) times daily (with meals). Qty: 60 Tab, Refills: 5      pravastatin (PRAVACHOL) 80 mg tablet Take 1 Tab by mouth nightly. Qty: 90 Tab, Refills: 3    Associated Diagnoses: Diabetes mellitus without complication ([facility])      isosorbide mononitrate ER (IMDUR) 30 mg tablet Take 1 Tab by mouth every morning.   Qty: 30 Tab, Refills: 11      nitroglycerin (NITROSTAT) 0.4 mg SL tablet 1 Tab by SubLINGual route every five (5) minutes as needed for Chest Pain. Up to 3 doses. Qty: 1 Bottle, Refills: 6      aspirin delayed-release 81 mg tablet Take  by mouth daily. glucose blood VI test strips (ONETOUCH ULTRA TEST) strip Check blood sugar daily, E11.9  Qty: 50 Strip, Refills: 11    Associated Diagnoses: Type 2 diabetes with nephropathy (HCC)      lancets (ONETOUCH SURESOFT LANCING DEV) misc Check blood sugar daily, E11.9  Qty: 50 Each, Refills: 11    Associated Diagnoses: Type 2 diabetes with nephropathy (HCC)      Blood-Glucose Meter (ONETOUCH ULTRA2 METER) monitoring kit Check blood sugar daily, E11.9  Qty: 1 Kit, Refills: 0    Associated Diagnoses: Type 2 diabetes with nephropathy (HCC)      cholecalciferol (VITAMIN D3) 1,000 unit cap Take  by mouth daily. cpap machine kit by Does Not Apply route.  11 cm               Signed:  DELROY Infante  11/14/2020  9:31 AM

## 2020-11-14 NOTE — PROGRESS NOTES
Patient placed on CPAP for use QHS. No distress or discomfort noted. Will continue to monitor. 11/13/20 2303   Oxygen Therapy   O2 Sat (%) 97 %   Pulse via Oximetry 70 beats per minute   O2 Device CPAP mask   O2 Flow Rate (L/min) 3 l/min   CPAP/BIPAP   CPAP/BIPAP Start/Stop On   Device Mode CPAP, auto-titrating   $$ CPAP Daily Yes   Mask Type and Size Full face; Medium   Skin Condition Intact   EPAP (cm H2O)   (12-15 autoset)   Leak (Estimated)   (good mask fit)   Pt's Home Machine No   Biomedical Check Performed Yes   Settings Verified Yes   Alarm Settings   Apnea 20   Low Ve 4

## 2020-11-14 NOTE — ROUTINE PROCESS
Discharge instructions reviewed with patient. Prescriptions given for spironolactone, augmentin, protonix, tramadol, cardiac rehab, Mag BMP, and med info sheets provided for all new medications. Opportunity for questions provided. Patient voiced understanding of all discharge instructions. IV removed. Heart monitor returned to monitor room.

## 2020-11-14 NOTE — ROUTINE PROCESS
Bedside and Verbal shift change report given to Kurt Nguyen RN (oncoming nurse) by self Cherelle daniels). Report included the following information SBAR, Kardex, Intake/Output, MAR, Recent Results

## 2020-11-14 NOTE — PROGRESS NOTES
[facility] CARDIOLOGY PROGRESS NOTE           11/14/2020 8:56 AM    Admit Date: 11/10/2020      Subjective:   Doing well this AM, work of breathing improved. Denies changes in leg swelling. No other complaints at this time.      ROS:  Cardiovascular:  As noted above    Objective:      Vitals:    11/14/20 0105 11/14/20 0451 11/14/20 0804 11/14/20 0829   BP: 105/62 136/70  116/62   Pulse: 72 60 69 69   Resp: 16 17  18   Temp: 97.8 °F (36.6 °C) 97.9 °F (36.6 °C)  97.4 °F (36.3 °C)   SpO2: 99% 99%  96%   Weight:  215 lb 11.2 oz (97.8 kg)     Height:           Physical Exam:  General-No Acute Distress  Neck- supple, no JVD  CV- regular rate and rhythm no MRG  Lung- clear bilaterally withotu wheezes or rales   Abd- soft, nontender, nondistended  Ext- trace edema bilaterally in legs   Skin- warm and dry    Data Review:   Recent Labs     11/14/20  0332 11/13/20  0326    142   K 3.9 4.4   MG 2.5* 2.7*   BUN 27* 23   CREA 1.40* 1.39*   * 107*   WBC  --  5.9   HGB  --  11.7   HCT  --  36.9   PLT  --  161       Assessment/Plan:     Principal Problem:    Acute on chronic HFrEF (heart failure with reduced ejection fraction) ([facility]) (11/10/2020)      NICM (nonischemic cardiomyopathy) ([facility] [address] (2/15/2013)    - appears nearly euvolemic    - on PO diuretics (torsemide)    - Renal function stable 1.4     - not on supplemental oxygen    - CHF meds: Coreg, Imdur     Active Problems:    HTN (hypertension) (4/12/2010)    - controlled       BA on CPAP (7/14/2014)    - PPV as needed       Morbid obesity ([facility] [address] (7/14/2014)    - continue efforts towards dietary changes in an effort to lose weight       Ventricular tachycardia (HCC) (4/18/2016)    - stable electrically and hemodynamically    - on amiodarone PO        Type 2 diabetes with nephropathy (HCC) (4/2/2018)    - SSI , lantus while inpatient        Restrictive lung disease (4/8/2020)    - per pulm       CKD (chronic kidney disease) stage 3, GFR 30-59 ml/min (8/24/2020)    - stable Cr       Lower abdominal pain (11/10/2020)    - on ABX per medicine        Pleural effusion on right (11/12/2020)    - not seen on US     Appropriate for DC home today , follow up with PCP And Dr Eulalio Castillo in Cardiology in 1-2 weeks.          Donell Shahid DO  11/14/2020 8:56 AM

## 2020-11-14 NOTE — PROGRESS NOTES
initial visit, offered support, assurance of spiritual care staff's prayers. Patient is being discharged soon.     Jarad Regaladolain ANDREINAN

## 2020-11-14 NOTE — PROGRESS NOTES
Pt is for discharge home today with no needs/supportive care orders recieved for CM at this time. Pt was weaned off O2 prior to her discharge. Care Management Interventions  PCP Verified by CM: Yes  Mode of Transport at Discharge:  Other (see comment)(Family)  Transition of Care Consult (CM Consult): Discharge Planning  Current Support Network: Family Lives [place], Own Home, Lives Alone  Confirm Follow Up Transport: Family  The Plan for Transition of Care is Related to the Following Treatment Goals : Return to her baseline  Discharge Location  Discharge Placement: [place]

## 2020-11-14 NOTE — PROGRESS NOTES
Progress Note    Patient: Webb Nyhan MRN: [id number]  SSN: [id number]    YOB: 1965  Age: 60 y.o. Sex: female      Admit Date: 11/10/2020    LOS: 4 days     Subjective:   55F with pmhx of NICM/ NSVT s/p ICD, BA on CPAP, CKD, DM, HTN, obesity presented to ER with report of weight gain and dyspnea. She has been admitted to the cardiology service. She is being diuresed for acute on chronic CHF. The hospitalist team was consulted on 11/10/2020 because the patient had abdominal discomfort. A CT scan of the abdomen reported diverticulitis. She has been switched to oral Augmentin which is well-tolerated. Her blood sugar has remained on the low side and for that reason her dose of Lantus has been reduced to 10 units at bedtime. Most likely once she goes home her dose of Lantus will need to be tapered up. She will follow-up up with her primary care doctor. She can continue taking Metformin. She is being discharged today    Objective:     Vitals:    11/14/20 0105 11/14/20 0451 11/14/20 0804 11/14/20 0829   BP: 105/62 136/70  116/62   Pulse: 72 60 69 69   Resp: 16 17  18   Temp: 97.8 °F (36.6 °C) 97.9 °F (36.6 °C)  97.4 °F (36.3 °C)   SpO2: 99% 99%  96%   Weight:  97.8 kg (215 lb 11.2 oz)     Height:            Intake and Output:  Current Shift: 11/14 0701 - 11/14 1900  In: 200 [P.O.:200]  Out: 1500 [Urine:1500]  Last three shifts: 11/12 1901 - 11/14 0700  In: 797 [P.O.:797]  Out: 2800 [Urine:2800]    Physical Exam:   GENERAL: alert, cooperative, uncooperative, appears stated age  EYE: conjunctivae/corneas clear. LYMPHATIC: Cervical, supraclavicular, and axillary nodes normal.   THROAT & NECK: normal and no erythema or exudates noted.    LUNG: clear to auscultation bilaterally  HEART: regular rate and rhythm, S1, S2 normal, no murmur, click, rub or gallop  ABDOMEN: Mild to moderate abdominal tenderness  EXTREMITIES:  extremities normal, atraumatic, no cyanosis or edema  SKIN: Normal.  NEUROLOGIC: No focal deficits  PSYCHIATRIC: non focal    Lab/Data Review: All lab results for the last 24 hours reviewed. Assessment:     Principal Problem:    Acute on chronic HFrEF (heart failure with reduced ejection fraction) ([address] (11/10/2020)    Active Problems:    HTN (hypertension) (4/12/2010)      NICM (nonischemic cardiomyopathy) ([address] (2/15/2013)      BA on CPAP (7/14/2014)      Morbid obesity ([address] (7/14/2014)      Ventricular tachycardia ([address] (4/18/2016)      Type 2 diabetes with nephropathy ([address] (4/2/2018)      Restrictive lung disease (4/8/2020)      CKD (chronic kidney disease) stage 3, GFR 30-59 ml/min (8/24/2020)      Lower abdominal pain (11/10/2020)      CHF (congestive heart failure) ([address] (11/10/2020)      Pleural effusion on right (11/12/2020)        Plan:     #Acute on chronic CHF exacerbation. Patient being discharged today    #Acute diverticulitis. She completed 2 days of intravenous Zosyn. She has been started on oral Augmentin which is well-tolerated    # DM type II: Blood sugar slightly on the low side. She is being discharged home with a dose of Lantus of 10 units at bedtime and her regular dose of Metformin. Dose of Lantus will need to be increased as necessary when she is at home.     Signed By: Tong Brar MD     November 14, 2020

## 2020-11-14 NOTE — DISCHARGE INSTRUCTIONS
Patient Education      Patient Education        Diverticulitis: Care Instructions  Overview     Diverticulitis occurs when pouches form in the wall of the colon and become inflamed or infected. It can be very painful. Doctors aren't sure what causes diverticulitis. There is no proof that foods such as nuts, seeds, or berries cause it or make it worse. A low-fiber diet may cause the colon to work harder to push stool forward. Pouches may form because of this extra work. It may be hard to think about healthy eating while you're in pain. But as you recover, you might think about how you can use healthy eating for overall better health. Healthy eating may help you avoid future attacks. Follow-up care is a key part of your treatment and safety. Be sure to make and go to all appointments, and call your doctor if you are having problems. It's also a good idea to know your test results and keep a list of the medicines you take. How can you care for yourself at home? · Drink plenty of fluids, enough so that your urine is light yellow or clear like water. If you have kidney, heart, or liver disease and have to limit fluids, talk with your doctor before you increase the amount of fluids you drink. · Stay with liquids or a bland diet (plain rice, bananas, dry toast or crackers, applesauce) until you are feeling better. Then you can return to regular foods and slowly increase the amount of fiber in your diet. · Use a heating pad set on low on your belly to relieve mild cramps and pain. · Get extra rest until you are feeling better. · Be safe with medicines. Read and follow all instructions on the label. ? If the doctor gave you a prescription medicine for pain, take it as prescribed. ? If you are not taking a prescription pain medicine, ask your doctor if you can take an over-the-counter medicine. · If your doctor prescribed antibiotics, take them as directed. Do not stop taking them just because you feel better.  You need to take the full course of antibiotics. · Do not use laxatives or enemas unless your doctor tells you to use them. When should you call for help? Call your doctor now or seek immediate medical care if:    · You have a fever.     · You are vomiting.     · You have new or worse belly pain.     · You cannot pass stools or gas. Watch closely for changes in your health, and be sure to contact your doctor if you have any problems. Where can you learn more? Go to http://www.gray.com/  Enter H901 in the search box to learn more about \"Diverticulitis: Care Instructions. \"  Current as of: April 15, 2020               Content Version: 12.6  © 2006-2020 [employer]. Care instructions adapted under license by [employer] (which disclaims liability or warranty for this information). If you have questions about a medical condition or this instruction, always ask your healthcare professional. [address] any warranty or liability for your use of this information. Heart Failure: Care Instructions  Your Care Instructions     Heart failure occurs when your heart does not pump as much blood as the body needs. Failure does not mean that the heart has stopped pumping but rather that it is not pumping as well as it should. Over time, this causes fluid buildup in your lungs and other parts of your body. Fluid buildup can cause shortness of breath, fatigue, swollen ankles, and other problems. By taking medicines regularly, reducing sodium (salt) in your diet, checking your weight every day, and making lifestyle changes, you can feel better and live longer. Follow-up care is a key part of your treatment and safety. Be sure to make and go to all appointments, and call your doctor if you are having problems. It's also a good idea to know your test results and keep a list of the medicines you take. How can you care for yourself at home?   Medicines    · Be safe with medicines. Take your medicines exactly as prescribed. Call your doctor if you think you are having a problem with your medicine.     · Do not take any vitamins, over-the-counter medicine, or herbal products without talking to your doctor first. [place] not take ibuprofen (Advil or Motrin) and naproxen (Aleve) without talking to your doctor first. They could make your heart failure worse.     · You may take some of the following medicine. ? Angiotensin-converting enzyme inhibitors (ACEIs) or angiotensin II receptor blockers (ARBs) reduce the heart's workload, lower blood pressure, and reduce swelling. Taking an ACEI or ARB may lower your chance of needing to be hospitalized. ? Beta-blockers can slow heart rate, decrease blood pressure, and improve your condition. Taking a beta-blocker may lower your chance of needing to be hospitalized. ? Diuretics, also called water pills, reduce swelling. You will get more details on the specific medicines your doctor prescribes. Diet    · Your doctor may suggest that you limit sodium. Your doctor can tell you how much sodium is right for you. An example is less than 3,000 mg a day. This includes all the salt you eat in cooking or in packaged foods. People get most of their sodium from processed foods. Fast food and restaurant meals also tend to be very high in sodium.     · Ask your doctor how much liquid you can drink each day. You may have to limit liquids. Weight    · Weigh yourself without clothing at the same time each day. Record your weight. Call your doctor if you have a sudden weight gain, such as more than 2 to 3 pounds in a day or 5 pounds in a week. (Your doctor may suggest a different range of weight gain.) A sudden weight gain may mean that your heart failure is getting worse. Activity level    · Start light exercise (if your doctor says it is okay).  Even if you can only do a small amount, exercise will help you get stronger, have more energy, and manage your weight and your stress. Walking is an easy way to get exercise. Start out by walking a little more than you did before. Bit by bit, increase the amount you walk.     · When you exercise, watch for signs that your heart is working too hard. You are pushing yourself too hard if you cannot talk while you are exercising. If you become short of breath or dizzy or have chest pain, stop, sit down, and rest.     · If you feel \"wiped out\" the day after you exercise, walk slower or for a shorter distance until you can work up to a better pace.     · Get enough rest at night. Sleeping with 1 or 2 pillows under your upper body and head may help you breathe easier. Lifestyle changes    · Do not smoke. Smoking can make a heart condition worse. If you need help quitting, talk to your doctor about stop-smoking programs and medicines. These can increase your chances of quitting for good. Quitting smoking may be the most important step you can take to protect your heart.     · Limit alcohol to 2 drinks a day for men and 1 drink a day for women. Too much alcohol can cause health problems.     · Avoid getting sick from colds and the flu. Get a pneumococcal vaccine shot. If you have had one before, ask your doctor whether you need another dose. Get a flu shot each year. If you must be around people with colds or the flu, wash your hands often. When should you call for help? Call 911 if you have symptoms of sudden heart failure such as:    · You have severe trouble breathing.     · You cough up pink, foamy mucus.     · You have a new irregular or rapid heartbeat. Call your doctor now or seek immediate medical care if:    · You have new or increased shortness of breath.     · You are dizzy or lightheaded, or you feel like you may faint.     · You have sudden weight gain, such as more than 2 to 3 pounds in a day or 5 pounds in a week.  (Your doctor may suggest a different range of weight gain.)     · You have increased swelling in your legs, ankles, or feet.     · You are suddenly so tired or weak that you cannot do your usual activities. Watch closely for changes in your health, and be sure to contact your doctor if you develop new symptoms. Where can you learn more? Go to http://www.gray.com/  Enter H109 in the search box to learn more about \"Heart Failure: Care Instructions. \"  Current as of: December 16, 2019               Content Version: 12.6  © 4633-5528 [employer]. Care instructions adapted under license by [employer] (which disclaims liability or warranty for this information). If you have questions about a medical condition or this instruction, always ask your healthcare professional. [place] [address] any warranty or liability for your use of this information. Tramadol/Acetaminophen (Ultracet) - (By mouth)   Why this medicine is used:   Relieves pain. Contact a nurse or doctor right away if you have:  · Extreme weakness, shallow breathing, fast or slow heartbeat  · Dark urine or pale stools, loss of appetite, stomach pain  · Yellow skin or eyes  · Seizures, seeing or hearing things that are not there  · Anxiety, restlessness, fever, sweating, muscle spasms, diarrhea     Common side effects:  · Nausea, vomiting, constipation  · Headache, drowsiness  · Itching, feeling of warmth, redness of the face, neck, arms, and upper chest  © 2017 [facility] Information is for End User's use only and may not be sold, redistributed or otherwise used for commercial purposes. Spironolactone (Aldactone, Carospir) - (By mouth)   Why this medicine is used:   Treats high blood pressure, edema (fluid retention), or high levels of aldosterone (a hormone).    Contact a nurse or doctor right away if you have:  · Blistering, peeling, red skin rash  · Blood in your stools or dark stools, vomiting blood or material that looks like coffee grounds  · Confusion, weakness, uneven heartbeat, trouble breathing, numbness or tingling  · Dry mouth, increased thirst, muscle cramps, nausea, vomiting  · Increased hunger or thirst, change in how much or how often you urinate  · Lightheadedness, dizziness, drowsiness, fainting, muscle twitching  · Unusual bleeding, bruising, or weakness     Common side effects:  · Breast swelling, enlargement, pain, or tenderness  © 2017 [facility] Information is for End User's use only and may not be sold, redistributed or otherwise used for commercial purposes. Pantoprazole (Protonix) - (By mouth)   Why this medicine is used:   Treats gastroesophageal reflux disease (GERD), a damaged esophagus, and high levels of stomach acid. Contact a nurse or doctor right away if you have:  · Blistering, peeling, red skin rash  · Swelling, muscle pain, stiffness, cramps, or twitching  · Joint pain, rash on your cheeks or arms that gets worse in the sun  · Dark-colored urine, change in how much or how often you urinate  · Seizures, dizziness, uneven heartbeat  · Severe diarrhea, stomach cramps, fever, weight gain     Common side effects:  · Mild diarrhea, stomach pain  · Headache, tiredness  © 2017 [facility] Information is for End User's use only and may not be sold, redistributed or otherwise used for commercial purposes. Patient Education      Amoxicillin/Clavulanate Potassium (Augmentin, Augmentin ES-600, Augmentin XR) - (By mouth)   Why this medicine is used:   Treats infections. This medicine is a penicillin antibiotic.   Contact a nurse or doctor right away if you have:  · Blistering, peeling, red skin rash  · Dark urine or pale stools, nausea, vomiting, loss of appetite, stomach pain, yellow eyes or skin  · Severe diarrhea, especially if bloody or ongoing     Common side effects:  · Diarrhea, nausea, vomiting  · Diaper rash  · Tooth discoloration (in children)  © 2017 [facility] [address] is for End User's use only and may not be sold, redistributed or otherwise used for commercial purposes.

## 2020-11-14 NOTE — ROUTINE PROCESS
Bedside and Verbal shift change report given to self (oncoming nurse) by Svetlana Yang RN (offgoing nurse). Report included the following information SBAR, Kardex, ED Summary, Procedure Summary, Intake/Output, MAR, Recent Results

## 2020-11-14 NOTE — ROUTINE PROCESS
Bedside and Verbal shift change report given to self (oncoming nurse) by Tutu Vallecillo RN (offgoing nurse). Report included the following information SBAR, Kardex, Intake/Output, MAR and Recent Results.

## 2020-11-16 ENCOUNTER — PATIENT OUTREACH (OUTPATIENT)
Dept: CASE MANAGEMENT | Age: 55
End: 2020-11-16

## 2020-11-17 ENCOUNTER — HOSPITAL ENCOUNTER (OUTPATIENT)
Dept: LAB | Age: 55
Discharge: HOME | End: 2020-11-17
Payer: MEDICARE

## 2020-11-17 ENCOUNTER — PATIENT OUTREACH (OUTPATIENT)
Dept: CASE MANAGEMENT | Age: 55
End: 2020-11-17

## 2020-11-17 DIAGNOSIS — I50.22 CHRONIC SYSTOLIC HEART FAILURE (HCC): Chronic | ICD-10-CM

## 2020-11-17 LAB
ALBUMIN SERPL-MCNC: 3.9 G/DL (ref 3.5–5)
ALBUMIN/GLOB SERPL: 1 {RATIO} (ref 1.2–3.5)
ALP SERPL-CCNC: 89 U/L (ref 50–136)
ALT SERPL-CCNC: 38 U/L (ref 12–65)
ANION GAP SERPL CALC-SCNC: 5 MMOL/L (ref 7–16)
AST SERPL-CCNC: 22 U/L (ref 15–37)
BILIRUB SERPL-MCNC: 0.4 MG/DL (ref 0.2–1.1)
BNP SERPL-MCNC: 526 PG/ML (ref 5–125)
BUN SERPL-MCNC: 33 MG/DL (ref 6–23)
CALCIUM SERPL-MCNC: 9.4 MG/DL (ref 8.3–10.4)
CHLORIDE SERPL-SCNC: 108 MMOL/L (ref 98–107)
CO2 SERPL-SCNC: 30 MMOL/L (ref 21–32)
CREAT SERPL-MCNC: 1.86 MG/DL (ref 0.6–1)
GLOBULIN SER CALC-MCNC: 3.8 G/DL (ref 2.3–3.5)
GLUCOSE SERPL-MCNC: 114 MG/DL (ref 65–100)
MAGNESIUM SERPL-MCNC: 2.3 MG/DL (ref 1.8–2.4)
POTASSIUM SERPL-SCNC: 4.2 MMOL/L (ref 3.5–5.1)
PROT SERPL-MCNC: 7.7 G/DL (ref 6.3–8.2)
SODIUM SERPL-SCNC: 143 MMOL/L (ref 136–145)
TSH SERPL DL<=0.005 MIU/L-ACNC: 1.67 UIU/ML (ref 0.36–3.74)

## 2020-11-17 PROCEDURE — 83735 ASSAY OF MAGNESIUM: CPT

## 2020-11-17 PROCEDURE — 83880 ASSAY OF NATRIURETIC PEPTIDE: CPT

## 2020-11-17 PROCEDURE — 80053 COMPREHEN METABOLIC PANEL: CPT

## 2020-11-17 PROCEDURE — 84443 ASSAY THYROID STIM HORMONE: CPT

## 2020-11-17 PROCEDURE — 36415 COLL VENOUS BLD VENIPUNCTURE: CPT

## 2020-11-17 NOTE — PROGRESS NOTES
Second [facility] outreach attempt was unsuccessful. Left message to return call. Will attempt third outreach within 5 business days.

## 2020-11-18 ENCOUNTER — PATIENT OUTREACH (OUTPATIENT)
Dept: CASE MANAGEMENT | Age: 55
End: 2020-11-18

## 2020-11-18 NOTE — PROGRESS NOTES
Third [facility] outreach attempt without success. Left message. Unable to reach for [facility] program.  Will reopen if call is returned.

## 2020-11-25 PROBLEM — N18.4 CKD (CHRONIC KIDNEY DISEASE) STAGE 4, GFR 15-29 ML/MIN (HCC): Status: ACTIVE | Noted: 2020-11-25

## 2021-01-21 ENCOUNTER — HOSPITAL ENCOUNTER (OUTPATIENT)
Dept: LAB | Age: 56
Discharge: HOME | End: 2021-01-21
Payer: MEDICARE

## 2021-01-21 DIAGNOSIS — I50.22 CHRONIC SYSTOLIC HEART FAILURE (HCC): Chronic | ICD-10-CM

## 2021-01-21 LAB
ANION GAP SERPL CALC-SCNC: 6 MMOL/L (ref 7–16)
BNP SERPL-MCNC: 487 PG/ML (ref 5–125)
BUN SERPL-MCNC: 36 MG/DL (ref 6–23)
CALCIUM SERPL-MCNC: 9.5 MG/DL (ref 8.3–10.4)
CHLORIDE SERPL-SCNC: 107 MMOL/L (ref 98–107)
CO2 SERPL-SCNC: 27 MMOL/L (ref 21–32)
CREAT SERPL-MCNC: 2 MG/DL (ref 0.6–1)
GLUCOSE SERPL-MCNC: 132 MG/DL (ref 65–100)
POTASSIUM SERPL-SCNC: 3.8 MMOL/L (ref 3.5–5.1)
SODIUM SERPL-SCNC: 140 MMOL/L (ref 136–145)

## 2021-01-21 PROCEDURE — 83880 ASSAY OF NATRIURETIC PEPTIDE: CPT

## 2021-01-21 PROCEDURE — 36415 COLL VENOUS BLD VENIPUNCTURE: CPT

## 2021-01-21 PROCEDURE — 80048 BASIC METABOLIC PNL TOTAL CA: CPT

## 2021-01-29 ENCOUNTER — TRANSCRIBE ORDER (OUTPATIENT)
Dept: SCHEDULING | Age: 56
End: 2021-01-29

## 2021-01-29 DIAGNOSIS — Z12.31 SCREENING MAMMOGRAM FOR HIGH-RISK PATIENT: Primary | ICD-10-CM

## 2021-03-09 ENCOUNTER — HOSPITAL ENCOUNTER (OUTPATIENT)
Dept: MAMMOGRAPHY | Age: 56
Discharge: HOME | End: 2021-03-09
Attending: INTERNAL MEDICINE
Payer: MEDICARE

## 2021-03-09 DIAGNOSIS — Z12.31 SCREENING MAMMOGRAM FOR HIGH-RISK PATIENT: ICD-10-CM

## 2021-03-09 PROCEDURE — 77063 BREAST TOMOSYNTHESIS BI: CPT

## 2021-04-26 ENCOUNTER — HOSPITAL ENCOUNTER (OUTPATIENT)
Dept: LAB | Age: 56
Discharge: HOME | End: 2021-04-26
Payer: MEDICARE

## 2021-04-26 LAB
25(OH)D3 SERPL-MCNC: 37 NG/ML (ref 30–100)
ANION GAP SERPL CALC-SCNC: 5 MMOL/L (ref 7–16)
BUN SERPL-MCNC: 34 MG/DL (ref 6–23)
CALCIUM SERPL-MCNC: 10.5 MG/DL (ref 8.3–10.4)
CALCIUM SERPL-MCNC: 9.9 MG/DL (ref 8.3–10.4)
CHLORIDE SERPL-SCNC: 106 MMOL/L (ref 98–107)
CO2 SERPL-SCNC: 29 MMOL/L (ref 21–32)
CREAT SERPL-MCNC: 1.55 MG/DL (ref 0.6–1)
GLUCOSE SERPL-MCNC: 95 MG/DL (ref 65–100)
MAGNESIUM SERPL-MCNC: 2.2 MG/DL (ref 1.8–2.4)
PHOSPHATE SERPL-MCNC: 3.7 MG/DL (ref 2.5–4.5)
POTASSIUM SERPL-SCNC: 3.6 MMOL/L (ref 3.5–5.1)
PTH-INTACT SERPL-MCNC: 255.6 PG/ML (ref 18.5–88)
SODIUM SERPL-SCNC: 140 MMOL/L (ref 136–145)

## 2021-04-26 PROCEDURE — 82306 VITAMIN D 25 HYDROXY: CPT

## 2021-04-26 PROCEDURE — 83970 ASSAY OF PARATHORMONE: CPT

## 2021-04-26 PROCEDURE — 80048 BASIC METABOLIC PNL TOTAL CA: CPT

## 2021-04-26 PROCEDURE — 83735 ASSAY OF MAGNESIUM: CPT

## 2021-04-26 PROCEDURE — 84100 ASSAY OF PHOSPHORUS: CPT

## 2021-04-26 PROCEDURE — 36415 COLL VENOUS BLD VENIPUNCTURE: CPT

## 2021-05-12 NOTE — PROGRESS NOTES
Gigi Roman, Wilda Walker Admission Date: 3/26/2019 Daily Progress Note: 4/10/2019 The patient's chart is reviewed and the patient is discussed with the staff. Wilda Walker is a 45yoF admitted 3/26 with abd hernia repair complicated by dehiscence and repair with mesh on 3/31 who has had abd pain/nausea and acute hypoxic respiratory failure complicating her course. Subjective: Went to OR yesterday for debridement. No overt purulence was noted. Completed antibiotics for pneumonia this morning. Afebrile on 2L NC. No labs this AM. Current Facility-Administered Medications Medication Dose Route Frequency  furosemide (LASIX) injection 20 mg  20 mg IntraVENous ONCE  phenol throat spray (CHLORASEPTIC) 1 Spray  1 Spray Oral PRN  
 NUTRITIONAL SUPPORT ELECTROLYTE PRN ORDERS   Does Not Apply PRN  
 ALPRAZolam (XANAX) tablet 0.5 mg  0.5 mg Oral Q8H PRN  piperacillin-tazobactam (ZOSYN) 4.5 g in 0.9% sodium chloride (MBP/ADV) 100 mL  4.5 g IntraVENous Q8H  
 insulin regular (NOVOLIN R, HUMULIN R) injection   SubCUTAneous Q6H  
 acetaminophen (TYLENOL) tablet 650 mg  650 mg Per NG tube Q6H PRN  
 amiodarone (CORDARONE) tablet 200 mg  200 mg Per NG tube DAILY  carvedilol (COREG) tablet 12.5 mg  12.5 mg Per NG tube BID WITH MEALS  dextrose (D50W) injection syrg 12.5 g  25 mL IntraVENous PRN  
 metoclopramide HCl (REGLAN) injection 10 mg  10 mg IntraVENous Q8H PRN  promethazine (PHENERGAN) with saline injection 25 mg  25 mg IntraVENous Q6H PRN  
 HYDROmorphone (PF) (DILAUDID) injection 0.5-2 mg  0.5-2 mg IntraVENous Q3H PRN  
 nitroglycerin (NITROSTAT) tablet 0.4 mg  0.4 mg SubLINGual Q5MIN PRN  pravastatin (PRAVACHOL) tablet 80 mg  80 mg Oral QHS  magnesium oxide (MAG-OX) tablet 400 mg  400 mg Oral BID  sodium chloride (NS) flush 5-40 mL  5-40 mL IntraVENous Q8H  
 sodium chloride (NS) flush 5-40 mL  5-40 mL IntraVENous PRN  
  diphenhydrAMINE (BENADRYL) injection 12.5 mg  12.5 mg IntraVENous Q4H PRN  
 ondansetron (ZOFRAN) injection 4 mg  4 mg IntraVENous Q4H PRN  
 enoxaparin (LOVENOX) injection 40 mg  40 mg SubCUTAneous Q12H  
 albuterol (PROVENTIL VENTOLIN) nebulizer solution 2.5 mg  2.5 mg Nebulization Q4H PRN  
 dupilumab (DUPIXENT) 300 mg/2 mL syringe  300 mg SubCUTAneous Q7D Review of Systems Constitutional: negative for fever, chills, sweats Cardiovascular: negative for chest pain, palpitations, syncope, edema Gastrointestinal:  negative for dysphagia, reflux, vomiting, diarrhea, abdominal pain, or melena Neurologic:  negative for focal weakness, numbness, headache Objective:  
 
Vitals:  
 04/09/19 2321 04/09/19 2337 04/10/19 5049 04/10/19 6902 BP: 124/59  102/60 Pulse: 99  60 Resp: 18  18 Temp: 98.2 °F (36.8 °C)  98.5 °F (36.9 °C) SpO2: 95% 96% 95% 95% Weight:      
Height:      
 
Intake and Output:  
04/08 1901 - 04/10 0700 In: 0204 [I.V.:3593] Out: 2070 [Urine:1875; Drains:185] No intake/output data recorded. Physical Exam:  
Constitution:  the patient is well developed and in no acute distress EENMT:  Sclera clear, pupils equal, oral mucosa moist 
Respiratory: clear anteriorly Cardiovascular:  RRR without M,G,R 
Gastrointestinal: soft and non-tender; with positive bowel sounds. Musculoskeletal: warm without cyanosis. There is no lower leg edema. Skin:  no jaundice or rashes, abd wound dressed under binder (not examineD) Neurologic: no gross neuro deficits Psychiatric:  alert and oriented x 3 CXR: none today LAB Recent Labs 04/10/19 
0543 04/10/19 
0022 04/09/19 
1158 04/09/19 
0539 04/09/19 
0001 [address] Recent Labs 04/08/19 
8826 WBC 18.0* HGB 9.6* HCT 29.0*  
 No results for input(s): NA, K, CL, CO2, GLU, BUN, CREA, MG, CA, PHOS, TROIQ, ALB, TBIL, TBILI, GPT, ALT, SGOT, BNPP in the last 72 hours. No lab exists for component: TROIP No results for input(s): PH, PCO2, PO2, HCO3, PHI, PCO2I, PO2I, HCO3I in the last 72 hours. No results for input(s): LCAD, LAC in the last 72 hours. Assessment:  (Medical Decision Making) Hospital Problems  Date Reviewed: 3/26/2019 Codes Class Noted POA Pseudomonas urinary tract infection ICD-10-CM: N39.0, B96.5 ICD-9-CM: 599.0, 041.7  4/5/2019 Yes On zosyn Acute hypoxemic respiratory failure ([facility] [place] [facility] ICD-10-CM: J96.01 
ICD-9-CM: 518.81  4/3/2019 Unknown Worsened today with positive fluid balance. ARDS (adult respiratory distress syndrome) ([facility] [place] [facility] ICD-10-CM: Q09 
ICD-9-CM: 518.52  4/3/2019 Unknown Vs noncardiogenic pulmonary edema * (Principal) Incarcerated incisional hernia ICD-10-CM: K43.0 ICD-9-CM: 552.21  3/26/2019 Yes BA on CPAP (Chronic) ICD-10-CM: G47.33, Z99.89 ICD-9-CM: 327.23, V46.8  7/14/2014 Yes Not wearing CPAP Morbid obesity ([facility] [place] [facility] (Chronic) ICD-10-CM: E66.01 
ICD-9-CM: 278.01  7/14/2014 Yes NICM with Chronic systolic heart failure (HCC) (Chronic) ICD-10-CM: D32.40 ICD-9-CM: 428.22  7/18/2013 Yes Overview Signed 3/30/2016  8:11 AM by Yanni ROMERO 4/15:low risk Echo 6/2014:EF 40-45% Echo 5/2013:EF 30-35% LHC 2/2013: minimal CAD Continue diuretics Long-term insulin use in type 2 diabetes ([facility] [place] [facility] ICD-10-CM: E11.9, Z79.4 ICD-9-CM: 250.00, V58.67  4/12/2010 Yes Plan:  (Medical Decision Making) --completed antibiotics today which will cover UTI and possible pneumonia vs ARDS 
--additional antibiotics if determined by surgery 
--lasix 20mg IV x 1  
--CPAP nightly is recommended 
--check CBC, CMP and CXR in AM 
--if improved will likely sign off tomorrow More than 50% of the time documented was spent in face-to-face contact with the patient and in the care of the patient on the floor/unit where the patient is located.  
 
Vita Multani MD

## 2021-05-18 PROBLEM — K62.5 RECTAL BLEEDING: Status: ACTIVE | Noted: 2021-05-18

## 2021-06-30 ENCOUNTER — ANESTHESIA EVENT (OUTPATIENT)
Dept: ENDOSCOPY | Age: 56
End: 2021-06-30
Payer: MEDICARE

## 2021-06-30 RX ORDER — SODIUM CHLORIDE, SODIUM LACTATE, POTASSIUM CHLORIDE, CALCIUM CHLORIDE 600; 310; 30; 20 MG/100ML; MG/100ML; MG/100ML; MG/100ML
100 INJECTION, SOLUTION INTRAVENOUS CONTINUOUS
Status: CANCELLED | OUTPATIENT
Start: 2021-06-30

## 2021-07-06 RX ORDER — SODIUM CHLORIDE 0.9 % (FLUSH) 0.9 %
5-40 SYRINGE (ML) INJECTION AS NEEDED
Status: CANCELLED | OUTPATIENT
Start: 2021-07-06

## 2021-07-06 RX ORDER — SODIUM CHLORIDE 0.9 % (FLUSH) 0.9 %
5-40 SYRINGE (ML) INJECTION EVERY 8 HOURS
Status: CANCELLED | OUTPATIENT
Start: 2021-07-06

## 2021-07-06 RX ORDER — SODIUM CHLORIDE, SODIUM LACTATE, POTASSIUM CHLORIDE, CALCIUM CHLORIDE 600; 310; 30; 20 MG/100ML; MG/100ML; MG/100ML; MG/100ML
100 INJECTION, SOLUTION INTRAVENOUS CONTINUOUS
Status: CANCELLED | OUTPATIENT
Start: 2021-07-06

## 2021-07-07 ENCOUNTER — ANESTHESIA (OUTPATIENT)
Dept: ENDOSCOPY | Age: 56
End: 2021-07-07
Payer: MEDICARE

## 2021-07-07 ENCOUNTER — HOSPITAL ENCOUNTER (OUTPATIENT)
Age: 56
Setting detail: OUTPATIENT SURGERY
Discharge: HOME | End: 2021-07-07
Attending: INTERNAL MEDICINE | Admitting: INTERNAL MEDICINE
Payer: MEDICARE

## 2021-07-07 VITALS
HEART RATE: 60 BPM | OXYGEN SATURATION: 93 % | DIASTOLIC BLOOD PRESSURE: 70 MMHG | TEMPERATURE: 97.7 F | RESPIRATION RATE: 16 BRPM | SYSTOLIC BLOOD PRESSURE: 111 MMHG

## 2021-07-07 LAB
GLUCOSE BLD STRIP.AUTO-MCNC: 144 MG/DL (ref 65–100)
SERVICE CMNT-IMP: ABNORMAL

## 2021-07-07 PROCEDURE — 77030021593 HC FCPS BIOP ENDOSC BSC -A: Performed by: INTERNAL MEDICINE

## 2021-07-07 PROCEDURE — 74011250636 HC RX REV CODE- 250/636: Performed by: NURSE ANESTHETIST, CERTIFIED REGISTERED

## 2021-07-07 PROCEDURE — 82962 GLUCOSE BLOOD TEST: CPT

## 2021-07-07 PROCEDURE — 74011250636 HC RX REV CODE- 250/636: Performed by: ANESTHESIOLOGY

## 2021-07-07 PROCEDURE — 76060000032 HC ANESTHESIA 0.5 TO 1 HR: Performed by: INTERNAL MEDICINE

## 2021-07-07 PROCEDURE — 2709999900 HC NON-CHARGEABLE SUPPLY: Performed by: INTERNAL MEDICINE

## 2021-07-07 PROCEDURE — 88305 TISSUE EXAM BY PATHOLOGIST: CPT

## 2021-07-07 PROCEDURE — 76040000025: Performed by: INTERNAL MEDICINE

## 2021-07-07 PROCEDURE — 74011000250 HC RX REV CODE- 250: Performed by: NURSE ANESTHETIST, CERTIFIED REGISTERED

## 2021-07-07 RX ORDER — SODIUM CHLORIDE, SODIUM LACTATE, POTASSIUM CHLORIDE, CALCIUM CHLORIDE 600; 310; 30; 20 MG/100ML; MG/100ML; MG/100ML; MG/100ML
100 INJECTION, SOLUTION INTRAVENOUS CONTINUOUS
Status: DISCONTINUED | OUTPATIENT
Start: 2021-07-07 | End: 2021-07-07 | Stop reason: HOSPADM

## 2021-07-07 RX ORDER — PROPOFOL 10 MG/ML
INJECTION, EMULSION INTRAVENOUS AS NEEDED
Status: DISCONTINUED | OUTPATIENT
Start: 2021-07-07 | End: 2021-07-07 | Stop reason: HOSPADM

## 2021-07-07 RX ORDER — LIDOCAINE HYDROCHLORIDE 20 MG/ML
INJECTION, SOLUTION EPIDURAL; INFILTRATION; INTRACAUDAL; PERINEURAL AS NEEDED
Status: DISCONTINUED | OUTPATIENT
Start: 2021-07-07 | End: 2021-07-07 | Stop reason: HOSPADM

## 2021-07-07 RX ORDER — PROPOFOL 10 MG/ML
INJECTION, EMULSION INTRAVENOUS
Status: DISCONTINUED | OUTPATIENT
Start: 2021-07-07 | End: 2021-07-07 | Stop reason: HOSPADM

## 2021-07-07 RX ORDER — EPHEDRINE SULFATE/0.9% NACL/PF 50 MG/5 ML
SYRINGE (ML) INTRAVENOUS AS NEEDED
Status: DISCONTINUED | OUTPATIENT
Start: 2021-07-07 | End: 2021-07-07 | Stop reason: HOSPADM

## 2021-07-07 RX ADMIN — PROPOFOL 50 MG: 10 INJECTION, EMULSION INTRAVENOUS at 07:04

## 2021-07-07 RX ADMIN — LIDOCAINE HYDROCHLORIDE 50 MG: 20 INJECTION, SOLUTION EPIDURAL; INFILTRATION; INTRACAUDAL; PERINEURAL at 07:04

## 2021-07-07 RX ADMIN — Medication 10 MG: at 07:13

## 2021-07-07 RX ADMIN — SODIUM CHLORIDE, SODIUM LACTATE, POTASSIUM CHLORIDE, AND CALCIUM CHLORIDE 100 ML/HR: 600; 310; 30; 20 INJECTION, SOLUTION INTRAVENOUS at 06:54

## 2021-07-07 RX ADMIN — PROPOFOL 200 MCG/KG/MIN: 10 INJECTION, EMULSION INTRAVENOUS at 07:04

## 2021-07-07 RX ADMIN — Medication 10 MG: at 07:23

## 2021-07-07 RX ADMIN — SODIUM CHLORIDE 150 MCG: 900 INJECTION, SOLUTION INTRAVENOUS at 07:23

## 2021-07-07 NOTE — DISCHARGE INSTRUCTIONS
Gastrointestinal Colonoscopy/Flexible Sigmoidoscopy - Lower Exam Discharge Instructions  1. Call Dr. Sherman Rodriguez office  for any problems or questions. 2. Contact the doctors office for follow up appointment as directed  3. Medication may cause drowsiness for several hours, therefore:  · Do not drive or operate machinery for reminder of the day. · No alcohol today. · Do not make any important or legal decisions for 24 hours. · Do not sign any legal documents for 24 hours. 4. Ordinarily, you may resume regular diet and activity after exam unless otherwise specified by your physician. 5. Because of air put into your colon during exam, you may experience some abdominal distension, relieved by the passage of gas, for several hours. 6. Contact your physician if you have any of the following:  · Excessive amount of bleeding - large amount when having a bowel movement. Occasional specks of blood with bowel movement would not be unusual.  · Severe abdominal pain  · Fever or Chills  7. Polyp Removal - follow these additional instructions  · Take Metamucil - 1 tablespoon in juice every morning for 3 days to avoid constipation as needed. · No Aspirin, Advil, Aleve, Nuprin, Ibuprofen, or medications that contain these drugs for 2 weeks. Tylenol is ok. Any additional instructions:    1. Follow up pathology results. Office should call within the next two weeks. 2. Repeat colonoscopy for surveillance based on pathology results. 3. High fiber diet indefinitely. 4. Bentyl 10 mg twice a day as needed. 5. Return to office in 2-3 months.

## 2021-07-07 NOTE — H&P
History and Physical for Colonoscopy             Date: 2021     History of Present Illness:  Patient presents to undergo colonoscopy. Past Medical History:   Diagnosis Date    Abdominal wall hernia 10/2/2018    Acute hypoxemic respiratory failure ([facility] 4/3/2019    Allergic rhinitis     followed by allergist; allergic to grass- has rescue inhaler PRN    ARDS (adult respiratory distress syndrome) ([facility] 4/3/2019    Arthritis     Automatic implantable cardioverter-defibrillator in situ 3/30/2016    CAD in native artery 3/30/2016    Chronic kidney disease     Chronic systolic heart failure ([facility] 2013    ECHO 2017- EF 39%; managed with medications; [place] Dr Gila [place] [facility]    CKD (chronic kidney disease)     [address] follows    Diabetes ([facility]     type 2 (on insulin);  FBS AM range- , hypo s/s <70, hgba1c- 7.9    Diabetes mellitus ([facility] 2010    Dyslipidemia 2013    Eczema     Fibromyalgia     GERD (gastroesophageal reflux disease)     hx of- no meds currently    Headache     Heart failure (HCC)     chronic systolic with EF 69%; non-ischemic cardiomyopathy    HTN (hypertension) 2010    Hypercholesterolemia     Incarcerated incisional hernia 3/26/2019    Morbid obesity ([facility]     Neuropathy     bilateral feet and tips of fingers    NICM (nonischemic cardiomyopathy) ([facility] 2/15/2013    Obesity     bmi- 41    Obstructive sleep apnea (adult) (pediatric) 2014    uses cpap qhs    Pseudomonas urinary tract infection 2019    Sciatic pain 2016    Severe persistent asthma with acute exacerbation 2020    [facility]ricular tachycardia[facility]     ablation for svt    Tobacco use disorder 2010     Past Surgical History:   Procedure Laterality Date    HX  SECTION      x1    HX GYN  78    D & E    HX HERNIA REPAIR  2019    mild incarceration, no bowel removal    HX HYSTERECTOMY      YEIMI-Left ovary intact per pt    HX IMPLANTABLE CARDIOVERTER DEFIBRILLATOR  07/18/2013    Biotronik dual chamber ICD by Dr. Kenan Sapp     [address]  07/18/2013    Biotronik dual chamber ICD by Dr. Nora Ramsey      ICD    HX Jero Vogt Right     HX SVT ABLATION  \"years ago\"    HX TONSILLECTOMY      NH LEFT HEART CATH,PERCUTANEOUS  2/13/2013    no intervention      Family History   Problem Relation Age of Onset    Heart Attack Father 48        [facility] Stroke Father     Hypertension Father     Heart Disease Father     Diabetes Other     Stroke Mother     Diabetes Brother     Heart Disease Brother     Hypertension Brother     Breast Cancer Sister 37     Social History     Tobacco Use    Smoking status: Former Smoker     Packs/day: 0.25     Years: 27.00     Pack years: 6.75    Smokeless tobacco: Never Used    Tobacco comment: \"every now and then\"   Substance Use Topics    Alcohol use: Yes     Alcohol/week: 1.0 standard drinks     Types: 1 Glasses of wine per week     Comment: occasional         Allergies   Allergen Reactions    Grass Pollen Hives and Shortness of Breath     \"inhaler PRN\"     Current Facility-Administered Medications   Medication Dose Route Frequency    lactated Ringers infusion  100 mL/hr IntraVENous CONTINUOUS    famotidine (PF) (PEPCID) 20 mg in 0.9% sodium chloride 10 mL injection  20 mg IntraVENous ONCE PRN    lactated Ringers infusion  100 mL/hr IntraVENous CONTINUOUS        Review of Systems:  A detailed 10 organ review of systems is obtained with pertinent positives as listed in the History of Present Illness. All others are negative. Objective:     Physical Exam:  Visit Vitals  BP (!) 107/55   Pulse 71   Temp 98.6 °F (37 °C)   Resp 18   SpO2 96%      General:  Alert and oriented.   Heart: Regular rate and rhythm  Lungs:  Clear to auscultation bilaterally  Abdomen: Soft, nontender, nondistended    Impression/Plan:     Proceed with colonoscopy as planned. I have discussed with the patient the technique, benefits, alternatives, and risks of these procedures, including medication reaction, immediate or delayed bleeding, or perforation of the gastrointestinal tract.      Signed By: Ace Mendosa MD     July 7, 2021

## 2021-07-07 NOTE — ADDENDUM NOTE
Addendum  created 07/07/21 0810 by Odilia Stewart MD    Attestation recorded in [address] [facility] filed

## 2021-07-07 NOTE — ANESTHESIA PREPROCEDURE EVALUATION
Relevant Problems   RESPIRATORY SYSTEM   (+) BA on CPAP      CARDIOVASCULAR   (+) CAD in native artery   (+) CHF (congestive heart failure) (HCC)   (+) HTN (hypertension)   (+) Ventricular tachycardia (HCC)      RENAL FAILURE   (+) CKD (chronic kidney disease) stage 3, GFR 30-59 ml/min (HCC)   (+) CKD (chronic kidney disease) stage 4, GFR 15-29 ml/min (HCC)      ENDOCRINE   (+) Long-term insulin use in type 2 diabetes (HCC)   (+) Morbid obesity (HCC)   (+) Type 2 diabetes with nephropathy (HCC)       Anesthetic History   No history of anesthetic complications            Review of Systems / Medical History  Patient summary reviewed, nursing notes reviewed and pertinent labs reviewed    Pulmonary        Sleep apnea: CPAP  Smoker (marijuana daily)  Asthma : well controlled       Neuro/Psych   Within defined limits           Cardiovascular    Hypertension      CHF  Dysrhythmias : SVT  Pacemaker (BiV ICD), CAD and hyperlipidemia      Comments: Echo 2020- EF 30-35%, regional wall motion abnl   GI/Hepatic/Renal     GERD: well controlled    Renal disease: CRI       Endo/Other    Diabetes: type 2    Morbid obesity     Other Findings   Comments: fibromyalgia         Physical Exam    Airway  Mallampati: IV  TM Distance: 4 - 6 cm  Neck ROM: short neck        Cardiovascular  Regular rate and rhythm,  S1 and S2 normal,  no murmur, click, rub, or gallop             Dental  No notable dental hx       Pulmonary  Breath sounds clear to auscultation               Abdominal         Other Findings            Anesthetic Plan    ASA: 4  Anesthesia type: total IV anesthesia          Induction: Intravenous  Anesthetic plan and risks discussed with: Patient      Discussed TIVA with its benefits (lower risk of nausea and sore throat, etc.) and risks including possible awareness, patient understands and elects to proceed

## 2021-07-07 NOTE — ROUTINE PROCESS
VSS. Patient denies any complaints at this time. Discharge education provided to patient and sister. Patient discharged out via wheelchair with RN.

## 2021-07-07 NOTE — OP NOTES
Procedure:  Colonoscopy    Date of Procedure:  7/7/2021    Patient:  Chin Garces     1965    Indication:   Rectal bleeding, lower abdominal pain     Sedation:  MAC    Pre-Procedure Exam:    Mental status:  alert and oriented  Airway:  normal oropharyngeal airway and neck mobility  CV:  regular rate and rhythm   Respiratory:  clear to auscultation    Procedure:  A History and Physical has been performed, and patient medication allergies have been reviewed. Risks of perforation, hemorrhage, adverse drug reaction, and aspiration were discussed. Informed consent was obtained for the procedure, including sedation. The patient was placed in the left lateral decubitus position. The heart rate, oxygen saturations, blood pressure, and response to care were monitored throughout the procedure. After performing a rectal exam, the colonoscope was passed through the anus and advanced under direct vision to the cecum, identified by appendiceal orifice and ileocecal valve. The quality of prep was adequate. A careful inspection was made as the colonoscope was withdrawn, including a retroflexed view of the rectum. The patient tolerated the procedure well. Findings:     ANUS:  Anal exam reveals no masses or hemorrhoids. RECTUM:  Internal hemorrhoids were seen in the rectum. One 3 mm sessile polyp was removed using a cold forceps. SIGMOID COLON: Multiple large and small-mouthed diverticula were seen. DESCENDING COLON:  Multiple large and small-mouthed diverticula were seen. SPLENIC FLEXURE:  The splenic flexure is normal.   TRANSVERSE COLON:  A few small-mouthed diverticula were seen. Two 3 mm sessile polyps were removed using a cold forceps. HEPATIC FLEXURE:  The hepatic flexure is normal.   ASCENDING COLON:  Multiple large and small-mouthed diverticula were seen.    CECUM:  The appendiceal orifice appears normal. The ileocecal valve appears normal.   TERMINAL ILEUM:  The terminal ileum was not entered. Specimen:  Yes    Estimated Blood Loss:  Minimal    Implant:  None           Impression:    Colon polyps. Diverticulosis. Internal hemorrhoids. Plan:  1. Follow up pathology results. 2. Repeat colonoscopy for surveillance based on pathology results. 3. High fiber diet indefinitely. 4. Bentyl 10 mg PO QID prn.   5. Return to office in 2-3 months.      Signed:  Sera Baum MD  7/7/2021  6:56 AM

## 2021-07-07 NOTE — PROGRESS NOTES
Spiritual Care visit. Initial Visit, Pre Surgery Consult. Visit and prayer before patient goes to surgery.     Visit by Akbar Paulino M.Ed., Th.B. ,Staff

## 2021-07-07 NOTE — ANESTHESIA POSTPROCEDURE EVALUATION
Procedure(s):  COLONOSCOPY/BMI 48 PT HAS AN ICD  ENDOSCOPIC POLYPECTOMY. total IV anesthesia    Anesthesia Post Evaluation      Multimodal analgesia: multimodal analgesia not used between 6 hours prior to anesthesia start to PACU discharge  Patient location during evaluation: bedside  Patient participation: complete - patient participated  Level of consciousness: awake and alert  Pain management: adequate  Airway patency: patent  Anesthetic complications: no  Cardiovascular status: acceptable  Respiratory status: acceptable, spontaneous ventilation and nonlabored ventilation  Hydration status: acceptable  Post anesthesia nausea and vomiting:  none      INITIAL Post-op Vital signs:   Vitals Value Taken Time   /70 07/07/21 0800   Temp 36.5 °C (97.7 °F) 07/07/21 0732   Pulse 60 07/07/21 0801   Resp 16 07/07/21 0800   SpO2 93 % 07/07/21 0801   Vitals shown include unvalidated device data.

## 2021-07-21 ENCOUNTER — APPOINTMENT (OUTPATIENT)
Dept: GENERAL RADIOLOGY | Age: 56
End: 2021-07-21
Attending: EMERGENCY MEDICINE
Payer: MEDICARE

## 2021-07-21 ENCOUNTER — HOSPITAL ENCOUNTER (EMERGENCY)
Age: 56
Discharge: HOME | End: 2021-07-21
Attending: EMERGENCY MEDICINE
Payer: MEDICARE

## 2021-07-21 VITALS
OXYGEN SATURATION: 96 % | BODY MASS INDEX: 42.33 KG/M2 | DIASTOLIC BLOOD PRESSURE: 75 MMHG | HEIGHT: 59 IN | HEART RATE: 60 BPM | TEMPERATURE: 98.2 F | SYSTOLIC BLOOD PRESSURE: 133 MMHG | RESPIRATION RATE: 16 BRPM | WEIGHT: 210 LBS

## 2021-07-21 DIAGNOSIS — R07.9 ACUTE CHEST PAIN: ICD-10-CM

## 2021-07-21 DIAGNOSIS — R07.89 ACUTE CHEST WALL PAIN: Primary | ICD-10-CM

## 2021-07-21 DIAGNOSIS — M62.838 MUSCLE SPASM: ICD-10-CM

## 2021-07-21 LAB
ALBUMIN SERPL-MCNC: 3.7 G/DL (ref 3.5–5)
ALBUMIN/GLOB SERPL: 0.9 {RATIO} (ref 1.2–3.5)
ALP SERPL-CCNC: 76 U/L (ref 50–130)
ALT SERPL-CCNC: 40 U/L (ref 12–65)
ANION GAP SERPL CALC-SCNC: 10 MMOL/L (ref 7–16)
AST SERPL-CCNC: 29 U/L (ref 15–37)
ATRIAL RATE: 62 BPM
BASOPHILS # BLD: 0.1 K/UL (ref 0–0.2)
BASOPHILS NFR BLD: 1 % (ref 0–2)
BILIRUB SERPL-MCNC: 0.4 MG/DL (ref 0.2–1.1)
BUN SERPL-MCNC: 36 MG/DL (ref 6–23)
CALCIUM SERPL-MCNC: 8.8 MG/DL (ref 8.3–10.4)
CALCULATED P AXIS, ECG09: 76 DEGREES
CALCULATED R AXIS, ECG10: 59 DEGREES
CALCULATED T AXIS, ECG11: -46 DEGREES
CHLORIDE SERPL-SCNC: 101 MMOL/L (ref 98–107)
CO2 SERPL-SCNC: 28 MMOL/L (ref 21–32)
CREAT SERPL-MCNC: 2.01 MG/DL (ref 0.6–1)
DIAGNOSIS, 93000: NORMAL
DIFFERENTIAL METHOD BLD: ABNORMAL
EOSINOPHIL # BLD: 0.2 K/UL (ref 0–0.8)
EOSINOPHIL NFR BLD: 3 % (ref 0.5–7.8)
ERYTHROCYTE [DISTWIDTH] IN BLOOD BY AUTOMATED COUNT: 13 % (ref 11.9–14.6)
GLOBULIN SER CALC-MCNC: 4.1 G/DL (ref 2.3–3.5)
GLUCOSE SERPL-MCNC: 188 MG/DL (ref 65–100)
HCT VFR BLD AUTO: 39.3 % (ref 35.8–46.3)
HGB BLD-MCNC: 13.6 G/DL (ref 11.7–15.4)
IMM GRANULOCYTES # BLD AUTO: 0 K/UL (ref 0–0.5)
IMM GRANULOCYTES NFR BLD AUTO: 0 % (ref 0–5)
LIPASE SERPL-CCNC: 154 U/L (ref 73–393)
LYMPHOCYTES # BLD: 2.3 K/UL (ref 0.5–4.6)
LYMPHOCYTES NFR BLD: 29 % (ref 13–44)
MAGNESIUM SERPL-MCNC: 2.5 MG/DL (ref 1.8–2.4)
MCH RBC QN AUTO: 33.2 PG (ref 26.1–32.9)
MCHC RBC AUTO-ENTMCNC: 34.6 G/DL (ref 31.4–35)
MCV RBC AUTO: 95.9 FL (ref 79.6–97.8)
MONOCYTES # BLD: 0.6 K/UL (ref 0.1–1.3)
MONOCYTES NFR BLD: 7 % (ref 4–12)
NEUTS SEG # BLD: 4.7 K/UL (ref 1.7–8.2)
NEUTS SEG NFR BLD: 60 % (ref 43–78)
NRBC # BLD: 0 K/UL (ref 0–0.2)
P-R INTERVAL, ECG05: 170 MS
PLATELET # BLD AUTO: 140 K/UL (ref 150–450)
PMV BLD AUTO: 11.6 FL (ref 9.4–12.3)
POTASSIUM SERPL-SCNC: 3.5 MMOL/L (ref 3.5–5.1)
PROT SERPL-MCNC: 7.8 G/DL (ref 6.3–8.2)
Q-T INTERVAL, ECG07: 416 MS
QRS DURATION, ECG06: 126 MS
QTC CALCULATION (BEZET), ECG08: 422 MS
RBC # BLD AUTO: 4.1 M/UL (ref 4.05–5.2)
SODIUM SERPL-SCNC: 139 MMOL/L (ref 136–145)
TROPONIN-HIGH SENSITIVITY: 39.7 PG/ML (ref 0–14)
TROPONIN-HIGH SENSITIVITY: 42.3 PG/ML (ref 0–14)
VENTRICULAR RATE, ECG03: 62 BPM
WBC # BLD AUTO: 7.8 K/UL (ref 4.3–11.1)

## 2021-07-21 PROCEDURE — 83735 ASSAY OF MAGNESIUM: CPT

## 2021-07-21 PROCEDURE — 85025 COMPLETE CBC W/AUTO DIFF WBC: CPT

## 2021-07-21 PROCEDURE — 99284 EMERGENCY DEPT VISIT MOD MDM: CPT

## 2021-07-21 PROCEDURE — 84484 ASSAY OF TROPONIN QUANT: CPT

## 2021-07-21 PROCEDURE — 80053 COMPREHEN METABOLIC PANEL: CPT

## 2021-07-21 PROCEDURE — 74011250636 HC RX REV CODE- 250/636: Performed by: EMERGENCY MEDICINE

## 2021-07-21 PROCEDURE — 96375 TX/PRO/DX INJ NEW DRUG ADDON: CPT

## 2021-07-21 PROCEDURE — 93005 ELECTROCARDIOGRAM TRACING: CPT | Performed by: EMERGENCY MEDICINE

## 2021-07-21 PROCEDURE — 83690 ASSAY OF LIPASE: CPT

## 2021-07-21 PROCEDURE — 71045 X-RAY EXAM CHEST 1 VIEW: CPT

## 2021-07-21 PROCEDURE — 96374 THER/PROPH/DIAG INJ IV PUSH: CPT

## 2021-07-21 RX ORDER — SODIUM CHLORIDE 0.9 % (FLUSH) 0.9 %
5-10 SYRINGE (ML) INJECTION AS NEEDED
Status: DISCONTINUED | OUTPATIENT
Start: 2021-07-21 | End: 2021-07-21 | Stop reason: HOSPADM

## 2021-07-21 RX ORDER — ONDANSETRON 2 MG/ML
4 INJECTION INTRAMUSCULAR; INTRAVENOUS
Status: COMPLETED | OUTPATIENT
Start: 2021-07-21 | End: 2021-07-21

## 2021-07-21 RX ORDER — METHOCARBAMOL 750 MG/1
750 TABLET, FILM COATED ORAL
Qty: 20 TABLET | Refills: 0 | Status: SHIPPED | OUTPATIENT
Start: 2021-07-21 | End: 2021-09-21 | Stop reason: SDUPTHER

## 2021-07-21 RX ORDER — TRAMADOL HYDROCHLORIDE 50 MG/1
50 TABLET ORAL
Qty: 12 TABLET | Refills: 0 | Status: SHIPPED | OUTPATIENT
Start: 2021-07-21 | End: 2021-07-24

## 2021-07-21 RX ORDER — MORPHINE SULFATE 4 MG/ML
4 INJECTION INTRAVENOUS
Status: COMPLETED | OUTPATIENT
Start: 2021-07-21 | End: 2021-07-21

## 2021-07-21 RX ORDER — SODIUM CHLORIDE 0.9 % (FLUSH) 0.9 %
5-10 SYRINGE (ML) INJECTION EVERY 8 HOURS
Status: DISCONTINUED | OUTPATIENT
Start: 2021-07-21 | End: 2021-07-21 | Stop reason: HOSPADM

## 2021-07-21 RX ADMIN — MORPHINE SULFATE 4 MG: 4 INJECTION INTRAVENOUS at 12:02

## 2021-07-21 RX ADMIN — ONDANSETRON 4 MG: 2 INJECTION INTRAMUSCULAR; INTRAVENOUS at 12:02

## 2021-07-21 NOTE — ED NOTES
I have reviewed discharge instructions with the patient. The patient verbalized understanding. Patient left ED via Discharge Method: ambulatory to Home with herself. Opportunity for questions and clarification provided. Patient given 2 scripts. To continue your aftercare when you leave the hospital, you may receive an automated call from our care team to check in on how you are doing. This is a free service and part of our promise to provide the best care and service to meet your aftercare needs.  If you have questions, or wish to unsubscribe from this service please call [phone]. Thank you for Choosing our [employer] Emergency Department.

## 2021-07-21 NOTE — ED TRIAGE NOTES
Left sided chest tightness than began 15 minutes ago. Pain radiates into left jaw and down her arm. Pt with hx of defibrillator due to a-fib.

## 2021-07-21 NOTE — ED PROVIDER NOTES
The history is provided by the patient. Chest Pain (Angina)   This is a new problem. The current episode started more than 2 days ago. The problem has been gradually worsening. Duration of episode(s) is 3 days. The problem occurs constantly. The pain is associated with normal activity. The pain is present in the lateral region and left side. The pain is at a severity of 8/10. The quality of the pain is described as sharp and tightness. The pain radiates to the left shoulder, left neck and left arm. The symptoms are aggravated by movement, palpation and certain positions. Pertinent negatives include no abdominal pain, no back pain, no claudication, no cough, no diaphoresis, no dizziness, no exertional chest pressure, no fever, no headaches, no hemoptysis, no irregular heartbeat, no leg pain, no lower extremity edema, no malaise/fatigue, no nausea, no near-syncope, no numbness, no orthopnea, no palpitations, no PND, no shortness of breath, no sputum production, no vomiting and no weakness. She has tried rest for the symptoms. The treatment provided no relief. Risk factors include cardiac disease, obesity, diabetes mellitus, hypertension and dyslipidemia. Her past medical history is significant for DM, HTN and CHF. Her past medical history does not include aneurysm, cancer, DVT or PE. Procedural history includes cardiac catheterization and pacemaker.        Past Medical History:   Diagnosis Date    Abdominal wall hernia 10/2/2018    Acute hypoxemic respiratory failure ([address] 4/3/2019    Allergic rhinitis     followed by allergist; allergic to grass- has rescue inhaler PRN    ARDS (adult respiratory distress syndrome) ([address] 4/3/2019    Arthritis     Automatic implantable cardioverter-defibrillator in situ 3/30/2016    CAD in native artery 3/30/2016    Chronic kidney disease     Chronic systolic heart failure ([address] 07/18/2013    ECHO 2017- EF 39%; managed with medications; [place] [facility]  CKD (chronic kidney disease)     [facility] follows    Diabetes ([facility]     type 2 (on insulin);  FBS AM range- , hypo s/s <70, hgba1c- 7.9    Diabetes mellitus ([facility] 2010    Dyslipidemia 2013    Eczema     Fibromyalgia     GERD (gastroesophageal reflux disease)     hx of- no meds currently    Headache     Heart failure (HCC)     chronic systolic with EF 77%; non-ischemic cardiomyopathy    HTN (hypertension) 2010    Hypercholesterolemia     Incarcerated incisional hernia 3/26/2019    Morbid obesity (HCC)     Neuropathy     bilateral feet and tips of fingers    NICM (nonischemic cardiomyopathy) ([facility] 2/15/2013    Obesity     bmi- 41    Obstructive sleep apnea (adult) (pediatric) 2014    uses cpap qhs    Pseudomonas urinary tract infection 2019    Sciatic pain 2016    Severe persistent asthma with acute exacerbation 2020    SVT [facility]raventricular tachycardia) ([facility])     ablation for svt    Tobacco use disorder 2010       Past Surgical History:   Procedure Laterality Date    COLONOSCOPY N/A 2021    COLONOSCOPY/BMI 50 PT HAS AN ICD performed by [place] L[place] MD at [facility]    HX  SECTION      x1   2 [facility] [address]  2019    mild incarceration, no bowel removal    HX HYSTERECTOMY      YEIMI-Left ovary intact per pt    HX IMPLANTABLE CARDIOVERTER DEFIBRILLATOR  2013    Biotronik dual chamber ICD by Dr. Karyn Gaucher  [facility] [address]  2013    Biotronik dual chamber ICD by Dr. Karyn Gaucher HX PACEMAKER      ICD    HX [place] Am[place] Right     HX SVT ABLATION  \"years ago\"    HX TONSILLECTOMY      NE LEFT HEART CATH,PERCUTANEOUS  2013    no intervention         Family History:   Problem Relation Age of Onset    Heart Attack Father 48        mi   Michelle Stroke Father     Hypertension Father     Heart Disease Father     Diabetes Other     Stroke Mother  Diabetes Brother     Heart Disease Brother     Hypertension Brother     Breast Cancer Sister 37       Social History     Socioeconomic History    Marital status: SINGLE     Spouse name: Not on file    Number of children: Not on file    Years of education: Not on file    Highest education level: Not on file   Occupational History    Not on file   Tobacco Use    Smoking status: Former Smoker     Packs/day: 0.25     Years: 27.00     Pack years: 6.75    Smokeless tobacco: Never Used    Tobacco comment: \"every now and then\"   Vaping Use    Vaping Use: Never used   Substance and Sexual Activity    Alcohol use: Yes     Alcohol/week: 1.0 standard drinks     Types: 1 Glasses of wine per week     Comment: occasional     Drug use: Yes     Types: Marijuana     Comment: every day    Sexual activity: Not Currently   Other Topics Concern     Service Not Asked    Blood Transfusions Not Asked    Caffeine Concern Not Asked    Occupational Exposure Not Asked    Hobby Hazards Not Asked    Sleep Concern Not Asked    Stress Concern Not Asked    Weight Concern Not Asked    Special Diet Not Asked    Back Care Not Asked    Exercise Not Asked    Bike Helmet Not Asked   [address] Yes    Self-Exams Not Asked   Social History Narrative    Denies physical or sexual abuse     Social Determinants of Health     Financial Resource Strain:     Difficulty of Paying Living Expenses:    Food Insecurity:     Worried About Running Out of Food in the Last Year:     [address] in the Last Year:    Transportation Needs:     Lack of Transportation (Medical):      Lack of Transportation (Non-Medical):    Physical Activity:     Days of Exercise per Week:     Minutes of Exercise per Session:    Stress:     Feeling of Stress :    Social Connections:     Frequency of Communication with Friends and Family:     Frequency of Social Gatherings with Friends and Family:     Attends [employer] Services:     Active Member of Clubs or Organizations:     Attends Club or Organization Meetings:     Marital Status:    Intimate Partner Violence:     Fear of Current or Ex-Partner:     Emotionally Abused:     Physically Abused:     Sexually Abused: ALLERGIES: Grass pollen    Review of Systems   Constitutional: Negative for diaphoresis, fever and malaise/fatigue. Respiratory: Negative for cough, hemoptysis, sputum production and shortness of breath. Cardiovascular: Positive for chest pain. Negative for palpitations, orthopnea, claudication, PND and near-syncope. Gastrointestinal: Negative for abdominal pain, nausea and vomiting. Musculoskeletal: Negative for back pain. Neurological: Negative for dizziness, weakness, numbness and headaches. All other systems reviewed and are negative. Vitals:    07/21/21 1055 07/21/21 1107 07/21/21 1112   BP: (!) 142/73 (!) 120/58    Pulse: 62 60    Resp: 18 21    Temp: 98.1 °F (36.7 °C)     SpO2: 98% 96% 98%   Weight: 95.3 kg (210 lb)     Height: 4' 11\" (1.499 m)              Physical Exam  Vitals and nursing note reviewed. Constitutional:       General: She is not in acute distress. Appearance: She is well-developed. HENT:      Head: Normocephalic and atraumatic. Right Ear: External ear normal.      Left Ear: External ear normal.      Mouth/Throat:      Mouth: Mucous membranes are moist.   Eyes:      Extraocular Movements: Extraocular movements intact. Conjunctiva/sclera: Conjunctivae normal.      Pupils: Pupils are equal, round, and reactive to light. Cardiovascular:      Rate and Rhythm: Normal rate and regular rhythm. Pulses: Normal pulses. Heart sounds: Normal heart sounds. Pulmonary:      Effort: Pulmonary effort is normal.      Breath sounds: Normal breath sounds. Chest:      Chest wall: Tenderness present. Abdominal:      General: Bowel sounds are normal. There is no distension. Palpations: Abdomen is soft.  There is no mass.      Tenderness: There is no abdominal tenderness. There is no right CVA tenderness, left CVA tenderness, guarding or rebound. Hernia: No hernia is present. Musculoskeletal:         General: Normal range of motion. Cervical back: Normal range of motion and neck supple. Tenderness (Left paraspinous/trapezius) present. Skin:     General: Skin is warm and dry. Capillary Refill: Capillary refill takes less than 2 seconds. Neurological:      Mental Status: She is alert and oriented to person, place, and time. Psychiatric:         Mood and Affect: Mood is depressed. Affect is tearful. Speech: Speech normal.         Cognition and Memory: Cognition and memory normal.          MDM  Number of Diagnoses or Management Options  Acute chest pain: new and requires workup  Acute chest wall pain: new and requires workup  Muscle spasm: new and requires workup     Amount and/or Complexity of Data Reviewed  Clinical lab tests: ordered and reviewed  Tests in the radiology section of CPT®: ordered and reviewed  Tests in the medicine section of CPT®: ordered and reviewed (ECG interpretation for ECG dated 7/21/2021 at 10:53 AM: ECG reveals a normal sinus rhythm at a rate of 62 bpm, normal MN and normal axis. Nonspecific T wave abnormality and nonspecific intraventricular conduction block. Abnormal ECG.   Jamison Richard MD  )  Review and summarize past medical records: yes    Risk of Complications, Morbidity, and/or Mortality  Presenting problems: high  Diagnostic procedures: moderate  Management options: moderate    Patient Progress  Patient progress: improved         Procedures      Results Reviewed:      Recent Results (from the past 24 hour(s))   EKG, 12 LEAD, INITIAL    Collection Time: 07/21/21 10:53 AM   Result Value Ref Range    Ventricular Rate 62 BPM    Atrial Rate 62 BPM    P-R Interval 170 ms    QRS Duration 126 ms    Q-T Interval 416 ms    QTC Calculation (Bezet) 422 ms    Calculated P Axis 76 degrees    Calculated R Axis 59 degrees    Calculated T Axis -46 degrees    Diagnosis       Atrial-paced rhythm  Non-specific intra-ventricular conduction block  Nonspecific T wave abnormality  Abnormal ECG  When compared with ECG of 10-NOV-2020 12:21,  Questionable change in QRS duration  Confirmed by ST BRAD PRYOR MD (), ANGELINE PATTERSON ([id number]) on 7/21/2021 12:13:21 PM     TROPONIN-HIGH SENSITIVITY    Collection Time: 07/21/21 11:08 AM   Result Value Ref Range    Troponin-High Sensitivity 42.3 (H) 0 - 14 pg/mL   CBC WITH AUTOMATED DIFF    Collection Time: 07/21/21 11:08 AM   Result Value Ref Range    WBC 7.8 4.3 - 11.1 K/uL    RBC 4.10 4.05 - 5.2 M/uL    HGB 13.6 11.7 - 15.4 g/dL    HCT 39.3 35.8 - 46.3 %    MCV 95.9 79.6 - 97.8 FL    MCH 33.2 (H) 26.1 - 32.9 PG    MCHC 34.6 31.4 - 35.0 g/dL    RDW 13.0 11.9 - 14.6 %    PLATELET 539 (L) 784 - 450 K/uL    MPV 11.6 9.4 - 12.3 FL    ABSOLUTE NRBC 0.00 0.0 - 0.2 K/uL    DF AUTOMATED      NEUTROPHILS 60 43 - 78 %    LYMPHOCYTES 29 13 - 44 %    MONOCYTES 7 4.0 - 12.0 %    EOSINOPHILS 3 0.5 - 7.8 %    BASOPHILS 1 0.0 - 2.0 %    IMMATURE GRANULOCYTES 0 0.0 - 5.0 %    ABS. NEUTROPHILS 4.7 1.7 - 8.2 K/UL    ABS. LYMPHOCYTES 2.3 0.5 - 4.6 K/UL    ABS. MONOCYTES 0.6 0.1 - 1.3 K/UL    ABS. EOSINOPHILS 0.2 0.0 - 0.8 K/UL    ABS. BASOPHILS 0.1 0.0 - 0.2 K/UL    ABS. IMM. GRANS. 0.0 0.0 - 0.5 K/UL   METABOLIC PANEL, COMPREHENSIVE    Collection Time: 07/21/21 11:08 AM   Result Value Ref Range    Sodium 139 136 - 145 mmol/L    Potassium 3.5 3.5 - 5.1 mmol/L    Chloride 101 98 - 107 mmol/L    CO2 28 21 - 32 mmol/L    Anion gap 10 7 - 16 mmol/L    Glucose 188 (H) 65 - 100 mg/dL    BUN 36 (H) 6 - 23 MG/DL    Creatinine 2.01 (H) 0.6 - 1.0 MG/DL    GFR est AA 33 (L) >60 ml/min/1.73m2    GFR est non-AA 27 (L) >60 ml/min/1.73m2    Calcium 8.8 8.3 - 10.4 MG/DL    Bilirubin, total 0.4 0.2 - 1.1 MG/DL    ALT (SGPT) 40 12 - 65 U/L    AST (SGOT) 29 15 - 37 U/L    Alk.  phosphatase 76 50 - 130 U/L    Protein, total 7.8 6.3 - 8.2 g/dL    Albumin 3.7 3.5 - 5.0 g/dL    Globulin 4.1 (H) 2.3 - 3.5 g/dL    A-G Ratio 0.9 (L) 1.2 - 3.5     LIPASE    Collection Time: 07/21/21 11:08 AM   Result Value Ref Range    Lipase 154 73 - 393 U/L   MAGNESIUM    Collection Time: 07/21/21 11:08 AM   Result Value Ref Range    Magnesium 2.5 (H) 1.8 - 2.4 mg/dL   TROPONIN-HIGH SENSITIVITY    Collection Time: 07/21/21  1:02 PM   Result Value Ref Range    Troponin-High Sensitivity 39.7 (H) 0 - 14 pg/mL     XR CHEST PORT   Final Result   Normal chest.          I discussed the results of all labs, procedures, radiographs, and treatments with the patient and available family. Treatment plan is agreed upon and the patient is ready for discharge. All voiced understanding of the discharge plan and medication instructions or changes as appropriate. Questions about treatment in the ED were answered. All were encouraged to return should symptoms worsen or new problems develop.

## 2021-10-25 ENCOUNTER — HOSPITAL ENCOUNTER (OUTPATIENT)
Dept: LAB | Age: 56
Discharge: HOME | End: 2021-10-25
Payer: MEDICARE

## 2021-10-25 LAB
25(OH)D3 SERPL-MCNC: 63.9 NG/ML (ref 30–100)
ALBUMIN SERPL-MCNC: 3.8 G/DL (ref 3.5–5)
ALBUMIN/GLOB SERPL: 1 {RATIO} (ref 1.2–3.5)
ALP SERPL-CCNC: 78 U/L (ref 50–136)
ALT SERPL-CCNC: 39 U/L (ref 12–65)
ANION GAP SERPL CALC-SCNC: 6 MMOL/L (ref 7–16)
AST SERPL-CCNC: 21 U/L (ref 15–37)
BASOPHILS # BLD: 0.1 K/UL (ref 0–0.2)
BASOPHILS NFR BLD: 1 % (ref 0–2)
BILIRUB SERPL-MCNC: 0.3 MG/DL (ref 0.2–1.1)
BUN SERPL-MCNC: 39 MG/DL (ref 6–23)
CALCIUM SERPL-MCNC: 9.6 MG/DL (ref 8.3–10.4)
CALCIUM SERPL-MCNC: 9.7 MG/DL (ref 8.3–10.4)
CHLORIDE SERPL-SCNC: 107 MMOL/L (ref 98–107)
CO2 SERPL-SCNC: 27 MMOL/L (ref 21–32)
COLLECT DURATION TIME UR: 24 HR
COLLECT DURATION TIME UR: 24 HR
CREAT 24H CL BSA ADJ PNL UR+SERPL: 28 ML/MIN (ref 72–110)
CREAT 24H UR-MRATE: 912 MG/24HR (ref 600–1800)
CREAT 24H UR-MRATE: 939 MG/24HR (ref 600–1800)
CREAT SERPL-MCNC: 2.13 MG/DL (ref 0.6–1)
CREAT UR-MCNC: 41 MG/DL
DIFFERENTIAL METHOD BLD: ABNORMAL
EOSINOPHIL # BLD: 0.2 K/UL (ref 0–0.8)
EOSINOPHIL NFR BLD: 2 % (ref 0.5–7.8)
ERYTHROCYTE [DISTWIDTH] IN BLOOD BY AUTOMATED COUNT: 13.2 % (ref 11.9–14.6)
FERRITIN SERPL-MCNC: 44 NG/ML (ref 8–388)
GLOBULIN SER CALC-MCNC: 4 G/DL (ref 2.3–3.5)
GLUCOSE SERPL-MCNC: 114 MG/DL (ref 65–100)
HCT VFR BLD AUTO: 44.3 % (ref 35.8–46.3)
HGB BLD-MCNC: 14 G/DL (ref 11.7–15.4)
IMM GRANULOCYTES # BLD AUTO: 0 K/UL (ref 0–0.5)
IMM GRANULOCYTES NFR BLD AUTO: 1 % (ref 0–5)
IRON SATN MFR SERPL: 22 %
IRON SERPL-MCNC: 79 UG/DL (ref 35–150)
LYMPHOCYTES # BLD: 2.6 K/UL (ref 0.5–4.6)
LYMPHOCYTES NFR BLD: 30 % (ref 13–44)
MAGNESIUM SERPL-MCNC: 2.5 MG/DL (ref 1.8–2.4)
MCH RBC QN AUTO: 31.4 PG (ref 26.1–32.9)
MCHC RBC AUTO-ENTMCNC: 31.6 G/DL (ref 31.4–35)
MCV RBC AUTO: 99.3 FL (ref 79.6–97.8)
MONOCYTES # BLD: 0.6 K/UL (ref 0.1–1.3)
MONOCYTES NFR BLD: 7 % (ref 4–12)
NEUTS SEG # BLD: 5.2 K/UL (ref 1.7–8.2)
NEUTS SEG NFR BLD: 60 % (ref 43–78)
NRBC # BLD: 0 K/UL (ref 0–0.2)
PHOSPHATE SERPL-MCNC: 3.9 MG/DL (ref 2.5–4.5)
PLATELET # BLD AUTO: 125 K/UL (ref 150–450)
PMV BLD AUTO: 12.1 FL (ref 9.4–12.3)
POTASSIUM SERPL-SCNC: 4.4 MMOL/L (ref 3.5–5.1)
PROT 24H UR-MRATE: 111 MG/24HR (ref 40–150)
PROT SERPL-MCNC: 7.8 G/DL (ref 6.3–8.2)
PROT/CREAT 24H UR-RTO: 0.12 RATIO
PTH-INTACT SERPL-MCNC: 157.1 PG/ML (ref 18.5–88)
RBC # BLD AUTO: 4.46 M/UL (ref 4.05–5.2)
SODIUM SERPL-SCNC: 140 MMOL/L (ref 136–145)
SPECIMEN VOL ?TM UR: 2225 ML
SPECIMEN VOL ?TM UR: 2225 ML
TIBC SERPL-MCNC: 357 UG/DL (ref 250–450)
WBC # BLD AUTO: 8.7 K/UL (ref 4.3–11.1)

## 2021-10-25 PROCEDURE — 83550 IRON BINDING TEST: CPT

## 2021-10-25 PROCEDURE — 85025 COMPLETE CBC W/AUTO DIFF WBC: CPT

## 2021-10-25 PROCEDURE — 82306 VITAMIN D 25 HYDROXY: CPT

## 2021-10-25 PROCEDURE — 82570 ASSAY OF URINE CREATININE: CPT

## 2021-10-25 PROCEDURE — 80053 COMPREHEN METABOLIC PANEL: CPT

## 2021-10-25 PROCEDURE — 82728 ASSAY OF FERRITIN: CPT

## 2021-10-25 PROCEDURE — 84100 ASSAY OF PHOSPHORUS: CPT

## 2021-10-25 PROCEDURE — 82575 CREATININE CLEARANCE TEST: CPT

## 2021-10-25 PROCEDURE — 36415 COLL VENOUS BLD VENIPUNCTURE: CPT

## 2021-10-25 PROCEDURE — 83735 ASSAY OF MAGNESIUM: CPT

## 2021-10-25 PROCEDURE — 83970 ASSAY OF PARATHORMONE: CPT

## 2022-02-24 ENCOUNTER — HOSPITAL ENCOUNTER (OUTPATIENT)
Dept: LAB | Age: 57
Discharge: HOME | End: 2022-02-24
Payer: MEDICARE

## 2022-02-24 LAB — PHOSPHATE SERPL-MCNC: 4 MG/DL (ref 2.5–4.5)

## 2022-02-24 PROCEDURE — 36415 COLL VENOUS BLD VENIPUNCTURE: CPT

## 2022-02-24 PROCEDURE — 84100 ASSAY OF PHOSPHORUS: CPT

## 2022-02-25 ENCOUNTER — TRANSCRIBE ORDER (OUTPATIENT)
Dept: SCHEDULING | Age: 57
End: 2022-02-25

## 2022-02-25 DIAGNOSIS — Z12.31 SCREENING MAMMOGRAM FOR HIGH-RISK PATIENT: Primary | ICD-10-CM

## 2022-03-18 PROBLEM — I50.9 CHF (CONGESTIVE HEART FAILURE) (HCC): Status: ACTIVE | Noted: 2020-11-10

## 2022-03-18 PROBLEM — E11.21 TYPE 2 DIABETES WITH NEPHROPATHY (HCC): Status: ACTIVE | Noted: 2018-04-02

## 2022-03-19 PROBLEM — J98.4 RESTRICTIVE LUNG DISEASE: Status: ACTIVE | Noted: 2020-04-08

## 2022-03-19 PROBLEM — K62.5 RECTAL BLEEDING: Status: ACTIVE | Noted: 2021-05-18

## 2022-03-19 PROBLEM — N18.30 CKD (CHRONIC KIDNEY DISEASE) STAGE 3, GFR 30-59 ML/MIN (HCC): Status: ACTIVE | Noted: 2020-08-24

## 2022-03-19 PROBLEM — N18.4 CKD (CHRONIC KIDNEY DISEASE) STAGE 4, GFR 15-29 ML/MIN (HCC): Status: ACTIVE | Noted: 2020-11-25

## 2022-03-19 PROBLEM — Z79.899 LONG TERM CURRENT USE OF AMIODARONE: Status: ACTIVE | Noted: 2020-06-12

## 2022-03-20 PROBLEM — I50.23 ACUTE ON CHRONIC HFREF (HEART FAILURE WITH REDUCED EJECTION FRACTION) (HCC): Status: ACTIVE | Noted: 2020-11-10

## 2022-03-20 PROBLEM — R10.30 LOWER ABDOMINAL PAIN: Status: ACTIVE | Noted: 2020-11-10

## 2022-03-20 PROBLEM — J90 PLEURAL EFFUSION ON RIGHT: Status: ACTIVE | Noted: 2020-11-12

## 2022-03-31 ENCOUNTER — HOSPITAL ENCOUNTER (OUTPATIENT)
Dept: MAMMOGRAPHY | Age: 57
Discharge: HOME | End: 2022-03-31
Attending: INTERNAL MEDICINE
Payer: MEDICARE

## 2022-03-31 DIAGNOSIS — Z12.31 SCREENING MAMMOGRAM FOR HIGH-RISK PATIENT: ICD-10-CM

## 2022-03-31 PROCEDURE — 77063 BREAST TOMOSYNTHESIS BI: CPT

## 2022-04-13 ENCOUNTER — HOSPITAL ENCOUNTER (OUTPATIENT)
Dept: LAB | Age: 57
Discharge: HOME | End: 2022-04-13
Payer: MEDICARE

## 2022-04-13 LAB
ALBUMIN SERPL-MCNC: 4.3 G/DL (ref 3.5–5)
ALBUMIN/GLOB SERPL: 1.3 {RATIO} (ref 1.2–3.5)
ALP SERPL-CCNC: 70 U/L (ref 50–136)
ALT SERPL-CCNC: 31 U/L (ref 12–65)
ANION GAP SERPL CALC-SCNC: 4 MMOL/L (ref 7–16)
AST SERPL-CCNC: 24 U/L (ref 15–37)
BILIRUB SERPL-MCNC: 0.4 MG/DL (ref 0.2–1.1)
BUN SERPL-MCNC: 34 MG/DL (ref 6–23)
CALCIUM SERPL-MCNC: 9.7 MG/DL (ref 8.3–10.4)
CALCIUM SERPL-MCNC: 9.8 MG/DL (ref 8.3–10.4)
CHLORIDE SERPL-SCNC: 109 MMOL/L (ref 98–107)
CO2 SERPL-SCNC: 28 MMOL/L (ref 21–32)
CREAT SERPL-MCNC: 2.2 MG/DL (ref 0.6–1)
GLOBULIN SER CALC-MCNC: 3.4 G/DL (ref 2.3–3.5)
GLUCOSE SERPL-MCNC: 95 MG/DL (ref 65–100)
MAGNESIUM SERPL-MCNC: 2.7 MG/DL (ref 1.8–2.4)
PHOSPHATE SERPL-MCNC: 3.9 MG/DL (ref 2.5–4.5)
POTASSIUM SERPL-SCNC: 4.1 MMOL/L (ref 3.5–5.1)
PROT SERPL-MCNC: 7.7 G/DL (ref 6.3–8.2)
PTH-INTACT SERPL-MCNC: 286.5 PG/ML (ref 18.5–88)
SODIUM SERPL-SCNC: 141 MMOL/L (ref 136–145)

## 2022-04-13 PROCEDURE — 83970 ASSAY OF PARATHORMONE: CPT

## 2022-04-13 PROCEDURE — 80053 COMPREHEN METABOLIC PANEL: CPT

## 2022-04-13 PROCEDURE — 84100 ASSAY OF PHOSPHORUS: CPT

## 2022-04-13 PROCEDURE — 83735 ASSAY OF MAGNESIUM: CPT

## 2022-04-13 PROCEDURE — 36415 COLL VENOUS BLD VENIPUNCTURE: CPT

## 2022-04-20 NOTE — ED NOTES
I have reviewed discharge instructions with the patient and spouse. The patient and spouse verbalized understanding. Patient left ED via Discharge Method: ambulatory to Home with self. Opportunity for questions and clarification provided. Patient given 2 scripts. No e-sign. To continue your aftercare when you leave the hospital, you may receive an automated call from our care team to check in on how you are doing. This is a free service and part of our promise to provide the best care and service to meet your aftercare needs.  If you have questions, or wish to unsubscribe from this service please call [phone]. Thank you for Choosing our [facility]. No

## 2022-05-17 NOTE — PROGRESS NOTES
Spoke with Dr. Raghav Jose. MD notified of urine output, color, and decrease in BP. Verbal orders received to administer an additional 500 cc NS bolus. Verbal orders also received to increase rate of continuous fluids to 125 mL/hour. Subjective:       Patient ID: Milady Bright is a 77 y.o. female.    Chief Complaint: routine f/u Type 2 Dm .      HPI: -Pt is a 77 y.o. wf  with a diagnosis of Type 2 diabetes mellitus diagnosed approximately 20110, as well as chronic conditions pending review including HTN, HLP.  Other pertinent medical and social information noted includes, but not limited to: NA.   Pt initially treated with Toujeo 35 units--but found she was hungry all the time.  Primary started her on Trulicity which has worked well--but cost prohibitive.  Thinks she was on metformin in the past and possibly with some diarrhea.  May have been on Januvia and or glimipiride. Not sure if and when--or why agent was changed.  Overall, glucoses quite reasonable--but the Trulicity will expedite pt into the pharmacy gap.  No  Focal complaints.     Interim Events:  Biggest issue is concern of memory loss--being seen by neuro.  Do note TSH last summer a little elevated.   Currently treated with cholestyramine for chronic diarrhea. Pt thought it may have been Januvia, so it was decreased from 100 mg to 25 mg  (as a1c markedly tight).  A1C remains in 5 % range. Only occasionally checks glucoses.  I do note a 20lb wt loss over the last year (intentional).    Diabetes Flow Sheet:  Diabetes Medications: Januvia 25  mg,       If insulin vial or pen preference:na  Prior failed/or not tolerated medication therapies:metformin-diarrhea, Trulicity-cost, Toujeo--pt c/o being hungry all the time.   Diabetes Complications:   Aspirin: 81 mg   Statin: pt refuses -,  Myalgias in past   ACE/ARB: irbesartan 300 mg   Last Urine Microalbumin:    Last Eye exam:annuall   Last Diabetic Education:    Glucometer:none     Review of Systems   Constitutional: Negative for activity change and fatigue.   HENT: Negative for hearing loss and trouble swallowing.    Eyes: Negative for photophobia and visual disturbance.        Last Eye Exam: UTD.    Respiratory: Positive for shortness of  breath (but does not interfere with activity. ). Negative for cough.    Cardiovascular: Negative for chest pain and palpitations.   Gastrointestinal: Positive for diarrhea (this continues to be intermittent--as a result has not restarted metformin, --is seeing gastro--added fiber-improved. ). Negative for constipation.   Genitourinary: Negative for frequency and urgency.   Musculoskeletal: Positive for arthralgias (ankles). Negative for myalgias.   Skin: Negative for rash and wound.   Allergic/Immunologic: Negative for food allergies.   Neurological: Negative for weakness and numbness.   Psychiatric/Behavioral: Negative for sleep disturbance. The patient is not nervous/anxious.        Objective:      Physical Exam  Constitutional:       Appearance: Normal appearance. She is well-developed.      Comments: Appears younger than age, well groomed.    HENT:      Head: Normocephalic and atraumatic.      Nose: Nose normal.   Eyes:      Conjunctiva/sclera: Conjunctivae normal.      Pupils: Pupils are equal, round, and reactive to light.   Neck:      Thyroid: No thyromegaly.      Trachea: No tracheal deviation.   Cardiovascular:      Rate and Rhythm: Normal rate and regular rhythm.      Pulses: Normal pulses.      Heart sounds: Normal heart sounds.   Pulmonary:      Effort: Pulmonary effort is normal.      Breath sounds: Normal breath sounds.   Musculoskeletal:         General: No deformity. Normal range of motion.      Cervical back: Normal range of motion and neck supple.      Comments: Deferred Feet: No open wounds or calluses.  Bunions, toe deformities,. Note some marked callus b/w some toes. .   Vibratory sensation to feet markedly decreased bilaterally     Kyphosis  Ambulates with cane      Skin:     General: Skin is warm and dry.   Neurological:      General: No focal deficit present.      Mental Status: She is alert and oriented to person, place, and time.   Psychiatric:         Mood and Affect: Mood normal.          "Behavior: Behavior normal.         Thought Content: Thought content normal.         Judgment: Judgment normal.           /64 (BP Location: Right arm, Patient Position: Sitting, BP Method: Large (Manual))   Pulse 92   Ht 5' 7" (1.702 m)   Wt 67.4 kg (148 lb 11.2 oz)   BMI 23.29 kg/m²     Hemoglobin A1C   Date Value Ref Range Status   05/10/2022 5.2 4.0 - 5.6 % Final     Comment:     ADA Screening Guidelines:  5.7-6.4%  Consistent with prediabetes  >or=6.5%  Consistent with diabetes    High levels of fetal hemoglobin interfere with the HbA1C  assay. Heterozygous hemoglobin variants (HbS, HgC, etc)do  not significantly interfere with this assay.   However, presence of multiple variants may affect accuracy.     02/18/2022 5.8 (H) 4.0 - 5.6 % Final     Comment:     ADA Screening Guidelines:  5.7-6.4%  Consistent with prediabetes  >or=6.5%  Consistent with diabetes    High levels of fetal hemoglobin interfere with the HbA1C  assay. Heterozygous hemoglobin variants (HbS, HgC, etc)do  not significantly interfere with this assay.   However, presence of multiple variants may affect accuracy.     10/13/2021 6.1 (H) 4.0 - 5.6 % Final     Comment:     ADA Screening Guidelines:  5.7-6.4%  Consistent with prediabetes  >or=6.5%  Consistent with diabetes    High levels of fetal hemoglobin interfere with the HbA1C  assay. Heterozygous hemoglobin variants (HbS, HgC, etc)do  not significantly interfere with this assay.   However, presence of multiple variants may affect accuracy.         Chemistry        Component Value Date/Time     02/25/2022 1012     08/05/2015 0840    K 4.3 02/25/2022 1012    K 4.1 08/05/2015 0840     02/25/2022 1012    CL 99 08/05/2015 0840    CO2 24 02/25/2022 1012    BUN 17 02/25/2022 1012    CREATININE 0.71 02/25/2022 1012    CREATININE 0.64 08/05/2015 0840     (H) 02/25/2022 1012        Component Value Date/Time    CALCIUM 9.7 02/25/2022 1012    ALKPHOS 80 07/30/2021 0815    " AST 44 (H) 07/30/2021 0815    AST 27 02/12/2016 0728    ALT 29 07/30/2021 0815    BILITOT 0.7 07/30/2021 0815    ESTGFRAFRICA >60 02/25/2022 1012    EGFRNONAA >60 02/25/2022 1012          Lab Results   Component Value Date    CHOL 165 02/25/2022     Lab Results   Component Value Date    HDL 80 (H) 02/25/2022     Lab Results   Component Value Date    LDLCALC 61.4 (L) 02/25/2022     Lab Results   Component Value Date    TRIG 118 02/25/2022     Lab Results   Component Value Date    CHOLHDL 48.5 02/25/2022     Lab Results   Component Value Date    MICALBCREAT 5.9 02/25/2022     Lab Results   Component Value Date    TSH 4.610 (H) 07/30/2021     Vit D, 25-Hydroxy   Date Value Ref Range Status   05/16/2017 49 30 - 96 ng/mL Final     Comment:     Vitamin D deficiency.........<10 ng/mL                              Vitamin D insufficiency......10-29 ng/mL       Vitamin D sufficiency........> or equal to 30 ng/mL  Vitamin D toxicity............>100 ng/mL           Assessment:     1. Type 2 diabetes mellitus with diabetic polyneuropathy, without long-term current use of insulin     2. Benign essential HTN     3. Hypercholesterolemia     4. Polyneuropathy associated with underlying disease     5. Memory loss  TSH             Plan:    d/c Januvia  DM in remission with wt loss.  Advised if illness or steroids should check glucoses more.   Can have primary follow.     No statin--reports myalgias on multiple agents in past.   Cont irbesartan.     ORDERS 05/17/2022    tsh today only (in setting of memory loss) Will send message.     Return to primary care

## 2022-05-24 ENCOUNTER — TELEPHONE (OUTPATIENT)
Dept: CARDIOLOGY CLINIC | Age: 57
End: 2022-05-24

## 2022-05-24 DIAGNOSIS — I42.8 NICM (NONISCHEMIC CARDIOMYOPATHY) (HCC): Primary | ICD-10-CM

## 2022-05-24 NOTE — PROGRESS NOTES
This note will not be viewable in [address] ALERT REMOTE CHECK     Preliminary Impression: Requires physician review due to ICD shock w/ syncope/ VT/ denies chest pain or sob/ advised ER for recurrent syncope or icd shocks/ hx VT on amiodarone/ advised no driving/ device appt 5/24 scheduled. Following MD: Dr. Luciana Seo  Implanting MD: Dr. Amina Schuler had an alert remote noting 1 episode in the VF zone on 5/24 at 0708. Strip confirming VT detected and 1 ATP and 1 40J ICD shock terminated to sinus. Full EGM not available. Patient reported syncope. Denies active chest pain or sob. Denies and changes or new complaints. Advised no driving and to stay rested tonight. ER for recurrent episodes or symptoms. The device report was generated from the remote website, and the results were forwarded to the ordering provider for review. Please see Interrogation report for the final results.      Hayden Lundy RN  5/24/2022  3:52 PM

## 2022-05-24 NOTE — TELEPHONE ENCOUNTER
Remote showing episode of VT detected w/ 1 ATP and 1 ICD shock delivered at 0700am. Full EGM not given. Patient reported syncope. Currently denies active complaints. Denies chest pain or sob. Advised no driving. Advised to stay rested through the night. ER for any further episodes, symptoms or ICD shocks. Appt scheduled 5/25.

## 2022-05-24 NOTE — TELEPHONE ENCOUNTER
Patient called stating she was at work and felt her heart go into a different rhythm. When this happened she passed out. No active chest pain. Please call and advise.

## 2022-05-25 ENCOUNTER — NURSE ONLY (OUTPATIENT)
Dept: CARDIOLOGY CLINIC | Age: 57
End: 2022-05-25

## 2022-05-25 DIAGNOSIS — I47.20 VENTRICULAR TACHYCARDIA: ICD-10-CM

## 2022-05-25 DIAGNOSIS — I47.20 VENTRICULAR TACHYCARDIA: Primary | ICD-10-CM

## 2022-05-25 LAB
ANION GAP SERPL CALC-SCNC: 6 MMOL/L (ref 7–16)
BUN SERPL-MCNC: 35 MG/DL (ref 6–23)
CALCIUM SERPL-MCNC: 9.7 MG/DL (ref 8.3–10.4)
CHLORIDE SERPL-SCNC: 107 MMOL/L (ref 98–107)
CO2 SERPL-SCNC: 28 MMOL/L (ref 21–32)
CREAT SERPL-MCNC: 2 MG/DL (ref 0.6–1)
GLUCOSE SERPL-MCNC: 132 MG/DL (ref 65–100)
MAGNESIUM SERPL-MCNC: 2.7 MG/DL (ref 1.8–2.4)
POTASSIUM SERPL-SCNC: 3.7 MMOL/L (ref 3.5–5.1)
SODIUM SERPL-SCNC: 141 MMOL/L (ref 136–145)

## 2022-05-25 NOTE — PROGRESS NOTES
This note will not be viewable in [address] IN OFFICE CHECK    Preliminary Impression: Requires physician review due to ICD shock x1/ VT w/ syncope/ ICD ATP adjusted/Reviewed w/ Dr. Shahzad Long increased 200mg bid and labs ordered/ ep eval scheduled . Following MD: Dr. Carlos Curtis  Implanting MD: Dr. Bedoya Session presented to the [address] [place] [address] Drive today for a device check after episode of syncope w/ VT. VF ATP adjusted. Hx VT. Labs and amio adjusted. Plan as above. Last cath in 2019 w/ normal coronaries. The device was interrogated, and the results were forwarded to the ordering provider for review.      Marc Vasques RN  5/25/2022  1:35 PM

## 2022-05-26 ENCOUNTER — TELEPHONE (OUTPATIENT)
Dept: CARDIOLOGY CLINIC | Age: 57
End: 2022-05-26

## 2022-05-26 RX ORDER — AMIODARONE HYDROCHLORIDE 200 MG/1
400 TABLET ORAL DAILY
Qty: 180 TABLET | Refills: 1 | Status: ON HOLD | OUTPATIENT
Start: 2022-05-26 | End: 2022-08-17 | Stop reason: SDUPTHER

## 2022-05-26 NOTE — TELEPHONE ENCOUNTER
Amiodarone dose doubled due to recent ICD shock for VT, new prescription sent to [facility]

## 2022-06-16 LAB
ANION GAP SERPL CALC-SCNC: 9 MMOL/L (ref 7–16)
BUN SERPL-MCNC: 41 MG/DL (ref 6–23)
CALCIUM SERPL-MCNC: 9.7 MG/DL (ref 8.3–10.4)
CHLORIDE SERPL-SCNC: 105 MMOL/L (ref 98–107)
CO2 SERPL-SCNC: 27 MMOL/L (ref 21–32)
CREAT SERPL-MCNC: 2.2 MG/DL (ref 0.6–1)
ERYTHROCYTE [DISTWIDTH] IN BLOOD BY AUTOMATED COUNT: 13.4 % (ref 11.9–14.6)
GLUCOSE SERPL-MCNC: 97 MG/DL (ref 65–100)
HCT VFR BLD AUTO: 45.5 % (ref 35.8–46.3)
HGB BLD-MCNC: 14.6 G/DL (ref 11.7–15.4)
MAGNESIUM SERPL-MCNC: 2.7 MG/DL (ref 1.8–2.4)
MCH RBC QN AUTO: 31.7 PG (ref 26.1–32.9)
MCHC RBC AUTO-ENTMCNC: 32.1 G/DL (ref 31.4–35)
MCV RBC AUTO: 98.7 FL (ref 79.6–97.8)
NRBC # BLD: 0 K/UL (ref 0–0.2)
PLATELET # BLD AUTO: 139 K/UL (ref 150–450)
PMV BLD AUTO: 12.1 FL (ref 9.4–12.3)
POTASSIUM SERPL-SCNC: 4.1 MMOL/L (ref 3.5–5.1)
RBC # BLD AUTO: 4.61 M/UL (ref 4.05–5.2)
SODIUM SERPL-SCNC: 141 MMOL/L (ref 136–145)
WBC # BLD AUTO: 7 K/UL (ref 4.3–11.1)

## 2022-06-17 LAB — 25(OH)D3 SERPL-MCNC: 56.9 NG/ML (ref 30–100)

## 2022-06-18 LAB
CALCIUM SERPL-MCNC: 9.8 MG/DL (ref 8.7–10.2)
PTH-INTACT SERPL-MCNC: ABNORMAL PG/ML (ref 15–65)

## 2022-06-24 ENCOUNTER — OFFICE VISIT (OUTPATIENT)
Dept: CARDIOLOGY CLINIC | Age: 57
End: 2022-06-24
Payer: MEDICARE

## 2022-06-24 VITALS
SYSTOLIC BLOOD PRESSURE: 122 MMHG | HEART RATE: 65 BPM | BODY MASS INDEX: 41.33 KG/M2 | DIASTOLIC BLOOD PRESSURE: 78 MMHG | WEIGHT: 205 LBS | HEIGHT: 59 IN

## 2022-06-24 DIAGNOSIS — I50.22 CHRONIC SYSTOLIC CONGESTIVE HEART FAILURE (HCC): Primary | ICD-10-CM

## 2022-06-24 DIAGNOSIS — I25.10 CAD IN NATIVE ARTERY: ICD-10-CM

## 2022-06-24 DIAGNOSIS — I47.20 VENTRICULAR TACHYCARDIA: ICD-10-CM

## 2022-06-24 PROCEDURE — 93000 ELECTROCARDIOGRAM COMPLETE: CPT | Performed by: INTERNAL MEDICINE

## 2022-06-24 PROCEDURE — G8417 CALC BMI ABV UP PARAM F/U: HCPCS | Performed by: INTERNAL MEDICINE

## 2022-06-24 PROCEDURE — G8427 DOCREV CUR MEDS BY ELIG CLIN: HCPCS | Performed by: INTERNAL MEDICINE

## 2022-06-24 PROCEDURE — 99214 OFFICE O/P EST MOD 30 MIN: CPT | Performed by: INTERNAL MEDICINE

## 2022-06-24 ASSESSMENT — ENCOUNTER SYMPTOMS
ALLERGIC/IMMUNOLOGIC NEGATIVE: 1
EYES NEGATIVE: 1
SHORTNESS OF BREATH: 1
GASTROINTESTINAL NEGATIVE: 1

## 2022-06-24 NOTE — PROGRESS NOTES
[facility] CARDIOLOGY  [address] [facility], [address]  PHONE: [phone]        22      NAME:  Vivi Allan  : 1965  MRN: [id number]     Referring Cardiologist: Kori Avelar DO    Reason for Consultation: VT/VF     ASSESSMENT and PLAN:  Kashif Brown was seen today for new patient and irregular heart beat. Diagnoses and all orders for this visit:    CHF (congestive heart failure) ([address]    Ventricular tachycardia ([address] s/p ICD in     CAD in native artery    CKD     60year old female with recent ICD shock. She has a history of NICM? With large aneurysmal LV. She is now taking amiodarone BID.     -VT/VF - continue amiodarone 200 mg po BID for now. ICD recently interrogated. -HFrEF - continue GDMT. -Mild chest pain - close observation. Discuss with Dr. Bossman Milan, possible need for [facility]. -Yearly EP follow up. Patient has been instructed and agrees to call our office with any issues or other concerns related to their cardiac condition(s) and/or complaint(s). No follow-up provider specified. Thank you for allowing me to participate in the electrophysiologic care of Ms. Vivi Allan. Please contact me if any questions or concerns were to arise. Lizabeth Navarro.  Dominick NAM, MS  Clinical Cardiac Electrophysiology  [facility] Cardiology  22  12:16 PM    ===================================================================  Chief Complant:    Chief Complaint   Patient presents with    New Patient     per Dr. Bossman Milan pt    Irregular Heart Beat     VT/ amio increased/ recently skocked        Consultation is requested by Sherrie Heart MD for evaluation of New Patient (per Dr. Bossman Milan pt) and Irregular Heart Beat (VT/ amio increased/ recently skocked)    History:  Vivi Allan is a most pleasant 60 y.o. female with a past medical and cardiac history significant for HFrEF s/p DC ICD implanted in  with recent ICD shock, appropriate, 1st ever shock per pt. She sees Dr. Debbie Rodriguez. She does c/o some mild chest tightness. Her LHC in the past was reported as normal. She was recently shocked for appropriate VF/VF and is now taking amiodarone 200 mg po BID. The patient otherwise denies presyncope, syncope or lateralizing symptoms. Cardiac PMH: (Old records have been reviewed and summarized below)   1.  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     2.  Normal coronary arteries. Echo 1/2020: EF 30-35%   4/2020:  a/c SHF    11/2020: HF admission, diuresed 7L    EKG:  (EKG has been independently visualized by me with interpretation below): Atrial pacing, V sensing. Nonspecific QRS widening, PVCs, normal axis. ECHO: 4/2022    Left Ventricle: Left ventricle is mildly dilated. Mildly increased wall thickness. Moderate global hypokinesis present. Moderately reduced left ventricular systolic function with a visually estimated EF of 30 - 35%. Abnormal diastolic function.   Mitral Valve: Mild transvalvular regurgitation.   Left Atrium: Left atrium is moderately dilated. LA Vol Index is  32 ml/m2. Previous Heart Catheterization: n/a     Stress Test: n/a     DEVICE INTERROGATION: n/a     Past Medical History, Past Surgical History, Family history, Social History, and Medications were all reviewed with the patient today and updated as necessary.      Current Outpatient Medications   Medication Sig Dispense Refill    amiodarone (CORDARONE) 200 MG tablet Take 2 tablets by mouth daily 180 tablet 1    acetaminophen (TYLENOL) 325 MG tablet Take 325 mg by mouth every 6 hours as needed      albuterol sulfate  (90 Base) MCG/ACT inhaler 2 puffs qid prn shortness of breath or wheezing      aspirin 81 MG EC tablet Take by mouth daily      bacitracin zinc 500 UNIT/GM ointment Apply topically daily      carvedilol (COREG) 6.25 MG tablet Take 6.25 mg by mouth 2 times daily (with meals)      vitamin D 25 MCG (1000 UT) CAPS Take by mouth daily      empagliflozin (JARDIANCE) 10 MG tablet Take 10 mg by mouth daily      insulin glargine (LANTUS SOLOSTAR) 100 UNIT/ML injection pen Inject 15 Units into the skin      isosorbide mononitrate (IMDUR) 30 MG extended release tablet Take 30 mg by mouth      latanoprost (XALATAN) 0.005 % ophthalmic solution Apply 1 drop to eye      nitroGLYCERIN (NITROSTAT) 0.4 MG SL tablet Place 0.4 mg under the tongue      ondansetron (ZOFRAN-ODT) 4 MG disintegrating tablet Take 1 tablet by mouth 3 times daily as needed      oxyCODONE (ROXICODONE) 5 MG immediate release tablet Take 1 tablet by mouth every 6 hours as needed.  polyethylene glycol (GLYCOLAX) 17 GM/SCOOP powder Take 17 g by mouth daily as needed      pravastatin (PRAVACHOL) 80 MG tablet Take 80 mg by mouth      sacubitril-valsartan (ENTRESTO) 49-51 MG per tablet Take 1 tablet by mouth 2 times daily      torsemide (DEMADEX) 20 MG tablet Take 20 mg by mouth 2 times daily       No current facility-administered medications for this visit. Allergies   Allergen Reactions    Other Hives and Shortness Of Breath     \"inhaler PRN\"    Penicillin G Itching       Past Medical History:   Diagnosis Date    Abdominal wall hernia 10/2/2018    Acute hypoxemic respiratory failure ([facility] 4/3/2019    Allergic rhinitis     followed by allergist; allergic to grass- has rescue inhaler PRN    ARDS (adult respiratory distress syndrome) ([facility] 4/3/2019    Arthritis     Automatic implantable cardioverter-defibrillator in situ 3/30/2016    CAD in native artery 3/30/2016    Chronic kidney disease     Chronic systolic heart failure ([facility] 07/18/2013    ECHO 2017- EF 39%; managed with medications; [place] [facility]    CKD (chronic kidney disease)     [facility] follows    Diabetes ([facility]     type 2 (on insulin);  FBS AM range- , hypo s/s <70, hgba1c- 7.9    Diabetes mellitus ([facility] 4/12/2010    Dyslipidemia 2/13/2013    Eczema     Fibromyalgia     GERD (gastroesophageal reflux disease)     hx of- no meds currently    Headache     Heart failure (HCC)     chronic systolic with EF 89%; non-ischemic cardiomyopathy    HTN (hypertension) 4/12/2010    Hypercholesterolemia     Incarcerated incisional hernia 3/26/2019    Morbid obesity ([address]     Neuropathy     bilateral feet and tips of fingers    NICM (nonischemic cardiomyopathy) ([address] 2/15/2013    Obesity     bmi- 39    Obstructive sleep apnea (adult) (pediatric) 07/14/2014    uses cpap qhs    Pseudomonas urinary tract infection 4/5/2019    Sciatic pain 5/23/2016    Severe persistent asthma with acute exacerbation 8/24/2020    SVT (supraventricular tachycardia) ([facility]     ablation for svt    Tobacco use disorder 4/12/2010     Past Surgical History:   Procedure Laterality Date    ABLATION OF DYSRHYTHMIC FOCUS  \"years ago\"    CARDIAC DEFIBRILLATOR PLACEMENT  07/18/2013    Biotronik dual chamber ICD by Dr. Wily Vasques  07/18/2013    Biotronik dual chamber ICD by Dr. Eron Alonso      x1    COLONOSCOPY N/A 7/7/2021    COLONOSCOPY/BMI 50 PT HAS AN ICD performed by Reynold Barragan MD at [address] [place] [address]  03/26/2019    mild incarceration, no bowel removal    HYSTERECTOMY (CERVIX STATUS UNKNOWN)      EDITH-Left ovary intact per pt    LEFT HEART CATH,PERCUTANEOUS  2/13/2013    no intervention    PACEMAKER      ICD    ROTATOR CUFF REPAIR Right     TONSILLECTOMY       Family History   Problem Relation Age of Onset    Diabetes Other     Heart Disease Father     Hypertension Father     Stroke Father     Heart Attack Father 48        mi    Breast Cancer Sister 37    Hypertension Brother     Heart Disease Brother     Diabetes Brother     Stroke Mother      Social History     Tobacco Use    Smoking status: Current Some Day Smoker     Packs/day: 0.25    Smokeless tobacco: Never Used    Tobacco comment: Quit smoking: \"every now and then\"   Substance Use Topics    Alcohol use: Yes     Alcohol/week: 1.0 standard drink       ROS:  A comprehensive review of systems was performed with the pertinent positives and negatives as noted in the HPI in addition to:  Review of Systems   Constitutional: Negative. HENT: Negative. Eyes: Negative. Respiratory: Positive for shortness of breath. Cardiovascular: Positive for chest pain. Gastrointestinal: Negative. Endocrine: Negative. Genitourinary: Negative. Musculoskeletal: Negative. Skin: Negative. Allergic/Immunologic: Negative. Neurological: Negative. Hematological: Negative. Psychiatric/Behavioral: Negative. All other systems reviewed and are negative. PHYSICAL EXAM:   /78   Pulse 65   Ht 4' 11\" (1.499 m)   Wt 205 lb (93 kg)   BMI 41.40 kg/m²      Wt Readings from Last 3 Encounters:   06/24/22 205 lb (93 kg)   04/06/22 205 lb 3.2 oz (93.1 kg)   04/06/22 204 lb (92.5 kg)     BP Readings from Last 3 Encounters:   06/24/22 122/78   04/06/22 106/68   04/06/22 118/78       Gen: Well appearing, well developed, no acute distress  Eyes: Pupils equal, round. Extraocular movements are intact  ENT: Oropharynx clear, no oral lesions, normal dentition  CV: S1S2, regular rate and rhythm, no murmurs, rubs or gallops, normal JVD, no carotid bruits, normal distal pulses, no TONY  Pulm: Clear to auscultation bilaterally, no accessory muscle uses, no wheezes or rales  GI: Soft, NT, ND, +BS  Neuro: Alert and oriented, nonfocal  Psych: Appropriate affect  Skin: Normal color and skin turgor  MSK: Normal muscle bulk and tone    Medical problems and test results were reviewed with the patient today. No results found for any visits on 06/24/22.

## 2022-06-24 NOTE — Clinical Note
Kush,    She is seeing you soon. Recent ICD shock for VT/VF. She seems to have a complex history and poor renal function. I think a LH would be reasonable but wanted to see what you thought about her before pulling that trigger in the effort to not box her kidneys. She does seem to have a little bit/not serious chest pain from previous.     JANESSA

## 2022-06-27 ENCOUNTER — TELEPHONE (OUTPATIENT)
Dept: CARDIOLOGY CLINIC | Age: 57
End: 2022-06-27

## 2022-06-27 NOTE — TELEPHONE ENCOUNTER
----- Message from Stephie Pruett DO sent at 6/24/2022  1:20 PM EDT -----  Can we find a spot to get her in sooner? Thanks    ----- Message -----  From: Shelby Monreal MD  Sent: 6/24/2022  12:30 PM EDT  To: Stephie Pruett DO    Kush,    She is seeing you soon. Recent ICD shock for VT/VF. She seems to have a complex history and poor renal function. I think a [facility] would be reasonable but wanted to see what you thought about her before pulling that trigger in the effort to not box her kidneys. She does seem to have a little bit/not serious chest pain from previous.     JJS

## 2022-06-27 NOTE — TELEPHONE ENCOUNTER
Called pt advised of Dr. Ruddy Gardner response. Pt gave a verbal understanding.  Scheduled for 6/30/22

## 2022-06-30 ENCOUNTER — OFFICE VISIT (OUTPATIENT)
Dept: CARDIOLOGY CLINIC | Age: 57
End: 2022-06-30
Payer: MEDICARE

## 2022-06-30 VITALS
SYSTOLIC BLOOD PRESSURE: 120 MMHG | BODY MASS INDEX: 41.12 KG/M2 | HEIGHT: 59 IN | DIASTOLIC BLOOD PRESSURE: 78 MMHG | HEART RATE: 60 BPM | WEIGHT: 204 LBS

## 2022-06-30 DIAGNOSIS — R06.02 SOB (SHORTNESS OF BREATH): ICD-10-CM

## 2022-06-30 DIAGNOSIS — Z79.899 LONG TERM CURRENT USE OF AMIODARONE: ICD-10-CM

## 2022-06-30 DIAGNOSIS — D69.6 THROMBOCYTOPENIA, UNSPECIFIED (HCC): ICD-10-CM

## 2022-06-30 DIAGNOSIS — I50.22 CHRONIC SYSTOLIC CONGESTIVE HEART FAILURE (HCC): ICD-10-CM

## 2022-06-30 DIAGNOSIS — G47.33 OSA ON CPAP: ICD-10-CM

## 2022-06-30 DIAGNOSIS — Z99.89 OSA ON CPAP: ICD-10-CM

## 2022-06-30 DIAGNOSIS — I42.8 NICM (NONISCHEMIC CARDIOMYOPATHY) (HCC): ICD-10-CM

## 2022-06-30 DIAGNOSIS — R07.89 CHEST TIGHTNESS: Primary | ICD-10-CM

## 2022-06-30 DIAGNOSIS — Z95.810 AUTOMATIC IMPLANTABLE CARDIOVERTER-DEFIBRILLATOR IN SITU: ICD-10-CM

## 2022-06-30 DIAGNOSIS — I47.20 VENTRICULAR TACHYCARDIA: ICD-10-CM

## 2022-06-30 DIAGNOSIS — N18.31 STAGE 3A CHRONIC KIDNEY DISEASE (HCC): ICD-10-CM

## 2022-06-30 PROBLEM — I20.0 ACCELERATING ANGINA (HCC): Status: ACTIVE | Noted: 2022-06-30

## 2022-06-30 PROCEDURE — 3017F COLORECTAL CA SCREEN DOC REV: CPT | Performed by: INTERNAL MEDICINE

## 2022-06-30 PROCEDURE — 93000 ELECTROCARDIOGRAM COMPLETE: CPT | Performed by: INTERNAL MEDICINE

## 2022-06-30 PROCEDURE — 99215 OFFICE O/P EST HI 40 MIN: CPT | Performed by: INTERNAL MEDICINE

## 2022-06-30 PROCEDURE — G8428 CUR MEDS NOT DOCUMENT: HCPCS | Performed by: INTERNAL MEDICINE

## 2022-06-30 PROCEDURE — 4004F PT TOBACCO SCREEN RCVD TLK: CPT | Performed by: INTERNAL MEDICINE

## 2022-06-30 PROCEDURE — G8417 CALC BMI ABV UP PARAM F/U: HCPCS | Performed by: INTERNAL MEDICINE

## 2022-06-30 NOTE — PROGRESS NOTES
[address], [address] [email or web address] [address]  PHONE: [phone]     22    NAME:  Jesus Howard  : 1965  MRN: [id number]       SUBJECTIVE:   Jesus Howard is a 60 y.o. female seen for a follow up visit regarding the following:     Chief Complaint   Patient presents with    Shortness of Breath     chest tightness    Irregular Heart Beat     \"feels left leg circulation is getting cut off\"       HPI:    Here for NICM/cSHF/VT. SHF (EF 30% in )      [facility] 2019:   Normal coronary arteries. Echo 2020: EF 30-35%   2020:  a/c SHF    2020: HF admission, diuresed 7L   Echo 2022: EF 30-35%     Recent ICD shock for VT, amio increased and seen by EP. She is having more chest pain and pressure. Into back and more SOB. NYHA Class III sx now. On torsemide daily most days, BID dosing PRN. Edema ok, DUARTE ok. On amio 200 BID now. Patient denies recent history of orthopnea, PND, excessive dizziness and/or syncope. Wearing CPAP every night now. Past Medical History, Past Surgical History, Family history, Social History, and Medications were all reviewed with the patient today and updated as necessary.      Current Outpatient Medications   Medication Sig Dispense Refill    amiodarone (CORDARONE) 200 MG tablet Take 2 tablets by mouth daily 180 tablet 1    acetaminophen (TYLENOL) 325 MG tablet Take 325 mg by mouth every 6 hours as needed      albuterol sulfate  (90 Base) MCG/ACT inhaler 2 puffs qid prn shortness of breath or wheezing      aspirin 81 MG EC tablet Take by mouth daily      carvedilol (COREG) 6.25 MG tablet Take 6.25 mg by mouth 2 times daily (with meals)      vitamin D 25 MCG (1000 UT) CAPS Take by mouth daily      empagliflozin (JARDIANCE) 10 MG tablet Take 10 mg by mouth daily      insulin glargine (LANTUS SOLOSTAR) 100 UNIT/ML injection pen Inject 15 Units into the skin      isosorbide mononitrate (IMDUR) 30 MG extended release tablet Take 30 mg by mouth      latanoprost (XALATAN) 0.005 % ophthalmic solution Apply 1 drop to eye      nitroGLYCERIN (NITROSTAT) 0.4 MG SL tablet Place 0.4 mg under the tongue      ondansetron (ZOFRAN-ODT) 4 MG disintegrating tablet Take 1 tablet by mouth 3 times daily as needed      oxyCODONE (ROXICODONE) 5 MG immediate release tablet Take 1 tablet by mouth every 6 hours as needed.  polyethylene glycol (GLYCOLAX) 17 GM/SCOOP powder Take 17 g by mouth daily as needed      pravastatin (PRAVACHOL) 80 MG tablet Take 80 mg by mouth      sacubitril-valsartan (ENTRESTO) 49-51 MG per tablet Take 1 tablet by mouth 2 times daily      torsemide (DEMADEX) 20 MG tablet Take 20 mg by mouth 2 times daily      bacitracin zinc 500 UNIT/GM ointment Apply topically daily       No current facility-administered medications for this visit.         Allergies   Allergen Reactions    Other Hives and Shortness Of Breath     \"inhaler PRN\"    Penicillin G Itching     Patient Active Problem List    Diagnosis Date Noted    Thrombocytopenia, unspecified 06/30/2022     Priority: Medium    Rectal bleeding 05/18/2021    CKD (chronic kidney disease) stage 4, GFR 15-29 ml/min ([facility] 11/25/2020    Pleural effusion on right 11/12/2020    CHF (congestive heart failure) ([facility] 11/10/2020    Acute on chronic HFrEF (heart failure with reduced ejection fraction) ([address] 11/10/2020    Lower abdominal pain 11/10/2020    CKD (chronic kidney disease) stage 3, GFR 30-59 ml/min ([address] 08/24/2020    Long term current use of amiodarone 06/12/2020    Restrictive lung disease 04/08/2020    Type 2 diabetes with nephropathy ([address] 04/02/2018    Chronic right-sided thoracic back pain 05/23/2016    Chronic left-sided low back pain with sciatica 05/23/2016    Ventricular tachycardia ([address] 04/18/2016    CAD in native artery 03/30/2016    Automatic implantable cardioverter-defibrillator in situ 03/30/2016 Biotronik dual ICD 7/2013  1/5 lost brother,11/8 had VF episode with ATP,no shock  Formatting of this note might be different from the original.  Overview:   Biotronik dual ICD 7/2013 1/5 lost brother,11/8 had VF episode with ATP,no shock        Morbid obesity ([facility] 07/14/2014    LORI on CPAP 07/14/2014    Chronic systolic heart failure ([facility] 07/18/2013     NST 4/15:low risk  Echo 6/2014:EF 40-45%  Echo 5/2013:EF 30-35%  LHC 2/2013: minimal CAD  Formatting of this note might be different from the original.  Overview:   NST 4/15:low risk  Echo 6/2014:EF 40-45%  Echo 5/2013:EF 30-35%  LHC 2/2013: minimal CAD        NICM (nonischemic cardiomyopathy) ([facility] 02/15/2013    Dyslipidemia 02/13/2013    HTN (hypertension) 04/12/2010    Long-term insulin use in type 2 diabetes ([facility] 04/12/2010      Past Surgical History:   Procedure Laterality Date    ABLATION OF DYSRHYTHMIC FOCUS  \"years ago\"   [address]  07/18/2013    Biotronik dual chamber ICD by Dr. Jose Manuel Monreal  07/18/2013    Biotronik dual chamber ICD by Dr. Hughes Pap      x1    COLONOSCOPY N/A 7/7/2021    COLONOSCOPY/BMI 50 PT HAS AN ICD performed by Darnell Nunez MD at [address] [place] [address]  03/26/2019    mild incarceration, no bowel removal    HYSTERECTOMY (CERVIX STATUS UNKNOWN)      EDITH-Left ovary intact per pt    LEFT HEART CATH,PERCUTANEOUS  2/13/2013    no intervention    PACEMAKER      ICD    ROTATOR CUFF REPAIR Right     TONSILLECTOMY       Family History   Problem Relation Age of Onset    Diabetes Other     Heart Disease Father     Hypertension Father     Stroke Father     Heart Attack Father 48        mi    Breast Cancer Sister 37    Hypertension Brother     Heart Disease Brother     Diabetes Brother     Stroke Mother      Social History     Tobacco Use    Smoking status: Current Some Day Smoker     Packs/day: 0.25    Smokeless tobacco: Never Used    Tobacco comment: Quit smoking: \"every now and then\"   Substance Use Topics    Alcohol use: Yes     Alcohol/week: 1.0 standard drink         ROS:    Constitutional:   Negative for fevers and unexplained weight loss. Eyes:   Negative for visual disturbance. ENT:   Negative for significant hearing loss and tinnitus. Respiratory:   Negative for hemoptysis. Cardiovascular:   Negative except as noted in HPI. Gastrointestinal:   Negative for melena and abdominal pain. Genitourinary:   Negative for hematuria, renal stones. Integumentary:   Negative for rash or non-healing wounds  Hematologic/Lymphatic:   Negative for excessive bleeding hx or clotting disorder. Musculoskeletal:  Negative for active, unexplained/severe joint pain. Neurological:   Negative for stroke. Behavioral/Psych:   Negative for suicidal ideations. Endocrine:   Negative for uncontrolled diabetic symptoms including polyuria, polydipsia and poor wound healing.      PHYSICAL EXAM:     /78   Pulse 60   Ht 4' 11\" (1.499 m)   Wt 204 lb (92.5 kg)   BMI 41.20 kg/m²    General/Constitutional:   Alert and oriented x 3, no acute distress  HEENT:   normocephalic, atraumatic, moist mucous membranes  Neck:   No JVD or carotid bruits bilaterally  Cardiovascular:   regular rate and rhythm, no murmur/rub/gallop appreciated  Pulmonary:   clear to auscultation bilaterally, no respiratory distress  Abdomen:   soft, non-tender, non-distended  Ext:   No sig LE edema bilaterally  Skin:  warm and dry, no obvious rashes seen  Neuro:   no obvious sensory or motor deficits  Psychiatric:   normal mood and affect      Lab Results   Component Value Date/Time     06/16/2022 09:26 AM    K 4.1 06/16/2022 09:26 AM     06/16/2022 09:26 AM    CO2 27 06/16/2022 09:26 AM    BUN 41 06/16/2022 09:26 AM    CREATININE 2.20 06/16/2022 09:26 AM    GLUCOSE 97 06/16/2022 09:26 AM    CALCIUM 9.7 06/16/2022 09:26 AM    CALCIUM 9.8 06/16/2022 09:26 AM        Lab Results   Component Value Date    WBC 7.0 06/16/2022    HGB 14.6 06/16/2022    HCT 45.5 06/16/2022    MCV 98.7 (H) 06/16/2022     (L) 06/16/2022       Lab Results   Component Value Date    TSH 1.130 12/22/2021       Lab Results   Component Value Date    LABA1C 7.2 (H) 03/31/2022     Lab Results   Component Value Date     03/31/2022       Lab Results   Component Value Date    CHOL 215 (H) 12/22/2021    CHOL 185 06/01/2021    CHOL 190 03/09/2021     Lab Results   Component Value Date    TRIG 91 12/22/2021    TRIG 71 06/01/2021    TRIG 70 03/09/2021     Lab Results   Component Value Date    HDL 79 12/22/2021    HDL 66 06/01/2021    HDL 74 03/09/2021     Lab Results   Component Value Date    LDLCALC 120 (H) 12/22/2021    LDLCALC 106 (H) 06/01/2021    LDLCALC 103 (H) 03/09/2021     Lab Results   Component Value Date    LABVLDL 14 08/17/2020    LABVLDL 17 12/31/2019    VLDL 16 12/22/2021    VLDL 13 06/01/2021    VLDL 13 03/09/2021     No results found for: CHOLHDLRATIO        I have Independently reviewed prior care notes, any ER records available, cardiac testing, labs and results with the patient and before seeing the patient today. Also independently reviewed outside records when available. ASSESSMENT:    Woody Cortes was seen today for shortness of breath and irregular heart beat. Diagnoses and all orders for this visit:    Chest tightness  -     Cancel: EKG 12 Lead; Future  -     EKG 12 Lead  -     Comprehensive Metabolic Panel; Future  -     CBC; Future  -     Magnesium; Future  -     TSH; Future  -     Case Request Cardiac Cath Lab    Ventricular tachycardia (HCC)  -     Cancel: EKG 12 Lead; Future  -     EKG 12 Lead  -     Comprehensive Metabolic Panel; Future  -     CBC; Future  -     Magnesium; Future  -     TSH;  Future  -     Case Request Cardiac Cath Lab    SOB (shortness of breath)  -     Cancel: EKG 12 Lead; Future  -     EKG 12 Lead    Thrombocytopenia, unspecified    Chronic systolic congestive heart failure (HCC)    NICM (nonischemic cardiomyopathy) (HCC)    Automatic implantable cardioverter-defibrillator in situ    LORI on CPAP    Stage 3a chronic kidney disease ([facility]    Long term current use of amiodarone          PLAN:      1. CSHF/NICM:  On entresto, coreg, imdur, aldactone, torsemide. Remain on GDMT. Low salt diet needed. EF the same since 2013, ~30%      Abnormal PFTs, working on quitting smoking, Saw pulm, remain on amio. No evidence of ILD on HRCT. Will follow. 2. VT:  Remain on amio and Mg for VT, follow for toxicity on these meds. Stress seems to drive much of the VT. Discussed with EP, more CP now, recent VT, requesting LHC. Plan on LHC. Plan for Left Heart Catheterization and possible Percutaneous Coronary Intervention (PCI) at [facility] due to Worsening angina within the last 2 mos as described in HPI. Discussed risk of cardiac catheterization and potential PCI with the patient in detail. These risks include, but are not limited to, bleeding, stroke, heart attack, cardiac arrhythmias, allergic reactions, atheroemboli, acute kidney injury and cardiac arrest/death. Local complications at the site of catheter insertion were also reviewed and discussed. The patient voiced complete understanding about these risks. The patient agrees to proceed with the aforementioned associated risks. Check labs in AM, needs hydration protocol for CKD. 3. CRI:  Seeing renal.     4. LORI:  On CPAP, seems compliant now. Reviewed and stressed today. Patient has been instructed and agrees to call our office with any issues or other concerns related to their cardiac condition(s) and/or complaint(s). Return in about 1 month (around 7/30/2022).        GUALBERTO HERNÁNDEZ, DO  6/30/2022

## 2022-07-01 ENCOUNTER — APPOINTMENT (OUTPATIENT)
Dept: NON INVASIVE DIAGNOSTICS | Age: 57
End: 2022-07-01
Attending: INTERNAL MEDICINE
Payer: MEDICARE

## 2022-07-01 ENCOUNTER — HOSPITAL ENCOUNTER (OUTPATIENT)
Age: 57
Setting detail: OUTPATIENT SURGERY
Discharge: HOME | End: 2022-07-01
Attending: INTERNAL MEDICINE | Admitting: INTERNAL MEDICINE
Payer: MEDICARE

## 2022-07-01 VITALS
HEART RATE: 60 BPM | DIASTOLIC BLOOD PRESSURE: 71 MMHG | WEIGHT: 202 LBS | OXYGEN SATURATION: 97 % | SYSTOLIC BLOOD PRESSURE: 126 MMHG | HEIGHT: 55 IN | BODY MASS INDEX: 46.75 KG/M2 | TEMPERATURE: 98 F

## 2022-07-01 DIAGNOSIS — I20.0 ACCELERATING ANGINA (HCC): ICD-10-CM

## 2022-07-01 DIAGNOSIS — R94.30 WALL MOTION ABNORMALITY OF INFERIOR WALL OF LEFT VENTRICLE: ICD-10-CM

## 2022-07-01 LAB
ANION GAP SERPL CALC-SCNC: 8 MMOL/L (ref 7–16)
BUN SERPL-MCNC: 34 MG/DL (ref 6–23)
CALCIUM SERPL-MCNC: 9.6 MG/DL (ref 8.3–10.4)
CHLORIDE SERPL-SCNC: 111 MMOL/L (ref 98–107)
CO2 SERPL-SCNC: 25 MMOL/L (ref 21–32)
CREAT SERPL-MCNC: 1.9 MG/DL (ref 0.6–1)
ECHO BSA: 1.89 M2
ECHO BSA: 1.89 M2
EKG ATRIAL RATE: 60 BPM
EKG DIAGNOSIS: NORMAL
EKG P AXIS: 86 DEGREES
EKG P-R INTERVAL: 244 MS
EKG Q-T INTERVAL: 440 MS
EKG QRS DURATION: 88 MS
EKG QTC CALCULATION (BAZETT): 440 MS
EKG R AXIS: 16 DEGREES
EKG T AXIS: -34 DEGREES
EKG VENTRICULAR RATE: 60 BPM
ERYTHROCYTE [DISTWIDTH] IN BLOOD BY AUTOMATED COUNT: 13.2 % (ref 11.9–14.6)
GLUCOSE SERPL-MCNC: 137 MG/DL (ref 65–100)
HCT VFR BLD AUTO: 43.3 % (ref 35.8–46.3)
HGB BLD-MCNC: 14.4 G/DL (ref 11.7–15.4)
LV EF: 38 %
LVEF MODALITY: NORMAL
MAGNESIUM SERPL-MCNC: 2.7 MG/DL (ref 1.8–2.4)
MCH RBC QN AUTO: 31.9 PG (ref 26.1–32.9)
MCHC RBC AUTO-ENTMCNC: 33.3 G/DL (ref 31.4–35)
MCV RBC AUTO: 96 FL (ref 79.6–97.8)
NRBC # BLD: 0 K/UL (ref 0–0.2)
PLATELET # BLD AUTO: 118 K/UL (ref 150–450)
PMV BLD AUTO: 12 FL (ref 9.4–12.3)
POTASSIUM SERPL-SCNC: 4.2 MMOL/L (ref 3.5–5.1)
RBC # BLD AUTO: 4.51 M/UL (ref 4.05–5.2)
SODIUM SERPL-SCNC: 144 MMOL/L (ref 136–145)
WBC # BLD AUTO: 7.3 K/UL (ref 4.3–11.1)

## 2022-07-01 PROCEDURE — 99152 MOD SED SAME PHYS/QHP 5/>YRS: CPT | Performed by: INTERNAL MEDICINE

## 2022-07-01 PROCEDURE — 93308 TTE F-UP OR LMTD: CPT | Performed by: INTERNAL MEDICINE

## 2022-07-01 PROCEDURE — 93005 ELECTROCARDIOGRAM TRACING: CPT | Performed by: INTERNAL MEDICINE

## 2022-07-01 PROCEDURE — 93458 L HRT ARTERY/VENTRICLE ANGIO: CPT | Performed by: INTERNAL MEDICINE

## 2022-07-01 PROCEDURE — 93325 DOPPLER ECHO COLOR FLOW MAPG: CPT | Performed by: INTERNAL MEDICINE

## 2022-07-01 PROCEDURE — C1887 CATHETER, GUIDING: HCPCS | Performed by: INTERNAL MEDICINE

## 2022-07-01 PROCEDURE — 80048 BASIC METABOLIC PNL TOTAL CA: CPT

## 2022-07-01 PROCEDURE — C1769 GUIDE WIRE: HCPCS | Performed by: INTERNAL MEDICINE

## 2022-07-01 PROCEDURE — 2709999900 HC NON-CHARGEABLE SUPPLY: Performed by: INTERNAL MEDICINE

## 2022-07-01 PROCEDURE — 6360000002 HC RX W HCPCS: Performed by: INTERNAL MEDICINE

## 2022-07-01 PROCEDURE — 2580000003 HC RX 258: Performed by: INTERNAL MEDICINE

## 2022-07-01 PROCEDURE — C8924 2D TTE W OR W/O FOL W/CON,FU: HCPCS

## 2022-07-01 PROCEDURE — 83735 ASSAY OF MAGNESIUM: CPT

## 2022-07-01 PROCEDURE — 6360000002 HC RX W HCPCS

## 2022-07-01 PROCEDURE — 2500000003 HC RX 250 WO HCPCS: Performed by: INTERNAL MEDICINE

## 2022-07-01 PROCEDURE — 6360000004 HC RX CONTRAST MEDICATION: Performed by: INTERNAL MEDICINE

## 2022-07-01 PROCEDURE — 85027 COMPLETE CBC AUTOMATED: CPT

## 2022-07-01 PROCEDURE — C1894 INTRO/SHEATH, NON-LASER: HCPCS | Performed by: INTERNAL MEDICINE

## 2022-07-01 RX ORDER — NITROGLYCERIN 20 MG/100ML
INJECTION INTRAVENOUS PRN
Status: DISCONTINUED | OUTPATIENT
Start: 2022-07-01 | End: 2022-07-01 | Stop reason: HOSPADM

## 2022-07-01 RX ORDER — HEPARIN SODIUM 200 [USP'U]/100ML
INJECTION, SOLUTION INTRAVENOUS CONTINUOUS PRN
Status: DISCONTINUED | OUTPATIENT
Start: 2022-07-01 | End: 2022-07-01 | Stop reason: HOSPADM

## 2022-07-01 RX ORDER — ASPIRIN 81 MG/1
324 TABLET, CHEWABLE ORAL DAILY
Status: DISCONTINUED | OUTPATIENT
Start: 2022-07-01 | End: 2022-07-01 | Stop reason: HOSPADM

## 2022-07-01 RX ORDER — LIDOCAINE HYDROCHLORIDE 10 MG/ML
INJECTION, SOLUTION INFILTRATION; PERINEURAL PRN
Status: DISCONTINUED | OUTPATIENT
Start: 2022-07-01 | End: 2022-07-01 | Stop reason: HOSPADM

## 2022-07-01 RX ADMIN — PERFLUTREN 3 ML: 6.52 INJECTION, SUSPENSION INTRAVENOUS at 15:05

## 2022-07-01 NOTE — Clinical Note
Contrast Dose Calculator:   Patient's age: 60.   Patient's sex: Female. Patient weight (kg) = 91.6. Creatinine level (mg/dL) = 1.9. Creatinine clearance (mL/min): 47.   Contrast concentration (mg/mL) = 370. MACD = 241 mL. Max Contrast dose per Creatinine Cl calculator = 105.75 mL.

## 2022-07-01 NOTE — PROGRESS NOTES
Assisted OOB & ambulated to BR & on unit. Tolerated activity without difficulty. Right wrist without bleeding or hematoma. Discharge instructions reviewed with patient including post cath care and medications side effects. Time allowed for questions and answers. INT removed with cath intact. Message left with [facility] cardiology to call patient with follow up appointment. Informed pt to be expecting call next week.

## 2022-07-01 NOTE — Clinical Note
TRANSFER - OUT REPORT:     Verbal report given to: cpru rn. Report consisted of patient's Situation, Background, Assessment and   Recommendations(SBAR). Opportunity for questions and clarification was provided. Patient transported with a Registered Nurse and [address] [place] [address] / [facility] Take the Interview. Patient transported to: [facility].

## 2022-07-01 NOTE — Clinical Note
Prepped: right groin and right radial. Site was clipped and prepped. Prepped with: ChloraPrep. Wet prep elapsed drying time: 3 mins. Patient was draped after wet prep solution dried.

## 2022-07-01 NOTE — PROGRESS NOTES
[facility] by   Right radial access  No interventions  Right wrist TR band with 12ml  Versed 2mg IV  Access site soft. Clean, dry, and intact; with no signs of bleeding or hematoma  Pt. Tolerated procedure    TRANSFER - OUT REPORT:    Verbal report given to John Vera on Fredirick Min  being transferred to [address] [place] [address] for routine progression of patient care       Report consisted of patient's Situation, Background, Assessment and   Recommendations(SBAR). Information from the following report(s) Nurse Handoff Report and MAR was reviewed with the receiving nurse. Lines:   Peripheral IV 07/01/22 Left Hand (Active)        Opportunity for questions and clarification was provided.       Patient transported with:  [employer]

## 2022-07-01 NOTE — H&P
Office Visit    2022  Felisha Holland DO      Internal Medicine Cardiovascular Disease  Chest tightness +9 more      Dx  Shortness of Breath   Irregular Heart Beat ; Referred by Tiffany Foote DO      Reason for Visit       Progress Notes  Tiffany Foote DO (Physician) [facility] Cardiovascular [facility]  Expand All Collapse All          [address], [address]  PHONE: [phone]                22     NAME:  Alyssia Rice  : 1965  MRN: [id number]         SUBJECTIVE:   Alyssia Rice is a 60 y.o. female seen for a follow up visit regarding the following:           Chief Complaint   Patient presents with    Shortness of Breath       chest tightness    Irregular Heart Beat       \"feels left leg circulation is getting cut off\"         HPI:    Here for NICM/cSHF/VT.         SHF (EF 30% in )     [facility] 2019:   Normal coronary arteries.   Echo 2020: EF 30-35%   2020:  a/c SHF    2020: HF admission, diuresed 7L   Echo 2022: EF 30-35%     Recent ICD shock for VT, amio increased and seen by EP.       She is having more chest pain and pressure. Into back and more SOB. NYHA Class III sx now.      On torsemide daily most days, BID dosing PRN.     Edema ok, DUARTE ok.    On amio 200 BID now.        Patient denies recent history of orthopnea, PND, excessive dizziness and/or syncope.      Wearing CPAP every night now.               Past Medical History, Past Surgical History, Family history, Social History, and Medications were all reviewed with the patient today and updated as necessary.      Current Facility-Administered Medications          Current Outpatient Medications   Medication Sig Dispense Refill    amiodarone (CORDARONE) 200 MG tablet Take 2 tablets by mouth daily 180 tablet 1    acetaminophen (TYLENOL) 325 MG tablet Take 325 mg by mouth every 6 hours as needed        albuterol sulfate  (90 Base) MCG/ACT inhaler 2 puffs qid prn shortness of breath or wheezing        aspirin 81 MG EC tablet Take by mouth daily        carvedilol (COREG) 6.25 MG tablet Take 6.25 mg by mouth 2 times daily (with meals)        vitamin D 25 MCG (1000 UT) CAPS Take by mouth daily        empagliflozin (JARDIANCE) 10 MG tablet Take 10 mg by mouth daily        insulin glargine (LANTUS SOLOSTAR) 100 UNIT/ML injection pen Inject 15 Units into the skin        isosorbide mononitrate (IMDUR) 30 MG extended release tablet Take 30 mg by mouth        latanoprost (XALATAN) 0.005 % ophthalmic solution Apply 1 drop to eye        nitroGLYCERIN (NITROSTAT) 0.4 MG SL tablet Place 0.4 mg under the tongue        ondansetron (ZOFRAN-ODT) 4 MG disintegrating tablet Take 1 tablet by mouth 3 times daily as needed        oxyCODONE (ROXICODONE) 5 MG immediate release tablet Take 1 tablet by mouth every 6 hours as needed.        polyethylene glycol (GLYCOLAX) 17 GM/SCOOP powder Take 17 g by mouth daily as needed        pravastatin (PRAVACHOL) 80 MG tablet Take 80 mg by mouth        sacubitril-valsartan (ENTRESTO) 49-51 MG per tablet Take 1 tablet by mouth 2 times daily        torsemide (DEMADEX) 20 MG tablet Take 20 mg by mouth 2 times daily        bacitracin zinc 500 UNIT/GM ointment Apply topically daily          No current facility-administered medications for this visit.                Allergies   Allergen Reactions    Other Hives and Shortness Of Breath       \"inhaler PRN\"    Penicillin G Itching            Patient Active Problem List     Diagnosis Date Noted    Thrombocytopenia, unspecified 06/30/2022       Priority: Medium    Rectal bleeding 05/18/2021    CKD (chronic kidney disease) stage 4, GFR 15-29 ml/min ([facility]) 11/25/2020    Pleural effusion on right 11/12/2020    CHF (congestive heart failure) ([facility] 11/10/2020    Acute on chronic HFrEF (heart failure with reduced ejection fraction) [facility] 11/10/2020    Lower abdominal pain 11/10/2020    CKD (chronic kidney disease) stage 3, GFR 30-59 ml/min ([facility]) 08/24/2020    Long term current use of amiodarone 06/12/2020    Restrictive lung disease 04/08/2020    Type 2 diabetes with nephropathy ([facility] 04/02/2018    Chronic right-sided thoracic back pain 05/23/2016    Chronic left-sided low back pain with sciatica 05/23/2016    Ventricular tachycardia ([facility] 04/18/2016    CAD in native artery 03/30/2016    Automatic implantable cardioverter-defibrillator in situ 03/30/2016       Biotronik dual ICD 7/2013  1/5 lost brother,11/8 had VF episode with ATP,no shock  Formatting of this note might be different from the original.  Overview:   Biotronik dual ICD 7/2013  1/5 lost brother,11/8 had VF episode with ATP,no shock          Morbid obesity ([facility] 07/14/2014    LORI on CPAP 07/14/2014    Chronic systolic heart failure ([facility] 07/18/2013       NST 4/15:low risk  Echo 6/2014:EF 40-45%  Echo 5/2013:EF 30-35%  C 2/2013: minimal CAD  Formatting of this note might be different from the original.  Overview:   NST 4/15:low risk  Echo 6/2014:EF 40-45%  Echo 5/2013:EF 30-35%  C 2/2013: minimal CAD          NICM (nonischemic cardiomyopathy) ([facility] 02/15/2013    Dyslipidemia 02/13/2013    HTN (hypertension) 04/12/2010    Long-term insulin use in type 2 diabetes ([facility] 04/12/2010      Past Surgical History         Past Surgical History:   Procedure Laterality Date    ABLATION OF DYSRHYTHMIC FOCUS   \"years ago\"    CARDIAC DEFIBRILLATOR PLACEMENT   07/18/2013     Biotronik dual chamber ICD by Dr. Onel Dorantes   07/18/2013     Biotronik dual chamber ICD by Dr. Analia Christianson         x1    COLONOSCOPY N/A 7/7/2021     COLONOSCOPY/BMI 48 PT HAS AN ICD performed by Celso Alanis MD at [address]   03/26/2019     mild incarceration, no bowel removal    HYSTERECTOMY (CERVIX STATUS UNKNOWN)         [facility]-Left ovary intact per pt    LEFT HEART CATH,PERCUTANEOUS   2/13/2013     no intervention    PACEMAKER         ICD    ROTATOR CUFF REPAIR Right      TONSILLECTOMY             Family History         Family History   Problem Relation Age of Onset    Diabetes Other      Heart Disease Father      Hypertension Father      Stroke Father      Heart Attack Father 48         mi    Breast Cancer Sister 37    Hypertension Brother      Heart Disease Brother      Diabetes Brother      Stroke Mother           Social History            Tobacco Use    Smoking status: Current Some Day Smoker       Packs/day: 0.25    Smokeless tobacco: Never Used    Tobacco comment: Quit smoking: \"every now and then\"   Substance Use Topics    Alcohol use: Yes       Alcohol/week: 1.0 standard drink            ROS:     Constitutional:   Negative for fevers and unexplained weight loss. Eyes:   Negative for visual disturbance. ENT:   Negative for significant hearing loss and tinnitus. Respiratory:   Negative for hemoptysis. Cardiovascular:   Negative except as noted in HPI. Gastrointestinal:   Negative for melena and abdominal pain. Genitourinary:   Negative for hematuria, renal stones. Integumentary:   Negative for rash or non-healing wounds  Hematologic/Lymphatic:   Negative for excessive bleeding hx or clotting disorder. Musculoskeletal:  Negative for active, unexplained/severe joint pain. Neurological:   Negative for stroke. Behavioral/Psych:   Negative for suicidal ideations.    Endocrine:   Negative for uncontrolled diabetic symptoms including polyuria, polydipsia and poor wound healing.      PHYSICAL EXAM:      /78   Pulse 60   Ht 4' 11\" (1.499 m)   Wt 204 lb (92.5 kg)   BMI 41.20 kg/m²    General/Constitutional:   Alert and oriented x 3, no acute distress  HEENT:   normocephalic, atraumatic, moist mucous membranes  Neck:   No JVD or carotid bruits bilaterally  Cardiovascular:   regular rate and rhythm, no murmur/rub/gallop appreciated  Pulmonary:   clear to auscultation bilaterally, no respiratory distress  Abdomen:   soft, non-tender, non-distended  Ext:   No sig LE edema bilaterally  Skin:  warm and dry, no obvious rashes seen  Neuro:   no obvious sensory or motor deficits  Psychiatric:   normal mood and affect              Lab Results   Component Value Date/Time      06/16/2022 09:26 AM     K 4.1 06/16/2022 09:26 AM      06/16/2022 09:26 AM     CO2 27 06/16/2022 09:26 AM     BUN 41 06/16/2022 09:26 AM     CREATININE 2.20 06/16/2022 09:26 AM     GLUCOSE 97 06/16/2022 09:26 AM     CALCIUM 9.7 06/16/2022 09:26 AM     CALCIUM 9.8 06/16/2022 09:26 AM               Lab Results   Component Value Date     WBC 7.0 06/16/2022     HGB 14.6 06/16/2022     HCT 45.5 06/16/2022     MCV 98.7 (H) 06/16/2022      (L) 06/16/2022               Lab Results   Component Value Date     TSH 1.130 12/22/2021               Lab Results   Component Value Date     LABA1C 7.2 (H) 03/31/2022            Lab Results   Component Value Date      03/31/2022               Lab Results   Component Value Date     CHOL 215 (H) 12/22/2021     CHOL 185 06/01/2021     CHOL 190 03/09/2021            Lab Results   Component Value Date     TRIG 91 12/22/2021     TRIG 71 06/01/2021     TRIG 70 03/09/2021      Lab Results   Component Value Date     HDL 79 12/22/2021     HDL 66 06/01/2021     HDL 74 03/09/2021            Lab Results   Component Value Date     LDLCALC 120 (H) 12/22/2021     LDLCALC 106 (H) 06/01/2021     LDLCALC 103 (H) 03/09/2021            Lab Results   Component Value Date     LABVLDL 14 08/17/2020     LABVLDL 17 12/31/2019     VLDL 16 12/22/2021     VLDL 13 06/01/2021     VLDL 13 03/09/2021      No results found for: CHOLLEONIE           I have Independently reviewed prior care notes, any ER records available, cardiac testing, labs and results with the patient and before seeing the patient today.   Also independently reviewed outside records when available.         ASSESSMENT:     Yann was seen today for shortness of breath and irregular heart beat.     Diagnoses and all orders for this visit:     Chest tightness  -     Cancel: EKG 12 Lead; Future  -     EKG 12 Lead  -     Comprehensive Metabolic Panel; Future  -     CBC; Future  -     Magnesium; Future  -     TSH; Future  -     Case Request Cardiac Cath Lab     Ventricular tachycardia (HCC)  -     Cancel: EKG 12 Lead; Future  -     EKG 12 Lead  -     Comprehensive Metabolic Panel; Future  -     CBC; Future  -     Magnesium; Future  -     TSH; Future  -     Case Request Cardiac Cath Lab     SOB (shortness of breath)  -     Cancel: EKG 12 Lead; Future  -     EKG 12 Lead     Thrombocytopenia, unspecified     Chronic systolic congestive heart failure (HCC)     NICM (nonischemic cardiomyopathy) (HCC)     Automatic implantable cardioverter-defibrillator in situ     LORI on CPAP     Stage 3a chronic kidney disease (HCC)     Long term current use of amiodarone              PLAN:       1. CSHF/NICM:  On entresto, coreg, imdur, aldactone, torsemide.  Remain on GDMT.  Low salt diet needed.       EF the same since 2013, ~30%      Abnormal PFTs, working on quitting smoking, Saw pulm, remain on amio. No evidence of ILD on HRCT.  Will follow.          2. VT:  Remain on amio and Mg for VT, follow for toxicity on these meds.   Stress seems to drive much of the VT.      Discussed with EP, more CP now, recent VT, requesting [facility]. Plan on [address] [place] [address].      Plan for Left Heart Catheterization and possible Percutaneous Coronary Intervention (PCI) at [facility] due to Worsening angina within the last 2 mos as described in HPI. Discussed risk of cardiac catheterization and potential PCI with the patient in detail. These risks include, but are not limited to, bleeding, stroke, heart attack, cardiac arrhythmias, allergic reactions, atheroemboli, acute kidney injury and cardiac arrest/death. Local complications at the site of catheter insertion were also reviewed and discussed. The patient voiced complete understanding about these risks. The patient agrees to proceed with the aforementioned associated risks.     Check labs in AM, needs hydration protocol for CKD.       3. CRI:  Seeing renal.      4. LORI:  On CPAP, seems compliant now.   Reviewed and stressed today.               Patient has been instructed and agrees to call our office with any issues or other concerns related to their cardiac condition(s) and/or complaint(s).       Return in about 1 month (around 7/30/2022).       GUALBERTO HERNÁNDEZ DO  6/30/2022            Instructions         Return in about 1 month (around 7/30/2022). AVS (Printed 6/30/2022)        Additional Documentation    Vitals:  /78     Pulse 60     Ht 4' 11\" (1.499 m)     Wt 204 lb (92.5 kg)     BMI 41.20 kg/m²     BSA 1.96 m²     Pain Sc   0 - No pain            More Vitals     Flowsheets:  Calorie Assessment        Encounter Info:  Billing Info,     Allergies,     Detailed Report,     History,     Medications,     Questionnaires          Travel Screening   Screenings since 06/29/22 0000  06/30/22 1424      Have you had a COVID-19 viral test in the last 10 days? No Mayuri Rice   Do you have any of the following new or worsening symptoms? None of these Mayuri Rice     Travel History   Travel since 06/01/22   No documented travel since 06/01/22    Chart Review Routing History    No routing history on file.     Encounter Status    Signed by Sampson Greco DO on 6/30/22 at 15:36    BestPractice Advisories    Click to view BestPractice Advisory history     Encounter Messages    Read Composed From To  Subject   N 6/28/2022  6:09 AM Sampson Greco DO Recardo Banister  Upcoming appointment at [address] [place] [address] on 6/30/22       Linked Episodes     Left heart cath / corona Noted 6/30/2022         Orders Placed       CBC     Comprehensive Metabolic Panel     Magnesium     TSH     EKG 12 Lead     Case Request Cardiac Cath Lab      Outpatient Medications at End of Encounter as of 6/30/2022    amiodarone (CORDARONE) 200 MG tablet (Taking) Take 2 tablets by mouth daily   acetaminophen (TYLENOL) 325 MG tablet (Taking) Take 325 mg by mouth every 6 hours as needed   albuterol sulfate  (90 Base) MCG/ACT inhaler (Taking) 2 puffs qid prn shortness of breath or wheezing   aspirin 81 MG EC tablet (Taking) Take by mouth daily   carvedilol (COREG) 6.25 MG tablet (Taking) Take 6.25 mg by mouth 2 times daily (with meals)   vitamin D 25 MCG (1000 UT) CAPS (Taking) Take by mouth daily   empagliflozin (JARDIANCE) 10 MG tablet (Taking) Take 10 mg by mouth daily   insulin glargine (LANTUS SOLOSTAR) 100 UNIT/ML injection pen (Taking) Inject 15 Units into the skin   isosorbide mononitrate (IMDUR) 30 MG extended release tablet (Taking) Take 30 mg by mouth   latanoprost (XALATAN) 0.005 % ophthalmic solution (Taking) Apply 1 drop to eye   nitroGLYCERIN (NITROSTAT) 0.4 MG SL tablet (Taking) Place 0.4 mg under the tongue   ondansetron (ZOFRAN-ODT) 4 MG disintegrating tablet (Taking) Take 1 tablet by mouth 3 times daily as needed   oxyCODONE (ROXICODONE) 5 MG immediate release tablet (Taking) Take 1 tablet by mouth every 6 hours as needed.    polyethylene glycol (GLYCOLAX) 17 GM/SCOOP powder (Taking) Take 17 g by mouth daily as needed   pravastatin (PRAVACHOL) 80 MG tablet (Taking) Take 80 mg by mouth   sacubitril-valsartan (ENTRESTO) 49-51 MG per tablet (Taking) Take 1 tablet by mouth 2 times daily   torsemide (DEMADEX) 20 MG tablet (Taking) Take 20 mg by mouth 2 times daily   bacitracin zinc 500 UNIT/GM ointment Apply topically daily     Medication Changes         None       Medication List     Visit Diagnoses         Chest tightness         Ventricular tachycardia (HCC)         SOB (shortness of breath)         Thrombocytopenia, unspecified         Chronic systolic congestive heart failure (HCC)         NICM (nonischemic cardiomyopathy) ([facility]         Automatic implantable cardioverter-defibrillator in situ         LORI on CPAP         Stage 3a chronic kidney disease ([facility]         Long term current use of amiodarone       Problem List

## 2022-07-01 NOTE — PROGRESS NOTES
TRANSFER - IN REPORT:    Verbal report received from Rober Mahoney on [place]  being received from cath lab for routine progression of patient care      Report consisted of patient's Situation, Background, Assessment and   Recommendations(SBAR). Information from the following report(s) Nurse Handoff Report was reviewed with the receiving nurse. Opportunity for questions and clarification was provided. Assessment completed upon patient's arrival to unit and care assumed. C by   Right radial access  No interventions  Right wrist TR band with 12ml  Versed 2mg IV  Access site soft. Clean, dry, and intact; with no signs of bleeding or hematoma  Pt.  Tolerated procedure

## 2022-07-08 ENCOUNTER — TELEPHONE (OUTPATIENT)
Dept: CARDIOLOGY CLINIC | Age: 57
End: 2022-07-08

## 2022-07-08 DIAGNOSIS — I20.0 ACCELERATING ANGINA (HCC): ICD-10-CM

## 2022-07-08 DIAGNOSIS — R94.30 WALL MOTION ABNORMALITY OF INFERIOR WALL OF LEFT VENTRICLE: Primary | ICD-10-CM

## 2022-07-08 NOTE — TELEPHONE ENCOUNTER
----- Message from Jordyn Vu DO sent at 7/6/2022  1:00 PM EDT -----  Please call her, explain that several cardiologists have reviewed her [facility] and we are recommending CT TAVR protocol to evaluate the heart better. Dr. Ildefonso Rouse said to talk with Maria Teresa Hernández about how to order this properly, but we need \"Cardiac CT TAVR Protocol\" and Dr. Ildefonso Rouse to read it. Please order and explain to patient as well. See me as planned, and need to get this soon.    Thanks

## 2022-07-12 ENCOUNTER — TELEPHONE (OUTPATIENT)
Dept: CARDIOLOGY CLINIC | Age: 57
End: 2022-07-12

## 2022-07-12 DIAGNOSIS — N18.4 CKD (CHRONIC KIDNEY DISEASE) STAGE 4, GFR 15-29 ML/MIN (HCC): ICD-10-CM

## 2022-07-12 DIAGNOSIS — E11.21 TYPE 2 DIABETES WITH NEPHROPATHY (HCC): Primary | ICD-10-CM

## 2022-07-12 NOTE — TELEPHONE ENCOUNTER
Patient called and would like to know if there are still tests that Dr. Glo Russ wants her to have done. Please call patient and advise.

## 2022-07-13 DIAGNOSIS — R94.30 WALL MOTION ABNORMALITY OF INFERIOR WALL OF LEFT VENTRICLE: Primary | ICD-10-CM

## 2022-07-13 NOTE — TELEPHONE ENCOUNTER
This isn't ordered correctly but I can order it for you. I have spoken with Dr. Juarez Nair to see what he needs from the CT.     Thanks,  Abhishek Isaacs

## 2022-07-14 ENCOUNTER — NURSE ONLY (OUTPATIENT)
Dept: INTERNAL MEDICINE CLINIC | Facility: CLINIC | Age: 57
End: 2022-07-14

## 2022-07-14 DIAGNOSIS — N18.4 CKD (CHRONIC KIDNEY DISEASE) STAGE 4, GFR 15-29 ML/MIN (HCC): ICD-10-CM

## 2022-07-14 DIAGNOSIS — E11.21 TYPE 2 DIABETES WITH NEPHROPATHY (HCC): ICD-10-CM

## 2022-07-14 LAB
BASOPHILS # BLD: 0.1 K/UL (ref 0–0.2)
BASOPHILS NFR BLD: 1 % (ref 0–2)
DIFFERENTIAL METHOD BLD: ABNORMAL
EOSINOPHIL # BLD: 0.2 K/UL (ref 0–0.8)
EOSINOPHIL NFR BLD: 2 % (ref 0.5–7.8)
ERYTHROCYTE [DISTWIDTH] IN BLOOD BY AUTOMATED COUNT: 13.1 % (ref 11.9–14.6)
HCT VFR BLD AUTO: 47 % (ref 35.8–46.3)
HGB BLD-MCNC: 15.3 G/DL (ref 11.7–15.4)
IMM GRANULOCYTES # BLD AUTO: 0 K/UL (ref 0–0.5)
IMM GRANULOCYTES NFR BLD AUTO: 0 % (ref 0–5)
LYMPHOCYTES # BLD: 2.5 K/UL (ref 0.5–4.6)
LYMPHOCYTES NFR BLD: 35 % (ref 13–44)
MCH RBC QN AUTO: 31.8 PG (ref 26.1–32.9)
MCHC RBC AUTO-ENTMCNC: 32.6 G/DL (ref 31.4–35)
MCV RBC AUTO: 97.7 FL (ref 79.6–97.8)
MONOCYTES # BLD: 0.4 K/UL (ref 0.1–1.3)
MONOCYTES NFR BLD: 6 % (ref 4–12)
NEUTS SEG # BLD: 3.9 K/UL (ref 1.7–8.2)
NEUTS SEG NFR BLD: 56 % (ref 43–78)
NRBC # BLD: 0 K/UL (ref 0–0.2)
PLATELET # BLD AUTO: 130 K/UL (ref 150–450)
PMV BLD AUTO: 12.6 FL (ref 9.4–12.3)
RBC # BLD AUTO: 4.81 M/UL (ref 4.05–5.2)
WBC # BLD AUTO: 7 K/UL (ref 4.3–11.1)

## 2022-07-15 LAB
ANION GAP SERPL CALC-SCNC: 6 MMOL/L (ref 7–16)
BUN SERPL-MCNC: 27 MG/DL (ref 6–23)
CALCIUM SERPL-MCNC: 9.6 MG/DL (ref 8.3–10.4)
CHLORIDE SERPL-SCNC: 105 MMOL/L (ref 98–107)
CHOLEST SERPL-MCNC: 167 MG/DL
CO2 SERPL-SCNC: 27 MMOL/L (ref 21–32)
CREAT SERPL-MCNC: 1.9 MG/DL (ref 0.6–1)
EST. AVERAGE GLUCOSE BLD GHB EST-MCNC: 143 MG/DL
GLUCOSE SERPL-MCNC: 113 MG/DL (ref 65–100)
HBA1C MFR BLD: 6.6 % (ref 4.8–5.6)
HDLC SERPL-MCNC: 67 MG/DL (ref 40–60)
HDLC SERPL: 2.5 {RATIO}
LDLC SERPL CALC-MCNC: 85.2 MG/DL
POTASSIUM SERPL-SCNC: 4.1 MMOL/L (ref 3.5–5.1)
SODIUM SERPL-SCNC: 138 MMOL/L (ref 136–145)
TRIGL SERPL-MCNC: 74 MG/DL (ref 35–150)
VLDLC SERPL CALC-MCNC: 14.8 MG/DL (ref 6–23)

## 2022-07-20 ENCOUNTER — HOSPITAL ENCOUNTER (OUTPATIENT)
Dept: CT IMAGING | Age: 57
Discharge: HOME | End: 2022-07-23
Payer: MEDICARE

## 2022-07-20 DIAGNOSIS — R94.30 WALL MOTION ABNORMALITY OF INFERIOR WALL OF LEFT VENTRICLE: ICD-10-CM

## 2022-07-20 PROCEDURE — 6360000004 HC RX CONTRAST MEDICATION: Performed by: INTERNAL MEDICINE

## 2022-07-20 PROCEDURE — 75574 CT ANGIO HRT W/3D IMAGE: CPT

## 2022-07-20 PROCEDURE — 75574 CT ANGIO HRT W/3D IMAGE: CPT | Performed by: INTERNAL MEDICINE

## 2022-07-20 PROCEDURE — 71275 CT ANGIOGRAPHY CHEST: CPT

## 2022-07-20 PROCEDURE — 2580000003 HC RX 258: Performed by: INTERNAL MEDICINE

## 2022-07-20 RX ORDER — 0.9 % SODIUM CHLORIDE 0.9 %
100 INTRAVENOUS SOLUTION INTRAVENOUS
Status: COMPLETED | OUTPATIENT
Start: 2022-07-20 | End: 2022-07-20

## 2022-07-20 RX ORDER — SODIUM CHLORIDE 0.9 % (FLUSH) 0.9 %
10 SYRINGE (ML) INJECTION
Status: COMPLETED | OUTPATIENT
Start: 2022-07-20 | End: 2022-07-20

## 2022-07-20 RX ADMIN — SODIUM CHLORIDE 100 ML: 9 INJECTION, SOLUTION INTRAVENOUS at 10:51

## 2022-07-20 RX ADMIN — IOPAMIDOL 75 ML: 755 INJECTION, SOLUTION INTRAVENOUS at 10:50

## 2022-07-20 RX ADMIN — SODIUM CHLORIDE, PRESERVATIVE FREE 10 ML: 5 INJECTION INTRAVENOUS at 10:51

## 2022-07-21 ENCOUNTER — OFFICE VISIT (OUTPATIENT)
Dept: CARDIOLOGY CLINIC | Age: 57
End: 2022-07-21
Payer: MEDICARE

## 2022-07-21 ENCOUNTER — OFFICE VISIT (OUTPATIENT)
Dept: INTERNAL MEDICINE CLINIC | Facility: CLINIC | Age: 57
End: 2022-07-21
Payer: MEDICARE

## 2022-07-21 VITALS
HEART RATE: 61 BPM | BODY MASS INDEX: 47.21 KG/M2 | OXYGEN SATURATION: 98 % | WEIGHT: 204 LBS | SYSTOLIC BLOOD PRESSURE: 114 MMHG | TEMPERATURE: 98.2 F | DIASTOLIC BLOOD PRESSURE: 70 MMHG | HEIGHT: 55 IN

## 2022-07-21 VITALS
DIASTOLIC BLOOD PRESSURE: 72 MMHG | SYSTOLIC BLOOD PRESSURE: 102 MMHG | BODY MASS INDEX: 47.44 KG/M2 | HEIGHT: 55 IN | HEART RATE: 64 BPM | WEIGHT: 205 LBS

## 2022-07-21 DIAGNOSIS — Z99.89 OSA ON CPAP: ICD-10-CM

## 2022-07-21 DIAGNOSIS — I42.8 NICM (NONISCHEMIC CARDIOMYOPATHY) (HCC): ICD-10-CM

## 2022-07-21 DIAGNOSIS — Z79.899 LONG TERM CURRENT USE OF AMIODARONE: ICD-10-CM

## 2022-07-21 DIAGNOSIS — Z95.810 AUTOMATIC IMPLANTABLE CARDIOVERTER-DEFIBRILLATOR IN SITU: Primary | ICD-10-CM

## 2022-07-21 DIAGNOSIS — I47.20 VENTRICULAR TACHYCARDIA: ICD-10-CM

## 2022-07-21 DIAGNOSIS — I25.3 ACQUIRED ANEURYSM OF LEFT VENTRICLE OF HEART: ICD-10-CM

## 2022-07-21 DIAGNOSIS — I50.23 ACUTE ON CHRONIC HFREF (HEART FAILURE WITH REDUCED EJECTION FRACTION) (HCC): ICD-10-CM

## 2022-07-21 DIAGNOSIS — E66.01 MORBID OBESITY (HCC): ICD-10-CM

## 2022-07-21 DIAGNOSIS — E11.21 TYPE 2 DIABETES WITH NEPHROPATHY (HCC): Primary | ICD-10-CM

## 2022-07-21 DIAGNOSIS — G47.33 OSA ON CPAP: ICD-10-CM

## 2022-07-21 DIAGNOSIS — I50.22 CHRONIC SYSTOLIC HEART FAILURE (HCC): ICD-10-CM

## 2022-07-21 DIAGNOSIS — J98.4 RESTRICTIVE LUNG DISEASE: ICD-10-CM

## 2022-07-21 DIAGNOSIS — N18.4 CKD (CHRONIC KIDNEY DISEASE) STAGE 4, GFR 15-29 ML/MIN (HCC): ICD-10-CM

## 2022-07-21 DIAGNOSIS — Z95.810 AUTOMATIC IMPLANTABLE CARDIOVERTER-DEFIBRILLATOR IN SITU: ICD-10-CM

## 2022-07-21 DIAGNOSIS — E78.5 DYSLIPIDEMIA: ICD-10-CM

## 2022-07-21 PROCEDURE — G8417 CALC BMI ABV UP PARAM F/U: HCPCS | Performed by: INTERNAL MEDICINE

## 2022-07-21 PROCEDURE — 3044F HG A1C LEVEL LT 7.0%: CPT | Performed by: INTERNAL MEDICINE

## 2022-07-21 PROCEDURE — 99214 OFFICE O/P EST MOD 30 MIN: CPT | Performed by: INTERNAL MEDICINE

## 2022-07-21 PROCEDURE — G8428 CUR MEDS NOT DOCUMENT: HCPCS | Performed by: INTERNAL MEDICINE

## 2022-07-21 PROCEDURE — 3017F COLORECTAL CA SCREEN DOC REV: CPT | Performed by: INTERNAL MEDICINE

## 2022-07-21 PROCEDURE — 4004F PT TOBACCO SCREEN RCVD TLK: CPT | Performed by: INTERNAL MEDICINE

## 2022-07-21 PROCEDURE — 2022F DILAT RTA XM EVC RTNOPTHY: CPT | Performed by: INTERNAL MEDICINE

## 2022-07-21 PROCEDURE — G8427 DOCREV CUR MEDS BY ELIG CLIN: HCPCS | Performed by: INTERNAL MEDICINE

## 2022-07-21 ASSESSMENT — ENCOUNTER SYMPTOMS
CHEST TIGHTNESS: 0
WHEEZING: 0
SHORTNESS OF BREATH: 1

## 2022-07-21 NOTE — PROGRESS NOTES
[address], [address] [email or web address] [address]  PHONE: [phone]     22    NAME:  Mimi Blanchard  : 1965  MRN: [id number]       SUBJECTIVE:   Mimi Blanchard is a 60 y.o. female seen for a follow up visit regarding the following:     Chief Complaint   Patient presents with    Chest Pain     Post cath        HPI:    Here for NICM/cSHF/VT. SHF (EF 30% in )      Echo 2020: EF 30-35%   2020:  a/c SHF   2020: HF admission, diuresed 7L   Echo 2022: EF 30-35%   LHC 2022: Angiographically normal coronary arteries  Echo 2022: EF 35%  Ct 2022: large left ventricular aneurysm     She lives by herself, sister here in town. One son in [place]. Some DUARTE, some SOB. NYHA Class III sx now. On torsemide daily most days, BID dosing PRN. Edema ok, DUARTE ok. On amio 200 BID now. Patient denies recent history of orthopnea, PND, excessive dizziness and/or syncope. Wearing CPAP every night now. Past Medical History, Past Surgical History, Family history, Social History, and Medications were all reviewed with the patient today and updated as necessary.      Current Outpatient Medications   Medication Sig Dispense Refill    amiodarone (CORDARONE) 200 MG tablet Take 2 tablets by mouth daily 180 tablet 1    acetaminophen (TYLENOL) 325 MG tablet Take 325 mg by mouth every 6 hours as needed      albuterol sulfate  (90 Base) MCG/ACT inhaler 2 puffs qid prn shortness of breath or wheezing      aspirin 81 MG EC tablet Take by mouth daily      bacitracin zinc 500 UNIT/GM ointment Apply topically daily      carvedilol (COREG) 6.25 MG tablet Take 6.25 mg by mouth 2 times daily (with meals)      vitamin D 25 MCG (1000 UT) CAPS Take by mouth daily      empagliflozin (JARDIANCE) 10 MG tablet Take 10 mg by mouth daily      insulin glargine (LANTUS SOLOSTAR) 100 UNIT/ML injection pen Inject 15 Units into the skin      isosorbide mononitrate (IMDUR) 30 MG extended release tablet Take 30 mg by mouth      latanoprost (XALATAN) 0.005 % ophthalmic solution Apply 1 drop to eye      nitroGLYCERIN (NITROSTAT) 0.4 MG SL tablet Place 0.4 mg under the tongue      ondansetron (ZOFRAN-ODT) 4 MG disintegrating tablet Take 1 tablet by mouth 3 times daily as needed      oxyCODONE (ROXICODONE) 5 MG immediate release tablet Take 1 tablet by mouth every 6 hours as needed. polyethylene glycol (GLYCOLAX) 17 GM/SCOOP powder Take 17 g by mouth daily as needed      pravastatin (PRAVACHOL) 80 MG tablet Take 80 mg by mouth      sacubitril-valsartan (ENTRESTO) 49-51 MG per tablet Take 1 tablet by mouth 2 times daily      torsemide (DEMADEX) 20 MG tablet Take 20 mg by mouth 2 times daily       No current facility-administered medications for this visit.         Allergies   Allergen Reactions    Other Hives and Shortness Of Breath     \"inhaler PRN\"    Penicillin G Itching     Patient Active Problem List    Diagnosis Date Noted    Accelerating angina ([facility] 06/30/2022     Priority: High     Added automatically from request for surgery [id number]      Acquired aneurysm of left ventricle of heart 07/21/2022     Priority: Medium    Thrombocytopenia, unspecified 06/30/2022     Priority: Medium    Rectal bleeding 05/18/2021    CKD (chronic kidney disease) stage 4, GFR 15-29 ml/min ([facility]) 11/25/2020    Pleural effusion on right 11/12/2020    CHF (congestive heart failure) ([facility] 11/10/2020    Acute on chronic HFrEF (heart failure with reduced ejection fraction) ([facility] 11/10/2020    Lower abdominal pain 11/10/2020    CKD (chronic kidney disease) stage 3, GFR 30-59 ml/min ([address] 08/24/2020    Long term current use of amiodarone 06/12/2020    Restrictive lung disease 04/08/2020    Type 2 diabetes with nephropathy ([address] 04/02/2018    Chronic right-sided thoracic back pain 05/23/2016    Chronic left-sided low back pain with sciatica 05/23/2016    Ventricular tachycardia ([facility] 04/18/2016    CAD in native artery 03/30/2016    Automatic implantable cardioverter-defibrillator in situ 03/30/2016     B[employer]ronik dual ICD 7/2013  1/5 lost brother,11/8 had VF episode with ATP,no shock  Formatting of this note might be different from the original.  Overview:   B[employer]ronik dual ICD 7/2013  1/5 lost brother,11/8 had VF episode with ATP,no shock        Morbid obesity ([address] 07/14/2014    LORI on CPAP 07/14/2014    Chronic systolic heart failure ([address] 07/18/2013     NST 4/15:low risk  Echo 6/2014:EF 40-45%  Echo 5/2013:EF 30-35%  LHC 2/2013: minimal CAD  Formatting of this note might be different from the original.  Overview:   NST 4/15:low risk  Echo 6/2014:EF 40-45%  Echo 5/2013:EF 30-35%  LHC 2/2013: minimal CAD        NICM (nonischemic cardiomyopathy) ([facility] 02/15/2013    Dyslipidemia 02/13/2013    HTN (hypertension) 04/12/2010    Long-term insulin use in type 2 diabetes ([address] 04/12/2010      Past Surgical History:   Procedure Laterality Date    ABLATION OF DYSRHYTHMIC FOCUS  \"years ago\"    CARDIAC DEFIBRILLATOR PLACEMENT  07/18/2013    Biotronik dual chamber ICD by Dr. Pippa Nicolas  07/18/2013    Biotronik dual chamber ICD by Dr. Trish Brody 7/1/2022    Left heart cath / coronary angiography performed by Andres Wilson MD at [address] [place] [address]      x1    COLONOSCOPY N/A 7/7/2021    COLONOSCOPY/BMI 48 PT HAS AN ICD performed by Nahun Clay MD [facility]  03/26/2019    mild incarceration, no bowel removal    HYSTERECTOMY (CERVIX STATUS UNKNOWN)      EDITH-Left ovary intact per pt    LEFT HEART CATH,PERCUTANEOUS  2/13/2013    no intervention    PACEMAKER      ICD    ROTATOR CUFF REPAIR Right     TONSILLECTOMY       Family History   Problem Relation Age of Onset    Diabetes Other     Heart Disease Father     Hypertension Father     Stroke Father     Heart Attack Father 48        mi    Breast Cancer Sister 37 07/14/2022    TRIG 91 12/22/2021    TRIG 71 06/01/2021     Lab Results   Component Value Date    HDL 67 (H) 07/14/2022    HDL 79 12/22/2021    HDL 66 06/01/2021     Lab Results   Component Value Date    LDLCALC 85.2 07/14/2022    LDLCALC 120 (H) 12/22/2021    LDLCALC 106 (H) 06/01/2021     Lab Results   Component Value Date    LABVLDL 14.8 07/14/2022    LABVLDL 14 08/17/2020    LABVLDL 17 12/31/2019    VLDL 16 12/22/2021    VLDL 13 06/01/2021    VLDL 13 03/09/2021     Lab Results   Component Value Date    CHOLHDLRATIO 2.5 07/14/2022           I have Independently reviewed prior care notes, any ER records available, cardiac testing, labs and results with the patient and before seeing the patient today. Also independently reviewed outside records when available. ASSESSMENT:    Gauri Urban was seen today for chest pain. Diagnoses and all orders for this visit:    Automatic implantable cardioverter-defibrillator in situ    Acquired aneurysm of left ventricle of heart    Acute on chronic HFrEF (heart failure with reduced ejection fraction) (HCC)    Chronic systolic heart failure (HCC)    Ventricular tachycardia (HCC)    NICM (nonischemic cardiomyopathy) ([facility]    Long term current use of amiodarone    Dyslipidemia    LORI on CPAP        PLAN:      1. CSHF/NICM:  On entresto, coreg, imdur, aldactone, torsemide. Remain on GDMT. Low salt diet needed. EF the same since 2013, ~30%      Abnormal PFTs, working on quitting smoking, Saw pulm, remain on amio. No evidence of ILD on HRCT. Will follow. LV aneurysm is large and reviewed today. She lives alone, little social support. Plan on conservative mgmt. No thrombus seen, hold on [facility] for now. Avoid more w/u for now. 2. VT:  Remain on amio and Mg for VT, follow for toxicity on these meds. Stress seems to drive much of the VT. Amio doubled. 200 BID for now. 3. CRI:  Seeing renal.      4. LORI:  On CPAP, seems compliant now.    Reviewed and stressed today. Patient has been instructed and agrees to call our office with any issues or other concerns related to their cardiac condition(s) and/or complaint(s). Return in about 1 month (around 8/21/2022).        GUALBERTO HERNÁNDEZ, DO  7/21/2022

## 2022-07-21 NOTE — PROGRESS NOTES
ASSESSMENT/PLAN:    1. Type 2 diabetes with nephropathy (HCC)  A1c stable. No med changes. - Basic Metabolic Panel; Future  - Hemoglobin A1C; Future  Lab Results   Component Value Date    LABA1C 6.6 (H) 07/14/2022     Lab Results   Component Value Date     07/14/2022     Lab Results   Component Value Date    CHOL 167 07/14/2022    CHOL 215 (H) 12/22/2021    CHOL 185 06/01/2021     Lab Results   Component Value Date    TRIG 74 07/14/2022    TRIG 91 12/22/2021    TRIG 71 06/01/2021     Lab Results   Component Value Date    HDL 67 (H) 07/14/2022    HDL 79 12/22/2021    HDL 66 06/01/2021     Lab Results   Component Value Date    LDLCALC 85.2 07/14/2022    LDLCALC 120 (H) 12/22/2021    LDLCALC 106 (H) 06/01/2021     Lab Results   Component Value Date    LABVLDL 14.8 07/14/2022    LABVLDL 14 08/17/2020    LABVLDL 17 12/31/2019    VLDL 16 12/22/2021    VLDL 13 06/01/2021    VLDL 13 03/09/2021     Lab Results   Component Value Date    CHOLHDLRATIO 2.5 07/14/2022       2. CKD (chronic kidney disease) stage 4, GFR 15-29 ml/min ([facility])  Continue Torsemide 20 mg BID. She may take an extra 20 mg for 3 days in the morning for SOB. Cr and GFR appear to be about the same. Lab Results   Component Value Date    CREATININE 1.90 (H) 07/14/2022       - Basic Metabolic Panel; Future    3. NICM (nonischemic cardiomyopathy) ([facility] [address]      4. Acquired aneurysm of left ventricle of heart  Cardiology notes reviewed and considered. I personally reviewed her CT images with her and discussed. 5. Automatic implantable cardioverter-defibrillator in situ  Cardiology notes reviewed and considered. Support provided. 6. LORI on CPAP  Compliance encouraged and she reports she is    7. Restrictive lung disease  Chronic sob. 8. Morbid obesity ([facility] [address]       9.  Long term current use of amiodarone  Lab Results   Component Value Date    TSH 1.130 12/22/2021     Lab Results   Component Value Date    ALT 31 04/13/2022    AST 24 2022    ALKPHOS 70 2022    BILITOT 0.4 2022             Evaluation and management of the chronic condition(s) delineated. No negative side effects reported. I have reviewed all the lab results. There are some abnormalities that are either expected or not critical to the patient's health, and are discussed in the office today and are addressed. Please refer to the above assessement and plan narrative and orders and follow up plan. Medication discussed and refilled as needed. Physical exam findings are stable unless otherwise indicated and this is addressed. The most recent lab work and imaging and consultant/urgent care visits and imaging are reviewed and discussed and considered during this visit encounter. 1. Type 2 diabetes with nephropathy ([facility]  -     Basic Metabolic Panel; Future  -     Hemoglobin A1C; Future  2. CKD (chronic kidney disease) stage 4, GFR 15-29 ml/min (HCC)  -     Basic Metabolic Panel; Future  3. NICM (nonischemic cardiomyopathy) ([facility]  4. Acquired aneurysm of left ventricle of heart  5. Automatic implantable cardioverter-defibrillator in situ  6. LORI on CPAP  7. Restrictive lung disease  8. Morbid obesity ([facility] [address]  9. Long term current use of amiodarone       On this date, 22, I have spent 58 minutes reviewing previous notes, test results and face to face with the patient discussing the diagnosis and importance of compliance with the treatment plan as well as documenting on the day of the visit. An electronic signature was used to authenticate this note. -- Ardis Boast, MD     Return in about 3 months (around 10/21/2022), or if symptoms worsen or fail to improve.     SUBJECTIVE/OBJECTIVE:      HPI:   Iglesia Villasenor (: 1965 is a 60 y.o. female, here for evaluation of the following chief complaint(s):   Chief Complaint   Patient presents with    Diabetes     3 month recheck    Hypertension    Cholesterol Problem    Discuss Labs       Patient is here for follow-up and management of chronic medical conditions, review of recent labs, review of any imaging completed since our last office visit and discuss any consultants opinions or management changes. DM stable. Tolerating current medications. No hypoglycemia. Lab Results   Component Value Date    LABA1C 6.6 (H) 07/14/2022     Lab Results   Component Value Date     07/14/2022       Cardiology visit earlier today with Dr. Graham Allen. Notes and imaging reviewed. She was out of town visiting family and had difficulty breathing and shortly after she got home, was at work and had an ICD event 5/2022. LHC 6/2022 with normal coronary arteries. Filling abnormality noted on LV gram, CTA heart with large LV aneurysm. Therefore. .. TAVR procedure on hold. NICM. EF about 30-35%    She has chronic DUARTE. She says she feels like she has fluid build up today. We reviewed her CT images together and discussed. CPAP compliant at this time she says. CKD. She says Nephrology has not recommended any medication adjustments. Recent notes not avail for review. She is not having any leg swelling. She has been taking Torsemide 20 mg BID. Labs reviewed and discussed and medication refilled as needed for chronic medications during ov or adjusted based on lab results and/or our discussion as appropriate. See discussion. The patient's available records and electronic chart records are reviewed. The PMH, PSH, medications, allergies, medications, FH, health maintenance and vaccination status are all reviewed and updated as appropriate. Records from outside providers have been reviewed, summarized, and considered as noted in the history of present illness, past medical history, and objective data of this note and encounter.           Health Maintenance   Topic Date Due    HIV screen  Never done    Diabetic retinal exam  Never done    Hepatitis B vaccine (1 of 3 - Risk 3-dose series) Never done Shingles vaccine (1 of 2) Never done    COVID-19 Vaccine (4 - Booster for Moderna series) 06/11/2022    Flu vaccine (1) 09/01/2022    Diabetic foot exam  09/21/2022    Annual Wellness Visit (AWV)  12/30/2022    Breast cancer screen  03/31/2023    Depression Screen  04/06/2023    A1C test (Diabetic or Prediabetic)  07/14/2023    Lipids  07/14/2023    Pneumococcal 0-64 years Vaccine (3 - PPSV23 or PCV20) 07/01/2024    Cervical cancer screen  04/13/2026    DTaP/Tdap/Td vaccine (2 - Td or Tdap) 01/08/2030    Colorectal Cancer Screen  07/07/2031    Hepatitis C screen  Completed    Hepatitis A vaccine  Aged Out    Hib vaccine  Aged Out    Meningococcal (ACWY) vaccine  Aged Out     Patient Active Problem List   Diagnosis    HTN (hypertension)    Type 2 diabetes with nephropathy (HCC)    CHF (congestive heart failure) ([facility]    CAD in native artery    Long-term insulin use in type 2 diabetes ([facility] [facility]    NICM (nonischemic cardiomyopathy) ([facility] [facility]    Rectal bleeding    Dyslipidemia    CKD (chronic kidney disease) stage 4, GFR 15-29 ml/min (HCC)    Ventricular tachycardia (HCC)    Chronic systolic heart failure (HCC)    Morbid obesity (HCC)    Restrictive lung disease    Long term current use of amiodarone    Chronic right-sided thoracic back pain    CKD (chronic kidney disease) stage 3, GFR 30-59 ml/min (HCC)    LORI on CPAP    Acute on chronic HFrEF (heart failure with reduced ejection fraction) (HCC)    Pleural effusion on right    Automatic implantable cardioverter-defibrillator in situ    Lower abdominal pain    Chronic left-sided low back pain with sciatica    Thrombocytopenia, unspecified    Accelerating angina ([address]    Acquired aneurysm of left ventricle of heart       Review of Systems   Respiratory:  Positive for shortness of breath. Negative for chest tightness and wheezing. Cardiovascular:  Negative for leg swelling.      Lab Results   Component Value Date/Time    WBC 7.0 07/14/2022 02:02 PM    HGB 15.3 07/14/2022 02:02 PM    HCT 47.0 07/14/2022 02:02 PM    MCV 97.7 07/14/2022 02:02 PM    RDW 13.1 07/14/2022 02:02 PM     07/14/2022 02:02 PM    NEUTOPHILPCT 60 10/25/2021 08:43 AM    LYMPHOPCT 35 07/14/2022 02:02 PM    LYMPHOPCT 30 10/25/2021 08:43 AM    MONOPCT 6 07/14/2022 02:02 PM    MONOPCT 7 10/25/2021 08:43 AM    EOSRELPCT 2 07/14/2022 02:02 PM    BASOPCT 1 07/14/2022 02:02 PM    BASOPCT 1 10/25/2021 08:43 AM    LYMPHSABS 2.5 07/14/2022 02:02 PM    LYMPHSABS 2.6 10/25/2021 08:43 AM    MONOSABS 0.4 07/14/2022 02:02 PM    MONOSABS 0.6 10/25/2021 08:43 AM    EOSABS 0.2 07/14/2022 02:02 PM    EOSABS 0.2 10/25/2021 08:43 AM    BASOSABS 0.1 07/14/2022 02:02 PM    IMMGRAN 0 07/14/2022 02:02 PM    GRANULOCYTEABSOLUTECOUNT 0.0 10/25/2021 08:43 AM       Lab Results   Component Value Date/Time     07/14/2022 02:02 PM    K 4.1 07/14/2022 02:02 PM     07/14/2022 02:02 PM    CO2 27 07/14/2022 02:02 PM    ANIONGAP 6 07/14/2022 02:02 PM    GLUCOSE 113 07/14/2022 02:02 PM    BUN 27 07/14/2022 02:02 PM    CREATININE 1.90 07/14/2022 02:02 PM    GFRAA 35 07/14/2022 02:02 PM    LABGLOM 29 07/14/2022 02:02 PM    CALCIUM 9.6 07/14/2022 02:02 PM    BILITOT 0.4 04/13/2022 10:29 AM    ALT 31 04/13/2022 10:29 AM    AST 24 04/13/2022 10:29 AM    ALKPHOS 70 04/13/2022 10:29 AM    PROT 7.7 04/13/2022 10:29 AM    LABALBU 4.3 04/13/2022 10:29 AM    GLOB 3.4 04/13/2022 10:29 AM       Lab Results   Component Value Date/Time    CHOL 167 07/14/2022 02:02 PM    HDL 67 07/14/2022 02:02 PM    TRIG 74 07/14/2022 02:02 PM    LDLCALC 85.2 07/14/2022 02:02 PM    VLDL 16 12/22/2021 07:54 AM       Lab Results   Component Value Date/Time    LABA1C 6.6 07/14/2022 02:02 PM    LABA1C 7.2 03/31/2022 08:12 AM    LABA1C 6.8 09/13/2021 08:15 AM    LABA1C 7.9 06/01/2021 08:28 AM    LABA1C 7.7 03/09/2021 09:54 AM       Lab Results   Component Value Date/Time    TSH 1.130 12/22/2021 07:54 AM    TSH 1.670 11/17/2020 09:58 AM    TSH 1.790 07/15/2020 09:51 AM Vitals:    07/21/22 1528   BP: 114/70   Site: Left Upper Arm   Position: Sitting   Cuff Size: Small Adult   Pulse: 61   Temp: 98.2 °F (36.8 °C)   TempSrc: Temporal   SpO2: 98%   Weight: 204 lb (92.5 kg)   Height: 4' 7\" (1.397 m)     Wt Readings from Last 3 Encounters:   07/21/22 204 lb (92.5 kg)   07/21/22 205 lb (93 kg)   07/01/22 202 lb (91.6 kg)     BP Readings from Last 3 Encounters:   07/21/22 114/70   07/21/22 102/72   07/01/22 126/71     Physical Exam  Vitals and nursing note reviewed. Constitutional:       Appearance: Normal appearance. She is obese. She is not ill-appearing. HENT:      Head: Normocephalic and atraumatic. Eyes:      Extraocular Movements: Extraocular movements intact. Conjunctiva/sclera: Conjunctivae normal.   Cardiovascular:      Rate and Rhythm: Normal rate and regular rhythm. Pulmonary:      Effort: Pulmonary effort is normal.      Breath sounds: Normal breath sounds. Musculoskeletal:      Right lower leg: No edema. Left lower leg: No edema. Neurological:      General: No focal deficit present. Mental Status: She is alert and oriented to person, place, and time. Mental status is at baseline.    Psychiatric:         Mood and Affect: Mood normal.         Behavior: Behavior normal.

## 2022-08-09 PROCEDURE — 93295 DEV INTERROG REMOTE 1/2/MLT: CPT | Performed by: INTERNAL MEDICINE

## 2022-08-09 PROCEDURE — 93296 REM INTERROG EVL PM/IDS: CPT | Performed by: INTERNAL MEDICINE

## 2022-08-10 ENCOUNTER — HOSPITAL ENCOUNTER (INPATIENT)
Age: 57
LOS: 6 days | Discharge: HOME HEALTH | DRG: 309 | End: 2022-08-17
Attending: EMERGENCY MEDICINE | Admitting: INTERNAL MEDICINE
Payer: MEDICARE

## 2022-08-10 ENCOUNTER — APPOINTMENT (OUTPATIENT)
Dept: GENERAL RADIOLOGY | Age: 57
DRG: 309 | End: 2022-08-10
Payer: MEDICARE

## 2022-08-10 DIAGNOSIS — I47.20 V TACH: ICD-10-CM

## 2022-08-10 DIAGNOSIS — G89.29 CHRONIC RIGHT-SIDED THORACIC BACK PAIN: ICD-10-CM

## 2022-08-10 DIAGNOSIS — N18.31 STAGE 3A CHRONIC KIDNEY DISEASE (HCC): ICD-10-CM

## 2022-08-10 DIAGNOSIS — I47.20 VENTRICULAR TACHYCARDIA: Primary | ICD-10-CM

## 2022-08-10 DIAGNOSIS — Z95.810 HISTORY OF AUTOMATIC INTERNAL CARDIAC DEFIBRILLATOR (AICD): ICD-10-CM

## 2022-08-10 DIAGNOSIS — M54.6 CHRONIC RIGHT-SIDED THORACIC BACK PAIN: ICD-10-CM

## 2022-08-10 PROBLEM — E11.21 TYPE 2 DIABETES WITH NEPHROPATHY (HCC): Status: ACTIVE | Noted: 2018-04-02

## 2022-08-10 LAB
ALBUMIN SERPL-MCNC: 3.8 G/DL (ref 3.5–5)
ALBUMIN/GLOB SERPL: 1 {RATIO} (ref 1.2–3.5)
ALP SERPL-CCNC: 73 U/L (ref 50–136)
ALT SERPL-CCNC: 32 U/L (ref 12–65)
ANION GAP SERPL CALC-SCNC: 4 MMOL/L (ref 7–16)
AST SERPL-CCNC: 21 U/L (ref 15–37)
BILIRUB SERPL-MCNC: 0.4 MG/DL (ref 0.2–1.1)
BUN SERPL-MCNC: 32 MG/DL (ref 6–23)
CALCIUM SERPL-MCNC: 9.1 MG/DL (ref 8.3–10.4)
CHLORIDE SERPL-SCNC: 110 MMOL/L (ref 98–107)
CO2 SERPL-SCNC: 28 MMOL/L (ref 21–32)
CREAT SERPL-MCNC: 2.1 MG/DL (ref 0.6–1)
EKG ATRIAL RATE: 63 BPM
EKG DIAGNOSIS: NORMAL
EKG P AXIS: 106 DEGREES
EKG P-R INTERVAL: 242 MS
EKG Q-T INTERVAL: 468 MS
EKG QRS DURATION: 146 MS
EKG QTC CALCULATION (BAZETT): 480 MS
EKG R AXIS: 97 DEGREES
EKG T AXIS: -76 DEGREES
EKG VENTRICULAR RATE: 63 BPM
ERYTHROCYTE [DISTWIDTH] IN BLOOD BY AUTOMATED COUNT: 13.4 % (ref 11.9–14.6)
GLOBULIN SER CALC-MCNC: 3.9 G/DL (ref 2.3–3.5)
GLUCOSE BLD STRIP.AUTO-MCNC: 170 MG/DL (ref 65–100)
GLUCOSE BLD STRIP.AUTO-MCNC: 92 MG/DL (ref 65–100)
GLUCOSE SERPL-MCNC: 152 MG/DL (ref 65–100)
HCT VFR BLD AUTO: 43.7 % (ref 35.8–46.3)
HGB BLD-MCNC: 14.2 G/DL (ref 11.7–15.4)
LIPASE SERPL-CCNC: 165 U/L (ref 73–393)
MAGNESIUM SERPL-MCNC: 2.6 MG/DL (ref 1.8–2.4)
MCH RBC QN AUTO: 32.1 PG (ref 26.1–32.9)
MCHC RBC AUTO-ENTMCNC: 32.5 G/DL (ref 31.4–35)
MCV RBC AUTO: 98.9 FL (ref 79.6–97.8)
NRBC # BLD: 0 K/UL (ref 0–0.2)
NT PRO BNP: 668 PG/ML (ref 5–125)
PLATELET # BLD AUTO: 138 K/UL (ref 150–450)
PMV BLD AUTO: 11.6 FL (ref 9.4–12.3)
POTASSIUM SERPL-SCNC: 3.6 MMOL/L (ref 3.5–5.1)
PROT SERPL-MCNC: 7.7 G/DL (ref 6.3–8.2)
RBC # BLD AUTO: 4.42 M/UL (ref 4.05–5.2)
SERVICE CMNT-IMP: ABNORMAL
SERVICE CMNT-IMP: NORMAL
SODIUM SERPL-SCNC: 142 MMOL/L (ref 136–145)
TROPONIN I SERPL HS-MCNC: 34.2 PG/ML (ref 0–14)
WBC # BLD AUTO: 7.6 K/UL (ref 4.3–11.1)

## 2022-08-10 PROCEDURE — 96366 THER/PROPH/DIAG IV INF ADDON: CPT

## 2022-08-10 PROCEDURE — 99223 1ST HOSP IP/OBS HIGH 75: CPT | Performed by: INTERNAL MEDICINE

## 2022-08-10 PROCEDURE — 96365 THER/PROPH/DIAG IV INF INIT: CPT

## 2022-08-10 PROCEDURE — 71045 X-RAY EXAM CHEST 1 VIEW: CPT

## 2022-08-10 PROCEDURE — 96375 TX/PRO/DX INJ NEW DRUG ADDON: CPT

## 2022-08-10 PROCEDURE — 2700000000 HC OXYGEN THERAPY PER DAY

## 2022-08-10 PROCEDURE — 83735 ASSAY OF MAGNESIUM: CPT

## 2022-08-10 PROCEDURE — 6360000002 HC RX W HCPCS: Performed by: INTERNAL MEDICINE

## 2022-08-10 PROCEDURE — 94760 N-INVAS EAR/PLS OXIMETRY 1: CPT

## 2022-08-10 PROCEDURE — 6370000000 HC RX 637 (ALT 250 FOR IP): Performed by: NURSE PRACTITIONER

## 2022-08-10 PROCEDURE — 93005 ELECTROCARDIOGRAM TRACING: CPT | Performed by: EMERGENCY MEDICINE

## 2022-08-10 PROCEDURE — G0378 HOSPITAL OBSERVATION PER HR: HCPCS

## 2022-08-10 PROCEDURE — 83880 ASSAY OF NATRIURETIC PEPTIDE: CPT

## 2022-08-10 PROCEDURE — 82962 GLUCOSE BLOOD TEST: CPT

## 2022-08-10 PROCEDURE — 6360000002 HC RX W HCPCS: Performed by: NURSE PRACTITIONER

## 2022-08-10 PROCEDURE — 83690 ASSAY OF LIPASE: CPT

## 2022-08-10 PROCEDURE — 85027 COMPLETE CBC AUTOMATED: CPT

## 2022-08-10 PROCEDURE — 84484 ASSAY OF TROPONIN QUANT: CPT

## 2022-08-10 PROCEDURE — 2580000003 HC RX 258: Performed by: NURSE PRACTITIONER

## 2022-08-10 PROCEDURE — 99285 EMERGENCY DEPT VISIT HI MDM: CPT

## 2022-08-10 PROCEDURE — 80053 COMPREHEN METABOLIC PANEL: CPT

## 2022-08-10 RX ORDER — ACETAMINOPHEN 325 MG/1
325 TABLET ORAL EVERY 6 HOURS PRN
Status: DISCONTINUED | OUTPATIENT
Start: 2022-08-10 | End: 2022-08-10 | Stop reason: SDUPTHER

## 2022-08-10 RX ORDER — INSULIN LISPRO 100 [IU]/ML
0-4 INJECTION, SOLUTION INTRAVENOUS; SUBCUTANEOUS
Status: DISCONTINUED | OUTPATIENT
Start: 2022-08-10 | End: 2022-08-16

## 2022-08-10 RX ORDER — SODIUM CHLORIDE, SODIUM LACTATE, POTASSIUM CHLORIDE, AND CALCIUM CHLORIDE .6; .31; .03; .02 G/100ML; G/100ML; G/100ML; G/100ML
1000 INJECTION, SOLUTION INTRAVENOUS ONCE
Status: DISCONTINUED | OUTPATIENT
Start: 2022-08-10 | End: 2022-08-10

## 2022-08-10 RX ORDER — POTASSIUM CHLORIDE 7.45 MG/ML
10 INJECTION INTRAVENOUS PRN
Status: DISCONTINUED | OUTPATIENT
Start: 2022-08-10 | End: 2022-08-17 | Stop reason: HOSPADM

## 2022-08-10 RX ORDER — ASPIRIN 81 MG/1
81 TABLET ORAL DAILY
Status: DISCONTINUED | OUTPATIENT
Start: 2022-08-10 | End: 2022-08-17 | Stop reason: HOSPADM

## 2022-08-10 RX ORDER — SODIUM CHLORIDE 0.9 % (FLUSH) 0.9 %
5-40 SYRINGE (ML) INJECTION EVERY 12 HOURS SCHEDULED
Status: DISCONTINUED | OUTPATIENT
Start: 2022-08-10 | End: 2022-08-17 | Stop reason: HOSPADM

## 2022-08-10 RX ORDER — ACETAMINOPHEN 650 MG/1
650 SUPPOSITORY RECTAL EVERY 6 HOURS PRN
Status: DISCONTINUED | OUTPATIENT
Start: 2022-08-10 | End: 2022-08-17 | Stop reason: HOSPADM

## 2022-08-10 RX ORDER — OXYCODONE HYDROCHLORIDE 5 MG/1
5 TABLET ORAL EVERY 6 HOURS PRN
Status: DISCONTINUED | OUTPATIENT
Start: 2022-08-10 | End: 2022-08-17 | Stop reason: HOSPADM

## 2022-08-10 RX ORDER — POTASSIUM CHLORIDE 20 MEQ/1
40 TABLET, EXTENDED RELEASE ORAL PRN
Status: DISCONTINUED | OUTPATIENT
Start: 2022-08-10 | End: 2022-08-17 | Stop reason: HOSPADM

## 2022-08-10 RX ORDER — INSULIN GLARGINE 100 [IU]/ML
15 INJECTION, SOLUTION SUBCUTANEOUS NIGHTLY
Status: DISCONTINUED | OUTPATIENT
Start: 2022-08-10 | End: 2022-08-17 | Stop reason: HOSPADM

## 2022-08-10 RX ORDER — ENOXAPARIN SODIUM 100 MG/ML
40 INJECTION SUBCUTANEOUS DAILY
Status: DISCONTINUED | OUTPATIENT
Start: 2022-08-10 | End: 2022-08-10

## 2022-08-10 RX ORDER — MAGNESIUM SULFATE IN WATER 40 MG/ML
2000 INJECTION, SOLUTION INTRAVENOUS PRN
Status: DISCONTINUED | OUTPATIENT
Start: 2022-08-10 | End: 2022-08-17 | Stop reason: HOSPADM

## 2022-08-10 RX ORDER — ISOSORBIDE MONONITRATE 30 MG/1
30 TABLET, EXTENDED RELEASE ORAL DAILY
Status: DISCONTINUED | OUTPATIENT
Start: 2022-08-11 | End: 2022-08-17 | Stop reason: HOSPADM

## 2022-08-10 RX ORDER — ALBUTEROL SULFATE 90 UG/1
2 AEROSOL, METERED RESPIRATORY (INHALATION) EVERY 6 HOURS PRN
Status: DISCONTINUED | OUTPATIENT
Start: 2022-08-10 | End: 2022-08-17 | Stop reason: HOSPADM

## 2022-08-10 RX ORDER — HYDROMORPHONE HYDROCHLORIDE 1 MG/ML
0.25 INJECTION, SOLUTION INTRAMUSCULAR; INTRAVENOUS; SUBCUTANEOUS ONCE
Status: COMPLETED | OUTPATIENT
Start: 2022-08-10 | End: 2022-08-10

## 2022-08-10 RX ORDER — CARVEDILOL 6.25 MG/1
6.25 TABLET ORAL 2 TIMES DAILY WITH MEALS
Status: DISCONTINUED | OUTPATIENT
Start: 2022-08-10 | End: 2022-08-17 | Stop reason: HOSPADM

## 2022-08-10 RX ORDER — NITROGLYCERIN 0.4 MG/1
0.4 TABLET SUBLINGUAL EVERY 5 MIN PRN
Status: DISCONTINUED | OUTPATIENT
Start: 2022-08-10 | End: 2022-08-17 | Stop reason: HOSPADM

## 2022-08-10 RX ORDER — SODIUM CHLORIDE 9 MG/ML
INJECTION, SOLUTION INTRAVENOUS PRN
Status: DISCONTINUED | OUTPATIENT
Start: 2022-08-10 | End: 2022-08-17 | Stop reason: HOSPADM

## 2022-08-10 RX ORDER — SODIUM CHLORIDE 0.9 % (FLUSH) 0.9 %
5-40 SYRINGE (ML) INJECTION PRN
Status: DISCONTINUED | OUTPATIENT
Start: 2022-08-10 | End: 2022-08-17 | Stop reason: HOSPADM

## 2022-08-10 RX ORDER — INSULIN LISPRO 100 [IU]/ML
0-4 INJECTION, SOLUTION INTRAVENOUS; SUBCUTANEOUS NIGHTLY
Status: DISCONTINUED | OUTPATIENT
Start: 2022-08-10 | End: 2022-08-17 | Stop reason: HOSPADM

## 2022-08-10 RX ORDER — ACETAMINOPHEN 325 MG/1
650 TABLET ORAL EVERY 6 HOURS PRN
Status: DISCONTINUED | OUTPATIENT
Start: 2022-08-10 | End: 2022-08-17 | Stop reason: HOSPADM

## 2022-08-10 RX ORDER — FUROSEMIDE 10 MG/ML
40 INJECTION INTRAMUSCULAR; INTRAVENOUS 2 TIMES DAILY
Status: DISCONTINUED | OUTPATIENT
Start: 2022-08-10 | End: 2022-08-12

## 2022-08-10 RX ORDER — ONDANSETRON 4 MG/1
4 TABLET, ORALLY DISINTEGRATING ORAL EVERY 8 HOURS PRN
Status: DISCONTINUED | OUTPATIENT
Start: 2022-08-10 | End: 2022-08-17 | Stop reason: HOSPADM

## 2022-08-10 RX ORDER — PRAVASTATIN SODIUM 80 MG/1
80 TABLET ORAL NIGHTLY
Status: DISCONTINUED | OUTPATIENT
Start: 2022-08-10 | End: 2022-08-17 | Stop reason: HOSPADM

## 2022-08-10 RX ORDER — LATANOPROST 50 UG/ML
1 SOLUTION/ DROPS OPHTHALMIC NIGHTLY
Status: DISCONTINUED | OUTPATIENT
Start: 2022-08-10 | End: 2022-08-17 | Stop reason: HOSPADM

## 2022-08-10 RX ORDER — POLYETHYLENE GLYCOL 3350 17 G/17G
17 POWDER, FOR SOLUTION ORAL DAILY PRN
Status: DISCONTINUED | OUTPATIENT
Start: 2022-08-10 | End: 2022-08-17 | Stop reason: HOSPADM

## 2022-08-10 RX ORDER — ENOXAPARIN SODIUM 100 MG/ML
30 INJECTION SUBCUTANEOUS DAILY
Status: DISCONTINUED | OUTPATIENT
Start: 2022-08-11 | End: 2022-08-11

## 2022-08-10 RX ADMIN — OXYCODONE 5 MG: 5 TABLET ORAL at 18:21

## 2022-08-10 RX ADMIN — LATANOPROST 1 DROP: 50 SOLUTION OPHTHALMIC at 20:58

## 2022-08-10 RX ADMIN — ASPIRIN 81 MG: 81 TABLET ORAL at 17:55

## 2022-08-10 RX ADMIN — SODIUM CHLORIDE, PRESERVATIVE FREE 10 ML: 5 INJECTION INTRAVENOUS at 21:00

## 2022-08-10 RX ADMIN — HYDROMORPHONE HYDROCHLORIDE 0.25 MG: 1 INJECTION, SOLUTION INTRAMUSCULAR; INTRAVENOUS; SUBCUTANEOUS at 20:01

## 2022-08-10 RX ADMIN — CARVEDILOL 6.25 MG: 6.25 TABLET, FILM COATED ORAL at 17:40

## 2022-08-10 RX ADMIN — FUROSEMIDE 40 MG: 10 INJECTION, SOLUTION INTRAMUSCULAR; INTRAVENOUS at 17:40

## 2022-08-10 RX ADMIN — PRAVASTATIN SODIUM 80 MG: 80 TABLET ORAL at 20:58

## 2022-08-10 RX ADMIN — SACUBITRIL AND VALSARTAN 1 TABLET: 49; 51 TABLET, FILM COATED ORAL at 20:58

## 2022-08-10 RX ADMIN — INSULIN GLARGINE 15 UNITS: 100 INJECTION, SOLUTION SUBCUTANEOUS at 20:58

## 2022-08-10 RX ADMIN — AMIODARONE HYDROCHLORIDE 1 MG/MIN: 50 INJECTION, SOLUTION INTRAVENOUS at 17:37

## 2022-08-10 ASSESSMENT — ENCOUNTER SYMPTOMS
ABDOMINAL PAIN: 0
NAUSEA: 0
COUGH: 0
TROUBLE SWALLOWING: 0
SORE THROAT: 0
VOICE CHANGE: 0
VOMITING: 0
EYE PAIN: 0
EYES NEGATIVE: 1
FACIAL SWELLING: 0
SHORTNESS OF BREATH: 0
RESPIRATORY NEGATIVE: 1
CHEST TIGHTNESS: 0
HEARTBURN: 0
BACK PAIN: 0

## 2022-08-10 ASSESSMENT — PAIN DESCRIPTION - ONSET
ONSET: ON-GOING
ONSET: PROGRESSIVE
ONSET: PROGRESSIVE

## 2022-08-10 ASSESSMENT — PAIN SCALES - GENERAL
PAINLEVEL_OUTOF10: 9
PAINLEVEL_OUTOF10: 0
PAINLEVEL_OUTOF10: 5
PAINLEVEL_OUTOF10: 10
PAINLEVEL_OUTOF10: 6

## 2022-08-10 ASSESSMENT — PAIN - FUNCTIONAL ASSESSMENT
PAIN_FUNCTIONAL_ASSESSMENT: 0-10
PAIN_FUNCTIONAL_ASSESSMENT: PREVENTS OR INTERFERES WITH ALL ACTIVE AND SOME PASSIVE ACTIVITIES
PAIN_FUNCTIONAL_ASSESSMENT: PREVENTS OR INTERFERES WITH MANY ACTIVE NOT PASSIVE ACTIVITIES
PAIN_FUNCTIONAL_ASSESSMENT: PREVENTS OR INTERFERES WITH MANY ACTIVE NOT PASSIVE ACTIVITIES

## 2022-08-10 ASSESSMENT — PAIN DESCRIPTION - LOCATION
LOCATION: ARM;SHOULDER;LEG
LOCATION: ARM;LEG;SHOULDER
LOCATION: ARM;SHOULDER;LEG

## 2022-08-10 ASSESSMENT — PAIN DESCRIPTION - ORIENTATION
ORIENTATION: LEFT
ORIENTATION: LEFT
ORIENTATION: LEFT;POSTERIOR

## 2022-08-10 ASSESSMENT — PAIN DESCRIPTION - DESCRIPTORS
DESCRIPTORS: SORE
DESCRIPTORS: ACHING;THROBBING
DESCRIPTORS: SORE

## 2022-08-10 ASSESSMENT — PAIN DESCRIPTION - FREQUENCY
FREQUENCY: CONTINUOUS

## 2022-08-10 ASSESSMENT — PAIN DESCRIPTION - PAIN TYPE
TYPE: CHRONIC PAIN

## 2022-08-10 NOTE — H&P
Patient seen and examined by me. Agree with above note by physician extender. Key findings are: 57-year-old female with history of nonischemic cardiomyopathy. She presents after ICD discharge earlier today. She was admitted with VF arrest 05/2022 with cardiac catheterization demonstrating normal coronary arteries. She presents today with no prodromal symptoms prior to ICD discharge. She was appropriately treated for underlying ventricular tachycardia. Vitals:    08/10/22 1430 08/10/22 1445 08/10/22 1630 08/10/22 1650   BP: 107/77 118/84 110/70    Pulse: 60 62 60    Resp: 16 20 11    Temp:    98.9 °F (37.2 °C)   TempSrc:    Oral   SpO2:   97%    Weight:    204 lb (92.5 kg)        General: Patient is alert and oriented, no apparent distress  HEENT: Pupils equal reactive, oropharynx clear  Chest: Clear to auscultation bilaterally  Cardiovascular: S1-S2 regular with no murmur  Abdomen: Soft positive bowel sounds  Extremities: Soft no edema with intact distal pulses    Principal Problem:    Ventricular tachycardia (HCC)  Plan: Recurrent VT requiring ICD therapy. We will give IV amiodarone overnight increased oral amiodarone 40 mg twice daily. Patient will be seen by electrophysiology tomorrow. Given young age and recurrent ventricular arrhythmias patient could be considered for VT ablation. Active Problems:    HTN (hypertension)  Plan: She is on appropriate therapy for established cardiomyopathy. We will continue carvedilol, Jardiance, Entresto. We will give IV Lasix for suspected volume overload. This may be a contributor to development of VTE. Type 2 diabetes with nephropathy ([address]  Plan: Continue home therapies    Morbid obesity ([address]  Plan: Chronic and major contributor to patient's multiple comorbidities. LORI on CPAP  Plan: Encourage compliance with CPAP.           Sky Acosta MD      [facility] CARDIOLOGY History &Physical                 Primary Cardiologist: Dr Rishi Corbin    Primary Care Physician: Hayden Granado MD    Admitting Physician: Dr Alexandra Kenyon:     Patient is a 60 y.o. female with a hx of ventricular cardiac arrest, CAD of the native coronary artery disease, CKD, status post ICD, diabetes mellitus, acute hypoxemic respiratory failure, lipidemia, fibromyalgia, heart failure reduced ejection from, nonischemic cardiomyopathy, hypertension, morbid obesity, obstructive sleep apnea, and tobacco use disorder. Patient was riding the bus to work when she felt a sudden episode of an ICD shock. Patient did not feel weak, dizzy, palpitations, nauseous, vomiting, or any other complaint at the time. She only felt 1 shock and came to the emergency department to be seen. She is currently very nervous and also states she has had recent increase and lower extremity edema and abdominal edema as well. She has been taking all of her medications as needed has been on 400 mg total daily of amiodarone at home and her as needed Demadex she has been taking regularly twice daily. She has no further complaints at this time. Recent Cardiac Synopsis w/ Labs  LHC/NST: 07/01/22    Conclusion  · Left heart cath selective coronary angiography left ventriculogram performed via right radial access    1. Severe nonischemic cardiomyopathy with ejection fraction estimated below 35%    2. Angiographically normal coronary arteries    3. Filling abnormality noted on LV gram representing either an aneurysmal section of the left ventricle or possibly severe MR into the left atrium echocardiogram needs to be obtained or reviewed for complete delineation of this    Echo: 07/01/22  Interpretation Summary  Formatting of this result is different from the original.      Left Ventricle: Moderately reduced left ventricular systolic function with a visually estimated EF of 35 - 40%. Left ventricle is dilated. Normal wall thickness. Mitral Valve: Mild regurgitation.     Left Atrium: Left atrium is moderately dilated. Technical qualifiers: Technically difficult study due to patient's body habitus and color flow Doppler was performed. Contrast used: Definity. EKst Degree HB  PPM Interrogation: 1 ICD shock, no recurrent runs of VT since her last ICD discharge in may. Past Medical History:   Diagnosis Date    Abdominal wall hernia 10/2/2018    Acute hypoxemic respiratory failure ([address] 4/3/2019    Allergic rhinitis     followed by allergist; allergic to grass- has rescue inhaler PRN    ARDS (adult respiratory distress syndrome) ([address] 4/3/2019    Arthritis     Automatic implantable cardioverter-defibrillator in situ 3/30/2016    CAD in native artery 3/30/2016    Chronic kidney disease     Chronic systolic heart failure ([facility] 2013    ECHO - EF 39%; managed with medications; [place] [facility]    CKD (chronic kidney disease)     [facility] follows    Diabetes ([facility]     type 2 (on insulin);  FBS AM range- , hypo s/s <70, hgba1c- 7.9    Diabetes mellitus ([address] 2010    Dyslipidemia 2013    Eczema     Fibromyalgia     GERD (gastroesophageal reflux disease)     hx of- no meds currently    Headache     Heart failure (HCC)     chronic systolic with EF 15%; non-ischemic cardiomyopathy    HTN (hypertension) 2010    Hypercholesterolemia     Incarcerated incisional hernia 3/26/2019    Morbid obesity ([address]     Neuropathy     bilateral feet and tips of fingers    NICM (nonischemic cardiomyopathy) ([facility] 2/15/2013    Obesity     bmi- 41    Obstructive sleep apnea (adult) (pediatric) 2014    uses cpap qhs    Pseudomonas urinary tract infection 2019    Sciatic pain 2016    Severe persistent asthma with acute exacerbation 2020    SVT (supraventricular tachycardia) ([address]     ablation for svt    Tobacco use disorder 2010      Past Surgical History:   Procedure Laterality Date    ABLATION OF DYSRHYTHMIC FOCUS  \"years ago\"    CARDIAC DEFIBRILLATOR PLACEMENT  2013    Biotronik dual chamber ICD by Dr. Cecy Oleary  2013    Biotronik dual chamber ICD by Dr. Matteo Horta N/A 2022    Left heart cath / coronary angiography performed by Taft Severs, MD at [facility] CATH LAB     SECTION      x1    COLONOSCOPY N/A 2021    COLONOSCOPY/BMI 50 PT HAS AN ICD performed by Dacia Wooten MD at [facility]  2019    mild incarceration, no bowel removal    HYSTERECTOMY (CERVIX STATUS UNKNOWN)      EDITH-Left ovary intact per pt    LEFT HEART CATH,PERCUTANEOUS  2013    no intervention    PACEMAKER      ICD    ROTATOR CUFF REPAIR Right     TONSILLECTOMY        Allergies   Allergen Reactions    Other Hives and Shortness Of Breath     \"inhaler PRN\"    Penicillin G Itching     Social History       Tobacco History       Smoking Status  Some Days Smoking Frequency  0.25 packs/day Smoking Tobacco Type  Cigarettes      Smokeless Tobacco Use  Never      Tobacco Comments  Quit smoking: \"every now and then\"              Alcohol History       Alcohol Use Status  Yes Amount  1.0 standard drink of alcohol/wk              Drug Use       Drug Use Status  Yes Types  Marijuana (Weed)              Sexual Activity       Sexually Active  Not Asked                  FH:   Family History   Problem Relation Age of Onset    Diabetes Other     Heart Disease Father     Hypertension Father     Stroke Father     Heart Attack Father 48        mi    Breast Cancer Sister 37    Hypertension Brother     Heart Disease Brother     Diabetes Brother     Stroke Mother         Review of Systems   Constitutional: Negative for chills, diaphoresis, fever, malaise/fatigue, night sweats, weight gain and weight loss. HENT: Negative. Eyes: Negative. Cardiovascular:  Positive for leg swelling. Negative for palpitations. ICD shock x1   Respiratory: Negative. Endocrine: Negative. Hematologic/Lymphatic: Negative. Skin: Negative. Musculoskeletal: Negative. Gastrointestinal:  Negative for heartburn, nausea and vomiting. Genitourinary: Negative. Neurological: Negative. Negative for weakness. Psychiatric/Behavioral: Negative. Objective:       /84   Pulse 62   Resp 20   SpO2 99%     No data found. No intake/output data recorded. No intake/output data recorded. Physical Exam:  General: Well Developed, obese, No Acute Distress  HEENT: pupils equal and round, no abnormalities noted  Neck: supple, no JVD, no carotid bruits  Heart: S1S2 with RRR without murmurs or gallops  Lungs: Clear lung sounds throughout with no adventitious lung sounds  Abd: soft, nontender, distended, with good bowel sounds  Ext: warm, increased lower limb edema from baseline. , calves supple/nontender, pulses 2+ bilaterally  Skin: warm and dry  Psychiatric: Normal mood and affect  Neurologic: Alert and oriented X 3      Data Review:     Recent Labs     08/10/22  1341 07/14/22  1402   WBC 7.6 7.0   HGB 14.2 15.3   HCT 43.7 47.0*   MCV 98.9* 97.7   * 130*       Recent Labs     08/10/22  1341      K 3.6   *   CO2 28   BUN 32*   CREATININE 2.10*   GLUCOSE 152*   CALCIUM 9.1   PROT 7.7   LABALBU 3.8   BILITOT 0.4   ALKPHOS 73   AST 21   ALT 32   LABGLOM 26*   GFRAA 31*   GLOB 3.9*       No results for input(s): CKTOTAL, CKMB, CKMBINDEX, DDIMER, TROPONINI in the last 720 hours. Assessment/Plan:     Principal Problem:    Ventricular tachycardia ([address]    Plan: Patient has had 1 episode of ATP to ICD shock. Review of patient's chart shows no gross electrolyte abnormalities. She is currently on amiodarone 200 mg twice daily at home and will start IV amiodarone overnight with electrophysiology to see in the morning. We will continue to monitor for further arrhythmias on telemetry. Patient's ICD is already been interrogated.   Further recommendations pending clinical course attending recommendations. Increased lower limb edema: We will start on twice daily Lasix by IV push. Continue to monitor daily BMP, daily mag, will address outpatient diuretic requirements. Patient's had increased demand Demadex at home. We will check limited echocardiogram for further reduction in EF. Patient's ICD is already been interrogated. Diabetes mellitus: We will continue home regiment can add sliding scale insulin for coverage if needed. Obesity: Patient is morbidly obese will encourage healthy lifestyle choices. LORI with use of CPAP: Patient can use CPAP this hospital admission.     ARNOLDO Chaparro - CNP  8/10/2022  4:04 PM

## 2022-08-10 NOTE — ED PROVIDER NOTES
[employer] Emergency Department Provider Note                   PCP:                Marko Espitia MD               Age: 60 y.o. Sex: female       ICD-10-CM    1. Ventricular tachycardia (HCC)  I47.2 Transthoracic echocardiogram (TTE) limited with contrast, bubble, strain, and 3D PRN     Transthoracic echocardiogram (TTE) limited with contrast, bubble, strain, and 3D PRN      2. V tach (HCC)  I47.2       3. History of automatic internal cardiac defibrillator (AICD)  Z95.810           DISPOSITION Admitted 08/10/2022 04:03:15 PM       MDM  Number of Diagnoses or Management Options  History of automatic internal cardiac defibrillator (AICD)  V tach ([address]  Ventricular tachycardia ([address]  Diagnosis management comments: 53-year-old female who presents for her AICD shocking her 3 times. Vital signs are reviewed. Patient is clinically nontoxic in appearance she was hooked up to cardiac monitor. She has normal blood pressure. Her heart rate is in the mid 60s. Initial EKG showed no signs of acute ischemia. Lab work here was obtained. CMP shows her baseline chronic kidney disease. Initial troponin came back at 34 her BNP came back at 600, of contacted on-call cardiologist who is agreeable to come see the patient. Patient's AICD is a [employer] we have contacted the rep. The Rep come down and say that she had had runs of V. tach with rates in the 240s when her defibrillator had gone off. Contact with the on-call cardiologist, come down and seen the patient and is agreeable for admitting the patient for her [facility] ID shocking her for her unexplained V. tach earlier today. The admitted in stable condition         Orders Placed This Encounter   Procedures    XR CHEST PORTABLE    CBC    Comprehensive Metabolic Panel    Lipase    Magnesium    Brain Natriuretic Peptide    Basic Metabolic Panel w/ Reflex to MG    Magnesium    ADULT DIET; Regular; 3 carb choices (45 gm/meal);  Low Fat/Low Chol/High Fiber/2 gm Na; 2000 ml; No Caffeine    Cardiac Monitor    Pulse Oximetry    PACEMAKER CARE    Vital signs per unit routine    Telemetry monitoring - 72 hour duration    Notify physician    Notify physician    Strict Bedrest    Daily weights    Strict intake and output    Daily weights    Misc nursing order (specify)    Full code    Initiate Oxygen Therapy Protocol    POCT Glucose    POCT Glucose    POCT Glucose    EKG 12 Lead    Saline lock IV    Place in Observation Service        Dat Laird is a 60 y.o. female who presents to the Emergency Department with chief complaint of    Chief Complaint   Patient presents with    AICD Problem      47-year-old female presents emerged department via this with a chief complaint of being shocked by her AICD 3 times today. Patient states that she was on a bus when she felt this for shock. She denied having any chest pain or shortness of breath prior to the shocking. She states that she did not pass out from a shock. This is happened to her before back in May where she passed out. Her AICD since 2013. She states that she is followed by cardiologist Dr. Kathleen Garner. Missing anything or rate control medicines. She denies any recent illness. Occasional tobacco smoker she denies alcohol she occasionally uses marijuana. Ports no current chest pain, back pain or abdominal pain. She endorses some slight shortness of breath. All other systems reviewed and are negative. Review of Systems   Constitutional:  Negative for activity change, chills and fever. HENT:  Negative for dental problem, drooling, facial swelling, sore throat, trouble swallowing and voice change. Eyes:  Negative for pain. Respiratory:  Negative for cough, chest tightness and shortness of breath. Cardiovascular:  Positive for chest pain. Negative for palpitations. Gastrointestinal:  Negative for abdominal pain, nausea and vomiting. Endocrine: Negative for polydipsia.    Genitourinary:  Negative for difficulty urinating, dysuria and hematuria. Musculoskeletal:  Negative for back pain and neck pain. Skin:  Negative for rash and wound. Neurological:  Negative for dizziness, seizures, facial asymmetry, speech difficulty, numbness and headaches. Psychiatric/Behavioral:  Negative for agitation and behavioral problems. Past Medical History:   Diagnosis Date    Abdominal wall hernia 10/2/2018    Acute hypoxemic respiratory failure ([address] 4/3/2019    Allergic rhinitis     followed by allergist; allergic to grass- has rescue inhaler PRN    ARDS (adult respiratory distress syndrome) ([address] 4/3/2019    Arthritis     Automatic implantable cardioverter-defibrillator in situ 3/30/2016    CAD in native artery 3/30/2016    Chronic kidney disease     Chronic systolic heart failure ([facility] 07/18/2013    ECHO 2017- EF 39%; managed with medications; [place] [facility]    CKD (chronic kidney disease)     [facility] follows    Diabetes ([facility]     type 2 (on insulin);  FBS AM range- , hypo s/s <70, hgba1c- 7.9    Diabetes mellitus ([address] 4/12/2010    Dyslipidemia 2/13/2013    Eczema     Fibromyalgia     GERD (gastroesophageal reflux disease)     hx of- no meds currently    Headache     Heart failure (HCC)     chronic systolic with EF 66%; non-ischemic cardiomyopathy    HTN (hypertension) 4/12/2010    Hypercholesterolemia     Incarcerated incisional hernia 3/26/2019    Morbid obesity ([address]     Neuropathy     bilateral feet and tips of fingers    NICM (nonischemic cardiomyopathy) ([facility] 2/15/2013    Obesity     bmi- 41    Obstructive sleep apnea (adult) (pediatric) 07/14/2014    uses cpap qhs    Pseudomonas urinary tract infection 4/5/2019    Sciatic pain 5/23/2016    Severe persistent asthma with acute exacerbation 8/24/2020    SVT (supraventricular tachycardia) ([address]     ablation for svt    Tobacco use disorder 4/12/2010        Past Surgical History:   Procedure Laterality Date    ABLATION OF DYSRHYTHMIC FOCUS  \"years ago\"    CARDIAC DEFIBRILLATOR PLACEMENT  07/18/2013    Biotronik dual chamber ICD by Dr. Zahira Matamoros  07/18/2013    Biotronik dual chamber ICD by Dr. Michale Mcpherson 7/1/2022    Left heart cath / coronary angiography performed by Sienna Banerjee MD at [facility]      x1    COLONOSCOPY N/A 7/7/2021    COLONOSCOPY/BMI 50 PT HAS AN ICD performed by Vijay Fontanez MD at [facility]  03/26/2019    mild incarceration, no bowel removal    HYSTERECTOMY (CERVIX STATUS UNKNOWN)      EDITH-Left ovary intact per pt    LEFT HEART CATH,PERCUTANEOUS  2/13/2013    no intervention    PACEMAKER      ICD    ROTATOR CUFF REPAIR Right     TONSILLECTOMY          Family History   Problem Relation Age of Onset    Diabetes Other     Heart Disease Father     Hypertension Father     Stroke Father     Heart Attack Father 48        mi    Breast Cancer Sister 37    Hypertension Brother     Heart Disease Brother     Diabetes Brother     Stroke Mother         Social History     Socioeconomic History    Marital status: Single   Tobacco Use    Smoking status: Some Days     Packs/day: 0.25     Types: Cigarettes    Smokeless tobacco: Never    Tobacco comments:     Quit smoking: \"every now and then\"   Vaping Use    Vaping Use: Never used   Substance and Sexual Activity    Alcohol use: Yes     Alcohol/week: 1.0 standard drink    Drug use: Yes     Types: Marijuana Juanetta [place])   Social History Narrative    Denies physical or sexual abuse        Allergies:  Other and Penicillin g    Current Discharge Medication List        CONTINUE these medications which have NOT CHANGED    Details   amiodarone (CORDARONE) 200 MG tablet Take 2 tablets by mouth daily  Qty: 180 tablet, Refills: 1      acetaminophen (TYLENOL) 325 MG tablet Take 325 mg by mouth every 6 hours as needed      albuterol sulfate  (90 Base) MCG/ACT inhaler 2 puffs qid prn shortness of breath or wheezing      aspirin 81 MG EC tablet Take by mouth daily      bacitracin zinc 500 UNIT/GM ointment Apply topically daily      carvedilol (COREG) 6.25 MG tablet Take 6.25 mg by mouth 2 times daily (with meals)      vitamin D 25 MCG (1000 UT) CAPS Take by mouth daily      empagliflozin (JARDIANCE) 10 MG tablet Take 10 mg by mouth daily      insulin glargine (LANTUS SOLOSTAR) 100 UNIT/ML injection pen Inject 15 Units into the skin      isosorbide mononitrate (IMDUR) 30 MG extended release tablet Take 30 mg by mouth      latanoprost (XALATAN) 0.005 % ophthalmic solution Apply 1 drop to eye      nitroGLYCERIN (NITROSTAT) 0.4 MG SL tablet Place 0.4 mg under the tongue      ondansetron (ZOFRAN-ODT) 4 MG disintegrating tablet Take 1 tablet by mouth 3 times daily as needed      oxyCODONE (ROXICODONE) 5 MG immediate release tablet Take 1 tablet by mouth every 6 hours as needed. polyethylene glycol (GLYCOLAX) 17 GM/SCOOP powder Take 17 g by mouth daily as needed      pravastatin (PRAVACHOL) 80 MG tablet Take 80 mg by mouth      sacubitril-valsartan (ENTRESTO) 49-51 MG per tablet Take 1 tablet by mouth 2 times daily      torsemide (DEMADEX) 20 MG tablet Take 20 mg by mouth 2 times daily              Vitals signs and nursing note reviewed. Patient Vitals for the past 4 hrs:   Temp Pulse Resp BP SpO2   08/10/22 2052 -- -- 20 -- --   08/10/22 2042 98.4 °F (36.9 °C) 60 20 (!) 109/56 99 %          Physical Exam  Vitals and nursing note reviewed. Constitutional:       General: She is not in acute distress. Appearance: Normal appearance. She is obese. She is not ill-appearing. HENT:      Head: Normocephalic and atraumatic. Right Ear: Tympanic membrane normal.      Left Ear: Tympanic membrane normal.      Mouth/Throat:      Mouth: Mucous membranes are moist.      Pharynx: Oropharynx is clear. Eyes:      Extraocular Movements: Extraocular movements intact. Pupils: Pupils are equal, round, and reactive to light. Cardiovascular:      Rate and Rhythm: Normal rate and regular rhythm. Heart sounds: No murmur heard. Pulmonary:      Effort: No respiratory distress. Breath sounds: No wheezing or rhonchi. Abdominal:      Palpations: Abdomen is soft. There is no mass. Tenderness: There is no abdominal tenderness. There is no guarding. Musculoskeletal:         General: No swelling or tenderness. Cervical back: Normal range of motion and neck supple. No rigidity or tenderness. Skin:     General: Skin is warm and dry. Capillary Refill: Capillary refill takes less than 2 seconds. Neurological:      General: No focal deficit present. Mental Status: She is alert and oriented to person, place, and time. Mental status is at baseline.    Psychiatric:         Mood and Affect: Mood normal.         Behavior: Behavior normal.        Procedures    ED EKG Interpretation  EKG was interpreted in the absence of a cardiologist.    Rate: Rate: Normal  EKG Interpretation: EKG Interpretation: sinus rhythm  ST Segments: ST segment depression - NO STEMI    Labs Reviewed   CBC - Abnormal; Notable for the following components:       Result Value    MCV 98.9 (*)     Platelets 492 (*)     All other components within normal limits   COMPREHENSIVE METABOLIC PANEL - Abnormal; Notable for the following components:    Chloride 110 (*)     Anion Gap 4 (*)     Glucose 152 (*)     BUN 32 (*)     Creatinine 2.10 (*)     GFR  31 (*)     GFR Non- 26 (*)     Globulin 3.9 (*)     Albumin/Globulin Ratio 1.0 (*)     All other components within normal limits   TROPONIN - Abnormal; Notable for the following components:    Troponin, High Sensitivity 34.2 (*)     All other components within normal limits   MAGNESIUM - Abnormal; Notable for the following components:    Magnesium 2.6 (*)     All other components within normal limits   BRAIN NATRIURETIC PEPTIDE - Abnormal; Notable for the following components:    NT Pro- (*)     All other components within normal limits   POCT GLUCOSE - Abnormal; Notable for the following components:    POC Glucose 170 (*)     All other components within normal limits   LIPASE   BASIC METABOLIC PANEL W/ REFLEX TO MG FOR LOW K   MAGNESIUM   POCT GLUCOSE   POCT GLUCOSE        XR CHEST PORTABLE   Final Result   Mild haziness overlies the lung fields bilaterally possibly edema or   overlying soft tissue. Stable cardiomegaly. Implantable cardiac device and 2   leads are unchanged in position. Pacer pads overlie the right upper chest and   left lateral chest.  No pneumothorax. Small left pleural effusion difficult to   exclude due to overlying soft tissue. No definite right pleural effusion. Voice dictation software was used during the making of this note. This software is not perfect and grammatical and other typographical errors may be present. This note has not been completely proofread for errors.      Starr Youssef DO  08/10/22 [id number]

## 2022-08-10 NOTE — PLAN OF CARE
TRANSFER - IN REPORT:    [facility]  being received from ED for routine progression of patient care      Report consisted of patient's Situation, Background, Assessment and   Recommendations(SBAR). Information from the following report(s) ED Encounter Summary, ED SBAR, and Cardiac Rhythm V Paced  was reviewed with the receiving nurse. Opportunity for questions and clarification was provided. Assessment completed upon patient's arrival to unit and care assumed. Dual nurse skin assessment completed with Vu Nguyen RN. Overall skin is clean dry and intact overall. There are no open wounds or area of skin breakdown noted. Heels and sacrum are clean dry and intact with no signs of skin breakdown or redness.  Shins are pale with scattered ecchymosis

## 2022-08-10 NOTE — ED NOTES
TRANSFER - OUT REPORT:    Verbal report given to Blank Pardo RN on Rachael Hernandez  being transferred to 18 for routine progression of patient care       Report consisted of patient's Situation, Background, Assessment and   Recommendations(SBAR). Information from the following report(s) ED SBAR was reviewed with the receiving nurse. Lines:   Peripheral IV 08/10/22 Left Antecubital (Active)        Opportunity for questions and clarification was provided.       Patient transported with:  Registered Nurse     Adolfo Shirley RN  08/10/22 5236

## 2022-08-11 ENCOUNTER — APPOINTMENT (OUTPATIENT)
Dept: NON INVASIVE DIAGNOSTICS | Age: 57
DRG: 309 | End: 2022-08-11
Payer: MEDICARE

## 2022-08-11 LAB
ANION GAP SERPL CALC-SCNC: 6 MMOL/L (ref 7–16)
BUN SERPL-MCNC: 29 MG/DL (ref 6–23)
CALCIUM SERPL-MCNC: 9 MG/DL (ref 8.3–10.4)
CHLORIDE SERPL-SCNC: 107 MMOL/L (ref 98–107)
CO2 SERPL-SCNC: 28 MMOL/L (ref 21–32)
CREAT SERPL-MCNC: 1.7 MG/DL (ref 0.6–1)
ECHO BSA: 1.95 M2
ECHO LV EDV A2C: 151 ML
ECHO LV EDV A4C: 239 ML
ECHO LV EDV INDEX A4C: 130 ML/M2
ECHO LV EDV NDEX A2C: 82 ML/M2
ECHO LV EJECTION FRACTION A2C: 19 %
ECHO LV EJECTION FRACTION A4C: 25 %
ECHO LV EJECTION FRACTION BIPLANE: 23 % (ref 55–100)
ECHO LV ESV A2C: 123 ML
ECHO LV ESV A4C: 180 ML
ECHO LV ESV INDEX A2C: 67 ML/M2
ECHO LV ESV INDEX A4C: 98 ML/M2
ECHO LV FRACTIONAL SHORTENING: 8 % (ref 28–44)
ECHO LV INTERNAL DIMENSION DIASTOLE INDEX: 3.86 CM/M2
ECHO LV INTERNAL DIMENSION DIASTOLIC: 7.1 CM (ref 3.9–5.3)
ECHO LV INTERNAL DIMENSION SYSTOLIC INDEX: 3.53 CM/M2
ECHO LV INTERNAL DIMENSION SYSTOLIC: 6.5 CM
ECHO LV IVSD: 1.2 CM (ref 0.6–0.9)
ECHO LV MASS 2D: 329.8 G (ref 67–162)
ECHO LV MASS INDEX 2D: 179.2 G/M2 (ref 43–95)
ECHO LV POSTERIOR WALL DIASTOLIC: 0.8 CM (ref 0.6–0.9)
ECHO LV RELATIVE WALL THICKNESS RATIO: 0.23
ECHO RV INTERNAL DIMENSION: 3.4 CM
GLUCOSE BLD STRIP.AUTO-MCNC: 108 MG/DL (ref 65–100)
GLUCOSE BLD STRIP.AUTO-MCNC: 112 MG/DL (ref 65–100)
GLUCOSE BLD STRIP.AUTO-MCNC: 114 MG/DL (ref 65–100)
GLUCOSE BLD STRIP.AUTO-MCNC: 115 MG/DL (ref 65–100)
GLUCOSE BLD STRIP.AUTO-MCNC: 128 MG/DL (ref 65–100)
GLUCOSE SERPL-MCNC: 120 MG/DL (ref 65–100)
MAGNESIUM SERPL-MCNC: 2.6 MG/DL (ref 1.8–2.4)
POTASSIUM SERPL-SCNC: 3.1 MMOL/L (ref 3.5–5.1)
SERVICE CMNT-IMP: ABNORMAL
SODIUM SERPL-SCNC: 141 MMOL/L (ref 136–145)

## 2022-08-11 PROCEDURE — 82962 GLUCOSE BLOOD TEST: CPT

## 2022-08-11 PROCEDURE — 6370000000 HC RX 637 (ALT 250 FOR IP): Performed by: NURSE PRACTITIONER

## 2022-08-11 PROCEDURE — 83735 ASSAY OF MAGNESIUM: CPT

## 2022-08-11 PROCEDURE — 1100000003 HC PRIVATE W/ TELEMETRY

## 2022-08-11 PROCEDURE — 6360000004 HC RX CONTRAST MEDICATION: Performed by: INTERNAL MEDICINE

## 2022-08-11 PROCEDURE — 2580000003 HC RX 258: Performed by: NURSE PRACTITIONER

## 2022-08-11 PROCEDURE — 36415 COLL VENOUS BLD VENIPUNCTURE: CPT

## 2022-08-11 PROCEDURE — C8924 2D TTE W OR W/O FOL W/CON,FU: HCPCS

## 2022-08-11 PROCEDURE — 2580000003 HC RX 258: Performed by: INTERNAL MEDICINE

## 2022-08-11 PROCEDURE — 2700000000 HC OXYGEN THERAPY PER DAY

## 2022-08-11 PROCEDURE — 99223 1ST HOSP IP/OBS HIGH 75: CPT | Performed by: INTERNAL MEDICINE

## 2022-08-11 PROCEDURE — 96372 THER/PROPH/DIAG INJ SC/IM: CPT

## 2022-08-11 PROCEDURE — 96375 TX/PRO/DX INJ NEW DRUG ADDON: CPT

## 2022-08-11 PROCEDURE — 96376 TX/PRO/DX INJ SAME DRUG ADON: CPT

## 2022-08-11 PROCEDURE — 6360000002 HC RX W HCPCS: Performed by: INTERNAL MEDICINE

## 2022-08-11 PROCEDURE — 6360000002 HC RX W HCPCS: Performed by: NURSE PRACTITIONER

## 2022-08-11 PROCEDURE — 80048 BASIC METABOLIC PNL TOTAL CA: CPT

## 2022-08-11 PROCEDURE — 93308 TTE F-UP OR LMTD: CPT | Performed by: INTERNAL MEDICINE

## 2022-08-11 RX ORDER — MORPHINE SULFATE 2 MG/ML
2 INJECTION, SOLUTION INTRAMUSCULAR; INTRAVENOUS ONCE
Status: COMPLETED | OUTPATIENT
Start: 2022-08-11 | End: 2022-08-11

## 2022-08-11 RX ORDER — AMIODARONE HYDROCHLORIDE 200 MG/1
200 TABLET ORAL 2 TIMES DAILY
Status: DISCONTINUED | OUTPATIENT
Start: 2022-08-11 | End: 2022-08-12

## 2022-08-11 RX ORDER — ENOXAPARIN SODIUM 100 MG/ML
40 INJECTION SUBCUTANEOUS DAILY
Status: DISCONTINUED | OUTPATIENT
Start: 2022-08-12 | End: 2022-08-17 | Stop reason: HOSPADM

## 2022-08-11 RX ADMIN — OXYCODONE 5 MG: 5 TABLET ORAL at 12:26

## 2022-08-11 RX ADMIN — FUROSEMIDE 40 MG: 10 INJECTION, SOLUTION INTRAMUSCULAR; INTRAVENOUS at 09:08

## 2022-08-11 RX ADMIN — EMPAGLIFLOZIN 10 MG: 10 TABLET, FILM COATED ORAL at 09:08

## 2022-08-11 RX ADMIN — SACUBITRIL AND VALSARTAN 1 TABLET: 49; 51 TABLET, FILM COATED ORAL at 21:29

## 2022-08-11 RX ADMIN — MORPHINE SULFATE 2 MG: 2 INJECTION, SOLUTION INTRAMUSCULAR; INTRAVENOUS at 09:43

## 2022-08-11 RX ADMIN — INSULIN GLARGINE 15 UNITS: 100 INJECTION, SOLUTION SUBCUTANEOUS at 21:47

## 2022-08-11 RX ADMIN — SODIUM CHLORIDE, PRESERVATIVE FREE 10 ML: 5 INJECTION INTRAVENOUS at 09:08

## 2022-08-11 RX ADMIN — OXYCODONE 5 MG: 5 TABLET ORAL at 21:29

## 2022-08-11 RX ADMIN — LATANOPROST 1 DROP: 50 SOLUTION OPHTHALMIC at 21:00

## 2022-08-11 RX ADMIN — AMIODARONE HYDROCHLORIDE 200 MG: 200 TABLET ORAL at 21:29

## 2022-08-11 RX ADMIN — SACUBITRIL AND VALSARTAN 1 TABLET: 49; 51 TABLET, FILM COATED ORAL at 09:08

## 2022-08-11 RX ADMIN — ASPIRIN 81 MG: 81 TABLET ORAL at 09:08

## 2022-08-11 RX ADMIN — POTASSIUM CHLORIDE 40 MEQ: 1500 TABLET, EXTENDED RELEASE ORAL at 14:44

## 2022-08-11 RX ADMIN — ISOSORBIDE MONONITRATE 30 MG: 30 TABLET, EXTENDED RELEASE ORAL at 09:08

## 2022-08-11 RX ADMIN — CARVEDILOL 3.12 MG: 6.25 TABLET, FILM COATED ORAL at 17:18

## 2022-08-11 RX ADMIN — ONDANSETRON 4 MG: 4 TABLET, ORALLY DISINTEGRATING ORAL at 03:45

## 2022-08-11 RX ADMIN — SODIUM CHLORIDE, PRESERVATIVE FREE 10 ML: 5 INJECTION INTRAVENOUS at 21:00

## 2022-08-11 RX ADMIN — MORPHINE SULFATE 2 MG: 2 INJECTION, SOLUTION INTRAMUSCULAR; INTRAVENOUS at 17:15

## 2022-08-11 RX ADMIN — ENOXAPARIN SODIUM 30 MG: 100 INJECTION SUBCUTANEOUS at 09:08

## 2022-08-11 RX ADMIN — PRAVASTATIN SODIUM 80 MG: 80 TABLET ORAL at 21:29

## 2022-08-11 RX ADMIN — OXYCODONE 5 MG: 5 TABLET ORAL at 06:20

## 2022-08-11 RX ADMIN — ONDANSETRON 4 MG: 4 TABLET, ORALLY DISINTEGRATING ORAL at 15:58

## 2022-08-11 RX ADMIN — PERFLUTREN 0.15 ML: 6.52 INJECTION, SUSPENSION INTRAVENOUS at 08:34

## 2022-08-11 RX ADMIN — OXYCODONE 5 MG: 5 TABLET ORAL at 00:14

## 2022-08-11 RX ADMIN — CARVEDILOL 6.25 MG: 6.25 TABLET, FILM COATED ORAL at 09:08

## 2022-08-11 RX ADMIN — ONDANSETRON 4 MG: 4 TABLET, ORALLY DISINTEGRATING ORAL at 21:30

## 2022-08-11 ASSESSMENT — PAIN SCALES - GENERAL
PAINLEVEL_OUTOF10: 0
PAINLEVEL_OUTOF10: 0
PAINLEVEL_OUTOF10: 8
PAINLEVEL_OUTOF10: 8
PAINLEVEL_OUTOF10: 6
PAINLEVEL_OUTOF10: 3
PAINLEVEL_OUTOF10: 8
PAINLEVEL_OUTOF10: 10
PAINLEVEL_OUTOF10: 6
PAINLEVEL_OUTOF10: 6

## 2022-08-11 ASSESSMENT — PAIN DESCRIPTION - LOCATION
LOCATION: ARM;LEG;SHOULDER
LOCATION: ARM;LEG;HIP
LOCATION: LEG;SHOULDER;ARM
LOCATION: SHOULDER
LOCATION: ARM;HIP;LEG
LOCATION: LEG;SHOULDER;ARM

## 2022-08-11 ASSESSMENT — PAIN DESCRIPTION - FREQUENCY
FREQUENCY: CONTINUOUS

## 2022-08-11 ASSESSMENT — PAIN - FUNCTIONAL ASSESSMENT
PAIN_FUNCTIONAL_ASSESSMENT: PREVENTS OR INTERFERES WITH MANY ACTIVE NOT PASSIVE ACTIVITIES
PAIN_FUNCTIONAL_ASSESSMENT: PREVENTS OR INTERFERES WITH ALL ACTIVE AND SOME PASSIVE ACTIVITIES
PAIN_FUNCTIONAL_ASSESSMENT: PREVENTS OR INTERFERES WITH MANY ACTIVE NOT PASSIVE ACTIVITIES
PAIN_FUNCTIONAL_ASSESSMENT: PREVENTS OR INTERFERES SOME ACTIVE ACTIVITIES AND ADLS
PAIN_FUNCTIONAL_ASSESSMENT: PREVENTS OR INTERFERES WITH MANY ACTIVE NOT PASSIVE ACTIVITIES
PAIN_FUNCTIONAL_ASSESSMENT: PREVENTS OR INTERFERES SOME ACTIVE ACTIVITIES AND ADLS

## 2022-08-11 ASSESSMENT — PAIN DESCRIPTION - ORIENTATION
ORIENTATION: LEFT

## 2022-08-11 ASSESSMENT — PAIN DESCRIPTION - DESCRIPTORS
DESCRIPTORS: SHOOTING;SHARP
DESCRIPTORS: SORE;ACHING
DESCRIPTORS: THROBBING;SORE;STABBING
DESCRIPTORS: THROBBING;STABBING;SORE
DESCRIPTORS: SORE;ACHING
DESCRIPTORS: SHARP;SHOOTING

## 2022-08-11 ASSESSMENT — PAIN DESCRIPTION - ONSET
ONSET: PROGRESSIVE
ONSET: ON-GOING
ONSET: GRADUAL
ONSET: PROGRESSIVE
ONSET: ON-GOING

## 2022-08-11 ASSESSMENT — PAIN DESCRIPTION - PAIN TYPE
TYPE: CHRONIC PAIN

## 2022-08-11 NOTE — CONSULTS
[facility] Cardiology/Electrophyiology Consult                Date of  Admission: 8/10/2022  1:21 PM     CC/Reason for consult: VT    Eliecer King is a 60 y.o. female admitted for Ventricular tachycardia ([facility] [address] [I47.2]. 59yo BF with a history of HTN, DM, CKD, RLD, LORI, obesity, NICM s/p Biotronik dual chamber ICD implanted 2013 admitted with VT s/p ICD shock and EP was consulted for management of VT. Pt reports an abrupt ICD shock while seated, denies preceding symptoms of chest pain, SOB, palpitations, presyncope or syncope. Pt does endorse some weight gain, leg swelling and abdominal fullness. Pt was recently treated with ICD shock 4/60 complicated by syncope and device settings were adjusted. Cardiac PMH: (Old records have been reviewed and summarized below)  TTE (7/1/22): EF 35-40%, dilated LV  Cath (7/1/22):  1. Severe nonischemic cardiomyopathy with ejection fraction estimated below 35%  2. Angiographically normal coronary arteries  3.   Filling abnormality noted on LV gram representing either an aneurysmal section of the left ventricle or possibly severe MR into the left atrium echocardiogram needs to be obtained or reviewed for complete delineation of this    Patient Active Problem List   Diagnosis    HTN (hypertension)    Type 2 diabetes with nephropathy (HCC)    CHF (congestive heart failure) (HCC)    CAD in native artery    Long-term insulin use in type 2 diabetes ([facility]    NICM (nonischemic cardiomyopathy) (HCC)    Rectal bleeding    Dyslipidemia    CKD (chronic kidney disease) stage 4, GFR 15-29 ml/min (HCC)    V tach (HCC)    Chronic systolic heart failure (HCC)    Morbid obesity (HCC)    Restrictive lung disease    Long term current use of amiodarone    Chronic right-sided thoracic back pain    CKD (chronic kidney disease) stage 3, GFR 30-59 ml/min (HCC)    LOIR on CPAP    Acute on chronic HFrEF (heart failure with reduced ejection fraction) ([facility])    Pleural effusion on right    History of dual chamber ICD by Dr. Jackeline Hyatt  2013    Biotronik dual chamber ICD by Dr. Jose Gomez 2022    Left heart cath / coronary angiography performed by Amanda Mcclelland MD at [facility] CATH LAB     SECTION      x1    COLONOSCOPY N/A 2021    COLONOSCOPY/BMI 50 PT HAS AN ICD performed by Ruthine Najjar, MD at [facility]  2019    mild incarceration, no bowel removal    HYSTERECTOMY (CERVIX STATUS UNKNOWN)      EDITH-Left ovary intact per pt    LEFT HEART CATH,PERCUTANEOUS  2013    no intervention    PACEMAKER      ICD    ROTATOR CUFF REPAIR Right     TONSILLECTOMY       Allergies   Allergen Reactions    Other Hives and Shortness Of Breath     \"inhaler PRN\"    Penicillin G Itching      Family History   Problem Relation Age of Onset    Diabetes Other     Heart Disease Father     Hypertension Father     Stroke Father     Heart Attack Father 48        mi    Breast Cancer Sister 37    Hypertension Brother     Heart Disease Brother     Diabetes Brother     Stroke Mother         Current Facility-Administered Medications   Medication Dose Route Frequency    amiodarone (CORDARONE) 450 mg in dextrose 5 % 250 mL infusion ([id number])  0.5 mg/min IntraVENous Continuous    albuterol sulfate HFA (PROVENTIL;VENTOLIN;PROAIR) 108 (90 Base) MCG/ACT inhaler 2 puff  2 puff Inhalation Q6H PRN    aspirin EC tablet 81 mg  81 mg Oral Daily    carvedilol (COREG) tablet 6.25 mg  6.25 mg Oral BID WC    empagliflozin (JARDIANCE) tablet 10 mg  10 mg Oral Daily    insulin glargine (LANTUS) injection vial 15 Units  15 Units SubCUTAneous Nightly    isosorbide mononitrate (IMDUR) extended release tablet 30 mg  30 mg Oral Daily    latanoprost (XALATAN) 0.005 % ophthalmic solution 1 drop  1 drop Ophthalmic Nightly    nitroGLYCERIN (NITROSTAT) SL tablet 0.4 mg  0.4 mg SubLINGual Q5 Min PRN    ondansetron (ZOFRAN-ODT) disintegrating tablet 4 mg  4 mg Oral Q8H PRN    oxyCODONE (ROXICODONE) immediate release tablet 5 mg  5 mg Oral Q6H PRN    pravastatin (PRAVACHOL) tablet 80 mg  80 mg Oral Nightly    sacubitril-valsartan (ENTRESTO) 49-51 MG per tablet 1 tablet  1 tablet Oral BID    furosemide (LASIX) injection 40 mg  40 mg IntraVENous BID    sodium chloride flush 0.9 % injection 5-40 mL  5-40 mL IntraVENous 2 times per day    sodium chloride flush 0.9 % injection 5-40 mL  5-40 mL IntraVENous PRN    0.9 % sodium chloride infusion   IntraVENous PRN    acetaminophen (TYLENOL) tablet 650 mg  650 mg Oral Q6H PRN    Or    acetaminophen (TYLENOL) suppository 650 mg  650 mg Rectal Q6H PRN    polyethylene glycol (GLYCOLAX) packet 17 g  17 g Oral Daily PRN    potassium chloride (KLOR-CON M) extended release tablet 40 mEq  40 mEq Oral PRN    Or    potassium bicarb-citric acid (EFFER-K) effervescent tablet 40 mEq  40 mEq Oral PRN    Or    potassium chloride 10 mEq/100 mL IVPB (Peripheral Line)  10 mEq IntraVENous PRN    magnesium sulfate 2000 mg in 50 mL IVPB premix  2,000 mg IntraVENous PRN    enoxaparin Sodium (LOVENOX) injection 30 mg  30 mg SubCUTAneous Daily    insulin lispro (HUMALOG) injection vial 0-4 Units  0-4 Units SubCUTAneous TID WC    insulin lispro (HUMALOG) injection vial 0-4 Units  0-4 Units SubCUTAneous Nightly       Review of Symptoms:  A comprehensive ROS was performed with the pertinent positives and negatives mentioned in the HPI, all other systems reviewed and are negative       Physical Exam  Vitals:    08/11/22 0108 08/11/22 0454 08/11/22 0811 08/11/22 0908   BP:  103/62 115/68 121/74   Pulse:  61 59 60   Resp: 19 19 19    Temp:  98 °F (36.7 °C) 98.6 °F (37 °C)    TempSrc:  Oral Oral    SpO2:  94% 97%    Weight:  205 lb 8 oz (93.2 kg)     Height:  4' 10\" (1.473 m)         Physical Exam:  General appearance - Alert, well appearing, and in no distress   Mental status - Affect appropriate to mood.   Neck - Carotids upstroke normal bilaterally, no bruits, no JVD. Resp - Clear to auscultation, no wheezes, rales or rhonchi, symmetric air entry. Heart - Normal rate, regular rhythm, normal S1, S2, no murmurs, rubs, clicks or gallops. GI - Soft, nontender, nondistended  Neurological - Grossly intact - normal speech, no focal findings  Musculoskeletal - No joint tenderness, deformity or swelling, no muscular tenderness noted. Extremities - Peripheral pulses normal, no pedal edema      Cardiographics    Telemetry:   ECG (Indpendently visualized and interpreted):  Echocardiogram:     Labs:   Recent Labs     08/10/22  1341 08/11/22  0449    141   K 3.6 3.1*   MG 2.6* 2.6*   BUN 32* 29*   WBC 7.6  --    HGB 14.2  --    HCT 43.7  --    *  --         Assessment:      Principal Problem:    V tach (HCC)  Active Problems:    HTN (hypertension)    Type 2 diabetes with nephropathy (HCC)    Morbid obesity (HCC)    LORI on CPAP    History of automatic internal cardiac defibrillator (AICD)  Resolved Problems:    * No resolved hospital problems. *         Plan:   1. 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 ICD: functioning normally, cont device checks, recent reprogramming from 5/22 likely why Pt did not have syncope from this most recent VT episode    3. HTN: controlled, cont entresto 49-51mg, coreg 6.25mg, unable to titrate with current BPs    Thank you very much for this referral. We appreciate the opportunity to participate in this patient's care. We will follow along with above stated plan. Tomy Meza MD, MS  Cardiology/Electrophysiology

## 2022-08-11 NOTE — CARE COORDINATION
Patient admitted through ER for ICD firing. Patient placed on IV Amio. O2 at 2lpm.  EP consulted. CM scanned medical record for discharge needs. CM will follow patient's status to assist as needed.

## 2022-08-11 NOTE — PLAN OF CARE
Problem: Discharge Planning  Goal: Discharge to home or other facility with appropriate resources  Outcome: Progressing     Problem: Pain  Goal: Verbalizes/displays adequate comfort level or baseline comfort level  Outcome: Progressing     Problem: Safety - Adult  Goal: Free from fall injury  Outcome: Progressing     Problem: Cardiovascular - Adult  Goal: Maintains optimal cardiac output and hemodynamic stability  Outcome: Progressing  Goal: Absence of cardiac dysrhythmias or at baseline  Outcome: Progressing

## 2022-08-12 ENCOUNTER — APPOINTMENT (OUTPATIENT)
Dept: GENERAL RADIOLOGY | Age: 57
DRG: 309 | End: 2022-08-12
Payer: MEDICARE

## 2022-08-12 LAB
ANION GAP SERPL CALC-SCNC: 4 MMOL/L (ref 7–16)
BUN SERPL-MCNC: 32 MG/DL (ref 6–23)
CALCIUM SERPL-MCNC: 9.5 MG/DL (ref 8.3–10.4)
CHLORIDE SERPL-SCNC: 109 MMOL/L (ref 98–107)
CO2 SERPL-SCNC: 27 MMOL/L (ref 21–32)
CREAT SERPL-MCNC: 1.7 MG/DL (ref 0.6–1)
GLUCOSE BLD STRIP.AUTO-MCNC: 112 MG/DL (ref 65–100)
GLUCOSE BLD STRIP.AUTO-MCNC: 113 MG/DL (ref 65–100)
GLUCOSE BLD STRIP.AUTO-MCNC: 132 MG/DL (ref 65–100)
GLUCOSE BLD STRIP.AUTO-MCNC: 136 MG/DL (ref 65–100)
GLUCOSE SERPL-MCNC: 114 MG/DL (ref 65–100)
MAGNESIUM SERPL-MCNC: 3 MG/DL (ref 1.8–2.4)
POTASSIUM SERPL-SCNC: 4.2 MMOL/L (ref 3.5–5.1)
SERVICE CMNT-IMP: ABNORMAL
SODIUM SERPL-SCNC: 140 MMOL/L (ref 136–145)

## 2022-08-12 PROCEDURE — 6370000000 HC RX 637 (ALT 250 FOR IP): Performed by: INTERNAL MEDICINE

## 2022-08-12 PROCEDURE — 2580000003 HC RX 258: Performed by: NURSE PRACTITIONER

## 2022-08-12 PROCEDURE — 36415 COLL VENOUS BLD VENIPUNCTURE: CPT

## 2022-08-12 PROCEDURE — 6360000002 HC RX W HCPCS: Performed by: INTERNAL MEDICINE

## 2022-08-12 PROCEDURE — 83735 ASSAY OF MAGNESIUM: CPT

## 2022-08-12 PROCEDURE — 99232 SBSQ HOSP IP/OBS MODERATE 35: CPT | Performed by: INTERNAL MEDICINE

## 2022-08-12 PROCEDURE — 82962 GLUCOSE BLOOD TEST: CPT

## 2022-08-12 PROCEDURE — 1100000003 HC PRIVATE W/ TELEMETRY

## 2022-08-12 PROCEDURE — 6370000000 HC RX 637 (ALT 250 FOR IP): Performed by: NURSE PRACTITIONER

## 2022-08-12 PROCEDURE — 72100 X-RAY EXAM L-S SPINE 2/3 VWS: CPT

## 2022-08-12 PROCEDURE — 80048 BASIC METABOLIC PNL TOTAL CA: CPT

## 2022-08-12 RX ORDER — DULOXETIN HYDROCHLORIDE 20 MG/1
20 CAPSULE, DELAYED RELEASE ORAL DAILY
Status: DISCONTINUED | OUTPATIENT
Start: 2022-08-12 | End: 2022-08-17 | Stop reason: HOSPADM

## 2022-08-12 RX ORDER — ACETAMINOPHEN 325 MG/1
650 TABLET ORAL EVERY 8 HOURS SCHEDULED
Status: DISCONTINUED | OUTPATIENT
Start: 2022-08-12 | End: 2022-08-17 | Stop reason: HOSPADM

## 2022-08-12 RX ORDER — TORSEMIDE 20 MG/1
20 TABLET ORAL 2 TIMES DAILY
Status: DISCONTINUED | OUTPATIENT
Start: 2022-08-12 | End: 2022-08-17 | Stop reason: HOSPADM

## 2022-08-12 RX ORDER — OXYCODONE HYDROCHLORIDE 5 MG/1
10 TABLET ORAL EVERY 4 HOURS PRN
Status: DISCONTINUED | OUTPATIENT
Start: 2022-08-12 | End: 2022-08-12

## 2022-08-12 RX ORDER — CYCLOBENZAPRINE HCL 10 MG
10 TABLET ORAL 3 TIMES DAILY PRN
Status: DISCONTINUED | OUTPATIENT
Start: 2022-08-12 | End: 2022-08-17 | Stop reason: HOSPADM

## 2022-08-12 RX ORDER — INSULIN LISPRO 100 [IU]/ML
0-16 INJECTION, SOLUTION INTRAVENOUS; SUBCUTANEOUS
Status: DISCONTINUED | OUTPATIENT
Start: 2022-08-12 | End: 2022-08-17 | Stop reason: HOSPADM

## 2022-08-12 RX ORDER — TRAMADOL HYDROCHLORIDE 50 MG/1
50 TABLET ORAL EVERY 6 HOURS PRN
Status: DISCONTINUED | OUTPATIENT
Start: 2022-08-12 | End: 2022-08-17 | Stop reason: HOSPADM

## 2022-08-12 RX ORDER — AMIODARONE HYDROCHLORIDE 200 MG/1
400 TABLET ORAL 2 TIMES DAILY
Status: DISCONTINUED | OUTPATIENT
Start: 2022-08-12 | End: 2022-08-17 | Stop reason: HOSPADM

## 2022-08-12 RX ORDER — INSULIN LISPRO 100 [IU]/ML
0-4 INJECTION, SOLUTION INTRAVENOUS; SUBCUTANEOUS NIGHTLY
Status: DISCONTINUED | OUTPATIENT
Start: 2022-08-12 | End: 2022-08-16

## 2022-08-12 RX ORDER — GABAPENTIN 100 MG/1
100 CAPSULE ORAL 3 TIMES DAILY
Status: DISCONTINUED | OUTPATIENT
Start: 2022-08-12 | End: 2022-08-16

## 2022-08-12 RX ORDER — DEXAMETHASONE SODIUM PHOSPHATE 10 MG/ML
4 INJECTION INTRAMUSCULAR; INTRAVENOUS EVERY 6 HOURS
Status: DISCONTINUED | OUTPATIENT
Start: 2022-08-12 | End: 2022-08-16

## 2022-08-12 RX ORDER — TRAMADOL HYDROCHLORIDE 50 MG/1
25 TABLET ORAL EVERY 6 HOURS PRN
Status: DISCONTINUED | OUTPATIENT
Start: 2022-08-12 | End: 2022-08-17 | Stop reason: HOSPADM

## 2022-08-12 RX ORDER — DEXTROSE MONOHYDRATE 100 MG/ML
INJECTION, SOLUTION INTRAVENOUS CONTINUOUS PRN
Status: DISCONTINUED | OUTPATIENT
Start: 2022-08-12 | End: 2022-08-17 | Stop reason: HOSPADM

## 2022-08-12 RX ADMIN — ONDANSETRON 4 MG: 4 TABLET, ORALLY DISINTEGRATING ORAL at 22:05

## 2022-08-12 RX ADMIN — DEXAMETHASONE SODIUM PHOSPHATE 4 MG: 10 INJECTION INTRAMUSCULAR; INTRAVENOUS at 23:47

## 2022-08-12 RX ADMIN — OXYCODONE 5 MG: 5 TABLET ORAL at 10:21

## 2022-08-12 RX ADMIN — SODIUM CHLORIDE, PRESERVATIVE FREE 10 ML: 5 INJECTION INTRAVENOUS at 09:12

## 2022-08-12 RX ADMIN — SODIUM CHLORIDE, PRESERVATIVE FREE 10 ML: 5 INJECTION INTRAVENOUS at 22:10

## 2022-08-12 RX ADMIN — TORSEMIDE 20 MG: 20 TABLET ORAL at 09:13

## 2022-08-12 RX ADMIN — SACUBITRIL AND VALSARTAN 1 TABLET: 49; 51 TABLET, FILM COATED ORAL at 09:13

## 2022-08-12 RX ADMIN — DULOXETINE HYDROCHLORIDE 20 MG: 20 CAPSULE, DELAYED RELEASE ORAL at 14:04

## 2022-08-12 RX ADMIN — ENOXAPARIN SODIUM 40 MG: 100 INJECTION SUBCUTANEOUS at 09:13

## 2022-08-12 RX ADMIN — OXYCODONE 5 MG: 5 TABLET ORAL at 16:52

## 2022-08-12 RX ADMIN — EMPAGLIFLOZIN 10 MG: 10 TABLET, FILM COATED ORAL at 10:22

## 2022-08-12 RX ADMIN — INSULIN GLARGINE 15 UNITS: 100 INJECTION, SOLUTION SUBCUTANEOUS at 21:30

## 2022-08-12 RX ADMIN — AMIODARONE HYDROCHLORIDE 400 MG: 200 TABLET ORAL at 09:12

## 2022-08-12 RX ADMIN — LATANOPROST 1 DROP: 50 SOLUTION OPHTHALMIC at 22:10

## 2022-08-12 RX ADMIN — ACETAMINOPHEN 650 MG: 325 TABLET ORAL at 22:05

## 2022-08-12 RX ADMIN — CARVEDILOL 6.25 MG: 6.25 TABLET, FILM COATED ORAL at 16:52

## 2022-08-12 RX ADMIN — DEXAMETHASONE SODIUM PHOSPHATE 4 MG: 10 INJECTION INTRAMUSCULAR; INTRAVENOUS at 18:39

## 2022-08-12 RX ADMIN — ISOSORBIDE MONONITRATE 30 MG: 30 TABLET, EXTENDED RELEASE ORAL at 09:12

## 2022-08-12 RX ADMIN — TORSEMIDE 20 MG: 20 TABLET ORAL at 22:05

## 2022-08-12 RX ADMIN — OXYCODONE 5 MG: 5 TABLET ORAL at 03:50

## 2022-08-12 RX ADMIN — ACETAMINOPHEN 650 MG: 325 TABLET ORAL at 14:03

## 2022-08-12 RX ADMIN — SACUBITRIL AND VALSARTAN 1 TABLET: 49; 51 TABLET, FILM COATED ORAL at 22:05

## 2022-08-12 RX ADMIN — PRAVASTATIN SODIUM 80 MG: 80 TABLET ORAL at 22:06

## 2022-08-12 RX ADMIN — ONDANSETRON 4 MG: 4 TABLET, ORALLY DISINTEGRATING ORAL at 03:25

## 2022-08-12 RX ADMIN — CARVEDILOL 6.25 MG: 6.25 TABLET, FILM COATED ORAL at 09:12

## 2022-08-12 RX ADMIN — AMIODARONE HYDROCHLORIDE 400 MG: 200 TABLET ORAL at 22:06

## 2022-08-12 RX ADMIN — ASPIRIN 81 MG: 81 TABLET ORAL at 09:12

## 2022-08-12 ASSESSMENT — PAIN DESCRIPTION - ORIENTATION
ORIENTATION: LEFT
ORIENTATION: LEFT
ORIENTATION: POSTERIOR;LEFT
ORIENTATION: POSTERIOR
ORIENTATION: LEFT

## 2022-08-12 ASSESSMENT — PAIN DESCRIPTION - PAIN TYPE
TYPE: CHRONIC PAIN
TYPE: CHRONIC PAIN

## 2022-08-12 ASSESSMENT — PAIN SCALES - GENERAL
PAINLEVEL_OUTOF10: 3
PAINLEVEL_OUTOF10: 5
PAINLEVEL_OUTOF10: 0
PAINLEVEL_OUTOF10: 10
PAINLEVEL_OUTOF10: 0
PAINLEVEL_OUTOF10: 0
PAINLEVEL_OUTOF10: 7
PAINLEVEL_OUTOF10: 0
PAINLEVEL_OUTOF10: 4
PAINLEVEL_OUTOF10: 6

## 2022-08-12 ASSESSMENT — PAIN DESCRIPTION - LOCATION
LOCATION: LEG

## 2022-08-12 ASSESSMENT — PAIN DESCRIPTION - ONSET
ONSET: ON-GOING
ONSET: ON-GOING

## 2022-08-12 ASSESSMENT — PAIN - FUNCTIONAL ASSESSMENT
PAIN_FUNCTIONAL_ASSESSMENT: ACTIVITIES ARE NOT PREVENTED
PAIN_FUNCTIONAL_ASSESSMENT: ACTIVITIES ARE NOT PREVENTED

## 2022-08-12 ASSESSMENT — PAIN DESCRIPTION - DESCRIPTORS
DESCRIPTORS: ACHING;DISCOMFORT
DESCRIPTORS: SHARP;SHOOTING
DESCRIPTORS: ACHING

## 2022-08-12 ASSESSMENT — PAIN DESCRIPTION - FREQUENCY
FREQUENCY: CONTINUOUS
FREQUENCY: CONTINUOUS

## 2022-08-12 NOTE — PROGRESS NOTES
[facility] CARDIOLOGY PROGRESS NOTE           8/12/2022 8:02 AM    Admit Date: 8/10/2022      Subjective:   Patient very anxious. No recurrence of cardiac arrhythmias. Denies active dyspnea or chest pain. Concerned about her risk of future events. ROS:  Cardiovascular:  As noted above    Objective:      Vitals:    08/11/22 2111 08/12/22 0008 08/12/22 0439 08/12/22 0732   BP: 119/64 108/72 118/79 124/74   Pulse: 58 60 59 56   Resp: 19 19 19    Temp: 98.1 °F (36.7 °C) 97.2 °F (36.2 °C) 98 °F (36.7 °C)    TempSrc: Oral Oral Oral    SpO2: 97% 96% 98% 99%   Weight:   205 lb 4.8 oz (93.1 kg)    Height:           Physical Exam:  General-No Acute Distress  Neck- supple, no JVD  CV- regular rate and rhythm Grade II/VI DAVID    Lung- clear bilaterally  Abd- soft, nontender, nondistended  Ext- no edema bilaterally. Skin- warm and dry      Data Review:   Recent Labs     08/10/22  1341 07/14/22  1402   WBC 7.6 7.0   HGB 14.2 15.3   HCT 43.7 47.0*   MCV 98.9* 97.7   * 130*       Recent Labs     08/12/22  0320 08/11/22  0449 08/10/22  1341      < > 142   K 4.2   < > 3.6   *   < > 110*   CO2 27   < > 28   BUN 32*   < > 32*   CREATININE 1.70*   < > 2.10*   GLUCOSE 114*   < > 152*   CALCIUM 9.5   < > 9.1   PROT  --   --  7.7   LABALBU  --   --  3.8   BILITOT  --   --  0.4   ALKPHOS  --   --  73   AST  --   --  21   ALT  --   --  32   LABGLOM 33*   < > 26*   GFRAA 40*   < > 31*   GLOB  --   --  3.9*    < > = values in this interval not displayed. No results for input(s): CKTOTAL, CKMB, CKMBINDEX, DDIMER, TROPONINI in the last 720 hours. Assessment/Plan:     Active Hospital Problems    Type 2 diabetes with nephropathy (HCC)  Continue medications      *Ventricular tachycardia (HCC)  Continue amiodarone 400 mg twice daily. Continue telemetry. Close EP follow-up. History of automatic internal cardiac defibrillator (AICD)  Normal device function.       LORI on CPAP  Continue CPAP.      Morbid obesity ([address]      Chronic systolic heart failure ([facility] [address]  On appropriate therapy with carvedilol, Entresto and Jardiance. Stop IV Lasix and change to home Demadex.       HTN (hypertension)  BP controlled                 Scott Martin MD

## 2022-08-12 NOTE — CARE COORDINATION
CM following for continued hospitalization. Cardiology adjusted medications for ICD firing. Patient on room air   Patient c/o severe (L) leg pain. XR spine series CT Myelogram ordered. CM following.

## 2022-08-12 NOTE — CONSULTS
[facility] Hospitalist Consult   Admit Date:  8/10/2022  1:21 PM   Name:  Iglesia Villasenor   Age:  60 y.o. Sex:  female  :  1965   MRN:  [id number]   Room:  [facility]    Presenting Complaint: AICD Problem    Reason(s) for Admission: Ventricular tachycardia ([address] [I47.2]     Hospitalists consulted by Pete Denney MD for: Left lower leg pain    History of Presenting Illness:   Iglesia Villasenor is a 60 y.o. female with history of diabetes mellitus, nonischemic cardiomyopathy, ventricular cardiac arrest, coronary artery disease of the native coronary artery, chronic kidney disease status post ICD, fibromyalgia, lipidemia heart failure with reduced ejection fraction, hypertension, morbid obesity, obstructive sleep apnea and tobacco use disorder. She had an episode of ventricular tachycardia in May that resulted in shock therapy from her ICD and syncope where she fell and has subsequently been having left-sided leg pain. For this admission she was admitted to the cardiology service on August 10, 2022 after her ICD shocked her while she was riding the bus on the way to work. She felt when shopping came to the ER to be seen. the pacemaker showed 1 shock. She was started on IV amiodarone and admitted to the telemetry service for EP evaluation. 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 The hospitalist service was consulted today as the patient states that no one is addressing her left lower leg pain that is severe in nature 10 out of 10. Ongoing since the initial shock in May and acutely worsened today. She states that no one has evaluated her for this pain and it is being ignored.     The hospitalist service were consulted. As previously stated the pain is a 10 out of 10 radiating across her left groin into  Left thigh and outer hip on the lateral aspect leg towards her medial knee. Sometimes the pain is dull with oxycodone but it comes right back as it wears off. She is tearful and crying. She does exhibit decreased mobility to the left hip secondary to pain increasing weakness compared with the right leg 3-4 out of 5 compared with the right 5 out of 5. She denies any fecal or urinary incontinence or retention. Endorses paresthesias. Review of Systems:  10 systems reviewed and negative except as noted in HPI. Assessment & Plan:     Principal Problem:    Ventricular tachycardia (HCC)    Left ventricular aneurysm    HTN (hypertension)  Chronic systolic heart failure  Morbid obesity  LORI on CPAP  History of automatic internal cardiac defibrillator  8/12/2022  Per primary      Left lower extremity pain-  8/12/2022  Patient is concerning for some nerve root compression  Given the nature of the pain  In the left lower extremity weakness  We will start her on Cymbalta  Decadron  Gabapentin  Standing Tylenol  As needed tramadol  Unable to have MRI as per defibrillators incompatible  Will consult interventional IR for nonemergent CT myelogram (  discussed with them and they would like plain films of the lumbar sacral spine first) and Lovenox held on Sunday and in anticipation of possible CT myelogram on Monday. Type 2 diabetes with nephropathy (HCC)  Last A1c was 6.6 in July  Endorses compliance with home medications  We will start her on insulin sliding scale  Since starting Decadron      Further work-up and management based on her clinical course       Discharge Planning:    Per primary    Diet:  ADULT DIET; Regular; 3 carb choices (45 gm/meal);  Low Fat/Low Chol/High Fiber/2 gm Na; 2000 ml; No Caffeine  DVT PPx: Lovenox  Code status: Full Code    Principal Problem:    Ventricular tachycardia [facility])  Active Problems:    Left ventricular aneurysm    HTN (hypertension)    Type 2 diabetes with nephropathy (HCC)    Chronic systolic heart failure (HCC)    Morbid obesity (HCC)    LORI on CPAP    History of automatic internal cardiac defibrillator (AICD)  Resolved Problems:    * No resolved hospital problems. *      Past History:    Past Medical History:   Diagnosis Date    Abdominal wall hernia 10/2/2018    Acute hypoxemic respiratory failure ([address] 4/3/2019    Allergic rhinitis     followed by allergist; allergic to grass- has rescue inhaler PRN    ARDS (adult respiratory distress syndrome) ([address] 4/3/2019    Arthritis     Automatic implantable cardioverter-defibrillator in situ 3/30/2016    CAD in native artery 3/30/2016    Chronic kidney disease     Chronic systolic heart failure ([address] 07/18/2013    ECHO 2017- EF 39%; managed with medications; [place] [facility]    CKD (chronic kidney disease)     [facility] follows    Diabetes ([address]     type 2 (on insulin);  FBS AM range- , hypo s/s <70, hgba1c- 7.9    Diabetes mellitus ([address] 4/12/2010    Dyslipidemia 2/13/2013    Eczema     Fibromyalgia     GERD (gastroesophageal reflux disease)     hx of- no meds currently    Headache     Heart failure (HCC)     chronic systolic with EF 19%; non-ischemic cardiomyopathy    HTN (hypertension) 4/12/2010    Hypercholesterolemia     Incarcerated incisional hernia 3/26/2019    Morbid obesity ([address]     Neuropathy     bilateral feet and tips of fingers    NICM (nonischemic cardiomyopathy) ([facility] 2/15/2013    Obesity     bmi- 41    Obstructive sleep apnea (adult) (pediatric) 07/14/2014    uses cpap qhs    Pseudomonas urinary tract infection 4/5/2019    Sciatic pain 5/23/2016    Severe persistent asthma with acute exacerbation 8/24/2020    SVT (supraventricular tachycardia) ([address]     ablation for svt    Tobacco use disorder 4/12/2010       Past Surgical History:   Procedure Laterality Date ABLATION OF DYSRHYTHMIC FOCUS  \"years ago\"    CARDIAC DEFIBRILLATOR PLACEMENT  07/18/2013    Biotronik dual chamber ICD by Dr. Chris Reid  07/18/2013    Biotronik dual chamber ICD by Dr. Javon Haddad N/A 7/1/2022    Left heart cath / coronary angiography performed by Phil Amaya MD at [facility]      x1    COLONOSCOPY N/A 7/7/2021    COLONOSCOPY/BMI 48 PT HAS AN ICD performed by Phil Long MD at [facility]  03/26/2019    mild incarceration, no bowel removal    HYSTERECTOMY (CERVIX STATUS UNKNOWN)      EDITH-Left ovary intact per pt    LEFT HEART CATH,PERCUTANEOUS  2/13/2013    no intervention    PACEMAKER      ICD    ROTATOR CUFF REPAIR Right     TONSILLECTOMY          Social History     Tobacco Use    Smoking status: Some Days     Packs/day: 0.25     Types: Cigarettes    Smokeless tobacco: Never    Tobacco comments:     Quit smoking: \"every now and then\"   Substance Use Topics    Alcohol use:  Yes     Alcohol/week: 1.0 standard drink      Social History     Substance and Sexual Activity   Drug Use Yes    Types: Marijuana Michael Blow)       Family History   Problem Relation Age of Onset    Diabetes Other     Heart Disease Father     Hypertension Father     Stroke Father     Heart Attack Father 48        mi    Breast Cancer Sister 37    Hypertension Brother     Heart Disease Brother     Diabetes Brother     Stroke Mother           Immunization History   Administered Date(s) Administered    COVID-19, MODERNA BLUE border, Primary or Immunocompromised, (age 12y+), IM, 100 mcg/0.5mL 07/12/2021, 07/12/2021, 08/09/2021, 08/09/2021    COVID-19, PFIZER PURPLE top, DILUTE for use, (age 15 y+), 30mcg/0.3mL 02/11/2022    Influenza Trivalent 10/31/2017    Influenza Virus Vaccine 11/14/2020, 12/15/2021    Influenza, High Dose (Fluzone 65 yrs and older) 01/06/2017, 11/08/2017    Influenza, Quadv, IM, PF (6 mo and older Fluzone, Flulaval, Fluarix, and 3 yrs and older Afluria) 11/14/2020    Influenza, Quadv, adjuvanted, 65 yrs +, IM, PF (Fluad) 12/29/2021    Influenza, Triv, inactivated, subunit, adjuvanted, IM (Fluad 65 yrs and older) 10/02/2019    PPD Test 04/03/2019, 04/08/2019    Pneumococcal Conjugate 13-valent ([id number]) 04/04/2017    Pneumococcal Polysaccharide ([id number]) 07/01/2019    Tdap (Boostrix, Adacel) 01/08/2020     Allergies   Allergen Reactions    Other Hives and Shortness Of Breath     \"inhaler PRN\"    Penicillin G Itching      Current Facility-Administered Medications   Medication Dose Route Frequency    amiodarone (CORDARONE) tablet 400 mg  400 mg Oral BID    torsemide (DEMADEX) tablet 20 mg  20 mg Oral BID    gabapentin (NEURONTIN) capsule 100 mg  100 mg Oral TID    DULoxetine (CYMBALTA) extended release capsule 20 mg  20 mg Oral Daily    cyclobenzaprine (FLEXERIL) tablet 10 mg  10 mg Oral TID PRN    acetaminophen (TYLENOL) tablet 650 mg  650 mg Oral 3 times per day    traMADol (ULTRAM) tablet 50 mg  50 mg Oral Q6H PRN    traMADol (ULTRAM) tablet 25 mg  25 mg Oral Q6H PRN    dexamethasone (DECADRON) injection 4 mg  4 mg IntraVENous Q6H    insulin lispro (HUMALOG) injection vial 0-16 Units  0-16 Units SubCUTAneous TID WC    insulin lispro (HUMALOG) injection vial 0-4 Units  0-4 Units SubCUTAneous Nightly    glucose chewable tablet 16 g  4 tablet Oral PRN    dextrose bolus 10% 125 mL  125 mL IntraVENous PRN    Or    dextrose bolus 10% 250 mL  250 mL IntraVENous PRN    glucagon (rDNA) injection 1 mg  1 mg SubCUTAneous PRN    dextrose 10 % infusion   IntraVENous Continuous PRN    enoxaparin (LOVENOX) injection 40 mg  40 mg SubCUTAneous Daily    albuterol sulfate HFA (PROVENTIL;VENTOLIN;PROAIR) 108 (90 Base) MCG/ACT inhaler 2 puff  2 puff Inhalation Q6H PRN    aspirin EC tablet 81 mg  81 mg Oral Daily    carvedilol (COREG) tablet 6.25 mg  6.25 mg Oral BID WC    empagliflozin (JARDIANCE) tablet 10 mg  10 mg Oral Daily    insulin glargine (LANTUS) injection vial 15 Units  15 Units SubCUTAneous Nightly    isosorbide mononitrate (IMDUR) extended release tablet 30 mg  30 mg Oral Daily    latanoprost (XALATAN) 0.005 % ophthalmic solution 1 drop  1 drop Ophthalmic Nightly    nitroGLYCERIN (NITROSTAT) SL tablet 0.4 mg  0.4 mg SubLINGual Q5 Min PRN    ondansetron (ZOFRAN-ODT) disintegrating tablet 4 mg  4 mg Oral Q8H PRN    oxyCODONE (ROXICODONE) immediate release tablet 5 mg  5 mg Oral Q6H PRN    pravastatin (PRAVACHOL) tablet 80 mg  80 mg Oral Nightly    sacubitril-valsartan (ENTRESTO) 49-51 MG per tablet 1 tablet  1 tablet Oral BID    sodium chloride flush 0.9 % injection 5-40 mL  5-40 mL IntraVENous 2 times per day    sodium chloride flush 0.9 % injection 5-40 mL  5-40 mL IntraVENous PRN    0.9 % sodium chloride infusion   IntraVENous PRN    acetaminophen (TYLENOL) tablet 650 mg  650 mg Oral Q6H PRN    Or    acetaminophen (TYLENOL) suppository 650 mg  650 mg Rectal Q6H PRN    polyethylene glycol (GLYCOLAX) packet 17 g  17 g Oral Daily PRN    potassium chloride (KLOR-CON M) extended release tablet 40 mEq  40 mEq Oral PRN    Or    potassium bicarb-citric acid (EFFER-K) effervescent tablet 40 mEq  40 mEq Oral PRN    Or    potassium chloride 10 mEq/100 mL IVPB (Peripheral Line)  10 mEq IntraVENous PRN    magnesium sulfate 2000 mg in 50 mL IVPB premix  2,000 mg IntraVENous PRN    insulin lispro (HUMALOG) injection vial 0-4 Units  0-4 Units SubCUTAneous TID WC    insulin lispro (HUMALOG) injection vial 0-4 Units  0-4 Units SubCUTAneous Nightly       Objective:   Patient Vitals for the past 24 hrs:   Temp Pulse Resp BP SpO2   08/12/22 1652 -- 60 18 117/74 --   08/12/22 1155 98.2 °F (36.8 °C) 58 19 111/76 97 %   08/12/22 0912 -- 64 -- 124/74 --   08/12/22 0732 98.5 °F (36.9 °C) 56 19 124/74 99 %   08/12/22 0439 98 °F (36.7 °C) 59 19 118/79 98 %   08/12/22 0008 97.2 °F (36.2 °C) 60 19 108/72 96 %   08/11/22 2111 98.1 °F (36.7 °C) 58 19 119/64 97 %   08/11/22 1718 -- 60 -- (!) 103/59 --       Oxygen Therapy  SpO2: 97 %  Pulse Oximeter Device Mode: Intermittent  O2 Device: None (Room air)  O2 Flow Rate (L/min): 2 L/min  End Tidal CO2: 36 (%)    Estimated body mass index is 42.91 kg/m² as calculated from the following:    Height as of this encounter: 4' 10\" (1.473 m). Weight as of this encounter: 205 lb 4.8 oz (93.1 kg). Intake/Output Summary (Last 24 hours) at 8/12/2022 1706  Last data filed at 8/12/2022 1202  Gross per 24 hour   Intake 480 ml   Output 900 ml   Net -420 ml         Physical Exam:    Blood pressure 117/74, pulse 60, temperature 98.2 °F (36.8 °C), temperature source Axillary, resp. rate 18, height 4' 10\" (1.473 m), weight 205 lb 4.8 oz (93.1 kg), SpO2 97 %. General:    Well nourished. Head:  Normocephalic, atraumatic  Eyes:  Sclerae appear normal.  Pupils equally round. ENT:  Nares appear normal, no drainage. Moist oral mucosa  Neck:  No restricted ROM. Trachea midline   CV:   RRR. No m/r/g. No jugular venous distension. Lungs:   CTAB. No wheezing, rhonchi, or rales. Symmetric expansion. Abdomen: Bowel sounds present. Soft, nontender, nondistended. Extremities: No cyanosis or clubbing. No edema  Skin:     No rashes and normal coloration. Warm and dry. Neuro:  CN II-XII grossly intact. Sensation intact. A&Ox3  Psych:  Normal mood and affect.       I have personally reviewed labs and tests showing:  Recent Labs:  Recent Results (from the past 24 hour(s))   POCT Glucose    Collection Time: 08/11/22  9:13 PM   Result Value Ref Range    POC Glucose 115 (H) 65 - 100 mg/dL    Performed by: Tania    Magnesium    Collection Time: 08/12/22  3:20 AM   Result Value Ref Range    Magnesium 3.0 (H) 1.8 - 2.4 mg/dL   Basic Metabolic Panel    Collection Time: 08/12/22  3:20 AM   Result Value Ref Range    Sodium 140 136 - 145 mmol/L    Potassium 4.2 3.5 - 5.1 mmol/L    Chloride 109 (H) 98 - 107 mmol/L    CO2 27 21 - 32 mmol/L    Anion Gap 4 (L) 7 - 16 mmol/L    Glucose 114 (H) 65 - 100 mg/dL    BUN 32 (H) 6 - 23 MG/DL    Creatinine 1.70 (H) 0.6 - 1.0 MG/DL    GFR African American 40 (L) >60 ml/min/1.73m2    GFR Non- 33 (L) >60 ml/min/1.73m2    Calcium 9.5 8.3 - 10.4 MG/DL   POCT Glucose    Collection Time: 08/12/22  7:31 AM   Result Value Ref Range    POC Glucose 112 (H) 65 - 100 mg/dL    Performed by: Matteo Draper    POCT Glucose    Collection Time: 08/12/22 11:56 AM   Result Value Ref Range    POC Glucose 136 (H) 65 - 100 mg/dL    Performed by: Matteo Draper        I have personally reviewed imaging studies showing:  Transthoracic echocardiogram (TTE) limited with contrast, bubble, strain, and 3D PRN    Result Date: 8/11/2022  Formatting of this result is different from the original.   Left Ventricle: Moderately reduced left ventricular systolic function. Left ventricle is dilated. Mildly increased wall thickness. Global hypokinesis present. Left Atrium: Left atrium is mildly dilated. Contrast used: Definity. Overall estimation of the ejection fraction is moderately reduced in the range of 40%. Patient has a posterior basal aneurysm presents that is partially visualized on this study. It has been visualized in other studies including a heart cath and is a large posterior basal aneurysm that affects the overall effect of cardiac output due to shunting of a significant amount of flow into the large posterior basal aneurysm       Echocardiogram:  08/10/22    TRANSTHORACIC ECHOCARDIOGRAM (TTE) LIMITED (CONTRAST/BUBBLE/3D PRN) 08/11/2022 11:07 AM (Final)    Interpretation Summary  Formatting of this result is different from the original.      Left Ventricle: Moderately reduced left ventricular systolic function. Left ventricle is dilated. Mildly increased wall thickness. Global hypokinesis present. Left Atrium: Left atrium is mildly dilated. Contrast used: Definity.     Overall estimation of the ejection fraction is moderately reduced in the range of 40%. Patient has a posterior basal aneurysm presents that is partially visualized on this study. It has been visualized in other studies including a heart cath and is a large posterior basal aneurysm that affects the overall effect of cardiac output due to shunting of a significant amount of flow into the large posterior basal aneurysm    Signed by: Genaro Dumas MD on 8/11/2022 11:07 AM        Signed:  Tori Banda MD    Part of this note may have been written by using a voice dictation software. The note has been proof read but may still contain some grammatical/other typographical errors.

## 2022-08-13 LAB
ANION GAP SERPL CALC-SCNC: 3 MMOL/L (ref 7–16)
BUN SERPL-MCNC: 35 MG/DL (ref 6–23)
CALCIUM SERPL-MCNC: 9 MG/DL (ref 8.3–10.4)
CHLORIDE SERPL-SCNC: 106 MMOL/L (ref 98–107)
CO2 SERPL-SCNC: 28 MMOL/L (ref 21–32)
CREAT SERPL-MCNC: 1.8 MG/DL (ref 0.6–1)
GLUCOSE BLD STRIP.AUTO-MCNC: 120 MG/DL (ref 65–100)
GLUCOSE BLD STRIP.AUTO-MCNC: 139 MG/DL (ref 65–100)
GLUCOSE BLD STRIP.AUTO-MCNC: 145 MG/DL (ref 65–100)
GLUCOSE BLD STRIP.AUTO-MCNC: 182 MG/DL (ref 65–100)
GLUCOSE BLD STRIP.AUTO-MCNC: 231 MG/DL (ref 65–100)
GLUCOSE SERPL-MCNC: 134 MG/DL (ref 65–100)
MAGNESIUM SERPL-MCNC: 2.9 MG/DL (ref 1.8–2.4)
POTASSIUM SERPL-SCNC: 4.3 MMOL/L (ref 3.5–5.1)
SERVICE CMNT-IMP: ABNORMAL
SODIUM SERPL-SCNC: 137 MMOL/L (ref 136–145)

## 2022-08-13 PROCEDURE — 6370000000 HC RX 637 (ALT 250 FOR IP): Performed by: INTERNAL MEDICINE

## 2022-08-13 PROCEDURE — 82962 GLUCOSE BLOOD TEST: CPT

## 2022-08-13 PROCEDURE — 6370000000 HC RX 637 (ALT 250 FOR IP): Performed by: NURSE PRACTITIONER

## 2022-08-13 PROCEDURE — 83735 ASSAY OF MAGNESIUM: CPT

## 2022-08-13 PROCEDURE — 36415 COLL VENOUS BLD VENIPUNCTURE: CPT

## 2022-08-13 PROCEDURE — 2580000003 HC RX 258: Performed by: NURSE PRACTITIONER

## 2022-08-13 PROCEDURE — 99232 SBSQ HOSP IP/OBS MODERATE 35: CPT | Performed by: INTERNAL MEDICINE

## 2022-08-13 PROCEDURE — 6360000002 HC RX W HCPCS: Performed by: INTERNAL MEDICINE

## 2022-08-13 PROCEDURE — 80048 BASIC METABOLIC PNL TOTAL CA: CPT

## 2022-08-13 PROCEDURE — 1100000003 HC PRIVATE W/ TELEMETRY

## 2022-08-13 RX ADMIN — OXYCODONE 5 MG: 5 TABLET ORAL at 14:09

## 2022-08-13 RX ADMIN — AMIODARONE HYDROCHLORIDE 400 MG: 200 TABLET ORAL at 09:35

## 2022-08-13 RX ADMIN — ACETAMINOPHEN 650 MG: 325 TABLET ORAL at 21:26

## 2022-08-13 RX ADMIN — ENOXAPARIN SODIUM 40 MG: 100 INJECTION SUBCUTANEOUS at 09:36

## 2022-08-13 RX ADMIN — POLYETHYLENE GLYCOL 3350 17 G: 17 POWDER, FOR SOLUTION ORAL at 12:15

## 2022-08-13 RX ADMIN — ACETAMINOPHEN 650 MG: 325 TABLET ORAL at 06:01

## 2022-08-13 RX ADMIN — DULOXETINE HYDROCHLORIDE 20 MG: 20 CAPSULE, DELAYED RELEASE ORAL at 09:35

## 2022-08-13 RX ADMIN — ACETAMINOPHEN 650 MG: 325 TABLET ORAL at 14:01

## 2022-08-13 RX ADMIN — TORSEMIDE 20 MG: 20 TABLET ORAL at 09:35

## 2022-08-13 RX ADMIN — ISOSORBIDE MONONITRATE 30 MG: 30 TABLET, EXTENDED RELEASE ORAL at 09:35

## 2022-08-13 RX ADMIN — DEXAMETHASONE SODIUM PHOSPHATE 4 MG: 10 INJECTION INTRAMUSCULAR; INTRAVENOUS at 17:30

## 2022-08-13 RX ADMIN — ONDANSETRON 4 MG: 4 TABLET, ORALLY DISINTEGRATING ORAL at 23:02

## 2022-08-13 RX ADMIN — LATANOPROST 1 DROP: 50 SOLUTION OPHTHALMIC at 21:32

## 2022-08-13 RX ADMIN — CARVEDILOL 6.25 MG: 6.25 TABLET, FILM COATED ORAL at 09:35

## 2022-08-13 RX ADMIN — TORSEMIDE 20 MG: 20 TABLET ORAL at 21:26

## 2022-08-13 RX ADMIN — INSULIN GLARGINE 15 UNITS: 100 INJECTION, SOLUTION SUBCUTANEOUS at 21:29

## 2022-08-13 RX ADMIN — AMIODARONE HYDROCHLORIDE 400 MG: 200 TABLET ORAL at 21:26

## 2022-08-13 RX ADMIN — SODIUM CHLORIDE, PRESERVATIVE FREE 10 ML: 5 INJECTION INTRAVENOUS at 09:36

## 2022-08-13 RX ADMIN — SACUBITRIL AND VALSARTAN 1 TABLET: 49; 51 TABLET, FILM COATED ORAL at 09:35

## 2022-08-13 RX ADMIN — DEXAMETHASONE SODIUM PHOSPHATE 4 MG: 10 INJECTION INTRAMUSCULAR; INTRAVENOUS at 12:15

## 2022-08-13 RX ADMIN — SACUBITRIL AND VALSARTAN 1 TABLET: 49; 51 TABLET, FILM COATED ORAL at 21:26

## 2022-08-13 RX ADMIN — DEXAMETHASONE SODIUM PHOSPHATE 4 MG: 10 INJECTION INTRAMUSCULAR; INTRAVENOUS at 06:00

## 2022-08-13 RX ADMIN — CARVEDILOL 6.25 MG: 6.25 TABLET, FILM COATED ORAL at 17:31

## 2022-08-13 RX ADMIN — SODIUM CHLORIDE, PRESERVATIVE FREE 10 ML: 5 INJECTION INTRAVENOUS at 21:33

## 2022-08-13 RX ADMIN — EMPAGLIFLOZIN 10 MG: 10 TABLET, FILM COATED ORAL at 09:35

## 2022-08-13 RX ADMIN — DEXAMETHASONE SODIUM PHOSPHATE 4 MG: 10 INJECTION INTRAMUSCULAR; INTRAVENOUS at 23:03

## 2022-08-13 RX ADMIN — ASPIRIN 81 MG: 81 TABLET ORAL at 09:35

## 2022-08-13 RX ADMIN — PRAVASTATIN SODIUM 80 MG: 80 TABLET ORAL at 21:26

## 2022-08-13 ASSESSMENT — PAIN SCALES - GENERAL
PAINLEVEL_OUTOF10: 0
PAINLEVEL_OUTOF10: 3
PAINLEVEL_OUTOF10: 0
PAINLEVEL_OUTOF10: 6
PAINLEVEL_OUTOF10: 0

## 2022-08-13 ASSESSMENT — PAIN DESCRIPTION - DESCRIPTORS
DESCRIPTORS: ACHING
DESCRIPTORS: ACHING

## 2022-08-13 ASSESSMENT — PAIN DESCRIPTION - PAIN TYPE: TYPE: CHRONIC PAIN

## 2022-08-13 ASSESSMENT — PAIN DESCRIPTION - LOCATION
LOCATION: HIP;LEG
LOCATION: LEG

## 2022-08-13 ASSESSMENT — PAIN DESCRIPTION - ORIENTATION
ORIENTATION: LEFT
ORIENTATION: LEFT

## 2022-08-13 ASSESSMENT — PAIN DESCRIPTION - ONSET: ONSET: ON-GOING

## 2022-08-13 ASSESSMENT — PAIN DESCRIPTION - FREQUENCY: FREQUENCY: CONTINUOUS

## 2022-08-13 ASSESSMENT — PAIN - FUNCTIONAL ASSESSMENT: PAIN_FUNCTIONAL_ASSESSMENT: ACTIVITIES ARE NOT PREVENTED

## 2022-08-13 NOTE — PROGRESS NOTES
[facility] CARDIOLOGY PROGRESS NOTE           8/13/2022 9:49 AM    Admit Date: 8/10/2022      Subjective:   Patient without recurrent arrhythmias. Notes left leg pain improved today. ROS:  Cardiovascular:  As noted above    Objective:      Vitals:    08/13/22 0002 08/13/22 0424 08/13/22 0728 08/13/22 0935   BP: 115/72 118/79 123/75 123/75   Pulse: 60 60 62 60   Resp: 16 24 18    Temp: 98.2 °F (36.8 °C) 97.7 °F (36.5 °C) 97.8 °F (36.6 °C)    TempSrc:   Oral    SpO2: 96% 97% 95%    Weight:       Height:           Physical Exam:  General-No Acute Distress. Anxious. Neck- supple, no JVD  CV- regular rate and rhythm Grade II/VI DAVID  Lung- clear bilaterally  Abd- soft, nontender, nondistended  Ext- no edema bilaterally. Skin- warm and dry      Data Review:   Recent Labs     08/10/22  1341 07/14/22  1402   WBC 7.6 7.0   HGB 14.2 15.3   HCT 43.7 47.0*   MCV 98.9* 97.7   * 130*         Recent Labs     08/13/22  0627 08/11/22  0449 08/10/22  1341      < > 142   K 4.3   < > 3.6      < > 110*   CO2 28   < > 28   BUN 35*   < > 32*   CREATININE 1.80*   < > 2.10*   GLUCOSE 134*   < > 152*   CALCIUM 9.0   < > 9.1   PROT  --   --  7.7   LABALBU  --   --  3.8   BILITOT  --   --  0.4   ALKPHOS  --   --  73   AST  --   --  21   ALT  --   --  32   LABGLOM 31*   < > 26*   GFRAA 37*   < > 31*   GLOB  --   --  3.9*    < > = values in this interval not displayed. No results for input(s): CKTOTAL, CKMB, CKMBINDEX, DDIMER, TROPONINI in the last 720 hours. Assessment/Plan:     Active Hospital Problems    Type 2 diabetes with nephropathy (HCC)  Continue medications      *Ventricular tachycardia (HCC)  Continue amiodarone 400 mg twice daily. Continue telemetry. History of automatic internal cardiac defibrillator (AICD)  Normal device function. LORI on CPAP  Continue CPAP.       Morbid obesity ([address]      Chronic systolic heart failure ([address]  On appropriate therapy with carvedilol, Entresto and [place]. Continue Demadex. HTN (hypertension)  BP controlled     Leg pain  Appreciate IM assistance.   Planned for CT myelogram on monday            Trinh West MD

## 2022-08-13 NOTE — PROGRESS NOTES
[facility] Hospitalist Consult Follow-Up Note   Admit Date:  8/10/2022  1:21 PM   Name:  Anselmo Engle   Age:  60 y.o. Sex:  female  :  1965   MRN:  [id number]   [place]  [facility] [place]    Presenting Complaint: AICD Problem    Reason(s) for Admission: Ventricular tachycardia ([address] [I47.2]     Hospitalists consulted by Fracisco Araujo MD for: Left lower leg pain    History of Presenting Illness:   Anselmo Engle is a 60 y.o. female with history of diabetes mellitus, nonischemic cardiomyopathy, ventricular cardiac arrest, coronary artery disease of the native coronary artery, chronic kidney disease status post ICD, fibromyalgia, lipidemia heart failure with reduced ejection fraction, hypertension, morbid obesity, obstructive sleep apnea and tobacco use disorder. She had an episode of ventricular tachycardia in May that resulted in shock therapy from her ICD and syncope where she fell and has subsequently been having left-sided leg pain. For this admission she was admitted to the cardiology service on August 10, 2022 after her ICD shocked her while she was riding the bus on the way to work. She felt when shopping came to the ER to be seen. the pacemaker showed 1 shock. She was started on IV amiodarone and admitted to the telemetry service for EP evaluation. 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 The hospitalist service was consulted today as the patient states that no one is addressing her left lower leg pain that is severe in nature 10 out of 10. Ongoing since the initial shock in May and acutely worsened today. She states that no one has evaluated her for this pain and it is being ignored.     The hospitalist service were consulted. As previously stated the pain is a 10 out of 10 radiating across her left groin into  Left thigh and outer hip on the lateral aspect leg towards her medial knee. Sometimes the pain is dulled with oxycodone but it comes right back as it wears off. She is tearful and crying. She does exhibit decreased mobility to the left hip secondary to pain increasing weakness compared with the right leg 3-4 out of 5 compared with the right 5 out of 5. She denies any fecal or urinary incontinence or retention. Endorses paresthesias. 8/13/2022  Patient seen and evaluated  States pain is improved with Decadron but not completely gone  Refused Neurontin-states it was a medication that she was tried on about 5 years ago secondary to neuropathy  We discussed considering giving the Neurontin a trial as it can take months for it to have any effect-she is agreeable  Will continue with this current plan-  Plan for CT myelogram on Monday with IR      Assessment & Plan:     Principal Problem:    Ventricular tachycardia ([address]    Left ventricular aneurysm    HTN (hypertension)  Chronic systolic heart failure  Morbid obesity  LORI on CPAP  History of automatic internal cardiac defibrillator  8/13/2022  Per primary      Left lower extremity pain-  8/13/2022  Patient is concerning for some nerve root compression/sciatica  Given the nature of the pain  & the left lower extremity weakness  Continue Cymbalta  Continue Decadron  Continue gabapentin  Continue standing Tylenol  Continue as needed tramadol  Unable to have MRI as her defibrillator is incompatible  Consulted interventional IR for nonemergent CT myelogram (  discussed with them and they would like plain films of the lumbar sacral spine first) and Lovenox held on Sunday and in anticipation of possible CT myelogram on Monday.         Type 2 diabetes with nephropathy (HCC)  Last A1c was 6.6 in July  Endorses compliance with home medications  Continue insulin sliding scale  Titrate her Lantus and sliding scale insulin as indicated    Further work-up and management based on her clinical course       Discharge Planning:    Per primary    Diet:  ADULT DIET; Regular; 3 carb choices (45 gm/meal); Low Fat/Low Chol/High Fiber/2 gm Na; 2000 ml; No Caffeine  DVT PPx: Lovenox  Code status: Full Code    Principal Problem:    Ventricular tachycardia (HCC)  Active Problems:    Left ventricular aneurysm    HTN (hypertension)    Type 2 diabetes with nephropathy (HCC)    Chronic systolic heart failure (HCC)    Morbid obesity (HCC)    LORI on CPAP    History of automatic internal cardiac defibrillator (AICD)  Resolved Problems:    * No resolved hospital problems. *      Past History:    Past Medical History:   Diagnosis Date    Abdominal wall hernia 10/2/2018    Acute hypoxemic respiratory failure ([address] 4/3/2019    Allergic rhinitis     followed by allergist; allergic to grass- has rescue inhaler PRN    ARDS (adult respiratory distress syndrome) ([address] 4/3/2019    Arthritis     Automatic implantable cardioverter-defibrillator in situ 3/30/2016    CAD in native artery 3/30/2016    Chronic kidney disease     Chronic systolic heart failure ([address] 07/18/2013    ECHO 2017- EF 39%; managed with medications; [place]lin [place]    CKD (chronic kidney disease)     [facility] follows    Diabetes ([address]     type 2 (on insulin);  FBS AM range- , hypo s/s <70, hgba1c- 7.9    Diabetes mellitus ([address] 4/12/2010    Dyslipidemia 2/13/2013    Eczema     Fibromyalgia     GERD (gastroesophageal reflux disease)     hx of- no meds currently    Headache     Heart failure ([address]     chronic systolic with EF 87%; non-ischemic cardiomyopathy    HTN (hypertension) 4/12/2010    Hypercholesterolemia     Incarcerated incisional hernia 3/26/2019    Morbid obesity ([address]     Neuropathy     bilateral feet and tips of fingers    NICM (nonischemic cardiomyopathy) ([address] 2/15/2013    Obesity bmi- 41    Obstructive sleep apnea (adult) (pediatric) 07/14/2014    uses cpap qhs    Pseudomonas urinary tract infection 4/5/2019    Sciatic pain 5/23/2016    Severe persistent asthma with acute exacerbation 8/24/2020    SVT (supraventricular tachycardia) ([facility])     ablation for svt    Tobacco use disorder 4/12/2010       Past Surgical History:   Procedure Laterality Date    ABLATION OF DYSRHYTHMIC FOCUS  \"years ago\"    CARDIAC DEFIBRILLATOR PLACEMENT  07/18/2013    Biotronik dual chamber ICD by Dr. Jacinto Dow  07/18/2013    Biotronik dual chamber ICD by Dr. Юлия Quiñonez 7/1/2022    Left heart cath / coronary angiography performed by Vilma Urias MD at [address] [place] [address]      x1    COLONOSCOPY N/A 7/7/2021    COLONOSCOPY/BMI 48 PT HAS AN ICD performed by Ai Zhou MD at [facility]  03/26/2019    mild incarceration, no bowel removal    HYSTERECTOMY (CERVIX STATUS UNKNOWN)      EDITH-Left ovary intact per pt    LEFT HEART CATH,PERCUTANEOUS  2/13/2013    no intervention    PACEMAKER      ICD    ROTATOR CUFF REPAIR Right     TONSILLECTOMY          Social History     Tobacco Use    Smoking status: Some Days     Packs/day: 0.25     Types: Cigarettes    Smokeless tobacco: Never    Tobacco comments:     Quit smoking: \"every now and then\"   Substance Use Topics    Alcohol use:  Yes     Alcohol/week: 1.0 standard drink      Social History     Substance and Sexual Activity   Drug Use Yes    Types: Marijuana Champ Cue)       Family History   Problem Relation Age of Onset    Diabetes Other     Heart Disease Father     Hypertension Father     Stroke Father     Heart Attack Father 48        mi    Breast Cancer Sister 37    Hypertension Brother     Heart Disease Brother     Diabetes Brother     Stroke Mother           Immunization History   Administered Date(s) Administered    COVID-19, MODERNA BLUE border, Primary or Immunocompromised, (age 12y+), IM, 100 mcg/0.5mL 07/12/2021, 07/12/2021, 08/09/2021, 08/09/2021    COVID-19, PFIZER PURPLE top, DILUTE for use, (age 15 y+), 30mcg/0.3mL 02/11/2022    Influenza Trivalent 10/31/2017    Influenza Virus Vaccine 11/14/2020, 12/15/2021    Influenza, High Dose (Fluzone 65 yrs and older) 01/06/2017, 11/08/2017    Influenza, Quadv, IM, PF (6 mo and older Fluzone, Flulaval, Fluarix, and 3 yrs and older Afluria) 11/14/2020    Influenza, Quadv, adjuvanted, 65 yrs +, IM, PF (Fluad) 12/29/2021    Influenza, Triv, inactivated, subunit, adjuvanted, IM (Fluad 65 yrs and older) 10/02/2019    PPD Test 04/03/2019, 04/08/2019    Pneumococcal Conjugate 13-valent ([id number]) 04/04/2017    Pneumococcal Polysaccharide ([id number]) 07/01/2019    Tdap (Boostrix, Adacel) 01/08/2020     Allergies   Allergen Reactions    Other Hives and Shortness Of Breath     \"inhaler PRN\"    Penicillin G Itching      Current Facility-Administered Medications   Medication Dose Route Frequency    amiodarone (CORDARONE) tablet 400 mg  400 mg Oral BID    torsemide (DEMADEX) tablet 20 mg  20 mg Oral BID    gabapentin (NEURONTIN) capsule 100 mg  100 mg Oral TID    DULoxetine (CYMBALTA) extended release capsule 20 mg  20 mg Oral Daily    cyclobenzaprine (FLEXERIL) tablet 10 mg  10 mg Oral TID PRN    acetaminophen (TYLENOL) tablet 650 mg  650 mg Oral 3 times per day    traMADol (ULTRAM) tablet 50 mg  50 mg Oral Q6H PRN    traMADol (ULTRAM) tablet 25 mg  25 mg Oral Q6H PRN    dexamethasone (DECADRON) injection 4 mg  4 mg IntraVENous Q6H    insulin lispro (HUMALOG) injection vial 0-16 Units  0-16 Units SubCUTAneous TID WC    insulin lispro (HUMALOG) injection vial 0-4 Units  0-4 Units SubCUTAneous Nightly    glucose chewable tablet 16 g  4 tablet Oral PRN    dextrose bolus 10% 125 mL  125 mL IntraVENous PRN    Or    dextrose bolus 10% 250 mL  250 mL IntraVENous PRN    glucagon (rDNA) injection 1 mg  1 mg SubCUTAneous PRN dextrose 10 % infusion   IntraVENous Continuous PRN    enoxaparin (LOVENOX) injection 40 mg  40 mg SubCUTAneous Daily    albuterol sulfate HFA (PROVENTIL;VENTOLIN;PROAIR) 108 (90 Base) MCG/ACT inhaler 2 puff  2 puff Inhalation Q6H PRN    aspirin EC tablet 81 mg  81 mg Oral Daily    carvedilol (COREG) tablet 6.25 mg  6.25 mg Oral BID WC    empagliflozin (JARDIANCE) tablet 10 mg  10 mg Oral Daily    insulin glargine (LANTUS) injection vial 15 Units  15 Units SubCUTAneous Nightly    isosorbide mononitrate (IMDUR) extended release tablet 30 mg  30 mg Oral Daily    latanoprost (XALATAN) 0.005 % ophthalmic solution 1 drop  1 drop Ophthalmic Nightly    nitroGLYCERIN (NITROSTAT) SL tablet 0.4 mg  0.4 mg SubLINGual Q5 Min PRN    ondansetron (ZOFRAN-ODT) disintegrating tablet 4 mg  4 mg Oral Q8H PRN    oxyCODONE (ROXICODONE) immediate release tablet 5 mg  5 mg Oral Q6H PRN    pravastatin (PRAVACHOL) tablet 80 mg  80 mg Oral Nightly    sacubitril-valsartan (ENTRESTO) 49-51 MG per tablet 1 tablet  1 tablet Oral BID    sodium chloride flush 0.9 % injection 5-40 mL  5-40 mL IntraVENous 2 times per day    sodium chloride flush 0.9 % injection 5-40 mL  5-40 mL IntraVENous PRN    0.9 % sodium chloride infusion   IntraVENous PRN    acetaminophen (TYLENOL) tablet 650 mg  650 mg Oral Q6H PRN    Or    acetaminophen (TYLENOL) suppository 650 mg  650 mg Rectal Q6H PRN    polyethylene glycol (GLYCOLAX) packet 17 g  17 g Oral Daily PRN    potassium chloride (KLOR-CON M) extended release tablet 40 mEq  40 mEq Oral PRN    Or    potassium bicarb-citric acid (EFFER-K) effervescent tablet 40 mEq  40 mEq Oral PRN    Or    potassium chloride 10 mEq/100 mL IVPB (Peripheral Line)  10 mEq IntraVENous PRN    magnesium sulfate 2000 mg in 50 mL IVPB premix  2,000 mg IntraVENous PRN    insulin lispro (HUMALOG) injection vial 0-4 Units  0-4 Units SubCUTAneous TID WC    insulin lispro (HUMALOG) injection vial 0-4 Units  0-4 Units SubCUTAneous Nightly Objective:   Patient Vitals for the past 24 hrs:   Temp Pulse Resp BP SpO2   08/13/22 0728 97.8 °F (36.6 °C) 62 18 123/75 95 %   08/13/22 0424 97.7 °F (36.5 °C) 60 24 118/79 97 %   08/13/22 0002 98.2 °F (36.8 °C) 60 16 115/72 96 %   08/12/22 1952 98.1 °F (36.7 °C) 59 18 112/70 95 %   08/12/22 1709 98.5 °F (36.9 °C) 60 18 117/74 98 %   08/12/22 1652 -- 60 18 117/74 --   08/12/22 1155 98.2 °F (36.8 °C) 58 19 111/76 97 %   08/12/22 0912 -- 64 -- 124/74 --         Oxygen Therapy  SpO2: 95 %  Pulse Oximeter Device Mode: Intermittent  O2 Device: Nasal cannula  O2 Flow Rate (L/min): 2 L/min  End Tidal CO2: 36 (%)    Estimated body mass index is 42.91 kg/m² as calculated from the following:    Height as of this encounter: 4' 10\" (1.473 m). Weight as of this encounter: 205 lb 4.8 oz (93.1 kg). Intake/Output Summary (Last 24 hours) at 8/13/2022 0759  Last data filed at 8/13/2022 0743  Gross per 24 hour   Intake 860 ml   Output 900 ml   Net -40 ml           Physical Exam:    Blood pressure 123/75, pulse 62, temperature 97.8 °F (36.6 °C), temperature source Oral, resp. rate 18, height 4' 10\" (1.473 m), weight 205 lb 4.8 oz (93.1 kg), SpO2 95 %. General:    Well nourished. Head:  Normocephalic, atraumatic  Eyes:  Sclerae appear normal.  Pupils equally round. ENT:  Nares appear normal, no drainage. Moist oral mucosa  Neck:  No restricted ROM. Trachea midline   CV:   RRR. No m/r/g. No jugular venous distension. Lungs:   CTAB. No wheezing, rhonchi, or rales. Symmetric expansion. Abdomen: Bowel sounds present. Soft, nontender, nondistended. Extremities: No cyanosis or clubbing. No edema  Skin:     No rashes and normal coloration. Warm and dry. Neuro:  CN II-XII grossly intact. Sensation intact. A&Ox3  Psych:  Normal mood and affect.       I have personally reviewed labs and tests showing:  Recent Labs:  Recent Results (from the past 24 hour(s))   POCT Glucose    Collection Time: 08/12/22 11:56 AM Result Value Ref Range    POC Glucose 136 (H) 65 - 100 mg/dL    Performed by: Dannielle Young    POCT Glucose    Collection Time: 08/12/22  4:51 PM   Result Value Ref Range    POC Glucose 113 (H) 65 - 100 mg/dL    Performed by: Dannielle Young    POCT Glucose    Collection Time: 08/12/22  7:54 PM   Result Value Ref Range    POC Glucose 132 (H) 65 - 100 mg/dL    Performed by: OscarKEATON    POCT Glucose    Collection Time: 08/13/22  4:22 AM   Result Value Ref Range    POC Glucose 145 (H) 65 - 100 mg/dL    Performed by: Catalina    Magnesium    Collection Time: 08/13/22  6:27 AM   Result Value Ref Range    Magnesium 2.9 (H) 1.8 - 2.4 mg/dL   Basic Metabolic Panel w/ Reflex to MG    Collection Time: 08/13/22  6:27 AM   Result Value Ref Range    Sodium 137 136 - 145 mmol/L    Potassium 4.3 3.5 - 5.1 mmol/L    Chloride 106 98 - 107 mmol/L    CO2 28 21 - 32 mmol/L    Anion Gap 3 (L) 7 - 16 mmol/L    Glucose 134 (H) 65 - 100 mg/dL    BUN 35 (H) 6 - 23 MG/DL    Creatinine 1.80 (H) 0.6 - 1.0 MG/DL    GFR African American 37 (L) >60 ml/min/1.73m2    GFR Non- 31 (L) >60 ml/min/1.73m2    Calcium 9.0 8.3 - 10.4 MG/DL   POCT Glucose    Collection Time: 08/13/22  7:29 AM   Result Value Ref Range    POC Glucose 139 (H) 65 - 100 mg/dL    Performed by: Dannielle Young        I have personally reviewed imaging studies showing:  Transthoracic echocardiogram (TTE) limited with contrast, bubble, strain, and 3D PRN    Result Date: 8/11/2022  Formatting of this result is different from the original.   Left Ventricle: Moderately reduced left ventricular systolic function. Left ventricle is dilated. Mildly increased wall thickness. Global hypokinesis present. Left Atrium: Left atrium is mildly dilated. Contrast used: Definity. Overall estimation of the ejection fraction is moderately reduced in the range of 40%.   Patient has a posterior basal aneurysm presents that is partially visualized on this study. It has been visualized in other studies including a heart cath and is a large posterior basal aneurysm that affects the overall effect of cardiac output due to shunting of a significant amount of flow into the large posterior basal aneurysm       Echocardiogram:  08/10/22    TRANSTHORACIC ECHOCARDIOGRAM (TTE) LIMITED (CONTRAST/BUBBLE/3D PRN) 08/11/2022 11:07 AM (Final)    Interpretation Summary  Formatting of this result is different from the original.      Left Ventricle: Moderately reduced left ventricular systolic function. Left ventricle is dilated. Mildly increased wall thickness. Global hypokinesis present. Left Atrium: Left atrium is mildly dilated. Contrast used: Definity. Overall estimation of the ejection fraction is moderately reduced in the range of 40%. Patient has a posterior basal aneurysm presents that is partially visualized on this study. It has been visualized in other studies including a heart cath and is a large posterior basal aneurysm that affects the overall effect of cardiac output due to shunting of a significant amount of flow into the large posterior basal aneurysm    Signed by: Alexis Dotson MD on 8/11/2022 11:07 AM        Signed:  Abdiel Lemos MD    Part of this note may have been written by using a voice dictation software. The note has been proof read but may still contain some grammatical/other typographical errors.

## 2022-08-14 LAB
GLUCOSE BLD STRIP.AUTO-MCNC: 167 MG/DL (ref 65–100)
GLUCOSE BLD STRIP.AUTO-MCNC: 173 MG/DL (ref 65–100)
GLUCOSE BLD STRIP.AUTO-MCNC: 190 MG/DL (ref 65–100)
GLUCOSE BLD STRIP.AUTO-MCNC: 252 MG/DL (ref 65–100)
SERVICE CMNT-IMP: ABNORMAL

## 2022-08-14 PROCEDURE — 6370000000 HC RX 637 (ALT 250 FOR IP): Performed by: INTERNAL MEDICINE

## 2022-08-14 PROCEDURE — 6360000002 HC RX W HCPCS: Performed by: INTERNAL MEDICINE

## 2022-08-14 PROCEDURE — 6370000000 HC RX 637 (ALT 250 FOR IP): Performed by: NURSE PRACTITIONER

## 2022-08-14 PROCEDURE — 2700000000 HC OXYGEN THERAPY PER DAY

## 2022-08-14 PROCEDURE — 82962 GLUCOSE BLOOD TEST: CPT

## 2022-08-14 PROCEDURE — 1100000003 HC PRIVATE W/ TELEMETRY

## 2022-08-14 PROCEDURE — 94760 N-INVAS EAR/PLS OXIMETRY 1: CPT

## 2022-08-14 PROCEDURE — 99232 SBSQ HOSP IP/OBS MODERATE 35: CPT | Performed by: INTERNAL MEDICINE

## 2022-08-14 PROCEDURE — 2580000003 HC RX 258: Performed by: NURSE PRACTITIONER

## 2022-08-14 RX ADMIN — CARVEDILOL 6.25 MG: 6.25 TABLET, FILM COATED ORAL at 17:33

## 2022-08-14 RX ADMIN — PRAVASTATIN SODIUM 80 MG: 80 TABLET ORAL at 22:25

## 2022-08-14 RX ADMIN — AMIODARONE HYDROCHLORIDE 400 MG: 200 TABLET ORAL at 08:59

## 2022-08-14 RX ADMIN — DULOXETINE HYDROCHLORIDE 20 MG: 20 CAPSULE, DELAYED RELEASE ORAL at 08:58

## 2022-08-14 RX ADMIN — SACUBITRIL AND VALSARTAN 1 TABLET: 49; 51 TABLET, FILM COATED ORAL at 22:26

## 2022-08-14 RX ADMIN — LATANOPROST 1 DROP: 50 SOLUTION OPHTHALMIC at 22:37

## 2022-08-14 RX ADMIN — ISOSORBIDE MONONITRATE 30 MG: 30 TABLET, EXTENDED RELEASE ORAL at 08:57

## 2022-08-14 RX ADMIN — TORSEMIDE 20 MG: 20 TABLET ORAL at 22:26

## 2022-08-14 RX ADMIN — OXYCODONE 5 MG: 5 TABLET ORAL at 22:35

## 2022-08-14 RX ADMIN — SODIUM CHLORIDE, PRESERVATIVE FREE 10 ML: 5 INJECTION INTRAVENOUS at 22:30

## 2022-08-14 RX ADMIN — AMIODARONE HYDROCHLORIDE 400 MG: 200 TABLET ORAL at 22:26

## 2022-08-14 RX ADMIN — CARVEDILOL 6.25 MG: 6.25 TABLET, FILM COATED ORAL at 08:59

## 2022-08-14 RX ADMIN — SODIUM CHLORIDE, PRESERVATIVE FREE 10 ML: 5 INJECTION INTRAVENOUS at 08:59

## 2022-08-14 RX ADMIN — INSULIN GLARGINE 15 UNITS: 100 INJECTION, SOLUTION SUBCUTANEOUS at 22:29

## 2022-08-14 RX ADMIN — DEXAMETHASONE SODIUM PHOSPHATE 4 MG: 10 INJECTION INTRAMUSCULAR; INTRAVENOUS at 12:11

## 2022-08-14 RX ADMIN — ACETAMINOPHEN 650 MG: 325 TABLET ORAL at 22:25

## 2022-08-14 RX ADMIN — DEXAMETHASONE SODIUM PHOSPHATE 4 MG: 10 INJECTION INTRAMUSCULAR; INTRAVENOUS at 23:20

## 2022-08-14 RX ADMIN — DEXAMETHASONE SODIUM PHOSPHATE 4 MG: 10 INJECTION INTRAMUSCULAR; INTRAVENOUS at 17:38

## 2022-08-14 RX ADMIN — ACETAMINOPHEN 650 MG: 325 TABLET ORAL at 05:23

## 2022-08-14 RX ADMIN — ACETAMINOPHEN 650 MG: 325 TABLET ORAL at 15:10

## 2022-08-14 RX ADMIN — OXYCODONE 5 MG: 5 TABLET ORAL at 08:58

## 2022-08-14 RX ADMIN — DEXAMETHASONE SODIUM PHOSPHATE 4 MG: 10 INJECTION INTRAMUSCULAR; INTRAVENOUS at 05:24

## 2022-08-14 RX ADMIN — TORSEMIDE 20 MG: 20 TABLET ORAL at 08:59

## 2022-08-14 RX ADMIN — POLYETHYLENE GLYCOL 3350 17 G: 17 POWDER, FOR SOLUTION ORAL at 17:33

## 2022-08-14 RX ADMIN — EMPAGLIFLOZIN 10 MG: 10 TABLET, FILM COATED ORAL at 08:58

## 2022-08-14 RX ADMIN — OXYCODONE 5 MG: 5 TABLET ORAL at 01:06

## 2022-08-14 RX ADMIN — SACUBITRIL AND VALSARTAN 1 TABLET: 49; 51 TABLET, FILM COATED ORAL at 08:59

## 2022-08-14 RX ADMIN — ASPIRIN 81 MG: 81 TABLET ORAL at 08:58

## 2022-08-14 ASSESSMENT — PAIN - FUNCTIONAL ASSESSMENT: PAIN_FUNCTIONAL_ASSESSMENT: PREVENTS OR INTERFERES SOME ACTIVE ACTIVITIES AND ADLS

## 2022-08-14 ASSESSMENT — PAIN DESCRIPTION - PAIN TYPE: TYPE: CHRONIC PAIN

## 2022-08-14 ASSESSMENT — PAIN DESCRIPTION - LOCATION
LOCATION: LEG
LOCATION: LEG;HIP
LOCATION: HIP;LEG
LOCATION: LEG

## 2022-08-14 ASSESSMENT — PAIN DESCRIPTION - DESCRIPTORS
DESCRIPTORS: ACHING
DESCRIPTORS: ACHING;DISCOMFORT

## 2022-08-14 ASSESSMENT — PAIN SCALES - GENERAL
PAINLEVEL_OUTOF10: 0
PAINLEVEL_OUTOF10: 3
PAINLEVEL_OUTOF10: 0
PAINLEVEL_OUTOF10: 0
PAINLEVEL_OUTOF10: 7
PAINLEVEL_OUTOF10: 0
PAINLEVEL_OUTOF10: 0
PAINLEVEL_OUTOF10: 6
PAINLEVEL_OUTOF10: 0
PAINLEVEL_OUTOF10: 6
PAINLEVEL_OUTOF10: 0

## 2022-08-14 ASSESSMENT — PAIN DESCRIPTION - ORIENTATION
ORIENTATION: LEFT

## 2022-08-14 ASSESSMENT — PAIN DESCRIPTION - FREQUENCY: FREQUENCY: CONTINUOUS

## 2022-08-14 ASSESSMENT — PAIN DESCRIPTION - ONSET: ONSET: ON-GOING

## 2022-08-14 NOTE — PROGRESS NOTES
[facility] CARDIOLOGY PROGRESS NOTE           8/14/2022 11:00 AM    Admit Date: 8/10/2022      Subjective:   Patient without recurrent arrhythmias. Notes left leg pain improved but not resolved. Plans for CT myelogram tomorrow. ROS:  Cardiovascular:  As noted above    Objective:      Vitals:    08/14/22 0454 08/14/22 0720 08/14/22 0858 08/14/22 0859   BP: 116/69 126/79  126/79   Pulse: 59 59  60   Resp: 24 20 18    Temp: 97.3 °F (36.3 °C) 97.9 °F (36.6 °C)     TempSrc:  Oral     SpO2: 99% 95%     Weight: 206 lb 4.8 oz (93.6 kg)      Height:           Physical Exam:  General-No Acute Distress. Anxious. Neck- supple, no JVD  CV- regular rate and rhythm Grade II/VI DAVID  Lung- clear bilaterally  Abd- soft, nontender, nondistended  Ext- no edema bilaterally. Skin- warm and dry      Data Review:   Recent Labs     08/10/22  1341   WBC 7.6   HGB 14.2   HCT 43.7   MCV 98.9*   *         Recent Labs     08/13/22  0627 08/11/22  0449 08/10/22  1341      < > 142   K 4.3   < > 3.6      < > 110*   CO2 28   < > 28   BUN 35*   < > 32*   CREATININE 1.80*   < > 2.10*   GLUCOSE 134*   < > 152*   CALCIUM 9.0   < > 9.1   PROT  --   --  7.7   LABALBU  --   --  3.8   BILITOT  --   --  0.4   ALKPHOS  --   --  73   AST  --   --  21   ALT  --   --  32   LABGLOM 31*   < > 26*   GFRAA 37*   < > 31*   GLOB  --   --  3.9*    < > = values in this interval not displayed. No results for input(s): CKTOTAL, CKMB, CKMBINDEX, DDIMER, TROPONINI in the last 720 hours. Assessment/Plan:     Active Hospital Problems    Type 2 diabetes with nephropathy (HCC)  Continue medications      *Ventricular tachycardia (HCC)  Continue amiodarone 400 mg twice daily. Continue telemetry. History of automatic internal cardiac defibrillator (AICD)  Normal device function. LORI on CPAP  Continue CPAP.       Morbid obesity ([address]      Chronic systolic heart failure ([address]  On appropriate therapy with carvedilol, Entresto and [facility]. Continue Demadex. HTN (hypertension)  BP controlled     Leg pain  Appreciate IM assistance. Planned for CT myelogram tomorrow.              Alexandra Mayers MD

## 2022-08-14 NOTE — PROGRESS NOTES
[facility] Hospitalist Consult Follow-Up Note   Admit Date:  8/10/2022  1:21 PM   Name:  Juany Urena   Age:  60 y.o. Sex:  female  :  1965   MRN:  [id number]   [facility]    Presenting Complaint: AICD Problem    Reason(s) for Admission: Ventricular tachycardia ([address] [I47.2]     Hospitalists consulted by Nazia Williamson MD for: Left lower leg pain    History of Presenting Illness:   Juany Urena is a 60 y.o. female with history of diabetes mellitus, nonischemic cardiomyopathy, ventricular cardiac arrest, coronary artery disease of the native coronary artery, chronic kidney disease status post ICD, fibromyalgia, lipidemia heart failure with reduced ejection fraction, hypertension, morbid obesity, obstructive sleep apnea and tobacco use disorder. She had an episode of ventricular tachycardia in May that resulted in shock therapy from her ICD and syncope where she fell and has subsequently been having left-sided leg pain. For this admission she was admitted to the cardiology service on August 10, 2022 after her ICD shocked her while she was riding the bus on the way to work. She felt when shopping came to the ER to be seen. the pacemaker showed 1 shock. She was started on IV amiodarone and admitted to the telemetry service for EP evaluation. 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 The hospitalist service was consulted today as the patient states that no one is addressing her left lower leg pain that is severe in nature 10 out of 10. Ongoing since the initial shock in May and acutely worsened today. She states that no one has evaluated her for this pain and it is being ignored.     The hospitalist service were consulted. As previously stated the pain is a 10 out of 10 radiating across her left groin into  Left thigh and outer hip on the lateral aspect leg towards her medial knee. Sometimes the pain is dulled with oxycodone but it comes right back as it wears off. She is tearful and crying. She does exhibit decreased mobility to the left hip secondary to pain increasing weakness compared with the right leg 3-4 out of 5 compared with the right 5 out of 5. She denies any fecal or urinary incontinence or retention. Endorses paresthesias. 8/14/2022  Patient seen and evaluated  States pain is improved with Decadron but not completely gone  Refused Neurontin-states it was a medication that she was tried on about 5 years ago secondary to neuropathy  We discussed considering giving the Neurontin a trial as it can take months for it to have any effect-mood she said she would try it she refused when the nurses brought her the medication  Will continue with this current plan-  Plan for CT myelogram on Monday with IR      Assessment & Plan:     Principal Problem:    Ventricular tachycardia ([address]    Left ventricular aneurysm    HTN (hypertension)  Chronic systolic heart failure  Morbid obesity  LORI on CPAP  History of automatic internal cardiac defibrillator    8/14/2022  Per primary      Left lower extremity pain-  8/14/2022  Patient is concerning for some nerve root compression/sciatica  Given the nature of the pain  & the left lower extremity weakness  Continue Cymbalta  Continue Decadron  Discontinue gabapentin  continue standing Tylenol  Continue as needed tramadol  Unable to have MRI as her defibrillator is incompatible  Consulted interventional IR for nonemergent CT myelogram (  discussed with them and they would like plain films of the lumbar sacral spine first) and Lovenox held on Sunday and in anticipation of possible CT myelogram on Monday.         Type 2 diabetes with nephropathy (HCC)  Last A1c was 6.6 in July  Endorses compliance with home medications  Continue insulin sliding scale  Titrate her Lantus and sliding scale insulin as indicated    Chronic kidney disease  Monitor renal function  Avoid nephrotoxins as able  Is medications for her    Further work-up and management based on her clinical course       Discharge Planning:    Per primary    Diet:  ADULT DIET; Regular; 3 carb choices (45 gm/meal); Low Fat/Low Chol/High Fiber/2 gm Na; 2000 ml; No Caffeine  DVT PPx: Lovenox  Code status: Full Code    Principal Problem:    Ventricular tachycardia (HCC)  Active Problems:    Left ventricular aneurysm    HTN (hypertension)    Type 2 diabetes with nephropathy (HCC)    Chronic systolic heart failure (HCC)    Morbid obesity (HCC)    LORI on CPAP    History of automatic internal cardiac defibrillator (AICD)  Resolved Problems:    * No resolved hospital problems. *      Past History:    Past Medical History:   Diagnosis Date    Abdominal wall hernia 10/2/2018    Acute hypoxemic respiratory failure ([facility] 4/3/2019    Allergic rhinitis     followed by allergist; allergic to grass- has rescue inhaler PRN    ARDS (adult respiratory distress syndrome) ([facility] 4/3/2019    Arthritis     Automatic implantable cardioverter-defibrillator in situ 3/30/2016    CAD in native artery 3/30/2016    Chronic kidney disease     Chronic systolic heart failure ([facility] 07/18/2013    ECHO 2017- EF 39%; managed with medications; [facility]    CKD (chronic kidney disease)     [facility] follows    Diabetes ([facility]     type 2 (on insulin);  FBS AM range- , hypo s/s <70, hgba1c- 7.9    Diabetes mellitus ([facility] 4/12/2010    Dyslipidemia 2/13/2013    Eczema     Fibromyalgia     GERD (gastroesophageal reflux disease)     hx of- no meds currently    Headache     Heart failure ([facility]     chronic systolic with EF 56%; non-ischemic cardiomyopathy    HTN (hypertension) 4/12/2010    Hypercholesterolemia     Incarcerated incisional hernia 3/26/2019    Morbid obesity (HCC)     Neuropathy     bilateral feet and tips of fingers    NICM (nonischemic cardiomyopathy) ([facility] 2/15/2013    Obesity     bmi- 41    Obstructive sleep apnea (adult) (pediatric) 07/14/2014    uses cpap qhs    Pseudomonas urinary tract infection 4/5/2019    Sciatic pain 5/23/2016    Severe persistent asthma with acute exacerbation 8/24/2020    SVT (supraventricular tachycardia) ([facility]     ablation for svt    Tobacco use disorder 4/12/2010       Past Surgical History:   Procedure Laterality Date    ABLATION OF DYSRHYTHMIC FOCUS  \"years ago\"    CARDIAC DEFIBRILLATOR PLACEMENT  07/18/2013    Biotronik dual chamber ICD by Dr. Chris Reid  07/18/2013    Biotronik dual chamber ICD by Dr. Mya Mahoney 7/1/2022    Left heart cath / coronary angiography performed by Phil Amaya MD at [address] [place] [address]    COLONOSCOPY N/A 7/7/2021    COLONOSCOPY/BMI 48 PT HAS AN ICD performed by [facility]Lng MD [facility]  03/26/2019    mild incarceration, no bowel removal    HYSTERECTOMY (CERVIX STATUS UNKNOWN)      EDITH-Left ovary intact per pt    LEFT HEART CATH,PERCUTANEOUS  2/13/2013    no intervention    PACEMAKER      ICD    ROTATOR CUFF REPAIR Right     TONSILLECTOMY          Social History     Tobacco Use    Smoking status: Some Days     Packs/day: 0.25     Types: Cigarettes    Smokeless tobacco: Never    Tobacco comments:     Quit smoking: \"every now and then\"   Substance Use Topics    Alcohol use:  Yes     Alcohol/week: 1.0 standard drink      Social History     Substance and Sexual Activity   Drug Use Yes    Types: Marijuana Michael Blow)       Family History   Problem Relation Age of Onset    Diabetes Other     Heart Disease Father     Hypertension Father     Stroke Father     Heart Attack Father 48        mi    Breast Cancer Sister 37    Hypertension Brother Heart Disease Brother     Diabetes Brother     Stroke Mother           Immunization History   Administered Date(s) Administered    COVID-19, MODERNA BLUE border, Primary or Immunocompromised, (age 12y+), IM, 100 mcg/0.5mL 07/12/2021, 07/12/2021, 08/09/2021, 08/09/2021    COVID-19, PFIZER PURPLE top, DILUTE for use, (age 15 y+), 30mcg/0.3mL 02/11/2022    Influenza Trivalent 10/31/2017    Influenza Virus Vaccine 11/14/2020, 12/15/2021    Influenza, High Dose (Fluzone 65 yrs and older) 01/06/2017, 11/08/2017    Influenza, Quadv, IM, PF (6 mo and older Fluzone, Flulaval, Fluarix, and 3 yrs and older Afluria) 11/14/2020    Influenza, Quadv, adjuvanted, 65 yrs +, IM, PF (Fluad) 12/29/2021    Influenza, Triv, inactivated, subunit, adjuvanted, IM (Fluad 65 yrs and older) 10/02/2019    PPD Test 04/03/2019, 04/08/2019    Pneumococcal Conjugate 13-valent ([id number]) 04/04/2017    Pneumococcal Polysaccharide ([id number]) 07/01/2019    Tdap (Boostrix, Adacel) 01/08/2020     Allergies   Allergen Reactions    Other Hives and Shortness Of Breath     \"inhaler PRN\"    Penicillin G Itching      Current Facility-Administered Medications   Medication Dose Route Frequency    amiodarone (CORDARONE) tablet 400 mg  400 mg Oral BID    torsemide (DEMADEX) tablet 20 mg  20 mg Oral BID    gabapentin (NEURONTIN) capsule 100 mg  100 mg Oral TID    DULoxetine (CYMBALTA) extended release capsule 20 mg  20 mg Oral Daily    cyclobenzaprine (FLEXERIL) tablet 10 mg  10 mg Oral TID PRN    acetaminophen (TYLENOL) tablet 650 mg  650 mg Oral 3 times per day    traMADol (ULTRAM) tablet 50 mg  50 mg Oral Q6H PRN    traMADol (ULTRAM) tablet 25 mg  25 mg Oral Q6H PRN    dexamethasone (DECADRON) injection 4 mg  4 mg IntraVENous Q6H    insulin lispro (HUMALOG) injection vial 0-16 Units  0-16 Units SubCUTAneous TID WC    insulin lispro (HUMALOG) injection vial 0-4 Units  0-4 Units SubCUTAneous Nightly    glucose chewable tablet 16 g  4 tablet Oral PRN dextrose bolus 10% 125 mL  125 mL IntraVENous PRN    Or    dextrose bolus 10% 250 mL  250 mL IntraVENous PRN    glucagon (rDNA) injection 1 mg  1 mg SubCUTAneous PRN    dextrose 10 % infusion   IntraVENous Continuous PRN    enoxaparin (LOVENOX) injection 40 mg  40 mg SubCUTAneous Daily    albuterol sulfate HFA (PROVENTIL;VENTOLIN;PROAIR) 108 (90 Base) MCG/ACT inhaler 2 puff  2 puff Inhalation Q6H PRN    aspirin EC tablet 81 mg  81 mg Oral Daily    carvedilol (COREG) tablet 6.25 mg  6.25 mg Oral BID WC    empagliflozin (JARDIANCE) tablet 10 mg  10 mg Oral Daily    insulin glargine (LANTUS) injection vial 15 Units  15 Units SubCUTAneous Nightly    isosorbide mononitrate (IMDUR) extended release tablet 30 mg  30 mg Oral Daily    latanoprost (XALATAN) 0.005 % ophthalmic solution 1 drop  1 drop Ophthalmic Nightly    nitroGLYCERIN (NITROSTAT) SL tablet 0.4 mg  0.4 mg SubLINGual Q5 Min PRN    ondansetron (ZOFRAN-ODT) disintegrating tablet 4 mg  4 mg Oral Q8H PRN    oxyCODONE (ROXICODONE) immediate release tablet 5 mg  5 mg Oral Q6H PRN    pravastatin (PRAVACHOL) tablet 80 mg  80 mg Oral Nightly    sacubitril-valsartan (ENTRESTO) 49-51 MG per tablet 1 tablet  1 tablet Oral BID    sodium chloride flush 0.9 % injection 5-40 mL  5-40 mL IntraVENous 2 times per day    sodium chloride flush 0.9 % injection 5-40 mL  5-40 mL IntraVENous PRN    0.9 % sodium chloride infusion   IntraVENous PRN    acetaminophen (TYLENOL) tablet 650 mg  650 mg Oral Q6H PRN    Or    acetaminophen (TYLENOL) suppository 650 mg  650 mg Rectal Q6H PRN    polyethylene glycol (GLYCOLAX) packet 17 g  17 g Oral Daily PRN    potassium chloride (KLOR-CON M) extended release tablet 40 mEq  40 mEq Oral PRN    Or    potassium bicarb-citric acid (EFFER-K) effervescent tablet 40 mEq  40 mEq Oral PRN    Or    potassium chloride 10 mEq/100 mL IVPB (Peripheral Line)  10 mEq IntraVENous PRN    magnesium sulfate 2000 mg in 50 mL IVPB premix  2,000 mg IntraVENous PRN reviewed labs and tests showing:  Recent Labs:  Recent Results (from the past 24 hour(s))   POCT Glucose    Collection Time: 08/13/22  8:18 PM   Result Value Ref Range    POC Glucose 231 (H) 65 - 100 mg/dL    Performed by: Catalina    POCT Glucose    Collection Time: 08/14/22  7:20 AM   Result Value Ref Range    POC Glucose 167 (H) 65 - 100 mg/dL    Performed by: Danneille Young    POCT Glucose    Collection Time: 08/14/22 11:37 AM   Result Value Ref Range    POC Glucose 190 (H) 65 - 100 mg/dL    Performed by: Dannielle Young        I have personally reviewed imaging studies showing:  Transthoracic echocardiogram (TTE) limited with contrast, bubble, strain, and 3D PRN    Result Date: 8/11/2022  Formatting of this result is different from the original.   Left Ventricle: Moderately reduced left ventricular systolic function. Left ventricle is dilated. Mildly increased wall thickness. Global hypokinesis present. Left Atrium: Left atrium is mildly dilated. Contrast used: Definity. Overall estimation of the ejection fraction is moderately reduced in the range of 40%. Patient has a posterior basal aneurysm presents that is partially visualized on this study. It has been visualized in other studies including a heart cath and is a large posterior basal aneurysm that affects the overall effect of cardiac output due to shunting of a significant amount of flow into the large posterior basal aneurysm       Echocardiogram:  08/10/22    TRANSTHORACIC ECHOCARDIOGRAM (TTE) LIMITED (CONTRAST/BUBBLE/3D PRN) 08/11/2022 11:07 AM (Final)    Interpretation Summary  Formatting of this result is different from the original.      Left Ventricle: Moderately reduced left ventricular systolic function. Left ventricle is dilated. Mildly increased wall thickness. Global hypokinesis present. Left Atrium: Left atrium is mildly dilated. Contrast used: Definity.     Overall estimation of the ejection fraction is moderately

## 2022-08-15 ENCOUNTER — APPOINTMENT (OUTPATIENT)
Dept: CT IMAGING | Age: 57
DRG: 309 | End: 2022-08-15
Payer: MEDICARE

## 2022-08-15 ENCOUNTER — APPOINTMENT (OUTPATIENT)
Dept: INTERVENTIONAL RADIOLOGY/VASCULAR | Age: 57
DRG: 309 | End: 2022-08-15
Payer: MEDICARE

## 2022-08-15 ENCOUNTER — TELEPHONE (OUTPATIENT)
Dept: CARDIOLOGY CLINIC | Age: 57
End: 2022-08-15

## 2022-08-15 LAB
GLUCOSE BLD STRIP.AUTO-MCNC: 177 MG/DL (ref 65–100)
GLUCOSE BLD STRIP.AUTO-MCNC: 191 MG/DL (ref 65–100)
GLUCOSE BLD STRIP.AUTO-MCNC: 193 MG/DL (ref 65–100)
GLUCOSE BLD STRIP.AUTO-MCNC: 228 MG/DL (ref 65–100)
SERVICE CMNT-IMP: ABNORMAL

## 2022-08-15 PROCEDURE — 99232 SBSQ HOSP IP/OBS MODERATE 35: CPT | Performed by: INTERNAL MEDICINE

## 2022-08-15 PROCEDURE — 6360000004 HC RX CONTRAST MEDICATION

## 2022-08-15 PROCEDURE — 6370000000 HC RX 637 (ALT 250 FOR IP): Performed by: NURSE PRACTITIONER

## 2022-08-15 PROCEDURE — B01B1ZZ FLUOROSCOPY OF SPINAL CORD USING LOW OSMOLAR CONTRAST: ICD-10-PCS | Performed by: RADIOLOGY

## 2022-08-15 PROCEDURE — 6370000000 HC RX 637 (ALT 250 FOR IP): Performed by: INTERNAL MEDICINE

## 2022-08-15 PROCEDURE — 82962 GLUCOSE BLOOD TEST: CPT

## 2022-08-15 PROCEDURE — 1100000003 HC PRIVATE W/ TELEMETRY

## 2022-08-15 PROCEDURE — 72132 CT LUMBAR SPINE W/DYE: CPT

## 2022-08-15 PROCEDURE — 6360000002 HC RX W HCPCS: Performed by: PHYSICIAN ASSISTANT

## 2022-08-15 PROCEDURE — 2580000003 HC RX 258: Performed by: NURSE PRACTITIONER

## 2022-08-15 PROCEDURE — 2500000003 HC RX 250 WO HCPCS: Performed by: PHYSICIAN ASSISTANT

## 2022-08-15 PROCEDURE — 6360000002 HC RX W HCPCS: Performed by: INTERNAL MEDICINE

## 2022-08-15 PROCEDURE — 2709999900 IR MYELOGRAM LUMBOSACRAL

## 2022-08-15 RX ORDER — BISACODYL 10 MG
10 SUPPOSITORY, RECTAL RECTAL ONCE
Status: COMPLETED | OUTPATIENT
Start: 2022-08-15 | End: 2022-08-15

## 2022-08-15 RX ORDER — MIDAZOLAM HYDROCHLORIDE 1 MG/ML
0.5 INJECTION INTRAMUSCULAR; INTRAVENOUS ONCE
Status: COMPLETED | OUTPATIENT
Start: 2022-08-15 | End: 2022-08-15

## 2022-08-15 RX ORDER — LIDOCAINE HYDROCHLORIDE 10 MG/ML
INJECTION, SOLUTION INFILTRATION; PERINEURAL
Status: COMPLETED | OUTPATIENT
Start: 2022-08-15 | End: 2022-08-15

## 2022-08-15 RX ADMIN — PRAVASTATIN SODIUM 80 MG: 80 TABLET ORAL at 21:14

## 2022-08-15 RX ADMIN — DEXAMETHASONE SODIUM PHOSPHATE 4 MG: 10 INJECTION INTRAMUSCULAR; INTRAVENOUS at 05:37

## 2022-08-15 RX ADMIN — ACETAMINOPHEN 650 MG: 325 TABLET ORAL at 14:08

## 2022-08-15 RX ADMIN — SACUBITRIL AND VALSARTAN 1 TABLET: 49; 51 TABLET, FILM COATED ORAL at 08:32

## 2022-08-15 RX ADMIN — AMIODARONE HYDROCHLORIDE 400 MG: 200 TABLET ORAL at 08:32

## 2022-08-15 RX ADMIN — DULOXETINE HYDROCHLORIDE 20 MG: 20 CAPSULE, DELAYED RELEASE ORAL at 08:32

## 2022-08-15 RX ADMIN — BISACODYL 10 MG: 10 SUPPOSITORY RECTAL at 12:07

## 2022-08-15 RX ADMIN — INSULIN GLARGINE 15 UNITS: 100 INJECTION, SOLUTION SUBCUTANEOUS at 21:11

## 2022-08-15 RX ADMIN — DEXAMETHASONE SODIUM PHOSPHATE 4 MG: 10 INJECTION INTRAMUSCULAR; INTRAVENOUS at 12:07

## 2022-08-15 RX ADMIN — ASPIRIN 81 MG: 81 TABLET ORAL at 08:32

## 2022-08-15 RX ADMIN — DEXAMETHASONE SODIUM PHOSPHATE 4 MG: 10 INJECTION INTRAMUSCULAR; INTRAVENOUS at 17:26

## 2022-08-15 RX ADMIN — TORSEMIDE 20 MG: 20 TABLET ORAL at 08:32

## 2022-08-15 RX ADMIN — LIDOCAINE HYDROCHLORIDE 10 ML: 10 INJECTION, SOLUTION INFILTRATION; PERINEURAL at 09:21

## 2022-08-15 RX ADMIN — ACETAMINOPHEN 650 MG: 325 TABLET ORAL at 05:37

## 2022-08-15 RX ADMIN — EMPAGLIFLOZIN 10 MG: 10 TABLET, FILM COATED ORAL at 08:32

## 2022-08-15 RX ADMIN — CARVEDILOL 6.25 MG: 6.25 TABLET, FILM COATED ORAL at 17:25

## 2022-08-15 RX ADMIN — AMIODARONE HYDROCHLORIDE 400 MG: 200 TABLET ORAL at 21:15

## 2022-08-15 RX ADMIN — LATANOPROST 1 DROP: 50 SOLUTION OPHTHALMIC at 21:15

## 2022-08-15 RX ADMIN — MIDAZOLAM 0.5 MG: 1 INJECTION INTRAMUSCULAR; INTRAVENOUS at 09:08

## 2022-08-15 RX ADMIN — ACETAMINOPHEN 650 MG: 325 TABLET ORAL at 21:14

## 2022-08-15 RX ADMIN — DEXAMETHASONE SODIUM PHOSPHATE 4 MG: 10 INJECTION INTRAMUSCULAR; INTRAVENOUS at 23:09

## 2022-08-15 RX ADMIN — SACUBITRIL AND VALSARTAN 1 TABLET: 49; 51 TABLET, FILM COATED ORAL at 21:14

## 2022-08-15 RX ADMIN — TORSEMIDE 20 MG: 20 TABLET ORAL at 21:15

## 2022-08-15 RX ADMIN — CARVEDILOL 6.25 MG: 6.25 TABLET, FILM COATED ORAL at 08:32

## 2022-08-15 RX ADMIN — SODIUM CHLORIDE, PRESERVATIVE FREE 5 ML: 5 INJECTION INTRAVENOUS at 09:01

## 2022-08-15 RX ADMIN — SODIUM CHLORIDE, PRESERVATIVE FREE 10 ML: 5 INJECTION INTRAVENOUS at 21:18

## 2022-08-15 RX ADMIN — ISOSORBIDE MONONITRATE 30 MG: 30 TABLET, EXTENDED RELEASE ORAL at 08:32

## 2022-08-15 ASSESSMENT — PAIN SCALES - GENERAL
PAINLEVEL_OUTOF10: 0
PAINLEVEL_OUTOF10: 3
PAINLEVEL_OUTOF10: 3

## 2022-08-15 ASSESSMENT — PAIN DESCRIPTION - DESCRIPTORS
DESCRIPTORS: ACHING
DESCRIPTORS: ACHING

## 2022-08-15 ASSESSMENT — PAIN DESCRIPTION - LOCATION
LOCATION: LEG;HIP
LOCATION: LEG;HIP

## 2022-08-15 ASSESSMENT — PAIN DESCRIPTION - ORIENTATION
ORIENTATION: LEFT
ORIENTATION: LEFT

## 2022-08-15 NOTE — PROGRESS NOTES
TRANSFER - OUT REPORT:    Verbal report given to [facility] on Ziyad Hanna  being transferred to [facility] for routine progression of patient care       Report consisted of patient's Situation, Background, Assessment and   Recommendations(SBAR). Information from the following report(s) Nurse Handoff Report and MAR was reviewed with the receiving nurse. Lines:   Peripheral IV 08/10/22 Left Antecubital (Active)   Site Assessment Clean, dry & intact 08/15/22 0444   Line Status Flushed;Capped 08/15/22 0444   Line Care Connections checked and tightened 08/15/22 0444   Phlebitis Assessment No symptoms 08/15/22 0444   Infiltration Assessment 0 08/15/22 0444   Alcohol Cap Used Yes 08/15/22 0444   Dressing Status Clean, dry & intact 08/15/22 0444   Dressing Type Transparent 08/15/22 0444        Opportunity for questions and clarification was provided.       Patient transported with:  Monitor tele box

## 2022-08-15 NOTE — PROGRESS NOTES
[facility] CARDIOLOGY PROGRESS NOTE           8/15/2022 8:02 AM    Admit Date: 8/10/2022      Subjective:   Patient notes lower abdominal pain. No bowel movement despite miralax. BP controlled. No arrhythmias on telemetry. ROS:  Cardiovascular:  As noted above    Objective:      Vitals:    08/14/22 1733 08/14/22 2114 08/15/22 0012 08/15/22 0424   BP: 119/66 121/83 118/78 123/80   Pulse:  60 60 60   Resp:  22 22 22   Temp:  97.9 °F (36.6 °C) 97.9 °F (36.6 °C) 98.2 °F (36.8 °C)   TempSrc:  Oral Oral Oral   SpO2:  90% 95% 97%   Weight:    202 lb (91.6 kg)   Height:           Physical Exam:  General-No Acute Distress. Anxious. Neck- supple, no JVD  CV- regular rate and rhythm Grade II/VI DAVID  Lung- clear bilaterally  Abd- soft, nontender, nondistended  Ext- no edema bilaterally. Skin- warm and dry      Data Review:   Recent Labs     08/10/22  1341   WBC 7.6   HGB 14.2   HCT 43.7   MCV 98.9*   *         Recent Labs     08/13/22  0627 08/11/22  0449 08/10/22  1341      < > 142   K 4.3   < > 3.6      < > 110*   CO2 28   < > 28   BUN 35*   < > 32*   CREATININE 1.80*   < > 2.10*   GLUCOSE 134*   < > 152*   CALCIUM 9.0   < > 9.1   PROT  --   --  7.7   LABALBU  --   --  3.8   BILITOT  --   --  0.4   ALKPHOS  --   --  73   AST  --   --  21   ALT  --   --  32   LABGLOM 31*   < > 26*   GFRAA 37*   < > 31*   GLOB  --   --  3.9*    < > = values in this interval not displayed. No results for input(s): CKTOTAL, CKMB, CKMBINDEX, DDIMER, TROPONINI in the last 720 hours. Assessment/Plan:     Active Hospital Problems    Type 2 diabetes with nephropathy (HCC)  Continue medications      *Ventricular tachycardia (HCC)  Continue amiodarone 400 mg twice daily. Continue telemetry. History of automatic internal cardiac defibrillator (AICD)  Normal device function. LORI on CPAP  Continue CPAP.       Morbid obesity ([address]      Chronic systolic heart failure ([address]  On

## 2022-08-15 NOTE — TELEPHONE ENCOUNTER
Patient called to let device know that she is in the hospital and she will not have her monitor for a remote check tomorrow.  Stated they have been checking the device daily in the hospital.

## 2022-08-15 NOTE — PROGRESS NOTES
[facility] Hospitalist Consult Follow-Up Note   Admit Date:  8/10/2022  1:21 PM   Name:  Donald Jansen   Age:  60 y.o. Sex:  female  :  1965   MRN:  [id number]   Room:  [facility]    Presenting Complaint: AICD Problem    Reason(s) for Admission: Ventricular tachycardia ([address] [I47.2]     Hospitalists consulted by Richardson Christy MD for: Left lower leg pain    History of Presenting Illness:   Donald Jansen is a 60 y.o. female with history of diabetes mellitus, nonischemic cardiomyopathy, ventricular cardiac arrest, coronary artery disease of the native coronary artery, chronic kidney disease status post ICD, fibromyalgia, lipidemia heart failure with reduced ejection fraction, hypertension, morbid obesity, obstructive sleep apnea and tobacco use disorder. She had an episode of ventricular tachycardia in May that resulted in shock therapy from her ICD and syncope where she fell and has subsequently been having left-sided leg pain. For this admission she was admitted to the cardiology service on August 10, 2022 after her ICD shocked her while she was riding the bus on the way to work. She felt when shopping came to the ER to be seen. the pacemaker showed 1 shock. She was started on IV amiodarone and admitted to the telemetry service for EP evaluation. 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 The hospitalist service was consulted today as the patient states that no one is addressing her left lower leg pain that is severe in nature 10 out of 10. Ongoing since the initial shock in May and acutely worsened today. She states that no one has evaluated her for this pain and it is being ignored.     The hospitalist 2/15/2013    Obesity     bmi- 41    Obstructive sleep apnea (adult) (pediatric) 07/14/2014    uses cpap qhs    Pseudomonas urinary tract infection 4/5/2019    Sciatic pain 5/23/2016    Severe persistent asthma with acute exacerbation 8/24/2020    SVT (supraventricular tachycardia) (HCC)     ablation for svt    Tobacco use disorder 4/12/2010       Past Surgical History:   Procedure Laterality Date    ABLATION OF DYSRHYTHMIC FOCUS  \"years ago\"    CARDIAC DEFIBRILLATOR PLACEMENT  07/18/2013    Biotronik dual chamber ICD by Dr. Aquiles Burnett  07/18/2013    Biotronik dual chamber ICD by Dr. Jessica Montes De Oca 7/1/2022    Left heart cath / coronary angiography performed by Flakito Benton MD at [address] [place] [address]    COLONOSCOPY N/A 7/7/2021    COLONOSCOPY/BMI 48 PT HAS AN ICD performed by Colleen Salinas MD at [facility]  03/26/2019    mild incarceration, no bowel removal    HYSTERECTOMY (CERVIX STATUS UNKNOWN)      EDITH-Left ovary intact per pt    LEFT HEART CATH,PERCUTANEOUS  2/13/2013    no intervention    PACEMAKER      ICD    ROTATOR CUFF REPAIR Right     TONSILLECTOMY          Social History     Tobacco Use    Smoking status: Some Days     Packs/day: 0.25     Types: Cigarettes    Smokeless tobacco: Never    Tobacco comments:     Quit smoking: \"every now and then\"   Substance Use Topics    Alcohol use:  Yes     Alcohol/week: 1.0 standard drink      Social History     Substance and Sexual Activity   Drug Use Yes    Types: Marijuana Anamika Snyder)       Family History   Problem Relation Age of Onset    Diabetes Other     Heart Disease Father     Hypertension Father     Stroke Father     Heart Attack Father 48        mi    Breast Cancer Sister 37    Hypertension Brother     Heart Disease Brother     Diabetes Brother     Stroke Mother           Immunization History   Administered Date(s) Administered    COVID-19, [address], Primary or Immunocompromised, (age 12y+), IM, 100 mcg/0.5mL 07/12/2021, 07/12/2021, 08/09/2021, 08/09/2021    COVID-19, PFIZER PURPLE top, DILUTE for use, (age 15 y+), 30mcg/0.3mL 02/11/2022    Influenza Trivalent 10/31/2017    Influenza Virus Vaccine 11/14/2020, 12/15/2021    Influenza, High Dose (Fluzone 65 yrs and older) 01/06/2017, 11/08/2017    Influenza, Quadv, IM, PF (6 mo and older Fluzone, Flulaval, Fluarix, and 3 yrs and older Afluria) 11/14/2020    Influenza, Quadv, adjuvanted, 65 yrs +, IM, PF (Fluad) 12/29/2021    Influenza, Triv, inactivated, subunit, adjuvanted, IM (Fluad 65 yrs and older) 10/02/2019    PPD Test 04/03/2019, 04/08/2019    Pneumococcal Conjugate 13-valent ([id number]) 04/04/2017    Pneumococcal Polysaccharide ([id number]) 07/01/2019    Tdap (Boostrix, Adacel) 01/08/2020     Allergies   Allergen Reactions    Other Hives and Shortness Of Breath     \"inhaler PRN\"    Penicillin G Itching      Current Facility-Administered Medications   Medication Dose Route Frequency    amiodarone (CORDARONE) tablet 400 mg  400 mg Oral BID    torsemide (DEMADEX) tablet 20 mg  20 mg Oral BID    gabapentin (NEURONTIN) capsule 100 mg  100 mg Oral TID    DULoxetine (CYMBALTA) extended release capsule 20 mg  20 mg Oral Daily    cyclobenzaprine (FLEXERIL) tablet 10 mg  10 mg Oral TID PRN    acetaminophen (TYLENOL) tablet 650 mg  650 mg Oral 3 times per day    traMADol (ULTRAM) tablet 50 mg  50 mg Oral Q6H PRN    traMADol (ULTRAM) tablet 25 mg  25 mg Oral Q6H PRN    dexamethasone (DECADRON) injection 4 mg  4 mg IntraVENous Q6H    insulin lispro (HUMALOG) injection vial 0-16 Units  0-16 Units SubCUTAneous TID WC    insulin lispro (HUMALOG) injection vial 0-4 Units  0-4 Units SubCUTAneous Nightly    glucose chewable tablet 16 g  4 tablet Oral PRN    dextrose bolus 10% 125 mL  125 mL IntraVENous PRN    Or    dextrose bolus 10% 250 mL  250 mL IntraVENous PRN    glucagon (rDNA) injection 1 mg  1 mg SubCUTAneous Nightly       Objective:   Patient Vitals for the past 24 hrs:   Temp Pulse Resp BP SpO2   08/15/22 1641 97.9 °F (36.6 °C) 58 19 120/76 96 %   08/15/22 1105 97.8 °F (36.6 °C) 60 19 119/72 95 %   08/15/22 0849 98.3 °F (36.8 °C) 78 20 105/65 97 %   08/15/22 0823 97.7 °F (36.5 °C) 60 20 131/78 94 %   08/15/22 0820 -- -- -- 132/87 --   08/15/22 0424 98.2 °F (36.8 °C) 60 22 123/80 97 %   08/15/22 0012 97.9 °F (36.6 °C) 60 22 118/78 95 %   08/14/22 2114 97.9 °F (36.6 °C) 60 22 121/83 90 %   08/14/22 1733 -- -- -- 119/66 --   08/14/22 1705 98.2 °F (36.8 °C) 59 22 (!) 102/90 96 %         Oxygen Therapy  SpO2: 96 %  Pulse Oximeter Device Mode: Intermittent  O2 Device: None (Room air)  O2 Flow Rate (L/min): 2 L/min  End Tidal CO2: 36 (%)  Oxygen Therapy: None (Room air)    Estimated body mass index is 42.22 kg/m² as calculated from the following:    Height as of this encounter: 4' 10\" (1.473 m). Weight as of this encounter: 202 lb (91.6 kg). Intake/Output Summary (Last 24 hours) at 8/15/2022 1658  Last data filed at 8/15/2022 1207  Gross per 24 hour   Intake 120 ml   Output 400 ml   Net -280 ml           Physical Exam:    Blood pressure 120/76, pulse 58, temperature 97.9 °F (36.6 °C), temperature source Oral, resp. rate 19, height 4' 10\" (1.473 m), weight 202 lb (91.6 kg), SpO2 96 %. General:    Well nourished. Head:  Normocephalic, atraumatic  Eyes:  Sclerae appear normal.  Pupils equally round. ENT:  Nares appear normal, no drainage. Moist oral mucosa  Neck:  No restricted ROM. Trachea midline   CV:   RRR. No m/r/g. No jugular venous distension. Lungs:   CTAB. No wheezing, rhonchi, or rales. Symmetric expansion. Abdomen: Bowel sounds present. Soft, nontender, nondistended. Extremities: No cyanosis or clubbing. No edema  Skin:     No rashes and normal coloration. Warm and dry. Neuro:  CN II-XII grossly intact. Sensation intact. A&Ox3  Psych:  Normal mood and affect.       I have personally reviewed labs and tests showing:  Recent Labs:  Recent Results (from the past 24 hour(s))   POCT Glucose    Collection Time: 08/14/22  5:07 PM   Result Value Ref Range    POC Glucose 173 (H) 65 - 100 mg/dL    Performed by: Juancarlos Ek    POCT Glucose    Collection Time: 08/14/22  9:16 PM   Result Value Ref Range    POC Glucose 252 (H) 65 - 100 mg/dL    Performed by: Tania    POCT Glucose    Collection Time: 08/15/22  8:17 AM   Result Value Ref Range    POC Glucose 177 (H) 65 - 100 mg/dL    Performed by: ValdizonSelenaPCT    POCT Glucose    Collection Time: 08/15/22 11:04 AM   Result Value Ref Range    POC Glucose 193 (H) 65 - 100 mg/dL    Performed by: ValdizonSelenaPCT    POCT Glucose    Collection Time: 08/15/22  4:40 PM   Result Value Ref Range    POC Glucose 191 (H) 65 - 100 mg/dL    Performed by: Nicole Hanna        I have personally reviewed imaging studies showing:  Transthoracic echocardiogram (TTE) limited with contrast, bubble, strain, and 3D PRN    Result Date: 8/11/2022  Formatting of this result is different from the original.   Left Ventricle: Moderately reduced left ventricular systolic function. Left ventricle is dilated. Mildly increased wall thickness. Global hypokinesis present. Left Atrium: Left atrium is mildly dilated. Contrast used: Definity. Overall estimation of the ejection fraction is moderately reduced in the range of 40%. Patient has a posterior basal aneurysm presents that is partially visualized on this study.   It has been visualized in other studies including a heart cath and is a large posterior basal aneurysm that affects the overall effect of cardiac output due to shunting of a significant amount of flow into the large posterior basal aneurysm       Echocardiogram:  08/10/22    TRANSTHORACIC ECHOCARDIOGRAM (TTE) LIMITED (CONTRAST/BUBBLE/3D PRN) 08/11/2022 11:07 AM (Final)    Interpretation Summary  Formatting of this result is different from the original.      Left Ventricle: Moderately reduced left ventricular systolic function. Left ventricle is dilated. Mildly increased wall thickness. Global hypokinesis present. Left Atrium: Left atrium is mildly dilated. Contrast used: Definity. Overall estimation of the ejection fraction is moderately reduced in the range of 40%. Patient has a posterior basal aneurysm presents that is partially visualized on this study. It has been visualized in other studies including a heart cath and is a large posterior basal aneurysm that affects the overall effect of cardiac output due to shunting of a significant amount of flow into the large posterior basal aneurysm    Signed by: Lacy Warren MD on 8/11/2022 11:07 AM        Signed:  Trang Almanza MD    Part of this note may have been written by using a voice dictation software. The note has been proof read but may still contain some grammatical/other typographical errors.

## 2022-08-15 NOTE — CARE COORDINATION
LOS 4 D  CM following for continued hospitalization. Per notes, patient in IR for myelogram for continued c/o severe (L) pain. Consider PT/OT pending results of myelogram.  CM following.

## 2022-08-16 ENCOUNTER — HOME HEALTH ADMISSION (OUTPATIENT)
Dept: HOME HEALTH SERVICES | Facility: HOME HEALTH | Age: 57
End: 2022-08-16
Payer: MEDICARE

## 2022-08-16 LAB
ANION GAP SERPL CALC-SCNC: 2 MMOL/L (ref 7–16)
BASOPHILS # BLD: 0 K/UL (ref 0–0.2)
BASOPHILS NFR BLD: 0 % (ref 0–2)
BUN SERPL-MCNC: 60 MG/DL (ref 6–23)
CALCIUM SERPL-MCNC: 9.3 MG/DL (ref 8.3–10.4)
CHLORIDE SERPL-SCNC: 107 MMOL/L (ref 98–107)
CO2 SERPL-SCNC: 26 MMOL/L (ref 21–32)
CREAT SERPL-MCNC: 2 MG/DL (ref 0.6–1)
DIFFERENTIAL METHOD BLD: ABNORMAL
EOSINOPHIL # BLD: 0 K/UL (ref 0–0.8)
EOSINOPHIL NFR BLD: 0 % (ref 0.5–7.8)
ERYTHROCYTE [DISTWIDTH] IN BLOOD BY AUTOMATED COUNT: 13 % (ref 11.9–14.6)
GLUCOSE BLD STRIP.AUTO-MCNC: 181 MG/DL (ref 65–100)
GLUCOSE BLD STRIP.AUTO-MCNC: 231 MG/DL (ref 65–100)
GLUCOSE BLD STRIP.AUTO-MCNC: 244 MG/DL (ref 65–100)
GLUCOSE BLD STRIP.AUTO-MCNC: 326 MG/DL (ref 65–100)
GLUCOSE SERPL-MCNC: 217 MG/DL (ref 65–100)
HCT VFR BLD AUTO: 46.5 % (ref 35.8–46.3)
HGB BLD-MCNC: 15.4 G/DL (ref 11.7–15.4)
IMM GRANULOCYTES # BLD AUTO: 0.1 K/UL (ref 0–0.5)
IMM GRANULOCYTES NFR BLD AUTO: 1 % (ref 0–5)
LYMPHOCYTES # BLD: 0.6 K/UL (ref 0.5–4.6)
LYMPHOCYTES NFR BLD: 5 % (ref 13–44)
MCH RBC QN AUTO: 31.8 PG (ref 26.1–32.9)
MCHC RBC AUTO-ENTMCNC: 33.1 G/DL (ref 31.4–35)
MCV RBC AUTO: 95.9 FL (ref 79.6–97.8)
MONOCYTES # BLD: 0.5 K/UL (ref 0.1–1.3)
MONOCYTES NFR BLD: 5 % (ref 4–12)
NEUTS SEG # BLD: 9.3 K/UL (ref 1.7–8.2)
NEUTS SEG NFR BLD: 89 % (ref 43–78)
NRBC # BLD: 0 K/UL (ref 0–0.2)
PLATELET # BLD AUTO: 147 K/UL (ref 150–450)
PMV BLD AUTO: 12.2 FL (ref 9.4–12.3)
POTASSIUM SERPL-SCNC: 4.2 MMOL/L (ref 3.5–5.1)
RBC # BLD AUTO: 4.85 M/UL (ref 4.05–5.2)
SERVICE CMNT-IMP: ABNORMAL
SODIUM SERPL-SCNC: 135 MMOL/L (ref 136–145)
WBC # BLD AUTO: 10.5 K/UL (ref 4.3–11.1)

## 2022-08-16 PROCEDURE — 6370000000 HC RX 637 (ALT 250 FOR IP): Performed by: NURSE PRACTITIONER

## 2022-08-16 PROCEDURE — 2580000003 HC RX 258: Performed by: NURSE PRACTITIONER

## 2022-08-16 PROCEDURE — 85025 COMPLETE CBC W/AUTO DIFF WBC: CPT

## 2022-08-16 PROCEDURE — 80048 BASIC METABOLIC PNL TOTAL CA: CPT

## 2022-08-16 PROCEDURE — 97530 THERAPEUTIC ACTIVITIES: CPT

## 2022-08-16 PROCEDURE — 82962 GLUCOSE BLOOD TEST: CPT

## 2022-08-16 PROCEDURE — 6370000000 HC RX 637 (ALT 250 FOR IP): Performed by: INTERNAL MEDICINE

## 2022-08-16 PROCEDURE — 99232 SBSQ HOSP IP/OBS MODERATE 35: CPT | Performed by: INTERNAL MEDICINE

## 2022-08-16 PROCEDURE — 36415 COLL VENOUS BLD VENIPUNCTURE: CPT

## 2022-08-16 PROCEDURE — 6360000002 HC RX W HCPCS: Performed by: INTERNAL MEDICINE

## 2022-08-16 PROCEDURE — 97161 PT EVAL LOW COMPLEX 20 MIN: CPT

## 2022-08-16 PROCEDURE — 1100000003 HC PRIVATE W/ TELEMETRY

## 2022-08-16 PROCEDURE — 6360000002 HC RX W HCPCS: Performed by: FAMILY MEDICINE

## 2022-08-16 RX ORDER — LORAZEPAM 0.5 MG/1
0.5 TABLET ORAL EVERY 6 HOURS PRN
Status: DISCONTINUED | OUTPATIENT
Start: 2022-08-16 | End: 2022-08-17 | Stop reason: HOSPADM

## 2022-08-16 RX ORDER — DEXAMETHASONE SODIUM PHOSPHATE 10 MG/ML
4 INJECTION INTRAMUSCULAR; INTRAVENOUS EVERY 12 HOURS
Status: DISCONTINUED | OUTPATIENT
Start: 2022-08-16 | End: 2022-08-17 | Stop reason: HOSPADM

## 2022-08-16 RX ADMIN — DEXAMETHASONE SODIUM PHOSPHATE 4 MG: 10 INJECTION INTRAMUSCULAR; INTRAVENOUS at 04:59

## 2022-08-16 RX ADMIN — LATANOPROST 1 DROP: 50 SOLUTION OPHTHALMIC at 21:35

## 2022-08-16 RX ADMIN — ISOSORBIDE MONONITRATE 30 MG: 30 TABLET, EXTENDED RELEASE ORAL at 08:59

## 2022-08-16 RX ADMIN — CARVEDILOL 6.25 MG: 6.25 TABLET, FILM COATED ORAL at 08:58

## 2022-08-16 RX ADMIN — ACETAMINOPHEN 650 MG: 325 TABLET ORAL at 21:34

## 2022-08-16 RX ADMIN — DEXAMETHASONE SODIUM PHOSPHATE 4 MG: 10 INJECTION INTRAMUSCULAR; INTRAVENOUS at 23:19

## 2022-08-16 RX ADMIN — ACETAMINOPHEN 650 MG: 325 TABLET ORAL at 04:59

## 2022-08-16 RX ADMIN — ASPIRIN 81 MG: 81 TABLET ORAL at 08:58

## 2022-08-16 RX ADMIN — INSULIN GLARGINE 15 UNITS: 100 INJECTION, SOLUTION SUBCUTANEOUS at 21:30

## 2022-08-16 RX ADMIN — PRAVASTATIN SODIUM 80 MG: 80 TABLET ORAL at 21:35

## 2022-08-16 RX ADMIN — SODIUM CHLORIDE, PRESERVATIVE FREE 10 ML: 5 INJECTION INTRAVENOUS at 21:35

## 2022-08-16 RX ADMIN — CARVEDILOL 6.25 MG: 6.25 TABLET, FILM COATED ORAL at 16:36

## 2022-08-16 RX ADMIN — EMPAGLIFLOZIN 10 MG: 10 TABLET, FILM COATED ORAL at 09:00

## 2022-08-16 RX ADMIN — ENOXAPARIN SODIUM 40 MG: 100 INJECTION SUBCUTANEOUS at 08:59

## 2022-08-16 RX ADMIN — DEXAMETHASONE SODIUM PHOSPHATE 4 MG: 10 INJECTION INTRAMUSCULAR; INTRAVENOUS at 11:51

## 2022-08-16 RX ADMIN — DULOXETINE HYDROCHLORIDE 20 MG: 20 CAPSULE, DELAYED RELEASE ORAL at 08:58

## 2022-08-16 RX ADMIN — TORSEMIDE 20 MG: 20 TABLET ORAL at 08:58

## 2022-08-16 RX ADMIN — SACUBITRIL AND VALSARTAN 1 TABLET: 49; 51 TABLET, FILM COATED ORAL at 08:58

## 2022-08-16 RX ADMIN — AMIODARONE HYDROCHLORIDE 400 MG: 200 TABLET ORAL at 21:34

## 2022-08-16 RX ADMIN — OXYCODONE 5 MG: 5 TABLET ORAL at 23:19

## 2022-08-16 RX ADMIN — TORSEMIDE 20 MG: 20 TABLET ORAL at 21:35

## 2022-08-16 RX ADMIN — SACUBITRIL AND VALSARTAN 1 TABLET: 49; 51 TABLET, FILM COATED ORAL at 21:34

## 2022-08-16 RX ADMIN — AMIODARONE HYDROCHLORIDE 400 MG: 200 TABLET ORAL at 08:58

## 2022-08-16 RX ADMIN — INSULIN LISPRO 4 UNITS: 100 INJECTION, SOLUTION INTRAVENOUS; SUBCUTANEOUS at 21:30

## 2022-08-16 RX ADMIN — ACETAMINOPHEN 650 MG: 325 TABLET ORAL at 11:51

## 2022-08-16 RX ADMIN — INSULIN LISPRO 4 UNITS: 100 INJECTION, SOLUTION INTRAVENOUS; SUBCUTANEOUS at 08:59

## 2022-08-16 RX ADMIN — INSULIN LISPRO 4 UNITS: 100 INJECTION, SOLUTION INTRAVENOUS; SUBCUTANEOUS at 16:36

## 2022-08-16 ASSESSMENT — PAIN SCALES - GENERAL
PAINLEVEL_OUTOF10: 0
PAINLEVEL_OUTOF10: 3
PAINLEVEL_OUTOF10: 0
PAINLEVEL_OUTOF10: 4

## 2022-08-16 ASSESSMENT — PAIN DESCRIPTION - ORIENTATION
ORIENTATION: MID
ORIENTATION: MID
ORIENTATION: LEFT
ORIENTATION: MID

## 2022-08-16 ASSESSMENT — PAIN DESCRIPTION - DESCRIPTORS
DESCRIPTORS: ACHING

## 2022-08-16 ASSESSMENT — PAIN DESCRIPTION - LOCATION
LOCATION: LEG;HIP
LOCATION: BACK
LOCATION: HEAD
LOCATION: BACK

## 2022-08-16 NOTE — PROGRESS NOTES
PHYSICAL THERAPY Initial Assessment and AM  (Link to Caseload Tracking: PT Visit Days : 1  Acknowledge Orders  Time In/Out  PT Charge Capture  Rehab Caseload Tracker    Adeel Jung is a 60 y.o. female   PRIMARY DIAGNOSIS: Ventricular tachycardia ([address]  Ventricular tachycardia ([address] [I47.2]       Reason for Referral: Difficulty in walking, Not elsewhere classified (R26.2)  Inpatient: Payor: Mariusz Cabrera / Plan: [facility] Normaparmjit Lopez / Product Type: *No Product type* /     ASSESSMENT:     REHAB RECOMMENDATIONS:   Recommendation to date pending progress:  Setting:  Home Health Therapy    Equipment:    To Be Determined     ASSESSMENT:  Ms. Breana Lopez presents with decreased transfers, ambulation, balance, activity tolerance and overall general functional mobility s/p hospital admission on 8/10/22 with pacemaker/defib going off multiple times for pt. Pt also with L LE pain, s/o myelogram yesterday. Pt reports pain improved L LE and overall feels well. Pt with SBA transfers and ambulation in hallway without assistive device. Pt did require multiple standing rest breaks (5-6 standing) to complete 300', general fatigue upon returning to room, HR 120s, decreased to 60s once rested. Due to pt living along and safety concerns for returning home, may benefit from HHPT for follow up at discharge.       [facility] [address] [id number] AM-PAC 6 Clicks Basic Mobility Inpatient Short Form  AM-PAC Mobility Inpatient   How much difficulty turning over in bed?: None  How much difficulty sitting down on / standing up from a chair with arms?: None  How much difficulty moving from lying on back to sitting on side of bed?: A Little  How much help from another person moving to and from a bed to a chair?: None  How much help from another person needed to walk in hospital room?: A Little  How much help from another person for climbing 3-5 steps with a railing?: A Little  AM-[facility] Inpatient Mobility Raw Score : 21  AM-PAC Inpatient T-Scale Score : Wide base of support    Weightbearing Status      Stairs      I=Independent, Mod I=Modified Independent, S=Supervision, SBA=Standby Assistance, CGA=Contact Guard Assistance,   Min=Minimal Assistance, Mod=Moderate Assistance, Max=Maximal Assistance, Total=Total Assistance, NT=Not Tested    PLAN:   ACUTE PHYSICAL THERAPY GOALS:   (Developed with and agreed upon by patient and/or caregiver. )  LTG:  (1.)Ms. Prabhu Smyth will move from supine to sit and sit to supine , scoot up and down, and roll side to side in bed with INDEPENDENT within 7 treatment day(s). (2.)Ms. Prabhu Smyth will transfer from bed to chair and chair to bed with INDEPENDENT using the least restrictive device within 7 treatment day(s). (3.)Ms. Prabhu Smyth will ambulate with INDEPENDENT for 500+ feet with the least restrictive device within 7 treatment day(s). (4.)Ms. Prabhu Smyth will perform 3 stairs with HR and SBA within 7 treatment days for ascending and descending stairs for home.   ________________________________________________________________________________________________       FREQUENCY AND DURATION: 3 times/week for duration of hospital stay or until stated goals are met, whichever comes first.    THERAPY PROGNOSIS: Good    PROBLEM LIST:   (Skilled intervention is medically necessary to address:)  Decreased ADL/Functional Activities  Decreased Activity Tolerance  Decreased Balance  Decreased Gait Ability  Decreased Transfer Abilities INTERVENTIONS PLANNED:   (Benefits and precautions of physical therapy have been discussed with the patient.)  Therapeutic Activity  Therapeutic Exercise/HEP  Neuromuscular Re-education  Gait Training       TREATMENT:   EVALUATION: LOW COMPLEXITY: (Untimed Charge)    TREATMENT:   Therapeutic Activity (10 Minutes):  Therapeutic activity included Scooting, Transfer Training, Ambulation on level ground, Sitting balance , and Standing balance to improve functional Activity tolerance, Balance, Coordination, Mobility, and

## 2022-08-16 NOTE — PROGRESS NOTES
[facility] CARDIOLOGY PROGRESS NOTE           8/16/2022 10:04 AM    Admit Date: 8/10/2022      Subjective:   Patient had CT myelogram yesterday. Has not been out of bed. No dyspnea. BP stable. No recurrent arrhythmias. ROS:  Cardiovascular:  As noted above    Objective:      Vitals:    08/15/22 2033 08/15/22 2337 08/16/22 0520 08/16/22 0713   BP: 108/63 102/62 108/60 127/83   Pulse: 59 60 59 59   Resp: 20 16 18 17   Temp: 98.2 °F (36.8 °C) 97.7 °F (36.5 °C) 97.5 °F (36.4 °C) 97.5 °F (36.4 °C)   TempSrc: Oral Oral Oral Oral   SpO2: 96% 98% 96% 100%   Weight:   199 lb (90.3 kg)    Height:           Physical Exam:  General-No Acute Distress. Anxious. Neck- supple, no JVD  CV- regular rate and rhythm Grade II/VI DAVID  Lung- clear bilaterally  Abd- soft, nontender, nondistended  Ext- no edema bilaterally. Skin- warm and dry      Data Review:   Recent Labs     08/16/22  0401 08/10/22  1341   WBC 10.5 7.6   HGB 15.4 14.2   HCT 46.5* 43.7   MCV 95.9 98.9*   * 138*         Recent Labs     08/16/22  0401 08/11/22  0449 08/10/22  1341   *   < > 142   K 4.2   < > 3.6      < > 110*   CO2 26   < > 28   BUN 60*   < > 32*   CREATININE 2.00*   < > 2.10*   GLUCOSE 217*   < > 152*   CALCIUM 9.3   < > 9.1   PROT  --   --  7.7   LABALBU  --   --  3.8   BILITOT  --   --  0.4   ALKPHOS  --   --  73   AST  --   --  21   ALT  --   --  32   LABGLOM 27*   < > 26*   GFRAA 33*   < > 31*   GLOB  --   --  3.9*    < > = values in this interval not displayed. No results for input(s): CKTOTAL, CKMB, CKMBINDEX, DDIMER, TROPONINI in the last 720 hours. Assessment/Plan:     Active Hospital Problems    Type 2 diabetes with nephropathy (HCC)  Continue medications      *Ventricular tachycardia (HCC)  Continue amiodarone 400 mg twice daily. Continue telemetry. History of automatic internal cardiac defibrillator (AICD)  Normal device function. LORI on CPAP  Continue CPAP.       Morbid obesity ([facility] [address]      Chronic systolic heart failure ([facility] [address]  On appropriate therapy with carvedilol, Entresto and Jardiance. Continue Demadex. HTN (hypertension)  BP controlled     Leg pain  Appreciate IM assistance. PT/SWS for discharge planning.      Constipation  Dry dulcolax suppository            Trinh West MD

## 2022-08-16 NOTE — CARE COORDINATION
LOS 5 D  CM went to discuss discharge needs with patient and patient was up in the chair. Patient agreed to home health at discharge to assist with returning patient back to baseline function. Patient has history with [facility]. CM sent referral for SN, PT, OT. CM following for any new discharge needs.

## 2022-08-16 NOTE — PROGRESS NOTES
Hospitalist Progress Note   Admit Date:  8/10/2022  1:21 PM   Name:  Tori Garcia   Age:  60 y.o. Sex:  female  :  1965   MRN:  [id number]   Room:  [facility]/    Presenting Complaint: AICD Problem     Reason(s) for Admission: Ventricular tachycardia [facility] Course & Interval History:   Tori Garcia is a 60 y.o. female with history of diabetes mellitus, nonischemic cardiomyopathy, ventricular cardiac arrest, coronary artery disease of the native coronary artery, chronic kidney disease status post ICD, fibromyalgia, lipidemia heart failure with reduced ejection fraction, hypertension, morbid obesity, obstructive sleep apnea and tobacco use disorder. She had an episode of ventricular tachycardia in May that resulted in shock therapy from her ICD and syncope where she fell and has subsequently been having left-sided leg pain. For this admission she was admitted to the cardiology service on August 10, 2022 after her ICD shocked her while she was riding the bus on the way to work. She felt when shopping came to the ER to be seen. the pacemaker showed 1 shock. She was started on IV amiodarone and admitted to the telemetry service for EP evaluation. 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 The hospitalist service was consulted today as the patient states that no one is addressing her left lower leg pain that is severe in nature 10 out of 10. Ongoing since the initial shock in May and acutely worsened today. She states that no one has evaluated her for this pain and it is being ignored. The hospitalist service were consulted.      As previously stated the pain is a 10 out of 10 radiating across her left groin into  Left thigh and outer hip on the lateral aspect leg towards her medial knee. Sometimes the pain is dulled with oxycodone but it comes right back as it wears off. She is tearful and crying. She does exhibit decreased mobility to the left hip secondary to pain increasing weakness compared with the right leg 3-4 out of 5 compared with the right 5 out of 5. She denies any fecal or urinary incontinence or retention. Endorses paresthesias. Subjective/24hr Events (08/16/22):      8/15/2022  Patient seen and evaluated  States pain is improved with Decadron but not completely gone  Refused Neurontin-states it was a medication that she was tried on about 5 years ago secondary to neuropathy  We discussed considering giving the Neurontin a trial as it can take months for it to have any effect-mood she said she would try it but she refused when the nurses brought her the medication  Awaiting CT myelogram with IR    8/16  Ct myelogram done yesterday showed-  4 nonrib-bearing lumbar vertebra; the 5th lumbar   vertebra is sacralized. No high-grade stenosis identified. There is mild,   grade 1, anterolisthesis of L3 on L4.    Says left lower extremity pain better  Mild elevated creatinine  Elevated glucose        Assessment & Plan:   Ventricular tachycardia (HCC)    Left ventricular aneurysm    HTN (hypertension)  Chronic systolic heart failure  Morbid obesity  LORI on CPAP  History of automatic internal cardiac defibrillator     8/15/2022  Per primary        Left lower extremity pain-  8/15/2022  Patient is concerning for some nerve root compression/sciatica  Given the nature of the pain  & the left lower extremity weakness  Continue Cymbalta  Continue Decadron  Gabapentin was discontinued   continue standing Tylenol  Continue as needed tramadol  Unable to have MRI as her defibrillator is incompatible  Consulted interventional IR for nonemergent CT myelogram scheduled for today  8/16  CT myelogram showed-  4 nonrib-bearing lumbar vertebra; the 5th lumbar   vertebra is sacralized. No high-grade stenosis identified. There is mild,   grade 1, anterolisthesis of L3 on L4. Improving pain on decadron. Will decrease to bid. Type 2 diabetes with nephropathy ([facility] [address]  8/15/2022  Last A1c was 6.6 in July  She endorses compliance with home medications  Continue insulin sliding scale  Titrate her Lantus and sliding scale insulin as indicated  8/16- elevated , cont sliding scale and lantus     Chronic kidney disease  8/15/2022  Monitor renal function  Avoid nephrotoxins as able  Is medications for her  8/16  Mild increase in creatinine -2. Discharge Planning:    Per primary     Diet:  ADULT DIET; Regular; 3 carb choices (45 gm/meal); Low Fat/Low Chol/High Fiber/2 gm Na; 2000 ml; No Caffeine  DVT PPx: Lovenox  Code status: Full Code    Diet:  ADULT DIET; Regular; 3 carb choices (45 gm/meal); Low Fat/Low Chol/High Fiber/2 gm Na; 2000 ml; No Caffeine  DVT PPx: lovenox  Code status: Full Code    Hospital Problems:  Principal Problem:    Ventricular tachycardia ([facility] [address]  Active Problems:    Left ventricular aneurysm    HTN (hypertension)    Type 2 diabetes with nephropathy (HCC)    Chronic systolic heart failure (HCC)    Morbid obesity (HCC)    LORI on CPAP    History of automatic internal cardiac defibrillator (AICD)  Resolved Problems:    * No resolved hospital problems.  *      Objective:   Patient Vitals for the past 24 hrs:   Temp Pulse Resp BP SpO2   08/16/22 0713 97.5 °F (36.4 °C) 59 17 127/83 100 %   08/16/22 0520 97.5 °F (36.4 °C) 59 18 108/60 96 %   08/15/22 2337 97.7 °F (36.5 °C) 60 16 102/62 98 %   08/15/22 2033 98.2 °F (36.8 °C) 59 20 108/63 96 %   08/15/22 1725 -- 64 -- 120/76 --   08/15/22 1641 97.9 °F (36.6 °C) 58 19 120/76 96 %       Oxygen Therapy  SpO2: 100 %  Pulse Oximeter Device Mode: Intermittent  O2 Device: None (Room air)  O2 Flow Rate (L/min): 0 L/min  End Tidal CO2: 36 (%)  Oxygen Therapy: None (Room air)    Estimated body mass index is 41.59 kg/m² as calculated from the following:    Height as of this encounter: 4' 10\" (1.473 m). Weight as of this encounter: 199 lb (90.3 kg). Intake/Output Summary (Last 24 hours) at 8/16/2022 1206  Last data filed at 8/15/2022 1207  Gross per 24 hour   Intake 120 ml   Output 0 ml   Net 120 ml         Physical Exam:     Blood pressure 127/83, pulse 59, temperature 97.5 °F (36.4 °C), temperature source Oral, resp. rate 17, height 4' 10\" (1.473 m), weight 199 lb (90.3 kg), SpO2 100 %. General:    Well nourished. Head:  Normocephalic, atraumatic  Eyes:  Sclerae appear normal.  Pupils equally round. ENT:  Nares appear normal, no drainage. Moist oral mucosa  Neck:  No restricted ROM. Trachea midline   CV:   RRR. No m/r/g. No jugular venous distension. Lungs:   CTAB. No wheezing, rhonchi, or rales. Symmetric expansion. Abdomen: Bowel sounds present. Soft, nontender, nondistended. Extremities: No cyanosis or clubbing. No edema  Skin:     No rashes and normal coloration. Warm and dry. Neuro:  CN II-XII grossly intact. Sensation intact. A&Ox3  Psych:  Normal mood and affect.       I have personally reviewed labs and tests showing:  Recent Labs:  Recent Results (from the past 48 hour(s))   POCT Glucose    Collection Time: 08/14/22  5:07 PM   Result Value Ref Range    POC Glucose 173 (H) 65 - 100 mg/dL    Performed by: Jackie Morales    POCT Glucose    Collection Time: 08/14/22  9:16 PM   Result Value Ref Range    POC Glucose 252 (H) 65 - 100 mg/dL    Performed by: Tania    POCT Glucose    Collection Time: 08/15/22  8:17 AM   Result Value Ref Range    POC Glucose 177 (H) 65 - 100 mg/dL    Performed by: HenryCT    POCT Glucose    Collection Time: 08/15/22 11:04 AM   Result Value Ref Range    POC Glucose 193 (H) 65 - 100 mg/dL    Performed by: MariettazonSelenaPCT    POCT Glucose    Collection Time: 08/15/22  4:40 PM Result Value Ref Range    POC Glucose 191 (H) 65 - 100 mg/dL    Performed by: Meagan    POCT Glucose    Collection Time: 08/15/22  8:35 PM   Result Value Ref Range    POC Glucose 228 (H) 65 - 100 mg/dL    Performed by: Ellie    Basic Metabolic Panel    Collection Time: 08/16/22  4:01 AM   Result Value Ref Range    Sodium 135 (L) 136 - 145 mmol/L    Potassium 4.2 3.5 - 5.1 mmol/L    Chloride 107 98 - 107 mmol/L    CO2 26 21 - 32 mmol/L    Anion Gap 2 (L) 7 - 16 mmol/L    Glucose 217 (H) 65 - 100 mg/dL    BUN 60 (H) 6 - 23 MG/DL    Creatinine 2.00 (H) 0.6 - 1.0 MG/DL    GFR African American 33 (L) >60 ml/min/1.73m2    GFR Non- 27 (L) >60 ml/min/1.73m2    Calcium 9.3 8.3 - 10.4 MG/DL   CBC with Auto Differential    Collection Time: 08/16/22  4:01 AM   Result Value Ref Range    WBC 10.5 4.3 - 11.1 K/uL    RBC 4.85 4.05 - 5.2 M/uL    Hemoglobin 15.4 11.7 - 15.4 g/dL    Hematocrit 46.5 (H) 35.8 - 46.3 %    MCV 95.9 79.6 - 97.8 FL    MCH 31.8 26.1 - 32.9 PG    MCHC 33.1 31.4 - 35.0 g/dL    RDW 13.0 11.9 - 14.6 %    Platelets 413 (L) 795 - 450 K/uL    MPV 12.2 9.4 - 12.3 FL    nRBC 0.00 0.0 - 0.2 K/uL    Differential Type AUTOMATED      Seg Neutrophils 89 (H) 43 - 78 %    Lymphocytes 5 (L) 13 - 44 %    Monocytes 5 4.0 - 12.0 %    Eosinophils % 0 (L) 0.5 - 7.8 %    Basophils 0 0.0 - 2.0 %    Immature Granulocytes 1 0.0 - 5.0 %    Segs Absolute 9.3 (H) 1.7 - 8.2 K/UL    Absolute Lymph # 0.6 0.5 - 4.6 K/UL    Absolute Mono # 0.5 0.1 - 1.3 K/UL    Absolute Eos # 0.0 0.0 - 0.8 K/UL    Basophils Absolute 0.0 0.0 - 0.2 K/UL    Absolute Immature Granulocyte 0.1 0.0 - 0.5 K/UL   POCT Glucose    Collection Time: 08/16/22  7:08 AM   Result Value Ref Range    POC Glucose 231 (H) 65 - 100 mg/dL    Performed by: Michel    POCT Glucose    Collection Time: 08/16/22 11:11 AM   Result Value Ref Range    POC Glucose 181 (H) 65 - 100 mg/dL    Performed by:  Leann johnston personally reviewed imaging studies showing: Other Studies:  CT MYELOGRAM LUMBAR   Final Result   1. Mild degenerative disc changes without significant central canal stenosis. 2. There is mild neural foraminal narrowing, worse on the left at L4-5. This is   secondary to extensive facet arthropathy. 3. Extensive bilateral SI joint disease            IR MYELOGRAM LUMBOSACRAL   Final Result   Successful intrathecal contrast administration. XR LUMBAR SPINE (2-3 VIEWS)   Final Result   1. Transitional lumbosacral vertebrae labeled L5 for the purposes of this   examination and is sacralized. 2. Lower lumbar facet arthropathy      XR CHEST PORTABLE   Final Result   Mild haziness overlies the lung fields bilaterally possibly edema or   overlying soft tissue. Stable cardiomegaly. Implantable cardiac device and 2   leads are unchanged in position. Pacer pads overlie the right upper chest and   left lateral chest.  No pneumothorax. Small left pleural effusion difficult to   exclude due to overlying soft tissue. No definite right pleural effusion.              Current Meds:  Current Facility-Administered Medications   Medication Dose Route Frequency    LORazepam (ATIVAN) tablet 0.5 mg  0.5 mg Oral Q6H PRN    amiodarone (CORDARONE) tablet 400 mg  400 mg Oral BID    torsemide (DEMADEX) tablet 20 mg  20 mg Oral BID    gabapentin (NEURONTIN) capsule 100 mg  100 mg Oral TID    DULoxetine (CYMBALTA) extended release capsule 20 mg  20 mg Oral Daily    cyclobenzaprine (FLEXERIL) tablet 10 mg  10 mg Oral TID PRN    acetaminophen (TYLENOL) tablet 650 mg  650 mg Oral 3 times per day    traMADol (ULTRAM) tablet 50 mg  50 mg Oral Q6H PRN    traMADol (ULTRAM) tablet 25 mg  25 mg Oral Q6H PRN    dexamethasone (DECADRON) injection 4 mg  4 mg IntraVENous Q6H    insulin lispro (HUMALOG) injection vial 0-16 Units  0-16 Units SubCUTAneous TID WC    glucose chewable tablet 16 g  4 tablet Oral PRN    dextrose bolus 10% 125 mL  125 mL

## 2022-08-17 VITALS
TEMPERATURE: 97.9 F | HEIGHT: 58 IN | WEIGHT: 196.5 LBS | BODY MASS INDEX: 41.25 KG/M2 | SYSTOLIC BLOOD PRESSURE: 115 MMHG | RESPIRATION RATE: 18 BRPM | HEART RATE: 61 BPM | DIASTOLIC BLOOD PRESSURE: 72 MMHG | OXYGEN SATURATION: 93 %

## 2022-08-17 LAB
ANION GAP SERPL CALC-SCNC: 1 MMOL/L (ref 7–16)
BASOPHILS # BLD: 0 K/UL (ref 0–0.2)
BASOPHILS NFR BLD: 0 % (ref 0–2)
BUN SERPL-MCNC: 64 MG/DL (ref 6–23)
CALCIUM SERPL-MCNC: 9.1 MG/DL (ref 8.3–10.4)
CHLORIDE SERPL-SCNC: 107 MMOL/L (ref 98–107)
CO2 SERPL-SCNC: 27 MMOL/L (ref 21–32)
CREAT SERPL-MCNC: 2.1 MG/DL (ref 0.6–1)
DIFFERENTIAL METHOD BLD: ABNORMAL
EOSINOPHIL # BLD: 0 K/UL (ref 0–0.8)
EOSINOPHIL NFR BLD: 0 % (ref 0.5–7.8)
ERYTHROCYTE [DISTWIDTH] IN BLOOD BY AUTOMATED COUNT: 13 % (ref 11.9–14.6)
GLUCOSE BLD STRIP.AUTO-MCNC: 199 MG/DL (ref 65–100)
GLUCOSE BLD STRIP.AUTO-MCNC: 225 MG/DL (ref 65–100)
GLUCOSE BLD STRIP.AUTO-MCNC: 227 MG/DL (ref 65–100)
GLUCOSE SERPL-MCNC: 182 MG/DL (ref 65–100)
HCT VFR BLD AUTO: 47.4 % (ref 35.8–46.3)
HGB BLD-MCNC: 15.9 G/DL (ref 11.7–15.4)
IMM GRANULOCYTES # BLD AUTO: 0.1 K/UL (ref 0–0.5)
IMM GRANULOCYTES NFR BLD AUTO: 1 % (ref 0–5)
LYMPHOCYTES # BLD: 0.7 K/UL (ref 0.5–4.6)
LYMPHOCYTES NFR BLD: 6 % (ref 13–44)
MCH RBC QN AUTO: 32.4 PG (ref 26.1–32.9)
MCHC RBC AUTO-ENTMCNC: 33.5 G/DL (ref 31.4–35)
MCV RBC AUTO: 96.5 FL (ref 79.6–97.8)
MONOCYTES # BLD: 0.9 K/UL (ref 0.1–1.3)
MONOCYTES NFR BLD: 7 % (ref 4–12)
NEUTS SEG # BLD: 10.8 K/UL (ref 1.7–8.2)
NEUTS SEG NFR BLD: 87 % (ref 43–78)
NRBC # BLD: 0 K/UL (ref 0–0.2)
PLATELET # BLD AUTO: 142 K/UL (ref 150–450)
PMV BLD AUTO: 12.1 FL (ref 9.4–12.3)
POTASSIUM SERPL-SCNC: 4.4 MMOL/L (ref 3.5–5.1)
RBC # BLD AUTO: 4.91 M/UL (ref 4.05–5.2)
SERVICE CMNT-IMP: ABNORMAL
SODIUM SERPL-SCNC: 135 MMOL/L (ref 136–145)
WBC # BLD AUTO: 12.4 K/UL (ref 4.3–11.1)

## 2022-08-17 PROCEDURE — 36415 COLL VENOUS BLD VENIPUNCTURE: CPT

## 2022-08-17 PROCEDURE — 85025 COMPLETE CBC W/AUTO DIFF WBC: CPT

## 2022-08-17 PROCEDURE — 97165 OT EVAL LOW COMPLEX 30 MIN: CPT

## 2022-08-17 PROCEDURE — 2580000003 HC RX 258: Performed by: NURSE PRACTITIONER

## 2022-08-17 PROCEDURE — 97535 SELF CARE MNGMENT TRAINING: CPT

## 2022-08-17 PROCEDURE — 6360000002 HC RX W HCPCS: Performed by: INTERNAL MEDICINE

## 2022-08-17 PROCEDURE — 80048 BASIC METABOLIC PNL TOTAL CA: CPT

## 2022-08-17 PROCEDURE — 6370000000 HC RX 637 (ALT 250 FOR IP): Performed by: INTERNAL MEDICINE

## 2022-08-17 PROCEDURE — 97530 THERAPEUTIC ACTIVITIES: CPT

## 2022-08-17 PROCEDURE — 6370000000 HC RX 637 (ALT 250 FOR IP): Performed by: NURSE PRACTITIONER

## 2022-08-17 PROCEDURE — 82962 GLUCOSE BLOOD TEST: CPT

## 2022-08-17 PROCEDURE — 99238 HOSP IP/OBS DSCHRG MGMT 30/<: CPT | Performed by: INTERNAL MEDICINE

## 2022-08-17 RX ORDER — PREDNISONE 10 MG/1
TABLET ORAL
Qty: 10 TABLET | Refills: 0 | Status: SHIPPED | OUTPATIENT
Start: 2022-08-17 | End: 2022-08-29

## 2022-08-17 RX ORDER — AMIODARONE HYDROCHLORIDE 200 MG/1
200 TABLET ORAL 2 TIMES DAILY
Qty: 180 TABLET | Refills: 3 | Status: ON HOLD | OUTPATIENT
Start: 2022-08-17 | End: 2022-09-24 | Stop reason: HOSPADM

## 2022-08-17 RX ORDER — DULOXETIN HYDROCHLORIDE 20 MG/1
20 CAPSULE, DELAYED RELEASE ORAL DAILY
Qty: 30 CAPSULE | Refills: 11 | Status: SHIPPED | OUTPATIENT
Start: 2022-08-18 | End: 2022-11-01

## 2022-08-17 RX ORDER — TRAMADOL HYDROCHLORIDE 50 MG/1
50 TABLET ORAL EVERY 6 HOURS PRN
Qty: 10 TABLET | Refills: 0 | Status: SHIPPED | OUTPATIENT
Start: 2022-08-17 | End: 2022-08-20

## 2022-08-17 RX ORDER — CYCLOBENZAPRINE HCL 10 MG
10 TABLET ORAL 3 TIMES DAILY PRN
Qty: 20 TABLET | Refills: 0 | Status: SHIPPED | OUTPATIENT
Start: 2022-08-17 | End: 2022-08-27

## 2022-08-17 RX ADMIN — SODIUM CHLORIDE, PRESERVATIVE FREE 10 ML: 5 INJECTION INTRAVENOUS at 08:46

## 2022-08-17 RX ADMIN — ACETAMINOPHEN 650 MG: 325 TABLET ORAL at 07:15

## 2022-08-17 RX ADMIN — ACETAMINOPHEN 650 MG: 325 TABLET ORAL at 05:42

## 2022-08-17 RX ADMIN — AMIODARONE HYDROCHLORIDE 400 MG: 200 TABLET ORAL at 08:45

## 2022-08-17 RX ADMIN — ENOXAPARIN SODIUM 40 MG: 100 INJECTION SUBCUTANEOUS at 08:46

## 2022-08-17 RX ADMIN — CARVEDILOL 6.25 MG: 6.25 TABLET, FILM COATED ORAL at 16:32

## 2022-08-17 RX ADMIN — SACUBITRIL AND VALSARTAN 1 TABLET: 49; 51 TABLET, FILM COATED ORAL at 08:46

## 2022-08-17 RX ADMIN — CARVEDILOL 6.25 MG: 6.25 TABLET, FILM COATED ORAL at 07:15

## 2022-08-17 RX ADMIN — INSULIN LISPRO 4 UNITS: 100 INJECTION, SOLUTION INTRAVENOUS; SUBCUTANEOUS at 07:16

## 2022-08-17 RX ADMIN — EMPAGLIFLOZIN 10 MG: 10 TABLET, FILM COATED ORAL at 08:46

## 2022-08-17 RX ADMIN — ASPIRIN 81 MG: 81 TABLET ORAL at 08:46

## 2022-08-17 RX ADMIN — DULOXETINE HYDROCHLORIDE 20 MG: 20 CAPSULE, DELAYED RELEASE ORAL at 08:46

## 2022-08-17 RX ADMIN — ISOSORBIDE MONONITRATE 30 MG: 30 TABLET, EXTENDED RELEASE ORAL at 08:46

## 2022-08-17 ASSESSMENT — PAIN SCALES - GENERAL
PAINLEVEL_OUTOF10: 3
PAINLEVEL_OUTOF10: 0
PAINLEVEL_OUTOF10: 3
PAINLEVEL_OUTOF10: 0
PAINLEVEL_OUTOF10: 5

## 2022-08-17 ASSESSMENT — PAIN DESCRIPTION - ORIENTATION
ORIENTATION: LEFT
ORIENTATION: MID
ORIENTATION: LEFT;MID

## 2022-08-17 ASSESSMENT — PAIN DESCRIPTION - LOCATION
LOCATION: LEG;BACK
LOCATION: HEAD
LOCATION: HEAD

## 2022-08-17 ASSESSMENT — PAIN DESCRIPTION - DESCRIPTORS
DESCRIPTORS: ACHING
DESCRIPTORS: ACHING

## 2022-08-17 NOTE — PROGRESS NOTES
[facility]LEMETRY  [address]  Phone: [phone]             August 17, 2022    Patient: Moira Chilel   YOB: 1965   Date of Visit: 8/10/2022       To Whom It May Concern:    Eliana Chirinos was seen and treated in our facility  beginning 8/10/2022 until 8/17/2022. She may return to work on 9/5/2022 .       Sincerely,       Yodit Bundy NP, APRN - CNP         Signature:__________________________________

## 2022-08-17 NOTE — DISCHARGE SUMMARY
[facility] Cardiology Discharge Summary     Patient ID:  Adeel Jung  [id number]  29 y.o.  1965    Admit date: 8/10/2022    Discharge date:  08/17/2022    Admitting Physician: Jm Sahu MD     Discharge Physician: Jack Gordon, NP, APRN - CNP/Dr. Vannesa Booth    Admission Diagnoses: Ventricular tachycardia [facility]) [I47.2]    Discharge Diagnoses:   Patient Active Problem List    Diagnosis    Left ventricular aneurysm    CKD (chronic kidney disease) stage 4, GFR 15-29 ml/min (HCC)    CHF (congestive heart failure) ([facility] [address]    Acute on chronic HFrEF (heart failure with reduced ejection fraction) ([facility] [address]    Lower abdominal pain    Long term current use of amiodarone    Restrictive lung disease    Type 2 diabetes with nephropathy (HCC)    Chronic right-sided thoracic back pain    Chronic left-sided low back pain with sciatica    Ventricular tachycardia (HCC)    Morbid obesity (HCC)    LORI on CPAP    Chronic systolic heart failure (HCC)    NICM (nonischemic cardiomyopathy) ([facility] [address]    Dyslipidemia    HTN (hypertension)    Long-term insulin use in type 2 diabetes [facility])       Cardiology Procedures this admission:  EchoCardiogram  Consults: [facility] Course: Patient presented to the emergency department of [facility] after an ICD shock. She also noted LE edema. Patient had underwent C 5/2022 that showed normal coronaries but with LV with a large posterior basal aneurysm. Patient was evaluated and subsequently admitted for further cardiac evaluation and treatment. EP was consulted for further evaluation of VT. Noted possible source of VT to be LV with a large posterior basal aneurysm noted on cath and TTE and that patient may need to be considered for resection of the aneurysm. Patient was transitioned from IV amiodarone to 400 mg PO BID without recurrence of arrhythmia. Hospitalist was consulted for ongoing leg pain.   Patient underwent CT myelogram which showed:  4 nonrib-bearing lumbar vertebra; the 5th lumbar   vertebra is sacralized. No high-grade stenosis identified. There is mild,   grade 1, anterolisthesis of L3 on L4. This was concerning for some root compression/sciatica. Unable to obtain MRI due to ICD being incompatible. She was treated with IV steroids and Cymbalta. Steroids were transitioned to tapered dose. Echocardiogram showed:     08/10/22    TRANSTHORACIC ECHOCARDIOGRAM (TTE) LIMITED (CONTRAST/BUBBLE/3D PRN) 08/11/2022 11:07 AM (Final)    Interpretation Summary  Formatting of this result is different from the original.      Left Ventricle: Moderately reduced left ventricular systolic function. Left ventricle is dilated. Mildly increased wall thickness. Global hypokinesis present. Left Atrium: Left atrium is mildly dilated. Contrast used: Definity. Overall estimation of the ejection fraction is moderately reduced in the range of 40%. Patient has a posterior basal aneurysm presents that is partially visualized on this study. It has been visualized in other studies including a heart cath and is a large posterior basal aneurysm that affects the overall effect of cardiac output due to shunting of a significant amount of flow into the large posterior basal aneurysm    Signed by: Alexis Dotson MD on 8/11/2022 11:07 AM      At time of discharge, patient was up feeling well without any complaints shortness of breath. LE edema was much improved. Patient's labs were stable with creatinine of 2.1. Labs will be checked on Friday, 8/19/2022. Patient was seen and examined by Dr. Rah Penn and determined stable and ready for discharge. Patient was instructed on the importance of medication compliance, low sodium diet, 2 liter per day fluid restriction and daily weights. For maximized medical therapy of congestive heart failure, patient will continue use of  BB and ARNI.   The patient has been scheduled for  care follow up with [facility] -- Dr. Hernández . DISPOSITION: The patient is being discharged home in stable condition on a low saturated fat, low cholesterol and low salt diet. The patient is instructed to advance activities as tolerated to the limit of fatigue or shortness of breath. The patient is informed to monitor daily weights and maintain a 2 liter per day fluid restriction. The patient is instructed to call the office for any shortness of breath, weight gain, or increased peripheral edema. Discharge Exam: BP (!) 130/91   Pulse 60   Temp 97.5 °F (36.4 °C)   Resp 16   Ht 4' 10\" (1.473 m)   Wt 196 lb 8 oz (89.1 kg)   SpO2 90%   BMI 41.07 kg/m²   Patient has been seen by Dr. Vance Orellana: see his progress note for exam details. Recent Results (from the past 24 hour(s))   POCT Glucose    Collection Time: 08/16/22 11:11 AM   Result Value Ref Range    POC Glucose 181 (H) 65 - 100 mg/dL    Performed by: Michel    POCT Glucose    Collection Time: 08/16/22  3:23 PM   Result Value Ref Range    POC Glucose 244 (H) 65 - 100 mg/dL    Performed by:  Michel    POCT Glucose    Collection Time: 08/16/22  8:56 PM   Result Value Ref Range    POC Glucose 326 (H) 65 - 100 mg/dL    Performed by: Mahsa)[facility] RegionyPCT    Basic Metabolic Panel    Collection Time: 08/17/22  3:45 AM   Result Value Ref Range    Sodium 135 (L) 136 - 145 mmol/L    Potassium 4.4 3.5 - 5.1 mmol/L    Chloride 107 98 - 107 mmol/L    CO2 27 21 - 32 mmol/L    Anion Gap 1 (L) 7 - 16 mmol/L    Glucose 182 (H) 65 - 100 mg/dL    BUN 64 (H) 6 - 23 MG/DL    Creatinine 2.10 (H) 0.6 - 1.0 MG/DL    GFR  31 (L) >60 ml/min/1.73m2    GFR Non- 26 (L) >60 ml/min/1.73m2    Calcium 9.1 8.3 - 10.4 MG/DL   CBC with Auto Differential    Collection Time: 08/17/22  3:45 AM   Result Value Ref Range    WBC 12.4 (H) 4.3 - 11.1 K/uL    RBC 4.91 4.05 - 5.2 M/uL    Hemoglobin 15.9 (H) 11.7 - 15.4 g/dL    Hematocrit 47.4 (H) 35.8 - 46.3 %    MCV 96.5 79.6 - 97.8 FL    MCH 32.4 26.1 - 32.9 PG    MCHC 33.5 31.4 - 35.0 g/dL    RDW 13.0 11.9 - 14.6 %    Platelets 367 (L) 601 - 450 K/uL    MPV 12.1 9.4 - 12.3 FL    nRBC 0.00 0.0 - 0.2 K/uL    Differential Type AUTOMATED      Seg Neutrophils 87 (H) 43 - 78 %    Lymphocytes 6 (L) 13 - 44 %    Monocytes 7 4.0 - 12.0 %    Eosinophils % 0 (L) 0.5 - 7.8 %    Basophils 0 0.0 - 2.0 %    Immature Granulocytes 1 0.0 - 5.0 %    Segs Absolute 10.8 (H) 1.7 - 8.2 K/UL    Absolute Lymph # 0.7 0.5 - 4.6 K/UL    Absolute Mono # 0.9 0.1 - 1.3 K/UL    Absolute Eos # 0.0 0.0 - 0.8 K/UL    Basophils Absolute 0.0 0.0 - 0.2 K/UL    Absolute Immature Granulocyte 0.1 0.0 - 0.5 K/UL   POCT Glucose    Collection Time: 08/17/22  7:02 AM   Result Value Ref Range    POC Glucose 227 (H) 65 - 100 mg/dL    Performed by: Ceferino Castillo          Patient Instructions:        Current Discharge Medication List        START taking these medications    Details   traMADol (ULTRAM) 50 MG tablet Take 1 tablet by mouth every 6 hours as needed for Pain for up to 3 days. Qty: 10 tablet, Refills: 0    Comments: Reduce doses taken as pain becomes manageable  Associated Diagnoses: Chronic right-sided thoracic back pain      DULoxetine (CYMBALTA) 20 MG extended release capsule Take 1 capsule by mouth daily  Qty: 30 capsule, Refills: 11      cyclobenzaprine (FLEXERIL) 10 MG tablet Take 1 tablet by mouth 3 times daily as needed for Muscle spasms  Qty: 20 tablet, Refills: 0      predniSONE (DELTASONE) 10 MG tablet Take 4 tablets by mouth daily for 3 days, THEN 3 tablets daily for 3 days, THEN 2 tablets daily for 3 days, THEN 1 tablet daily for 3 days.   Qty: 10 tablet, Refills: 0           CONTINUE these medications which have CHANGED    Details   amiodarone (CORDARONE) 200 MG tablet Take 1 tablet by mouth 2 times daily  Qty: 180 tablet, Refills: 3           CONTINUE these medications which have NOT CHANGED    Details   acetaminophen (TYLENOL) 325 MG tablet Take 325 mg by mouth every 6 hours as needed      aspirin 81 MG EC tablet Take by mouth daily      carvedilol (COREG) 6.25 MG tablet Take 6.25 mg by mouth 2 times daily (with meals)      vitamin D 25 MCG (1000 UT) CAPS Take by mouth daily      empagliflozin (JARDIANCE) 10 MG tablet Take 10 mg by mouth daily      insulin glargine (LANTUS SOLOSTAR) 100 UNIT/ML injection pen Inject 15 Units into the skin      isosorbide mononitrate (IMDUR) 30 MG extended release tablet Take 30 mg by mouth      latanoprost (XALATAN) 0.005 % ophthalmic solution Apply 1 drop to eye      polyethylene glycol (GLYCOLAX) 17 GM/SCOOP powder Take 17 g by mouth daily as needed      pravastatin (PRAVACHOL) 80 MG tablet Take 80 mg by mouth      sacubitril-valsartan (ENTRESTO) 49-51 MG per tablet Take 1 tablet by mouth 2 times daily      torsemide (DEMADEX) 20 MG tablet Take 20 mg by mouth 2 times daily           STOP taking these medications       albuterol sulfate  (90 Base) MCG/ACT inhaler Comments:   Reason for Stopping:         bacitracin zinc 500 UNIT/GM ointment Comments:   Reason for Stopping:         nitroGLYCERIN (NITROSTAT) 0.4 MG SL tablet Comments:   Reason for Stopping:         ondansetron (ZOFRAN-ODT) 4 MG disintegrating tablet Comments:   Reason for Stopping:         oxyCODONE (ROXICODONE) 5 MG immediate release tablet Comments:   Reason for Stopping:                  Signed:  Storm Gonzalez NP, APRN - CNP  8/17/2022 8:26 AM

## 2022-08-17 NOTE — PROGRESS NOTES
Hospitalist Progress Note   Admit Date:  8/10/2022  1:21 PM   Name:  Marco To   Age:  60 y.o. Sex:  female  :  1965   MRN:  [id number]   Room:  [facility]/    Presenting Complaint: AICD Problem     Reason(s) for Admission: Ventricular tachycardia [place] Hospital Course & Interval History:   Marco To is a 60 y.o. female with history of diabetes mellitus, nonischemic cardiomyopathy, ventricular cardiac arrest, coronary artery disease of the native coronary artery, chronic kidney disease status post ICD, fibromyalgia, lipidemia heart failure with reduced ejection fraction, hypertension, morbid obesity, obstructive sleep apnea and tobacco use disorder. She had an episode of ventricular tachycardia in May that resulted in shock therapy from her ICD and syncope where she fell and has subsequently been having left-sided leg pain. For this admission she was admitted to the cardiology service on August 10, 2022 after her ICD shocked her while she was riding the bus on the way to work. She felt when shopping came to the ER to be seen. the pacemaker showed 1 shock. She was started on IV amiodarone and admitted to the telemetry service for EP evaluation. 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 The hospitalist service was consulted today as the patient states that no one is addressing her left lower leg pain that is severe in nature 10 out of 10. Ongoing since the initial shock in May and acutely worsened today. She states that no one has evaluated her for this pain and it is being ignored. The hospitalist service were consulted.      As previously stated the pain is a 10 out of 10 radiating across her left groin into  Left thigh and outer hip on the lateral aspect leg towards her medial knee. Sometimes the pain is dulled with oxycodone but it comes right back as it wears off. She is tearful and crying. She does exhibit decreased mobility to the left hip secondary to pain increasing weakness compared with the right leg 3-4 out of 5 compared with the right 5 out of 5. She denies any fecal or urinary incontinence or retention. Endorses paresthesias. Subjective/24hr Events (08/17/22):      8/15/2022  Patient seen and evaluated  States pain is improved with Decadron but not completely gone  Refused Neurontin-states it was a medication that she was tried on about 5 years ago secondary to neuropathy  We discussed considering giving the Neurontin a trial as it can take months for it to have any effect-mood she said she would try it but she refused when the nurses brought her the medication  Awaiting CT myelogram with IR    8/16  Ct myelogram done yesterday showed-  4 nonrib-bearing lumbar vertebra; the 5th lumbar   vertebra is sacralized. No high-grade stenosis identified. There is mild,   grade 1, anterolisthesis of L3 on L4. Says left lower extremity pain better  Mild elevated creatinine  Elevated glucose    8/17  Says doing ok  Said did walk with not much pain left lower extremity- thinks she has sciatica  Mild increase in BUN. Cardiology wants to Discharge pt    Recommended close f/u on kidney functions  Require tapering prednisone  Increase lantus ot 18 units qhs until on prednisone.         Assessment & Plan:   Ventricular tachycardia (HCC)    Left ventricular aneurysm    HTN (hypertension)  Chronic systolic heart failure  Morbid obesity  LORI on CPAP  History of automatic internal cardiac defibrillator     8/15/2022  Per primary        Left lower extremity pain-  8/15/2022  Patient is concerning for some nerve root compression/sciatica  Given the nature of the pain  & the left lower extremity weakness  Continue Cymbalta  Continue Decadron  Gabapentin was discontinued   continue standing Tylenol  Continue as needed tramadol  Unable to have MRI as her defibrillator is incompatible  Consulted interventional IR for nonemergent CT myelogram scheduled for today  8/16  CT myelogram showed-  4 nonrib-bearing lumbar vertebra; the 5th lumbar   vertebra is sacralized. No high-grade stenosis identified. There is mild,   grade 1, anterolisthesis of L3 on L4. Improving pain on decadron. Will decrease to bid. 8/17 recommended tapering dose of prednisone at discharge              Type 2 diabetes with nephropathy ([facility] [address]  8/15/2022  Last A1c was 6.6 in July  She endorses compliance with home medications  Continue insulin sliding scale  Titrate her Lantus and sliding scale insulin as indicated  8/16- elevated , cont sliding scale and lantus  8/17 recommended to increase lantus to 18 units until prednisone tapering is finished     Chronic kidney disease  8/15/2022  Monitor renal function  Avoid nephrotoxins as able  Is medications for her  8/16  Mild increase in creatinine -2.  8/17- mild increase in BUN. Cardiology ok to hold diuretic for 24 hrs. Close out pt follow up. Discharge Planning:    Per primary     Diet:  ADULT DIET; Regular; 3 carb choices (45 gm/meal); Low Fat/Low Chol/High Fiber/2 gm Na; 2000 ml; No Caffeine  DVT PPx: Lovenox  Code status: Full Code    Diet:  ADULT DIET; Regular; 3 carb choices (45 gm/meal);  Low Fat/Low Chol/High Fiber/2 gm Na; 2000 ml; No Caffeine  DVT PPx: lovenox  Code status: Full Code    Hospital Problems:  Principal Problem:    Ventricular tachycardia ([address]  Active Problems:    Left ventricular aneurysm    HTN (hypertension)    Type 2 diabetes with nephropathy (HCC)    Chronic systolic heart failure (HCC)    Morbid obesity (HCC)    LORI on CPAP  Resolved Problems:    History of automatic internal cardiac defibrillator (AICD)      Objective:   Patient Vitals for the past 24 hrs:   Temp Pulse Resp BP SpO2   08/17/22 1152 98.1 °F (36.7 °C) 60 18 109/71 95 %   08/17/22 0720 97.5 °F (36.4 °C) 60 16 (!) 130/91 90 %   08/17/22 0447 98 °F (36.7 °C) 68 18 (!) 97/59 97 %   08/17/22 0028 97.9 °F (36.6 °C) 61 18 113/83 97 %   08/16/22 2052 98.1 °F (36.7 °C) 65 18 120/76 97 %   08/16/22 1630 98.1 °F (36.7 °C) 59 18 112/74 100 %       Oxygen Therapy  SpO2: 95 %  Pulse Oximeter Device Mode: Intermittent  O2 Device: None (Room air)  O2 Flow Rate (L/min): 0 L/min  End Tidal CO2: 36 (%)  Oxygen Therapy: None (Room air)    Estimated body mass index is 41.07 kg/m² as calculated from the following:    Height as of this encounter: 4' 10\" (1.473 m). Weight as of this encounter: 196 lb 8 oz (89.1 kg). Intake/Output Summary (Last 24 hours) at 8/17/2022 1421  Last data filed at 8/17/2022 0844  Gross per 24 hour   Intake 340 ml   Output --   Net 340 ml         Physical Exam:     Blood pressure 109/71, pulse 60, temperature 98.1 °F (36.7 °C), temperature source Axillary, resp. rate 18, height 4' 10\" (1.473 m), weight 196 lb 8 oz (89.1 kg), SpO2 95 %. General:    Well nourished. Head:  Normocephalic, atraumatic  Eyes:  Sclerae appear normal.  Pupils equally round. ENT:  Nares appear normal, no drainage. Moist oral mucosa  Neck:  No restricted ROM. Trachea midline   CV:   RRR. No m/r/g. No jugular venous distension. Lungs:   CTAB. No wheezing, rhonchi, or rales. Symmetric expansion. Abdomen: Bowel sounds present. Soft, nontender, nondistended. Extremities: No cyanosis or clubbing. No edema  Skin:     No rashes and normal coloration. Warm and dry. Neuro:  CN II-XII grossly intact. Sensation intact. A&Ox3  Psych:  Normal mood and affect.       I have personally reviewed labs and tests showing:  Recent Labs:  Recent Results (from the past 48 hour(s))   POCT Glucose    Collection Time: 08/15/22  4:40 PM   Result Value Ref Range    POC Glucose 191 (H) 65 - 100 mg/dL    Performed by: Meagan    POCT Glucose    Collection Time: 08/15/22  8:35 PM   Result Value Ref Range    POC Glucose 228 (H) 65 - 100 mg/dL    Performed by: Ellie    Basic Metabolic Panel    Collection Time: 08/16/22  4:01 AM   Result Value Ref Range    Sodium 135 (L) 136 - 145 mmol/L    Potassium 4.2 3.5 - 5.1 mmol/L    Chloride 107 98 - 107 mmol/L    CO2 26 21 - 32 mmol/L    Anion Gap 2 (L) 7 - 16 mmol/L    Glucose 217 (H) 65 - 100 mg/dL    BUN 60 (H) 6 - 23 MG/DL    Creatinine 2.00 (H) 0.6 - 1.0 MG/DL    GFR African American 33 (L) >60 ml/min/1.73m2    GFR Non- 27 (L) >60 ml/min/1.73m2    Calcium 9.3 8.3 - 10.4 MG/DL   CBC with Auto Differential    Collection Time: 08/16/22  4:01 AM   Result Value Ref Range    WBC 10.5 4.3 - 11.1 K/uL    RBC 4.85 4.05 - 5.2 M/uL    Hemoglobin 15.4 11.7 - 15.4 g/dL    Hematocrit 46.5 (H) 35.8 - 46.3 %    MCV 95.9 79.6 - 97.8 FL    MCH 31.8 26.1 - 32.9 PG    MCHC 33.1 31.4 - 35.0 g/dL    RDW 13.0 11.9 - 14.6 %    Platelets 957 (L) 162 - 450 K/uL    MPV 12.2 9.4 - 12.3 FL    nRBC 0.00 0.0 - 0.2 K/uL    Differential Type AUTOMATED      Seg Neutrophils 89 (H) 43 - 78 %    Lymphocytes 5 (L) 13 - 44 %    Monocytes 5 4.0 - 12.0 %    Eosinophils % 0 (L) 0.5 - 7.8 %    Basophils 0 0.0 - 2.0 %    Immature Granulocytes 1 0.0 - 5.0 %    Segs Absolute 9.3 (H) 1.7 - 8.2 K/UL    Absolute Lymph # 0.6 0.5 - 4.6 K/UL    Absolute Mono # 0.5 0.1 - 1.3 K/UL    Absolute Eos # 0.0 0.0 - 0.8 K/UL    Basophils Absolute 0.0 0.0 - 0.2 K/UL    Absolute Immature Granulocyte 0.1 0.0 - 0.5 K/UL   POCT Glucose    Collection Time: 08/16/22  7:08 AM   Result Value Ref Range    POC Glucose 231 (H) 65 - 100 mg/dL    Performed by: Michel    POCT Glucose    Collection Time: 08/16/22 11:11 AM   Result Value Ref Range    POC Glucose 181 (H) 65 - 100 mg/dL    Performed by:  Michel    POCT Glucose    Collection Time: 08/16/22  3:23 PM   Result Value Ref Range    POC Glucose 244 (H) 65 - 100 mg/dL    Performed by:  Michel    POCT Glucose    Collection Time: 08/16/22  8:56 PM   Result Value Ref Range    POC Glucose 326 (H) 65 - 100 mg/dL    Performed by: Mahsa)[facility]    Basic Metabolic Panel    Collection Time: 08/17/22  3:45 AM   Result Value Ref Range    Sodium 135 (L) 136 - 145 mmol/L    Potassium 4.4 3.5 - 5.1 mmol/L    Chloride 107 98 - 107 mmol/L    CO2 27 21 - 32 mmol/L    Anion Gap 1 (L) 7 - 16 mmol/L    Glucose 182 (H) 65 - 100 mg/dL    BUN 64 (H) 6 - 23 MG/DL    Creatinine 2.10 (H) 0.6 - 1.0 MG/DL    GFR  31 (L) >60 ml/min/1.73m2    GFR Non- 26 (L) >60 ml/min/1.73m2    Calcium 9.1 8.3 - 10.4 MG/DL   CBC with Auto Differential    Collection Time: 08/17/22  3:45 AM   Result Value Ref Range    WBC 12.4 (H) 4.3 - 11.1 K/uL    RBC 4.91 4.05 - 5.2 M/uL    Hemoglobin 15.9 (H) 11.7 - 15.4 g/dL    Hematocrit 47.4 (H) 35.8 - 46.3 %    MCV 96.5 79.6 - 97.8 FL    MCH 32.4 26.1 - 32.9 PG    MCHC 33.5 31.4 - 35.0 g/dL    RDW 13.0 11.9 - 14.6 %    Platelets 546 (L) 511 - 450 K/uL    MPV 12.1 9.4 - 12.3 FL    nRBC 0.00 0.0 - 0.2 K/uL    Differential Type AUTOMATED      Seg Neutrophils 87 (H) 43 - 78 %    Lymphocytes 6 (L) 13 - 44 %    Monocytes 7 4.0 - 12.0 %    Eosinophils % 0 (L) 0.5 - 7.8 %    Basophils 0 0.0 - 2.0 %    Immature Granulocytes 1 0.0 - 5.0 %    Segs Absolute 10.8 (H) 1.7 - 8.2 K/UL    Absolute Lymph # 0.7 0.5 - 4.6 K/UL    Absolute Mono # 0.9 0.1 - 1.3 K/UL    Absolute Eos # 0.0 0.0 - 0.8 K/UL    Basophils Absolute 0.0 0.0 - 0.2 K/UL    Absolute Immature Granulocyte 0.1 0.0 - 0.5 K/UL   POCT Glucose    Collection Time: 08/17/22  7:02 AM   Result Value Ref Range    POC Glucose 227 (H) 65 - 100 mg/dL    Performed by: Braden Hopson    POCT Glucose    Collection Time: 08/17/22 11:50 AM   Result Value Ref Range    POC Glucose 225 (H) 65 - 100 mg/dL    Performed by: Braden Hopson        I have personally reviewed imaging studies glucagon (rDNA) injection 1 mg  1 mg SubCUTAneous PRN    dextrose 10 % infusion   IntraVENous Continuous PRN    enoxaparin (LOVENOX) injection 40 mg  40 mg SubCUTAneous Daily    albuterol sulfate HFA (PROVENTIL;VENTOLIN;PROAIR) 108 (90 Base) MCG/ACT inhaler 2 puff  2 puff Inhalation Q6H PRN    aspirin EC tablet 81 mg  81 mg Oral Daily    carvedilol (COREG) tablet 6.25 mg  6.25 mg Oral BID WC    empagliflozin (JARDIANCE) tablet 10 mg  10 mg Oral Daily    insulin glargine (LANTUS) injection vial 15 Units  15 Units SubCUTAneous Nightly    isosorbide mononitrate (IMDUR) extended release tablet 30 mg  30 mg Oral Daily    latanoprost (XALATAN) 0.005 % ophthalmic solution 1 drop  1 drop Ophthalmic Nightly    nitroGLYCERIN (NITROSTAT) SL tablet 0.4 mg  0.4 mg SubLINGual Q5 Min PRN    ondansetron (ZOFRAN-ODT) disintegrating tablet 4 mg  4 mg Oral Q8H PRN    oxyCODONE (ROXICODONE) immediate release tablet 5 mg  5 mg Oral Q6H PRN    pravastatin (PRAVACHOL) tablet 80 mg  80 mg Oral Nightly    sacubitril-valsartan (ENTRESTO) 49-51 MG per tablet 1 tablet  1 tablet Oral BID    sodium chloride flush 0.9 % injection 5-40 mL  5-40 mL IntraVENous 2 times per day    sodium chloride flush 0.9 % injection 5-40 mL  5-40 mL IntraVENous PRN    0.9 % sodium chloride infusion   IntraVENous PRN    acetaminophen (TYLENOL) tablet 650 mg  650 mg Oral Q6H PRN    Or    acetaminophen (TYLENOL) suppository 650 mg  650 mg Rectal Q6H PRN    polyethylene glycol (GLYCOLAX) packet 17 g  17 g Oral Daily PRN    potassium chloride (KLOR-CON M) extended release tablet 40 mEq  40 mEq Oral PRN    Or    potassium bicarb-citric acid (EFFER-K) effervescent tablet 40 mEq  40 mEq Oral PRN    Or    potassium chloride 10 mEq/100 mL IVPB (Peripheral Line)  10 mEq IntraVENous PRN    magnesium sulfate 2000 mg in 50 mL IVPB premix  2,000 mg IntraVENous PRN    insulin lispro (HUMALOG) injection vial 0-4 Units  0-4 Units SubCUTAneous Nightly       Signed:  Tiffany Patten José Antonio Welch MD

## 2022-08-17 NOTE — PROGRESS NOTES
PHYSICAL THERAPY: Daily Note AM   (Link to Caseload Tracking: PT Visit Days : 2  Time In/Out PT Charge Capture  Rehab Caseload Tracker  Orders    Nicolas Woods is a 60 y.o. female   PRIMARY DIAGNOSIS: Ventricular tachycardia ([address]  Ventricular tachycardia ([address] [I47.2]       Inpatient: Payor: Gustavo Hightower / Plan: [employer] / Product Type: *No Product type* /     ASSESSMENT:     REHAB RECOMMENDATIONS:   Recommendation to date pending progress:  Setting:  Home Health Therapy    Equipment:    To Be Determined     ASSESSMENT:  Ms. Soledad Hinojosa is in chair on arrival, states feeling better. Pt HR 60s at rest, independent transfers, ambulation without assistive device for 300', 2 standing rest breaks, improved balance and mobility this date. Less fatigue noted with activity, fewer rest breaks required. Pt HR 90s after mobility, improved from yesterday. PT to cont to follow for acute care needs.       SUBJECTIVE:   Ms. Soledad Hinojosa states, \"I am still here, hoping to go home today\"     Social/Functional Lives With: Alone  Receives Help From: Family  ADL Assistance: [address] [place] [address] Avenue: Independent  Ambulation Assistance: Independent  Transfer Assistance: Independent  Mode of Transportation: Bus (Medicare Lenise Bowman)  OBJECTIVE:     PAIN: Jolyne [place] / O2: Sada Parra / [facility] / Arden Raw:   Pre Treatment:   Pain Assessment: None - Denies Pain      Post Treatment: 0/10 Vitals        Oxygen  O2 Therapy: Room air None    RESTRICTIONS/PRECAUTIONS:        MOBILITY: I Mod I S SBA CGA Min Mod Max Total  NT x2 Comments:   Bed Mobility    Rolling [] [] [] [] [] [] [] [] [] [x] [] In chair   Supine to Sit [] [] [] [] [] [] [] [] [] [x] []    Scooting [] [] [] [] [] [] [] [] [] [x] []    Sit to Supine [] [] [] [] [] [] [] [] [] [x] []    Transfers    Sit to Stand [x] [] [] [] [] [] [] [] [] [] []    Bed to Chair [] [] [] [] [] [] [] [] [] [x] []    Stand to Sit [x] [] [] [] [] [] [] [] [] [] []     [] [] [] [] [] [] [] [] [] [] []    I=Independent, Mod I=Modified Independent, S=Supervision, SBA=Standby Assistance, CGA=Contact Guard Assistance,   Min=Minimal Assistance, Mod=Moderate Assistance, Max=Maximal Assistance, Total=Total Assistance, NT=Not Tested    BALANCE: Good Fair+ Fair Fair- Poor NT Comments   Sitting Static [x] [] [] [] [] []    Sitting Dynamic [x] [] [] [] [] []              Standing Static [x] [] [] [] [] []    Standing Dynamic [] [x] [] [] [] []      GAIT: I Mod I S SBA CGA Min Mod Max Total  NT x2 Comments:   Level of Assistance [] [] [] [x] [] [] [] [] [] [] []    Distance 300 feet    DME None    Gait Quality Trunk sway increased and Wide base of support    Weightbearing Status      Stairs      I=Independent, Mod I=Modified Independent, S=Supervision, SBA=Standby Assistance, CGA=Contact Guard Assistance,   Min=Minimal Assistance, Mod=Moderate Assistance, Max=Maximal Assistance, Total=Total Assistance, NT=Not Tested    PLAN:   ACUTE PHYSICAL THERAPY GOALS:   (Developed with and agreed upon by patient and/or caregiver. )  LTG:  (1.)Ms. Amina Dejesus will move from supine to sit and sit to supine , scoot up and down, and roll side to side in bed with INDEPENDENT within 7 treatment day(s). (2.)Ms. Amina Dejesus will transfer from bed to chair and chair to bed with INDEPENDENT using the least restrictive device within 7 treatment day(s). (3.)Ms. Amina Dejesus will ambulate with INDEPENDENT for 500+ feet with the least restrictive device within 7 treatment day(s). (4.)Ms. Amina Dejesus will perform 3 stairs with HR and SBA within 7 treatment days for ascending and descending stairs for home. FREQUENCY AND DURATION: 3 times/week for duration of hospital stay or until stated goals are met, whichever comes first.    TREATMENT:   TREATMENT:   Therapeutic Activity (23 Minutes):  Therapeutic activity included Scooting, Transfer Training, Ambulation on level ground, Sitting balance , Standing balance, and activity tolerance and energy conservation to improve functional Activity tolerance, Balance, Coordination, Mobility, and Strength.     TREATMENT GRID:  N/A    AFTER TREATMENT PRECAUTIONS: Bed/Chair Locked, Call light within reach, Chair, Needs within reach, and RN notified    INTERDISCIPLINARY COLLABORATION:  RN/ PCT and PT/ PTA    EDUCATION: Education Given To: Patient  Education Provided: Energy Conservation  Education Method: Verbal  Barriers to Learning: None  Education Outcome: Verbalized understanding    TIME IN/OUT:  Time In: 1120  Time Out: 0673  Minutes: [address], PT

## 2022-08-17 NOTE — PLAN OF CARE
Problem: Discharge Planning  Goal: Discharge to home or other facility with appropriate resources  Outcome: Completed     Problem: Safety - Adult  Goal: Free from fall injury  Outcome: Completed     Problem: Cardiovascular - Adult  Goal: Maintains optimal cardiac output and hemodynamic stability  Outcome: Completed  Goal: Absence of cardiac dysrhythmias or at baseline  Outcome: Completed

## 2022-08-17 NOTE — DISCHARGE INSTRUCTIONS
Resume demadex on Friday 8/19/2022  Increase Lantus to 18 units daily while on steroids then decrease back to 15 units after    The patient is being discharged home in stable condition on a low saturated fat, low cholesterol and low salt diet. The patient is instructed to advance activities as tolerated to the limit of fatigue or shortness of breath. The patient is informed to monitor daily weights and maintain a 2 liter per day fluid restriction. The patient is instructed to call the office for any shortness of breath, weight gain, or increased peripheral edema. Learning About ICD Shocks  What are ICDs and ICD shocks? An implantable cardioverter-defibrillator (ICD) is a device that is placed under the skin of your chest. It has thin wires (called leads). Most of the time, these leads are placed inside the heart. Some ICDs have a lead that is placed under the skin so that it lies near your heart. The ICD is always checking your heart. If it detects a life-threatening rapid heart rhythm, it tries to slow the rhythm to get it back to normal. If the dangerous rhythm does not stop, the ICD sends an electric shock to the heart to restore a normalrhythm. The device then goes back to its watchful mode. The idea of living with an ICD and getting shocked worries some people. The shock can be uncomfortable. It may feel like you are being kicked in the chest.For many people, getting a shock can cause anxiety and depression. It's normal to be worried about living with an ICD. After all, you don't know when a shock might occur, and a shock could be a reminder that your heart isnot as healthy as it could be. But an ICD is an important part of your treatment. It can save your life. Ifyou take a few simple steps, you can feel better about having an ICD. How can you get over your fears about the ICD? Know your ICD treatment   Learn how the ICD works, what it does, and how it keeps you safe.  This can help reduce any anxiety you may feel. Keep your regular doctor appointments. Your doctor:  Sets both the rate at which a shock will occur and the level of shock needed to restore your heart to a normal rate. Checks to see whether the ICD has given you any shocks since your last visit. This helps your doctor know if your medicines need to be adjusted. Checks the ICD battery and replaces it as needed. Talk with others who have an ICD. Ask them if they have been shocked and what it was like. Ask them how they cope with it. Talking with others can help you feel better. Always carry your ICD identity card, a list of all the medicines you are taking, and your doctor's name and phone number. This will help you get the best possible treatment if you get a shock and need help. Make an action plan   Talk to your doctor about making an action plan for what to do if you getshocked. Here is an example:  After one shock:  Call 911 or other emergency services right away if you feel bad or have symptoms like chest pain. Call your doctor soon if you feel fine right away after the shock. Your doctor may want to talk about the shock and schedule a follow-up visit. If you get a second shock in a 24-hour period, call your doctor right away. Call even if you feel fine right away. Stay calm after a shock   Follow the action plan you made with your doctor. Do some breathing exercises. They may help you relax. Sit or lie in a comfortable position. Put one hand on your belly just below your ribs and the other hand on your chest.  Take a deep breath in through your nose, and let your belly push your hand out. Your chest should not move. Breathe out through pursed lips as if you were whistling. Feel the hand on your belly go in, and use it to push all the air out. Breathe in and out like this until you feel more relaxed. Keep a good attitude. When you've had a shock, you may question how healthy you are or worry about getting another shock. But try to focus on the positive things in your life, like loving relationships, pleasant activities, or good friends. Don't make changes in what you do. You may want to avoid an action because you think it caused the shock. But a shock can occur at any time, and you can't prevent shocks by your actions alone. Don't stop doing things you enjoy to try to avoid a shock. Follow-up care is a key part of your treatment and safety. Be sure to make and go to all appointments, and call your doctor if you are having problems. It's also a good idea to know your test results and keep alist of the medicines you take. Where can you learn more? Go to [email or web address] and sign in to your [employer] account. Enter V781 in the Seaters box to learn more about \"Learning About ICD Shocks. \"     If you do not have an account, please click on the \"Sign Up Now\" link. Current as of: January 10, 2022               Content Version: 13.3   [employer]. Care instructions adapted under license by [facility] ([facility]). If you have questions about a medical condition or this instruction, always ask your healthcare professional. [address] any warranty or liability for your use of this information. tramadol  Pronunciation: TRAM a dol  Brand: ConZip, Qdolo, Ultram, Ultram ER  What is the most important information I should know about tramadol? MISUSE OF THIS MEDICINE CAN CAUSE ADDICTION, OVERDOSE, OR DEATH. Keep this medicine where others cannot get to it. Tramadol should not be given to a child younger than 15years old, or anyone younger than 25years old who recently had surgery to remove the tonsils or adenoids. Ultram ER should not be given to anyone younger than 25years old. Taking tramadol during pregnancy may cause life-threatening withdrawal symptoms in the .    Fatal side effects may occur if you use also use alcohol or other drugs that cause drowsiness or slow breathing. What is tramadol? Tramadol is an pain medicine similar to an opioid. Tramadol is used to treatmoderate to severe pain. The extended-release form of tramadol is for around-the-clock treatment of pain. This form of tramadol is not for use on an as-needed basis for pain. Tramadol may also be used for purposes not listed in this medication guide. What should I discuss with my healthcare provider before taking tramadol? You should not take tramadol if you are allergic to it, or if you have:  severe asthma or breathing problems;  a stomach or bowel obstruction (including paralytic ileus);  if you have recently used alcohol, sedatives, tranquilizers, or narcotic medications; or  if you have used an MAO inhibitor in the past 14 days (such as isocarboxazid, linezolid, methylene blue injection, phenelzine, or tranylcypromine). Tramadol should not be given to a child younger than 15years old. Ultram ER should not be given to anyone younger than 25years old. Do not give tramadol to anyone younger than 25years old who recently hadsurgery to remove the tonsils or adenoids. Seizures have occurred in some people taking tramadol. Your seizure risk may be higher if you have ever had:  a head injury, epilepsy or other seizure disorder;  drug or alcohol addiction; or  a metabolic disorder. Tell your doctor if you have ever had:  breathing problems, sleep apnea;  liver or kidney disease;  urination problems;  problems with your gallbladder, pancreas, or thyroid;  a stomach disorder; or  mental illness, or suicide attempt. If you use tramadol during pregnancy, your baby could be born with life-threatening withdrawal symptoms, and may need medical treatment for several weeks. Ask a doctor before using tramadol if you are breastfeeding. Tell your doctor if you notice severe drowsiness or slow breathing in thenursing baby. How should I take tramadol?   Follow the directions on your prescription label and read all medication guides. Never use tramadol in larger amounts, or for longer than prescribed. Tell your doctor if you feel an increased urge to take more of this medicine. Never share tramadol with another person, especially someone with a history of drug addiction. MISUSE CAN CAUSE ADDICTION, OVERDOSE, OR DEATH. Keep the medicine where others cannot get to it. Selling or giving away thismedicine is against the law. Stop taking all other opioid medications when you start taking tramadol. Tramadol can be taken with or without food, but take it the same way each time. Swallow the capsule or tablet whole to avoid exposure to a potentially fatal overdose. Do not crush, chew, break,open, or dissolve. Measure liquid medicine with the supplied syringe or a dose-measuring device (not a kitchen spoon). Never crush or break a tramadol pill to inhale the powder or mix it into a liquid to inject the drug into your vein. This practice has resulted in death. You may have withdrawal symptoms if you stop using tramadol suddenly. Ask your doctor before stopping the medicine. Store at room temperature away from moisture and heat. Keep track of your medicine. You should be aware if anyone is using it improperly or without aprescription. Do not keep leftover tramadol. Just one dose can cause death in someone using it accidentally or improperly. Ask your pharmacist where to locate a drug take-back disposal program. If there is no take-back program, mix the leftover medicine with cat litter orcoffee grounds in a sealed plastic bag throw the bag in the trash. What happens if I miss a dose? Since tramadol is used for pain, you are not likely to miss a dose. Skip any missed dose if it is almost time for your next dose. Do not use two doses at one time. What happens if I overdose? Seek emergency medical attention or call the Poison Help line at 1-733.581.4040.  An overdose can be fatal, especially in a child or other person using the medicine without a prescription. Overdose symptoms may include severe drowsiness, pinpoint pupils, slowbreathing, or no breathing. Your doctor may recommend you get naloxone (a medicine to reverse an opioid overdose) and keep it with you at all times. A person caring for you can give the naloxone if you stop breathing or don't wake up. Your caregiver must still get emergency medical help and may need to perform CPR (cardiopulmonaryresuscitation) on you while waiting for help to arrive. Anyone can buy naloxone from a pharmacy or local health department. Make sureany person caring for you knows where you keep naloxone and how to use it. What should I avoid while taking tramadol? Do not drink alcohol. Dangerous side effects or death could occur. Avoid driving or hazardous activity until you know how this medicine will affect you. Dizziness or drowsiness can cause falls, accidents, or severeinjuries. What are the possible side effects of tramadol? Get emergency medical help if you have signs of an allergic reaction (hives, difficult breathing, swelling in your face or throat) or a severe skin reaction (fever, sore throat, burning in your eyes, skin pain, red or purple skin rashthat spreads and causes blistering and peeling). Tramadol can slow or stop your breathing, and death may occur. A person caring for you should give naloxone and/or seek emergency medical attention if you have slow breathing with long pauses, blue colored lips, or ifyou are hard to wake up. Call your doctor at once if you have:  noisy breathing, sighing, shallow breathing, breathing that stops during sleep;  a slow heart rate or weak pulse;  a light-headed feeling, like you might pass out;  seizure (convulsions); or  low cortisol levels --nausea, vomiting, loss of appetite, dizziness, worsening tiredness or weakness.   Seek medical attention right away if you have symptoms of serotonin syndrome, such as: agitation, hallucinations, fever, sweating, shivering, fast heart rate, musclestiffness, twitching, loss of coordination, nausea, vomiting, or diarrhea. Serious breathing problems may be more likely in older adults and people who are debilitated or have wasting syndrome or chronic breathing disorders. Common side effects may include:  constipation, nausea, vomiting, stomach pain;  dizziness, drowsiness, tiredness;  headache; or  itching. This is not a complete list of side effects and others may occur. Call your doctor for medical advice about side effects. You may report side effects toFDA at 8-370-RSH-9712. What other drugs will affect tramadol? You may have breathing problems or withdrawal symptoms if you start or stop taking certain other medicines. Tell your doctor if you also use an antibiotic, antifungal medication, heart or blood pressure medication, seizure medication, or medicine to treat HIV orhepatitis C. Many other drugs can be dangerous when used with tramadol. Tell your doctor if you also use:  medicine for allergies, asthma, blood pressure, motion sickness, irritable bowel, or overactive bladder;  other opioid medicines;  a benzodiazepine sedative like Valium, Klonopin, or Xanax;  sleep medicine, muscle relaxers, or other drugs that make you drowsy; or  drugs that affect serotonin, such as antidepressants, stimulants, or medicine for migraines or Parkinson's disease. This list is not complete. Other drugs may affect tramadol, including prescription and over-the-counter medicines, vitamins, and herbal products. Not all possible interactions are listed here. Many other drugs can be dangerous when used with tramadol.  Tell your doctor if you also use:  medicine for allergies, asthma, blood pressure, motion sickness, irritable bowel, or overactive bladder;  other opioid medicines;  a benzodiazepine sedative like Valium, Klonopin, or Xanax;  sleep medicine, muscle relaxers, or other drugs that make you drowsy;  drugs that affect serotonin, such as antidepressants, stimulants, or medicine for migraines or Parkinson's disease. drugs that affect serotonin levels in your body --a stimulant, or medicine for depression, Parkinson's disease, migraine headaches, serious infections, or nausea and vomiting. This list is not complete. Many other drugs may affect tramadol. This includes prescription and over-the-counter medicines, vitamins, and herbal products. Notall possible drug interactions are listed here. Where can I get more information? Your doctor or pharmacist can provide more information about tramadol. Remember, keep this and all other medicines out of the reach of children, never share your medicines with others, and use this medication only for the indication prescribed. Every effort has been made to ensure that the information provided by [address] is accurate, up-to-date, and complete, but no guarantee is made to that effect. Drug information contained herein may be time sensitive. [facility] information has been compiled for use by healthcare practitioners and consumers in the [place] and therefore [facility] does not warrant that uses outside of the [place] are appropriate, unless specifically indicated otherwise. [facility]'s drug information does not endorse drugs, diagnose patients or recommend therapy. [facility]'s drug information is an informational resource designed to assist licensed healthcare practitioners in caring for their patients and/or to serve consumers viewing this service as a supplement to, and not a substitute for, the expertise, skill, knowledge and judgment of healthcare practitioners. The absence of a warning for a given drug or drug combination in no way should be construed to indicate that the drug or drug combination is safe, effective or appropriate for any given patient.  [employer] does not assume any responsibility for any aspect of healthcare administered with the aid of information [employer] provides. The information contained herein is not intended to cover all possible uses, directions, precautions, warnings, drug interactions, allergic reactions, or adverse effects. If you have questions about the drugs you are taking, check with yourdoctor, nurse or pharmacist.  Copyright 0312-0117 [address]: 22.02. Revision date:6/15/2021. Care instructions adapted under license by [facility] ([facility]). If you have questions about a medical condition or this instruction, always ask your healthcare professional. [place] [address] any warranty or liability for your use of this information. prednisone  Pronunciation: PRED ni sone  Brand: Joselito  What is the most important information I should know about prednisone? You should not use prednisone if you have a fungal infection anywhere in yourbody. You should not stop using prednisone suddenly. Follow your doctor's instructions about tapering your dose. What is prednisone? Prednisone is a steroid that reduces inflammation in the body, and alsosuppresses your immune system. Prednisone is used to treat many different conditions such as hormonal disorders, skin diseases, arthritis, lupus, psoriasis, allergic conditions, ulcerative colitis, Crohn's disease, eye diseases, lung diseases, asthma, tuberculosis, blood cell disorders, kidney disorders, leukemia, lymphoma, multiple sclerosis, organ transplant rejection, swelling from a brain tumor orinjury. Prednisone may also be used for purposes not listed in this medication guide. What should I discuss with my healthcare provider before taking prednisone? You should not use prednisone if you are allergic to it, or if you have afungal infection anywhere in your body. Steroid medication can weaken your immune system, making it easier for you to get an infection or worsening an infection you already have.  Tell your doctor about any illnessor infection you've had within the past several weeks. Tell your doctor if you have ever had:  heart problems, high blood pressure, or a heart attack;  glaucoma or cataracts;  herpes infection of the eyes;  past or present tuberculosis;  a parasite infection that causes diarrhea (such as threadworms);  any illness that causes diarrhea;  underactive thyroid;  diabetes;  a stomach ulcer, diverticulitis;  a colostomy or ileostomy;  osteoporosis or low bone mineral density (steroid medication can increase your risk of bone loss);  low levels of calcium or potassium in your blood;  cirrhosis or other liver disease;  mental illness or psychosis; or  a muscle disorder such as myasthenia gravis. Long-term use of steroids may lead to bone loss (osteoporosis), especially if you smoke or drink alcohol, if you do not exercise, or if you do not get enoughvitamin D or calcium in your diet. It is not known whether this medicine will harm an unborn baby. Tell yourdoctor if you are pregnant or plan to become pregnant. You should not breastfeed while using prednisone. How should I take prednisone? Follow all directions on your prescription label and read all medication guides or instruction sheets. Your doctor may occasionally change your dose. Use themedicine exactly as directed. Prednisone is taken daily or every other day, depending on the condition being treated. You may need to take the medicine at a certain time of day. Follow your doctor's instructions about when and how often to take this medicine. Take with food if prednisone upsets your stomach. Measure liquid medicine carefully. Use the dosing syringe provided, or use a medicine dose-measuringdevice (not a kitchen spoon). Swallow the delayed-release tablet whole and do not crush, chew, or break it. Prednisone can weaken (suppress) your immune system, and you may get an infection more easily.  Call your doctor if you have signs of infection (fever, weakness, cold or flu symptoms, skin sores, diarrhea, frequent or recurringillness). If you have major surgery or a severe injury or infection, your prednisone dose needs may change. Make sure any doctor caring for you knows you are using thismedicine. If you use this medicine long-term, you may need medical tests and vision exams. In case of emergency, wear or carry medical identification to let others knowyou use a steroid. You should not stop using prednisone suddenly. Follow your doctor's instructions about tapering your dose. Store at room temperature away from moisture, heat, and light. What happens if I miss a dose? Take the medicine as soon as you can, but skip the missed dose if it is almost time for your next dose. Do not take two doses at one time. What happens if I overdose? Seek emergency medical attention or call the Poison Help line at 1-355.429.2858. High doses or long-term use of prednisone can lead to thinning skin, easy bruising, changes in body fat (especially in your face, neck, back, and waist), increased acne or facial hair, menstrualproblems, impotence, or loss of interest in sex. What should I avoid while taking prednisone? Do not receive a \"live\" vaccine while using prednisone. The vaccine may not work as well and may not fully protect you from disease. Live vaccines include measles, mumps, rubella (MMR), polio, rotavirus, typhoid, yellow fever, varicella (chickenpox), zoster (shingles), and nasal flu(influenza) vaccine. Avoid being near people who are sick or have infections. Call your doctor for preventive treatment if you are exposed to chickenpox or measles. These conditions can be serious or even fatal in people who are using steroidmedicine. Avoid drinking alcohol. What are the possible side effects of prednisone? Get emergency medical help if you have signs of an allergic reaction: hives; difficult breathing; swelling of your face, lips, tongue, or throat.   Call your doctor at once if you have:  muscle pain or weakness;  blurred vision, tunnel vision, eye pain, or seeing halos around lights;  severe depression, changes in personality, unusual thoughts or behavior;  bloody or tarry stools, coughing up blood or vomit that looks like coffee grounds;  swelling, rapid weight gain, feeling short of breath;  irregular heartbeats;  severe headache, pounding in your neck or ears;  decreased adrenal gland hormones --muscle weakness, tiredness, diarrhea, nausea, menstrual changes, skin discoloration, craving salty foods, and feeling light-headed; or  low potassium level --leg cramps, constipation, irregular heartbeats, fluttering in your chest, increased thirst or urination, numbness or tingling, muscle weakness or limp feeling. Prednisone can affect growth in children. Tell your doctor if your child is notgrowing at a normal rate while using this medicine. Common side effects may include:  weight gain (especially in your face or your upper back and torso);  increased appetite;  mood changes, trouble sleeping;  changes in your menstrual periods;  problems with memory or thought;  muscle or joint pain;  weakness;  headache, dizziness, spinning sensation;  nausea, bloating, loss of appetite;  slow wound healing; or  acne, increased sweating, thinning skin, bruising, pinpoint spots under your skin. This is not a complete list of side effects and others may occur. Call your doctor for medical advice about side effects. You may report side effects toFDA at 3-820-FXJ-3315. What other drugs will affect prednisone? Sometimes it is not safe to use certain medications at the same time. Some drugs can affect your blood levels of other drugs you take, which mayincrease side effects or make the medications less effective. Tell your doctor about all your current medicines.  Many drugs can affect prednisone, especially:  bupropion;  cyclosporine;  digoxin;  ketoconazole;  an antibiotic;  birth control pills or hormone replacement therapy;  a diuretic or \"water pill\";  insulin or oral diabetes medicine;  a blood thinner --warfarin, Coumadin, Jantoven; or  NSAIDs (nonsteroidal anti-inflammatory drugs) --aspirin, ibuprofen (Advil, Motrin), naproxen (Aleve), celecoxib, diclofenac, indomethacin, meloxicam, and others. This list is not complete and many other drugs may affect prednisone. This includes prescription and over-the-counter medicines, vitamins, andherbal products. Not all possible drug interactions are listed here. Where can I get more information? Your pharmacist can provide more information about prednisone. Remember, keep this and all other medicines out of the reach of children, never share your medicines with others, and use this medication only for the indication prescribed. Every effort has been made to ensure that the information provided by [address] is accurate, up-to-date, and complete, but no guarantee is made to that effect. Drug information contained herein may be time sensitive. [facility] information has been compiled for use by healthcare practitioners and consumers in the [place] and therefore [employer] does not warrant that uses outside of the [place] are appropriate, unless specifically indicated otherwise. [facility]'s drug information does not endorse drugs, diagnose patients or recommend therapy. [facility] drug information is an informational resource designed to assist licensed healthcare practitioners in caring for their patients and/or to serve consumers viewing this service as a supplement to, and not a substitute for, the expertise, skill, knowledge and judgment of healthcare practitioners. The absence of a warning for a given drug or drug combination in no way should be construed to indicate that the drug or drug combination is safe, effective or appropriate for any given patient.  [facility] does not assume any responsibility for any aspect of discuss with my healthcare provider before taking duloxetine? You should not use duloxetine if you are allergic to it. Do not take duloxetine within 5 days before or 14 days after you have used an MAO inhibitor, such as isocarboxazid, linezolid, methylene blue injection, phenelzine, ortranylcypromine. A dangerous drug interaction could occur. Tell your doctor if you also take stimulant medicine, opioid medicine, herbal products, or medicine for depression, mental illness, Parkinson's disease, migraine headaches, serious infections, or prevention of nausea and vomiting. An interaction with duloxetine could cause a serious condition called serotonin syndrome. Not approved for use by anyone younger than 9years old. Tell your doctor if you have ever had:  heart problems, high blood pressure;  liver or kidney disease;  slow digestion;  a seizure;  bleeding problems;  sexual problems;  narrow-angle glaucoma;  bipolar disorder (manic depression);  drug addiction or suicidal thoughts; or  if you drink large amounts of alcohol. People with depression or mental illness may have thoughts about suicide. Some young people may have increased suicidal thoughts when first starting a medicine to treat depression. Stay alert to changes in your mood or symptoms. Your family or caregivers should also watch for sudden changes in your behavior. Taking this medicine during pregnancy could harm the baby, but stopping the medicine may not be safe for you. Do not start or stop duloxetine without asking your doctor. If you are pregnant, your name may be listed on a pregnancy registry to trackthe effects of duloxetine on the baby. If you are breastfeeding, tell your doctor if you notice drowsiness, feedingproblems, and slow weight gain in the nursing baby. How should I take duloxetine? Follow all directions on your prescription label and read all medication guides or instruction sheets. Your doctor may occasionally change your dose. Use themedicine exactly as directed. Taking duloxetine in higher doses or more often than prescribed will not make it more effective, and may increase side effects. Swallow the capsule whole and do not crush, chew, break, or open it. Take with or without food. Your blood pressure will need to be checked often. Tell your doctor if you have any changes in sexual function, such as loss of interest in sex, trouble having an orgasm, or (in men)problems with erections or ejaculation. Some sexual problems can be treated. Your symptoms may not improve for up to 4 weeks. Do not stop using duloxetine suddenly, or you could have unpleasant symptoms (such as agitation, confusion, tinglingor electric shock feelings). Ask your doctor before stopping the medicine. Store at room temperature away from moisture and heat. What happens if I miss a dose? Take the medicine as soon as you can, but skip the missed dose if it is almost time for your next dose. Do not take two doses at one time. What happens if I overdose? Seek emergency medical attention or call the Poison Help line at 1-256.514.5054. Overdose symptoms may include vomiting, dizziness or drowsiness, seizures, fastheartbeats, fainting, or coma. What should I avoid while taking duloxetine? Ask your doctor before taking a nonsteroidal anti-inflammatory drug (NSAID) such as aspirin, ibuprofen, naproxen, Advil, Aleve, Motrin, and others. Usingan NSAID with duloxetine may cause you to bruise or bleed easily. Avoid driving or hazardous activity until you know how this medicine will affect you. Dizziness or drowsiness can cause falls, accidents, or severeinjuries. Avoid getting up too fast from a sitting or lying position, or you may feeldizzy. Drinking alcohol may increase your risk of liver damage, especially if you take Drizalma. What are the possible side effects of duloxetine?   Get emergency medical help if you have signs of an allergic reaction (hives, difficult breathing, swelling in your face or throat) or a severe skin reaction (fever, sore throat, burning eyes, skin pain, red or purple skin rash withblistering and peeling). Tell your doctor right away if you have new or sudden changes in mood or behavior, including new or worse depression or anxiety, panic attacks, trouble sleeping, or if you feel impulsive, irritable, agitated, hostile, aggressive, restless, more activeor talkative, or have thoughts about suicide or hurting yourself. Call your doctor at once if you have:  pounding heartbeats or fluttering in your chest;  a light-headed feeling, like you might pass out;  easy bruising, unusual bleeding;  vision changes;  painful or difficult urination;  liver problems --right-sided upper stomach pain, itching, dark urine, jaundice (yellowing of the skin or eyes);  low blood sodium --headache, confusion, problems with thinking or memory, weakness, feeling unsteady; or  manic episodes --racing thoughts, increased energy, decreased need for sleep, risk-taking behavior, being agitated or talkative. Seek medical attention right away if you have symptoms of serotonin syndrome, such as: agitation, hallucinations, fever, sweating, shivering, fast heart rate, musclestiffness, twitching, loss of coordination, nausea, vomiting, or diarrhea. Common side effects may include:  drowsiness;  nausea, constipation, loss of appetite;  dry mouth; or  increased sweating. This is not a complete list of side effects and others may occur. Call your doctor for medical advice about side effects. You may report side effects toFDA at 8-566-FDA-7224. What other drugs will affect duloxetine? Sometimes it is not safe to use certain medications at the same time. Some drugs can affect your blood levels of other drugs you take, which mayincrease side effects or make the medications less effective. Many drugs can affect duloxetine.  This includes prescription and over-the-counter medicines, vitamins, and herbal products. Not all possible interactions are listed here. Tell your doctor about all other medicines you use. Where can I get more information? Your pharmacist can provide more information about duloxetine. Remember, keep this and all other medicines out of the reach of children, never share your medicines with others, and use this medication only for the indication prescribed. Every effort has been made to ensure that the information provided by [address] [place] [address] is accurate, up-to-date, and complete, but no guarantee is made to that effect. Drug information contained herein may be time sensitive. [facility] information has been compiled for use by healthcare practitioners and consumers in the [place] and therefore [facility] does not warrant that uses outside of the [place] are appropriate, unless specifically indicated otherwise. [facility]'s drug information does not endorse drugs, diagnose patients or recommend therapy. [facility]'s drug information is an informational resource designed to assist licensed healthcare practitioners in caring for their patients and/or to serve consumers viewing this service as a supplement to, and not a substitute for, the expertise, skill, knowledge and judgment of healthcare practitioners. The absence of a warning for a given drug or drug combination in no way should be construed to indicate that the drug or drug combination is safe, effective or appropriate for any given patient. [facility] does not assume any responsibility for any aspect of healthcare administered with the aid of information [facility] provides. The information contained herein is not intended to cover all possible uses, directions, precautions, warnings, drug interactions, allergic reactions, or adverse effects.  If you have questions about the drugs you are taking, check with yourdoctor, nurse or pharmacist.  Copyright 0416-9318 [address] [place] [address] Avenue: 17.01. Revision date:11/1/2021. Care instructions adapted under license by [facility] ([facility]). If you have questions about a medical condition or this instruction, always ask your healthcare professional. [address] any warranty or liability for your use of this information. cyclobenzaprine  Pronunciation: jessica arteaga preanabell  Brand: Amrix, Comfort Pac with Cyclobenzaprine, Fexmid  What is the most important information I should know about cyclobenzaprine? You should not use cyclobenzaprine if you have a thyroid disorder, heart block, congestive heart failure, a heart rhythm disorder, or you have recently had aheart attack. Do not use cyclobenzaprine if you have taken an MAO inhibitor in the past 14 days, such as isocarboxazid, linezolid, phenelzine, rasagiline, selegiline, ortranylcypromine. What is cyclobenzaprine? Cyclobenzaprine is a muscle relaxant. It works by blocking nerve impulses (orpain sensations) that are sent to your brain. Cyclobenzaprine is used together with rest and physical therapy to relievemuscle spasms caused by painful conditions such as an injury. Cyclobenzaprine may also be used for purposes not listed in this medicationguide. What should I discuss with my healthcare provider before taking cyclobenzaprine? You should not use cyclobenzaprine if you are allergic to it, or if you have:  a thyroid disorder;  heart block, heart rhythm disorder, congestive heart failure; or  if you have recently had a heart attack. Cyclobenzaprine is not approved for use by anyone younger than 13years old. Do not use cyclobenzaprine if you have taken an MAO inhibitor in the past 14 days. A dangerous drug interaction could occur. MAO inhibitors include isocarboxazid, linezolid, phenelzine, rasagiline, selegiline, andtranylcypromine. Some medicines can interact with cyclobenzaprine and cause a serious condition called serotonin syndrome.  Be sure your doctor knows if you also take stimulant medicine, opioid medicine, herbal products, or medicine for depression, mental illness, Parkinson's disease, migraine headaches, serious infections, or prevention of nausea and vomiting. Ask your doctor before making any changes in how or when you take your medications. Tell your doctor if you have ever had:  liver disease;  glaucoma;  enlarged prostate; or  problems with urination. It is not known whether this medicine will harm an unborn baby. Tell yourdoctor if you are pregnant or plan to become pregnant. It may not be safe to breast-feed while using this medicine. Ask your doctorabout any risk. Older adults may be more sensitive to the effects of this medicine. How should I take cyclobenzaprine? Follow all directions on your prescription label and read all medication guides or instruction sheets. Your doctor may occasionally change your dose. Use themedicine exactly as directed. Cyclobenzaprine is usually taken once daily for only 2 or 3 weeks. Follow your doctor's dosing instructions very carefully. Swallow the capsule whole and do not crush, chew, break, or open it. Take the medicine at the same time each day. Call your doctor if your symptoms do not improve after 3 weeks, or if they getworse. Store at room temperature away from moisture, heat, and light. What happens if I miss a dose? Take the medicine as soon as you can, but skip the missed dose if it is almost time for your next dose. Do not take two doses at one time. What happens if I overdose? Seek emergency medical attention or call the Poison Help line at 1-218.652.7662. An overdose of cyclobenzaprine can be fatal.  Overdose symptoms may include severe drowsiness, vomiting, fast heartbeats,tremors, agitation, or hallucinations. What should I avoid while taking cyclobenzaprine? Avoid driving or hazardous activity until you know how this medicine willaffect you. Your reactions could be impaired. Avoid drinking alcohol. Dangerous side effects could occur. What are the possible side effects of cyclobenzaprine? Get emergency medical help if you have signs of an allergic reaction: hives; difficult breathing; swelling of your face, lips, tongue, or throat. Stop using cyclobenzaprine and call your doctor at once if you have:  fast or irregular heartbeats;  chest pain or pressure, pain spreading to your jaw or shoulder; or  sudden numbness or weakness (especially on one side of the body), slurred speech, balance problems. Seek medical attention right away if you have symptoms of serotonin syndrome, such as: agitation, hallucinations, fever, sweating, shivering, fast heart rate, musclestiffness, twitching, loss of coordination, nausea, vomiting, or diarrhea. Serious side effects may be more likely in older adults. Common side effects may include:  drowsiness, tiredness;  headache, dizziness;  dry mouth; or  upset stomach, nausea, constipation. This is not a complete list of side effects and others may occur. Call your doctor for medical advice about side effects. You may report side effects toFDA at 9-869-FTN-3974. What other drugs will affect cyclobenzaprine? Using cyclobenzaprine with other drugs that make you drowsy can worsen this effect. Ask your doctor before using opioid medication, a sleeping pill, amuscle relaxer, or medicine for anxiety or seizures. Tell your doctor about all your other medicines, especially:  bupropion (Zyban, for smoking cessation);  meperidine;  tramadol;  verapamil;  cold or allergy medicine that contains an antihistamine (Benadryl and others);  medicine to treat Parkinson's disease;  medicine to treat excess stomach acid, stomach ulcer, motion sickness, or irritable bowel syndrome;  medicine to treat overactive bladder; or  bronchodilator asthma medication. This list is not complete.  Other drugs may affect cyclobenzaprine, including prescription and over-the-counter medicines, vitamins, and herbal products. Notall possible drug interactions are listed here. Where can I get more information? Your pharmacist can provide more information about cyclobenzaprine. Remember, keep this and all other medicines out of the reach of children, never share your medicines with others, and use this medication only for the indication prescribed. Every effort has been made to ensure that the information provided by Maru Shepp[address] is accurate, up-to-date, and complete, but no guarantee is made to that effect. Drug information contained herein may be time sensitive. [facility] information has been compiled for use by healthcare practitioners and consumers in the [place] and therefore [facility] does not warrant that uses outside of the [place] are appropriate, unless specifically indicated otherwise. [facility]'s drug information does not endorse drugs, diagnose patients or recommend therapy. [facility]'s drug information is an informational resource designed to assist licensed healthcare practitioners in caring for their patients and/or to serve consumers viewing this service as a supplement to, and not a substitute for, the expertise, skill, knowledge and judgment of healthcare practitioners. The absence of a warning for a given drug or drug combination in no way should be construed to indicate that the drug or drug combination is safe, effective or appropriate for any given patient. [facility] does not assume any responsibility for any aspect of healthcare administered with the aid of information [facility] provides. The information contained herein is not intended to cover all possible uses, directions, precautions, warnings, drug interactions, allergic reactions, or adverse effects. If you have questions about the drugs you are taking, check with yourdoctor, nurse or pharmacist.  Copyright 2332-1305 [address]. Version: 5.01. Revision date: 9/26/2018.   Care instructions adapted under license by [facility]. If you have questions about a medical condition or this instruction, always ask your healthcare professional. [place] [address] any warranty or liability for your use of this information.

## 2022-08-17 NOTE — PROGRESS NOTES
OCCUPATIONAL THERAPY Initial Assessment, Daily Note, and AM       OT Visit Days: 1  Acknowledge Orders  Time  OT Charge Capture  Rehab Caseload Tracker      Jennifer Tanner is a 60 y.o. female   PRIMARY DIAGNOSIS: Ventricular tachycardia ([address]  Ventricular tachycardia ([address] [I47.2]       Reason for Referral: Generalized Muscle Weakness (M62.81)  Other abnormalities of gait and mobility (R26.89)  History of falling (Z91.81)  Inpatient: Payor: [facility] / Plan: [address] [place] [address] / Product Type: *No Product type* /     ASSESSMENT:     REHAB RECOMMENDATIONS:   Recommendation to date pending progress:  Setting:  Home Health Therapy    Equipment:    To Be Determined     ASSESSMENT:  Ms. Bernadette Escobedo is a 61 y/o F presenting with ventricular tachycardia. Pt supine on entry on RA A/O x 4. Applicable PMHx: DM, CKD, ID, CAD, ventricular cardiac arrest, cardiomyopathy. Pt denied light headedness, dizziness or SOB. Pt reports no fall(s) in past 6 months. PLOF Mod I. DME present in home; None. Pt currently SBA with UB ADLs, CGA-SBA with LB ADLs, CGA-SBA for toileting and CGA-SBA for functional t/fs and household mobility for ADLs with no AD. Pt had episode of tearfulness and increased fear seated EOB. Increased discussion and compassion. Pt verbalized concerns over current condition and overall cardiac health. Pt assured and consoled. Pt discussed at rounds concerning ID concerns with regards to hx of collapse with shock and various positions working at [employer] Increased rest break duration and frequency required throughout session. Pt presents with deficits in ADLs, functional t/fs, household mobility for ADLs and activity tolerance compared to reported PLOF. Pt tolerated session well. Pt presents pleasant and motivated with good rehab potential. Pt would benefit from continued skilled OT services for duration of hospital stay and after t/f to next level of care or until all stated goals met.       [facility] [address] [address] -PAC 6 Clicks Daily Activity Inpatient Short Form:    AM-PAC Daily Activity Inpatient   How much help for putting on and taking off regular lower body clothing?: A Little  How much help for Bathing?: A Little  How much help for Toileting?: A Little  How much help for putting on and taking off regular upper body clothing?: None  How much help for taking care of personal grooming?: A Little  How much help for eating meals?: None  [facility]: 20  AM-PAC Inpatient ADL T-Scale Score : 42.03  ADL Inpatient CMS 0-100% Score: 38.32  ADL Inpatient CMS G-Code Modifier : CJ           SUBJECTIVE:     Ms. Jefferson Scott states, \"My uncle has the same condition I do and his burst, he survived it though\"     Social/Functional Lives With: Alone  Receives Help From: Family  ADL Assistance: Independent  Homemaking Assistance: Independent  Ambulation Assistance: Independent  Transfer Assistance: Independent  Mode of Transportation: Bus (Medicare Alonna Le)    OBJECTIVE:     Yasmeen Salinaser / Isa Hidden / AIRWAY: Telemetry     RESTRICTIONS/PRECAUTIONS:       PAIN: VITALS / O2:   Pre Treatment:   Pain Assessment: 0-10  Pain Level: 5  Pain Location: Head  Pain Orientation: Left  Non-Pharmaceutical Pain Intervention(s): Rest  Response to Pain Intervention: Patient satisfied      Post Treatment: None       Vitals   Vitals  Heart Rate: 72  Heart Rate Source: Monitor  BP: 109/68  BP Location: Right upper arm  BP Method: Automatic  Patient Position: Sitting      Oxygen  O2 Device: None (Room air)  Heart Rate: 72  SpO2: 95 %         GROSS EVALUATION: INTACT IMPAIRED   (See Comments)   UE AROM [x] []   UE PROM [] []NT   Strength [] LUE Strength  Gross LUE Strength: WFL  LUE Strength Comment: 4/5  RUE Strength  Gross RUE Strength: WFL  RUE Strength Comment: 4/5     Posture / Balance [] Sitting - Static: Good  Sitting - Dynamic: Good  Standing - Static: Fair, +  Standing - Dynamic: Fair, +   Sensation [x]     Coordination [x]       Tone [] N/A     Edema [] N/A   Activity Tolerance [] Patient Tolerated treatment well, Patient limited by fatigue     Hand Dominance R [x] L []      COGNITION/  PERCEPTION: INTACT IMPAIRED   (See Comments)   Orientation [x]     Vision [x]     Hearing [x]     Cognition  [x]     Perception []       MOBILITY: I Mod I S SBA CGA Min Mod Max Total  NT x2 Comments:   Bed Mobility    Rolling [] [] [] [x] [] [] [] [] [] [] []    Supine to Sit [] [] [] [x] [] [] [] [] [] [] []    Scooting [] [] [] [x] [] [] [] [] [] [] []    Sit to Supine [] [] [] [x] [] [] [] [] [] [] []    Transfers    Sit to Stand [] [] [] [x] [x] [] [] [] [] [] [] No AD   Bed to Chair [] [] [] [x] [x] [] [] [] [] [] [] No AD   Stand to Sit [] [] [] [x] [x] [] [] [] [] [] [] No AD   Tub/Shower [] [] [] [] [] [] [] [] [] [x] []     Toilet [] [] [] [] [] [] [] [] [] [x] []      [] [] [] [] [] [] [] [] [] [] []    I=Independent, Mod I=Modified Independent, S=Supervision/Setup, SBA=Standby Assistance, CGA=Contact Guard Assistance, Min=Minimal Assistance, Mod=Moderate Assistance, Max=Maximal Assistance, Total=Total Assistance, NT=Not Tested    ACTIVITIES OF DAILY LIVING: I Mod I S SBA CGA Min Mod Max Total NT Comments   BASIC ADLs:              Upper Body Bathing  [] [] [] [x] [] [] [] [] [] [] BUE standing at sink no AD   Lower Body Bathing [] [] [] [x] [] [] [] [] [] []    Toileting [] [] [] [x] [x] [] [] [] [] []    Upper Body Dressing [] [] [] [x] [] [] [] [] [] [] Hollis Staggers around back   Lower Body Dressing [] [] [] [x] [x] [] [] [] [] [] Doff/don socks EOB   Feeding [] [x] [] [] [] [] [] [] [] []    Grooming [] [] [] [x] [] [] [] [] [] [] Hand hygiene, oral hygiene and face washing standing at sink no AD   Personal Device Care [] [] [] [] [] [] [] [] [] [x]    Functional Mobility [] [] [] [x] [x] [] [] [] [] [] X2 household mobility for ADLs in room and on unit no AD   I=Independent, Mod I=Modified Independent, S=Supervision/Setup, SBA=Standby Assistance, CGA=Contact Guard Assistance, Min=Minimal Assistance, Mod=Moderate Assistance, Max=Maximal Assistance, Total=Total Assistance, NT=Not Tested    PLAN:     FREQUENCY/DURATION   OT Plan of Care: 3 times/week for duration of hospital stay or until stated goals are met, whichever comes first.    ACUTE OCCUPATIONAL THERAPY GOALS:   (Developed with and agreed upon by patient and/or caregiver.)  1. Patient will complete lower body bathing and dressing with Mod I and adaptive equipment as   needed. 2. Patient will completed upper body bathing and dressing with Mod I and adaptive equipment as needed. 3. Patient will complete grooming standing at sink with Mod I and adaptive equipment as needed. 4. Patient will complete toileting with Mod I and adaptive equipment as needed. 5. Patient will tolerate 30 minutes of OT treatment with no rest breaks to increase activity tolerance for ADLs. 6. Patient will complete functional transfers with Mod I and adaptive equipment as needed. Timeframe: 7 visits         PROBLEM LIST:   (Skilled intervention is medically necessary to address:)  Decreased ADL/Functional Activities  Decreased Activity Tolerance  Decreased Balance  Decreased Gait Ability  Decreased Strength  Decreased Transfer Abilities   INTERVENTIONS PLANNED:  (Benefits and precautions of occupational therapy have been discussed with the patient.)  Self Care Training  Therapeutic Activity  Therapeutic Exercise/HEP  Neuromuscular Re-education  Manual Therapy  Education         TREATMENT:     EVALUATION: LOW COMPLEXITY: (Untimed Charge)    TREATMENT:   Therapeutic Activity (10 Minutes): Therapeutic activity included Rolling, Supine to Sit, Transfer Training, Ambulation on level ground, Sitting balance , and Standing balance to improve functional Activity tolerance, Balance, Mobility, and Strength.   Self Care (14 minutes): Patient participated in upper body bathing, upper body dressing, lower body dressing, and grooming ADLs in unsupported sitting and standing with no verbal cueing to increase independence, decrease assistance required, and increase activity tolerance. Patient also participated in functional mobility, functional transfer, and energy conservation training to increase independence, decrease assistance required, and increase activity tolerance.      TREATMENT GRID:  N/A    AFTER TREATMENT PRECAUTIONS: Bed/Chair Locked, Call light within reach, Chair, Needs within reach, and RN notified    INTERDISCIPLINARY COLLABORATION:  RN/ PCT and OT/ SANTANA    EDUCATION:  Education Given To: Patient  Education Provided: Role of Therapy;Plan of Care;Energy Conservation  Barriers to Learning: None  Education Outcome: Verbalized understanding    TOTAL TREATMENT DURATION AND TIME:  Time In: 0950  Time Out: [address]  Minutes: [address] [facility], OT

## 2022-08-18 ENCOUNTER — TELEPHONE (OUTPATIENT)
Dept: INTERNAL MEDICINE CLINIC | Facility: CLINIC | Age: 57
End: 2022-08-18

## 2022-08-18 ENCOUNTER — HOME CARE VISIT (OUTPATIENT)
Dept: SCHEDULING | Facility: HOME HEALTH | Age: 57
End: 2022-08-18
Payer: MEDICARE

## 2022-08-18 ENCOUNTER — CARE COORDINATION (OUTPATIENT)
Dept: CARE COORDINATION | Facility: CLINIC | Age: 57
End: 2022-08-18

## 2022-08-18 VITALS
RESPIRATION RATE: 16 BRPM | HEART RATE: 60 BPM | DIASTOLIC BLOOD PRESSURE: 68 MMHG | SYSTOLIC BLOOD PRESSURE: 102 MMHG | TEMPERATURE: 97.2 F | OXYGEN SATURATION: 97 %

## 2022-08-18 PROCEDURE — 1090000002 HH PPS REVENUE DEBIT

## 2022-08-18 PROCEDURE — 0221000100 HH NO PAY CLAIM PROCEDURE

## 2022-08-18 PROCEDURE — G0299 HHS/HOSPICE OF RN EA 15 MIN: HCPCS

## 2022-08-18 PROCEDURE — 400013 HH SOC

## 2022-08-18 PROCEDURE — 1090000001 HH PPS REVENUE CREDIT

## 2022-08-18 ASSESSMENT — ENCOUNTER SYMPTOMS
STOOL DESCRIPTION: SOFT FORMED
NAUSEA: 1

## 2022-08-18 NOTE — CARE COORDINATION
[facility] Transitions Initial Follow Up Call    Call within 2 business days of discharge: Yes    Patient: Rachael Hernandez Patient : 1965   MRN: [id number]  Reason for Admission: VT  Discharge Date: 22 RARS: Readmission Risk Score: 15.3      Last Discharge [facility]       Date Complaint Diagnosis Description Type Department Provider    8/10/22 AICD Problem Ventricular tachycardia ([address] . .. ED to Hosp-Admission (Discharged) (ADMITTED) Chuyita Celeste MD; Lynne Pallas. .. Spoke with: patient    Facility: [facility]    Transitions of Care Initial Call    Was this an external facility discharge? No Discharge Facility: [facility]    Challenges to be reviewed by the provider   Additional needs identified to be addressed with provider: No  none             Method of communication with provider : none    Advance Care Planning:   Does patient have an Advance Directive: reviewed and current. Care Transition Nurse contacted the patient by telephone to perform post hospital discharge assessment. Verified name and  with patient as identifiers. Provided introduction to self, and explanation of the CTN role. CTN reviewed discharge instructions, medical action plan and red flags with patient who verbalized understanding. Patient given an opportunity to ask questions and does not have any further questions or concerns at this time. Were discharge instructions available to patient? Yes. Reviewed appropriate site of care based on symptoms and resources available to patient including: PCP  Specialist  Home health  CTN . The patient agrees to contact the PCP office for questions related to their healthcare. Medication reconciliation was performed with patient, who verbalizes understanding of administration of home medications. Advised obtaining a 90-day supply of all daily and as-needed medications. Was patient discharged with a pulse oximeter?  no    CTN provided contact information. Plan for follow-up call in 5-7 days based on severity of symptoms and risk factors. Plan for next call: symptom management-assess for s/s of concern.  Patient with numerous medications changes  follow up appointment-assess for changes toregimen or any concerns identified from follow up appt     Care Transitions 24 Hour Call    Schedule Follow Up Appointment with PCP: Completed  Do you have a copy of your discharge instructions?: Yes  Do you have all of your prescriptions and are they filled?: Yes  Have you been contacted by a [facility]?: No  Have you scheduled your follow up appointment?: Yes  How are you going to get to your appointment?: Car - family or friend to transport  Do you feel like you have everything you need to keep you well at home?: Yes  Are you an active caregiver in your home?: No  Care Transitions Interventions    Physical Therapy: Completed    Disease Specific Clinic: Completed               Follow Up  Future Appointments   Date Time Provider Tj Brar   8/18/2022 11:30 AM [place]O [place]   8/24/2022 11:30 AM [facility] DEVICE [facility] AMB   8/24/2022 12:00 PM Jamie Parkinson DO UCDG [facility]VL AMB   10/12/2022  2:20 PM Dash Pires, APRN - CNP PSCD GVL AMB   10/25/2022  8:15 AM MAT LAB MAT GVL AMB   11/1/2022  3:40 PM MD MARCY Hussein GVL AMB       Jose Manuel Romero RN

## 2022-08-18 NOTE — TELEPHONE ENCOUNTER
Pt was discharged from the hospital yesterday and has a follow-up scheduled with Liliana on 8/30/22. Please call for NOMI.

## 2022-08-19 DIAGNOSIS — E11.21 TYPE 2 DIABETES WITH NEPHROPATHY (HCC): ICD-10-CM

## 2022-08-19 DIAGNOSIS — I47.20 VENTRICULAR TACHYCARDIA: ICD-10-CM

## 2022-08-19 DIAGNOSIS — R07.89 CHEST TIGHTNESS: ICD-10-CM

## 2022-08-19 DIAGNOSIS — N18.4 CKD (CHRONIC KIDNEY DISEASE) STAGE 4, GFR 15-29 ML/MIN (HCC): ICD-10-CM

## 2022-08-19 LAB
ALBUMIN SERPL-MCNC: 3.5 G/DL (ref 3.5–5)
ALBUMIN/GLOB SERPL: 1.1 {RATIO} (ref 1.2–3.5)
ALP SERPL-CCNC: 72 U/L (ref 50–136)
ALT SERPL-CCNC: 29 U/L (ref 12–65)
ANION GAP SERPL CALC-SCNC: 1 MMOL/L (ref 7–16)
AST SERPL-CCNC: 16 U/L (ref 15–37)
BILIRUB SERPL-MCNC: 0.5 MG/DL (ref 0.2–1.1)
BUN SERPL-MCNC: 44 MG/DL (ref 6–23)
CALCIUM SERPL-MCNC: 8.6 MG/DL (ref 8.3–10.4)
CHLORIDE SERPL-SCNC: 105 MMOL/L (ref 98–107)
CO2 SERPL-SCNC: 28 MMOL/L (ref 21–32)
CREAT SERPL-MCNC: 2.4 MG/DL (ref 0.6–1)
ERYTHROCYTE [DISTWIDTH] IN BLOOD BY AUTOMATED COUNT: 12.5 % (ref 11.9–14.6)
EST. AVERAGE GLUCOSE BLD GHB EST-MCNC: 151 MG/DL
GLOBULIN SER CALC-MCNC: 3.1 G/DL (ref 2.3–3.5)
GLUCOSE SERPL-MCNC: 231 MG/DL (ref 65–100)
HBA1C MFR BLD: 6.9 % (ref 4.8–5.6)
HCT VFR BLD AUTO: 46.5 % (ref 35.8–46.3)
HGB BLD-MCNC: 15.3 G/DL (ref 11.7–15.4)
MAGNESIUM SERPL-MCNC: 2.9 MG/DL (ref 1.8–2.4)
MCH RBC QN AUTO: 31.6 PG (ref 26.1–32.9)
MCHC RBC AUTO-ENTMCNC: 32.9 G/DL (ref 31.4–35)
MCV RBC AUTO: 96.1 FL (ref 79.6–97.8)
NRBC # BLD: 0 K/UL (ref 0–0.2)
PLATELET # BLD AUTO: 127 K/UL (ref 150–450)
PMV BLD AUTO: 12.7 FL (ref 9.4–12.3)
POTASSIUM SERPL-SCNC: 3.9 MMOL/L (ref 3.5–5.1)
PROT SERPL-MCNC: 6.6 G/DL (ref 6.3–8.2)
RBC # BLD AUTO: 4.84 M/UL (ref 4.05–5.2)
SODIUM SERPL-SCNC: 134 MMOL/L (ref 136–145)
TSH, 3RD GENERATION: 1.28 UIU/ML (ref 0.36–3.74)
WBC # BLD AUTO: 11.1 K/UL (ref 4.3–11.1)

## 2022-08-19 PROCEDURE — 1090000001 HH PPS REVENUE CREDIT

## 2022-08-19 PROCEDURE — 1090000002 HH PPS REVENUE DEBIT

## 2022-08-19 ASSESSMENT — ENCOUNTER SYMPTOMS: CONTUSION: 1

## 2022-08-20 PROCEDURE — 1090000001 HH PPS REVENUE CREDIT

## 2022-08-20 PROCEDURE — 1090000002 HH PPS REVENUE DEBIT

## 2022-08-21 PROCEDURE — 1090000001 HH PPS REVENUE CREDIT

## 2022-08-21 PROCEDURE — 1090000002 HH PPS REVENUE DEBIT

## 2022-08-22 PROCEDURE — 1090000001 HH PPS REVENUE CREDIT

## 2022-08-22 PROCEDURE — 1090000002 HH PPS REVENUE DEBIT

## 2022-08-23 ENCOUNTER — TELEPHONE (OUTPATIENT)
Dept: INTERNAL MEDICINE CLINIC | Facility: CLINIC | Age: 57
End: 2022-08-23

## 2022-08-23 ENCOUNTER — HOME CARE VISIT (OUTPATIENT)
Dept: SCHEDULING | Facility: HOME HEALTH | Age: 57
End: 2022-08-23
Payer: MEDICARE

## 2022-08-23 VITALS
WEIGHT: 199 LBS | SYSTOLIC BLOOD PRESSURE: 108 MMHG | RESPIRATION RATE: 20 BRPM | TEMPERATURE: 97.7 F | HEART RATE: 63 BPM | OXYGEN SATURATION: 95 % | BODY MASS INDEX: 41.59 KG/M2 | DIASTOLIC BLOOD PRESSURE: 62 MMHG

## 2022-08-23 VITALS
DIASTOLIC BLOOD PRESSURE: 62 MMHG | OXYGEN SATURATION: 97 % | TEMPERATURE: 97.4 F | HEART RATE: 70 BPM | RESPIRATION RATE: 18 BRPM | SYSTOLIC BLOOD PRESSURE: 102 MMHG

## 2022-08-23 PROCEDURE — 1090000002 HH PPS REVENUE DEBIT

## 2022-08-23 PROCEDURE — 1090000001 HH PPS REVENUE CREDIT

## 2022-08-23 PROCEDURE — G0151 HHCP-SERV OF PT,EA 15 MIN: HCPCS

## 2022-08-23 PROCEDURE — G0299 HHS/HOSPICE OF RN EA 15 MIN: HCPCS

## 2022-08-23 ASSESSMENT — ENCOUNTER SYMPTOMS
STOOL DESCRIPTION: SOFT FORMED
PAIN LOCATION - PAIN QUALITY: SORE
DYSPNEA ACTIVITY LEVEL: AFTER AMBULATING MORE THAN 20 FT
DYSPNEA ACTIVITY LEVEL: AFTER AMBULATING MORE THAN 20 FT

## 2022-08-23 NOTE — TELEPHONE ENCOUNTER
Lyndsay Concepcion from [facility]. initiated Start of Care for patient.  She will be seeing patient 2 times a week for 2 weeks because patient plans on going back to work on [employer] ok given

## 2022-08-24 ENCOUNTER — OFFICE VISIT (OUTPATIENT)
Dept: CARDIOLOGY CLINIC | Age: 57
End: 2022-08-24
Payer: MEDICARE

## 2022-08-24 VITALS
BODY MASS INDEX: 41.98 KG/M2 | WEIGHT: 200 LBS | SYSTOLIC BLOOD PRESSURE: 118 MMHG | DIASTOLIC BLOOD PRESSURE: 58 MMHG | HEART RATE: 62 BPM | HEIGHT: 58 IN

## 2022-08-24 DIAGNOSIS — I47.20 VENTRICULAR TACHYCARDIA: ICD-10-CM

## 2022-08-24 DIAGNOSIS — Z09 HOSPITAL DISCHARGE FOLLOW-UP: ICD-10-CM

## 2022-08-24 DIAGNOSIS — I25.3 LEFT VENTRICULAR ANEURYSM: ICD-10-CM

## 2022-08-24 DIAGNOSIS — I10 PRIMARY HYPERTENSION: ICD-10-CM

## 2022-08-24 DIAGNOSIS — I50.23 ACUTE ON CHRONIC HFREF (HEART FAILURE WITH REDUCED EJECTION FRACTION) (HCC): ICD-10-CM

## 2022-08-24 DIAGNOSIS — I42.8 NICM (NONISCHEMIC CARDIOMYOPATHY) (HCC): ICD-10-CM

## 2022-08-24 DIAGNOSIS — I50.22 CHRONIC SYSTOLIC CONGESTIVE HEART FAILURE (HCC): Primary | ICD-10-CM

## 2022-08-24 PROCEDURE — 1111F DSCHRG MED/CURRENT MED MERGE: CPT | Performed by: INTERNAL MEDICINE

## 2022-08-24 PROCEDURE — 1090000002 HH PPS REVENUE DEBIT

## 2022-08-24 PROCEDURE — G8427 DOCREV CUR MEDS BY ELIG CLIN: HCPCS | Performed by: INTERNAL MEDICINE

## 2022-08-24 PROCEDURE — 4004F PT TOBACCO SCREEN RCVD TLK: CPT | Performed by: INTERNAL MEDICINE

## 2022-08-24 PROCEDURE — 1090000001 HH PPS REVENUE CREDIT

## 2022-08-24 PROCEDURE — 99214 OFFICE O/P EST MOD 30 MIN: CPT | Performed by: INTERNAL MEDICINE

## 2022-08-24 PROCEDURE — 3017F COLORECTAL CA SCREEN DOC REV: CPT | Performed by: INTERNAL MEDICINE

## 2022-08-24 PROCEDURE — G8417 CALC BMI ABV UP PARAM F/U: HCPCS | Performed by: INTERNAL MEDICINE

## 2022-08-24 NOTE — PROGRESS NOTES
[address], [address] [employer] [address]  PHONE: [phone]     22    NAME:  Iglesia Villasenor  : 1965  MRN: [id number]       SUBJECTIVE:   Iglesia Villasenor is a 60 y.o. female seen for a follow up visit regarding the following:     Chief Complaint   Patient presents with    Follow-Up from Hospital     Tachycardia        HPI:    Here for NICM/cSHF/VT. SHF (EF 30% in )      Echo 2020: EF 30-35%   2020:  a/c SHF   2020: HF admission, diuresed 7L   Echo 2022: EF 30-35%     LHC 2022: Angiographically normal coronary arteries  Echo 2022: EF 35%  Ct 2022: large left ventricular aneurysm     Recent ICD shock for VT, on amio BID now. Diuresed 10 pds. She lives by herself, sister here in [facility]. One son in [place]. Some DUARTE, some SOB. NYHA Class III sx now. On torsemide daily most days, BID dosing PRN. Edema ok, DUARTE ok. On amio 200 BID now. Patient denies recent history of orthopnea, PND, excessive dizziness and/or syncope. Wearing CPAP every night now. Past Medical History, Past Surgical History, Family history, Social History, and Medications were all reviewed with the patient today and updated as necessary. Current Outpatient Medications   Medication Sig Dispense Refill    calcitRIOL (ROCALTROL) 0.25 MCG capsule Take 0.25 mcg by mouth daily. Cholecalciferol (VITAMIN D3) 50 MCG (2000) TABS Take 1 tablet by mouth daily. nitroGLYCERIN (NITROSTAT) 0.4 MG SL tablet Place 0.4 mg under the tongue every 5 minutes as needed for Chest pain. dupilumab (DUPIXENT) 300 MG/2ML SOPN injection Inject 300 mg into the skin every 14 days. Patient usually takes medication on Monday or Tuesday. acetaminophen (TYLENOL) 500 MG tablet Take 1,000 mg by mouth every 6 hours as needed for Pain (moderate pain).       amiodarone (CORDARONE) 200 MG tablet Take 1 tablet by mouth 2 times daily 180 tablet 3    cyclobenzaprine (FLEXERIL) 10 MG tablet Take 1 tablet by mouth 3 times daily as needed for Muscle spasms 20 tablet 0    aspirin 81 MG EC tablet Take 81 mg by mouth daily      carvedilol (COREG) 6.25 MG tablet Take 6.25 mg by mouth 2 times daily (with meals)      vitamin D 25 MCG (1000 UT) CAPS Take by mouth daily      empagliflozin (JARDIANCE) 10 MG tablet Take 10 mg by mouth daily      insulin glargine (LANTUS SOLOSTAR) 100 UNIT/ML injection pen Inject 15 Units into the skin nightly      isosorbide mononitrate (IMDUR) 30 MG extended release tablet Take 30 mg by mouth daily      latanoprost (XALATAN) 0.005 % ophthalmic solution Place 1 drop into both eyes nightly      polyethylene glycol (GLYCOLAX) 17 GM/SCOOP powder Take 17 g by mouth daily as needed      pravastatin (PRAVACHOL) 80 MG tablet Take 80 mg by mouth nightly      sacubitril-valsartan (ENTRESTO) 49-51 MG per tablet Take 1 tablet by mouth 2 times daily      torsemide (DEMADEX) 20 MG tablet Take 20 mg by mouth 2 times daily      DULoxetine (CYMBALTA) 20 MG extended release capsule Take 1 capsule by mouth daily (Patient not taking: Reported on 8/24/2022) 30 capsule 11    predniSONE (DELTASONE) 10 MG tablet Take 4 tablets by mouth daily for 3 days, THEN 3 tablets daily for 3 days, THEN 2 tablets daily for 3 days, THEN 1 tablet daily for 3 days. 10 tablet 0     No current facility-administered medications for this visit.         Allergies   Allergen Reactions    Other Hives and Shortness Of Breath     \"inhaler PRN\"    Penicillin G Itching     Patient Active Problem List    Diagnosis Date Noted    Accelerating angina ([facility] 06/30/2022     Priority: High     Added automatically from request for surgery [id number]      Left ventricular aneurysm 07/21/2022     Priority: Medium    Thrombocytopenia, unspecified 06/30/2022     Priority: Medium    Rectal bleeding 05/18/2021    CKD (chronic kidney disease) stage 4, GFR 15-29 ml/min ([facility]) 11/25/2020    Pleural effusion on right 11/12/2020 CHF (congestive heart failure) ([facility] 11/10/2020    Acute on chronic HFrEF (heart failure with reduced ejection fraction) ([facility] 11/10/2020    Lower abdominal pain 11/10/2020    CKD (chronic kidney disease) stage 3, GFR 30-59 ml/min ([facility] 08/24/2020    Long term current use of amiodarone 06/12/2020    Restrictive lung disease 04/08/2020    Type 2 diabetes with nephropathy ([facility] 04/02/2018    Chronic right-sided thoracic back pain 05/23/2016    Chronic left-sided low back pain with sciatica 05/23/2016    Ventricular tachycardia ([facility] 04/18/2016    Morbid obesity ([facility] 07/14/2014    LORI on CPAP 07/14/2014    Chronic systolic heart failure ([facility] 07/18/2013     NST 4/15:low risk  Echo 6/2014:EF 40-45%  Echo 5/2013:EF 30-35%  LHC 2/2013: minimal CAD  Formatting of this note might be different from the original.  Overview:   NST 4/15:low risk  Echo 6/2014:EF 40-45%  Echo 5/2013:EF 30-35%  LHC 2/2013: minimal CAD        NICM (nonischemic cardiomyopathy) ([facility] 02/15/2013    Dyslipidemia 02/13/2013    HTN (hypertension) 04/12/2010    Long-term insulin use in type 2 diabetes ([facility] 04/12/2010      Past Surgical History:   Procedure Laterality Date    ABLATION OF DYSRHYTHMIC FOCUS  \"years ago\"    CARDIAC DEFIBRILLATOR PLACEMENT  07/18/2013    Biotronik dual chamber ICD by Dr. Pascual De La Vega  07/18/2013    Biotronik dual chamber ICD by Dr. Laura Rosenbaum 7/1/2022    Left heart cath / coronary angiography performed by Osmar Velazquez MD at [address] [place] [address]      x1    COLONOSCOPY N/A 7/7/2021    COLONOSCOPY/BMI 48 PT HAS AN ICD performed [facility]  03/26/2019    mild incarceration, no bowel removal    HYSTERECTOMY (CERVIX STATUS UNKNOWN)      EDITH-Left ovary intact per pt    LEFT HEART CATH,PERCUTANEOUS  2/13/2013    no intervention    PACEMAKER      ICD    ROTATOR CUFF REPAIR Right TONSILLECTOMY       Family History   Problem Relation Age of Onset    Diabetes Other     Heart Disease Father     Hypertension Father     Stroke Father     Heart Attack Father 48        mi    Breast Cancer Sister 37    Hypertension Brother     Heart Disease Brother     Diabetes Brother     Stroke Mother      Social History     Tobacco Use    Smoking status: Some Days     Packs/day: 0.25     Types: Cigarettes    Smokeless tobacco: Never    Tobacco comments:     Quit smoking: \"every now and then\"   Substance Use Topics    Alcohol use: Yes     Alcohol/week: 1.0 standard drink         ROS:    No obvious pertinent positives on review of systems except for what was outlined in the HPI today.       PHYSICAL EXAM:     BP (!) 118/58   Pulse 62   Ht 4' 10\" (1.473 m)   Wt 200 lb (90.7 kg)   BMI 41.80 kg/m²    General/Constitutional:   Alert and oriented x 3, no acute distress  HEENT:   normocephalic, atraumatic, moist mucous membranes  Neck:   No JVD or carotid bruits bilaterally  Cardiovascular:   regular rate and rhythm, no murmur/rub/gallop appreciated  Pulmonary:   clear to auscultation bilaterally, no respiratory distress  Abdomen:   soft, non-tender, non-distended  Ext:   No sig LE edema bilaterally  Skin:  warm and dry, no obvious rashes seen  Neuro:   no obvious sensory or motor deficits  Psychiatric:   normal mood and affect      Lab Results   Component Value Date/Time     08/19/2022 08:36 AM    K 3.9 08/19/2022 08:36 AM     08/19/2022 08:36 AM    CO2 28 08/19/2022 08:36 AM    BUN 44 08/19/2022 08:36 AM    CREATININE 2.40 08/19/2022 08:36 AM    GLUCOSE 231 08/19/2022 08:36 AM    CALCIUM 8.6 08/19/2022 08:36 AM        Lab Results   Component Value Date    WBC 11.1 08/19/2022    HGB 15.3 08/19/2022    HCT 46.5 (H) 08/19/2022    MCV 96.1 08/19/2022     (L) 08/19/2022       Lab Results   Component Value Date    TSH 1.130 12/22/2021       Lab Results   Component Value Date    LABA1C 6.9 (H) 08/19/2022 Lab Results   Component Value Date     08/19/2022       Lab Results   Component Value Date    CHOL 167 07/14/2022    CHOL 215 (H) 12/22/2021    CHOL 185 06/01/2021     Lab Results   Component Value Date    TRIG 74 07/14/2022    TRIG 91 12/22/2021    TRIG 71 06/01/2021     Lab Results   Component Value Date    HDL 67 (H) 07/14/2022    HDL 79 12/22/2021    HDL 66 06/01/2021     Lab Results   Component Value Date    LDLCALC 85.2 07/14/2022    LDLCALC 120 (H) 12/22/2021    LDLCALC 106 (H) 06/01/2021     Lab Results   Component Value Date    LABVLDL 14.8 07/14/2022    LABVLDL 14 08/17/2020    LABVLDL 17 12/31/2019    VLDL 16 12/22/2021    VLDL 13 06/01/2021    VLDL 13 03/09/2021     Lab Results   Component Value Date    CHOLHDLRATIO 2.5 07/14/2022           I have Independently reviewed prior care notes, any ER records available, cardiac testing, labs and results with the patient and before seeing the patient today. Also independently reviewed outside records when available. ASSESSMENT:    Yann was seen today for follow-up from hospital.    Diagnoses and all orders for this visit:    Chronic systolic congestive heart failure (HCC)    NICM (nonischemic cardiomyopathy) (HCC)    Ventricular tachycardia (HCC)    Acute on chronic HFrEF (heart failure with reduced ejection fraction) ([facility]    Primary hypertension    Left ventricular aneurysm  -     Leonides Lennox, MD, Cardiothoracic Surgery, [facility]        PLAN:    1. CSHF/NICM:  On entresto, coreg, imdur, torsemide. Remain on GDMT. Low salt diet needed. EF the same since 2013, ~30%, last echo reviewed. Abnormal PFTs, working on quitting smoking, Saw pulm, remain on amio. No evidence of ILD on HRCT. Will follow. LV aneurysm is large and reviewed today. 2 sisters here in town. No thrombus seen, hold on [facility] [place] [facility] for now. Refer to CTS for consideration LV aneurysm resection.            2. VT:  Remain on amio and Mg for VT, follow for toxicity on these meds. Stress seems to drive much of the VT. Amio doubled. 200 BID for now. Will follow for amiodarone toxicity with the following:  EKG yearly (follow for QT prolongation)  TFTs and LFTs every 6 mos  CXR yearly  PFTs as needed for change in signs or symptoms  Dermatology evaluation for photosensitivity and blue-gray changes as needed  Eye and neuro exam as needed (corneal microdeposits, ataxia, dizziness, neuropathy)        3. CRI:  Seeing renal.  Follow labs on torsemide now. Follow Cr.       4. LORI:  On CPAP, seems compliant now. Patient has been instructed and agrees to call our office with any issues or other concerns related to their cardiac condition(s) and/or complaint(s). Return in about 1 month (around 9/24/2022).        GUALBERTO HERNÁNDEZ, DO  8/24/2022

## 2022-08-25 ENCOUNTER — CARE COORDINATION (OUTPATIENT)
Dept: CARE COORDINATION | Facility: CLINIC | Age: 57
End: 2022-08-25

## 2022-08-25 PROCEDURE — 1090000002 HH PPS REVENUE DEBIT

## 2022-08-25 PROCEDURE — 1090000001 HH PPS REVENUE CREDIT

## 2022-08-25 NOTE — CARE COORDINATION
[facility] Transitions Follow Up Call    2022    Patient: Randall Sanchez  Patient : 1965   MRN: [id number]  Reason for Admission: CHF  Discharge Date: 22 RARS: Readmission Risk Score: 15.3         Spoke with: patient    Care Transitions Follow Up Call    Needs to be reviewed by the provider   Additional needs identified to be addressed with provider: No  none             Method of communication with provider : none      Care Transition Nurse contacted the patient by telephone to follow up after admission on . Verified name and  with patient as identifiers. Addressed changes since last contact:  Patient with recent appointments and CTN reviewed s/s of HF   Discussed follow-up appointments. If no appointment was previously scheduled, appointment scheduling offered: Yes. Is follow up appointment scheduled within 7 days of discharge? Yes. Advance Care Planning:   Does patient have an Advance Directive: reviewed and current. CTN reviewed discharge instructions, medical action plan and red flags with patient and discussed any barriers to care and/or understanding of plan of care after discharge. Discussed appropriate site of care based on symptoms and resources available to patient including: PCP  Specialist  Home health  CTN . The patient agrees to contact the PCP office for questions related to their healthcare. Patients top risk factors for readmission: medical condition-CHF, EF, CRI, LORI  Interventions to address risk factors: Obtained and reviewed discharge summary and/or continuity of care documents and Reviewed and followed up on pending diagnostic tests and treatments-patient with recent cardiology appt       [facility] follow up appointment(s): [facility]    CTN provided contact information for future needs. Plan for follow-up call in 10-14 days based on severity of symptoms and risk factors.   Plan for next call: symptom management-assess for s/s of CHF and daily weights         Care Transitions Subsequent and Final Call    Subsequent and Final Calls  Do you have any ongoing symptoms?: No  Have your medications changed?: Yes  Patient Reports: completed prednisone taper this morning  Do you have any questions related to your medications?: No  Do you currently have any active services?: Yes  Are you currently active with any services?: Home Health  Do you have any needs or concerns that I can assist you with?: No  Identified Barriers: None  Care Transitions Interventions    Physical Therapy: Completed    Disease Specific Clinic: Completed      Other Interventions:              Follow Up  Future Appointments   Date Time Provider Tj Brar   8/26/2022 To Be Determined ORLANDO Alfonso    8/30/2022 To Be Determined ORLANDO Alfonso    8/30/2022  3:30 PM ARNOLDO Souza CNP [facility]   9/2/2022 To Be Determined ORLANDO Alfonso    9/30/2022 11:15 AM DO ALEX AriasDG [facility]   10/12/2022  2:20 PM [address], APRN - CNP [facility]   10/25/2022  8:15 AM MAT [facility]   11/1/2022  3:40 PM Jillian Munguia MD [facility]       Nikita Dunn RN

## 2022-08-26 ENCOUNTER — HOME CARE VISIT (OUTPATIENT)
Dept: SCHEDULING | Facility: HOME HEALTH | Age: 57
End: 2022-08-26
Payer: MEDICARE

## 2022-08-26 PROCEDURE — 1090000002 HH PPS REVENUE DEBIT

## 2022-08-26 PROCEDURE — 1090000001 HH PPS REVENUE CREDIT

## 2022-08-26 PROCEDURE — G0299 HHS/HOSPICE OF RN EA 15 MIN: HCPCS

## 2022-08-27 VITALS
HEART RATE: 63 BPM | SYSTOLIC BLOOD PRESSURE: 122 MMHG | OXYGEN SATURATION: 97 % | RESPIRATION RATE: 18 BRPM | DIASTOLIC BLOOD PRESSURE: 78 MMHG | TEMPERATURE: 97.1 F

## 2022-08-27 PROCEDURE — 1090000001 HH PPS REVENUE CREDIT

## 2022-08-27 PROCEDURE — 1090000002 HH PPS REVENUE DEBIT

## 2022-08-27 ASSESSMENT — ENCOUNTER SYMPTOMS
STOOL DESCRIPTION: SOFT FORMED
DYSPNEA ACTIVITY LEVEL: AFTER AMBULATING MORE THAN 20 FT

## 2022-08-28 PROCEDURE — 1090000001 HH PPS REVENUE CREDIT

## 2022-08-28 PROCEDURE — 1090000002 HH PPS REVENUE DEBIT

## 2022-08-29 ENCOUNTER — HOME CARE VISIT (OUTPATIENT)
Dept: SCHEDULING | Facility: HOME HEALTH | Age: 57
End: 2022-08-29
Payer: MEDICARE

## 2022-08-29 VITALS
OXYGEN SATURATION: 96 % | TEMPERATURE: 97 F | SYSTOLIC BLOOD PRESSURE: 100 MMHG | RESPIRATION RATE: 16 BRPM | DIASTOLIC BLOOD PRESSURE: 80 MMHG | HEART RATE: 60 BPM

## 2022-08-29 PROCEDURE — G0152 HHCP-SERV OF OT,EA 15 MIN: HCPCS

## 2022-08-29 PROCEDURE — 1090000001 HH PPS REVENUE CREDIT

## 2022-08-29 PROCEDURE — 1090000002 HH PPS REVENUE DEBIT

## 2022-08-29 ASSESSMENT — ENCOUNTER SYMPTOMS
DYSPNEA ACTIVITY LEVEL: AFTER AMBULATING LESS THAN 10 FT
PAIN LOCATION - PAIN QUALITY: BURNING

## 2022-08-30 ENCOUNTER — TELEPHONE (OUTPATIENT)
Dept: CARDIOLOGY CLINIC | Age: 57
End: 2022-08-30

## 2022-08-30 ENCOUNTER — HOME CARE VISIT (OUTPATIENT)
Dept: SCHEDULING | Facility: HOME HEALTH | Age: 57
End: 2022-08-30
Payer: MEDICARE

## 2022-08-30 ENCOUNTER — OFFICE VISIT (OUTPATIENT)
Dept: INTERNAL MEDICINE CLINIC | Facility: CLINIC | Age: 57
End: 2022-08-30
Payer: MEDICARE

## 2022-08-30 VITALS
WEIGHT: 202 LBS | HEART RATE: 61 BPM | BODY MASS INDEX: 40.72 KG/M2 | HEIGHT: 59 IN | TEMPERATURE: 97 F | RESPIRATION RATE: 16 BRPM | SYSTOLIC BLOOD PRESSURE: 115 MMHG | OXYGEN SATURATION: 98 % | DIASTOLIC BLOOD PRESSURE: 83 MMHG

## 2022-08-30 VITALS
HEART RATE: 61 BPM | WEIGHT: 204 LBS | TEMPERATURE: 97.9 F | SYSTOLIC BLOOD PRESSURE: 118 MMHG | RESPIRATION RATE: 18 BRPM | DIASTOLIC BLOOD PRESSURE: 68 MMHG | HEIGHT: 59 IN | BODY MASS INDEX: 41.12 KG/M2 | OXYGEN SATURATION: 98 %

## 2022-08-30 DIAGNOSIS — Z09 HOSPITAL DISCHARGE FOLLOW-UP: Primary | ICD-10-CM

## 2022-08-30 DIAGNOSIS — I50.22 CHRONIC SYSTOLIC CONGESTIVE HEART FAILURE (HCC): ICD-10-CM

## 2022-08-30 DIAGNOSIS — I25.3 LEFT VENTRICULAR ANEURYSM: ICD-10-CM

## 2022-08-30 DIAGNOSIS — R06.02 SHORTNESS OF BREATH: ICD-10-CM

## 2022-08-30 DIAGNOSIS — N18.4 CHRONIC RENAL INSUFFICIENCY, STAGE 4 (SEVERE) (HCC): ICD-10-CM

## 2022-08-30 DIAGNOSIS — I50.9 CHF (CONGESTIVE HEART FAILURE) (HCC): Primary | ICD-10-CM

## 2022-08-30 DIAGNOSIS — I47.20 VENTRICULAR TACHYCARDIA: ICD-10-CM

## 2022-08-30 PROCEDURE — 99495 TRANSJ CARE MGMT MOD F2F 14D: CPT | Performed by: NURSE PRACTITIONER

## 2022-08-30 PROCEDURE — G0299 HHS/HOSPICE OF RN EA 15 MIN: HCPCS

## 2022-08-30 PROCEDURE — 1111F DSCHRG MED/CURRENT MED MERGE: CPT | Performed by: NURSE PRACTITIONER

## 2022-08-30 PROCEDURE — 1090000002 HH PPS REVENUE DEBIT

## 2022-08-30 PROCEDURE — 1090000001 HH PPS REVENUE CREDIT

## 2022-08-30 ASSESSMENT — PATIENT HEALTH QUESTIONNAIRE - PHQ9
2. FEELING DOWN, DEPRESSED OR HOPELESS: 0
1. LITTLE INTEREST OR PLEASURE IN DOING THINGS: 0
SUM OF ALL RESPONSES TO PHQ9 QUESTIONS 1 & 2: 0
SUM OF ALL RESPONSES TO PHQ QUESTIONS 1-9: 0

## 2022-08-30 ASSESSMENT — ENCOUNTER SYMPTOMS
PAIN LOCATION - PAIN QUALITY: TIGHTNESS
STOOL DESCRIPTION: SOFT FORMED
DYSPNEA ACTIVITY LEVEL: AFTER AMBULATING MORE THAN 20 FT

## 2022-08-30 NOTE — TELEPHONE ENCOUNTER
Since discharge from hospital with Afib she has gained 8 pounds and that was 13 days ago. She is very SOB.  Went to PCP today and he scheduled labs here at the [facility] in the AM  Please call

## 2022-08-30 NOTE — TELEPHONE ENCOUNTER
Dc'd from hospital 8/17 and has gained 12 pounds since being home. Since Friday has gained 3 pounds. She is SOB and has some swelling in her ankles. EF 30-35% 4/22. Saw PCP today and they ordered a CBC and BMP for tomorrow and advised she call here. She is on Coreg 6.25mg bid,Torsemide 20mg bid and Entresto 49/51mg bid.    BP today 115/83 has CKD stage 4

## 2022-08-30 NOTE — PROGRESS NOTES
Post-Discharge Transitional Care Follow Up      Donald Jansen   YOB: 1965    Date of Office Visit:  8/30/2022  Date of Hospital Admission: 8/10/22  Date of Hospital Discharge: 8/17/22  Readmission Risk Score (high >=14%. Medium >=10%):Readmission Risk Score: 15.3      Care management risk score Rising risk (score 2-5) and Complex Care (Scores >=6): No Risk Score On File     Non face to face  following discharge, date last encounter closed (first attempt may have been earlier): 08/18/2022     Call initiated 2 business days of discharge: Yes     Hospital discharge follow-up  -     KS DISCHARGE MEDS RECONCILED W/ CURRENT OUTPATIENT MED LIST    Chronic systolic congestive heart failure ([address]  -     Basic Metabolic Panel; Future  -     CBC with Auto Differential; Future  -     Brain Natriuretic Peptide; Future  Weight gain and increased SOB since discharge. Check labs today. Encouraged compliance with low sodium diet and fluid restriction. Continue medications as prescribed for now given poor renal function. Recommended that patient call her cardiologist to discuss plan of care given weight gain and worsening SOB. Ventricular tachycardia ([address]  -     Basic Metabolic Panel; Future  -     CBC with Auto Differential; Future    Left ventricular aneurysm  Patient scheduled for consultation with CT surgery on 9/6/22 to discuss possible resection of LV aneurysm. Chronic renal insufficiency, stage 4 (severe) (HCC)  -     Basic Metabolic Panel; Future    Medical Decision Making: moderate complexity    On this date 8/30/2022 I have spent 35 minutes reviewing previous notes, test results and face to face with the patient discussing the diagnosis and importance of compliance with the treatment plan as well as documenting on the day of the visit. Subjective:   HPI    Inpatient course: Discharge summary reviewed- see chart.     Interval history/Current status:     Patient presented to the emergency department of [facility] after an ICD shock. She also noted LE edema. Patient had underwent [facility] 5/2022 that showed normal coronaries but with LV with a large posterior basal aneurysm. Patient was evaluated and subsequently admitted for further cardiac evaluation and treatment. EP was consulted for further evaluation of VT. Noted possible source of VT to be LV with a large posterior basal aneurysm noted on cath and TTE and that patient may need to be considered for resection of the aneurysm. Patient was transitioned from IV amiodarone to PO BID without recurrence of arrhythmia. Hospitalist was consulted for ongoing leg pain. Patient underwent CT myelogram which showed:  4 nonrib-bearing lumbar vertebra; the 5th lumbar   vertebra is sacralized. No high-grade stenosis identified. There is mild,   grade 1, anterolisthesis of L3 on L4. This was concerning for some root compression/sciatica. Unable to obtain MRI due to ICD being incompatible. She was treated with IV steroids and Cymbalta. Steroids were transitioned to tapered dose. Since returning home, patient reports improvement in her leg pain. At time of discharge, patient was up feeling well without any complaints shortness of breath. LE edema was much improved. Patient's labs were stable with creatinine of 2.1. Labs to be rechecked on Friday, 8/19/2022. Patient was instructed on the importance of medication compliance, low sodium diet, 2 liter per day fluid restriction and daily weights. For maximized medical therapy of congestive heart failure, patient will continue use of  BB and ARNI. The patient has been scheduled for care follow up with [facility] -- Dr. Liam White . Since returning home, patient trying to follow a low sodium diet and fluid restriction as instructed. Compliant with medications as prescribed. She unfortunately reports weight gain of 8 pounds since returning home, 3 pounds of which has been within the past 3 days. Denies lower extremity edema but reports increased SOB on exertion. No CP. Reports taking an extra dose of torsemide last this morning. Patient has consultation appointment with cardiothoracic surgery on 9/6/22 to discuss possible resection of aneurysm. Followed by nephrology. Follow-up scheduled for next month. Recheck renal function today. Patient Active Problem List   Diagnosis    HTN (hypertension)    Type 2 diabetes with nephropathy (HCC)    CHF (congestive heart failure) (HCC)    Long-term insulin use in type 2 diabetes ([facility]    NICM (nonischemic cardiomyopathy) (HCC)    Rectal bleeding    Dyslipidemia    CKD (chronic kidney disease) stage 4, GFR 15-29 ml/min (HCC)    Ventricular tachycardia (HCC)    Chronic systolic heart failure (HCC)    Morbid obesity (HCC)    Restrictive lung disease    Long term current use of amiodarone    Chronic right-sided thoracic back pain    CKD (chronic kidney disease) stage 3, GFR 30-59 ml/min (HCC)    LORI on CPAP    Acute on chronic HFrEF (heart failure with reduced ejection fraction) ([facility])    Pleural effusion on right    Lower abdominal pain    Chronic left-sided low back pain with sciatica    Thrombocytopenia, unspecified    Accelerating angina ([facility]    Left ventricular aneurysm       Medications listed as ordered at the time of discharge from hospital     Medication List            Accurate as of August 30, 2022 11:59 PM. If you have any questions, ask your nurse or doctor.                 CONTINUE taking these medications      acetaminophen 500 MG tablet  Commonly known as: TYLENOL     amiodarone 200 MG tablet  Commonly known as: CORDARONE  Take 1 tablet by mouth 2 times daily     aspirin 81 MG EC tablet     calcitRIOL 0.25 MCG capsule  Commonly known as: ROCALTROL     carvedilol 6.25 MG tablet  Commonly known as: COREG     DULoxetine 20 MG extended release capsule  Commonly known as: CYMBALTA  Take 1 capsule by mouth daily     dupilumab 300 MG/2ML Sopn injection  Commonly known as: DUPIXENT     empagliflozin 10 MG tablet  Commonly known as: JARDIANCE     Entresto 49-51 MG per tablet  Generic drug: sacubitril-valsartan     isosorbide mononitrate 30 MG extended release tablet  Commonly known as: IMDUR     Lantus SoloStar 100 UNIT/ML injection pen  Generic drug: insulin glargine     latanoprost 0.005 % ophthalmic solution  Commonly known as: XALATAN     nitroGLYCERIN 0.4 MG SL tablet  Commonly known as: NITROSTAT     polyethylene glycol 17 GM/SCOOP powder  Commonly known as: GLYCOLAX     pravastatin 80 MG tablet  Commonly known as: PRAVACHOL     torsemide 20 MG tablet  Commonly known as: DEMADEX     * vitamin D 25 MCG (1000 UT) Caps     * Vitamin D3 50 MCG (2000 UT) Tabs           * This list has 2 medication(s) that are the same as other medications prescribed for you. Read the directions carefully, and ask your doctor or other care provider to review them with you. Medications marked \"taking\" at this time  Outpatient Medications Marked as Taking for the 8/30/22 encounter (Office Visit) with ARNOLDO Shultz CNP   Medication Sig Dispense Refill    calcitRIOL (ROCALTROL) 0.25 MCG capsule Take 0.25 mcg by mouth daily. Cholecalciferol (VITAMIN D3) 50 MCG (2000 UT) TABS Take 1 tablet by mouth daily. dupilumab (DUPIXENT) 300 MG/2ML SOPN injection Inject 300 mg into the skin every 14 days. Patient usually takes medication on Monday or Tuesday. acetaminophen (TYLENOL) 500 MG tablet Take 1,000 mg by mouth every 6 hours as needed for Pain (moderate pain).       amiodarone (CORDARONE) 200 MG tablet Take 1 tablet by mouth 2 times daily 180 tablet 3    DULoxetine (CYMBALTA) 20 MG extended release capsule Take 1 capsule by mouth daily 30 capsule 11    aspirin 81 MG EC tablet Take 81 mg by mouth daily      carvedilol (COREG) 6.25 MG tablet Take 6.25 mg by mouth 2 times daily (with meals)      vitamin D 25 MCG (1000 UT) CAPS Take by mouth daily empagliflozin (JARDIANCE) 10 MG tablet Take 10 mg by mouth daily      insulin glargine (LANTUS SOLOSTAR) 100 UNIT/ML injection pen Inject 15 Units into the skin nightly      isosorbide mononitrate (IMDUR) 30 MG extended release tablet Take 30 mg by mouth daily      latanoprost (XALATAN) 0.005 % ophthalmic solution Place 1 drop into both eyes nightly      polyethylene glycol (GLYCOLAX) 17 GM/SCOOP powder Take 17 g by mouth daily as needed      pravastatin (PRAVACHOL) 80 MG tablet Take 80 mg by mouth nightly      sacubitril-valsartan (ENTRESTO) 49-51 MG per tablet Take 1 tablet by mouth 2 times daily      torsemide (DEMADEX) 20 MG tablet Take 20 mg by mouth 2 times daily          Medications patient taking as of now reconciled against medications ordered at time of hospital discharge: Yes    Review of Systems   Constitutional:  Positive for fatigue and unexpected weight change. Negative for chills and fever. HENT:  Negative for congestion, ear pain and sore throat. Respiratory:  Positive for shortness of breath. Negative for cough and wheezing. Cardiovascular:  Negative for chest pain, palpitations and leg swelling. Gastrointestinal:  Negative for abdominal pain, diarrhea, nausea and vomiting. Genitourinary:  Negative for dysuria and hematuria. Neurological:  Negative for dizziness, light-headedness and headaches. Objective:    /83 (Site: Left Upper Arm, Position: Sitting, Cuff Size: Medium Adult)   Pulse 61   Temp 97 °F (36.1 °C) (Temporal)   Resp 16   Ht 4' 11\" (1.499 m)   Wt 202 lb (91.6 kg)   SpO2 98%   BMI 40.80 kg/m²       Physical Exam  Vitals and nursing note reviewed. Constitutional:       General: She is not in acute distress. Appearance: Normal appearance. Cardiovascular:      Rate and Rhythm: Normal rate and regular rhythm. Pulses:           Dorsalis pedis pulses are 2+ on the right side and 2+ on the left side.    Pulmonary:      Effort: Pulmonary effort is normal. No respiratory distress. Breath sounds: Normal breath sounds. Abdominal:      General: Bowel sounds are normal.      Palpations: Abdomen is soft. Musculoskeletal:      Right lower leg: Edema (trace; non-pitting) present. Left lower leg: Edema (trace; non-pitting) present. Skin:     General: Skin is warm and dry. Neurological:      Mental Status: She is alert and oriented to person, place, and time. Psychiatric:         Mood and Affect: Mood normal.       An electronic signature was used to authenticate this note.   --ARNOLDO Pantoja - CNP

## 2022-08-30 NOTE — TELEPHONE ENCOUNTER
Can increase torsemide to 40 BID, focus on low salt diet. Raise legs. To ER for worsening DUARTE, SOB, edema. Let's call her Fri AM, see if getting better.     Thanks

## 2022-08-31 ENCOUNTER — TELEPHONE (OUTPATIENT)
Dept: INTERNAL MEDICINE CLINIC | Facility: CLINIC | Age: 57
End: 2022-08-31

## 2022-08-31 DIAGNOSIS — N18.4 CHRONIC RENAL INSUFFICIENCY, STAGE 4 (SEVERE) (HCC): ICD-10-CM

## 2022-08-31 DIAGNOSIS — I50.22 CHRONIC SYSTOLIC CONGESTIVE HEART FAILURE (HCC): ICD-10-CM

## 2022-08-31 DIAGNOSIS — I47.20 VENTRICULAR TACHYCARDIA: ICD-10-CM

## 2022-08-31 LAB
ANION GAP SERPL CALC-SCNC: 7 MMOL/L (ref 4–13)
BASOPHILS # BLD: 0.1 K/UL (ref 0–0.2)
BASOPHILS NFR BLD: 1 % (ref 0–2)
BUN SERPL-MCNC: 26 MG/DL (ref 6–23)
CALCIUM SERPL-MCNC: 9.3 MG/DL (ref 8.3–10.4)
CHLORIDE SERPL-SCNC: 100 MMOL/L (ref 101–110)
CO2 SERPL-SCNC: 29 MMOL/L (ref 21–32)
CREAT SERPL-MCNC: 1.8 MG/DL (ref 0.6–1)
DIFFERENTIAL METHOD BLD: ABNORMAL
EOSINOPHIL # BLD: 0.1 K/UL (ref 0–0.8)
EOSINOPHIL NFR BLD: 2 % (ref 0.5–7.8)
ERYTHROCYTE [DISTWIDTH] IN BLOOD BY AUTOMATED COUNT: 13.1 % (ref 11.9–14.6)
GLUCOSE SERPL-MCNC: 206 MG/DL (ref 65–100)
HCT VFR BLD AUTO: 45.2 % (ref 35.8–46.3)
HGB BLD-MCNC: 14.6 G/DL (ref 11.7–15.4)
IMM GRANULOCYTES # BLD AUTO: 0.1 K/UL (ref 0–0.5)
IMM GRANULOCYTES NFR BLD AUTO: 1 % (ref 0–5)
LYMPHOCYTES # BLD: 2.4 K/UL (ref 0.5–4.6)
LYMPHOCYTES NFR BLD: 27 % (ref 13–44)
MCH RBC QN AUTO: 31.7 PG (ref 26.1–32.9)
MCHC RBC AUTO-ENTMCNC: 32.3 G/DL (ref 31.4–35)
MCV RBC AUTO: 98.3 FL (ref 79.6–97.8)
MONOCYTES # BLD: 0.6 K/UL (ref 0.1–1.3)
MONOCYTES NFR BLD: 6 % (ref 4–12)
NEUTS SEG # BLD: 5.6 K/UL (ref 1.7–8.2)
NEUTS SEG NFR BLD: 63 % (ref 43–78)
NRBC # BLD: 0 K/UL (ref 0–0.2)
PLATELET # BLD AUTO: 133 K/UL (ref 150–450)
PMV BLD AUTO: 12.7 FL (ref 9.4–12.3)
POTASSIUM SERPL-SCNC: 3.7 MMOL/L (ref 3.5–5.1)
RBC # BLD AUTO: 4.6 M/UL (ref 4.05–5.2)
SODIUM SERPL-SCNC: 136 MMOL/L (ref 136–145)
WBC # BLD AUTO: 8.7 K/UL (ref 4.3–11.1)

## 2022-08-31 PROCEDURE — 1090000002 HH PPS REVENUE DEBIT

## 2022-08-31 PROCEDURE — 1090000001 HH PPS REVENUE CREDIT

## 2022-08-31 ASSESSMENT — ENCOUNTER SYMPTOMS
WHEEZING: 0
VOMITING: 0
SHORTNESS OF BREATH: 1
NAUSEA: 0
SORE THROAT: 0
ABDOMINAL PAIN: 0
COUGH: 0
DIARRHEA: 0

## 2022-08-31 NOTE — TELEPHONE ENCOUNTER
Pt wasn't able to give blood today,  said pt was dehydrated. The pt just walked out. I tried calling pt, mailbox is full.      Thank you    Aneta Hayden

## 2022-09-01 ENCOUNTER — HOME CARE VISIT (OUTPATIENT)
Dept: SCHEDULING | Facility: HOME HEALTH | Age: 57
End: 2022-09-01
Payer: MEDICARE

## 2022-09-01 VITALS
OXYGEN SATURATION: 98 % | HEART RATE: 60 BPM | RESPIRATION RATE: 20 BRPM | SYSTOLIC BLOOD PRESSURE: 108 MMHG | DIASTOLIC BLOOD PRESSURE: 78 MMHG | TEMPERATURE: 97.4 F

## 2022-09-01 PROCEDURE — G0299 HHS/HOSPICE OF RN EA 15 MIN: HCPCS

## 2022-09-01 PROCEDURE — 1090000002 HH PPS REVENUE DEBIT

## 2022-09-01 PROCEDURE — 1090000001 HH PPS REVENUE CREDIT

## 2022-09-01 ASSESSMENT — ENCOUNTER SYMPTOMS
PAIN LOCATION - PAIN QUALITY: ACHE, SORE
STOOL DESCRIPTION: FORMED
DYSPNEA ACTIVITY LEVEL: AFTER AMBULATING 10 - 20 FT

## 2022-09-02 ENCOUNTER — TELEPHONE (OUTPATIENT)
Dept: INTERNAL MEDICINE CLINIC | Facility: CLINIC | Age: 57
End: 2022-09-02

## 2022-09-02 PROCEDURE — 1090000002 HH PPS REVENUE DEBIT

## 2022-09-02 PROCEDURE — 1090000001 HH PPS REVENUE CREDIT

## 2022-09-02 NOTE — TELEPHONE ENCOUNTER
Called and informed pt. of MD response and placed orders as below  Orders Placed This Encounter   Procedures    Comprehensive Metabolic Panel     Standing Status:   Future     Standing Expiration Date:   9/2/2023    Magnesium     Standing Status:   Future     Standing Expiration Date:   9/2/2023    Brain Natriuretic Peptide     Standing Status:   Future     Standing Expiration Date:   9/2/2023     Pt.v/u.

## 2022-09-02 NOTE — TELEPHONE ENCOUNTER
Jaimee ,nurse with [facility], states that patient's nursing order has ended and she would like verbal to continue nursing . She states patient wanted to go back to work. Jaimee needs to know is patient able to return to work? Called Jaimee and notified her that patient was not able to return to work and ok given to continue [facility] visits.

## 2022-09-02 NOTE — TELEPHONE ENCOUNTER
Fine to return to work, can remain on double dose. When she can, needs to get CMP, BNP, Mag next week.    Thanks

## 2022-09-02 NOTE — TELEPHONE ENCOUNTER
I spoke w/pt. at [address] [place] [address] N request.After increasing Torsemide her breathing is a little better. Wt.is still up about 6 pounds. She still has a little swelling in ankles and abdomen. 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 Is she ok to return to work. She feels ok if he is ok w/it. She already has an ok to return to work. What does she need to do about her Torsemide? Does she continue double dose?

## 2022-09-02 NOTE — TELEPHONE ENCOUNTER
Pt was called and she informed me that she will be unable to come to the office to sign the form. The form is being mailed today.     Thank you    Wilbert Russo

## 2022-09-03 PROCEDURE — 1090000001 HH PPS REVENUE CREDIT

## 2022-09-03 PROCEDURE — 1090000002 HH PPS REVENUE DEBIT

## 2022-09-04 PROCEDURE — 1090000002 HH PPS REVENUE DEBIT

## 2022-09-04 PROCEDURE — 1090000001 HH PPS REVENUE CREDIT

## 2022-09-05 ENCOUNTER — HOME CARE VISIT (OUTPATIENT)
Dept: HOME HEALTH SERVICES | Facility: HOME HEALTH | Age: 57
End: 2022-09-05
Payer: MEDICARE

## 2022-09-05 PROCEDURE — 1090000002 HH PPS REVENUE DEBIT

## 2022-09-05 PROCEDURE — 1090000001 HH PPS REVENUE CREDIT

## 2022-09-05 ASSESSMENT — ENCOUNTER SYMPTOMS: DYSPNEA ACTIVITY LEVEL: AFTER AMBULATING 10 - 20 FT

## 2022-09-06 ENCOUNTER — HOSPITAL ENCOUNTER (INPATIENT)
Age: 57
LOS: 18 days | Discharge: ANOTHER HOSPITAL | DRG: 315 | End: 2022-09-24
Attending: EMERGENCY MEDICINE | Admitting: INTERNAL MEDICINE
Payer: MEDICARE

## 2022-09-06 ENCOUNTER — APPOINTMENT (OUTPATIENT)
Dept: GENERAL RADIOLOGY | Age: 57
DRG: 315 | End: 2022-09-06
Payer: MEDICARE

## 2022-09-06 DIAGNOSIS — I47.20 VENTRICULAR TACHYCARDIA: Primary | ICD-10-CM

## 2022-09-06 PROBLEM — N17.9 ACUTE ON CHRONIC RENAL FAILURE (HCC): Status: ACTIVE | Noted: 2022-09-06

## 2022-09-06 PROBLEM — I50.23 ACUTE ON CHRONIC HFREF (HEART FAILURE WITH REDUCED EJECTION FRACTION) (HCC): Status: RESOLVED | Noted: 2020-11-10 | Resolved: 2022-09-06

## 2022-09-06 PROBLEM — I20.0 ACCELERATING ANGINA (HCC): Status: RESOLVED | Noted: 2022-06-30 | Resolved: 2022-09-06

## 2022-09-06 PROBLEM — N18.4 CKD (CHRONIC KIDNEY DISEASE) STAGE 4, GFR 15-29 ML/MIN (HCC): Status: ACTIVE | Noted: 2020-11-25

## 2022-09-06 PROBLEM — N18.9 ACUTE ON CHRONIC RENAL FAILURE (HCC): Status: ACTIVE | Noted: 2022-09-06

## 2022-09-06 PROBLEM — J90 PLEURAL EFFUSION ON RIGHT: Status: RESOLVED | Noted: 2020-11-12 | Resolved: 2022-09-06

## 2022-09-06 LAB
ALBUMIN SERPL-MCNC: 3.6 G/DL (ref 3.5–5)
ALBUMIN/GLOB SERPL: 1.1 {RATIO} (ref 1.2–3.5)
ALP SERPL-CCNC: 82 U/L (ref 50–136)
ALT SERPL-CCNC: 55 U/L (ref 12–65)
ANION GAP SERPL CALC-SCNC: 8 MMOL/L (ref 4–13)
ARTERIAL PATENCY WRIST A: POSITIVE
AST SERPL-CCNC: 41 U/L (ref 15–37)
BASE DEFICIT BLD-SCNC: 2.9 MMOL/L
BDY SITE: ABNORMAL
BILIRUB SERPL-MCNC: 0.6 MG/DL (ref 0.2–1.1)
BUN SERPL-MCNC: 50 MG/DL (ref 6–23)
CA-I BLD-MCNC: 1.11 MMOL/L (ref 1.12–1.32)
CALCIUM SERPL-MCNC: 9.4 MG/DL (ref 8.3–10.4)
CHLORIDE SERPL-SCNC: 101 MMOL/L (ref 101–110)
CO2 BLD-SCNC: 22 MMOL/L (ref 13–23)
CO2 SERPL-SCNC: 26 MMOL/L (ref 21–32)
CREAT SERPL-MCNC: 3.3 MG/DL (ref 0.6–1)
ERYTHROCYTE [DISTWIDTH] IN BLOOD BY AUTOMATED COUNT: 13.5 % (ref 11.9–14.6)
GAS FLOW.O2 O2 DELIVERY SYS: ABNORMAL L/MIN
GLOBULIN SER CALC-MCNC: 3.2 G/DL (ref 2.3–3.5)
GLUCOSE BLD STRIP.AUTO-MCNC: 133 MG/DL (ref 65–100)
GLUCOSE BLD STRIP.AUTO-MCNC: 137 MG/DL (ref 65–100)
GLUCOSE BLD STRIP.AUTO-MCNC: 140 MG/DL (ref 65–100)
GLUCOSE BLD STRIP.AUTO-MCNC: 325 MG/DL (ref 65–100)
GLUCOSE SERPL-MCNC: 348 MG/DL (ref 65–100)
HCO3 BLD-SCNC: 21.5 MMOL/L (ref 22–26)
HCT VFR BLD AUTO: 41.6 % (ref 35.8–46.3)
HGB BLD-MCNC: 13.5 G/DL (ref 11.7–15.4)
LIPASE SERPL-CCNC: 100 U/L (ref 73–393)
MAGNESIUM SERPL-MCNC: 2.7 MG/DL (ref 1.8–2.4)
MCH RBC QN AUTO: 32 PG (ref 26.1–32.9)
MCHC RBC AUTO-ENTMCNC: 32.5 G/DL (ref 31.4–35)
MCV RBC AUTO: 98.6 FL (ref 79.6–97.8)
NRBC # BLD: 0 K/UL (ref 0–0.2)
NT PRO BNP: 3618 PG/ML (ref 5–125)
PCO2 BLD: 35.5 MMHG (ref 35–45)
PH BLD: 7.39 [PH] (ref 7.35–7.45)
PLATELET # BLD AUTO: 123 K/UL (ref 150–450)
PMV BLD AUTO: 12.2 FL (ref 9.4–12.3)
PO2 BLD: 53 MMHG (ref 75–100)
POTASSIUM BLD-SCNC: 3 MMOL/L (ref 3.5–5.1)
POTASSIUM SERPL-SCNC: 3.8 MMOL/L (ref 3.5–5.1)
PROT SERPL-MCNC: 6.8 G/DL (ref 6.3–8.2)
RBC # BLD AUTO: 4.22 M/UL (ref 4.05–5.2)
SAO2 % BLD: 87 %
SERVICE CMNT-IMP: ABNORMAL
SODIUM BLD-SCNC: 138 MMOL/L (ref 136–145)
SODIUM SERPL-SCNC: 135 MMOL/L (ref 136–145)
SPECIMEN SITE: ABNORMAL
TROPONIN I SERPL HS-MCNC: 132.2 PG/ML (ref 0–14)
TROPONIN I SERPL HS-MCNC: 433.8 PG/ML (ref 0–14)
WBC # BLD AUTO: 11 K/UL (ref 4.3–11.1)

## 2022-09-06 PROCEDURE — 84484 ASSAY OF TROPONIN QUANT: CPT

## 2022-09-06 PROCEDURE — 80053 COMPREHEN METABOLIC PANEL: CPT

## 2022-09-06 PROCEDURE — 6360000002 HC RX W HCPCS: Performed by: INTERNAL MEDICINE

## 2022-09-06 PROCEDURE — 2580000003 HC RX 258: Performed by: EMERGENCY MEDICINE

## 2022-09-06 PROCEDURE — 1100000003 HC PRIVATE W/ TELEMETRY

## 2022-09-06 PROCEDURE — 71045 X-RAY EXAM CHEST 1 VIEW: CPT

## 2022-09-06 PROCEDURE — 6370000000 HC RX 637 (ALT 250 FOR IP): Performed by: INTERNAL MEDICINE

## 2022-09-06 PROCEDURE — 99285 EMERGENCY DEPT VISIT HI MDM: CPT

## 2022-09-06 PROCEDURE — 51702 INSERT TEMP BLADDER CATH: CPT

## 2022-09-06 PROCEDURE — 2580000003 HC RX 258: Performed by: NURSE PRACTITIONER

## 2022-09-06 PROCEDURE — 82962 GLUCOSE BLOOD TEST: CPT

## 2022-09-06 PROCEDURE — 82803 BLOOD GASES ANY COMBINATION: CPT

## 2022-09-06 PROCEDURE — 5A2204Z RESTORATION OF CARDIAC RHYTHM, SINGLE: ICD-10-PCS | Performed by: EMERGENCY MEDICINE

## 2022-09-06 PROCEDURE — 99223 1ST HOSP IP/OBS HIGH 75: CPT | Performed by: INTERNAL MEDICINE

## 2022-09-06 PROCEDURE — 85027 COMPLETE CBC AUTOMATED: CPT

## 2022-09-06 PROCEDURE — 83735 ASSAY OF MAGNESIUM: CPT

## 2022-09-06 PROCEDURE — 6360000002 HC RX W HCPCS: Performed by: EMERGENCY MEDICINE

## 2022-09-06 PROCEDURE — 83880 ASSAY OF NATRIURETIC PEPTIDE: CPT

## 2022-09-06 PROCEDURE — 96374 THER/PROPH/DIAG INJ IV PUSH: CPT

## 2022-09-06 PROCEDURE — 83690 ASSAY OF LIPASE: CPT

## 2022-09-06 PROCEDURE — 36415 COLL VENOUS BLD VENIPUNCTURE: CPT

## 2022-09-06 PROCEDURE — 6370000000 HC RX 637 (ALT 250 FOR IP): Performed by: NURSE PRACTITIONER

## 2022-09-06 RX ORDER — ACETAMINOPHEN 650 MG/1
650 SUPPOSITORY RECTAL EVERY 6 HOURS PRN
Status: DISCONTINUED | OUTPATIENT
Start: 2022-09-06 | End: 2022-09-25 | Stop reason: HOSPADM

## 2022-09-06 RX ORDER — ACETAMINOPHEN 325 MG/1
650 TABLET ORAL EVERY 6 HOURS PRN
Status: DISCONTINUED | OUTPATIENT
Start: 2022-09-06 | End: 2022-09-25 | Stop reason: HOSPADM

## 2022-09-06 RX ORDER — SODIUM CHLORIDE 0.9 % (FLUSH) 0.9 %
5-40 SYRINGE (ML) INJECTION EVERY 12 HOURS SCHEDULED
Status: DISCONTINUED | OUTPATIENT
Start: 2022-09-06 | End: 2022-09-25 | Stop reason: HOSPADM

## 2022-09-06 RX ORDER — INSULIN GLARGINE 100 [IU]/ML
7 INJECTION, SOLUTION SUBCUTANEOUS NIGHTLY
Status: DISCONTINUED | OUTPATIENT
Start: 2022-09-06 | End: 2022-09-25 | Stop reason: HOSPADM

## 2022-09-06 RX ORDER — LATANOPROST 50 UG/ML
1 SOLUTION/ DROPS OPHTHALMIC NIGHTLY
Status: DISCONTINUED | OUTPATIENT
Start: 2022-09-06 | End: 2022-09-25 | Stop reason: HOSPADM

## 2022-09-06 RX ORDER — SODIUM CHLORIDE 0.9 % (FLUSH) 0.9 %
5-40 SYRINGE (ML) INJECTION PRN
Status: DISCONTINUED | OUTPATIENT
Start: 2022-09-06 | End: 2022-09-25 | Stop reason: HOSPADM

## 2022-09-06 RX ORDER — PRAVASTATIN SODIUM 80 MG/1
80 TABLET ORAL NIGHTLY
Status: DISCONTINUED | OUTPATIENT
Start: 2022-09-06 | End: 2022-09-25 | Stop reason: HOSPADM

## 2022-09-06 RX ORDER — ONDANSETRON 2 MG/ML
4 INJECTION INTRAMUSCULAR; INTRAVENOUS EVERY 4 HOURS PRN
Status: DISCONTINUED | OUTPATIENT
Start: 2022-09-06 | End: 2022-09-25 | Stop reason: HOSPADM

## 2022-09-06 RX ORDER — POLYETHYLENE GLYCOL 3350 17 G/17G
17 POWDER, FOR SOLUTION ORAL DAILY PRN
Status: DISCONTINUED | OUTPATIENT
Start: 2022-09-06 | End: 2022-09-07 | Stop reason: SDUPTHER

## 2022-09-06 RX ORDER — HYDROCODONE BITARTRATE AND ACETAMINOPHEN 5; 325 MG/1; MG/1
1 TABLET ORAL EVERY 6 HOURS PRN
Status: DISCONTINUED | OUTPATIENT
Start: 2022-09-06 | End: 2022-09-07 | Stop reason: SDUPTHER

## 2022-09-06 RX ORDER — ENOXAPARIN SODIUM 100 MG/ML
40 INJECTION SUBCUTANEOUS DAILY
Status: DISCONTINUED | OUTPATIENT
Start: 2022-09-06 | End: 2022-09-06 | Stop reason: ALTCHOICE

## 2022-09-06 RX ORDER — HEPARIN SODIUM 5000 [USP'U]/ML
5000 INJECTION, SOLUTION INTRAVENOUS; SUBCUTANEOUS EVERY 8 HOURS SCHEDULED
Status: DISCONTINUED | OUTPATIENT
Start: 2022-09-06 | End: 2022-09-25 | Stop reason: HOSPADM

## 2022-09-06 RX ORDER — SODIUM CHLORIDE 9 MG/ML
INJECTION, SOLUTION INTRAVENOUS CONTINUOUS
Status: DISCONTINUED | OUTPATIENT
Start: 2022-09-06 | End: 2022-09-08

## 2022-09-06 RX ORDER — DULOXETIN HYDROCHLORIDE 20 MG/1
20 CAPSULE, DELAYED RELEASE ORAL DAILY
Status: DISCONTINUED | OUTPATIENT
Start: 2022-09-06 | End: 2022-09-25 | Stop reason: HOSPADM

## 2022-09-06 RX ORDER — INSULIN LISPRO 100 [IU]/ML
0-4 INJECTION, SOLUTION INTRAVENOUS; SUBCUTANEOUS NIGHTLY
Status: DISCONTINUED | OUTPATIENT
Start: 2022-09-06 | End: 2022-09-25 | Stop reason: HOSPADM

## 2022-09-06 RX ORDER — ASPIRIN 81 MG/1
81 TABLET ORAL DAILY
Status: DISCONTINUED | OUTPATIENT
Start: 2022-09-06 | End: 2022-09-25 | Stop reason: HOSPADM

## 2022-09-06 RX ORDER — INSULIN LISPRO 100 [IU]/ML
0-4 INJECTION, SOLUTION INTRAVENOUS; SUBCUTANEOUS
Status: DISCONTINUED | OUTPATIENT
Start: 2022-09-06 | End: 2022-09-25 | Stop reason: HOSPADM

## 2022-09-06 RX ORDER — DEXTROSE MONOHYDRATE 100 MG/ML
INJECTION, SOLUTION INTRAVENOUS CONTINUOUS PRN
Status: DISCONTINUED | OUTPATIENT
Start: 2022-09-06 | End: 2022-09-25 | Stop reason: HOSPADM

## 2022-09-06 RX ORDER — MIDAZOLAM HYDROCHLORIDE 1 MG/ML
2 INJECTION, SOLUTION INTRAMUSCULAR; INTRAVENOUS
Status: DISCONTINUED | OUTPATIENT
Start: 2022-09-06 | End: 2022-09-06 | Stop reason: SDUPTHER

## 2022-09-06 RX ORDER — MIDAZOLAM HYDROCHLORIDE 1 MG/ML
2 INJECTION, SOLUTION INTRAMUSCULAR; INTRAVENOUS ONCE
Status: COMPLETED | OUTPATIENT
Start: 2022-09-06 | End: 2022-09-06

## 2022-09-06 RX ORDER — NITROGLYCERIN 0.4 MG/1
0.4 TABLET SUBLINGUAL EVERY 5 MIN PRN
Status: DISCONTINUED | OUTPATIENT
Start: 2022-09-06 | End: 2022-09-25 | Stop reason: HOSPADM

## 2022-09-06 RX ORDER — POTASSIUM CHLORIDE 7.45 MG/ML
10 INJECTION INTRAVENOUS ONCE
Status: COMPLETED | OUTPATIENT
Start: 2022-09-06 | End: 2022-09-06

## 2022-09-06 RX ADMIN — SALINE NASAL SPRAY 1 SPRAY: 1.5 SOLUTION NASAL at 09:40

## 2022-09-06 RX ADMIN — ASPIRIN 81 MG: 81 TABLET, COATED ORAL at 09:28

## 2022-09-06 RX ADMIN — HEPARIN SODIUM 5000 UNITS: 5000 INJECTION INTRAVENOUS; SUBCUTANEOUS at 22:07

## 2022-09-06 RX ADMIN — SODIUM CHLORIDE: 9 INJECTION, SOLUTION INTRAVENOUS at 01:59

## 2022-09-06 RX ADMIN — INSULIN GLARGINE 7 UNITS: 100 INJECTION, SOLUTION SUBCUTANEOUS at 21:00

## 2022-09-06 RX ADMIN — HEPARIN SODIUM 5000 UNITS: 5000 INJECTION INTRAVENOUS; SUBCUTANEOUS at 14:12

## 2022-09-06 RX ADMIN — HYDROCODONE BITARTRATE AND ACETAMINOPHEN 1 TABLET: 5; 325 TABLET ORAL at 16:13

## 2022-09-06 RX ADMIN — DULOXETINE HYDROCHLORIDE 20 MG: 20 CAPSULE, DELAYED RELEASE ORAL at 09:29

## 2022-09-06 RX ADMIN — AMIODARONE HYDROCHLORIDE 0.5 MG/MIN: 50 INJECTION, SOLUTION INTRAVENOUS at 16:30

## 2022-09-06 RX ADMIN — AMIODARONE HYDROCHLORIDE 0.5 MG/MIN: 50 INJECTION, SOLUTION INTRAVENOUS at 02:13

## 2022-09-06 RX ADMIN — SODIUM CHLORIDE, PRESERVATIVE FREE 10 ML: 5 INJECTION INTRAVENOUS at 09:25

## 2022-09-06 RX ADMIN — LATANOPROST 1 DROP: 50 SOLUTION OPHTHALMIC at 23:32

## 2022-09-06 RX ADMIN — PRAVASTATIN SODIUM 80 MG: 80 TABLET ORAL at 21:58

## 2022-09-06 RX ADMIN — ACETAMINOPHEN 650 MG: 325 TABLET ORAL at 21:58

## 2022-09-06 RX ADMIN — ACETAMINOPHEN 650 MG: 325 TABLET ORAL at 04:32

## 2022-09-06 RX ADMIN — MIDAZOLAM HYDROCHLORIDE 2 MG: 1 INJECTION, SOLUTION INTRAMUSCULAR; INTRAVENOUS at 01:49

## 2022-09-06 RX ADMIN — SODIUM CHLORIDE: 9 INJECTION, SOLUTION INTRAVENOUS at 23:03

## 2022-09-06 RX ADMIN — HEPARIN SODIUM 5000 UNITS: 5000 INJECTION INTRAVENOUS; SUBCUTANEOUS at 05:50

## 2022-09-06 RX ADMIN — SODIUM CHLORIDE, PRESERVATIVE FREE 10 ML: 5 INJECTION INTRAVENOUS at 21:00

## 2022-09-06 RX ADMIN — POTASSIUM CHLORIDE 10 MEQ: 7.46 INJECTION, SOLUTION INTRAVENOUS at 01:50

## 2022-09-06 ASSESSMENT — PAIN SCALES - GENERAL
PAINLEVEL_OUTOF10: 2
PAINLEVEL_OUTOF10: 6
PAINLEVEL_OUTOF10: 3
PAINLEVEL_OUTOF10: 0
PAINLEVEL_OUTOF10: 3
PAINLEVEL_OUTOF10: 0
PAINLEVEL_OUTOF10: 8
PAINLEVEL_OUTOF10: 0

## 2022-09-06 ASSESSMENT — PAIN - FUNCTIONAL ASSESSMENT
PAIN_FUNCTIONAL_ASSESSMENT: ACTIVITIES ARE NOT PREVENTED
PAIN_FUNCTIONAL_ASSESSMENT: PREVENTS OR INTERFERES SOME ACTIVE ACTIVITIES AND ADLS
PAIN_FUNCTIONAL_ASSESSMENT: NONE - DENIES PAIN
PAIN_FUNCTIONAL_ASSESSMENT: PREVENTS OR INTERFERES SOME ACTIVE ACTIVITIES AND ADLS

## 2022-09-06 ASSESSMENT — PAIN DESCRIPTION - DESCRIPTORS
DESCRIPTORS: ACHING
DESCRIPTORS: SHARP;SHOOTING
DESCRIPTORS: DISCOMFORT;SORE
DESCRIPTORS: ACHING

## 2022-09-06 ASSESSMENT — PAIN DESCRIPTION - LOCATION
LOCATION: BACK;LEG
LOCATION: LEG;BUTTOCKS
LOCATION: HEAD
LOCATION: LEG;BUTTOCKS
LOCATION: BACK;LEG

## 2022-09-06 ASSESSMENT — PAIN DESCRIPTION - ORIENTATION
ORIENTATION: LEFT
ORIENTATION: ANTERIOR

## 2022-09-06 ASSESSMENT — ENCOUNTER SYMPTOMS
EYES NEGATIVE: 1
NAUSEA: 1
VOMITING: 1
SHORTNESS OF BREATH: 1
ALLERGIC/IMMUNOLOGIC NEGATIVE: 1

## 2022-09-06 ASSESSMENT — PAIN DESCRIPTION - FREQUENCY: FREQUENCY: CONTINUOUS

## 2022-09-06 ASSESSMENT — PAIN DESCRIPTION - PAIN TYPE: TYPE: CHRONIC PAIN

## 2022-09-06 ASSESSMENT — PAIN DESCRIPTION - DIRECTION: RADIATING_TOWARDS: FOOT

## 2022-09-06 ASSESSMENT — PAIN DESCRIPTION - ONSET: ONSET: ON-GOING

## 2022-09-06 NOTE — PROGRESS NOTES
Norberto Rosales MD on call MD Gauthier about nephrology consult. Per MD Gauthier patient is establsihed with MD Hill and we need to call him. Attempted to reach MD Hill via perfect serve. It directs phone call to his office and no one answers line. Voicemail left, waiting on call back. [address] with Hendricks Councilman at [facility] office for Dr. Dakota Palmer, she said MD will see consult probably later this evening. RN Jasmeet Hunt to be notified.

## 2022-09-06 NOTE — ED NOTES
TRANSFER - OUT REPORT:    Verbal report given to joseph baker  on [facility]  being transferred to room 322 for routine progression of patient care     routine progression of patient care  Report consisted of patient's Situation, Background, Assessment and   Recommendations(SBAR). Information from the following report(s) ED SBAR was reviewed with the receiving nurse. Lines:   Peripheral IV 09/06/22 Right Arm (Active)   Site Assessment Clean, dry & intact 09/06/22 0116   Line Status Blood return noted 09/06/22 0116   Phlebitis Assessment No symptoms 09/06/22 0116   Infiltration Assessment 0 09/06/22 0116   Dressing Status New dressing applied 09/06/22 0116   Dressing Type Transparent 09/06/22 0116   Dressing Intervention New 09/06/22 0116       Peripheral IV 09/06/22 Left Arm (Active)   Site Assessment Clean, dry & intact 09/06/22 0117   Line Status Blood return noted 09/06/22 0117   Phlebitis Assessment No symptoms 09/06/22 0117   Infiltration Assessment 0 09/06/22 0117   Alcohol Cap Used No 09/06/22 0117   Dressing Status New dressing applied 09/06/22 0117   Dressing Type Transparent 09/06/22 0117   Dressing Intervention New 09/06/22 0117        Opportunity for questions and clarification was provided.       Patient transported with:  Monitor, O2 @ 5lpm, and Registered Nurse      Sima Giron RN  09/06/22 5248

## 2022-09-06 NOTE — PLAN OF CARE
Problem: Discharge Planning  Goal: Discharge to home or other facility with appropriate resources  Outcome: Progressing  Flowsheets (Taken 9/6/2022 0322)  Discharge to home or other facility with appropriate resources:   Identify barriers to discharge with patient and caregiver   Arrange for needed discharge resources and transportation as appropriate   Identify discharge learning needs (meds, wound care, etc)   Refer to discharge planning if patient needs post-hospital services based on physician order or complex needs related to functional status, cognitive ability or social support system     Problem: Safety - Adult  Goal: Free from fall injury  Outcome: Progressing     Problem: Pain  Goal: Verbalizes/displays adequate comfort level or baseline comfort level  Outcome: Progressing

## 2022-09-06 NOTE — PROGRESS NOTES
Subjective:   Renal Daily Progress Note:     Admission Date: 2022     Patient's chart reviewed,  CKD patient followed by me at the clinic with creatinine 2-2.2 range is admitted with SNEHA to crea 3.3 with fluid overload - she has had SNEHA episodes previously - concur with current medical therapy   No urgent dialysis needed - work on measures to enhance renal perfusion  Avoid magnesium containing products as she has mild hypermagnesemia  Full consult to follow      Objective:     /63   Pulse 60   Temp 97.9 °F (36.6 °C) (Temporal)   Resp 16   Ht 4' 10\" (1.473 m)   Wt 201 lb (91.2 kg)   SpO2 95%   BMI 42.01 kg/m²   Temp (24hrs), Av °F (36.7 °C), Min:97.9 °F (36.6 °C), Max:98.5 °F (36.9 °C)       Patient Vitals for the past 72 hrs:   Weight   22 0300 201 lb (91.2 kg)   22 0115 202 lb (91.6 kg)   22 0045 202 lb (91.6 kg)         Intake/Output Summary (Last 24 hours) at 2022 1805  Last data filed at 2022 1600  Gross per 24 hour   Intake 120 ml   Output 1700 ml   Net -1580 ml        Recent Labs     22  0103   *   K 3.8      CO2 26   BUN 50*   MG 2.7*     Recent Labs     22   WBC 11.0   HGB 13.5   HCT 41.6   *

## 2022-09-06 NOTE — ED PROVIDER NOTES
[employer] Emergency Department Provider Note                   PCP:                Melissa Kirk MD               Age: 60 y.o. Sex: female       ICD-10-CM    1. Ventricular tachycardia ([address]  I47.2           DISPOSITION Admitted 09/06/2022 01:58:11 AM        MDM  Number of Diagnoses or Management Options  Ventricular tachycardia [facility]): new, needed workup  Diagnosis management comments: Patient underwent successful synchronized electrical cardioversion. Case discussed with Dr. Vitor Veras, patient will be admitted to cardiology service. Amount and/or Complexity of Data Reviewed  Clinical lab tests: ordered and reviewed  Tests in the radiology section of CPT®: ordered and reviewed  Review and summarize past medical records: yes  Discuss the patient with other providers: yes  Independent visualization of images, tracings, or specimens: yes (Multiple EKGs obtained prior to revision demonstrating a wide-complex tachycardia with a rate of approximately 140 bpm.  EKG after cardioversion demonstrated sinus rhythm with a rate of 68 without acute ischemic changes.)    Risk of Complications, Morbidity, and/or Mortality  Presenting problems: high  Diagnostic procedures: high  Management options: high  General comments: ===================================================================  This patient is critically ill and there is a high probability of of imminent or life threatening deterioration in the patient's condition without immediate management. The nature of the patient's clinical problem is: Unstable V-Tach    I have spent 1 hour in direct patient care, documentation, review of labs/xrays/old records, discussion with Staff, Colleague, Nursing . The time involved in the performance of separately reportable procedures was not counted toward critical care time.      Amor Zamarripa DO, DO; 9/6/2022 @2:05 AM  ===================================================================            Critical Care  Total time providing critical care: 30-74 minutes    Patient Progress  Patient progress: improved       Orders Placed This Encounter   Procedures    XR CHEST PORTABLE    CBC    Comprehensive Metabolic Panel    Troponin    Magnesium    Lipase    Brain Natriuretic Peptide    Cardiac Monitor    Pulse Oximetry    ED nasal cannula oxygen    POCT Blood Gas & Electrolytes    EKG 12 Lead    Pulse Oximetry, Spot    Saline lock IV    ADMIT TO INPATIENT        Medications   midazolam PF (VERSED) injection 2 mg (2 mg IntraVENous Not Given 9/6/22 0149)   amiodarone (CORDARONE) 450 mg in dextrose 5 % 250 mL infusion ([id number]) (has no administration in time range)   0.9 % sodium chloride infusion ( IntraVENous New Bag 9/6/22 0159)   potassium chloride 10 mEq/100 mL IVPB (Peripheral Line) (10 mEq IntraVENous New Bag 9/6/22 0150)   midazolam PF (VERSED) injection 2 mg (2 mg IntraVENous Given by Other 9/6/22 0149)       New Prescriptions    No medications on file        Debo Cortez is a 60 y.o. female who presents to the Emergency Department with chief complaint of    Chief Complaint   Patient presents with    Shortness of Breath      Patient arrived from home by EMS with a history of illness for about the past 24 hours or so. She states that she began to feel worse approximately 7 PM this evening and then went to sleep but then woke up approximately 10:30 PM feeling much worse. She complains of shortness of breath and had some generalized myalgias but denied any chest pain. EMS transmitted EKG from the scene showing wide-complex tachycardia with a rate of 140 bpm.  The patient has a history of pacemaker defibrillator. No defibrillations reported by the patient. Upon arrival the patient initial blood pressure of 105/80 with no change in the EKG. She reports some nausea and retching as well over the last 12 hours. She denies diarrhea. She reports decreased urine output despite elevated blood sugar.   Medical records Take 81 mg by mouth daily    CALCITRIOL (ROCALTROL) 0.25 MCG CAPSULE    Take 0.25 mcg by mouth daily. CARVEDILOL (COREG) 6.25 MG TABLET    Take 6.25 mg by mouth 2 times daily (with meals)    CHOLECALCIFEROL (VITAMIN D3) 50 MCG (2000 UT) TABS    Take 1 tablet by mouth daily. CYCLOBENZAPRINE (FLEXERIL) 10 MG TABLET    Take 10 mg by mouth 3 times daily as needed for Muscle spasms. DULOXETINE (CYMBALTA) 20 MG EXTENDED RELEASE CAPSULE    Take 1 capsule by mouth daily    DUPILUMAB (DUPIXENT) 300 MG/2ML SOPN INJECTION    Inject 300 mg into the skin every 14 days. Patient usually takes medication on Monday or Tuesday. EMPAGLIFLOZIN (JARDIANCE) 10 MG TABLET    Take 10 mg by mouth daily    INSULIN GLARGINE (LANTUS SOLOSTAR) 100 UNIT/ML INJECTION PEN    Inject 15 Units into the skin nightly    ISOSORBIDE MONONITRATE (IMDUR) 30 MG EXTENDED RELEASE TABLET    Take 30 mg by mouth daily    LATANOPROST (XALATAN) 0.005 % OPHTHALMIC SOLUTION    Place 1 drop into both eyes nightly    NITROGLYCERIN (NITROSTAT) 0.4 MG SL TABLET    Place 0.4 mg under the tongue every 5 minutes as needed for Chest pain. Max 3 doses     POLYETHYLENE GLYCOL (GLYCOLAX) 17 GM/SCOOP POWDER    Take 17 g by mouth daily as needed    PRAVASTATIN (PRAVACHOL) 80 MG TABLET    Take 80 mg by mouth nightly    SACUBITRIL-VALSARTAN (ENTRESTO) 49-51 MG PER TABLET    Take 1 tablet by mouth 2 times daily    TORSEMIDE (DEMADEX) 20 MG TABLET    Take 40 mg by mouth 2 times daily    VITAMIN D 25 MCG (1000 UT) CAPS    Take by mouth daily        Vitals signs and nursing note reviewed. Patient Vitals for the past 4 hrs:   Temp Pulse Resp BP SpO2   09/06/22 0115 97.9 °F (36.6 °C) (!) 137 20 105/80 93 %   09/06/22 0110 -- (!) 137 24 -- 93 %   09/06/22 0045 97.9 °F (36.6 °C) (!) 140 20 105/80 (!) 88 %          Physical Exam  Vitals and nursing note reviewed. Constitutional:       General: She is not in acute distress. Appearance: Normal appearance. She is obese. She is diaphoretic. She is not ill-appearing or toxic-appearing. HENT:      Head: Normocephalic and atraumatic. Mouth/Throat:      Mouth: Mucous membranes are moist.      Pharynx: Oropharynx is clear. No oropharyngeal exudate or posterior oropharyngeal erythema. Eyes:      Extraocular Movements: Extraocular movements intact. Conjunctiva/sclera: Conjunctivae normal.      Pupils: Pupils are equal, round, and reactive to light. Neck:      Comments: Unable to assess for JVD  Cardiovascular:      Rate and Rhythm: Regular rhythm. Tachycardia present. Heart sounds: Normal heart sounds. Pulmonary:      Effort: No respiratory distress. Breath sounds: Normal breath sounds. No wheezing or rales. Abdominal:      General: There is no distension. Palpations: Abdomen is soft. Tenderness: There is no right CVA tenderness. Musculoskeletal:         General: Normal range of motion. Cervical back: Normal range of motion and neck supple. No rigidity. Skin:     General: Skin is warm. Capillary Refill: Capillary refill takes less than 2 seconds. Neurological:      General: No focal deficit present. Mental Status: She is alert and oriented to person, place, and time. Mental status is at baseline. Psychiatric:         Mood and Affect: Mood normal.         Behavior: Behavior normal.         Thought Content:  Thought content normal.        Cardiovert / Defib    Date/Time: 9/6/2022 2:06 AM  Performed by: Lurdes Martinez DO  Authorized by: Sven Hawley MD     Consent:     Consent obtained:  Verbal and emergent situation    Consent given by:  Patient    Risks, benefits, and alternatives were discussed: yes      Risks discussed:  Induced arrhythmia and pain  Taswell protocol:     Patient identity confirmed:  Verbally with patient  Pre-procedure details:     Cardioversion basis:  Emergent    Rhythm:  Ventricular tachycardia    Electrode placement:  Anterior-posterior  Attempt one:

## 2022-09-06 NOTE — PROGRESS NOTES
TRANSFER - IN REPORT:    Verbal report received from Aylin PERRY on [employer]  being received from ED for routine progression of patient care      Report consisted of patient's Situation, Background, Assessment and   Recommendations(SBAR). Information from the following report(s) Nurse Handoff Report, ED Encounter Summary, ED SBAR, Adult Overview, MAR, and Recent Results was reviewed with the receiving nurse. Opportunity for questions and clarification was provided. Assessment completed upon patient's arrival to unit and care assumed.

## 2022-09-06 NOTE — H&P
[address] Cardiology History & Physical      Date of  Admission: 9/6/2022 12:45 AM     Primary Care Physician: Dr. Adrianna Bernal  Primary Cardiologist: Dr. Gavino Fletcher  Admitting Physician: Dr. Vannesa Booth    CC: shortness of breath, nausea    HPI:  Adeel Jung is a 60 y.o. female with past medical history of NICM with ICD in place, [facility], left ventricular aneurysm, CKD, IDDM, HLD and morbid obesity who presented to the ER via EMS with complaints of shortness of breath and hurting all over. Patient reports that after getting home from work yesterday afternoon, she began to feel ill. Reports that she started hurting all over and took a muscle relaxer. She was able to fall aslep but woke back up around 10:30pm and felt short of breath and her pain had returned. She denies chest pain. EMS was called. Initial EKG obtained by EMS showed sustained VTACH with rate in the 140's. STEMI alert was initially activated. Cardiology responded to the ER after STEMI page and alert was cancelled. EMS reported that the patient's BP was low and she was started on IV dopamine for BP support prior to arrival.     Upon arrival to the ER, patient was alert and oriented but remained in [facility]. Bedside ABG was obtained that showed pO2 of 53 and potassium of 3.0. CXR shows possible vascular congestion. Patient was eventually moved to code room and was sedated prior to being defibrillated by ER MD. Return of SR was obtained. Cardiology was asked to see patient for admission.       Past Medical History:   Diagnosis Date    Abdominal wall hernia 10/2/2018    Acute hypoxemic respiratory failure ([address] 4/3/2019    Allergic rhinitis     followed by allergist; allergic to grass- has rescue inhaler PRN    ARDS (adult respiratory distress syndrome) ([address] 4/3/2019    Arthritis     Automatic implantable cardioverter-defibrillator in situ 3/30/2016    CAD in native artery 3/30/2016    Chronic kidney disease     Chronic systolic heart failure ([address] 07/18/2013    ECHO 2017- EF 39%; managed with medications; [place]bas [place]    CKD (chronic kidney disease)     [address] follows    Diabetes [address]     type 2 (on insulin);  FBS AM range- , hypo s/s <70, hgba1c- 7.9    Diabetes mellitus ([facility] [address] [facility] 4/12/2010    Dyslipidemia 2/13/2013    Eczema     Fibromyalgia     GERD (gastroesophageal reflux disease)     hx of- no meds currently    Headache     Heart failure (HCC)     chronic systolic with EF 84%; non-ischemic cardiomyopathy    HTN (hypertension) 4/12/2010    Hypercholesterolemia     Incarcerated incisional hernia 3/26/2019    Morbid obesity (HCC)     Neuropathy     bilateral feet and tips of fingers    NICM (nonischemic cardiomyopathy) ([facility] [address] [facility] 2/15/2013    Obesity     bmi- 41    Obstructive sleep apnea (adult) (pediatric) 07/14/2014    uses cpap qhs    Pseudomonas urinary tract infection 4/5/2019    Sciatic pain 5/23/2016    Severe persistent asthma with acute exacerbation 8/24/2020    SVT (supraventricular tachycardia) ([facility])     ablation for svt    Tobacco use disorder 4/12/2010      Past Surgical History:   Procedure Laterality Date    ABLATION OF DYSRHYTHMIC FOCUS  \"years ago\"    CARDIAC DEFIBRILLATOR PLACEMENT  07/18/2013    Biotronik dual chamber ICD by Dr. Kyleigh Centeno  07/18/2013    Biotronik dual chamber ICD by Dr. Bud Maldonado 7/1/2022    Left heart cath / coronary angiography performed by Isidro Solorio MD at [address] [place] [address]      x1    COLONOSCOPY N/A 7/7/2021    COLONOSCOPY/BMI 48 PT HAS AN ICD performed by Bing Pierce MD at [facility]  03/26/2019    mild incarceration, no bowel removal    HYSTERECTOMY (CERVIX STATUS UNKNOWN)      EDITH-Left ovary intact per pt    LEFT HEART CATH,PERCUTANEOUS  2/13/2013    no intervention    PACEMAKER      ICD    ROTATOR CUFF REPAIR Right     TONSILLECTOMY Allergies   Allergen Reactions    Other Hives and Shortness Of Breath     \"inhaler PRN\"    Penicillin G Itching      Social History     Socioeconomic History    Marital status: Single     Spouse name: Not on file    Number of children: Not on file    Years of education: Not on file    Highest education level: Not on file   Occupational History    Not on file   Tobacco Use    Smoking status: Some Days     Packs/day: 0.25     Types: Cigarettes    Smokeless tobacco: Never    Tobacco comments:     Quit smoking: \"every now and then\"   Vaping Use    Vaping Use: Never used   Substance and Sexual Activity    Alcohol use: Yes     Alcohol/week: 1.0 standard drink    Drug use: Yes     Types: Marijuana Michael Blow)    Sexual activity: Not on file   Other Topics Concern    Not on file   Social History Narrative    Denies physical or sexual abuse     Social Determinants of Health     Financial Resource Strain: Not on file   Food Insecurity: Not on file   Transportation Needs: Not on file   Physical Activity: Not on file   Stress: Not on file   Social Connections: Not on file   Intimate Partner Violence: Not on file   Housing Stability: Not on file     Family History   Problem Relation Age of Onset    Diabetes Other     Heart Disease Father     Hypertension Father     Stroke Father     Heart Attack Father 48        mi    Breast Cancer Sister 37    Hypertension Brother     Heart Disease Brother     Diabetes Brother     Stroke Mother         Current Facility-Administered Medications   Medication Dose Route Frequency    midazolam PF (VERSED) injection 2 mg  2 mg IntraVENous NOW    amiodarone (CORDARONE) 450 mg in dextrose 5 % 250 mL infusion ([id number])  0.5 mg/min IntraVENous Continuous    0.9 % sodium chloride infusion   IntraVENous Continuous       Review of Systems    Review of Systems   Constitutional:        + pain all over   HENT: Negative. Eyes: Negative. Cardiovascular: Negative.     Respiratory:  Positive for shortness of breath. Endocrine: Negative. Hematologic/Lymphatic: Negative. Skin: Negative. Musculoskeletal: Negative. Gastrointestinal:  Positive for nausea. Genitourinary: Negative. Neurological: Negative. Psychiatric/Behavioral: Negative. Allergic/Immunologic: Negative. Subjective:   BP 92/62   Pulse 60   Temp 98.5 °F (36.9 °C) (Oral)   Resp 20   Ht 4' 11\" (1.499 m)   Wt 202 lb (91.6 kg)   SpO2 93%   BMI 40.80 kg/m²   Physical Exam  HENT:      Mouth/Throat:      Mouth: Mucous membranes are moist.   Eyes:      Pupils: Pupils are equal, round, and reactive to light. Cardiovascular:      Rate and Rhythm: Normal rate and regular rhythm. Heart sounds: Normal heart sounds. Pulmonary:      Breath sounds: Normal breath sounds. Abdominal:      General: Bowel sounds are normal.   Musculoskeletal:         General: No swelling. Skin:     General: Skin is warm and dry. Neurological:      Mental Status: She is alert and oriented to person, place, and time. Psychiatric:         Mood and Affect: Mood normal.        Cardiographics  Telemetry: normal sinus rhythm  ECG: [facility]  Echocardiogram:  from 8/11/2022    Left Ventricle: Moderately reduced left ventricular systolic function. Left ventricle is dilated. Mildly increased wall thickness. Global hypokinesis present. Left Atrium: Left atrium is mildly dilated. Overall estimation of the ejection fraction is moderately reduced in the range of 40%. Patient has a posterior basal aneurysm presents that is partially visualized on this study.   It has been visualized in other studies including a heart cath and is a large posterior basal aneurysm that affects the overall effect of cardiac output due to shunting of a significant amount of flow into the large posterior basal aneurysm  Labs:   Recent Results (from the past 24 hour(s))   CBC    Collection Time: 09/06/22  1:03 AM   Result Value Ref Range    WBC 11.0 4.3 - 11.1 K/uL RBC 4.22 4.05 - 5.2 M/uL    Hemoglobin 13.5 11.7 - 15.4 g/dL    Hematocrit 41.6 35.8 - 46.3 %    MCV 98.6 (H) 79.6 - 97.8 FL    MCH 32.0 26.1 - 32.9 PG    MCHC 32.5 31.4 - 35.0 g/dL    RDW 13.5 11.9 - 14.6 %    Platelets 705 (L) 531 - 450 K/uL    MPV 12.2 9.4 - 12.3 FL    nRBC 0.00 0.0 - 0.2 K/uL   Comprehensive Metabolic Panel    Collection Time: 09/06/22  1:03 AM   Result Value Ref Range    Sodium 135 (L) 136 - 145 mmol/L    Potassium 3.8 3.5 - 5.1 mmol/L    Chloride 101 101 - 110 mmol/L    CO2 26 21 - 32 mmol/L    Anion Gap 8 4 - 13 mmol/L    Glucose 348 (H) 65 - 100 mg/dL    BUN 50 (H) 6 - 23 MG/DL    Creatinine 3.30 (H) 0.6 - 1.0 MG/DL    GFR African American 18 (L) >60 ml/min/1.73m2    GFR Non- 15 (L) >60 ml/min/1.73m2    Calcium 9.4 8.3 - 10.4 MG/DL    Total Bilirubin 0.6 0.2 - 1.1 MG/DL    ALT 55 12 - 65 U/L    AST 41 (H) 15 - 37 U/L    Alk Phosphatase 82 50 - 136 U/L    Total Protein 6.8 6.3 - 8.2 g/dL    Albumin 3.6 3.5 - 5.0 g/dL    Globulin 3.2 2.3 - 3.5 g/dL    Albumin/Globulin Ratio 1.1 (L) 1.2 - 3.5     Troponin    Collection Time: 09/06/22  1:03 AM   Result Value Ref Range    Troponin, High Sensitivity 132.2 (HH) 0 - 14 pg/mL   Magnesium    Collection Time: 09/06/22  1:03 AM   Result Value Ref Range    Magnesium 2.7 (H) 1.8 - 2.4 mg/dL   Lipase    Collection Time: 09/06/22  1:03 AM   Result Value Ref Range    Lipase 100 73 - 393 U/L   Brain Natriuretic Peptide    Collection Time: 09/06/22  1:03 AM   Result Value Ref Range    NT Pro-BNP 3,618 (H) 5 - 125 PG/ML   POCT Blood Gas & Electrolytes    Collection Time: 09/06/22  1:08 AM   Result Value Ref Range    pH, Arterial, POC 7.39 7.35 - 7.45      pCO2, Arterial, POC 35.5 35 - 45 MMHG    pO2, Arterial, POC 53 (L) 75 - 100 MMHG    POC Sodium 138 136 - 145 MMOL/L    POC Potassium 3.0 (L) 3.5 - 5.1 MMOL/L    POC Ionized Calcium 1.11 (L) 1.12 - 1.32 mmol/L    POC Glucose 325 (H) 65 - 100 MG/DL    BASE DEFICIT (POC) 2.9 mmol/L    HCO3, Mixed 21.5 (L) 22 - 26 MMOL/L    POC TCO2 22 13 - 23 MMOL/L    POC O2 SAT 87 %    Source ARTERIAL      Site RIGHT RADIAL      POC Nick's Test Positive      DEVICE NASAL CANNULA      Performed by: Tyrell     POC GFR  Cannot be calculated >60 ml/min/1.73m2    Glomerular Filtration Rate, POC Cannot be calculated >60 ml/min/1.73m2       Patient has been seen and examined by Dr. Mc Singletary and he agrees with the following assessment and plan:     Assessment/Plan:          Ventricular tachycardia ([facility] -- started on low dose IV amiodarone in the ER, continue on admission. Patient reports compliance to 200mg BID dosing at home. Consult CV surgery - scheduled for outpatient appt today to see Dr. Tianna Toledo. During last admission, felt to possibly be contributing to [facility]. Biotronik ICD in place. Patient denies shocks at home. LHC was done 7/1/2022 that showed normal coronary arteries. Do not anticipate [facility] needed this admission. Left ventricular aneurysm -- consult CT surgery today for evaluation. See above. Type 2 diabetes with nephropathy (HCC) -- BGL elevated in the ER. Continue home insulin at half dose. Add SSI ACHS and diabetic diet. NICM (nonischemic cardiomyopathy) ([facility] [address] -- known EF of 35-40%. Biotronik ICD in place. Holding Coreg and Víctor Bucy on admission due to initial hypotension on arrival to the ER. Monitor BP closely. Titrate medications as needed. Dyslipidemia -- continue statin therapy on admission. CKD (chronic kidney disease) stage 4, GFR 15-29 ml/min ([facility]) -- followed by [facility] as outpatient. Take torsemide 40mg BID as outpatient. Does not appear to extremely volume overloaded today. CXR with possible vascular congestion. Hypoxia improved with restoration of SR. Will defer IV diuretics to nephrology. Consult them to see patient today for recommendations.  Given IVF in the ER due to hypotension      Morbid obesity ([facility] [address]       Kelly AGUIAR Sally Trejo - CNP  9/6/2022 2:50 AM

## 2022-09-06 NOTE — PROGRESS NOTES
Verbal bedside report given to KT, oncoming RN. Patient's situation, background, assessment and recommendations provided. Opportunity for questions provided. Oncoming RN assumed care of patient. Amiodarone and NS IV drips verified at bedside with oncoming RN.

## 2022-09-06 NOTE — ED NOTES
Pt to stay on non-rebreather to keep on at 95-96% she is at 5L at this time and resting     Sherol Goldberg, RN  09/06/22 0148

## 2022-09-06 NOTE — ED NOTES
New order of Versed 2mg given by MD and at 1:44 am patient was cardioverted with 150j, patient is stable at this time, heart rate is 63 BP is 122/65 (79). Patient on non-rebreather at this time and showing no s/sx of distress.       Chuy Herzog, RN  09/06/22 [address], RN  09/06/22 3040

## 2022-09-07 ENCOUNTER — TELEPHONE (OUTPATIENT)
Dept: INTERNAL MEDICINE CLINIC | Facility: CLINIC | Age: 57
End: 2022-09-07

## 2022-09-07 LAB
ANION GAP SERPL CALC-SCNC: 6 MMOL/L (ref 4–13)
BUN SERPL-MCNC: 31 MG/DL (ref 6–23)
CALCIUM SERPL-MCNC: 8.5 MG/DL (ref 8.3–10.4)
CHLORIDE SERPL-SCNC: 113 MMOL/L (ref 101–110)
CO2 SERPL-SCNC: 25 MMOL/L (ref 21–32)
CREAT SERPL-MCNC: 1.6 MG/DL (ref 0.6–1)
GLUCOSE BLD STRIP.AUTO-MCNC: 107 MG/DL (ref 65–100)
GLUCOSE BLD STRIP.AUTO-MCNC: 121 MG/DL (ref 65–100)
GLUCOSE BLD STRIP.AUTO-MCNC: 125 MG/DL (ref 65–100)
GLUCOSE BLD STRIP.AUTO-MCNC: 141 MG/DL (ref 65–100)
GLUCOSE BLD STRIP.AUTO-MCNC: 192 MG/DL (ref 65–100)
GLUCOSE SERPL-MCNC: 120 MG/DL (ref 65–100)
MAGNESIUM SERPL-MCNC: 2.9 MG/DL (ref 1.8–2.4)
POTASSIUM SERPL-SCNC: 3.8 MMOL/L (ref 3.5–5.1)
SERVICE CMNT-IMP: ABNORMAL
SODIUM SERPL-SCNC: 144 MMOL/L (ref 136–145)

## 2022-09-07 PROCEDURE — 2580000003 HC RX 258: Performed by: EMERGENCY MEDICINE

## 2022-09-07 PROCEDURE — 6370000000 HC RX 637 (ALT 250 FOR IP): Performed by: INTERNAL MEDICINE

## 2022-09-07 PROCEDURE — 36415 COLL VENOUS BLD VENIPUNCTURE: CPT

## 2022-09-07 PROCEDURE — 6370000000 HC RX 637 (ALT 250 FOR IP): Performed by: NURSE PRACTITIONER

## 2022-09-07 PROCEDURE — 99232 SBSQ HOSP IP/OBS MODERATE 35: CPT | Performed by: INTERNAL MEDICINE

## 2022-09-07 PROCEDURE — 80048 BASIC METABOLIC PNL TOTAL CA: CPT

## 2022-09-07 PROCEDURE — 1100000003 HC PRIVATE W/ TELEMETRY

## 2022-09-07 PROCEDURE — 2580000003 HC RX 258: Performed by: NURSE PRACTITIONER

## 2022-09-07 PROCEDURE — 6360000002 HC RX W HCPCS: Performed by: NURSE PRACTITIONER

## 2022-09-07 PROCEDURE — 6360000002 HC RX W HCPCS: Performed by: EMERGENCY MEDICINE

## 2022-09-07 PROCEDURE — 2700000000 HC OXYGEN THERAPY PER DAY

## 2022-09-07 PROCEDURE — 6360000002 HC RX W HCPCS: Performed by: INTERNAL MEDICINE

## 2022-09-07 PROCEDURE — 83735 ASSAY OF MAGNESIUM: CPT

## 2022-09-07 RX ORDER — HYDROCODONE BITARTRATE AND ACETAMINOPHEN 7.5; 325 MG/1; MG/1
1 TABLET ORAL EVERY 6 HOURS PRN
Status: DISCONTINUED | OUTPATIENT
Start: 2022-09-07 | End: 2022-09-25 | Stop reason: HOSPADM

## 2022-09-07 RX ORDER — POLYETHYLENE GLYCOL 3350 17 G/17G
17 POWDER, FOR SOLUTION ORAL DAILY PRN
Status: DISCONTINUED | OUTPATIENT
Start: 2022-09-07 | End: 2022-09-25 | Stop reason: HOSPADM

## 2022-09-07 RX ORDER — MORPHINE SULFATE 2 MG/ML
2 INJECTION, SOLUTION INTRAMUSCULAR; INTRAVENOUS EVERY 4 HOURS PRN
Status: DISCONTINUED | OUTPATIENT
Start: 2022-09-07 | End: 2022-09-25 | Stop reason: HOSPADM

## 2022-09-07 RX ORDER — TRAMADOL HYDROCHLORIDE 50 MG/1
50 TABLET ORAL EVERY 6 HOURS PRN
Status: DISCONTINUED | OUTPATIENT
Start: 2022-09-07 | End: 2022-09-25 | Stop reason: HOSPADM

## 2022-09-07 RX ADMIN — POLYETHYLENE GLYCOL 3350 17 G: 17 POWDER, FOR SOLUTION ORAL at 15:50

## 2022-09-07 RX ADMIN — HEPARIN SODIUM 5000 UNITS: 5000 INJECTION INTRAVENOUS; SUBCUTANEOUS at 06:19

## 2022-09-07 RX ADMIN — DULOXETINE HYDROCHLORIDE 20 MG: 20 CAPSULE, DELAYED RELEASE ORAL at 08:41

## 2022-09-07 RX ADMIN — LATANOPROST 1 DROP: 50 SOLUTION OPHTHALMIC at 21:15

## 2022-09-07 RX ADMIN — AMIODARONE HYDROCHLORIDE 0.5 MG/MIN: 50 INJECTION, SOLUTION INTRAVENOUS at 08:41

## 2022-09-07 RX ADMIN — SODIUM CHLORIDE: 9 INJECTION, SOLUTION INTRAVENOUS at 21:04

## 2022-09-07 RX ADMIN — PRAVASTATIN SODIUM 80 MG: 80 TABLET ORAL at 21:03

## 2022-09-07 RX ADMIN — SODIUM CHLORIDE, PRESERVATIVE FREE 10 ML: 5 INJECTION INTRAVENOUS at 21:07

## 2022-09-07 RX ADMIN — ONDANSETRON 4 MG: 2 INJECTION INTRAMUSCULAR; INTRAVENOUS at 18:36

## 2022-09-07 RX ADMIN — ASPIRIN 81 MG: 81 TABLET, COATED ORAL at 08:41

## 2022-09-07 RX ADMIN — INSULIN GLARGINE 7 UNITS: 100 INJECTION, SOLUTION SUBCUTANEOUS at 21:00

## 2022-09-07 RX ADMIN — MORPHINE SULFATE 2 MG: 2 INJECTION, SOLUTION INTRAMUSCULAR; INTRAVENOUS at 21:14

## 2022-09-07 RX ADMIN — HEPARIN SODIUM 5000 UNITS: 5000 INJECTION INTRAVENOUS; SUBCUTANEOUS at 13:33

## 2022-09-07 RX ADMIN — HYDROCODONE BITARTRATE AND ACETAMINOPHEN 1 TABLET: 5; 325 TABLET ORAL at 08:54

## 2022-09-07 RX ADMIN — SODIUM CHLORIDE, PRESERVATIVE FREE 10 ML: 5 INJECTION INTRAVENOUS at 08:47

## 2022-09-07 RX ADMIN — MORPHINE SULFATE 2 MG: 2 INJECTION, SOLUTION INTRAMUSCULAR; INTRAVENOUS at 13:32

## 2022-09-07 RX ADMIN — HEPARIN SODIUM 5000 UNITS: 5000 INJECTION INTRAVENOUS; SUBCUTANEOUS at 21:03

## 2022-09-07 ASSESSMENT — PAIN DESCRIPTION - DESCRIPTORS
DESCRIPTORS: POUNDING
DESCRIPTORS: DISCOMFORT
DESCRIPTORS: CRAMPING

## 2022-09-07 ASSESSMENT — PAIN DESCRIPTION - LOCATION
LOCATION: BACK
LOCATION: LEG
LOCATION: LEG

## 2022-09-07 ASSESSMENT — PAIN SCALES - GENERAL
PAINLEVEL_OUTOF10: 8
PAINLEVEL_OUTOF10: 9
PAINLEVEL_OUTOF10: 10

## 2022-09-07 ASSESSMENT — PAIN DESCRIPTION - ORIENTATION
ORIENTATION: UPPER
ORIENTATION: LEFT
ORIENTATION: LEFT

## 2022-09-07 NOTE — TELEPHONE ENCOUNTER
Pt called to inform us that she is in the hospital for Afib. Her fibulator didn't shock her to inform her she was in Afib. The are talking about transferring her to [facility] due to the aneurysm she have.     Thank you    Chanelle Serrano

## 2022-09-07 NOTE — PROGRESS NOTES
Bedside and Verbal shift change report given to myself (oncoming nurse) by Chava Velazquez RN (offgoing nurse). Report included the following information Index, Intake/Output, MAR, Recent Results, and Med Rec Status. Amio gtt infusing at bedside.

## 2022-09-07 NOTE — PROGRESS NOTES
[facility] CARDIOLOGY PROGRESS NOTE           9/7/2022 7:33 AM    Admit Date: 9/6/2022    Admit Diagnosis: Ventricular tachycardia ([facility] [I47.2]    Assessment:   Principal Problem:    Ventricular tachycardia (HCC)  Active Problems:    Acute on chronic renal failure (HCC)    Left ventricular aneurysm    Type 2 diabetes with nephropathy (HCC)    NICM (nonischemic cardiomyopathy) (HCC)    Dyslipidemia    CKD (chronic kidney disease) stage 4, GFR 15-29 ml/min (HCC)    Morbid obesity ([address]  Resolved Problems:    * No resolved hospital problems. *      Plan:   VT  Posterior LV aneurysm  NICM, EF 30-35%  IDDM  Morbid obesity  NSEHA on CKD     60year old female with complicated history including severe NICM, large posterior LV aneurysm and recurrent VT requiring ICD shock and hospital-based therapy. -VT - continue amiodarone IV for another 24 hours and transition to po if electrically stable. . Add lido vs mexiletine if recurrent VT. Likely from LV scar (VT morphology appears posterior LV). ICD interrogated yesterday. Pt was in VT for 12 hours prior to defib in ED.     -Pt needs surgical eval for LV aneurysm resection, considering transfer to [place]/referral to Dr. Janny Thornton. -LV aneurysm - CTS referral to [facility] pending. -NICM - GDMT on hold, hemodynamics improving. -SNEHA on CKD - likely from poor hemodynamics from being in VT. Following labs closely. Gently reintroduce GDMT as able. Nephrology following. Continue measures to support renal perfusion.     -Morbid obesity - adds complexity to her care. Encourage weight loss. -IDDM - continue therapies. Pt is at high risk for poor outcome/recurrent admissions. Thank you for allowing me to participate in the electrophysiologic care of this most pleasant patient. Please feel free to contact me if there are any questions or concerns. Joselin Lahumberto.  Nicolle Salinas MD, MS  Clinical Cardiac Electrophysiology  [facility]    Subjective: No complaints this AM, no chest pain or shortness of breath    Interval History: (History of pertinent interval events obtained from nursing staff)    ROS:  GEN:  No fever or chills  Cardiovascular:  As noted above  Pulmonary:  As noted above  Neuro:  No new focal motor or sensory loss      Objective:     Vitals:    09/06/22 2115 09/07/22 0040 09/07/22 0420 09/07/22 0450   BP: (!) 107/56 (!) 92/51 (!) 103/55    Pulse: 59 60 60    Resp: 16 17 17    Temp: 98.3 °F (36.8 °C) 98.1 °F (36.7 °C) 97.6 °F (36.4 °C)    TempSrc: Oral Oral Oral    SpO2:  98% 99%    Weight:    201 lb (91.2 kg)   Height:           Physical Exam:  General-Well Developed, Well Nourished, No Acute Distress, Alert & Oriented x 3, appropriate mood. Obese. Neck- supple, no JVD  CV- regular rate and rhythm no MRG, left sided CIED C/D/I. Lung- clear bilaterally  Abd- soft, nontender, nondistended  Ext- no edema bilaterally.   Skin- warm and dry    Current Facility-Administered Medications   Medication Dose Route Frequency    amiodarone (CORDARONE) 450 mg in dextrose 5 % 250 mL infusion ([id number])  0.5 mg/min IntraVENous Continuous    aspirin EC tablet 81 mg  81 mg Oral Daily    DULoxetine (CYMBALTA) extended release capsule 20 mg  20 mg Oral Daily    insulin glargine (LANTUS) injection vial 7 Units  7 Units SubCUTAneous Nightly    pravastatin (PRAVACHOL) tablet 80 mg  80 mg Oral Nightly    sodium chloride flush 0.9 % injection 5-40 mL  5-40 mL IntraVENous 2 times per day    sodium chloride flush 0.9 % injection 5-40 mL  5-40 mL IntraVENous PRN    ondansetron (ZOFRAN) injection 4 mg  4 mg IntraVENous Q4H PRN    acetaminophen (TYLENOL) tablet 650 mg  650 mg Oral Q6H PRN    Or    acetaminophen (TYLENOL) suppository 650 mg  650 mg Rectal Q6H PRN    polyethylene glycol (GLYCOLAX) packet 17 g  17 g Oral Daily PRN    nitroGLYCERIN (NITROSTAT) SL tablet 0.4 mg  0.4 mg SubLINGual Q5 Min PRN    0.9 % sodium chloride infusion   IntraVENous Continuous    heparin (porcine) injection 5,000 Units  5,000 Units SubCUTAneous 3 times per day    sodium chloride (OCEAN, BABY AYR) 0.65 % nasal spray 1 spray  1 spray Each Nostril PRN    insulin lispro (HUMALOG) injection vial 0-4 Units  0-4 Units SubCUTAneous TID WC    insulin lispro (HUMALOG) injection vial 0-4 Units  0-4 Units SubCUTAneous Nightly    glucose chewable tablet 16 g  4 tablet Oral PRN    dextrose bolus 10% 125 mL  125 mL IntraVENous PRN    Or    dextrose bolus 10% 250 mL  250 mL IntraVENous PRN    glucagon (rDNA) injection 1 mg  1 mg SubCUTAneous PRN    dextrose 10 % infusion   IntraVENous Continuous PRN    HYDROcodone-acetaminophen (NORCO) 5-325 MG per tablet 1 tablet  1 tablet Oral Q6H PRN    latanoprost (XALATAN) 0.005 % ophthalmic solution 1 drop  1 drop Both Eyes Nightly       Data Review:   Recent Results (from the past 24 hour(s))   POCT Glucose    Collection Time: 09/06/22 12:02 PM   Result Value Ref Range    POC Glucose 137 (H) 65 - 100 mg/dL    Performed by: KailaystalPCT    POCT Glucose    Collection Time: 09/06/22  4:20 PM   Result Value Ref Range    POC Glucose 133 (H) 65 - 100 mg/dL    Performed by: BreezyKrystalPCT    POCT Glucose    Collection Time: 09/06/22  9:19 PM   Result Value Ref Range    POC Glucose 192 (H) 65 - 100 mg/dL    Performed by: Scott        EKG:  (EKG has been independently visualized by me with interpretation below): JESSEE

## 2022-09-07 NOTE — CONSULTS
RENAL H&P/CONSULT    Subjective:     Patient is a 60year old female with Chronic Kidney Disease with baseline creatinine 2-2.2 followed by me in the office setting is admitted with V tach. She feels much better since admission and her heart rate is now normal.   She has had prior SNEHA on CKD episodes, IDDM, morbid obesity s/p DC ICD. She underwent defibrillated in the ED and was placed on amiodarone IV. Her crea was 1.8 on 8/31/22 and increased to 3.3 on admission, and improved to 1.6 today. No vomiting, no CP, dyspnea is controlled with nasal O2. Past Medical History:   Diagnosis Date    Abdominal wall hernia 10/2/2018    Acute hypoxemic respiratory failure ([address] 4/3/2019    Allergic rhinitis     followed by allergist; allergic to grass- has rescue inhaler PRN    ARDS (adult respiratory distress syndrome) ([address] 4/3/2019    Arthritis     Automatic implantable cardioverter-defibrillator in situ 3/30/2016    CAD in native artery 3/30/2016    Chronic kidney disease     Chronic systolic heart failure ([address] 07/18/2013    ECHO 2017- EF 39%; managed with medications; [place] [place] [place]    CKD (chronic kidney disease)     [facility] follows    Diabetes ([address]     type 2 (on insulin);  FBS AM range- , hypo s/s <70, hgba1c- 7.9    Diabetes mellitus ([address] 4/12/2010    Dyslipidemia 2/13/2013    Eczema     Fibromyalgia     GERD (gastroesophageal reflux disease)     hx of- no meds currently    Headache     Heart failure (HCC)     chronic systolic with EF 32%; non-ischemic cardiomyopathy    HTN (hypertension) 4/12/2010    Hypercholesterolemia     Incarcerated incisional hernia 3/26/2019    Morbid obesity ([address]     Neuropathy     bilateral feet and tips of fingers    NICM (nonischemic cardiomyopathy) ([address] 2/15/2013    Obesity     bmi- 41    Obstructive sleep apnea (adult) (pediatric) 07/14/2014    uses cpap qhs    Pseudomonas urinary tract infection 4/5/2019    Sciatic pain 5/23/2016 Severe persistent asthma with acute exacerbation 8/24/2020    SVT (supraventricular tachycardia) (HCC)     ablation for svt    Tobacco use disorder 4/12/2010      Past Surgical History:   Procedure Laterality Date    ABLATION OF DYSRHYTHMIC FOCUS  \"years ago\"    CARDIAC DEFIBRILLATOR PLACEMENT  07/18/2013    Biotronik dual chamber ICD by Dr. Victor Manuel Stevens  07/18/2013    Biotronik dual chamber ICD by Dr. Asia Toledo 7/1/2022    Left heart cath / coronary angiography performed by Kassi Webber MD at [facility]      x1    COLONOSCOPY N/A 7/7/2021    COLONOSCOPY/BMI 48 PT HAS AN ICD performed by Cornelius Galarza MD at [facility]  03/26/2019    mild incarceration, no bowel removal    HYSTERECTOMY (CERVIX STATUS UNKNOWN)      EDITH-Left ovary intact per pt    LEFT HEART CATH,PERCUTANEOUS  2/13/2013    no intervention    PACEMAKER      ICD    ROTATOR CUFF REPAIR Right     TONSILLECTOMY        Prior to Admission medications    Medication Sig Start Date End Date Taking? Authorizing Provider   cyclobenzaprine (FLEXERIL) 10 MG tablet Take 10 mg by mouth 3 times daily as needed for Muscle spasms. Historical Provider, MD   calcitRIOL (ROCALTROL) 0.25 MCG capsule Take 0.25 mcg by mouth daily. Historical Provider, MD   Cholecalciferol (VITAMIN D3) 50 MCG (2000 UT) TABS Take 1 tablet by mouth daily. Historical Provider, MD   nitroGLYCERIN (NITROSTAT) 0.4 MG SL tablet Place 0.4 mg under the tongue every 5 minutes as needed for Chest pain. Max 3 doses     Historical Provider, MD   dupilumab (DUPIXENT) 300 MG/2ML SOPN injection Inject 300 mg into the skin every 14 days. Patient usually takes medication on Monday or Tuesday. Historical Provider, MD   acetaminophen (TYLENOL) 500 MG tablet Take 1,000 mg by mouth every 6 hours as needed for Pain (breakthrough pain).     Historical Provider, MD   amiodarone (CORDARONE) 200 MG tablet Take 1 tablet by mouth 2 times daily 8/17/22   ARNOLDO Boyce CNP   DULoxetine (CYMBALTA) 20 MG extended release capsule Take 1 capsule by mouth daily 8/18/22   ARNOLDO Boyce CNP   aspirin 81 MG EC tablet Take 81 mg by mouth daily    Ar Automatic Reconciliation   carvedilol (COREG) 6.25 MG tablet Take 6.25 mg by mouth 2 times daily (with meals) 11/23/21   Ar Automatic Reconciliation   vitamin D 25 MCG (1000 UT) CAPS Take by mouth daily  Patient not taking: Reported on 9/6/2022    Ar Automatic Reconciliation   empagliflozin (JARDIANCE) 10 MG tablet Take 10 mg by mouth daily 12/29/21   Ar Automatic Reconciliation   insulin glargine (LANTUS SOLOSTAR) 100 UNIT/ML injection pen Inject 15 Units into the skin nightly 12/22/21   Ar Automatic Reconciliation   isosorbide mononitrate (IMDUR) 30 MG extended release tablet Take 30 mg by mouth daily 11/23/21   Ar Automatic Reconciliation   latanoprost (XALATAN) 0.005 % ophthalmic solution Place 1 drop into both eyes nightly 5/10/21   Ar Automatic Reconciliation   polyethylene glycol (GLYCOLAX) 17 GM/SCOOP powder Take 17 g by mouth daily as needed  Patient not taking: Reported on 9/6/2022 11/14/20   Ar Automatic Reconciliation   pravastatin (PRAVACHOL) 80 MG tablet Take 80 mg by mouth nightly 11/23/21   Ar Automatic Reconciliation   sacubitril-valsartan (ENTRESTO) 49-51 MG per tablet Take 1 tablet by mouth 2 times daily 11/23/21   Ar Automatic Reconciliation   torsemide (DEMADEX) 20 MG tablet Take 40 mg by mouth 2 times daily 8/30/22   Ar Automatic Reconciliation   albuterol sulfate  (90 Base) MCG/ACT inhaler 2 puffs qid prn shortness of breath or wheezing  Patient not taking: Reported on 8/10/2022 11/14/20 8/17/22  Ar Automatic Reconciliation     Allergies   Allergen Reactions    Other Hives and Shortness Of Breath     \"inhaler PRN\"    Penicillin G Itching      Social History     Tobacco Use    Smoking status: Some Days Packs/day: 0.25     Types: Cigarettes    Smokeless tobacco: Never    Tobacco comments:     Quit smoking: \"every now and then\"   Substance Use Topics    Alcohol use: Yes     Alcohol/week: 1.0 standard drink      Family History   Problem Relation Age of Onset    Diabetes Other     Heart Disease Father     Hypertension Father     Stroke Father     Heart Attack Father 48        mi    Breast Cancer Sister 37    Hypertension Brother     Heart Disease Brother     Diabetes Brother     Stroke Mother           Review of Systems    Constitutional: no fever, negative  Eyes: fair vision, negative  Ears, nose, mouth, throat, and face:fair hearing, negative  Respiratory: no asthma, negative  Cardiovascular:no chest pain, arrhythmia, see HPI  Gastrointestinal:no diarrhea, negative  Genitourinary: no dysuria,negative  Hematologic/lymphatic: no bleeding tendency, negative  Neurological: no seizures, negative  Behvioral/Psych: no psych hospitalization negative  Endocrine: no goiter, negative      Objective:       /68   Pulse 59   Temp 98.4 °F (36.9 °C) (Oral)   Resp 20   Ht 4' 10\" (1.473 m)   Wt 201 lb (91.2 kg)   SpO2 98%   BMI 42.01 kg/m²     09/07 0701 - 09/07 1900  In: 60 [P.O.:60]  Out: 300 [Urine:300]  09/05 1901 - 09/07 0700  In: 460 [P.O.:460]  Out: 2600 [Urine:2600]    /68   Pulse 59   Temp 98.4 °F (36.9 °C) (Oral)   Resp 20   Ht 4' 10\" (1.473 m)   Wt 201 lb (91.2 kg)   SpO2 98%   BMI 42.01 kg/m²   General:  Alert, cooperative, no distress, appears stated age. Head:  Normocephalic, without obvious abnormality, atraumatic. Eyes:  Conjunctivae/corneas clear. EOMs intact. Ears:  Normal external ear canals both ears. Nose: Nares normal. Septum midline. Mucosa normal. No drainage or sinus tenderness. Throat: Lips, mucosa, and tongue normal. Teeth and gums normal.   Neck: Supple, symmetrical, trachea midline, no adenopathy, no JVD. Back:   Symmetric, no curvature.  ROM normal. No CVA tenderness. Lungs:   Clear to auscultation bilaterally. Heart:  Regular rate and rhythm, S1, S2 normal, no murmur,  rub or gallop. Abdomen:   Soft, non-tender. Bowel sounds normal. No masses,  No organomegaly. No renal bruit. Extremities: Extremities normal, atraumatic, no cyanosis or edema. Skin: Skin color, texture, turgor normal. No rashes or lesions. Lymph nodes: Cervical and supraclavicular nodes normal.   Neurologic: Baseline tremor is present. No asterixis.          Data Review:     Recent Results (from the past 24 hour(s))   POCT Glucose    Collection Time: 09/06/22 12:02 PM   Result Value Ref Range    POC Glucose 137 (H) 65 - 100 mg/dL    Performed by: CarlitosPCKEATON    POCT Glucose    Collection Time: 09/06/22  4:20 PM   Result Value Ref Range    POC Glucose 133 (H) 65 - 100 mg/dL    Performed by: KailaCiafoalPCKEATON    POCT Glucose    Collection Time: 09/06/22  9:19 PM   Result Value Ref Range    POC Glucose 192 (H) 65 - 100 mg/dL    Performed by: Scott    Magnesium    Collection Time: 09/07/22  7:00 AM   Result Value Ref Range    Magnesium 2.9 (H) 1.8 - 2.4 mg/dL   Basic Metabolic Panel w/ Reflex to MG    Collection Time: 09/07/22  7:00 AM   Result Value Ref Range    Sodium 144 136 - 145 mmol/L    Potassium 3.8 3.5 - 5.1 mmol/L    Chloride 113 (H) 101 - 110 mmol/L    CO2 25 21 - 32 mmol/L    Anion Gap 6 4 - 13 mmol/L    Glucose 120 (H) 65 - 100 mg/dL    BUN 31 (H) 6 - 23 MG/DL    Creatinine 1.60 (H) 0.6 - 1.0 MG/DL    GFR  43 (L) >60 ml/min/1.73m2    GFR Non- 35 (L) >60 ml/min/1.73m2    Calcium 8.5 8.3 - 10.4 MG/DL   POCT Glucose    Collection Time: 09/07/22  7:30 AM   Result Value Ref Range    POC Glucose 107 (H) 65 - 100 mg/dL    Performed by: Dominique Birmingham    POCT Glucose    Collection Time: 09/07/22 11:31 AM   Result Value Ref Range    POC Glucose 125 (H) 65 - 100 mg/dL    Performed by: Claus([facility])        CXR;   Results for orders placed during the hospital encounter of 09/06/22    XR CHEST PORTABLE    Narrative  Portable chest xray    COMPARISON: August 10, 2022    INDICATION: chest pain    FINDINGS:    Heart is enlarged. Mediastinal contour is within normal limits. Stable  left-sided cardiac pacer. Increased interstitial prominence, likely vascular  congestion. No pneumothorax. No large pleural effusion. Impression  1. Cardiomegaly and suggestion of vascular congestion. CT Abdomen  Results for orders placed in visit on 03/31/19    CT ABDOMEN PELVIS W IV CONTRAST    Narrative  CT abdomen and pelvis with contrast: 03/30/2019    History: Recent hernia surgery, abdominal distention. Technique: Helically acquired images were obtained from the domes of the  diaphragms to the ischial tuberosities reconstructed at 5mm intervals after the  uneventful administration of oral contrast for bowel opacification and 100 cc's  of intravenous Isovue-370 to evaluate the solid abdominal viscera and vascular  structures. Coronal reformatted images were submitted. Radiation dose reduction techniques were used for this study:  Our CT scanners  use one or all of the following: Automated exposure control, adjustment of the  mA and/or kVp according to patient's size, iterative reconstruction. Comparison: 02/26/2019. CT ABDOMEN: There is mild bilateral lower lobe subsegmental atelectasis and/or  scar. There is a tiny low-density lesion within the right hepatic lobe, too  small to characterize but statistically benign, unchanged. The spleen, pancreas  and adrenal glands are unremarkable. A cyst within the upper pole left kidney  measures 1.87 m. 2 subcentimeter low-density lesions within the right kidney are  too small to characterize but statistically benign. There is a large defect within the anterior abdominal wall measuring nearly 12  mm in transverse diameter.  Most of the small bowel as well as a large portion of  the mid Volume depletion - initially volume depleted, now near euvolemic  Hyponatremia - mild, likely due to cardiac disease - no specific therapy needed at this time  Hypoalbuminemia - probably due to urinary protein loss       Plan:     As above     Annalise Seo M.D.

## 2022-09-08 LAB
ANION GAP SERPL CALC-SCNC: 7 MMOL/L (ref 4–13)
BUN SERPL-MCNC: 20 MG/DL (ref 6–23)
CALCIUM SERPL-MCNC: 9.2 MG/DL (ref 8.3–10.4)
CHLORIDE SERPL-SCNC: 113 MMOL/L (ref 101–110)
CO2 SERPL-SCNC: 20 MMOL/L (ref 21–32)
CREAT SERPL-MCNC: 1.3 MG/DL (ref 0.6–1)
GLUCOSE BLD STRIP.AUTO-MCNC: 106 MG/DL (ref 65–100)
GLUCOSE BLD STRIP.AUTO-MCNC: 109 MG/DL (ref 65–100)
GLUCOSE BLD STRIP.AUTO-MCNC: 111 MG/DL (ref 65–100)
GLUCOSE BLD STRIP.AUTO-MCNC: 115 MG/DL (ref 65–100)
GLUCOSE SERPL-MCNC: 116 MG/DL (ref 65–100)
MAGNESIUM SERPL-MCNC: 2.5 MG/DL (ref 1.8–2.4)
POTASSIUM SERPL-SCNC: 5.5 MMOL/L (ref 3.5–5.1)
SERVICE CMNT-IMP: ABNORMAL
SODIUM SERPL-SCNC: 140 MMOL/L (ref 136–145)

## 2022-09-08 PROCEDURE — 6360000002 HC RX W HCPCS: Performed by: NURSE PRACTITIONER

## 2022-09-08 PROCEDURE — 6370000000 HC RX 637 (ALT 250 FOR IP): Performed by: NURSE PRACTITIONER

## 2022-09-08 PROCEDURE — 80048 BASIC METABOLIC PNL TOTAL CA: CPT

## 2022-09-08 PROCEDURE — 6370000000 HC RX 637 (ALT 250 FOR IP): Performed by: INTERNAL MEDICINE

## 2022-09-08 PROCEDURE — 99232 SBSQ HOSP IP/OBS MODERATE 35: CPT | Performed by: INTERNAL MEDICINE

## 2022-09-08 PROCEDURE — 1100000003 HC PRIVATE W/ TELEMETRY

## 2022-09-08 PROCEDURE — 2580000003 HC RX 258: Performed by: NURSE PRACTITIONER

## 2022-09-08 PROCEDURE — 82962 GLUCOSE BLOOD TEST: CPT

## 2022-09-08 PROCEDURE — 36415 COLL VENOUS BLD VENIPUNCTURE: CPT

## 2022-09-08 PROCEDURE — 6360000002 HC RX W HCPCS: Performed by: INTERNAL MEDICINE

## 2022-09-08 PROCEDURE — 2700000000 HC OXYGEN THERAPY PER DAY

## 2022-09-08 PROCEDURE — 83735 ASSAY OF MAGNESIUM: CPT

## 2022-09-08 PROCEDURE — 2580000003 HC RX 258: Performed by: EMERGENCY MEDICINE

## 2022-09-08 PROCEDURE — 6360000002 HC RX W HCPCS: Performed by: EMERGENCY MEDICINE

## 2022-09-08 RX ORDER — AMIODARONE HYDROCHLORIDE 200 MG/1
400 TABLET ORAL EVERY 8 HOURS
Status: DISCONTINUED | OUTPATIENT
Start: 2022-09-08 | End: 2022-09-22

## 2022-09-08 RX ADMIN — SODIUM CHLORIDE: 9 INJECTION, SOLUTION INTRAVENOUS at 09:02

## 2022-09-08 RX ADMIN — LATANOPROST 1 DROP: 50 SOLUTION OPHTHALMIC at 21:16

## 2022-09-08 RX ADMIN — AMIODARONE HYDROCHLORIDE 400 MG: 200 TABLET ORAL at 14:36

## 2022-09-08 RX ADMIN — ONDANSETRON 4 MG: 2 INJECTION INTRAMUSCULAR; INTRAVENOUS at 18:52

## 2022-09-08 RX ADMIN — HEPARIN SODIUM 5000 UNITS: 5000 INJECTION INTRAVENOUS; SUBCUTANEOUS at 21:16

## 2022-09-08 RX ADMIN — SODIUM CHLORIDE, PRESERVATIVE FREE 10 ML: 5 INJECTION INTRAVENOUS at 21:18

## 2022-09-08 RX ADMIN — AMIODARONE HYDROCHLORIDE 0.5 MG/MIN: 50 INJECTION, SOLUTION INTRAVENOUS at 00:01

## 2022-09-08 RX ADMIN — POLYETHYLENE GLYCOL 3350 17 G: 17 POWDER, FOR SOLUTION ORAL at 04:53

## 2022-09-08 RX ADMIN — MORPHINE SULFATE 2 MG: 2 INJECTION, SOLUTION INTRAMUSCULAR; INTRAVENOUS at 13:25

## 2022-09-08 RX ADMIN — AMIODARONE HYDROCHLORIDE 400 MG: 200 TABLET ORAL at 21:16

## 2022-09-08 RX ADMIN — SODIUM CHLORIDE, PRESERVATIVE FREE 10 ML: 5 INJECTION INTRAVENOUS at 11:10

## 2022-09-08 RX ADMIN — MORPHINE SULFATE 2 MG: 2 INJECTION, SOLUTION INTRAMUSCULAR; INTRAVENOUS at 09:02

## 2022-09-08 RX ADMIN — HEPARIN SODIUM 5000 UNITS: 5000 INJECTION INTRAVENOUS; SUBCUTANEOUS at 13:25

## 2022-09-08 RX ADMIN — PRAVASTATIN SODIUM 80 MG: 80 TABLET ORAL at 21:16

## 2022-09-08 RX ADMIN — ONDANSETRON 4 MG: 2 INJECTION INTRAMUSCULAR; INTRAVENOUS at 00:01

## 2022-09-08 RX ADMIN — DULOXETINE HYDROCHLORIDE 20 MG: 20 CAPSULE, DELAYED RELEASE ORAL at 09:03

## 2022-09-08 RX ADMIN — MORPHINE SULFATE 2 MG: 2 INJECTION, SOLUTION INTRAMUSCULAR; INTRAVENOUS at 04:53

## 2022-09-08 RX ADMIN — MORPHINE SULFATE 2 MG: 2 INJECTION, SOLUTION INTRAMUSCULAR; INTRAVENOUS at 18:42

## 2022-09-08 RX ADMIN — HEPARIN SODIUM 5000 UNITS: 5000 INJECTION INTRAVENOUS; SUBCUTANEOUS at 06:14

## 2022-09-08 RX ADMIN — ASPIRIN 81 MG: 81 TABLET, COATED ORAL at 09:03

## 2022-09-08 ASSESSMENT — PAIN DESCRIPTION - ORIENTATION
ORIENTATION: POSTERIOR;MID;UPPER
ORIENTATION: LEFT
ORIENTATION: LEFT

## 2022-09-08 ASSESSMENT — PAIN DESCRIPTION - LOCATION
LOCATION: LEG
LOCATION: LEG
LOCATION: BACK
LOCATION: BACK

## 2022-09-08 ASSESSMENT — PAIN SCALES - GENERAL
PAINLEVEL_OUTOF10: 9
PAINLEVEL_OUTOF10: 7
PAINLEVEL_OUTOF10: 8
PAINLEVEL_OUTOF10: 9

## 2022-09-08 ASSESSMENT — PAIN DESCRIPTION - DESCRIPTORS
DESCRIPTORS: DISCOMFORT;CRAMPING
DESCRIPTORS: DISCOMFORT
DESCRIPTORS: DISCOMFORT;CRUSHING

## 2022-09-08 NOTE — PROGRESS NOTES
I have initiated contact with tertiary referral center and I am awaiting day hear back from them today concerning transfer of Ms. Yaima Bloom for complex annulus ectomy plus minus mitral valve repair. Plan is to see if they are willing to take her in transfer for hopefully definitive therapy of her LV aneurysm    Update after discussion with Dr. Leo Dover at [facility] we will look to facilitate an admission at his facility in approximately a week. He will be unavailable for the approximately the next 7 days. Patient always had this for quite a while and even though she is having ventricular tachycardia we have been able to reasonably suppress with amiodarone and appropriately treat with her ICD and planning for an evaluation and admission at [facility] with the cardiac surgery in approximately 7 to 10 days should not be an issue.   Therefore when she is stable for discharge from this hospitalization she could be discharged with then plans that we would coordinate with [facility] for an admission for definitive therapy of her posterior basal aneurysm when the surgeon is back in town

## 2022-09-08 NOTE — PROGRESS NOTES
RENAL H&P/CONSULT    Subjective:     Patient is a 60year old female with Chronic Kidney Disease with baseline creatinine 2-2.2 followed by me in the office setting is admitted with V tach. She feels much better since admission and her heart rate is now normal.   She has had prior SNEHA on CKD episodes, IDDM, morbid obesity s/p DC ICD. She underwent defibrillated in the ED and was placed on amiodarone IV. Her crea was 1.8 on 8/31/22 and increased to 3.3 on admission, and improved to 1.6 today. No vomiting, no CP, dyspnea is controlled with nasal O2.  9/8/22- alert/oriented x3, sitting on side of bed, no signs of distress, on room air, denies dyspnea, CP, n/v, HA, or dizziness. On Amiodarone gtt. Family at bedside. Past Medical History:   Diagnosis Date    Abdominal wall hernia 10/2/2018    Acute hypoxemic respiratory failure ([address] 4/3/2019    Allergic rhinitis     followed by allergist; allergic to grass- has rescue inhaler PRN    ARDS (adult respiratory distress syndrome) ([address] 4/3/2019    Arthritis     Automatic implantable cardioverter-defibrillator in situ 3/30/2016    CAD in native artery 3/30/2016    Chronic kidney disease     Chronic systolic heart failure ([address] 07/18/2013    ECHO 2017- EF 39%; managed with medications; [place]    CKD (chronic kidney disease)     [facility] follows    Diabetes ([address]     type 2 (on insulin);  FBS AM range- , hypo s/s <70, hgba1c- 7.9    Diabetes mellitus ([address] 4/12/2010    Dyslipidemia 2/13/2013    Eczema     Fibromyalgia     GERD (gastroesophageal reflux disease)     hx of- no meds currently    Headache     Heart failure ([address]     chronic systolic with EF 39%; non-ischemic cardiomyopathy    HTN (hypertension) 4/12/2010    Hypercholesterolemia     Incarcerated incisional hernia 3/26/2019    Morbid obesity ([address]     Neuropathy     bilateral feet and tips of fingers    NICM (nonischemic cardiomyopathy) ([address] 2/15/2013    Obesity bmi- 41    Obstructive sleep apnea (adult) (pediatric) 07/14/2014    uses cpap qhs    Pseudomonas urinary tract infection 4/5/2019    Sciatic pain 5/23/2016    Severe persistent asthma with acute exacerbation 8/24/2020    SVT (supraventricular tachycardia) ([facility])     ablation for svt    Tobacco use disorder 4/12/2010      Past Surgical History:   Procedure Laterality Date    ABLATION OF DYSRHYTHMIC FOCUS  \"years ago\"    CARDIAC DEFIBRILLATOR PLACEMENT  07/18/2013    Biotronik dual chamber ICD by Dr. Regla Nelson  07/18/2013    Biotronik dual chamber ICD by Dr. Scooter Carter 7/1/2022    Left heart cath / coronary angiography performed by Jonathan Valencia MD at [facility]      x1    COLONOSCOPY N/A 7/7/2021    COLONOSCOPY/BMI 48 PT HAS AN ICD performed by Mick Dobbs MD at [facility]  03/26/2019    mild incarceration, no bowel removal    HYSTERECTOMY (CERVIX STATUS UNKNOWN)      EDITH-Left ovary intact per pt    LEFT HEART CATH,PERCUTANEOUS  2/13/2013    no intervention    PACEMAKER      ICD    ROTATOR CUFF REPAIR Right     TONSILLECTOMY        Prior to Admission medications    Medication Sig Start Date End Date Taking? Authorizing Provider   cyclobenzaprine (FLEXERIL) 10 MG tablet Take 10 mg by mouth 3 times daily as needed for Muscle spasms. Historical Provider, MD   calcitRIOL (ROCALTROL) 0.25 MCG capsule Take 0.25 mcg by mouth daily. Historical Provider, MD   Cholecalciferol (VITAMIN D3) 50 MCG (2000 UT) TABS Take 1 tablet by mouth daily. Historical Provider, MD   nitroGLYCERIN (NITROSTAT) 0.4 MG SL tablet Place 0.4 mg under the tongue every 5 minutes as needed for Chest pain. Max 3 doses     Historical Provider, MD   dupilumab (DUPIXENT) 300 MG/2ML SOPN injection Inject 300 mg into the skin every 14 days. Patient usually takes medication on Monday or Tuesday.     Historical Provider, MD   acetaminophen (TYLENOL) 500 MG tablet Take 1,000 mg by mouth every 6 hours as needed for Pain (breakthrough pain).     Historical Provider, MD   amiodarone (CORDARONE) 200 MG tablet Take 1 tablet by mouth 2 times daily 8/17/22   ARNOLDO Amaya CNP   DULoxetine (CYMBALTA) 20 MG extended release capsule Take 1 capsule by mouth daily 8/18/22   ARNOLDO Amaya CNP   aspirin 81 MG EC tablet Take 81 mg by mouth daily    Ar Automatic Reconciliation   carvedilol (COREG) 6.25 MG tablet Take 6.25 mg by mouth 2 times daily (with meals) 11/23/21   Ar Automatic Reconciliation   vitamin D 25 MCG (1000 UT) CAPS Take by mouth daily  Patient not taking: Reported on 9/6/2022    Ar Automatic Reconciliation   empagliflozin (JARDIANCE) 10 MG tablet Take 10 mg by mouth daily 12/29/21   Ar Automatic Reconciliation   insulin glargine (LANTUS SOLOSTAR) 100 UNIT/ML injection pen Inject 15 Units into the skin nightly 12/22/21   Ar Automatic Reconciliation   isosorbide mononitrate (IMDUR) 30 MG extended release tablet Take 30 mg by mouth daily 11/23/21   Ar Automatic Reconciliation   latanoprost (XALATAN) 0.005 % ophthalmic solution Place 1 drop into both eyes nightly 5/10/21   Ar Automatic Reconciliation   polyethylene glycol (GLYCOLAX) 17 GM/SCOOP powder Take 17 g by mouth daily as needed  Patient not taking: Reported on 9/6/2022 11/14/20   Ar Automatic Reconciliation   pravastatin (PRAVACHOL) 80 MG tablet Take 80 mg by mouth nightly 11/23/21   Ar Automatic Reconciliation   sacubitril-valsartan (ENTRESTO) 49-51 MG per tablet Take 1 tablet by mouth 2 times daily 11/23/21   Ar Automatic Reconciliation   torsemide (DEMADEX) 20 MG tablet Take 40 mg by mouth 2 times daily 8/30/22   Ar Automatic Reconciliation   albuterol sulfate  (90 Base) MCG/ACT inhaler 2 puffs qid prn shortness of breath or wheezing  Patient not taking: Reported on 8/10/2022 11/14/20 8/17/22  Ar Automatic Reconciliation     Allergies Allergen Reactions    Other Hives and Shortness Of Breath     \"inhaler PRN\"    Penicillin G Itching      Social History     Tobacco Use    Smoking status: Some Days     Packs/day: 0.25     Types: Cigarettes    Smokeless tobacco: Never    Tobacco comments:     Quit smoking: \"every now and then\"   Substance Use Topics    Alcohol use: Yes     Alcohol/week: 1.0 standard drink      Family History   Problem Relation Age of Onset    Diabetes Other     Heart Disease Father     Hypertension Father     Stroke Father     Heart Attack Father 48        mi    Breast Cancer Sister 37    Hypertension Brother     Heart Disease Brother     Diabetes Brother     Stroke Mother           Review of Systems    Negative except for what is mention in HPI above      Objective:       /84   Pulse 79   Temp 98.6 °F (37 °C) (Oral)   Resp 18   Ht 4' 10\" (1.473 m)   Wt 208 lb 11.2 oz (94.7 kg)   SpO2 93%   BMI 43.62 kg/m²     09/08 0701 - 09/08 1900  In: -   Out: 400 [Urine:400]  09/06 1901 - 09/08 0700  In: 520 [P.O.:520]  Out: 2175 [Urine:2175]    /84   Pulse 79   Temp 98.6 °F (37 °C) (Oral)   Resp 18   Ht 4' 10\" (1.473 m)   Wt 208 lb 11.2 oz (94.7 kg)   SpO2 93%   BMI 43.62 kg/m²     General:  Alert, cooperative, no distress, appears stated age. Head:  Normocephalic, without obvious abnormality, atraumatic. Neck: Supple, symmetrical, trachea midline, no JVD. Back:   Symmetric, no curvature. ROM normal. No CVA tenderness. Lungs:   Clear to auscultation bilaterally. Heart:  Regular rate and rhythm, S1, S2 normal, no murmur,  rub or gallop. Abdomen:   Soft, non-tender. Bowel sounds normal   Extremities: Extremities normal, atraumatic, no cyanosis or edema. Skin: Skin color, texture, turgor normal. No rashes or lesions. Lymph nodes: Cervical and supraclavicular nodes normal.   Neurologic: Baseline tremor is present. No asterixis.          Data Review:     Recent Results (from the past 24 hour(s))   POCT Glucose    Collection Time: 09/07/22 11:31 AM   Result Value Ref Range    POC Glucose 125 (H) 65 - 100 mg/dL    Performed by: Jael Jarvis    POCT Glucose    Collection Time: 09/07/22  4:08 PM   Result Value Ref Range    POC Glucose 141 (H) 65 - 100 mg/dL    Performed by: Jael Jarvis    POCT Glucose    Collection Time: 09/07/22  8:30 PM   Result Value Ref Range    POC Glucose 121 (H) 65 - 100 mg/dL    Performed by: Elliot    Magnesium    Collection Time: 09/08/22  6:25 AM   Result Value Ref Range    Magnesium 2.5 (H) 1.8 - 2.4 mg/dL   Basic Metabolic Panel w/ Reflex to MG    Collection Time: 09/08/22  6:25 AM   Result Value Ref Range    Sodium 140 136 - 145 mmol/L    Potassium 5.5 (H) 3.5 - 5.1 mmol/L    Chloride 113 (H) 101 - 110 mmol/L    CO2 20 (L) 21 - 32 mmol/L    Anion Gap 7 4 - 13 mmol/L    Glucose 116 (H) 65 - 100 mg/dL    BUN 20 6 - 23 MG/DL    Creatinine 1.30 (H) 0.6 - 1.0 MG/DL    GFR  54 (L) >60 ml/min/1.73m2    GFR Non- 45 (L) >60 ml/min/1.73m2    Calcium 9.2 8.3 - 10.4 MG/DL   POCT Glucose    Collection Time: 09/08/22  7:31 AM   Result Value Ref Range    POC Glucose 106 (H) 65 - 100 mg/dL    Performed by: [address]        CXR;   Results for orders placed during the hospital encounter of 09/06/22    XR CHEST PORTABLE    Narrative  Portable chest xray    COMPARISON: August 10, 2022    INDICATION: chest pain    FINDINGS:    Heart is enlarged. Mediastinal contour is within normal limits. Stable  left-sided cardiac pacer. Increased interstitial prominence, likely vascular  congestion. No pneumothorax. No large pleural effusion. Impression  1. Cardiomegaly and suggestion of vascular congestion. CT Abdomen  Results for orders placed in visit on 03/31/19    CT ABDOMEN PELVIS W IV CONTRAST    Narrative  CT abdomen and pelvis with contrast: 03/30/2019    History: Recent hernia surgery, abdominal distention.     Technique: Helically acquired images were obtained from the domes of the  diaphragms to the ischial tuberosities reconstructed at 5mm intervals after the  uneventful administration of oral contrast for bowel opacification and 100 cc's  of intravenous Isovue-370 to evaluate the solid abdominal viscera and vascular  structures. Coronal reformatted images were submitted. Radiation dose reduction techniques were used for this study:  Our CT scanners  use one or all of the following: Automated exposure control, adjustment of the  mA and/or kVp according to patient's size, iterative reconstruction. Comparison: 02/26/2019. CT ABDOMEN: There is mild bilateral lower lobe subsegmental atelectasis and/or  scar. There is a tiny low-density lesion within the right hepatic lobe, too  small to characterize but statistically benign, unchanged. The spleen, pancreas  and adrenal glands are unremarkable. A cyst within the upper pole left kidney  measures 1.87 m. 2 subcentimeter low-density lesions within the right kidney are  too small to characterize but statistically benign. There is a large defect within the anterior abdominal wall measuring nearly 12  mm in transverse diameter. Most of the small bowel as well as a large portion of  the mid stomach and transverse colon anteriorly large hernia. There is soft  tissue stranding along the lateral abdominal walls, presumably postsurgical. The  the proximal and midportion of the stomach is moderately distended which with  narrowing of the distal stomach has the pylorus traverses back into the  peritoneal cavity. The proximal small bowel is located within the peritoneal  cavity and is dilated with these loops of bowel extending into the large hernia  which also appear mildly dilated. A definite transition zone is unable to be  visualized. No adenopathy is present. There is no free fluid. A drainage catheter is present  along the inferior margin of the hernia.     CT PELVIS: A Chamberlain catheter is present within urinary bladder. No adenopathy or  ascites is present. There are no plantar changes. Sigmoid diverticula are  present. Impression  IMPRESSION: Large abdominal wall defect containing many loops of small bowel,  transverse colon and portions of the stomach. Narrowing of the distal stomach as  it enters back into the peritoneal cavity causes partial outlet obstruction. Proximal small bowel is also mildly dilated without a definite transition zone. A small bowel obstruction cannot be excluded. Principal Problem:    Ventricular tachycardia (HCC)  Active Problems:    Acute on chronic renal failure (HCC)    Left ventricular aneurysm    Type 2 diabetes with nephropathy (HCC)    NICM (nonischemic cardiomyopathy) (HCC)    Dyslipidemia    CKD (chronic kidney disease) stage 4, GFR 15-29 ml/min (HCC)    Morbid obesity ([facility]  Resolved Problems:    * No resolved hospital problems.  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 hypertensive nephrosclerosis - excellent improvement in response to medical management and renal function is better than  baseline today   - kidney indices continue to improve, continue medical management, no urgent needs at this time, will monitor for new baseline  Volume depletion - initially volume depleted, euvolemic on exam, on IVFs  Hyponatremia - mild, likely due to cardiac disease - no specific therapy needed at this time  - improved, will monitor  Hypoalbuminemia - probably due to urinary protein loss   Hyperkalemia- mild, on admission K+ 3.8 with blood draw and 3.0 with POC test, given KCL 10 mEq IV at that time, with mild acidosis, no intervention at this time  Acidosis- mild, no hx, repeat in AM, if still low, will start sodium bicarb PO  Ventricular tachycardia- LV aneurysm, on Amiodarone gtt, ICD, managed by Cardiology, plans for surgery at [facility] within the next 7-10 days      Plan:     As above     [facility]

## 2022-09-08 NOTE — PROGRESS NOTES
[facility] CARDIOLOGY PROGRESS NOTE           9/8/2022 6:53 AM    Admit Date: 9/6/2022    Admit Diagnosis: Ventricular tachycardia (HCC) [I47.2]      Subjective:   No complaints this AM, no chest pain or shortness of breath    Interval History: (History of pertinent interval events obtained from nursing staff)    ROS:  GEN:  No fever or chills  Cardiovascular:  As noted above  Pulmonary:  As noted above  Neuro:  No new focal motor or sensory loss      Objective:     Vitals:    09/07/22 1607 09/07/22 1936 09/07/22 2348 09/08/22 0404   BP: 118/80 120/65 112/67 93/65   Pulse: 60 60 58 58   Resp: 16 20 20 20   Temp: 98.1 °F (36.7 °C) 98.1 °F (36.7 °C) 97.7 °F (36.5 °C) 98.1 °F (36.7 °C)   TempSrc: Oral Oral Oral Oral   SpO2: 94% 98% 95% 98%   Weight:    208 lb 11.2 oz (94.7 kg)   Height:           Physical Exam:  General-Well Developed, Well Nourished, No Acute Distress, Alert & Oriented x 3, appropriate mood. Neck- supple, no JVD  CV- regular rate and rhythm no MRG  Lung- clear bilaterally  Abd- soft, nontender, nondistended  Ext- no edema bilaterally.   Skin- warm and dry    Current Facility-Administered Medications   Medication Dose Route Frequency    HYDROcodone-acetaminophen (NORCO) 7.5-325 MG per tablet 1 tablet  1 tablet Oral Q6H PRN    morphine injection 2 mg  2 mg IntraVENous Q4H PRN    traMADol (ULTRAM) tablet 50 mg  50 mg Oral Q6H PRN    polyethylene glycol (GLYCOLAX) packet 17 g  17 g Oral Daily PRN    amiodarone (CORDARONE) 450 mg in dextrose 5 % 250 mL infusion ([id number])  0.5 mg/min IntraVENous Continuous    aspirin EC tablet 81 mg  81 mg Oral Daily    DULoxetine (CYMBALTA) extended release capsule 20 mg  20 mg Oral Daily    insulin glargine (LANTUS) injection vial 7 Units  7 Units SubCUTAneous Nightly    pravastatin (PRAVACHOL) tablet 80 mg  80 mg Oral Nightly    sodium chloride flush 0.9 % injection 5-40 mL  5-40 mL IntraVENous 2 times per day    sodium chloride flush 0.9 % injection 5-40 mL  5-40 mL IntraVENous PRN    ondansetron (ZOFRAN) injection 4 mg  4 mg IntraVENous Q4H PRN    acetaminophen (TYLENOL) tablet 650 mg  650 mg Oral Q6H PRN    Or    acetaminophen (TYLENOL) suppository 650 mg  650 mg Rectal Q6H PRN    nitroGLYCERIN (NITROSTAT) SL tablet 0.4 mg  0.4 mg SubLINGual Q5 Min PRN    0.9 % sodium chloride infusion   IntraVENous Continuous    heparin (porcine) injection 5,000 Units  5,000 Units SubCUTAneous 3 times per day    sodium chloride (OCEAN, BABY AYR) 0.65 % nasal spray 1 spray  1 spray Each Nostril PRN    insulin lispro (HUMALOG) injection vial 0-4 Units  0-4 Units SubCUTAneous TID WC    insulin lispro (HUMALOG) injection vial 0-4 Units  0-4 Units SubCUTAneous Nightly    glucose chewable tablet 16 g  4 tablet Oral PRN    dextrose bolus 10% 125 mL  125 mL IntraVENous PRN    Or    dextrose bolus 10% 250 mL  250 mL IntraVENous PRN    glucagon (rDNA) injection 1 mg  1 mg SubCUTAneous PRN    dextrose 10 % infusion   IntraVENous Continuous PRN    latanoprost (XALATAN) 0.005 % ophthalmic solution 1 drop  1 drop Both Eyes Nightly     Data Review:   Recent Results (from the past 24 hour(s))   Magnesium    Collection Time: 09/07/22  7:00 AM   Result Value Ref Range    Magnesium 2.9 (H) 1.8 - 2.4 mg/dL   Basic Metabolic Panel w/ Reflex to MG    Collection Time: 09/07/22  7:00 AM   Result Value Ref Range    Sodium 144 136 - 145 mmol/L    Potassium 3.8 3.5 - 5.1 mmol/L    Chloride 113 (H) 101 - 110 mmol/L    CO2 25 21 - 32 mmol/L    Anion Gap 6 4 - 13 mmol/L    Glucose 120 (H) 65 - 100 mg/dL    BUN 31 (H) 6 - 23 MG/DL    Creatinine 1.60 (H) 0.6 - 1.0 MG/DL    GFR  43 (L) >60 ml/min/1.73m2    GFR Non- 35 (L) >60 ml/min/1.73m2    Calcium 8.5 8.3 - 10.4 MG/DL   POCT Glucose    Collection Time: 09/07/22  7:30 AM   Result Value Ref Range    POC Glucose 107 (H) 65 - 100 mg/dL    Performed by: Chadd Salas    POCT Glucose    Collection Time: 09/07/22 11:31 AM   Result Value Ref Range    POC Glucose 125 (H) 65 - 100 mg/dL    Performed by: Ro Christensen)    POCT Glucose    Collection Time: 09/07/22  4:08 PM   Result Value Ref Range    POC Glucose 141 (H) 65 - 100 mg/dL    Performed by: Ro Christensen)    POCT Glucose    Collection Time: 09/07/22  8:30 PM   Result Value Ref Range    POC Glucose 121 (H) 65 - 100 mg/dL    Performed by: Elliot        EKG:  (EKG has been independently visualized by me with interpretation below)  Assessment:     Principal Problem:    Ventricular tachycardia (HCC)  Active Problems:    Acute on chronic renal failure (HCC)    Left ventricular aneurysm    Type 2 diabetes with nephropathy (HCC)    NICM (nonischemic cardiomyopathy) (HCC)    Dyslipidemia    CKD (chronic kidney disease) stage 4, GFR 15-29 ml/min (HCC)    Morbid obesity ([address]  Resolved Problems:    * No resolved hospital problems. *  54 year old female with complicated history including severe NICM, large posterior LV aneurysm and recurrent VT requiring ICD shock and hospital-based therapy. Plan:   1. VT - Likely from LV scar (VT morphology appears posterior LV). Pt was in VT for 12 hours prior to defib in ED. Pt needs surgical eval for LV aneurysm resection, considering transfer. Will stop IV amiodarone today , transition to 400mg Q8H with plans to taper dose. 2.  NICM - GDMT on hold, hemodynamics improving. 3.  SNEHA on CKD - likely from poor hemodynamics from being in VT. Following labs closely. Gently reintroduce GDMT as able. Nephrology following. Continue measures to support renal perfusion. 4.  Morbid obesity - adds complexity to her care. Encourage weight loss. 5.  IDDM - continue therapies. Pt is at high risk for poor outcome/recurrent admissions. Lauren Meza MD  Cardiology/Electrophysiology

## 2022-09-09 LAB
GLUCOSE BLD STRIP.AUTO-MCNC: 109 MG/DL (ref 65–100)
GLUCOSE BLD STRIP.AUTO-MCNC: 124 MG/DL (ref 65–100)
GLUCOSE BLD STRIP.AUTO-MCNC: 168 MG/DL (ref 65–100)
GLUCOSE BLD STRIP.AUTO-MCNC: 90 MG/DL (ref 65–100)
SERVICE CMNT-IMP: ABNORMAL
SERVICE CMNT-IMP: NORMAL

## 2022-09-09 PROCEDURE — 6360000002 HC RX W HCPCS: Performed by: INTERNAL MEDICINE

## 2022-09-09 PROCEDURE — 6370000000 HC RX 637 (ALT 250 FOR IP): Performed by: NURSE PRACTITIONER

## 2022-09-09 PROCEDURE — 6370000000 HC RX 637 (ALT 250 FOR IP): Performed by: INTERNAL MEDICINE

## 2022-09-09 PROCEDURE — 99232 SBSQ HOSP IP/OBS MODERATE 35: CPT | Performed by: INTERNAL MEDICINE

## 2022-09-09 PROCEDURE — 2580000003 HC RX 258: Performed by: NURSE PRACTITIONER

## 2022-09-09 PROCEDURE — 82962 GLUCOSE BLOOD TEST: CPT

## 2022-09-09 PROCEDURE — 1100000003 HC PRIVATE W/ TELEMETRY

## 2022-09-09 PROCEDURE — 6360000002 HC RX W HCPCS: Performed by: NURSE PRACTITIONER

## 2022-09-09 RX ORDER — LORAZEPAM 0.5 MG/1
0.5 TABLET ORAL EVERY 4 HOURS PRN
Status: DISCONTINUED | OUTPATIENT
Start: 2022-09-09 | End: 2022-09-25 | Stop reason: HOSPADM

## 2022-09-09 RX ORDER — DOCUSATE SODIUM 100 MG/1
100 CAPSULE, LIQUID FILLED ORAL DAILY
Status: DISCONTINUED | OUTPATIENT
Start: 2022-09-09 | End: 2022-09-25 | Stop reason: HOSPADM

## 2022-09-09 RX ADMIN — AMIODARONE HYDROCHLORIDE 400 MG: 200 TABLET ORAL at 05:33

## 2022-09-09 RX ADMIN — LORAZEPAM 0.5 MG: 0.5 TABLET ORAL at 06:22

## 2022-09-09 RX ADMIN — HYDROCODONE BITARTRATE AND ACETAMINOPHEN 1 TABLET: 7.5; 325 TABLET ORAL at 20:22

## 2022-09-09 RX ADMIN — LATANOPROST 1 DROP: 50 SOLUTION OPHTHALMIC at 20:25

## 2022-09-09 RX ADMIN — ASPIRIN 81 MG: 81 TABLET, COATED ORAL at 08:48

## 2022-09-09 RX ADMIN — DOCUSATE SODIUM 100 MG: 100 CAPSULE, LIQUID FILLED ORAL at 10:12

## 2022-09-09 RX ADMIN — MORPHINE SULFATE 2 MG: 2 INJECTION, SOLUTION INTRAMUSCULAR; INTRAVENOUS at 08:48

## 2022-09-09 RX ADMIN — HEPARIN SODIUM 5000 UNITS: 5000 INJECTION INTRAVENOUS; SUBCUTANEOUS at 05:33

## 2022-09-09 RX ADMIN — POLYETHYLENE GLYCOL 3350 17 G: 17 POWDER, FOR SOLUTION ORAL at 10:12

## 2022-09-09 RX ADMIN — SODIUM CHLORIDE, PRESERVATIVE FREE 10 ML: 5 INJECTION INTRAVENOUS at 08:49

## 2022-09-09 RX ADMIN — HEPARIN SODIUM 5000 UNITS: 5000 INJECTION INTRAVENOUS; SUBCUTANEOUS at 14:29

## 2022-09-09 RX ADMIN — AMIODARONE HYDROCHLORIDE 400 MG: 200 TABLET ORAL at 14:29

## 2022-09-09 RX ADMIN — AMIODARONE HYDROCHLORIDE 400 MG: 200 TABLET ORAL at 21:19

## 2022-09-09 RX ADMIN — INSULIN GLARGINE 7 UNITS: 100 INJECTION, SOLUTION SUBCUTANEOUS at 21:22

## 2022-09-09 RX ADMIN — HEPARIN SODIUM 5000 UNITS: 5000 INJECTION INTRAVENOUS; SUBCUTANEOUS at 21:30

## 2022-09-09 RX ADMIN — SODIUM CHLORIDE, PRESERVATIVE FREE 5 ML: 5 INJECTION INTRAVENOUS at 20:24

## 2022-09-09 RX ADMIN — HYDROCODONE BITARTRATE AND ACETAMINOPHEN 1 TABLET: 7.5; 325 TABLET ORAL at 12:15

## 2022-09-09 RX ADMIN — DULOXETINE HYDROCHLORIDE 20 MG: 20 CAPSULE, DELAYED RELEASE ORAL at 08:48

## 2022-09-09 RX ADMIN — PRAVASTATIN SODIUM 80 MG: 80 TABLET ORAL at 20:24

## 2022-09-09 ASSESSMENT — PAIN DESCRIPTION - LOCATION
LOCATION: CHEST;SHOULDER
LOCATION: SHOULDER
LOCATION: BACK

## 2022-09-09 ASSESSMENT — PAIN DESCRIPTION - ORIENTATION
ORIENTATION: LEFT;ANTERIOR
ORIENTATION: MID
ORIENTATION: ANTERIOR

## 2022-09-09 ASSESSMENT — PAIN SCALES - GENERAL
PAINLEVEL_OUTOF10: 8
PAINLEVEL_OUTOF10: 6
PAINLEVEL_OUTOF10: 7
PAINLEVEL_OUTOF10: 0

## 2022-09-09 ASSESSMENT — PAIN DESCRIPTION - DESCRIPTORS
DESCRIPTORS: ACHING;DISCOMFORT
DESCRIPTORS: ACHING
DESCRIPTORS: ACHING

## 2022-09-09 NOTE — PROGRESS NOTES
Diagnosis    HTN (hypertension)    Type 2 diabetes with nephropathy (HCC)    CHF (congestive heart failure) (HCC)    Long-term insulin use in type 2 diabetes (HCC)    NICM (nonischemic cardiomyopathy) (HCC)    Rectal bleeding    Dyslipidemia    CKD (chronic kidney disease) stage 4, GFR 15-29 ml/min (HCC)    Ventricular tachycardia (HCC)    Chronic systolic heart failure (HCC)    Morbid obesity (HCC)    Restrictive lung disease    Long term current use of amiodarone    Chronic right-sided thoracic back pain    CKD (chronic kidney disease) stage 3, GFR 30-59 ml/min (HCC)    LORI on CPAP    Lower abdominal pain    Chronic left-sided low back pain with sciatica    Thrombocytopenia, unspecified    Left ventricular aneurysm    Acute on chronic renal failure (HCC)     PLAN  Ventricular Tachycardia  -Currently NSR with rate of 66  -Presence of ICD; Will continue to monitor  -Most likely due to scarring of LV and from LV aneurysm. -She will remain inpatient due to 12 hours of VT s/p defibrillation and hesitancy to go home until Dr. Mei Argueta at [address] is back from conference to surgically intervene on her LV aneurysm. Plan is to transfer her next week. -Continue oral amiodarone, heparin, and ASA. Non-ischemic Cardiomyopathy with reduced EF  -Unknown etiology of LV aneurysm  -EF will most likely correct with intervention of her aneurysm. -BP today 133/89    SNEHA on CKD stage 3-4  -Being followed by nephrology    IDDM  -Continue on inpatient medications    Toby Morse  I have personally seen and evaluated the patient and reviewed the students note and agree with the following assessment and plan and findings. I was present for and observed the key components of this note. Any appropriate additions or editing of the information have been done by me.     Alyssa Hampton MD, [facility]  Cardiology

## 2022-09-09 NOTE — PROGRESS NOTES
Discussed with patient the definitive plan for surgical resection at [facility]. She is uncomfortable going home prior to transfer and with her presentation on this admission of 12 hours of ventricular tachycardia that was then eventually externally cardioverted in the emergency room.   We will plan on her remaining inpatient until transfer next week when Dr Markus Yusuf is back from his conference which he states should be midweek

## 2022-09-09 NOTE — PROGRESS NOTES
RENAL PROGRESS NOTE    Subjective:     Patient is a 60year old female with Chronic Kidney Disease with baseline creatinine 2-2.2 followed by me in the office setting is admitted with V tach. She feels much better since admission and her heart rate is now normal.   She has had prior SNEHA on CKD episodes, IDDM, morbid obesity s/p DC ICD. She underwent defibrillated in the ED and was placed on amiodarone IV. Her crea was 1.8 on 8/31/22 and increased to 3.3 on admission, and improved to 1.6 today. No vomiting, no CP, dyspnea is controlled with nasal O2.  9/8/22- alert/oriented x3, sitting on side of bed, no signs of distress, on room air, denies dyspnea, CP, n/v, HA, or dizziness. On Amiodarone gtt. Family at bedside. 9/9/22- alert/oriented, lying in bed, no signs of distress, c/o CP earlier with dyspnea when walking, no CP or dyspnea at time of assessment, no n/v, HA or dizziness. Kidney indices stable. Plans to remain in hospital until transferred to [facility]. Past Medical History:   Diagnosis Date    Abdominal wall hernia 10/2/2018    Acute hypoxemic respiratory failure ([facility] 4/3/2019    Allergic rhinitis     followed by allergist; allergic to grass- has rescue inhaler PRN    ARDS (adult respiratory distress syndrome) ([facility] 4/3/2019    Arthritis     Automatic implantable cardioverter-defibrillator in situ 3/30/2016    CAD in native artery 3/30/2016    Chronic kidney disease     Chronic systolic heart failure ([facility] 07/18/2013    ECHO 2017- EF 39%; managed with medications; [place] [facility] [place] [place]    CKD (chronic kidney disease)     [facility] follows    Diabetes ([facility]     type 2 (on insulin);  FBS AM range- , hypo s/s <70, hgba1c- 7.9    Diabetes mellitus ([facility] 4/12/2010    Dyslipidemia 2/13/2013    Eczema     Fibromyalgia     GERD (gastroesophageal reflux disease)     hx of- no meds currently    Headache     Heart failure (HCC)     chronic systolic with EF 64%; non-ischemic cardiomyopathy    HTN (hypertension) 4/12/2010    Hypercholesterolemia     Incarcerated incisional hernia 3/26/2019    Morbid obesity (HCC)     Neuropathy     bilateral feet and tips of fingers    NICM (nonischemic cardiomyopathy) ([facility] 2/15/2013    Obesity     bmi- 41    Obstructive sleep apnea (adult) (pediatric) 07/14/2014    uses cpap qhs    Pseudomonas urinary tract infection 4/5/2019    Sciatic pain 5/23/2016    Severe persistent asthma with acute exacerbation 8/24/2020    SVT (supraventricular tachycardia) ([facility])     ablation for svt    Tobacco use disorder 4/12/2010      Past Surgical History:   Procedure Laterality Date    ABLATION OF DYSRHYTHMIC FOCUS  \"years ago\"    CARDIAC DEFIBRILLATOR PLACEMENT  07/18/2013    Biotronik dual chamber ICD by Dr. Jerry Almaguer  07/18/2013    Biotronik dual chamber ICD by Dr. Fadumo Hernandez 7/1/2022    Left heart cath / coronary angiography performed by Teri Gee MD at [address] [place] [address]    COLONOSCOPY N/A 7/7/2021    COLONOSCOPY/BMI 48 PT HAS AN ICD performed by Stacey Atkinson MD [facility]  03/26/2019    mild incarceration, no bowel removal    HYSTERECTOMY (CERVIX STATUS UNKNOWN)      EDITH-Left ovary intact per pt    LEFT HEART CATH,PERCUTANEOUS  2/13/2013    no intervention    PACEMAKER      ICD    ROTATOR CUFF REPAIR Right     TONSILLECTOMY        Prior to Admission medications    Medication Sig Start Date End Date Taking? Authorizing Provider   cyclobenzaprine (FLEXERIL) 10 MG tablet Take 10 mg by mouth 3 times daily as needed for Muscle spasms. Historical Provider, MD   calcitRIOL (ROCALTROL) 0.25 MCG capsule Take 0.25 mcg by mouth daily. Historical Provider, MD   Cholecalciferol (VITAMIN D3) 50 MCG (2000 UT) TABS Take 1 tablet by mouth daily.     Historical Provider, MD   nitroGLYCERIN (NITROSTAT) 0.4 MG SL tablet Place 0.4 mg under the tongue every 5 minutes as needed for Chest pain. Max 3 doses     Historical Provider, MD   dupilumab (DUPIXENT) 300 MG/2ML SOPN injection Inject 300 mg into the skin every 14 days. Patient usually takes medication on Monday or Tuesday. Historical Provider, MD   acetaminophen (TYLENOL) 500 MG tablet Take 1,000 mg by mouth every 6 hours as needed for Pain (breakthrough pain).     Historical Provider, MD   amiodarone (CORDARONE) 200 MG tablet Take 1 tablet by mouth 2 times daily 8/17/22   Carmina Jean Baptiste APRN - CNP   DULoxetine (CYMBALTA) 20 MG extended release capsule Take 1 capsule by mouth daily 8/18/22   Carmina Jean Baptiste APRN - CNP   aspirin 81 MG EC tablet Take 81 mg by mouth daily    Ar Automatic Reconciliation   carvedilol (COREG) 6.25 MG tablet Take 6.25 mg by mouth 2 times daily (with meals) 11/23/21   Ar Automatic Reconciliation   vitamin D 25 MCG (1000 UT) CAPS Take by mouth daily  Patient not taking: Reported on 9/6/2022    Ar Automatic Reconciliation   empagliflozin (JARDIANCE) 10 MG tablet Take 10 mg by mouth daily 12/29/21   Ar Automatic Reconciliation   insulin glargine (LANTUS SOLOSTAR) 100 UNIT/ML injection pen Inject 15 Units into the skin nightly 12/22/21   Ar Automatic Reconciliation   isosorbide mononitrate (IMDUR) 30 MG extended release tablet Take 30 mg by mouth daily 11/23/21   Ar Automatic Reconciliation   latanoprost (XALATAN) 0.005 % ophthalmic solution Place 1 drop into both eyes nightly 5/10/21   Ar Automatic Reconciliation   polyethylene glycol (GLYCOLAX) 17 GM/SCOOP powder Take 17 g by mouth daily as needed  Patient not taking: Reported on 9/6/2022 11/14/20   Ar Automatic Reconciliation   pravastatin (PRAVACHOL) 80 MG tablet Take 80 mg by mouth nightly 11/23/21   Ar Automatic Reconciliation   sacubitril-valsartan (ENTRESTO) 49-51 MG per tablet Take 1 tablet by mouth 2 times daily 11/23/21   Ar Automatic Reconciliation   torsemide (DEMADEX) 20 MG tablet Take 40 mg by mouth 2 times daily 8/30/22   Ar Automatic Reconciliation   albuterol sulfate  (90 Base) MCG/ACT inhaler 2 puffs qid prn shortness of breath or wheezing  Patient not taking: Reported on 8/10/2022 11/14/20 8/17/22  Ar Automatic Reconciliation     Allergies   Allergen Reactions    Other Hives and Shortness Of Breath     \"inhaler PRN\"    Penicillin G Itching      Social History     Tobacco Use    Smoking status: Some Days     Packs/day: 0.25     Types: Cigarettes    Smokeless tobacco: Never    Tobacco comments:     Quit smoking: \"every now and then\"   Substance Use Topics    Alcohol use: Yes     Alcohol/week: 1.0 standard drink      Family History   Problem Relation Age of Onset    Diabetes Other     Heart Disease Father     Hypertension Father     Stroke Father     Heart Attack Father 48        mi    Breast Cancer Sister 37    Hypertension Brother     Heart Disease Brother     Diabetes Brother     Stroke Mother           Review of Systems    Negative except for what is mention in HPI above      Objective:       /65   Pulse 60   Temp 97.6 °F (36.4 °C) (Temporal)   Resp 19   Ht 4' 10\" (1.473 m)   Wt 208 lb 1.6 oz (94.4 kg)   SpO2 94%   BMI 43.49 kg/m²     No intake/output data recorded. 09/07 1901 - 09/09 0700  In: 120 [P.O.:120]  Out: 1825 [Urine:1825]    /65   Pulse 60   Temp 97.6 °F (36.4 °C) (Temporal)   Resp 19   Ht 4' 10\" (1.473 m)   Wt 208 lb 1.6 oz (94.4 kg)   SpO2 94%   BMI 43.49 kg/m²     General:  Alert, cooperative, no distress, appears stated age. Head:  Normocephalic, without obvious abnormality, atraumatic. Neck: Supple, symmetrical, trachea midline, no JVD. Back:   Symmetric, no curvature. ROM normal. No CVA tenderness. Lungs:   Clear to auscultation bilaterally. Heart:  Regular rate and rhythm, S1, S2 normal, no murmur,  rub or gallop. Abdomen:   Soft, non-tender. Bowel sounds normal   Extremities: Extremities normal, atraumatic, no cyanosis or edema.    Skin: Skin color, texture, turgor normal. No rashes or lesions. Neurologic: Baseline tremor is present. No asterixis. Data Review:     Recent Results (from the past 24 hour(s))   POCT Glucose    Collection Time: 09/08/22  4:28 PM   Result Value Ref Range    POC Glucose 109 (H) 65 - 100 mg/dL    Performed by: Willam    POCT Glucose    Collection Time: 09/08/22  9:05 PM   Result Value Ref Range    POC Glucose 111 (H) 65 - 100 mg/dL    Performed by: HennyPCT    POCT Glucose    Collection Time: 09/09/22  8:22 AM   Result Value Ref Range    POC Glucose 90 65 - 100 mg/dL    Performed by: MilenaalPCT    POCT Glucose    Collection Time: 09/09/22 11:53 AM   Result Value Ref Range    POC Glucose 109 (H) 65 - 100 mg/dL    Performed by: Vinod Shelton;   Results for orders placed during the hospital encounter of 09/06/22    XR CHEST PORTABLE    Narrative  Portable chest xray    COMPARISON: August 10, 2022    INDICATION: chest pain    FINDINGS:    Heart is enlarged. Mediastinal contour is within normal limits. Stable  left-sided cardiac pacer. Increased interstitial prominence, likely vascular  congestion. No pneumothorax. No large pleural effusion. Impression  1. Cardiomegaly and suggestion of vascular congestion. CT Abdomen  Results for orders placed in visit on 03/31/19    CT ABDOMEN PELVIS W IV CONTRAST    Narrative  CT abdomen and pelvis with contrast: 03/30/2019    History: Recent hernia surgery, abdominal distention. Technique: Helically acquired images were obtained from the domes of the  diaphragms to the ischial tuberosities reconstructed at 5mm intervals after the  uneventful administration of oral contrast for bowel opacification and 100 cc's  of intravenous Isovue-370 to evaluate the solid abdominal viscera and vascular  structures. Coronal reformatted images were submitted.     Radiation dose reduction techniques were used for this study:  Our CT scanners  use one or all of the following: Automated exposure control, adjustment of the  mA and/or kVp according to patient's size, iterative reconstruction. Comparison: 02/26/2019. CT ABDOMEN: There is mild bilateral lower lobe subsegmental atelectasis and/or  scar. There is a tiny low-density lesion within the right hepatic lobe, too  small to characterize but statistically benign, unchanged. The spleen, pancreas  and adrenal glands are unremarkable. A cyst within the upper pole left kidney  measures 1.87 m. 2 subcentimeter low-density lesions within the right kidney are  too small to characterize but statistically benign. There is a large defect within the anterior abdominal wall measuring nearly 12  mm in transverse diameter. Most of the small bowel as well as a large portion of  the mid stomach and transverse colon anteriorly large hernia. There is soft  tissue stranding along the lateral abdominal walls, presumably postsurgical. The  the proximal and midportion of the stomach is moderately distended which with  narrowing of the distal stomach has the pylorus traverses back into the  peritoneal cavity. The proximal small bowel is located within the peritoneal  cavity and is dilated with these loops of bowel extending into the large hernia  which also appear mildly dilated. A definite transition zone is unable to be  visualized. No adenopathy is present. There is no free fluid. A drainage catheter is present  along the inferior margin of the hernia. CT PELVIS: A Chamberlain catheter is present within urinary bladder. No adenopathy or  ascites is present. There are no plantar changes. Sigmoid diverticula are  present. Impression  IMPRESSION: Large abdominal wall defect containing many loops of small bowel,  transverse colon and portions of the stomach. Narrowing of the distal stomach as  it enters back into the peritoneal cavity causes partial outlet obstruction.   Proximal small bowel is also mildly dilated without a definite transition zone. A small bowel obstruction cannot be excluded. Principal Problem:    Ventricular tachycardia (HCC)  Active Problems:    Acute on chronic renal failure (HCC)    Left ventricular aneurysm    Type 2 diabetes with nephropathy (HCC)    NICM (nonischemic cardiomyopathy) (HCC)    Dyslipidemia    CKD (chronic kidney disease) stage 4, GFR 15-29 ml/min (HCC)    Morbid obesity ([address]  Resolved Problems:    * No resolved hospital problems.  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 hypertensive nephrosclerosis - excellent improvement in response to medical management and renal function is better than  baseline today   - no new labs, continue medical management, no urgent needs at this time, will monitor   Volume depletion - initially volume depleted, euvolemic on exam, IVFs discontinued   Hyponatremia - mild, likely due to cardiac disease - no specific therapy needed at this time  - improved, will monitor  Hypoalbuminemia - probably due to urinary protein loss   Hyperkalemia- mild, on admission K+ 3.8 with blood draw and 3.0 with POC test, given KCL 10 mEq IV at that time, with mild acidosis, no intervention at this time  - no new labs today, BMP ordered tomorrow  Acidosis- mild, no hx, repeat in AM, if still low, will start sodium bicarb PO  Ventricular tachycardia- LV aneurysm, on Amiodarone gtt, ICD, managed by Cardiology, plans for surgery at [address] within the next 7-10 days      Plan:     As above     Medardo Rodrigues [facility]

## 2022-09-09 NOTE — CARE COORDINATION
LOS 3 D  CM met with patient to discuss DCP. Patient reported she will be sent to [facility] for evaluation of her heart next week. CM verified that per Cardiology notes that is correct. CM following to assist with any discharge needs.

## 2022-09-10 LAB
ANION GAP SERPL CALC-SCNC: 4 MMOL/L (ref 4–13)
BUN SERPL-MCNC: 21 MG/DL (ref 6–23)
CALCIUM SERPL-MCNC: 9.1 MG/DL (ref 8.3–10.4)
CHLORIDE SERPL-SCNC: 114 MMOL/L (ref 101–110)
CO2 SERPL-SCNC: 20 MMOL/L (ref 21–32)
CREAT SERPL-MCNC: 1.3 MG/DL (ref 0.6–1)
ERYTHROCYTE [DISTWIDTH] IN BLOOD BY AUTOMATED COUNT: 13.6 % (ref 11.9–14.6)
GLUCOSE BLD STRIP.AUTO-MCNC: 105 MG/DL (ref 65–100)
GLUCOSE BLD STRIP.AUTO-MCNC: 117 MG/DL (ref 65–100)
GLUCOSE BLD STRIP.AUTO-MCNC: 125 MG/DL (ref 65–100)
GLUCOSE BLD STRIP.AUTO-MCNC: 140 MG/DL (ref 65–100)
GLUCOSE SERPL-MCNC: 130 MG/DL (ref 65–100)
HCT VFR BLD AUTO: 38.4 % (ref 35.8–46.3)
HGB BLD-MCNC: 12.3 G/DL (ref 11.7–15.4)
MCH RBC QN AUTO: 32.4 PG (ref 26.1–32.9)
MCHC RBC AUTO-ENTMCNC: 32 G/DL (ref 31.4–35)
MCV RBC AUTO: 101.1 FL (ref 79.6–97.8)
NRBC # BLD: 0 K/UL (ref 0–0.2)
PHOSPHATE SERPL-MCNC: 3.3 MG/DL (ref 2.5–4.5)
PLATELET # BLD AUTO: 115 K/UL (ref 150–450)
PMV BLD AUTO: 11.2 FL (ref 9.4–12.3)
POTASSIUM SERPL-SCNC: 4.7 MMOL/L (ref 3.5–5.1)
RBC # BLD AUTO: 3.8 M/UL (ref 4.05–5.2)
SERVICE CMNT-IMP: ABNORMAL
SODIUM SERPL-SCNC: 138 MMOL/L (ref 136–145)
WBC # BLD AUTO: 6.2 K/UL (ref 4.3–11.1)

## 2022-09-10 PROCEDURE — 36415 COLL VENOUS BLD VENIPUNCTURE: CPT

## 2022-09-10 PROCEDURE — 6360000002 HC RX W HCPCS: Performed by: INTERNAL MEDICINE

## 2022-09-10 PROCEDURE — 6370000000 HC RX 637 (ALT 250 FOR IP): Performed by: INTERNAL MEDICINE

## 2022-09-10 PROCEDURE — 80048 BASIC METABOLIC PNL TOTAL CA: CPT

## 2022-09-10 PROCEDURE — 84100 ASSAY OF PHOSPHORUS: CPT

## 2022-09-10 PROCEDURE — 82962 GLUCOSE BLOOD TEST: CPT

## 2022-09-10 PROCEDURE — 6370000000 HC RX 637 (ALT 250 FOR IP): Performed by: NURSE PRACTITIONER

## 2022-09-10 PROCEDURE — 1100000003 HC PRIVATE W/ TELEMETRY

## 2022-09-10 PROCEDURE — 6360000002 HC RX W HCPCS: Performed by: NURSE PRACTITIONER

## 2022-09-10 PROCEDURE — 2580000003 HC RX 258: Performed by: NURSE PRACTITIONER

## 2022-09-10 PROCEDURE — 99232 SBSQ HOSP IP/OBS MODERATE 35: CPT | Performed by: INTERNAL MEDICINE

## 2022-09-10 PROCEDURE — 85027 COMPLETE CBC AUTOMATED: CPT

## 2022-09-10 PROCEDURE — 94760 N-INVAS EAR/PLS OXIMETRY 1: CPT

## 2022-09-10 RX ORDER — FOLIC ACID/VIT B COMPLEX AND C 0.8 MG
1 TABLET ORAL DAILY
Status: DISCONTINUED | OUTPATIENT
Start: 2022-09-10 | End: 2022-09-25 | Stop reason: HOSPADM

## 2022-09-10 RX ORDER — SODIUM BICARBONATE 650 MG/1
650 TABLET ORAL 2 TIMES DAILY
Status: DISCONTINUED | OUTPATIENT
Start: 2022-09-10 | End: 2022-09-25 | Stop reason: HOSPADM

## 2022-09-10 RX ORDER — VITAMIN B COMPLEX
2000 TABLET ORAL DAILY
Status: DISCONTINUED | OUTPATIENT
Start: 2022-09-10 | End: 2022-09-25 | Stop reason: HOSPADM

## 2022-09-10 RX ADMIN — AMIODARONE HYDROCHLORIDE 400 MG: 200 TABLET ORAL at 06:03

## 2022-09-10 RX ADMIN — SODIUM CHLORIDE, PRESERVATIVE FREE 10 ML: 5 INJECTION INTRAVENOUS at 20:41

## 2022-09-10 RX ADMIN — AMIODARONE HYDROCHLORIDE 400 MG: 200 TABLET ORAL at 14:19

## 2022-09-10 RX ADMIN — SODIUM BICARBONATE 650 MG TABLET 650 MG: at 21:56

## 2022-09-10 RX ADMIN — PRAVASTATIN SODIUM 80 MG: 80 TABLET ORAL at 21:56

## 2022-09-10 RX ADMIN — HEPARIN SODIUM 5000 UNITS: 5000 INJECTION INTRAVENOUS; SUBCUTANEOUS at 06:02

## 2022-09-10 RX ADMIN — INSULIN GLARGINE 7 UNITS: 100 INJECTION, SOLUTION SUBCUTANEOUS at 20:48

## 2022-09-10 RX ADMIN — AMIODARONE HYDROCHLORIDE 400 MG: 200 TABLET ORAL at 21:56

## 2022-09-10 RX ADMIN — HEPARIN SODIUM 5000 UNITS: 5000 INJECTION INTRAVENOUS; SUBCUTANEOUS at 21:56

## 2022-09-10 RX ADMIN — MORPHINE SULFATE 2 MG: 2 INJECTION, SOLUTION INTRAMUSCULAR; INTRAVENOUS at 16:56

## 2022-09-10 RX ADMIN — POLYETHYLENE GLYCOL 3350 17 G: 17 POWDER, FOR SOLUTION ORAL at 08:35

## 2022-09-10 RX ADMIN — DOCUSATE SODIUM 100 MG: 100 CAPSULE, LIQUID FILLED ORAL at 08:35

## 2022-09-10 RX ADMIN — SODIUM CHLORIDE, PRESERVATIVE FREE 10 ML: 5 INJECTION INTRAVENOUS at 08:35

## 2022-09-10 RX ADMIN — MORPHINE SULFATE 2 MG: 2 INJECTION, SOLUTION INTRAMUSCULAR; INTRAVENOUS at 11:22

## 2022-09-10 RX ADMIN — DULOXETINE HYDROCHLORIDE 20 MG: 20 CAPSULE, DELAYED RELEASE ORAL at 08:35

## 2022-09-10 RX ADMIN — VITAMIN D, TAB 1000IU (100/BT) 2000 UNITS: 25 TAB at 11:20

## 2022-09-10 RX ADMIN — SODIUM BICARBONATE 650 MG TABLET 650 MG: at 11:20

## 2022-09-10 RX ADMIN — LATANOPROST 1 DROP: 50 SOLUTION OPHTHALMIC at 20:42

## 2022-09-10 RX ADMIN — MORPHINE SULFATE 2 MG: 2 INJECTION, SOLUTION INTRAMUSCULAR; INTRAVENOUS at 21:56

## 2022-09-10 RX ADMIN — HEPARIN SODIUM 5000 UNITS: 5000 INJECTION INTRAVENOUS; SUBCUTANEOUS at 14:19

## 2022-09-10 RX ADMIN — ASPIRIN 81 MG: 81 TABLET, COATED ORAL at 08:35

## 2022-09-10 RX ADMIN — Medication 1 TABLET: at 11:20

## 2022-09-10 RX ADMIN — HYDROCODONE BITARTRATE AND ACETAMINOPHEN 1 TABLET: 7.5; 325 TABLET ORAL at 06:01

## 2022-09-10 RX ADMIN — ONDANSETRON 4 MG: 2 INJECTION INTRAMUSCULAR; INTRAVENOUS at 20:37

## 2022-09-10 ASSESSMENT — PAIN SCALES - GENERAL
PAINLEVEL_OUTOF10: 6
PAINLEVEL_OUTOF10: 6
PAINLEVEL_OUTOF10: 7
PAINLEVEL_OUTOF10: 8
PAINLEVEL_OUTOF10: 7

## 2022-09-10 ASSESSMENT — PAIN DESCRIPTION - LOCATION
LOCATION: BACK
LOCATION: CHEST
LOCATION: BACK

## 2022-09-10 ASSESSMENT — PAIN DESCRIPTION - ORIENTATION
ORIENTATION: LEFT
ORIENTATION: MID
ORIENTATION: MID;LEFT
ORIENTATION: LEFT

## 2022-09-10 ASSESSMENT — PAIN DESCRIPTION - DESCRIPTORS
DESCRIPTORS: ACHING
DESCRIPTORS: ACHING
DESCRIPTORS: ACHING;SORE
DESCRIPTORS: ACHING

## 2022-09-10 ASSESSMENT — PAIN DESCRIPTION - ONSET: ONSET: ON-GOING

## 2022-09-10 ASSESSMENT — PAIN DESCRIPTION - PAIN TYPE: TYPE: CHRONIC PAIN

## 2022-09-10 NOTE — PROGRESS NOTES
RENAL PROGRESS NOTE    Subjective:     Patient is a 60year old female with Chronic Kidney Disease with baseline creatinine 2-2.2 followed by me in the office setting is admitted with V tach. She feels much better since admission and her heart rate is now normal.   She has had prior SNEHA on CKD episodes, IDDM, morbid obesity s/p DC ICD. She underwent defibrillated in the ED and was placed on amiodarone IV. Her crea was 1.8 on 8/31/22 and increased to 3.3 on admission, and improved to 1.6 today. No vomiting, no CP, dyspnea is controlled with nasal O2.  9/8/22- alert/oriented x3, sitting on side of bed, no signs of distress, on room air, denies dyspnea, CP, n/v, HA, or dizziness. On Amiodarone gtt. Family at bedside. 9/9/22- alert/oriented, lying in bed, no signs of distress, c/o CP earlier with dyspnea when walking, no CP or dyspnea at time of assessment, no n/v, HA or dizziness. Kidney indices stable. Plans to remain in hospital until transferred to [facility]. 9/10/22 - alert and communicating well - breathing better - No N/V, no uremic signs or symptoms - edema is improved     Past Medical History:   Diagnosis Date    Abdominal wall hernia 10/2/2018    Acute hypoxemic respiratory failure ([address] 4/3/2019    Allergic rhinitis     followed by allergist; allergic to grass- has rescue inhaler PRN    ARDS (adult respiratory distress syndrome) ([address] 4/3/2019    Arthritis     Automatic implantable cardioverter-defibrillator in situ 3/30/2016    CAD in native artery 3/30/2016    Chronic kidney disease     Chronic systolic heart failure ([address] 07/18/2013    ECHO 2017- EF 39%; managed with medications; [place]    CKD (chronic kidney disease)     [facility] follows    Diabetes ([address]     type 2 (on insulin);  FBS AM range- , hypo s/s <70, hgba1c- 7.9    Diabetes mellitus ([address] 4/12/2010    Dyslipidemia 2/13/2013    Eczema     Fibromyalgia     GERD (gastroesophageal reflux disease) hx of- no meds currently    Headache     Heart failure (HCC)     chronic systolic with EF 46%; non-ischemic cardiomyopathy    HTN (hypertension) 4/12/2010    Hypercholesterolemia     Incarcerated incisional hernia 3/26/2019    Morbid obesity ([facility]     Neuropathy     bilateral feet and tips of fingers    NICM (nonischemic cardiomyopathy) ([facility] 2/15/2013    Obesity     bmi- 41    Obstructive sleep apnea (adult) (pediatric) 07/14/2014    uses cpap qhs    Pseudomonas urinary tract infection 4/5/2019    Sciatic pain 5/23/2016    Severe persistent asthma with acute exacerbation 8/24/2020    SVT (supraventricular tachycardia) ([facility])     ablation for svt    Tobacco use disorder 4/12/2010      Past Surgical History:   Procedure Laterality Date    ABLATION OF DYSRHYTHMIC FOCUS  \"years ago\"    CARDIAC DEFIBRILLATOR PLACEMENT  07/18/2013    Biotronik dual chamber ICD by Dr. Sadiq Crowe  07/18/2013    Biotronik dual chamber ICD by Dr. Christie Pressley 7/1/2022    Left heart cath / coronary angiography performed by Anil Trejo MD at [address] [place] [address]      x1    COLONOSCOPY N/A 7/7/2021    COLONOSCOPY/BMI 48 PT HAS AN ICD performed by Valerie Garvey MD at [facility]  03/26/2019    mild incarceration, no bowel removal    HYSTERECTOMY (CERVIX STATUS UNKNOWN)      EDITH-Left ovary intact per pt    LEFT HEART CATH,PERCUTANEOUS  2/13/2013    no intervention    PACEMAKER      ICD    ROTATOR CUFF REPAIR Right     TONSILLECTOMY        Prior to Admission medications    Medication Sig Start Date End Date Taking? Authorizing Provider   cyclobenzaprine (FLEXERIL) 10 MG tablet Take 10 mg by mouth 3 times daily as needed for Muscle spasms. Historical Provider, MD   calcitRIOL (ROCALTROL) 0.25 MCG capsule Take 0.25 mcg by mouth daily.     Historical Provider, MD   Cholecalciferol (VITAMIN D3) 50 MCG (2000 UT) TABS Take 1 tablet by mouth daily. Historical Provider, MD   nitroGLYCERIN (NITROSTAT) 0.4 MG SL tablet Place 0.4 mg under the tongue every 5 minutes as needed for Chest pain. Max 3 doses     Historical Provider, MD   dupilumab (DUPIXENT) 300 MG/2ML SOPN injection Inject 300 mg into the skin every 14 days. Patient usually takes medication on Monday or Tuesday. Historical Provider, MD   acetaminophen (TYLENOL) 500 MG tablet Take 1,000 mg by mouth every 6 hours as needed for Pain (breakthrough pain).     Historical Provider, MD   amiodarone (CORDARONE) 200 MG tablet Take 1 tablet by mouth 2 times daily 8/17/22   ARNOLDO Ca CNP   DULoxetine (CYMBALTA) 20 MG extended release capsule Take 1 capsule by mouth daily 8/18/22   ARNOLDO Ca CNP   aspirin 81 MG EC tablet Take 81 mg by mouth daily    Ar Automatic Reconciliation   carvedilol (COREG) 6.25 MG tablet Take 6.25 mg by mouth 2 times daily (with meals) 11/23/21   Ar Automatic Reconciliation   vitamin D 25 MCG (1000 UT) CAPS Take by mouth daily  Patient not taking: Reported on 9/6/2022    Ar Automatic Reconciliation   empagliflozin (JARDIANCE) 10 MG tablet Take 10 mg by mouth daily 12/29/21   Ar Automatic Reconciliation   insulin glargine (LANTUS SOLOSTAR) 100 UNIT/ML injection pen Inject 15 Units into the skin nightly 12/22/21   Ar Automatic Reconciliation   isosorbide mononitrate (IMDUR) 30 MG extended release tablet Take 30 mg by mouth daily 11/23/21   Ar Automatic Reconciliation   latanoprost (XALATAN) 0.005 % ophthalmic solution Place 1 drop into both eyes nightly 5/10/21   Ar Automatic Reconciliation   polyethylene glycol (GLYCOLAX) 17 GM/SCOOP powder Take 17 g by mouth daily as needed  Patient not taking: Reported on 9/6/2022 11/14/20   Ar Automatic Reconciliation   pravastatin (PRAVACHOL) 80 MG tablet Take 80 mg by mouth nightly 11/23/21   Ar Automatic Reconciliation   sacubitril-valsartan (ENTRESTO) 49-51 MG per tablet Take 1 tablet by mouth 2 times daily 11/23/21   Ar Automatic Reconciliation   torsemide (DEMADEX) 20 MG tablet Take 40 mg by mouth 2 times daily 8/30/22   Ar Automatic Reconciliation   albuterol sulfate  (90 Base) MCG/ACT inhaler 2 puffs qid prn shortness of breath or wheezing  Patient not taking: Reported on 8/10/2022 11/14/20 8/17/22  Ar Automatic Reconciliation     Allergies   Allergen Reactions    Other Hives and Shortness Of Breath     \"inhaler PRN\"    Penicillin G Itching      Social History     Tobacco Use    Smoking status: Some Days     Packs/day: 0.25     Types: Cigarettes    Smokeless tobacco: Never    Tobacco comments:     Quit smoking: \"every now and then\"   Substance Use Topics    Alcohol use: Yes     Alcohol/week: 1.0 standard drink      Family History   Problem Relation Age of Onset    Diabetes Other     Heart Disease Father     Hypertension Father     Stroke Father     Heart Attack Father 48        mi    Breast Cancer Sister 37    Hypertension Brother     Heart Disease Brother     Diabetes Brother     Stroke Mother           Review of Systems    Negative except for what is mention in HPI above      Objective:       /63   Pulse 60   Temp 98.7 °F (37.1 °C) (Oral)   Resp 16   Ht 4' 10\" (1.473 m)   Wt 208 lb 1.6 oz (94.4 kg)   SpO2 94%   BMI 43.49 kg/m²     No intake/output data recorded. 09/08 1901 - 09/10 0700  In: 61 [P.O.:60]  Out: 1500 [Urine:1500]    /63   Pulse 60   Temp 98.7 °F (37.1 °C) (Oral)   Resp 16   Ht 4' 10\" (1.473 m)   Wt 208 lb 1.6 oz (94.4 kg)   SpO2 94%   BMI 43.49 kg/m²     General:  Alert, cooperative, no distress, appears stated age. Head:  Normocephalic, without obvious abnormality, atraumatic. Neck: Supple, symmetrical, trachea midline, no JVD. Back:   Symmetric, no curvature. ROM normal. No CVA tenderness. Lungs:   Clear to auscultation bilaterally. Heart:  Regular rate and rhythm, S1, S2 normal, no murmur,  rub or gallop. Abdomen:   Soft, non-tender.  Bowel sounds normal   Extremities: Extremities normal, atraumatic, no cyanosis or edema. Skin: Skin color, texture, turgor normal. No rashes or lesions. Neurologic: Baseline tremor is present. No asterixis.          Data Review:     Recent Results (from the past 24 hour(s))   POCT Glucose    Collection Time: 09/09/22 11:53 AM   Result Value Ref Range    POC Glucose 109 (H) 65 - 100 mg/dL    Performed by: [employer]    POCT Glucose    Collection Time: 09/09/22  4:59 PM   Result Value Ref Range    POC Glucose 124 (H) 65 - 100 mg/dL    Performed by: Carl[employer]    POCT Glucose    Collection Time: 09/09/22  9:14 PM   Result Value Ref Range    POC Glucose 168 (H) 65 - 100 mg/dL    Performed by: Tania    Basic Metabolic Panel    Collection Time: 09/10/22  5:57 AM   Result Value Ref Range    Sodium 138 136 - 145 mmol/L    Potassium 4.7 3.5 - 5.1 mmol/L    Chloride 114 (H) 101 - 110 mmol/L    CO2 20 (L) 21 - 32 mmol/L    Anion Gap 4 4 - 13 mmol/L    Glucose 130 (H) 65 - 100 mg/dL    BUN 21 6 - 23 MG/DL    Creatinine 1.30 (H) 0.6 - 1.0 MG/DL    GFR  54 (L) >60 ml/min/1.73m2    GFR Non- 45 (L) >60 ml/min/1.73m2    Calcium 9.1 8.3 - 10.4 MG/DL   Phosphorus    Collection Time: 09/10/22  5:57 AM   Result Value Ref Range    Phosphorus 3.3 2.5 - 4.5 MG/DL   CBC    Collection Time: 09/10/22  5:57 AM   Result Value Ref Range    WBC 6.2 4.3 - 11.1 K/uL    RBC 3.80 (L) 4.05 - 5.2 M/uL    Hemoglobin 12.3 11.7 - 15.4 g/dL    Hematocrit 38.4 35.8 - 46.3 %    .1 (H) 79.6 - 97.8 FL    MCH 32.4 26.1 - 32.9 PG    MCHC 32.0 31.4 - 35.0 g/dL    RDW 13.6 11.9 - 14.6 %    Platelets 148 (L) 476 - 450 K/uL    MPV 11.2 9.4 - 12.3 FL    nRBC 0.00 0.0 - 0.2 K/uL   POCT Glucose    Collection Time: 09/10/22  7:25 AM   Result Value Ref Range    POC Glucose 125 (H) 65 - 100 mg/dL    Performed by: Alexia        CXR;   Results for orders placed during the hospital encounter of 09/06/22    XR CHEST PORTABLE    Narrative  Portable chest xray    COMPARISON: August 10, 2022    INDICATION: chest pain    FINDINGS:    Heart is enlarged. Mediastinal contour is within normal limits. Stable  left-sided cardiac pacer. Increased interstitial prominence, likely vascular  congestion. No pneumothorax. No large pleural effusion. Impression  1. Cardiomegaly and suggestion of vascular congestion. CT Abdomen  Results for orders placed in visit on 03/31/19    CT ABDOMEN PELVIS W IV CONTRAST    Narrative  CT abdomen and pelvis with contrast: 03/30/2019    History: Recent hernia surgery, abdominal distention. Technique: Helically acquired images were obtained from the domes of the  diaphragms to the ischial tuberosities reconstructed at 5mm intervals after the  uneventful administration of oral contrast for bowel opacification and 100 cc's  of intravenous Isovue-370 to evaluate the solid abdominal viscera and vascular  structures. Coronal reformatted images were submitted. Radiation dose reduction techniques were used for this study:  Our CT scanners  use one or all of the following: Automated exposure control, adjustment of the  mA and/or kVp according to patient's size, iterative reconstruction. Comparison: 02/26/2019. CT ABDOMEN: There is mild bilateral lower lobe subsegmental atelectasis and/or  scar. There is a tiny low-density lesion within the right hepatic lobe, too  small to characterize but statistically benign, unchanged. The spleen, pancreas  and adrenal glands are unremarkable. A cyst within the upper pole left kidney  measures 1.87 m. 2 subcentimeter low-density lesions within the right kidney are  too small to characterize but statistically benign. There is a large defect within the anterior abdominal wall measuring nearly 12  mm in transverse diameter. Most of the small bowel as well as a large portion of  the mid stomach and transverse colon anteriorly large hernia.  There is soft  tissue stranding along the lateral abdominal walls, presumably postsurgical. The  the proximal and midportion of the stomach is moderately distended which with  narrowing of the distal stomach has the pylorus traverses back into the  peritoneal cavity. The proximal small bowel is located within the peritoneal  cavity and is dilated with these loops of bowel extending into the large hernia  which also appear mildly dilated. A definite transition zone is unable to be  visualized. No adenopathy is present. There is no free fluid. A drainage catheter is present  along the inferior margin of the hernia. CT PELVIS: A Chamberlain catheter is present within urinary bladder. No adenopathy or  ascites is present. There are no plantar changes. Sigmoid diverticula are  present. Impression  IMPRESSION: Large abdominal wall defect containing many loops of small bowel,  transverse colon and portions of the stomach. Narrowing of the distal stomach as  it enters back into the peritoneal cavity causes partial outlet obstruction. Proximal small bowel is also mildly dilated without a definite transition zone. A small bowel obstruction cannot be excluded. Principal Problem:    Ventricular tachycardia (HCC)  Active Problems:    Acute on chronic renal failure (HCC)    Left ventricular aneurysm    Type 2 diabetes with nephropathy (HCC)    NICM (nonischemic cardiomyopathy) ([facility])    Dyslipidemia    CKD (chronic kidney disease) stage 4, GFR 15-29 ml/min (HCC)    Morbid obesity ([address]  Resolved Problems:    * No resolved hospital problems.  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 hypertensive nephrosclerosis - excellent improvement in response to medical management and renal function is better than  baseline   Volume depletion - euvolemic on exam   Hyponatremia - mild, likely due to cardiac disease - no specific therapy needed   Hypoalbuminemia - probably due to urinary protein loss - work on supplements  Hyperkalemia- mild, on admission K+ 3.8 with blood draw and 3.0 with POC test, given KCL 10 mEq IV at that time, with mild acidosis, no intervention at this time  Acidosis- mild, monitor response to sodium bicarb PO  Ventricular tachycardia- LV aneurysm, on Amiodarone gtt, ICD, managed by Cardiology, plans for surgery at [facility] within the next 7-10 days      Plan:     As above

## 2022-09-10 NOTE — PROGRESS NOTES
Reviewed notes for new spiritual concerns. LOCAL     [employer]              Will follow as needed.

## 2022-09-10 NOTE — PROGRESS NOTES
[facility] CARDIOLOGY PROGRESS NOTE           9/10/2022 9:09 AM    Admit Date: 9/6/2022    Admit Diagnosis: Ventricular tachycardia ([address] [I47.2]      Subjective:   No complaints this AM, no chest pain or shortness of breath    Interval History: (History of pertinent interval events obtained from nursing staff)    ROS:  GEN:  No fever or chills  Cardiovascular:  As noted above  Pulmonary:  As noted above  Neuro:  No new focal motor or sensory loss      Objective:     Vitals:    09/09/22 2346 09/10/22 0421 09/10/22 0601 09/10/22 0817   BP: (!) 149/80 118/70  122/63   Pulse: 60 61  60   Resp: 18 18 16 16   Temp: 97.9 °F (36.6 °C) 98.2 °F (36.8 °C)  98.7 °F (37.1 °C)   TempSrc: Oral Oral  Oral   SpO2: 100% 100%  94%   Weight:       Height:           Physical Exam:  General-Well Developed, Well Nourished, No Acute Distress, Alert & Oriented x 3, appropriate mood. Neck- supple, no JVD  CV- regular rate and rhythm no MRG  Lung- clear bilaterally  Abd- soft, nontender, nondistended  Ext- no edema bilaterally.   Skin- warm and dry    Current Facility-Administered Medications   Medication Dose Route Frequency    LORazepam (ATIVAN) tablet 0.5 mg  0.5 mg Oral Q4H PRN    docusate sodium (COLACE) capsule 100 mg  100 mg Oral Daily    amiodarone (CORDARONE) tablet 400 mg  400 mg Oral q8h    HYDROcodone-acetaminophen (NORCO) 7.5-325 MG per tablet 1 tablet  1 tablet Oral Q6H PRN    morphine injection 2 mg  2 mg IntraVENous Q4H PRN    traMADol (ULTRAM) tablet 50 mg  50 mg Oral Q6H PRN    polyethylene glycol (GLYCOLAX) packet 17 g  17 g Oral Daily PRN    aspirin EC tablet 81 mg  81 mg Oral Daily    DULoxetine (CYMBALTA) extended release capsule 20 mg  20 mg Oral Daily    insulin glargine (LANTUS) injection vial 7 Units  7 Units SubCUTAneous Nightly    pravastatin (PRAVACHOL) tablet 80 mg  80 mg Oral Nightly    sodium chloride flush 0.9 % injection 5-40 mL  5-40 mL IntraVENous 2 times per day    sodium chloride flush 0.9 % injection 5-40 mL  5-40 mL IntraVENous PRN    ondansetron (ZOFRAN) injection 4 mg  4 mg IntraVENous Q4H PRN    acetaminophen (TYLENOL) tablet 650 mg  650 mg Oral Q6H PRN    Or    acetaminophen (TYLENOL) suppository 650 mg  650 mg Rectal Q6H PRN    nitroGLYCERIN (NITROSTAT) SL tablet 0.4 mg  0.4 mg SubLINGual Q5 Min PRN    heparin (porcine) injection 5,000 Units  5,000 Units SubCUTAneous 3 times per day    sodium chloride (OCEAN, BABY AYR) 0.65 % nasal spray 1 spray  1 spray Each Nostril PRN    insulin lispro (HUMALOG) injection vial 0-4 Units  0-4 Units SubCUTAneous TID WC    insulin lispro (HUMALOG) injection vial 0-4 Units  0-4 Units SubCUTAneous Nightly    glucose chewable tablet 16 g  4 tablet Oral PRN    dextrose bolus 10% 125 mL  125 mL IntraVENous PRN    Or    dextrose bolus 10% 250 mL  250 mL IntraVENous PRN    glucagon (rDNA) injection 1 mg  1 mg SubCUTAneous PRN    dextrose 10 % infusion   IntraVENous Continuous PRN    latanoprost (XALATAN) 0.005 % ophthalmic solution 1 drop  1 drop Both Eyes Nightly     Data Review:   Recent Results (from the past 24 hour(s))   POCT Glucose    Collection Time: 09/09/22 11:53 AM   Result Value Ref Range    POC Glucose 109 (H) 65 - 100 mg/dL    Performed by: Marii Cerda    POCT Glucose    Collection Time: 09/09/22  4:59 PM   Result Value Ref Range    POC Glucose 124 (H) 65 - 100 mg/dL    Performed by: Gabi    POCT Glucose    Collection Time: 09/09/22  9:14 PM   Result Value Ref Range    POC Glucose 168 (H) 65 - 100 mg/dL    Performed by: Tania    Basic Metabolic Panel    Collection Time: 09/10/22  5:57 AM   Result Value Ref Range    Sodium 138 136 - 145 mmol/L    Potassium 4.7 3.5 - 5.1 mmol/L    Chloride 114 (H) 101 - 110 mmol/L    CO2 20 (L) 21 - 32 mmol/L    Anion Gap 4 4 - 13 mmol/L    Glucose 130 (H) 65 - 100 mg/dL    BUN 21 6 - 23 MG/DL    Creatinine 1.30 (H) 0.6 - 1.0 MG/DL    GFR  54 (L) >60 ml/min/1.73m2    GFR Non-African American 45 (L) >60 ml/min/1.73m2    Calcium 9.1 8.3 - 10.4 MG/DL   Phosphorus    Collection Time: 09/10/22  5:57 AM   Result Value Ref Range    Phosphorus 3.3 2.5 - 4.5 MG/DL   CBC    Collection Time: 09/10/22  5:57 AM   Result Value Ref Range    WBC 6.2 4.3 - 11.1 K/uL    RBC 3.80 (L) 4.05 - 5.2 M/uL    Hemoglobin 12.3 11.7 - 15.4 g/dL    Hematocrit 38.4 35.8 - 46.3 %    .1 (H) 79.6 - 97.8 FL    MCH 32.4 26.1 - 32.9 PG    MCHC 32.0 31.4 - 35.0 g/dL    RDW 13.6 11.9 - 14.6 %    Platelets 151 (L) 996 - 450 K/uL    MPV 11.2 9.4 - 12.3 FL    nRBC 0.00 0.0 - 0.2 K/uL   POCT Glucose    Collection Time: 09/10/22  7:25 AM   Result Value Ref Range    POC Glucose 125 (H) 65 - 100 mg/dL    Performed by: Alexia        EKG:  (EKG has been independently visualized by me with interpretation below)  Assessment:     Principal Problem:    Ventricular tachycardia (HCC)  Active Problems:    Acute on chronic renal failure (HCC)    Left ventricular aneurysm    Type 2 diabetes with nephropathy (HCC)    NICM (nonischemic cardiomyopathy) (HCC)    Dyslipidemia    CKD (chronic kidney disease) stage 4, GFR 15-29 ml/min (HCC)    Morbid obesity (HCC)  Resolved Problems:    * No resolved hospital problems. *    Plan:   1. Ventricular Tachycardia: Currently NSR, ICD in place, VT likely originating from LV aneurysm, awaiting transfer to Dr. Juanito Valadez at [facility] for surgical intervention, continue oral amiodarone, heparin, and ASA. 2. Non-ischemic Cardiomyopathy with reduced EF: currently not on OMT     3. SNEHA on CKD stage 3-4: per nephrology     4. IDDM: continue on inpatient medications    Thai Meza MD  Cardiology/Electrophysiology

## 2022-09-11 LAB
GLUCOSE BLD STRIP.AUTO-MCNC: 118 MG/DL (ref 65–100)
GLUCOSE BLD STRIP.AUTO-MCNC: 120 MG/DL (ref 65–100)
GLUCOSE BLD STRIP.AUTO-MCNC: 179 MG/DL (ref 65–100)
GLUCOSE BLD STRIP.AUTO-MCNC: 96 MG/DL (ref 65–100)
SERVICE CMNT-IMP: ABNORMAL
SERVICE CMNT-IMP: NORMAL

## 2022-09-11 PROCEDURE — 1100000003 HC PRIVATE W/ TELEMETRY

## 2022-09-11 PROCEDURE — 6370000000 HC RX 637 (ALT 250 FOR IP): Performed by: NURSE PRACTITIONER

## 2022-09-11 PROCEDURE — 6360000002 HC RX W HCPCS: Performed by: INTERNAL MEDICINE

## 2022-09-11 PROCEDURE — 82962 GLUCOSE BLOOD TEST: CPT

## 2022-09-11 PROCEDURE — 6370000000 HC RX 637 (ALT 250 FOR IP): Performed by: INTERNAL MEDICINE

## 2022-09-11 PROCEDURE — 94760 N-INVAS EAR/PLS OXIMETRY 1: CPT

## 2022-09-11 PROCEDURE — 51702 INSERT TEMP BLADDER CATH: CPT

## 2022-09-11 PROCEDURE — 2580000003 HC RX 258: Performed by: NURSE PRACTITIONER

## 2022-09-11 PROCEDURE — 6360000002 HC RX W HCPCS: Performed by: NURSE PRACTITIONER

## 2022-09-11 RX ORDER — FUROSEMIDE 10 MG/ML
40 INJECTION INTRAMUSCULAR; INTRAVENOUS 2 TIMES DAILY
Status: DISCONTINUED | OUTPATIENT
Start: 2022-09-11 | End: 2022-09-12

## 2022-09-11 RX ADMIN — AMIODARONE HYDROCHLORIDE 400 MG: 200 TABLET ORAL at 21:49

## 2022-09-11 RX ADMIN — HEPARIN SODIUM 5000 UNITS: 5000 INJECTION INTRAVENOUS; SUBCUTANEOUS at 13:59

## 2022-09-11 RX ADMIN — Medication 1 TABLET: at 08:50

## 2022-09-11 RX ADMIN — AMIODARONE HYDROCHLORIDE 400 MG: 200 TABLET ORAL at 05:33

## 2022-09-11 RX ADMIN — AMIODARONE HYDROCHLORIDE 400 MG: 200 TABLET ORAL at 13:59

## 2022-09-11 RX ADMIN — FUROSEMIDE 40 MG: 10 INJECTION, SOLUTION INTRAMUSCULAR; INTRAVENOUS at 22:48

## 2022-09-11 RX ADMIN — DOCUSATE SODIUM 100 MG: 100 CAPSULE, LIQUID FILLED ORAL at 08:50

## 2022-09-11 RX ADMIN — INSULIN GLARGINE 7 UNITS: 100 INJECTION, SOLUTION SUBCUTANEOUS at 20:24

## 2022-09-11 RX ADMIN — MORPHINE SULFATE 2 MG: 2 INJECTION, SOLUTION INTRAMUSCULAR; INTRAVENOUS at 20:24

## 2022-09-11 RX ADMIN — SODIUM BICARBONATE 650 MG TABLET 650 MG: at 08:50

## 2022-09-11 RX ADMIN — PRAVASTATIN SODIUM 80 MG: 80 TABLET ORAL at 20:23

## 2022-09-11 RX ADMIN — SODIUM BICARBONATE 650 MG TABLET 650 MG: at 20:23

## 2022-09-11 RX ADMIN — HEPARIN SODIUM 5000 UNITS: 5000 INJECTION INTRAVENOUS; SUBCUTANEOUS at 05:33

## 2022-09-11 RX ADMIN — MORPHINE SULFATE 2 MG: 2 INJECTION, SOLUTION INTRAMUSCULAR; INTRAVENOUS at 05:37

## 2022-09-11 RX ADMIN — MORPHINE SULFATE 2 MG: 2 INJECTION, SOLUTION INTRAMUSCULAR; INTRAVENOUS at 14:03

## 2022-09-11 RX ADMIN — ASPIRIN 81 MG: 81 TABLET, COATED ORAL at 08:49

## 2022-09-11 RX ADMIN — SODIUM CHLORIDE, PRESERVATIVE FREE 10 ML: 5 INJECTION INTRAVENOUS at 08:50

## 2022-09-11 RX ADMIN — LATANOPROST 1 DROP: 50 SOLUTION OPHTHALMIC at 20:25

## 2022-09-11 RX ADMIN — VITAMIN D, TAB 1000IU (100/BT) 2000 UNITS: 25 TAB at 08:50

## 2022-09-11 RX ADMIN — DULOXETINE HYDROCHLORIDE 20 MG: 20 CAPSULE, DELAYED RELEASE ORAL at 08:50

## 2022-09-11 RX ADMIN — HEPARIN SODIUM 5000 UNITS: 5000 INJECTION INTRAVENOUS; SUBCUTANEOUS at 21:50

## 2022-09-11 RX ADMIN — SODIUM CHLORIDE, PRESERVATIVE FREE 10 ML: 5 INJECTION INTRAVENOUS at 20:25

## 2022-09-11 ASSESSMENT — PAIN SCALES - GENERAL
PAINLEVEL_OUTOF10: 8
PAINLEVEL_OUTOF10: 0
PAINLEVEL_OUTOF10: 0
PAINLEVEL_OUTOF10: 8
PAINLEVEL_OUTOF10: 0
PAINLEVEL_OUTOF10: 0
PAINLEVEL_OUTOF10: 8

## 2022-09-11 ASSESSMENT — PAIN DESCRIPTION - ORIENTATION
ORIENTATION: MID
ORIENTATION: LEFT

## 2022-09-11 ASSESSMENT — PAIN DESCRIPTION - DESCRIPTORS
DESCRIPTORS: ACHING
DESCRIPTORS: ACHING;STABBING;SHOOTING

## 2022-09-11 ASSESSMENT — PAIN DESCRIPTION - PAIN TYPE: TYPE: ACUTE PAIN

## 2022-09-11 ASSESSMENT — PAIN DESCRIPTION - ONSET: ONSET: GRADUAL

## 2022-09-11 ASSESSMENT — PAIN DESCRIPTION - LOCATION
LOCATION: BACK
LOCATION: FOOT
LOCATION: LEG

## 2022-09-11 ASSESSMENT — PAIN - FUNCTIONAL ASSESSMENT: PAIN_FUNCTIONAL_ASSESSMENT: ACTIVITIES ARE NOT PREVENTED

## 2022-09-11 ASSESSMENT — PAIN DESCRIPTION - FREQUENCY: FREQUENCY: CONTINUOUS

## 2022-09-11 NOTE — PROGRESS NOTES
[facility] CARDIOLOGY PROGRESS NOTE           9/11/2022 8:32 AM    Admit Date: 9/6/2022    Admit Diagnosis: Ventricular tachycardia ([address] [I47.2]    Subjective:   No complaints this AM, no chest pain or shortness of breath    Interval History: (History of pertinent interval events obtained from nursing staff)    ROS:  GEN:  No fever or chills  Cardiovascular:  As noted above  Pulmonary:  As noted above  Neuro:  No new focal motor or sensory loss      Objective:     Vitals:    09/11/22 0005 09/11/22 0420 09/11/22 0537 09/11/22 0746   BP: (!) 123/58 126/81 132/62 127/73   Pulse: 59 60 64 60   Resp: 18 18 18 18   Temp: 98.2 °F (36.8 °C) 98.1 °F (36.7 °C)  98.4 °F (36.9 °C)   TempSrc: Oral Oral  Oral   SpO2: 98% 97%  94%   Weight:  216 lb 6.4 oz (98.2 kg)     Height:           Physical Exam:  General-Well Developed, Well Nourished, No Acute Distress, Alert & Oriented x 3, appropriate mood. Neck- supple, no JVD  CV- regular rate and rhythm no MRG  Lung- clear bilaterally  Abd- soft, nontender, nondistended  Ext- no edema bilaterally.   Skin- warm and dry    Current Facility-Administered Medications   Medication Dose Route Frequency    sodium bicarbonate tablet 650 mg  650 mg Oral BID    cydney-ignacio tablet 1 tablet  1 tablet Oral Daily    Vitamin D (CHOLECALCIFEROL) tablet 2,000 Units  2,000 Units Oral Daily    LORazepam (ATIVAN) tablet 0.5 mg  0.5 mg Oral Q4H PRN    docusate sodium (COLACE) capsule 100 mg  100 mg Oral Daily    amiodarone (CORDARONE) tablet 400 mg  400 mg Oral q8h    HYDROcodone-acetaminophen (NORCO) 7.5-325 MG per tablet 1 tablet  1 tablet Oral Q6H PRN    morphine injection 2 mg  2 mg IntraVENous Q4H PRN    traMADol (ULTRAM) tablet 50 mg  50 mg Oral Q6H PRN    polyethylene glycol (GLYCOLAX) packet 17 g  17 g Oral Daily PRN    aspirin EC tablet 81 mg  81 mg Oral Daily    DULoxetine (CYMBALTA) extended release capsule 20 mg  20 mg Oral Daily    insulin glargine (LANTUS) injection vial 7 Units  7 Units SubCUTAneous Nightly    pravastatin (PRAVACHOL) tablet 80 mg  80 mg Oral Nightly    sodium chloride flush 0.9 % injection 5-40 mL  5-40 mL IntraVENous 2 times per day    sodium chloride flush 0.9 % injection 5-40 mL  5-40 mL IntraVENous PRN    ondansetron (ZOFRAN) injection 4 mg  4 mg IntraVENous Q4H PRN    acetaminophen (TYLENOL) tablet 650 mg  650 mg Oral Q6H PRN    Or    acetaminophen (TYLENOL) suppository 650 mg  650 mg Rectal Q6H PRN    nitroGLYCERIN (NITROSTAT) SL tablet 0.4 mg  0.4 mg SubLINGual Q5 Min PRN    heparin (porcine) injection 5,000 Units  5,000 Units SubCUTAneous 3 times per day    sodium chloride (OCEAN, BABY AYR) 0.65 % nasal spray 1 spray  1 spray Each Nostril PRN    insulin lispro (HUMALOG) injection vial 0-4 Units  0-4 Units SubCUTAneous TID WC    insulin lispro (HUMALOG) injection vial 0-4 Units  0-4 Units SubCUTAneous Nightly    glucose chewable tablet 16 g  4 tablet Oral PRN    dextrose bolus 10% 125 mL  125 mL IntraVENous PRN    Or    dextrose bolus 10% 250 mL  250 mL IntraVENous PRN    glucagon (rDNA) injection 1 mg  1 mg SubCUTAneous PRN    dextrose 10 % infusion   IntraVENous Continuous PRN    latanoprost (XALATAN) 0.005 % ophthalmic solution 1 drop  1 drop Both Eyes Nightly     Data Review:   Recent Results (from the past 24 hour(s))   POCT Glucose    Collection Time: 09/10/22 11:02 AM   Result Value Ref Range    POC Glucose 105 (H) 65 - 100 mg/dL    Performed by: TrevCovestorcruzito    POCT Glucose    Collection Time: 09/10/22  3:59 PM   Result Value Ref Range    POC Glucose 117 (H) 65 - 100 mg/dL    Performed by: TrevCovestorcruzito    POCT Glucose    Collection Time: 09/10/22  8:41 PM   Result Value Ref Range    POC Glucose 140 (H) 65 - 100 mg/dL    Performed by: Tania    POCT Glucose    Collection Time: 09/11/22  7:48 AM   Result Value Ref Range    POC Glucose 118 (H) 65 - 100 mg/dL    Performed by: Tania        EKG:  (EKG has been independently visualized by me with interpretation below)  Assessment:     Principal Problem:    Ventricular tachycardia (HCC)  Active Problems:    Acute on chronic renal failure (HCC)    Left ventricular aneurysm    Type 2 diabetes with nephropathy (HCC)    NICM (nonischemic cardiomyopathy) (HCC)    Dyslipidemia    CKD (chronic kidney disease) stage 4, GFR 15-29 ml/min (HCC)    Morbid obesity ([address]  Resolved Problems:    * No resolved hospital problems. *    Plan:   1. Ventricular Tachycardia: Currently NSR, ICD in place, VT likely originating from LV aneurysm, awaiting transfer to Dr. Mei Argueta at [facility] for surgical intervention, continue oral amiodarone. 2. Non-ischemic Cardiomyopathy with reduced EF: currently not on OMT     3. SNEHA on CKD stage 3-4: per nephrology     4. IDDM: continue on inpatient medications    5. PPX: hep TID    Carlos Ng.  Derrick NAM  Cardiology/Electrophysiology

## 2022-09-12 LAB
ANION GAP SERPL CALC-SCNC: 5 MMOL/L (ref 4–13)
BUN SERPL-MCNC: 21 MG/DL (ref 6–23)
CALCIUM SERPL-MCNC: 9.6 MG/DL (ref 8.3–10.4)
CHLORIDE SERPL-SCNC: 109 MMOL/L (ref 101–110)
CO2 SERPL-SCNC: 25 MMOL/L (ref 21–32)
CREAT SERPL-MCNC: 1.4 MG/DL (ref 0.6–1)
GLUCOSE BLD STRIP.AUTO-MCNC: 107 MG/DL (ref 65–100)
GLUCOSE BLD STRIP.AUTO-MCNC: 113 MG/DL (ref 65–100)
GLUCOSE BLD STRIP.AUTO-MCNC: 153 MG/DL (ref 65–100)
GLUCOSE BLD STRIP.AUTO-MCNC: 167 MG/DL (ref 65–100)
GLUCOSE SERPL-MCNC: 129 MG/DL (ref 65–100)
POTASSIUM SERPL-SCNC: 4 MMOL/L (ref 3.5–5.1)
SERVICE CMNT-IMP: ABNORMAL
SODIUM SERPL-SCNC: 139 MMOL/L (ref 136–145)

## 2022-09-12 PROCEDURE — 36415 COLL VENOUS BLD VENIPUNCTURE: CPT

## 2022-09-12 PROCEDURE — 82962 GLUCOSE BLOOD TEST: CPT

## 2022-09-12 PROCEDURE — 80048 BASIC METABOLIC PNL TOTAL CA: CPT

## 2022-09-12 PROCEDURE — 99233 SBSQ HOSP IP/OBS HIGH 50: CPT | Performed by: INTERNAL MEDICINE

## 2022-09-12 PROCEDURE — 6360000002 HC RX W HCPCS: Performed by: INTERNAL MEDICINE

## 2022-09-12 PROCEDURE — 6360000002 HC RX W HCPCS: Performed by: NURSE PRACTITIONER

## 2022-09-12 PROCEDURE — 1100000003 HC PRIVATE W/ TELEMETRY

## 2022-09-12 PROCEDURE — 6370000000 HC RX 637 (ALT 250 FOR IP): Performed by: NURSE PRACTITIONER

## 2022-09-12 PROCEDURE — 6370000000 HC RX 637 (ALT 250 FOR IP): Performed by: INTERNAL MEDICINE

## 2022-09-12 PROCEDURE — 51702 INSERT TEMP BLADDER CATH: CPT

## 2022-09-12 PROCEDURE — 2580000003 HC RX 258: Performed by: NURSE PRACTITIONER

## 2022-09-12 RX ORDER — TORSEMIDE 20 MG/1
20 TABLET ORAL DAILY
Status: DISCONTINUED | OUTPATIENT
Start: 2022-09-13 | End: 2022-09-25 | Stop reason: HOSPADM

## 2022-09-12 RX ADMIN — SODIUM BICARBONATE 650 MG TABLET 650 MG: at 21:05

## 2022-09-12 RX ADMIN — HEPARIN SODIUM 5000 UNITS: 5000 INJECTION INTRAVENOUS; SUBCUTANEOUS at 21:26

## 2022-09-12 RX ADMIN — AMIODARONE HYDROCHLORIDE 400 MG: 200 TABLET ORAL at 13:52

## 2022-09-12 RX ADMIN — DULOXETINE HYDROCHLORIDE 20 MG: 20 CAPSULE, DELAYED RELEASE ORAL at 08:47

## 2022-09-12 RX ADMIN — Medication 1 TABLET: at 08:48

## 2022-09-12 RX ADMIN — FUROSEMIDE 40 MG: 10 INJECTION, SOLUTION INTRAMUSCULAR; INTRAVENOUS at 08:44

## 2022-09-12 RX ADMIN — SODIUM CHLORIDE, PRESERVATIVE FREE 10 ML: 5 INJECTION INTRAVENOUS at 08:45

## 2022-09-12 RX ADMIN — HEPARIN SODIUM 5000 UNITS: 5000 INJECTION INTRAVENOUS; SUBCUTANEOUS at 13:53

## 2022-09-12 RX ADMIN — VITAMIN D, TAB 1000IU (100/BT) 2000 UNITS: 25 TAB at 08:46

## 2022-09-12 RX ADMIN — SODIUM BICARBONATE 650 MG TABLET 650 MG: at 08:47

## 2022-09-12 RX ADMIN — ONDANSETRON 4 MG: 2 INJECTION INTRAMUSCULAR; INTRAVENOUS at 15:55

## 2022-09-12 RX ADMIN — FUROSEMIDE 40 MG: 10 INJECTION, SOLUTION INTRAMUSCULAR; INTRAVENOUS at 17:23

## 2022-09-12 RX ADMIN — HEPARIN SODIUM 5000 UNITS: 5000 INJECTION INTRAVENOUS; SUBCUTANEOUS at 06:09

## 2022-09-12 RX ADMIN — HYDROCODONE BITARTRATE AND ACETAMINOPHEN 1 TABLET: 7.5; 325 TABLET ORAL at 06:18

## 2022-09-12 RX ADMIN — PRAVASTATIN SODIUM 80 MG: 80 TABLET ORAL at 21:05

## 2022-09-12 RX ADMIN — AMIODARONE HYDROCHLORIDE 400 MG: 200 TABLET ORAL at 21:05

## 2022-09-12 RX ADMIN — DOCUSATE SODIUM 100 MG: 100 CAPSULE, LIQUID FILLED ORAL at 08:46

## 2022-09-12 RX ADMIN — AMIODARONE HYDROCHLORIDE 400 MG: 200 TABLET ORAL at 06:09

## 2022-09-12 RX ADMIN — INSULIN GLARGINE 7 UNITS: 100 INJECTION, SOLUTION SUBCUTANEOUS at 21:02

## 2022-09-12 RX ADMIN — ASPIRIN 81 MG: 81 TABLET, COATED ORAL at 08:46

## 2022-09-12 RX ADMIN — SODIUM CHLORIDE, PRESERVATIVE FREE 5 ML: 5 INJECTION INTRAVENOUS at 19:58

## 2022-09-12 RX ADMIN — MORPHINE SULFATE 2 MG: 2 INJECTION, SOLUTION INTRAMUSCULAR; INTRAVENOUS at 19:32

## 2022-09-12 RX ADMIN — LATANOPROST 1 DROP: 50 SOLUTION OPHTHALMIC at 21:05

## 2022-09-12 ASSESSMENT — PAIN SCALES - GENERAL
PAINLEVEL_OUTOF10: 0
PAINLEVEL_OUTOF10: 6
PAINLEVEL_OUTOF10: 0
PAINLEVEL_OUTOF10: 8
PAINLEVEL_OUTOF10: 0

## 2022-09-12 ASSESSMENT — PAIN DESCRIPTION - LOCATION
LOCATION: ARM;SHOULDER
LOCATION: BACK

## 2022-09-12 ASSESSMENT — PAIN DESCRIPTION - ORIENTATION
ORIENTATION: LEFT
ORIENTATION: MID

## 2022-09-12 ASSESSMENT — PAIN DESCRIPTION - DESCRIPTORS
DESCRIPTORS: ACHING
DESCRIPTORS: ACHING;THROBBING;SORE

## 2022-09-12 NOTE — PROGRESS NOTES
RENAL PROGRESS NOTE    Subjective:     Patient is a 60year old female with Chronic Kidney Disease with baseline creatinine 2-2.2 followed by me in the office setting is admitted with V tach. She feels much better since admission and her heart rate is now normal.   She has had prior SNEHA on CKD episodes, IDDM, morbid obesity s/p DC ICD. She underwent defibrillated in the ED and was placed on amiodarone IV. Her crea was 1.8 on 8/31/22 and increased to 3.3 on admission, and improved to 1.6 today. No vomiting, no CP, dyspnea is controlled with nasal O2.  9/8/22- alert/oriented x3, sitting on side of bed, no signs of distress, on room air, denies dyspnea, CP, n/v, HA, or dizziness. On Amiodarone gtt. Family at bedside. 9/9/22- alert/oriented, lying in bed, no signs of distress, c/o CP earlier with dyspnea when walking, no CP or dyspnea at time of assessment, no n/v, HA or dizziness. Kidney indices stable. Plans to remain in hospital until transferred to [facility]. 9/10/22 - alert and communicating well - breathing better - No N/V, no uremic signs or symptoms - edema is improved   9/11/22 - she complains of increased dyspnea and edema this PM, no chest pain - no N/V     Past Medical History:   Diagnosis Date    Abdominal wall hernia 10/2/2018    Acute hypoxemic respiratory failure ([address] 4/3/2019    Allergic rhinitis     followed by allergist; allergic to grass- has rescue inhaler PRN    ARDS (adult respiratory distress syndrome) ([address] 4/3/2019    Arthritis     Automatic implantable cardioverter-defibrillator in situ 3/30/2016    CAD in native artery 3/30/2016    Chronic kidney disease     Chronic systolic heart failure ([address] 07/18/2013    ECHO 2017- EF 39%; managed with medications; [place] [facility]pez [facility] [place]    CKD (chronic kidney disease)     [facility] follows    Diabetes ([facility]     type 2 (on insulin);  FBS AM range- , hypo s/s <70, hgba1c- 7.9    Diabetes mellitus ([address] 4/12/2010 Dyslipidemia 2/13/2013    Eczema     Fibromyalgia     GERD (gastroesophageal reflux disease)     hx of- no meds currently    Headache     Heart failure (HCC)     chronic systolic with EF 11%; non-ischemic cardiomyopathy    HTN (hypertension) 4/12/2010    Hypercholesterolemia     Incarcerated incisional hernia 3/26/2019    Morbid obesity (HCC)     Neuropathy     bilateral feet and tips of fingers    NICM (nonischemic cardiomyopathy) ([address] 2/15/2013    Obesity     bmi- 41    Obstructive sleep apnea (adult) (pediatric) 07/14/2014    uses cpap qhs    Pseudomonas urinary tract infection 4/5/2019    Sciatic pain 5/23/2016    Severe persistent asthma with acute exacerbation 8/24/2020    SVT (supraventricular tachycardia) ([facility])     ablation for svt    Tobacco use disorder 4/12/2010      Past Surgical History:   Procedure Laterality Date    ABLATION OF DYSRHYTHMIC FOCUS  \"years ago\"    CARDIAC DEFIBRILLATOR PLACEMENT  07/18/2013    Biotronik dual chamber ICD by Dr. Kyleigh Centeno  07/18/2013    Biotronik dual chamber ICD by Dr. Bud Maldonado 7/1/2022    Left heart cath / coronary angiography performed by Isidro Solorio MD at [address] [place] [address]      x1    COLONOSCOPY N/A 7/7/2021    COLONOSCOPY/BMI 48 PT HAS AN ICD performed by Bing Pierce MD at [facility]  03/26/2019    mild incarceration, no bowel removal    HYSTERECTOMY (CERVIX STATUS UNKNOWN)      EDITH-Left ovary intact per pt    LEFT HEART CATH,PERCUTANEOUS  2/13/2013    no intervention    PACEMAKER      ICD    ROTATOR CUFF REPAIR Right     TONSILLECTOMY        Prior to Admission medications    Medication Sig Start Date End Date Taking? Authorizing Provider   cyclobenzaprine (FLEXERIL) 10 MG tablet Take 10 mg by mouth 3 times daily as needed for Muscle spasms. Historical Provider, MD   calcitRIOL (ROCALTROL) 0.25 MCG capsule Take 0.25 mcg by mouth daily. Historical Provider, MD   Cholecalciferol (VITAMIN D3) 50 MCG (2000 UT) TABS Take 1 tablet by mouth daily. Historical Provider, MD   nitroGLYCERIN (NITROSTAT) 0.4 MG SL tablet Place 0.4 mg under the tongue every 5 minutes as needed for Chest pain. Max 3 doses     Historical Provider, MD   dupilumab (DUPIXENT) 300 MG/2ML SOPN injection Inject 300 mg into the skin every 14 days. Patient usually takes medication on Monday or Tuesday. Historical Provider, MD   acetaminophen (TYLENOL) 500 MG tablet Take 1,000 mg by mouth every 6 hours as needed for Pain (breakthrough pain).     Historical Provider, MD   amiodarone (CORDARONE) 200 MG tablet Take 1 tablet by mouth 2 times daily 8/17/22   ARNOLDO Thompson - CNP   DULoxetine (CYMBALTA) 20 MG extended release capsule Take 1 capsule by mouth daily 8/18/22   Jack Gordon, ARNOLDO - CNP   aspirin 81 MG EC tablet Take 81 mg by mouth daily    Ar Automatic Reconciliation   carvedilol (COREG) 6.25 MG tablet Take 6.25 mg by mouth 2 times daily (with meals) 11/23/21   Ar Automatic Reconciliation   vitamin D 25 MCG (1000 UT) CAPS Take by mouth daily  Patient not taking: Reported on 9/6/2022    Ar Automatic Reconciliation   empagliflozin (JARDIANCE) 10 MG tablet Take 10 mg by mouth daily 12/29/21   Ar Automatic Reconciliation   insulin glargine (LANTUS SOLOSTAR) 100 UNIT/ML injection pen Inject 15 Units into the skin nightly 12/22/21   Ar Automatic Reconciliation   isosorbide mononitrate (IMDUR) 30 MG extended release tablet Take 30 mg by mouth daily 11/23/21   Ar Automatic Reconciliation   latanoprost (XALATAN) 0.005 % ophthalmic solution Place 1 drop into both eyes nightly 5/10/21   Ar Automatic Reconciliation   polyethylene glycol (GLYCOLAX) 17 GM/SCOOP powder Take 17 g by mouth daily as needed  Patient not taking: Reported on 9/6/2022 11/14/20   Ar Automatic Reconciliation   pravastatin (PRAVACHOL) 80 MG tablet Take 80 mg by mouth nightly 11/23/21   Ar Automatic Reconciliation   sacubitril-valsartan (ENTRESTO) 49-51 MG per tablet Take 1 tablet by mouth 2 times daily 11/23/21   Ar Automatic Reconciliation   torsemide (DEMADEX) 20 MG tablet Take 40 mg by mouth 2 times daily 8/30/22   Ar Automatic Reconciliation   albuterol sulfate  (90 Base) MCG/ACT inhaler 2 puffs qid prn shortness of breath or wheezing  Patient not taking: Reported on 8/10/2022 11/14/20 8/17/22  Ar Automatic Reconciliation     Allergies   Allergen Reactions    Other Hives and Shortness Of Breath     \"inhaler PRN\"    Penicillin G Itching      Social History     Tobacco Use    Smoking status: Some Days     Packs/day: 0.25     Types: Cigarettes    Smokeless tobacco: Never    Tobacco comments:     Quit smoking: \"every now and then\"   Substance Use Topics    Alcohol use: Yes     Alcohol/week: 1.0 standard drink      Family History   Problem Relation Age of Onset    Diabetes Other     Heart Disease Father     Hypertension Father     Stroke Father     Heart Attack Father 48        mi    Breast Cancer Sister 37    Hypertension Brother     Heart Disease Brother     Diabetes Brother     Stroke Mother           Review of Systems    Negative except for what is mention in HPI above      Objective:       /63   Pulse 60   Temp 96.8 °F (36 °C) (Temporal)   Resp 18   Ht 4' 10\" (1.473 m)   Wt 216 lb 6.4 oz (98.2 kg)   SpO2 95%   BMI 45.23 kg/m²     No intake/output data recorded. 09/10 0701 - 09/11 1900  In: 240 [P.O.:240]  Out: 2000 [Urine:2000]    /63   Pulse 60   Temp 96.8 °F (36 °C) (Temporal)   Resp 18   Ht 4' 10\" (1.473 m)   Wt 216 lb 6.4 oz (98.2 kg)   SpO2 95%   BMI 45.23 kg/m²     General:  Alert, cooperative, mild respiratory distress on nasal O2, appears stated age. Head:  Normocephalic, without obvious abnormality, atraumatic. Neck: Supple, symmetrical, trachea midline, no JVD. Back:   Symmetric, no curvature. ROM normal. No CVA tenderness.    Lungs:   Clear to auscultation bilaterally. Heart:  Regular rate and rhythm, S1, S2 normal, no murmur,  rub or gallop. Abdomen:   Soft, non-tender. Bowel sounds normal   Extremities: Extremities normal, atraumatic, no cyanosis  - +edema. Skin: Skin color, texture, turgor normal. No rashes or lesions. Neurologic: Baseline tremor is present. No asterixis. Data Review:     Recent Results (from the past 24 hour(s))   POCT Glucose    Collection Time: 09/11/22  7:48 AM   Result Value Ref Range    POC Glucose 118 (H) 65 - 100 mg/dL    Performed by: Tania    POCT Glucose    Collection Time: 09/11/22 11:25 AM   Result Value Ref Range    POC Glucose 179 (H) 65 - 100 mg/dL    Performed by: Tania    POCT Glucose    Collection Time: 09/11/22  4:24 PM   Result Value Ref Range    POC Glucose 96 65 - 100 mg/dL    Performed by: Adamson (Oliver)    POCT Glucose    Collection Time: 09/11/22  8:18 PM   Result Value Ref Range    POC Glucose 120 (H) 65 - 100 mg/dL    Performed by: Cynthia        CXR;   Results for orders placed during the hospital encounter of 09/06/22    XR CHEST PORTABLE    Narrative  Portable chest xray    COMPARISON: August 10, 2022    INDICATION: chest pain    FINDINGS:    Heart is enlarged. Mediastinal contour is within normal limits. Stable  left-sided cardiac pacer. Increased interstitial prominence, likely vascular  congestion. No pneumothorax. No large pleural effusion. Impression  1. Cardiomegaly and suggestion of vascular congestion. CT Abdomen  Results for orders placed in visit on 03/31/19    CT ABDOMEN PELVIS W IV CONTRAST    Narrative  CT abdomen and pelvis with contrast: 03/30/2019    History: Recent hernia surgery, abdominal distention.     Technique: Helically acquired images were obtained from the domes of the  diaphragms to the ischial tuberosities reconstructed at 5mm intervals after the  uneventful administration of oral contrast for bowel opacification and 100 cc's  of intravenous Isovue-370 to evaluate the solid abdominal viscera and vascular  structures. Coronal reformatted images were submitted. Radiation dose reduction techniques were used for this study:  Our CT scanners  use one or all of the following: Automated exposure control, adjustment of the  mA and/or kVp according to patient's size, iterative reconstruction. Comparison: 02/26/2019. CT ABDOMEN: There is mild bilateral lower lobe subsegmental atelectasis and/or  scar. There is a tiny low-density lesion within the right hepatic lobe, too  small to characterize but statistically benign, unchanged. The spleen, pancreas  and adrenal glands are unremarkable. A cyst within the upper pole left kidney  measures 1.87 m. 2 subcentimeter low-density lesions within the right kidney are  too small to characterize but statistically benign. There is a large defect within the anterior abdominal wall measuring nearly 12  mm in transverse diameter. Most of the small bowel as well as a large portion of  the mid stomach and transverse colon anteriorly large hernia. There is soft  tissue stranding along the lateral abdominal walls, presumably postsurgical. The  the proximal and midportion of the stomach is moderately distended which with  narrowing of the distal stomach has the pylorus traverses back into the  peritoneal cavity. The proximal small bowel is located within the peritoneal  cavity and is dilated with these loops of bowel extending into the large hernia  which also appear mildly dilated. A definite transition zone is unable to be  visualized. No adenopathy is present. There is no free fluid. A drainage catheter is present  along the inferior margin of the hernia. CT PELVIS: A Chamberlain catheter is present within urinary bladder. No adenopathy or  ascites is present. There are no plantar changes. Sigmoid diverticula are  present.     Impression  IMPRESSION: Large abdominal wall defect containing many loops of small bowel,  transverse colon and portions of the stomach. Narrowing of the distal stomach as  it enters back into the peritoneal cavity causes partial outlet obstruction. Proximal small bowel is also mildly dilated without a definite transition zone. A small bowel obstruction cannot be excluded. Principal Problem:    Ventricular tachycardia (HCC)  Active Problems:    Acute on chronic renal failure (HCC)    Left ventricular aneurysm    Type 2 diabetes with nephropathy (HCC)    NICM (nonischemic cardiomyopathy) (HCC)    Dyslipidemia    CKD (chronic kidney disease) stage 4, GFR 15-29 ml/min (HCC)    Morbid obesity ([address]  Resolved Problems:    * No resolved hospital problems.  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 hypertensive nephrosclerosis - excellent improvement in response to medical management and renal function is better than  baseline   Volume overload - initially with volume depletion, now with mild fluid excess - start IV Lasix 40 mg BID, first dose now, see orders - plan to switch to Torsemide PO once volume status is controlled  Hyponatremia - mild, likely due to cardiac disease - no specific therapy needed   Hypoalbuminemia - probably due to urinary protein loss - work on supplements  Hyperkalemia- mild, on admission K+ 3.8 with blood draw and 3.0 with POC test, given KCL 10 mEq IV at that time, with mild acidosis, no intervention at this time  Acidosis- mild, monitor response to sodium bicarb PO  Ventricular tachycardia- LV aneurysm, on Amiodarone gtt, ICD, managed by Cardiology, plans for surgery at [facility] within the next 7-10 days      Plan:     As above

## 2022-09-12 NOTE — PROGRESS NOTES
RENAL PROGRESS NOTE    Subjective:     Patient is a 60year old female with Chronic Kidney Disease with baseline creatinine 2-2.2 followed by me in the office setting is admitted with V tach. She feels much better since admission and her heart rate is now normal.   She has had prior SNEHA on CKD episodes, IDDM, morbid obesity s/p DC ICD. She underwent defibrillated in the ED and was placed on amiodarone IV. Her crea was 1.8 on 8/31/22 and increased to 3.3 on admission, and improved to 1.6 today. No vomiting, no CP, dyspnea is controlled with nasal O2.  9/8/22- alert/oriented x3, sitting on side of bed, no signs of distress, on room air, denies dyspnea, CP, n/v, HA, or dizziness. On Amiodarone gtt. Family at bedside. 9/9/22- alert/oriented, lying in bed, no signs of distress, c/o CP earlier with dyspnea when walking, no CP or dyspnea at time of assessment, no n/v, HA or dizziness. Kidney indices stable. Plans to remain in hospital until transferred to [facility]. 9/10/22 - alert and communicating well - breathing better - No N/V, no uremic signs or symptoms - edema is improved   9/11/22 - she complains of increased dyspnea and edema this PM, no chest pain - no N/V   9/12/22- alert/oriented, c/o dyspnea with exertion, on 2L NC, denies CP, n/v, HA, or dizziness. Good UOP with diuresis.      Past Medical History:   Diagnosis Date    Abdominal wall hernia 10/2/2018    Acute hypoxemic respiratory failure ([address] 4/3/2019    Allergic rhinitis     followed by allergist; allergic to grass- has rescue inhaler PRN    ARDS (adult respiratory distress syndrome) ([address] 4/3/2019    Arthritis     Automatic implantable cardioverter-defibrillator in situ 3/30/2016    CAD in native artery 3/30/2016    Chronic kidney disease     Chronic systolic heart failure ([address] 07/18/2013    ECHO 2017- EF 39%; managed with medications; [place] [facility] [place] [facility]    CKD (chronic kidney disease)     [facility] follows    Diabetes ([address] Muscle spasms. Historical Provider, MD   calcitRIOL (ROCALTROL) 0.25 MCG capsule Take 0.25 mcg by mouth daily. Historical Provider, MD   Cholecalciferol (VITAMIN D3) 50 MCG (2000 UT) TABS Take 1 tablet by mouth daily. Historical Provider, MD   nitroGLYCERIN (NITROSTAT) 0.4 MG SL tablet Place 0.4 mg under the tongue every 5 minutes as needed for Chest pain. Max 3 doses     Historical Provider, MD   dupilumab (DUPIXENT) 300 MG/2ML SOPN injection Inject 300 mg into the skin every 14 days. Patient usually takes medication on Monday or Tuesday. Historical Provider, MD   acetaminophen (TYLENOL) 500 MG tablet Take 1,000 mg by mouth every 6 hours as needed for Pain (breakthrough pain).     Historical Provider, MD   amiodarone (CORDARONE) 200 MG tablet Take 1 tablet by mouth 2 times daily 8/17/22   ARNOLDO Issa CNP   DULoxetine (CYMBALTA) 20 MG extended release capsule Take 1 capsule by mouth daily 8/18/22   ARNOLDO Issa CNP   aspirin 81 MG EC tablet Take 81 mg by mouth daily    Ar Automatic Reconciliation   carvedilol (COREG) 6.25 MG tablet Take 6.25 mg by mouth 2 times daily (with meals) 11/23/21   Ar Automatic Reconciliation   vitamin D 25 MCG (1000 UT) CAPS Take by mouth daily  Patient not taking: Reported on 9/6/2022    Ar Automatic Reconciliation   empagliflozin (JARDIANCE) 10 MG tablet Take 10 mg by mouth daily 12/29/21   Ar Automatic Reconciliation   insulin glargine (LANTUS SOLOSTAR) 100 UNIT/ML injection pen Inject 15 Units into the skin nightly 12/22/21   Ar Automatic Reconciliation   isosorbide mononitrate (IMDUR) 30 MG extended release tablet Take 30 mg by mouth daily 11/23/21   Ar Automatic Reconciliation   latanoprost (XALATAN) 0.005 % ophthalmic solution Place 1 drop into both eyes nightly 5/10/21   Ar Automatic Reconciliation   polyethylene glycol (GLYCOLAX) 17 GM/SCOOP powder Take 17 g by mouth daily as needed  Patient not taking: Reported on 9/6/2022 11/14/20   Ar no JVD. Back:   Symmetric, no curvature. ROM normal. No CVA tenderness. Lungs:   Decreased bases to auscultation bilaterally. Heart:  Regular rate and rhythm, S1, S2 normal, no murmur,  rub or gallop. Abdomen:   Soft, non-tender. Bowel sounds normal   Extremities: Extremities normal, atraumatic, no cyanosis, 1+ edema. Skin: Skin color, texture, turgor normal. No rashes or lesions. Neurologic: Baseline tremor is present. No asterixis. Data Review:     Recent Results (from the past 24 hour(s))   POCT Glucose    Collection Time: 09/11/22 11:25 AM   Result Value Ref Range    POC Glucose 179 (H) 65 - 100 mg/dL    Performed by: Tania    POCT Glucose    Collection Time: 09/11/22  4:24 PM   Result Value Ref Range    POC Glucose 96 65 - 100 mg/dL    Performed by: Adamson (Oliver)    POCT Glucose    Collection Time: 09/11/22  8:18 PM   Result Value Ref Range    POC Glucose 120 (H) 65 - 100 mg/dL    Performed by: Cynthia    POCT Glucose    Collection Time: 09/12/22  8:43 AM   Result Value Ref Range    POC Glucose 107 (H) 65 - 100 mg/dL    Performed by: Bonita        CXR;   Results for orders placed during the hospital encounter of 09/06/22    XR CHEST PORTABLE    Narrative  Portable chest xray    COMPARISON: August 10, 2022    INDICATION: chest pain    FINDINGS:    Heart is enlarged. Mediastinal contour is within normal limits. Stable  left-sided cardiac pacer. Increased interstitial prominence, likely vascular  congestion. No pneumothorax. No large pleural effusion. Impression  1. Cardiomegaly and suggestion of vascular congestion. CT Abdomen  Results for orders placed in visit on 03/31/19    CT ABDOMEN PELVIS W IV CONTRAST    Narrative  CT abdomen and pelvis with contrast: 03/30/2019    History: Recent hernia surgery, abdominal distention.     Technique: Helically acquired images were obtained from the domes of the  diaphragms to the ischial tuberosities reconstructed at 5mm intervals after the  uneventful administration of oral contrast for bowel opacification and 100 cc's  of intravenous Isovue-370 to evaluate the solid abdominal viscera and vascular  structures. Coronal reformatted images were submitted. Radiation dose reduction techniques were used for this study:  Our CT scanners  use one or all of the following: Automated exposure control, adjustment of the  mA and/or kVp according to patient's size, iterative reconstruction. Comparison: 02/26/2019. CT ABDOMEN: There is mild bilateral lower lobe subsegmental atelectasis and/or  scar. There is a tiny low-density lesion within the right hepatic lobe, too  small to characterize but statistically benign, unchanged. The spleen, pancreas  and adrenal glands are unremarkable. A cyst within the upper pole left kidney  measures 1.87 m. 2 subcentimeter low-density lesions within the right kidney are  too small to characterize but statistically benign. There is a large defect within the anterior abdominal wall measuring nearly 12  mm in transverse diameter. Most of the small bowel as well as a large portion of  the mid stomach and transverse colon anteriorly large hernia. There is soft  tissue stranding along the lateral abdominal walls, presumably postsurgical. The  the proximal and midportion of the stomach is moderately distended which with  narrowing of the distal stomach has the pylorus traverses back into the  peritoneal cavity. The proximal small bowel is located within the peritoneal  cavity and is dilated with these loops of bowel extending into the large hernia  which also appear mildly dilated. A definite transition zone is unable to be  visualized. No adenopathy is present. There is no free fluid. A drainage catheter is present  along the inferior margin of the hernia. CT PELVIS: A Chamberlain catheter is present within urinary bladder. No adenopathy or  ascites is present.  There are no plantar changes. Sigmoid diverticula are  present. Impression  IMPRESSION: Large abdominal wall defect containing many loops of small bowel,  transverse colon and portions of the stomach. Narrowing of the distal stomach as  it enters back into the peritoneal cavity causes partial outlet obstruction. Proximal small bowel is also mildly dilated without a definite transition zone. A small bowel obstruction cannot be excluded. Principal Problem:    Ventricular tachycardia ([facility])  Active Problems:    Acute on chronic renal failure ([facility])    Left ventricular aneurysm    Type 2 diabetes with nephropathy ([facility])    NICM (nonischemic cardiomyopathy) ([facility])    Dyslipidemia    CKD (chronic kidney disease) stage 4, GFR 15-29 ml/min ([facility])    Morbid obesity ([facility]  Resolved Problems:    * No resolved hospital problems.  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 hypertensive nephrosclerosis - excellent improvement in response to medical management and renal function is better than  baseline, started on Lasix IV, monitor kidney function   Volume overload - initially with volume depletion, now with mild fluid excess - on Lasix 40 mg BID IV with much improvement,  switch to Torsemide PO as volume status is better controlled  - Good UOP with diuresis  Hyponatremia - mild, likely due to cardiac disease - no specific therapy needed, will monitor   Hypoalbuminemia - probably due to urinary protein loss - work on supplements  Hyperkalemia- mild, on admission K+ 3.8 with blood draw and 3.0 with POC test, given KCL 10 mEq IV at that time, with mild acidosis, no intervention at this time  - will continue to monitor, BMP in AM  Acidosis- mild, monitor response to sodium bicarb PO, BMP in AM  Ventricular tachycardia- LV aneurysm, on Amiodarone gtt, ICD, managed by Cardiology, plans for surgery at [facility] within the next 7-10 days      Plan:     As above

## 2022-09-12 NOTE — PROGRESS NOTES
injection 40 mg  40 mg IntraVENous BID    sodium bicarbonate tablet 650 mg  650 mg Oral BID    cydney-ignacio tablet 1 tablet  1 tablet Oral Daily    Vitamin D (CHOLECALCIFEROL) tablet 2,000 Units  2,000 Units Oral Daily    LORazepam (ATIVAN) tablet 0.5 mg  0.5 mg Oral Q4H PRN    docusate sodium (COLACE) capsule 100 mg  100 mg Oral Daily    amiodarone (CORDARONE) tablet 400 mg  400 mg Oral q8h    HYDROcodone-acetaminophen (NORCO) 7.5-325 MG per tablet 1 tablet  1 tablet Oral Q6H PRN    morphine injection 2 mg  2 mg IntraVENous Q4H PRN    traMADol (ULTRAM) tablet 50 mg  50 mg Oral Q6H PRN    polyethylene glycol (GLYCOLAX) packet 17 g  17 g Oral Daily PRN    aspirin EC tablet 81 mg  81 mg Oral Daily    DULoxetine (CYMBALTA) extended release capsule 20 mg  20 mg Oral Daily    insulin glargine (LANTUS) injection vial 7 Units  7 Units SubCUTAneous Nightly    pravastatin (PRAVACHOL) tablet 80 mg  80 mg Oral Nightly    sodium chloride flush 0.9 % injection 5-40 mL  5-40 mL IntraVENous 2 times per day    sodium chloride flush 0.9 % injection 5-40 mL  5-40 mL IntraVENous PRN    ondansetron (ZOFRAN) injection 4 mg  4 mg IntraVENous Q4H PRN    acetaminophen (TYLENOL) tablet 650 mg  650 mg Oral Q6H PRN    Or    acetaminophen (TYLENOL) suppository 650 mg  650 mg Rectal Q6H PRN    nitroGLYCERIN (NITROSTAT) SL tablet 0.4 mg  0.4 mg SubLINGual Q5 Min PRN    heparin (porcine) injection 5,000 Units  5,000 Units SubCUTAneous 3 times per day    sodium chloride (OCEAN, BABY AYR) 0.65 % nasal spray 1 spray  1 spray Each Nostril PRN    insulin lispro (HUMALOG) injection vial 0-4 Units  0-4 Units SubCUTAneous TID WC    insulin lispro (HUMALOG) injection vial 0-4 Units  0-4 Units SubCUTAneous Nightly    glucose chewable tablet 16 g  4 tablet Oral PRN    dextrose bolus 10% 125 mL  125 mL IntraVENous PRN    Or    dextrose bolus 10% 250 mL  250 mL IntraVENous PRN    glucagon (rDNA) injection 1 mg  1 mg SubCUTAneous PRN    dextrose 10 % infusion IntraVENous Continuous PRN    latanoprost (XALATAN) 0.005 % ophthalmic solution 1 drop  1 drop Both Eyes Nightly       Data Review: data included in this note has been independently reviewed by the author     TELEMETRY: NSR with rate of 61    Assessment/Plan:     Patient Active Problem List   Diagnosis    HTN (hypertension)    Type 2 diabetes with nephropathy (HCC)    CHF (congestive heart failure) ([facility])    Long-term insulin use in type 2 diabetes (HCC)    NICM (nonischemic cardiomyopathy) ([facility])    Rectal bleeding    Dyslipidemia    CKD (chronic kidney disease) stage 4, GFR 15-29 ml/min ([facility])    Ventricular tachycardia (HCC)    Chronic systolic heart failure (HCC)    Morbid obesity ([facility])    Restrictive lung disease    Long term current use of amiodarone    Chronic right-sided thoracic back pain    CKD (chronic kidney disease) stage 3, GFR 30-59 ml/min ([facility])    LORI on CPAP    Lower abdominal pain    Chronic left-sided low back pain with sciatica    Thrombocytopenia, unspecified    Left ventricular aneurysm    Acute on chronic renal failure ([facility])     PLAN  Ventricular Tachycardia  -Currently NSR with rate of 61  -Presence of ICD; Will continue to monitor  -Most likely due to scarring of LV and from LV aneurysm. -Awaiting transfer to Dr. Bj Griffin at [facility] for surgical intervention   -Continue oral amiodarone, heparin, and ASA. Non-ischemic Cardiomyopathy with reduced EF  -Increased extremity swelling. Will continue loop diuretics prn  -Unknown etiology of LV aneurysm  -EF will most likely correct with intervention of her aneurysm. -BP today 135/73     SNEHA on CKD stage 3-4  -Being followed by nephrology     IDDM  -Continue on inpatient medications        Karen Chappell  I have personally seen and evaluated the patient and reviewed the students note and agree with the following assessment and plan and findings. I was present for and observed the key components of this note.   Any appropriate additions or editing of the information have been done by me.     Nader Heart MD, [facility]  Cardiology

## 2022-09-13 LAB
GLUCOSE BLD STRIP.AUTO-MCNC: 116 MG/DL (ref 65–100)
GLUCOSE BLD STRIP.AUTO-MCNC: 122 MG/DL (ref 65–100)
GLUCOSE BLD STRIP.AUTO-MCNC: 153 MG/DL (ref 65–100)
SERVICE CMNT-IMP: ABNORMAL

## 2022-09-13 PROCEDURE — 6370000000 HC RX 637 (ALT 250 FOR IP): Performed by: INTERNAL MEDICINE

## 2022-09-13 PROCEDURE — 6370000000 HC RX 637 (ALT 250 FOR IP): Performed by: NURSE PRACTITIONER

## 2022-09-13 PROCEDURE — 99232 SBSQ HOSP IP/OBS MODERATE 35: CPT | Performed by: INTERNAL MEDICINE

## 2022-09-13 PROCEDURE — 6360000002 HC RX W HCPCS: Performed by: NURSE PRACTITIONER

## 2022-09-13 PROCEDURE — 2580000003 HC RX 258: Performed by: NURSE PRACTITIONER

## 2022-09-13 PROCEDURE — 6360000002 HC RX W HCPCS: Performed by: INTERNAL MEDICINE

## 2022-09-13 PROCEDURE — 1100000003 HC PRIVATE W/ TELEMETRY

## 2022-09-13 PROCEDURE — 82962 GLUCOSE BLOOD TEST: CPT

## 2022-09-13 RX ADMIN — MORPHINE SULFATE 2 MG: 2 INJECTION, SOLUTION INTRAMUSCULAR; INTRAVENOUS at 21:33

## 2022-09-13 RX ADMIN — DULOXETINE HYDROCHLORIDE 20 MG: 20 CAPSULE, DELAYED RELEASE ORAL at 08:33

## 2022-09-13 RX ADMIN — LATANOPROST 1 DROP: 50 SOLUTION OPHTHALMIC at 21:35

## 2022-09-13 RX ADMIN — SODIUM CHLORIDE, PRESERVATIVE FREE 10 ML: 5 INJECTION INTRAVENOUS at 08:33

## 2022-09-13 RX ADMIN — HEPARIN SODIUM 5000 UNITS: 5000 INJECTION INTRAVENOUS; SUBCUTANEOUS at 21:34

## 2022-09-13 RX ADMIN — HEPARIN SODIUM 5000 UNITS: 5000 INJECTION INTRAVENOUS; SUBCUTANEOUS at 05:44

## 2022-09-13 RX ADMIN — SODIUM BICARBONATE 650 MG TABLET 650 MG: at 08:32

## 2022-09-13 RX ADMIN — ASPIRIN 81 MG: 81 TABLET, COATED ORAL at 08:32

## 2022-09-13 RX ADMIN — AMIODARONE HYDROCHLORIDE 400 MG: 200 TABLET ORAL at 21:33

## 2022-09-13 RX ADMIN — ONDANSETRON 4 MG: 2 INJECTION INTRAMUSCULAR; INTRAVENOUS at 10:26

## 2022-09-13 RX ADMIN — INSULIN GLARGINE 7 UNITS: 100 INJECTION, SOLUTION SUBCUTANEOUS at 22:00

## 2022-09-13 RX ADMIN — AMIODARONE HYDROCHLORIDE 400 MG: 200 TABLET ORAL at 13:37

## 2022-09-13 RX ADMIN — SODIUM BICARBONATE 650 MG TABLET 650 MG: at 21:33

## 2022-09-13 RX ADMIN — VITAMIN D, TAB 1000IU (100/BT) 2000 UNITS: 25 TAB at 08:32

## 2022-09-13 RX ADMIN — PRAVASTATIN SODIUM 80 MG: 80 TABLET ORAL at 21:35

## 2022-09-13 RX ADMIN — HYDROCODONE BITARTRATE AND ACETAMINOPHEN 1 TABLET: 7.5; 325 TABLET ORAL at 05:47

## 2022-09-13 RX ADMIN — TORSEMIDE 20 MG: 20 TABLET ORAL at 08:33

## 2022-09-13 RX ADMIN — ONDANSETRON 4 MG: 2 INJECTION INTRAMUSCULAR; INTRAVENOUS at 15:26

## 2022-09-13 RX ADMIN — DOCUSATE SODIUM 100 MG: 100 CAPSULE, LIQUID FILLED ORAL at 08:32

## 2022-09-13 RX ADMIN — HEPARIN SODIUM 5000 UNITS: 5000 INJECTION INTRAVENOUS; SUBCUTANEOUS at 13:37

## 2022-09-13 RX ADMIN — ONDANSETRON 4 MG: 2 INJECTION INTRAMUSCULAR; INTRAVENOUS at 21:34

## 2022-09-13 RX ADMIN — MORPHINE SULFATE 2 MG: 2 INJECTION, SOLUTION INTRAMUSCULAR; INTRAVENOUS at 10:26

## 2022-09-13 RX ADMIN — Medication 1 TABLET: at 08:32

## 2022-09-13 RX ADMIN — AMIODARONE HYDROCHLORIDE 400 MG: 200 TABLET ORAL at 05:44

## 2022-09-13 RX ADMIN — SODIUM CHLORIDE, PRESERVATIVE FREE 10 ML: 5 INJECTION INTRAVENOUS at 22:02

## 2022-09-13 ASSESSMENT — PAIN DESCRIPTION - ORIENTATION
ORIENTATION: LEFT
ORIENTATION: MID
ORIENTATION: MID

## 2022-09-13 ASSESSMENT — PAIN DESCRIPTION - DESCRIPTORS
DESCRIPTORS: ACHING
DESCRIPTORS: ACHING
DESCRIPTORS: ACHING;BURNING

## 2022-09-13 ASSESSMENT — PAIN DESCRIPTION - PAIN TYPE: TYPE: CHRONIC PAIN

## 2022-09-13 ASSESSMENT — PAIN DESCRIPTION - ONSET: ONSET: ON-GOING

## 2022-09-13 ASSESSMENT — PAIN SCALES - GENERAL
PAINLEVEL_OUTOF10: 0
PAINLEVEL_OUTOF10: 7
PAINLEVEL_OUTOF10: 0
PAINLEVEL_OUTOF10: 8
PAINLEVEL_OUTOF10: 0
PAINLEVEL_OUTOF10: 6

## 2022-09-13 ASSESSMENT — PAIN DESCRIPTION - LOCATION
LOCATION: GENERALIZED
LOCATION: BACK
LOCATION: LEG

## 2022-09-13 NOTE — PROGRESS NOTES
RENAL PROGRESS NOTE    Subjective:     Patient is a 60year old female with Chronic Kidney Disease with baseline creatinine 2-2.2 followed by me in the office setting is admitted with V tach. She feels much better since admission and her heart rate is now normal.   She has had prior SNEHA on CKD episodes, IDDM, morbid obesity s/p DC ICD. She underwent defibrillated in the ED and was placed on amiodarone IV. Her crea was 1.8 on 8/31/22 and increased to 3.3 on admission, and improved to 1.6 today. No vomiting, no CP, dyspnea is controlled with nasal O2.  9/8/22- alert/oriented x3, sitting on side of bed, no signs of distress, on room air, denies dyspnea, CP, n/v, HA, or dizziness. On Amiodarone gtt. Family at bedside. 9/9/22- alert/oriented, lying in bed, no signs of distress, c/o CP earlier with dyspnea when walking, no CP or dyspnea at time of assessment, no n/v, HA or dizziness. Kidney indices stable. Plans to remain in hospital [facility]. 9/10/22 - alert and communicating well - breathing better - No N/V, no uremic signs or symptoms - edema is improved   9/11/22 - she complains of increased dyspnea and edema this PM, no chest pain - no N/V   9/12/22- alert/oriented, c/o dyspnea with exertion, on 2L NC, denies CP, n/v, HA, or dizziness. Good UOP with diuresis. 9/13/22- alert/oriented, sitting in chair, no signs of distress, on room air, c/o dyspnea with exertion, denies CP, HA, dizziness, or n/v. [facility].     Past Medical History:   Diagnosis Date    Abdominal wall hernia 10/2/2018    Acute hypoxemic respiratory failure ([address] 4/3/2019    Allergic rhinitis     followed by allergist; allergic to grass- has rescue inhaler PRN    ARDS (adult respiratory distress syndrome) ([address] 4/3/2019    Arthritis     Automatic implantable cardioverter-defibrillator in situ 3/30/2016    CAD in native artery 3/30/2016    Chronic kidney disease     Chronic systolic heart failure ([address] 07/18/2013 ECHO 2017- EF 39%; managed with medications; [place]    CKD (chronic kidney disease)     [address] follows    Diabetes ([facility]     type 2 (on insulin);  FBS AM range- , hypo s/s <70, hgba1c- 7.9    Diabetes mellitus ([facility] 4/12/2010    Dyslipidemia 2/13/2013    Eczema     Fibromyalgia     GERD (gastroesophageal reflux disease)     hx of- no meds currently    Headache     Heart failure (HCC)     chronic systolic with EF 24%; non-ischemic cardiomyopathy    HTN (hypertension) 4/12/2010    Hypercholesterolemia     Incarcerated incisional hernia 3/26/2019    Morbid obesity ([facility])     Neuropathy     bilateral feet and tips of fingers    NICM (nonischemic cardiomyopathy) ([facility] 2/15/2013    Obesity     bmi- 41    Obstructive sleep apnea (adult) (pediatric) 07/14/2014    uses cpap qhs    Pseudomonas urinary tract infection 4/5/2019    Sciatic pain 5/23/2016    Severe persistent asthma with acute exacerbation 8/24/2020    SVT (supraventricular tachycardia) ([facility])     ablation for svt    Tobacco use disorder 4/12/2010      Past Surgical History:   Procedure Laterality Date    ABLATION OF DYSRHYTHMIC FOCUS  \"years ago\"    CARDIAC DEFIBRILLATOR PLACEMENT  07/18/2013    Biotronik dual chamber ICD by Dr. Jayleen Henry  07/18/2013    Biotronik dual chamber ICD by Dr. Ted Hale 7/1/2022    Left heart cath / coronary angiography performed by Serafin Monique MD at [address] [place] [address]      x1    COLONOSCOPY N/A 7/7/2021    COLONOSCOPY/BMI 48 PT HAS AN ICD performed by Celso Alanis MD at [facility]  03/26/2019    mild incarceration, no bowel removal    HYSTERECTOMY (CERVIX STATUS UNKNOWN)      EDITH-Left ovary intact per pt    LEFT HEART CATH,PERCUTANEOUS  2/13/2013    no intervention    PACEMAKER      ICD    ROTATOR CUFF REPAIR Right     TONSILLECTOMY        Prior to Admission medications    Medication Sig Start Date End Date Taking? Authorizing Provider   cyclobenzaprine (FLEXERIL) 10 MG tablet Take 10 mg by mouth 3 times daily as needed for Muscle spasms. Historical Provider, MD   calcitRIOL (ROCALTROL) 0.25 MCG capsule Take 0.25 mcg by mouth daily. Historical Provider, MD   Cholecalciferol (VITAMIN D3) 50 MCG (2000 UT) TABS Take 1 tablet by mouth daily. Historical Provider, MD   nitroGLYCERIN (NITROSTAT) 0.4 MG SL tablet Place 0.4 mg under the tongue every 5 minutes as needed for Chest pain. Max 3 doses     Historical Provider, MD   dupilumab (DUPIXENT) 300 MG/2ML SOPN injection Inject 300 mg into the skin every 14 days. Patient usually takes medication on Monday or Tuesday. Historical Provider, MD   acetaminophen (TYLENOL) 500 MG tablet Take 1,000 mg by mouth every 6 hours as needed for Pain (breakthrough pain).     Historical Provider, MD   amiodarone (CORDARONE) 200 MG tablet Take 1 tablet by mouth 2 times daily 8/17/22   ARNOLDO Marques CNP   DULoxetine (CYMBALTA) 20 MG extended release capsule Take 1 capsule by mouth daily 8/18/22   ARNOLDO Marques CNP   aspirin 81 MG EC tablet Take 81 mg by mouth daily    Ar Automatic Reconciliation   carvedilol (COREG) 6.25 MG tablet Take 6.25 mg by mouth 2 times daily (with meals) 11/23/21   Ar Automatic Reconciliation   vitamin D 25 MCG (1000 UT) CAPS Take by mouth daily  Patient not taking: Reported on 9/6/2022    Ar Automatic Reconciliation   empagliflozin (JARDIANCE) 10 MG tablet Take 10 mg by mouth daily 12/29/21   Ar Automatic Reconciliation   insulin glargine (LANTUS SOLOSTAR) 100 UNIT/ML injection pen Inject 15 Units into the skin nightly 12/22/21   Ar Automatic Reconciliation   isosorbide mononitrate (IMDUR) 30 MG extended release tablet Take 30 mg by mouth daily 11/23/21   Ar Automatic Reconciliation   latanoprost (XALATAN) 0.005 % ophthalmic solution Place 1 drop into both eyes nightly 5/10/21   Ar room air, appears stated age. Head:  Normocephalic, without obvious abnormality, atraumatic. Neck: Supple, symmetrical, trachea midline, no JVD. Back:   Symmetric, no curvature. ROM normal. No CVA tenderness. Lungs:   Decreased bases to auscultation bilaterally. Heart:  Regular rate and rhythm, S1, S2 normal, no murmur,  rub or gallop. Abdomen:   Soft, non-tender. Bowel sounds normal   Extremities: Extremities normal, atraumatic, no cyanosis, trace edema. Skin: Skin color, texture, turgor normal. No rashes or lesions. Neurologic: Baseline tremor is present. No asterixis. Data Review:     Recent Results (from the past 24 hour(s))   POCT Glucose    Collection Time: 09/12/22  3:58 PM   Result Value Ref Range    POC Glucose 167 (H) 65 - 100 mg/dL    Performed by: Nancy    POCT Glucose    Collection Time: 09/12/22  8:28 PM   Result Value Ref Range    POC Glucose 153 (H) 65 - 100 mg/dL    Performed by: Ellie    POCT Glucose    Collection Time: 09/13/22  7:53 AM   Result Value Ref Range    POC Glucose 116 (H) 65 - 100 mg/dL    Performed by: CarlitosPCKEATON    POCT Glucose    Collection Time: 09/13/22 11:53 AM   Result Value Ref Range    POC Glucose 153 (H) 65 - 100 mg/dL    Performed by: CarlitosPCT        CXR;   Results for orders placed during the hospital encounter of 09/06/22    XR CHEST PORTABLE    Narrative  Portable chest xray    COMPARISON: August 10, 2022    INDICATION: chest pain    FINDINGS:    Heart is enlarged. Mediastinal contour is within normal limits. Stable  left-sided cardiac pacer. Increased interstitial prominence, likely vascular  congestion. No pneumothorax. No large pleural effusion. Impression  1. Cardiomegaly and suggestion of vascular congestion.        CT Abdomen  Results for orders placed in visit on 03/31/19    CT ABDOMEN PELVIS W IV CONTRAST    Narrative  CT abdomen and pelvis with contrast: 03/30/2019    History: Recent hernia surgery, abdominal distention. Technique: Helically acquired images were obtained from the domes of the  diaphragms to the ischial tuberosities reconstructed at 5mm intervals after the  uneventful administration of oral contrast for bowel opacification and 100 cc's  of intravenous Isovue-370 to evaluate the solid abdominal viscera and vascular  structures. Coronal reformatted images were submitted. Radiation dose reduction techniques were used for this study:  Our CT scanners  use one or all of the following: Automated exposure control, adjustment of the  mA and/or kVp according to patient's size, iterative reconstruction. Comparison: 02/26/2019. CT ABDOMEN: There is mild bilateral lower lobe subsegmental atelectasis and/or  scar. There is a tiny low-density lesion within the right hepatic lobe, too  small to characterize but statistically benign, unchanged. The spleen, pancreas  and adrenal glands are unremarkable. A cyst within the upper pole left kidney  measures 1.87 m. 2 subcentimeter low-density lesions within the right kidney are  too small to characterize but statistically benign. There is a large defect within the anterior abdominal wall measuring nearly 12  mm in transverse diameter. Most of the small bowel as well as a large portion of  the mid stomach and transverse colon anteriorly large hernia. There is soft  tissue stranding along the lateral abdominal walls, presumably postsurgical. The  the proximal and midportion of the stomach is moderately distended which with  narrowing of the distal stomach has the pylorus traverses back into the  peritoneal cavity. The proximal small bowel is located within the peritoneal  cavity and is dilated with these loops of bowel extending into the large hernia  which also appear mildly dilated. A definite transition zone is unable to be  visualized. No adenopathy is present. There is no free fluid.  A drainage catheter is present  along the inferior margin of monitor  Acidosis- improved with sodium bicarb PO, continue, will monitor  Ventricular tachycardia- LV aneurysm, on Amiodarone gtt, ICD, managed by Cardiology, plans for surgery at [address]:     As above

## 2022-09-13 NOTE — PROGRESS NOTES
Expect Dr Lourdes Gloria call on wed to accept transfer. [place] is [place] till wed.  I have talked to his office on 9/12 to confirm

## 2022-09-14 LAB
ANION GAP SERPL CALC-SCNC: 6 MMOL/L (ref 4–13)
BUN SERPL-MCNC: 32 MG/DL (ref 6–23)
CALCIUM SERPL-MCNC: 9.7 MG/DL (ref 8.3–10.4)
CHLORIDE SERPL-SCNC: 106 MMOL/L (ref 101–110)
CO2 SERPL-SCNC: 29 MMOL/L (ref 21–32)
CREAT SERPL-MCNC: 1.6 MG/DL (ref 0.6–1)
GLUCOSE BLD STRIP.AUTO-MCNC: 101 MG/DL (ref 65–100)
GLUCOSE BLD STRIP.AUTO-MCNC: 108 MG/DL (ref 65–100)
GLUCOSE BLD STRIP.AUTO-MCNC: 116 MG/DL (ref 65–100)
GLUCOSE BLD STRIP.AUTO-MCNC: 152 MG/DL (ref 65–100)
GLUCOSE BLD STRIP.AUTO-MCNC: 154 MG/DL (ref 65–100)
GLUCOSE SERPL-MCNC: 138 MG/DL (ref 65–100)
POTASSIUM SERPL-SCNC: 4.4 MMOL/L (ref 3.5–5.1)
SERVICE CMNT-IMP: ABNORMAL
SODIUM SERPL-SCNC: 141 MMOL/L (ref 136–145)

## 2022-09-14 PROCEDURE — 94760 N-INVAS EAR/PLS OXIMETRY 1: CPT

## 2022-09-14 PROCEDURE — 6370000000 HC RX 637 (ALT 250 FOR IP): Performed by: NURSE PRACTITIONER

## 2022-09-14 PROCEDURE — 6370000000 HC RX 637 (ALT 250 FOR IP): Performed by: INTERNAL MEDICINE

## 2022-09-14 PROCEDURE — 80048 BASIC METABOLIC PNL TOTAL CA: CPT

## 2022-09-14 PROCEDURE — 36415 COLL VENOUS BLD VENIPUNCTURE: CPT

## 2022-09-14 PROCEDURE — 6360000002 HC RX W HCPCS: Performed by: INTERNAL MEDICINE

## 2022-09-14 PROCEDURE — 51702 INSERT TEMP BLADDER CATH: CPT

## 2022-09-14 PROCEDURE — 6360000002 HC RX W HCPCS: Performed by: NURSE PRACTITIONER

## 2022-09-14 PROCEDURE — 1100000003 HC PRIVATE W/ TELEMETRY

## 2022-09-14 PROCEDURE — 99232 SBSQ HOSP IP/OBS MODERATE 35: CPT | Performed by: INTERNAL MEDICINE

## 2022-09-14 PROCEDURE — 82962 GLUCOSE BLOOD TEST: CPT

## 2022-09-14 PROCEDURE — 2580000003 HC RX 258: Performed by: NURSE PRACTITIONER

## 2022-09-14 RX ADMIN — TORSEMIDE 20 MG: 20 TABLET ORAL at 08:56

## 2022-09-14 RX ADMIN — HEPARIN SODIUM 5000 UNITS: 5000 INJECTION INTRAVENOUS; SUBCUTANEOUS at 05:03

## 2022-09-14 RX ADMIN — LATANOPROST 1 DROP: 50 SOLUTION OPHTHALMIC at 21:20

## 2022-09-14 RX ADMIN — MORPHINE SULFATE 2 MG: 2 INJECTION, SOLUTION INTRAMUSCULAR; INTRAVENOUS at 17:50

## 2022-09-14 RX ADMIN — ONDANSETRON 4 MG: 2 INJECTION INTRAMUSCULAR; INTRAVENOUS at 12:41

## 2022-09-14 RX ADMIN — AMIODARONE HYDROCHLORIDE 400 MG: 200 TABLET ORAL at 05:02

## 2022-09-14 RX ADMIN — DULOXETINE HYDROCHLORIDE 20 MG: 20 CAPSULE, DELAYED RELEASE ORAL at 08:55

## 2022-09-14 RX ADMIN — VITAMIN D, TAB 1000IU (100/BT) 2000 UNITS: 25 TAB at 08:55

## 2022-09-14 RX ADMIN — POLYETHYLENE GLYCOL 3350 17 G: 17 POWDER, FOR SOLUTION ORAL at 17:49

## 2022-09-14 RX ADMIN — PRAVASTATIN SODIUM 80 MG: 80 TABLET ORAL at 21:20

## 2022-09-14 RX ADMIN — LORAZEPAM 0.5 MG: 0.5 TABLET ORAL at 00:32

## 2022-09-14 RX ADMIN — SODIUM BICARBONATE 650 MG TABLET 650 MG: at 08:55

## 2022-09-14 RX ADMIN — ASPIRIN 81 MG: 81 TABLET, COATED ORAL at 08:55

## 2022-09-14 RX ADMIN — SODIUM CHLORIDE, PRESERVATIVE FREE 10 ML: 5 INJECTION INTRAVENOUS at 08:55

## 2022-09-14 RX ADMIN — ONDANSETRON 4 MG: 2 INJECTION INTRAMUSCULAR; INTRAVENOUS at 17:50

## 2022-09-14 RX ADMIN — DOCUSATE SODIUM 100 MG: 100 CAPSULE, LIQUID FILLED ORAL at 08:55

## 2022-09-14 RX ADMIN — SODIUM CHLORIDE, PRESERVATIVE FREE 10 ML: 5 INJECTION INTRAVENOUS at 21:20

## 2022-09-14 RX ADMIN — AMIODARONE HYDROCHLORIDE 400 MG: 200 TABLET ORAL at 21:20

## 2022-09-14 RX ADMIN — HEPARIN SODIUM 5000 UNITS: 5000 INJECTION INTRAVENOUS; SUBCUTANEOUS at 13:35

## 2022-09-14 RX ADMIN — SODIUM BICARBONATE 650 MG TABLET 650 MG: at 21:19

## 2022-09-14 RX ADMIN — AMIODARONE HYDROCHLORIDE 400 MG: 200 TABLET ORAL at 13:35

## 2022-09-14 RX ADMIN — HEPARIN SODIUM 5000 UNITS: 5000 INJECTION INTRAVENOUS; SUBCUTANEOUS at 21:30

## 2022-09-14 RX ADMIN — MORPHINE SULFATE 2 MG: 2 INJECTION, SOLUTION INTRAMUSCULAR; INTRAVENOUS at 08:54

## 2022-09-14 RX ADMIN — INSULIN GLARGINE 7 UNITS: 100 INJECTION, SOLUTION SUBCUTANEOUS at 21:18

## 2022-09-14 RX ADMIN — Medication 1 TABLET: at 09:52

## 2022-09-14 RX ADMIN — LORAZEPAM 0.5 MG: 0.5 TABLET ORAL at 21:28

## 2022-09-14 ASSESSMENT — PAIN SCALES - GENERAL
PAINLEVEL_OUTOF10: 8
PAINLEVEL_OUTOF10: 0
PAINLEVEL_OUTOF10: 0
PAINLEVEL_OUTOF10: 9
PAINLEVEL_OUTOF10: 0

## 2022-09-14 ASSESSMENT — PAIN DESCRIPTION - DESCRIPTORS
DESCRIPTORS: ACHING
DESCRIPTORS: ACHING

## 2022-09-14 ASSESSMENT — PAIN DESCRIPTION - ORIENTATION
ORIENTATION: RIGHT;LEFT
ORIENTATION: LEFT;RIGHT

## 2022-09-14 ASSESSMENT — PAIN SCALES - WONG BAKER
WONGBAKER_NUMERICALRESPONSE: 0
WONGBAKER_NUMERICALRESPONSE: 0

## 2022-09-14 ASSESSMENT — PAIN - FUNCTIONAL ASSESSMENT: PAIN_FUNCTIONAL_ASSESSMENT: PREVENTS OR INTERFERES SOME ACTIVE ACTIVITIES AND ADLS

## 2022-09-14 ASSESSMENT — PAIN DESCRIPTION - LOCATION
LOCATION: SHOULDER;LEG
LOCATION: SHOULDER;LEG

## 2022-09-14 NOTE — CARE COORDINATION
LOS 8 D  CM following for anticipated transfer at discharge to [facility] under Dr Jennings Going care for LV aneurysm management. Awaiting bed availability. Patient up in chair with no complaints.

## 2022-09-14 NOTE — PROGRESS NOTES
9/14/2022 9:03 AM    Admit Date: 9/6/2022    Admit Diagnosis: Ventricular tachycardia ([address] [I47.2]      Subjective:   No sob- brief cp last night      Objective:    /83   Pulse 60   Temp 98.1 °F (36.7 °C) (Oral)   Resp 19   Ht 4' 10\" (1.473 m)   Wt 209 lb 4.8 oz (94.9 kg)   SpO2 96%   BMI 43.74 kg/m²     Physical Exam:  Ramón Page, Well Nourished, No Acute Distress, Alert & Oriented x 3, appropriate mood. Neck- supple, no JVD  CV- regular rate and rhythm no MRG  Lung- clear bilaterally  Abd- soft, nontender, nondistended  Ext- no edema bilaterally. Skin- warm and dry        Data Review:   Recent Labs     09/14/22  0418      K 4.4   BUN 32*       Assessment/Plan:     Active Hospital Problems    Acute on chronic renal failure (HCC)  Worse- hold demadex      Left ventricular aneurysm- to [facility] for surgery when a bed is available      CKD (chronic kidney disease) stage 4, GFR 15-29 ml/min (HCC)      Type 2 diabetes with nephropathy (HCC)      Ventricular tachycardia (HCC)Stable. Continue amio      Morbid obesity ([address]      NICM (nonischemic cardiomyopathy) ([address]      Dyslipidemia

## 2022-09-14 NOTE — PROGRESS NOTES
RENAL PROGRESS NOTE    Subjective:     Patient is a 60year old female with Chronic Kidney Disease with baseline creatinine 2-2.2 followed by me in the office setting is admitted with V tach. She feels much better since admission and her heart rate is now normal.   She has had prior SNEHA on CKD episodes, IDDM, morbid obesity s/p DC ICD. She underwent defibrillated in the ED and was placed on amiodarone IV. Her crea was 1.8 on 8/31/22 and increased to 3.3 on admission, and improved to 1.6 today. No vomiting, no CP, dyspnea is controlled with nasal O2.  9/8/22- alert/oriented x3, sitting on side of bed, no signs of distress, on room air, denies dyspnea, CP, n/v, HA, or dizziness. On Amiodarone gtt. Family at bedside. 9/9/22- alert/oriented, lying in bed, no signs of distress, c/o CP earlier with dyspnea when walking, no CP or dyspnea at time of assessment, no n/v, HA or dizziness. Kidney indices stable. Plans to remain in hospital until transferred to [facility]. 9/10/22 - alert and communicating well - breathing better - No N/V, no uremic signs or symptoms - edema is improved   9/11/22 - she complains of increased dyspnea and edema this PM, no chest pain - no N/V   9/12/22- alert/oriented, c/o dyspnea with exertion, on 2L NC, denies CP, n/v, HA, or dizziness. Good UOP with diuresis. 9/13/22- alert/oriented, sitting in chair, no signs of distress, on room air, c/o dyspnea with exertion, denies CP, HA, dizziness, or n/v. Waiting on transfer to [facility]. 9/14/22- alert/oriented, sitting in chair, no signs of distress, on room air, denies dyspnea, CP, n/v, HA, or dizziness. No complaints.     Past Medical History:   Diagnosis Date    Abdominal wall hernia 10/2/2018    Acute hypoxemic respiratory failure ([address] 4/3/2019    Allergic rhinitis     followed by allergist; allergic to grass- has rescue inhaler PRN    ARDS (adult respiratory distress syndrome) ([address] 4/3/2019    Arthritis     Automatic implantable cardioverter-defibrillator in situ 3/30/2016    CAD in native artery 3/30/2016    Chronic kidney disease     Chronic systolic heart failure ([facility] 07/18/2013    ECHO 2017- EF 39%; managed with medications; [place] [facility]    CKD (chronic kidney disease)     [facility] follows    Diabetes ([facility]     type 2 (on insulin);  FBS AM range- , hypo s/s <70, hgba1c- 7.9    Diabetes mellitus ([facility] 4/12/2010    Dyslipidemia 2/13/2013    Eczema     Fibromyalgia     GERD (gastroesophageal reflux disease)     hx of- no meds currently    Headache     Heart failure (HCC)     chronic systolic with EF 57%; non-ischemic cardiomyopathy    HTN (hypertension) 4/12/2010    Hypercholesterolemia     Incarcerated incisional hernia 3/26/2019    Morbid obesity (HCC)     Neuropathy     bilateral feet and tips of fingers    NICM (nonischemic cardiomyopathy) ([facility] 2/15/2013    Obesity     bmi- 41    Obstructive sleep apnea (adult) (pediatric) 07/14/2014    uses cpap qhs    Pseudomonas urinary tract infection 4/5/2019    Sciatic pain 5/23/2016    Severe persistent asthma with acute exacerbation 8/24/2020    SVT (supraventricular tachycardia) ([facility])     ablation for svt    Tobacco use disorder 4/12/2010      Past Surgical History:   Procedure Laterality Date    ABLATION OF DYSRHYTHMIC FOCUS  \"years ago\"    CARDIAC DEFIBRILLATOR PLACEMENT  07/18/2013    Biotronik dual chamber ICD by Dr. Jerry Almaguer  07/18/2013    Biotronik dual chamber ICD by Dr. Fadumo Hernandez 7/1/2022    Left heart cath / coronary angiography performed by Teri Gee MD at [facility]      x1    COLONOSCOPY N/A 7/7/2021    COLONOSCOPY/BMI 48 PT HAS AN ICD performed by Stacey Atkinson MD at [facility]  03/26/2019    mild incarceration, no bowel removal    HYSTERECTOMY (CERVIX STATUS UNKNOWN)      EDITH-Left ovary intact per pt    LEFT HEART CATH,PERCUTANEOUS  2/13/2013    no intervention    PACEMAKER      ICD    ROTATOR CUFF REPAIR Right     TONSILLECTOMY        Prior to Admission medications    Medication Sig Start Date End Date Taking? Authorizing Provider   cyclobenzaprine (FLEXERIL) 10 MG tablet Take 10 mg by mouth 3 times daily as needed for Muscle spasms. Historical Provider, MD   calcitRIOL (ROCALTROL) 0.25 MCG capsule Take 0.25 mcg by mouth daily. Historical Provider, MD   Cholecalciferol (VITAMIN D3) 50 MCG (2000 UT) TABS Take 1 tablet by mouth daily. Historical Provider, MD   nitroGLYCERIN (NITROSTAT) 0.4 MG SL tablet Place 0.4 mg under the tongue every 5 minutes as needed for Chest pain. Max 3 doses     Historical Provider, MD   dupilumab (DUPIXENT) 300 MG/2ML SOPN injection Inject 300 mg into the skin every 14 days. Patient usually takes medication on Monday or Tuesday. Historical Provider, MD   acetaminophen (TYLENOL) 500 MG tablet Take 1,000 mg by mouth every 6 hours as needed for Pain (breakthrough pain).     Historical Provider, MD   amiodarone (CORDARONE) 200 MG tablet Take 1 tablet by mouth 2 times daily 8/17/22   ARNOLDO Bojorquez CNP   DULoxetine (CYMBALTA) 20 MG extended release capsule Take 1 capsule by mouth daily 8/18/22   ARNOLDO Bojorquez CNP   aspirin 81 MG EC tablet Take 81 mg by mouth daily    Ar Automatic Reconciliation   carvedilol (COREG) 6.25 MG tablet Take 6.25 mg by mouth 2 times daily (with meals) 11/23/21   Ar Automatic Reconciliation   vitamin D 25 MCG (1000 UT) CAPS Take by mouth daily  Patient not taking: Reported on 9/6/2022    Ar Automatic Reconciliation   empagliflozin (JARDIANCE) 10 MG tablet Take 10 mg by mouth daily 12/29/21   Ar Automatic Reconciliation   insulin glargine (LANTUS SOLOSTAR) 100 UNIT/ML injection pen Inject 15 Units into the skin nightly 12/22/21   Ar Automatic Reconciliation   isosorbide mononitrate (IMDUR) 30 MG extended release tablet Take 30 mg by mouth daily 11/23/21   Ar Automatic Reconciliation   latanoprost (XALATAN) 0.005 % ophthalmic solution Place 1 drop into both eyes nightly 5/10/21   Ar Automatic Reconciliation   polyethylene glycol (GLYCOLAX) 17 GM/SCOOP powder Take 17 g by mouth daily as needed  Patient not taking: Reported on 9/6/2022 11/14/20   Ar Automatic Reconciliation   pravastatin (PRAVACHOL) 80 MG tablet Take 80 mg by mouth nightly 11/23/21   Ar Automatic Reconciliation   sacubitril-valsartan (ENTRESTO) 49-51 MG per tablet Take 1 tablet by mouth 2 times daily 11/23/21   Ar Automatic Reconciliation   torsemide (DEMADEX) 20 MG tablet Take 40 mg by mouth 2 times daily 8/30/22   Ar Automatic Reconciliation   albuterol sulfate  (90 Base) MCG/ACT inhaler 2 puffs qid prn shortness of breath or wheezing  Patient not taking: Reported on 8/10/2022 11/14/20 8/17/22  Ar Automatic Reconciliation     Allergies   Allergen Reactions    Other Hives and Shortness Of Breath     \"inhaler PRN\"    Penicillin G Itching      Social History     Tobacco Use    Smoking status: Some Days     Packs/day: 0.25     Types: Cigarettes    Smokeless tobacco: Never    Tobacco comments:     Quit smoking: \"every now and then\"   Substance Use Topics    Alcohol use:  Yes     Alcohol/week: 1.0 standard drink      Family History   Problem Relation Age of Onset    Diabetes Other     Heart Disease Father     Hypertension Father     Stroke Father     Heart Attack Father 48        mi    Breast Cancer Sister 37    Hypertension Brother     Heart Disease Brother     Diabetes Brother     Stroke Mother           Review of Systems    Negative except for what is mention in HPI above      Objective:       /83   Pulse 60   Temp 98.1 °F (36.7 °C) (Oral)   Resp 19   Ht 4' 10\" (1.473 m)   Wt 209 lb 4.8 oz (94.9 kg)   SpO2 96%   BMI 43.74 kg/m²     09/14 0701 - 09/14 1900  In: 200 [P.O.:200]  Out: 150 [Urine:150]  09/12 1901 - 09/14 0700  In: -   Out: 2500 [Urine:2500]    /83   Pulse 60   Temp 98.1 °F (36.7 °C) (Oral)   Resp 19   Ht 4' 10\" (1.473 m)   Wt 209 lb 4.8 oz (94.9 kg)   SpO2 96%   BMI 43.74 kg/m²     General:  Alert, cooperative, no respiratory distress on room air, appears stated age. Head:  Normocephalic, without obvious abnormality, atraumatic. Neck: Supple, symmetrical, trachea midline, no JVD. Back:   Symmetric, no curvature. ROM normal. No CVA tenderness. Lungs:   Decreased bases to auscultation bilaterally. Heart:  Regular rate and rhythm, S1, S2 normal, no murmur,  rub or gallop. Abdomen:   Soft, non-tender. Bowel sounds normal   Extremities: Extremities normal, atraumatic, no cyanosis, trace edema. Skin: Skin color, texture, turgor normal. No rashes or lesions. Neurologic: Baseline tremor is present. No asterixis.          Data Review:     Recent Results (from the past 24 hour(s))   POCT Glucose    Collection Time: 09/13/22 11:53 AM   Result Value Ref Range    POC Glucose 153 (H) 65 - 100 mg/dL    Performed by: [employer]    POCT Glucose    Collection Time: 09/13/22  4:13 PM   Result Value Ref Range    POC Glucose 122 (H) 65 - 100 mg/dL    Performed by: [employer]    POCT Glucose    Collection Time: 09/14/22 12:13 AM   Result Value Ref Range    POC Glucose 108 (H) 65 - 100 mg/dL    [facility]formed by: [facility]    Basic Metabolic Panel    Collection Time: 09/14/22  4:18 AM   Result Value Ref Range    Sodium 141 136 - 145 mmol/L    Potassium 4.4 3.5 - 5.1 mmol/L    Chloride 106 101 - 110 mmol/L    CO2 29 21 - 32 mmol/L    Anion Gap 6 4 - 13 mmol/L    Glucose 138 (H) 65 - 100 mg/dL    BUN 32 (H) 6 - 23 MG/DL    Creatinine 1.60 (H) 0.6 - 1.0 MG/DL    GFR  43 (L) >60 ml/min/1.73m2    GFR Non- 35 (L) >60 ml/min/1.73m2    Calcium 9.7 8.3 - 10.4 MG/DL   POCT Glucose    Collection Time: 09/14/22  7:45 AM   Result Value Ref Range    POC Glucose 116 (H) 65 - 100 mg/dL    Performed by: well as a large portion of  the mid stomach and transverse colon anteriorly large hernia. There is soft  tissue stranding along the lateral abdominal walls, presumably postsurgical. The  the proximal and midportion of the stomach is moderately distended which with  narrowing of the distal stomach has the pylorus traverses back into the  peritoneal cavity. The proximal small bowel is located within the peritoneal  cavity and is dilated with these loops of bowel extending into the large hernia  which also appear mildly dilated. A definite transition zone is unable to be  visualized. No adenopathy is present. There is no free fluid. A drainage catheter is present  along the inferior margin of the hernia. CT PELVIS: A Chamberlain catheter is present within urinary bladder. No adenopathy or  ascites is present. There are no plantar changes. Sigmoid diverticula are  present. Impression  IMPRESSION: Large abdominal wall defect containing many loops of small bowel,  transverse colon and portions of the stomach. Narrowing of the distal stomach as  it enters back into the peritoneal cavity causes partial outlet obstruction. Proximal small bowel is also mildly dilated without a definite transition zone. A small bowel obstruction cannot be excluded. Principal Problem:    Ventricular tachycardia (HCC)  Active Problems:    Acute on chronic renal failure (HCC)    Left ventricular aneurysm    Type 2 diabetes with nephropathy (HCC)    NICM (nonischemic cardiomyopathy) ([facility])    Dyslipidemia    CKD (chronic kidney disease) stage 4, GFR 15-29 ml/min (HCC)    Morbid obesity ([address]  Resolved Problems:    * No resolved hospital problems.  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 hypertensive nephrosclerosis - excellent improvement in response to medical management and renal function is better than  baseline  - creatinine 1.6, up from 1.4, up from 1.30, likely due to diuresis, Torsemide on hold, will continue to monitor  Volume overload - initially with volume depletion, now with mild fluid excess - on Lasix 40 mg BID IV with much improvement,  switch to Torsemide PO as volume status is better controlled  - Torsemide on hold due to worsening kidney indices   Hyponatremia - mild, likely due to cardiac disease, improved, will monitor   Hypoalbuminemia - probably due to urinary protein loss - work on supplements  Hyperkalemia- mild, on admission K+ 3.8 with blood draw and 3.0 with POC test, given KCL 10 mEq IV at that time, with mild acidosis, no intervention at this time  - improved with improvement in acidosis, stable, will continue to monitor  Acidosis- stable with sodium bicarb PO, continue, will monitor  Ventricular tachycardia- LV aneurysm, on Amiodarone gtt, ICD, managed by Cardiology, plans for surgery at [address]:     As above     Ana Phan [facility]

## 2022-09-15 LAB
ANION GAP SERPL CALC-SCNC: 4 MMOL/L (ref 4–13)
BUN SERPL-MCNC: 32 MG/DL (ref 6–23)
CALCIUM SERPL-MCNC: 9.9 MG/DL (ref 8.3–10.4)
CHLORIDE SERPL-SCNC: 105 MMOL/L (ref 101–110)
CO2 SERPL-SCNC: 30 MMOL/L (ref 21–32)
CREAT SERPL-MCNC: 1.6 MG/DL (ref 0.6–1)
GLUCOSE BLD STRIP.AUTO-MCNC: 119 MG/DL (ref 65–100)
GLUCOSE BLD STRIP.AUTO-MCNC: 121 MG/DL (ref 65–100)
GLUCOSE BLD STRIP.AUTO-MCNC: 125 MG/DL (ref 65–100)
GLUCOSE BLD STRIP.AUTO-MCNC: 148 MG/DL (ref 65–100)
GLUCOSE SERPL-MCNC: 132 MG/DL (ref 65–100)
MAGNESIUM SERPL-MCNC: 2.3 MG/DL (ref 1.8–2.4)
POTASSIUM SERPL-SCNC: 4.4 MMOL/L (ref 3.5–5.1)
SERVICE CMNT-IMP: ABNORMAL
SODIUM SERPL-SCNC: 139 MMOL/L (ref 136–145)

## 2022-09-15 PROCEDURE — 1100000003 HC PRIVATE W/ TELEMETRY

## 2022-09-15 PROCEDURE — 6370000000 HC RX 637 (ALT 250 FOR IP): Performed by: NURSE PRACTITIONER

## 2022-09-15 PROCEDURE — 36415 COLL VENOUS BLD VENIPUNCTURE: CPT

## 2022-09-15 PROCEDURE — 2580000003 HC RX 258: Performed by: NURSE PRACTITIONER

## 2022-09-15 PROCEDURE — 80048 BASIC METABOLIC PNL TOTAL CA: CPT

## 2022-09-15 PROCEDURE — 6370000000 HC RX 637 (ALT 250 FOR IP): Performed by: INTERNAL MEDICINE

## 2022-09-15 PROCEDURE — 94760 N-INVAS EAR/PLS OXIMETRY 1: CPT

## 2022-09-15 PROCEDURE — 99232 SBSQ HOSP IP/OBS MODERATE 35: CPT | Performed by: INTERNAL MEDICINE

## 2022-09-15 PROCEDURE — 6360000002 HC RX W HCPCS: Performed by: NURSE PRACTITIONER

## 2022-09-15 PROCEDURE — 6360000002 HC RX W HCPCS: Performed by: INTERNAL MEDICINE

## 2022-09-15 PROCEDURE — 82962 GLUCOSE BLOOD TEST: CPT

## 2022-09-15 PROCEDURE — 83735 ASSAY OF MAGNESIUM: CPT

## 2022-09-15 RX ORDER — MINERAL OIL 100 G/100G
1 OIL RECTAL ONCE
Status: DISCONTINUED | OUTPATIENT
Start: 2022-09-15 | End: 2022-09-25 | Stop reason: HOSPADM

## 2022-09-15 RX ADMIN — HYDROCODONE BITARTRATE AND ACETAMINOPHEN 1 TABLET: 7.5; 325 TABLET ORAL at 16:22

## 2022-09-15 RX ADMIN — INSULIN GLARGINE 7 UNITS: 100 INJECTION, SOLUTION SUBCUTANEOUS at 22:03

## 2022-09-15 RX ADMIN — LATANOPROST 1 DROP: 50 SOLUTION OPHTHALMIC at 22:11

## 2022-09-15 RX ADMIN — HEPARIN SODIUM 5000 UNITS: 5000 INJECTION INTRAVENOUS; SUBCUTANEOUS at 05:51

## 2022-09-15 RX ADMIN — AMIODARONE HYDROCHLORIDE 400 MG: 200 TABLET ORAL at 05:50

## 2022-09-15 RX ADMIN — HEPARIN SODIUM 5000 UNITS: 5000 INJECTION INTRAVENOUS; SUBCUTANEOUS at 22:04

## 2022-09-15 RX ADMIN — AMIODARONE HYDROCHLORIDE 400 MG: 200 TABLET ORAL at 22:04

## 2022-09-15 RX ADMIN — SODIUM CHLORIDE, PRESERVATIVE FREE 10 ML: 5 INJECTION INTRAVENOUS at 22:06

## 2022-09-15 RX ADMIN — MORPHINE SULFATE 2 MG: 2 INJECTION, SOLUTION INTRAMUSCULAR; INTRAVENOUS at 05:52

## 2022-09-15 RX ADMIN — MORPHINE SULFATE 2 MG: 2 INJECTION, SOLUTION INTRAMUSCULAR; INTRAVENOUS at 22:04

## 2022-09-15 RX ADMIN — AMIODARONE HYDROCHLORIDE 400 MG: 200 TABLET ORAL at 13:07

## 2022-09-15 RX ADMIN — Medication 1 TABLET: at 09:36

## 2022-09-15 RX ADMIN — ASPIRIN 81 MG: 81 TABLET, COATED ORAL at 09:36

## 2022-09-15 RX ADMIN — SODIUM CHLORIDE, PRESERVATIVE FREE 10 ML: 5 INJECTION INTRAVENOUS at 09:36

## 2022-09-15 RX ADMIN — DOCUSATE SODIUM 100 MG: 100 CAPSULE, LIQUID FILLED ORAL at 09:36

## 2022-09-15 RX ADMIN — PRAVASTATIN SODIUM 80 MG: 80 TABLET ORAL at 22:04

## 2022-09-15 RX ADMIN — POLYETHYLENE GLYCOL 3350 17 G: 17 POWDER, FOR SOLUTION ORAL at 09:52

## 2022-09-15 RX ADMIN — HEPARIN SODIUM 5000 UNITS: 5000 INJECTION INTRAVENOUS; SUBCUTANEOUS at 13:07

## 2022-09-15 RX ADMIN — VITAMIN D, TAB 1000IU (100/BT) 2000 UNITS: 25 TAB at 09:36

## 2022-09-15 RX ADMIN — SODIUM BICARBONATE 650 MG TABLET 650 MG: at 09:36

## 2022-09-15 RX ADMIN — DULOXETINE HYDROCHLORIDE 20 MG: 20 CAPSULE, DELAYED RELEASE ORAL at 09:36

## 2022-09-15 ASSESSMENT — PAIN DESCRIPTION - ORIENTATION
ORIENTATION: LEFT;MID
ORIENTATION: LEFT

## 2022-09-15 ASSESSMENT — PAIN SCALES - GENERAL
PAINLEVEL_OUTOF10: 6
PAINLEVEL_OUTOF10: 0
PAINLEVEL_OUTOF10: 7
PAINLEVEL_OUTOF10: 0

## 2022-09-15 ASSESSMENT — PAIN SCALES - WONG BAKER
WONGBAKER_NUMERICALRESPONSE: 0

## 2022-09-15 ASSESSMENT — PAIN DESCRIPTION - DESCRIPTORS
DESCRIPTORS: ACHING
DESCRIPTORS: ACHING;DISCOMFORT;SORE

## 2022-09-15 ASSESSMENT — PAIN - FUNCTIONAL ASSESSMENT: PAIN_FUNCTIONAL_ASSESSMENT: ACTIVITIES ARE NOT PREVENTED

## 2022-09-15 ASSESSMENT — PAIN DESCRIPTION - LOCATION
LOCATION: ARM
LOCATION: SHOULDER;BACK

## 2022-09-15 NOTE — PLAN OF CARE
Problem: Discharge Planning  Goal: Discharge to home or other facility with appropriate resources  Outcome: Progressing  Flowsheets (Taken 9/14/2022 2015)  Discharge to home or other facility with appropriate resources:   Identify barriers to discharge with patient and caregiver   Arrange for needed discharge resources and transportation as appropriate   Identify discharge learning needs (meds, wound care, etc)   Refer to discharge planning if patient needs post-hospital services based on physician order or complex needs related to functional status, cognitive ability or social support system     Problem: Safety - Adult  Goal: Free from fall injury  Outcome: Progressing  Flowsheets  Taken 9/15/2022 0049 by Popeye Bhat RN  Free From Fall Injury:   Instruct family/caregiver on patient safety   Based on caregiver fall risk screen, instruct family/caregiver to ask for assistance with transferring infant if caregiver noted to have fall risk factors       Problem: Pain  Goal: Verbalizes/displays adequate comfort level or baseline comfort level  Outcome: Progressing  Flowsheets (Taken 9/15/2022 0049)  Verbalizes/displays adequate comfort level or baseline comfort level:   Encourage patient to monitor pain and request assistance   Assess pain using appropriate pain scale   Administer analgesics based on type and severity of pain and evaluate response   Implement non-pharmacological measures as appropriate and evaluate response   Consider cultural and social influences on pain and pain management   Notify Licensed Independent Practitioner if interventions unsuccessful or patient reports new pain     Problem: Chronic Conditions and Co-morbidities  Goal: Patient's chronic conditions and co-morbidity symptoms are monitored and maintained or improved  Outcome: Progressing  Flowsheets (Taken 9/14/2022 2015)  Care Plan - Patient's Chronic Conditions and Co-Morbidity Symptoms are Monitored and Maintained or Improved:   Monitor hemodynamic stability:   Monitor blood pressure and heart rate   Monitor urine output and notify Licensed Independent Practitioner for values outside of normal range   Assess for signs of decreased cardiac output   Administer fluid and/or volume expanders as ordered   Administer vasoactive medications as ordered  Goal: Absence of cardiac dysrhythmias or at baseline  Outcome: Progressing  Flowsheets  Taken 9/15/2022 0049  Absence of cardiac dysrhythmias or at baseline:   Monitor cardiac rate and rhythm   Assess for signs of decreased cardiac output   Administer antiarrhythmia medication and electrolyte replacement as ordered  Taken 9/14/2022 2015  Absence of cardiac dysrhythmias or at baseline:   Monitor cardiac rate and rhythm   Assess for signs of decreased cardiac output   Administer antiarrhythmia medication and electrolyte replacement as ordered

## 2022-09-15 NOTE — CARE COORDINATION
CM following for planned discharge to transfer to [address] [place] [address] under Dr Prosper Castillo service. CM contacted Katelin Baez ([phone]), Dr Prosper Castillo assistant,  for transfer formation. Berto Diaz reports Dr Sabrina Easley is still out of the country and unable to accept patient in his service until he returns. KELY obtained Abiel Tomlinson contact -[phone]. FAX: [phone]. KELY informed by [facility] transfer of [facility] transfer protocol:  (1)Dr Sabrina Easley accepts the patient (2) Referring physician  [facility]) will contact [facility] to request patient to added to transfer list (3) Physicians will speak colloquially regarding timing of transfer (4) Bed availability. CM informed [facility] is at capacity.

## 2022-09-15 NOTE — DISCHARGE SUMMARY
[facility] Cardiology Discharge Summary     Patient ID:  Elena Orellana  [id number]  60 y.o.  1965    Admit date: 9/6/2022    Discharge date and time: 9/24/2022    Admitting Physician: Luis E Retana MD     Discharge Physician: ARNOLDO Clement CNP/Dr. Annabelle Bruce    Admission Diagnoses: Ventricular tachycardia [facility]) [I47.2]    Discharge Diagnoses:   Patient Active Problem List    Diagnosis    Acute on chronic renal failure ([facility]    Left ventricular aneurysm    Thrombocytopenia, unspecified    Ventricular tachycardia     CKD (chronic kidney disease) stage 4, GFR 15-29 ml/min ([facility])    CHF (congestive heart failure) ([facility]    Obesity     LORI on CPAP    Long term current use of amiodarone        Type 2 diabetes with nephropathy ([facility]    NICM (nonischemic cardiomyopathy) [facility])       Cardiology Procedures this admission:  device interrogation   Consults: nephrology     Hospital Course: Pt was admitted with complaints of SOB and diffuse pain on 9-6-22. H/O NICM with Biotronik ICD in place, [facility], left ventricular aneurysm, CKD, IDDM, HLD and morbid obesity. 9-6-22 EMS initially called STEMI alert but pt noted to be in sustained VT w rate 140. Started on IV amio and externally defibrillated. BP low, started on dopamine. Admitted, felt to need surgical eval for LV aneurysm resection. Gently hydrated for renal failure. Nephrology consulted. Renal failure thought to be secondary to underperfusion due to arrhythmia. Cr improved and returned to baseline. SOB, given IV lasix, diuresed - 18L, she was change to PO diuretic but it was held due to SNEHA. As patient improved she was transitioned to PO amiodarone then decreased to 400 mg bid  Her entresto remained on hold along with her diuretics d/t borderline B/Ps  and CKD. Her BB was changed to toprol due to borderline B/Ps. It was felt her VT most likely related to posterior basilar aneurysm. She has remained with no further VT for 5 days.  She is 136 - 145 mmol/L    Potassium 4.5 3.5 - 5.1 mmol/L    Chloride 110 101 - 110 mmol/L    CO2 29 21 - 32 mmol/L    Anion Gap Cannot be calculated 4 - 13 mmol/L    Glucose 111 (H) 65 - 100 mg/dL    BUN 20 6 - 23 MG/DL    Creatinine 1.50 (H) 0.6 - 1.0 MG/DL    GFR  46 (L) >60 ml/min/1.73m2    GFR Non- 38 (L) >60 ml/min/1.73m2    Calcium 9.3 8.3 - 10.4 MG/DL   CBC with Auto Differential    Collection Time: 09/24/22  4:29 AM   Result Value Ref Range    WBC 7.6 4.3 - 11.1 K/uL    RBC 3.90 (L) 4.05 - 5.2 M/uL    Hemoglobin 12.5 11.7 - 15.4 g/dL    Hematocrit 39.3 35.8 - 46.3 %    .8 (H) 79.6 - 97.8 FL    MCH 32.1 26.1 - 32.9 PG    MCHC 31.8 31.4 - 35.0 g/dL    RDW 13.5 11.9 - 14.6 %    Platelets 999 (L) 530 - 450 K/uL    MPV 11.6 9.4 - 12.3 FL    nRBC 0.02 0.0 - 0.2 K/uL    Differential Type AUTOMATED      Seg Neutrophils 59 43 - 78 %    Lymphocytes 28 13 - 44 %    Monocytes 9 4.0 - 12.0 %    Eosinophils % 3 0.5 - 7.8 %    Basophils 1 0.0 - 2.0 %    Immature Granulocytes 0 0.0 - 5.0 %    Segs Absolute 4.5 1.7 - 8.2 K/UL    Absolute Lymph # 2.1 0.5 - 4.6 K/UL    Absolute Mono # 0.7 0.1 - 1.3 K/UL    Absolute Eos # 0.2 0.0 - 0.8 K/UL    Basophils Absolute 0.0 0.0 - 0.2 K/UL    Absolute Immature Granulocyte 0.0 0.0 - 0.5 K/UL   Magnesium    Collection Time: 09/24/22  4:29 AM   Result Value Ref Range    Magnesium 2.4 1.8 - 2.4 mg/dL   POCT Glucose    Collection Time: 09/24/22  7:39 AM   Result Value Ref Range    POC Glucose 105 (H) 65 - 100 mg/dL    Performed by: Jennifer Angeles    POCT Glucose    Collection Time: 09/24/22 12:03 PM   Result Value Ref Range    POC Glucose 111 (H) 65 - 100 mg/dL    Performed by: Jennifer Angeles          Patient Instructions:     Current Discharge Medication List        START taking these medications    Details   metoprolol succinate (TOPROL XL) 25 MG extended release tablet Take 1 tablet by mouth daily  Qty: 30 tablet, Refills: 5      B Complex-C-Folic Acid (LYNDA-TYAA) TABS Take 1 tablet by mouth daily  Qty: 30 tablet, Refills: 0      sodium bicarbonate 650 MG tablet Take 1 tablet by mouth 2 times daily  Qty: 60 tablet, Refills: 5           CONTINUE these medications which have CHANGED    Details   amiodarone (PACERONE) 400 MG tablet Take 1 tablet by mouth 2 times daily  Qty: 60 tablet, Refills: 5      insulin glargine (LANTUS SOLOSTAR) 100 UNIT/ML injection pen Inject 7 Units into the skin nightly  Qty: 5 Adjustable Dose Pre-filled Pen Syringe, Refills: 3           CONTINUE these medications which have NOT CHANGED    Details   cyclobenzaprine (FLEXERIL) 10 MG tablet Take 10 mg by mouth 3 times daily as needed for Muscle spasms. calcitRIOL (ROCALTROL) 0.25 MCG capsule Take 0.25 mcg by mouth daily. Cholecalciferol (VITAMIN D3) 50 MCG (2000 UT) TABS Take 1 tablet by mouth daily. nitroGLYCERIN (NITROSTAT) 0.4 MG SL tablet Place 0.4 mg under the tongue every 5 minutes as needed for Chest pain. Max 3 doses       dupilumab (DUPIXENT) 300 MG/2ML SOPN injection Inject 300 mg into the skin every 14 days. Patient usually takes medication on Monday or Tuesday. acetaminophen (TYLENOL) 500 MG tablet Take 1,000 mg by mouth every 6 hours as needed for Pain (breakthrough pain).       DULoxetine (CYMBALTA) 20 MG extended release capsule Take 1 capsule by mouth daily  Qty: 30 capsule, Refills: 11      aspirin 81 MG EC tablet Take 81 mg by mouth daily      vitamin D 25 MCG (1000 UT) CAPS Take by mouth daily      latanoprost (XALATAN) 0.005 % ophthalmic solution Place 1 drop into both eyes nightly      polyethylene glycol (GLYCOLAX) 17 GM/SCOOP powder Take 17 g by mouth daily as needed      pravastatin (PRAVACHOL) 80 MG tablet Take 80 mg by mouth nightly           STOP taking these medications       albuterol sulfate  (90 Base) MCG/ACT inhaler Comments:   Reason for Stopping:         carvedilol (COREG) 6.25 MG tablet Comments:   Reason for Stopping: empagliflozin (JARDIANCE) 10 MG tablet Comments:   Reason for Stopping:         isosorbide mononitrate (IMDUR) 30 MG extended release tablet Comments:   Reason for Stopping:         sacubitril-valsartan (ENTRESTO) 49-51 MG per tablet Comments:   Reason for Stopping:         torsemide (DEMADEX) 20 MG tablet Comments:   Reason for Stopping:                  Juan R Fritz APRN - CNP-C  9/24/2022  3:53 PM     [facility]     9/24/2022     4:30 PM    I have personally seen and examined [facility] with  Monica Mccarty NP. I agree and confirm findings in the discharge summary as mentioned above as outlined above with following pertinent additions/exceptions:      66-year-old -American woman with a known history of nonischemic cardiomyopathy with reported LV aneurysm came in with sustained ventricular tachycardia with heart rate at 140 bpm and was externally defibrillated back to sinus rhythm as the rates were below the threshold detected by her Biotronik ICD. -Appropriate adjustments were made to her device to be able to detect sustained VT at a lower rate. When she was initially admitted, blood pressures were low but recovered. She was taken off Entresto when she was found to be in acute kidney injury. She was also taken off her carvedilol but blood pressures have improved and she was restarted on metoprolol succinate today.  -Dr. Schroeder Mend try to get her transferred to a center of excellence in view of her underlying cardiomyopathy with persistent VT despite being on amiodarone.  -Electrolytes were appropriately monitored and replaced. Thyroid function was within normal range. For 5 days prior to discharge she had no further VT.  -Stable at the time of discharge to [facility].   Please refer to my note from today for details on physical exam and other plans.  -Claria Baumgarten was discontinued in view of hypotension and renal dysfunction with peak creatinine at 3.4 when she first came in down to

## 2022-09-15 NOTE — PROGRESS NOTES
RENAL PROGRESS NOTE    Subjective:     Patient is a 60year old female with Chronic Kidney Disease with baseline creatinine 2-2.2 followed by me in the office setting is admitted with V tach. She feels much better since admission and her heart rate is now normal.   She has had prior SNEHA on CKD episodes, IDDM, morbid obesity s/p DC ICD. She underwent defibrillated in the ED and was placed on amiodarone IV. Her crea was 1.8 on 8/31/22 and increased to 3.3 on admission, and improved to 1.6 today. No vomiting, no CP, dyspnea is controlled with nasal O2.  9/8/22- alert/oriented x3, sitting on side of bed, no signs of distress, on room air, denies dyspnea, CP, n/v, HA, or dizziness. On Amiodarone gtt. Family at bedside. 9/9/22- alert/oriented, lying in bed, no signs of distress, c/o CP earlier with dyspnea when walking, no CP or dyspnea at time of assessment, no n/v, HA or dizziness. Kidney indices stable. Plans to remain in hospital until transferred to [facility]. 9/10/22 - alert and communicating well - breathing better - No N/V, no uremic signs or symptoms - edema is improved   9/11/22 - she complains of increased dyspnea and edema this PM, no chest pain - no N/V   9/12/22- alert/oriented, c/o dyspnea with exertion, on 2L NC, denies CP, n/v, HA, or dizziness. Good UOP with diuresis. 9/13/22- alert/oriented, sitting in chair, no signs of distress, on room air, c/o dyspnea with exertion, denies CP, HA, dizziness, or n/v. Waiting on transfer to [facility]. 9/14/22- alert/oriented, sitting in chair, no signs of distress, on room air, denies dyspnea, CP, n/v, HA, or dizziness. No complaints. 9/15/22- alert/oriented, sitting in chair, no signs of distress, on room air, concerned about weight, she feels it is fluid gain, she is on low sodium diet, weight 206 lb 2 days ago compared to 208 lbs today. No other concerns. Denies dyspnea, CP, n/v, HA, or dizziness.     Past Medical History:   Diagnosis Date    Abdominal wall hernia 10/2/2018    Acute hypoxemic respiratory failure ([facility] 4/3/2019    Allergic rhinitis     followed by allergist; allergic to grass- has rescue inhaler PRN    ARDS (adult respiratory distress syndrome) ([facility] [address] 4/3/2019    Arthritis     Automatic implantable cardioverter-defibrillator in situ 3/30/2016    CAD in native artery 3/30/2016    Chronic kidney disease     Chronic systolic heart failure ([facility] [address] 2013    ECHO 2017- EF 39%; managed with medications; [place]    CKD (chronic kidney disease)     [facility] follows    Diabetes ([facility]     type 2 (on insulin);  FBS AM range- , hypo s/s <70, hgba1c- 7.9    Diabetes mellitus ([facility] 2010    Dyslipidemia 2013    Eczema     Fibromyalgia     GERD (gastroesophageal reflux disease)     hx of- no meds currently    Headache     Heart failure (HCC)     chronic systolic with EF 75%; non-ischemic cardiomyopathy    HTN (hypertension) 2010    Hypercholesterolemia     Incarcerated incisional hernia 3/26/2019    Morbid obesity (HCC)     Neuropathy     bilateral feet and tips of fingers    NICM (nonischemic cardiomyopathy) ([facility] 2/15/2013    Obesity     bmi- 41    Obstructive sleep apnea (adult) (pediatric) 2014    uses cpap qhs    Pseudomonas urinary tract infection 2019    Sciatic pain 2016    Severe persistent asthma with acute exacerbation 2020    SVT (supraventricular tachycardia) ([facility] [address]     ablation for svt    Tobacco use disorder 2010      Past Surgical History:   Procedure Laterality Date    ABLATION OF DYSRHYTHMIC FOCUS  \"years ago\"    CARDIAC DEFIBRILLATOR PLACEMENT  2013    Biotronik dual chamber ICD by Dr. Aquiles Burnett  2013    Biotronik dual chamber ICD by Dr. Jessica Montes De [place]ca 2022    Left heart cath / coronary angiography performed by Flakito Benton MD at [address] [place] [address] CATH LAB     SECTION      x1    COLONOSCOPY N/A daily 12/29/21   Ar Automatic Reconciliation   insulin glargine (LANTUS SOLOSTAR) 100 UNIT/ML injection pen Inject 15 Units into the skin nightly 12/22/21   Ar Automatic Reconciliation   isosorbide mononitrate (IMDUR) 30 MG extended release tablet Take 30 mg by mouth daily 11/23/21   Ar Automatic Reconciliation   latanoprost (XALATAN) 0.005 % ophthalmic solution Place 1 drop into both eyes nightly 5/10/21   Ar Automatic Reconciliation   polyethylene glycol (GLYCOLAX) 17 GM/SCOOP powder Take 17 g by mouth daily as needed  Patient not taking: Reported on 9/6/2022 11/14/20   Ar Automatic Reconciliation   pravastatin (PRAVACHOL) 80 MG tablet Take 80 mg by mouth nightly 11/23/21   Ar Automatic Reconciliation   sacubitril-valsartan (ENTRESTO) 49-51 MG per tablet Take 1 tablet by mouth 2 times daily 11/23/21   Ar Automatic Reconciliation   torsemide (DEMADEX) 20 MG tablet Take 40 mg by mouth 2 times daily 8/30/22   Ar Automatic Reconciliation   albuterol sulfate  (90 Base) MCG/ACT inhaler 2 puffs qid prn shortness of breath or wheezing  Patient not taking: Reported on 8/10/2022 11/14/20 8/17/22  Ar Automatic Reconciliation     Allergies   Allergen Reactions    Other Hives and Shortness Of Breath     \"inhaler PRN\"    Penicillin G Itching      Social History     Tobacco Use    Smoking status: Some Days     Packs/day: 0.25     Types: Cigarettes    Smokeless tobacco: Never    Tobacco comments:     Quit smoking: \"every now and then\"   Substance Use Topics    Alcohol use:  Yes     Alcohol/week: 1.0 standard drink      Family History   Problem Relation Age of Onset    Diabetes Other     Heart Disease Father     Hypertension Father     Stroke Father     Heart Attack Father 48        mi    Breast Cancer Sister 37    Hypertension Brother     Heart Disease Brother     Diabetes Brother     Stroke Mother           Review of Systems    Negative except for what is mention in HPI above      Objective:       /71   Pulse 60 Temp 98.2 °F (36.8 °C) (Oral)   Resp 18   Ht 4' 10\" (1.473 m)   Wt 208 lb 3.2 oz (94.4 kg)   SpO2 94%   BMI 43.51 kg/m²     09/15 0701 - 09/15 1900  In: 218 [P.O.:218]  Out: 950 [Urine:950]  09/13 1901 - 09/15 0700  In: 420 [P.O.:420]  Out: 2400 [Urine:2400]    /71   Pulse 60   Temp 98.2 °F (36.8 °C) (Oral)   Resp 18   Ht 4' 10\" (1.473 m)   Wt 208 lb 3.2 oz (94.4 kg)   SpO2 94%   BMI 43.51 kg/m²     General:  Alert, cooperative, no respiratory distress on room air, appears stated age. Head:  Normocephalic, without obvious abnormality, atraumatic. Neck: Supple, symmetrical, trachea midline, no JVD. Back:   Symmetric, no curvature. ROM normal. No CVA tenderness. Lungs:   Decreased bases to auscultation bilaterally. Heart:  Regular rate and rhythm, S1, S2 normal, no murmur,  rub or gallop. Abdomen:   Soft, non-tender. Bowel sounds normal   Extremities: Extremities normal, atraumatic, no cyanosis, trace edema. Skin: Skin color, texture, turgor normal. No rashes or lesions. Neurologic: Baseline tremor is present. No asterixis.          Data Review:     Recent Results (from the past 24 hour(s))   POCT Glucose    Collection Time: 09/14/22  4:37 PM   Result Value Ref Range    POC Glucose 101 (H) 65 - 100 mg/dL    Performed by: Mary AliceKEATON    POCT Glucose    Collection Time: 09/14/22  9:09 PM   Result Value Ref Range    POC Glucose 154 (H) 65 - 100 mg/dL    Performed by: [facility]    Basic Metabolic Panel    Collection Time: 09/15/22  6:43 AM   Result Value Ref Range    Sodium 139 136 - 145 mmol/L    Potassium 4.4 3.5 - 5.1 mmol/L    Chloride 105 101 - 110 mmol/L    CO2 30 21 - 32 mmol/L    Anion Gap 4 4 - 13 mmol/L    Glucose 132 (H) 65 - 100 mg/dL    BUN 32 (H) 6 - 23 MG/DL    Creatinine 1.60 (H) 0.6 - 1.0 MG/DL    GFR  43 (L) >60 ml/min/1.73m2    GFR Non- 35 (L) >60 ml/min/1.73m2    Calcium 9.9 8.3 - 10.4 MG/DL   Magnesium Collection Time: 09/15/22  6:43 AM   Result Value Ref Range    Magnesium 2.3 1.8 - 2.4 mg/dL   POCT Glucose    Collection Time: 09/15/22  7:49 AM   Result Value Ref Range    POC Glucose 121 (H) 65 - 100 mg/dL    Performed by: Yoko Vasquez    POCT Glucose    Collection Time: 09/15/22 11:48 AM   Result Value Ref Range    POC Glucose 148 (H) 65 - 100 mg/dL    Performed by: Gabi        CXR;   Results for orders placed during the hospital encounter of 09/06/22    XR CHEST PORTABLE    Narrative  Portable chest xray    COMPARISON: August 10, 2022    INDICATION: chest pain    FINDINGS:    Heart is enlarged. Mediastinal contour is within normal limits. Stable  left-sided cardiac pacer. Increased interstitial prominence, likely vascular  congestion. No pneumothorax. No large pleural effusion. Impression  1. Cardiomegaly and suggestion of vascular congestion. CT Abdomen  Results for orders placed in visit on 03/31/19    CT ABDOMEN PELVIS W IV CONTRAST    Narrative  CT abdomen and pelvis with contrast: 03/30/2019    History: Recent hernia surgery, abdominal distention. Technique: Helically acquired images were obtained from the domes of the  diaphragms to the ischial tuberosities reconstructed at 5mm intervals after the  uneventful administration of oral contrast for bowel opacification and 100 cc's  of intravenous Isovue-370 to evaluate the solid abdominal viscera and vascular  structures. Coronal reformatted images were submitted. Radiation dose reduction techniques were used for this study:  Our CT scanners  use one or all of the following: Automated exposure control, adjustment of the  mA and/or kVp according to patient's size, iterative reconstruction. Comparison: 02/26/2019. CT ABDOMEN: There is mild bilateral lower lobe subsegmental atelectasis and/or  scar. There is a tiny low-density lesion within the right hepatic lobe, too  small to characterize but statistically benign, unchanged. The spleen, pancreas  and adrenal glands are unremarkable. A cyst within the upper pole left kidney  measures 1.87 m. 2 subcentimeter low-density lesions within the right kidney are  too small to characterize but statistically benign. There is a large defect within the anterior abdominal wall measuring nearly 12  mm in transverse diameter. Most of the small bowel as well as a large portion of  the mid stomach and transverse colon anteriorly large hernia. There is soft  tissue stranding along the lateral abdominal walls, presumably postsurgical. The  the proximal and midportion of the stomach is moderately distended which with  narrowing of the distal stomach has the pylorus traverses back into the  peritoneal cavity. The proximal small bowel is located within the peritoneal  cavity and is dilated with these loops of bowel extending into the large hernia  which also appear mildly dilated. A definite transition zone is unable to be  visualized. No adenopathy is present. There is no free fluid. A drainage catheter is present  along the inferior margin of the hernia. CT PELVIS: A Chamberlain catheter is present within urinary bladder. No adenopathy or  ascites is present. There are no plantar changes. Sigmoid diverticula are  present. Impression  IMPRESSION: Large abdominal wall defect containing many loops of small bowel,  transverse colon and portions of the stomach. Narrowing of the distal stomach as  it enters back into the peritoneal cavity causes partial outlet obstruction. Proximal small bowel is also mildly dilated without a definite transition zone. A small bowel obstruction cannot be excluded.            Principal Problem:    Ventricular tachycardia (HCC)  Active Problems:    Acute on chronic renal failure (HCC)    Left ventricular aneurysm    Type 2 diabetes with nephropathy (HCC)    NICM (nonischemic cardiomyopathy) (HCC)    Dyslipidemia    CKD (chronic kidney disease) stage 4, GFR 15-29 ml/min [facility])    Morbid obesity ([address]  Resolved Problems:    * No resolved hospital problems.  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 hypertensive nephrosclerosis - excellent improvement in response to medical management and renal function is better than  baseline  - creatinine stable, 1.6, same as yesterday, Torsemide on hold, will monitor   Volume overload - initially with volume depletion, now with mild fluid excess - on Lasix 40 mg BID IV with much improvement,  switch to Torsemide PO as volume status is better controlled  - Torsemide on hold due to worsening kidney indices, trace edema   Hyponatremia - mild, likely due to cardiac disease, improved, will monitor   Hypoalbuminemia - probably due to urinary protein loss - work on supplements  Hyperkalemia- mild, on admission K+ 3.8 with blood draw and 3.0 with POC test, given KCL 10 mEq IV at that time, with mild acidosis, no intervention at this time  - improved with improvement in acidosis, stable, will continue to monitor  Acidosis- stable with sodium bicarb PO, continue, CO2 on high end of normal, will monitor, may need to discontinue  Ventricular tachycardia- LV aneurysm, on Amiodarone gtt, ICD, managed by Cardiology, plans for surgery at [facility]       Plan:     As above     [facility]

## 2022-09-15 NOTE — PROGRESS NOTES
am  9/15/2022 1:21 PM    Admit Date: 9/6/2022    Admit Diagnosis: Ventricular tachycardia ([facility] [address] [I47.2]      Subjective:    Patient : Mrs. Sarai Olvera, who has a history of recurrent Vtach, LV aneurysm, NICM with EF of 40%, SNEHA w/CKD, IDDM, and the presence of an ICD presented to the hospital with VT in the 140s and was defibrillated in the ED. She reports her ICD did not shock her. She was placed on IV amiodarone. She is doing well today. She admits to continued episodes of diaphoresis and rigors. She is ready to see Dr. Aga Loo at [facility] [place] to take care of her heart. I've informed her that Dr. Sequeira Freed flight back home was delayed but would keep her updated. She denies palpitations or SOB. She did have an episode of chest tightness yesterday as noted by Dr. Timothy Aguilera in yesterday's note. Objective:    /71   Pulse 60   Temp 98.2 °F (36.8 °C) (Oral)   Resp 18   Ht 4' 10\" (1.473 m)   Wt 208 lb 3.2 oz (94.4 kg)   SpO2 94%   BMI 43.51 kg/m²     ROS:  General ROS: negative for - chills  Hematological and Lymphatic ROS: negative for - blood clots or jaundice  Respiratory ROS: no cough, shortness of breath, or wheezing  Cardiovascular ROS: positive for - chest pain  Gastrointestinal ROS: no abdominal pain, change in bowel habits, or black or bloody stools  Neurological ROS: no TIA or stroke symptoms    Physical Exam:      Physical Examination: General appearance - Appearance: alert, well appearing, and in no distress.    Neck/lymph - supple, no significant adenopathy  Chest/CV - clear to auscultation, no wheezes, rales or rhonchi, symmetric air entry  Heart - normal rate, regular rhythm, normal S1, S2, no murmurs, rubs, clicks or gallops  Abdomen/GI - soft, nontender, nondistended, no masses or organomegaly   Musculoskeletal - no joint tenderness, deformity or swelling  Extremities - peripheral pulses normal, no pedal edema, no clubbing or cyanosis  Skin - normal coloration and turgor, no rashes, no suspicious skin lesions noted    Current Facility-Administered Medications   Medication Dose Route Frequency    [Held by provider] torsemide (DEMADEX) tablet 20 mg  20 mg Oral Daily    sodium bicarbonate tablet 650 mg  650 mg Oral BID    cydney-ignacio tablet 1 tablet  1 tablet Oral Daily    Vitamin D (CHOLECALCIFEROL) tablet 2,000 Units  2,000 Units Oral Daily    LORazepam (ATIVAN) tablet 0.5 mg  0.5 mg Oral Q4H PRN    docusate sodium (COLACE) capsule 100 mg  100 mg Oral Daily    amiodarone (CORDARONE) tablet 400 mg  400 mg Oral q8h    HYDROcodone-acetaminophen (NORCO) 7.5-325 MG per tablet 1 tablet  1 tablet Oral Q6H PRN    morphine injection 2 mg  2 mg IntraVENous Q4H PRN    traMADol (ULTRAM) tablet 50 mg  50 mg Oral Q6H PRN    polyethylene glycol (GLYCOLAX) packet 17 g  17 g Oral Daily PRN    aspirin EC tablet 81 mg  81 mg Oral Daily    DULoxetine (CYMBALTA) extended release capsule 20 mg  20 mg Oral Daily    insulin glargine (LANTUS) injection vial 7 Units  7 Units SubCUTAneous Nightly    pravastatin (PRAVACHOL) tablet 80 mg  80 mg Oral Nightly    sodium chloride flush 0.9 % injection 5-40 mL  5-40 mL IntraVENous 2 times per day    sodium chloride flush 0.9 % injection 5-40 mL  5-40 mL IntraVENous PRN    ondansetron (ZOFRAN) injection 4 mg  4 mg IntraVENous Q4H PRN    acetaminophen (TYLENOL) tablet 650 mg  650 mg Oral Q6H PRN    Or    acetaminophen (TYLENOL) suppository 650 mg  650 mg Rectal Q6H PRN    nitroGLYCERIN (NITROSTAT) SL tablet 0.4 mg  0.4 mg SubLINGual Q5 Min PRN    heparin (porcine) injection 5,000 Units  5,000 Units SubCUTAneous 3 times per day    sodium chloride (OCEAN, BABY AYR) 0.65 % nasal spray 1 spray  1 spray Each Nostril PRN    insulin lispro (HUMALOG) injection vial 0-4 Units  0-4 Units SubCUTAneous TID WC    insulin lispro (HUMALOG) injection vial 0-4 Units  0-4 Units SubCUTAneous Nightly    glucose chewable tablet 16 g  4 tablet Oral PRN    dextrose bolus 10% 125 mL  125 mL IntraVENous PRN    Or    dextrose bolus 10% 250 mL  250 mL IntraVENous PRN    glucagon (rDNA) injection 1 mg  1 mg SubCUTAneous PRN    dextrose 10 % infusion   IntraVENous Continuous PRN    latanoprost (XALATAN) 0.005 % ophthalmic solution 1 drop  1 drop Both Eyes Nightly       Data Review: data included in this note has been independently reviewed by the author     Assessment/Plan:     Patient Active Problem List   Diagnosis    HTN (hypertension)    Type 2 diabetes with nephropathy (HCC)    CHF (congestive heart failure) ([facility])    Long-term insulin use in type 2 diabetes ([facility] [address]    NICM (nonischemic cardiomyopathy) ([facility])    Rectal bleeding    Dyslipidemia    CKD (chronic kidney disease) stage 4, GFR 15-29 ml/min ([facility])    Ventricular tachycardia (HCC)    Chronic systolic heart failure (HCC)    Morbid obesity ([facility])    Restrictive lung disease    Long term current use of amiodarone    Chronic right-sided thoracic back pain    CKD (chronic kidney disease) stage 3, GFR 30-59 ml/min (HCC)    LORI on CPAP    Lower abdominal pain    Chronic left-sided low back pain with sciatica    Thrombocytopenia, unspecified    Left ventricular aneurysm    Acute on chronic renal failure (HCC)     PLAN  Ventricular Tachycardia  -Currently NSR with rate of 60  -Presence of ICD; Will continue to monitor  -Most likely due to scarring of LV and from LV aneurysm. -Awaiting transfer to Dr. Melissa Wakefield at [facility] for surgical intervention. See note above.  -Continue oral amiodarone, heparin, and ASA. Non-ischemic Cardiomyopathy with reduced EF  -Resolved symptoms of fluid overload  -Unknown etiology of LV aneurysm  -EF will most likely improve with intervention of her aneurysm. -BP today 115/71     SNEHA on CKD stage 3-4  -Being followed by nephrology. Demadex was held yesterday due to increase in BUN.  Continue to hold as this patient does not have signs of fluid overload     IDDM  -Continue on inpatient medications      Vignesh Altman  I have personally seen and evaluated the patient and reviewed the students note and agree with the following assessment and plan and findings. I was present for and observed the key components of this note. Any appropriate additions or editing of the information have been done by me.     Artur Nicole MD, [facility]  Cardiology

## 2022-09-15 NOTE — PROGRESS NOTES
Comprehensive Nutrition Assessment    Type and Reason for Visit: Initial, RD Nutrition Re-Screen/LOS  Length of Stay    Nutrition Recommendations/Plan:   Meals and Snacks:  Diet: Continue current order  Nutrition Supplement Therapy:  Medical food supplement therapy:  Discontinue Nepro three times per day (this provides 420 kcal and 19 grams protein per bottle)     Malnutrition Assessment:  Malnutrition Status: No malnutrition     Nutrition Assessment:  Nutrition History: Pt denies any changes in po intake PTA. She reports being wt stable PTA. Do You Have Any Cultural, Faith, or Ethnic Food Preferences?: No   Nutrition Background:   Pt with PMH notable for recurrent Vtach, LV aneurysm, NICM with EF of 40%, SNEHA w/CKD, IDDM, and the presence of an ICD. Presents with VT and defibrillated in the ED. Nutrition Interval:  Pt is pending transfer to [facility] at this time for aneurysm resection. Pt reports eating well and occasionally drinking Nepro. Discussed no need for additional protein from Nepro shakes as pt currently is eating well and does not need additional protein with CKD IV. Current Nutrition Therapies:  ADULT DIET; Regular; 4 carb choices (60 gm/meal); Low Fat/Low Chol/High Fiber/2 gm Na; 2000 ml; No Caffeine  ADULT ORAL NUTRITION SUPPLEMENT; Breakfast, Lunch, Dinner, HS Snack; Renal Oral Supplement    Current Intake:   Average Meal Intake: % Average Supplements Intake: 1-25%      Anthropometric Measures:  Height: 4' 10\" (147.3 cm)  Current Body Wt: 208 lb 1.8 oz (94.4 kg) (9/15), Weight source: Standing Scale  BMI: 43.5, Obese Class 3 (BMI 40.0 or greater)  Admission Body Weight: 208 lb 12.4 oz (94.7 kg) (9/8; standing scale)  Ideal Body Weight (Kg) (Calculated): 41 kg (90 lbs), 231.2 %  Usual Body Wt:  (200-204lbs), Percent weight change:         Edema:Peripheral Vascular  Peripheral Vascular (WDL): Exceptions to WDL  Edema: Right lower extremity; Left lower extremity  RUE Edema: +1;Non-pitting  RLE Edema: Trace  LLE Edema: Trace   BMI Category Obese Class 3 (BMI 40.0 or greater)  Estimated Daily Nutrient Needs:  Energy (kcal/day): 1647-4263 (Kcal/kg (15-18) Weight used: 94.4 kg Current  Protein (g/day): 49-61 Weight Used: (Ideal) 40.9 kg  Fluid (ml/day):   (1 ml/kcal)    Nutrition Diagnosis:   No nutrition diagnosis at this time    Nutrition Interventions:   Food and/or Nutrient Delivery: Continue Current Diet, Discontinue Oral Nutrition Supplement     Coordination of Nutrition Care: Continue to monitor while inpatient  Plan of Care discussed with: ORLANDO Jones    Goals:       Active Goal:  (Meet at least 90% estimated nutrition needs by RD follow up.)       Nutrition Monitoring and Evaluation:      Food/Nutrient Intake Outcomes: Food and Nutrient Intake  Physical Signs/Symptoms Outcomes: Meal Time Behavior, Weight    Discharge Planning:    No discharge needs at this time    Darek Nash RD

## 2022-09-16 LAB
ANION GAP SERPL CALC-SCNC: 4 MMOL/L (ref 4–13)
BUN SERPL-MCNC: 29 MG/DL (ref 6–23)
CALCIUM SERPL-MCNC: 10 MG/DL (ref 8.3–10.4)
CHLORIDE SERPL-SCNC: 110 MMOL/L (ref 101–110)
CO2 SERPL-SCNC: 26 MMOL/L (ref 21–32)
CREAT SERPL-MCNC: 1.4 MG/DL (ref 0.6–1)
GLUCOSE BLD STRIP.AUTO-MCNC: 105 MG/DL (ref 65–100)
GLUCOSE BLD STRIP.AUTO-MCNC: 114 MG/DL (ref 65–100)
GLUCOSE BLD STRIP.AUTO-MCNC: 127 MG/DL (ref 65–100)
GLUCOSE BLD STRIP.AUTO-MCNC: 129 MG/DL (ref 65–100)
GLUCOSE SERPL-MCNC: 98 MG/DL (ref 65–100)
POTASSIUM SERPL-SCNC: 5.4 MMOL/L (ref 3.5–5.1)
SERVICE CMNT-IMP: ABNORMAL
SODIUM SERPL-SCNC: 140 MMOL/L (ref 136–145)

## 2022-09-16 PROCEDURE — 6360000002 HC RX W HCPCS: Performed by: NURSE PRACTITIONER

## 2022-09-16 PROCEDURE — 6370000000 HC RX 637 (ALT 250 FOR IP): Performed by: NURSE PRACTITIONER

## 2022-09-16 PROCEDURE — 6360000002 HC RX W HCPCS: Performed by: INTERNAL MEDICINE

## 2022-09-16 PROCEDURE — 6370000000 HC RX 637 (ALT 250 FOR IP): Performed by: INTERNAL MEDICINE

## 2022-09-16 PROCEDURE — 99232 SBSQ HOSP IP/OBS MODERATE 35: CPT | Performed by: INTERNAL MEDICINE

## 2022-09-16 PROCEDURE — 36415 COLL VENOUS BLD VENIPUNCTURE: CPT

## 2022-09-16 PROCEDURE — 1100000003 HC PRIVATE W/ TELEMETRY

## 2022-09-16 PROCEDURE — 82962 GLUCOSE BLOOD TEST: CPT

## 2022-09-16 PROCEDURE — 80048 BASIC METABOLIC PNL TOTAL CA: CPT

## 2022-09-16 PROCEDURE — 2580000003 HC RX 258: Performed by: NURSE PRACTITIONER

## 2022-09-16 RX ADMIN — AMIODARONE HYDROCHLORIDE 400 MG: 200 TABLET ORAL at 21:02

## 2022-09-16 RX ADMIN — MORPHINE SULFATE 2 MG: 2 INJECTION, SOLUTION INTRAMUSCULAR; INTRAVENOUS at 13:01

## 2022-09-16 RX ADMIN — PRAVASTATIN SODIUM 80 MG: 80 TABLET ORAL at 21:02

## 2022-09-16 RX ADMIN — INSULIN GLARGINE 7 UNITS: 100 INJECTION, SOLUTION SUBCUTANEOUS at 21:08

## 2022-09-16 RX ADMIN — HEPARIN SODIUM 5000 UNITS: 5000 INJECTION INTRAVENOUS; SUBCUTANEOUS at 21:44

## 2022-09-16 RX ADMIN — SODIUM BICARBONATE 650 MG TABLET 650 MG: at 21:02

## 2022-09-16 RX ADMIN — HEPARIN SODIUM 5000 UNITS: 5000 INJECTION INTRAVENOUS; SUBCUTANEOUS at 13:32

## 2022-09-16 RX ADMIN — SODIUM BICARBONATE 650 MG TABLET 650 MG: at 08:27

## 2022-09-16 RX ADMIN — ONDANSETRON 4 MG: 2 INJECTION INTRAMUSCULAR; INTRAVENOUS at 20:57

## 2022-09-16 RX ADMIN — DULOXETINE HYDROCHLORIDE 20 MG: 20 CAPSULE, DELAYED RELEASE ORAL at 08:24

## 2022-09-16 RX ADMIN — AMIODARONE HYDROCHLORIDE 400 MG: 200 TABLET ORAL at 13:32

## 2022-09-16 RX ADMIN — MORPHINE SULFATE 2 MG: 2 INJECTION, SOLUTION INTRAMUSCULAR; INTRAVENOUS at 20:57

## 2022-09-16 RX ADMIN — VITAMIN D, TAB 1000IU (100/BT) 2000 UNITS: 25 TAB at 08:27

## 2022-09-16 RX ADMIN — LATANOPROST 1 DROP: 50 SOLUTION OPHTHALMIC at 21:02

## 2022-09-16 RX ADMIN — SODIUM CHLORIDE, PRESERVATIVE FREE 5 ML: 5 INJECTION INTRAVENOUS at 20:00

## 2022-09-16 RX ADMIN — MORPHINE SULFATE 2 MG: 2 INJECTION, SOLUTION INTRAMUSCULAR; INTRAVENOUS at 08:31

## 2022-09-16 RX ADMIN — SODIUM CHLORIDE, PRESERVATIVE FREE 10 ML: 5 INJECTION INTRAVENOUS at 08:31

## 2022-09-16 RX ADMIN — DOCUSATE SODIUM 100 MG: 100 CAPSULE, LIQUID FILLED ORAL at 08:27

## 2022-09-16 RX ADMIN — AMIODARONE HYDROCHLORIDE 400 MG: 200 TABLET ORAL at 06:01

## 2022-09-16 RX ADMIN — Medication 1 TABLET: at 08:24

## 2022-09-16 RX ADMIN — POLYETHYLENE GLYCOL 3350 17 G: 17 POWDER, FOR SOLUTION ORAL at 08:30

## 2022-09-16 RX ADMIN — ASPIRIN 81 MG: 81 TABLET, COATED ORAL at 08:24

## 2022-09-16 RX ADMIN — HEPARIN SODIUM 5000 UNITS: 5000 INJECTION INTRAVENOUS; SUBCUTANEOUS at 06:01

## 2022-09-16 ASSESSMENT — PAIN SCALES - WONG BAKER
WONGBAKER_NUMERICALRESPONSE: 0

## 2022-09-16 ASSESSMENT — PAIN DESCRIPTION - ONSET
ONSET: ON-GOING
ONSET: ON-GOING

## 2022-09-16 ASSESSMENT — PAIN SCALES - GENERAL
PAINLEVEL_OUTOF10: 7
PAINLEVEL_OUTOF10: 0
PAINLEVEL_OUTOF10: 8
PAINLEVEL_OUTOF10: 0
PAINLEVEL_OUTOF10: 8

## 2022-09-16 ASSESSMENT — PAIN DESCRIPTION - FREQUENCY
FREQUENCY: CONTINUOUS
FREQUENCY: CONTINUOUS

## 2022-09-16 ASSESSMENT — PAIN DESCRIPTION - PAIN TYPE
TYPE: CHRONIC PAIN
TYPE: CHRONIC PAIN

## 2022-09-16 ASSESSMENT — PAIN DESCRIPTION - LOCATION
LOCATION: ARM
LOCATION: BACK;ARM
LOCATION: SHOULDER

## 2022-09-16 ASSESSMENT — PAIN DESCRIPTION - ORIENTATION
ORIENTATION: RIGHT;MID
ORIENTATION: LEFT
ORIENTATION: LEFT

## 2022-09-16 ASSESSMENT — PAIN DESCRIPTION - DESCRIPTORS
DESCRIPTORS: ACHING
DESCRIPTORS: ACHING;BURNING
DESCRIPTORS: ACHING

## 2022-09-16 NOTE — PROGRESS NOTES
Bedside and Verbal shift change report to be received from [address]. Report included the following information Nurse Handoff Report, Intake/Output, MAR, and Recent Results.

## 2022-09-16 NOTE — PLAN OF CARE
nares and determine need for appliance change or resting period.   Outcome: Progressing     Problem: ABCDS Injury Assessment  Goal: Absence of physical injury  Outcome: Progressing  Flowsheets (Taken 9/16/2022 0637)  Absence of Physical Injury: Implement safety measures based on patient assessment     Problem: Respiratory - Adult  Goal: Achieves optimal ventilation and oxygenation  Outcome: Progressing  Flowsheets (Taken 9/16/2022 0637)  Achieves optimal ventilation and oxygenation:   Assess for changes in respiratory status   Assess for changes in mentation and behavior   Position to facilitate oxygenation and minimize respiratory effort   Oxygen supplementation based on oxygen saturation or arterial blood gases   Initiate smoking cessation protocol as indicated     Problem: Cardiovascular - Adult  Goal: Maintains optimal cardiac output and hemodynamic stability  Outcome: Progressing  Goal: Absence of cardiac dysrhythmias or at baseline  Outcome: Progressing

## 2022-09-16 NOTE — PROGRESS NOTES
Bedside shift change report given to Annie Owens (oncoming nurse) by self and Jeanette Do RN (offgoing nurse). Report included the following information Nurse Handoff Report.

## 2022-09-16 NOTE — PROGRESS NOTES
RENAL PROGRESS NOTE    Subjective:     Patient is a 60year old female with Chronic Kidney Disease with baseline creatinine 2-2.2 followed by me in the office setting is admitted with V tach. She feels much better since admission and her heart rate is now normal.   She has had prior SNEHA on CKD episodes, IDDM, morbid obesity s/p DC ICD. She underwent defibrillated in the ED and was placed on amiodarone IV. Her crea was 1.8 on 8/31/22 and increased to 3.3 on admission, and improved to 1.6 today. No vomiting, no CP, dyspnea is controlled with nasal O2.  9/8/22- alert/oriented x3, sitting on side of bed, no signs of distress, on room air, denies dyspnea, CP, n/v, HA, or dizziness. On Amiodarone gtt. Family at bedside. 9/9/22- alert/oriented, lying in bed, no signs of distress, c/o CP earlier with dyspnea when walking, no CP or dyspnea at time of assessment, no n/v, HA or dizziness. Kidney indices stable. Plans to remain in hospital until transferred to [facility]. 9/10/22 - alert and communicating well - breathing better - No N/V, no uremic signs or symptoms - edema is improved   9/11/22 - she complains of increased dyspnea and edema this PM, no chest pain - no N/V   9/12/22- alert/oriented, c/o dyspnea with exertion, on 2L NC, denies CP, n/v, HA, or dizziness. Good UOP with diuresis. 9/13/22- alert/oriented, sitting in chair, no signs of distress, on room air, c/o dyspnea with exertion, denies CP, HA, dizziness, or n/v. Waiting on transfer to [facility]. 9/14/22- alert/oriented, sitting in chair, no signs of distress, on room air, denies dyspnea, CP, n/v, HA, or dizziness. No complaints. 9/15/22- alert/oriented, sitting in chair, no signs of distress, on room air, concerned about weight, she feels it is fluid gain, she is on low sodium diet, weight 206 lb 2 days ago compared to 208 lbs today. No other concerns. Denies dyspnea, CP, n/v, HA, or dizziness.   9/16/22- alert, oriented, sitting in chair, no signs of distress, on coronary angiography performed by Waqar Kowalski MD at [facility]      x1    COLONOSCOPY N/A 7/7/2021    COLONOSCOPY/BMI 48 PT HAS AN ICD performed by Jeremy Gramajo MD at [facility]  03/26/2019    mild incarceration, no bowel removal    HYSTERECTOMY (CERVIX STATUS UNKNOWN)      EDITH-Left ovary intact per pt    LEFT HEART CATH,PERCUTANEOUS  2/13/2013    no intervention    PACEMAKER      ICD    ROTATOR CUFF REPAIR Right     TONSILLECTOMY        Prior to Admission medications    Medication Sig Start Date End Date Taking? Authorizing Provider   cyclobenzaprine (FLEXERIL) 10 MG tablet Take 10 mg by mouth 3 times daily as needed for Muscle spasms. Historical Provider, MD   calcitRIOL (ROCALTROL) 0.25 MCG capsule Take 0.25 mcg by mouth daily. Historical Provider, MD   Cholecalciferol (VITAMIN D3) 50 MCG (2000 UT) TABS Take 1 tablet by mouth daily. Historical Provider, MD   nitroGLYCERIN (NITROSTAT) 0.4 MG SL tablet Place 0.4 mg under the tongue every 5 minutes as needed for Chest pain. Max 3 doses     Historical Provider, MD   dupilumab (DUPIXENT) 300 MG/2ML SOPN injection Inject 300 mg into the skin every 14 days. Patient usually takes medication on Monday or Tuesday. Historical Provider, MD   acetaminophen (TYLENOL) 500 MG tablet Take 1,000 mg by mouth every 6 hours as needed for Pain (breakthrough pain).     Historical Provider, MD   amiodarone (CORDARONE) 200 MG tablet Take 1 tablet by mouth 2 times daily 8/17/22   Magdalena Martinez APRN - CNP   DULoxetine (CYMBALTA) 20 MG extended release capsule Take 1 capsule by mouth daily 8/18/22   ARNOLDO Interiano - CNP   aspirin 81 MG EC tablet Take 81 mg by mouth daily    Ar Automatic Reconciliation   carvedilol (COREG) 6.25 MG tablet Take 6.25 mg by mouth 2 times daily (with meals) 11/23/21   Ar Automatic Reconciliation   vitamin D 25 MCG (1000 UT) CAPS Take by mouth daily  Patient not taking: Reported on 9/6/2022    Ar Automatic Reconciliation   empagliflozin (JARDIANCE) 10 MG tablet Take 10 mg by mouth daily 12/29/21   Ar Automatic Reconciliation   insulin glargine (LANTUS SOLOSTAR) 100 UNIT/ML injection pen Inject 15 Units into the skin nightly 12/22/21   Ar Automatic Reconciliation   isosorbide mononitrate (IMDUR) 30 MG extended release tablet Take 30 mg by mouth daily 11/23/21   Ar Automatic Reconciliation   latanoprost (XALATAN) 0.005 % ophthalmic solution Place 1 drop into both eyes nightly 5/10/21   Ar Automatic Reconciliation   polyethylene glycol (GLYCOLAX) 17 GM/SCOOP powder Take 17 g by mouth daily as needed  Patient not taking: Reported on 9/6/2022 11/14/20   Ar Automatic Reconciliation   pravastatin (PRAVACHOL) 80 MG tablet Take 80 mg by mouth nightly 11/23/21   Ar Automatic Reconciliation   sacubitril-valsartan (ENTRESTO) 49-51 MG per tablet Take 1 tablet by mouth 2 times daily 11/23/21   Ar Automatic Reconciliation   torsemide (DEMADEX) 20 MG tablet Take 40 mg by mouth 2 times daily 8/30/22   Ar Automatic Reconciliation   albuterol sulfate  (90 Base) MCG/ACT inhaler 2 puffs qid prn shortness of breath or wheezing  Patient not taking: Reported on 8/10/2022 11/14/20 8/17/22  Ar Automatic Reconciliation     Allergies   Allergen Reactions    Other Hives and Shortness Of Breath     \"inhaler PRN\"    Penicillin G Itching      Social History     Tobacco Use    Smoking status: Some Days     Packs/day: 0.25     Types: Cigarettes    Smokeless tobacco: Never    Tobacco comments:     Quit smoking: \"every now and then\"   Substance Use Topics    Alcohol use:  Yes     Alcohol/week: 1.0 standard drink      Family History   Problem Relation Age of Onset    Diabetes Other     Heart Disease Father     Hypertension Father     Stroke Father     Heart Attack Father 48        mi    Breast Cancer Sister 37    Hypertension Brother     Heart Disease Brother     Diabetes Brother     Stroke Mother Review of Systems    Negative except for what is mention in HPI above      Objective:       /68   Pulse 63   Temp 98.3 °F (36.8 °C) (Oral)   Resp 17   Ht 4' 10\" (1.473 m)   Wt 207 lb 6.4 oz (94.1 kg)   SpO2 96%   BMI 43.35 kg/m²     09/16 0701 - 09/16 1900  In: 240 [P.O.:240]  Out: 500 [Urine:500]  09/14 1901 - 09/16 0700  In: 858 [P.O.:858]  Out: 4150 [Urine:4150]    /68   Pulse 63   Temp 98.3 °F (36.8 °C) (Oral)   Resp 17   Ht 4' 10\" (1.473 m)   Wt 207 lb 6.4 oz (94.1 kg)   SpO2 96%   BMI 43.35 kg/m²     General:  Alert, cooperative, no respiratory distress on room air, appears stated age. Head:  Normocephalic, without obvious abnormality, atraumatic. Neck: Supple, symmetrical, trachea midline, no JVD. Back:   Symmetric, no curvature. ROM normal. No CVA tenderness. Lungs:   Decreased bases to auscultation bilaterally. Heart:  Regular rate and rhythm, S1, S2 normal, no murmur,  rub or gallop. Abdomen:   Soft, non-tender. Bowel sounds normal   Extremities: Extremities normal, atraumatic, no cyanosis, trace edema. Skin: Skin color, texture, turgor normal. No rashes or lesions. Neurologic: Baseline tremor is present. No asterixis. Data Review:     Recent Results (from the past 24 hour(s))   POCT Glucose    Collection Time: 09/15/22  4:27 PM   Result Value Ref Range    POC Glucose 119 (H) 65 - 100 mg/dL    Performed by:  Michel    POCT Glucose    Collection Time: 09/15/22  8:17 PM   Result Value Ref Range    POC Glucose 125 (H) 65 - 100 mg/dL    Performed by: Val    Basic Metabolic Panel    Collection Time: 09/16/22  5:13 AM   Result Value Ref Range    Sodium 140 136 - 145 mmol/L    Potassium 5.4 (H) 3.5 - 5.1 mmol/L    Chloride 110 101 - 110 mmol/L    CO2 26 21 - 32 mmol/L    Anion Gap 4 4 - 13 mmol/L    Glucose 98 65 - 100 mg/dL    BUN 29 (H) 6 - 23 MG/DL    Creatinine 1.40 (H) 0.6 - 1.0 MG/DL    GFR  50 (L) >60 ml/min/1.73m2    GFR Non-African American 41 (L) >60 ml/min/1.73m2    Calcium 10.0 8.3 - 10.4 MG/DL   POCT Glucose    Collection Time: 09/16/22  7:23 AM   Result Value Ref Range    POC Glucose 105 (H) 65 - 100 mg/dL    Performed by: Oralia Matthews    POCT Glucose    Collection Time: 09/16/22 12:23 PM   Result Value Ref Range    POC Glucose 114 (H) 65 - 100 mg/dL    Performed by: Gabi        CXR;   Results for orders placed during the hospital encounter of 09/06/22    XR CHEST PORTABLE    Narrative  Portable chest xray    COMPARISON: August 10, 2022    INDICATION: chest pain    FINDINGS:    Heart is enlarged. Mediastinal contour is within normal limits. Stable  left-sided cardiac pacer. Increased interstitial prominence, likely vascular  congestion. No pneumothorax. No large pleural effusion. Impression  1. Cardiomegaly and suggestion of vascular congestion. CT Abdomen  Results for orders placed in visit on 03/31/19    CT ABDOMEN PELVIS W IV CONTRAST    Narrative  CT abdomen and pelvis with contrast: 03/30/2019    History: Recent hernia surgery, abdominal distention. Technique: Helically acquired images were obtained from the domes of the  diaphragms to the ischial tuberosities reconstructed at 5mm intervals after the  uneventful administration of oral contrast for bowel opacification and 100 cc's  of intravenous Isovue-370 to evaluate the solid abdominal viscera and vascular  structures. Coronal reformatted images were submitted. Radiation dose reduction techniques were used for this study:  Our CT scanners  use one or all of the following: Automated exposure control, adjustment of the  mA and/or kVp according to patient's size, iterative reconstruction. Comparison: 02/26/2019. CT ABDOMEN: There is mild bilateral lower lobe subsegmental atelectasis and/or  scar.  There is a tiny low-density lesion within the right hepatic lobe, too  small to characterize but statistically benign, unchanged. The spleen, pancreas  and adrenal glands are unremarkable. A cyst within the upper pole left kidney  measures 1.87 m. 2 subcentimeter low-density lesions within the right kidney are  too small to characterize but statistically benign. There is a large defect within the anterior abdominal wall measuring nearly 12  mm in transverse diameter. Most of the small bowel as well as a large portion of  the mid stomach and transverse colon anteriorly large hernia. There is soft  tissue stranding along the lateral abdominal walls, presumably postsurgical. The  the proximal and midportion of the stomach is moderately distended which with  narrowing of the distal stomach has the pylorus traverses back into the  peritoneal cavity. The proximal small bowel is located within the peritoneal  cavity and is dilated with these loops of bowel extending into the large hernia  which also appear mildly dilated. A definite transition zone is unable to be  visualized. No adenopathy is present. There is no free fluid. A drainage catheter is present  along the inferior margin of the hernia. CT PELVIS: A Chamberlain catheter is present within urinary bladder. No adenopathy or  ascites is present. There are no plantar changes. Sigmoid diverticula are  present. Impression  IMPRESSION: Large abdominal wall defect containing many loops of small bowel,  transverse colon and portions of the stomach. Narrowing of the distal stomach as  it enters back into the peritoneal cavity causes partial outlet obstruction. Proximal small bowel is also mildly dilated without a definite transition zone. A small bowel obstruction cannot be excluded.            Principal Problem:    Ventricular tachycardia (HCC)  Active Problems:    Acute on chronic renal failure (HCC)    Left ventricular aneurysm    Type 2 diabetes with nephropathy (HCC)    NICM (nonischemic cardiomyopathy) (HCC)    Dyslipidemia    CKD (chronic kidney disease) stage 4, GFR 15-29 ml/min ([facility] [address]    Morbid obesity ([facility] [address]  Resolved Problems:    * No resolved hospital problems.  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 hypertensive nephrosclerosis - excellent improvement in response to medical management and renal function is better than  baseline  - kidney function improving with stopping diuretic, will monitor  Volume overload - initially with volume depletion, now with mild fluid excess - on Lasix 40 mg BID IV with much improvement,  switch to Torsemide PO as volume status is better controlled  - Torsemide on hold due to worsening kidney indices, trace edema   Hyponatremia - mild, likely due to cardiac disease, improved, will monitor   Hypoalbuminemia - probably due to urinary protein loss - work on supplements  Hyperkalemia- mild, on admission K+ 3.8 with blood draw and 3.0 with POC test, given KCL 10 mEq IV at that time, with mild acidosis, no intervention at this time  - improved with improvement in acidosis, stable, elevated again, recheck in AM, if still elevated, will give Lokelma  Acidosis- stable with sodium bicarb PO, continue,  will monitor   Ventricular tachycardia- LV aneurysm, on Amiodarone gtt, ICD, managed by Cardiology, plans for surgery at [facility]       Plan:     As above     Guillermo De Anda [facility]

## 2022-09-16 NOTE — PROGRESS NOTES
([facility]  Needs weight loss. Dyslipidemia  Continue pravastatin.                  Alexandra Mayers MD

## 2022-09-16 NOTE — PLAN OF CARE
Problem: Safety - Adult  Goal: Free from fall injury  9/16/2022 0824 by Nikole Howell RN  Outcome: Progressing  9/16/2022 0637 by Suleman Alfaro RN  Outcome: Progressing  Flowsheets (Taken 9/16/2022 7070)  Free From Fall Injury:   Instruct family/caregiver on patient safety   Based on caregiver fall risk screen, instruct family/caregiver to ask for assistance with transferring infant if caregiver noted to have fall risk factors     Problem: Pain  Goal: Verbalizes/displays adequate comfort level or baseline comfort level  9/16/2022 0637 by Suleman Alfaro RN  Outcome: Progressing  Flowsheets (Taken 9/16/2022 9646)  Verbalizes/displays adequate comfort level or baseline comfort level:   Encourage patient to monitor pain and request assistance   Assess pain using appropriate pain scale   Administer analgesics based on type and severity of pain and evaluate response   Implement non-pharmacological measures as appropriate and evaluate response   Notify Licensed Independent Practitioner if interventions unsuccessful or patient reports new pain   Consider cultural and social influences on pain and pain management

## 2022-09-16 NOTE — PROGRESS NOTES
CM following for discharge planning. Patient is awaiting transfer to [facility] Dr Deandre Torres per report. CM will continue to follow.

## 2022-09-17 LAB
ANION GAP SERPL CALC-SCNC: 3 MMOL/L (ref 4–13)
BUN SERPL-MCNC: 23 MG/DL (ref 6–23)
CALCIUM SERPL-MCNC: 9.4 MG/DL (ref 8.3–10.4)
CHLORIDE SERPL-SCNC: 110 MMOL/L (ref 101–110)
CO2 SERPL-SCNC: 28 MMOL/L (ref 21–32)
CREAT SERPL-MCNC: 1.4 MG/DL (ref 0.6–1)
GLUCOSE BLD STRIP.AUTO-MCNC: 102 MG/DL (ref 65–100)
GLUCOSE BLD STRIP.AUTO-MCNC: 114 MG/DL (ref 65–100)
GLUCOSE BLD STRIP.AUTO-MCNC: 123 MG/DL (ref 65–100)
GLUCOSE BLD STRIP.AUTO-MCNC: 166 MG/DL (ref 65–100)
GLUCOSE SERPL-MCNC: 117 MG/DL (ref 65–100)
POTASSIUM SERPL-SCNC: 4.5 MMOL/L (ref 3.5–5.1)
SERVICE CMNT-IMP: ABNORMAL
SODIUM SERPL-SCNC: 141 MMOL/L (ref 136–145)

## 2022-09-17 PROCEDURE — 82962 GLUCOSE BLOOD TEST: CPT

## 2022-09-17 PROCEDURE — 80048 BASIC METABOLIC PNL TOTAL CA: CPT

## 2022-09-17 PROCEDURE — 6370000000 HC RX 637 (ALT 250 FOR IP): Performed by: NURSE PRACTITIONER

## 2022-09-17 PROCEDURE — 6370000000 HC RX 637 (ALT 250 FOR IP): Performed by: INTERNAL MEDICINE

## 2022-09-17 PROCEDURE — 99232 SBSQ HOSP IP/OBS MODERATE 35: CPT | Performed by: INTERNAL MEDICINE

## 2022-09-17 PROCEDURE — 6360000002 HC RX W HCPCS: Performed by: NURSE PRACTITIONER

## 2022-09-17 PROCEDURE — 6370000000 HC RX 637 (ALT 250 FOR IP): Performed by: PHYSICIAN ASSISTANT

## 2022-09-17 PROCEDURE — 36415 COLL VENOUS BLD VENIPUNCTURE: CPT

## 2022-09-17 PROCEDURE — 6360000002 HC RX W HCPCS: Performed by: INTERNAL MEDICINE

## 2022-09-17 PROCEDURE — 2580000003 HC RX 258: Performed by: NURSE PRACTITIONER

## 2022-09-17 PROCEDURE — 1100000003 HC PRIVATE W/ TELEMETRY

## 2022-09-17 RX ORDER — SIMETHICONE 80 MG
80 TABLET,CHEWABLE ORAL EVERY 6 HOURS PRN
Status: DISCONTINUED | OUTPATIENT
Start: 2022-09-17 | End: 2022-09-25 | Stop reason: HOSPADM

## 2022-09-17 RX ORDER — MAGNESIUM HYDROXIDE/ALUMINUM HYDROXICE/SIMETHICONE 120; 1200; 1200 MG/30ML; MG/30ML; MG/30ML
30 SUSPENSION ORAL EVERY 6 HOURS PRN
Status: DISCONTINUED | OUTPATIENT
Start: 2022-09-17 | End: 2022-09-25 | Stop reason: HOSPADM

## 2022-09-17 RX ADMIN — AMIODARONE HYDROCHLORIDE 400 MG: 200 TABLET ORAL at 14:48

## 2022-09-17 RX ADMIN — MORPHINE SULFATE 2 MG: 2 INJECTION, SOLUTION INTRAMUSCULAR; INTRAVENOUS at 20:47

## 2022-09-17 RX ADMIN — HEPARIN SODIUM 5000 UNITS: 5000 INJECTION INTRAVENOUS; SUBCUTANEOUS at 14:48

## 2022-09-17 RX ADMIN — SODIUM CHLORIDE, PRESERVATIVE FREE 10 ML: 5 INJECTION INTRAVENOUS at 09:16

## 2022-09-17 RX ADMIN — ALUMINUM HYDROXIDE, MAGNESIUM HYDROXIDE, DIMETHICONE 30 ML: 200; 200; 20 LIQUID ORAL at 04:42

## 2022-09-17 RX ADMIN — SODIUM CHLORIDE, PRESERVATIVE FREE 10 ML: 5 INJECTION INTRAVENOUS at 20:51

## 2022-09-17 RX ADMIN — MORPHINE SULFATE 2 MG: 2 INJECTION, SOLUTION INTRAMUSCULAR; INTRAVENOUS at 06:16

## 2022-09-17 RX ADMIN — ASPIRIN 81 MG: 81 TABLET, COATED ORAL at 09:10

## 2022-09-17 RX ADMIN — HEPARIN SODIUM 5000 UNITS: 5000 INJECTION INTRAVENOUS; SUBCUTANEOUS at 20:48

## 2022-09-17 RX ADMIN — SODIUM BICARBONATE 650 MG TABLET 650 MG: at 20:48

## 2022-09-17 RX ADMIN — AMIODARONE HYDROCHLORIDE 400 MG: 200 TABLET ORAL at 06:12

## 2022-09-17 RX ADMIN — DULOXETINE HYDROCHLORIDE 20 MG: 20 CAPSULE, DELAYED RELEASE ORAL at 09:10

## 2022-09-17 RX ADMIN — INSULIN GLARGINE 7 UNITS: 100 INJECTION, SOLUTION SUBCUTANEOUS at 20:48

## 2022-09-17 RX ADMIN — MORPHINE SULFATE 2 MG: 2 INJECTION, SOLUTION INTRAMUSCULAR; INTRAVENOUS at 10:22

## 2022-09-17 RX ADMIN — VITAMIN D, TAB 1000IU (100/BT) 2000 UNITS: 25 TAB at 09:10

## 2022-09-17 RX ADMIN — PRAVASTATIN SODIUM 80 MG: 80 TABLET ORAL at 20:48

## 2022-09-17 RX ADMIN — HEPARIN SODIUM 5000 UNITS: 5000 INJECTION INTRAVENOUS; SUBCUTANEOUS at 06:12

## 2022-09-17 RX ADMIN — MORPHINE SULFATE 2 MG: 2 INJECTION, SOLUTION INTRAMUSCULAR; INTRAVENOUS at 14:51

## 2022-09-17 RX ADMIN — LATANOPROST 1 DROP: 50 SOLUTION OPHTHALMIC at 20:48

## 2022-09-17 RX ADMIN — SODIUM BICARBONATE 650 MG TABLET 650 MG: at 09:15

## 2022-09-17 RX ADMIN — AMIODARONE HYDROCHLORIDE 400 MG: 200 TABLET ORAL at 20:48

## 2022-09-17 RX ADMIN — DOCUSATE SODIUM 100 MG: 100 CAPSULE, LIQUID FILLED ORAL at 09:10

## 2022-09-17 RX ADMIN — Medication 1 TABLET: at 09:15

## 2022-09-17 ASSESSMENT — PAIN DESCRIPTION - LOCATION
LOCATION: LEG;ARM
LOCATION: BACK
LOCATION: SHOULDER
LOCATION: ARM

## 2022-09-17 ASSESSMENT — PAIN DESCRIPTION - FREQUENCY: FREQUENCY: CONTINUOUS

## 2022-09-17 ASSESSMENT — PAIN SCALES - GENERAL
PAINLEVEL_OUTOF10: 0
PAINLEVEL_OUTOF10: 7
PAINLEVEL_OUTOF10: 0
PAINLEVEL_OUTOF10: 0
PAINLEVEL_OUTOF10: 8
PAINLEVEL_OUTOF10: 0
PAINLEVEL_OUTOF10: 0
PAINLEVEL_OUTOF10: 8
PAINLEVEL_OUTOF10: 0
PAINLEVEL_OUTOF10: 8

## 2022-09-17 ASSESSMENT — PAIN DESCRIPTION - ONSET: ONSET: ON-GOING

## 2022-09-17 ASSESSMENT — PAIN SCALES - WONG BAKER
WONGBAKER_NUMERICALRESPONSE: 0

## 2022-09-17 ASSESSMENT — PAIN DESCRIPTION - ORIENTATION
ORIENTATION: LEFT
ORIENTATION: MID
ORIENTATION: RIGHT
ORIENTATION: RIGHT

## 2022-09-17 ASSESSMENT — PAIN DESCRIPTION - DESCRIPTORS
DESCRIPTORS: SHARP;SHOOTING
DESCRIPTORS: ACHING

## 2022-09-17 ASSESSMENT — PAIN DESCRIPTION - PAIN TYPE
TYPE: CHRONIC PAIN
TYPE: CHRONIC PAIN

## 2022-09-17 ASSESSMENT — PAIN - FUNCTIONAL ASSESSMENT
PAIN_FUNCTIONAL_ASSESSMENT: ACTIVITIES ARE NOT PREVENTED
PAIN_FUNCTIONAL_ASSESSMENT: PREVENTS OR INTERFERES SOME ACTIVE ACTIVITIES AND ADLS

## 2022-09-17 NOTE — PROGRESS NOTES
[employer]    CARDIAC DEFIBRILLATOR PLACEMENT  07/18/2013    Biotronik dual chamber ICD by Dr. Devaughn Collet N/A 7/1/2022    Left heart cath / coronary angiography performed by Lacy Warren MD at [address] [place] [address]      x1    COLONOSCOPY N/A 7/7/2021    COLONOSCOPY/BMI 48 PT HAS AN ICD performed by Alex Pratt MD at [facility]  03/26/2019    mild incarceration, no bowel removal    HYSTERECTOMY (CERVIX STATUS UNKNOWN)      EDITH-Left ovary intact per pt    LEFT HEART CATH,PERCUTANEOUS  2/13/2013    no intervention    PACEMAKER      ICD    ROTATOR CUFF REPAIR Right     TONSILLECTOMY        Prior to Admission medications    Medication Sig Start Date End Date Taking? Authorizing Provider   cyclobenzaprine (FLEXERIL) 10 MG tablet Take 10 mg by mouth 3 times daily as needed for Muscle spasms. Historical Provider, MD   calcitRIOL (ROCALTROL) 0.25 MCG capsule Take 0.25 mcg by mouth daily. Historical Provider, MD   Cholecalciferol (VITAMIN D3) 50 MCG (2000 UT) TABS Take 1 tablet by mouth daily. Historical Provider, MD   nitroGLYCERIN (NITROSTAT) 0.4 MG SL tablet Place 0.4 mg under the tongue every 5 minutes as needed for Chest pain. Max 3 doses     Historical Provider, MD   dupilumab (DUPIXENT) 300 MG/2ML SOPN injection Inject 300 mg into the skin every 14 days. Patient usually takes medication on Monday or Tuesday. Historical Provider, MD   acetaminophen (TYLENOL) 500 MG tablet Take 1,000 mg by mouth every 6 hours as needed for Pain (breakthrough pain).     Historical Provider, MD   amiodarone (CORDARONE) 200 MG tablet Take 1 tablet by mouth 2 times daily 8/17/22   ARNOLDO Garza CNP   DULoxetine (CYMBALTA) 20 MG extended release capsule Take 1 capsule by mouth daily 8/18/22   ARNOLDO Garza CNP   aspirin 81 MG EC tablet Take 81 mg by mouth daily    Ar Automatic Reconciliation   carvedilol (COREG) 6.25 MG Father     Heart Attack Father 48        mi    Breast Cancer Sister 37    Hypertension Brother     Heart Disease Brother     Diabetes Brother     Stroke Mother           Review of Systems    Negative except for what is mention in HPI above      Objective:       /78   Pulse 60   Temp 98.1 °F (36.7 °C) (Temporal)   Resp 20   Ht 4' 10\" (1.473 m)   Wt 204 lb (92.5 kg)   SpO2 95%   BMI 42.64 kg/m²     09/17 0701 - 09/17 1900  In: 120 [P.O.:120]  Out: -   09/15 1901 - 09/17 0700  In: 510 [P.O.:510]  Out: 3750 [Urine:3750]    /78   Pulse 60   Temp 98.1 °F (36.7 °C) (Temporal)   Resp 20   Ht 4' 10\" (1.473 m)   Wt 204 lb (92.5 kg)   SpO2 95%   BMI 42.64 kg/m²     General:  Alert, cooperative, no respiratory distress on room air, appears stated age. Head:  Normocephalic, without obvious abnormality, atraumatic. Neck: Supple, symmetrical, trachea midline, no JVD. Back:   Symmetric, no curvature. ROM normal. No CVA tenderness. Lungs:   Decreased bases to auscultation bilaterally. Heart:  Regular rate and rhythm, S1, S2 normal, no murmur,  rub or gallop. Abdomen:   Soft, non-tender. Bowel sounds normal   Extremities: Extremities normal, atraumatic, no cyanosis, trace edema. Skin: Skin color, texture, turgor normal. No rashes or lesions. Neurologic: Baseline tremor is present. No asterixis. Data Review:     Recent Results (from the past 24 hour(s))   POCT Glucose    Collection Time: 09/16/22 12:23 PM   Result Value Ref Range    POC Glucose 114 (H) 65 - 100 mg/dL    Performed by: TimefulT    POCT Glucose    Collection Time: 09/16/22  4:16 PM   Result Value Ref Range    POC Glucose 127 (H) 65 - 100 mg/dL    Performed by: [employer]    POCT Glucose    Collection Time: 09/16/22  9:05 PM   Result Value Ref Range    POC Glucose 129 (H) 65 - 100 mg/dL    Performed by:  AartiKEATON    Basic Metabolic Panel    Collection Time: 09/17/22  3:57 AM   Result Value Ref Range Sodium 141 136 - 145 mmol/L    Potassium 4.5 3.5 - 5.1 mmol/L    Chloride 110 101 - 110 mmol/L    CO2 28 21 - 32 mmol/L    Anion Gap 3 (L) 4 - 13 mmol/L    Glucose 117 (H) 65 - 100 mg/dL    BUN 23 6 - 23 MG/DL    Creatinine 1.40 (H) 0.6 - 1.0 MG/DL    GFR African American 50 (L) >60 ml/min/1.73m2    GFR Non- 41 (L) >60 ml/min/1.73m2    Calcium 9.4 8.3 - 10.4 MG/DL   POCT Glucose    Collection Time: 09/17/22  7:43 AM   Result Value Ref Range    POC Glucose 102 (H) 65 - 100 mg/dL    Performed by: Mook Tim;   Results for orders placed during the hospital encounter of 09/06/22    XR CHEST PORTABLE    Narrative  Portable chest xray    COMPARISON: August 10, 2022    INDICATION: chest pain    FINDINGS:    Heart is enlarged. Mediastinal contour is within normal limits. Stable  left-sided cardiac pacer. Increased interstitial prominence, likely vascular  congestion. No pneumothorax. No large pleural effusion. Impression  1. Cardiomegaly and suggestion of vascular congestion. CT Abdomen  Results for orders placed in visit on 03/31/19    CT ABDOMEN PELVIS W IV CONTRAST    Narrative  CT abdomen and pelvis with contrast: 03/30/2019    History: Recent hernia surgery, abdominal distention. Technique: Helically acquired images were obtained from the domes of the  diaphragms to the ischial tuberosities reconstructed at 5mm intervals after the  uneventful administration of oral contrast for bowel opacification and 100 cc's  of intravenous Isovue-370 to evaluate the solid abdominal viscera and vascular  structures. Coronal reformatted images were submitted. Radiation dose reduction techniques were used for this study:  Our CT scanners  use one or all of the following: Automated exposure control, adjustment of the  mA and/or kVp according to patient's size, iterative reconstruction. Comparison: 02/26/2019.     CT ABDOMEN: There is mild bilateral lower lobe subsegmental atelectasis and/or  scar. There is a tiny low-density lesion within the right hepatic lobe, too  small to characterize but statistically benign, unchanged. The spleen, pancreas  and adrenal glands are unremarkable. A cyst within the upper pole left kidney  measures 1.87 m. 2 subcentimeter low-density lesions within the right kidney are  too small to characterize but statistically benign. There is a large defect within the anterior abdominal wall measuring nearly 12  mm in transverse diameter. Most of the small bowel as well as a large portion of  the mid stomach and transverse colon anteriorly large hernia. There is soft  tissue stranding along the lateral abdominal walls, presumably postsurgical. The  the proximal and midportion of the stomach is moderately distended which with  narrowing of the distal stomach has the pylorus traverses back into the  peritoneal cavity. The proximal small bowel is located within the peritoneal  cavity and is dilated with these loops of bowel extending into the large hernia  which also appear mildly dilated. A definite transition zone is unable to be  visualized. No adenopathy is present. There is no free fluid. A drainage catheter is present  along the inferior margin of the hernia. CT PELVIS: A Chamberlain catheter is present within urinary bladder. No adenopathy or  ascites is present. There are no plantar changes. Sigmoid diverticula are  present. Impression  IMPRESSION: Large abdominal wall defect containing many loops of small bowel,  transverse colon and portions of the stomach. Narrowing of the distal stomach as  it enters back into the peritoneal cavity causes partial outlet obstruction. Proximal small bowel is also mildly dilated without a definite transition zone. A small bowel obstruction cannot be excluded.            Principal Problem:    Ventricular tachycardia (HCC)  Active Problems:    Acute on chronic renal failure (HCC)    Left ventricular aneurysm    Type 2 diabetes with nephropathy ([facility]    NICM (nonischemic cardiomyopathy) ([address] [facility]    Dyslipidemia    CKD (chronic kidney disease) stage 4, GFR 15-29 ml/min ([facility])    Morbid obesity ([facility]  Resolved Problems:    * No resolved hospital problems.  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 hypertensive nephrosclerosis - excellent improvement in response to medical management and renal function is better than  baseline  - kidney function improving with stopping diuretic, will continue to monitor  Volume overload - initially with volume depletion, now with mild fluid excess - on Lasix 40 mg BID IV with much improvement,  switch to Torsemide PO as volume status is better controlled  - Torsemide on hold due to worsening kidney indices, near euvolemic  Hyponatremia - mild, likely due to cardiac disease, improved, will monitor   Hypoalbuminemia - probably due to urinary protein loss - work on supplements  Hyperkalemia- mild, on admission K+ 3.8 with blood draw and 3.0 with POC test, given KCL 10 mEq IV at that time, with mild acidosis, no intervention at this time  - improved with improvement in acidosis, stable, elevated again, recheck potassium was WNL, no intervention needed, continue low potassium diet  Acidosis- stable with sodium bicarb PO, continue,  will monitor   Ventricular tachycardia- LV aneurysm, on Amiodarone gtt, ICD, managed by Cardiology, plans for surgery at [facility]       Plan:     As above     Derrick Schmitz [facility]

## 2022-09-17 NOTE — PROGRESS NOTES
Bedside shift change report to be given to [facility] (oncoming nurse) by self and Jaqueline Osler (offgoing nurse). Report included the following information Nurse Handoff Report.

## 2022-09-17 NOTE — PROGRESS NOTES
Chamberlain removed per orders at 1600. Patient to notify RN when able to urinate on her own. Patient tolerated well, will monitor.

## 2022-09-17 NOTE — PROGRESS NOTES
[facility] CARDIOLOGY PROGRESS NOTE           9/17/2022 8:45 AM    Admit Date: 9/6/2022      Subjective:   No recurrent ventricular arrhythmias overnight. No chest pain or dyspnea. Feels constipated. ROS:  Cardiovascular:  As noted above    Objective:      Vitals:    09/16/22 2334 09/17/22 0400 09/17/22 0616 09/17/22 0815   BP: 109/69 130/72  127/78   Pulse: 59 59  60   Resp: 20 18 20 20   Temp: 97.3 °F (36.3 °C) 97.9 °F (36.6 °C)  98.1 °F (36.7 °C)   TempSrc: Oral Oral  Temporal   SpO2: 99% 98%  95%   Weight:  204 lb (92.5 kg)     Height:           Physical Exam:  General-No Acute Distress  Neck- supple, no JVD  CV- regular rate and rhythm no MRG  Lung- clear bilaterally  Abd- soft, nontender, nondistended  Ext- no edema bilaterally. Skin- warm and dry      Data Review:   Recent Labs     09/10/22  0557 09/06/22  0103 08/31/22  0947   WBC 6.2 11.0 8.7   HGB 12.3 13.5 14.6   HCT 38.4 41.6 45.2   .1* 98.6* 98.3*   * 123* 133*         Recent Labs     09/17/22  0357 09/07/22  0700 09/06/22  0103      < > 135*   K 4.5   < > 3.8      < > 101   CO2 28   < > 26   BUN 23   < > 50*   CREATININE 1.40*   < > 3.30*   GLUCOSE 117*   < > 348*   CALCIUM 9.4   < > 9.4   PROT  --   --  6.8   LABALBU  --   --  3.6   BILITOT  --   --  0.6   ALKPHOS  --   --  82   AST  --   --  41*   ALT  --   --  55   LABGLOM 41*   < > 15*   GFRAA 50*   < > 18*   GLOB  --   --  3.2    < > = values in this interval not displayed. No results for input(s): CKTOTAL, CKMB, CKMBINDEX, DDIMER, TROPONINI in the last 720 hours. Assessment/Plan:     Active Hospital Problems    Acute on chronic renal failure (HCC)  Improved. Appreciate nephrology assistance. Left ventricular aneurysm  Awaiting transfer to [facility] for aneurysm resection.         Type 2 diabetes with nephropathy (HCC)  Continue insulin      *Ventricular tachycardia (HCC)  Continue Amiodarone 400 mg TID      Morbid obesity ([address]  Needs weight loss. Dyslipidemia  Continue pravastatin.            Nate Kam MD

## 2022-09-17 NOTE — PROGRESS NOTES
Bedside and Verbal shift change report received from [facility]. Report included the following information Nurse Handoff Report, Intake/Output, MAR, and Recent Results.

## 2022-09-18 LAB
ANION GAP SERPL CALC-SCNC: 6 MMOL/L (ref 4–13)
BASOPHILS # BLD: 0.1 K/UL (ref 0–0.2)
BASOPHILS NFR BLD: 1 % (ref 0–2)
BUN SERPL-MCNC: 22 MG/DL (ref 6–23)
CALCIUM SERPL-MCNC: 9.8 MG/DL (ref 8.3–10.4)
CHLORIDE SERPL-SCNC: 109 MMOL/L (ref 101–110)
CO2 SERPL-SCNC: 25 MMOL/L (ref 21–32)
CREAT SERPL-MCNC: 1.4 MG/DL (ref 0.6–1)
DIFFERENTIAL METHOD BLD: ABNORMAL
EOSINOPHIL # BLD: 0.3 K/UL (ref 0–0.8)
EOSINOPHIL NFR BLD: 4 % (ref 0.5–7.8)
ERYTHROCYTE [DISTWIDTH] IN BLOOD BY AUTOMATED COUNT: 13.1 % (ref 11.9–14.6)
GLUCOSE BLD STRIP.AUTO-MCNC: 125 MG/DL (ref 65–100)
GLUCOSE BLD STRIP.AUTO-MCNC: 130 MG/DL (ref 65–100)
GLUCOSE BLD STRIP.AUTO-MCNC: 137 MG/DL (ref 65–100)
GLUCOSE BLD STRIP.AUTO-MCNC: 177 MG/DL (ref 65–100)
GLUCOSE SERPL-MCNC: 112 MG/DL (ref 65–100)
HCT VFR BLD AUTO: 37.6 % (ref 35.8–46.3)
HGB BLD-MCNC: 12 G/DL (ref 11.7–15.4)
IMM GRANULOCYTES # BLD AUTO: 0.1 K/UL (ref 0–0.5)
IMM GRANULOCYTES NFR BLD AUTO: 1 % (ref 0–5)
LYMPHOCYTES # BLD: 2.3 K/UL (ref 0.5–4.6)
LYMPHOCYTES NFR BLD: 29 % (ref 13–44)
MCH RBC QN AUTO: 31.4 PG (ref 26.1–32.9)
MCHC RBC AUTO-ENTMCNC: 31.9 G/DL (ref 31.4–35)
MCV RBC AUTO: 98.4 FL (ref 79.6–97.8)
MONOCYTES # BLD: 0.8 K/UL (ref 0.1–1.3)
MONOCYTES NFR BLD: 10 % (ref 4–12)
NEUTS SEG # BLD: 4.6 K/UL (ref 1.7–8.2)
NEUTS SEG NFR BLD: 55 % (ref 43–78)
NRBC # BLD: 0 K/UL (ref 0–0.2)
PLATELET # BLD AUTO: 160 K/UL (ref 150–450)
PMV BLD AUTO: 11.3 FL (ref 9.4–12.3)
POTASSIUM SERPL-SCNC: 4.6 MMOL/L (ref 3.5–5.1)
RBC # BLD AUTO: 3.82 M/UL (ref 4.05–5.2)
SERVICE CMNT-IMP: ABNORMAL
SODIUM SERPL-SCNC: 140 MMOL/L (ref 136–145)
WBC # BLD AUTO: 8.2 K/UL (ref 4.3–11.1)

## 2022-09-18 PROCEDURE — 6360000002 HC RX W HCPCS: Performed by: INTERNAL MEDICINE

## 2022-09-18 PROCEDURE — 6370000000 HC RX 637 (ALT 250 FOR IP): Performed by: INTERNAL MEDICINE

## 2022-09-18 PROCEDURE — 6370000000 HC RX 637 (ALT 250 FOR IP): Performed by: NURSE PRACTITIONER

## 2022-09-18 PROCEDURE — 2580000003 HC RX 258: Performed by: NURSE PRACTITIONER

## 2022-09-18 PROCEDURE — 82962 GLUCOSE BLOOD TEST: CPT

## 2022-09-18 PROCEDURE — 1100000003 HC PRIVATE W/ TELEMETRY

## 2022-09-18 PROCEDURE — 85025 COMPLETE CBC W/AUTO DIFF WBC: CPT

## 2022-09-18 PROCEDURE — 99232 SBSQ HOSP IP/OBS MODERATE 35: CPT | Performed by: INTERNAL MEDICINE

## 2022-09-18 PROCEDURE — 6360000002 HC RX W HCPCS: Performed by: NURSE PRACTITIONER

## 2022-09-18 PROCEDURE — 36415 COLL VENOUS BLD VENIPUNCTURE: CPT

## 2022-09-18 PROCEDURE — 80048 BASIC METABOLIC PNL TOTAL CA: CPT

## 2022-09-18 RX ADMIN — MORPHINE SULFATE 2 MG: 2 INJECTION, SOLUTION INTRAMUSCULAR; INTRAVENOUS at 09:06

## 2022-09-18 RX ADMIN — LATANOPROST 1 DROP: 50 SOLUTION OPHTHALMIC at 20:37

## 2022-09-18 RX ADMIN — SODIUM BICARBONATE 650 MG TABLET 650 MG: at 08:59

## 2022-09-18 RX ADMIN — AMIODARONE HYDROCHLORIDE 400 MG: 200 TABLET ORAL at 14:12

## 2022-09-18 RX ADMIN — VITAMIN D, TAB 1000IU (100/BT) 2000 UNITS: 25 TAB at 08:59

## 2022-09-18 RX ADMIN — DOCUSATE SODIUM 100 MG: 100 CAPSULE, LIQUID FILLED ORAL at 08:59

## 2022-09-18 RX ADMIN — HEPARIN SODIUM 5000 UNITS: 5000 INJECTION INTRAVENOUS; SUBCUTANEOUS at 14:12

## 2022-09-18 RX ADMIN — HEPARIN SODIUM 5000 UNITS: 5000 INJECTION INTRAVENOUS; SUBCUTANEOUS at 05:22

## 2022-09-18 RX ADMIN — MORPHINE SULFATE 2 MG: 2 INJECTION, SOLUTION INTRAMUSCULAR; INTRAVENOUS at 20:30

## 2022-09-18 RX ADMIN — HEPARIN SODIUM 5000 UNITS: 5000 INJECTION INTRAVENOUS; SUBCUTANEOUS at 20:34

## 2022-09-18 RX ADMIN — ASPIRIN 81 MG: 81 TABLET, COATED ORAL at 08:59

## 2022-09-18 RX ADMIN — INSULIN GLARGINE 7 UNITS: 100 INJECTION, SOLUTION SUBCUTANEOUS at 20:31

## 2022-09-18 RX ADMIN — AMIODARONE HYDROCHLORIDE 400 MG: 200 TABLET ORAL at 05:21

## 2022-09-18 RX ADMIN — SODIUM CHLORIDE, PRESERVATIVE FREE 10 ML: 5 INJECTION INTRAVENOUS at 21:38

## 2022-09-18 RX ADMIN — SODIUM BICARBONATE 650 MG TABLET 650 MG: at 20:30

## 2022-09-18 RX ADMIN — Medication 1 TABLET: at 08:59

## 2022-09-18 RX ADMIN — PRAVASTATIN SODIUM 80 MG: 80 TABLET ORAL at 20:30

## 2022-09-18 RX ADMIN — DULOXETINE HYDROCHLORIDE 20 MG: 20 CAPSULE, DELAYED RELEASE ORAL at 08:59

## 2022-09-18 RX ADMIN — POLYETHYLENE GLYCOL 3350 17 G: 17 POWDER, FOR SOLUTION ORAL at 09:06

## 2022-09-18 RX ADMIN — AMIODARONE HYDROCHLORIDE 400 MG: 200 TABLET ORAL at 20:30

## 2022-09-18 RX ADMIN — SODIUM CHLORIDE, PRESERVATIVE FREE 10 ML: 5 INJECTION INTRAVENOUS at 09:00

## 2022-09-18 RX ADMIN — HYDROCODONE BITARTRATE AND ACETAMINOPHEN 1 TABLET: 7.5; 325 TABLET ORAL at 05:21

## 2022-09-18 RX ADMIN — MORPHINE SULFATE 2 MG: 2 INJECTION, SOLUTION INTRAMUSCULAR; INTRAVENOUS at 15:44

## 2022-09-18 ASSESSMENT — PAIN DESCRIPTION - LOCATION
LOCATION: LEG;SHOULDER
LOCATION: LEG
LOCATION: LEG
LOCATION: LEG;SHOULDER

## 2022-09-18 ASSESSMENT — PAIN DESCRIPTION - DESCRIPTORS
DESCRIPTORS: ACHING
DESCRIPTORS: DISCOMFORT
DESCRIPTORS: PENETRATING
DESCRIPTORS: ACHING

## 2022-09-18 ASSESSMENT — PAIN DESCRIPTION - PAIN TYPE
TYPE: CHRONIC PAIN
TYPE: CHRONIC PAIN

## 2022-09-18 ASSESSMENT — PAIN SCALES - GENERAL
PAINLEVEL_OUTOF10: 8
PAINLEVEL_OUTOF10: 6
PAINLEVEL_OUTOF10: 0
PAINLEVEL_OUTOF10: 0
PAINLEVEL_OUTOF10: 7
PAINLEVEL_OUTOF10: 8
PAINLEVEL_OUTOF10: 0

## 2022-09-18 ASSESSMENT — PAIN DESCRIPTION - ONSET
ONSET: ON-GOING
ONSET: ON-GOING

## 2022-09-18 ASSESSMENT — PAIN SCALES - WONG BAKER
WONGBAKER_NUMERICALRESPONSE: 4
WONGBAKER_NUMERICALRESPONSE: 0

## 2022-09-18 ASSESSMENT — PAIN DESCRIPTION - ORIENTATION
ORIENTATION: LEFT

## 2022-09-18 ASSESSMENT — PAIN DESCRIPTION - FREQUENCY
FREQUENCY: CONTINUOUS
FREQUENCY: CONTINUOUS

## 2022-09-18 NOTE — PROGRESS NOTES
[facility] CARDIOLOGY PROGRESS NOTE           9/18/2022 8:31 AM    Admit Date: 9/6/2022      Subjective:   No recurrent ventricular arrhythmias overnight. No chest pain or dyspnea. BP controlled. ROS:  Cardiovascular:  As noted above    Objective:      Vitals:    09/17/22 2037 09/18/22 0040 09/18/22 0454 09/18/22 0753   BP: (!) 126/59 125/66 (!) 126/54 128/69   Pulse: 59 60 59 60   Resp: 19 21 20    Temp: 97.9 °F (36.6 °C) 97.7 °F (36.5 °C) 97.9 °F (36.6 °C)    TempSrc: Oral Oral Oral    SpO2: 95% 96% 100% 97%   Weight:   206 lb 12.8 oz (93.8 kg)    Height:           Physical Exam:  General-No Acute Distress  Neck- supple, no JVD  CV- regular rate and rhythm no MRG  Lung- clear bilaterally  Abd- soft, nontender, nondistended  Ext- no edema bilaterally. Skin- warm and dry      Data Review:   Recent Labs     09/18/22  0449 09/10/22  0557 09/06/22  0103   WBC 8.2 6.2 11.0   HGB 12.0 12.3 13.5   HCT 37.6 38.4 41.6   MCV 98.4* 101.1* 98.6*    115* 123*         Recent Labs     09/18/22 0449 09/07/22  0700 09/06/22  0103      < > 135*   K 4.6   < > 3.8      < > 101   CO2 25   < > 26   BUN 22   < > 50*   CREATININE 1.40*   < > 3.30*   GLUCOSE 112*   < > 348*   CALCIUM 9.8   < > 9.4   PROT  --   --  6.8   LABALBU  --   --  3.6   BILITOT  --   --  0.6   ALKPHOS  --   --  82   AST  --   --  41*   ALT  --   --  55   LABGLOM 41*   < > 15*   GFRAA 50*   < > 18*   GLOB  --   --  3.2    < > = values in this interval not displayed. No results for input(s): CKTOTAL, CKMB, CKMBINDEX, DDIMER, TROPONINI in the last 720 hours. Assessment/Plan:     Active Hospital Problems    Acute on chronic renal failure (HCC)  Improved. Appreciate nephrology assistance. Left ventricular aneurysm  Awaiting transfer to [facility] for aneurysm resection.         Type 2 diabetes with nephropathy (HCC)  Continue insulin      *Ventricular tachycardia (HCC)  Continue Amiodarone 400 mg TID      Morbid obesity ([address]  Needs weight loss. Dyslipidemia  Continue pravastatin.            Mundo Giles MD

## 2022-09-18 NOTE — PROGRESS NOTES
Bedside and Verbal shift change report received from [facility]. Report included the following information Nurse Handoff Report, Intake/Output, MAR, and Recent Results.

## 2022-09-18 NOTE — PROGRESS NOTES
RENAL PROGRESS NOTE    Subjective:     Patient is a 60year old female with Chronic Kidney Disease with baseline creatinine 2-2.2 followed by me in the office setting is admitted with V tach. She feels much better since admission and her heart rate is now normal.   She has had prior SNEHA on CKD episodes, IDDM, morbid obesity s/p DC ICD. She underwent defibrillated in the ED and was placed on amiodarone IV. Her crea was 1.8 on 8/31/22 and increased to 3.3 on admission, and improved to 1.6 today. No vomiting, no CP, dyspnea is controlled with nasal O2.  9/8/22- alert/oriented x3, sitting on side of bed, no signs of distress, on room air, denies dyspnea, CP, n/v, HA, or dizziness. On Amiodarone gtt. Family at bedside. 9/9/22- alert/oriented, lying in bed, no signs of distress, c/o CP earlier with dyspnea when walking, no CP or dyspnea at time of assessment, no n/v, HA or dizziness. Kidney indices stable. Plans to remain in hospital until transferred to [facility] [place]. 9/10/22 - alert and communicating well - breathing better - No N/V, no uremic signs or symptoms - edema is improved   9/11/22 - she complains of increased dyspnea and edema this PM, no chest pain - no N/V   9/12/22- alert/oriented, c/o dyspnea with exertion, on 2L NC, denies CP, n/v, HA, or dizziness. Good UOP with diuresis. 9/13/22- alert/oriented, sitting in chair, no signs of distress, on room air, c/o dyspnea with exertion, denies CP, HA, dizziness, or n/v. Waiting on transfer to [facility] [place]. 9/14/22- alert/oriented, sitting in chair, no signs of distress, on room air, denies dyspnea, CP, n/v, HA, or dizziness. No complaints. 9/15/22- alert/oriented, sitting in chair, no signs of distress, on room air, concerned about weight, she feels it is fluid gain, she is on low sodium diet, weight 206 lb 2 days ago compared to 208 lbs today. No other concerns. Denies dyspnea, CP, n/v, HA, or dizziness.   9/16/22- alert, oriented, sitting in chair, no signs of distress, on History:   Procedure Laterality Date    ABLATION OF DYSRHYTHMIC FOCUS  \"years ago\"    CARDIAC DEFIBRILLATOR PLACEMENT  07/18/2013    Biotronik dual chamber ICD by Dr. Michelle Will  07/18/2013    Biotronik dual chamber ICD by Dr. Sven Vargas N/A 7/1/2022    Left heart cath / coronary angiography performed by Taylor Edward MD at [facility]      x1    COLONOSCOPY N/A 7/7/2021    COLONOSCOPY/BMI 48 PT HAS AN ICD performed by Chris Lezama MD at [facility]  03/26/2019    mild incarceration, no bowel removal    HYSTERECTOMY (CERVIX STATUS UNKNOWN)      EDITH-Left ovary intact per pt    LEFT HEART CATH,PERCUTANEOUS  2/13/2013    no intervention    PACEMAKER      ICD    ROTATOR CUFF REPAIR Right     TONSILLECTOMY        Prior to Admission medications    Medication Sig Start Date End Date Taking? Authorizing Provider   cyclobenzaprine (FLEXERIL) 10 MG tablet Take 10 mg by mouth 3 times daily as needed for Muscle spasms. Historical Provider, MD   calcitRIOL (ROCALTROL) 0.25 MCG capsule Take 0.25 mcg by mouth daily. Historical Provider, MD   Cholecalciferol (VITAMIN D3) 50 MCG (2000 UT) TABS Take 1 tablet by mouth daily. Historical Provider, MD   nitroGLYCERIN (NITROSTAT) 0.4 MG SL tablet Place 0.4 mg under the tongue every 5 minutes as needed for Chest pain. Max 3 doses     Historical Provider, MD   dupilumab (DUPIXENT) 300 MG/2ML SOPN injection Inject 300 mg into the skin every 14 days. Patient usually takes medication on Monday or Tuesday. Historical Provider, MD   acetaminophen (TYLENOL) 500 MG tablet Take 1,000 mg by mouth every 6 hours as needed for Pain (breakthrough pain).     Historical Provider, MD   amiodarone (CORDARONE) 200 MG tablet Take 1 tablet by mouth 2 times daily 8/17/22   ARNOLDO Gardner - CNP   DULoxetine (CYMBALTA) 20 MG extended release capsule Take 1 capsule by mouth daily 8/18/22   Maggy Bullard, APRN - CNP   aspirin 81 MG EC tablet Take 81 mg by mouth daily    Ar Automatic Reconciliation   carvedilol (COREG) 6.25 MG tablet Take 6.25 mg by mouth 2 times daily (with meals) 11/23/21   Ar Automatic Reconciliation   vitamin D 25 MCG (1000 UT) CAPS Take by mouth daily  Patient not taking: Reported on 9/6/2022    Ar Automatic Reconciliation   empagliflozin (JARDIANCE) 10 MG tablet Take 10 mg by mouth daily 12/29/21   Ar Automatic Reconciliation   insulin glargine (LANTUS SOLOSTAR) 100 UNIT/ML injection pen Inject 15 Units into the skin nightly 12/22/21   Ar Automatic Reconciliation   isosorbide mononitrate (IMDUR) 30 MG extended release tablet Take 30 mg by mouth daily 11/23/21   Ar Automatic Reconciliation   latanoprost (XALATAN) 0.005 % ophthalmic solution Place 1 drop into both eyes nightly 5/10/21   Ar Automatic Reconciliation   polyethylene glycol (GLYCOLAX) 17 GM/SCOOP powder Take 17 g by mouth daily as needed  Patient not taking: Reported on 9/6/2022 11/14/20   Ar Automatic Reconciliation   pravastatin (PRAVACHOL) 80 MG tablet Take 80 mg by mouth nightly 11/23/21   Ar Automatic Reconciliation   sacubitril-valsartan (ENTRESTO) 49-51 MG per tablet Take 1 tablet by mouth 2 times daily 11/23/21   Ar Automatic Reconciliation   torsemide (DEMADEX) 20 MG tablet Take 40 mg by mouth 2 times daily 8/30/22   Ar Automatic Reconciliation   albuterol sulfate  (90 Base) MCG/ACT inhaler 2 puffs qid prn shortness of breath or wheezing  Patient not taking: Reported on 8/10/2022 11/14/20 8/17/22  Ar Automatic Reconciliation     Allergies   Allergen Reactions    Other Hives and Shortness Of Breath     \"inhaler PRN\"    Penicillin G Itching      Social History     Tobacco Use    Smoking status: Some Days     Packs/day: 0.25     Types: Cigarettes    Smokeless tobacco: Never    Tobacco comments:     Quit smoking: \"every now and then\"   Substance Use Topics    Alcohol use:  Yes Alcohol/week: 1.0 standard drink      Family History   Problem Relation Age of Onset    Diabetes Other     Heart Disease Father     Hypertension Father     Stroke Father     Heart Attack Father 48        mi    Breast Cancer Sister 37    Hypertension Brother     Heart Disease Brother     Diabetes Brother     Stroke Mother           Review of Systems    Negative except for what is mention in HPI above      Objective:       /69   Pulse 60   Temp 97.9 °F (36.6 °C) (Oral)   Resp 20   Ht 4' 10\" (1.473 m)   Wt 206 lb 12.8 oz (93.8 kg)   SpO2 97%   BMI 43.22 kg/m²     No intake/output data recorded. 09/16 1901 - 09/18 0700  In: 480 [P.O.:480]  Out: 2550 [Urine:2550]    /69   Pulse 60   Temp 97.9 °F (36.6 °C) (Oral)   Resp 20   Ht 4' 10\" (1.473 m)   Wt 206 lb 12.8 oz (93.8 kg)   SpO2 97%   BMI 43.22 kg/m²     General:  Alert, cooperative, no respiratory distress on room air, appears stated age. Head:  Normocephalic, without obvious abnormality, atraumatic. Neck: Supple, symmetrical, trachea midline, no JVD. Back:   Symmetric, no curvature. ROM normal. No CVA tenderness. Lungs:   Decreased bases to auscultation bilaterally. Heart:  Regular rate and rhythm, S1, S2 normal, no murmur,  rub or gallop. Abdomen:   Soft, non-tender. Bowel sounds normal   Extremities: Extremities normal, atraumatic, no cyanosis, trace edema. Skin: Skin color, texture, turgor normal. No rashes or lesions. Neurologic: Baseline tremor is present. No asterixis.          Data Review:     Recent Results (from the past 24 hour(s))   POCT Glucose    Collection Time: 09/17/22 11:30 AM   Result Value Ref Range    POC Glucose 114 (H) 65 - 100 mg/dL    Performed by: Gabriella De Dios    POCT Glucose    Collection Time: 09/17/22  4:00 PM   Result Value Ref Range    POC Glucose 123 (H) 65 - 100 mg/dL    Performed by: Marval Holter    POCT Glucose    Collection Time: 09/17/22  8:38 PM   Result Value Ref Range    POC Glucose 166 (H) 65 - 100 mg/dL    [facility]formed by: [facility]    Basic Metabolic Panel    Collection Time: 09/18/22  4:49 AM   Result Value Ref Range    Sodium 140 136 - 145 mmol/L    Potassium 4.6 3.5 - 5.1 mmol/L    Chloride 109 101 - 110 mmol/L    CO2 25 21 - 32 mmol/L    Anion Gap 6 4 - 13 mmol/L    Glucose 112 (H) 65 - 100 mg/dL    BUN 22 6 - 23 MG/DL    Creatinine 1.40 (H) 0.6 - 1.0 MG/DL    GFR African American 50 (L) >60 ml/min/1.73m2    GFR Non- 41 (L) >60 ml/min/1.73m2    Calcium 9.8 8.3 - 10.4 MG/DL   CBC with Auto Differential    Collection Time: 09/18/22  4:49 AM   Result Value Ref Range    WBC 8.2 4.3 - 11.1 K/uL    RBC 3.82 (L) 4.05 - 5.2 M/uL    Hemoglobin 12.0 11.7 - 15.4 g/dL    Hematocrit 37.6 35.8 - 46.3 %    MCV 98.4 (H) 79.6 - 97.8 FL    MCH 31.4 26.1 - 32.9 PG    MCHC 31.9 31.4 - 35.0 g/dL    RDW 13.1 11.9 - 14.6 %    Platelets 836 096 - 858 K/uL    MPV 11.3 9.4 - 12.3 FL    nRBC 0.00 0.0 - 0.2 K/uL    Differential Type AUTOMATED      Seg Neutrophils 55 43 - 78 %    Lymphocytes 29 13 - 44 %    Monocytes 10 4.0 - 12.0 %    Eosinophils % 4 0.5 - 7.8 %    Basophils 1 0.0 - 2.0 %    Immature Granulocytes 1 0.0 - 5.0 %    Segs Absolute 4.6 1.7 - 8.2 K/UL    Absolute Lymph # 2.3 0.5 - 4.6 K/UL    Absolute Mono # 0.8 0.1 - 1.3 K/UL    Absolute Eos # 0.3 0.0 - 0.8 K/UL    Basophils Absolute 0.1 0.0 - 0.2 K/UL    Absolute Immature Granulocyte 0.1 0.0 - 0.5 K/UL   POCT Glucose    Collection Time: 09/18/22  7:54 AM   Result Value Ref Range    POC Glucose 125 (H) 65 - 100 mg/dL    Performed by: Zenobia Covarrubias;   Results for orders placed during the hospital encounter of 09/06/22    XR CHEST PORTABLE    Narrative  Portable chest xray    COMPARISON: August 10, 2022    INDICATION: chest pain    FINDINGS:    Heart is enlarged. Mediastinal contour is within normal limits. Stable  left-sided cardiac pacer. Increased interstitial prominence, likely vascular  congestion.  No pneumothorax. No large pleural effusion. Impression  1. Cardiomegaly and suggestion of vascular congestion. CT Abdomen  Results for orders placed in visit on 03/31/19    CT ABDOMEN PELVIS W IV CONTRAST    Narrative  CT abdomen and pelvis with contrast: 03/30/2019    History: Recent hernia surgery, abdominal distention. Technique: Helically acquired images were obtained from the domes of the  diaphragms to the ischial tuberosities reconstructed at 5mm intervals after the  uneventful administration of oral contrast for bowel opacification and 100 cc's  of intravenous Isovue-370 to evaluate the solid abdominal viscera and vascular  structures. Coronal reformatted images were submitted. Radiation dose reduction techniques were used for this study:  Our CT scanners  use one or all of the following: Automated exposure control, adjustment of the  mA and/or kVp according to patient's size, iterative reconstruction. Comparison: 02/26/2019. CT ABDOMEN: There is mild bilateral lower lobe subsegmental atelectasis and/or  scar. There is a tiny low-density lesion within the right hepatic lobe, too  small to characterize but statistically benign, unchanged. The spleen, pancreas  and adrenal glands are unremarkable. A cyst within the upper pole left kidney  measures 1.87 m. 2 subcentimeter low-density lesions within the right kidney are  too small to characterize but statistically benign. There is a large defect within the anterior abdominal wall measuring nearly 12  mm in transverse diameter. Most of the small bowel as well as a large portion of  the mid stomach and transverse colon anteriorly large hernia. There is soft  tissue stranding along the lateral abdominal walls, presumably postsurgical. The  the proximal and midportion of the stomach is moderately distended which with  narrowing of the distal stomach has the pylorus traverses back into the  peritoneal cavity.  The proximal small bowel is located within the peritoneal  cavity and is dilated with these loops of bowel extending into the large hernia  which also appear mildly dilated. A definite transition zone is unable to be  visualized. No adenopathy is present. There is no free fluid. A drainage catheter is present  along the inferior margin of the hernia. CT PELVIS: A Chamberlain catheter is present within urinary bladder. No adenopathy or  ascites is present. There are no plantar changes. Sigmoid diverticula are  present. Impression  IMPRESSION: Large abdominal wall defect containing many loops of small bowel,  transverse colon and portions of the stomach. Narrowing of the distal stomach as  it enters back into the peritoneal cavity causes partial outlet obstruction. Proximal small bowel is also mildly dilated without a definite transition zone. A small bowel obstruction cannot be excluded. Principal Problem:    Ventricular tachycardia ([facility])  Active Problems:    Acute on chronic renal failure ([facility])    Left ventricular aneurysm    Type 2 diabetes with nephropathy ([facility])    NICM (nonischemic cardiomyopathy) ([facility])    Dyslipidemia    CKD (chronic kidney disease) stage 4, GFR 15-29 ml/min ([facility])    Morbid obesity ([address]  Resolved Problems:    * No resolved hospital problems.  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 hypertensive nephrosclerosis - excellent improvement in response to medical management and renal function is better than  baseline  - kidney function stable last three days, this maybe her new baseline, will monitor  Volume overload - initially with volume depletion, now with mild fluid excess - on Lasix 40 mg BID IV with much improvement,  switch to Torsemide PO as volume status is better controlled  - Torsemide on hold due to worsening kidney indices, near euvolemic, good UOP  Hyponatremia - mild, likely due to cardiac disease, improved, will monitor   Hypoalbuminemia - probably due to urinary protein loss - work on supplements  Hyperkalemia- mild, on admission K+ 3.8 with blood draw and 3.0 with POC test, given KCL 10 mEq IV at that time, with mild acidosis, no intervention at this time  - improved with improvement in acidosis, continue low potassium diet  Acidosis- stable with sodium bicarb PO, continue,  will monitor   Ventricular tachycardia- LV aneurysm, on Amiodarone gtt, ICD, managed by Cardiology, plans for surgery at [address]:     As above     Olivier Avalos, [address] [facility]

## 2022-09-18 NOTE — PROGRESS NOTES
Bedside and Verbal shift change report to be given to Mana Love RN (oncoming nurse) by self (offgoing nurse). Report included the following information Nurse Handoff Report, Intake/Output, MAR, and Recent Results.

## 2022-09-19 LAB
ANION GAP SERPL CALC-SCNC: 2 MMOL/L (ref 4–13)
BUN SERPL-MCNC: 26 MG/DL (ref 6–23)
CALCIUM SERPL-MCNC: 9.9 MG/DL (ref 8.3–10.4)
CHLORIDE SERPL-SCNC: 109 MMOL/L (ref 101–110)
CO2 SERPL-SCNC: 28 MMOL/L (ref 21–32)
CREAT SERPL-MCNC: 1.6 MG/DL (ref 0.6–1)
GLUCOSE BLD STRIP.AUTO-MCNC: 106 MG/DL (ref 65–100)
GLUCOSE BLD STRIP.AUTO-MCNC: 117 MG/DL (ref 65–100)
GLUCOSE BLD STRIP.AUTO-MCNC: 131 MG/DL (ref 65–100)
GLUCOSE BLD STRIP.AUTO-MCNC: 98 MG/DL (ref 65–100)
GLUCOSE SERPL-MCNC: 136 MG/DL (ref 65–100)
POTASSIUM SERPL-SCNC: 4.4 MMOL/L (ref 3.5–5.1)
SERVICE CMNT-IMP: ABNORMAL
SERVICE CMNT-IMP: NORMAL
SODIUM SERPL-SCNC: 139 MMOL/L (ref 136–145)

## 2022-09-19 PROCEDURE — 1100000003 HC PRIVATE W/ TELEMETRY

## 2022-09-19 PROCEDURE — 6370000000 HC RX 637 (ALT 250 FOR IP): Performed by: INTERNAL MEDICINE

## 2022-09-19 PROCEDURE — 6370000000 HC RX 637 (ALT 250 FOR IP): Performed by: NURSE PRACTITIONER

## 2022-09-19 PROCEDURE — 80048 BASIC METABOLIC PNL TOTAL CA: CPT

## 2022-09-19 PROCEDURE — 6360000002 HC RX W HCPCS: Performed by: NURSE PRACTITIONER

## 2022-09-19 PROCEDURE — 82962 GLUCOSE BLOOD TEST: CPT

## 2022-09-19 PROCEDURE — 6360000002 HC RX W HCPCS: Performed by: INTERNAL MEDICINE

## 2022-09-19 PROCEDURE — 99232 SBSQ HOSP IP/OBS MODERATE 35: CPT | Performed by: INTERNAL MEDICINE

## 2022-09-19 PROCEDURE — 36415 COLL VENOUS BLD VENIPUNCTURE: CPT

## 2022-09-19 PROCEDURE — 2580000003 HC RX 258: Performed by: NURSE PRACTITIONER

## 2022-09-19 RX ADMIN — MORPHINE SULFATE 2 MG: 2 INJECTION, SOLUTION INTRAMUSCULAR; INTRAVENOUS at 06:00

## 2022-09-19 RX ADMIN — Medication 1 TABLET: at 08:49

## 2022-09-19 RX ADMIN — AMIODARONE HYDROCHLORIDE 400 MG: 200 TABLET ORAL at 13:34

## 2022-09-19 RX ADMIN — HEPARIN SODIUM 5000 UNITS: 5000 INJECTION INTRAVENOUS; SUBCUTANEOUS at 06:01

## 2022-09-19 RX ADMIN — LORAZEPAM 0.5 MG: 0.5 TABLET ORAL at 20:00

## 2022-09-19 RX ADMIN — SODIUM BICARBONATE 650 MG TABLET 650 MG: at 08:48

## 2022-09-19 RX ADMIN — POLYETHYLENE GLYCOL 3350 17 G: 17 POWDER, FOR SOLUTION ORAL at 11:47

## 2022-09-19 RX ADMIN — LORAZEPAM 0.5 MG: 0.5 TABLET ORAL at 13:34

## 2022-09-19 RX ADMIN — ONDANSETRON 4 MG: 2 INJECTION INTRAMUSCULAR; INTRAVENOUS at 20:12

## 2022-09-19 RX ADMIN — MORPHINE SULFATE 2 MG: 2 INJECTION, SOLUTION INTRAMUSCULAR; INTRAVENOUS at 20:00

## 2022-09-19 RX ADMIN — ASPIRIN 81 MG: 81 TABLET, COATED ORAL at 08:48

## 2022-09-19 RX ADMIN — HEPARIN SODIUM 5000 UNITS: 5000 INJECTION INTRAVENOUS; SUBCUTANEOUS at 20:00

## 2022-09-19 RX ADMIN — DOCUSATE SODIUM 100 MG: 100 CAPSULE, LIQUID FILLED ORAL at 08:48

## 2022-09-19 RX ADMIN — ONDANSETRON 4 MG: 2 INJECTION INTRAMUSCULAR; INTRAVENOUS at 13:34

## 2022-09-19 RX ADMIN — HEPARIN SODIUM 5000 UNITS: 5000 INJECTION INTRAVENOUS; SUBCUTANEOUS at 13:34

## 2022-09-19 RX ADMIN — DULOXETINE HYDROCHLORIDE 20 MG: 20 CAPSULE, DELAYED RELEASE ORAL at 08:48

## 2022-09-19 RX ADMIN — LATANOPROST 1 DROP: 50 SOLUTION OPHTHALMIC at 20:03

## 2022-09-19 RX ADMIN — SODIUM CHLORIDE, PRESERVATIVE FREE 10 ML: 5 INJECTION INTRAVENOUS at 20:18

## 2022-09-19 RX ADMIN — HYDROCODONE BITARTRATE AND ACETAMINOPHEN 1 TABLET: 7.5; 325 TABLET ORAL at 08:47

## 2022-09-19 RX ADMIN — VITAMIN D, TAB 1000IU (100/BT) 2000 UNITS: 25 TAB at 08:48

## 2022-09-19 RX ADMIN — SODIUM CHLORIDE, PRESERVATIVE FREE 10 ML: 5 INJECTION INTRAVENOUS at 08:48

## 2022-09-19 RX ADMIN — SODIUM BICARBONATE 650 MG TABLET 650 MG: at 20:00

## 2022-09-19 RX ADMIN — MORPHINE SULFATE 2 MG: 2 INJECTION, SOLUTION INTRAMUSCULAR; INTRAVENOUS at 11:48

## 2022-09-19 RX ADMIN — INSULIN GLARGINE 7 UNITS: 100 INJECTION, SOLUTION SUBCUTANEOUS at 21:00

## 2022-09-19 RX ADMIN — AMIODARONE HYDROCHLORIDE 400 MG: 200 TABLET ORAL at 20:00

## 2022-09-19 RX ADMIN — AMIODARONE HYDROCHLORIDE 400 MG: 200 TABLET ORAL at 06:01

## 2022-09-19 RX ADMIN — PRAVASTATIN SODIUM 80 MG: 80 TABLET ORAL at 20:00

## 2022-09-19 ASSESSMENT — PAIN DESCRIPTION - ORIENTATION
ORIENTATION: LEFT
ORIENTATION: LEFT;LOWER;POSTERIOR
ORIENTATION: LEFT
ORIENTATION: POSTERIOR

## 2022-09-19 ASSESSMENT — PAIN DESCRIPTION - FREQUENCY: FREQUENCY: CONTINUOUS

## 2022-09-19 ASSESSMENT — PAIN DESCRIPTION - ONSET: ONSET: ON-GOING

## 2022-09-19 ASSESSMENT — PAIN SCALES - GENERAL
PAINLEVEL_OUTOF10: 0
PAINLEVEL_OUTOF10: 9
PAINLEVEL_OUTOF10: 5
PAINLEVEL_OUTOF10: 8
PAINLEVEL_OUTOF10: 10
PAINLEVEL_OUTOF10: 0
PAINLEVEL_OUTOF10: 6

## 2022-09-19 ASSESSMENT — PAIN DESCRIPTION - DESCRIPTORS
DESCRIPTORS: ACHING;SHOOTING
DESCRIPTORS: ACHING;DISCOMFORT
DESCRIPTORS: SHOOTING

## 2022-09-19 ASSESSMENT — PAIN DESCRIPTION - LOCATION
LOCATION: LEG
LOCATION: LEG;BACK

## 2022-09-19 ASSESSMENT — PAIN SCALES - WONG BAKER: WONGBAKER_NUMERICALRESPONSE: 0

## 2022-09-19 ASSESSMENT — PAIN DESCRIPTION - PAIN TYPE: TYPE: CHRONIC PAIN

## 2022-09-19 ASSESSMENT — PAIN - FUNCTIONAL ASSESSMENT: PAIN_FUNCTIONAL_ASSESSMENT: ACTIVITIES ARE NOT PREVENTED

## 2022-09-19 NOTE — PROGRESS NOTES
RENAL PROGRESS NOTE    Subjective:     Patient is a 60year old female with Chronic Kidney Disease with baseline creatinine 2-2.2 followed by me in the office setting is admitted with V tach. She feels much better since admission and her heart rate is now normal.   She has had prior SNEHA on CKD episodes, IDDM, morbid obesity s/p DC ICD. She underwent defibrillated in the ED and was placed on amiodarone IV. Her crea was 1.8 on 8/31/22 and increased to 3.3 on admission, and improved to 1.6 today. No vomiting, no CP, dyspnea is controlled with nasal O2.  9/8/22- alert/oriented x3, sitting on side of bed, no signs of distress, on room air, denies dyspnea, CP, n/v, HA, or dizziness. On Amiodarone gtt. Family at bedside. 9/9/22- alert/oriented, lying in bed, no signs of distress, c/o CP earlier with dyspnea when walking, no CP or dyspnea at time of assessment, no n/v, HA or dizziness. Kidney indices stable. Plans to remain in hospital until transferred to [facility]. 9/10/22 - alert and communicating well - breathing better - No N/V, no uremic signs or symptoms - edema is improved   9/11/22 - she complains of increased dyspnea and edema this PM, no chest pain - no N/V   9/12/22- alert/oriented, c/o dyspnea with exertion, on 2L NC, denies CP, n/v, HA, or dizziness. Good UOP with diuresis. 9/13/22- alert/oriented, sitting in chair, no signs of distress, on room air, c/o dyspnea with exertion, denies CP, HA, dizziness, or n/v. Waiting on transfer to [facility]. 9/14/22- alert/oriented, sitting in chair, no signs of distress, on room air, denies dyspnea, CP, n/v, HA, or dizziness. No complaints. 9/15/22- alert/oriented, sitting in chair, no signs of distress, on room air, concerned about weight, she feels it is fluid gain, she is on low sodium diet, weight 206 lb 2 days ago compared to 208 lbs today. No other concerns. Denies dyspnea, CP, n/v, HA, or dizziness.   9/16/22- alert, oriented, sitting in chair, no signs of distress, on room air, no complaints. On 2000 ml fluid restriction. 9/17/22- alert/oriented, lying in bed, no signs of distress, on room air, c/o dyspnea with exertion, denies CP, n/v, HA, or dizziness. Had BM this AM.   9/18/22-alert/oriented, sitting up in bed, no signs of distress, on room air, c/o dyspnea with exertion, at time of assessment denies dyspnea or CP, no other complaints. 9/19/22-alert/oriented, lying in bed, no signs of distress, on room air, no complaints, last BM was yesterday. Still waiting on bed at [facility]. Past Medical History:   Diagnosis Date    Abdominal wall hernia 10/2/2018    Acute hypoxemic respiratory failure ([address] 4/3/2019    Allergic rhinitis     followed by allergist; allergic to grass- has rescue inhaler PRN    ARDS (adult respiratory distress syndrome) ([address] 4/3/2019    Arthritis     Automatic implantable cardioverter-defibrillator in situ 3/30/2016    CAD in native artery 3/30/2016    Chronic kidney disease     Chronic systolic heart failure ([address] 07/18/2013    ECHO 2017- EF 39%; managed with medications; [place]bas [place] [facility]    CKD (chronic kidney disease)     [facility] follows    Diabetes ([address]     type 2 (on insulin);  FBS AM range- , hypo s/s <70, hgba1c- 7.9    Diabetes mellitus ([address] 4/12/2010    Dyslipidemia 2/13/2013    Eczema     Fibromyalgia     GERD (gastroesophageal reflux disease)     hx of- no meds currently    Headache     Heart failure (HCC)     chronic systolic with EF 65%; non-ischemic cardiomyopathy    HTN (hypertension) 4/12/2010    Hypercholesterolemia     Incarcerated incisional hernia 3/26/2019    Morbid obesity ([address]     Neuropathy     bilateral feet and tips of fingers    NICM (nonischemic cardiomyopathy) ([address] 2/15/2013    Obesity     bmi- 39    Obstructive sleep apnea (adult) (pediatric) 07/14/2014    uses cpap qhs    Pseudomonas urinary tract infection 4/5/2019    Sciatic pain 5/23/2016    Severe persistent asthma with acute exacerbation 8/24/2020    SVT (supraventricular tachycardia) (HCC)     ablation for svt    Tobacco use disorder 4/12/2010      Past Surgical History:   Procedure Laterality Date    ABLATION OF DYSRHYTHMIC FOCUS  \"years ago\"    CARDIAC DEFIBRILLATOR PLACEMENT  07/18/2013    Biotronik dual chamber ICD by Dr. Jerry Almaguer  07/18/2013    Biotronik dual chamber ICD by Dr. Herr Enter N/A 7/1/2022    Left heart cath / coronary angiography performed by Teri Gee MD at [facility]      x1    COLONOSCOPY N/A 7/7/2021    COLONOSCOPY/BMI 48 PT HAS AN ICD performed by Stacey Atkinson MD at [facility]  03/26/2019    mild incarceration, no bowel removal    HYSTERECTOMY (CERVIX STATUS UNKNOWN)      EDITH-Left ovary intact per pt    LEFT HEART CATH,PERCUTANEOUS  2/13/2013    no intervention    PACEMAKER      ICD    ROTATOR CUFF REPAIR Right     TONSILLECTOMY        Prior to Admission medications    Medication Sig Start Date End Date Taking? Authorizing Provider   cyclobenzaprine (FLEXERIL) 10 MG tablet Take 10 mg by mouth 3 times daily as needed for Muscle spasms. Historical Provider, MD   calcitRIOL (ROCALTROL) 0.25 MCG capsule Take 0.25 mcg by mouth daily. Historical Provider, MD   Cholecalciferol (VITAMIN D3) 50 MCG (2000 UT) TABS Take 1 tablet by mouth daily. Historical Provider, MD   nitroGLYCERIN (NITROSTAT) 0.4 MG SL tablet Place 0.4 mg under the tongue every 5 minutes as needed for Chest pain. Max 3 doses     Historical Provider, MD   dupilumab (DUPIXENT) 300 MG/2ML SOPN injection Inject 300 mg into the skin every 14 days. Patient usually takes medication on Monday or Tuesday. Historical Provider, MD   acetaminophen (TYLENOL) 500 MG tablet Take 1,000 mg by mouth every 6 hours as needed for Pain (breakthrough pain).     Historical Provider, MD   amiodarone (CORDARONE) 200 MG tablet Take 1 tablet by mouth 2 times daily 8/17/22   ARNOLDO Interiano CNP   DULoxetine (CYMBALTA) 20 MG extended release capsule Take 1 capsule by mouth daily 8/18/22   Magdalena JuanARNOLDO CNP   aspirin 81 MG EC tablet Take 81 mg by mouth daily    Ar Automatic Reconciliation   carvedilol (COREG) 6.25 MG tablet Take 6.25 mg by mouth 2 times daily (with meals) 11/23/21   Ar Automatic Reconciliation   vitamin D 25 MCG (1000 UT) CAPS Take by mouth daily  Patient not taking: Reported on 9/6/2022    Ar Automatic Reconciliation   empagliflozin (JARDIANCE) 10 MG tablet Take 10 mg by mouth daily 12/29/21   Ar Automatic Reconciliation   insulin glargine (LANTUS SOLOSTAR) 100 UNIT/ML injection pen Inject 15 Units into the skin nightly 12/22/21   Ar Automatic Reconciliation   isosorbide mononitrate (IMDUR) 30 MG extended release tablet Take 30 mg by mouth daily 11/23/21   Ar Automatic Reconciliation   latanoprost (XALATAN) 0.005 % ophthalmic solution Place 1 drop into both eyes nightly 5/10/21   Ar Automatic Reconciliation   polyethylene glycol (GLYCOLAX) 17 GM/SCOOP powder Take 17 g by mouth daily as needed  Patient not taking: Reported on 9/6/2022 11/14/20   Ar Automatic Reconciliation   pravastatin (PRAVACHOL) 80 MG tablet Take 80 mg by mouth nightly 11/23/21   Ar Automatic Reconciliation   sacubitril-valsartan (ENTRESTO) 49-51 MG per tablet Take 1 tablet by mouth 2 times daily 11/23/21   Ar Automatic Reconciliation   torsemide (DEMADEX) 20 MG tablet Take 40 mg by mouth 2 times daily 8/30/22   Ar Automatic Reconciliation   albuterol sulfate  (90 Base) MCG/ACT inhaler 2 puffs qid prn shortness of breath or wheezing  Patient not taking: Reported on 8/10/2022 11/14/20 8/17/22  Ar Automatic Reconciliation     Allergies   Allergen Reactions    Other Hives and Shortness Of Breath     \"inhaler PRN\"    Penicillin G Itching      Social History     Tobacco Use    Smoking status: Some Days     Packs/day: 0.25     Types: Cigarettes    Smokeless tobacco: Never    Tobacco comments:     Quit smoking: \"every now and then\"   Substance Use Topics    Alcohol use: Yes     Alcohol/week: 1.0 standard drink      Family History   Problem Relation Age of Onset    Diabetes Other     Heart Disease Father     Hypertension Father     Stroke Father     Heart Attack Father 48        mi    Breast Cancer Sister 37    Hypertension Brother     Heart Disease Brother     Diabetes Brother     Stroke Mother           Review of Systems    Negative except for what is mention in HPI above      Objective:       /60   Pulse 60   Temp 98.1 °F (36.7 °C) (Oral)   Resp 18   Ht 4' 10\" (1.473 m)   Wt 204 lb 12.8 oz (92.9 kg)   SpO2 95%   BMI 42.80 kg/m²     09/19 0701 - 09/19 1900  In: 475 [P.O.:475]  Out: 400 [Urine:400]  09/17 1901 - 09/19 0700  In: 440 [P.O.:440]  Out: 1850 [Urine:1850]    /60   Pulse 60   Temp 98.1 °F (36.7 °C) (Oral)   Resp 18   Ht 4' 10\" (1.473 m)   Wt 204 lb 12.8 oz (92.9 kg)   SpO2 95%   BMI 42.80 kg/m²     General:  Alert, cooperative, no respiratory distress on room air, appears stated age. Head:  Normocephalic, without obvious abnormality, atraumatic. Neck: Supple, symmetrical, trachea midline, no JVD. Back:   Symmetric, no curvature. ROM normal. No CVA tenderness. Lungs:   Decreased bases to auscultation bilaterally. Heart:  Regular rate and rhythm, S1, S2 normal, no murmur,  rub or gallop. Abdomen:   Soft, non-tender. Bowel sounds normal   Extremities: Extremities normal, atraumatic, no cyanosis, no edema. Skin: Skin color, texture, turgor normal. No rashes or lesions. Neurologic: Baseline tremor is present. No asterixis.          Data Review:     Recent Results (from the past 24 hour(s))   POCT Glucose    Collection Time: 09/18/22  4:37 PM   Result Value Ref Range    POC Glucose 130 (H) 65 - 100 mg/dL    Performed by: Kim Deluna    POCT Glucose    Collection Time: 09/18/22  8:13 PM   Result Value Ref Range    POC Glucose 177 (H) 65 - 100 mg/dL    Performed by: Chidi Carrillo    Basic Metabolic Panel    Collection Time: 09/19/22  4:15 AM   Result Value Ref Range    Sodium 139 136 - 145 mmol/L    Potassium 4.4 3.5 - 5.1 mmol/L    Chloride 109 101 - 110 mmol/L    CO2 28 21 - 32 mmol/L    Anion Gap 2 (L) 4 - 13 mmol/L    Glucose 136 (H) 65 - 100 mg/dL    BUN 26 (H) 6 - 23 MG/DL    Creatinine 1.60 (H) 0.6 - 1.0 MG/DL    GFR  43 (L) >60 ml/min/1.73m2    GFR Non- 35 (L) >60 ml/min/1.73m2    Calcium 9.9 8.3 - 10.4 MG/DL   POCT Glucose    Collection Time: 09/19/22  7:33 AM   Result Value Ref Range    POC Glucose 117 (H) 65 - 100 mg/dL    Performed by: BrightkiterganPCT    POCT Glucose    Collection Time: 09/19/22 11:02 AM   Result Value Ref Range    POC Glucose 106 (H) 65 - 100 mg/dL    Performed by: BrightkiterganPCT        CXR;   Results for orders placed during the hospital encounter of 09/06/22    XR CHEST PORTABLE    Narrative  Portable chest xray    COMPARISON: August 10, 2022    INDICATION: chest pain    FINDINGS:    Heart is enlarged. Mediastinal contour is within normal limits. Stable  left-sided cardiac pacer. Increased interstitial prominence, likely vascular  congestion. No pneumothorax. No large pleural effusion. Impression  1. Cardiomegaly and suggestion of vascular congestion. CT Abdomen  Results for orders placed in visit on 03/31/19    CT ABDOMEN PELVIS W IV CONTRAST    Narrative  CT abdomen and pelvis with contrast: 03/30/2019    History: Recent hernia surgery, abdominal distention. Technique: Helically acquired images were obtained from the domes of the  diaphragms to the ischial tuberosities reconstructed at 5mm intervals after the  uneventful administration of oral contrast for bowel opacification and 100 cc's  of intravenous Isovue-370 to evaluate the solid abdominal viscera and vascular  structures.  Coronal reformatted images were submitted. Radiation dose reduction techniques were used for this study:  Our CT scanners  use one or all of the following: Automated exposure control, adjustment of the  mA and/or kVp according to patient's size, iterative reconstruction. Comparison: 02/26/2019. CT ABDOMEN: There is mild bilateral lower lobe subsegmental atelectasis and/or  scar. There is a tiny low-density lesion within the right hepatic lobe, too  small to characterize but statistically benign, unchanged. The spleen, pancreas  and adrenal glands are unremarkable. A cyst within the upper pole left kidney  measures 1.87 m. 2 subcentimeter low-density lesions within the right kidney are  too small to characterize but statistically benign. There is a large defect within the anterior abdominal wall measuring nearly 12  mm in transverse diameter. Most of the small bowel as well as a large portion of  the mid stomach and transverse colon anteriorly large hernia. There is soft  tissue stranding along the lateral abdominal walls, presumably postsurgical. The  the proximal and midportion of the stomach is moderately distended which with  narrowing of the distal stomach has the pylorus traverses back into the  peritoneal cavity. The proximal small bowel is located within the peritoneal  cavity and is dilated with these loops of bowel extending into the large hernia  which also appear mildly dilated. A definite transition zone is unable to be  visualized. No adenopathy is present. There is no free fluid. A drainage catheter is present  along the inferior margin of the hernia. CT PELVIS: A Chamberlain catheter is present within urinary bladder. No adenopathy or  ascites is present. There are no plantar changes. Sigmoid diverticula are  present. Impression  IMPRESSION: Large abdominal wall defect containing many loops of small bowel,  transverse colon and portions of the stomach.  Narrowing of the distal stomach as  it enters back into the peritoneal cavity causes partial outlet obstruction. Proximal small bowel is also mildly dilated without a definite transition zone. A small bowel obstruction cannot be excluded. Principal Problem:    Ventricular tachycardia (HCC)  Active Problems:    Acute on chronic renal failure (HCC)    Left ventricular aneurysm    Type 2 diabetes with nephropathy (HCC)    NICM (nonischemic cardiomyopathy) (HCC)    Dyslipidemia    CKD (chronic kidney disease) stage 4, GFR 15-29 ml/min (HCC)    Morbid obesity ([address]  Resolved Problems:    * No resolved hospital problems.  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 hypertensive nephrosclerosis - excellent improvement in response to medical management and renal function is better than  baseline  - creatinine range 1.4 to 1.6, this maybe her new baseline, will monitor  Volume overload - initially with volume depletion, now with mild fluid excess - on Lasix 40 mg BID IV with much improvement,  switch to Torsemide PO as volume status is better controlled  - Torsemide on hold due to worsening kidney indices, euvolemic, good UOP  Hyponatremia - mild, likely due to cardiac disease, improved, will monitor   Hypoalbuminemia - probably due to urinary protein loss - work on supplements  Hyperkalemia- mild, on admission K+ 3.8 with blood draw and 3.0 with POC test, given KCL 10 mEq IV at that time, with mild acidosis, no intervention at this time  - improved with improvement in acidosis, continue low potassium diet  Acidosis- stable with sodium bicarb PO, continue,  will monitor   Ventricular tachycardia- LV aneurysm, on Amiodarone gtt, ICD, managed by Cardiology, plans for surgery at [facility]       Plan:     As above     Syed Collier, [address] [facility]

## 2022-09-19 NOTE — CARE COORDINATION
Per report from Cardiology, Dr Khalida Mott has referred pt to Dr Alex Zaragoza at [facility] and he has accepted pt for care. Await bed availability. [facility] transfer center phone [phone], fax [phone] is processing bed request and will call floor with update when available. Martina Thakur [phone] has been placed on will call to transport pt to [facility]. Telementry Charge RN and Staff RN will coordinate completion of [facility] and [facility] forms as required for transfer packet to finalize pt transfer when a bed becomes available. [facility] has been updated that [place] is unable to accept pt transfer. Serge Montesy that transport will not be needed at this time. If other arrangements for pt transfer are made, Martina Thakur will need to be called to schedule pt transport.

## 2022-09-19 NOTE — PROGRESS NOTES
am  9/19/2022 8:52 AM    Admit Date: 9/6/2022    Admit Diagnosis: Ventricular tachycardia ([address] [I47.2]      Subjective:    Patient : Mrs. Zuleima Cline, who has a history of recurrent Vtach, LV aneurysm, NICM with EF of 40%, SNEHA w/CKD, IDDM, and the presence of an ICD presented to the hospital with VT in the 140s and was defibrillated in the ED. She reports her ICD did not shock her. She was placed on IV amiodarone. Today is in pain from her sciatica of her left leg. She reports it is improving since her morning dose of morphine. She is tearful but doing ok. She is awaiting a bed in [facility]. Once bed is ready she will be transferred. Objective:    /60   Pulse 60   Temp 98.1 °F (36.7 °C)   Resp 17   Ht 4' 10\" (1.473 m)   Wt 204 lb 12.8 oz (92.9 kg)   SpO2 95%   BMI 42.80 kg/m²     ROS:  General ROS: negative for - chills  Hematological and Lymphatic ROS: negative for - blood clots or jaundice  Respiratory ROS: no cough, shortness of breath, or wheezing  Cardiovascular ROS: no chest pain or dyspnea on exertion  Gastrointestinal ROS: no abdominal pain, change in bowel habits, or black or bloody stools  Neurological ROS: no TIA or stroke symptoms. Sciatic pain in L LE    Physical Exam:      Physical Examination: General appearance - Appearance: alert, oriented to person, place, and time, and crying.    Neck/lymph - supple, no significant adenopathy  Chest/CV - clear to auscultation, no wheezes, rales or rhonchi, symmetric air entry  Heart - normal rate, regular rhythm, normal S1, S2, no murmurs, rubs, clicks or gallops  Abdomen/GI - soft, nontender, nondistended, no masses or organomegaly   Musculoskeletal - no joint tenderness, deformity or swelling  Extremities - peripheral pulses normal, no pedal edema, no clubbing or cyanosis, no pedal edema noted  Skin - normal coloration and turgor, no rashes, no suspicious skin lesions noted    Current Facility-Administered Medications   Medication Dose Route Frequency simethicone (MYLICON) chewable tablet 80 mg  80 mg Oral Q6H PRN    aluminum & magnesium hydroxide-simethicone (MAALOX) 200-200-20 MG/5ML suspension 30 mL  30 mL Oral Q6H PRN    mineral oil enema 1 enema  1 enema Rectal Once    [Held by provider] torsemide (DEMADEX) tablet 20 mg  20 mg Oral Daily    sodium bicarbonate tablet 650 mg  650 mg Oral BID    cydney-ignacio tablet 1 tablet  1 tablet Oral Daily    Vitamin D (CHOLECALCIFEROL) tablet 2,000 Units  2,000 Units Oral Daily    LORazepam (ATIVAN) tablet 0.5 mg  0.5 mg Oral Q4H PRN    docusate sodium (COLACE) capsule 100 mg  100 mg Oral Daily    amiodarone (CORDARONE) tablet 400 mg  400 mg Oral q8h    HYDROcodone-acetaminophen (NORCO) 7.5-325 MG per tablet 1 tablet  1 tablet Oral Q6H PRN    morphine injection 2 mg  2 mg IntraVENous Q4H PRN    traMADol (ULTRAM) tablet 50 mg  50 mg Oral Q6H PRN    polyethylene glycol (GLYCOLAX) packet 17 g  17 g Oral Daily PRN    aspirin EC tablet 81 mg  81 mg Oral Daily    DULoxetine (CYMBALTA) extended release capsule 20 mg  20 mg Oral Daily    insulin glargine (LANTUS) injection vial 7 Units  7 Units SubCUTAneous Nightly    pravastatin (PRAVACHOL) tablet 80 mg  80 mg Oral Nightly    sodium chloride flush 0.9 % injection 5-40 mL  5-40 mL IntraVENous 2 times per day    sodium chloride flush 0.9 % injection 5-40 mL  5-40 mL IntraVENous PRN    ondansetron (ZOFRAN) injection 4 mg  4 mg IntraVENous Q4H PRN    acetaminophen (TYLENOL) tablet 650 mg  650 mg Oral Q6H PRN    Or    acetaminophen (TYLENOL) suppository 650 mg  650 mg Rectal Q6H PRN    nitroGLYCERIN (NITROSTAT) SL tablet 0.4 mg  0.4 mg SubLINGual Q5 Min PRN    heparin (porcine) injection 5,000 Units  5,000 Units SubCUTAneous 3 times per day    sodium chloride (OCEAN, BABY AYR) 0.65 % nasal spray 1 spray  1 spray Each Nostril PRN    insulin lispro (HUMALOG) injection vial 0-4 Units  0-4 Units SubCUTAneous TID WC    insulin lispro (HUMALOG) injection vial 0-4 Units  0-4 Units SubCUTAneous evaluated the patient and reviewed the students note and agree with the following assessment and plan and findings. I was present for and observed the key components of this note. Any appropriate additions or editing of the information have been done by me.     Maki Nicholas MD, [facility]  Cardiology

## 2022-09-20 ENCOUNTER — TELEPHONE (OUTPATIENT)
Dept: INTERNAL MEDICINE CLINIC | Facility: CLINIC | Age: 57
End: 2022-09-20

## 2022-09-20 LAB
ANION GAP SERPL CALC-SCNC: 3 MMOL/L (ref 4–13)
BUN SERPL-MCNC: 20 MG/DL (ref 6–23)
CALCIUM SERPL-MCNC: 9.7 MG/DL (ref 8.3–10.4)
CHLORIDE SERPL-SCNC: 110 MMOL/L (ref 101–110)
CO2 SERPL-SCNC: 26 MMOL/L (ref 21–32)
CREAT SERPL-MCNC: 1.4 MG/DL (ref 0.6–1)
GLUCOSE BLD STRIP.AUTO-MCNC: 104 MG/DL (ref 65–100)
GLUCOSE BLD STRIP.AUTO-MCNC: 105 MG/DL (ref 65–100)
GLUCOSE BLD STRIP.AUTO-MCNC: 120 MG/DL (ref 65–100)
GLUCOSE BLD STRIP.AUTO-MCNC: 122 MG/DL (ref 65–100)
GLUCOSE BLD STRIP.AUTO-MCNC: 125 MG/DL (ref 65–100)
GLUCOSE SERPL-MCNC: 125 MG/DL (ref 65–100)
POTASSIUM SERPL-SCNC: 4.2 MMOL/L (ref 3.5–5.1)
SERVICE CMNT-IMP: ABNORMAL
SODIUM SERPL-SCNC: 139 MMOL/L (ref 136–145)

## 2022-09-20 PROCEDURE — 82962 GLUCOSE BLOOD TEST: CPT

## 2022-09-20 PROCEDURE — 6370000000 HC RX 637 (ALT 250 FOR IP): Performed by: INTERNAL MEDICINE

## 2022-09-20 PROCEDURE — 6360000002 HC RX W HCPCS: Performed by: INTERNAL MEDICINE

## 2022-09-20 PROCEDURE — 1100000003 HC PRIVATE W/ TELEMETRY

## 2022-09-20 PROCEDURE — 6360000002 HC RX W HCPCS: Performed by: NURSE PRACTITIONER

## 2022-09-20 PROCEDURE — 2580000003 HC RX 258: Performed by: NURSE PRACTITIONER

## 2022-09-20 PROCEDURE — 6370000000 HC RX 637 (ALT 250 FOR IP): Performed by: NURSE PRACTITIONER

## 2022-09-20 PROCEDURE — 80048 BASIC METABOLIC PNL TOTAL CA: CPT

## 2022-09-20 PROCEDURE — 36415 COLL VENOUS BLD VENIPUNCTURE: CPT

## 2022-09-20 PROCEDURE — 99232 SBSQ HOSP IP/OBS MODERATE 35: CPT | Performed by: INTERNAL MEDICINE

## 2022-09-20 RX ADMIN — AMIODARONE HYDROCHLORIDE 400 MG: 200 TABLET ORAL at 05:14

## 2022-09-20 RX ADMIN — MORPHINE SULFATE 2 MG: 2 INJECTION, SOLUTION INTRAMUSCULAR; INTRAVENOUS at 05:14

## 2022-09-20 RX ADMIN — LORAZEPAM 0.5 MG: 0.5 TABLET ORAL at 17:33

## 2022-09-20 RX ADMIN — HEPARIN SODIUM 5000 UNITS: 5000 INJECTION INTRAVENOUS; SUBCUTANEOUS at 14:32

## 2022-09-20 RX ADMIN — VITAMIN D, TAB 1000IU (100/BT) 2000 UNITS: 25 TAB at 09:17

## 2022-09-20 RX ADMIN — SODIUM CHLORIDE, PRESERVATIVE FREE 10 ML: 5 INJECTION INTRAVENOUS at 09:17

## 2022-09-20 RX ADMIN — LORAZEPAM 0.5 MG: 0.5 TABLET ORAL at 12:09

## 2022-09-20 RX ADMIN — PRAVASTATIN SODIUM 80 MG: 80 TABLET ORAL at 20:59

## 2022-09-20 RX ADMIN — MORPHINE SULFATE 2 MG: 2 INJECTION, SOLUTION INTRAMUSCULAR; INTRAVENOUS at 21:20

## 2022-09-20 RX ADMIN — SODIUM BICARBONATE 650 MG TABLET 650 MG: at 09:17

## 2022-09-20 RX ADMIN — HYDROCODONE BITARTRATE AND ACETAMINOPHEN 1 TABLET: 7.5; 325 TABLET ORAL at 12:09

## 2022-09-20 RX ADMIN — MORPHINE SULFATE 2 MG: 2 INJECTION, SOLUTION INTRAMUSCULAR; INTRAVENOUS at 09:22

## 2022-09-20 RX ADMIN — INSULIN GLARGINE 7 UNITS: 100 INJECTION, SOLUTION SUBCUTANEOUS at 21:20

## 2022-09-20 RX ADMIN — SODIUM CHLORIDE, PRESERVATIVE FREE 10 ML: 5 INJECTION INTRAVENOUS at 21:03

## 2022-09-20 RX ADMIN — DULOXETINE HYDROCHLORIDE 20 MG: 20 CAPSULE, DELAYED RELEASE ORAL at 09:17

## 2022-09-20 RX ADMIN — SODIUM BICARBONATE 650 MG TABLET 650 MG: at 20:59

## 2022-09-20 RX ADMIN — AMIODARONE HYDROCHLORIDE 400 MG: 200 TABLET ORAL at 20:59

## 2022-09-20 RX ADMIN — HEPARIN SODIUM 5000 UNITS: 5000 INJECTION INTRAVENOUS; SUBCUTANEOUS at 05:15

## 2022-09-20 RX ADMIN — DOCUSATE SODIUM 100 MG: 100 CAPSULE, LIQUID FILLED ORAL at 09:17

## 2022-09-20 RX ADMIN — Medication 1 TABLET: at 09:16

## 2022-09-20 RX ADMIN — ASPIRIN 81 MG: 81 TABLET, COATED ORAL at 09:17

## 2022-09-20 RX ADMIN — LATANOPROST 1 DROP: 50 SOLUTION OPHTHALMIC at 21:04

## 2022-09-20 RX ADMIN — MORPHINE SULFATE 2 MG: 2 INJECTION, SOLUTION INTRAMUSCULAR; INTRAVENOUS at 17:15

## 2022-09-20 RX ADMIN — AMIODARONE HYDROCHLORIDE 400 MG: 200 TABLET ORAL at 14:32

## 2022-09-20 RX ADMIN — HEPARIN SODIUM 5000 UNITS: 5000 INJECTION INTRAVENOUS; SUBCUTANEOUS at 21:00

## 2022-09-20 ASSESSMENT — PAIN DESCRIPTION - PAIN TYPE
TYPE: CHRONIC PAIN

## 2022-09-20 ASSESSMENT — PAIN DESCRIPTION - ORIENTATION
ORIENTATION: LEFT

## 2022-09-20 ASSESSMENT — PAIN DESCRIPTION - DESCRIPTORS
DESCRIPTORS: SHARP;SHOOTING
DESCRIPTORS: SHOOTING;SHARP
DESCRIPTORS: SHARP;SHOOTING
DESCRIPTORS: SHOOTING;SHARP;STABBING
DESCRIPTORS: ACHING;STABBING;SHOOTING;SHARP
DESCRIPTORS: SHOOTING
DESCRIPTORS: ACHING;SHOOTING

## 2022-09-20 ASSESSMENT — PAIN - FUNCTIONAL ASSESSMENT
PAIN_FUNCTIONAL_ASSESSMENT: PREVENTS OR INTERFERES SOME ACTIVE ACTIVITIES AND ADLS
PAIN_FUNCTIONAL_ASSESSMENT: ACTIVITIES ARE NOT PREVENTED
PAIN_FUNCTIONAL_ASSESSMENT: PREVENTS OR INTERFERES SOME ACTIVE ACTIVITIES AND ADLS

## 2022-09-20 ASSESSMENT — PAIN SCALES - GENERAL
PAINLEVEL_OUTOF10: 7
PAINLEVEL_OUTOF10: 0
PAINLEVEL_OUTOF10: 8
PAINLEVEL_OUTOF10: 7
PAINLEVEL_OUTOF10: 9
PAINLEVEL_OUTOF10: 8
PAINLEVEL_OUTOF10: 0
PAINLEVEL_OUTOF10: 8
PAINLEVEL_OUTOF10: 0
PAINLEVEL_OUTOF10: 6

## 2022-09-20 ASSESSMENT — PAIN DESCRIPTION - LOCATION
LOCATION: ARM;LEG
LOCATION: SHOULDER
LOCATION: HIP;LEG
LOCATION: HIP;LEG
LOCATION: LEG;HIP
LOCATION: HIP;LEG
LOCATION: LEG
LOCATION: LEG;SHOULDER

## 2022-09-20 ASSESSMENT — PAIN DESCRIPTION - FREQUENCY
FREQUENCY: CONTINUOUS

## 2022-09-20 ASSESSMENT — PAIN SCALES - WONG BAKER: WONGBAKER_NUMERICALRESPONSE: 0

## 2022-09-20 ASSESSMENT — PAIN DESCRIPTION - ONSET
ONSET: ON-GOING

## 2022-09-20 ASSESSMENT — PAIN DESCRIPTION - DIRECTION: RADIATING_TOWARDS: RADIATES DOWN LEG

## 2022-09-20 NOTE — PROGRESS NOTES
Sammy evaluated patient's insurance and they do not accept patient's insurance. I have placed the call to [facility] in [place] since they have a physical location in [place] specifically the [facility] they may be able to take the patient's insurance I expect to hear from them today.   If they cannot then we will have to pursue if [facility] has a surgeon capable of the posterior basal aneurysm resection

## 2022-09-20 NOTE — PROGRESS NOTES
RENAL PROGRESS NOTE    Subjective:     Patient is a 60year old female with Chronic Kidney Disease with baseline creatinine 2-2.2 followed by me in the office setting is admitted with V tach. She feels much better since admission and her heart rate is now normal.   She has had prior SNEHA on CKD episodes, IDDM, morbid obesity s/p DC ICD. She underwent defibrillated in the ED and was placed on amiodarone IV. Her crea was 1.8 on 8/31/22 and increased to 3.3 on admission, and improved to 1.6 today. No vomiting, no CP, dyspnea is controlled with nasal O2.  9/8/22- alert/oriented x3, sitting on side of bed, no signs of distress, on room air, denies dyspnea, CP, n/v, HA, or dizziness. On Amiodarone gtt. Family at bedside. 9/9/22- alert/oriented, lying in bed, no signs of distress, c/o CP earlier with dyspnea when walking, no CP or dyspnea at time of assessment, no n/v, HA or dizziness. Kidney indices stable. Plans to remain in hospital until transferred to [facility]. 9/10/22 - alert and communicating well - breathing better - No N/V, no uremic signs or symptoms - edema is improved   9/11/22 - she complains of increased dyspnea and edema this PM, no chest pain - no N/V   9/12/22- alert/oriented, c/o dyspnea with exertion, on 2L NC, denies CP, n/v, HA, or dizziness. Good UOP with diuresis. 9/13/22- alert/oriented, sitting in chair, no signs of distress, on room air, c/o dyspnea with exertion, denies CP, HA, dizziness, or n/v. Waiting on transfer to [facility]. 9/14/22- alert/oriented, sitting in chair, no signs of distress, on room air, denies dyspnea, CP, n/v, HA, or dizziness. No complaints. 9/15/22- alert/oriented, sitting in chair, no signs of distress, on room air, concerned about weight, she feels it is fluid gain, she is on low sodium diet, weight 206 lb 2 days ago compared to 208 lbs today. No other concerns. Denies dyspnea, CP, n/v, HA, or dizziness.   9/16/22- alert, oriented, sitting in chair, no signs of distress, on room air, no complaints. On 2000 ml fluid restriction. 9/17/22- alert/oriented, lying in bed, no signs of distress, on room air, c/o dyspnea with exertion, denies CP, n/v, HA, or dizziness. Had BM this AM.   9/18/22-alert/oriented, sitting up in bed, no signs of distress, on room air, c/o dyspnea with exertion, at time of assessment denies dyspnea or CP, no other complaints. 9/19/22-alert/oriented, lying in bed, no signs of distress, on room air, no complaints, last BM was yesterday. Still waiting on bed at [facility]. 9/20/22- alert/oriented, sitting up in bed, no distress, on phone with insurance [employer] has declined her due to her insurance being out of network. She is sad and teary eyed. Past Medical History:   Diagnosis Date    Abdominal wall hernia 10/2/2018    Acute hypoxemic respiratory failure ([address] 4/3/2019    Allergic rhinitis     followed by allergist; allergic to grass- has rescue inhaler PRN    ARDS (adult respiratory distress syndrome) ([address] 4/3/2019    Arthritis     Automatic implantable cardioverter-defibrillator in situ 3/30/2016    CAD in native artery 3/30/2016    Chronic kidney disease     Chronic systolic heart failure ([address] 07/18/2013    ECHO 2017- EF 39%; managed with medications; [place] [facility]    CKD (chronic kidney disease)     [facility] follows    Diabetes ([address]     type 2 (on insulin);  FBS AM range- , hypo s/s <70, hgba1c- 7.9    Diabetes mellitus ([address] 4/12/2010    Dyslipidemia 2/13/2013    Eczema     Fibromyalgia     GERD (gastroesophageal reflux disease)     hx of- no meds currently    Headache     Heart failure ([address]     chronic systolic with EF 30%; non-ischemic cardiomyopathy    HTN (hypertension) 4/12/2010    Hypercholesterolemia     Incarcerated incisional hernia 3/26/2019    Morbid obesity ([address]     Neuropathy     bilateral feet and tips of fingers    NICM (nonischemic cardiomyopathy) ([address] 2/15/2013    Obesity     bmi- 39 Obstructive sleep apnea (adult) (pediatric) 07/14/2014    uses cpap qhs    Pseudomonas urinary tract infection 4/5/2019    Sciatic pain 5/23/2016    Severe persistent asthma with acute exacerbation 8/24/2020    SVT (supraventricular tachycardia) ([facility])     ablation for svt    Tobacco use disorder 4/12/2010      Past Surgical History:   Procedure Laterality Date    ABLATION OF DYSRHYTHMIC FOCUS  \"years ago\"    CARDIAC DEFIBRILLATOR PLACEMENT  07/18/2013    Biotronik dual chamber ICD by Dr. Monzon   07/18/2013    Biotronik dual chamber ICD by Dr. Juanito Braswell 7/1/2022    Left heart cath / coronary angiography performed by Jennifer Aguilera MD at [facility]      x1    COLONOSCOPY N/A 7/7/2021    COLONOSCOPY/BMI 48 PT HAS AN ICD performed by Doc Virgen MD at [facility]  03/26/2019    mild incarceration, no bowel removal    HYSTERECTOMY (CERVIX STATUS UNKNOWN)      EDITH-Left ovary intact per pt    LEFT HEART CATH,PERCUTANEOUS  2/13/2013    no intervention    PACEMAKER      ICD    ROTATOR CUFF REPAIR Right     TONSILLECTOMY        Prior to Admission medications    Medication Sig Start Date End Date Taking? Authorizing Provider   cyclobenzaprine (FLEXERIL) 10 MG tablet Take 10 mg by mouth 3 times daily as needed for Muscle spasms. Historical Provider, MD   calcitRIOL (ROCALTROL) 0.25 MCG capsule Take 0.25 mcg by mouth daily. Historical Provider, MD   Cholecalciferol (VITAMIN D3) 50 MCG (2000 UT) TABS Take 1 tablet by mouth daily. Historical Provider, MD   nitroGLYCERIN (NITROSTAT) 0.4 MG SL tablet Place 0.4 mg under the tongue every 5 minutes as needed for Chest pain. Max 3 doses     Historical Provider, MD   dupilumab (DUPIXENT) 300 MG/2ML SOPN injection Inject 300 mg into the skin every 14 days. Patient usually takes medication on Monday or Tuesday.     Historical Provider, MD acetaminophen (TYLENOL) 500 MG tablet Take 1,000 mg by mouth every 6 hours as needed for Pain (breakthrough pain).     Historical Provider, MD   amiodarone (CORDARONE) 200 MG tablet Take 1 tablet by mouth 2 times daily 8/17/22   ARNOLDO Gardner CNP   DULoxetine (CYMBALTA) 20 MG extended release capsule Take 1 capsule by mouth daily 8/18/22   ARNOLDO Gardner CNP   aspirin 81 MG EC tablet Take 81 mg by mouth daily    Ar Automatic Reconciliation   carvedilol (COREG) 6.25 MG tablet Take 6.25 mg by mouth 2 times daily (with meals) 11/23/21   Ar Automatic Reconciliation   vitamin D 25 MCG (1000 UT) CAPS Take by mouth daily  Patient not taking: Reported on 9/6/2022    Ar Automatic Reconciliation   empagliflozin (JARDIANCE) 10 MG tablet Take 10 mg by mouth daily 12/29/21   Ar Automatic Reconciliation   insulin glargine (LANTUS SOLOSTAR) 100 UNIT/ML injection pen Inject 15 Units into the skin nightly 12/22/21   Ar Automatic Reconciliation   isosorbide mononitrate (IMDUR) 30 MG extended release tablet Take 30 mg by mouth daily 11/23/21   Ar Automatic Reconciliation   latanoprost (XALATAN) 0.005 % ophthalmic solution Place 1 drop into both eyes nightly 5/10/21   Ar Automatic Reconciliation   polyethylene glycol (GLYCOLAX) 17 GM/SCOOP powder Take 17 g by mouth daily as needed  Patient not taking: Reported on 9/6/2022 11/14/20   Ar Automatic Reconciliation   pravastatin (PRAVACHOL) 80 MG tablet Take 80 mg by mouth nightly 11/23/21   Ar Automatic Reconciliation   sacubitril-valsartan (ENTRESTO) 49-51 MG per tablet Take 1 tablet by mouth 2 times daily 11/23/21   Ar Automatic Reconciliation   torsemide (DEMADEX) 20 MG tablet Take 40 mg by mouth 2 times daily 8/30/22   Ar Automatic Reconciliation   albuterol sulfate  (90 Base) MCG/ACT inhaler 2 puffs qid prn shortness of breath or wheezing  Patient not taking: Reported on 8/10/2022 11/14/20 8/17/22  Ar Automatic Reconciliation     Allergies   Allergen Reactions    Other Hives and Shortness Of Breath     \"inhaler PRN\"    Penicillin G Itching      Social History     Tobacco Use    Smoking status: Some Days     Packs/day: 0.25     Types: Cigarettes    Smokeless tobacco: Never    Tobacco comments:     Quit smoking: \"every now and then\"   Substance Use Topics    Alcohol use: Yes     Alcohol/week: 1.0 standard drink      Family History   Problem Relation Age of Onset    Diabetes Other     Heart Disease Father     Hypertension Father     Stroke Father     Heart Attack Father 48        mi    Breast Cancer Sister 37    Hypertension Brother     Heart Disease Brother     Diabetes Brother     Stroke Mother           Review of Systems    Negative except for what is mention in HPI above      Objective:       /89   Pulse 60   Temp 98.1 °F (36.7 °C) (Oral)   Resp 18   Ht 4' 10\" (1.473 m)   Wt 206 lb 8 oz (93.7 kg)   SpO2 99%   BMI 43.16 kg/m²     No intake/output data recorded. 09/18 1901 - 09/20 0700  In: 775 [P.O.:775]  Out: 1500 [Urine:1500]    /89   Pulse 60   Temp 98.1 °F (36.7 °C) (Oral)   Resp 18   Ht 4' 10\" (1.473 m)   Wt 206 lb 8 oz (93.7 kg)   SpO2 99%   BMI 43.16 kg/m²     General:  Alert, cooperative, no respiratory distress on room air, appears stated age. Head:  Normocephalic, without obvious abnormality, atraumatic. Neck: Supple, symmetrical, trachea midline, no JVD. Back:   Symmetric, no curvature. ROM normal. No CVA tenderness. Lungs:   Decreased bases to auscultation bilaterally. Heart:  Regular rate and rhythm, S1, S2 normal, no murmur,  rub or gallop. Abdomen:   Soft, non-tender. Bowel sounds normal   Extremities: Extremities normal, atraumatic, no cyanosis, no edema. Skin: Skin color, texture, turgor normal. No rashes or lesions. Neurologic: Baseline tremor is present. No asterixis.          Data Review:     Recent Results (from the past 24 hour(s))   POCT Glucose    Collection Time: 09/19/22  4:17 PM   Result Value Ref Range    POC Glucose 98 65 - 100 mg/dL    Performed by: RamonPCKEATON    POCT Glucose    Collection Time: 09/19/22  8:53 PM   Result Value Ref Range    POC Glucose 131 (H) 65 - 100 mg/dL    Performed by: Tania    Basic Metabolic Panel    Collection Time: 09/20/22  5:43 AM   Result Value Ref Range    Sodium 139 136 - 145 mmol/L    Potassium 4.2 3.5 - 5.1 mmol/L    Chloride 110 101 - 110 mmol/L    CO2 26 21 - 32 mmol/L    Anion Gap 3 (L) 4 - 13 mmol/L    Glucose 125 (H) 65 - 100 mg/dL    BUN 20 6 - 23 MG/DL    Creatinine 1.40 (H) 0.6 - 1.0 MG/DL    GFR African American 50 (L) >60 ml/min/1.73m2    GFR Non- 41 (L) >60 ml/min/1.73m2    Calcium 9.7 8.3 - 10.4 MG/DL   POCT Glucose    Collection Time: 09/20/22  8:10 AM   Result Value Ref Range    POC Glucose 105 (H) 65 - 100 mg/dL    Performed by: CarlitosPCKEATON    POCT Glucose    Collection Time: 09/20/22 12:10 PM   Result Value Ref Range    POC Glucose 104 (H) 65 - 100 mg/dL    Performed by: Jamil    POCT Glucose    Collection Time: 09/20/22  1:25 PM   Result Value Ref Range    POC Glucose 125 (H) 65 - 100 mg/dL    Performed by: MilenaalPCT        CXR;   Results for orders placed during the hospital encounter of 09/06/22    XR CHEST PORTABLE    Narrative  Portable chest xray    COMPARISON: August 10, 2022    INDICATION: chest pain    FINDINGS:    Heart is enlarged. Mediastinal contour is within normal limits. Stable  left-sided cardiac pacer. Increased interstitial prominence, likely vascular  congestion. No pneumothorax. No large pleural effusion. Impression  1. Cardiomegaly and suggestion of vascular congestion. CT Abdomen  Results for orders placed in visit on 03/31/19    CT ABDOMEN PELVIS W IV CONTRAST    Narrative  CT abdomen and pelvis with contrast: 03/30/2019    History: Recent hernia surgery, abdominal distention.     Technique: Helically acquired images were obtained from the domes of the  diaphragms to the ischial tuberosities reconstructed at 5mm intervals after the  uneventful administration of oral contrast for bowel opacification and 100 cc's  of intravenous Isovue-370 to evaluate the solid abdominal viscera and vascular  structures. Coronal reformatted images were submitted. Radiation dose reduction techniques were used for this study:  Our CT scanners  use one or all of the following: Automated exposure control, adjustment of the  mA and/or kVp according to patient's size, iterative reconstruction. Comparison: 02/26/2019. CT ABDOMEN: There is mild bilateral lower lobe subsegmental atelectasis and/or  scar. There is a tiny low-density lesion within the right hepatic lobe, too  small to characterize but statistically benign, unchanged. The spleen, pancreas  and adrenal glands are unremarkable. A cyst within the upper pole left kidney  measures 1.87 m. 2 subcentimeter low-density lesions within the right kidney are  too small to characterize but statistically benign. There is a large defect within the anterior abdominal wall measuring nearly 12  mm in transverse diameter. Most of the small bowel as well as a large portion of  the mid stomach and transverse colon anteriorly large hernia. There is soft  tissue stranding along the lateral abdominal walls, presumably postsurgical. The  the proximal and midportion of the stomach is moderately distended which with  narrowing of the distal stomach has the pylorus traverses back into the  peritoneal cavity. The proximal small bowel is located within the peritoneal  cavity and is dilated with these loops of bowel extending into the large hernia  which also appear mildly dilated. A definite transition zone is unable to be  visualized. No adenopathy is present. There is no free fluid. A drainage catheter is present  along the inferior margin of the hernia. CT PELVIS: A Chamberlain catheter is present within urinary bladder. No adenopathy or  ascites is present. There are no plantar changes. Sigmoid diverticula are  present. Impression  IMPRESSION: Large abdominal wall defect containing many loops of small bowel,  transverse colon and portions of the stomach. Narrowing of the distal stomach as  it enters back into the peritoneal cavity causes partial outlet obstruction. Proximal small bowel is also mildly dilated without a definite transition zone. A small bowel obstruction cannot be excluded. Principal Problem:    Ventricular tachycardia ([facility])  Active Problems:    Acute on chronic renal failure ([facility])    Left ventricular aneurysm    Type 2 diabetes with nephropathy ([facility])    NICM (nonischemic cardiomyopathy) ([facility])    Dyslipidemia    CKD (chronic kidney disease) stage 4, GFR 15-29 ml/min (HCC)    Morbid obesity ([facility]  Resolved Problems:    * No resolved hospital problems.  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 hypertensive nephrosclerosis - excellent improvement in response to medical management and renal function is better than  baseline  - creatinine range 1.4 to 1.6, this maybe her new baseline, will monitor  Volume overload - initially with volume depletion, now with mild fluid excess - on Lasix 40 mg BID IV with much improvement,  switch to Torsemide PO as volume status is better controlled  - Torsemide on hold due to worsening kidney indices, euvolemic, good UOP  Hyponatremia - mild, likely due to cardiac disease, improved, will monitor   Hypoalbuminemia - probably due to urinary protein loss - work on supplements  Hyperkalemia- mild, on admission K+ 3.8 with blood draw and 3.0 with POC test, given KCL 10 mEq IV at that time, with mild acidosis, no intervention at this time  - improved with improvement in acidosis, continue low potassium diet  Acidosis- stable with sodium bicarb PO, continue,  will monitor

## 2022-09-20 NOTE — CARE COORDINATION
LOS 14 D  CM following for continued stay. Per notes,  [facility] declined patient due to being out of network with patient's [facility] Medicare. CM discussed with Dr Joaquina Hernandez and Samir Vázquez. CM contacted bed transfer at [facility], [facility], [place], and [facility]. All faciltiies are at capacity. CM visited patient to call [facility] for hospitals in network. Jelena verified all the above facilties are in network. CM followed up with Samir Vázquez. CM remains available to assist with discharge needs. CM reached out to [place] bed transfer to attempt to learn of the insurance decline. CM spoke with Roxanna Booker who informed this CM that patient's [facility] insurance was an [facility] plan which does not extend care into another state. CM went back to discuss with patient and together we contacted Jelena to discuss insurance options. Jelena informed patient that she is eligible to change her plan from [facility] to [facility] prior to September 30th. Patient was agreeable and initiated the new plan application on the phone with the [facility] agent. Patient awaiting confirmation callback. CM informed Bryan Bellamy NP who informed Dr Diamante Yarbrough. Dr Diamante Yarbrough prefers patient to transfer to [facility] under Dr Harris Denny care.     _____ Addendum: CM returned to patient room and given new O account number [phone]) Medicare Advantage Dual Plan. Plan will not be in effect until 10/1/22. KELY contacted Bryan Bellamy NP and informed to send referral to [address] [facility].    CM will send referral in AM.

## 2022-09-20 NOTE — TELEPHONE ENCOUNTER
Pt LVM on 9/20/22 in regards to forms being faxed to our office for Yordy Joseph NP to fill out from her visit on 8/30/22. Pt stated that she has been admitted to [facility] since 9/6/22 and needs to fax these forms to us. Tried to contact pt on 9/20/22 @ 2:30 PM to see what forms she needed filled out- no answer and v/m full.

## 2022-09-20 NOTE — PROGRESS NOTES
[facility] CARDIOLOGY PROGRESS NOTE           9/20/2022 4:13 PM    Admit Date: 9/6/2022      Subjective:   Patient resting comfortably in bed. No additional arrhythmias last 24 hours. ROS:  Cardiovascular:  As noted above    Objective:      Vitals:    09/19/22 2352 09/20/22 0427 09/20/22 0830 09/20/22 1405   BP: 120/67 122/66 131/71 125/89   Pulse: 59 66 60 60   Resp: 17 17 19 18   Temp: 98.1 °F (36.7 °C) 97.7 °F (36.5 °C) 98.4 °F (36.9 °C) 98.1 °F (36.7 °C)   TempSrc: Oral Oral Oral Oral   SpO2: 91% 96% 98% 99%   Weight:  206 lb 8 oz (93.7 kg)     Height:           Physical Exam:  General-No Acute Distress  Neck- supple, no JVD  CV- regular rate and rhythm no MRG  Lung- clear bilaterally  Abd- soft, nontender, nondistended  Ext- no edema bilaterally. 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 Initial efforts to obtain transfer to [address] have been unsuccessful related to either insurance issues or lack of available resources due to lack of available beds at transfer facilities. Active Problems:    Acute on chronic renal failure (HCC)  Plan: Renal function at baseline. Left ventricular aneurysm  Plan: See above. Type 2 diabetes with nephropathy (HCC)  Plan: Stable    NICM (nonischemic cardiomyopathy) ([facility]  Plan: Appropriate medical therapy    Dyslipidemia  Plan: On statin therapy    CKD (chronic kidney disease) stage 4, GFR 15-29 ml/min (HCC)  Plan: Stable    Morbid obesity ([facility]  Plan: Chronic and unchanged.         Yogesh Carvajal MD  9/20/2022 4:13 PM

## 2022-09-21 LAB
GLUCOSE BLD STRIP.AUTO-MCNC: 103 MG/DL (ref 65–100)
GLUCOSE BLD STRIP.AUTO-MCNC: 106 MG/DL (ref 65–100)
GLUCOSE BLD STRIP.AUTO-MCNC: 125 MG/DL (ref 65–100)
GLUCOSE BLD STRIP.AUTO-MCNC: 129 MG/DL (ref 65–100)
SERVICE CMNT-IMP: ABNORMAL

## 2022-09-21 PROCEDURE — 6360000002 HC RX W HCPCS: Performed by: NURSE PRACTITIONER

## 2022-09-21 PROCEDURE — 99232 SBSQ HOSP IP/OBS MODERATE 35: CPT | Performed by: INTERNAL MEDICINE

## 2022-09-21 PROCEDURE — 6370000000 HC RX 637 (ALT 250 FOR IP): Performed by: INTERNAL MEDICINE

## 2022-09-21 PROCEDURE — 1100000003 HC PRIVATE W/ TELEMETRY

## 2022-09-21 PROCEDURE — 6370000000 HC RX 637 (ALT 250 FOR IP): Performed by: NURSE PRACTITIONER

## 2022-09-21 PROCEDURE — 82962 GLUCOSE BLOOD TEST: CPT

## 2022-09-21 PROCEDURE — 2580000003 HC RX 258: Performed by: NURSE PRACTITIONER

## 2022-09-21 PROCEDURE — 6360000002 HC RX W HCPCS: Performed by: INTERNAL MEDICINE

## 2022-09-21 RX ADMIN — MORPHINE SULFATE 2 MG: 2 INJECTION, SOLUTION INTRAMUSCULAR; INTRAVENOUS at 22:53

## 2022-09-21 RX ADMIN — SODIUM CHLORIDE, PRESERVATIVE FREE 5 ML: 5 INJECTION INTRAVENOUS at 20:35

## 2022-09-21 RX ADMIN — HYDROCODONE BITARTRATE AND ACETAMINOPHEN 1 TABLET: 7.5; 325 TABLET ORAL at 08:56

## 2022-09-21 RX ADMIN — Medication 1 TABLET: at 08:48

## 2022-09-21 RX ADMIN — SODIUM BICARBONATE 650 MG TABLET 650 MG: at 20:21

## 2022-09-21 RX ADMIN — SODIUM CHLORIDE, PRESERVATIVE FREE 5 ML: 5 INJECTION INTRAVENOUS at 22:55

## 2022-09-21 RX ADMIN — HEPARIN SODIUM 5000 UNITS: 5000 INJECTION INTRAVENOUS; SUBCUTANEOUS at 14:37

## 2022-09-21 RX ADMIN — MORPHINE SULFATE 2 MG: 2 INJECTION, SOLUTION INTRAMUSCULAR; INTRAVENOUS at 17:17

## 2022-09-21 RX ADMIN — AMIODARONE HYDROCHLORIDE 400 MG: 200 TABLET ORAL at 14:36

## 2022-09-21 RX ADMIN — SODIUM BICARBONATE 650 MG TABLET 650 MG: at 08:48

## 2022-09-21 RX ADMIN — LORAZEPAM 0.5 MG: 0.5 TABLET ORAL at 12:35

## 2022-09-21 RX ADMIN — SODIUM CHLORIDE, PRESERVATIVE FREE 5 ML: 5 INJECTION INTRAVENOUS at 20:25

## 2022-09-21 RX ADMIN — ONDANSETRON 4 MG: 2 INJECTION INTRAMUSCULAR; INTRAVENOUS at 14:49

## 2022-09-21 RX ADMIN — INSULIN GLARGINE 7 UNITS: 100 INJECTION, SOLUTION SUBCUTANEOUS at 21:30

## 2022-09-21 RX ADMIN — HEPARIN SODIUM 5000 UNITS: 5000 INJECTION INTRAVENOUS; SUBCUTANEOUS at 21:30

## 2022-09-21 RX ADMIN — VITAMIN D, TAB 1000IU (100/BT) 2000 UNITS: 25 TAB at 08:48

## 2022-09-21 RX ADMIN — PRAVASTATIN SODIUM 80 MG: 80 TABLET ORAL at 20:21

## 2022-09-21 RX ADMIN — SODIUM CHLORIDE, PRESERVATIVE FREE 5 ML: 5 INJECTION INTRAVENOUS at 08:48

## 2022-09-21 RX ADMIN — AMIODARONE HYDROCHLORIDE 400 MG: 200 TABLET ORAL at 06:01

## 2022-09-21 RX ADMIN — ASPIRIN 81 MG: 81 TABLET, COATED ORAL at 08:47

## 2022-09-21 RX ADMIN — LATANOPROST 1 DROP: 50 SOLUTION OPHTHALMIC at 20:24

## 2022-09-21 RX ADMIN — DOCUSATE SODIUM 100 MG: 100 CAPSULE, LIQUID FILLED ORAL at 08:48

## 2022-09-21 RX ADMIN — AMIODARONE HYDROCHLORIDE 400 MG: 200 TABLET ORAL at 20:21

## 2022-09-21 RX ADMIN — ONDANSETRON 4 MG: 2 INJECTION INTRAMUSCULAR; INTRAVENOUS at 20:35

## 2022-09-21 RX ADMIN — MORPHINE SULFATE 2 MG: 2 INJECTION, SOLUTION INTRAMUSCULAR; INTRAVENOUS at 10:24

## 2022-09-21 RX ADMIN — HEPARIN SODIUM 5000 UNITS: 5000 INJECTION INTRAVENOUS; SUBCUTANEOUS at 06:01

## 2022-09-21 RX ADMIN — DULOXETINE HYDROCHLORIDE 20 MG: 20 CAPSULE, DELAYED RELEASE ORAL at 08:48

## 2022-09-21 RX ADMIN — MORPHINE SULFATE 2 MG: 2 INJECTION, SOLUTION INTRAMUSCULAR; INTRAVENOUS at 06:09

## 2022-09-21 ASSESSMENT — PAIN DESCRIPTION - LOCATION
LOCATION: SHOULDER;LEG
LOCATION: LEG;SHOULDER
LOCATION: SHOULDER
LOCATION: SHOULDER
LOCATION: ARM;SHOULDER
LOCATION: ARM;LEG

## 2022-09-21 ASSESSMENT — PAIN DESCRIPTION - ORIENTATION
ORIENTATION: LEFT

## 2022-09-21 ASSESSMENT — PAIN SCALES - GENERAL
PAINLEVEL_OUTOF10: 3
PAINLEVEL_OUTOF10: 3
PAINLEVEL_OUTOF10: 0
PAINLEVEL_OUTOF10: 7
PAINLEVEL_OUTOF10: 0
PAINLEVEL_OUTOF10: 8
PAINLEVEL_OUTOF10: 0
PAINLEVEL_OUTOF10: 8
PAINLEVEL_OUTOF10: 10
PAINLEVEL_OUTOF10: 9
PAINLEVEL_OUTOF10: 7

## 2022-09-21 ASSESSMENT — PAIN SCALES - WONG BAKER: WONGBAKER_NUMERICALRESPONSE: 0

## 2022-09-21 ASSESSMENT — PAIN DESCRIPTION - DESCRIPTORS: DESCRIPTORS: ACHING;SHARP

## 2022-09-21 NOTE — CARE COORDINATION
CM following for discharge need for higher level of care for LV aneurysm. CM met with [employer] twice today to determine any avenue for patient to have out of state coverage before Oct 1. [facility] medicare changed to PPO on 9/20 per patient with activation 10/1). CM informed by [facility] supervisor that coverage could not be activated until the first of the month. CM contacted Medicare to see if patient could switch to traditional medicare. CM informed patient could not change medicare plan until open enrollment which begins Oct 15th. CM informed patient of the information above and updated Petty Salazar, cardiology NP. CM following.

## 2022-09-21 NOTE — PROGRESS NOTES
[facility] CARDIOLOGY PROGRESS NOTE    9/21/2022 8:14 AM    Admit Date: 9/6/2022        Subjective:   Stable overnight without angina, CHF, or palpitations. Vitals stable and controlled. No VT overnight. No other complaints overnight. Tolerating meds well. Continues high-dose amiodarone oral taper. Objective:      Vitals:    09/20/22 2047 09/21/22 0028 09/21/22 0430 09/21/22 0756   BP: 131/67 120/68 113/62 108/76   Pulse: 65 57 60 75   Resp: 18 20 18 20   Temp: 97.7 °F (36.5 °C) 97 °F (36.1 °C) 97.2 °F (36.2 °C) 97.9 °F (36.6 °C)   TempSrc: Temporal Temporal Temporal Oral   SpO2: 98% 98% 95% 100%   Weight:   204 lb 4.8 oz (92.7 kg)    Height:           Physical Exam:  Neck- supple, no JVD sitting upright  CV- regular rate and rhythm no MRG  Lung- clear bilaterally anteroapically, decreased bibasilar but no crackles or wheezing  Abd- soft, nontender, nondistended  Ext- no distal edema  Skin- warm and dry    Data Review:   Recent Labs     09/19/22  0415 09/20/22  0543    139   K 4.4 4.2   BUN 26* 20       Assessment and Plan:     *Ventricular tachycardia (HCC)-none in the past couple of days. Continue telemetry monitoring. Continue amiodarone to decrease to twice daily dosing tomorrow morning. Recheck BMP and magnesium in the morning and replete electrolytes aggressively as needed. Related to posterior basilar aneurysm. Initial efforts to obtain transfer to [facility], [address], and [facility] have been unsuccessful due to either insurance issues or lack of available resources, however her insurance has now changed and case management is working to see if [facility] will accept the patient on October 1. Continue to maximize meds and monitor closely in the meantime. Active Hospital Problems    Acute on chronic renal failure (HCC)-clinically euvolemic and asymptomatic today. Follow and recheck BMP daily      Left ventricular aneurysm-likely source of reentrant ventricular tachycardia.

## 2022-09-21 NOTE — PROGRESS NOTES
RENAL PROGRESS NOTE    Subjective:     Patient is a 60year old female with Chronic Kidney Disease with baseline creatinine 2-2.2 followed by me in the office setting is admitted with V tach. She feels much better since admission and her heart rate is now normal.   She has had prior SNEHA on CKD episodes, IDDM, morbid obesity s/p DC ICD. She underwent defibrillated in the ED and was placed on amiodarone IV. Her crea was 1.8 on 8/31/22 and increased to 3.3 on admission, and improved to 1.6 today. No vomiting, no CP, dyspnea is controlled with nasal O2.  9/8/22- alert/oriented x3, sitting on side of bed, no signs of distress, on room air, denies dyspnea, CP, n/v, HA, or dizziness. On Amiodarone gtt. Family at bedside. 9/9/22- alert/oriented, lying in bed, no signs of distress, c/o CP earlier with dyspnea when walking, no CP or dyspnea at time of assessment, no n/v, HA or dizziness. Kidney indices stable. Plans to remain in hospital until transferred to [facility]. 9/10/22 - alert and communicating well - breathing better - No N/V, no uremic signs or symptoms - edema is improved   9/11/22 - she complains of increased dyspnea and edema this PM, no chest pain - no N/V   9/12/22- alert/oriented, c/o dyspnea with exertion, on 2L NC, denies CP, n/v, HA, or dizziness. Good UOP with diuresis. 9/13/22- alert/oriented, sitting in chair, no signs of distress, on room air, c/o dyspnea with exertion, denies CP, HA, dizziness, or n/v. Waiting on transfer to [facility]. 9/14/22- alert/oriented, sitting in chair, no signs of distress, on room air, denies dyspnea, CP, n/v, HA, or dizziness. No complaints. 9/15/22- alert/oriented, sitting in chair, no signs of distress, on room air, concerned about weight, she feels it is fluid gain, she is on low sodium diet, weight 206 lb 2 days ago compared to 208 lbs today. No other concerns. Denies dyspnea, CP, n/v, HA, or dizziness.   9/16/22- alert, oriented, sitting in chair, no signs of distress, on room air, no complaints. On 2000 ml fluid restriction. 9/17/22- alert/oriented, lying in bed, no signs of distress, on room air, c/o dyspnea with exertion, denies CP, n/v, HA, or dizziness. Had BM this AM.   9/18/22-alert/oriented, sitting up in bed, no signs of distress, on room air, c/o dyspnea with exertion, at time of assessment denies dyspnea or CP, no other complaints. 9/19/22-alert/oriented, lying in bed, no signs of distress, on room air, no complaints, last BM was yesterday. Still waiting on bed at [facility]. 9/20/22- alert/oriented, sitting up in bed, no distress, on phone with insurance Bill Salcedo has declined her due to her insurance being out of network. She is sad and teary eyed. 9/21/22- alert/oriented, sitting up in bed, no signs of distress, on 2L NC, denies dyspnea or CP. No new labs today. Past Medical History:   Diagnosis Date    Abdominal wall hernia 10/2/2018    Acute hypoxemic respiratory failure ([address] 4/3/2019    Allergic rhinitis     followed by allergist; allergic to grass- has rescue inhaler PRN    ARDS (adult respiratory distress syndrome) ([address] 4/3/2019    Arthritis     Automatic implantable cardioverter-defibrillator in situ 3/30/2016    CAD in native artery 3/30/2016    Chronic kidney disease     Chronic systolic heart failure ([address] 07/18/2013    ECHO 2017- EF 39%; managed with medications; [place]    CKD (chronic kidney disease)     [facility] follows    Diabetes ([address]     type 2 (on insulin);  FBS AM range- , hypo s/s <70, hgba1c- 7.9    Diabetes mellitus ([address] 4/12/2010    Dyslipidemia 2/13/2013    Eczema     Fibromyalgia     GERD (gastroesophageal reflux disease)     hx of- no meds currently    Headache     Heart failure ([address]     chronic systolic with EF 54%; non-ischemic cardiomyopathy    HTN (hypertension) 4/12/2010    Hypercholesterolemia     Incarcerated incisional hernia 3/26/2019    Morbid obesity (HCC)     Neuropathy bilateral feet and tips of fingers    NICM (nonischemic cardiomyopathy) ([facility] 2/15/2013    Obesity     bmi- 41    Obstructive sleep apnea (adult) (pediatric) 07/14/2014    uses cpap qhs    Pseudomonas urinary tract infection 4/5/2019    Sciatic pain 5/23/2016    Severe persistent asthma with acute exacerbation 8/24/2020    SVT (supraventricular tachycardia) ([facility])     ablation for svt    Tobacco use disorder 4/12/2010      Past Surgical History:   Procedure Laterality Date    ABLATION OF DYSRHYTHMIC FOCUS  \"years ago\"    CARDIAC DEFIBRILLATOR PLACEMENT  07/18/2013    Biotronik dual chamber ICD by Dr. Cecy Oleary  07/18/2013    Biotronik dual chamber ICD by Dr. Nilo Rudolph 7/1/2022    Left heart cath / coronary angiography performed by Taft Severs, MD at [address] [place] [address]    COLONOSCOPY N/A 7/7/2021    COLONOSCOPY/BMI 48 PT HAS AN ICD performed by Dacia Wooten MD at [facility]  03/26/2019    mild incarceration, no bowel removal    HYSTERECTOMY (CERVIX STATUS UNKNOWN)      EDITH-Left ovary intact per pt    LEFT HEART CATH,PERCUTANEOUS  2/13/2013    no intervention    PACEMAKER      ICD    ROTATOR CUFF REPAIR Right     TONSILLECTOMY        Prior to Admission medications    Medication Sig Start Date End Date Taking? Authorizing Provider   cyclobenzaprine (FLEXERIL) 10 MG tablet Take 10 mg by mouth 3 times daily as needed for Muscle spasms. Historical Provider, MD   calcitRIOL (ROCALTROL) 0.25 MCG capsule Take 0.25 mcg by mouth daily. Historical Provider, MD   Cholecalciferol (VITAMIN D3) 50 MCG (2000 UT) TABS Take 1 tablet by mouth daily. Historical Provider, MD   nitroGLYCERIN (NITROSTAT) 0.4 MG SL tablet Place 0.4 mg under the tongue every 5 minutes as needed for Chest pain.  Max 3 doses     Historical Provider, MD   dupilumab (DUPIXENT) 300 MG/2ML SOPN injection Inject 300 mg into the skin every 14 days. Patient usually takes medication on Monday or Tuesday. Historical Provider, MD   acetaminophen (TYLENOL) 500 MG tablet Take 1,000 mg by mouth every 6 hours as needed for Pain (breakthrough pain).     Historical Provider, MD   amiodarone (CORDARONE) 200 MG tablet Take 1 tablet by mouth 2 times daily 8/17/22   Samanta Burleson, APRN - CNP   DULoxetine (CYMBALTA) 20 MG extended release capsule Take 1 capsule by mouth daily 8/18/22   ARNOLDO Hobbs - CNP   aspirin 81 MG EC tablet Take 81 mg by mouth daily    Ar Automatic Reconciliation   carvedilol (COREG) 6.25 MG tablet Take 6.25 mg by mouth 2 times daily (with meals) 11/23/21   Ar Automatic Reconciliation   vitamin D 25 MCG (1000 UT) CAPS Take by mouth daily  Patient not taking: Reported on 9/6/2022    Ar Automatic Reconciliation   empagliflozin (JARDIANCE) 10 MG tablet Take 10 mg by mouth daily 12/29/21   Ar Automatic Reconciliation   insulin glargine (LANTUS SOLOSTAR) 100 UNIT/ML injection pen Inject 15 Units into the skin nightly 12/22/21   Ar Automatic Reconciliation   isosorbide mononitrate (IMDUR) 30 MG extended release tablet Take 30 mg by mouth daily 11/23/21   Ar Automatic Reconciliation   latanoprost (XALATAN) 0.005 % ophthalmic solution Place 1 drop into both eyes nightly 5/10/21   Ar Automatic Reconciliation   polyethylene glycol (GLYCOLAX) 17 GM/SCOOP powder Take 17 g by mouth daily as needed  Patient not taking: Reported on 9/6/2022 11/14/20   Ar Automatic Reconciliation   pravastatin (PRAVACHOL) 80 MG tablet Take 80 mg by mouth nightly 11/23/21   Ar Automatic Reconciliation   sacubitril-valsartan (ENTRESTO) 49-51 MG per tablet Take 1 tablet by mouth 2 times daily 11/23/21   Ar Automatic Reconciliation   torsemide (DEMADEX) 20 MG tablet Take 40 mg by mouth 2 times daily 8/30/22   Ar Automatic Reconciliation   albuterol sulfate  (90 Base) MCG/ACT inhaler 2 puffs qid prn shortness of breath or wheezing  Patient not taking: Reported on 8/10/2022 11/14/20 8/17/22  Ar Automatic Reconciliation     Allergies   Allergen Reactions    Other Hives and Shortness Of Breath     \"inhaler PRN\"    Penicillin G Itching      Social History     Tobacco Use    Smoking status: Some Days     Packs/day: 0.25     Types: Cigarettes    Smokeless tobacco: Never    Tobacco comments:     Quit smoking: \"every now and then\"   Substance Use Topics    Alcohol use: Yes     Alcohol/week: 1.0 standard drink      Family History   Problem Relation Age of Onset    Diabetes Other     Heart Disease Father     Hypertension Father     Stroke Father     Heart Attack Father 48        mi    Breast Cancer Sister 37    Hypertension Brother     Heart Disease Brother     Diabetes Brother     Stroke Mother           Review of Systems    Negative except for what is mention in HPI above      Objective:       /71   Pulse 60   Temp 98.1 °F (36.7 °C) (Oral)   Resp 18   Ht 4' 10\" (1.473 m)   Wt 204 lb 4.8 oz (92.7 kg)   SpO2 93%   BMI 42.70 kg/m²     09/21 0701 - 09/21 1900  In: 120 [P.O.:120]  Out: -   09/19 1901 - 09/21 0700  In: -   Out: 1650 [Urine:1650]    /71   Pulse 60   Temp 98.1 °F (36.7 °C) (Oral)   Resp 18   Ht 4' 10\" (1.473 m)   Wt 204 lb 4.8 oz (92.7 kg)   SpO2 93%   BMI 42.70 kg/m²     General:  Alert, cooperative, no respiratory distress on room air, appears stated age. Head:  Normocephalic, without obvious abnormality, atraumatic. Neck: Supple, symmetrical, trachea midline, no JVD. Back:   Symmetric, no curvature. ROM normal. No CVA tenderness. Lungs:   Decreased bases to auscultation bilaterally. Heart:  Regular rate and rhythm, S1, S2 normal, no murmur,  rub or gallop. Abdomen:   Soft, non-tender. Bowel sounds normal   Extremities: Extremities normal, atraumatic, no cyanosis, no edema. Skin: Skin color, texture, turgor normal. No rashes or lesions. Neurologic: Baseline tremor is present. No asterixis.          Data Review:     Recent Results (from the past 24 hour(s))   POCT Glucose    Collection Time: 09/20/22 12:10 PM   Result Value Ref Range    POC Glucose 104 (H) 65 - 100 mg/dL    Performed by: Bernadine Gitelman    POCT Glucose    Collection Time: 09/20/22  1:25 PM   Result Value Ref Range    POC Glucose 125 (H) 65 - 100 mg/dL    Performed by: BreezyKrystalPCT    POCT Glucose    Collection Time: 09/20/22  5:01 PM   Result Value Ref Range    POC Glucose 120 (H) 65 - 100 mg/dL    Performed by: BreezyKrystalPCT    POCT Glucose    Collection Time: 09/20/22  9:00 PM   Result Value Ref Range    POC Glucose 122 (H) 65 - 100 mg/dL    Performed by: Mehdi Jimenez    POCT Glucose    Collection Time: 09/21/22  7:17 AM   Result Value Ref Range    POC Glucose 106 (H) 65 - 100 mg/dL    Performed by: Reinier)CNA    POCT Glucose    Collection Time: 09/21/22 11:00 AM   Result Value Ref Range    POC Glucose 125 (H) 65 - 100 mg/dL    Performed by: Reinier)CNA        CXR;   Results for orders placed during the hospital encounter of 09/06/22    XR CHEST PORTABLE    Narrative  Portable chest xray    COMPARISON: August 10, 2022    INDICATION: chest pain    FINDINGS:    Heart is enlarged. Mediastinal contour is within normal limits. Stable  left-sided cardiac pacer. Increased interstitial prominence, likely vascular  congestion. No pneumothorax. No large pleural effusion. Impression  1. Cardiomegaly and suggestion of vascular congestion. CT Abdomen  Results for orders placed in visit on 03/31/19    CT ABDOMEN PELVIS W IV CONTRAST    Narrative  CT abdomen and pelvis with contrast: 03/30/2019    History: Recent hernia surgery, abdominal distention.     Technique: Helically acquired images were obtained from the domes of the  diaphragms to the ischial tuberosities reconstructed at 5mm intervals after the  uneventful administration of oral contrast for bowel opacification and 100 cc's  of intravenous Isovue-370 to evaluate the solid abdominal viscera and vascular  structures. Coronal reformatted images were submitted. Radiation dose reduction techniques were used for this study:  Our CT scanners  use one or all of the following: Automated exposure control, adjustment of the  mA and/or kVp according to patient's size, iterative reconstruction. Comparison: 02/26/2019. CT ABDOMEN: There is mild bilateral lower lobe subsegmental atelectasis and/or  scar. There is a tiny low-density lesion within the right hepatic lobe, too  small to characterize but statistically benign, unchanged. The spleen, pancreas  and adrenal glands are unremarkable. A cyst within the upper pole left kidney  measures 1.87 m. 2 subcentimeter low-density lesions within the right kidney are  too small to characterize but statistically benign. There is a large defect within the anterior abdominal wall measuring nearly 12  mm in transverse diameter. Most of the small bowel as well as a large portion of  the mid stomach and transverse colon anteriorly large hernia. There is soft  tissue stranding along the lateral abdominal walls, presumably postsurgical. The  the proximal and midportion of the stomach is moderately distended which with  narrowing of the distal stomach has the pylorus traverses back into the  peritoneal cavity. The proximal small bowel is located within the peritoneal  cavity and is dilated with these loops of bowel extending into the large hernia  which also appear mildly dilated. A definite transition zone is unable to be  visualized. No adenopathy is present. There is no free fluid. A drainage catheter is present  along the inferior margin of the hernia. CT PELVIS: A Chamberlain catheter is present within urinary bladder. No adenopathy or  ascites is present. There are no plantar changes. Sigmoid diverticula are  present.     Impression  IMPRESSION: Large abdominal wall defect containing many loops of small bowel,  transverse colon and portions of the stomach. Narrowing of the distal stomach as  it enters back into the peritoneal cavity causes partial outlet obstruction. Proximal small bowel is also mildly dilated without a definite transition zone. A small bowel obstruction cannot be excluded. Principal Problem:    Ventricular tachycardia (HCC)  Active Problems:    Acute on chronic renal failure (HCC)    Left ventricular aneurysm    Type 2 diabetes with nephropathy (HCC)    NICM (nonischemic cardiomyopathy) (HCC)    Dyslipidemia    CKD (chronic kidney disease) stage 4, GFR 15-29 ml/min (HCC)    Morbid obesity ([address]  Resolved Problems:    * No resolved hospital problems.  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 hypertensive nephrosclerosis - excellent improvement in response to medical management and renal function is better than  baseline  - creatinine range 1.4 to 1.6, this maybe her new baseline, will continue to monitor  Volume overload - initially with volume depletion, now with mild fluid excess - on Lasix 40 mg BID IV with much improvement,  switch to Torsemide PO as volume status is better controlled  - Torsemide on hold due to worsening kidney indices, euvolemic, good UOP  Hyponatremia - mild, likely due to cardiac disease, improved, will continue to monitor   Hypoalbuminemia - probably due to urinary protein loss - work on supplements  Hyperkalemia- mild, on admission K+ 3.8 with blood draw and 3.0 with POC test, given KCL 10 mEq IV at that time, with mild acidosis, no intervention at this time  - improved with improvement in acidosis, continue low potassium diet  Acidosis- stable with sodium bicarb PO, continue,  will continue to monitor   Ventricular tachycardia- LV aneurysm, on Amiodarone gtt, ICD, managed by Cardiology, plans for surgery at [address]:     As above Akosua Celis, [address] [place] [address]

## 2022-09-22 LAB
ANION GAP SERPL CALC-SCNC: 0 MMOL/L (ref 4–13)
BASOPHILS # BLD: 0.1 K/UL (ref 0–0.2)
BASOPHILS NFR BLD: 1 % (ref 0–2)
BUN SERPL-MCNC: 21 MG/DL (ref 6–23)
CALCIUM SERPL-MCNC: 9.1 MG/DL (ref 8.3–10.4)
CHLORIDE SERPL-SCNC: 110 MMOL/L (ref 101–110)
CO2 SERPL-SCNC: 27 MMOL/L (ref 21–32)
CREAT SERPL-MCNC: 1.4 MG/DL (ref 0.6–1)
DIFFERENTIAL METHOD BLD: ABNORMAL
EOSINOPHIL # BLD: 0.2 K/UL (ref 0–0.8)
EOSINOPHIL NFR BLD: 3 % (ref 0.5–7.8)
ERYTHROCYTE [DISTWIDTH] IN BLOOD BY AUTOMATED COUNT: 13.3 % (ref 11.9–14.6)
GLUCOSE BLD STRIP.AUTO-MCNC: 107 MG/DL (ref 65–100)
GLUCOSE BLD STRIP.AUTO-MCNC: 108 MG/DL (ref 65–100)
GLUCOSE BLD STRIP.AUTO-MCNC: 126 MG/DL (ref 65–100)
GLUCOSE BLD STRIP.AUTO-MCNC: 96 MG/DL (ref 65–100)
GLUCOSE SERPL-MCNC: 113 MG/DL (ref 65–100)
HCT VFR BLD AUTO: 36.4 % (ref 35.8–46.3)
HGB BLD-MCNC: 11.8 G/DL (ref 11.7–15.4)
IMM GRANULOCYTES # BLD AUTO: 0 K/UL (ref 0–0.5)
IMM GRANULOCYTES NFR BLD AUTO: 1 % (ref 0–5)
LYMPHOCYTES # BLD: 2.4 K/UL (ref 0.5–4.6)
LYMPHOCYTES NFR BLD: 28 % (ref 13–44)
MAGNESIUM SERPL-MCNC: 2.3 MG/DL (ref 1.8–2.4)
MCH RBC QN AUTO: 31.7 PG (ref 26.1–32.9)
MCHC RBC AUTO-ENTMCNC: 32.4 G/DL (ref 31.4–35)
MCV RBC AUTO: 97.8 FL (ref 79.6–97.8)
MONOCYTES # BLD: 0.7 K/UL (ref 0.1–1.3)
MONOCYTES NFR BLD: 9 % (ref 4–12)
NEUTS SEG # BLD: 5.1 K/UL (ref 1.7–8.2)
NEUTS SEG NFR BLD: 58 % (ref 43–78)
NRBC # BLD: 0.03 K/UL (ref 0–0.2)
PLATELET # BLD AUTO: 158 K/UL (ref 150–450)
PMV BLD AUTO: 11.5 FL (ref 9.4–12.3)
POTASSIUM SERPL-SCNC: 4.2 MMOL/L (ref 3.5–5.1)
RBC # BLD AUTO: 3.72 M/UL (ref 4.05–5.2)
SERVICE CMNT-IMP: ABNORMAL
SERVICE CMNT-IMP: NORMAL
SODIUM SERPL-SCNC: 137 MMOL/L (ref 136–145)
TSH, 3RD GENERATION: 2.92 UIU/ML (ref 0.36–3.74)
WBC # BLD AUTO: 8.5 K/UL (ref 4.3–11.1)

## 2022-09-22 PROCEDURE — 6370000000 HC RX 637 (ALT 250 FOR IP): Performed by: NURSE PRACTITIONER

## 2022-09-22 PROCEDURE — 2580000003 HC RX 258: Performed by: NURSE PRACTITIONER

## 2022-09-22 PROCEDURE — 1100000003 HC PRIVATE W/ TELEMETRY

## 2022-09-22 PROCEDURE — 85025 COMPLETE CBC W/AUTO DIFF WBC: CPT

## 2022-09-22 PROCEDURE — 36415 COLL VENOUS BLD VENIPUNCTURE: CPT

## 2022-09-22 PROCEDURE — 6360000002 HC RX W HCPCS: Performed by: NURSE PRACTITIONER

## 2022-09-22 PROCEDURE — 99232 SBSQ HOSP IP/OBS MODERATE 35: CPT | Performed by: INTERNAL MEDICINE

## 2022-09-22 PROCEDURE — 6370000000 HC RX 637 (ALT 250 FOR IP): Performed by: INTERNAL MEDICINE

## 2022-09-22 PROCEDURE — 82962 GLUCOSE BLOOD TEST: CPT

## 2022-09-22 PROCEDURE — 6360000002 HC RX W HCPCS: Performed by: INTERNAL MEDICINE

## 2022-09-22 PROCEDURE — 84443 ASSAY THYROID STIM HORMONE: CPT

## 2022-09-22 PROCEDURE — 80048 BASIC METABOLIC PNL TOTAL CA: CPT

## 2022-09-22 PROCEDURE — 83735 ASSAY OF MAGNESIUM: CPT

## 2022-09-22 RX ORDER — AMIODARONE HYDROCHLORIDE 200 MG/1
400 TABLET ORAL 2 TIMES DAILY
Status: DISCONTINUED | OUTPATIENT
Start: 2022-09-22 | End: 2022-09-25 | Stop reason: HOSPADM

## 2022-09-22 RX ADMIN — SODIUM BICARBONATE 650 MG TABLET 650 MG: at 22:00

## 2022-09-22 RX ADMIN — SODIUM CHLORIDE, PRESERVATIVE FREE 10 ML: 5 INJECTION INTRAVENOUS at 22:02

## 2022-09-22 RX ADMIN — DOCUSATE SODIUM 100 MG: 100 CAPSULE, LIQUID FILLED ORAL at 08:12

## 2022-09-22 RX ADMIN — LORAZEPAM 0.5 MG: 0.5 TABLET ORAL at 12:21

## 2022-09-22 RX ADMIN — MORPHINE SULFATE 2 MG: 2 INJECTION, SOLUTION INTRAMUSCULAR; INTRAVENOUS at 08:10

## 2022-09-22 RX ADMIN — MORPHINE SULFATE 2 MG: 2 INJECTION, SOLUTION INTRAMUSCULAR; INTRAVENOUS at 22:42

## 2022-09-22 RX ADMIN — VITAMIN D, TAB 1000IU (100/BT) 2000 UNITS: 25 TAB at 08:11

## 2022-09-22 RX ADMIN — MORPHINE SULFATE 2 MG: 2 INJECTION, SOLUTION INTRAMUSCULAR; INTRAVENOUS at 12:21

## 2022-09-22 RX ADMIN — HYDROCODONE BITARTRATE AND ACETAMINOPHEN 1 TABLET: 7.5; 325 TABLET ORAL at 05:26

## 2022-09-22 RX ADMIN — HYDROCODONE BITARTRATE AND ACETAMINOPHEN 1 TABLET: 7.5; 325 TABLET ORAL at 21:59

## 2022-09-22 RX ADMIN — AMIODARONE HYDROCHLORIDE 400 MG: 200 TABLET ORAL at 05:26

## 2022-09-22 RX ADMIN — LATANOPROST 1 DROP: 50 SOLUTION OPHTHALMIC at 22:04

## 2022-09-22 RX ADMIN — DULOXETINE HYDROCHLORIDE 20 MG: 20 CAPSULE, DELAYED RELEASE ORAL at 08:11

## 2022-09-22 RX ADMIN — HEPARIN SODIUM 5000 UNITS: 5000 INJECTION INTRAVENOUS; SUBCUTANEOUS at 22:00

## 2022-09-22 RX ADMIN — AMIODARONE HYDROCHLORIDE 400 MG: 200 TABLET ORAL at 22:00

## 2022-09-22 RX ADMIN — INSULIN GLARGINE 7 UNITS: 100 INJECTION, SOLUTION SUBCUTANEOUS at 21:57

## 2022-09-22 RX ADMIN — SODIUM BICARBONATE 650 MG TABLET 650 MG: at 08:11

## 2022-09-22 RX ADMIN — HEPARIN SODIUM 5000 UNITS: 5000 INJECTION INTRAVENOUS; SUBCUTANEOUS at 13:46

## 2022-09-22 RX ADMIN — PRAVASTATIN SODIUM 80 MG: 80 TABLET ORAL at 21:59

## 2022-09-22 RX ADMIN — SODIUM CHLORIDE, PRESERVATIVE FREE 10 ML: 5 INJECTION INTRAVENOUS at 08:14

## 2022-09-22 RX ADMIN — MORPHINE SULFATE 2 MG: 2 INJECTION, SOLUTION INTRAMUSCULAR; INTRAVENOUS at 18:24

## 2022-09-22 RX ADMIN — ONDANSETRON 4 MG: 2 INJECTION INTRAMUSCULAR; INTRAVENOUS at 15:35

## 2022-09-22 RX ADMIN — HEPARIN SODIUM 5000 UNITS: 5000 INJECTION INTRAVENOUS; SUBCUTANEOUS at 06:00

## 2022-09-22 RX ADMIN — ASPIRIN 81 MG: 81 TABLET, COATED ORAL at 08:12

## 2022-09-22 RX ADMIN — Medication 0.8 MG: at 08:11

## 2022-09-22 ASSESSMENT — PAIN DESCRIPTION - ORIENTATION
ORIENTATION: LEFT

## 2022-09-22 ASSESSMENT — PAIN DESCRIPTION - DESCRIPTORS
DESCRIPTORS: SHARP
DESCRIPTORS: ACHING;STABBING
DESCRIPTORS: SHOOTING;PRESSURE
DESCRIPTORS: SHOOTING;SHARP
DESCRIPTORS: SHARP
DESCRIPTORS: SHARP;SHOOTING

## 2022-09-22 ASSESSMENT — PAIN DESCRIPTION - LOCATION
LOCATION: LEG
LOCATION: LEG
LOCATION: SHOULDER;HIP
LOCATION: SHOULDER;HIP;LEG
LOCATION: LEG
LOCATION: SHOULDER;HIP

## 2022-09-22 ASSESSMENT — PAIN DESCRIPTION - ONSET
ONSET: ON-GOING
ONSET: ON-GOING

## 2022-09-22 ASSESSMENT — PAIN DESCRIPTION - PAIN TYPE
TYPE: CHRONIC PAIN;NEUROPATHIC PAIN
TYPE: CHRONIC PAIN;NEUROPATHIC PAIN

## 2022-09-22 ASSESSMENT — PAIN SCALES - GENERAL
PAINLEVEL_OUTOF10: 7
PAINLEVEL_OUTOF10: 10
PAINLEVEL_OUTOF10: 7
PAINLEVEL_OUTOF10: 7
PAINLEVEL_OUTOF10: 0
PAINLEVEL_OUTOF10: 8
PAINLEVEL_OUTOF10: 0
PAINLEVEL_OUTOF10: 0
PAINLEVEL_OUTOF10: 7
PAINLEVEL_OUTOF10: 0

## 2022-09-22 ASSESSMENT — PAIN - FUNCTIONAL ASSESSMENT
PAIN_FUNCTIONAL_ASSESSMENT: PREVENTS OR INTERFERES WITH MANY ACTIVE NOT PASSIVE ACTIVITIES
PAIN_FUNCTIONAL_ASSESSMENT: PREVENTS OR INTERFERES SOME ACTIVE ACTIVITIES AND ADLS

## 2022-09-22 ASSESSMENT — PAIN DESCRIPTION - FREQUENCY
FREQUENCY: INTERMITTENT
FREQUENCY: INTERMITTENT

## 2022-09-22 NOTE — PROGRESS NOTES
RENAL PROGRESS NOTE    Subjective:     Patient is a 60year old female with Chronic Kidney Disease with baseline creatinine 2-2.2 followed by me in the office setting is admitted with V tach. She feels much better since admission and her heart rate is now normal.   She has had prior SNEHA on CKD episodes, IDDM, morbid obesity s/p DC ICD. She underwent defibrillated in the ED and was placed on amiodarone IV. Her crea was 1.8 on 8/31/22 and increased to 3.3 on admission, and improved to 1.6 today. No vomiting, no CP, dyspnea is controlled with nasal O2.  9/8/22- alert/oriented x3, sitting on side of bed, no signs of distress, on room air, denies dyspnea, CP, n/v, HA, or dizziness. On Amiodarone gtt. Family at bedside. 9/9/22- alert/oriented, lying in bed, no signs of distress, c/o CP earlier with dyspnea when walking, no CP or dyspnea at time of assessment, no n/v, HA or dizziness. Kidney indices stable. Plans to remain in hospital until transferred to [facility]. 9/10/22 - alert and communicating well - breathing better - No N/V, no uremic signs or symptoms - edema is improved   9/11/22 - she complains of increased dyspnea and edema this PM, no chest pain - no N/V   9/12/22- alert/oriented, c/o dyspnea with exertion, on 2L NC, denies CP, n/v, HA, or dizziness. Good UOP with diuresis. 9/13/22- alert/oriented, sitting in chair, no signs of distress, on room air, c/o dyspnea with exertion, denies CP, HA, dizziness, or n/v. Waiting on transfer to [facility]. 9/14/22- alert/oriented, sitting in chair, no signs of distress, on room air, denies dyspnea, CP, n/v, HA, or dizziness. No complaints. 9/15/22- alert/oriented, sitting in chair, no signs of distress, on room air, concerned about weight, she feels it is fluid gain, she is on low sodium diet, weight 206 lb 2 days ago compared to 208 lbs today. No other concerns. Denies dyspnea, CP, n/v, HA, or dizziness.   9/16/22- alert, oriented, sitting in chair, no signs of distress, on room air, no complaints. On 2000 ml fluid restriction. 9/17/22- alert/oriented, lying in bed, no signs of distress, on room air, c/o dyspnea with exertion, denies CP, n/v, HA, or dizziness. Had BM this AM.   9/18/22-alert/oriented, sitting up in bed, no signs of distress, on room air, c/o dyspnea with exertion, at time of assessment denies dyspnea or CP, no other complaints. 9/19/22-alert/oriented, lying in bed, no signs of distress, on room air, no complaints, last BM was yesterday. Still waiting on bed at [facility]. 9/20/22- alert/oriented, sitting up in bed, no distress, on phone with insurance Dianne Dyer has declined her due to her insurance being out of network. She is sad and teary eyed. 9/21/22- alert/oriented, sitting up in bed, no signs of distress, on 2L NC, denies dyspnea or CP. No new labs today. 9/22/22- alert/oriented, sitting in chair, no signs of distress, on room air, no complaints. She still awaiting transfer for cardiac procedure. Her kidney functions is stable with a baseline of 1.4-1.6. Her electrolytes are stable, and her anemia is at goal for CKD. Nephrology will sign off at this time. If there are any future changes that need our attention, please re-consult us. If not expect to follow-up in office once discharged home. Past Medical History:   Diagnosis Date    Abdominal wall hernia 10/2/2018    Acute hypoxemic respiratory failure ([address] 4/3/2019    Allergic rhinitis     followed by allergist; allergic to grass- has rescue inhaler PRN    ARDS (adult respiratory distress syndrome) ([address] 4/3/2019    Arthritis     Automatic implantable cardioverter-defibrillator in situ 3/30/2016    CAD in native artery 3/30/2016    Chronic kidney disease     Chronic systolic heart failure ([address] 07/18/2013    ECHO 2017- EF 39%; managed with medications; [facility]    CKD (chronic kidney disease)     [facility] follows    Diabetes ([address]     type 2 (on insulin);  FBS AM range- , hypo s/s <70, hgba1c- 7.9    Diabetes mellitus ([facility] 4/12/2010    Dyslipidemia 2/13/2013    Eczema     Fibromyalgia     GERD (gastroesophageal reflux disease)     hx of- no meds currently    Headache     Heart failure (HCC)     chronic systolic with EF 06%; non-ischemic cardiomyopathy    HTN (hypertension) 4/12/2010    Hypercholesterolemia     Incarcerated incisional hernia 3/26/2019    Morbid obesity ([facility])     Neuropathy     bilateral feet and tips of fingers    NICM (nonischemic cardiomyopathy) ([facility] 2/15/2013    Obesity     bmi- 41    Obstructive sleep apnea (adult) (pediatric) 07/14/2014    uses cpap qhs    Pseudomonas urinary tract infection 4/5/2019    Sciatic pain 5/23/2016    Severe persistent asthma with acute exacerbation 8/24/2020    SVT (supraventricular tachycardia) ([facility])     ablation for svt    Tobacco use disorder 4/12/2010      Past Surgical History:   Procedure Laterality Date    ABLATION OF DYSRHYTHMIC FOCUS  \"years ago\"    CARDIAC DEFIBRILLATOR PLACEMENT  07/18/2013    Biotronik dual chamber ICD by Dr. Aquiles Burnett  07/18/2013    Biotronik dual chamber ICD by Dr. Jessica Montes De Oca 7/1/2022    Left heart cath / coronary angiography performed by Flakito Benton MD at [address] [place] [address]      x1    COLONOSCOPY N/A 7/7/2021    COLONOSCOPY/BMI 48 PT HAS AN ICD performed by Colleen Salinas MD at [facility]  03/26/2019    mild incarceration, no bowel removal    HYSTERECTOMY (CERVIX STATUS UNKNOWN)      EDITH-Left ovary intact per pt    LEFT HEART CATH,PERCUTANEOUS  2/13/2013    no intervention    PACEMAKER      ICD    ROTATOR CUFF REPAIR Right     TONSILLECTOMY        Prior to Admission medications    Medication Sig Start Date End Date Taking? Authorizing Provider   cyclobenzaprine (FLEXERIL) 10 MG tablet Take 10 mg by mouth 3 times daily as needed for Muscle spasms.     Historical Provider, MD   calcitRIOL (ROCALTROL) 0.25 MCG capsule Take 0.25 mcg by mouth daily. Historical Provider, MD   Cholecalciferol (VITAMIN D3) 50 MCG (2000 UT) TABS Take 1 tablet by mouth daily. Historical Provider, MD   nitroGLYCERIN (NITROSTAT) 0.4 MG SL tablet Place 0.4 mg under the tongue every 5 minutes as needed for Chest pain. Max 3 doses     Historical Provider, MD   dupilumab (DUPIXENT) 300 MG/2ML SOPN injection Inject 300 mg into the skin every 14 days. Patient usually takes medication on Monday or Tuesday. Historical Provider, MD   acetaminophen (TYLENOL) 500 MG tablet Take 1,000 mg by mouth every 6 hours as needed for Pain (breakthrough pain).     Historical Provider, MD   amiodarone (CORDARONE) 200 MG tablet Take 1 tablet by mouth 2 times daily 8/17/22   ARNOLDO Chakraborty CNP   DULoxetine (CYMBALTA) 20 MG extended release capsule Take 1 capsule by mouth daily 8/18/22   ARNOLDO Chakraborty CNP   aspirin 81 MG EC tablet Take 81 mg by mouth daily    Ar Automatic Reconciliation   carvedilol (COREG) 6.25 MG tablet Take 6.25 mg by mouth 2 times daily (with meals) 11/23/21   Ar Automatic Reconciliation   vitamin D 25 MCG (1000 UT) CAPS Take by mouth daily  Patient not taking: Reported on 9/6/2022    Ar Automatic Reconciliation   empagliflozin (JARDIANCE) 10 MG tablet Take 10 mg by mouth daily 12/29/21   Ar Automatic Reconciliation   insulin glargine (LANTUS SOLOSTAR) 100 UNIT/ML injection pen Inject 15 Units into the skin nightly 12/22/21   Ar Automatic Reconciliation   isosorbide mononitrate (IMDUR) 30 MG extended release tablet Take 30 mg by mouth daily 11/23/21   Ar Automatic Reconciliation   latanoprost (XALATAN) 0.005 % ophthalmic solution Place 1 drop into both eyes nightly 5/10/21   Ar Automatic Reconciliation   polyethylene glycol (GLYCOLAX) 17 GM/SCOOP powder Take 17 g by mouth daily as needed  Patient not taking: Reported on 9/6/2022 11/14/20   Ar Automatic Reconciliation pravastatin (PRAVACHOL) 80 MG tablet Take 80 mg by mouth nightly 11/23/21   Ar Automatic Reconciliation   sacubitril-valsartan (ENTRESTO) 49-51 MG per tablet Take 1 tablet by mouth 2 times daily 11/23/21   Ar Automatic Reconciliation   torsemide (DEMADEX) 20 MG tablet Take 40 mg by mouth 2 times daily 8/30/22   Ar Automatic Reconciliation   albuterol sulfate  (90 Base) MCG/ACT inhaler 2 puffs qid prn shortness of breath or wheezing  Patient not taking: Reported on 8/10/2022 11/14/20 8/17/22  Ar Automatic Reconciliation     Allergies   Allergen Reactions    Other Hives and Shortness Of Breath     \"inhaler PRN\"    Penicillin G Itching      Social History     Tobacco Use    Smoking status: Some Days     Packs/day: 0.25     Types: Cigarettes    Smokeless tobacco: Never    Tobacco comments:     Quit smoking: \"every now and then\"   Substance Use Topics    Alcohol use: Yes     Alcohol/week: 1.0 standard drink      Family History   Problem Relation Age of Onset    Diabetes Other     Heart Disease Father     Hypertension Father     Stroke Father     Heart Attack Father 48        mi    Breast Cancer Sister 37    Hypertension Brother     Heart Disease Brother     Diabetes Brother     Stroke Mother           Review of Systems    Negative except for what is mention in HPI above      Objective:       /83   Pulse 72   Temp 98.2 °F (36.8 °C) (Oral)   Resp 18   Ht 4' 10\" (1.473 m)   Wt 204 lb 3.2 oz (92.6 kg)   SpO2 95%   BMI 42.68 kg/m²     09/22 0701 - 09/22 1900  In: 400 [P.O.:400]  Out: 800 [Urine:800]  09/20 1901 - 09/22 0700  In: 560 [P.O.:560]  Out: 2950 [Urine:2950]    /83   Pulse 72   Temp 98.2 °F (36.8 °C) (Oral)   Resp 18   Ht 4' 10\" (1.473 m)   Wt 204 lb 3.2 oz (92.6 kg)   SpO2 95%   BMI 42.68 kg/m²     General:  Alert, cooperative, no respiratory distress on room air, appears stated age. Head:  Normocephalic, without obvious abnormality, atraumatic.    Neck: Supple, symmetrical, trachea midline, no JVD. Back:   Symmetric, no curvature. ROM normal. No CVA tenderness. Lungs:   Decreased bases to auscultation bilaterally. Heart:  Regular rate and rhythm, S1, S2 normal, no murmur,  rub or gallop. Abdomen:   Soft, non-tender. Bowel sounds normal   Extremities: Extremities normal, atraumatic, no cyanosis, no edema. Skin: Skin color, texture, turgor normal. No rashes or lesions. Neurologic: Baseline tremor is present. No asterixis.          Data Review:     Recent Results (from the past 24 hour(s))   POCT Glucose    Collection Time: 09/21/22  8:16 PM   Result Value Ref Range    POC Glucose 129 (H) 65 - 100 mg/dL    Performed by: Mahsa[facility]    Basic Metabolic Panel    Collection Time: 09/22/22  3:50 AM   Result Value Ref Range    Sodium 137 136 - 145 mmol/L    Potassium 4.2 3.5 - 5.1 mmol/L    Chloride 110 101 - 110 mmol/L    CO2 27 21 - 32 mmol/L    Anion Gap 0 (L) 4 - 13 mmol/L    Glucose 113 (H) 65 - 100 mg/dL    BUN 21 6 - 23 MG/DL    Creatinine 1.40 (H) 0.6 - 1.0 MG/DL    GFR African American 50 (L) >60 ml/min/1.73m2    GFR Non- 41 (L) >60 ml/min/1.73m2    Calcium 9.1 8.3 - 10.4 MG/DL   CBC with Auto Differential    Collection Time: 09/22/22  3:50 AM   Result Value Ref Range    WBC 8.5 4.3 - 11.1 K/uL    RBC 3.72 (L) 4.05 - 5.2 M/uL    Hemoglobin 11.8 11.7 - 15.4 g/dL    Hematocrit 36.4 35.8 - 46.3 %    MCV 97.8 79.6 - 97.8 FL    MCH 31.7 26.1 - 32.9 PG    MCHC 32.4 31.4 - 35.0 g/dL    RDW 13.3 11.9 - 14.6 %    Platelets 049 315 - 416 K/uL    MPV 11.5 9.4 - 12.3 FL    nRBC 0.03 0.0 - 0.2 K/uL    Differential Type AUTOMATED      Seg Neutrophils 58 43 - 78 %    Lymphocytes 28 13 - 44 %    Monocytes 9 4.0 - 12.0 %    Eosinophils % 3 0.5 - 7.8 %    Basophils 1 0.0 - 2.0 %    Immature Granulocytes 1 0.0 - 5.0 %    Segs Absolute 5.1 1.7 - 8.2 K/UL    Absolute Lymph # 2.4 0.5 - 4.6 K/UL    Absolute Mono # 0.7 0.1 - 1.3 K/UL    Absolute Eos # 0.2 0.0 - 0.8 K/UL    Basophils Absolute 0.1 0.0 - 0.2 K/UL    Absolute Immature Granulocyte 0.0 0.0 - 0.5 K/UL   Magnesium    Collection Time: 09/22/22  3:50 AM   Result Value Ref Range    Magnesium 2.3 1.8 - 2.4 mg/dL   TSH    Collection Time: 09/22/22  3:50 AM   Result Value Ref Range    TSH, 3RD GENERATION 2.920 0.358 - 3.740 uIU/mL   POCT Glucose    Collection Time: 09/22/22  7:27 AM   Result Value Ref Range    POC Glucose 107 (H) 65 - 100 mg/dL    Performed by: Willam    POCT Glucose    Collection Time: 09/22/22 11:40 AM   Result Value Ref Range    POC Glucose 126 (H) 65 - 100 mg/dL    Performed by: Henny Bronson 371;   Results for orders placed during the hospital encounter of 09/06/22    XR CHEST PORTABLE    Narrative  Portable chest xray    COMPARISON: August 10, 2022    INDICATION: chest pain    FINDINGS:    Heart is enlarged. Mediastinal contour is within normal limits. Stable  left-sided cardiac pacer. Increased interstitial prominence, likely vascular  congestion. No pneumothorax. No large pleural effusion. Impression  1. Cardiomegaly and suggestion of vascular congestion. CT Abdomen  Results for orders placed in visit on 03/31/19    CT ABDOMEN PELVIS W IV CONTRAST    Narrative  CT abdomen and pelvis with contrast: 03/30/2019    History: Recent hernia surgery, abdominal distention. Technique: Helically acquired images were obtained from the domes of the  diaphragms to the ischial tuberosities reconstructed at 5mm intervals after the  uneventful administration of oral contrast for bowel opacification and 100 cc's  of intravenous Isovue-370 to evaluate the solid abdominal viscera and vascular  structures. Coronal reformatted images were submitted.     Radiation dose reduction techniques were used for this study:  Our CT scanners  use one or all of the following: Automated exposure control, adjustment of the  mA and/or kVp according to patient's size, iterative reconstruction. Comparison: 02/26/2019. CT ABDOMEN: There is mild bilateral lower lobe subsegmental atelectasis and/or  scar. There is a tiny low-density lesion within the right hepatic lobe, too  small to characterize but statistically benign, unchanged. The spleen, pancreas  and adrenal glands are unremarkable. A cyst within the upper pole left kidney  measures 1.87 m. 2 subcentimeter low-density lesions within the right kidney are  too small to characterize but statistically benign. There is a large defect within the anterior abdominal wall measuring nearly 12  mm in transverse diameter. Most of the small bowel as well as a large portion of  the mid stomach and transverse colon anteriorly large hernia. There is soft  tissue stranding along the lateral abdominal walls, presumably postsurgical. The  the proximal and midportion of the stomach is moderately distended which with  narrowing of the distal stomach has the pylorus traverses back into the  peritoneal cavity. The proximal small bowel is located within the peritoneal  cavity and is dilated with these loops of bowel extending into the large hernia  which also appear mildly dilated. A definite transition zone is unable to be  visualized. No adenopathy is present. There is no free fluid. A drainage catheter is present  along the inferior margin of the hernia. CT PELVIS: A Chamberlain catheter is present within urinary bladder. No adenopathy or  ascites is present. There are no plantar changes. Sigmoid diverticula are  present. Impression  IMPRESSION: Large abdominal wall defect containing many loops of small bowel,  transverse colon and portions of the stomach. Narrowing of the distal stomach as  it enters back into the peritoneal cavity causes partial outlet obstruction. Proximal small bowel is also mildly dilated without a definite transition zone. A small bowel obstruction cannot be excluded.            Principal Problem:    Ventricular

## 2022-09-22 NOTE — PROGRESS NOTES
Comprehensive Nutrition Assessment    Type and Reason for Visit: Reassess  Length of Stay    Nutrition Recommendations/Plan:   Meals and Snacks:  Diet: Continue current order     Malnutrition Assessment:  Malnutrition Status: No malnutrition     Nutrition Assessment:  Nutrition History: Pt denies any changes in po intake PTA. She reports being wt stable PTA. Do You Have Any Cultural, Restorationist, or Ethnic Food Preferences?: No   Nutrition Background:   Pt with PMH notable for recurrent Vtach, LV aneurysm, NICM with EF of 40%, SNEHA w/CKD, IDDM, and the presence of an ICD. Presents with VT and defibrillated in the ED. Nutrition Interval:  Per chart review, [facility] does not accept pt's insurance and pt is attempting to change insurance plans to gain acceptance to [facility]. Visited pt- sitting in chair in room alone. Pt endorsed good meal intake and appetite. Current Nutrition Therapies:  ADULT DIET; Regular; 4 carb choices (60 gm/meal); Low Fat/Low Chol/High Fiber/2 gm Na; 2000 ml; No Caffeine    Current Intake:   Average Meal Intake: % Average Supplements Intake: None Ordered      Anthropometric Measures:  Height: 4' 10\" (147.3 cm)  Current Body Wt: 204 lb 3.2 oz (92.6 kg) (9/22), Weight source: Standing Scale  BMI: 42.7, Obese Class 3 (BMI 40.0 or greater)  Admission Body Weight: 208 lb 12.4 oz (94.7 kg) (9/8; standing scale)  Ideal Body Weight (Kg) (Calculated): 41 kg (90 lbs), 231.2 %  Usual Body Wt:  (200-204lbs), Percent weight change:         Edema:Peripheral Vascular  Peripheral Vascular (WDL): Exceptions to WDL  Edema: Right lower extremity; Left lower extremity  RUE Edema: Trace; Non-pitting  RLE Edema: Trace  LLE Edema: Trace   BMI Category Obese Class 3 (BMI 40.0 or greater)  Estimated Daily Nutrient Needs:  Energy (kcal/day): 1389 - 1667 (Kcal/kg (15-18) Weight used: 92.6 kg Current  Protein (g/day): 49-61 Weight Used: (Ideal) 40.9 kg  Fluid (ml/day): 1389 - 1667 (1 ml/kcal)    Nutrition Diagnosis: No nutrition diagnosis at this time    Nutrition Interventions:   Food and/or Nutrient Delivery: Continue Current Diet     Coordination of Nutrition Care: Continue to monitor while inpatient  Plan of Care discussed with: Patient    Goals:       Active Goal:  (Continue to meet at least 90% of estimated nutritition needs by next RD assessment)       Nutrition Monitoring and Evaluation:      Food/Nutrient Intake Outcomes: Food and Nutrient Intake  Physical Signs/Symptoms Outcomes: Weight, Meal Time Behavior    Discharge Planning:    No discharge needs at this time    Kita Leventhal, RD

## 2022-09-22 NOTE — PROGRESS NOTES
(HCC)-recheck daily. Type 2 diabetes with nephropathy (HCC)-per protocol, follow close       Morbid obesity ([facility]       NICM (nonischemic cardiomyopathy) (HCC)-decrease amiodarone to twice daily dosing tomorrow if no arrhythmias in the next 24 hours. Replete electrolytes as tolerated. Maximize guideline directed medical therapy as tolerated. ICD in place. Dyslipidemia-continue meds with outpatient surveillance.      Juan Carlos Gibson MD

## 2022-09-22 NOTE — CARE COORDINATION
KELY contacted by Arden Pradhan NP to check on bed availability for patient at [address]. CM called [facility] bed control and informed hospital has no beds but waiting list is available. CM sent this information on to Arden Pradhan through [address] [place] [address]. CM remains available to assist with discharge needs.

## 2022-09-22 NOTE — PLAN OF CARE
Problem: Discharge Planning  Goal: Discharge to home or other facility with appropriate resources  Outcome: Progressing  Flowsheets  Taken 9/22/2022 0810 by Margarita Munguia RN  Discharge to home or other facility with appropriate resources:   Identify barriers to discharge with patient and caregiver   Arrange for needed discharge resources and transportation as appropriate   Identify discharge learning needs (meds, wound care, etc)  Taken 9/21/2022 2000 by Tigist Sanchez RN  Discharge to home or other facility with appropriate resources: Identify barriers to discharge with patient and caregiver     Problem: Chronic Conditions and Co-morbidities  Goal: Patient's chronic conditions and co-morbidity symptoms are monitored and maintained or improved  Outcome: Progressing  Flowsheets  Taken 9/22/2022 0810 by Cole Clay 34 - Patient's Chronic Conditions and Co-Morbidity Symptoms are Monitored and Maintained or Improved:   Monitor and assess patient's chronic conditions and comorbid symptoms for stability, deterioration, or improvement   Collaborate with multidisciplinary team to address chronic and comorbid conditions and prevent exacerbation or deterioration  Taken 9/21/2022 2000 by Cole Roy 34 - Patient's Chronic Conditions and Co-Morbidity Symptoms are Monitored and Maintained or Improved: Monitor and assess patient's chronic conditions and comorbid symptoms for stability, deterioration, or improvement

## 2022-09-23 LAB
ANION GAP SERPL CALC-SCNC: 6 MMOL/L (ref 4–13)
BASOPHILS # BLD: 0.1 K/UL (ref 0–0.2)
BASOPHILS NFR BLD: 1 % (ref 0–2)
BUN SERPL-MCNC: 20 MG/DL (ref 6–23)
CALCIUM SERPL-MCNC: 9.5 MG/DL (ref 8.3–10.4)
CHLORIDE SERPL-SCNC: 108 MMOL/L (ref 101–110)
CO2 SERPL-SCNC: 25 MMOL/L (ref 21–32)
CREAT SERPL-MCNC: 1.4 MG/DL (ref 0.6–1)
DIFFERENTIAL METHOD BLD: ABNORMAL
EOSINOPHIL # BLD: 0.2 K/UL (ref 0–0.8)
EOSINOPHIL NFR BLD: 3 % (ref 0.5–7.8)
ERYTHROCYTE [DISTWIDTH] IN BLOOD BY AUTOMATED COUNT: 13.4 % (ref 11.9–14.6)
GLUCOSE BLD STRIP.AUTO-MCNC: 100 MG/DL (ref 65–100)
GLUCOSE BLD STRIP.AUTO-MCNC: 101 MG/DL (ref 65–100)
GLUCOSE BLD STRIP.AUTO-MCNC: 104 MG/DL (ref 65–100)
GLUCOSE BLD STRIP.AUTO-MCNC: 109 MG/DL (ref 65–100)
GLUCOSE SERPL-MCNC: 112 MG/DL (ref 65–100)
HCT VFR BLD AUTO: 37.5 % (ref 35.8–46.3)
HGB BLD-MCNC: 12.2 G/DL (ref 11.7–15.4)
IMM GRANULOCYTES # BLD AUTO: 0.1 K/UL (ref 0–0.5)
IMM GRANULOCYTES NFR BLD AUTO: 1 % (ref 0–5)
LYMPHOCYTES # BLD: 2.2 K/UL (ref 0.5–4.6)
LYMPHOCYTES NFR BLD: 28 % (ref 13–44)
MAGNESIUM SERPL-MCNC: 2.2 MG/DL (ref 1.8–2.4)
MCH RBC QN AUTO: 32 PG (ref 26.1–32.9)
MCHC RBC AUTO-ENTMCNC: 32.5 G/DL (ref 31.4–35)
MCV RBC AUTO: 98.4 FL (ref 79.6–97.8)
MONOCYTES # BLD: 0.7 K/UL (ref 0.1–1.3)
MONOCYTES NFR BLD: 9 % (ref 4–12)
NEUTS SEG # BLD: 4.5 K/UL (ref 1.7–8.2)
NEUTS SEG NFR BLD: 58 % (ref 43–78)
NRBC # BLD: 0.02 K/UL (ref 0–0.2)
PLATELET # BLD AUTO: 151 K/UL (ref 150–450)
PMV BLD AUTO: 11.2 FL (ref 9.4–12.3)
POTASSIUM SERPL-SCNC: 4.3 MMOL/L (ref 3.5–5.1)
RBC # BLD AUTO: 3.81 M/UL (ref 4.05–5.2)
SERVICE CMNT-IMP: ABNORMAL
SERVICE CMNT-IMP: NORMAL
SODIUM SERPL-SCNC: 139 MMOL/L (ref 136–145)
WBC # BLD AUTO: 7.7 K/UL (ref 4.3–11.1)

## 2022-09-23 PROCEDURE — 6370000000 HC RX 637 (ALT 250 FOR IP): Performed by: NURSE PRACTITIONER

## 2022-09-23 PROCEDURE — 6360000002 HC RX W HCPCS: Performed by: NURSE PRACTITIONER

## 2022-09-23 PROCEDURE — 1100000003 HC PRIVATE W/ TELEMETRY

## 2022-09-23 PROCEDURE — 82962 GLUCOSE BLOOD TEST: CPT

## 2022-09-23 PROCEDURE — 85025 COMPLETE CBC W/AUTO DIFF WBC: CPT

## 2022-09-23 PROCEDURE — 6370000000 HC RX 637 (ALT 250 FOR IP): Performed by: INTERNAL MEDICINE

## 2022-09-23 PROCEDURE — 6360000002 HC RX W HCPCS: Performed by: INTERNAL MEDICINE

## 2022-09-23 PROCEDURE — 36415 COLL VENOUS BLD VENIPUNCTURE: CPT

## 2022-09-23 PROCEDURE — 83735 ASSAY OF MAGNESIUM: CPT

## 2022-09-23 PROCEDURE — 99232 SBSQ HOSP IP/OBS MODERATE 35: CPT | Performed by: INTERNAL MEDICINE

## 2022-09-23 PROCEDURE — 2580000003 HC RX 258: Performed by: NURSE PRACTITIONER

## 2022-09-23 PROCEDURE — 80048 BASIC METABOLIC PNL TOTAL CA: CPT

## 2022-09-23 RX ADMIN — LATANOPROST 1 DROP: 50 SOLUTION OPHTHALMIC at 21:42

## 2022-09-23 RX ADMIN — INSULIN GLARGINE 7 UNITS: 100 INJECTION, SOLUTION SUBCUTANEOUS at 21:48

## 2022-09-23 RX ADMIN — MORPHINE SULFATE 2 MG: 2 INJECTION, SOLUTION INTRAMUSCULAR; INTRAVENOUS at 17:02

## 2022-09-23 RX ADMIN — LORAZEPAM 0.5 MG: 0.5 TABLET ORAL at 21:40

## 2022-09-23 RX ADMIN — PRAVASTATIN SODIUM 80 MG: 80 TABLET ORAL at 21:40

## 2022-09-23 RX ADMIN — Medication 1 TABLET: at 08:58

## 2022-09-23 RX ADMIN — HEPARIN SODIUM 5000 UNITS: 5000 INJECTION INTRAVENOUS; SUBCUTANEOUS at 14:35

## 2022-09-23 RX ADMIN — DULOXETINE HYDROCHLORIDE 20 MG: 20 CAPSULE, DELAYED RELEASE ORAL at 08:58

## 2022-09-23 RX ADMIN — HEPARIN SODIUM 5000 UNITS: 5000 INJECTION INTRAVENOUS; SUBCUTANEOUS at 04:55

## 2022-09-23 RX ADMIN — MORPHINE SULFATE 2 MG: 2 INJECTION, SOLUTION INTRAMUSCULAR; INTRAVENOUS at 21:40

## 2022-09-23 RX ADMIN — ASPIRIN 81 MG: 81 TABLET, COATED ORAL at 08:58

## 2022-09-23 RX ADMIN — MORPHINE SULFATE 2 MG: 2 INJECTION, SOLUTION INTRAMUSCULAR; INTRAVENOUS at 04:54

## 2022-09-23 RX ADMIN — LORAZEPAM 0.5 MG: 0.5 TABLET ORAL at 04:24

## 2022-09-23 RX ADMIN — HEPARIN SODIUM 5000 UNITS: 5000 INJECTION INTRAVENOUS; SUBCUTANEOUS at 21:40

## 2022-09-23 RX ADMIN — AMIODARONE HYDROCHLORIDE 400 MG: 200 TABLET ORAL at 21:40

## 2022-09-23 RX ADMIN — VITAMIN D, TAB 1000IU (100/BT) 2000 UNITS: 25 TAB at 08:58

## 2022-09-23 RX ADMIN — MORPHINE SULFATE 2 MG: 2 INJECTION, SOLUTION INTRAMUSCULAR; INTRAVENOUS at 11:04

## 2022-09-23 RX ADMIN — SODIUM BICARBONATE 650 MG TABLET 650 MG: at 08:58

## 2022-09-23 RX ADMIN — AMIODARONE HYDROCHLORIDE 400 MG: 200 TABLET ORAL at 08:58

## 2022-09-23 RX ADMIN — HYDROCODONE BITARTRATE AND ACETAMINOPHEN 1 TABLET: 7.5; 325 TABLET ORAL at 08:58

## 2022-09-23 RX ADMIN — SODIUM CHLORIDE, PRESERVATIVE FREE 10 ML: 5 INJECTION INTRAVENOUS at 08:59

## 2022-09-23 RX ADMIN — SODIUM CHLORIDE, PRESERVATIVE FREE 10 ML: 5 INJECTION INTRAVENOUS at 21:41

## 2022-09-23 RX ADMIN — SODIUM BICARBONATE 650 MG TABLET 650 MG: at 21:40

## 2022-09-23 RX ADMIN — DOCUSATE SODIUM 100 MG: 100 CAPSULE, LIQUID FILLED ORAL at 08:57

## 2022-09-23 RX ADMIN — ONDANSETRON 4 MG: 2 INJECTION INTRAMUSCULAR; INTRAVENOUS at 14:34

## 2022-09-23 ASSESSMENT — PAIN DESCRIPTION - DESCRIPTORS
DESCRIPTORS: SHOOTING;SHARP
DESCRIPTORS: ACHING
DESCRIPTORS: ACHING;SHARP
DESCRIPTORS: ACHING

## 2022-09-23 ASSESSMENT — PAIN SCALES - GENERAL
PAINLEVEL_OUTOF10: 4
PAINLEVEL_OUTOF10: 8
PAINLEVEL_OUTOF10: 7
PAINLEVEL_OUTOF10: 7
PAINLEVEL_OUTOF10: 6
PAINLEVEL_OUTOF10: 4
PAINLEVEL_OUTOF10: 7
PAINLEVEL_OUTOF10: 4
PAINLEVEL_OUTOF10: 4
PAINLEVEL_OUTOF10: 0
PAINLEVEL_OUTOF10: 4

## 2022-09-23 ASSESSMENT — PAIN DESCRIPTION - PAIN TYPE
TYPE: NEUROPATHIC PAIN;CHRONIC PAIN
TYPE: CHRONIC PAIN;NEUROPATHIC PAIN
TYPE: CHRONIC PAIN
TYPE: CHRONIC PAIN

## 2022-09-23 ASSESSMENT — PAIN DESCRIPTION - LOCATION
LOCATION: BACK;LEG
LOCATION: LEG;ARM
LOCATION: LEG
LOCATION: HIP;SHOULDER
LOCATION: SHOULDER

## 2022-09-23 ASSESSMENT — PAIN DESCRIPTION - ORIENTATION
ORIENTATION: LEFT

## 2022-09-23 ASSESSMENT — PAIN DESCRIPTION - ONSET
ONSET: ON-GOING

## 2022-09-23 ASSESSMENT — PAIN DESCRIPTION - FREQUENCY
FREQUENCY: CONTINUOUS

## 2022-09-23 ASSESSMENT — PAIN - FUNCTIONAL ASSESSMENT
PAIN_FUNCTIONAL_ASSESSMENT: ACTIVITIES ARE NOT PREVENTED

## 2022-09-23 NOTE — PROGRESS NOTES
[facility] CARDIOLOGY PROGRESS NOTE    9/23/2022 7:40 AM    Admit Date: 9/6/2022        Subjective:   Stable overnight without angina, CHF, or palpitations. No VT overnight, none in 4 days. Vitals stable and controlled. No other complaints overnight. Tolerating meds well. Objective:      Vitals:    09/22/22 1824 09/22/22 2011 09/23/22 0053 09/23/22 0357   BP:  125/71 134/87 134/70   Pulse:  59 60 58   Resp: 14 16 16 16   Temp:  98.2 °F (36.8 °C) 98.2 °F (36.8 °C) 97.3 °F (36.3 °C)   TempSrc:  Oral Oral Oral   SpO2:  99% 99% 94%   Weight:    204 lb 14.4 oz (92.9 kg)   Height:           Physical Exam:  Neck- supple, no JVD 60 degrees  CV- regular rate and rhythm no MRG  Lung- clear bilaterally, decreased bibasilar but no crackles or wheezing  Abd- soft, nontender, nondistended  Ext- no edema  Skin- warm and dry    Data Review:   Recent Labs     09/22/22  0350 09/23/22  0542    139   K 4.2 4.3   MG 2.3 2.2   BUN 21 20   WBC 8.5 7.7   HGB 11.8 12.2   HCT 36.4 37.5    151       Assessment and Plan:      *Ventricular tachycardia (HCC)-none in the past 4 days. Continue telemetry monitoring. Decreased amiodarone to 400 mg twice daily yesterday. Recheck BMP and magnesium in the morning and replete electrolytes aggressively as needed. VT most likely related to posterior basilar aneurysm. Initial efforts to obtain transfer to [facility], [address], and [facility] have been unsuccessful due to either insurance issues or lack of available resources, however her insurance has now changed and case management is working to see if [facility] or [address] will accept the patient on October 1. Continue to maximize meds and monitor closely in the meantime. Active Hospital Problems    Acute on chronic renal failure (HCC)-clinically euvolemic and asymptomatic today. Follow and recheck BMP daily       Left ventricular aneurysm-likely source of reentrant ventricular tachycardia. See above.        CKD (chronic kidney disease) stage 4, GFR 15-29 ml/min (HCC)-recheck daily. Type 2 diabetes with nephropathy (HCC)-per protocol, follow close       Morbid obesity ([address]       NICM (nonischemic cardiomyopathy) (HCC)-continue slow amiodarone taper as tolerated, decrease amiodarone to 400 mg twice daily dosing yesterday. Replete electrolytes as tolerated. Maximize guideline directed medical therapy as tolerated. ICD in place. Dyslipidemia-continue meds with outpatient surveillance.      Oneida Santana MD

## 2022-09-23 NOTE — PROGRESS NOTES
Spiritual Care Visit, initial visit. Visited with patient at bedside. Prayed for patient's healing and health. Visit by Alpesh Miguel, Staff .  Bhavesh., Nohemy.B., B.A.

## 2022-09-23 NOTE — CARE COORDINATION
LOS 17 D  CM discussed patient's DCP with Dr Shellie Mayers. Dr Shellie Mayers informs this CM that [address] CV surgeon, Dr Lucas Lentz has accepted patient under his service. Dr Татьяна Eller, CV surgeon also aware of transfer. CM sent required documentation for transfer to [address] bed transfer fax. CM spoke with Ernestine Vasquez, in bed transfer who verified patient's referral had been received. Discharge when bed available at [address] for LV aneurysm surgery.

## 2022-09-24 VITALS
HEART RATE: 64 BPM | RESPIRATION RATE: 19 BRPM | WEIGHT: 206.7 LBS | DIASTOLIC BLOOD PRESSURE: 85 MMHG | HEIGHT: 58 IN | SYSTOLIC BLOOD PRESSURE: 125 MMHG | OXYGEN SATURATION: 95 % | BODY MASS INDEX: 43.39 KG/M2 | TEMPERATURE: 98.2 F

## 2022-09-24 LAB
ANION GAP SERPL CALC-SCNC: ABNORMAL MMOL/L (ref 4–13)
BASOPHILS # BLD: 0 K/UL (ref 0–0.2)
BASOPHILS NFR BLD: 1 % (ref 0–2)
BUN SERPL-MCNC: 20 MG/DL (ref 6–23)
CALCIUM SERPL-MCNC: 9.3 MG/DL (ref 8.3–10.4)
CHLORIDE SERPL-SCNC: 110 MMOL/L (ref 101–110)
CO2 SERPL-SCNC: 29 MMOL/L (ref 21–32)
CREAT SERPL-MCNC: 1.5 MG/DL (ref 0.6–1)
DIFFERENTIAL METHOD BLD: ABNORMAL
EOSINOPHIL # BLD: 0.2 K/UL (ref 0–0.8)
EOSINOPHIL NFR BLD: 3 % (ref 0.5–7.8)
ERYTHROCYTE [DISTWIDTH] IN BLOOD BY AUTOMATED COUNT: 13.5 % (ref 11.9–14.6)
GLUCOSE BLD STRIP.AUTO-MCNC: 105 MG/DL (ref 65–100)
GLUCOSE BLD STRIP.AUTO-MCNC: 111 MG/DL (ref 65–100)
GLUCOSE BLD STRIP.AUTO-MCNC: 169 MG/DL (ref 65–100)
GLUCOSE SERPL-MCNC: 111 MG/DL (ref 65–100)
HCT VFR BLD AUTO: 39.3 % (ref 35.8–46.3)
HGB BLD-MCNC: 12.5 G/DL (ref 11.7–15.4)
IMM GRANULOCYTES # BLD AUTO: 0 K/UL (ref 0–0.5)
IMM GRANULOCYTES NFR BLD AUTO: 0 % (ref 0–5)
LYMPHOCYTES # BLD: 2.1 K/UL (ref 0.5–4.6)
LYMPHOCYTES NFR BLD: 28 % (ref 13–44)
MAGNESIUM SERPL-MCNC: 2.4 MG/DL (ref 1.8–2.4)
MCH RBC QN AUTO: 32.1 PG (ref 26.1–32.9)
MCHC RBC AUTO-ENTMCNC: 31.8 G/DL (ref 31.4–35)
MCV RBC AUTO: 100.8 FL (ref 79.6–97.8)
MONOCYTES # BLD: 0.7 K/UL (ref 0.1–1.3)
MONOCYTES NFR BLD: 9 % (ref 4–12)
NEUTS SEG # BLD: 4.5 K/UL (ref 1.7–8.2)
NEUTS SEG NFR BLD: 59 % (ref 43–78)
NRBC # BLD: 0.02 K/UL (ref 0–0.2)
PLATELET # BLD AUTO: 149 K/UL (ref 150–450)
PMV BLD AUTO: 11.6 FL (ref 9.4–12.3)
POTASSIUM SERPL-SCNC: 4.5 MMOL/L (ref 3.5–5.1)
RBC # BLD AUTO: 3.9 M/UL (ref 4.05–5.2)
SERVICE CMNT-IMP: ABNORMAL
SODIUM SERPL-SCNC: 138 MMOL/L (ref 136–145)
WBC # BLD AUTO: 7.6 K/UL (ref 4.3–11.1)

## 2022-09-24 PROCEDURE — 6370000000 HC RX 637 (ALT 250 FOR IP): Performed by: NURSE PRACTITIONER

## 2022-09-24 PROCEDURE — 6370000000 HC RX 637 (ALT 250 FOR IP): Performed by: INTERNAL MEDICINE

## 2022-09-24 PROCEDURE — 6360000002 HC RX W HCPCS: Performed by: INTERNAL MEDICINE

## 2022-09-24 PROCEDURE — 85025 COMPLETE CBC W/AUTO DIFF WBC: CPT

## 2022-09-24 PROCEDURE — 6360000002 HC RX W HCPCS: Performed by: NURSE PRACTITIONER

## 2022-09-24 PROCEDURE — 99239 HOSP IP/OBS DSCHRG MGMT >30: CPT | Performed by: INTERNAL MEDICINE

## 2022-09-24 PROCEDURE — 1100000003 HC PRIVATE W/ TELEMETRY

## 2022-09-24 PROCEDURE — 2580000003 HC RX 258: Performed by: NURSE PRACTITIONER

## 2022-09-24 PROCEDURE — 82962 GLUCOSE BLOOD TEST: CPT

## 2022-09-24 PROCEDURE — 80048 BASIC METABOLIC PNL TOTAL CA: CPT

## 2022-09-24 PROCEDURE — 83735 ASSAY OF MAGNESIUM: CPT

## 2022-09-24 PROCEDURE — 36415 COLL VENOUS BLD VENIPUNCTURE: CPT

## 2022-09-24 RX ORDER — FOLIC ACID/VIT B COMPLEX AND C 0.8 MG
1 TABLET ORAL DAILY
Qty: 30 TABLET | Refills: 0
Start: 2022-09-25

## 2022-09-24 RX ORDER — INSULIN GLARGINE 100 [IU]/ML
7 INJECTION, SOLUTION SUBCUTANEOUS NIGHTLY
Qty: 5 ADJUSTABLE DOSE PRE-FILLED PEN SYRINGE | Refills: 3
Start: 2022-09-24

## 2022-09-24 RX ORDER — METOPROLOL SUCCINATE 25 MG/1
25 TABLET, EXTENDED RELEASE ORAL DAILY
Status: DISCONTINUED | OUTPATIENT
Start: 2022-09-24 | End: 2022-09-25 | Stop reason: HOSPADM

## 2022-09-24 RX ORDER — METOPROLOL SUCCINATE 25 MG/1
25 TABLET, EXTENDED RELEASE ORAL DAILY
Qty: 30 TABLET | Refills: 5
Start: 2022-09-25 | End: 2022-11-01

## 2022-09-24 RX ORDER — AMIODARONE HYDROCHLORIDE 400 MG/1
400 TABLET ORAL 2 TIMES DAILY
Qty: 60 TABLET | Refills: 5
Start: 2022-09-24

## 2022-09-24 RX ORDER — SODIUM BICARBONATE 650 MG/1
650 TABLET ORAL 2 TIMES DAILY
Qty: 60 TABLET | Refills: 5
Start: 2022-09-24 | End: 2022-11-01

## 2022-09-24 RX ADMIN — DOCUSATE SODIUM 100 MG: 100 CAPSULE, LIQUID FILLED ORAL at 09:38

## 2022-09-24 RX ADMIN — HEPARIN SODIUM 5000 UNITS: 5000 INJECTION INTRAVENOUS; SUBCUTANEOUS at 14:33

## 2022-09-24 RX ADMIN — MORPHINE SULFATE 2 MG: 2 INJECTION, SOLUTION INTRAMUSCULAR; INTRAVENOUS at 14:33

## 2022-09-24 RX ADMIN — SODIUM CHLORIDE, PRESERVATIVE FREE 10 ML: 5 INJECTION INTRAVENOUS at 09:40

## 2022-09-24 RX ADMIN — VITAMIN D, TAB 1000IU (100/BT) 2000 UNITS: 25 TAB at 09:38

## 2022-09-24 RX ADMIN — HYDROCODONE BITARTRATE AND ACETAMINOPHEN 1 TABLET: 7.5; 325 TABLET ORAL at 19:58

## 2022-09-24 RX ADMIN — MORPHINE SULFATE 2 MG: 2 INJECTION, SOLUTION INTRAMUSCULAR; INTRAVENOUS at 10:06

## 2022-09-24 RX ADMIN — ASPIRIN 81 MG: 81 TABLET, COATED ORAL at 09:38

## 2022-09-24 RX ADMIN — Medication 1 TABLET: at 09:38

## 2022-09-24 RX ADMIN — MORPHINE SULFATE 2 MG: 2 INJECTION, SOLUTION INTRAMUSCULAR; INTRAVENOUS at 06:12

## 2022-09-24 RX ADMIN — HEPARIN SODIUM 5000 UNITS: 5000 INJECTION INTRAVENOUS; SUBCUTANEOUS at 06:06

## 2022-09-24 RX ADMIN — DULOXETINE HYDROCHLORIDE 20 MG: 20 CAPSULE, DELAYED RELEASE ORAL at 09:38

## 2022-09-24 RX ADMIN — LORAZEPAM 0.5 MG: 0.5 TABLET ORAL at 15:17

## 2022-09-24 RX ADMIN — MORPHINE SULFATE 2 MG: 2 INJECTION, SOLUTION INTRAMUSCULAR; INTRAVENOUS at 18:44

## 2022-09-24 RX ADMIN — METOPROLOL SUCCINATE 25 MG: 25 TABLET, EXTENDED RELEASE ORAL at 11:55

## 2022-09-24 RX ADMIN — SODIUM BICARBONATE 650 MG TABLET 650 MG: at 09:38

## 2022-09-24 RX ADMIN — AMIODARONE HYDROCHLORIDE 400 MG: 200 TABLET ORAL at 09:39

## 2022-09-24 RX ADMIN — HYDROCODONE BITARTRATE AND ACETAMINOPHEN 1 TABLET: 7.5; 325 TABLET ORAL at 09:39

## 2022-09-24 ASSESSMENT — PAIN SCALES - GENERAL
PAINLEVEL_OUTOF10: 9
PAINLEVEL_OUTOF10: 6
PAINLEVEL_OUTOF10: 8
PAINLEVEL_OUTOF10: 0
PAINLEVEL_OUTOF10: 0

## 2022-09-24 ASSESSMENT — PAIN DESCRIPTION - DESCRIPTORS
DESCRIPTORS: ACHING;THROBBING
DESCRIPTORS: SHARP;SHOOTING;SPASM
DESCRIPTORS: SHARP;STABBING;SPASM

## 2022-09-24 ASSESSMENT — PAIN DESCRIPTION - ORIENTATION
ORIENTATION: LEFT

## 2022-09-24 ASSESSMENT — PAIN DESCRIPTION - LOCATION
LOCATION: SCROTUM
LOCATION: LEG
LOCATION: LEG;SHOULDER

## 2022-09-24 ASSESSMENT — PAIN DESCRIPTION - PAIN TYPE
TYPE: CHRONIC PAIN
TYPE: CHRONIC PAIN

## 2022-09-24 ASSESSMENT — PAIN DESCRIPTION - FREQUENCY
FREQUENCY: CONTINUOUS
FREQUENCY: CONTINUOUS

## 2022-09-24 ASSESSMENT — PAIN - FUNCTIONAL ASSESSMENT
PAIN_FUNCTIONAL_ASSESSMENT: PREVENTS OR INTERFERES SOME ACTIVE ACTIVITIES AND ADLS
PAIN_FUNCTIONAL_ASSESSMENT: ACTIVITIES ARE NOT PREVENTED

## 2022-09-24 ASSESSMENT — PAIN DESCRIPTION - ONSET
ONSET: ON-GOING
ONSET: ON-GOING

## 2022-09-24 NOTE — CARE COORDINATION
Chart reviewed by CM for potential discharge needs or concerns. None identified or reported. Patient is medically cleared for discharge today. Patient to discharge to [facility] this evening for further evaluation. Please consult or notify CM if needs arise. 09/23/22 [id number]   Discharge Planning   Patient expects to be discharged to: Other (comment)  ([id number] Discharge   Transition of Care Consult (CM Consult) Discharge Planning   Condition of Participation: Discharge Planning   The Plan for Transition of Care is related to the following treatment goals: [facility] transfer   The Patient and/or Patient Representative was provided with a Choice of Provider?  Patient

## 2022-09-24 NOTE — PROGRESS NOTES
MG/DL    GFR African American 46 (L) >60 ml/min/1.73m2    GFR Non- 38 (L) >60 ml/min/1.73m2    Calcium 9.3 8.3 - 10.4 MG/DL   CBC with Auto Differential    Collection Time: 09/24/22  4:29 AM   Result Value Ref Range    WBC 7.6 4.3 - 11.1 K/uL    RBC 3.90 (L) 4.05 - 5.2 M/uL    Hemoglobin 12.5 11.7 - 15.4 g/dL    Hematocrit 39.3 35.8 - 46.3 %    .8 (H) 79.6 - 97.8 FL    MCH 32.1 26.1 - 32.9 PG    MCHC 31.8 31.4 - 35.0 g/dL    RDW 13.5 11.9 - 14.6 %    Platelets 687 (L) 335 - 450 K/uL    MPV 11.6 9.4 - 12.3 FL    nRBC 0.02 0.0 - 0.2 K/uL    Differential Type AUTOMATED      Seg Neutrophils 59 43 - 78 %    Lymphocytes 28 13 - 44 %    Monocytes 9 4.0 - 12.0 %    Eosinophils % 3 0.5 - 7.8 %    Basophils 1 0.0 - 2.0 %    Immature Granulocytes 0 0.0 - 5.0 %    Segs Absolute 4.5 1.7 - 8.2 K/UL    Absolute Lymph # 2.1 0.5 - 4.6 K/UL    Absolute Mono # 0.7 0.1 - 1.3 K/UL    Absolute Eos # 0.2 0.0 - 0.8 K/UL    Basophils Absolute 0.0 0.0 - 0.2 K/UL    Absolute Immature Granulocyte 0.0 0.0 - 0.5 K/UL   Magnesium    Collection Time: 09/24/22  4:29 AM   Result Value Ref Range    Magnesium 2.4 1.8 - 2.4 mg/dL   POCT Glucose    Collection Time: 09/24/22  7:39 AM   Result Value Ref Range    POC Glucose 105 (H) 65 - 100 mg/dL    Performed by: Shikha Oneil    08/10/22    TRANSTHORACIC ECHOCARDIOGRAM (TTE) LIMITED (CONTRAST/BUBBLE/3D PRN) 08/11/2022 11:07 AM (Final)    Interpretation Summary    Left Ventricle: Moderately reduced left ventricular systolic function. Left ventricle is dilated. Mildly increased wall thickness. Global hypokinesis present. Left Atrium: Left atrium is mildly dilated. Contrast used: Definity. Overall estimation of the ejection fraction is moderately reduced in the range of 40%. Patient has a posterior basal aneurysm presents that is partially visualized on this study.   It has been visualized in other studies including a heart cath and is a large posterior basal aneurysm that affects the overall effect of cardiac output due to shunting of a significant amount of flow into the large posterior basal aneurysm    Signed by: Vic Flowers MD on 8/11/2022 11:07 AM   Assessment/Plan:          Ventricular tachycardia (HCC)-none in the past 5 days. Continue telemetry monitoring. Decreased amiodarone to 400 mg twice daily yesterday.    -Electrolytes have been appropriately replaced and within normal range. 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 Initial efforts to obtain transfer to [facility], [facility], and [facility] have been unsuccessful due to either insurance issues or lack of available resources, however her insurance has now changed and case management is working to see if [facility] or [facility] will accept the patient on October 1 or sooner. Continue to maximize meds and monitor closely in the meantime. Active Hospital Problems    Acute on chronic renal failure (HCC)-clinically euvolemic and asymptomatic today. Follow and recheck BMP daily       Left ventricular aneurysm-likely source of reentrant ventricular tachycardia. See above. CKD (chronic kidney disease) stage 3, (HCC)-recheck daily.  -Davin Fabiana was held because of this. Type 2 diabetes with nephropathy (HCC)-per protocol, follow close       Morbid obesity ([address]       NICM (nonischemic cardiomyopathy) (HCC)-continue slow amiodarone taper as tolerated, continue amiodarone to 400 mg twice daily . Replete electrolytes as tolerated. Maximize guideline directed medical therapy as tolerated. ICD in place. Dyslipidemia-continue meds with outpatient surveillance-statin     Plan today: She is euvolemic on exam.  Avoiding diuretic therapy. Renal function is clearly improved here in the hospital.  Taken off Davin Fabiana because of acute on chronic kidney injury.  -Reluctant to restart ACE inhibitor/ARB because of this.   Started low-dose metoprolol succinate as it appears that her blood pressures are borderline low and carvedilol may be too much for her at this point.  -Continuous telemetry.  -Closely monitoring renal function and electrolytes.   -Needs to ambulate as tolerated  Kalyani Garcia MD  9/24/2022 8:16 AM

## 2022-09-25 NOTE — PROGRESS NOTES
Pt was discharged to [address] at 2010 with [facility] transport, personal belongings and paperwork given to transport. Pt left before being able to e sign after visit summary, but was able to fax over documentation to [address] at [phone]. Spoke with Keagan Navarro RN regarding the documentation. Gave report to Asya Kaur RN.

## 2022-09-25 NOTE — PROGRESS NOTES
TRANSFER - OUT REPORT:    Verbal report given to Fadi Madden RN on Eloisa David being transferred to [address] for routine progression of patient care       Report consisted of patients Situation, Background, Assessment and Recommendations (SBAR). Information from the following report(s) SBAR, Kardex, MAR, and Recent Results was reviewed with the receiving nurse. Opportunity for questions and clarification was provided.

## 2022-09-26 DIAGNOSIS — Z95.810 AICD (AUTOMATIC CARDIOVERTER/DEFIBRILLATOR) PRESENT: ICD-10-CM

## 2022-09-26 DIAGNOSIS — I47.20 VENTRICULAR TACHYCARDIA: Primary | ICD-10-CM

## 2022-09-26 DIAGNOSIS — I42.8 NICM (NONISCHEMIC CARDIOMYOPATHY) (HCC): ICD-10-CM

## 2022-09-26 DIAGNOSIS — I47.20 VENTRICULAR TACHYCARDIA: ICD-10-CM

## 2022-09-27 DIAGNOSIS — Z95.810 AICD (AUTOMATIC CARDIOVERTER/DEFIBRILLATOR) PRESENT: ICD-10-CM

## 2022-09-27 DIAGNOSIS — I50.22 CHRONIC SYSTOLIC HEART FAILURE (HCC): ICD-10-CM

## 2022-09-27 DIAGNOSIS — I47.20 VENTRICULAR TACHYCARDIA: ICD-10-CM

## 2022-09-27 DIAGNOSIS — I50.9 CHF (CONGESTIVE HEART FAILURE) (HCC): Primary | ICD-10-CM

## 2022-10-04 ENCOUNTER — CARE COORDINATION (OUTPATIENT)
Dept: CARE COORDINATION | Facility: CLINIC | Age: 57
End: 2022-10-04

## 2022-10-04 RX ORDER — SACUBITRIL AND VALSARTAN 49; 51 MG/1; MG/1
TABLET, FILM COATED ORAL
Qty: 180 TABLET | Refills: 3 | Status: SHIPPED | OUTPATIENT
Start: 2022-10-04 | End: 2022-11-01

## 2022-10-04 RX ORDER — ISOSORBIDE MONONITRATE 30 MG/1
TABLET, EXTENDED RELEASE ORAL
Qty: 90 TABLET | Refills: 3 | Status: SHIPPED | OUTPATIENT
Start: 2022-10-04 | End: 2022-11-01

## 2022-10-04 RX ORDER — PRAVASTATIN SODIUM 80 MG/1
TABLET ORAL
Qty: 90 TABLET | Refills: 3 | Status: SHIPPED | OUTPATIENT
Start: 2022-10-04 | End: 2022-11-01

## 2022-10-04 RX ORDER — LANOLIN ALCOHOL/MO/W.PET/CERES
CREAM (GRAM) TOPICAL
Qty: 180 TABLET | Refills: 3 | Status: SHIPPED | OUTPATIENT
Start: 2022-10-04 | End: 2022-11-01

## 2022-10-04 RX ORDER — TORSEMIDE 20 MG/1
TABLET ORAL
Qty: 180 TABLET | Refills: 3 | Status: SHIPPED | OUTPATIENT
Start: 2022-10-04 | End: 2022-11-01

## 2022-10-04 RX ORDER — CARVEDILOL 6.25 MG/1
TABLET ORAL
Qty: 180 TABLET | Refills: 3 | Status: SHIPPED | OUTPATIENT
Start: 2022-10-04

## 2022-10-04 NOTE — CARE COORDINATION
Care Transitions Outreach Attempt    Call within 2 business days of discharge: Yes   Attempted to reach patient for transitions of care follow up. Unable to reach patient. Patient: Lucina Choi Patient : 1965 MRN: [id number]    Last Discharge  [address]       Date Complaint Diagnosis Description Type Department Provider    22 Shortness of Breath Ventricular tachycardia ED to Hosp-Admission (Discharged) (ADMITTED) Quique Mendez MD; Pawel Paige. .. Was this an external facility discharge?  No Discharge Facility: [facility]    Noted following upcoming appointments from discharge chart review:   [facility] follow up appointment(s):   Future Appointments   Date Time Provider Tj Brar   10/12/2022  2:20 PM [address], APRN - CNP PSCD G[facility] AMB   10/25/2022  8:15 AM MAT LAB MAT [facility] AMB   2022  3:40 PM MD MARCY Us GVL AMB     Non-[facility] follow up appointment(s): na

## 2022-10-05 ENCOUNTER — CARE COORDINATION (OUTPATIENT)
Dept: CARE COORDINATION | Facility: CLINIC | Age: 57
End: 2022-10-05

## 2022-10-05 DIAGNOSIS — E11.21 TYPE 2 DIABETES WITH NEPHROPATHY (HCC): Primary | ICD-10-CM

## 2022-10-05 DIAGNOSIS — N18.4 CKD (CHRONIC KIDNEY DISEASE) STAGE 4, GFR 15-29 ML/MIN (HCC): ICD-10-CM

## 2022-10-05 NOTE — CARE COORDINATION
Care Transitions Outreach Attempt    Call within 2 business days of discharge: Yes   Attempted to reach patient for transitions of care follow up. Unable to reach patient. Patient: Bossman Lopes Patient : 1965 MRN: [id number]    Last Discharge  [address]       Date Complaint Diagnosis Description Type Department Provider    22 Shortness of Breath Ventricular tachycardia ED to Hosp-Admission (Discharged) (ADMITTED) Annice Councilman, MD; Jossie Foreman. .. Was this an external facility discharge?  Yes, 10/2  Discharge Facility: [facility]    Noted following upcoming appointments from discharge chart review:   [facility] follow up appointment(s):   Future Appointments   Date Time Provider Tj rBar   10/12/2022  2:20 PM [address] Ayan, APRN - CNP PSCD [facility] AMB   10/25/2022  8:15 AM MAT LAB MAT [facility] AMB   2022  3:40 PM Chaneta Lundborg, MD MAT [facility] AMB     Non-[facility] follow up appointment(s): stanley

## 2022-10-07 ENCOUNTER — CARE COORDINATION (OUTPATIENT)
Dept: CARE COORDINATION | Facility: CLINIC | Age: 57
End: 2022-10-07

## 2022-10-07 NOTE — CARE COORDINATION
Care Transitions Outreach Attempt    Call within 2 business days of discharge: Yes   Attempted to reach patient for transitions of care follow up. Unable to reach patient. Patient: Doris Daily Patient : 1965 MRN: [id number]    Last Discharge  Street       Date Complaint Diagnosis Description Type Department Provider    22 Shortness of Breath Ventricular tachycardia ED to Hosp-Admission (Discharged) (ADMITTED) Frankie George MD; Nancy Hameed. .. Was this an external facility discharge?  Yes,  [address]  Discharge Facility: 10/2    Noted following upcoming appointments from discharge chart review:   [facility] follow up appointment(s):   Future Appointments   Date Time Provider Tj Brar   10/12/2022  2:20 PM ARNOLDO Dickinson - CNP PSCD [facility] AMB   10/25/2022  8:15 AM MAT LAB MAT [facility] AMB   2022  3:40 PM Vanesa Palencia MD MAT [facility] AMB   2022  9:15 AM Melanie Orellana DO [facility] GVL AMB     Non-[facility] follow up appointment(s): na

## 2022-10-10 ENCOUNTER — HOME HEALTH ADMISSION (OUTPATIENT)
Dept: HOME HEALTH SERVICES | Facility: HOME HEALTH | Age: 57
End: 2022-10-10

## 2022-10-11 ENCOUNTER — CARE COORDINATION (OUTPATIENT)
Dept: CARE COORDINATION | Facility: CLINIC | Age: 57
End: 2022-10-11

## 2022-10-11 NOTE — CARE COORDINATION
Care Transitions Outreach Attempt    Call within 2 business days of discharge: Yes   Attempted to reach patient for transitions of care follow up. Unable to reach patient. Patient: Lucina Choi Patient : 1965 MRN: [id number]    Last Discharge [address]       Date Complaint Diagnosis Description Type Department Provider    22 Shortness of Breath Ventricular tachycardia ED to Hosp-Admission (Discharged) (ADMITTED) Quique Mendez MD; Pawel Paige. .. Was this an external facility discharge?  No Discharge Facility: [facility]    Noted following upcoming appointments from discharge chart review:   [facility] follow up appointment(s):   Future Appointments   Date Time Provider Tj Brar   10/12/2022  2:20 PM [address], APRN - CNP PSCKATHERINE GVL AMB   10/25/2022  8:15 AM MAT LAB MAT [facility] AMB   2022  3:40 PM MD MARCY Us GVL AMB   2022  9:15 AM DO ISIDRA Rivas [facility] AMB     Non-[facility] follow up appointment(s): na

## 2022-10-25 DIAGNOSIS — E11.21 TYPE 2 DIABETES WITH NEPHROPATHY (HCC): ICD-10-CM

## 2022-10-25 DIAGNOSIS — N18.4 CKD (CHRONIC KIDNEY DISEASE) STAGE 4, GFR 15-29 ML/MIN (HCC): ICD-10-CM

## 2022-10-25 LAB
ANION GAP SERPL CALC-SCNC: 10 MMOL/L (ref 2–11)
BASOPHILS # BLD: 0.1 K/UL (ref 0–0.2)
BASOPHILS NFR BLD: 1 % (ref 0–2)
BUN SERPL-MCNC: 37 MG/DL (ref 6–23)
CALCIUM SERPL-MCNC: 9.5 MG/DL (ref 8.3–10.4)
CHLORIDE SERPL-SCNC: 101 MMOL/L (ref 101–110)
CHOLEST SERPL-MCNC: 173 MG/DL
CO2 SERPL-SCNC: 27 MMOL/L (ref 21–32)
CREAT SERPL-MCNC: 2.7 MG/DL (ref 0.6–1)
DIFFERENTIAL METHOD BLD: NORMAL
EOSINOPHIL # BLD: 0.2 K/UL (ref 0–0.8)
EOSINOPHIL NFR BLD: 2 % (ref 0.5–7.8)
ERYTHROCYTE [DISTWIDTH] IN BLOOD BY AUTOMATED COUNT: 13.2 % (ref 11.9–14.6)
EST. AVERAGE GLUCOSE BLD GHB EST-MCNC: 154 MG/DL
GLUCOSE SERPL-MCNC: 136 MG/DL (ref 65–100)
HBA1C MFR BLD: 7 % (ref 4.8–5.6)
HCT VFR BLD AUTO: 40.8 % (ref 35.8–46.3)
HDLC SERPL-MCNC: 58 MG/DL (ref 40–60)
HDLC SERPL: 3 {RATIO}
HGB BLD-MCNC: 12.8 G/DL (ref 11.7–15.4)
IMM GRANULOCYTES # BLD AUTO: 0 K/UL (ref 0–0.5)
IMM GRANULOCYTES NFR BLD AUTO: 0 % (ref 0–5)
LDLC SERPL CALC-MCNC: 96.8 MG/DL
LYMPHOCYTES # BLD: 2.9 K/UL (ref 0.5–4.6)
LYMPHOCYTES NFR BLD: 33 % (ref 13–44)
MCH RBC QN AUTO: 30.5 PG (ref 26.1–32.9)
MCHC RBC AUTO-ENTMCNC: 31.4 G/DL (ref 31.4–35)
MCV RBC AUTO: 97.4 FL (ref 82–102)
MONOCYTES # BLD: 0.6 K/UL (ref 0.1–1.3)
MONOCYTES NFR BLD: 7 % (ref 4–12)
NEUTS SEG # BLD: 4.9 K/UL (ref 1.7–8.2)
NEUTS SEG NFR BLD: 57 % (ref 43–78)
NRBC # BLD: 0 K/UL (ref 0–0.2)
PLATELET # BLD AUTO: 185 K/UL (ref 150–450)
PMV BLD AUTO: 12 FL (ref 9.4–12.3)
POTASSIUM SERPL-SCNC: 4.2 MMOL/L (ref 3.5–5.1)
RBC # BLD AUTO: 4.19 M/UL (ref 4.05–5.2)
SODIUM SERPL-SCNC: 138 MMOL/L (ref 133–143)
TRIGL SERPL-MCNC: 91 MG/DL (ref 35–150)
VLDLC SERPL CALC-MCNC: 18.2 MG/DL (ref 6–23)
WBC # BLD AUTO: 8.7 K/UL (ref 4.3–11.1)

## 2022-10-25 NOTE — PROGRESS NOTES
[facility] CARDIOLOGY PROGRESS NOTE           11/12/2020 1:20 PM    Admit Date: 11/10/2020      Subjective:   Less sob      Objective:      Vitals:    11/12/20 0730 11/12/20 0930 11/12/20 0938 11/12/20 1302   BP:  119/66 120/72 (!) 101/54   Pulse:  63 61 70   Resp:   20 20   Temp:   98.1 °F (36.7 °C) 98 °F (36.7 °C)   SpO2: 96%  95% 98%   Weight:       Height:           Physical Exam:  General-No Acute Distress  Neck- supple  CV- regular rate and rhythm no MRG  Lung- clear bilaterally  Abd- soft, nontender, nondistended  Ext- + edema bilaterally. Skin- warm and dry    Data Review:   Recent Labs     11/12/20  0349 11/11/20  0306  11/10/20  0429    141  --  142   K 4.0 3.8  --  4.0   MG 2.6* 2.6*   < >  --    BUN 22 19  --  19   CREA 1.38* 1.34*  --  1.40*   GLU 96 101*  --  151*   WBC  --   --   --  5.9   HGB  --   --   --  12.3   HCT  --   --   --  38.8   PLT  --   --   --  159    < > = values in this interval not displayed. Assessment/Plan:     Principal Problem:    Acute on chronic HFrEF (heart failure with reduced ejection fraction) ([facility] (11/10/2020)        Active Problems:    HTN (hypertension) (4/12/2010)          NICM (nonischemic cardiomyopathy) ([facility] (2/15/2013)          BA on CPAP (7/14/2014)          Morbid obesity ([facility] (7/14/2014)          Ventricular tachycardia ([facility] (4/18/2016)          Type 2 diabetes with nephropathy ([facility] (4/2/2018)          CKD (chronic kidney disease) stage 3, GFR 30-59 ml/min (8/24/2020)          Lower abdominal pain (11/10/2020)>> diverticulitis          CHF (congestive heart failure) ([facility] (11/10/2020)      ////    Ad spironolactone  Check NT pro BNP  Ask pulmonary to see ?  thoracentesis        Jayson Michel MD  11/12/2020 1:20 PM Doxepin Pregnancy And Lactation Text: This medication is Pregnancy Category C and it isn't known if it is safe during pregnancy. It is also excreted in breast milk and breast feeding isn't recommended.

## 2022-10-26 RX ORDER — EMPAGLIFLOZIN 10 MG/1
TABLET, FILM COATED ORAL
Qty: 90 TABLET | OUTPATIENT
Start: 2022-10-26

## 2022-10-27 RX ORDER — AMIODARONE HYDROCHLORIDE 200 MG/1
TABLET ORAL
Qty: 180 TABLET | Refills: 1 | OUTPATIENT
Start: 2022-10-27

## 2022-10-28 RX ORDER — EMPAGLIFLOZIN 10 MG/1
TABLET, FILM COATED ORAL
Qty: 90 TABLET | OUTPATIENT
Start: 2022-10-28

## 2022-11-01 ENCOUNTER — OFFICE VISIT (OUTPATIENT)
Dept: INTERNAL MEDICINE CLINIC | Facility: CLINIC | Age: 57
End: 2022-11-01
Payer: MEDICARE

## 2022-11-01 VITALS
TEMPERATURE: 97.1 F | WEIGHT: 198 LBS | HEIGHT: 58 IN | HEART RATE: 68 BPM | SYSTOLIC BLOOD PRESSURE: 114 MMHG | OXYGEN SATURATION: 98 % | BODY MASS INDEX: 41.56 KG/M2 | DIASTOLIC BLOOD PRESSURE: 62 MMHG

## 2022-11-01 DIAGNOSIS — M54.6 CHRONIC RIGHT-SIDED THORACIC BACK PAIN: ICD-10-CM

## 2022-11-01 DIAGNOSIS — I25.3 LEFT VENTRICULAR ANEURYSM: ICD-10-CM

## 2022-11-01 DIAGNOSIS — N18.4 CKD (CHRONIC KIDNEY DISEASE) STAGE 4, GFR 15-29 ML/MIN (HCC): ICD-10-CM

## 2022-11-01 DIAGNOSIS — E11.21 TYPE 2 DIABETES WITH NEPHROPATHY (HCC): Primary | ICD-10-CM

## 2022-11-01 DIAGNOSIS — I50.22 CHRONIC SYSTOLIC HEART FAILURE (HCC): ICD-10-CM

## 2022-11-01 DIAGNOSIS — G89.29 CHRONIC RIGHT-SIDED THORACIC BACK PAIN: ICD-10-CM

## 2022-11-01 PROCEDURE — 3051F HG A1C>EQUAL 7.0%<8.0%: CPT | Performed by: INTERNAL MEDICINE

## 2022-11-01 PROCEDURE — G8427 DOCREV CUR MEDS BY ELIG CLIN: HCPCS | Performed by: INTERNAL MEDICINE

## 2022-11-01 PROCEDURE — G8417 CALC BMI ABV UP PARAM F/U: HCPCS | Performed by: INTERNAL MEDICINE

## 2022-11-01 PROCEDURE — 3074F SYST BP LT 130 MM HG: CPT | Performed by: INTERNAL MEDICINE

## 2022-11-01 PROCEDURE — 4004F PT TOBACCO SCREEN RCVD TLK: CPT | Performed by: INTERNAL MEDICINE

## 2022-11-01 PROCEDURE — 2022F DILAT RTA XM EVC RTNOPTHY: CPT | Performed by: INTERNAL MEDICINE

## 2022-11-01 PROCEDURE — G8484 FLU IMMUNIZE NO ADMIN: HCPCS | Performed by: INTERNAL MEDICINE

## 2022-11-01 PROCEDURE — 3017F COLORECTAL CA SCREEN DOC REV: CPT | Performed by: INTERNAL MEDICINE

## 2022-11-01 PROCEDURE — 99213 OFFICE O/P EST LOW 20 MIN: CPT | Performed by: INTERNAL MEDICINE

## 2022-11-01 PROCEDURE — 3078F DIAST BP <80 MM HG: CPT | Performed by: INTERNAL MEDICINE

## 2022-11-01 RX ORDER — ATORVASTATIN CALCIUM 40 MG/1
TABLET, FILM COATED ORAL DAILY
COMMUNITY
Start: 2022-10-11 | End: 2023-10-11

## 2022-11-01 RX ORDER — SACUBITRIL AND VALSARTAN 24; 26 MG/1; MG/1
1 TABLET, FILM COATED ORAL 2 TIMES DAILY
COMMUNITY
End: 2022-11-14 | Stop reason: SDUPTHER

## 2022-11-01 RX ORDER — SPIRONOLACTONE 25 MG/1
TABLET ORAL
COMMUNITY
Start: 2022-10-10

## 2022-11-01 RX ORDER — CYCLOBENZAPRINE HCL 10 MG
10 TABLET ORAL 3 TIMES DAILY PRN
Qty: 30 TABLET | Refills: 0 | Status: SHIPPED | OUTPATIENT
Start: 2022-11-01 | End: 2022-11-11

## 2022-11-01 RX ORDER — FUROSEMIDE 40 MG/1
TABLET ORAL 2 TIMES DAILY
COMMUNITY
Start: 2022-10-10 | End: 2023-10-10

## 2022-11-01 ASSESSMENT — ENCOUNTER SYMPTOMS
BACK PAIN: 1
CHEST TIGHTNESS: 0

## 2022-11-01 NOTE — PROGRESS NOTES
ASSESSMENT/PLAN:    1. Type 2 diabetes with nephropathy ([facility]  1. Rx changes: noneIf blood sugars trend less than 70 on checks without diet change or skipping meals, decrease Lantus to 7 units and call office. 2.  Education: Reviewed ABCs of diabetes management (respective goals in parentheses):  A1C (<7), blood pressure (<130/80), and cholesterol (LDL <100). 3.  Compliance at present is estimated to be fair. Efforts to improve compliance (if necessary) will be directed at regular blood sugar monitorin times daily. 4. Follow up: 3 months    2. CKD (chronic kidney disease) stage 4, GFR 15-29 ml/min (HCC)  Worsening. High risk for hypoglycemia with worsening CKD and heart issues. 3. Chronic right-sided thoracic back pain     - cyclobenzaprine (FLEXERIL) 10 MG tablet; Take 1 tablet by mouth 3 times daily as needed for Muscle spasms  Dispense: 30 tablet; Refill: 0    4. Chronic systolic heart failure (HCC)  Complex. High risk issues. Evaluation and management of the chronic condition(s) delineated. No negative side effects reported. I have reviewed all the lab results. There are some abnormalities that are either expected or not critical to the patient's health, and are discussed in the office today and are addressed. Please refer to the above assessement and plan narrative and orders and follow up plan. Medication discussed and refilled as needed. Physical exam findings are stable unless otherwise indicated and this is addressed. The most recent lab work and imaging and consultant/urgent care visits and imaging are reviewed and discussed and considered during this visit encounter. 1. Type 2 diabetes with nephropathy ([facility]  2. CKD (chronic kidney disease) stage 4, GFR 15-29 ml/min (HCC)  3. Chronic right-sided thoracic back pain  -     cyclobenzaprine (FLEXERIL) 10 MG tablet; Take 1 tablet by mouth 3 times daily as needed for Muscle spasms, Disp-30 tablet, R-0Normal  4. Chronic systolic heart failure ([facility]  5. Left ventricular aneurysm       On this date, 22, I have spent 58 minutes reviewing previous notes, test results and face to face with the patient discussing the diagnosis and importance of compliance with the treatment plan as well as documenting on the day of the visit. An electronic signature was used to authenticate this note. -- Anabella Mckenzie MD     Return in about 3 months (around 2023). SUBJECTIVE/OBJECTIVE:      HPI:   Barry Salvador (: 1965 is a 59 y.o. female, here for evaluation of the following chief complaint(s):   Chief Complaint   Patient presents with    Diabetes     3 month recheck    Hypertension    Discuss Labs       Patient is here for follow-up and management of chronic medical conditions, review of recent labs, review of any imaging completed since our last office visit and discuss any consultants opinions or management changes. DM. Tolerating current medications. Insulin decreased to 10 units in hospital.      Diabetes  She presents for her follow-up diabetic visit. She has type 2 diabetes mellitus. Onset time: years. Pertinent negatives for diabetes include no chest pain. There are no hypoglycemic complications. (Lowest blood sugar she remembers was 72 about 3 days ago because she delayed breakfast a few hours.) Symptoms are stable. Her weight is decreasing steadily. She is following a diabetic diet. She has had a previous visit with a dietitian (worked with nutrition and low sodium and diabetic diet in hospital at [address]). Home blood sugar record trend: stable. Her breakfast blood glucose is taken between 8-9 am. Her breakfast blood glucose range is generally 110-130 mg/dl. Her lunch blood glucose is taken between 12-1 pm. Her lunch blood glucose range is generally 110-130 mg/dl. Her dinner blood glucose is taken between 6-7 pm. Her dinner blood glucose range is generally 140-180 mg/dl.  An ACE inhibitor/angiotensin II receptor blocker is being taken. She sees a podiatrist.Eye exam is current. Hypertension  This is a chronic problem. Pertinent negatives include no chest pain. DM stable. Tolerating current medications. Insulin decreased to 10 units in hospital.    Lab Results   Component Value Date    LABA1C 7.0 (H) 10/25/2022     Lab Results   Component Value Date     10/25/2022     CKD. She says Nephrology following. Recent notes not avail for review. She is not having any leg swelling. Chronic right thoracic back pain. Requests pain medication. Tolerates flexeril       Cardiology issues complex. Recent [facility] [place] hospitalization and cardiac procedure. ICD event 5/2022. LHC 6/2022 with normal coronary arteries. Filling abnormality noted on LV gram, CTA heart with large LV aneurysm. Therefore. .. TAVR procedure on hold. NICM. EF about 30-35%    She has chronic DUARTE. CPAP compliant at this time she says. Labs reviewed and discussed and medication refilled as needed for chronic medications during ov or adjusted based on lab results and/or our discussion as appropriate. See discussion. The patient's available records and electronic chart records are reviewed. The PMH, PSH, medications, allergies, medications, FH, health maintenance and vaccination status are all reviewed and updated as appropriate. Records from outside providers have been reviewed, summarized, and considered as noted in the history of present illness, past medical history, and objective data of this note and encounter.           Health Maintenance   Topic Date Due    HIV screen  Never done    Hepatitis B vaccine (1 of 3 - Risk 3-dose series) Never done    Shingles vaccine (1 of 2) Never done    COVID-19 Vaccine (4 - Booster for Moderna series) 04/08/2022    Flu vaccine (1) 08/01/2022    Diabetic foot exam  09/21/2022    Annual Wellness Visit (AWV)  12/30/2022    Breast cancer screen  03/31/2023    Depression Screen 08/30/2023    A1C test (Diabetic or Prediabetic)  10/25/2023    Lipids  10/25/2023    Pneumococcal 0-64 years Vaccine (3 - PPSV23 if available, else PCV20) 07/01/2024    DTaP/Tdap/Td vaccine (2 - Td or Tdap) 01/08/2030    Colorectal Cancer Screen  07/07/2031    Hepatitis C screen  Completed    Hepatitis A vaccine  Aged Out    Hib vaccine  Aged Out    Meningococcal (ACWY) vaccine  Aged Out    Diabetic retinal exam  Discontinued     Patient Active Problem List   Diagnosis    HTN (hypertension)    Type 2 diabetes with nephropathy (HCC)    CHF (congestive heart failure) (HCC)    Long-term insulin use in type 2 diabetes ([facility]    NICM (nonischemic cardiomyopathy) ([facility]    Rectal bleeding    Dyslipidemia    CKD (chronic kidney disease) stage 4, GFR 15-29 ml/min (HCC)    Ventricular tachycardia    Chronic systolic heart failure (HCC)    Morbid obesity (HCC)    Restrictive lung disease    Long term current use of amiodarone    Chronic right-sided thoracic back pain    CKD (chronic kidney disease) stage 3, GFR 30-59 ml/min (HCC)    LORI on CPAP    Lower abdominal pain    Chronic left-sided low back pain with sciatica    Thrombocytopenia, unspecified    Left ventricular aneurysm    Acute on chronic renal failure ([facility]       Review of Systems   Respiratory:  Negative for chest tightness. Cardiovascular:  Negative for chest pain and leg swelling. Musculoskeletal:  Positive for back pain (chronic).      Lab Results   Component Value Date/Time    WBC 8.7 10/25/2022 07:54 AM    HGB 12.8 10/25/2022 07:54 AM    HCT 40.8 10/25/2022 07:54 AM    MCV 97.4 10/25/2022 07:54 AM    RDW 13.2 10/25/2022 07:54 AM     10/25/2022 07:54 AM    NEUTOPHILPCT 60 10/25/2021 08:43 AM    LYMPHOPCT 33 10/25/2022 07:54 AM    LYMPHOPCT 30 10/25/2021 08:43 AM    MONOPCT 7 10/25/2022 07:54 AM    MONOPCT 7 10/25/2021 08:43 AM    EOSRELPCT 2 10/25/2022 07:54 AM    BASOPCT 1 10/25/2022 07:54 AM    BASOPCT 1 10/25/2021 08:43 AM    LYMPHSABS 2.9 10/25/2022 07:54 AM    LYMPHSABS 2.6 10/25/2021 08:43 AM    MONOSABS 0.6 10/25/2022 07:54 AM    MONOSABS 0.6 10/25/2021 08:43 AM    EOSABS 0.2 10/25/2022 07:54 AM    EOSABS 0.2 10/25/2021 08:43 AM    BASOSABS 0.1 10/25/2022 07:54 AM    IMMGRAN 0 10/25/2022 07:54 AM    GRANULOCYTEABSOLUTECOUNT 0.0 10/25/2021 08:43 AM       Lab Results   Component Value Date/Time     10/25/2022 07:54 AM    K 4.2 10/25/2022 07:54 AM     10/25/2022 07:54 AM    CO2 27 10/25/2022 07:54 AM    ANIONGAP 10 10/25/2022 07:54 AM    GLUCOSE 136 10/25/2022 07:54 AM    BUN 37 10/25/2022 07:54 AM    CREATININE 2.70 10/25/2022 07:54 AM    GFRAA 46 09/24/2022 04:29 AM    LABGLOM 20 10/25/2022 07:54 AM    CALCIUM 9.5 10/25/2022 07:54 AM    BILITOT 0.6 09/06/2022 01:03 AM    ALT 55 09/06/2022 01:03 AM    AST 41 09/06/2022 01:03 AM    ALKPHOS 82 09/06/2022 01:03 AM    ALKPHOS 70 04/13/2022 10:29 AM    PROT 6.8 09/06/2022 01:03 AM    LABALBU 3.6 09/06/2022 01:03 AM    GLOB 3.2 09/06/2022 01:03 AM    ALBUMIN 1.1 09/06/2022 01:03 AM       Lab Results   Component Value Date/Time    CHOL 173 10/25/2022 07:54 AM    HDL 58 10/25/2022 07:54 AM    TRIG 91 10/25/2022 07:54 AM    LDLCALC 96.8 10/25/2022 07:54 AM    VLDL 16 12/22/2021 07:54 AM       Lab Results   Component Value Date/Time    LABA1C 7.0 10/25/2022 07:54 AM    LABA1C 6.9 08/19/2022 08:36 AM    LABA1C 6.6 07/14/2022 02:02 PM    LABA1C 7.2 03/31/2022 08:12 AM    LABA1C 6.8 09/13/2021 08:15 AM       Lab Results   Component Value Date/Time    TSH 1.130 12/22/2021 07:54 AM    TSH 1.670 11/17/2020 09:58 AM    TSH 1.790 07/15/2020 09:51 AM           Vitals:    11/01/22 1601   BP: 114/62   Site: Right Upper Arm   Position: Sitting   Cuff Size: Small Adult   Pulse: 68   Temp: 97.1 °F (36.2 °C)   TempSrc: Skin   SpO2: 98%   Weight: 198 lb (89.8 kg)   Height: 4' 10\" (1.473 m)     Wt Readings from Last 3 Encounters:   11/01/22 198 lb (89.8 kg)   09/24/22 206 lb 11.2 oz (93.8 kg)   08/30/22 202 lb (91.6 kg)     BP Readings from Last 3 Encounters:   11/01/22 114/62   09/24/22 125/85   09/01/22 108/78     Physical Exam  Vitals and nursing note reviewed. Constitutional:       Appearance: Normal appearance. She is obese. She is not ill-appearing. HENT:      Head: Normocephalic and atraumatic. Eyes:      Extraocular Movements: Extraocular movements intact. Conjunctiva/sclera: Conjunctivae normal.   Cardiovascular:      Rate and Rhythm: Normal rate and regular rhythm. Pulmonary:      Effort: Pulmonary effort is normal.      Breath sounds: Normal breath sounds. Musculoskeletal:      Right lower leg: No edema. Left lower leg: No edema. Neurological:      General: No focal deficit present. Mental Status: She is alert and oriented to person, place, and time. Mental status is at baseline.    Psychiatric:         Mood and Affect: Mood normal.         Behavior: Behavior normal.

## 2022-11-10 RX ORDER — PEN NEEDLE, DIABETIC 32GX 5/32"
NEEDLE, DISPOSABLE MISCELLANEOUS
Qty: 100 EACH | OUTPATIENT
Start: 2022-11-10

## 2022-11-10 RX ORDER — SPIRONOLACTONE 25 MG/1
TABLET ORAL
Qty: 90 TABLET | OUTPATIENT
Start: 2022-11-10

## 2022-11-14 ENCOUNTER — OFFICE VISIT (OUTPATIENT)
Dept: CARDIOLOGY CLINIC | Age: 57
End: 2022-11-14
Payer: MEDICARE

## 2022-11-14 VITALS
HEART RATE: 60 BPM | DIASTOLIC BLOOD PRESSURE: 80 MMHG | BODY MASS INDEX: 42.4 KG/M2 | SYSTOLIC BLOOD PRESSURE: 104 MMHG | HEIGHT: 58 IN | WEIGHT: 202 LBS

## 2022-11-14 DIAGNOSIS — I25.3 LEFT VENTRICULAR ANEURYSM: ICD-10-CM

## 2022-11-14 DIAGNOSIS — I42.8 NICM (NONISCHEMIC CARDIOMYOPATHY) (HCC): Primary | ICD-10-CM

## 2022-11-14 DIAGNOSIS — Z99.89 OSA ON CPAP: ICD-10-CM

## 2022-11-14 DIAGNOSIS — G47.33 OSA ON CPAP: ICD-10-CM

## 2022-11-14 DIAGNOSIS — I47.20 VENTRICULAR TACHYCARDIA: ICD-10-CM

## 2022-11-14 DIAGNOSIS — I50.22 CHRONIC SYSTOLIC HEART FAILURE (HCC): ICD-10-CM

## 2022-11-14 DIAGNOSIS — I10 PRIMARY HYPERTENSION: ICD-10-CM

## 2022-11-14 LAB
25(OH)D3 SERPL-MCNC: 46.8 NG/ML (ref 30–100)
ALBUMIN SERPL-MCNC: 4 G/DL (ref 3.5–5)
ALBUMIN/GLOB SERPL: 1.3 {RATIO} (ref 0.4–1.6)
ALP SERPL-CCNC: 100 U/L (ref 50–136)
ALT SERPL-CCNC: 57 U/L (ref 12–65)
ANION GAP SERPL CALC-SCNC: 5 MMOL/L (ref 2–11)
AST SERPL-CCNC: 33 U/L (ref 15–37)
BILIRUB SERPL-MCNC: 0.4 MG/DL (ref 0.2–1.1)
BUN SERPL-MCNC: 33 MG/DL (ref 6–23)
CALCIUM SERPL-MCNC: 9.4 MG/DL (ref 8.3–10.4)
CHLORIDE SERPL-SCNC: 103 MMOL/L (ref 101–110)
CO2 SERPL-SCNC: 29 MMOL/L (ref 21–32)
CREAT SERPL-MCNC: 2.2 MG/DL (ref 0.6–1)
CREAT UR-MCNC: 40 MG/DL
FERRITIN SERPL-MCNC: 108 NG/ML (ref 8–388)
GLOBULIN SER CALC-MCNC: 3.1 G/DL (ref 2.8–4.5)
GLUCOSE SERPL-MCNC: 147 MG/DL (ref 65–100)
MAGNESIUM SERPL-MCNC: 2.5 MG/DL (ref 1.8–2.4)
PHOSPHATE SERPL-MCNC: 3.8 MG/DL (ref 2.5–4.5)
POTASSIUM SERPL-SCNC: 4.5 MMOL/L (ref 3.5–5.1)
PROT SERPL-MCNC: 7.1 G/DL (ref 6.3–8.2)
PROT UR-MCNC: <5 MG/DL
PROT/CREAT UR-RTO: NORMAL
SODIUM SERPL-SCNC: 137 MMOL/L (ref 133–143)

## 2022-11-14 PROCEDURE — 99214 OFFICE O/P EST MOD 30 MIN: CPT | Performed by: INTERNAL MEDICINE

## 2022-11-14 PROCEDURE — 3074F SYST BP LT 130 MM HG: CPT | Performed by: INTERNAL MEDICINE

## 2022-11-14 PROCEDURE — 3078F DIAST BP <80 MM HG: CPT | Performed by: INTERNAL MEDICINE

## 2022-11-14 RX ORDER — APIXABAN 5 MG/1
TABLET, FILM COATED ORAL
COMMUNITY
Start: 2022-10-10 | End: 2022-11-14 | Stop reason: SDUPTHER

## 2022-11-14 RX ORDER — APIXABAN 5 MG/1
5 TABLET, FILM COATED ORAL 2 TIMES DAILY
Qty: 60 TABLET | Refills: 11 | Status: SHIPPED | OUTPATIENT
Start: 2022-11-14

## 2022-11-14 RX ORDER — SACUBITRIL AND VALSARTAN 24; 26 MG/1; MG/1
1 TABLET, FILM COATED ORAL 2 TIMES DAILY
Qty: 60 TABLET | Refills: 11 | Status: SHIPPED | OUTPATIENT
Start: 2022-11-14

## 2022-11-14 NOTE — PROGRESS NOTES
[address], [address] [facility] [address]  PHONE: [phone]     22    NAME:  Jacinto Chan  : 1965  MRN: [id number]       SUBJECTIVE:   Jacinto Chan is a 59 y.o. female seen for a follow up visit regarding the following:     Chief Complaint   Patient presents with    Congestive Heart Failure       HPI:    Here for NICM/cSHF/VT. SHF (EF 30% in )      Echo 2020: EF 30-35%   2020:  a/c SHF   2020: HF admission, diuresed 7L   Echo 2022: EF 30-35%      LHC 2022: Angiographically normal coronary arteries  Echo 2022: EF 35%  Ct 2022: large left ventricular aneurysm  VT ablation 10/2022 [facility]. Feeling better, energy better. No CP. Doing PT and OT at home now. She lives by herself, sister here in town. One son in [facility]. Home since Oct 11th. On lasix now. Amio daily now. Edema ok, DUARTE ok. On NOAC now as well. NOAC started at [address]. Patient denies recent history of orthopnea, PND, excessive dizziness and/or syncope. Wearing CPAP every night now. Past Medical History, Past Surgical History, Family history, Social History, and Medications were all reviewed with the patient today and updated as necessary.      Current Outpatient Medications   Medication Sig Dispense Refill    ELIQUIS 5 MG TABS tablet Take 1 tablet by mouth 2 times daily 60 tablet 11    sacubitril-valsartan (ENTRESTO) 24-26 MG per tablet Take 1 tablet by mouth 2 times daily 60 tablet 11    spironolactone (ALDACTONE) 25 MG tablet TAKE 1 TABLET BY MOUTH DAILY      atorvastatin (LIPITOR) 40 MG tablet Take by mouth daily      furosemide (LASIX) 40 MG tablet Take by mouth 2 times daily      empagliflozin (JARDIANCE) 10 MG tablet Take 10 mg by mouth daily      carvedilol (COREG) 6.25 MG tablet TAKE 1 TABLET TWICE DAILY WITH MEALS 180 tablet 3    amiodarone (PACERONE) 400 MG tablet Take 1 tablet by mouth 2 times daily (Patient taking differently: Take 400 mg by mouth daily) 60 tablet 5    insulin glargine (LANTUS SOLOSTAR) 100 UNIT/ML injection pen Inject 7 Units into the skin nightly 5 Adjustable Dose Pre-filled Pen Syringe 3    nitroGLYCERIN (NITROSTAT) 0.4 MG SL tablet Place 0.4 mg under the tongue every 5 minutes as needed for Chest pain Max 3 doses      acetaminophen (TYLENOL) 500 MG tablet Take 1,000 mg by mouth every 6 hours as needed for Pain (breakthrough pain). aspirin 81 MG EC tablet Take 81 mg by mouth daily      vitamin D 25 MCG (1000 UT) CAPS Take by mouth daily      latanoprost (XALATAN) 0.005 % ophthalmic solution Place 1 drop into both eyes nightly      B Complex-C-Folic Acid (LYNDA-TAYA) TABS Take 1 tablet by mouth daily 30 tablet 0     No current facility-administered medications for this visit.         Allergies   Allergen Reactions    Other Hives and Shortness Of Breath     \"inhaler PRN\"    Penicillin G Itching     Patient Active Problem List    Diagnosis Date Noted    Acute on chronic renal failure (HCC) 09/06/2022     Priority: High    Left ventricular aneurysm 07/21/2022     Priority: Medium    Thrombocytopenia, unspecified 06/30/2022     Priority: Medium    Rectal bleeding 05/18/2021    CKD (chronic kidney disease) stage 4, GFR 15-29 ml/min ([facility]) 11/25/2020    CHF (congestive heart failure) ([facility] 11/10/2020    Lower abdominal pain 11/10/2020    CKD (chronic kidney disease) stage 3, GFR 30-59 ml/min ([facility] 08/24/2020    Long term current use of amiodarone 06/12/2020    Restrictive lung disease 04/08/2020    Type 2 diabetes with nephropathy ([facility] 04/02/2018    Chronic right-sided thoracic back pain 05/23/2016    Chronic left-sided low back pain with sciatica 05/23/2016    Ventricular tachycardia 04/18/2016    Morbid obesity ([facility] 07/14/2014    LORI on CPAP 07/14/2014    Chronic systolic heart failure ([facility] 07/18/2013     NST 4/15:low risk  Echo 6/2014:EF 40-45%  Echo 5/2013:EF 30-35%  LHC 2/2013: minimal CAD  Formatting of this note might be different from the original.  Overview:   NST 4/15:low risk  Echo 6/2014:EF 40-45%  Echo 5/2013:EF 30-35%  LHC 2/2013: minimal CAD        NICM (nonischemic cardiomyopathy) ([facility] 02/15/2013    Dyslipidemia 02/13/2013    HTN (hypertension) 04/12/2010    Long-term insulin use in type 2 diabetes ([facility] 04/12/2010      Past Surgical History:   Procedure Laterality Date    ABLATION OF DYSRHYTHMIC FOCUS  \"years ago\"    CARDIAC DEFIBRILLATOR PLACEMENT  07/18/2013    Biotronik dual chamber ICD by Dr. Etelvina Bridges  07/18/2013    Biotronik dual chamber ICD by Dr. Ramona Zazueta 7/1/2022    Left heart cath / coronary angiography performed by Lito Amaro MD at [address] [place] [address]    COLONOSCOPY N/A 7/7/2021    COLONOSCOPY/BMI 50 PT HAS AN ICD performed by Lourdes Hernandez MD [facility]  03/26/2019    mild incarceration, no bowel removal    HYSTERECTOMY (CERVIX STATUS UNKNOWN)      EDITH-Left ovary intact per pt    LEFT HEART CATH,PERCUTANEOUS  2/13/2013    no intervention    PACEMAKER      ICD    ROTATOR CUFF REPAIR Right     TONSILLECTOMY       Family History   Problem Relation Age of Onset    Diabetes Other     Heart Disease Father     Hypertension Father     Stroke Father     Heart Attack Father 48        mi    Breast Cancer Sister 37    Hypertension Brother     Heart Disease Brother     Diabetes Brother     Stroke Mother      Social History     Tobacco Use    Smoking status: Some Days     Packs/day: 0.25     Types: Cigarettes    Smokeless tobacco: Never    Tobacco comments:     Quit smoking: \"every now and then\"   Substance Use Topics    Alcohol use: Yes     Alcohol/week: 1.0 standard drink         ROS:    No obvious pertinent positives on review of systems except for what was outlined in the HPI today.       PHYSICAL EXAM:     /80   Pulse 60   Ht 4' 10\" (1.473 m)   Wt 202 lb (91.6 kg)   BMI 42.22 kg/m²    General/Constitutional:   Alert and oriented x 3, no acute distress  HEENT:   normocephalic, atraumatic, moist mucous membranes  Neck:   No JVD or carotid bruits bilaterally  Cardiovascular:   regular rate and rhythm, no murmur/rub/gallop appreciated  Pulmonary:   clear to auscultation bilaterally, no respiratory distress  Abdomen:   soft, non-tender, non-distended  Ext:   No sig LE edema bilaterally  Skin:  warm and dry, no obvious rashes seen  Neuro:   no obvious sensory or motor deficits  Psychiatric:   normal mood and affect      Lab Results   Component Value Date/Time     10/25/2022 07:54 AM    K 4.2 10/25/2022 07:54 AM     10/25/2022 07:54 AM    CO2 27 10/25/2022 07:54 AM    BUN 37 10/25/2022 07:54 AM    CREATININE 2.70 10/25/2022 07:54 AM    GLUCOSE 136 10/25/2022 07:54 AM    CALCIUM 9.5 10/25/2022 07:54 AM        Lab Results   Component Value Date    WBC 8.7 10/25/2022    HGB 12.8 10/25/2022    HCT 40.8 10/25/2022    MCV 97.4 10/25/2022     10/25/2022       Lab Results   Component Value Date    TSH 1.130 12/22/2021       Lab Results   Component Value Date    LABA1C 7.0 (H) 10/25/2022     Lab Results   Component Value Date     10/25/2022       Lab Results   Component Value Date    CHOL 173 10/25/2022    CHOL 167 07/14/2022    CHOL 215 (H) 12/22/2021     Lab Results   Component Value Date    TRIG 91 10/25/2022    TRIG 74 07/14/2022    TRIG 91 12/22/2021     Lab Results   Component Value Date    HDL 58 10/25/2022    HDL 67 (H) 07/14/2022    HDL 79 12/22/2021     Lab Results   Component Value Date    LDLCALC 96.8 10/25/2022    LDLCALC 85.2 07/14/2022    LDLCALC 120 (H) 12/22/2021     Lab Results   Component Value Date    LABVLDL 18.2 10/25/2022    LABVLDL 14.8 07/14/2022    LABVLDL 14 08/17/2020    VLDL 16 12/22/2021    VLDL 13 06/01/2021    VLDL 13 03/09/2021     Lab Results   Component Value Date    CHOLHDLRATIO 3.0 10/25/2022    CHOLHDLRATIO 2.5 07/14/2022           I have Independently reviewed prior care notes, any ER records available, cardiac testing, labs and results with the patient and before seeing the patient today. Also independently reviewed outside records when available. ASSESSMENT:    Gi Garcia was seen today for congestive heart failure. Diagnoses and all orders for this visit:    NICM (nonischemic cardiomyopathy) ([facility]    Ventricular tachycardia    Left ventricular aneurysm    Primary hypertension    Chronic systolic heart failure (HCC)    LORI on CPAP    Other orders  -     ELIQUIS 5 MG TABS tablet; Take 1 tablet by mouth 2 times daily  -     sacubitril-valsartan (ENTRESTO) 24-26 MG per tablet; Take 1 tablet by mouth 2 times daily        PLAN:   1. CSHF/NICM:  On entresto, coreg, imdur, torsemide. Remain on GDMT. Low salt diet needed. EF the same since 2013, ~30%, last echo reviewed. Abnormal PFTs, working on quitting smoking, Saw pulm, remain on amio. No evidence of ILD on HRCT. Will follow. She declined LVAD at [facility]. Deemed to not be surgical candidate for LV aneurysm as well. 2. VT:  Remain on amio, s/p ablation. 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 CRI:  Seeing renal.  Follow labs on lasix, more labs today. Follow Cr.       4. LORI:  On CPAP, seems compliant now. NOAC started at [facility], will resume. Wants to return to work in Jan.        Patient has been instructed and agrees to call our office with any issues or other concerns related to their cardiac condition(s) and/or complaint(s). Return in about 1 month (around 12/14/2022).        GUALBERTO HERNÁNDEZ, DO  11/14/2022

## 2022-11-15 LAB
CALCIUM SERPL-MCNC: 9.5 MG/DL (ref 8.7–10.2)
PTH-INTACT SERPL-MCNC: ABNORMAL PG/ML (ref 15–65)

## 2022-11-21 NOTE — PROGRESS NOTES
[address] [place] [address] Court, [address] [facility]  [phone]    Patient Name:  Renee Javier  YOB: 1965      Office Visit 11/22/2022    CHIEF COMPLAINT:    Chief Complaint   Patient presents with    CPAP/BiPAP    Sleep Apnea         HISTORY OF PRESENT ILLNESS:  Patient is a 58 yo female seen today for follow up of LORI. Diagnostic sleep study on 06/10/2014 with an AHI of 92.2 and lowest oxygen saturation of 85%. She is prescribed cpap therapy with a humidifier set at 12 cm with a full face mask. Most recent download reveals AHI on PAP therapy is 3.0, leak is 66.5 and the hourly usage is 7 hours 32 minutes nightly. The overall use is 2431 hours with days greater than four hours at 321/365. The patient is compliant with the Pap therapy and is feeling better as a result. She reports she is doing well with CPAP therapy and has no problems with her machine or her equipment. She denies any excessive daytime sleepiness or fatigue. [place] score 7/24. She does report that she was in the hospital and late September and pretty much all of October and that is why she has no use of her CPAP on her compliance report. States that she was having some problems with her heart but that has all seem to be resolved and she is doing better now. She states that her weight has consistently been around 200 pounds over the last year. She is currently trying to reduce the amount of calories that she consumes. We also discussed increasing her activity level. She denies any swelling in her ankles or feet. Her blood pressure is controlled today.         Sleep Medicine 11/22/2022 10/12/2021   Sitting and reading 2 0   Watching TV 2 3   Sitting, inactive in a public place (e.g. a theatre or a meeting) 0 0   As a passenger in a car for an hour without a break 0 0   Lying down to rest in the afternoon when circumstances permit 3 2   Sitting and talking to someone 0 0   Sitting quietly after a lunch without alcohol 0 0   In a car, while stopped for a few minutes in traffic 0 0   [place] Sleepiness Score 7 5      Past Medical History:   Diagnosis Date    Abdominal wall hernia 10/2/2018    Acute hypoxemic respiratory failure ([address] 4/3/2019    Allergic rhinitis     followed by allergist; allergic to grass- has rescue inhaler PRN    ARDS (adult respiratory distress syndrome) ([address] 4/3/2019    Arthritis     Automatic implantable cardioverter-defibrillator in situ 3/30/2016    CAD in native artery 3/30/2016    Chronic kidney disease     Chronic systolic heart failure ([address] 07/18/2013    ECHO 2017- EF 39%; managed with medications; [facility]    CKD (chronic kidney disease)     [facility] follows    Diabetes ([address]     type 2 (on insulin);  FBS AM range- , hypo s/s <70, hgba1c- 7.9    Diabetes mellitus ([address] 4/12/2010    Dyslipidemia 2/13/2013    Eczema     Fibromyalgia     GERD (gastroesophageal reflux disease)     hx of- no meds currently    Headache     Heart failure (HCC)     chronic systolic with EF 67%; non-ischemic cardiomyopathy    HTN (hypertension) 4/12/2010    Hypercholesterolemia     Incarcerated incisional hernia 3/26/2019    Morbid obesity (HCC)     Neuropathy     bilateral feet and tips of fingers    NICM (nonischemic cardiomyopathy) ([address] 2/15/2013    Obesity     bmi- 41    Obstructive sleep apnea (adult) (pediatric) 07/14/2014    uses cpap qhs    Pseudomonas urinary tract infection 4/5/2019    Sciatic pain 5/23/2016    Severe persistent asthma with acute exacerbation 8/24/2020    SVT (supraventricular tachycardia) ([facility])     ablation for svt    Tobacco use disorder 4/12/2010         Patient Active Problem List   Diagnosis    HTN (hypertension)    Type 2 diabetes with nephropathy (HCC)    CHF (congestive heart failure) ([address]    Long-term insulin use in type 2 diabetes ([address]    NICM (nonischemic cardiomyopathy) ([address]    Rectal bleeding Dyslipidemia    CKD (chronic kidney disease) stage 4, GFR 15-29 ml/min ([facility])    Ventricular tachycardia    Chronic systolic heart failure (HCC)    Class 3 severe obesity due to excess calories without serious comorbidity with body mass index (BMI) of 40.0 to 44.9 in adult [facility])    Restrictive lung disease    Long term current use of amiodarone    Chronic right-sided thoracic back pain    CKD (chronic kidney disease) stage 3, GFR 30-59 ml/min ([facility])    LORI on CPAP    Lower abdominal pain    Chronic left-sided low back pain with sciatica    Thrombocytopenia, unspecified    Left ventricular aneurysm    Acute on chronic renal failure [facility])          Past Surgical History:   Procedure Laterality Date    ABLATION OF DYSRHYTHMIC FOCUS  \"years ago\"    CARDIAC DEFIBRILLATOR PLACEMENT  2013    Biotronik dual chamber ICD by Dr. Rona Rock  2013    Biotronik dual chamber ICD by Dr. Thea eYboah 2022    Left heart cath / coronary angiography performed by Trish Busby MD at [address] [place] [address] CATH LAB     SECTION      x1    COLONOSCOPY N/A 2021    COLONOSCOPY/BMI 50 PT HAS AN ICD performed by Melvin Mckeon MD [facility]  2019    mild incarceration, no bowel removal    HYSTERECTOMY (CERVIX STATUS UNKNOWN)      EDITH-Left ovary intact per pt    LEFT HEART CATH,PERCUTANEOUS  2013    no intervention    PACEMAKER      ICD    ROTATOR CUFF REPAIR Right     TONSILLECTOMY             Social History     Socioeconomic History    Marital status: Single     Spouse name: Not on file    Number of children: Not on file    Years of education: Not on file    Highest education level: Not on file   Occupational History    Not on file   Tobacco Use    Smoking status: Some Days     Packs/day: 0.25     Types: Cigarettes    Smokeless tobacco: Never    Tobacco comments:     Quit smoking: \"every now and then\"   Vaping Use Vaping Use: Never used   Substance and Sexual Activity    Alcohol use:  Yes     Alcohol/week: 1.0 standard drink    Drug use: Yes     Types: Marijuana [facility])    Sexual activity: Not on file   Other Topics Concern    Not on file   Social History Narrative    Denies physical or sexual abuse     Social Determinants of Health     Financial Resource Strain: Not on file   Food Insecurity: Not on file   Transportation Needs: Not on file   Physical Activity: Not on file   Stress: Not on file   Social Connections: Not on file   Intimate Partner Violence: Not on file   Housing Stability: Not on file         Family History   Problem Relation Age of Onset    Diabetes Other     Heart Disease Father     Hypertension Father     Stroke Father     Heart Attack Father 48        mi    Breast Cancer Sister 37    Hypertension Brother     Heart Disease Brother     Diabetes Brother     Stroke Mother          Allergies   Allergen Reactions    Other Hives and Shortness Of Breath     \"inhaler PRN\"    Penicillin G Itching         Current Outpatient Medications   Medication Sig    ELIQUIS 5 MG TABS tablet Take 1 tablet by mouth 2 times daily    sacubitril-valsartan (ENTRESTO) 24-26 MG per tablet Take 1 tablet by mouth 2 times daily    spironolactone (ALDACTONE) 25 MG tablet TAKE 1 TABLET BY MOUTH DAILY    atorvastatin (LIPITOR) 40 MG tablet Take by mouth daily    furosemide (LASIX) 40 MG tablet Take by mouth 2 times daily    empagliflozin (JARDIANCE) 10 MG tablet Take 10 mg by mouth daily    carvedilol (COREG) 6.25 MG tablet TAKE 1 TABLET TWICE DAILY WITH MEALS    amiodarone (PACERONE) 400 MG tablet Take 1 tablet by mouth 2 times daily (Patient taking differently: Take 400 mg by mouth daily)    B Complex-C-Folic Acid (LYNDA-TAYA) TABS Take 1 tablet by mouth daily    insulin glargine (LANTUS SOLOSTAR) 100 UNIT/ML injection pen Inject 7 Units into the skin nightly    nitroGLYCERIN (NITROSTAT) 0.4 MG SL tablet Place 0.4 mg under the tongue every 5 minutes as needed for Chest pain Max 3 doses    acetaminophen (TYLENOL) 500 MG tablet Take 1,000 mg by mouth every 6 hours as needed for Pain (breakthrough pain). aspirin 81 MG EC tablet Take 81 mg by mouth daily    vitamin D 25 MCG (1000 UT) CAPS Take by mouth daily    latanoprost (XALATAN) 0.005 % ophthalmic solution Place 1 drop into both eyes nightly     No current facility-administered medications for this visit. REVIEW OF SYSTEMS:   CONSTITUTIONAL:   There is no history of fever, chills, night sweats, weight loss, weight gain, persistent fatigue, or lethargy/hypersomnolence. CARDIAC:   No chest pain, pressure, discomfort, palpitations, orthopnea, murmurs, or edema. GI:   No dysphagia, heartburn reflux, nausea/vomiting, diarrhea, abdominal pain, or bleeding. NEURO:   There is no history of AMS, persistent headache, decreased level of consciousness, seizures, or motor or sensory deficits. PHYSICAL EXAM:    Vitals:    11/22/22 1101   BP: 110/70   Pulse: 60   Resp: 16   SpO2: 96%   Weight: 198 lb 6.4 oz (90 kg)   Height: 4' 8\" (1.422 m)        BMI: 44.48       GENERAL APPEARANCE:   The patient is obese and in no respiratory distress. HEENT:   PERRL. Conjunctivae unremarkable. Nasal mucosa is without epistaxis, exudate, or polyps. Nares patent bilateral.  Gums and dentition are unremarkable. There is oropharyngeal narrowing. Gloria score 3. NECK/LYMPHATIC:   Symmetrical with no elevation of jugular venous pulsation. Trachea midline. No thyroid enlargement. No cervical adenopathy. LUNGS:   Normal respiratory effort with symmetrical lung expansion. Breath sounds clear. HEART:   There is a regular rate and rhythm. No murmur, rub, or gallop. There is no edema in the lower extremities. ABDOMEN:   Soft and non-tender. No hepatosplenomegaly. Bowel sounds are normal.     NEURO:   The patient is alert and oriented to person, place, and time.   Memory appears intact and mood encounter diagnosis was LORI on CPAP. A diagnosis of Class 3 severe obesity due to excess calories without serious comorbidity with body mass index (BMI) of 40.0 to 44.9 in adult [facility]) was also pertinent to this visit. (  X   )Supplies Needed       CPAP Machine   (  x   ) CPAP Unit  (     ) Auto CPAP Unit  (     ) BiLevel Unit  (     ) Auto BiLevel Unit  (     ) ASV   (     ) Bilevel ST      Length of need: 12 months    Pressure:  12 cmH20  EPR: 3     Starting Ramp Pressure:   cm H20  Ramp Time: min      Patient had a diagnostic Apnea Hypopnea Index (AHI) of :  92.2  *SUPPLIES* Replace all as needed, or per coverage guidelines     Masks Type:  ( x   ) -Full Face Mask (1 per 3 mon)  (  x  ) -Full Mask (1 per month) Interface/Cushion      (  ) -Nasal Mask (1 per 3 mon)  (  ) - Nasal Mask (1 per month) Interface/Cushion  (     ) -Pillow (2 per mon)  (     ) -Iwvtmjijk (1 per 6 mon)            Other Supplies:    (   X  )-Guyosfje (1 per 6 mon)  (   X  )-Ksqyae Tubing (1 per 3 mon)  (   X  )- Disposable Filter (2 per mon)  (   x  )-Qisxqb Humidifier (1 per year)     ( x    )-Sojarplxy (sometimes used with Full Face Mask) (1 per 6 mos)  (    )-Tubing-without heat (1 per 3 mos)  (     )-Non-Disposable Filter (1 per 6 mos)  (  x   )-Water Chamber (1 per 6 mos)  (     )-Humidifier non-heated (1 per 5 yrs)      Signed Date: 11/22/2022  Electronically Signed By: [id number], APRN - CNP  Electronically Dated:  11/22/2022               Collaborating Physician: Dr. Elpidio Ruiz    Over 50% of today's office visit was spent in face to face time reviewing test results, prognosis, importance of compliance, education about disease process, benefits of medications, instructions for management of acute flare-ups, and follow up plans. Total face to face time spent with patient was 20 minutes.         [address] Ayan, APRN - CNP  Electronically signed

## 2022-11-22 ENCOUNTER — OFFICE VISIT (OUTPATIENT)
Dept: SLEEP MEDICINE | Age: 57
End: 2022-11-22

## 2022-11-22 VITALS
WEIGHT: 198.4 LBS | RESPIRATION RATE: 16 BRPM | HEART RATE: 60 BPM | OXYGEN SATURATION: 96 % | HEIGHT: 56 IN | BODY MASS INDEX: 44.63 KG/M2 | DIASTOLIC BLOOD PRESSURE: 70 MMHG | SYSTOLIC BLOOD PRESSURE: 110 MMHG

## 2022-11-22 DIAGNOSIS — Z99.89 OSA ON CPAP: Primary | ICD-10-CM

## 2022-11-22 DIAGNOSIS — G47.33 OSA ON CPAP: Primary | ICD-10-CM

## 2022-11-22 DIAGNOSIS — E66.01 CLASS 3 SEVERE OBESITY DUE TO EXCESS CALORIES WITHOUT SERIOUS COMORBIDITY WITH BODY MASS INDEX (BMI) OF 40.0 TO 44.9 IN ADULT (HCC): ICD-10-CM

## 2022-11-22 ASSESSMENT — SLEEP AND FATIGUE QUESTIONNAIRES
HOW LIKELY ARE YOU TO NOD OFF OR FALL ASLEEP WHEN YOU ARE A PASSENGER IN A CAR FOR AN HOUR WITHOUT A BREAK: 0
HOW LIKELY ARE YOU TO NOD OFF OR FALL ASLEEP WHILE SITTING INACTIVE IN A PUBLIC PLACE: 0
HOW LIKELY ARE YOU TO NOD OFF OR FALL ASLEEP WHILE LYING DOWN TO REST IN THE AFTERNOON WHEN CIRCUMSTANCES PERMIT: 3
HOW LIKELY ARE YOU TO NOD OFF OR FALL ASLEEP WHILE SITTING QUIETLY AFTER LUNCH WITHOUT ALCOHOL: 0
HOW LIKELY ARE YOU TO NOD OFF OR FALL ASLEEP WHILE WATCHING TV: 2
HOW LIKELY ARE YOU TO NOD OFF OR FALL ASLEEP IN A CAR, WHILE STOPPED FOR A FEW MINUTES IN TRAFFIC: 0
ESS TOTAL SCORE: 7
HOW LIKELY ARE YOU TO NOD OFF OR FALL ASLEEP WHILE SITTING AND TALKING TO SOMEONE: 0
HOW LIKELY ARE YOU TO NOD OFF OR FALL ASLEEP WHILE SITTING AND READING: 2

## 2022-11-22 NOTE — PATIENT INSTRUCTIONS
Continue CPAP 12 cm H2O with nightly compliance  New supplies ordered with [facility]  Recommendations as above  Follow-up in 1 year or sooner if needed

## 2022-11-28 ENCOUNTER — TELEPHONE (OUTPATIENT)
Dept: CARDIOLOGY CLINIC | Age: 57
End: 2022-11-28

## 2022-11-28 RX ORDER — AMIODARONE HYDROCHLORIDE 200 MG/1
200 TABLET ORAL 2 TIMES DAILY
Qty: 180 TABLET | Refills: 1 | Status: SHIPPED | OUTPATIENT
Start: 2022-11-28

## 2022-11-28 NOTE — TELEPHONE ENCOUNTER
MEDICATION REFILL REQUEST      Name of Medication:  Amiodarone  Dose:  200 mg  Frequency:  one tab twice daily  Quantity:  ?  Days' supply:  ? Pharmacy Name/Location:  [facility] did not receive this Rx refill.

## 2022-11-28 NOTE — TELEPHONE ENCOUNTER
Requested Prescriptions     Pending Prescriptions Disp Refills    amiodarone (CORDARONE) 200 MG tablet [Pharmacy Med Name: AMIODARONE HYDROCHLORIDE 200 MG Tablet] 180 tablet 1     Sig: Take 1 tablet by mouth 2 times daily

## 2022-12-07 ENCOUNTER — TELEPHONE (OUTPATIENT)
Dept: INTERNAL MEDICINE CLINIC | Facility: CLINIC | Age: 57
End: 2022-12-07

## 2022-12-07 DIAGNOSIS — M25.512 LEFT SHOULDER PAIN, UNSPECIFIED CHRONICITY: Primary | ICD-10-CM

## 2022-12-07 NOTE — TELEPHONE ENCOUNTER
John, Physical Therapist with [facility] states that she has been seeing patient and she is continuing to complain with left shoulder pain.  Jose Chand is requesting a referral for patient to [facility]

## 2022-12-08 NOTE — TELEPHONE ENCOUNTER
Orders Placed This Encounter    External Referral To Orthopedic Surgery     Referral Priority:   Routine     Referral Type:   Eval and Treat     Referral Reason:   Specialty Services Required     Requested Specialty:   Orthopedic Surgery     Number of Visits Requested:   1         Randell Ch MD

## 2022-12-13 RX ORDER — PEN NEEDLE, DIABETIC 32GX 5/32"
NEEDLE, DISPOSABLE MISCELLANEOUS
Qty: 100 EACH | OUTPATIENT
Start: 2022-12-13

## 2022-12-15 DIAGNOSIS — E11.21 TYPE 2 DIABETES WITH NEPHROPATHY (HCC): Primary | ICD-10-CM

## 2022-12-15 RX ORDER — INSULIN GLARGINE 100 [IU]/ML
7 INJECTION, SOLUTION SUBCUTANEOUS NIGHTLY
Qty: 5 ADJUSTABLE DOSE PRE-FILLED PEN SYRINGE | Refills: 3 | Status: SHIPPED | OUTPATIENT
Start: 2022-12-15

## 2022-12-22 ENCOUNTER — TELEPHONE (OUTPATIENT)
Dept: INTERNAL MEDICINE CLINIC | Facility: CLINIC | Age: 57
End: 2022-12-22

## 2022-12-22 DIAGNOSIS — E11.21 TYPE 2 DIABETES WITH NEPHROPATHY (HCC): Primary | ICD-10-CM

## 2022-12-22 NOTE — TELEPHONE ENCOUNTER
New prescription sent to pharmacy on record    Orders Placed This Encounter    Insulin Pen Needle 32G X 4 MM MISC     Sig: Use as directed with Lantus pen     Dispense:  100 each     Refill:  3

## 2022-12-22 NOTE — TELEPHONE ENCOUNTER
Pt called requesting droplet pen needle 32g 5/32in to be sent to [facility].  Please advise      Thank you    Asif Sunshine

## 2022-12-23 ENCOUNTER — OFFICE VISIT (OUTPATIENT)
Dept: CARDIOLOGY CLINIC | Age: 57
End: 2022-12-23

## 2022-12-23 VITALS
WEIGHT: 197 LBS | HEIGHT: 56 IN | SYSTOLIC BLOOD PRESSURE: 114 MMHG | DIASTOLIC BLOOD PRESSURE: 66 MMHG | BODY MASS INDEX: 44.32 KG/M2 | HEART RATE: 64 BPM

## 2022-12-23 DIAGNOSIS — I25.119 ATHEROSCLEROSIS OF NATIVE CORONARY ARTERY OF NATIVE HEART WITH ANGINA PECTORIS (HCC): ICD-10-CM

## 2022-12-23 DIAGNOSIS — I25.3 LEFT VENTRICULAR ANEURYSM: ICD-10-CM

## 2022-12-23 DIAGNOSIS — I42.8 NICM (NONISCHEMIC CARDIOMYOPATHY) (HCC): ICD-10-CM

## 2022-12-23 DIAGNOSIS — I50.22 CHRONIC SYSTOLIC HEART FAILURE (HCC): ICD-10-CM

## 2022-12-23 DIAGNOSIS — I10 PRIMARY HYPERTENSION: ICD-10-CM

## 2022-12-23 DIAGNOSIS — I42.8 NICM (NONISCHEMIC CARDIOMYOPATHY) (HCC): Primary | ICD-10-CM

## 2022-12-23 DIAGNOSIS — I20.9 ANGINA PECTORIS, UNSPECIFIED (HCC): ICD-10-CM

## 2022-12-23 LAB
ANION GAP SERPL CALC-SCNC: 6 MMOL/L (ref 2–11)
BUN SERPL-MCNC: 37 MG/DL (ref 6–23)
CALCIUM SERPL-MCNC: 10.1 MG/DL (ref 8.3–10.4)
CHLORIDE SERPL-SCNC: 104 MMOL/L (ref 101–110)
CO2 SERPL-SCNC: 30 MMOL/L (ref 21–32)
CREAT SERPL-MCNC: 2.1 MG/DL (ref 0.6–1)
ERYTHROCYTE [DISTWIDTH] IN BLOOD BY AUTOMATED COUNT: 14.1 % (ref 11.9–14.6)
GLUCOSE SERPL-MCNC: 206 MG/DL (ref 65–100)
HCT VFR BLD AUTO: 50 % (ref 35.8–46.3)
HGB BLD-MCNC: 16 G/DL (ref 11.7–15.4)
MAGNESIUM SERPL-MCNC: 2.9 MG/DL (ref 1.8–2.4)
MCH RBC QN AUTO: 30 PG (ref 26.1–32.9)
MCHC RBC AUTO-ENTMCNC: 32 G/DL (ref 31.4–35)
MCV RBC AUTO: 93.8 FL (ref 82–102)
NRBC # BLD: 0 K/UL (ref 0–0.2)
PLATELET # BLD AUTO: 164 K/UL (ref 150–450)
PMV BLD AUTO: 12.5 FL (ref 9.4–12.3)
POTASSIUM SERPL-SCNC: 4.5 MMOL/L (ref 3.5–5.1)
RBC # BLD AUTO: 5.33 M/UL (ref 4.05–5.2)
SODIUM SERPL-SCNC: 140 MMOL/L (ref 133–143)
WBC # BLD AUTO: 8.8 K/UL (ref 4.3–11.1)

## 2022-12-23 RX ORDER — NITROGLYCERIN 0.4 MG/1
0.4 TABLET SUBLINGUAL EVERY 5 MIN PRN
Qty: 25 TABLET | Refills: 6 | Status: SHIPPED | OUTPATIENT
Start: 2022-12-23

## 2022-12-23 NOTE — PROGRESS NOTES
[address], [address] [employer] [address]  PHONE: [phone]     22    NAME:  Sobeida Rankin  : 1965  MRN: [id number]       SUBJECTIVE:   Sobeida Rankin is a 59 y.o. female seen for a follow up visit regarding the following:     Chief Complaint   Patient presents with    Cardiomyopathy         HPI:    Here for NICM/cSHF/VT. SHF (EF 30% in )      Echo 2020: EF 30-35%   2020:  a/c SHF   2020: HF admission, diuresed 7L   Echo 2022: EF 30-35%      LHC 2022: Angiographically normal coronary arteries  Echo 2022: EF 35%  Ct 2022: large left ventricular aneurysm  VT ablation 10/2022 [facility]. Feeling better, energy better. No CP. Doing PT and OT at home now. No new angina, pressure. She lives by herself, sister here in town. One son in [place]. On lasix now. Amio daily now. Edema ok, DUARTE ok. On NOAC now as well. NOAC started at [address]. Patient denies recent history of orthopnea, PND, excessive dizziness and/or syncope. Wearing CPAP every night now. Past Medical History, Past Surgical History, Family history, Social History, and Medications were all reviewed with the patient today and updated as necessary.      Current Outpatient Medications   Medication Sig Dispense Refill    nitroGLYCERIN (NITROSTAT) 0.4 MG SL tablet Place 1 tablet under the tongue every 5 minutes as needed for Chest pain Max 3 doses 25 tablet 6    Insulin Pen Needle 32G X 4 MM MISC Use as directed with Lantus pen 100 each 3    insulin glargine (LANTUS SOLOSTAR) 100 UNIT/ML injection pen Inject 7 Units into the skin nightly 5 Adjustable Dose Pre-filled Pen Syringe 3    ELIQUIS 5 MG TABS tablet Take 1 tablet by mouth 2 times daily 60 tablet 11    sacubitril-valsartan (ENTRESTO) 24-26 MG per tablet Take 1 tablet by mouth 2 times daily 60 tablet 11    spironolactone (ALDACTONE) 25 MG tablet TAKE 1 TABLET BY MOUTH DAILY      atorvastatin (LIPITOR) 40 MG tablet Take by mouth daily      furosemide (LASIX) 40 MG tablet Take by mouth 2 times daily      empagliflozin (JARDIANCE) 10 MG tablet Take 10 mg by mouth daily      amiodarone (PACERONE) 400 MG tablet Take 1 tablet by mouth 2 times daily (Patient taking differently: Take 400 mg by mouth daily) 60 tablet 5    B Complex-C-Folic Acid (LYNDA-TAYA) TABS Take 1 tablet by mouth daily 30 tablet 0    acetaminophen (TYLENOL) 500 MG tablet Take 1,000 mg by mouth every 6 hours as needed for Pain (breakthrough pain). aspirin 81 MG EC tablet Take 81 mg by mouth daily      vitamin D 25 MCG (1000 UT) CAPS Take by mouth daily      latanoprost (XALATAN) 0.005 % ophthalmic solution Place 1 drop into both eyes nightly      amiodarone (CORDARONE) 200 MG tablet Take 1 tablet by mouth 2 times daily 180 tablet 1    carvedilol (COREG) 6.25 MG tablet TAKE 1 TABLET TWICE DAILY WITH MEALS 180 tablet 3     No current facility-administered medications for this visit.         Allergies   Allergen Reactions    Other Hives and Shortness Of Breath     \"inhaler PRN\"    Penicillin G Itching     Patient Active Problem List    Diagnosis Date Noted    Acute on chronic renal failure ([facility] 09/06/2022     Priority: High    Angina pectoris, unspecified 12/23/2022     Priority: Medium    Atherosclerotic heart disease of native coronary artery with unspecified angina pectoris 12/23/2022     Priority: Medium    Left ventricular aneurysm 07/21/2022     Priority: Medium    Thrombocytopenia, unspecified 06/30/2022     Priority: Medium    Rectal bleeding 05/18/2021    CKD (chronic kidney disease) stage 4, GFR 15-29 ml/min ([facility] 11/25/2020    CHF (congestive heart failure) ([facility] 11/10/2020    Lower abdominal pain 11/10/2020    CKD (chronic kidney disease) stage 3, GFR 30-59 ml/min ([address] 08/24/2020    Long term current use of amiodarone 06/12/2020    Restrictive lung disease 04/08/2020    Type 2 diabetes with nephropathy ([address] 04/02/2018    Chronic right-sided thoracic back pain 05/23/2016    Chronic left-sided low back pain with sciatica 05/23/2016    Ventricular tachycardia 04/18/2016    Class 3 severe obesity due to excess calories without serious comorbidity with body mass index (BMI) of 40.0 to 44.9 in adult ([facility] 07/14/2014    LORI on CPAP 07/14/2014    Chronic systolic heart failure ([facility] 07/18/2013     NST 4/15:low risk  Echo 6/2014:EF 40-45%  Echo 5/2013:EF 30-35%  LHC 2/2013: minimal CAD  Formatting of this note might be different from the original.  Overview:   NST 4/15:low risk  Echo 6/2014:EF 40-45%  Echo 5/2013:EF 30-35%  LHC 2/2013: minimal CAD        NICM (nonischemic cardiomyopathy) ([facility] 02/15/2013    Dyslipidemia 02/13/2013    HTN (hypertension) 04/12/2010    Long-term insulin use in type 2 diabetes ([facility] 04/12/2010      Past Surgical History:   Procedure Laterality Date    ABLATION OF DYSRHYTHMIC FOCUS  \"years ago\"    CARDIAC DEFIBRILLATOR PLACEMENT  07/18/2013    Biotronik dual chamber ICD by Dr. Esme Castro  07/18/2013    Biotronik dual chamber ICD by Dr. Cholo Castaneda 7/1/2022    Left heart cath / coronary angiography performed by Ally Mata MD at [address] [place] [address]      x1    COLONOSCOPY N/A 7/7/2021    COLONOSCOPY/BMI 50 PT HAS AN ICD performed by Bl[facility]  03/26/2019    mild incarceration, no bowel removal    HYSTERECTOMY (CERVIX STATUS UNKNOWN)      EDITH-Left ovary intact per pt    LEFT HEART CATH,PERCUTANEOUS  2/13/2013    no intervention    PACEMAKER      ICD    ROTATOR CUFF REPAIR Right     TONSILLECTOMY       Family History   Problem Relation Age of Onset    Diabetes Other     Heart Disease Father     Hypertension Father     Stroke Father     Heart Attack Father 48        mi    Breast Cancer Sister 37    Hypertension Brother     Heart Disease Brother     Diabetes Brother     Stroke Mother      Social History     Tobacco Use    Smoking status: Some Days     Packs/day: 0.25     Types: Cigarettes    Smokeless tobacco: Never    Tobacco comments:     Quit smoking: \"every now and then\"   Substance Use Topics    Alcohol use: Yes     Alcohol/week: 1.0 standard drink         ROS:    No obvious pertinent positives on review of systems except for what was outlined in the HPI today.       PHYSICAL EXAM:     /66   Pulse 64   Ht 4' 8\" (1.422 m)   Wt 197 lb (89.4 kg)   BMI 44.17 kg/m²    General/Constitutional:   Alert and oriented x 3, no acute distress  HEENT:   normocephalic, atraumatic, moist mucous membranes  Neck:   No JVD or carotid bruits bilaterally  Cardiovascular:   regular rate and rhythm, no murmur/rub/gallop appreciated  Pulmonary:   clear to auscultation bilaterally, no respiratory distress  Abdomen:   soft, non-tender, non-distended  Ext:   No sig LE edema bilaterally  Skin:  warm and dry, no obvious rashes seen  Neuro:   no obvious sensory or motor deficits  Psychiatric:   normal mood and affect      Lab Results   Component Value Date/Time     11/14/2022 09:39 AM    K 4.5 11/14/2022 09:39 AM     11/14/2022 09:39 AM    CO2 29 11/14/2022 09:39 AM    BUN 33 11/14/2022 09:39 AM    CREATININE 2.20 11/14/2022 09:39 AM    GLUCOSE 147 11/14/2022 09:39 AM    CALCIUM 9.4 11/14/2022 09:39 AM    CALCIUM 9.5 11/14/2022 09:39 AM        Lab Results   Component Value Date    WBC 6.8 11/14/2022    HGB 13.8 11/14/2022    HCT 44.5 11/14/2022    MCV 98.2 11/14/2022     (L) 11/14/2022       Lab Results   Component Value Date    TSH 1.130 12/22/2021       Lab Results   Component Value Date    LABA1C 7.0 (H) 10/25/2022     Lab Results   Component Value Date     10/25/2022       Lab Results   Component Value Date    CHOL 173 10/25/2022    CHOL 167 07/14/2022    CHOL 215 (H) 12/22/2021     Lab Results   Component Value Date    TRIG 91 10/25/2022    TRIG 74 07/14/2022    TRIG 91 12/22/2021     Lab Results   Component Value Date    HDL 58 10/25/2022    HDL 67 (H) 07/14/2022    HDL 79 12/22/2021     Lab Results   Component Value Date    LDLCALC 96.8 10/25/2022    LDLCALC 85.2 07/14/2022    LDLCALC 120 (H) 12/22/2021     Lab Results   Component Value Date    LABVLDL 18.2 10/25/2022    LABVLDL 14.8 07/14/2022    LABVLDL 14 08/17/2020    VLDL 16 12/22/2021    VLDL 13 06/01/2021    VLDL 13 03/09/2021     Lab Results   Component Value Date    CHOLHDLRATIO 3.0 10/25/2022    CHOLHDLRATIO 2.5 07/14/2022           I have Independently reviewed prior care notes, any ER records available, cardiac testing, labs and results with the patient and before seeing the patient today. Also independently reviewed outside records when available. ASSESSMENT:    Sammi Adair was seen today for cardiomyopathy. Diagnoses and all orders for this visit:    NICM (nonischemic cardiomyopathy) ([facility]  -     Basic Metabolic Panel; Future  -     CBC; Future  -     Magnesium; Future    Left ventricular aneurysm    Primary hypertension    Chronic systolic heart failure (HCC)    Angina pectoris, unspecified    Atherosclerosis of native coronary artery of native heart with angina pectoris ([facility]    Other orders  -     nitroGLYCERIN (NITROSTAT) 0.4 MG SL tablet; Place 1 tablet under the tongue every 5 minutes as needed for Chest pain Max 3 doses        PLAN:   1. CSHF/NICM:  On entresto, coreg, imdur, torsemide. Remain on GDMT. Low salt diet needed. EF the same since 2013, ~30%, last echo reviewed. Abnormal PFTs, working on quitting smoking, Saw pulm, remain on amio. No evidence of ILD on HRCT. Will follow. She declined LVAD at [address]. Deemed to not be surgical candidate for LV aneurysm as well. Conservative mgmt best for now. No plan for surgery. Reviewed again today. 2. VT:  Remain on amio, s/p ablation. Seeing Dominick soon for EP.       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 CRI:  more labs today, call her with results. 4. LORI:  On CPAP, seems compliant now. Patient has been instructed and agrees to call our office with any issues or other concerns related to their cardiac condition(s) and/or complaint(s). Return in about 3 months (around 3/23/2023).        GUALBERTO HERNÁNDEZ,   12/23/2022

## 2022-12-23 NOTE — LETTER
[facility] CARDIOLOGY  [address] [place] [id number]  Phone: [phone]  Fax: [phone], DO        December 23, 2022        To Whom it May Concern,          Milton Vincent is under my care for her cardiac condition. She was last evaluated on 12/23/2022. Ms. Juan Garcia is cleared to go back to work on 1/16/2023 on light duty, 3 days a week     Please contact our office with any questions or concerns [phone].       Sincerely,        GUALBERTO HERNÁNDEZ, DO

## 2022-12-29 ENCOUNTER — TELEPHONE (OUTPATIENT)
Dept: CARDIOLOGY CLINIC | Age: 57
End: 2022-12-29

## 2022-12-29 NOTE — TELEPHONE ENCOUNTER
----- Message from Stephanie Almendarez DO sent at 12/24/2022  3:49 PM EST -----  Please call her, labs are stable, no med changes needed, continue meds and call for issues, worsening sx. See me as planned, but good news!   Thanks

## 2023-01-03 DIAGNOSIS — I47.20 VENTRICULAR TACHYCARDIA: ICD-10-CM

## 2023-01-03 DIAGNOSIS — I42.8 NICM (NONISCHEMIC CARDIOMYOPATHY) (HCC): ICD-10-CM

## 2023-01-03 DIAGNOSIS — Z95.810 AICD (AUTOMATIC CARDIOVERTER/DEFIBRILLATOR) PRESENT: ICD-10-CM

## 2023-02-14 ENCOUNTER — OFFICE VISIT (OUTPATIENT)
Dept: INTERNAL MEDICINE CLINIC | Facility: CLINIC | Age: 58
End: 2023-02-14
Payer: MEDICARE

## 2023-02-14 VITALS
HEIGHT: 56 IN | WEIGHT: 196 LBS | TEMPERATURE: 97.2 F | SYSTOLIC BLOOD PRESSURE: 120 MMHG | HEART RATE: 75 BPM | OXYGEN SATURATION: 97 % | DIASTOLIC BLOOD PRESSURE: 82 MMHG | BODY MASS INDEX: 44.09 KG/M2

## 2023-02-14 DIAGNOSIS — I50.22 CHRONIC SYSTOLIC HEART FAILURE (HCC): ICD-10-CM

## 2023-02-14 DIAGNOSIS — N18.4 CKD (CHRONIC KIDNEY DISEASE) STAGE 4, GFR 15-29 ML/MIN (HCC): ICD-10-CM

## 2023-02-14 DIAGNOSIS — I25.119 ATHEROSCLEROSIS OF NATIVE CORONARY ARTERY OF NATIVE HEART WITH ANGINA PECTORIS (HCC): ICD-10-CM

## 2023-02-14 DIAGNOSIS — E11.21 TYPE 2 DIABETES WITH NEPHROPATHY (HCC): Primary | ICD-10-CM

## 2023-02-14 LAB — HBA1C MFR BLD: 8.5 %

## 2023-02-14 PROCEDURE — 99213 OFFICE O/P EST LOW 20 MIN: CPT | Performed by: INTERNAL MEDICINE

## 2023-02-14 PROCEDURE — 3079F DIAST BP 80-89 MM HG: CPT | Performed by: INTERNAL MEDICINE

## 2023-02-14 PROCEDURE — G8417 CALC BMI ABV UP PARAM F/U: HCPCS | Performed by: INTERNAL MEDICINE

## 2023-02-14 PROCEDURE — G8427 DOCREV CUR MEDS BY ELIG CLIN: HCPCS | Performed by: INTERNAL MEDICINE

## 2023-02-14 PROCEDURE — 4004F PT TOBACCO SCREEN RCVD TLK: CPT | Performed by: INTERNAL MEDICINE

## 2023-02-14 PROCEDURE — 83036 HEMOGLOBIN GLYCOSYLATED A1C: CPT | Performed by: INTERNAL MEDICINE

## 2023-02-14 PROCEDURE — 2022F DILAT RTA XM EVC RTNOPTHY: CPT | Performed by: INTERNAL MEDICINE

## 2023-02-14 PROCEDURE — 3046F HEMOGLOBIN A1C LEVEL >9.0%: CPT | Performed by: INTERNAL MEDICINE

## 2023-02-14 PROCEDURE — 3017F COLORECTAL CA SCREEN DOC REV: CPT | Performed by: INTERNAL MEDICINE

## 2023-02-14 PROCEDURE — G8484 FLU IMMUNIZE NO ADMIN: HCPCS | Performed by: INTERNAL MEDICINE

## 2023-02-14 PROCEDURE — 3074F SYST BP LT 130 MM HG: CPT | Performed by: INTERNAL MEDICINE

## 2023-02-14 RX ORDER — LISINOPRIL 40 MG/1
TABLET ORAL
COMMUNITY
End: 2023-02-14

## 2023-02-14 RX ORDER — DIAZEPAM 5 MG/1
TABLET ORAL
COMMUNITY
End: 2023-02-14

## 2023-02-14 RX ORDER — BIMATOPROST 0.01 %
DROPS OPHTHALMIC (EYE)
COMMUNITY

## 2023-02-14 RX ORDER — ERGOCALCIFEROL 1.25 MG/1
CAPSULE ORAL
COMMUNITY
End: 2023-02-14

## 2023-02-14 RX ORDER — DUPILUMAB 300 MG/2ML
INJECTION, SOLUTION SUBCUTANEOUS
COMMUNITY
Start: 2023-01-19

## 2023-02-14 RX ORDER — DULOXETIN HYDROCHLORIDE 20 MG/1
CAPSULE, DELAYED RELEASE ORAL
COMMUNITY
Start: 2023-02-08 | End: 2023-02-14

## 2023-02-14 RX ORDER — HYDROCODONE BITARTRATE AND ACETAMINOPHEN 7.5; 325 MG/1; MG/1
TABLET ORAL
COMMUNITY
End: 2023-02-14

## 2023-02-14 RX ORDER — GABAPENTIN 300 MG/1
CAPSULE ORAL
COMMUNITY
End: 2023-02-14

## 2023-02-14 ASSESSMENT — ENCOUNTER SYMPTOMS
CHEST TIGHTNESS: 0
BACK PAIN: 1

## 2023-02-14 NOTE — PROGRESS NOTES
ASSESSMENT/PLAN:    1. Type 2 diabetes with nephropathy ([facility]  1. Rx changes: Currently on 7-10 units Lantus. Increase to 12 Units daily. Continue Freestyle Tara CGM due to fluctuating blood sugar and CKD. 2.  Education: Reviewed ABCs of diabetes management (respective goals in parentheses):  A1C (<7), blood pressure (<130/80), and cholesterol (LDL <100). 3.  Compliance at present is estimated to be fair. Efforts to improve compliance (if necessary) will be directed at regular blood sugar monitorin times daily. 4. Follow up: 3 months       Patient Instructions   Increase Lantus Insulin to 12 Units. No labs are ordered for 3 months. Will check A1c in office and or get labs that day. Evaluation and management of the chronic condition(s) delineated. No negative side effects reported. I have reviewed all the lab results. There are some abnormalities that are either expected or not critical to the patient's health, and are discussed in the office today and are addressed. Please refer to the above assessement and plan narrative and orders and follow up plan. Medication discussed and refilled as needed. Physical exam findings are stable unless otherwise indicated and this is addressed. The most recent lab work and imaging and consultant/urgent care visits and imaging are reviewed and discussed and considered during this visit encounter. 1. Type 2 diabetes with nephropathy (HCC)  -     AMB POC HEMOGLOBIN A1C  2. Chronic systolic heart failure ([facility]  3. CKD (chronic kidney disease) stage 4, GFR 15-29 ml/min ([facility])  4. Atherosclerosis of native coronary artery of native heart with angina pectoris [facility])          An electronic signature was used to authenticate this note. -- Martha Resendez MD     Return in about 3 months (around 2023).     SUBJECTIVE/OBJECTIVE:      HPI:   Gasper Krabbe (: 1965 is a 59 y.o. female, here for evaluation of the following chief complaint(s):   Chief Complaint   Patient presents with    Diabetes     3 month recheck    Hypertension    Medication Refill       Patient is here for follow-up and management of chronic medical conditions, review of recent labs, review of any imaging completed since our last office visit and discuss any consultants opinions or management changes. Diabetes  She presents for her follow-up diabetic visit. She has type 2 diabetes mellitus. Onset time: years. Her disease course has been worsening. Pertinent negatives for diabetes include no chest pain. There are no hypoglycemic complications. (Lowest blood sugar she remembers was 72 about 3 days ago because she delayed breakfast a few hours.) Symptoms are stable. Diabetic complications include heart disease and nephropathy. Risk factors for coronary artery disease include diabetes mellitus, dyslipidemia, family history, obesity, sedentary lifestyle, tobacco exposure and hypertension. Current diabetic treatment includes insulin injections. She is compliant with treatment most of the time. She is currently taking insulin at bedtime. Insulin injections are given by patient. Rotation sites for injection include the abdominal wall. Her weight is stable. Diabetic current diet: Drinks lots of OJ- discussed to avoid. She has had a previous visit with a dietitian (worked with nutrition and low sodium and diabetic diet in hospital at [address]). Home blood sugar record trend: stable. Her breakfast blood glucose is taken between 8-9 am. Her breakfast blood glucose range is generally 130-140 mg/dl. Her lunch blood glucose is taken between 12-1 pm. Her lunch blood glucose range is generally 110-130 mg/dl. Her dinner blood glucose is taken between 6-7 pm. Her dinner blood glucose range is generally 180-200 mg/dl. An ACE inhibitor/angiotensin II receptor blocker is being taken. She sees a podiatrist.Eye exam is current. Hypertension  This is a chronic problem.  Pertinent negatives include no chest pain. Medication Refill  Pertinent negatives include no chest pain. Lab Results   Component Value Date    LABA1C 7.0 (H) 10/25/2022     Lab Results   Component Value Date     10/25/2022     CKD. She says Nephrology following. Recent notes not avail for review. She is not having any leg swelling. Cardiology issues complex. Recent [facility] hospitalization and cardiac procedure. ICD event 5/2022. LHC 6/2022 with normal coronary arteries. Filling abnormality noted on LV gram, CTA heart with large LV aneurysm. Therefore. .. TAVR procedure on hold. NICM. EF about 30-35%    She has chronic DUARTE. CPAP compliant at this time she says. Labs reviewed and discussed and medication refilled as needed for chronic medications during ov or adjusted based on lab results and/or our discussion as appropriate. See discussion. The patient's available records and electronic chart records are reviewed. The PMH, PSH, medications, allergies, medications, FH, health maintenance and vaccination status are all reviewed and updated as appropriate. Records from outside providers have been reviewed, summarized, and considered as noted in the history of present illness, past medical history, and objective data of this note and encounter.           Health Maintenance   Topic Date Due    HIV screen  Never done    Hepatitis B vaccine (1 of 3 - Risk 3-dose series) Never done    Shingles vaccine (1 of 2) Never done    COVID-19 Vaccine (4 - Booster for Moderna series) 04/08/2022    Flu vaccine (1) 08/01/2022    Diabetic foot exam  09/21/2022    Annual Wellness Visit (AWV)  12/30/2022    Breast cancer screen  03/31/2023    Depression Screen  08/30/2023    Lipids  10/25/2023    Diabetic Alb to Cr ratio (uACR) test  11/14/2023    GFR test (Diabetes, CKD 3-4, OR last GFR 15-59)  12/23/2023    A1C test (Diabetic or Prediabetic)  02/14/2024    Pneumococcal 0-64 years Vaccine (3 - PPSV23 if available, else PCV20) 07/01/2024    DTaP/Tdap/Td vaccine (2 - Td or Tdap) 01/08/2030    Colorectal Cancer Screen  07/07/2031    Hepatitis C screen  Completed    Hepatitis A vaccine  Aged Out    Hib vaccine  Aged Out    Meningococcal (ACWY) vaccine  Aged Out    Diabetic retinal exam  Discontinued     Patient Active Problem List   Diagnosis    HTN (hypertension)    Type 2 diabetes with nephropathy (HCC)    CHF (congestive heart failure) (HCC)    Long-term insulin use in type 2 diabetes ([facility]    NICM (nonischemic cardiomyopathy) ([facility]    Rectal bleeding    Dyslipidemia    CKD (chronic kidney disease) stage 4, GFR 15-29 ml/min ([facility])    Ventricular tachycardia    Chronic systolic heart failure (HCC)    Class 3 severe obesity due to excess calories without serious comorbidity with body mass index (BMI) of 40.0 to 44.9 in [facility])    Restrictive lung disease    Long term current use of amiodarone    Chronic right-sided thoracic back pain    CKD (chronic kidney disease) stage 3, GFR 30-59 ml/min ([facility])    LORI on CPAP    Lower abdominal pain    Chronic left-sided low back pain with sciatica    Thrombocytopenia, unspecified    Left ventricular aneurysm    Acute on chronic renal failure (HCC)    Angina pectoris, unspecified    Atherosclerotic heart disease of native coronary artery with unspecified angina pectoris       Review of Systems   Respiratory:  Negative for chest tightness. Cardiovascular:  Negative for chest pain and leg swelling. Musculoskeletal:  Positive for back pain (chronic).      Lab Results   Component Value Date/Time    WBC 8.8 12/23/2022 10:30 AM    HGB 16.0 12/23/2022 10:30 AM    HCT 50.0 12/23/2022 10:30 AM    MCV 93.8 12/23/2022 10:30 AM    RDW 14.1 12/23/2022 10:30 AM     12/23/2022 10:30 AM    NEUTOPHILPCT 60 10/25/2021 08:43 AM    LYMPHOPCT 32 11/14/2022 09:39 AM    LYMPHOPCT 30 10/25/2021 08:43 AM    MONOPCT 7 11/14/2022 09:39 AM    MONOPCT 7 10/25/2021 08:43 AM    EOSRELPCT 3 11/14/2022 09:39 AM    BASOPCT 1 11/14/2022 09:39 AM    BASOPCT 1 10/25/2021 08:43 AM    LYMPHSABS 2.2 11/14/2022 09:39 AM    LYMPHSABS 2.6 10/25/2021 08:43 AM    MONOSABS 0.5 11/14/2022 09:39 AM    MONOSABS 0.6 10/25/2021 08:43 AM    EOSABS 0.2 11/14/2022 09:39 AM    EOSABS 0.2 10/25/2021 08:43 AM    BASOSABS 0.0 11/14/2022 09:39 AM    IMMGRAN 0 11/14/2022 09:39 AM    GRANULOCYTEABSOLUTECOUNT 0.0 10/25/2021 08:43 AM       Lab Results   Component Value Date/Time     12/23/2022 10:30 AM    K 4.5 12/23/2022 10:30 AM     12/23/2022 10:30 AM    CO2 30 12/23/2022 10:30 AM    ANIONGAP 6 12/23/2022 10:30 AM    GLUCOSE 206 12/23/2022 10:30 AM    BUN 37 12/23/2022 10:30 AM    CREATININE 2.10 12/23/2022 10:30 AM    GFRAA 46 09/24/2022 04:29 AM    LABGLOM 27 12/23/2022 10:30 AM    LABGLOM 32 03/31/2022 08:12 AM    CALCIUM 10.1 12/23/2022 10:30 AM    BILITOT 0.4 11/14/2022 09:39 AM    ALT 57 11/14/2022 09:39 AM    AST 33 11/14/2022 09:39 AM    ALKPHOS 100 11/14/2022 09:39 AM    ALKPHOS 70 04/13/2022 10:29 AM    PROT 7.1 11/14/2022 09:39 AM    LABALBU 4.0 11/14/2022 09:39 AM    GLOB 3.1 11/14/2022 09:39 AM    ALBUMIN 1.3 11/14/2022 09:39 AM       Lab Results   Component Value Date/Time    CHOL 173 10/25/2022 07:54 AM    HDL 58 10/25/2022 07:54 AM    TRIG 91 10/25/2022 07:54 AM    LDLCALC 96.8 10/25/2022 07:54 AM    VLDL 16 12/22/2021 07:54 AM       Lab Results   Component Value Date/Time    LABA1C 7.0 10/25/2022 07:54 AM    LABA1C 6.9 08/19/2022 08:36 AM    LABA1C 6.6 07/14/2022 02:02 PM    LABA1C 7.2 03/31/2022 08:12 AM    LABA1C 6.8 09/13/2021 08:15 AM       Lab Results   Component Value Date/Time    TSH 1.130 12/22/2021 07:54 AM    TSH 1.670 11/17/2020 09:58 AM    TSH 1.790 07/15/2020 09:51 AM           Vitals:    02/14/23 1141   BP: 120/82   Site: Left Upper Arm   Position: Sitting   Cuff Size: Small Adult   Pulse: 75   Temp: 97.2 °F (36.2 °C)   TempSrc: Skin   SpO2: 97%   Weight: 196 lb (88.9 kg)   Height: 4' 8\" (1.422 m)     Wt Readings from Last 3 Encounters:   02/14/23 196 lb (88.9 kg)   12/23/22 197 lb (89.4 kg)   11/22/22 198 lb 6.4 oz (90 kg)     BP Readings from Last 3 Encounters:   02/14/23 120/82   12/23/22 114/66   11/22/22 110/70     Physical Exam  Vitals and nursing note reviewed. Constitutional:       Appearance: Normal appearance. She is obese. She is not ill-appearing. HENT:      Head: Normocephalic and atraumatic. Eyes:      Extraocular Movements: Extraocular movements intact. Conjunctiva/sclera: Conjunctivae normal.   Cardiovascular:      Rate and Rhythm: Normal rate and regular rhythm. Pulmonary:      Effort: Pulmonary effort is normal.      Breath sounds: Normal breath sounds. Musculoskeletal:      Right lower leg: No edema. Left lower leg: No edema. Neurological:      General: No focal deficit present. Mental Status: She is alert and oriented to person, place, and time. Mental status is at baseline.    Psychiatric:         Mood and Affect: Mood normal.         Behavior: Behavior normal.

## 2023-02-20 ENCOUNTER — OFFICE VISIT (OUTPATIENT)
Dept: CARDIOLOGY CLINIC | Age: 58
End: 2023-02-20

## 2023-02-20 VITALS
SYSTOLIC BLOOD PRESSURE: 120 MMHG | DIASTOLIC BLOOD PRESSURE: 60 MMHG | HEART RATE: 63 BPM | BODY MASS INDEX: 43.87 KG/M2 | HEIGHT: 56 IN | WEIGHT: 195 LBS

## 2023-02-20 DIAGNOSIS — I25.119 ATHEROSCLEROSIS OF NATIVE CORONARY ARTERY OF NATIVE HEART WITH ANGINA PECTORIS (HCC): ICD-10-CM

## 2023-02-20 DIAGNOSIS — I42.8 NICM (NONISCHEMIC CARDIOMYOPATHY) (HCC): Primary | ICD-10-CM

## 2023-02-20 ASSESSMENT — ENCOUNTER SYMPTOMS
GASTROINTESTINAL NEGATIVE: 1
EYES NEGATIVE: 1
ALLERGIC/IMMUNOLOGIC NEGATIVE: 1
SHORTNESS OF BREATH: 0

## 2023-02-20 NOTE — PROGRESS NOTES
[facility] CARDIOLOGY  [address] [place] [address], [address]  PHONE: [phone]        23      NAME:  Frida Romero  : 1965  MRN: [id number]     Referring Cardiologist: Aby Figueredo DO    Reason for Consultation: VT/VF     ASSESSMENT and PLAN:  Dilshad Michael was seen today for new patient and irregular heart beat. Diagnoses and all orders for this visit:    CHF (congestive heart failure) ([address]    Ventricular tachycardia ([address] s/p ICD in  s/p VT ablation at [address] 10/2022, Dr. Frank Rich.     CAD in native artery    CKD stage IV    LV aneurysm    NIDCM, s/p BTK DC ICD     Obesity    LORI     59year old female with recent ICD shock. She has a history of NICM? With large aneurysmal LV. She is now taking amiodarone BID.     -VT/VF - Continue amiodarone 200 mg po daily. ICD interrogation without further VT/VF. Biannual LFTs, TFTs, yearly PFTs, eye exams while on amiodarone. Successful VT ablation from 10/2022 at [address]. Reviewed notes. -HFrEF - continue GDMT. Declined LVAD at [address]. -LV aneursym - deemed not a surgical candidate at [address]. -LORI - continue CPAP  -Obesity - weight loss encouraged. -Mild chest pain - stable. -EP follow up in 6 months or PRN. Patient has been instructed and agrees to call our office with any issues or other concerns related to their cardiac condition(s) and/or complaint(s). No follow-up provider specified. Thank you for allowing me to participate in the electrophysiologic care of Ms. Frida Romero. Please contact me if any questions or concerns were to arise. Alee Yanez.  Dominick NAM, MS  Clinical Cardiac Electrophysiology  [facility] Cardiology  23  11:59 AM    ===================================================================  Chief Complant:    Chief Complaint   Patient presents with    Congestive Heart Failure    6 Month Follow-Up        Consultation is requested by Leisa Calderón MD for evaluation of Congestive Heart Failure and 6 Month Follow-Up    History:  Sirisha Robles is a most pleasant 59 y.o. female with a past medical and cardiac history significant for HFrEF s/p DC ICD implanted in 2013 with recent ICD shock, appropriate, 1st ever shock per pt. She sees Dr. Gavino Fischer. She does c/o some mild chest tightness. Her LHC in the past was reported as normal. She was recently shocked for appropriate VF/VF and is now taking amiodarone 200 mg po BID. She comes in for follow up of VT, NIDCM, and LV aneurysm. . She underwent transfer to [address] in 9/2022 and subsequently underwent VT ablation 10/7/2022 with Dr. Jairon May, she has done well with no further treated VT/VF episodes. She is down to 200 mg po amiodarone daily. Feels well and tolerating medicines. The patient otherwise denies presyncope, syncope or lateralizing symptoms. Cardiac PMH: (Old records have been reviewed and summarized below)   1. 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 2.  Normal coronary arteries. Echo 1/2020: EF 30-35%   4/2020:  a/c SHF    11/2020: HF admission, diuresed 7L    VT ablation 10/7/2022 at [address] with Dr. Sarah Gaspar  VT# Morphology Induced by Hemodynamically tolerated Y/N Mapping strategy Mapped to: Inducible at end of procedure:   VT1 - Clinical VT (LBBB/V3 Transition/left superior Axis)  spontaneously Y substrate mapping voltage, entrainment mapping, Pacemapping and LAT mapping in VT Basal inferior septum N   VT2 (RBBB//Right inferior Axis)  ma spontaneously N not mapped Not mapped Y   VT3 (RBBB/right inferior Axis) spontaneously N not mapped Not mapped N     We first proceeded with LV mapping/ablation via a trans-septal approach.  We performed single transseptal with ICE and fluoro guidance using a [employer] sheath and a versacross wire however the rotation of the heart along with excessively pendulus septum did not provide good visualization of the atria so this was aborted and we proceeded with a retrograde aortic fashion. The 5F artieral sheath was exchanged for an 8F desination sheath. We carefully advanced the ablation catheter using fluoroscopy and 3-D mapping up to the descending aortic arch and then created a tight curl up in the catheter and then advance the oblation catheter easily across the aortic valve. We created LV geometry and substrate map using 3-D reconstruction in the [employer] system. We collected simultaneous FAM and voltage mapping data. We noted a large area of scar with aneurysmal area in the basal inferior septum, basal inferior wall and inferolateral wall. VT1 was spontaneously induced during catheter manipulation in the LV. This was hemodynamically tolerated briefly but required cardioversion later. VT2 and VT3 were also spontaneously induced during catheter manipulation in the LV and were not tolerated hemodynamically and required cardioversion. There were areas of border zone of voltage septally and inferiorly in the LV as well as posteriorly. There were areas in the middle of the scar which did not capture with high output pacing. We pacemapped multiple areas in the scar and border zones but the best pace map obtained was about 80% for VTs. There was areas with fractionated signals and late potentials in the border zone and had VT induction with catheter maniupulation in those area. Therefore we targeted this area for ablation. Posterioly we induced VT2 during catheter manipulation so this area was targeted as well. Septally the pacemapping was better, but we noted still with RBBB pattern basal septally which did not match well for VT1. We applied RF lesions to the border zone targeting late potentials and fractionated signals and VT1 was not inducible. After targeting the areas with delayed conduction, we switched to RV mapping.  We transferred the ablation catheter into the RV via the femoral venous access. Mapping basal septally appositionally to the LV lesions had spontaneous ectopy matching 97% for the VT1. We targeted this for ablation as well. VT1 was reinduced during catheter manipulation and further ablation at the best pace map site terminated the VT during ablation. Further reinforcement lesions were applied at this site in the RV inferior septum opposite to the LV lesion set. Several lesions were applied with RF ablation was performed in power control mode with discontinuation if temperature reached 41-42 degrees Celsius. Radiofrequency ablation was performed in power control mode with power titrated from 35 to 40W with careful monitoring of impedance change and contact w/ ICE and force sensor. EKG:  (EKG has been independently visualized by me with interpretation below): Atrial pacing, V sensing. Nonspecific QRS widening, PVCs, normal axis. ECHO: 4/2022    Left Ventricle: Left ventricle is mildly dilated. Mildly increased wall thickness. Moderate global hypokinesis present. Moderately reduced left ventricular systolic function with a visually estimated EF of 30 - 35%. Abnormal diastolic function. Mitral Valve: Mild transvalvular regurgitation. Left Atrium: Left atrium is moderately dilated. LA Vol Index is  32 ml/m2. Previous Heart Catheterization: n/a     Stress Test: n/a     DEVICE INTERROGATION: [address] ICD, implanted 2013, stable lead parameters. Battery life is stable. AP 93%,  4%, no further VT/VTA events since 9/2022. Past Medical History, Past Surgical History, Family history, Social History, and Medications were all reviewed with the patient today and updated as necessary.      Current Outpatient Medications   Medication Sig Dispense Refill    bimatoprost (LUMIGAN) 0.01 % SOLN ophthalmic drops Lumigan 0.01 % eye drops      DUPIXENT 300 MG/2ML SOSY injection Inject 300 mg into the skin every 14 days      nitroGLYCERIN (NITROSTAT) 0.4 MG SL tablet Place 1 tablet under the tongue every 5 minutes as needed for Chest pain Max 3 doses 25 tablet 6    Insulin Pen Needle 32G X 4 MM MISC Use as directed with Lantus pen 100 each 3    insulin glargine (LANTUS SOLOSTAR) 100 UNIT/ML injection pen Inject 7 Units into the skin nightly 5 Adjustable Dose Pre-filled Pen Syringe 3    ELIQUIS 5 MG TABS tablet Take 1 tablet by mouth 2 times daily 60 tablet 11    sacubitril-valsartan (ENTRESTO) 24-26 MG per tablet Take 1 tablet by mouth 2 times daily 60 tablet 11    spironolactone (ALDACTONE) 25 MG tablet TAKE 1 TABLET BY MOUTH DAILY      atorvastatin (LIPITOR) 40 MG tablet Take by mouth daily      furosemide (LASIX) 40 MG tablet Take by mouth 2 times daily      empagliflozin (JARDIANCE) 10 MG tablet Take 10 mg by mouth daily      carvedilol (COREG) 6.25 MG tablet TAKE 1 TABLET TWICE DAILY WITH MEALS 180 tablet 3    amiodarone (PACERONE) 400 MG tablet Take 1 tablet by mouth 2 times daily (Patient taking differently: Take 400 mg by mouth daily) 60 tablet 5    acetaminophen (TYLENOL) 500 MG tablet Take 1,000 mg by mouth every 6 hours as needed for Pain (breakthrough pain). aspirin 81 MG EC tablet Take 81 mg by mouth daily      vitamin D 25 MCG (1000 UT) CAPS Take by mouth daily      latanoprost (XALATAN) 0.005 % ophthalmic solution Place 1 drop into both eyes nightly       No current facility-administered medications for this visit.      Allergies   Allergen Reactions    Other Hives and Shortness Of Breath     \"inhaler PRN\"    Penicillin G Itching       Past Medical History:   Diagnosis Date    Abdominal wall hernia 10/2/2018    Acute hypoxemic respiratory failure ([address] 4/3/2019    Allergic rhinitis     followed by allergist; allergic to grass- has rescue inhaler PRN    ARDS (adult respiratory distress syndrome) ([facility] 4/3/2019    Arthritis     Automatic implantable cardioverter-defibrillator in situ 3/30/2016    CAD in native artery 3/30/2016    Chronic kidney disease     Chronic systolic heart failure ([facility] 07/18/2013    ECHO 2017- EF 39%; managed with medications; [place] [facility]    CKD (chronic kidney disease)     [address] follows    Diabetes ([facility]     type 2 (on insulin);  FBS AM range- , hypo s/s <70, hgba1c- 7.9    Diabetes mellitus ([facility] 4/12/2010    Dyslipidemia 2/13/2013    Eczema     Fibromyalgia     GERD (gastroesophageal reflux disease)     hx of- no meds currently    Headache     Heart failure (HCC)     chronic systolic with EF 51%; non-ischemic cardiomyopathy    HTN (hypertension) 4/12/2010    Hypercholesterolemia     Incarcerated incisional hernia 3/26/2019    Morbid obesity (HCC)     Neuropathy     bilateral feet and tips of fingers    NICM (nonischemic cardiomyopathy) ([facility] 2/15/2013    Obesity     bmi- 39    Obstructive sleep apnea (adult) (pediatric) 07/14/2014    uses cpap qhs    Pseudomonas urinary tract infection 4/5/2019    Sciatic pain 5/23/2016    Severe persistent asthma with acute exacerbation 8/24/2020    SVT (supraventricular tachycardia) ([facility])     ablation for svt    Tobacco use disorder 4/12/2010     Past Surgical History:   Procedure Laterality Date    ABLATION OF DYSRHYTHMIC FOCUS  \"years ago\"    CARDIAC DEFIBRILLATOR PLACEMENT  07/18/2013    Biotronik dual chamber ICD by Dr. Pamela Portillo  07/18/2013    Biotronik dual chamber ICD by Dr. Aurea Turner 7/1/2022    Left heart cath / coronary angiography performed by Laquita Taylor MD at [address] [place] [address]      x1    COLONOSCOPY N/A 7/7/2021    COLONOSCOPY/BMI 48 PT HAS AN ICD [facility] [place]  03/26/2019    mild incarceration, no bowel removal    HYSTERECTOMY (CERVIX STATUS UNKNOWN)      EDITH-Left ovary intact per pt    LEFT HEART CATH,PERCUTANEOUS  2/13/2013    no intervention    PACEMAKER      ICD    ROTATOR CUFF REPAIR Right TONSILLECTOMY       Family History   Problem Relation Age of Onset    Diabetes Other     Heart Disease Father     Hypertension Father     Stroke Father     Heart Attack Father 48        mi    Breast Cancer Sister 37    Hypertension Brother     Heart Disease Brother     Diabetes Brother     Stroke Mother      Social History     Tobacco Use    Smoking status: Some Days     Packs/day: 0.25     Types: Cigarettes    Smokeless tobacco: Never    Tobacco comments:     Quit smoking: \"every now and then\"   Substance Use Topics    Alcohol use: Yes     Alcohol/week: 1.0 standard drink       ROS:  A comprehensive review of systems was performed with the pertinent positives and negatives as noted in the HPI in addition to:  Review of Systems   Constitutional: Negative. HENT: Negative. Eyes: Negative. Respiratory:  Negative for shortness of breath. Cardiovascular:  Positive for chest pain. Gastrointestinal: Negative. Endocrine: Negative. Genitourinary: Negative. Musculoskeletal: Negative. Skin: Negative. Allergic/Immunologic: Negative. Neurological: Negative. Hematological: Negative. Psychiatric/Behavioral: Negative. All other systems reviewed and are negative. PHYSICAL EXAM:   /60   Pulse 63   Ht 4' 8\" (1.422 m)   Wt 195 lb (88.5 kg)   BMI 43.72 kg/m²      Wt Readings from Last 3 Encounters:   02/20/23 195 lb (88.5 kg)   02/14/23 196 lb (88.9 kg)   12/23/22 197 lb (89.4 kg)     BP Readings from Last 3 Encounters:   02/20/23 120/60   02/14/23 120/82   12/23/22 114/66       Gen: Well appearing, well developed, no acute distress  Eyes: Pupils equal, round. Extraocular movements are intact  ENT: Oropharynx clear, no oral lesions, normal dentition  CV: S1S2, regular rate and rhythm, no murmurs, rubs or gallops, normal JVD, no carotid bruits, normal distal pulses, no TONY, left sided CIED C/D/I.    Pulm: Clear to auscultation bilaterally, no accessory muscle uses, no wheezes or rales  GI: Soft, NT, ND, +BS  Neuro: Alert and oriented, nonfocal  Psych: Appropriate affect  Skin: Normal color and skin turgor  MSK: Normal muscle bulk and tone    Medical problems and test results were reviewed with the patient today. No results found for any visits on 02/20/23.

## 2023-03-14 DIAGNOSIS — I47.20 VENTRICULAR TACHYCARDIA (HCC): ICD-10-CM

## 2023-03-14 DIAGNOSIS — Z95.810 AICD (AUTOMATIC CARDIOVERTER/DEFIBRILLATOR) PRESENT: ICD-10-CM

## 2023-03-14 DIAGNOSIS — I42.8 NICM (NONISCHEMIC CARDIOMYOPATHY) (HCC): ICD-10-CM

## 2023-03-16 ENCOUNTER — TELEPHONE (OUTPATIENT)
Dept: INTERNAL MEDICINE CLINIC | Facility: CLINIC | Age: 58
End: 2023-03-16

## 2023-03-17 NOTE — TELEPHONE ENCOUNTER
Attempted to contact patient to let her know prescription for Musella  was sent to pharmacy,but was unable to leave message due to mailbox being full.

## 2023-03-23 ENCOUNTER — OFFICE VISIT (OUTPATIENT)
Dept: CARDIOLOGY CLINIC | Age: 58
End: 2023-03-23

## 2023-03-23 VITALS
WEIGHT: 192.8 LBS | DIASTOLIC BLOOD PRESSURE: 72 MMHG | SYSTOLIC BLOOD PRESSURE: 104 MMHG | BODY MASS INDEX: 43.37 KG/M2 | HEIGHT: 56 IN | HEART RATE: 72 BPM

## 2023-03-23 DIAGNOSIS — I42.8 NICM (NONISCHEMIC CARDIOMYOPATHY) (HCC): ICD-10-CM

## 2023-03-23 DIAGNOSIS — N18.4 CKD (CHRONIC KIDNEY DISEASE) STAGE 4, GFR 15-29 ML/MIN (HCC): ICD-10-CM

## 2023-03-23 DIAGNOSIS — I10 PRIMARY HYPERTENSION: ICD-10-CM

## 2023-03-23 DIAGNOSIS — I25.119 ATHEROSCLEROSIS OF NATIVE CORONARY ARTERY OF NATIVE HEART WITH ANGINA PECTORIS (HCC): ICD-10-CM

## 2023-03-23 DIAGNOSIS — Z79.899 LONG TERM CURRENT USE OF AMIODARONE: ICD-10-CM

## 2023-03-23 DIAGNOSIS — G47.33 OSA ON CPAP: ICD-10-CM

## 2023-03-23 DIAGNOSIS — Z99.89 OSA ON CPAP: ICD-10-CM

## 2023-03-23 DIAGNOSIS — I47.20 VENTRICULAR TACHYCARDIA (HCC): ICD-10-CM

## 2023-03-23 DIAGNOSIS — I25.3 LEFT VENTRICULAR ANEURYSM: ICD-10-CM

## 2023-03-23 DIAGNOSIS — I50.22 CHRONIC SYSTOLIC HEART FAILURE (HCC): Primary | ICD-10-CM

## 2023-03-23 DIAGNOSIS — E78.5 DYSLIPIDEMIA: ICD-10-CM

## 2023-03-23 NOTE — PROGRESS NOTES
tablet Take 1 tablet by mouth 2 times daily 60 tablet 11    sacubitril-valsartan (ENTRESTO) 24-26 MG per tablet Take 1 tablet by mouth 2 times daily 60 tablet 11    spironolactone (ALDACTONE) 25 MG tablet TAKE 1 TABLET BY MOUTH DAILY      atorvastatin (LIPITOR) 40 MG tablet Take by mouth daily      furosemide (LASIX) 40 MG tablet Take by mouth 2 times daily      carvedilol (COREG) 6.25 MG tablet TAKE 1 TABLET TWICE DAILY WITH MEALS 180 tablet 3    amiodarone (PACERONE) 400 MG tablet Take 1 tablet by mouth 2 times daily (Patient taking differently: Take 400 mg by mouth daily) 60 tablet 5    acetaminophen (TYLENOL) 500 MG tablet Take 1,000 mg by mouth every 6 hours as needed for Pain (breakthrough pain). aspirin 81 MG EC tablet Take 81 mg by mouth daily      vitamin D 25 MCG (1000 UT) CAPS Take by mouth daily      latanoprost (XALATAN) 0.005 % ophthalmic solution Place 1 drop into both eyes nightly       No current facility-administered medications for this visit.         Allergies   Allergen Reactions    Other Hives and Shortness Of Breath     \"inhaler PRN\"    Penicillin G Itching     Patient Active Problem List    Diagnosis Date Noted    Acute on chronic renal failure ([facility] 09/06/2022     Priority: High    Angina pectoris, unspecified 12/23/2022     Priority: Medium    Atherosclerotic heart disease of native coronary artery with unspecified angina pectoris 12/23/2022     Priority: Medium    Left ventricular aneurysm 07/21/2022     Priority: Medium    Thrombocytopenia, unspecified 06/30/2022     Priority: Medium    Rectal bleeding 05/18/2021    CKD (chronic kidney disease) stage 4, GFR 15-29 ml/min ([facility] 11/25/2020    CHF (congestive heart failure) ([facility] 11/10/2020    Lower abdominal pain 11/10/2020    CKD (chronic kidney disease) stage 3, GFR 30-59 ml/min ([address] 08/24/2020    Long term current use of amiodarone 06/12/2020    Restrictive lung disease 04/08/2020    Type 2 diabetes with nephropathy ([address] 04/02/2018

## 2023-04-04 ENCOUNTER — HOSPITAL ENCOUNTER (OUTPATIENT)
Dept: MAMMOGRAPHY | Age: 58
Discharge: HOME | End: 2023-04-07
Payer: MEDICARE

## 2023-04-04 DIAGNOSIS — Z12.31 VISIT FOR SCREENING MAMMOGRAM: ICD-10-CM

## 2023-04-04 PROCEDURE — 77063 BREAST TOMOSYNTHESIS BI: CPT

## 2023-05-09 DIAGNOSIS — Z95.810 AICD (AUTOMATIC CARDIOVERTER/DEFIBRILLATOR) PRESENT: ICD-10-CM

## 2023-05-09 DIAGNOSIS — I47.20 VENTRICULAR TACHYCARDIA (HCC): ICD-10-CM

## 2023-05-09 DIAGNOSIS — I50.9 CHF (CONGESTIVE HEART FAILURE) (HCC): Primary | ICD-10-CM

## 2023-05-09 DIAGNOSIS — I42.8 NICM (NONISCHEMIC CARDIOMYOPATHY) (HCC): ICD-10-CM

## 2023-05-18 ENCOUNTER — OFFICE VISIT (OUTPATIENT)
Dept: INTERNAL MEDICINE CLINIC | Facility: CLINIC | Age: 58
End: 2023-05-18
Payer: MEDICARE

## 2023-05-18 VITALS
TEMPERATURE: 97.3 F | OXYGEN SATURATION: 95 % | SYSTOLIC BLOOD PRESSURE: 124 MMHG | DIASTOLIC BLOOD PRESSURE: 76 MMHG | HEART RATE: 60 BPM | BODY MASS INDEX: 42.74 KG/M2 | WEIGHT: 190 LBS | HEIGHT: 56 IN

## 2023-05-18 DIAGNOSIS — Z79.4 TYPE 2 DIABETES MELLITUS WITH OTHER SPECIFIED COMPLICATION, WITH LONG-TERM CURRENT USE OF INSULIN (HCC): ICD-10-CM

## 2023-05-18 DIAGNOSIS — E78.5 DYSLIPIDEMIA: ICD-10-CM

## 2023-05-18 DIAGNOSIS — E11.69 TYPE 2 DIABETES MELLITUS WITH OTHER SPECIFIED COMPLICATION, WITH LONG-TERM CURRENT USE OF INSULIN (HCC): ICD-10-CM

## 2023-05-18 DIAGNOSIS — I50.22 CHRONIC SYSTOLIC HEART FAILURE (HCC): ICD-10-CM

## 2023-05-18 DIAGNOSIS — N18.4 CKD (CHRONIC KIDNEY DISEASE) STAGE 4, GFR 15-29 ML/MIN (HCC): ICD-10-CM

## 2023-05-18 DIAGNOSIS — E11.21 TYPE 2 DIABETES WITH NEPHROPATHY (HCC): Primary | ICD-10-CM

## 2023-05-18 DIAGNOSIS — J32.9 SINUSITIS, UNSPECIFIED CHRONICITY, UNSPECIFIED LOCATION: ICD-10-CM

## 2023-05-18 DIAGNOSIS — I42.8 NICM (NONISCHEMIC CARDIOMYOPATHY) (HCC): ICD-10-CM

## 2023-05-18 PROBLEM — D69.6 THROMBOCYTOPENIA, UNSPECIFIED (HCC): Status: RESOLVED | Noted: 2022-06-30 | Resolved: 2023-05-18

## 2023-05-18 LAB — HBA1C MFR BLD: 7.6 %

## 2023-05-18 PROCEDURE — 83036 HEMOGLOBIN GLYCOSYLATED A1C: CPT | Performed by: INTERNAL MEDICINE

## 2023-05-18 PROCEDURE — 3046F HEMOGLOBIN A1C LEVEL >9.0%: CPT | Performed by: INTERNAL MEDICINE

## 2023-05-18 PROCEDURE — 3078F DIAST BP <80 MM HG: CPT | Performed by: INTERNAL MEDICINE

## 2023-05-18 PROCEDURE — 3074F SYST BP LT 130 MM HG: CPT | Performed by: INTERNAL MEDICINE

## 2023-05-18 PROCEDURE — 2022F DILAT RTA XM EVC RTNOPTHY: CPT | Performed by: INTERNAL MEDICINE

## 2023-05-18 PROCEDURE — G8427 DOCREV CUR MEDS BY ELIG CLIN: HCPCS | Performed by: INTERNAL MEDICINE

## 2023-05-18 PROCEDURE — 4004F PT TOBACCO SCREEN RCVD TLK: CPT | Performed by: INTERNAL MEDICINE

## 2023-05-18 PROCEDURE — 99214 OFFICE O/P EST MOD 30 MIN: CPT | Performed by: INTERNAL MEDICINE

## 2023-05-18 PROCEDURE — 3017F COLORECTAL CA SCREEN DOC REV: CPT | Performed by: INTERNAL MEDICINE

## 2023-05-18 PROCEDURE — G8417 CALC BMI ABV UP PARAM F/U: HCPCS | Performed by: INTERNAL MEDICINE

## 2023-05-18 RX ORDER — PROCHLORPERAZINE 25 MG/1
SUPPOSITORY RECTAL
Qty: 9 EACH | Refills: 3 | Status: SHIPPED | OUTPATIENT
Start: 2023-05-18

## 2023-05-18 RX ORDER — ATORVASTATIN CALCIUM 40 MG/1
40 TABLET, FILM COATED ORAL DAILY
Qty: 90 TABLET | Refills: 1 | Status: SHIPPED | OUTPATIENT
Start: 2023-05-18 | End: 2024-05-17

## 2023-05-18 RX ORDER — AZITHROMYCIN 250 MG/1
250 TABLET, FILM COATED ORAL SEE ADMIN INSTRUCTIONS
Qty: 1 PACKET | Refills: 0 | Status: SHIPPED | OUTPATIENT
Start: 2023-05-18 | End: 2023-05-23

## 2023-05-18 RX ORDER — AMIODARONE HYDROCHLORIDE 200 MG/1
TABLET ORAL
COMMUNITY
Start: 2023-05-02

## 2023-05-18 RX ORDER — INSULIN GLARGINE 100 [IU]/ML
12 INJECTION, SOLUTION SUBCUTANEOUS NIGHTLY
Qty: 5 ADJUSTABLE DOSE PRE-FILLED PEN SYRINGE | Refills: 3 | Status: SHIPPED | OUTPATIENT
Start: 2023-05-18

## 2023-05-18 RX ORDER — PROCHLORPERAZINE 25 MG/1
SUPPOSITORY RECTAL
Qty: 1 EACH | Refills: 3 | Status: SHIPPED | OUTPATIENT
Start: 2023-05-18

## 2023-05-18 SDOH — ECONOMIC STABILITY: INCOME INSECURITY: HOW HARD IS IT FOR YOU TO PAY FOR THE VERY BASICS LIKE FOOD, HOUSING, MEDICAL CARE, AND HEATING?: SOMEWHAT HARD

## 2023-05-18 SDOH — ECONOMIC STABILITY: FOOD INSECURITY: WITHIN THE PAST 12 MONTHS, THE FOOD YOU BOUGHT JUST DIDN'T LAST AND YOU DIDN'T HAVE MONEY TO GET MORE.: OFTEN TRUE

## 2023-05-18 SDOH — ECONOMIC STABILITY: HOUSING INSECURITY
IN THE LAST 12 MONTHS, WAS THERE A TIME WHEN YOU DID NOT HAVE A STEADY PLACE TO SLEEP OR SLEPT IN A SHELTER (INCLUDING NOW)?: NO

## 2023-05-18 SDOH — ECONOMIC STABILITY: FOOD INSECURITY: WITHIN THE PAST 12 MONTHS, YOU WORRIED THAT YOUR FOOD WOULD RUN OUT BEFORE YOU GOT MONEY TO BUY MORE.: OFTEN TRUE

## 2023-05-18 ASSESSMENT — PATIENT HEALTH QUESTIONNAIRE - PHQ9
SUM OF ALL RESPONSES TO PHQ9 QUESTIONS 1 & 2: 0
1. LITTLE INTEREST OR PLEASURE IN DOING THINGS: 0
SUM OF ALL RESPONSES TO PHQ QUESTIONS 1-9: 0
2. FEELING DOWN, DEPRESSED OR HOPELESS: 0
SUM OF ALL RESPONSES TO PHQ QUESTIONS 1-9: 0

## 2023-05-18 ASSESSMENT — ENCOUNTER SYMPTOMS
CHEST TIGHTNESS: 0
BACK PAIN: 1

## 2023-05-18 NOTE — PATIENT INSTRUCTIONS
speaking staff available   What they offer: Provide information and assistance for the whole family  Discuss your health and help you find a doctor if you need one. Answer questions about your medications. Diabetes Self-Management education. Connect you with financial assistance agencies, social and medical services. Provide moral support during difficult times. Unfortunately they are unable to administer any medicine, provide you with money or transportation in our vehicles. However, the team will try to help you with your health needs. Phone: [phone] to make an appointment. Leave voice mail with name and call back number. Medication Cost Assistance    Good Rx    o What they offer: Good Rx tracks prescription drug prices and provides drug  coupons for discounts on medications. o Website: Talkable/     NeedyMeds   o What they offer: Needy[employer]eds offers free information on medications and healthcare cost savings programs including prescription assistance programs, coupons, and discount programs. o Website: [employer]Back[employer]/   o Helpline: 998.828.2330     RX Assist   o What they offer: Information about free and low-cost medicine programs. o Website: https://Explore Engage/     [employer] $4 Prescription Program   o What they offer: Prescription Program includes up to a 30-day supply for $4 and a 90-day supply for $10 of some covered generic drugs at commonly prescribed dosages   o Website: Molly Bowling  What they offer:  If you are uninsured and cannot afford the prescription medicine you need, you may be able to have your prescriptions filled at no cost through [employer]. You must live in [address] [place] [address]. To find out if you qualify. Website: Learncafe.it    Cost Plus Drugs  What they offer:  Low-cost versions of high-cost generic drugs  Website: https://costLootWorkss. Viewex/    [facility] [place] [facility] [place]

## 2023-05-18 NOTE — PROGRESS NOTES
ASSESSMENT/PLAN:    1. Type 2 diabetes with nephropathy (HCC)  Improved. A1c 7.6%  Dexcom recommended. See patient instructions. - AMB POC HEMOGLOBIN A1C  - insulin glargine (LANTUS SOLOSTAR) 100 UNIT/ML injection pen; Inject 12 Units into the skin nightly  Dispense: 5 Adjustable Dose Pre-filled Pen Syringe; Refill: 3  - Continuous Blood Gluc  (DEXCOM G6 ) RJ; Change every 10 days as directed. Dx E11.9  Dispense: 9 each; Refill: 3  - Continuous Blood Gluc Sensor (DEXCOM G6 SENSOR) MISC; Change every 10 days as directed. Dx E11.9  Dispense: 9 each; Refill: 3  - Continuous Blood Gluc Transmit (DEXCOM G6 TRANSMITTER) MISC; Change every 90 days as directed. Dx E11.9  Dispense: 1 each; Refill: 3    2. Type 2 diabetes mellitus with other specified complication, with long-term current use of insulin (HCC)  As above   - Continuous Blood Gluc  (DEXCOM G6 ) RJ; Change every 10 days as directed. Dx E11.9  Dispense: 9 each; Refill: 3  - Continuous Blood Gluc Sensor (DEXCOM G6 SENSOR) MISC; Change every 10 days as directed. Dx E11.9  Dispense: 9 each; Refill: 3  - Continuous Blood Gluc Transmit (DEXCOM G6 TRANSMITTER) MISC; Change every 90 days as directed. Dx E11.9  Dispense: 1 each; Refill: 3    3. Chronic systolic heart failure (HCC)  euvolemic  - Continuous Blood Gluc  (DEXCOM G6 ) RJ; Change every 10 days as directed. Dx E11.9  Dispense: 9 each; Refill: 3  - Continuous Blood Gluc Sensor (DEXCOM G6 SENSOR) MISC; Change every 10 days as directed. Dx E11.9  Dispense: 9 each; Refill: 3  - Continuous Blood Gluc Transmit (DEXCOM G6 TRANSMITTER) MISC; Change every 90 days as directed. Dx E11.9  Dispense: 1 each; Refill: 3    4. NICM (nonischemic cardiomyopathy) ([facility]  euvolemic  - Continuous Blood Gluc  (DEXCOM G6 ) RJ; Change every 10 days as directed. Dx E11.9  Dispense: 9 each;  Refill: 3  - Continuous Blood Gluc Sensor (DEXCOM G6 SENSOR) MISC;

## 2023-06-09 ENCOUNTER — OFFICE VISIT (OUTPATIENT)
Dept: INTERNAL MEDICINE CLINIC | Facility: CLINIC | Age: 58
End: 2023-06-09
Payer: MEDICARE

## 2023-06-09 DIAGNOSIS — Z71.89 ENCOUNTER FOR DIABETES EDUCATION: Primary | ICD-10-CM

## 2023-06-09 PROCEDURE — G8428 CUR MEDS NOT DOCUMENT: HCPCS | Performed by: INTERNAL MEDICINE

## 2023-06-09 PROCEDURE — 3017F COLORECTAL CA SCREEN DOC REV: CPT | Performed by: INTERNAL MEDICINE

## 2023-06-09 PROCEDURE — 95249 CONT GLUC MNTR PT PROV EQP: CPT | Performed by: INTERNAL MEDICINE

## 2023-06-09 PROCEDURE — G8417 CALC BMI ABV UP PARAM F/U: HCPCS | Performed by: INTERNAL MEDICINE

## 2023-06-09 PROCEDURE — 4004F PT TOBACCO SCREEN RCVD TLK: CPT | Performed by: INTERNAL MEDICINE

## 2023-06-09 PROCEDURE — 99212 OFFICE O/P EST SF 10 MIN: CPT | Performed by: INTERNAL MEDICINE

## 2023-07-06 ENCOUNTER — OFFICE VISIT (OUTPATIENT)
Age: 58
End: 2023-07-06
Payer: MEDICARE

## 2023-07-06 VITALS
DIASTOLIC BLOOD PRESSURE: 74 MMHG | WEIGHT: 191 LBS | SYSTOLIC BLOOD PRESSURE: 112 MMHG | BODY MASS INDEX: 42.97 KG/M2 | HEIGHT: 56 IN | HEART RATE: 68 BPM

## 2023-07-06 DIAGNOSIS — Z79.899 LONG TERM CURRENT USE OF AMIODARONE: ICD-10-CM

## 2023-07-06 DIAGNOSIS — I10 PRIMARY HYPERTENSION: ICD-10-CM

## 2023-07-06 DIAGNOSIS — I25.3 LEFT VENTRICULAR ANEURYSM: ICD-10-CM

## 2023-07-06 DIAGNOSIS — N18.31 STAGE 3A CHRONIC KIDNEY DISEASE (HCC): ICD-10-CM

## 2023-07-06 DIAGNOSIS — I42.8 NICM (NONISCHEMIC CARDIOMYOPATHY) (HCC): ICD-10-CM

## 2023-07-06 DIAGNOSIS — I50.22 CHRONIC SYSTOLIC HEART FAILURE (HCC): Primary | ICD-10-CM

## 2023-07-06 DIAGNOSIS — I25.119 ATHEROSCLEROSIS OF NATIVE CORONARY ARTERY OF NATIVE HEART WITH ANGINA PECTORIS (HCC): ICD-10-CM

## 2023-07-06 PROCEDURE — 3074F SYST BP LT 130 MM HG: CPT | Performed by: INTERNAL MEDICINE

## 2023-07-06 PROCEDURE — 3017F COLORECTAL CA SCREEN DOC REV: CPT | Performed by: INTERNAL MEDICINE

## 2023-07-06 PROCEDURE — G8428 CUR MEDS NOT DOCUMENT: HCPCS | Performed by: INTERNAL MEDICINE

## 2023-07-06 PROCEDURE — 4004F PT TOBACCO SCREEN RCVD TLK: CPT | Performed by: INTERNAL MEDICINE

## 2023-07-06 PROCEDURE — G8417 CALC BMI ABV UP PARAM F/U: HCPCS | Performed by: INTERNAL MEDICINE

## 2023-07-06 PROCEDURE — 99214 OFFICE O/P EST MOD 30 MIN: CPT | Performed by: INTERNAL MEDICINE

## 2023-07-06 PROCEDURE — 3078F DIAST BP <80 MM HG: CPT | Performed by: INTERNAL MEDICINE

## 2023-07-06 RX ORDER — NITROGLYCERIN 0.4 MG/1
0.4 TABLET SUBLINGUAL EVERY 5 MIN PRN
Qty: 25 TABLET | Refills: 6 | Status: SHIPPED | OUTPATIENT
Start: 2023-07-06

## 2023-07-06 RX ORDER — CALCITRIOL 0.25 UG/1
CAPSULE, LIQUID FILLED ORAL
COMMUNITY
Start: 2023-05-23

## 2023-07-06 NOTE — TELEPHONE ENCOUNTER
Requested Prescriptions     Pending Prescriptions Disp Refills    nitroGLYCERIN (NITROSTAT) 0.4 MG SL tablet [Pharmacy Med Name: NITROGLYCERIN 0.4 MG Tablet Sublingual] 25 tablet 6     Sig: PLACE 1 TABLET UNDER THE TONGUE EVERY 5 MINUTES AS NEEDED FOR CHEST PAIN, MAX 3 DOSES

## 2023-07-06 NOTE — PROGRESS NOTES
[address] [facility], [facility], [address]  PHONE: [phone]     23    NAME:  Jerry Carrion  : 1965  MRN: [id number]       SUBJECTIVE:   Jerry Carrion is a 59 y.o. female seen for a follow up visit regarding the following:     Chief Complaint   Patient presents with    Congestive Heart Failure       HPI:    Here for NICM/cSHF/VT. SHF (EF 30% in )      Echo 2020: EF 30-35%   2020:  a/c SHF   2020: HF admission, diuresed 7L   Echo 2022: EF 30-35%      LHC 2022: Angiographically normal coronary arteries  Echo 2022: EF 35%  Ct 2022: large left ventricular aneurysm  VT ablation 10/2022 [facility]. Feeling better, energy better. No CP.  some rectal bleeding at times. On lasix, no CP. DUARTE ok now. No new angina. She lives by herself, sister here in town. One son in [place]. NOAC started at [address]. Doing well on anticoagulation treatment as reviewed today, no bleeding issues or excessive bruising noted. Patient denies recent history of orthopnea, PND, excessive dizziness and/or syncope. Wearing CPAP every night now. Past Medical History, Past Surgical History, Family history, Social History, and Medications were all reviewed with the patient today and updated as necessary. Current Outpatient Medications   Medication Sig Dispense Refill    calcitRIOL (ROCALTROL) 0.25 MCG capsule       CPAP Machine MISC by Does not apply route      amiodarone (CORDARONE) 200 MG tablet       insulin glargine (LANTUS SOLOSTAR) 100 UNIT/ML injection pen Inject 12 Units into the skin nightly 5 Adjustable Dose Pre-filled Pen Syringe 3    atorvastatin (LIPITOR) 40 MG tablet Take 1 tablet by mouth daily 90 tablet 1    Continuous Blood Gluc  (DEXCOM G6 ) RJ Change every 10 days as directed. Dx E11.9 9 each 3    Continuous Blood Gluc Sensor (DEXCOM G6 SENSOR) MISC Change every 10 days as directed.  Dx E11.9 9 each 3

## 2023-07-07 ENCOUNTER — OFFICE VISIT (OUTPATIENT)
Dept: INTERNAL MEDICINE CLINIC | Facility: CLINIC | Age: 58
End: 2023-07-07
Payer: MEDICARE

## 2023-07-07 VITALS
SYSTOLIC BLOOD PRESSURE: 100 MMHG | BODY MASS INDEX: 42.38 KG/M2 | OXYGEN SATURATION: 98 % | WEIGHT: 188.38 LBS | HEIGHT: 56 IN | DIASTOLIC BLOOD PRESSURE: 60 MMHG | TEMPERATURE: 97.5 F | HEART RATE: 76 BPM

## 2023-07-07 DIAGNOSIS — K64.8 INTERNAL HEMORRHOIDS: Primary | ICD-10-CM

## 2023-07-07 DIAGNOSIS — K62.5 RECTAL BLEEDING: ICD-10-CM

## 2023-07-07 PROCEDURE — 4004F PT TOBACCO SCREEN RCVD TLK: CPT | Performed by: NURSE PRACTITIONER

## 2023-07-07 PROCEDURE — 3078F DIAST BP <80 MM HG: CPT | Performed by: NURSE PRACTITIONER

## 2023-07-07 PROCEDURE — G8427 DOCREV CUR MEDS BY ELIG CLIN: HCPCS | Performed by: NURSE PRACTITIONER

## 2023-07-07 PROCEDURE — 99214 OFFICE O/P EST MOD 30 MIN: CPT | Performed by: NURSE PRACTITIONER

## 2023-07-07 PROCEDURE — 3074F SYST BP LT 130 MM HG: CPT | Performed by: NURSE PRACTITIONER

## 2023-07-07 PROCEDURE — 3017F COLORECTAL CA SCREEN DOC REV: CPT | Performed by: NURSE PRACTITIONER

## 2023-07-07 PROCEDURE — G8417 CALC BMI ABV UP PARAM F/U: HCPCS | Performed by: NURSE PRACTITIONER

## 2023-07-07 RX ORDER — HYDROCORTISONE ACETATE 25 MG/1
25 SUPPOSITORY RECTAL EVERY 12 HOURS
Qty: 30 SUPPOSITORY | Refills: 1 | Status: SHIPPED | OUTPATIENT
Start: 2023-07-07

## 2023-07-07 ASSESSMENT — ENCOUNTER SYMPTOMS
DIARRHEA: 0
BLOOD IN STOOL: 0
VOMITING: 0
CONSTIPATION: 0
ABDOMINAL PAIN: 0
SHORTNESS OF BREATH: 0
NAUSEA: 0
ANAL BLEEDING: 1

## 2023-07-07 NOTE — PROGRESS NOTES
NICM (nonischemic cardiomyopathy) ([address]) 2/15/2013    Obesity     bmi- 41    Obstructive sleep apnea (adult) (pediatric) 07/14/2014    uses cpap qhs    Pseudomonas urinary tract infection 4/5/2019    Sciatic pain 5/23/2016    Severe persistent asthma with acute exacerbation 8/24/2020    SVT (supraventricular tachycardia) ([facility])     ablation for svt    Tobacco use disorder 4/12/2010       Past Surgical History:   Procedure Laterality Date    ABLATION OF DYSRHYTHMIC FOCUS  \"years ago\"    CARDIAC DEFIBRILLATOR PLACEMENT  07/18/2013    Biotronik dual chamber ICD by Dr. Austin Reece  07/18/2013    Biotronik dual chamber ICD by Dr. Sandra Fam 7/1/2022    Left heart cath / coronary angiography performed by Marisol Hawkins MD at [facility]      x1    COLONOSCOPY N/A 7/7/2021    COLONOSCOPY/BMI 50 PT HAS AN ICD performed by Violetta Law MD at [address]  03/26/2019    mild incarceration, no bowel removal    HYSTERECTOMY (CERVIX STATUS UNKNOWN)      EDITH-Left ovary intact per pt    LEFT HEART CATH,PERCUTANEOUS  2/13/2013    no intervention    PACEMAKER      ICD    ROTATOR CUFF REPAIR Right     TONSILLECTOMY         Family History   Problem Relation Age of Onset    Diabetes Other     Heart Disease Father     Hypertension Father     Stroke Father     Heart Attack Father 48        mi    Breast Cancer Sister 37    Hypertension Brother     Heart Disease Brother     Diabetes Brother     Stroke Mother        Social History     Tobacco Use    Smoking status: Some Days     Packs/day: 0.25     Types: Cigarettes    Smokeless tobacco: Never    Tobacco comments:     Quit smoking: \"every now and then\"   Vaping Use    Vaping Use: Never used   Substance Use Topics    Alcohol use:  Yes     Alcohol/week: 1.0 standard drink    Drug use: Yes     Types: Marijuana Valdemar Adler)       Current Outpatient Medications:

## 2023-07-17 RX ORDER — AMIODARONE HYDROCHLORIDE 200 MG/1
TABLET ORAL
Qty: 180 TABLET | Refills: 1 | Status: SHIPPED | OUTPATIENT
Start: 2023-07-17

## 2023-07-17 NOTE — TELEPHONE ENCOUNTER
Requested Prescriptions     Pending Prescriptions Disp Refills    amiodarone (CORDARONE) 200 MG tablet [Pharmacy Med Name: AMIODARONE HYDROCHLORIDE 200 MG Tablet] 180 tablet 1     Sig: TAKE 1 TABLET TWICE DAILY

## 2023-07-25 DIAGNOSIS — I42.8 NICM (NONISCHEMIC CARDIOMYOPATHY) (HCC): ICD-10-CM

## 2023-07-25 DIAGNOSIS — Z79.4 TYPE 2 DIABETES MELLITUS WITH OTHER SPECIFIED COMPLICATION, WITH LONG-TERM CURRENT USE OF INSULIN (HCC): ICD-10-CM

## 2023-07-25 DIAGNOSIS — E11.69 TYPE 2 DIABETES MELLITUS WITH OTHER SPECIFIED COMPLICATION, WITH LONG-TERM CURRENT USE OF INSULIN (HCC): ICD-10-CM

## 2023-07-25 DIAGNOSIS — E11.21 TYPE 2 DIABETES WITH NEPHROPATHY (HCC): ICD-10-CM

## 2023-07-25 DIAGNOSIS — I50.22 CHRONIC SYSTOLIC HEART FAILURE (HCC): ICD-10-CM

## 2023-07-25 DIAGNOSIS — N18.4 CKD (CHRONIC KIDNEY DISEASE) STAGE 4, GFR 15-29 ML/MIN (HCC): ICD-10-CM

## 2023-07-25 NOTE — TELEPHONE ENCOUNTER
Pt is requesting a refill of her DEXCOM G6 SENSOR. States she only has 1 left. Please send to [facility].

## 2023-07-26 RX ORDER — PROCHLORPERAZINE 25 MG/1
SUPPOSITORY RECTAL
Qty: 9 EACH | Refills: 3 | Status: SHIPPED | OUTPATIENT
Start: 2023-07-26

## 2023-07-31 DIAGNOSIS — Z95.810 AICD (AUTOMATIC CARDIOVERTER/DEFIBRILLATOR) PRESENT: ICD-10-CM

## 2023-07-31 DIAGNOSIS — I42.8 NICM (NONISCHEMIC CARDIOMYOPATHY) (HCC): ICD-10-CM

## 2023-07-31 DIAGNOSIS — I47.20 VENTRICULAR TACHYCARDIA (HCC): ICD-10-CM

## 2023-08-03 PROCEDURE — 93295 DEV INTERROG REMOTE 1/2/MLT: CPT | Performed by: INTERNAL MEDICINE

## 2023-08-03 PROCEDURE — 93296 REM INTERROG EVL PM/IDS: CPT | Performed by: INTERNAL MEDICINE

## 2023-08-09 RX ORDER — SPIRONOLACTONE 25 MG/1
25 TABLET ORAL DAILY
Qty: 90 TABLET | Refills: 3 | Status: SHIPPED | OUTPATIENT
Start: 2023-08-09

## 2023-08-09 RX ORDER — SACUBITRIL AND VALSARTAN 24; 26 MG/1; MG/1
1 TABLET, FILM COATED ORAL 2 TIMES DAILY
Qty: 180 TABLET | Refills: 3 | Status: SHIPPED | OUTPATIENT
Start: 2023-08-09

## 2023-08-09 NOTE — TELEPHONE ENCOUNTER
MEDICATION REFILL REQUEST      Name of Medication:  Spironolactone  Dose:  25  Frequency:  QD  Quantity:  90  Days' supply:  90 with 3 refills      Pharmacy Name/Location:  [address] mail [phone]    MEDICATION REFILL REQUEST      Name of Medication:  Entresto  Dose:  24-26 mg  Frequency:  BID  Quantity:  60  Days' supply:  30 with refills      Pharmacy Name/Location:  [address] mail [phone]

## 2023-08-21 ENCOUNTER — OFFICE VISIT (OUTPATIENT)
Age: 58
End: 2023-08-21
Payer: MEDICARE

## 2023-08-21 VITALS
WEIGHT: 187.3 LBS | HEART RATE: 61 BPM | HEIGHT: 56 IN | SYSTOLIC BLOOD PRESSURE: 126 MMHG | BODY MASS INDEX: 42.13 KG/M2 | DIASTOLIC BLOOD PRESSURE: 88 MMHG

## 2023-08-21 DIAGNOSIS — Z91.89 AT RISK FOR AMIODARONE TOXICITY WITH LONG TERM USE: ICD-10-CM

## 2023-08-21 DIAGNOSIS — I42.8 NICM (NONISCHEMIC CARDIOMYOPATHY) (HCC): Primary | ICD-10-CM

## 2023-08-21 DIAGNOSIS — I50.22 CHRONIC SYSTOLIC CONGESTIVE HEART FAILURE (HCC): ICD-10-CM

## 2023-08-21 DIAGNOSIS — Z79.899 AT RISK FOR AMIODARONE TOXICITY WITH LONG TERM USE: ICD-10-CM

## 2023-08-21 DIAGNOSIS — I47.20 VENTRICULAR TACHYCARDIA (HCC): ICD-10-CM

## 2023-08-21 LAB
ALBUMIN SERPL-MCNC: 4 G/DL (ref 3.5–5)
ALBUMIN/GLOB SERPL: 1.2 (ref 0.4–1.6)
ALP SERPL-CCNC: 93 U/L (ref 50–136)
ALT SERPL-CCNC: 39 U/L (ref 12–65)
AST SERPL-CCNC: 29 U/L (ref 15–37)
BILIRUB DIRECT SERPL-MCNC: 0.1 MG/DL
BILIRUB SERPL-MCNC: 0.4 MG/DL (ref 0.2–1.1)
GLOBULIN SER CALC-MCNC: 3.3 G/DL (ref 2.8–4.5)
PROT SERPL-MCNC: 7.3 G/DL (ref 6.3–8.2)
T4 FREE SERPL-MCNC: 1.4 NG/DL (ref 0.78–1.46)
TSH, 3RD GENERATION: 1.82 UIU/ML (ref 0.36–3.74)

## 2023-08-21 PROCEDURE — 99214 OFFICE O/P EST MOD 30 MIN: CPT | Performed by: INTERNAL MEDICINE

## 2023-08-21 PROCEDURE — 3074F SYST BP LT 130 MM HG: CPT | Performed by: INTERNAL MEDICINE

## 2023-08-21 PROCEDURE — 3079F DIAST BP 80-89 MM HG: CPT | Performed by: INTERNAL MEDICINE

## 2023-08-21 PROCEDURE — 4004F PT TOBACCO SCREEN RCVD TLK: CPT | Performed by: INTERNAL MEDICINE

## 2023-08-21 PROCEDURE — G8417 CALC BMI ABV UP PARAM F/U: HCPCS | Performed by: INTERNAL MEDICINE

## 2023-08-21 PROCEDURE — G8427 DOCREV CUR MEDS BY ELIG CLIN: HCPCS | Performed by: INTERNAL MEDICINE

## 2023-08-21 PROCEDURE — 93000 ELECTROCARDIOGRAM COMPLETE: CPT | Performed by: INTERNAL MEDICINE

## 2023-08-21 PROCEDURE — 3017F COLORECTAL CA SCREEN DOC REV: CPT | Performed by: INTERNAL MEDICINE

## 2023-08-21 RX ORDER — DULOXETIN HYDROCHLORIDE 20 MG/1
CAPSULE, DELAYED RELEASE ORAL
COMMUNITY
Start: 2023-06-01

## 2023-08-21 ASSESSMENT — ENCOUNTER SYMPTOMS
EYES NEGATIVE: 1
ALLERGIC/IMMUNOLOGIC NEGATIVE: 1
GASTROINTESTINAL NEGATIVE: 1
SHORTNESS OF BREATH: 0

## 2023-08-21 NOTE — TELEPHONE ENCOUNTER
Patient is requesting a note from Dr. Acacia Zamudio stating it is ok for her to work four days instead of just three.

## 2023-08-21 NOTE — PROGRESS NOTES
[facility] CARDIOLOGY  [id number] [place] [address], [address] [place] [address]  PHONE: [phone]        23      NAME:  Alissa Camacho  : 1965  MRN: [id number]     Referring Cardiologist: Karlene Cummings DO    Reason for Consultation: VT/VF     ASSESSMENT and PLAN:  Linda Gamboa was seen today for new patient and irregular heart beat. Diagnoses and all orders for this visit:    CHF (congestive heart failure) ([address])    Ventricular tachycardia ([address]) s/p ICD in  s/p VT ablation at [address] 10/2022, Dr. Shira Pederson.     CAD in native artery    CKD stage IV    LV aneurysm    NIDCM, s/p BTK DC ICD     Obesity    LORI     59year old female with recent ICD shock. She has a history of NICM? With large aneurysmal LV. She is now taking amiodarone BID.     -VT/VF - Continue amiodarone 200 mg po daily. ICD interrogation without further VT/VF. Biannual LFTs, TFTs, yearly PFTs, eye exams while on amiodarone. Successful VT ablation from 10/2022 at [address]. Reviewed notes. -HFrEF - continue GDMT. Declined LVAD at [address]. -LV aneursym - deemed not a surgical candidate at [address]. -LORI - continue CPAP    -Obesity - weight loss encouraged. -Mild chest pain - stable. -EP follow up in 6 months or PRN. Patient has been instructed and agrees to call our office with any issues or other concerns related to their cardiac condition(s) and/or complaint(s). No follow-up provider specified. Thank you for allowing me to participate in the electrophysiologic care of Ms. Alissa Camacho. Please contact me if any questions or concerns were to arise. Jenn Loja.  Dominick NAM, MS  Clinical Cardiac Electrophysiology  [facility] Cardiology  23  3:12 PM    ===================================================================  Chief Complant:    Chief Complaint   Patient presents with    Cardiomyopathy    Congestive Heart Failure    Coronary Artery Disease        History:  Alissa Camacho is a most pleasant 59

## 2023-08-23 NOTE — TELEPHONE ENCOUNTER
If feeling well, no restrictions. If she wants light duty, we can do that as well. Her call.     Thanks

## 2023-08-24 NOTE — TELEPHONE ENCOUNTER
Spoke with patient. States she just want the letter just incase they ask for it, stating she does not have any restrictions. Also needs rx for lasix sent to [facility].

## 2023-08-25 RX ORDER — FUROSEMIDE 40 MG/1
40 TABLET ORAL 2 TIMES DAILY
Qty: 180 TABLET | Refills: 3 | Status: SHIPPED | OUTPATIENT
Start: 2023-08-25 | End: 2024-08-24

## 2023-09-05 NOTE — PROGRESS NOTES
Medicare Annual Wellness Visit    Ashwin Conklin is here for Medicare AWV    Assessment & Plan   Primary hypertension  Alzheimer's dementia with behavioral disturbance ([address])  Vitamin D deficiency  Unintentional weight loss  Initial Medicare annual wellness visit    Recommendations for Preventive Services Due: see orders and patient instructions/AVS.  Recommended screening schedule for the next 5-10 years is provided to the patient in written form: see Patient Instructions/AVS.     Return in 1 year (on 9/5/2024) for Medicare Annual Wellness Visit in 1 year. Subjective     Patient's complete Health Risk Assessment and screening values have been reviewed and are found in Flowsheets. The following problems were reviewed today and where indicated follow up appointments were made and/or referrals ordered. Positive Risk Factor Screenings with Interventions:       Cognitive: Words recalled: 0 Words Recalled   Clock Drawing Test (CDT): (!) Abnormal   Total Score: (!) 0   Total Score Interpretation: Abnormal Mini-Cog      Interventions:  Patient is well cared for by  at home. Vision Screen:  Do you have difficulty driving, watching TV, or doing any of your daily activities because of your eyesight?: No  Have you had an eye exam within the past year?: (!) No  No results found. Interventions:   Patient encouraged to make appointment with their eye specialist     ADL's:   Patient reports needing help with:  Select all that apply: (!) Grooming  Select all that apply: [employer], Housekeeping, Banking/Finances, Shopping, Food Preparation, Transportation  Interventions:  Patient lives at home with her  and is well cared for. Discussed with  option of Memory Care unit at [facility]. Advanced Directives:  Do you have a Living Will?: (!) No    Intervention:  Discussed ACP with  and patient. She is to remain as FULL CODE.          Objective   Vitals:    09/05/23 1254   BP: (!) 139/58 TRANSFER - IN REPORT: 
 
Verbal report received from [facility] [employer]  being received from ICU for routine progression of care Report consisted of patients Situation, Background, Assessment and  
Recommendations(SBAR). Information from the following report(s) SBAR, Procedure Summary and Recent Results was reviewed with the receiving nurse. Opportunity for questions and clarification was provided. Assessment completed upon patients arrival to unit and care assumed.

## 2023-09-16 ASSESSMENT — PATIENT HEALTH QUESTIONNAIRE - PHQ9
SUM OF ALL RESPONSES TO PHQ9 QUESTIONS 1 & 2: 0
2. FEELING DOWN, DEPRESSED OR HOPELESS: 0
1. LITTLE INTEREST OR PLEASURE IN DOING THINGS: NOT AT ALL
SUM OF ALL RESPONSES TO PHQ QUESTIONS 1-9: 0
SUM OF ALL RESPONSES TO PHQ9 QUESTIONS 1 & 2: 0
SUM OF ALL RESPONSES TO PHQ QUESTIONS 1-9: 0
2. FEELING DOWN, DEPRESSED OR HOPELESS: NOT AT ALL
1. LITTLE INTEREST OR PLEASURE IN DOING THINGS: 0

## 2023-09-19 ENCOUNTER — OFFICE VISIT (OUTPATIENT)
Dept: INTERNAL MEDICINE CLINIC | Facility: CLINIC | Age: 58
End: 2023-09-19
Payer: MEDICARE

## 2023-09-19 VITALS
WEIGHT: 187 LBS | BODY MASS INDEX: 42.07 KG/M2 | TEMPERATURE: 97.3 F | SYSTOLIC BLOOD PRESSURE: 124 MMHG | HEIGHT: 56 IN | DIASTOLIC BLOOD PRESSURE: 80 MMHG | HEART RATE: 70 BPM | OXYGEN SATURATION: 97 %

## 2023-09-19 DIAGNOSIS — E78.5 DYSLIPIDEMIA: ICD-10-CM

## 2023-09-19 DIAGNOSIS — E11.21 TYPE 2 DIABETES WITH NEPHROPATHY (HCC): Primary | ICD-10-CM

## 2023-09-19 LAB — HBA1C MFR BLD: 7.6 %

## 2023-09-19 PROCEDURE — 3074F SYST BP LT 130 MM HG: CPT | Performed by: INTERNAL MEDICINE

## 2023-09-19 PROCEDURE — 2022F DILAT RTA XM EVC RTNOPTHY: CPT | Performed by: INTERNAL MEDICINE

## 2023-09-19 PROCEDURE — 3017F COLORECTAL CA SCREEN DOC REV: CPT | Performed by: INTERNAL MEDICINE

## 2023-09-19 PROCEDURE — 99213 OFFICE O/P EST LOW 20 MIN: CPT | Performed by: INTERNAL MEDICINE

## 2023-09-19 PROCEDURE — G8427 DOCREV CUR MEDS BY ELIG CLIN: HCPCS | Performed by: INTERNAL MEDICINE

## 2023-09-19 PROCEDURE — G8417 CALC BMI ABV UP PARAM F/U: HCPCS | Performed by: INTERNAL MEDICINE

## 2023-09-19 PROCEDURE — 3046F HEMOGLOBIN A1C LEVEL >9.0%: CPT | Performed by: INTERNAL MEDICINE

## 2023-09-19 PROCEDURE — 4004F PT TOBACCO SCREEN RCVD TLK: CPT | Performed by: INTERNAL MEDICINE

## 2023-09-19 PROCEDURE — 3079F DIAST BP 80-89 MM HG: CPT | Performed by: INTERNAL MEDICINE

## 2023-09-19 PROCEDURE — 83036 HEMOGLOBIN GLYCOSYLATED A1C: CPT | Performed by: INTERNAL MEDICINE

## 2023-09-19 RX ORDER — ATORVASTATIN CALCIUM 40 MG/1
40 TABLET, FILM COATED ORAL DAILY
Qty: 90 TABLET | Refills: 3 | Status: SHIPPED | OUTPATIENT
Start: 2023-09-19

## 2023-09-19 ASSESSMENT — ENCOUNTER SYMPTOMS
BACK PAIN: 1
CHEST TIGHTNESS: 0

## 2023-09-19 NOTE — PROGRESS NOTES
ASSESSMENT/PLAN:       1. Type 2 diabetes with nephropathy (HCC)  Treatment regimen is effective. - AMB POC HEMOGLOBIN A1C  -  DIABETES FOOT EXAM    2. Dyslipidemia  Treatment regimen is effective. Medication refilled      - atorvastatin (LIPITOR) 40 MG tablet; Take 1 tablet by mouth daily  Dispense: 90 tablet; Refill: 3    Patient Instructions     Results for orders placed or performed in visit on 09/19/23   AMB POC HEMOGLOBIN A1C   Result Value Ref Range    Hemoglobin A1C, POC 7.6 %      Treatment regimen is effective. Continue CGM/Dexcom. Data reviewed. Current blood sugar in office is 94              On this date, 9/19/23, I have spent 20 minutes reviewing previous notes, test results and face to face with the patient discussing the diagnosis and importance of compliance with the treatment plan as well as documenting on the day of the visit. An electronic signature was used to authenticate this note. -- Ha Godinez MD   Patient Instructions     Results for orders placed or performed in visit on 09/19/23   AMB POC HEMOGLOBIN A1C   Result Value Ref Range    Hemoglobin A1C, POC 7.6 %      Treatment regimen is effective. Continue CGM/Dexcom. Data reviewed. Current blood sugar in office is 94          Evaluation and management of the chronic condition(s) delineated. No negative side effects reported. I have reviewed all the lab results. There are some abnormalities that are either expected or not critical to the patient's health, and are discussed in the office today and are addressed. Please refer to the above assessement and plan narrative and orders and follow up plan. Medication discussed and refilled as needed. Physical exam findings are stable unless otherwise indicated and this is addressed. The most recent lab work and imaging and consultant/urgent care visits and imaging are reviewed and discussed and considered during this visit encounter.       1. Type 2 diabetes

## 2023-09-19 NOTE — PATIENT INSTRUCTIONS
Results for orders placed or performed in visit on 09/19/23   AMB POC HEMOGLOBIN A1C   Result Value Ref Range    Hemoglobin A1C, POC 7.6 %      Treatment regimen is effective. Continue CGM/Dexcom. Data reviewed.   Current blood sugar in office is 94

## 2023-09-29 ENCOUNTER — TELEPHONE (OUTPATIENT)
Age: 58
End: 2023-09-29

## 2023-09-29 NOTE — TELEPHONE ENCOUNTER
----- Message from Tom Urias MD sent at 9/27/2023  5:42 PM EDT -----  EF remains severely reduced but stable.    ----- Message -----  From: Lovely Stuart MD  Sent: 9/26/2023   6:29 PM EDT  To: Tom Urias MD

## 2023-09-29 NOTE — TELEPHONE ENCOUNTER
Spoke with pt made her aware per Dr Anders Lackey EF remains severely reduced but stable. pt verbalized understanding and stated at this time she has no questions or issues and should anything come about she will ask Dr Dino Molina at her follow up appointment on 10/5/23.

## 2023-10-05 ENCOUNTER — NURSE ONLY (OUTPATIENT)
Dept: PULMONOLOGY | Age: 58
End: 2023-10-05
Payer: MEDICARE

## 2023-10-05 ENCOUNTER — OFFICE VISIT (OUTPATIENT)
Age: 58
End: 2023-10-05
Payer: MEDICARE

## 2023-10-05 VITALS
BODY MASS INDEX: 42.11 KG/M2 | SYSTOLIC BLOOD PRESSURE: 92 MMHG | DIASTOLIC BLOOD PRESSURE: 56 MMHG | HEART RATE: 64 BPM | WEIGHT: 187.2 LBS | HEIGHT: 56 IN

## 2023-10-05 DIAGNOSIS — I47.20 VENTRICULAR TACHYCARDIA (HCC): ICD-10-CM

## 2023-10-05 DIAGNOSIS — I10 PRIMARY HYPERTENSION: ICD-10-CM

## 2023-10-05 DIAGNOSIS — Z79.899 LONG TERM CURRENT USE OF AMIODARONE: Primary | ICD-10-CM

## 2023-10-05 DIAGNOSIS — I25.119 ATHEROSCLEROSIS OF NATIVE CORONARY ARTERY OF NATIVE HEART WITH ANGINA PECTORIS (HCC): ICD-10-CM

## 2023-10-05 DIAGNOSIS — I50.22 CHRONIC SYSTOLIC HEART FAILURE (HCC): Primary | ICD-10-CM

## 2023-10-05 DIAGNOSIS — I25.3 LEFT VENTRICULAR ANEURYSM: ICD-10-CM

## 2023-10-05 DIAGNOSIS — I42.8 NICM (NONISCHEMIC CARDIOMYOPATHY) (HCC): ICD-10-CM

## 2023-10-05 LAB
FEF25-27, POC: 1.38 L/S
FET, POC: NORMAL
FEV 1 , POC: 0.98 L
FEV1/FVC, POC: NORMAL
FVC, POC: 1.11
LUNG AGE, POC: NORMAL
PEF, POC: 2.91 L/S
TOTAL HEMOGLOBIN (SPHB), POC: 12.1 G/DL

## 2023-10-05 PROCEDURE — 94010 BREATHING CAPACITY TEST: CPT | Performed by: INTERNAL MEDICINE

## 2023-10-05 PROCEDURE — G8417 CALC BMI ABV UP PARAM F/U: HCPCS | Performed by: INTERNAL MEDICINE

## 2023-10-05 PROCEDURE — 88738 HGB QUANT TRANSCUTANEOUS: CPT | Performed by: INTERNAL MEDICINE

## 2023-10-05 PROCEDURE — 3017F COLORECTAL CA SCREEN DOC REV: CPT | Performed by: INTERNAL MEDICINE

## 2023-10-05 PROCEDURE — G8484 FLU IMMUNIZE NO ADMIN: HCPCS | Performed by: INTERNAL MEDICINE

## 2023-10-05 PROCEDURE — 94729 DIFFUSING CAPACITY: CPT | Performed by: INTERNAL MEDICINE

## 2023-10-05 PROCEDURE — 4004F PT TOBACCO SCREEN RCVD TLK: CPT | Performed by: INTERNAL MEDICINE

## 2023-10-05 PROCEDURE — 3078F DIAST BP <80 MM HG: CPT | Performed by: INTERNAL MEDICINE

## 2023-10-05 PROCEDURE — 94726 PLETHYSMOGRAPHY LUNG VOLUMES: CPT | Performed by: INTERNAL MEDICINE

## 2023-10-05 PROCEDURE — G8428 CUR MEDS NOT DOCUMENT: HCPCS | Performed by: INTERNAL MEDICINE

## 2023-10-05 PROCEDURE — 99214 OFFICE O/P EST MOD 30 MIN: CPT | Performed by: INTERNAL MEDICINE

## 2023-10-05 PROCEDURE — 3074F SYST BP LT 130 MM HG: CPT | Performed by: INTERNAL MEDICINE

## 2023-10-05 ASSESSMENT — PULMONARY FUNCTION TESTS: FVC_POC: 1.11

## 2023-10-26 LAB
ALBUMIN SERPL-MCNC: 3.7 G/DL (ref 3.2–4.6)
ALBUMIN/GLOB SERPL: 1.2 (ref 0.4–1.6)
ALP SERPL-CCNC: 100 U/L (ref 50–136)
ALT SERPL-CCNC: 38 U/L (ref 12–65)
AST SERPL-CCNC: 26 U/L (ref 15–37)
BILIRUB DIRECT SERPL-MCNC: 0.2 MG/DL
BILIRUB SERPL-MCNC: 0.4 MG/DL (ref 0.2–1.1)
GLOBULIN SER CALC-MCNC: 3 G/DL (ref 2.8–4.5)
PROT SERPL-MCNC: 6.7 G/DL (ref 6.3–8.2)

## 2023-11-02 PROCEDURE — 93296 REM INTERROG EVL PM/IDS: CPT | Performed by: INTERNAL MEDICINE

## 2023-11-02 PROCEDURE — 93295 DEV INTERROG REMOTE 1/2/MLT: CPT | Performed by: INTERNAL MEDICINE

## 2023-11-07 ENCOUNTER — TELEPHONE (OUTPATIENT)
Age: 58
End: 2023-11-07

## 2023-11-07 NOTE — TELEPHONE ENCOUNTER
Ms. Trish Du saw Dr. Zeferino Knowles 10/05/2023, at that visit she requested a letter summarizing her cardiac condition. She is applying to the [employer] for long-term disability. Ms. Trish Du has a complex cardiac history including LV aneurysm, SHF (last echo 30-35% in Sept 2023) NICM, s/p VT ablation at [address] 10/2022. At her last OV she noted no new symptoms, per Dr. Zeferino Knowles her current baseline is NYHA class II-III CHF symptoms. He notes she would not be able to consistently function in any capacity in a work environment and therefore is appropriate for disability. Letter drafted and forwarded to Dr. Zeferino Knowles for review./[facility]    Letter approved. Mailed to Ms. Shelton's home address./b

## 2023-11-08 DIAGNOSIS — E11.21 TYPE 2 DIABETES WITH NEPHROPATHY (HCC): ICD-10-CM

## 2023-11-08 RX ORDER — INSULIN GLARGINE 100 [IU]/ML
INJECTION, SOLUTION SUBCUTANEOUS
Qty: 15 ML | Refills: 10 | OUTPATIENT
Start: 2023-11-08

## 2023-11-13 DIAGNOSIS — E11.21 TYPE 2 DIABETES WITH NEPHROPATHY (HCC): ICD-10-CM

## 2023-11-13 RX ORDER — INSULIN GLARGINE 100 [IU]/ML
INJECTION, SOLUTION SUBCUTANEOUS
Qty: 15 ML | Refills: 10 | OUTPATIENT
Start: 2023-11-13

## 2023-11-14 DIAGNOSIS — E78.5 DYSLIPIDEMIA: ICD-10-CM

## 2023-11-14 DIAGNOSIS — E11.21 TYPE 2 DIABETES WITH NEPHROPATHY (HCC): ICD-10-CM

## 2023-11-14 RX ORDER — ATORVASTATIN CALCIUM 40 MG/1
40 TABLET, FILM COATED ORAL DAILY
Qty: 90 TABLET | Refills: 3 | Status: SHIPPED | OUTPATIENT
Start: 2023-11-14

## 2023-11-14 RX ORDER — INSULIN GLARGINE 100 [IU]/ML
12 INJECTION, SOLUTION SUBCUTANEOUS NIGHTLY
Qty: 5 ADJUSTABLE DOSE PRE-FILLED PEN SYRINGE | Refills: 3 | Status: SHIPPED | OUTPATIENT
Start: 2023-11-14

## 2023-11-14 NOTE — TELEPHONE ENCOUNTER
Pt called, requ refill of atorvastatin 40 mg tablet, lantus solostar insulin    Sent to [facility]

## 2023-11-15 NOTE — TELEPHONE ENCOUNTER
Attempted to contact patient but was unable to leave message due to voicemail being full.  Will try again later

## 2023-11-27 NOTE — TELEPHONE ENCOUNTER
Patient called stating she would like samples of the following :    ELIQUIS 5 MG TABS tablet      Patient would like to pick these up at the [address]    Please call and advise when available and placed at Check-In.

## 2023-11-29 RX ORDER — APIXABAN 5 MG/1
5 TABLET, FILM COATED ORAL 2 TIMES DAILY
Qty: 180 TABLET | Refills: 3 | Status: SHIPPED | OUTPATIENT
Start: 2023-11-29

## 2023-12-05 RX ORDER — CARVEDILOL 6.25 MG/1
TABLET ORAL
Qty: 180 TABLET | Refills: 3 | Status: SHIPPED | OUTPATIENT
Start: 2023-12-05

## 2024-01-02 ENCOUNTER — OFFICE VISIT (OUTPATIENT)
Dept: INTERNAL MEDICINE CLINIC | Facility: CLINIC | Age: 59
End: 2024-01-02
Payer: MEDICARE

## 2024-01-02 VITALS
SYSTOLIC BLOOD PRESSURE: 126 MMHG | DIASTOLIC BLOOD PRESSURE: 66 MMHG | WEIGHT: 193 LBS | HEIGHT: 56 IN | TEMPERATURE: 97.2 F | HEART RATE: 61 BPM | OXYGEN SATURATION: 98 % | BODY MASS INDEX: 43.41 KG/M2

## 2024-01-02 DIAGNOSIS — Z23 NEEDS FLU SHOT: ICD-10-CM

## 2024-01-02 DIAGNOSIS — I50.22 CHRONIC SYSTOLIC HEART FAILURE (HCC): ICD-10-CM

## 2024-01-02 DIAGNOSIS — I42.8 NICM (NONISCHEMIC CARDIOMYOPATHY) (HCC): ICD-10-CM

## 2024-01-02 DIAGNOSIS — E11.21 TYPE 2 DIABETES WITH NEPHROPATHY (HCC): ICD-10-CM

## 2024-01-02 DIAGNOSIS — E11.69 TYPE 2 DIABETES MELLITUS WITH OTHER SPECIFIED COMPLICATION, WITH LONG-TERM CURRENT USE OF INSULIN (HCC): Primary | ICD-10-CM

## 2024-01-02 DIAGNOSIS — N18.4 CKD (CHRONIC KIDNEY DISEASE) STAGE 4, GFR 15-29 ML/MIN (HCC): ICD-10-CM

## 2024-01-02 DIAGNOSIS — Z79.4 TYPE 2 DIABETES MELLITUS WITH OTHER SPECIFIED COMPLICATION, WITH LONG-TERM CURRENT USE OF INSULIN (HCC): Primary | ICD-10-CM

## 2024-01-02 LAB — HBA1C MFR BLD: 7.7 %

## 2024-01-02 PROCEDURE — 2022F DILAT RTA XM EVC RTNOPTHY: CPT | Performed by: INTERNAL MEDICINE

## 2024-01-02 PROCEDURE — 3017F COLORECTAL CA SCREEN DOC REV: CPT | Performed by: INTERNAL MEDICINE

## 2024-01-02 PROCEDURE — G8427 DOCREV CUR MEDS BY ELIG CLIN: HCPCS | Performed by: INTERNAL MEDICINE

## 2024-01-02 PROCEDURE — 3074F SYST BP LT 130 MM HG: CPT | Performed by: INTERNAL MEDICINE

## 2024-01-02 PROCEDURE — 83036 HEMOGLOBIN GLYCOSYLATED A1C: CPT | Performed by: INTERNAL MEDICINE

## 2024-01-02 PROCEDURE — G8417 CALC BMI ABV UP PARAM F/U: HCPCS | Performed by: INTERNAL MEDICINE

## 2024-01-02 PROCEDURE — 3078F DIAST BP <80 MM HG: CPT | Performed by: INTERNAL MEDICINE

## 2024-01-02 PROCEDURE — G0008 ADMIN INFLUENZA VIRUS VAC: HCPCS | Performed by: INTERNAL MEDICINE

## 2024-01-02 PROCEDURE — 4004F PT TOBACCO SCREEN RCVD TLK: CPT | Performed by: INTERNAL MEDICINE

## 2024-01-02 PROCEDURE — 99213 OFFICE O/P EST LOW 20 MIN: CPT | Performed by: INTERNAL MEDICINE

## 2024-01-02 PROCEDURE — G8482 FLU IMMUNIZE ORDER/ADMIN: HCPCS | Performed by: INTERNAL MEDICINE

## 2024-01-02 PROCEDURE — 90674 CCIIV4 VAC NO PRSV 0.5 ML IM: CPT | Performed by: INTERNAL MEDICINE

## 2024-01-02 PROCEDURE — 3046F HEMOGLOBIN A1C LEVEL >9.0%: CPT | Performed by: INTERNAL MEDICINE

## 2024-01-02 ASSESSMENT — ENCOUNTER SYMPTOMS
BACK PAIN: 1
CHEST TIGHTNESS: 0

## 2024-01-02 NOTE — PROGRESS NOTES
08:43 AM       Lab Results   Component Value Date/Time     12/23/2022 10:30 AM    K 4.5 12/23/2022 10:30 AM     12/23/2022 10:30 AM    CO2 30 12/23/2022 10:30 AM    ANIONGAP 6 12/23/2022 10:30 AM    GLUCOSE 206 12/23/2022 10:30 AM    BUN 37 12/23/2022 10:30 AM    CREATININE 2.10 12/23/2022 10:30 AM    GFRAA 46 09/24/2022 04:29 AM    LABGLOM 27 12/23/2022 10:30 AM    LABGLOM 32 03/31/2022 08:12 AM    CALCIUM 10.1 12/23/2022 10:30 AM    BILITOT 0.4 10/26/2023 11:10 AM    ALT 38 10/26/2023 11:10 AM    AST 26 10/26/2023 11:10 AM    ALKPHOS 100 10/26/2023 11:10 AM    ALKPHOS 70 04/13/2022 10:29 AM    PROT 6.7 10/26/2023 11:10 AM    LABALBU 3.7 10/26/2023 11:10 AM    GLOB 3.0 10/26/2023 11:10 AM    ALBUMIN 1.2 10/26/2023 11:10 AM       Lab Results   Component Value Date/Time    CHOL 173 10/25/2022 07:54 AM    HDL 58 10/25/2022 07:54 AM    TRIG 91 10/25/2022 07:54 AM    LDLCALC 96.8 10/25/2022 07:54 AM    VLDL 16 12/22/2021 07:54 AM       Lab Results   Component Value Date/Time    LABA1C 7.0 10/25/2022 07:54 AM    LABA1C 6.9 08/19/2022 08:36 AM    LABA1C 6.6 07/14/2022 02:02 PM    LABA1C 7.2 03/31/2022 08:12 AM    LABA1C 6.8 09/13/2021 08:15 AM       Lab Results   Component Value Date/Time    TSH 1.130 12/22/2021 07:54 AM    TSH 1.670 11/17/2020 09:58 AM    TSH 1.790 07/15/2020 09:51 AM           Vitals:    01/02/24 1238   BP: 126/66   Site: Left Upper Arm   Position: Sitting   Cuff Size: Small Adult   Pulse: 61   Temp: 97.2 °F (36.2 °C)   TempSrc: Temporal   SpO2: 98%   Weight: 87.5 kg (193 lb)   Height: 1.422 m (4' 8\")     Wt Readings from Last 3 Encounters:   01/02/24 87.5 kg (193 lb)   10/05/23 84.9 kg (187 lb 3.2 oz)   09/26/23 84.8 kg (187 lb)     BP Readings from Last 3 Encounters:   01/02/24 126/66   10/05/23 (!) 92/56   09/26/23 124/80     Physical Exam  Vitals and nursing note reviewed.   Constitutional:       Appearance: Normal appearance. She is obese. She is not ill-appearing.   HENT:      Head:

## 2024-01-02 NOTE — PATIENT INSTRUCTIONS
Results for orders placed or performed in visit on 01/02/24   AMB POC HEMOGLOBIN A1C   Result Value Ref Range    Hemoglobin A1C, POC 7.7 %     No change on diabetic medications.    Agree with Aldactone dosing every other day.    Labs prior to next visit in 3 months.  See orders.

## 2024-01-04 NOTE — TELEPHONE ENCOUNTER
Pt states her Jardiance  10mg and Insulin pens should be sent to [facility], it was sent to [facility].

## 2024-01-05 DIAGNOSIS — E11.21 TYPE 2 DIABETES WITH NEPHROPATHY (HCC): ICD-10-CM

## 2024-01-05 NOTE — TELEPHONE ENCOUNTER
Pt states her Jardiance  10mg and Insulin pens should be sent to [facility], it was sent to [facility].

## 2024-01-05 NOTE — TELEPHONE ENCOUNTER
Refill re-sent to [facility] pharmacy.     Tried to contact pt on 1/5/24 @ 3034- no answer and v/m full.

## 2024-01-09 ENCOUNTER — OFFICE VISIT (OUTPATIENT)
Age: 59
End: 2024-01-09
Payer: MEDICARE

## 2024-01-09 VITALS
WEIGHT: 191.8 LBS | BODY MASS INDEX: 43.15 KG/M2 | HEART RATE: 76 BPM | DIASTOLIC BLOOD PRESSURE: 64 MMHG | SYSTOLIC BLOOD PRESSURE: 94 MMHG | HEIGHT: 56 IN

## 2024-01-09 DIAGNOSIS — I25.3 LEFT VENTRICULAR ANEURYSM: ICD-10-CM

## 2024-01-09 DIAGNOSIS — N18.4 CKD (CHRONIC KIDNEY DISEASE) STAGE 4, GFR 15-29 ML/MIN (HCC): ICD-10-CM

## 2024-01-09 DIAGNOSIS — E78.5 DYSLIPIDEMIA: ICD-10-CM

## 2024-01-09 DIAGNOSIS — I50.22 CHRONIC SYSTOLIC HEART FAILURE (HCC): Primary | ICD-10-CM

## 2024-01-09 DIAGNOSIS — I47.20 VENTRICULAR TACHYCARDIA (HCC): ICD-10-CM

## 2024-01-09 DIAGNOSIS — I10 PRIMARY HYPERTENSION: ICD-10-CM

## 2024-01-09 DIAGNOSIS — I42.8 NICM (NONISCHEMIC CARDIOMYOPATHY) (HCC): ICD-10-CM

## 2024-01-09 PROCEDURE — 99214 OFFICE O/P EST MOD 30 MIN: CPT | Performed by: INTERNAL MEDICINE

## 2024-01-09 PROCEDURE — G8482 FLU IMMUNIZE ORDER/ADMIN: HCPCS | Performed by: INTERNAL MEDICINE

## 2024-01-09 PROCEDURE — 3074F SYST BP LT 130 MM HG: CPT | Performed by: INTERNAL MEDICINE

## 2024-01-09 PROCEDURE — 4004F PT TOBACCO SCREEN RCVD TLK: CPT | Performed by: INTERNAL MEDICINE

## 2024-01-09 PROCEDURE — G8417 CALC BMI ABV UP PARAM F/U: HCPCS | Performed by: INTERNAL MEDICINE

## 2024-01-09 PROCEDURE — 3017F COLORECTAL CA SCREEN DOC REV: CPT | Performed by: INTERNAL MEDICINE

## 2024-01-09 PROCEDURE — 3078F DIAST BP <80 MM HG: CPT | Performed by: INTERNAL MEDICINE

## 2024-01-09 PROCEDURE — G8428 CUR MEDS NOT DOCUMENT: HCPCS | Performed by: INTERNAL MEDICINE

## 2024-01-09 NOTE — PROGRESS NOTES
GLUCOSE 206 12/23/2022 10:30 AM    CALCIUM 10.1 12/23/2022 10:30 AM        Lab Results   Component Value Date    WBC 8.8 12/23/2022    HGB 16.0 (H) 12/23/2022    HCT 50.0 (H) 12/23/2022    MCV 93.8 12/23/2022     12/23/2022       Lab Results   Component Value Date    TSH 1.130 12/22/2021       Lab Results   Component Value Date    LABA1C 7.0 (H) 10/25/2022     Lab Results   Component Value Date     10/25/2022       Lab Results   Component Value Date    CHOL 173 10/25/2022    CHOL 167 07/14/2022    CHOL 215 (H) 12/22/2021     Lab Results   Component Value Date    TRIG 91 10/25/2022    TRIG 74 07/14/2022    TRIG 91 12/22/2021     Lab Results   Component Value Date    HDL 58 10/25/2022    HDL 67 (H) 07/14/2022    HDL 79 12/22/2021     Lab Results   Component Value Date    LDLCALC 96.8 10/25/2022    LDLCALC 85.2 07/14/2022    LDLCALC 120 (H) 12/22/2021     Lab Results   Component Value Date    LABVLDL 18.2 10/25/2022    LABVLDL 14.8 07/14/2022    LABVLDL 14 08/17/2020    VLDL 16 12/22/2021    VLDL 13 06/01/2021    VLDL 13 03/09/2021     Lab Results   Component Value Date    CHOLHDLRATIO 3.0 10/25/2022    CHOLHDLRATIO 2.5 07/14/2022         No valid procedures specified.      I have Independently reviewed prior care notes, any ER records available, cardiac testing, labs and results with the patient and before seeing the patient today.  Also independently reviewed outside records when available.       ASSESSMENT:    Yann was seen today for congestive heart failure and dizziness.    Diagnoses and all orders for this visit:    Chronic systolic heart failure (HCC)    Primary hypertension    Left ventricular aneurysm    NICM (nonischemic cardiomyopathy) (HCC)    Ventricular tachycardia (HCC)    CKD (chronic kidney disease) stage 4, GFR 15-29 ml/min (HCC)    Dyslipidemia          PLAN:     1. CSHF/NICM:  On entresto, coreg, furosemide, aldactone.  Remain on GDMT.  Low salt diet needed.     BP low, some

## 2024-01-12 ENCOUNTER — TELEPHONE (OUTPATIENT)
Age: 59
End: 2024-01-12

## 2024-02-08 ENCOUNTER — OFFICE VISIT (OUTPATIENT)
Age: 59
End: 2024-02-08
Payer: MEDICARE

## 2024-02-08 VITALS
WEIGHT: 190 LBS | SYSTOLIC BLOOD PRESSURE: 106 MMHG | BODY MASS INDEX: 42.74 KG/M2 | HEIGHT: 56 IN | DIASTOLIC BLOOD PRESSURE: 78 MMHG | HEART RATE: 60 BPM

## 2024-02-08 DIAGNOSIS — I20.9 ANGINA, CLASS III (HCC): ICD-10-CM

## 2024-02-08 DIAGNOSIS — I20.0 UNSTABLE ANGINA PECTORIS (HCC): Primary | ICD-10-CM

## 2024-02-08 DIAGNOSIS — I47.20 VENTRICULAR TACHYCARDIA (HCC): ICD-10-CM

## 2024-02-08 PROCEDURE — 3078F DIAST BP <80 MM HG: CPT | Performed by: INTERNAL MEDICINE

## 2024-02-08 PROCEDURE — G8417 CALC BMI ABV UP PARAM F/U: HCPCS | Performed by: INTERNAL MEDICINE

## 2024-02-08 PROCEDURE — 93000 ELECTROCARDIOGRAM COMPLETE: CPT | Performed by: INTERNAL MEDICINE

## 2024-02-08 PROCEDURE — 99214 OFFICE O/P EST MOD 30 MIN: CPT | Performed by: INTERNAL MEDICINE

## 2024-02-08 PROCEDURE — G8427 DOCREV CUR MEDS BY ELIG CLIN: HCPCS | Performed by: INTERNAL MEDICINE

## 2024-02-08 PROCEDURE — 3017F COLORECTAL CA SCREEN DOC REV: CPT | Performed by: INTERNAL MEDICINE

## 2024-02-08 PROCEDURE — G8482 FLU IMMUNIZE ORDER/ADMIN: HCPCS | Performed by: INTERNAL MEDICINE

## 2024-02-08 PROCEDURE — 3074F SYST BP LT 130 MM HG: CPT | Performed by: INTERNAL MEDICINE

## 2024-02-08 PROCEDURE — 4004F PT TOBACCO SCREEN RCVD TLK: CPT | Performed by: INTERNAL MEDICINE

## 2024-02-08 RX ORDER — ASPIRIN 325 MG
325 TABLET ORAL ONCE
OUTPATIENT
Start: 2024-02-08 | End: 2024-02-08

## 2024-02-08 RX ORDER — SODIUM CHLORIDE 9 MG/ML
INJECTION, SOLUTION INTRAVENOUS CONTINUOUS
OUTPATIENT
Start: 2024-02-08

## 2024-02-08 RX ORDER — SODIUM CHLORIDE 0.9 % (FLUSH) 0.9 %
5-40 SYRINGE (ML) INJECTION EVERY 12 HOURS SCHEDULED
OUTPATIENT
Start: 2024-02-08

## 2024-02-08 RX ORDER — LORAZEPAM 0.5 MG/1
0.5 TABLET ORAL
OUTPATIENT
Start: 2024-02-08 | End: 2024-02-09

## 2024-02-08 RX ORDER — SODIUM CHLORIDE 9 MG/ML
INJECTION, SOLUTION INTRAVENOUS PRN
OUTPATIENT
Start: 2024-02-08

## 2024-02-08 RX ORDER — SODIUM CHLORIDE 0.9 % (FLUSH) 0.9 %
5-40 SYRINGE (ML) INJECTION PRN
OUTPATIENT
Start: 2024-02-08

## 2024-02-08 ASSESSMENT — ENCOUNTER SYMPTOMS
GASTROINTESTINAL NEGATIVE: 1
SHORTNESS OF BREATH: 0
ALLERGIC/IMMUNOLOGIC NEGATIVE: 1
EYES NEGATIVE: 1

## 2024-02-08 NOTE — PROGRESS NOTES
History, and Medications were all reviewed with the patient today and updated as necessary.     Current Outpatient Medications   Medication Sig Dispense Refill    empagliflozin (JARDIANCE) 10 MG tablet Take 1 tablet by mouth daily 90 tablet 3    atorvastatin (LIPITOR) 40 MG tablet Take 1 tablet by mouth daily 90 tablet 3    insulin glargine (LANTUS SOLOSTAR) 100 UNIT/ML injection pen Inject 12 Units into the skin nightly 5 Adjustable Dose Pre-filled Pen Syringe 3    furosemide (LASIX) 40 MG tablet Take 1 tablet by mouth 2 times daily 180 tablet 3    sacubitril-valsartan (ENTRESTO) 24-26 MG per tablet Take 1 tablet by mouth 2 times daily 180 tablet 3    amiodarone (CORDARONE) 200 MG tablet TAKE 1 TABLET TWICE DAILY (Patient taking differently: daily) 180 tablet 1    psyllium (METAMUCIL) 28 % packet Take 1 packet by mouth daily Take with full glass of h20 or juice 30 each 2    nitroGLYCERIN (NITROSTAT) 0.4 MG SL tablet PLACE 1 TABLET UNDER THE TONGUE EVERY 5 MINUTES AS NEEDED FOR CHEST PAIN, MAX 3 DOSES 25 tablet 6    calcitRIOL (ROCALTROL) 0.25 MCG capsule Take 1 capsule by mouth daily      acetaminophen (TYLENOL) 500 MG tablet Take 2 tablets by mouth every 6 hours as needed for Pain (breakthrough pain)      aspirin 81 MG EC tablet Take 1 tablet by mouth daily      latanoprost (XALATAN) 0.005 % ophthalmic solution Place 1 drop into both eyes nightly      Insulin Pen Needle 32G X 4 MM MISC Use as directed with Lantus pen 100 each 3    carvedilol (COREG) 6.25 MG tablet TAKE 1 TABLET TWICE DAILY WITH MEALS 180 tablet 3    ELIQUIS 5 MG TABS tablet Take 1 tablet by mouth 2 times daily (Patient taking differently: Take 1 tablet by mouth daily) 180 tablet 3    DULoxetine (CYMBALTA) 20 MG extended release capsule       Continuous Blood Gluc Sensor (DEXCOM G6 SENSOR) MISC Change every 10 days as directed. Dx E11.9 9 each 3    CPAP Machine MISC by Does not apply route      Continuous Blood Gluc  (DEXCOM G6 )

## 2024-02-14 ENCOUNTER — TRANSCRIBE ORDERS (OUTPATIENT)
Dept: SCHEDULING | Age: 59
End: 2024-02-14

## 2024-02-14 DIAGNOSIS — Z12.31 VISIT FOR SCREENING MAMMOGRAM: Primary | ICD-10-CM

## 2024-02-14 NOTE — PROGRESS NOTES
Patient pre-assessment complete for Yann Shelton scheduled for [facility], arrival time 1030. Patient verified using . Patient instructed to bring a list of all home medications on the day of procedure. NPO status reinforced. Patient informed to take a full dose aspirin 325mg  or 81 mg x 4 on the day of procedure. Patient instructed to HOLD Eliquis tonight, jardiance, 1/2 dose of lantus tonight. Instructed they can take all other medications excluding vitamins & supplements. Patient verbalizes understanding of all instructions & denies any questions at this time.

## 2024-02-15 ENCOUNTER — HOSPITAL ENCOUNTER (OUTPATIENT)
Age: 59
Setting detail: OUTPATIENT SURGERY
Discharge: HOME | End: 2024-02-15
Attending: INTERNAL MEDICINE | Admitting: INTERNAL MEDICINE
Payer: MEDICARE

## 2024-02-15 VITALS
HEART RATE: 63 BPM | RESPIRATION RATE: 28 BRPM | DIASTOLIC BLOOD PRESSURE: 99 MMHG | SYSTOLIC BLOOD PRESSURE: 126 MMHG | OXYGEN SATURATION: 94 %

## 2024-02-15 DIAGNOSIS — I20.9 ANGINA, CLASS III (HCC): ICD-10-CM

## 2024-02-15 LAB
ANION GAP SERPL CALC-SCNC: 3 MMOL/L (ref 2–11)
BASOPHILS # BLD: 0 K/UL (ref 0–0.2)
BASOPHILS NFR BLD: 0 % (ref 0–2)
BUN SERPL-MCNC: 28 MG/DL (ref 8–23)
CALCIUM SERPL-MCNC: 9.9 MG/DL (ref 8.3–10.4)
CHLORIDE SERPL-SCNC: 111 MMOL/L (ref 103–113)
CO2 SERPL-SCNC: 27 MMOL/L (ref 21–32)
CREAT SERPL-MCNC: 1.4 MG/DL (ref 0.6–1)
DIFFERENTIAL METHOD BLD: ABNORMAL
EKG ATRIAL RATE: 60 BPM
EKG DIAGNOSIS: NORMAL
EKG P AXIS: 103 DEGREES
EKG P-R INTERVAL: 204 MS
EKG Q-T INTERVAL: 414 MS
EKG QRS DURATION: 104 MS
EKG QTC CALCULATION (BAZETT): 414 MS
EKG R AXIS: 37 DEGREES
EKG T AXIS: 3 DEGREES
EKG VENTRICULAR RATE: 60 BPM
EOSINOPHIL # BLD: 0.3 K/UL (ref 0–0.8)
EOSINOPHIL NFR BLD: 4 % (ref 0.5–7.8)
ERYTHROCYTE [DISTWIDTH] IN BLOOD BY AUTOMATED COUNT: 15.2 % (ref 11.9–14.6)
GLUCOSE SERPL-MCNC: 119 MG/DL (ref 65–100)
HCT VFR BLD AUTO: 37.1 % (ref 35.8–46.3)
HGB BLD-MCNC: 11.4 G/DL (ref 11.7–15.4)
IMM GRANULOCYTES # BLD AUTO: 0 K/UL (ref 0–0.5)
IMM GRANULOCYTES NFR BLD AUTO: 0 % (ref 0–5)
LYMPHOCYTES # BLD: 2.4 K/UL (ref 0.5–4.6)
LYMPHOCYTES NFR BLD: 32 % (ref 13–44)
MAGNESIUM SERPL-MCNC: 2.6 MG/DL (ref 1.8–2.4)
MCH RBC QN AUTO: 27.7 PG (ref 26.1–32.9)
MCHC RBC AUTO-ENTMCNC: 30.7 G/DL (ref 31.4–35)
MCV RBC AUTO: 90 FL (ref 82–102)
MONOCYTES # BLD: 0.6 K/UL (ref 0.1–1.3)
MONOCYTES NFR BLD: 8 % (ref 4–12)
NEUTS SEG # BLD: 4.2 K/UL (ref 1.7–8.2)
NEUTS SEG NFR BLD: 56 % (ref 43–78)
NRBC # BLD: 0 K/UL (ref 0–0.2)
PLATELET # BLD AUTO: 190 K/UL (ref 150–450)
PMV BLD AUTO: 11.4 FL (ref 9.4–12.3)
POTASSIUM SERPL-SCNC: 4.3 MMOL/L (ref 3.5–5.1)
RBC # BLD AUTO: 4.12 M/UL (ref 4.05–5.2)
SODIUM SERPL-SCNC: 141 MMOL/L (ref 136–146)
WBC # BLD AUTO: 7.5 K/UL (ref 4.3–11.1)

## 2024-02-15 PROCEDURE — 2709999900 HC NON-CHARGEABLE SUPPLY: Performed by: INTERNAL MEDICINE

## 2024-02-15 PROCEDURE — 99152 MOD SED SAME PHYS/QHP 5/>YRS: CPT | Performed by: INTERNAL MEDICINE

## 2024-02-15 PROCEDURE — 76937 US GUIDE VASCULAR ACCESS: CPT

## 2024-02-15 PROCEDURE — 6370000000 HC RX 637 (ALT 250 FOR IP): Performed by: INTERNAL MEDICINE

## 2024-02-15 PROCEDURE — 83735 ASSAY OF MAGNESIUM: CPT

## 2024-02-15 PROCEDURE — C1894 INTRO/SHEATH, NON-LASER: HCPCS | Performed by: INTERNAL MEDICINE

## 2024-02-15 PROCEDURE — 93010 ELECTROCARDIOGRAM REPORT: CPT | Performed by: INTERNAL MEDICINE

## 2024-02-15 PROCEDURE — 93458 L HRT ARTERY/VENTRICLE ANGIO: CPT | Performed by: INTERNAL MEDICINE

## 2024-02-15 PROCEDURE — 93005 ELECTROCARDIOGRAM TRACING: CPT | Performed by: INTERNAL MEDICINE

## 2024-02-15 PROCEDURE — 85025 COMPLETE CBC W/AUTO DIFF WBC: CPT

## 2024-02-15 PROCEDURE — 2500000003 HC RX 250 WO HCPCS: Performed by: INTERNAL MEDICINE

## 2024-02-15 PROCEDURE — C1769 GUIDE WIRE: HCPCS | Performed by: INTERNAL MEDICINE

## 2024-02-15 PROCEDURE — 6360000002 HC RX W HCPCS: Performed by: INTERNAL MEDICINE

## 2024-02-15 PROCEDURE — 80048 BASIC METABOLIC PNL TOTAL CA: CPT

## 2024-02-15 RX ORDER — DIAZEPAM 5 MG/1
5 TABLET ORAL EVERY 6 HOURS PRN
Status: DISCONTINUED | OUTPATIENT
Start: 2024-02-15 | End: 2024-02-15 | Stop reason: HOSPADM

## 2024-02-15 RX ORDER — SODIUM CHLORIDE 0.9 % (FLUSH) 0.9 %
5-40 SYRINGE (ML) INJECTION EVERY 12 HOURS SCHEDULED
Status: CANCELLED | OUTPATIENT
Start: 2024-02-15

## 2024-02-15 RX ORDER — SODIUM CHLORIDE 0.9 % (FLUSH) 0.9 %
5-40 SYRINGE (ML) INJECTION PRN
Status: CANCELLED | OUTPATIENT
Start: 2024-02-15

## 2024-02-15 RX ORDER — ASPIRIN 81 MG/1
162 TABLET, CHEWABLE ORAL DAILY
Status: DISCONTINUED | OUTPATIENT
Start: 2024-02-15 | End: 2024-02-15 | Stop reason: HOSPADM

## 2024-02-15 RX ORDER — HEPARIN SODIUM 200 [USP'U]/100ML
INJECTION, SOLUTION INTRAVENOUS CONTINUOUS PRN
Status: COMPLETED | OUTPATIENT
Start: 2024-02-15 | End: 2024-02-15

## 2024-02-15 RX ORDER — MIDAZOLAM HYDROCHLORIDE 1 MG/ML
INJECTION INTRAMUSCULAR; INTRAVENOUS PRN
Status: DISCONTINUED | OUTPATIENT
Start: 2024-02-15 | End: 2024-02-15 | Stop reason: HOSPADM

## 2024-02-15 RX ORDER — ACETAMINOPHEN 325 MG/1
650 TABLET ORAL EVERY 4 HOURS PRN
Status: CANCELLED | OUTPATIENT
Start: 2024-02-15

## 2024-02-15 RX ORDER — SODIUM CHLORIDE 9 MG/ML
INJECTION, SOLUTION INTRAVENOUS PRN
Status: CANCELLED | OUTPATIENT
Start: 2024-02-15

## 2024-02-15 RX ORDER — ASPIRIN 325 MG
325 TABLET, DELAYED RELEASE (ENTERIC COATED) ORAL ONCE
Status: DISCONTINUED | OUTPATIENT
Start: 2024-02-15 | End: 2024-02-15

## 2024-02-15 RX ORDER — LIDOCAINE HYDROCHLORIDE 10 MG/ML
INJECTION, SOLUTION INFILTRATION; PERINEURAL PRN
Status: DISCONTINUED | OUTPATIENT
Start: 2024-02-15 | End: 2024-02-15 | Stop reason: HOSPADM

## 2024-02-15 RX ORDER — SODIUM CHLORIDE 9 MG/ML
INJECTION, SOLUTION INTRAVENOUS CONTINUOUS
Status: DISCONTINUED | OUTPATIENT
Start: 2024-02-15 | End: 2024-02-15 | Stop reason: HOSPADM

## 2024-02-15 RX ADMIN — ASPIRIN 81 MG 162 MG: 81 TABLET ORAL at 10:00

## 2024-02-15 RX ADMIN — DIAZEPAM 5 MG: 5 TABLET ORAL at 10:24

## 2024-02-15 ASSESSMENT — PAIN SCALES - GENERAL
PAINLEVEL_OUTOF10: 0
PAINLEVEL_OUTOF10: 0

## 2024-02-15 NOTE — PROGRESS NOTES
US Guided PIV access-    Ultrasound was used to find the vein which was compressible and without any ultrasound features of an artery or nerve bundle. Skin was cleaned and disinfected prior to IV puncture.  Under real-time ultrasound guidance peripheral access was obtained in the right cephalic using 20 G 1.75\" Peripheral IV catheter after 1 attempt(s). Blood return was present and IV flushed without difficulty with no clinical signs of infiltration. IV dressing applied and no immediate complications noted. Patient tolerated the procedure well.        US Guided PIV access-    Ultrasound was used to find the vein which was compressible and without any ultrasound features of an artery or nerve bundle. Skin was cleaned and disinfected prior to IV puncture.  Under real-time ultrasound guidance peripheral access was obtained in the right forearm using 20 G 1.75\" Peripheral IV catheter after 1 attempt(s). Blood return was present and IV flushed without difficulty with no clinical signs of infiltration. IV dressing applied and no immediate complications noted. Patient tolerated the procedure well.

## 2024-02-15 NOTE — PROGRESS NOTES
Patient received to CPRU room # 15  Ambulatory from [facility]. Patient scheduled for [facility] today with Dr Osman. Procedure reviewed & questions answered, voiced good understanding consent obtained & placed on chart. All medications and medical history reviewed. Will prep patient per orders. Patient & family updated on plan of care.      The patient has a fraility score of 3-MANAGING WELL, based on ambulation without assistance.    Patient took Aspirin 162mg today at 0630 prior to arrival.  162mg aspirin given upon arrival to room

## 2024-02-15 NOTE — PROGRESS NOTES
1400-patient ambulated to bath room with no difficulties. Right radial  with no bleeding or hematoma.  Rband removed and sterile dressing applied to site.    1405- all discharge instructions explained to patient and family. Time allowed for questions no. No questions and concerns at this time.    1410- right radial checked. Site with no redness or bleeding. pt discharged to home via Wheelchair with family.

## 2024-02-15 NOTE — PROGRESS NOTES
TRANSFER - IN REPORT:    Verbal report received from Yeison PERRY on Yann Shelton  being received from [facility] for routine progression of patient care      Report consisted of patient's Situation, Background, Assessment and   Recommendations(SBAR).     Information from the following report(s) Nurse Handoff Report was reviewed with the receiving nurse.    Opportunity for questions and clarification was provided.      Assessment completed upon patient's arrival to unit and care assumed.

## 2024-02-15 NOTE — PROGRESS NOTES
TRANSFER - OUT REPORT:    [facility] with Dr. Osman  Access: right radial   No intervention    TR band applied with 12 ml in band   No bleeding or hematoma site soft    MAR  2 mg versed  25 mcg Fentanyl   5,000 units heparin     Verbal report given to RN on Yann Shelton  being transferred to CPRU for routine progression of patient care       Report consisted of patient's Situation, Background, Assessment and Recommendations(SBAR).     Information from the following report(s) Nurse Handoff Report and MAR was reviewed with the receiving nurse.    Opportunity for questions and clarification was provided.      Patient transported with:  Registered Nurse

## 2024-02-15 NOTE — DISCHARGE INSTRUCTIONS
HEART CATHETERIZATION/ANGIOGRAPHY DISCHARGE INSTRUCTIONS    Check puncture site frequently for swelling or bleeding. If there is any bleeding, apply pressure over the area with a clean towel or washcloth and call 911. Notify your doctor for any redness, swelling, drainage, or oozing from the puncture site. Notify your doctor for any fever or chills.  If the extremity becomes cold, numb, or painful call [facility] at [phone].  Activity should be limited for the next 48 hours. No heavy lifting, pushing, pulling  or strenuous activity for 48 hours. No heavy lifting (anything over 10 pounds) for 3 days.  You may resume your usual diet. Drink more fluids than usual.  Have a responsible person drive you home and stay with you for at least 24 hours after your heart catheterization/angiography.  No driving for 24 hours.  You may remove bandage from your Right Wrist in 24 hours. You may shower in 24 hours. No tub baths, hot tubs, or swimming for 1 week. Do not place any lotions, creams, powders, or ointments over puncture site for 1 week. You may place a clean band-aid over the puncture site each day for 5 days. Change daily.  Continue all medications as prescribed.        Sedation for a Medical Procedure: Care Instructions     You were given a sedative medication during your visit. While many of the effects will have worn   off before you leave; you may continue to feel some effects for several hours.      Common side effects from sedation include:  Feeling sleepy. (Your doctors and nurses will make sure you are not too sleepy to go home.)  Nausea and vomiting. This usually does not last long.  Feeling tired.     How can you care for yourself at home?  Activity    Don't do anything for 24 hours that requires attention to detail. It takes time for the medicine effects to completely wear off.     Do not make important legal decisions for 24 hours.     Do not sign any legal documents for 24 hours.     Do not drink

## 2024-03-12 ENCOUNTER — TELEPHONE (OUTPATIENT)
Age: 59
End: 2024-03-12

## 2024-03-12 DIAGNOSIS — Z45.02 AICD AT END OF BATTERY LIFE: ICD-10-CM

## 2024-03-12 DIAGNOSIS — I47.20 VENTRICULAR TACHYCARDIA (HCC): Primary | ICD-10-CM

## 2024-03-12 NOTE — TELEPHONE ENCOUNTER
BIO DC ICD at [facility]. Recent hearth cath with Dr. Osman. Denies any current issues/infections. Stable linox lead. Hold AC 2 days prior.    Okay to schedule gen change?

## 2024-03-15 PROBLEM — Z45.02 AICD AT END OF BATTERY LIFE: Status: ACTIVE | Noted: 2024-03-12

## 2024-03-21 LAB
ANION GAP SERPL CALC-SCNC: 5 MMOL/L (ref 2–11)
BUN SERPL-MCNC: 25 MG/DL (ref 8–23)
CALCIUM SERPL-MCNC: 9.6 MG/DL (ref 8.3–10.4)
CHLORIDE SERPL-SCNC: 110 MMOL/L (ref 103–113)
CO2 SERPL-SCNC: 28 MMOL/L (ref 21–32)
CREAT SERPL-MCNC: 1.3 MG/DL (ref 0.6–1)
CREAT UR-MCNC: 54 MG/DL
GLUCOSE SERPL-MCNC: 70 MG/DL (ref 65–100)
Lab: NORMAL
Lab: NORMAL
MAGNESIUM SERPL-MCNC: 2.3 MG/DL (ref 1.8–2.4)
PHOSPHATE SERPL-MCNC: 3.8 MG/DL (ref 2.3–3.7)
POTASSIUM SERPL-SCNC: 3.7 MMOL/L (ref 3.5–5.1)
PROT UR-MCNC: 10 MG/DL
PROT/CREAT UR-RTO: 0.2
REFERENCE LAB: NORMAL
SODIUM SERPL-SCNC: 143 MMOL/L (ref 136–146)

## 2024-03-27 LAB
Lab: NORMAL
Lab: NORMAL
REFERENCE LAB: NORMAL

## 2024-04-02 DIAGNOSIS — I47.20 VENTRICULAR TACHYCARDIA (HCC): ICD-10-CM

## 2024-04-02 LAB
ANION GAP SERPL CALC-SCNC: 5 MMOL/L (ref 2–11)
BASOPHILS # BLD: 0 K/UL (ref 0–0.2)
BASOPHILS NFR BLD: 1 % (ref 0–2)
BUN SERPL-MCNC: 31 MG/DL (ref 8–23)
CALCIUM SERPL-MCNC: 9.7 MG/DL (ref 8.3–10.4)
CHLORIDE SERPL-SCNC: 110 MMOL/L (ref 103–113)
CO2 SERPL-SCNC: 28 MMOL/L (ref 21–32)
CREAT SERPL-MCNC: 1.5 MG/DL (ref 0.6–1)
DIFFERENTIAL METHOD BLD: ABNORMAL
EOSINOPHIL # BLD: 0.3 K/UL (ref 0–0.8)
EOSINOPHIL NFR BLD: 4 % (ref 0.5–7.8)
ERYTHROCYTE [DISTWIDTH] IN BLOOD BY AUTOMATED COUNT: 16.4 % (ref 11.9–14.6)
GLUCOSE SERPL-MCNC: 134 MG/DL (ref 65–100)
HCT VFR BLD AUTO: 35.5 % (ref 35.8–46.3)
HGB BLD-MCNC: 10.6 G/DL (ref 11.7–15.4)
IMM GRANULOCYTES # BLD AUTO: 0 K/UL (ref 0–0.5)
IMM GRANULOCYTES NFR BLD AUTO: 0 % (ref 0–5)
INR PPP: 1.1
LYMPHOCYTES # BLD: 2.3 K/UL (ref 0.5–4.6)
LYMPHOCYTES NFR BLD: 32 % (ref 13–44)
MAGNESIUM SERPL-MCNC: 2.2 MG/DL (ref 1.8–2.4)
MCH RBC QN AUTO: 26 PG (ref 26.1–32.9)
MCHC RBC AUTO-ENTMCNC: 29.9 G/DL (ref 31.4–35)
MCV RBC AUTO: 87 FL (ref 82–102)
MONOCYTES # BLD: 0.5 K/UL (ref 0.1–1.3)
MONOCYTES NFR BLD: 7 % (ref 4–12)
NEUTS SEG # BLD: 4.1 K/UL (ref 1.7–8.2)
NEUTS SEG NFR BLD: 57 % (ref 43–78)
NRBC # BLD: 0 K/UL (ref 0–0.2)
PLATELET # BLD AUTO: 178 K/UL (ref 150–450)
PMV BLD AUTO: 11.9 FL (ref 9.4–12.3)
POTASSIUM SERPL-SCNC: 3.5 MMOL/L (ref 3.5–5.1)
PROTHROMBIN TIME: 14.8 SEC (ref 11.3–14.9)
RBC # BLD AUTO: 4.08 M/UL (ref 4.05–5.2)
SODIUM SERPL-SCNC: 143 MMOL/L (ref 136–146)
WBC # BLD AUTO: 7.2 K/UL (ref 4.3–11.1)

## 2024-04-09 NOTE — PROGRESS NOTES
Patient pre-assessment complete for ICD Change out with Dr. Tan scheduled for 4/10/24 at 1:30pm, arrival time 1130. Patient verified using . Patient instructed to bring a list of home medications on the day of procedure. NPO status reinforced. Patient instructed to follow EP Information Sheet given at appointment. Patient verbalizes understanding of all instructions & denies any questions at this time.

## 2024-04-10 ENCOUNTER — ANESTHESIA (OUTPATIENT)
Dept: CARDIAC CATH/INVASIVE PROCEDURES | Age: 59
End: 2024-04-10
Payer: MEDICARE

## 2024-04-10 ENCOUNTER — ANESTHESIA EVENT (OUTPATIENT)
Dept: CARDIAC CATH/INVASIVE PROCEDURES | Age: 59
End: 2024-04-10
Payer: MEDICARE

## 2024-04-10 ENCOUNTER — HOSPITAL ENCOUNTER (OUTPATIENT)
Age: 59
Setting detail: OUTPATIENT SURGERY
Discharge: HOME | End: 2024-04-10
Attending: INTERNAL MEDICINE | Admitting: INTERNAL MEDICINE
Payer: MEDICARE

## 2024-04-10 VITALS
TEMPERATURE: 98 F | BODY MASS INDEX: 43.41 KG/M2 | HEART RATE: 78 BPM | SYSTOLIC BLOOD PRESSURE: 126 MMHG | HEIGHT: 56 IN | OXYGEN SATURATION: 92 % | RESPIRATION RATE: 18 BRPM | WEIGHT: 193 LBS | DIASTOLIC BLOOD PRESSURE: 87 MMHG

## 2024-04-10 DIAGNOSIS — Z45.02 AICD AT END OF BATTERY LIFE: ICD-10-CM

## 2024-04-10 LAB
ECHO BSA: 1.86 M2
EKG ATRIAL RATE: 62 BPM
EKG DIAGNOSIS: NORMAL
EKG P AXIS: 81 DEGREES
EKG P-R INTERVAL: 234 MS
EKG Q-T INTERVAL: 470 MS
EKG QRS DURATION: 112 MS
EKG QTC CALCULATION (BAZETT): 477 MS
EKG R AXIS: -2 DEGREES
EKG T AXIS: -45 DEGREES
EKG VENTRICULAR RATE: 62 BPM
GLUCOSE BLD STRIP.AUTO-MCNC: 133 MG/DL (ref 65–100)
SERVICE CMNT-IMP: ABNORMAL

## 2024-04-10 PROCEDURE — C1721 AICD, DUAL CHAMBER: HCPCS | Performed by: INTERNAL MEDICINE

## 2024-04-10 PROCEDURE — 2720000010 HC SURG SUPPLY STERILE: Performed by: INTERNAL MEDICINE

## 2024-04-10 PROCEDURE — 6360000002 HC RX W HCPCS: Performed by: ANESTHESIOLOGY

## 2024-04-10 PROCEDURE — 6360000002 HC RX W HCPCS: Performed by: INTERNAL MEDICINE

## 2024-04-10 PROCEDURE — 6360000002 HC RX W HCPCS: Performed by: NURSE ANESTHETIST, CERTIFIED REGISTERED

## 2024-04-10 PROCEDURE — 2500000003 HC RX 250 WO HCPCS: Performed by: NURSE ANESTHETIST, CERTIFIED REGISTERED

## 2024-04-10 PROCEDURE — 82962 GLUCOSE BLOOD TEST: CPT

## 2024-04-10 PROCEDURE — 33228 REMV&REPLC PM GEN DUAL LEAD: CPT | Performed by: INTERNAL MEDICINE

## 2024-04-10 PROCEDURE — 93010 ELECTROCARDIOGRAM REPORT: CPT | Performed by: INTERNAL MEDICINE

## 2024-04-10 PROCEDURE — 2580000003 HC RX 258: Performed by: INTERNAL MEDICINE

## 2024-04-10 PROCEDURE — A4217 STERILE WATER/SALINE, 500 ML: HCPCS | Performed by: INTERNAL MEDICINE

## 2024-04-10 PROCEDURE — 2500000003 HC RX 250 WO HCPCS: Performed by: REGISTERED NURSE

## 2024-04-10 PROCEDURE — 3700000000 HC ANESTHESIA ATTENDED CARE: Performed by: INTERNAL MEDICINE

## 2024-04-10 PROCEDURE — 33263 RMVL & RPLCMT DFB GEN 2 LEAD: CPT | Performed by: ANESTHESIOLOGY

## 2024-04-10 PROCEDURE — 3700000001 HC ADD 15 MINUTES (ANESTHESIA): Performed by: INTERNAL MEDICINE

## 2024-04-10 PROCEDURE — 33263 RMVL & RPLCMT DFB GEN 2 LEAD: CPT | Performed by: INTERNAL MEDICINE

## 2024-04-10 PROCEDURE — 2709999900 HC NON-CHARGEABLE SUPPLY: Performed by: INTERNAL MEDICINE

## 2024-04-10 PROCEDURE — 2580000003 HC RX 258: Performed by: ANESTHESIOLOGY

## 2024-04-10 PROCEDURE — 93005 ELECTROCARDIOGRAM TRACING: CPT | Performed by: INTERNAL MEDICINE

## 2024-04-10 PROCEDURE — 2500000003 HC RX 250 WO HCPCS: Performed by: INTERNAL MEDICINE

## 2024-04-10 DEVICE — IMPLANTABLE DEVICE
Type: IMPLANTABLE DEVICE | Status: FUNCTIONAL
Brand: ILIVIA NEO 7 DR-T DF-1 PROMRI

## 2024-04-10 RX ORDER — FENTANYL CITRATE 50 UG/ML
100 INJECTION, SOLUTION INTRAMUSCULAR; INTRAVENOUS
Status: DISCONTINUED | OUTPATIENT
Start: 2024-04-10 | End: 2024-04-10 | Stop reason: HOSPADM

## 2024-04-10 RX ORDER — MIDAZOLAM HYDROCHLORIDE 2 MG/2ML
2 INJECTION, SOLUTION INTRAMUSCULAR; INTRAVENOUS
Status: DISCONTINUED | OUTPATIENT
Start: 2024-04-10 | End: 2024-04-10 | Stop reason: HOSPADM

## 2024-04-10 RX ORDER — LIDOCAINE HYDROCHLORIDE 20 MG/ML
INJECTION, SOLUTION EPIDURAL; INFILTRATION; INTRACAUDAL; PERINEURAL PRN
Status: DISCONTINUED | OUTPATIENT
Start: 2024-04-10 | End: 2024-04-10 | Stop reason: SDUPTHER

## 2024-04-10 RX ORDER — HYDROMORPHONE HYDROCHLORIDE 2 MG/ML
0.5 INJECTION, SOLUTION INTRAMUSCULAR; INTRAVENOUS; SUBCUTANEOUS EVERY 10 MIN PRN
Status: DISCONTINUED | OUTPATIENT
Start: 2024-04-10 | End: 2024-04-10 | Stop reason: HOSPADM

## 2024-04-10 RX ORDER — FENTANYL CITRATE 50 UG/ML
25 INJECTION, SOLUTION INTRAMUSCULAR; INTRAVENOUS EVERY 5 MIN PRN
Status: DISCONTINUED | OUTPATIENT
Start: 2024-04-10 | End: 2024-04-10 | Stop reason: HOSPADM

## 2024-04-10 RX ORDER — METOCLOPRAMIDE HYDROCHLORIDE 5 MG/ML
10 INJECTION INTRAMUSCULAR; INTRAVENOUS
Status: CANCELLED | OUTPATIENT
Start: 2024-04-10 | End: 2024-04-11

## 2024-04-10 RX ORDER — NALOXONE HYDROCHLORIDE 0.4 MG/ML
INJECTION, SOLUTION INTRAMUSCULAR; INTRAVENOUS; SUBCUTANEOUS PRN
Status: CANCELLED | OUTPATIENT
Start: 2024-04-10

## 2024-04-10 RX ORDER — EPHEDRINE SULFATE 5 MG/ML
INJECTION INTRAVENOUS PRN
Status: DISCONTINUED | OUTPATIENT
Start: 2024-04-10 | End: 2024-04-10 | Stop reason: SDUPTHER

## 2024-04-10 RX ORDER — SCOLOPAMINE TRANSDERMAL SYSTEM 1 MG/1
1 PATCH, EXTENDED RELEASE TRANSDERMAL
Status: DISCONTINUED | OUTPATIENT
Start: 2024-04-10 | End: 2024-04-10 | Stop reason: HOSPADM

## 2024-04-10 RX ORDER — SODIUM CHLORIDE, SODIUM LACTATE, POTASSIUM CHLORIDE, CALCIUM CHLORIDE 600; 310; 30; 20 MG/100ML; MG/100ML; MG/100ML; MG/100ML
INJECTION, SOLUTION INTRAVENOUS CONTINUOUS
Status: DISCONTINUED | OUTPATIENT
Start: 2024-04-10 | End: 2024-04-10 | Stop reason: HOSPADM

## 2024-04-10 RX ORDER — PROPOFOL 10 MG/ML
INJECTION, EMULSION INTRAVENOUS PRN
Status: DISCONTINUED | OUTPATIENT
Start: 2024-04-10 | End: 2024-04-10 | Stop reason: SDUPTHER

## 2024-04-10 RX ORDER — DOXYCYCLINE HYCLATE 100 MG
100 TABLET ORAL 2 TIMES DAILY
Qty: 10 TABLET | Refills: 0 | Status: SHIPPED | OUTPATIENT
Start: 2024-04-10 | End: 2024-04-15

## 2024-04-10 RX ORDER — ONDANSETRON 2 MG/ML
4 INJECTION INTRAMUSCULAR; INTRAVENOUS
Status: CANCELLED | OUTPATIENT
Start: 2024-04-10 | End: 2024-04-11

## 2024-04-10 RX ORDER — DIPHENHYDRAMINE HYDROCHLORIDE 50 MG/ML
12.5 INJECTION INTRAMUSCULAR; INTRAVENOUS
Status: CANCELLED | OUTPATIENT
Start: 2024-04-10 | End: 2024-04-11

## 2024-04-10 RX ORDER — SODIUM CHLORIDE, SODIUM LACTATE, POTASSIUM CHLORIDE, CALCIUM CHLORIDE 600; 310; 30; 20 MG/100ML; MG/100ML; MG/100ML; MG/100ML
INJECTION, SOLUTION INTRAVENOUS CONTINUOUS
Status: CANCELLED | OUTPATIENT
Start: 2024-04-10

## 2024-04-10 RX ORDER — OXYCODONE HYDROCHLORIDE 5 MG/1
5 TABLET ORAL
Status: CANCELLED | OUTPATIENT
Start: 2024-04-10 | End: 2024-04-11

## 2024-04-10 RX ORDER — GLYCOPYRROLATE 0.2 MG/ML
INJECTION INTRAMUSCULAR; INTRAVENOUS PRN
Status: DISCONTINUED | OUTPATIENT
Start: 2024-04-10 | End: 2024-04-10 | Stop reason: SDUPTHER

## 2024-04-10 RX ORDER — SODIUM CHLORIDE 0.9 % (FLUSH) 0.9 %
5-40 SYRINGE (ML) INJECTION EVERY 12 HOURS SCHEDULED
Status: DISCONTINUED | OUTPATIENT
Start: 2024-04-10 | End: 2024-04-10 | Stop reason: HOSPADM

## 2024-04-10 RX ADMIN — SODIUM CHLORIDE, SODIUM LACTATE, POTASSIUM CHLORIDE, AND CALCIUM CHLORIDE: 600; 310; 30; 20 INJECTION, SOLUTION INTRAVENOUS at 13:42

## 2024-04-10 RX ADMIN — PROPOFOL 140 MCG/KG/MIN: 10 INJECTION, EMULSION INTRAVENOUS at 13:47

## 2024-04-10 RX ADMIN — HYDROMORPHONE HYDROCHLORIDE 0.5 MG: 2 INJECTION, SOLUTION INTRAMUSCULAR; INTRAVENOUS; SUBCUTANEOUS at 15:00

## 2024-04-10 RX ADMIN — LIDOCAINE HYDROCHLORIDE 40 MG: 20 INJECTION, SOLUTION EPIDURAL; INFILTRATION; INTRACAUDAL; PERINEURAL at 13:45

## 2024-04-10 RX ADMIN — GLYCOPYRROLATE 0.1 MG: 0.2 INJECTION INTRAMUSCULAR; INTRAVENOUS at 13:48

## 2024-04-10 RX ADMIN — EPHEDRINE SULFATE 10 MG: 5 INJECTION INTRAVENOUS at 14:00

## 2024-04-10 RX ADMIN — HYDROMORPHONE HYDROCHLORIDE 0.5 MG: 2 INJECTION, SOLUTION INTRAMUSCULAR; INTRAVENOUS; SUBCUTANEOUS at 14:48

## 2024-04-10 RX ADMIN — Medication 2 G: at 13:59

## 2024-04-10 RX ADMIN — PROPOFOL 50 MG: 10 INJECTION, EMULSION INTRAVENOUS at 13:45

## 2024-04-10 ASSESSMENT — PAIN DESCRIPTION - DESCRIPTORS: DESCRIPTORS: ACHING

## 2024-04-10 ASSESSMENT — PAIN DESCRIPTION - ORIENTATION: ORIENTATION: LEFT

## 2024-04-10 ASSESSMENT — PAIN SCALES - GENERAL
PAINLEVEL_OUTOF10: 7
PAINLEVEL_OUTOF10: 7

## 2024-04-10 ASSESSMENT — PAIN DESCRIPTION - LOCATION: LOCATION: ARM;SHOULDER

## 2024-04-10 NOTE — ANESTHESIA POSTPROCEDURE EVALUATION
Department of Anesthesiology  Postprocedure Note    Patient: Yann Shelton  MRN: [id number]  YOB: 1965  Date of evaluation: 4/10/2024    Procedure Summary       Date: 04/10/24 Room / Location: [place] [address] ALL EVENTS / [facility] CARDIAC CATH LAB    Anesthesia Start: 1337 Anesthesia Stop: 1442    Procedure: Remove & replace ICD dual leads (Left) Diagnosis:       AICD at end of battery life      (AICD at end of battery life [Z45.02])    Providers: Michael Tan MD Responsible Provider: Tuan Smith MD    Anesthesia Type: TIVA ASA Status: 3            Anesthesia Type: TIVA    Iman Phase I: Iman Score: 10    Iman Phase II:      Anesthesia Post Evaluation    Patient location during evaluation: PACU  Patient participation: complete - patient participated  Level of consciousness: awake  Pain score: 1  Airway patency: patent  Nausea & Vomiting: no nausea and no vomiting  Cardiovascular status: blood pressure returned to baseline and hemodynamically stable  Respiratory status: acceptable  Hydration status: euvolemic  Multimodal analgesia pain management approach  Pain management: adequate    There were no known notable events for this encounter.   minor surgery planned

## 2024-04-10 NOTE — PROGRESS NOTES
present.          ASSESSMENT:  (Medical Decision Making)       ICD-10-CM    1. LORI on CPAP  G47.33 Patient is using, compliant and benefiting from Pap therapy. Continue current settings as AHI is down to 3.5 events per hour.        2. Obesity, Class III, BMI 40-49.9 (morbid obesity) ([facility])  E66.01 Patient can lose weight including ambulating 8-10,000 steps.  Can Build Up Slowly as tolerated to this goal.    Also modifying dietary intake with decrease carbohydrates and sweets. Told to use avoid the P's --> Pizza, Pasta, Pastry, etc.  Increase fruits, vegetables, and lean meats e.g. Turkey, chicken or game meat. Patient is aware the goal is to consume less than 2000 brenda/day, since patient is a nondiabetic.    We discussed using the [employer] LOSE IT to count calories. Patient can police themselves.     If the future, if still having issues can use a more regimented diet e.g. [place], Atkins, Optavia, etc. Clinical correlation suggested.              PLAN:    Continue CPAP 12 cm H2O with nightly compliance  [facility] supplies ordered with [facility]  Recommendations as above  Follow-up in 1 year or sooner if needed    Orders Placed This Encounter   Procedures    DME - DURABLE MEDICAL EQUIPMENT     [facility]  Phone: [address]  [facility] [address]  Dept: [phone]      Patient Name: Yann Shelton  : 1965  Gender: female  Address: [address] [place] [address]  Patient phone: [phone] (home) [phone] (work)      Primary Insurance: Payor: [employer] MEDICARE / Plan: [employer] CHOICE-PPO MEDICARE / Product Type: *No Product type* /   Subscriber ID: [id number] - (Medicare Managed)      AMB Supply Order  Order Details     DME Location: [facility]   Order Date: 2024   The primary encounter diagnosis was LORI on CPAP. A diagnosis of Obesity, Class III, BMI 40-49.9 (morbid obesity) ([facility]) was also pertinent to this visit.             (  X   )Supplies

## 2024-04-10 NOTE — PROGRESS NOTES
Discharge instructions given per orders, voiced good understanding of incision care, medications & follow up care. Denies any questions

## 2024-04-10 NOTE — PROCEDURES
: Michael Tan MD, MS    REFERRING: Kush Schmitt DO    Pre-Procedure Diagnosis  1. Chronic systolic heart failure, ejection fraction of 30-35%  2. Nonischemic dilated cardiomyopathy.  3. Secondary Prevention  4. ICD generator ZACKARY    Procedure Performed  1. Dual Chamber ICD Gen Change    Anesthesia: Monitor Anesthesia Care     Estimated Blood Loss: Less than 10 mL     Specimens: * No specimens in log *     Complications: None     Patient Information and Indications: The procedure, indications, risks, benefits, and alternatives were discussed with the patient and family members, who desired to proceed after questions were answered and informed consent was documented.     Procedure: After informed consent was obtained, the patient was brought to the Electrophysiology Laboratory in a fasting state and was prepped and draped in sterile fashion. Prophylactic antibiotic was administered prior to skin incision Conscious sedation was administered with continuous oxygen saturation measurement and blood pressure measurement by Anesthesia.  Local anesthetic (sensorcaine) was delivered to the left pectoral region and an incision was made directly over the prior surgical scar. The subcutaneous pocket was opened using blunt dissection and Bovie electrocautery, and adequate hemostasis was established. The device was freed from overlying fibrotic tissue and the leads freed to give enough slack for device exchange. The leads were individually removed from the old generator and tested using the PSA revealing excellent pacing parameters.  The lead pins were then cleaned with antibiotic soaked gauze, dried gently, and attached to a new ICD generator. Pins were directly observed to pass the tip electrode, and the ring hex wrench screws were secured, and leads tug tested. The device and leads were gently positioned within the pocket. The pocket was irrigated copiously with a saline antimicrobial solution prior to device

## 2024-04-10 NOTE — H&P
and then advance the oblation catheter easily across the aortic valve. We created LV geometry and substrate map using 3-D reconstruction in the [employer] CARTO electroanatomic mapping system. We collected simultaneous FAM and voltage mapping data.    We noted a large area of scar with aneurysmal area in the basal inferior septum, basal inferior wall and inferolateral wall. VT1 was spontaneously induced during catheter manipulation in the LV. This was hemodynamically tolerated briefly but required cardioversion later. VT2 and VT3 were also spontaneously induced during catheter manipulation in the LV and were not tolerated hemodynamically and required cardioversion. There were areas of border zone of voltage septally and inferiorly in the LV as well as posteriorly. There were areas in the middle of the scar which did not capture with high output pacing. We pacemapped multiple areas in the scar and border zones but the best pace map obtained was about 80% for VTs. There was areas with fractionated signals and late potentials in the border zone and had VT induction with catheter maniupulation in those area. Therefore we targeted this area for ablation. Posterioly we induced VT2 during catheter manipulation so this area was targeted as well. Septally the pacemapping was better, but we noted still with RBBB pattern basal septally which did not match well for VT1. We applied RF lesions to the border zone targeting late potentials and fractionated signals and VT1 was not inducible. After targeting the areas with delayed conduction, we switched to RV mapping. We transferred the ablation catheter into the RV via the femoral venous access. Mapping basal septally appositionally to the LV lesions had spontaneous ectopy matching 97% for the VT1. We targeted this for ablation as well. VT1 was reinduced during catheter manipulation and further ablation at the best pace map site terminated the VT during ablation. Further

## 2024-04-10 NOTE — PROGRESS NOTES
Report received from [facility] EP Lab RN. Procedural finding communicated. Intra procedural medication administration reviewed. Progression of care discussed.    Patient received into CPRU room 6, Post gen change    Access site without bleeding or swelling. Left chest    Patient instructed to limit movement of left upper extremity.    Routine post procedural vital signs & site assessment initiated.

## 2024-04-10 NOTE — DISCHARGE INSTRUCTIONS
Post Op Device Instructions        Incision & Dressing Care   Please keep Aquacel dressing on wound until your 2 week follow up in device clinic. The dressing promotes healing and is meant to stay on the wound. Keep incision site completely dry for 48 hours. During this time you may take a sponge bath, but no shower. After 48 hours you may shower with your back to the water letting the water run over the dressing. Do not let the water directly hit the dressing, it is water resistant no water proof. Do not use any lotions, creams, or ointments on the incision.    Avoid manipulating the device or leads with your fingers. Do not massage or excessively rub the implant site. This could displace the leads      For four weeks after your implant   Do not lift anything heavier than a gallon of milk.   Do not raise your elbow above the level of your shoulder on the Left shoulder implant side.   Avoid excessive pushing, pulling, or twisting.     Call you doctor for any of the following:   Any signs of infection, including redness, swelling or pain at the incision site, or a   temperature of 101° F or greater.   If you have twitching chest muscles, hiccups that won't stop, or a swollen arm on the   side of the incision.   Any feelings of light-headedness, chest pain, or shortness of breath.   Please notify your doctors and dentists that you have an ICD.       Y          If you receive one shock from your device:   and you feel ok, you may call to let your physician know.   but if you are feeling poorly, please notify your doctor. You may need to be seen.   If you receive more than one shock in a 24 hour period, call your physician to schedule a visit or report to the emergency room.       Please call the Device Clinic at  [phone] if you have questions or concerns about your device. Please call the hospital if it is after 5 pm or the weekend please page the on call cardiologist at [facility] at [phone]

## 2024-04-10 NOTE — PROGRESS NOTES
Assisted OOB & ambulated to BR & on unit. Tolerated activity without difficulty. incision without bleeding or hematoma

## 2024-04-10 NOTE — PROGRESS NOTES
Patient received to CPRU room # 9  Ambulatory from [facility]. Patient scheduled for gen change today with Dr Tan. Procedure reviewed & questions answered, voiced good understanding consent obtained & placed on chart. All medications and medical history reviewed. Will prep patient per orders. Patient & family updated on plan of care.      The patient has a fraility score of 3-MANAGING WELL, based on patient A&Ox3, patient able to ambulate to room without difficulty.

## 2024-04-10 NOTE — ANESTHESIA PRE PROCEDURE
Department of Anesthesiology  Preprocedure Note       Name:  Yann Shelton   Age:  58 y.o.  :  1965                                          MRN:  [id number]         Date:  4/10/2024      Surgeon: Surgeon(s):  Michael Tan MD    Procedure: Procedure(s):  Remove & replace ICD dual leads    Medications prior to admission:   Prior to Admission medications    Medication Sig Start Date End Date Taking? Authorizing Provider   empagliflozin (JARDIANCE) 10 MG tablet Take 1 tablet by mouth daily 24   Randell Lawson MD   Insulin Pen Needle 32G X 4 MM MISC Use as directed with Lantus pen 24   Randell Lawson MD   carvedilol (COREG) 6.25 MG tablet TAKE 1 TABLET TWICE DAILY WITH MEALS 23   Stuart Schmitt DO   ELIQUIS 5 MG TABS tablet Take 1 tablet by mouth 2 times daily  Patient taking differently: Take 1 tablet by mouth daily 23   Stuart Schmitt DO   atorvastatin (LIPITOR) 40 MG tablet Take 1 tablet by mouth daily 23   Randell Lawson MD   insulin glargine (LANTUS SOLOSTAR) 100 UNIT/ML injection pen Inject 12 Units into the skin nightly 23   Randell Lawson MD   furosemide (LASIX) 40 MG tablet Take 1 tablet by mouth 2 times daily 23  Stuart Schmitt DO   DULoxetine (CYMBALTA) 20 MG extended release capsule  23   Provider, MD Sweta   sacubitril-valsartan (ENTRESTO) 24-26 MG per tablet Take 1 tablet by mouth 2 times daily 23   Stuart Schmitt DO   Continuous Blood Gluc Sensor (DEXCOM G6 SENSOR) MISC Change every 10 days as directed. Dx E11.9 23   Randell Lawson MD   amiodarone (CORDARONE) 200 MG tablet TAKE 1 TABLET TWICE DAILY  Patient taking differently: daily 23   Michael Tan MD   psyllium (METAMUCIL) 28 % packet Take 1 packet by mouth daily Take with full glass of h20 or juice 23   Sophia Hoffman, ARNOLDO - CNP   nitroGLYCERIN (NITROSTAT) 0.4 MG SL tablet PLACE 1 TABLET

## 2024-04-11 ENCOUNTER — OFFICE VISIT (OUTPATIENT)
Dept: SLEEP MEDICINE | Age: 59
End: 2024-04-11
Payer: MEDICARE

## 2024-04-11 ENCOUNTER — OFFICE VISIT (OUTPATIENT)
Dept: INTERNAL MEDICINE CLINIC | Facility: CLINIC | Age: 59
End: 2024-04-11
Payer: MEDICARE

## 2024-04-11 ENCOUNTER — HOSPITAL ENCOUNTER (OUTPATIENT)
Dept: MAMMOGRAPHY | Age: 59
Discharge: HOME | End: 2024-04-11
Attending: INTERNAL MEDICINE
Payer: MEDICARE

## 2024-04-11 VITALS
HEART RATE: 75 BPM | BODY MASS INDEX: 44.09 KG/M2 | OXYGEN SATURATION: 95 % | DIASTOLIC BLOOD PRESSURE: 73 MMHG | HEIGHT: 56 IN | SYSTOLIC BLOOD PRESSURE: 110 MMHG | WEIGHT: 196 LBS

## 2024-04-11 VITALS
DIASTOLIC BLOOD PRESSURE: 72 MMHG | OXYGEN SATURATION: 99 % | SYSTOLIC BLOOD PRESSURE: 114 MMHG | HEIGHT: 56 IN | HEART RATE: 70 BPM | WEIGHT: 196 LBS | TEMPERATURE: 97.9 F | BODY MASS INDEX: 44.09 KG/M2

## 2024-04-11 DIAGNOSIS — E66.01 OBESITY, CLASS III, BMI 40-49.9 (MORBID OBESITY) (HCC): ICD-10-CM

## 2024-04-11 DIAGNOSIS — I42.8 NICM (NONISCHEMIC CARDIOMYOPATHY) (HCC): ICD-10-CM

## 2024-04-11 DIAGNOSIS — N18.4 CKD (CHRONIC KIDNEY DISEASE) STAGE 4, GFR 15-29 ML/MIN (HCC): ICD-10-CM

## 2024-04-11 DIAGNOSIS — G47.33 OSA ON CPAP: Primary | ICD-10-CM

## 2024-04-11 DIAGNOSIS — E11.69 TYPE 2 DIABETES MELLITUS WITH OTHER SPECIFIED COMPLICATION, WITH LONG-TERM CURRENT USE OF INSULIN (HCC): ICD-10-CM

## 2024-04-11 DIAGNOSIS — I10 PRIMARY HYPERTENSION: ICD-10-CM

## 2024-04-11 DIAGNOSIS — G89.18 POST-OP PAIN: Primary | ICD-10-CM

## 2024-04-11 DIAGNOSIS — G47.33 OSA ON CPAP: ICD-10-CM

## 2024-04-11 DIAGNOSIS — E11.21 TYPE 2 DIABETES WITH NEPHROPATHY (HCC): Primary | ICD-10-CM

## 2024-04-11 DIAGNOSIS — Z12.31 VISIT FOR SCREENING MAMMOGRAM: ICD-10-CM

## 2024-04-11 DIAGNOSIS — E78.5 DYSLIPIDEMIA: ICD-10-CM

## 2024-04-11 DIAGNOSIS — Z79.4 TYPE 2 DIABETES MELLITUS WITH OTHER SPECIFIED COMPLICATION, WITH LONG-TERM CURRENT USE OF INSULIN (HCC): ICD-10-CM

## 2024-04-11 DIAGNOSIS — Z45.02 AICD AT END OF BATTERY LIFE: ICD-10-CM

## 2024-04-11 DIAGNOSIS — J98.4 RESTRICTIVE LUNG DISEASE: ICD-10-CM

## 2024-04-11 DIAGNOSIS — I50.22 CHRONIC SYSTOLIC HEART FAILURE (HCC): ICD-10-CM

## 2024-04-11 PROBLEM — N18.9 ACUTE ON CHRONIC RENAL FAILURE (HCC): Status: RESOLVED | Noted: 2022-09-06 | Resolved: 2024-04-11

## 2024-04-11 PROBLEM — N18.30 CKD (CHRONIC KIDNEY DISEASE) STAGE 3, GFR 30-59 ML/MIN (HCC): Status: RESOLVED | Noted: 2020-08-24 | Resolved: 2024-04-11

## 2024-04-11 PROBLEM — N17.9 ACUTE ON CHRONIC RENAL FAILURE (HCC): Status: RESOLVED | Noted: 2022-09-06 | Resolved: 2024-04-11

## 2024-04-11 PROBLEM — K62.5 RECTAL BLEEDING: Status: RESOLVED | Noted: 2021-05-18 | Resolved: 2024-04-11

## 2024-04-11 PROBLEM — I20.9 ANGINA PECTORIS, UNSPECIFIED (HCC): Status: RESOLVED | Noted: 2022-12-23 | Resolved: 2024-04-11

## 2024-04-11 LAB — HBA1C MFR BLD: 7.5 %

## 2024-04-11 PROCEDURE — 4004F PT TOBACCO SCREEN RCVD TLK: CPT | Performed by: NURSE PRACTITIONER

## 2024-04-11 PROCEDURE — G8417 CALC BMI ABV UP PARAM F/U: HCPCS | Performed by: INTERNAL MEDICINE

## 2024-04-11 PROCEDURE — 2022F DILAT RTA XM EVC RTNOPTHY: CPT | Performed by: INTERNAL MEDICINE

## 2024-04-11 PROCEDURE — 3074F SYST BP LT 130 MM HG: CPT | Performed by: NURSE PRACTITIONER

## 2024-04-11 PROCEDURE — 99213 OFFICE O/P EST LOW 20 MIN: CPT | Performed by: NURSE PRACTITIONER

## 2024-04-11 PROCEDURE — 99213 OFFICE O/P EST LOW 20 MIN: CPT | Performed by: INTERNAL MEDICINE

## 2024-04-11 PROCEDURE — 95249 CONT GLUC MNTR PT PROV EQP: CPT | Performed by: INTERNAL MEDICINE

## 2024-04-11 PROCEDURE — 3046F HEMOGLOBIN A1C LEVEL >9.0%: CPT | Performed by: INTERNAL MEDICINE

## 2024-04-11 PROCEDURE — 77063 BREAST TOMOSYNTHESIS BI: CPT

## 2024-04-11 PROCEDURE — 3078F DIAST BP <80 MM HG: CPT | Performed by: NURSE PRACTITIONER

## 2024-04-11 PROCEDURE — 83036 HEMOGLOBIN GLYCOSYLATED A1C: CPT | Performed by: INTERNAL MEDICINE

## 2024-04-11 PROCEDURE — G8417 CALC BMI ABV UP PARAM F/U: HCPCS | Performed by: NURSE PRACTITIONER

## 2024-04-11 PROCEDURE — 4004F PT TOBACCO SCREEN RCVD TLK: CPT | Performed by: INTERNAL MEDICINE

## 2024-04-11 PROCEDURE — 3078F DIAST BP <80 MM HG: CPT | Performed by: INTERNAL MEDICINE

## 2024-04-11 PROCEDURE — 3017F COLORECTAL CA SCREEN DOC REV: CPT | Performed by: NURSE PRACTITIONER

## 2024-04-11 PROCEDURE — 95251 CONT GLUC MNTR ANALYSIS I&R: CPT | Performed by: INTERNAL MEDICINE

## 2024-04-11 PROCEDURE — 3017F COLORECTAL CA SCREEN DOC REV: CPT | Performed by: INTERNAL MEDICINE

## 2024-04-11 PROCEDURE — 3074F SYST BP LT 130 MM HG: CPT | Performed by: INTERNAL MEDICINE

## 2024-04-11 PROCEDURE — G8427 DOCREV CUR MEDS BY ELIG CLIN: HCPCS | Performed by: NURSE PRACTITIONER

## 2024-04-11 PROCEDURE — G8427 DOCREV CUR MEDS BY ELIG CLIN: HCPCS | Performed by: INTERNAL MEDICINE

## 2024-04-11 RX ORDER — ATORVASTATIN CALCIUM 40 MG/1
40 TABLET, FILM COATED ORAL DAILY
Qty: 90 TABLET | Refills: 3 | Status: SHIPPED | OUTPATIENT
Start: 2024-04-11

## 2024-04-11 RX ORDER — HYDROCODONE BITARTRATE AND ACETAMINOPHEN 5; 325 MG/1; MG/1
1 TABLET ORAL EVERY 6 HOURS PRN
Qty: 8 TABLET | Refills: 0 | Status: SHIPPED | OUTPATIENT
Start: 2024-04-11 | End: 2024-04-14

## 2024-04-11 RX ORDER — PROCHLORPERAZINE 25 MG/1
SUPPOSITORY RECTAL
Qty: 9 EACH | Refills: 3 | Status: SHIPPED | OUTPATIENT
Start: 2024-04-11

## 2024-04-11 RX ORDER — INSULIN GLARGINE 100 [IU]/ML
12 INJECTION, SOLUTION SUBCUTANEOUS NIGHTLY
Qty: 5 ADJUSTABLE DOSE PRE-FILLED PEN SYRINGE | Refills: 3 | Status: SHIPPED | OUTPATIENT
Start: 2024-04-11

## 2024-04-11 ASSESSMENT — SLEEP AND FATIGUE QUESTIONNAIRES
HOW LIKELY ARE YOU TO NOD OFF OR FALL ASLEEP WHILE LYING DOWN TO REST IN THE AFTERNOON WHEN CIRCUMSTANCES PERMIT: HIGH CHANCE OF DOZING
ESS TOTAL SCORE: 7
HOW LIKELY ARE YOU TO NOD OFF OR FALL ASLEEP WHEN YOU ARE A PASSENGER IN A CAR FOR AN HOUR WITHOUT A BREAK: WOULD NEVER DOZE
HOW LIKELY ARE YOU TO NOD OFF OR FALL ASLEEP WHILE SITTING INACTIVE IN A PUBLIC PLACE: WOULD NEVER DOZE
HOW LIKELY ARE YOU TO NOD OFF OR FALL ASLEEP WHILE SITTING AND READING: MODERATE CHANCE OF DOZING
HOW LIKELY ARE YOU TO NOD OFF OR FALL ASLEEP WHILE SITTING QUIETLY AFTER LUNCH WITHOUT ALCOHOL: WOULD NEVER DOZE
HOW LIKELY ARE YOU TO NOD OFF OR FALL ASLEEP IN A CAR, WHILE STOPPED FOR A FEW MINUTES IN TRAFFIC: WOULD NEVER DOZE
HOW LIKELY ARE YOU TO NOD OFF OR FALL ASLEEP WHILE WATCHING TV: MODERATE CHANCE OF DOZING
HOW LIKELY ARE YOU TO NOD OFF OR FALL ASLEEP WHILE SITTING AND TALKING TO SOMEONE: WOULD NEVER DOZE

## 2024-04-11 NOTE — ASSESSMENT & PLAN NOTE
Following with nephrology.    Cr and GFR improved.     Volume status appears stable.     Lab Results   Component Value Date/Time     04/02/2024 10:10 AM    K 3.5 04/02/2024 10:10 AM     04/02/2024 10:10 AM    CO2 28 04/02/2024 10:10 AM    BUN 31 04/02/2024 10:10 AM    CREATININE 1.50 04/02/2024 10:10 AM    GLUCOSE 134 04/02/2024 10:10 AM    CALCIUM 9.7 04/02/2024 10:10 AM    LABGLOM 40 04/02/2024 10:10 AM    LABGLOM 32 03/31/2022 08:12 AM

## 2024-04-11 NOTE — PROGRESS NOTES
Ventricular tachycardia (HCC)    Chronic systolic heart failure ([facility])    Obesity, Class III, BMI 40-49.9 (morbid obesity) ([facility])    Restrictive lung disease    Long term current use of amiodarone    Chronic right-sided thoracic back pain    CKD (chronic kidney disease) stage 3, GFR 30-59 ml/min ([facility])    LORI on CPAP    Lower abdominal pain    Chronic left-sided low back pain with sciatica    Left ventricular aneurysm    Acute on chronic renal failure ([facility])    Angina pectoris, unspecified    Atherosclerotic heart disease of native coronary artery with unspecified angina pectoris    Angina, class III ([facility])    AICD at end of battery life       Review of Systems   Respiratory:  Negative for chest tightness.    Cardiovascular:  Negative for chest pain and leg swelling.   Musculoskeletal:  Positive for back pain (chronic).       Lab Results   Component Value Date/Time    WBC 7.2 04/02/2024 10:10 AM    HGB 10.6 04/02/2024 10:10 AM    HCT 35.5 04/02/2024 10:10 AM    MCV 87.0 04/02/2024 10:10 AM    RDW 16.4 04/02/2024 10:10 AM     04/02/2024 10:10 AM    NEUTOPHILPCT 57 04/02/2024 10:10 AM    LYMPHOPCT 32 04/02/2024 10:10 AM    MONOPCT 7 04/02/2024 10:10 AM    EOSRELPCT 4 04/02/2024 10:10 AM    BASOPCT 1 04/02/2024 10:10 AM    LYMPHSABS 2.3 04/02/2024 10:10 AM    MONOSABS 0.5 04/02/2024 10:10 AM    EOSABS 0.3 04/02/2024 10:10 AM    BASOSABS 0.0 04/02/2024 10:10 AM    IMMGRAN 0 04/02/2024 10:10 AM    GRANULOCYTEABSOLUTECOUNT 0.0 10/25/2021 08:43 AM       Lab Results   Component Value Date/Time     04/02/2024 10:10 AM    K 3.5 04/02/2024 10:10 AM     04/02/2024 10:10 AM    CO2 28 04/02/2024 10:10 AM    ANIONGAP 5 04/02/2024 10:10 AM    GLUCOSE 134 04/02/2024 10:10 AM    BUN 31 04/02/2024 10:10 AM    CREATININE 1.50 04/02/2024 10:10 AM    GFRAA 46 09/24/2022 04:29 AM    LABGLOM 40 04/02/2024 10:10 AM    LABGLOM 32 03/31/2022 08:12 AM    CALCIUM 9.7 04/02/2024 10:10 AM    BILITOT 0.4 10/26/2023 11:10 AM    ALT 38

## 2024-04-11 NOTE — TELEPHONE ENCOUNTER
Patient called and reporting increasing pain post gen change. Denies swelling or drainage. Taking ABX as prescribed. OTC medication not helping.     Will route to Ariel rahman NP for norco

## 2024-04-11 NOTE — TELEPHONE ENCOUNTER
Called and talked with Pt about her [employer] appt. Pt asked if Dr Tan could call her in some pain medication because she is in pain. Pt also wants to know what day she can go back to work at [employer]. Please call Pt and advise.

## 2024-04-11 NOTE — ASSESSMENT & PLAN NOTE
Well-controlled, continue current medications    Treatment regimen is effective.   .    See orders.     Hypertension Medications       Loop Diuretics       furosemide (LASIX) 40 MG tablet Take 1 tablet by mouth 2 times daily       Alpha-Beta Blockers       carvedilol (COREG) 6.25 MG tablet TAKE 1 TABLET TWICE DAILY WITH MEALS

## 2024-04-11 NOTE — ASSESSMENT & PLAN NOTE
At goal, continue current medications  Interpretation of 72 Hour Continuous Glucose Monitor:  A patient-owned Dexcom 7 CGM was downloaded and 30 days of data were analyzed.  Compliance with using the device is high.  Average blood glucose during the reviewed timeframe was 145 +/- 6.8% (time in range 86% , hyperglycemia <1%, hypoglycemia 0).  There is a pattern of glycemic control.  Results for orders placed or performed in visit on 04/11/24   AMB POC HEMOGLOBIN A1C   Result Value Ref Range    Hemoglobin A1C, POC 7.5 %      -Continue chronic medications as prescribed w/ the following diabetic medication changes:  Treatment regimen is effective.       -Education: Reviewed ‘ABCs’ of diabetes management (respective goals in parentheses):  A1C (<7), blood pressure (<130/80), and cholesterol (LDL <100).  -Diabetic treatment medications and compliance is emphasized to achieve glycemic control and prevent the potential long term diabetic complications (retinopathy, stroke, heart attack, neuropathy, oral disease, increased cancer risk as well as many others)   Encouraged efforts to improve compliance efforts with moderate cardiovascular exercise dietary modifications.  Glycemic control is promoted by cardiovascular exercise (walking, swimming, aerobics) for 30-45 minutes, 3-4 times weekly.  A more mediterranean type diet with legumes, lean meats, fish and poultry, and lean sources of protein and limiting excessive red meat is recommended.   The importance of a healthy lifestyle are discussed.  Avoid high fructose containing foods, frequent fast food meals, overly processed foods.  A healthy BMI will be easier to achieve if by moderating portion sizes.    -Monitor blood sugar at home daily as directed and keep record to bring to next appointment.  -Discussion/Counseling provided today include: interpretation of lab results, blood sugar goals, complications of diabetes mellitus, nutrition and annual eye exams and diabetic

## 2024-04-11 NOTE — PATIENT INSTRUCTIONS
The medication list included in this document is our record of what you are currently taking, including any changes that were made at today's visit.  If you find any differences when compared to your medications at home, or have any questions that were not answered at your visit, please contact the office.    Follow up in 3 months with labs prior.  See orders.

## 2024-04-11 NOTE — ASSESSMENT & PLAN NOTE
Amiodarone use and obese with restriction  Pulmonary Functions Testing Results: 10/2023  Compared to 2020 there does not appear to be any worsening pulmonary function findings particularly with the question of chronic amiodarone use.  There is some question of air trapping which might benefit from further evaluation.

## 2024-04-19 ENCOUNTER — NURSE ONLY (OUTPATIENT)
Age: 59
End: 2024-04-19

## 2024-04-19 ENCOUNTER — OFFICE VISIT (OUTPATIENT)
Age: 59
End: 2024-04-19
Payer: MEDICARE

## 2024-04-19 VITALS
BODY MASS INDEX: 43.64 KG/M2 | HEIGHT: 56 IN | HEART RATE: 56 BPM | WEIGHT: 194 LBS | DIASTOLIC BLOOD PRESSURE: 72 MMHG | SYSTOLIC BLOOD PRESSURE: 116 MMHG

## 2024-04-19 DIAGNOSIS — I47.20 VENTRICULAR TACHYCARDIA (HCC): Primary | ICD-10-CM

## 2024-04-19 DIAGNOSIS — N18.4 CKD (CHRONIC KIDNEY DISEASE) STAGE 4, GFR 15-29 ML/MIN (HCC): ICD-10-CM

## 2024-04-19 DIAGNOSIS — I47.20 VENTRICULAR TACHYCARDIA (HCC): ICD-10-CM

## 2024-04-19 DIAGNOSIS — I10 PRIMARY HYPERTENSION: ICD-10-CM

## 2024-04-19 DIAGNOSIS — I50.22 CHRONIC SYSTOLIC HEART FAILURE (HCC): Primary | ICD-10-CM

## 2024-04-19 DIAGNOSIS — I42.8 NICM (NONISCHEMIC CARDIOMYOPATHY) (HCC): ICD-10-CM

## 2024-04-19 DIAGNOSIS — I25.3 LEFT VENTRICULAR ANEURYSM: ICD-10-CM

## 2024-04-19 DIAGNOSIS — G47.33 OSA ON CPAP: ICD-10-CM

## 2024-04-19 DIAGNOSIS — E78.5 DYSLIPIDEMIA: ICD-10-CM

## 2024-04-19 DIAGNOSIS — I50.9 CHF (CONGESTIVE HEART FAILURE) (HCC): ICD-10-CM

## 2024-04-19 DIAGNOSIS — Z79.899 LONG TERM CURRENT USE OF AMIODARONE: ICD-10-CM

## 2024-04-19 PROBLEM — I20.9 ANGINA, CLASS III (HCC): Status: RESOLVED | Noted: 2024-02-08 | Resolved: 2024-04-19

## 2024-04-19 PROBLEM — Z45.02 AICD AT END OF BATTERY LIFE: Status: RESOLVED | Noted: 2024-03-12 | Resolved: 2024-04-19

## 2024-04-19 PROCEDURE — 99214 OFFICE O/P EST MOD 30 MIN: CPT | Performed by: INTERNAL MEDICINE

## 2024-04-19 PROCEDURE — 3078F DIAST BP <80 MM HG: CPT | Performed by: INTERNAL MEDICINE

## 2024-04-19 PROCEDURE — 3074F SYST BP LT 130 MM HG: CPT | Performed by: INTERNAL MEDICINE

## 2024-04-19 NOTE — PROGRESS NOTES
concerns related to their cardiac condition(s) and/or complaint(s).        Return in about 3 months (around 7/19/2024).       Stuart Schmitt, DO  4/19/2024

## 2024-06-26 RX ORDER — SACUBITRIL AND VALSARTAN 24; 26 MG/1; MG/1
1 TABLET, FILM COATED ORAL 2 TIMES DAILY
Qty: 180 TABLET | Refills: 3 | Status: SHIPPED | OUTPATIENT
Start: 2024-06-26

## 2024-06-28 ENCOUNTER — OFFICE VISIT (OUTPATIENT)
Age: 59
End: 2024-06-28
Payer: MEDICARE

## 2024-06-28 VITALS
HEIGHT: 59 IN | WEIGHT: 196 LBS | SYSTOLIC BLOOD PRESSURE: 122 MMHG | BODY MASS INDEX: 39.51 KG/M2 | HEART RATE: 60 BPM | DIASTOLIC BLOOD PRESSURE: 72 MMHG

## 2024-06-28 DIAGNOSIS — I10 PRIMARY HYPERTENSION: Primary | ICD-10-CM

## 2024-06-28 PROCEDURE — G8427 DOCREV CUR MEDS BY ELIG CLIN: HCPCS | Performed by: INTERNAL MEDICINE

## 2024-06-28 PROCEDURE — G8417 CALC BMI ABV UP PARAM F/U: HCPCS | Performed by: INTERNAL MEDICINE

## 2024-06-28 PROCEDURE — 3017F COLORECTAL CA SCREEN DOC REV: CPT | Performed by: INTERNAL MEDICINE

## 2024-06-28 PROCEDURE — 99214 OFFICE O/P EST MOD 30 MIN: CPT | Performed by: INTERNAL MEDICINE

## 2024-06-28 PROCEDURE — 3074F SYST BP LT 130 MM HG: CPT | Performed by: INTERNAL MEDICINE

## 2024-06-28 PROCEDURE — 93000 ELECTROCARDIOGRAM COMPLETE: CPT | Performed by: INTERNAL MEDICINE

## 2024-06-28 PROCEDURE — 4004F PT TOBACCO SCREEN RCVD TLK: CPT | Performed by: INTERNAL MEDICINE

## 2024-06-28 PROCEDURE — 3078F DIAST BP <80 MM HG: CPT | Performed by: INTERNAL MEDICINE

## 2024-06-28 RX ORDER — FUROSEMIDE 40 MG/1
40 TABLET ORAL 2 TIMES DAILY
Qty: 180 TABLET | Refills: 3 | Status: SHIPPED | OUTPATIENT
Start: 2024-06-28 | End: 2025-06-28

## 2024-06-28 NOTE — PROGRESS NOTES
[facility] CARDIOLOGY  [address] [place] [address], [address]  [place] [address]  PHONE: [phone]        24      NAME:  Yann Shelton  : 1965  MRN: [id number]     Referring Cardiologist: Kush Schmitt DO    Reason for Consultation: VT/VF     ASSESSMENT and PLAN:  Yann was seen today for new patient and irregular heart beat.    Diagnoses and all orders for this visit:    CHF (congestive heart failure) (HCC)    Ventricular tachycardia (HCC) s/p ICD in  s/p [place] ablation [place] [facility] [place], Dr. Taylor.     CAD in native artery    CKD stage IV    LV aneurysm    NIDCM, s/p BTK DC ICD     Obesity    LORI     58 year old female with previous ICD shock. With large aneurysmal LV. She is now taking amiodarone BID.     She is now having worsening chest pain with associated jaw pain and diaphoresis concerning for acceleration angina.     -VT/VF - Stable, no recurrences of VT s/p ablation. Successful VT ablation from 10/2022 at [facility]. Reviewed notes.   -Continue amiodarone 200 mg po daily. Look to wean to 100 mg if stable in follow up.   -Biannual LFTs, TFTs, yearly PFTs, eye exams while on amiodarone.   -ICD interrogation without further VT/VF.     -Chest pain - now with acceleration angina. Discussed options and pt to undergo LHC. Prep for procedure completed.     -HFrEF - continue GDMT. Declined LVAD at [facility].    -LV aneursym - deemed not a surgical candidate at [facility].     -LORI - continue CPAP    -Obesity - weight loss encouraged.      -EP follow up in 6 months or PRN.    Patient is being seen today within a 90-day global period, but is being seen for a separately identifiable E/M service and is not related to the post-operative care of the procedure.     Patient has been instructed and agrees to call our office with any issues or other concerns related to their cardiac condition(s) and/or complaint(s).    No follow-up provider specified.    Thank you for allowing me to participate in the

## 2024-07-22 ENCOUNTER — TELEPHONE (OUTPATIENT)
Age: 59
End: 2024-07-22

## 2024-07-22 DIAGNOSIS — R06.02 SOB (SHORTNESS OF BREATH): ICD-10-CM

## 2024-07-22 DIAGNOSIS — I10 HTN (HYPERTENSION): Primary | ICD-10-CM

## 2024-07-22 DIAGNOSIS — I50.9 CHF (CONGESTIVE HEART FAILURE) (HCC): ICD-10-CM

## 2024-07-22 NOTE — TELEPHONE ENCOUNTER
EF 9/23 was 30-35 % Pt.calls today c/o fluid retention. She has gained 4 pounds in 3 days.She feels pressure in her heart into her middle back.Has not tried Nitro.This pressure has been going on off/on for several days.She denies pain just pressure.She has no visible edema in LE's but has in her abdomen.On entresto 24/26 qday, coreg 6.25mg bid , furosemide 40mg bid.Off Spironolactone for about 6 months. Declined LVAD at [facility].-LV aneursym.Sees  on Thursday.Wants to know what to do in interim?

## 2024-07-22 NOTE — TELEPHONE ENCOUNTER
Pt states they think they are retaining extra fluid and would like to know if they can take an extra water pill

## 2024-07-22 NOTE — TELEPHONE ENCOUNTER
Pt.notified of 's response and v/u.Labs ordered as below.  Orders Placed This Encounter   Procedures    Comprehensive Metabolic Panel     Standing Status:   Future     Standing Expiration Date:   7/22/2025    Brain Natriuretic Peptide     Standing Status:   Future     Standing Expiration Date:   7/22/2025    CBC     Standing Status:   Future     Standing Expiration Date:   7/22/2025

## 2024-07-24 DIAGNOSIS — N18.4 CKD (CHRONIC KIDNEY DISEASE) STAGE 4, GFR 15-29 ML/MIN (HCC): ICD-10-CM

## 2024-07-24 DIAGNOSIS — R06.02 SOB (SHORTNESS OF BREATH): ICD-10-CM

## 2024-07-24 DIAGNOSIS — E78.5 DYSLIPIDEMIA: ICD-10-CM

## 2024-07-24 DIAGNOSIS — E11.69 TYPE 2 DIABETES MELLITUS WITH OTHER SPECIFIED COMPLICATION, WITH LONG-TERM CURRENT USE OF INSULIN (HCC): ICD-10-CM

## 2024-07-24 DIAGNOSIS — Z79.4 TYPE 2 DIABETES MELLITUS WITH OTHER SPECIFIED COMPLICATION, WITH LONG-TERM CURRENT USE OF INSULIN (HCC): ICD-10-CM

## 2024-07-24 DIAGNOSIS — I50.9 CHF (CONGESTIVE HEART FAILURE) (HCC): ICD-10-CM

## 2024-07-24 DIAGNOSIS — I50.22 CHRONIC SYSTOLIC HEART FAILURE (HCC): ICD-10-CM

## 2024-07-24 DIAGNOSIS — I42.8 NICM (NONISCHEMIC CARDIOMYOPATHY) (HCC): ICD-10-CM

## 2024-07-24 DIAGNOSIS — I10 HTN (HYPERTENSION): ICD-10-CM

## 2024-07-24 DIAGNOSIS — E11.21 TYPE 2 DIABETES WITH NEPHROPATHY (HCC): ICD-10-CM

## 2024-07-24 LAB
ALBUMIN SERPL-MCNC: 3.8 G/DL (ref 3.2–4.6)
ALBUMIN/GLOB SERPL: 1.3 (ref 1–1.9)
ALP SERPL-CCNC: 73 U/L (ref 35–104)
ALT SERPL-CCNC: 23 U/L (ref 12–65)
ANION GAP SERPL CALC-SCNC: 14 MMOL/L (ref 9–18)
AST SERPL-CCNC: 27 U/L (ref 15–37)
BILIRUB SERPL-MCNC: 0.3 MG/DL (ref 0–1.2)
BUN SERPL-MCNC: 30 MG/DL (ref 8–23)
CALCIUM SERPL-MCNC: 9.3 MG/DL (ref 8.8–10.2)
CHLORIDE SERPL-SCNC: 101 MMOL/L (ref 98–107)
CHOLEST SERPL-MCNC: 235 MG/DL (ref 0–200)
CO2 SERPL-SCNC: 24 MMOL/L (ref 20–28)
CREAT SERPL-MCNC: 1.92 MG/DL (ref 0.6–1.1)
ERYTHROCYTE [DISTWIDTH] IN BLOOD BY AUTOMATED COUNT: 17.2 % (ref 11.9–14.6)
EST. AVERAGE GLUCOSE BLD GHB EST-MCNC: 163 MG/DL
GLOBULIN SER CALC-MCNC: 3.1 G/DL (ref 2.3–3.5)
GLUCOSE SERPL-MCNC: 185 MG/DL (ref 70–99)
HBA1C MFR BLD: 7.3 % (ref 0–5.6)
HCT VFR BLD AUTO: 27.9 % (ref 35.8–46.3)
HDLC SERPL-MCNC: 77 MG/DL (ref 40–60)
HDLC SERPL: 3 (ref 0–5)
HGB BLD-MCNC: 7.9 G/DL (ref 11.7–15.4)
LDLC SERPL CALC-MCNC: 131 MG/DL (ref 0–100)
MCH RBC QN AUTO: 23 PG (ref 26.1–32.9)
MCHC RBC AUTO-ENTMCNC: 28.3 G/DL (ref 31.4–35)
MCV RBC AUTO: 81.3 FL (ref 82–102)
NRBC # BLD: 0.02 K/UL (ref 0–0.2)
NT PRO BNP: 324 PG/ML (ref 0–125)
PLATELET # BLD AUTO: 196 K/UL (ref 150–450)
PMV BLD AUTO: 11.1 FL (ref 9.4–12.3)
POTASSIUM SERPL-SCNC: 3.6 MMOL/L (ref 3.5–5.1)
PROT SERPL-MCNC: 6.9 G/DL (ref 6.3–8.2)
RBC # BLD AUTO: 3.43 M/UL (ref 4.05–5.2)
SODIUM SERPL-SCNC: 139 MMOL/L (ref 136–145)
TRIGL SERPL-MCNC: 134 MG/DL (ref 0–150)
VLDLC SERPL CALC-MCNC: 27 MG/DL (ref 6–23)
WBC # BLD AUTO: 7.5 K/UL (ref 4.3–11.1)

## 2024-07-25 ENCOUNTER — OFFICE VISIT (OUTPATIENT)
Age: 59
End: 2024-07-25
Payer: MEDICARE

## 2024-07-25 VITALS
HEART RATE: 64 BPM | HEIGHT: 59 IN | SYSTOLIC BLOOD PRESSURE: 122 MMHG | WEIGHT: 191 LBS | DIASTOLIC BLOOD PRESSURE: 60 MMHG | BODY MASS INDEX: 38.51 KG/M2

## 2024-07-25 DIAGNOSIS — Z79.899 LONG TERM CURRENT USE OF AMIODARONE: ICD-10-CM

## 2024-07-25 DIAGNOSIS — D50.8 OTHER IRON DEFICIENCY ANEMIA: ICD-10-CM

## 2024-07-25 DIAGNOSIS — I50.22 CHRONIC SYSTOLIC HEART FAILURE (HCC): ICD-10-CM

## 2024-07-25 DIAGNOSIS — E78.5 DYSLIPIDEMIA: ICD-10-CM

## 2024-07-25 DIAGNOSIS — G47.33 OSA ON CPAP: ICD-10-CM

## 2024-07-25 DIAGNOSIS — I10 PRIMARY HYPERTENSION: ICD-10-CM

## 2024-07-25 DIAGNOSIS — N18.4 CKD (CHRONIC KIDNEY DISEASE) STAGE 4, GFR 15-29 ML/MIN (HCC): ICD-10-CM

## 2024-07-25 DIAGNOSIS — I42.8 NICM (NONISCHEMIC CARDIOMYOPATHY) (HCC): Primary | ICD-10-CM

## 2024-07-25 PROBLEM — D50.9 IRON DEFICIENCY ANEMIA: Status: ACTIVE | Noted: 2024-07-25

## 2024-07-25 PROCEDURE — 3078F DIAST BP <80 MM HG: CPT | Performed by: INTERNAL MEDICINE

## 2024-07-25 PROCEDURE — 3074F SYST BP LT 130 MM HG: CPT | Performed by: INTERNAL MEDICINE

## 2024-07-25 PROCEDURE — 4004F PT TOBACCO SCREEN RCVD TLK: CPT | Performed by: INTERNAL MEDICINE

## 2024-07-25 PROCEDURE — G8428 CUR MEDS NOT DOCUMENT: HCPCS | Performed by: INTERNAL MEDICINE

## 2024-07-25 PROCEDURE — G8417 CALC BMI ABV UP PARAM F/U: HCPCS | Performed by: INTERNAL MEDICINE

## 2024-07-25 PROCEDURE — 99214 OFFICE O/P EST MOD 30 MIN: CPT | Performed by: INTERNAL MEDICINE

## 2024-07-25 PROCEDURE — 3017F COLORECTAL CA SCREEN DOC REV: CPT | Performed by: INTERNAL MEDICINE

## 2024-07-25 NOTE — PROGRESS NOTES
(nonischemic cardiomyopathy) (HCC) 02/15/2013    Dyslipidemia 2013    HTN (hypertension) 2010    Long-term insulin use in type 2 diabetes (HCC) 2010      Past Surgical History:   Procedure Laterality Date    ABLATION OF DYSRHYTHMIC FOCUS  \"years ago\"    CARDIAC DEFIBRILLATOR PLACEMENT  2013    Biotronik dual chamber ICD by Dr. Bautista    CARDIAC DEFIBRILLATOR PLACEMENT  2013    Biotronik dual chamber ICD by Dr. Bautista    CARDIAC PROCEDURE N/A 2022    Left heart cath / coronary angiography performed by Caleb Shelton MD at [facility] CARDIAC CATH LAB    CARDIAC PROCEDURE N/A 2/15/2024    Left heart cath / coronary angiography performed by Christopher Osman MD at [facility] CARDIAC CATH LAB     SECTION      x1    COLONOSCOPY N/A 2021    COLONOSCOPY/BMI 48 PT HAS AN ICD performed by Miles Betancourt MD at [facility] ENDOSCOPY    EP DEVICE PROCEDURE Left 4/10/2024    Remove & replace ICD dual leads performed by Michael Tan MD at [facility] CARDIAC CATH LAB    GYN  1988    D & E    HERNIA REPAIR  2019    mild incarceration, no bowel removal    HYSTERECTOMY (CERVIX STATUS UNKNOWN)      EDITH-Left ovary intact per pt    LEFT HEART CATH,PERCUTANEOUS  2013    no intervention    PACEMAKER      ICD    ROTATOR CUFF REPAIR Right     TONSILLECTOMY       Family History   Problem Relation Age of Onset    Diabetes Other     Heart Disease Father     Hypertension Father     Stroke Father     Heart Attack Father 50        mi    Breast Cancer Sister 43    Hypertension Brother     Heart Disease Brother     Diabetes Brother     Stroke Mother      Social History     Tobacco Use    Smoking status: Some Days     Current packs/day: 0.25     Types: Cigarettes    Smokeless tobacco: Never    Tobacco comments:     Quit smoking: \"every now and then\"   Substance Use Topics    Alcohol use: Yes     Alcohol/week: 1.0 standard drink of alcohol         ROS:    No obvious pertinent positives on review of systems except

## 2024-08-01 ENCOUNTER — OFFICE VISIT (OUTPATIENT)
Dept: INTERNAL MEDICINE CLINIC | Facility: CLINIC | Age: 59
End: 2024-08-01

## 2024-08-01 VITALS
SYSTOLIC BLOOD PRESSURE: 114 MMHG | HEART RATE: 60 BPM | HEIGHT: 56 IN | TEMPERATURE: 98 F | BODY MASS INDEX: 43.19 KG/M2 | WEIGHT: 192 LBS | DIASTOLIC BLOOD PRESSURE: 70 MMHG | OXYGEN SATURATION: 99 %

## 2024-08-01 DIAGNOSIS — E11.21 TYPE 2 DIABETES WITH NEPHROPATHY (HCC): Primary | ICD-10-CM

## 2024-08-01 DIAGNOSIS — I50.22 CHRONIC SYSTOLIC HEART FAILURE (HCC): ICD-10-CM

## 2024-08-01 DIAGNOSIS — N18.4 CKD (CHRONIC KIDNEY DISEASE) STAGE 4, GFR 15-29 ML/MIN (HCC): ICD-10-CM

## 2024-08-01 DIAGNOSIS — E11.69 TYPE 2 DIABETES MELLITUS WITH OTHER SPECIFIED COMPLICATION, WITH LONG-TERM CURRENT USE OF INSULIN (HCC): ICD-10-CM

## 2024-08-01 DIAGNOSIS — D50.8 OTHER IRON DEFICIENCY ANEMIA: ICD-10-CM

## 2024-08-01 DIAGNOSIS — Z79.4 TYPE 2 DIABETES MELLITUS WITH OTHER SPECIFIED COMPLICATION, WITH LONG-TERM CURRENT USE OF INSULIN (HCC): ICD-10-CM

## 2024-08-01 RX ORDER — FUROSEMIDE 40 MG/1
80 TABLET ORAL 2 TIMES DAILY
Qty: 120 TABLET | Refills: 5 | Status: SHIPPED | OUTPATIENT
Start: 2024-08-01

## 2024-08-01 SDOH — ECONOMIC STABILITY: INCOME INSECURITY: HOW HARD IS IT FOR YOU TO PAY FOR THE VERY BASICS LIKE FOOD, HOUSING, MEDICAL CARE, AND HEATING?: NOT HARD AT ALL

## 2024-08-01 SDOH — ECONOMIC STABILITY: FOOD INSECURITY: WITHIN THE PAST 12 MONTHS, THE FOOD YOU BOUGHT JUST DIDN'T LAST AND YOU DIDN'T HAVE MONEY TO GET MORE.: NEVER TRUE

## 2024-08-01 SDOH — ECONOMIC STABILITY: FOOD INSECURITY: WITHIN THE PAST 12 MONTHS, YOU WORRIED THAT YOUR FOOD WOULD RUN OUT BEFORE YOU GOT MONEY TO BUY MORE.: NEVER TRUE

## 2024-08-01 SDOH — ECONOMIC STABILITY: HOUSING INSECURITY
IN THE LAST 12 MONTHS, WAS THERE A TIME WHEN YOU DID NOT HAVE A STEADY PLACE TO SLEEP OR SLEPT IN A SHELTER (INCLUDING NOW)?: YES

## 2024-08-01 ASSESSMENT — ENCOUNTER SYMPTOMS
CHEST TIGHTNESS: 0
BACK PAIN: 1

## 2024-08-01 ASSESSMENT — PATIENT HEALTH QUESTIONNAIRE - PHQ9
1. LITTLE INTEREST OR PLEASURE IN DOING THINGS: NOT AT ALL
2. FEELING DOWN, DEPRESSED OR HOPELESS: NOT AT ALL
SUM OF ALL RESPONSES TO PHQ9 QUESTIONS 1 & 2: 0
SUM OF ALL RESPONSES TO PHQ QUESTIONS 1-9: 0

## 2024-08-01 NOTE — PATIENT INSTRUCTIONS
Follow up in 3 months     Hold Aspirin for now.    Blood sugar controlled.    Medication refilled    Follow up with hematology on Monday       The medication list included in this document is our record of what you are currently taking, including any changes that were made at today's visit.  If you find any differences when compared to your medications at home, or have any questions that were not answered at your visit, please contact the office.              [place] Housing Resources*  (Call [facility] if you need more resources.)    [place] [employer]  They Offer: Housing Choice Vouchers (Section 8) rental assistance available to persons with disabilities, families with children, and older adults whose household income is below 80% the median income for [facility].   Contact: 455.267.9249; Website:www.Perzo.Invincea    [employer] [place] [employer] Homeless - National Call Center   They offer: Free 24/7 access to trained counselors for local resources and assistance.  Contact: 485.368.9234 Website: www.[place]hom[place]/nationalcallcenter.asp      [employer].com    https://www.[employer]/ppxdbszdkm-uookfs-gb/    [place] Housing Search    https://www.Myrl.Front Up/    [employer] [facility] Connections:  They Offer: Homelessness Prevention, Affordable Housing, Outreach Support  Contact: 398.330.2729; [address] [place] [address], [place] [address]  Helpful Info: Hours are Monday -Friday 8:30am-4:30pm.    [place] [facility] [place]/ [facility]   They Offer: Provides shelter to homeless men and families, emergency housing, meals, clothing, personal care items, gospel services, discipleship, personal counseling, drug addiction counseling, GED training. To receive services, families must have marriage license to receive housing.  Contact: [address] [place] [address]; [phone]  Helpful Info:  Hours of Operation: 8:00 a.m. - 9:00 p.m. Monday -Friday      [facility]

## 2024-08-01 NOTE — ASSESSMENT & PLAN NOTE
At goal, continue current medications  Interpretation of 72 Hour Continuous Glucose Monitor:  A patient-owned Dexcom 7 CGM was downloaded and 30 days of data were analyzed.  Compliance with using the device is high.  Average blood glucose during the reviewed timeframe was 145 +/- 6.8% (time in range 86% , hyperglycemia <1%, hypoglycemia 0).  There is a pattern of glycemic control.  No results found for this visit on 08/01/24.     -Continue chronic medications as prescribed w/ the following diabetic medication changes:  Treatment regimen is effective.       -Education: Reviewed ‘ABCs’ of diabetes management (respective goals in parentheses):  A1C (<7), blood pressure (<130/80), and cholesterol (LDL <100).  -Diabetic treatment medications and compliance is emphasized to achieve glycemic control and prevent the potential long term diabetic complications (retinopathy, stroke, heart attack, neuropathy, oral disease, increased cancer risk as well as many others)   Encouraged efforts to improve compliance efforts with moderate cardiovascular exercise dietary modifications.  Glycemic control is promoted by cardiovascular exercise (walking, swimming, aerobics) for 30-45 minutes, 3-4 times weekly.  A more mediterranean type diet with legumes, lean meats, fish and poultry, and lean sources of protein and limiting excessive red meat is recommended.   The importance of a healthy lifestyle are discussed.  Avoid high fructose containing foods, frequent fast food meals, overly processed foods.  A healthy BMI will be easier to achieve if by moderating portion sizes.    -Monitor blood sugar at home daily as directed and keep record to bring to next appointment.  -Discussion/Counseling provided today include: interpretation of lab results, blood sugar goals, complications of diabetes mellitus, nutrition and annual eye exams and diabetic foot care precautions.

## 2024-08-01 NOTE — ASSESSMENT & PLAN NOTE
On ASA and Eliquis.  Will cautiously continue Eliquis and hold Aspirin for now.     No bleeding.  No abd pain.  No acute GERD.  Because of complex heart issues, difficult to discontinue blood thinning medications.   Seeing Heme on Monday next week.    No obvious bleeding.    CKD?  Procrit?  Iron infusion.

## 2024-08-01 NOTE — PROGRESS NOTES
continue Eliquis and hold Aspirin for now.     No bleeding.  No abd pain.  No acute GERD.  Because of complex heart issues, difficult to discontinue blood thinning medications.   Seeing Heme on Monday next week.    No obvious bleeding.    CKD?  Procrit?  Iron infusion.            An electronic signature was used to authenticate this note.  -- Randell Lawson MD     Return in about 3 months (around 2024).    SUBJECTIVE/OBJECTIVE:      HPI:   Yann Shelton (: 1965 is a 58 y.o. female, here for evaluation of the following chief complaint(s):   Chief Complaint   Patient presents with    Diabetes     3 month follow up,lab review       Patient is here for follow-up and management of chronic medical conditions, review of recent labs, review of any imaging completed since our last office visit and discuss any consultants opinions or management changes.   Diabetes  She presents for her follow-up diabetic visit. She has type 2 diabetes mellitus. Onset time: years. Her disease course has been stable. Pertinent negatives for diabetes include no chest pain. There are no hypoglycemic complications. (Lowest blood sugar she remembers was 72 about 3 days ago because she delayed breakfast a few hours.) Symptoms are stable. Diabetic complications include heart disease and nephropathy. Risk factors for coronary artery disease include diabetes mellitus, dyslipidemia, family history, obesity, sedentary lifestyle, tobacco exposure and hypertension. Current diabetic treatment includes insulin injections. She is compliant with treatment most of the time. She is currently taking insulin at bedtime. Insulin injections are given by patient. Rotation sites for injection include the abdominal wall. Her weight is stable. Diabetic current diet: Drinks lots of OJ- discussed to avoid. She has had a previous visit with a dietitian (worked with nutrition and low sodium and diabetic diet in hospital at [facility]). Home blood sugar record

## 2024-08-05 ENCOUNTER — HOSPITAL ENCOUNTER (OUTPATIENT)
Dept: LAB | Age: 59
Discharge: HOME | End: 2024-08-08
Payer: MEDICARE

## 2024-08-05 ENCOUNTER — OFFICE VISIT (OUTPATIENT)
Dept: ONCOLOGY | Age: 59
End: 2024-08-05
Payer: MEDICARE

## 2024-08-05 VITALS
OXYGEN SATURATION: 97 % | SYSTOLIC BLOOD PRESSURE: 94 MMHG | WEIGHT: 196.9 LBS | BODY MASS INDEX: 44.29 KG/M2 | DIASTOLIC BLOOD PRESSURE: 47 MMHG | HEART RATE: 66 BPM | HEIGHT: 56 IN | RESPIRATION RATE: 18 BRPM | TEMPERATURE: 98.1 F

## 2024-08-05 DIAGNOSIS — I10 HYPERTENSION, UNSPECIFIED TYPE: ICD-10-CM

## 2024-08-05 DIAGNOSIS — D63.1 ANEMIA DUE TO CHRONIC RENAL FAILURE TREATED WITH ERYTHROPOIETIN, UNSPECIFIED CKD STAGE: ICD-10-CM

## 2024-08-05 DIAGNOSIS — N18.9 ANEMIA DUE TO CHRONIC RENAL FAILURE TREATED WITH ERYTHROPOIETIN, UNSPECIFIED CKD STAGE: Primary | ICD-10-CM

## 2024-08-05 DIAGNOSIS — E55.9 VITAMIN D DEFICIENCY: ICD-10-CM

## 2024-08-05 DIAGNOSIS — N18.9 ANEMIA DUE TO CHRONIC RENAL FAILURE TREATED WITH ERYTHROPOIETIN, UNSPECIFIED CKD STAGE: ICD-10-CM

## 2024-08-05 DIAGNOSIS — D63.1 ANEMIA DUE TO CHRONIC RENAL FAILURE TREATED WITH ERYTHROPOIETIN, UNSPECIFIED CKD STAGE: Primary | ICD-10-CM

## 2024-08-05 LAB
25(OH)D3 SERPL-MCNC: 37.3 NG/ML (ref 30–100)
ALBUMIN SERPL-MCNC: 3.6 G/DL (ref 3.2–4.6)
ALBUMIN/GLOB SERPL: 1.1 (ref 1–1.9)
ALP SERPL-CCNC: 73 U/L (ref 35–104)
ALT SERPL-CCNC: 24 U/L (ref 12–65)
ANION GAP SERPL CALC-SCNC: 13 MMOL/L (ref 9–18)
AST SERPL-CCNC: 27 U/L (ref 15–37)
BASOPHILS # BLD: 0 K/UL (ref 0–0.2)
BASOPHILS NFR BLD: 0 % (ref 0–2)
BILIRUB SERPL-MCNC: 0.2 MG/DL (ref 0–1.2)
BUN SERPL-MCNC: 30 MG/DL (ref 8–23)
CALCIUM SERPL-MCNC: 9 MG/DL (ref 8.8–10.2)
CHLORIDE SERPL-SCNC: 102 MMOL/L (ref 98–107)
CO2 SERPL-SCNC: 26 MMOL/L (ref 20–28)
CREAT SERPL-MCNC: 1.5 MG/DL (ref 0.6–1.1)
CRP SERPL HS-MCNC: 1.4 MG/L (ref 0–3)
DAT POLY-SP REAG RBC QL: NORMAL
DIFFERENTIAL METHOD BLD: ABNORMAL
EOSINOPHIL # BLD: 0.2 K/UL (ref 0–0.8)
EOSINOPHIL NFR BLD: 3 % (ref 0.5–7.8)
ERYTHROCYTE [DISTWIDTH] IN BLOOD BY AUTOMATED COUNT: 17 % (ref 11.9–14.6)
ERYTHROCYTE [SEDIMENTATION RATE] IN BLOOD: 16 MM/HR (ref 0–30)
FERRITIN SERPL-MCNC: 11 NG/ML (ref 8–388)
GLOBULIN SER CALC-MCNC: 3.3 G/DL (ref 2.3–3.5)
GLUCOSE SERPL-MCNC: 112 MG/DL (ref 70–99)
HAPTOGLOB SERPL-MCNC: 111 MG/DL (ref 30–200)
HCT VFR BLD AUTO: 24.3 % (ref 35.8–46.3)
HGB BLD-MCNC: 7 G/DL (ref 11.7–15.4)
HGB RETIC QN AUTO: 18 PG (ref 29–35)
IMM GRANULOCYTES # BLD AUTO: 0 K/UL (ref 0–0.5)
IMM GRANULOCYTES NFR BLD AUTO: 0 % (ref 0–5)
IMM RETICS NFR: 20.3 % (ref 3–15.9)
LDH SERPL L TO P-CCNC: 237 U/L (ref 127–281)
LYMPHOCYTES # BLD: 2.4 K/UL (ref 0.5–4.6)
LYMPHOCYTES NFR BLD: 32 % (ref 13–44)
MCH RBC QN AUTO: 22.5 PG (ref 26.1–32.9)
MCHC RBC AUTO-ENTMCNC: 28.8 G/DL (ref 31.4–35)
MCV RBC AUTO: 78.1 FL (ref 82–102)
MONOCYTES # BLD: 0.6 K/UL (ref 0.1–1.3)
MONOCYTES NFR BLD: 8 % (ref 4–12)
NEUTS SEG # BLD: 4.3 K/UL (ref 1.7–8.2)
NEUTS SEG NFR BLD: 57 % (ref 43–78)
NRBC # BLD: 0.03 K/UL (ref 0–0.2)
PLATELET # BLD AUTO: 188 K/UL (ref 150–450)
PMV BLD AUTO: 10.9 FL (ref 9.4–12.3)
POTASSIUM SERPL-SCNC: 3.2 MMOL/L (ref 3.5–5.1)
PROT SERPL-MCNC: 6.9 G/DL (ref 6.3–8.2)
RBC # BLD AUTO: 3.11 M/UL (ref 4.05–5.2)
RETICS # AUTO: 0.05 M/UL (ref 0.03–0.1)
RETICS/RBC NFR AUTO: 1.6 % (ref 0.3–2)
SODIUM SERPL-SCNC: 141 MMOL/L (ref 136–145)
VIT B12 SERPL-MCNC: 430 PG/ML (ref 193–986)
WBC # BLD AUTO: 7.6 K/UL (ref 4.3–11.1)

## 2024-08-05 PROCEDURE — 85046 RETICYTE/HGB CONCENTRATE: CPT

## 2024-08-05 PROCEDURE — 82728 ASSAY OF FERRITIN: CPT

## 2024-08-05 PROCEDURE — 3078F DIAST BP <80 MM HG: CPT | Performed by: INTERNAL MEDICINE

## 2024-08-05 PROCEDURE — G8427 DOCREV CUR MEDS BY ELIG CLIN: HCPCS | Performed by: INTERNAL MEDICINE

## 2024-08-05 PROCEDURE — G8417 CALC BMI ABV UP PARAM F/U: HCPCS | Performed by: INTERNAL MEDICINE

## 2024-08-05 PROCEDURE — 82306 VITAMIN D 25 HYDROXY: CPT

## 2024-08-05 PROCEDURE — 83020 HEMOGLOBIN ELECTROPHORESIS: CPT

## 2024-08-05 PROCEDURE — 99205 OFFICE O/P NEW HI 60 MIN: CPT | Performed by: INTERNAL MEDICINE

## 2024-08-05 PROCEDURE — 80053 COMPREHEN METABOLIC PANEL: CPT

## 2024-08-05 PROCEDURE — 85025 COMPLETE CBC W/AUTO DIFF WBC: CPT

## 2024-08-05 PROCEDURE — 84165 PROTEIN E-PHORESIS SERUM: CPT

## 2024-08-05 PROCEDURE — 36415 COLL VENOUS BLD VENIPUNCTURE: CPT

## 2024-08-05 PROCEDURE — 83615 LACTATE (LD) (LDH) ENZYME: CPT

## 2024-08-05 PROCEDURE — 86880 COOMBS TEST DIRECT: CPT

## 2024-08-05 PROCEDURE — 3074F SYST BP LT 130 MM HG: CPT | Performed by: INTERNAL MEDICINE

## 2024-08-05 PROCEDURE — 4004F PT TOBACCO SCREEN RCVD TLK: CPT | Performed by: INTERNAL MEDICINE

## 2024-08-05 PROCEDURE — 82784 ASSAY IGA/IGD/IGG/IGM EACH: CPT

## 2024-08-05 PROCEDURE — 82607 VITAMIN B-12: CPT

## 2024-08-05 PROCEDURE — 86334 IMMUNOFIX E-PHORESIS SERUM: CPT

## 2024-08-05 PROCEDURE — 85652 RBC SED RATE AUTOMATED: CPT

## 2024-08-05 PROCEDURE — 3017F COLORECTAL CA SCREEN DOC REV: CPT | Performed by: INTERNAL MEDICINE

## 2024-08-05 PROCEDURE — 86141 C-REACTIVE PROTEIN HS: CPT

## 2024-08-05 PROCEDURE — 83010 ASSAY OF HAPTOGLOBIN QUANT: CPT

## 2024-08-05 PROCEDURE — 82668 ASSAY OF ERYTHROPOIETIN: CPT

## 2024-08-05 NOTE — PROGRESS NOTES
NEW PATIENT INTAKE      Referral Diagnosis: Other iron deficiency anemia    Referring Provider:  Stuart Schmitt DO    Primary Care Provider: Randell Lawson MD    Family History of Cancer/ Hematology Disorders: Sister with breast cancer     Presenting Symptoms:  DUARTE, chest pain, lack of energy    Chronological History of Pertinent Events:  Mr. Shelton is a 58-year-old black female referred for LUCIO. PMH includes stage 4 CKD, LORI, HTN, LV aneurysm, VT, NICM, CHF, DM2, and LUCIO. Her most recent colonoscopy was completed on 7/7/21 with findings of colon polyps; diverticulosis; and internal hemorrhoids. Pathology was benign (Epic/Labs).     7/24/24: Routine labs drawn in advance of primary care f/u (Epic/Labs)  -CBC significant for RBC 3.43, HGB 7.9 (Down from 10.6 on 4/2/24), HCT 27.9, MCV 81.3, MCH 23.0, MCHC 28.3, RDW 17.2   -CMP significant for glucose 185, BUN 30, creatinine 1.92, eGFR 29    7/25/24: Cardiology f/u for NICM/cSHF/VT   -Pt is on Eliquis and baby ASA   -Cardiologist notes, “Anemia: new issue, no bleeding now. Refer to heme. IV iron needed??”  -Referred to [facility]    8/1/24: F/u with PCP   -PCP notes, “On ASA and Eliquis. Will cautiously continue Eliquis and hold Aspirin for now. No bleeding. No abd pain. No acute GERD. Because of complex heart issues, difficult to discontinue blood thinning medications. Seeing Heme on Monday next week. No obvious bleeding. CKD? Procrit? Iron infusion.”     Notes from Referring Provider: None    Presented at Tumor Board: N/A    Other Pertinent Information: None

## 2024-08-05 NOTE — PATIENT INSTRUCTIONS
Patient Information from Today's Visit    The members of your [facility] are listed below:    Physician Provider: Sigifredo Luz Medical Oncologist  Advanced Practice Clinician: Shahnaz Murillo NP  Registered Nurse: N/A  Navigator: N/A  Medical Assistant: Gladys SMALL MA and Linh CROW MA  : Liliana SMALL   Supportive Care Services: Kishore RICHARDSON LMSW    Diagnosis:   LUCIO      Follow Up Instructions:   You will have some blood work done today  We will also schedule you for a Retacrit Injection following your next appointment with     -this will help bring your hemoglobin up into the normal range  We will bring you back at the end of August for a follow up with  and lab work prior.      Treatment Summary has been discussed and given to patient:N/A      Current Labs:   Lab work done today downstairs            Please refer to After Visit Summary or OpenSesamehart for upcoming appointment information. Please call our office for rescheduling needs at least 24 hours before your scheduled appointment time.If you have any questions regarding your upcoming schedule please reach out to your care team through [employer] or call [phone].     Please notify your assigned Nurse Navigator of any unplanned hospital admissions or Emergency Room visits within 24 hours of discharge.    -------------------------------------------------------------------------------------------------------------------  Please call our office at [phone] if you have any  of the following symptoms:   Fever of 100.5 or greater  Chills  Shortness of breath  Swelling or pain in one leg    After office hours an answering service is available and will contact a provider for emergencies or if you are experiencing any of the above symptoms.        Gladys Cowan MA

## 2024-08-06 LAB — EPO SERPL-ACNC: 869 MIU/ML (ref 2.6–18.5)

## 2024-08-07 ENCOUNTER — TELEPHONE (OUTPATIENT)
Dept: ONCOLOGY | Age: 59
End: 2024-08-07

## 2024-08-07 PROBLEM — D50.8 OTHER IRON DEFICIENCY ANEMIAS: Status: ACTIVE | Noted: 2024-07-25

## 2024-08-07 RX ORDER — EPINEPHRINE 1 MG/ML
0.3 INJECTION, SOLUTION, CONCENTRATE INTRAVENOUS PRN
Status: CANCELLED | OUTPATIENT
Start: 2024-08-15

## 2024-08-07 RX ORDER — HEPARIN SODIUM (PORCINE) LOCK FLUSH IV SOLN 100 UNIT/ML 100 UNIT/ML
500 SOLUTION INTRAVENOUS PRN
Status: CANCELLED | OUTPATIENT
Start: 2024-08-15

## 2024-08-07 RX ORDER — ONDANSETRON 2 MG/ML
8 INJECTION INTRAMUSCULAR; INTRAVENOUS
Status: CANCELLED | OUTPATIENT
Start: 2024-08-15

## 2024-08-07 RX ORDER — SODIUM CHLORIDE 9 MG/ML
5-250 INJECTION, SOLUTION INTRAVENOUS PRN
Status: CANCELLED | OUTPATIENT
Start: 2024-08-15

## 2024-08-07 RX ORDER — FAMOTIDINE 10 MG/ML
20 INJECTION, SOLUTION INTRAVENOUS
Status: CANCELLED | OUTPATIENT
Start: 2024-08-15

## 2024-08-07 RX ORDER — ALBUTEROL SULFATE 90 UG/1
4 AEROSOL, METERED RESPIRATORY (INHALATION) PRN
Status: CANCELLED | OUTPATIENT
Start: 2024-08-15

## 2024-08-07 RX ORDER — SODIUM CHLORIDE 9 MG/ML
INJECTION, SOLUTION INTRAVENOUS CONTINUOUS
Status: CANCELLED | OUTPATIENT
Start: 2024-08-15

## 2024-08-07 RX ORDER — SODIUM CHLORIDE 0.9 % (FLUSH) 0.9 %
5-40 SYRINGE (ML) INJECTION PRN
Status: CANCELLED | OUTPATIENT
Start: 2024-08-15

## 2024-08-07 RX ORDER — ACETAMINOPHEN 325 MG/1
650 TABLET ORAL
Status: CANCELLED | OUTPATIENT
Start: 2024-08-15

## 2024-08-07 RX ORDER — DIPHENHYDRAMINE HYDROCHLORIDE 50 MG/ML
50 INJECTION INTRAMUSCULAR; INTRAVENOUS
Status: CANCELLED | OUTPATIENT
Start: 2024-08-15

## 2024-08-07 NOTE — TELEPHONE ENCOUNTER
Call to patient regarding Iron Infusions needed due to low ferritin level. Patient did not answer, so left a VM explaining that after she was seen by  Monday, lab work showed a low iron level. Asked patient to please call our office back to schedule IV Iron appointments.

## 2024-08-08 LAB
ALBUMIN SERPL ELPH-MCNC: 3.7 G/DL (ref 2.9–4.4)
ALBUMIN/GLOB SERPL: 1.3 (ref 0.7–1.7)
ALPHA1 GLOB SERPL ELPH-MCNC: 0.3 G/DL (ref 0–0.4)
ALPHA2 GLOB SERPL ELPH-MCNC: 0.6 G/DL (ref 0.4–1)
B-GLOBULIN SERPL ELPH-MCNC: 1.1 G/DL (ref 0.7–1.3)
GAMMA GLOB SERPL ELPH-MCNC: 1 G/DL (ref 0.4–1.8)
GLOBULIN SER-MCNC: 3 G/DL (ref 2.2–3.9)
HGB A MFR BLD: 97.9 % (ref 96.4–98.8)
HGB A2 MFR BLD COLUMN CHROM: 2.1 % (ref 1.8–3.2)
HGB F MFR BLD: 0 % (ref 0–2)
HGB FRACT BLD-IMP: NORMAL
HGB S MFR BLD: 0 %
IGA SERPL-MCNC: 143 MG/DL (ref 87–352)
IGG SERPL-MCNC: 994 MG/DL (ref 586–1602)
IGM SERPL-MCNC: 180 MG/DL (ref 26–217)
INTERPRETATION SERPL IEP-IMP: NORMAL
M PROTEIN SERPL ELPH-MCNC: NORMAL G/DL
PROT SERPL-MCNC: 6.7 G/DL (ref 6–8.5)

## 2024-08-09 RX ORDER — SODIUM CHLORIDE 9 MG/ML
INJECTION, SOLUTION INTRAVENOUS CONTINUOUS
OUTPATIENT
Start: 2024-08-09

## 2024-08-09 RX ORDER — ACETAMINOPHEN 325 MG/1
650 TABLET ORAL ONCE
OUTPATIENT
Start: 2024-08-09 | End: 2024-08-09

## 2024-08-09 RX ORDER — MEPERIDINE HYDROCHLORIDE 50 MG/ML
12.5 INJECTION INTRAMUSCULAR; INTRAVENOUS; SUBCUTANEOUS PRN
OUTPATIENT
Start: 2024-08-09

## 2024-08-09 RX ORDER — ONDANSETRON 2 MG/ML
8 INJECTION INTRAMUSCULAR; INTRAVENOUS
OUTPATIENT
Start: 2024-08-09

## 2024-08-09 RX ORDER — FAMOTIDINE 10 MG/ML
20 INJECTION, SOLUTION INTRAVENOUS
OUTPATIENT
Start: 2024-08-09

## 2024-08-09 RX ORDER — EPINEPHRINE 1 MG/ML
0.3 INJECTION, SOLUTION, CONCENTRATE INTRAVENOUS PRN
OUTPATIENT
Start: 2024-08-09

## 2024-08-09 RX ORDER — HEPARIN SODIUM (PORCINE) LOCK FLUSH IV SOLN 100 UNIT/ML 100 UNIT/ML
500 SOLUTION INTRAVENOUS PRN
OUTPATIENT
Start: 2024-08-09

## 2024-08-09 RX ORDER — SODIUM CHLORIDE 9 MG/ML
5-250 INJECTION, SOLUTION INTRAVENOUS PRN
OUTPATIENT
Start: 2024-08-09

## 2024-08-09 RX ORDER — ACETAMINOPHEN 325 MG/1
650 TABLET ORAL
OUTPATIENT
Start: 2024-08-09

## 2024-08-09 RX ORDER — SODIUM CHLORIDE 0.9 % (FLUSH) 0.9 %
5-40 SYRINGE (ML) INJECTION PRN
OUTPATIENT
Start: 2024-08-09

## 2024-08-09 RX ORDER — ALBUTEROL SULFATE 90 UG/1
4 AEROSOL, METERED RESPIRATORY (INHALATION) PRN
OUTPATIENT
Start: 2024-08-09

## 2024-08-09 RX ORDER — DIPHENHYDRAMINE HYDROCHLORIDE 50 MG/ML
50 INJECTION INTRAMUSCULAR; INTRAVENOUS
OUTPATIENT
Start: 2024-08-09

## 2024-08-10 RX ORDER — ONDANSETRON 2 MG/ML
8 INJECTION INTRAMUSCULAR; INTRAVENOUS
OUTPATIENT
Start: 2024-08-14

## 2024-08-10 RX ORDER — MEPERIDINE HYDROCHLORIDE 50 MG/ML
12.5 INJECTION INTRAMUSCULAR; INTRAVENOUS; SUBCUTANEOUS PRN
OUTPATIENT
Start: 2024-08-14

## 2024-08-10 RX ORDER — ACETAMINOPHEN 325 MG/1
650 TABLET ORAL
OUTPATIENT
Start: 2024-08-14

## 2024-08-10 RX ORDER — SODIUM CHLORIDE 9 MG/ML
INJECTION, SOLUTION INTRAVENOUS CONTINUOUS
OUTPATIENT
Start: 2024-08-14

## 2024-08-10 RX ORDER — HEPARIN SODIUM (PORCINE) LOCK FLUSH IV SOLN 100 UNIT/ML 100 UNIT/ML
500 SOLUTION INTRAVENOUS PRN
OUTPATIENT
Start: 2024-08-14

## 2024-08-10 RX ORDER — FAMOTIDINE 10 MG/ML
20 INJECTION, SOLUTION INTRAVENOUS
OUTPATIENT
Start: 2024-08-14

## 2024-08-10 RX ORDER — ACETAMINOPHEN 325 MG/1
650 TABLET ORAL ONCE
OUTPATIENT
Start: 2024-08-14 | End: 2024-08-14

## 2024-08-10 RX ORDER — EPINEPHRINE 1 MG/ML
0.3 INJECTION, SOLUTION, CONCENTRATE INTRAVENOUS PRN
OUTPATIENT
Start: 2024-08-14

## 2024-08-10 RX ORDER — SODIUM CHLORIDE 9 MG/ML
5-250 INJECTION, SOLUTION INTRAVENOUS PRN
OUTPATIENT
Start: 2024-08-14

## 2024-08-10 RX ORDER — ALBUTEROL SULFATE 90 UG/1
4 AEROSOL, METERED RESPIRATORY (INHALATION) PRN
OUTPATIENT
Start: 2024-08-14

## 2024-08-10 RX ORDER — SODIUM CHLORIDE 0.9 % (FLUSH) 0.9 %
5-40 SYRINGE (ML) INJECTION PRN
OUTPATIENT
Start: 2024-08-14

## 2024-08-10 RX ORDER — DIPHENHYDRAMINE HYDROCHLORIDE 50 MG/ML
50 INJECTION INTRAMUSCULAR; INTRAVENOUS
OUTPATIENT
Start: 2024-08-14

## 2024-08-12 LAB — HBA1 GENE MUT TESTED BLD/T: NORMAL

## 2024-08-15 ENCOUNTER — HOSPITAL ENCOUNTER (OUTPATIENT)
Dept: INFUSION THERAPY | Age: 59
Setting detail: INFUSION SERIES
Discharge: HOME | End: 2024-08-15
Payer: MEDICARE

## 2024-08-15 VITALS
OXYGEN SATURATION: 98 % | HEART RATE: 61 BPM | TEMPERATURE: 98 F | RESPIRATION RATE: 20 BRPM | SYSTOLIC BLOOD PRESSURE: 132 MMHG | DIASTOLIC BLOOD PRESSURE: 62 MMHG

## 2024-08-15 DIAGNOSIS — D50.8 OTHER IRON DEFICIENCY ANEMIAS: Primary | ICD-10-CM

## 2024-08-15 PROCEDURE — 6370000000 HC RX 637 (ALT 250 FOR IP): Performed by: INTERNAL MEDICINE

## 2024-08-15 PROCEDURE — 96366 THER/PROPH/DIAG IV INF ADDON: CPT

## 2024-08-15 PROCEDURE — 6360000002 HC RX W HCPCS: Performed by: INTERNAL MEDICINE

## 2024-08-15 PROCEDURE — 96376 TX/PRO/DX INJ SAME DRUG ADON: CPT

## 2024-08-15 PROCEDURE — 96375 TX/PRO/DX INJ NEW DRUG ADDON: CPT

## 2024-08-15 PROCEDURE — 2580000003 HC RX 258: Performed by: INTERNAL MEDICINE

## 2024-08-15 PROCEDURE — 96365 THER/PROPH/DIAG IV INF INIT: CPT

## 2024-08-15 RX ORDER — DEXAMETHASONE SODIUM PHOSPHATE 10 MG/ML
10 INJECTION INTRAMUSCULAR; INTRAVENOUS ONCE
Status: CANCELLED | OUTPATIENT
Start: 2024-08-15 | End: 2024-08-15

## 2024-08-15 RX ORDER — SODIUM CHLORIDE 0.9 % (FLUSH) 0.9 %
5-40 SYRINGE (ML) INJECTION PRN
OUTPATIENT
Start: 2024-08-15

## 2024-08-15 RX ORDER — MEPERIDINE HYDROCHLORIDE 25 MG/ML
12.5 INJECTION INTRAMUSCULAR; INTRAVENOUS; SUBCUTANEOUS PRN
Status: DISCONTINUED | OUTPATIENT
Start: 2024-08-15 | End: 2024-08-16 | Stop reason: HOSPADM

## 2024-08-15 RX ORDER — DIPHENHYDRAMINE HYDROCHLORIDE 50 MG/ML
50 INJECTION INTRAMUSCULAR; INTRAVENOUS
OUTPATIENT
Start: 2024-08-15

## 2024-08-15 RX ORDER — ALBUTEROL SULFATE 90 UG/1
4 AEROSOL, METERED RESPIRATORY (INHALATION) PRN
OUTPATIENT
Start: 2024-08-15

## 2024-08-15 RX ORDER — SODIUM CHLORIDE 9 MG/ML
5-250 INJECTION, SOLUTION INTRAVENOUS PRN
Status: DISCONTINUED | OUTPATIENT
Start: 2024-08-15 | End: 2024-08-16 | Stop reason: HOSPADM

## 2024-08-15 RX ORDER — DEXAMETHASONE SODIUM PHOSPHATE 10 MG/ML
10 INJECTION INTRAMUSCULAR; INTRAVENOUS ONCE
Status: COMPLETED | OUTPATIENT
Start: 2024-08-15 | End: 2024-08-15

## 2024-08-15 RX ORDER — EPINEPHRINE 1 MG/ML
0.3 INJECTION, SOLUTION, CONCENTRATE INTRAVENOUS PRN
OUTPATIENT
Start: 2024-08-15

## 2024-08-15 RX ORDER — ACETAMINOPHEN 325 MG/1
650 TABLET ORAL ONCE
Status: COMPLETED | OUTPATIENT
Start: 2024-08-15 | End: 2024-08-15

## 2024-08-15 RX ORDER — HEPARIN 100 UNIT/ML
500 SYRINGE INTRAVENOUS PRN
OUTPATIENT
Start: 2024-08-15

## 2024-08-15 RX ORDER — ACETAMINOPHEN 325 MG/1
650 TABLET ORAL
OUTPATIENT
Start: 2024-08-15

## 2024-08-15 RX ORDER — SODIUM CHLORIDE 9 MG/ML
INJECTION, SOLUTION INTRAVENOUS CONTINUOUS
OUTPATIENT
Start: 2024-08-15

## 2024-08-15 RX ORDER — ALBUTEROL SULFATE 90 UG/1
4 AEROSOL, METERED RESPIRATORY (INHALATION) PRN
Status: DISCONTINUED | OUTPATIENT
Start: 2024-08-15 | End: 2024-08-16 | Stop reason: HOSPADM

## 2024-08-15 RX ORDER — SODIUM CHLORIDE 9 MG/ML
5-250 INJECTION, SOLUTION INTRAVENOUS PRN
Status: CANCELLED | OUTPATIENT
Start: 2024-08-15

## 2024-08-15 RX ORDER — ACETAMINOPHEN 325 MG/1
650 TABLET ORAL ONCE
Status: CANCELLED | OUTPATIENT
Start: 2024-08-15 | End: 2024-08-15

## 2024-08-15 RX ORDER — ACETAMINOPHEN 325 MG/1
650 TABLET ORAL
Status: DISCONTINUED | OUTPATIENT
Start: 2024-08-15 | End: 2024-08-16 | Stop reason: HOSPADM

## 2024-08-15 RX ORDER — SODIUM CHLORIDE 0.9 % (FLUSH) 0.9 %
5-40 SYRINGE (ML) INJECTION PRN
Status: DISCONTINUED | OUTPATIENT
Start: 2024-08-15 | End: 2024-08-16 | Stop reason: HOSPADM

## 2024-08-15 RX ORDER — EPINEPHRINE 1 MG/ML
0.3 INJECTION, SOLUTION, CONCENTRATE INTRAVENOUS PRN
Status: DISCONTINUED | OUTPATIENT
Start: 2024-08-15 | End: 2024-08-16 | Stop reason: HOSPADM

## 2024-08-15 RX ORDER — SODIUM CHLORIDE 9 MG/ML
5-250 INJECTION, SOLUTION INTRAVENOUS PRN
OUTPATIENT
Start: 2024-08-15

## 2024-08-15 RX ORDER — DIPHENHYDRAMINE HYDROCHLORIDE 50 MG/ML
50 INJECTION INTRAMUSCULAR; INTRAVENOUS
Status: DISCONTINUED | OUTPATIENT
Start: 2024-08-15 | End: 2024-08-16 | Stop reason: HOSPADM

## 2024-08-15 RX ORDER — ONDANSETRON 2 MG/ML
8 INJECTION INTRAMUSCULAR; INTRAVENOUS
OUTPATIENT
Start: 2024-08-15

## 2024-08-15 RX ORDER — MEPERIDINE HYDROCHLORIDE 25 MG/ML
12.5 INJECTION INTRAMUSCULAR; INTRAVENOUS; SUBCUTANEOUS PRN
OUTPATIENT
Start: 2024-08-15

## 2024-08-15 RX ORDER — ONDANSETRON 2 MG/ML
8 INJECTION INTRAMUSCULAR; INTRAVENOUS
Status: DISCONTINUED | OUTPATIENT
Start: 2024-08-15 | End: 2024-08-16 | Stop reason: HOSPADM

## 2024-08-15 RX ADMIN — DEXAMETHASONE SODIUM PHOSPHATE 10 MG: 10 INJECTION INTRAMUSCULAR; INTRAVENOUS at 09:30

## 2024-08-15 RX ADMIN — SODIUM CHLORIDE 75 ML/HR: 9 INJECTION, SOLUTION INTRAVENOUS at 08:15

## 2024-08-15 RX ADMIN — ACETAMINOPHEN 650 MG: 325 TABLET ORAL at 09:29

## 2024-08-15 RX ADMIN — SODIUM CHLORIDE 25 MG: 9 INJECTION, SOLUTION INTRAVENOUS at 08:15

## 2024-08-15 RX ADMIN — SODIUM CHLORIDE 975 MG: 9 INJECTION, SOLUTION INTRAVENOUS at 10:04

## 2024-08-15 RX ADMIN — SODIUM CHLORIDE, PRESERVATIVE FREE 10 ML: 5 INJECTION INTRAVENOUS at 07:52

## 2024-08-15 NOTE — PROGRESS NOTES
Arrived to the Infusion Center.  Infed completed. Patient tolerated without complication.   Any issues or concerns during appointment: No.  Patient aware of next infusion appointment on 08/27/24 (date) at 1200.  Patient instructed to call provider with temperature of 100.4 or greater or nausea/vomiting/ diarrhea or pain not controlled by medications  Discharged ambulatory.    IP OT attempted. RN made aware of attempt. Pt occupied with HD. Will follow-up later today as schedule permits.

## 2024-08-15 NOTE — PROGRESS NOTES
's initial visit with Ms. Shelton to convey care and concern and to explore any spiritual/emotional needs. Ms. Shelton was receiving her first iron infusion and she appeared in good spirits. No needs were voiced in the visit. It was nice meeting Ms. Shelton.      Reverend Steven Vasquez MDiv  Board Certified

## 2024-08-16 ENCOUNTER — APPOINTMENT (OUTPATIENT)
Dept: NON INVASIVE DIAGNOSTICS | Age: 59
DRG: 640 | End: 2024-08-16
Payer: MEDICARE

## 2024-08-16 ENCOUNTER — HOSPITAL ENCOUNTER (INPATIENT)
Age: 59
LOS: 3 days | Discharge: HOME | DRG: 640 | End: 2024-08-19
Attending: EMERGENCY MEDICINE | Admitting: FAMILY MEDICINE
Payer: MEDICARE

## 2024-08-16 ENCOUNTER — APPOINTMENT (OUTPATIENT)
Dept: CT IMAGING | Age: 59
DRG: 640 | End: 2024-08-16
Payer: MEDICARE

## 2024-08-16 ENCOUNTER — APPOINTMENT (OUTPATIENT)
Dept: ULTRASOUND IMAGING | Age: 59
DRG: 640 | End: 2024-08-16
Payer: MEDICARE

## 2024-08-16 ENCOUNTER — APPOINTMENT (OUTPATIENT)
Dept: GENERAL RADIOLOGY | Age: 59
DRG: 640 | End: 2024-08-16
Payer: MEDICARE

## 2024-08-16 DIAGNOSIS — R06.02 SHORTNESS OF BREATH: ICD-10-CM

## 2024-08-16 DIAGNOSIS — D64.9 CHRONIC ANEMIA: ICD-10-CM

## 2024-08-16 DIAGNOSIS — R10.10 UPPER ABDOMINAL PAIN: Primary | ICD-10-CM

## 2024-08-16 DIAGNOSIS — I50.23 ACUTE ON CHRONIC SYSTOLIC CONGESTIVE HEART FAILURE (HCC): ICD-10-CM

## 2024-08-16 DIAGNOSIS — I50.9 ACUTE ON CHRONIC CONGESTIVE HEART FAILURE, UNSPECIFIED HEART FAILURE TYPE (HCC): ICD-10-CM

## 2024-08-16 DIAGNOSIS — J96.01 ACUTE RESPIRATORY FAILURE WITH HYPOXIA (HCC): ICD-10-CM

## 2024-08-16 DIAGNOSIS — R11.0 NAUSEA: ICD-10-CM

## 2024-08-16 PROBLEM — N30.00 ACUTE CYSTITIS WITHOUT HEMATURIA: Status: ACTIVE | Noted: 2024-08-16

## 2024-08-16 LAB
ALBUMIN SERPL-MCNC: 3.3 G/DL (ref 3.2–4.6)
ALBUMIN/GLOB SERPL: 1.1 (ref 1–1.9)
ALP SERPL-CCNC: 70 U/L (ref 35–104)
ALT SERPL-CCNC: 24 U/L (ref 12–65)
ANION GAP SERPL CALC-SCNC: 15 MMOL/L (ref 9–18)
APPEARANCE UR: CLEAR
AST SERPL-CCNC: 19 U/L (ref 15–37)
BACTERIA URNS QL MICRO: ABNORMAL /HPF
BASOPHILS # BLD: 0 K/UL (ref 0–0.2)
BASOPHILS NFR BLD: 0 % (ref 0–2)
BILIRUB SERPL-MCNC: 0.2 MG/DL (ref 0–1.2)
BILIRUB UR QL: NEGATIVE
BUN SERPL-MCNC: 22 MG/DL (ref 8–23)
CALCIUM SERPL-MCNC: 9.5 MG/DL (ref 8.8–10.2)
CHLORIDE SERPL-SCNC: 107 MMOL/L (ref 98–107)
CO2 SERPL-SCNC: 20 MMOL/L (ref 20–28)
COLOR UR: ABNORMAL
CREAT SERPL-MCNC: 1.23 MG/DL (ref 0.6–1.1)
DIFFERENTIAL METHOD BLD: ABNORMAL
ECHO AO ASC DIAM: 2.7 CM
ECHO AO ASCENDING AORTA INDEX: 1.53 CM/M2
ECHO AO ROOT DIAM: 3 CM
ECHO AO ROOT INDEX: 1.7 CM/M2
ECHO AV AREA PEAK VELOCITY: 3.2 CM2
ECHO AV AREA VTI: 3 CM2
ECHO AV AREA/BSA PEAK VELOCITY: 1.8 CM2/M2
ECHO AV AREA/BSA VTI: 1.7 CM2/M2
ECHO AV MEAN GRADIENT: 6 MMHG
ECHO AV MEAN VELOCITY: 1.1 M/S
ECHO AV PEAK GRADIENT: 10 MMHG
ECHO AV PEAK VELOCITY: 1.6 M/S
ECHO AV VELOCITY RATIO: 0.75
ECHO AV VTI: 35.2 CM
ECHO BSA: 1.87 M2
ECHO EST RA PRESSURE: 3 MMHG
ECHO IVC EXP: 2 CM
ECHO LA AREA 2C: 28.3 CM2
ECHO LA AREA 4C: 30.3 CM2
ECHO LA MAJOR AXIS: 7 CM
ECHO LA MINOR AXIS: 7.2 CM
ECHO LA VOL BP: 99 ML (ref 22–52)
ECHO LA VOL MOD A2C: 94 ML (ref 22–52)
ECHO LA VOL MOD A4C: 103 ML (ref 22–52)
ECHO LA VOL/BSA BIPLANE: 56 ML/M2 (ref 16–34)
ECHO LA VOLUME INDEX MOD A2C: 53 ML/M2 (ref 16–34)
ECHO LA VOLUME INDEX MOD A4C: 59 ML/M2 (ref 16–34)
ECHO LV E' LATERAL VELOCITY: 4 CM/S
ECHO LV E' SEPTAL VELOCITY: 4 CM/S
ECHO LV EDV A2C: 217 ML
ECHO LV EDV A4C: 233 ML
ECHO LV EDV INDEX A4C: 132 ML/M2
ECHO LV EDV NDEX A2C: 123 ML/M2
ECHO LV EJECTION FRACTION A2C: 27 %
ECHO LV EJECTION FRACTION A4C: 37 %
ECHO LV EJECTION FRACTION BIPLANE: 33 % (ref 55–100)
ECHO LV ESV A2C: 160 ML
ECHO LV ESV A4C: 147 ML
ECHO LV ESV INDEX A2C: 91 ML/M2
ECHO LV ESV INDEX A4C: 84 ML/M2
ECHO LV FRACTIONAL SHORTENING: 13 % (ref 28–44)
ECHO LV INTERNAL DIMENSION DIASTOLE INDEX: 3.92 CM/M2
ECHO LV INTERNAL DIMENSION DIASTOLIC: 6.9 CM (ref 3.9–5.3)
ECHO LV INTERNAL DIMENSION SYSTOLIC INDEX: 3.41 CM/M2
ECHO LV INTERNAL DIMENSION SYSTOLIC: 6 CM
ECHO LV IVSD: 1.1 CM (ref 0.6–0.9)
ECHO LV MASS 2D: 333.8 G (ref 67–162)
ECHO LV MASS INDEX 2D: 189.7 G/M2 (ref 43–95)
ECHO LV POSTERIOR WALL DIASTOLIC: 1 CM (ref 0.6–0.9)
ECHO LV RELATIVE WALL THICKNESS RATIO: 0.29
ECHO LVOT AREA: 4.2 CM2
ECHO LVOT AV VTI INDEX: 0.72
ECHO LVOT DIAM: 2.3 CM
ECHO LVOT MEAN GRADIENT: 3 MMHG
ECHO LVOT PEAK GRADIENT: 6 MMHG
ECHO LVOT PEAK VELOCITY: 1.2 M/S
ECHO LVOT STROKE VOLUME INDEX: 59.7 ML/M2
ECHO LVOT SV: 105.1 ML
ECHO LVOT VTI: 25.3 CM
ECHO MV A VELOCITY: 1.23 M/S
ECHO MV AREA VTI: 2.8 CM2
ECHO MV E DECELERATION TIME (DT): 158 MS
ECHO MV E VELOCITY: 0.91 M/S
ECHO MV E/A RATIO: 0.74
ECHO MV E/E' LATERAL: 22.75
ECHO MV E/E' RATIO (AVERAGED): 22.75
ECHO MV E/E' SEPTAL: 22.75
ECHO MV LVOT VTI INDEX: 1.46
ECHO MV MAX VELOCITY: 1.6 M/S
ECHO MV MEAN GRADIENT: 4 MMHG
ECHO MV MEAN VELOCITY: 0.9 M/S
ECHO MV PEAK GRADIENT: 10 MMHG
ECHO MV VTI: 36.9 CM
ECHO PV ACCELERATION TIME (AT): 98 MS
ECHO PV MAX VELOCITY: 1.2 M/S
ECHO PV PEAK GRADIENT: 6 MMHG
ECHO RIGHT VENTRICULAR SYSTOLIC PRESSURE (RVSP): 32 MMHG
ECHO RV BASAL DIMENSION: 4.2 CM
ECHO RV FREE WALL PEAK S': 11 CM/S
ECHO RV MID DIMENSION: 3.7 CM
ECHO RV TAPSE: 2.7 CM (ref 1.7–?)
ECHO TV REGURGITANT MAX VELOCITY: 2.71 M/S
ECHO TV REGURGITANT PEAK GRADIENT: 29 MMHG
EKG ATRIAL RATE: 60 BPM
EKG DIAGNOSIS: NORMAL
EKG P AXIS: 77 DEGREES
EKG P-R INTERVAL: 305 MS
EKG Q-T INTERVAL: 466 MS
EKG QRS DURATION: 126 MS
EKG QTC CALCULATION (BAZETT): 466 MS
EKG R AXIS: 45 DEGREES
EKG T AXIS: -71 DEGREES
EKG VENTRICULAR RATE: 60 BPM
EOSINOPHIL # BLD: 0 K/UL (ref 0–0.8)
EOSINOPHIL NFR BLD: 0 % (ref 0.5–7.8)
EPI CELLS #/AREA URNS HPF: ABNORMAL /HPF
ERYTHROCYTE [DISTWIDTH] IN BLOOD BY AUTOMATED COUNT: 17 % (ref 11.9–14.6)
GLOBULIN SER CALC-MCNC: 3.1 G/DL (ref 2.3–3.5)
GLUCOSE BLD STRIP.AUTO-MCNC: 176 MG/DL (ref 65–100)
GLUCOSE BLD STRIP.AUTO-MCNC: 187 MG/DL (ref 65–100)
GLUCOSE BLD STRIP.AUTO-MCNC: 187 MG/DL (ref 65–100)
GLUCOSE BLD STRIP.AUTO-MCNC: 227 MG/DL (ref 65–100)
GLUCOSE SERPL-MCNC: 184 MG/DL (ref 70–99)
GLUCOSE UR STRIP.AUTO-MCNC: >1000 MG/DL
HCT VFR BLD AUTO: 25.2 % (ref 35.8–46.3)
HGB BLD-MCNC: 7.1 G/DL (ref 11.7–15.4)
HGB UR QL STRIP: NEGATIVE
HYALINE CASTS URNS QL MICRO: ABNORMAL /LPF
IMM GRANULOCYTES # BLD AUTO: 0.1 K/UL (ref 0–0.5)
IMM GRANULOCYTES NFR BLD AUTO: 1 % (ref 0–5)
KETONES UR QL STRIP.AUTO: NEGATIVE MG/DL
LACTATE SERPL-SCNC: 1.1 MMOL/L (ref 0.5–2)
LEUKOCYTE ESTERASE UR QL STRIP.AUTO: ABNORMAL
LIPASE SERPL-CCNC: 24 U/L (ref 13–60)
LYMPHOCYTES # BLD: 0.9 K/UL (ref 0.5–4.6)
LYMPHOCYTES NFR BLD: 11 % (ref 13–44)
MCH RBC QN AUTO: 21.6 PG (ref 26.1–32.9)
MCHC RBC AUTO-ENTMCNC: 28.2 G/DL (ref 31.4–35)
MCV RBC AUTO: 76.8 FL (ref 82–102)
MONOCYTES # BLD: 0.7 K/UL (ref 0.1–1.3)
MONOCYTES NFR BLD: 8 % (ref 4–12)
NEUTS SEG # BLD: 6.9 K/UL (ref 1.7–8.2)
NEUTS SEG NFR BLD: 80 % (ref 43–78)
NITRITE UR QL STRIP.AUTO: NEGATIVE
NRBC # BLD: 0.13 K/UL (ref 0–0.2)
NT PRO BNP: 1251 PG/ML (ref 0–125)
PH UR STRIP: 5.5 (ref 5–9)
PLATELET # BLD AUTO: 178 K/UL (ref 150–450)
PMV BLD AUTO: 10.4 FL (ref 9.4–12.3)
POTASSIUM SERPL-SCNC: 3.7 MMOL/L (ref 3.5–5.1)
PROCALCITONIN SERPL-MCNC: 0.16 NG/ML (ref 0–0.1)
PROT SERPL-MCNC: 6.5 G/DL (ref 6.3–8.2)
PROT UR STRIP-MCNC: NEGATIVE MG/DL
RBC # BLD AUTO: 3.28 M/UL (ref 4.05–5.2)
RBC #/AREA URNS HPF: ABNORMAL /HPF
SARS-COV-2 RDRP RESP QL NAA+PROBE: NOT DETECTED
SERVICE CMNT-IMP: ABNORMAL
SODIUM SERPL-SCNC: 142 MMOL/L (ref 136–145)
SOURCE: NORMAL
SP GR UR REFRACTOMETRY: >1.035 (ref 1–1.02)
TROPONIN T SERPL HS-MCNC: 13 NG/L (ref 0–14)
UROBILINOGEN UR QL STRIP.AUTO: 0.2 EU/DL (ref 0.2–1)
WBC # BLD AUTO: 8.6 K/UL (ref 4.3–11.1)
WBC URNS QL MICRO: ABNORMAL /HPF

## 2024-08-16 PROCEDURE — 1100000000 HC RM PRIVATE

## 2024-08-16 PROCEDURE — 74177 CT ABD & PELVIS W/CONTRAST: CPT

## 2024-08-16 PROCEDURE — 96374 THER/PROPH/DIAG INJ IV PUSH: CPT

## 2024-08-16 PROCEDURE — 87040 BLOOD CULTURE FOR BACTERIA: CPT

## 2024-08-16 PROCEDURE — 83605 ASSAY OF LACTIC ACID: CPT

## 2024-08-16 PROCEDURE — 76705 ECHO EXAM OF ABDOMEN: CPT

## 2024-08-16 PROCEDURE — 6360000002 HC RX W HCPCS: Performed by: EMERGENCY MEDICINE

## 2024-08-16 PROCEDURE — 2700000000 HC OXYGEN THERAPY PER DAY

## 2024-08-16 PROCEDURE — 2580000003 HC RX 258: Performed by: NURSE PRACTITIONER

## 2024-08-16 PROCEDURE — C8929 TTE W OR WO FOL WCON,DOPPLER: HCPCS

## 2024-08-16 PROCEDURE — 71045 X-RAY EXAM CHEST 1 VIEW: CPT

## 2024-08-16 PROCEDURE — 6360000004 HC RX CONTRAST MEDICATION: Performed by: FAMILY MEDICINE

## 2024-08-16 PROCEDURE — 96375 TX/PRO/DX INJ NEW DRUG ADDON: CPT

## 2024-08-16 PROCEDURE — 6370000000 HC RX 637 (ALT 250 FOR IP): Performed by: NURSE PRACTITIONER

## 2024-08-16 PROCEDURE — 94761 N-INVAS EAR/PLS OXIMETRY MLT: CPT

## 2024-08-16 PROCEDURE — 85025 COMPLETE CBC W/AUTO DIFF WBC: CPT

## 2024-08-16 PROCEDURE — 84484 ASSAY OF TROPONIN QUANT: CPT

## 2024-08-16 PROCEDURE — 94760 N-INVAS EAR/PLS OXIMETRY 1: CPT

## 2024-08-16 PROCEDURE — 6360000002 HC RX W HCPCS: Performed by: NURSE PRACTITIONER

## 2024-08-16 PROCEDURE — 99285 EMERGENCY DEPT VISIT HI MDM: CPT

## 2024-08-16 PROCEDURE — 2580000003 HC RX 258: Performed by: FAMILY MEDICINE

## 2024-08-16 PROCEDURE — 83690 ASSAY OF LIPASE: CPT

## 2024-08-16 PROCEDURE — 83880 ASSAY OF NATRIURETIC PEPTIDE: CPT

## 2024-08-16 PROCEDURE — 71250 CT THORAX DX C-: CPT

## 2024-08-16 PROCEDURE — 82962 GLUCOSE BLOOD TEST: CPT

## 2024-08-16 PROCEDURE — 84145 PROCALCITONIN (PCT): CPT

## 2024-08-16 PROCEDURE — 93005 ELECTROCARDIOGRAM TRACING: CPT | Performed by: EMERGENCY MEDICINE

## 2024-08-16 PROCEDURE — 6360000004 HC RX CONTRAST MEDICATION: Performed by: EMERGENCY MEDICINE

## 2024-08-16 PROCEDURE — 93010 ELECTROCARDIOGRAM REPORT: CPT | Performed by: INTERNAL MEDICINE

## 2024-08-16 PROCEDURE — 81001 URINALYSIS AUTO W/SCOPE: CPT

## 2024-08-16 PROCEDURE — 80053 COMPREHEN METABOLIC PANEL: CPT

## 2024-08-16 PROCEDURE — 87086 URINE CULTURE/COLONY COUNT: CPT

## 2024-08-16 PROCEDURE — 87635 SARS-COV-2 COVID-19 AMP PRB: CPT

## 2024-08-16 PROCEDURE — 93306 TTE W/DOPPLER COMPLETE: CPT | Performed by: INTERNAL MEDICINE

## 2024-08-16 RX ORDER — CALCITRIOL 0.25 UG/1
0.5 CAPSULE, LIQUID FILLED ORAL DAILY
Status: DISCONTINUED | OUTPATIENT
Start: 2024-08-17 | End: 2024-08-19 | Stop reason: HOSPADM

## 2024-08-16 RX ORDER — IBUPROFEN 600 MG/1
1 TABLET ORAL PRN
Status: DISCONTINUED | OUTPATIENT
Start: 2024-08-16 | End: 2024-08-19 | Stop reason: HOSPADM

## 2024-08-16 RX ORDER — INSULIN GLARGINE 100 [IU]/ML
12 INJECTION, SOLUTION SUBCUTANEOUS NIGHTLY
Status: DISCONTINUED | OUTPATIENT
Start: 2024-08-16 | End: 2024-08-19 | Stop reason: HOSPADM

## 2024-08-16 RX ORDER — VITAMIN B COMPLEX
1000 TABLET ORAL DAILY
Status: DISCONTINUED | OUTPATIENT
Start: 2024-08-16 | End: 2024-08-19 | Stop reason: HOSPADM

## 2024-08-16 RX ORDER — CALCITRIOL 0.5 UG/1
0.5 CAPSULE, LIQUID FILLED ORAL DAILY
COMMUNITY

## 2024-08-16 RX ORDER — MAGNESIUM SULFATE IN WATER 40 MG/ML
2000 INJECTION, SOLUTION INTRAVENOUS PRN
Status: DISCONTINUED | OUTPATIENT
Start: 2024-08-16 | End: 2024-08-19 | Stop reason: HOSPADM

## 2024-08-16 RX ORDER — SODIUM CHLORIDE 0.9 % (FLUSH) 0.9 %
5-40 SYRINGE (ML) INJECTION PRN
Status: DISCONTINUED | OUTPATIENT
Start: 2024-08-16 | End: 2024-08-19 | Stop reason: HOSPADM

## 2024-08-16 RX ORDER — ONDANSETRON 4 MG/1
4 TABLET, ORALLY DISINTEGRATING ORAL EVERY 8 HOURS PRN
Status: DISCONTINUED | OUTPATIENT
Start: 2024-08-16 | End: 2024-08-17

## 2024-08-16 RX ORDER — POLYETHYLENE GLYCOL 3350 17 G/17G
17 POWDER, FOR SOLUTION ORAL DAILY PRN
Status: DISCONTINUED | OUTPATIENT
Start: 2024-08-16 | End: 2024-08-19 | Stop reason: HOSPADM

## 2024-08-16 RX ORDER — ACETAMINOPHEN 650 MG/1
650 SUPPOSITORY RECTAL EVERY 6 HOURS PRN
Status: DISCONTINUED | OUTPATIENT
Start: 2024-08-16 | End: 2024-08-19 | Stop reason: HOSPADM

## 2024-08-16 RX ORDER — MAGNESIUM HYDROXIDE/ALUMINUM HYDROXICE/SIMETHICONE 120; 1200; 1200 MG/30ML; MG/30ML; MG/30ML
30 SUSPENSION ORAL EVERY 6 HOURS PRN
Status: DISCONTINUED | OUTPATIENT
Start: 2024-08-16 | End: 2024-08-19 | Stop reason: HOSPADM

## 2024-08-16 RX ORDER — ONDANSETRON 2 MG/ML
4 INJECTION INTRAMUSCULAR; INTRAVENOUS EVERY 6 HOURS PRN
Status: DISCONTINUED | OUTPATIENT
Start: 2024-08-16 | End: 2024-08-17

## 2024-08-16 RX ORDER — AMIODARONE HYDROCHLORIDE 200 MG/1
200 TABLET ORAL DAILY
Status: DISCONTINUED | OUTPATIENT
Start: 2024-08-16 | End: 2024-08-19 | Stop reason: HOSPADM

## 2024-08-16 RX ORDER — OXYCODONE AND ACETAMINOPHEN 10; 325 MG/1; MG/1
1 TABLET ORAL EVERY 8 HOURS PRN
Status: DISPENSED | OUTPATIENT
Start: 2024-08-16 | End: 2024-08-19

## 2024-08-16 RX ORDER — SODIUM CHLORIDE 0.9 % (FLUSH) 0.9 %
5-40 SYRINGE (ML) INJECTION EVERY 12 HOURS SCHEDULED
Status: DISCONTINUED | OUTPATIENT
Start: 2024-08-16 | End: 2024-08-19 | Stop reason: HOSPADM

## 2024-08-16 RX ORDER — ACETAMINOPHEN 325 MG/1
650 TABLET ORAL EVERY 6 HOURS PRN
Status: DISCONTINUED | OUTPATIENT
Start: 2024-08-16 | End: 2024-08-19 | Stop reason: HOSPADM

## 2024-08-16 RX ORDER — FAMOTIDINE 20 MG/1
10 TABLET, FILM COATED ORAL 2 TIMES DAILY PRN
Status: DISCONTINUED | OUTPATIENT
Start: 2024-08-16 | End: 2024-08-19 | Stop reason: HOSPADM

## 2024-08-16 RX ORDER — INSULIN LISPRO 100 [IU]/ML
0-4 INJECTION, SOLUTION INTRAVENOUS; SUBCUTANEOUS
Status: DISCONTINUED | OUTPATIENT
Start: 2024-08-16 | End: 2024-08-19 | Stop reason: HOSPADM

## 2024-08-16 RX ORDER — FUROSEMIDE 10 MG/ML
40 INJECTION INTRAMUSCULAR; INTRAVENOUS ONCE
Status: COMPLETED | OUTPATIENT
Start: 2024-08-16 | End: 2024-08-16

## 2024-08-16 RX ORDER — ATORVASTATIN CALCIUM 40 MG/1
40 TABLET, FILM COATED ORAL DAILY
Status: DISCONTINUED | OUTPATIENT
Start: 2024-08-16 | End: 2024-08-19 | Stop reason: HOSPADM

## 2024-08-16 RX ORDER — MORPHINE SULFATE 2 MG/ML
2 INJECTION, SOLUTION INTRAMUSCULAR; INTRAVENOUS EVERY 4 HOURS PRN
Status: DISPENSED | OUTPATIENT
Start: 2024-08-16 | End: 2024-08-19

## 2024-08-16 RX ORDER — DEXTROSE MONOHYDRATE 100 MG/ML
INJECTION, SOLUTION INTRAVENOUS CONTINUOUS PRN
Status: DISCONTINUED | OUTPATIENT
Start: 2024-08-16 | End: 2024-08-19 | Stop reason: HOSPADM

## 2024-08-16 RX ORDER — DULOXETIN HYDROCHLORIDE 20 MG/1
20 CAPSULE, DELAYED RELEASE ORAL DAILY
Status: DISCONTINUED | OUTPATIENT
Start: 2024-08-16 | End: 2024-08-19 | Stop reason: HOSPADM

## 2024-08-16 RX ORDER — POTASSIUM CHLORIDE 7.45 MG/ML
10 INJECTION INTRAVENOUS PRN
Status: DISCONTINUED | OUTPATIENT
Start: 2024-08-16 | End: 2024-08-19 | Stop reason: HOSPADM

## 2024-08-16 RX ORDER — POTASSIUM CHLORIDE 20 MEQ/1
40 TABLET, EXTENDED RELEASE ORAL PRN
Status: DISCONTINUED | OUTPATIENT
Start: 2024-08-16 | End: 2024-08-19 | Stop reason: HOSPADM

## 2024-08-16 RX ORDER — LATANOPROST 50 UG/ML
1 SOLUTION/ DROPS OPHTHALMIC NIGHTLY
Status: DISCONTINUED | OUTPATIENT
Start: 2024-08-16 | End: 2024-08-19 | Stop reason: HOSPADM

## 2024-08-16 RX ORDER — SODIUM CHLORIDE 9 MG/ML
INJECTION, SOLUTION INTRAVENOUS PRN
Status: DISCONTINUED | OUTPATIENT
Start: 2024-08-16 | End: 2024-08-19 | Stop reason: HOSPADM

## 2024-08-16 RX ORDER — MORPHINE SULFATE 4 MG/ML
4 INJECTION, SOLUTION INTRAMUSCULAR; INTRAVENOUS
Status: COMPLETED | OUTPATIENT
Start: 2024-08-16 | End: 2024-08-16

## 2024-08-16 RX ORDER — BISACODYL 10 MG
10 SUPPOSITORY, RECTAL RECTAL DAILY PRN
Status: DISCONTINUED | OUTPATIENT
Start: 2024-08-16 | End: 2024-08-19

## 2024-08-16 RX ORDER — FUROSEMIDE 10 MG/ML
40 INJECTION INTRAMUSCULAR; INTRAVENOUS 2 TIMES DAILY
Status: DISCONTINUED | OUTPATIENT
Start: 2024-08-16 | End: 2024-08-19 | Stop reason: HOSPADM

## 2024-08-16 RX ORDER — INSULIN LISPRO 100 [IU]/ML
0-4 INJECTION, SOLUTION INTRAVENOUS; SUBCUTANEOUS NIGHTLY
Status: DISCONTINUED | OUTPATIENT
Start: 2024-08-16 | End: 2024-08-19 | Stop reason: HOSPADM

## 2024-08-16 RX ORDER — CARVEDILOL 6.25 MG/1
6.25 TABLET ORAL 2 TIMES DAILY WITH MEALS
Status: DISCONTINUED | OUTPATIENT
Start: 2024-08-16 | End: 2024-08-19 | Stop reason: HOSPADM

## 2024-08-16 RX ORDER — ONDANSETRON 2 MG/ML
4 INJECTION INTRAMUSCULAR; INTRAVENOUS
Status: COMPLETED | OUTPATIENT
Start: 2024-08-16 | End: 2024-08-16

## 2024-08-16 RX ADMIN — MORPHINE SULFATE 2 MG: 2 INJECTION, SOLUTION INTRAMUSCULAR; INTRAVENOUS at 21:46

## 2024-08-16 RX ADMIN — ONDANSETRON 4 MG: 2 INJECTION INTRAMUSCULAR; INTRAVENOUS at 05:59

## 2024-08-16 RX ADMIN — APIXABAN 5 MG: 5 TABLET, FILM COATED ORAL at 12:25

## 2024-08-16 RX ADMIN — INSULIN GLARGINE 12 UNITS: 100 INJECTION, SOLUTION SUBCUTANEOUS at 21:45

## 2024-08-16 RX ADMIN — SODIUM CHLORIDE 1 ML: 9 INJECTION INTRAMUSCULAR; INTRAVENOUS; SUBCUTANEOUS at 16:02

## 2024-08-16 RX ADMIN — LATANOPROST 1 DROP: 50 SOLUTION OPHTHALMIC at 20:10

## 2024-08-16 RX ADMIN — SODIUM CHLORIDE, PRESERVATIVE FREE 10 ML: 5 INJECTION INTRAVENOUS at 21:50

## 2024-08-16 RX ADMIN — WATER 1000 MG: 1 INJECTION INTRAMUSCULAR; INTRAVENOUS; SUBCUTANEOUS at 09:59

## 2024-08-16 RX ADMIN — APIXABAN 5 MG: 5 TABLET, FILM COATED ORAL at 20:09

## 2024-08-16 RX ADMIN — CARVEDILOL 6.25 MG: 6.25 TABLET, FILM COATED ORAL at 17:17

## 2024-08-16 RX ADMIN — IOPAMIDOL 100 ML: 755 INJECTION, SOLUTION INTRAVENOUS at 06:21

## 2024-08-16 RX ADMIN — ATORVASTATIN CALCIUM 40 MG: 40 TABLET, FILM COATED ORAL at 12:24

## 2024-08-16 RX ADMIN — EMPAGLIFLOZIN 10 MG: 10 TABLET, FILM COATED ORAL at 12:38

## 2024-08-16 RX ADMIN — MORPHINE SULFATE 4 MG: 4 INJECTION, SOLUTION INTRAMUSCULAR; INTRAVENOUS at 06:00

## 2024-08-16 RX ADMIN — FUROSEMIDE 40 MG: 10 INJECTION, SOLUTION INTRAMUSCULAR; INTRAVENOUS at 17:17

## 2024-08-16 RX ADMIN — CARVEDILOL 6.25 MG: 6.25 TABLET, FILM COATED ORAL at 12:25

## 2024-08-16 RX ADMIN — SACUBITRIL AND VALSARTAN 1 TABLET: 24; 26 TABLET, FILM COATED ORAL at 20:10

## 2024-08-16 RX ADMIN — SACUBITRIL AND VALSARTAN 1 TABLET: 24; 26 TABLET, FILM COATED ORAL at 12:38

## 2024-08-16 RX ADMIN — MORPHINE SULFATE 2 MG: 2 INJECTION, SOLUTION INTRAMUSCULAR; INTRAVENOUS at 17:17

## 2024-08-16 RX ADMIN — FUROSEMIDE 40 MG: 10 INJECTION, SOLUTION INTRAMUSCULAR; INTRAVENOUS at 09:18

## 2024-08-16 RX ADMIN — OXYCODONE AND ACETAMINOPHEN 1 TABLET: 10; 325 TABLET ORAL at 20:08

## 2024-08-16 RX ADMIN — ONDANSETRON 4 MG: 2 INJECTION INTRAMUSCULAR; INTRAVENOUS at 21:57

## 2024-08-16 ASSESSMENT — PAIN DESCRIPTION - ORIENTATION: ORIENTATION: MID

## 2024-08-16 ASSESSMENT — PAIN SCALES - GENERAL
PAINLEVEL_OUTOF10: 8
PAINLEVEL_OUTOF10: 7
PAINLEVEL_OUTOF10: 8
PAINLEVEL_OUTOF10: 8
PAINLEVEL_OUTOF10: 7
PAINLEVEL_OUTOF10: 7
PAINLEVEL_OUTOF10: 6

## 2024-08-16 ASSESSMENT — PAIN DESCRIPTION - LOCATION
LOCATION: ABDOMEN;BACK
LOCATION: ABDOMEN;SHOULDER
LOCATION: ABDOMEN
LOCATION: BACK

## 2024-08-16 ASSESSMENT — PAIN DESCRIPTION - PAIN TYPE: TYPE: ACUTE PAIN

## 2024-08-16 ASSESSMENT — PAIN - FUNCTIONAL ASSESSMENT
PAIN_FUNCTIONAL_ASSESSMENT: 0-10
PAIN_FUNCTIONAL_ASSESSMENT: PREVENTS OR INTERFERES SOME ACTIVE ACTIVITIES AND ADLS
PAIN_FUNCTIONAL_ASSESSMENT: PREVENTS OR INTERFERES SOME ACTIVE ACTIVITIES AND ADLS

## 2024-08-16 ASSESSMENT — LIFESTYLE VARIABLES: HOW OFTEN DO YOU HAVE A DRINK CONTAINING ALCOHOL: MONTHLY OR LESS

## 2024-08-16 ASSESSMENT — PAIN DESCRIPTION - DESCRIPTORS
DESCRIPTORS: ACHING
DESCRIPTORS: ACHING

## 2024-08-16 NOTE — ED PROVIDER NOTES
Emergency Department Provider Note       PCP: Randell Lawson MD   Age: 58 y.o.   Sex: female     DISPOSITION    Condition at Disposition: Data Unavailable       ICD-10-CM    1. Upper abdominal pain  R10.10       2. Chronic anemia  D64.9       3. Nausea  R11.0           Medical Decision Making     Patient comes to the ED for evaluation of sudden onset mid to upper abdominal pain that radiates to her back.  Patient states the pain woke her from sleep.  She reports associated nausea.  Patient reports having an iron infusion yesterday.  Patient also reports hyperglycemia.  EKG was sinus rhythm, no ST elevation.    CBC with anemia at baseline, no leukocytosis.  CMP unremarkable.  Troponin negative.  Lipase within normal limits.  Lactic acid within normal limits at 1.1.  Chest x-ray without acute findings.  CT A/P with IV contrast obtained, radiology interpretation pending.  Will sign out to oncoming provider.     1 acute illness with systemic symptoms.    Over the counter drug management performed.  Prescription drug management performed.  Parental controlled substances given in the ED.  Shared medical decision making was utilized in creating the patients health plan today.    I independently ordered and reviewed each unique test.    I reviewed external records: provider visit note from outside specialist.     Pt seen by Heme/Onc on 8/5/24.  Office note reviewed.  Pt seen for anemia.  Pt smokes daily.      I interpreted the X-rays CXR without vascular congestion or infiltrate.  I interpreted the CT Scan CT A/P without gallstones, GB distention or pneumoperitoneum.    My Independent EKG Interpretation: sinus rhythm, no evidence of arrhythmia      ST Segments:Normal ST segments - NO STEMI   Rate: 60    7:00 AM EDT The patient's care will be transitioned to Dr. Parra.  At this time, the plan is dispo pending CT interpretation GB .  Please see that provider's documentation for further information.       History

## 2024-08-16 NOTE — ED NOTES
Patient placed on 2L nasal cannula at this time due to O2 saturation dropped to 88%. MD Fidel notified at this time.

## 2024-08-16 NOTE — CARE COORDINATION
SW met with patient who confirmed demographic information, states that she lives alone and has family that's local/supportive. Patient is insured and established with primary care. Patient does not use assistive devices, is independent in her ADLs, and denies any falls. Patient requiring supplemental O2 which is not baseline. She does endorse utilizing a c-pap nocturnally. Patient anticipated to admit. No needs identified from SW/CM, but will continue to follow. CMI and Milestones completed.        08/16/24 1130   Service Assessment   Patient Orientation Alert and Oriented;Self;Situation;Place;Person   Cognition Alert   History Provided By Patient   Primary Caregiver Self   Support Systems Family Members   PCP Verified by CM Yes   Prior Functional Level Independent in ADLs/IADLs   Current Functional Level Independent in ADLs/IADLs   Can patient return to prior living arrangement Yes   Ability to make needs known: Good   Family able to assist with home care needs: Yes   Would you like for me to discuss the discharge plan with any other family members/significant others, and if so, who? No   Financial Resources Medicaid;Medicare   Community Resources None   Discharge Planning   Patient expects to be discharged to: Josh Carter LMSW  Medical Social Worker  [facility]

## 2024-08-16 NOTE — ED PROVIDER NOTES
Emergency Department Provider Note                   PCP:                Randell Lawson MD               Age: 58 y.o.      Sex: female       ICD-10-CM    1. Upper abdominal pain  R10.10       2. Chronic anemia  D64.9       3. Nausea  R11.0       4. Acute on chronic systolic congestive heart failure (HCC)  I50.23       5. Acute respiratory failure with hypoxia (HCC)  J96.01           DISPOSITION Decision To Admit 08/16/2024 09:09:23 AM  Condition at Disposition: Stable        MDM  Number of Diagnoses or Management Options  Acute on chronic systolic congestive heart failure (HCC)  Acute respiratory failure with hypoxia (HCC)  Chronic anemia  Nausea  Upper abdominal pain  Diagnosis management comments: MEDICAL DECISION MAKING  Complexity of Problems Addressed:  1 or more chronic illnesses with a severe exacerbation or progression.    Data Reviewed and Analyzed:  Category 1:   I independently reviewed each unique test.    Category 3: Discussion of management or test interpretation.  Patient was signed over to me with results of studies pending.  Her ultrasound did not show acute cholecystitis.  However, nursing noted to me that the patient's oxygen saturation dropped to 88% on room air and she was placed on supplemental oxygen.  Her BNP came back elevated and a CT scan was done due to a question of an infiltrate on the x-ray.  This shows she had fluid in the right fissure as well as some atelectasis in left base.  Her shortness of breath is likely multifactorial with this fluid and atelectasis as well as her chronic anemia.  The patient is given IV Lasix as she does have a history of CHF with reduced EF.  The hospitalist was consulted for admission.  Results of studies and planned were discussed with the patient and she was in agreement.  The patient was admitted and I have discussed patient management with the admitting provider.    Risk of Complications and/or Morbidity of Patient Management:  Chronic

## 2024-08-16 NOTE — H&P
Severely reduced left ventricular systolic function with a visually estimated EF of 30 - 35%. Left ventricle is dilated. Severely increased wall thickness. Grade I diastolic dysfunction with normal LAP. Mid to basal inferolateral and anterolateral walls are aneurysmal.    Signed by: Blake Hurtado MD on 9/26/2023  6:29 PM        Orders Placed This Encounter   Medications    morphine sulfate (PF) injection 4 mg    ondansetron (ZOFRAN) injection 4 mg    iopamidol (ISOVUE-370) 76 % injection 100 mL    furosemide (LASIX) injection 40 mg    atorvastatin (LIPITOR) tablet 40 mg    calcitRIOL (ROCALTROL) capsule 0.25 mcg    carvedilol (COREG) tablet 6.25 mg    DULoxetine (CYMBALTA) extended release capsule 20 mg    apixaban (ELIQUIS) tablet 5 mg     Order Specific Question:   Indication of Use     Answer:   A Fib/A Flutter    empagliflozin (JARDIANCE) tablet 10 mg     Order Specific Question:   Indication of Use     Answer:   Heart Failure (reduced EF)    sacubitril-valsartan (ENTRESTO) 24-26 MG per tablet 1 tablet    insulin glargine (LANTUS) injection vial 12 Units    latanoprost (XALATAN) 0.005 % ophthalmic solution 1 drop    vitamin D CAPS 1,000 Units    amiodarone (CORDARONE) tablet 200 mg    sodium chloride flush 0.9 % injection 5-40 mL    sodium chloride flush 0.9 % injection 5-40 mL    0.9 % sodium chloride infusion    famotidine (PEPCID) tablet 10 mg    aluminum & magnesium hydroxide-simethicone (MAALOX) 200-200-20 MG/5ML suspension 30 mL    OR Linked Order Group     ondansetron (ZOFRAN-ODT) disintegrating tablet 4 mg     ondansetron (ZOFRAN) injection 4 mg    polyethylene glycol (GLYCOLAX) packet 17 g    bisacodyl (DULCOLAX) suppository 10 mg    OR Linked Order Group     acetaminophen (TYLENOL) tablet 650 mg     acetaminophen (TYLENOL) suppository 650 mg    OR Linked Order Group     potassium chloride (KLOR-CON M) extended release tablet 40 mEq     potassium bicarb-citric acid (EFFER-K) effervescent tablet 40

## 2024-08-17 ENCOUNTER — APPOINTMENT (OUTPATIENT)
Dept: GENERAL RADIOLOGY | Age: 59
DRG: 640 | End: 2024-08-17
Payer: MEDICARE

## 2024-08-17 LAB
ANION GAP SERPL CALC-SCNC: 13 MMOL/L (ref 9–18)
BASOPHILS # BLD: 0 K/UL (ref 0–0.2)
BASOPHILS NFR BLD: 0 % (ref 0–2)
BUN SERPL-MCNC: 22 MG/DL (ref 8–23)
CALCIUM SERPL-MCNC: 9.6 MG/DL (ref 8.8–10.2)
CHLORIDE SERPL-SCNC: 105 MMOL/L (ref 98–107)
CHOLEST SERPL-MCNC: 178 MG/DL (ref 0–200)
CO2 SERPL-SCNC: 25 MMOL/L (ref 20–28)
CREAT SERPL-MCNC: 1.18 MG/DL (ref 0.6–1.1)
DIFFERENTIAL METHOD BLD: ABNORMAL
EOSINOPHIL # BLD: 0 K/UL (ref 0–0.8)
EOSINOPHIL NFR BLD: 0 % (ref 0.5–7.8)
ERYTHROCYTE [DISTWIDTH] IN BLOOD BY AUTOMATED COUNT: 17.4 % (ref 11.9–14.6)
GLUCOSE BLD STRIP.AUTO-MCNC: 105 MG/DL (ref 65–100)
GLUCOSE BLD STRIP.AUTO-MCNC: 132 MG/DL (ref 65–100)
GLUCOSE BLD STRIP.AUTO-MCNC: 170 MG/DL (ref 65–100)
GLUCOSE BLD STRIP.AUTO-MCNC: 178 MG/DL (ref 65–100)
GLUCOSE SERPL-MCNC: 100 MG/DL (ref 70–99)
HCT VFR BLD AUTO: 25.1 % (ref 35.8–46.3)
HCT VFR BLD AUTO: 30.7 % (ref 35.8–46.3)
HDLC SERPL-MCNC: 68 MG/DL (ref 40–60)
HDLC SERPL: 2.6 (ref 0–5)
HGB BLD-MCNC: 7 G/DL (ref 11.7–15.4)
HGB BLD-MCNC: 8.7 G/DL (ref 11.7–15.4)
HISTORY CHECK: NORMAL
IMM GRANULOCYTES # BLD AUTO: 0.2 K/UL (ref 0–0.5)
IMM GRANULOCYTES NFR BLD AUTO: 2 % (ref 0–5)
IRON SERPL-MCNC: 579 UG/DL (ref 35–100)
LDLC SERPL CALC-MCNC: 93 MG/DL (ref 0–100)
LYMPHOCYTES # BLD: 2.5 K/UL (ref 0.5–4.6)
LYMPHOCYTES NFR BLD: 27 % (ref 13–44)
MAGNESIUM SERPL-MCNC: 2.5 MG/DL (ref 1.8–2.4)
MCH RBC QN AUTO: 21.7 PG (ref 26.1–32.9)
MCHC RBC AUTO-ENTMCNC: 27.9 G/DL (ref 31.4–35)
MCV RBC AUTO: 77.7 FL (ref 82–102)
MONOCYTES # BLD: 0.8 K/UL (ref 0.1–1.3)
MONOCYTES NFR BLD: 9 % (ref 4–12)
NEUTS SEG # BLD: 5.8 K/UL (ref 1.7–8.2)
NEUTS SEG NFR BLD: 62 % (ref 43–78)
NRBC # BLD: 0.21 K/UL (ref 0–0.2)
NT PRO BNP: 548 PG/ML (ref 0–125)
PLATELET # BLD AUTO: 180 K/UL (ref 150–450)
PMV BLD AUTO: 11.3 FL (ref 9.4–12.3)
POTASSIUM SERPL-SCNC: 3.7 MMOL/L (ref 3.5–5.1)
RBC # BLD AUTO: 3.23 M/UL (ref 4.05–5.2)
SERVICE CMNT-IMP: ABNORMAL
SODIUM SERPL-SCNC: 143 MMOL/L (ref 136–145)
TRANSFERRIN SERPL-MCNC: 352 MG/DL (ref 200–360)
TRIGL SERPL-MCNC: 83 MG/DL (ref 0–150)
TROPONIN T SERPL HS-MCNC: 15 NG/L (ref 0–14)
TSH, 3RD GENERATION: 2.12 UIU/ML (ref 0.27–4.2)
VLDLC SERPL CALC-MCNC: 17 MG/DL (ref 6–23)
WBC # BLD AUTO: 9.3 K/UL (ref 4.3–11.1)

## 2024-08-17 PROCEDURE — P9016 RBC LEUKOCYTES REDUCED: HCPCS

## 2024-08-17 PROCEDURE — 71045 X-RAY EXAM CHEST 1 VIEW: CPT

## 2024-08-17 PROCEDURE — 2580000003 HC RX 258: Performed by: NURSE PRACTITIONER

## 2024-08-17 PROCEDURE — 93005 ELECTROCARDIOGRAM TRACING: CPT | Performed by: INTERNAL MEDICINE

## 2024-08-17 PROCEDURE — 84466 ASSAY OF TRANSFERRIN: CPT

## 2024-08-17 PROCEDURE — 84484 ASSAY OF TROPONIN QUANT: CPT

## 2024-08-17 PROCEDURE — 36430 TRANSFUSION BLD/BLD COMPNT: CPT

## 2024-08-17 PROCEDURE — 86923 COMPATIBILITY TEST ELECTRIC: CPT

## 2024-08-17 PROCEDURE — 83880 ASSAY OF NATRIURETIC PEPTIDE: CPT

## 2024-08-17 PROCEDURE — 86900 BLOOD TYPING SEROLOGIC ABO: CPT

## 2024-08-17 PROCEDURE — 80061 LIPID PANEL: CPT

## 2024-08-17 PROCEDURE — 1100000000 HC RM PRIVATE

## 2024-08-17 PROCEDURE — 99222 1ST HOSP IP/OBS MODERATE 55: CPT | Performed by: INTERNAL MEDICINE

## 2024-08-17 PROCEDURE — 94760 N-INVAS EAR/PLS OXIMETRY 1: CPT

## 2024-08-17 PROCEDURE — 94761 N-INVAS EAR/PLS OXIMETRY MLT: CPT

## 2024-08-17 PROCEDURE — 85025 COMPLETE CBC W/AUTO DIFF WBC: CPT

## 2024-08-17 PROCEDURE — 86850 RBC ANTIBODY SCREEN: CPT

## 2024-08-17 PROCEDURE — 30233N1 TRANSFUSION OF NONAUTOLOGOUS RED BLOOD CELLS INTO PERIPHERAL VEIN, PERCUTANEOUS APPROACH: ICD-10-PCS | Performed by: FAMILY MEDICINE

## 2024-08-17 PROCEDURE — 85018 HEMOGLOBIN: CPT

## 2024-08-17 PROCEDURE — 6370000000 HC RX 637 (ALT 250 FOR IP): Performed by: NURSE PRACTITIONER

## 2024-08-17 PROCEDURE — 2700000000 HC OXYGEN THERAPY PER DAY

## 2024-08-17 PROCEDURE — 82962 GLUCOSE BLOOD TEST: CPT

## 2024-08-17 PROCEDURE — 83540 ASSAY OF IRON: CPT

## 2024-08-17 PROCEDURE — 84443 ASSAY THYROID STIM HORMONE: CPT

## 2024-08-17 PROCEDURE — 83735 ASSAY OF MAGNESIUM: CPT

## 2024-08-17 PROCEDURE — 85014 HEMATOCRIT: CPT

## 2024-08-17 PROCEDURE — 86901 BLOOD TYPING SEROLOGIC RH(D): CPT

## 2024-08-17 PROCEDURE — 80048 BASIC METABOLIC PNL TOTAL CA: CPT

## 2024-08-17 PROCEDURE — 6360000002 HC RX W HCPCS: Performed by: NURSE PRACTITIONER

## 2024-08-17 PROCEDURE — 36415 COLL VENOUS BLD VENIPUNCTURE: CPT

## 2024-08-17 RX ORDER — NITROGLYCERIN 0.4 MG/1
0.4 TABLET SUBLINGUAL EVERY 5 MIN PRN
Status: DISCONTINUED | OUTPATIENT
Start: 2024-08-17 | End: 2024-08-19 | Stop reason: HOSPADM

## 2024-08-17 RX ORDER — MAGNESIUM HYDROXIDE/ALUMINUM HYDROXICE/SIMETHICONE 120; 1200; 1200 MG/30ML; MG/30ML; MG/30ML
30 SUSPENSION ORAL ONCE
Status: COMPLETED | OUTPATIENT
Start: 2024-08-17 | End: 2024-08-17

## 2024-08-17 RX ORDER — SODIUM CHLORIDE 9 MG/ML
INJECTION, SOLUTION INTRAVENOUS PRN
Status: DISCONTINUED | OUTPATIENT
Start: 2024-08-17 | End: 2024-08-18

## 2024-08-17 RX ADMIN — MORPHINE SULFATE 2 MG: 2 INJECTION, SOLUTION INTRAMUSCULAR; INTRAVENOUS at 12:30

## 2024-08-17 RX ADMIN — ALUMINUM HYDROXIDE, MAGNESIUM HYDROXIDE, DIMETHICONE 30 ML: 200; 200; 20 LIQUID ORAL at 11:16

## 2024-08-17 RX ADMIN — FUROSEMIDE 40 MG: 10 INJECTION, SOLUTION INTRAMUSCULAR; INTRAVENOUS at 17:11

## 2024-08-17 RX ADMIN — MORPHINE SULFATE 2 MG: 2 INJECTION, SOLUTION INTRAMUSCULAR; INTRAVENOUS at 21:45

## 2024-08-17 RX ADMIN — FUROSEMIDE 40 MG: 10 INJECTION, SOLUTION INTRAMUSCULAR; INTRAVENOUS at 08:33

## 2024-08-17 RX ADMIN — CALCITRIOL 0.5 MCG: 0.25 CAPSULE ORAL at 08:33

## 2024-08-17 RX ADMIN — ONDANSETRON 4 MG: 2 INJECTION INTRAMUSCULAR; INTRAVENOUS at 13:53

## 2024-08-17 RX ADMIN — SODIUM CHLORIDE, PRESERVATIVE FREE 10 ML: 5 INJECTION INTRAVENOUS at 08:33

## 2024-08-17 RX ADMIN — APIXABAN 5 MG: 5 TABLET, FILM COATED ORAL at 08:33

## 2024-08-17 RX ADMIN — MORPHINE SULFATE 2 MG: 2 INJECTION, SOLUTION INTRAMUSCULAR; INTRAVENOUS at 17:22

## 2024-08-17 RX ADMIN — INSULIN GLARGINE 12 UNITS: 100 INJECTION, SOLUTION SUBCUTANEOUS at 21:28

## 2024-08-17 RX ADMIN — SODIUM CHLORIDE, PRESERVATIVE FREE 10 ML: 5 INJECTION INTRAVENOUS at 20:03

## 2024-08-17 RX ADMIN — APIXABAN 5 MG: 5 TABLET, FILM COATED ORAL at 20:02

## 2024-08-17 RX ADMIN — ATORVASTATIN CALCIUM 40 MG: 40 TABLET, FILM COATED ORAL at 08:33

## 2024-08-17 RX ADMIN — ONDANSETRON 4 MG: 2 INJECTION INTRAMUSCULAR; INTRAVENOUS at 21:27

## 2024-08-17 RX ADMIN — AMIODARONE HYDROCHLORIDE 200 MG: 200 TABLET ORAL at 08:33

## 2024-08-17 RX ADMIN — WATER 1000 MG: 1 INJECTION INTRAMUSCULAR; INTRAVENOUS; SUBCUTANEOUS at 08:40

## 2024-08-17 RX ADMIN — MORPHINE SULFATE 2 MG: 2 INJECTION, SOLUTION INTRAMUSCULAR; INTRAVENOUS at 07:26

## 2024-08-17 RX ADMIN — ONDANSETRON 4 MG: 2 INJECTION INTRAMUSCULAR; INTRAVENOUS at 04:21

## 2024-08-17 RX ADMIN — SACUBITRIL AND VALSARTAN 1 TABLET: 24; 26 TABLET, FILM COATED ORAL at 20:02

## 2024-08-17 RX ADMIN — ALUMINUM HYDROXIDE, MAGNESIUM HYDROXIDE, DIMETHICONE 30 ML: 200; 200; 20 LIQUID ORAL at 17:11

## 2024-08-17 RX ADMIN — CARVEDILOL 6.25 MG: 6.25 TABLET, FILM COATED ORAL at 17:11

## 2024-08-17 RX ADMIN — ONDANSETRON 4 MG: 4 TABLET, ORALLY DISINTEGRATING ORAL at 08:39

## 2024-08-17 RX ADMIN — EMPAGLIFLOZIN 10 MG: 10 TABLET, FILM COATED ORAL at 08:34

## 2024-08-17 RX ADMIN — OXYCODONE AND ACETAMINOPHEN 1 TABLET: 10; 325 TABLET ORAL at 20:02

## 2024-08-17 RX ADMIN — SACUBITRIL AND VALSARTAN 1 TABLET: 24; 26 TABLET, FILM COATED ORAL at 08:34

## 2024-08-17 RX ADMIN — CARVEDILOL 6.25 MG: 6.25 TABLET, FILM COATED ORAL at 08:34

## 2024-08-17 RX ADMIN — SODIUM CHLORIDE, PRESERVATIVE FREE 10 ML: 5 INJECTION INTRAVENOUS at 21:28

## 2024-08-17 RX ADMIN — MORPHINE SULFATE 2 MG: 2 INJECTION, SOLUTION INTRAMUSCULAR; INTRAVENOUS at 03:04

## 2024-08-17 RX ADMIN — LATANOPROST 1 DROP: 50 SOLUTION OPHTHALMIC at 20:03

## 2024-08-17 RX ADMIN — VITAMIN D, TAB 1000IU (100/BT) 1000 UNITS: 25 TAB at 08:34

## 2024-08-17 ASSESSMENT — PAIN SCALES - GENERAL
PAINLEVEL_OUTOF10: 8
PAINLEVEL_OUTOF10: 8
PAINLEVEL_OUTOF10: 4
PAINLEVEL_OUTOF10: 6
PAINLEVEL_OUTOF10: 8
PAINLEVEL_OUTOF10: 7
PAINLEVEL_OUTOF10: 7

## 2024-08-17 ASSESSMENT — PAIN DESCRIPTION - ORIENTATION
ORIENTATION: RIGHT

## 2024-08-17 ASSESSMENT — PAIN DESCRIPTION - DESCRIPTORS
DESCRIPTORS: ACHING
DESCRIPTORS: ACHING
DESCRIPTORS: SHARP

## 2024-08-17 ASSESSMENT — PAIN DESCRIPTION - LOCATION
LOCATION: CHEST
LOCATION: CHEST
LOCATION: ABDOMEN;BACK
LOCATION: ABDOMEN;BACK
LOCATION: CHEST
LOCATION: ABDOMEN;BACK

## 2024-08-17 NOTE — CONSENT
Informed Consent for Blood Component Transfusion Note    I have discussed with the patient the rationale for blood component transfusion; its benefits in treating or preventing fatigue, organ damage, or death; and its risk which includes mild transfusion reactions, rare risk of blood borne infection, or more serious but rare reactions. I have discussed the alternatives to transfusion, including the risk and consequences of not receiving transfusion. The patient had an opportunity to ask questions and had agreed to proceed with transfusion of blood components.    Electronically signed by ARNOLDO Sanders CNP on 8/17/24 at 10:58 AM EDT

## 2024-08-17 NOTE — CONSULTS
[facility] CARDIOLOGY PROGRESS NOTE           8/17/2024 1:43 PM    Admit Date: 8/16/2024      Subjective:   Patient was seen and examined in her room.Consult dictated.Job#:[id number].    ROS:  GEN:  No fever or chills  Cardiovascular:  As noted above:chronic dyspnea.No chest pain or palpitations  Pulmonary:  As noted above:chronic SOB  Neuro:  No new focal motor or sensory loss    Objective:      Vitals:    08/17/24 0756 08/17/24 0830 08/17/24 1108 08/17/24 1327   BP: 114/71   118/72   Pulse: 60  59 60   Resp: 18  16 18   Temp: 98.6 °F (37 °C)   97.7 °F (36.5 °C)   TempSrc: Oral   Oral   SpO2: 100% 97% 95% 93%   Weight:       Height:           Physical Exam:  General-NAD  Neck- supple, no JVD  CV- regular rate and rhythm with 1/6 DAVID  Lung- clear bilaterally  Abd- soft, nontender, nondistended  Ext- no edema bilaterally.  Skin- warm and dry  Psychiatric:  Normal mood and affect.  Neurologic:  Alert and oriented X 3      Data Review:   Recent Labs     08/16/24  0537 08/17/24  0549    143   K 3.7 3.7   MG  --  2.5*   BUN 22 22   WBC 8.6 9.3   HGB 7.1* 7.0*   HCT 25.2* 25.1*    180   LDL  --  93   HDL  --  68*       TELEMETRY:  NSR    Assessment/Plan:     Principal Problem:    Acute respiratory failure with hypoxemia (HCC):Stable.Agree with hospitalist's medical management and plan.  Active Problems:    HTN (hypertension):Stable.Continue Entresto,Coreg,and Lasix.    Long-term insulin use in type 2 diabetes (HCC)    Chronic systolic heart failure (HCC):Stable on GDMT.Outpatient follow up with Dr Schmitt.Will be on standby.    LORI on CPAP    Anemia due to chronic renal failure treated with erythropoietin:per hospitalist.   Hx VT:s/p VT ablation.Continue Amiodarone & Coreg.ICD in place.  Hx of LV aneurysm on chronic NOAC as anemia allows.              JARON ROSSI MD  8/17/2024 1:43 PM   
I will follow this patient for CHF education. Thank you for this consult.  
Nutrition Note:   Acknowledge Consult for Education: CHF diet education    Initial education to be completed by CHF Nurse Navigator. Will defer RD intervention at this time. Navigator will reconsult RD if additional diet education intervention needed.    VIJAY RODRIGUEZ, RD    
wheezing  GI: The patient complains of abdominal fullness.  She denies abdominal pain, nausea, vomiting, diarrhea.  : The patient denies hematuria or dysuria.  MUSCULOSKELETAL: The patient denies fall or fracture.  She denies significant peripheral edema.    PHYSICAL EXAMINATION:  VITAL SIGNS:  Blood pressure 118/72, O2 saturation on room air is 93%, respirations 18, pulse is 60, temperature is 97.7.  GENERAL:  The patient is a pleasant middle-aged  female, in no acute distress.  SKIN: Warm and dry.  NEUROLOGIC:  Alert and oriented.  PSYCHIATRIC: Normal mood and affect.  HEENT:  Normocephalic, atraumatic.  Pupils are reactive.  CHEST: Fairly clear to auscultation.  CARDIAC EXAMINATION:  Regular rate and rhythm with a soft 1/6 systolic murmur at the apex.  No S3 was noted.  There is no significant JVD.  ABDOMEN: Protuberant, mildly obese.  No tenderness was noted.  No masses were felt.  She has normal bowel sounds.  EXTREMITIES: Reveal no edema, cyanosis, or clubbing.  Pulses 2+ upper and lower extremities.    AVAILABLE LABORATORY DATA:  BMP:  Sodium is 143, potassium is 3.7, chloride is 105, CO2 is 25, creatinine is 1.18, BUN is 22, magnesium is 2.5.  Glomerular filtration rate is 53.  NT-proBNP was 548.  Total cholesterol 178, HDL 68, LDL is 93, triglycerides 83.  CBC:  WBC is 9.3, hemoglobin 7.0, hematocrit 25.1, platelet count is 180,000.  Serum iron level is 559.    EKG reports a normal sinus rhythm with a prolonged first-degree AV block with nonspecific interventricular conduction delay, nonspecific ST-T changes.    IMPRESSION AND PLAN:    1. Nonischemic cardiomyopathy with chronic stable systolic heart failure, on goal-directed medical therapy.  At this point, the patient's chronic heart failure appears to be stable on medical therapy.  Her left ventricular ejection fraction appears to be stable in the 35% to 40% range.  The patient appears to be on goal-directed medical therapy and

## 2024-08-17 NOTE — PLAN OF CARE
Problem: Discharge Planning  Goal: Discharge to home or other facility with appropriate resources  Outcome: Progressing  Flowsheets (Taken 8/16/2024 2000)  Discharge to home or other facility with appropriate resources: Identify discharge learning needs (meds, wound care, etc)     Problem: Pain  Goal: Verbalizes/displays adequate comfort level or baseline comfort level  Outcome: Progressing

## 2024-08-18 LAB
ABO + RH BLD: NORMAL
ANION GAP SERPL CALC-SCNC: 12 MMOL/L (ref 9–18)
BACTERIA SPEC CULT: NORMAL
BASOPHILS # BLD: 0.1 K/UL (ref 0–0.2)
BASOPHILS NFR BLD: 1 % (ref 0–2)
BLD PROD TYP BPU: NORMAL
BLOOD BANK BLOOD PRODUCT EXPIRATION DATE: NORMAL
BLOOD BANK DISPENSE STATUS: NORMAL
BLOOD BANK ISBT PRODUCT BLOOD TYPE: 5100
BLOOD BANK PRODUCT CODE: NORMAL
BLOOD BANK UNIT TYPE AND RH: NORMAL
BLOOD GROUP ANTIBODIES SERPL: NORMAL
BPU ID: NORMAL
BUN SERPL-MCNC: 20 MG/DL (ref 8–23)
CALCIUM SERPL-MCNC: 9.5 MG/DL (ref 8.8–10.2)
CHLORIDE SERPL-SCNC: 103 MMOL/L (ref 98–107)
CO2 SERPL-SCNC: 28 MMOL/L (ref 20–28)
CREAT SERPL-MCNC: 1.21 MG/DL (ref 0.6–1.1)
CROSSMATCH RESULT: NORMAL
DIFFERENTIAL METHOD BLD: ABNORMAL
EKG ATRIAL RATE: 60 BPM
EKG DIAGNOSIS: NORMAL
EKG P AXIS: 88 DEGREES
EKG P-R INTERVAL: 296 MS
EKG Q-T INTERVAL: 470 MS
EKG QRS DURATION: 114 MS
EKG QTC CALCULATION (BAZETT): 470 MS
EKG R AXIS: -14 DEGREES
EKG T AXIS: -61 DEGREES
EKG VENTRICULAR RATE: 60 BPM
EOSINOPHIL # BLD: 0.1 K/UL (ref 0–0.8)
EOSINOPHIL NFR BLD: 1 % (ref 0.5–7.8)
ERYTHROCYTE [DISTWIDTH] IN BLOOD BY AUTOMATED COUNT: 17.7 % (ref 11.9–14.6)
GLUCOSE BLD STRIP.AUTO-MCNC: 182 MG/DL (ref 65–100)
GLUCOSE BLD STRIP.AUTO-MCNC: 185 MG/DL (ref 65–100)
GLUCOSE BLD STRIP.AUTO-MCNC: 220 MG/DL (ref 65–100)
GLUCOSE BLD STRIP.AUTO-MCNC: 94 MG/DL (ref 65–100)
GLUCOSE SERPL-MCNC: 82 MG/DL (ref 70–99)
HCT VFR BLD AUTO: 30.8 % (ref 35.8–46.3)
HGB BLD-MCNC: 8.9 G/DL (ref 11.7–15.4)
IMM GRANULOCYTES # BLD AUTO: 0.1 K/UL (ref 0–0.5)
IMM GRANULOCYTES NFR BLD AUTO: 1 % (ref 0–5)
LYMPHOCYTES # BLD: 2.9 K/UL (ref 0.5–4.6)
LYMPHOCYTES NFR BLD: 28 % (ref 13–44)
MAGNESIUM SERPL-MCNC: 2.6 MG/DL (ref 1.8–2.4)
MCH RBC QN AUTO: 22.6 PG (ref 26.1–32.9)
MCHC RBC AUTO-ENTMCNC: 28.9 G/DL (ref 31.4–35)
MCV RBC AUTO: 78.4 FL (ref 82–102)
MONOCYTES # BLD: 0.9 K/UL (ref 0.1–1.3)
MONOCYTES NFR BLD: 8 % (ref 4–12)
NEUTS SEG # BLD: 6.2 K/UL (ref 1.7–8.2)
NEUTS SEG NFR BLD: 60 % (ref 43–78)
NRBC # BLD: 0.41 K/UL (ref 0–0.2)
PLATELET # BLD AUTO: 181 K/UL (ref 150–450)
PMV BLD AUTO: 11.2 FL (ref 9.4–12.3)
POTASSIUM SERPL-SCNC: 3.9 MMOL/L (ref 3.5–5.1)
RBC # BLD AUTO: 3.93 M/UL (ref 4.05–5.2)
SERVICE CMNT-IMP: ABNORMAL
SERVICE CMNT-IMP: NORMAL
SERVICE CMNT-IMP: NORMAL
SODIUM SERPL-SCNC: 143 MMOL/L (ref 136–145)
SPECIMEN EXP DATE BLD: NORMAL
UNIT DIVISION: 0
UNIT ISSUE DATE/TIME: NORMAL
WBC # BLD AUTO: 10.3 K/UL (ref 4.3–11.1)

## 2024-08-18 PROCEDURE — 93010 ELECTROCARDIOGRAM REPORT: CPT | Performed by: INTERNAL MEDICINE

## 2024-08-18 PROCEDURE — 2580000003 HC RX 258: Performed by: NURSE PRACTITIONER

## 2024-08-18 PROCEDURE — 6360000002 HC RX W HCPCS: Performed by: NURSE PRACTITIONER

## 2024-08-18 PROCEDURE — 6370000000 HC RX 637 (ALT 250 FOR IP): Performed by: NURSE PRACTITIONER

## 2024-08-18 PROCEDURE — 1100000000 HC RM PRIVATE

## 2024-08-18 PROCEDURE — 6370000000 HC RX 637 (ALT 250 FOR IP): Performed by: INTERNAL MEDICINE

## 2024-08-18 PROCEDURE — 80048 BASIC METABOLIC PNL TOTAL CA: CPT

## 2024-08-18 PROCEDURE — 85025 COMPLETE CBC W/AUTO DIFF WBC: CPT

## 2024-08-18 PROCEDURE — 83735 ASSAY OF MAGNESIUM: CPT

## 2024-08-18 PROCEDURE — 94761 N-INVAS EAR/PLS OXIMETRY MLT: CPT

## 2024-08-18 PROCEDURE — 82962 GLUCOSE BLOOD TEST: CPT

## 2024-08-18 PROCEDURE — 36415 COLL VENOUS BLD VENIPUNCTURE: CPT

## 2024-08-18 PROCEDURE — 94760 N-INVAS EAR/PLS OXIMETRY 1: CPT

## 2024-08-18 RX ORDER — DOCUSATE SODIUM 100 MG/1
100 CAPSULE, LIQUID FILLED ORAL DAILY PRN
Status: DISCONTINUED | OUTPATIENT
Start: 2024-08-18 | End: 2024-08-19 | Stop reason: HOSPADM

## 2024-08-18 RX ADMIN — CARVEDILOL 6.25 MG: 6.25 TABLET, FILM COATED ORAL at 16:11

## 2024-08-18 RX ADMIN — SODIUM CHLORIDE, PRESERVATIVE FREE 10 ML: 5 INJECTION INTRAVENOUS at 08:23

## 2024-08-18 RX ADMIN — ATORVASTATIN CALCIUM 40 MG: 40 TABLET, FILM COATED ORAL at 08:22

## 2024-08-18 RX ADMIN — POLYETHYLENE GLYCOL 3350 17 G: 17 POWDER, FOR SOLUTION ORAL at 08:20

## 2024-08-18 RX ADMIN — MORPHINE SULFATE 2 MG: 2 INJECTION, SOLUTION INTRAMUSCULAR; INTRAVENOUS at 08:27

## 2024-08-18 RX ADMIN — AMIODARONE HYDROCHLORIDE 200 MG: 200 TABLET ORAL at 08:23

## 2024-08-18 RX ADMIN — FUROSEMIDE 40 MG: 10 INJECTION, SOLUTION INTRAMUSCULAR; INTRAVENOUS at 16:11

## 2024-08-18 RX ADMIN — FUROSEMIDE 40 MG: 10 INJECTION, SOLUTION INTRAMUSCULAR; INTRAVENOUS at 08:22

## 2024-08-18 RX ADMIN — SODIUM CHLORIDE, PRESERVATIVE FREE 10 ML: 5 INJECTION INTRAVENOUS at 21:43

## 2024-08-18 RX ADMIN — INSULIN LISPRO 1 UNITS: 100 INJECTION, SOLUTION INTRAVENOUS; SUBCUTANEOUS at 16:11

## 2024-08-18 RX ADMIN — INSULIN GLARGINE 12 UNITS: 100 INJECTION, SOLUTION SUBCUTANEOUS at 21:41

## 2024-08-18 RX ADMIN — CARVEDILOL 6.25 MG: 6.25 TABLET, FILM COATED ORAL at 08:22

## 2024-08-18 RX ADMIN — FAMOTIDINE 10 MG: 20 TABLET, FILM COATED ORAL at 15:06

## 2024-08-18 RX ADMIN — CALCITRIOL 0.5 MCG: 0.25 CAPSULE ORAL at 08:22

## 2024-08-18 RX ADMIN — MORPHINE SULFATE 2 MG: 2 INJECTION, SOLUTION INTRAMUSCULAR; INTRAVENOUS at 15:06

## 2024-08-18 RX ADMIN — VITAMIN D, TAB 1000IU (100/BT) 1000 UNITS: 25 TAB at 08:23

## 2024-08-18 RX ADMIN — SACUBITRIL AND VALSARTAN 1 TABLET: 24; 26 TABLET, FILM COATED ORAL at 08:23

## 2024-08-18 RX ADMIN — APIXABAN 5 MG: 5 TABLET, FILM COATED ORAL at 08:22

## 2024-08-18 RX ADMIN — OXYCODONE AND ACETAMINOPHEN 1 TABLET: 10; 325 TABLET ORAL at 22:13

## 2024-08-18 RX ADMIN — DOCUSATE SODIUM 100 MG: 100 CAPSULE, LIQUID FILLED ORAL at 22:13

## 2024-08-18 RX ADMIN — LATANOPROST 1 DROP: 50 SOLUTION OPHTHALMIC at 21:38

## 2024-08-18 RX ADMIN — APIXABAN 5 MG: 5 TABLET, FILM COATED ORAL at 21:41

## 2024-08-18 RX ADMIN — WATER 1000 MG: 1 INJECTION INTRAMUSCULAR; INTRAVENOUS; SUBCUTANEOUS at 08:22

## 2024-08-18 RX ADMIN — EMPAGLIFLOZIN 10 MG: 10 TABLET, FILM COATED ORAL at 08:23

## 2024-08-18 ASSESSMENT — PAIN DESCRIPTION - LOCATION: LOCATION: ABDOMEN

## 2024-08-18 ASSESSMENT — PAIN DESCRIPTION - DESCRIPTORS: DESCRIPTORS: CRAMPING

## 2024-08-18 ASSESSMENT — PAIN SCALES - GENERAL: PAINLEVEL_OUTOF10: 6

## 2024-08-18 ASSESSMENT — PAIN - FUNCTIONAL ASSESSMENT: PAIN_FUNCTIONAL_ASSESSMENT: ACTIVITIES ARE NOT PREVENTED

## 2024-08-18 NOTE — PLAN OF CARE
Problem: Discharge Planning  Goal: Discharge to home or other facility with appropriate resources  Outcome: Progressing  Flowsheets (Taken 8/17/2024 2005)  Discharge to home or other facility with appropriate resources: Identify discharge learning needs (meds, wound care, etc)     Problem: Safety - Adult  Goal: Free from fall injury  Outcome: Progressing

## 2024-08-19 ENCOUNTER — TELEPHONE (OUTPATIENT)
Dept: INTERNAL MEDICINE CLINIC | Facility: CLINIC | Age: 59
End: 2024-08-19

## 2024-08-19 VITALS
HEIGHT: 56 IN | HEART RATE: 58 BPM | WEIGHT: 195.6 LBS | RESPIRATION RATE: 18 BRPM | SYSTOLIC BLOOD PRESSURE: 115 MMHG | OXYGEN SATURATION: 97 % | BODY MASS INDEX: 44 KG/M2 | TEMPERATURE: 97.3 F | DIASTOLIC BLOOD PRESSURE: 69 MMHG

## 2024-08-19 PROBLEM — J96.01 ACUTE RESPIRATORY FAILURE WITH HYPOXIA (HCC): Status: RESOLVED | Noted: 2024-08-16 | Resolved: 2024-08-19

## 2024-08-19 PROBLEM — J96.01 ACUTE RESPIRATORY FAILURE WITH HYPOXEMIA (HCC): Status: RESOLVED | Noted: 2024-08-16 | Resolved: 2024-08-19

## 2024-08-19 PROBLEM — N30.00 ACUTE CYSTITIS WITHOUT HEMATURIA: Status: RESOLVED | Noted: 2024-08-16 | Resolved: 2024-08-19

## 2024-08-19 LAB
ALBUMIN SERPL-MCNC: 3.1 G/DL (ref 3.2–4.6)
ALBUMIN/GLOB SERPL: 1 (ref 1–1.9)
ALP SERPL-CCNC: 70 U/L (ref 35–104)
ALT SERPL-CCNC: 25 U/L (ref 12–65)
ANION GAP SERPL CALC-SCNC: 12 MMOL/L (ref 9–18)
AST SERPL-CCNC: 24 U/L (ref 15–37)
BASOPHILS # BLD: 0 K/UL (ref 0–0.2)
BASOPHILS NFR BLD: 0 % (ref 0–2)
BILIRUB SERPL-MCNC: 0.3 MG/DL (ref 0–1.2)
BUN SERPL-MCNC: 20 MG/DL (ref 8–23)
CALCIUM SERPL-MCNC: 9.3 MG/DL (ref 8.8–10.2)
CHLORIDE SERPL-SCNC: 99 MMOL/L (ref 98–107)
CO2 SERPL-SCNC: 27 MMOL/L (ref 20–28)
CREAT SERPL-MCNC: 1.25 MG/DL (ref 0.6–1.1)
DIFFERENTIAL METHOD BLD: ABNORMAL
EOSINOPHIL # BLD: 0.2 K/UL (ref 0–0.8)
EOSINOPHIL NFR BLD: 3 % (ref 0.5–7.8)
ERYTHROCYTE [DISTWIDTH] IN BLOOD BY AUTOMATED COUNT: 18.1 % (ref 11.9–14.6)
GLOBULIN SER CALC-MCNC: 3 G/DL (ref 2.3–3.5)
GLUCOSE BLD STRIP.AUTO-MCNC: 126 MG/DL (ref 65–100)
GLUCOSE BLD STRIP.AUTO-MCNC: 203 MG/DL (ref 65–100)
GLUCOSE SERPL-MCNC: 113 MG/DL (ref 70–99)
HCT VFR BLD AUTO: 30.8 % (ref 35.8–46.3)
HGB BLD-MCNC: 8.9 G/DL (ref 11.7–15.4)
IMM GRANULOCYTES # BLD AUTO: 0.1 K/UL (ref 0–0.5)
IMM GRANULOCYTES NFR BLD AUTO: 1 % (ref 0–5)
LYMPHOCYTES # BLD: 2.3 K/UL (ref 0.5–4.6)
LYMPHOCYTES NFR BLD: 27 % (ref 13–44)
MCH RBC QN AUTO: 22.8 PG (ref 26.1–32.9)
MCHC RBC AUTO-ENTMCNC: 28.9 G/DL (ref 31.4–35)
MCV RBC AUTO: 79 FL (ref 82–102)
MONOCYTES # BLD: 0.9 K/UL (ref 0.1–1.3)
MONOCYTES NFR BLD: 10 % (ref 4–12)
NEUTS SEG # BLD: 5 K/UL (ref 1.7–8.2)
NEUTS SEG NFR BLD: 59 % (ref 43–78)
NRBC # BLD: 0.19 K/UL (ref 0–0.2)
PLATELET # BLD AUTO: 160 K/UL (ref 150–450)
PMV BLD AUTO: 10.9 FL (ref 9.4–12.3)
POTASSIUM SERPL-SCNC: 4.1 MMOL/L (ref 3.5–5.1)
PROT SERPL-MCNC: 6.1 G/DL (ref 6.3–8.2)
RBC # BLD AUTO: 3.9 M/UL (ref 4.05–5.2)
SERVICE CMNT-IMP: ABNORMAL
SERVICE CMNT-IMP: ABNORMAL
SODIUM SERPL-SCNC: 138 MMOL/L (ref 136–145)
WBC # BLD AUTO: 8.5 K/UL (ref 4.3–11.1)

## 2024-08-19 PROCEDURE — 82962 GLUCOSE BLOOD TEST: CPT

## 2024-08-19 PROCEDURE — 85025 COMPLETE CBC W/AUTO DIFF WBC: CPT

## 2024-08-19 PROCEDURE — 6370000000 HC RX 637 (ALT 250 FOR IP): Performed by: NURSE PRACTITIONER

## 2024-08-19 PROCEDURE — 94760 N-INVAS EAR/PLS OXIMETRY 1: CPT

## 2024-08-19 PROCEDURE — 6360000002 HC RX W HCPCS: Performed by: NURSE PRACTITIONER

## 2024-08-19 PROCEDURE — 6370000000 HC RX 637 (ALT 250 FOR IP): Performed by: STUDENT IN AN ORGANIZED HEALTH CARE EDUCATION/TRAINING PROGRAM

## 2024-08-19 PROCEDURE — 94761 N-INVAS EAR/PLS OXIMETRY MLT: CPT

## 2024-08-19 PROCEDURE — 80053 COMPREHEN METABOLIC PANEL: CPT

## 2024-08-19 PROCEDURE — 2700000000 HC OXYGEN THERAPY PER DAY

## 2024-08-19 PROCEDURE — 2580000003 HC RX 258: Performed by: NURSE PRACTITIONER

## 2024-08-19 PROCEDURE — 36415 COLL VENOUS BLD VENIPUNCTURE: CPT

## 2024-08-19 RX ORDER — AMIODARONE HYDROCHLORIDE 200 MG/1
200 TABLET ORAL DAILY
Qty: 30 TABLET | Refills: 0
Start: 2024-08-19 | End: 2024-09-18

## 2024-08-19 RX ORDER — BISACODYL 10 MG
10 SUPPOSITORY, RECTAL RECTAL ONCE
Status: COMPLETED | OUTPATIENT
Start: 2024-08-19 | End: 2024-08-19

## 2024-08-19 RX ADMIN — APIXABAN 5 MG: 5 TABLET, FILM COATED ORAL at 09:32

## 2024-08-19 RX ADMIN — BISACODYL 10 MG: 10 SUPPOSITORY RECTAL at 09:41

## 2024-08-19 RX ADMIN — FUROSEMIDE 40 MG: 10 INJECTION, SOLUTION INTRAMUSCULAR; INTRAVENOUS at 09:34

## 2024-08-19 RX ADMIN — ATORVASTATIN CALCIUM 40 MG: 40 TABLET, FILM COATED ORAL at 09:31

## 2024-08-19 RX ADMIN — CARVEDILOL 6.25 MG: 6.25 TABLET, FILM COATED ORAL at 09:31

## 2024-08-19 RX ADMIN — VITAMIN D, TAB 1000IU (100/BT) 1000 UNITS: 25 TAB at 09:31

## 2024-08-19 RX ADMIN — AMIODARONE HYDROCHLORIDE 200 MG: 200 TABLET ORAL at 09:31

## 2024-08-19 RX ADMIN — EMPAGLIFLOZIN 10 MG: 10 TABLET, FILM COATED ORAL at 09:32

## 2024-08-19 RX ADMIN — OXYCODONE AND ACETAMINOPHEN 1 TABLET: 10; 325 TABLET ORAL at 06:07

## 2024-08-19 RX ADMIN — SODIUM CHLORIDE, PRESERVATIVE FREE 10 ML: 5 INJECTION INTRAVENOUS at 09:35

## 2024-08-19 RX ADMIN — INSULIN LISPRO 1 UNITS: 100 INJECTION, SOLUTION INTRAVENOUS; SUBCUTANEOUS at 11:47

## 2024-08-19 RX ADMIN — CALCITRIOL 0.5 MCG: 0.25 CAPSULE ORAL at 09:31

## 2024-08-19 RX ADMIN — SACUBITRIL AND VALSARTAN 1 TABLET: 24; 26 TABLET, FILM COATED ORAL at 09:31

## 2024-08-19 ASSESSMENT — PAIN - FUNCTIONAL ASSESSMENT: PAIN_FUNCTIONAL_ASSESSMENT: ACTIVITIES ARE NOT PREVENTED

## 2024-08-19 ASSESSMENT — PAIN SCALES - GENERAL: PAINLEVEL_OUTOF10: 6

## 2024-08-19 ASSESSMENT — PAIN DESCRIPTION - DESCRIPTORS: DESCRIPTORS: ACHING

## 2024-08-19 ASSESSMENT — PAIN DESCRIPTION - LOCATION: LOCATION: ABDOMEN

## 2024-08-19 NOTE — PLAN OF CARE
Problem: Discharge Planning  Goal: Discharge to home or other facility with appropriate resources  Outcome: Progressing  Flowsheets (Taken 8/18/2024 2133)  Discharge to home or other facility with appropriate resources: Identify discharge learning needs (meds, wound care, etc)     Problem: Pain  Goal: Verbalizes/displays adequate comfort level or baseline comfort level  Outcome: Progressing     Problem: Safety - Adult  Goal: Free from fall injury  Outcome: Progressing

## 2024-08-19 NOTE — PLAN OF CARE
Problem: Discharge Planning  Goal: Discharge to home or other facility with appropriate resources  8/19/2024 0826 by Glo Saini RN  Outcome: Progressing  8/19/2024 0147 by Elba Rangel RN  Outcome: Progressing  Flowsheets (Taken 8/18/2024 2133)  Discharge to home or other facility with appropriate resources: Identify discharge learning needs (meds, wound care, etc)     Problem: Pain  Goal: Verbalizes/displays adequate comfort level or baseline comfort level  8/19/2024 0826 by Glo Saini RN  Outcome: Progressing  8/19/2024 0147 by Elba Rangel RN  Outcome: Progressing     Problem: Safety - Adult  Goal: Free from fall injury  8/19/2024 0826 by Glo Saini RN  Outcome: Progressing  8/19/2024 0147 by Elba Rangel RN  Outcome: Progressing

## 2024-08-19 NOTE — CARE COORDINATION
Patient with discharge orders for today.  Patient did not qualify for home o2 per walk test. No additional needs made known to CM. Patient has met all treatment goals and milestones for discharge. Family to provide transportation home. CM following until patient is discharged.       08/19/24 1140   Services At/After Discharge   Transition of Care Consult (CM Consult) N/A   Services At/After Discharge None   [place] Resource Information Provided? No   Mode of Transport at Discharge Other (see comment)  (Family)   Confirm Follow Up Transport Family   Condition of Participation: Discharge Planning   The Plan for Transition of Care is related to the following treatment goals: Patient to return to baseline level of function at home.   The Patient and/or Patient Representative was provided with a Choice of Provider? Patient   The Patient and/Or Patient Representative agree with the Discharge Plan? Yes   Freedom of Choice list was provided with basic dialogue that supports the patient's individualized plan of care/goals, treatment preferences, and shares the quality data associated with the providers?  Yes

## 2024-08-19 NOTE — DISCHARGE SUMMARY
Differential    Collection Time: 08/19/24  4:09 AM   Result Value Ref Range    WBC 8.5 4.3 - 11.1 K/uL    RBC 3.90 (L) 4.05 - 5.2 M/uL    Hemoglobin 8.9 (L) 11.7 - 15.4 g/dL    Hematocrit 30.8 (L) 35.8 - 46.3 %    MCV 79.0 (L) 82.0 - 102.0 FL    MCH 22.8 (L) 26.1 - 32.9 PG    MCHC 28.9 (L) 31.4 - 35.0 g/dL    RDW 18.1 (H) 11.9 - 14.6 %    Platelets 160 150 - 450 K/uL    MPV 10.9 9.4 - 12.3 FL    nRBC 0.19 0.0 - 0.2 K/uL    Differential Type AUTOMATED      Neutrophils % 59 43 - 78 %    Lymphocytes % 27 13 - 44 %    Monocytes % 10 4.0 - 12.0 %    Eosinophils % 3 0.5 - 7.8 %    Basophils % 0 0.0 - 2.0 %    Immature Granulocytes % 1 0.0 - 5.0 %    Neutrophils Absolute 5.0 1.7 - 8.2 K/UL    Lymphocytes Absolute 2.3 0.5 - 4.6 K/UL    Monocytes Absolute 0.9 0.1 - 1.3 K/UL    Eosinophils Absolute 0.2 0.0 - 0.8 K/UL    Basophils Absolute 0.0 0.0 - 0.2 K/UL    Immature Granulocytes Absolute 0.1 0.0 - 0.5 K/UL   Comprehensive Metabolic Panel    Collection Time: 08/19/24  4:09 AM   Result Value Ref Range    Sodium 138 136 - 145 mmol/L    Potassium 4.1 3.5 - 5.1 mmol/L    Chloride 99 98 - 107 mmol/L    CO2 27 20 - 28 mmol/L    Anion Gap 12 9 - 18 mmol/L    Glucose 113 (H) 70 - 99 mg/dL    BUN 20 8 - 23 MG/DL    Creatinine 1.25 (H) 0.60 - 1.10 MG/DL    Est, Glom Filt Rate 49 (L) >60 ml/min/1.73m2    Calcium 9.3 8.8 - 10.2 MG/DL    Total Bilirubin 0.3 0.0 - 1.2 MG/DL    ALT 25 12 - 65 U/L    AST 24 15 - 37 U/L    Alk Phosphatase 70 35 - 104 U/L    Total Protein 6.1 (L) 6.3 - 8.2 g/dL    Albumin 3.1 (L) 3.2 - 4.6 g/dL    Globulin 3.0 2.3 - 3.5 g/dL    Albumin/Globulin Ratio 1.0 1.0 - 1.9     POCT Glucose    Collection Time: 08/19/24  7:52 AM   Result Value Ref Range    POC Glucose 126 (H) 65 - 100 mg/dL    Performed by: Greg        Recent Labs     08/16/24  0939   COVID19 Not detected       Recent Vital Data:  Patient Vitals for the past 24 hrs:   Temp Pulse Resp BP SpO2   08/19/24 0908 -- 63 -- -- 96 %   08/19/24 0751

## 2024-08-19 NOTE — FLOWSHEET NOTE
08/19/24 1646   AVS Reviewed   AVS & discharge instructions reviewed with patient and/or representative? Yes   Reviewed instructions with Patient   Level of Understanding Questions answered;Verbalized understanding

## 2024-08-19 NOTE — PROGRESS NOTES
08/16/24 2034   Oxygen Therapy/Pulse Ox   O2 Therapy Oxygen humidified  (aquapak added for humidity)   $Oxygen $Daily Charge   O2 Device Nasal cannula   O2 Flow Rate (L/min) 2 L/min   Pulse 60   Respirations 18   SpO2 98 %   Humidification Source   (aquapak)   Pulse Oximeter Device Mode Intermittent   Pulse Oximeter Device Location Left;Finger   $Pulse Oximeter $Spot check (single)     Pt has Home CPAP (at home). Pt declines to have family bring her CPAP to hospital. Pt declines Hospital CPAP. O2 increased from 2lpm to 3lpm for use HS. Enc pt to notify RN if she changes her mind on Hospital CPAP.   
  [facility]  [address] [place] [address]  Phone: [phone]             August 19, 2024    Patient: Yann Shelton   YOB: 1965   Date of Visit: 8/16/2024       To Whom It May Concern:    Yann Shelton was seen and treated in our facility  beginning 8/16/2024 until . She may return to work on 8/26/24 .      Sincerely,       BRIANDA WILKES RN         Signature:__________________________________         
2141-  This RN presented to patients room to medicate patient. After med admin patient complained of 8 out of 10 right sided chest pain. Patient specifies this pain is \"different\" than her previous pain. Per monitor room patients cardiac rhythm was A-paced at 51 BPM. Vitals obtained and MD notified of all above information and current vitals (taken at 2134.) New orders received for STAT EKG and Troponins. This RN called  and EKG tech to notify of new STAT orders. Patient medicated with PRN morphine. This RN called Rector RN to notify of patient status. Valentine RN presented to patients room to assist.     2217  MD notified of EKG results.    2226  Patient states pain is improved but still 7 out of 10. New orders received for STAT CXR. This RN called X-ray tech to notify of new STAT orders.     2248  Patient states that pain has decreased to a 4 out of 10. Patient is satisfied with her current pain level.     2258  MD notified of troponin results. Per MD discontinue future troponin orders.     2320  MD notified of CXR results.  
Accessing chart as instructor for pre-licensure, novice nurses.   
Protocols    References:  N   Joint Commission National Standard   L    Respiratory Care Department Policy, Procedure and Protocol Guideline Manual, 1995, JULIET Martines.   L  Therapist Driven Respiratory Care Protocols - A Practitioner’s Guide for Criteria-Based Respiratory Care by Ha Moreland M.D., and JULIET Martines RN, RRT.   L  The rationale for therapist-driven protocols: an update. Respiratory Care 1998; 43:719-723   N   [facility] Clinical Practice Guidelines.             
5 mg Oral BID    empagliflozin (JARDIANCE) tablet 10 mg  10 mg Oral Daily    sacubitril-valsartan (ENTRESTO) 24-26 MG per tablet 1 tablet  1 tablet Oral BID    insulin glargine (LANTUS) injection vial 12 Units  12 Units SubCUTAneous Nightly    latanoprost (XALATAN) 0.005 % ophthalmic solution 1 drop  1 drop Both Eyes Nightly    Vitamin D (CHOLECALCIFEROL) tablet 1,000 Units  1,000 Units Oral Daily    amiodarone (CORDARONE) tablet 200 mg  200 mg Oral Daily    sodium chloride flush 0.9 % injection 5-40 mL  5-40 mL IntraVENous 2 times per day    sodium chloride flush 0.9 % injection 5-40 mL  5-40 mL IntraVENous PRN    0.9 % sodium chloride infusion   IntraVENous PRN    famotidine (PEPCID) tablet 10 mg  10 mg Oral BID PRN    aluminum & magnesium hydroxide-simethicone (MAALOX) 200-200-20 MG/5ML suspension 30 mL  30 mL Oral Q6H PRN    ondansetron (ZOFRAN-ODT) disintegrating tablet 4 mg  4 mg Oral Q8H PRN    Or    ondansetron (ZOFRAN) injection 4 mg  4 mg IntraVENous Q6H PRN    polyethylene glycol (GLYCOLAX) packet 17 g  17 g Oral Daily PRN    bisacodyl (DULCOLAX) suppository 10 mg  10 mg Rectal Daily PRN    acetaminophen (TYLENOL) tablet 650 mg  650 mg Oral Q6H PRN    Or    acetaminophen (TYLENOL) suppository 650 mg  650 mg Rectal Q6H PRN    potassium chloride (KLOR-CON M) extended release tablet 40 mEq  40 mEq Oral PRN    Or    potassium bicarb-citric acid (EFFER-K) effervescent tablet 40 mEq  40 mEq Oral PRN    Or    potassium chloride 10 mEq/100 mL IVPB (Peripheral Line)  10 mEq IntraVENous PRN    magnesium sulfate 2000 mg in 50 mL IVPB premix  2,000 mg IntraVENous PRN    furosemide (LASIX) injection 40 mg  40 mg IntraVENous BID    cefTRIAXone (ROCEPHIN) 1,000 mg in sterile water 10 mL IV syringe  1,000 mg IntraVENous Q24H    glucose chewable tablet 16 g  4 tablet Oral PRN    dextrose bolus 10% 125 mL  125 mL IntraVENous PRN    Or    dextrose bolus 10% 250 mL  250 mL IntraVENous PRN    Glucagon Emergency KIT 1 mg  1 mg 
oxyCODONE-acetaminophen (PERCOCET)  MG per tablet 1 tablet  1 tablet Oral Q8H PRN    morphine injection 2 mg  2 mg IntraVENous Q4H PRN       Signed:  ARNOLDO Sanders - CNP    Part of this note may have been written by using a voice dictation software.  The note has been proof read but may still contain some grammatical/other typographical errors.

## 2024-08-19 NOTE — TELEPHONE ENCOUNTER
NOMI for anemia, d/c 8/19, [place] [facility]    HFU appt made for 8/27.

## 2024-08-20 ENCOUNTER — CARE COORDINATION (OUTPATIENT)
Dept: CARE COORDINATION | Facility: CLINIC | Age: 59
End: 2024-08-20

## 2024-08-20 DIAGNOSIS — J96.01 ACUTE RESPIRATORY FAILURE WITH HYPOXEMIA (HCC): Primary | ICD-10-CM

## 2024-08-20 PROCEDURE — 1111F DSCHRG MED/CURRENT MED MERGE: CPT | Performed by: INTERNAL MEDICINE

## 2024-08-20 NOTE — TELEPHONE ENCOUNTER
Care Transitions Initial Follow Up Call    Outreach made within 2 business days of discharge: Yes    Patient: Yann Shelton Patient : 1965   MRN: [id number]  Reason for Admission: Acute respiratory failure with hypoxemia  Discharge Date: 24       Spoke with: Patient    Discharge department/facility: [facility] Interactive Patient Contact:  Was patient able to fill all prescriptions: Yes  Was patient instructed to bring all medications to the follow-up visit: Yes  Is patient taking all medications as directed in the discharge summary? Yes  Does patient understand their discharge instructions: Yes  Does patient have questions or concerns that need addressed prior to 7-14 day follow up office visit: no    Additional needs identified to be addressed with provider  No needs identified             Scheduled appointment with PCP within 7-14 days    Follow Up  Future Appointments   Date Time Provider Department Center   2024  2:30 PM Ariel Schmidt, APRN - CNP [facility] AMB   2024 10:20 AM PERIPHERAL [facility]   2024 11:00 AM Sigifredo Luz MD [facility] AMB   2024 12:00 PM INFUSION [facility]   2024  3:30 PM Sophia Hoffman APRN - CNP [facility] DEP   2024  8:30 AM Stuart Schmitt DO [facility] AMB   2024 10:20 AM Randell Lawson MD [facility] DEP   12/10/2024  1:15 PM Michael Tan MD [facility] AMB   2025  3:15 PM [facility]       Gladys King LPN

## 2024-08-20 NOTE — CARE COORDINATION
Care Transitions Note    Initial Call - Call within 2 business days of discharge: Yes    Patient Current Location:  Home: [address] [place] [id number]    Care Transition Nurse contacted the patient by telephone to perform post hospital discharge assessment, verified name and  as identifiers. Provided introduction to self, and explanation of the Care Transition Nurse role.     Patient: Yann Shelton    Patient : 1965   MRN: [id number]    Reason for Admission: resp failure after infusion  Discharge Date: 24  RURS: Readmission Risk Score: 15      Last Discharge Facility       Date Complaint Diagnosis Description Type Department Provider    24 Abdominal Pain Upper abdominal pain ... ED to Hosp-Admission (Discharged) (ADMITTED) [facility] Harry Prince MD; Jacy Sarah, ...            Was this an external facility discharge? No    Additional needs identified to be addressed with provider   No needs identified             Method of communication with provider: none.    Patients top risk factors for readmission: medical condition-sHF, resp failure, anemia, LORI, morbid obesity    Interventions to address risk factors:   Review of patient management of conditions/medications: reviewed recent d/c summary    Care Summary Note: Patient home after recent IP admission where she developed shortness of breath after an infusion. Patient indicated she feels ' fine' as of now but was not the case on admission. Patient indicated the extra fluid caused her to become short of breath and it has been removed and she is breathing well and has no complaints. Patient has appt. And all medications. NO acute needs at this time.     Care Transition Nurse reviewed discharge instructions, medical action plan, and red flags with patient. The patient was given an opportunity to ask questions; all questions answered at this time.. The patient verbalized understanding.   Were discharge instructions available to

## 2024-08-21 LAB
BACTERIA SPEC CULT: NORMAL
BACTERIA SPEC CULT: NORMAL
SERVICE CMNT-IMP: NORMAL
SERVICE CMNT-IMP: NORMAL

## 2024-08-22 ENCOUNTER — APPOINTMENT (OUTPATIENT)
Dept: INFUSION THERAPY | Age: 59
End: 2024-08-22
Payer: MEDICARE

## 2024-08-26 ENCOUNTER — TELEPHONE (OUTPATIENT)
Dept: INTERNAL MEDICINE CLINIC | Facility: CLINIC | Age: 59
End: 2024-08-26

## 2024-08-26 ENCOUNTER — TELEPHONE (OUTPATIENT)
Age: 59
End: 2024-08-26

## 2024-08-26 ENCOUNTER — OFFICE VISIT (OUTPATIENT)
Age: 59
End: 2024-08-26
Payer: MEDICARE

## 2024-08-26 VITALS
DIASTOLIC BLOOD PRESSURE: 72 MMHG | HEART RATE: 68 BPM | BODY MASS INDEX: 42.97 KG/M2 | SYSTOLIC BLOOD PRESSURE: 120 MMHG | HEIGHT: 56 IN | WEIGHT: 191 LBS

## 2024-08-26 DIAGNOSIS — N18.9 ANEMIA DUE TO CHRONIC RENAL FAILURE TREATED WITH ERYTHROPOIETIN, UNSPECIFIED CKD STAGE: ICD-10-CM

## 2024-08-26 DIAGNOSIS — E55.9 VITAMIN D DEFICIENCY: Primary | ICD-10-CM

## 2024-08-26 DIAGNOSIS — I50.22 CHRONIC SYSTOLIC HEART FAILURE (HCC): Primary | ICD-10-CM

## 2024-08-26 DIAGNOSIS — I25.3 LEFT VENTRICULAR ANEURYSM: ICD-10-CM

## 2024-08-26 DIAGNOSIS — I25.119 ATHEROSCLEROSIS OF NATIVE CORONARY ARTERY OF NATIVE HEART WITH ANGINA PECTORIS (HCC): ICD-10-CM

## 2024-08-26 DIAGNOSIS — D63.1 ANEMIA DUE TO CHRONIC RENAL FAILURE TREATED WITH ERYTHROPOIETIN, UNSPECIFIED CKD STAGE: ICD-10-CM

## 2024-08-26 DIAGNOSIS — E11.21 TYPE 2 DIABETES WITH NEPHROPATHY (HCC): Primary | ICD-10-CM

## 2024-08-26 DIAGNOSIS — I42.8 NICM (NONISCHEMIC CARDIOMYOPATHY) (HCC): ICD-10-CM

## 2024-08-26 PROCEDURE — 4004F PT TOBACCO SCREEN RCVD TLK: CPT | Performed by: INTERNAL MEDICINE

## 2024-08-26 PROCEDURE — G8417 CALC BMI ABV UP PARAM F/U: HCPCS | Performed by: INTERNAL MEDICINE

## 2024-08-26 PROCEDURE — 3078F DIAST BP <80 MM HG: CPT | Performed by: INTERNAL MEDICINE

## 2024-08-26 PROCEDURE — G8428 CUR MEDS NOT DOCUMENT: HCPCS | Performed by: INTERNAL MEDICINE

## 2024-08-26 PROCEDURE — 99214 OFFICE O/P EST MOD 30 MIN: CPT | Performed by: INTERNAL MEDICINE

## 2024-08-26 PROCEDURE — 3017F COLORECTAL CA SCREEN DOC REV: CPT | Performed by: INTERNAL MEDICINE

## 2024-08-26 PROCEDURE — 1111F DSCHRG MED/CURRENT MED MERGE: CPT | Performed by: INTERNAL MEDICINE

## 2024-08-26 PROCEDURE — 3074F SYST BP LT 130 MM HG: CPT | Performed by: INTERNAL MEDICINE

## 2024-08-26 RX ORDER — ACYCLOVIR 400 MG/1
TABLET ORAL
Qty: 1 EACH | Refills: 0 | Status: SHIPPED | COMMUNITY
Start: 2024-08-26

## 2024-08-26 RX ORDER — AMIODARONE HYDROCHLORIDE 200 MG/1
200 TABLET ORAL DAILY
Qty: 90 TABLET | Refills: 1 | Status: SHIPPED | OUTPATIENT
Start: 2024-08-26

## 2024-08-26 RX ORDER — NITROGLYCERIN 0.4 MG/1
0.4 TABLET SUBLINGUAL EVERY 5 MIN PRN
Qty: 25 TABLET | Refills: 11 | Status: SHIPPED | OUTPATIENT
Start: 2024-08-26

## 2024-08-26 NOTE — TELEPHONE ENCOUNTER
Care Transitions Initial Follow Up Call    Call within 2 business days of discharge: No    Patient: Yann Shelton Patient : 1965 MRN: [id number]    RARS: Readmission Risk Score: 15    Admit date: 24  Discharge date: 24  Admission diagnosis: acute on chronic congestive heart failure, SOB  Cardiology procedures this admission: echocardiogram    Pt states she is doing well since dc from [facility]. Weight is down 7 lbs since dc. Breathing is better. Taking all medications as prescribed. Triage reviewed dc instructions and pt verbalizes understanding. She confirms tc appt date, time, and location.     24 at 9:56am Message left for pt to return call to this nurse.      Follow Up  Future Appointments   Date Time Provider Department Center   2024  3:00 PM Stuart Schmitt DO [facility] AMB   2024 10:20 AM PERIPHERAL [facility]   2024 11:00 AM Sigifredo Luz MD [facility] AMB   2024 12:00 PM INFUSION [facility]   2024  3:30 PM Sophia Hoffman APRN - CNP [facility] DEP   2024  8:30 AM Stuart Schmitt DO [facility] AMB   2024 10:20 AM Randell Lawson MD [facility] DEP   12/10/2024  1:15 PM Michael Tan MD [facility] AMB   2025  3:15 PM [facility] - [facility]       Ethel Boyd RN

## 2024-08-26 NOTE — PROGRESS NOTES
[address] [facility], [place] [address]  PHONE: [phone]     24    NAME:  Yann Shelton  : 1965  MRN: [id number]       SUBJECTIVE:   Yann Shelton is a 58 y.o. female seen for a follow up visit regarding the following:     Chief Complaint   Patient presents with    Congestive Heart Failure    Follow-Up from Hospital    Results     Echo      Admit Date:     2024    DC date: 2024        HPI: Here for NICM/cSHF/VT.         SHF (EF 30% in )      Echo 2020: EF 30-35%   2020:  a/c SHF   2020: HF admission, diuresed 7L      LHC 2022: Angiographically normal coronary arteries  Echo 2022: EF 35%  Ct 2022: large left ventricular aneurysm  VT ablation 10/2022 [facility].  Echo 2023: EF 30-35%  LHC 2024: Normal epicardial coronary arteries   2024 admission for A/C resp failure, HF, anemia s/p PRBC     Feeling better now, on lasix 80/40 daily, feeling better and stronger.    She lives by herself, sister here in town.  One son in [place].    No CP. No new DUARTE.   less tired.        NOAC started at [facility].     Doing well on anticoagulation treatment as reviewed today, no bleeding issues or excessive bruising noted.          Patient denies recent history of orthopnea, PND, excessive dizziness and/or syncope.      Wearing CPAP every night now.           Past Medical History, Past Surgical History, Family history, Social History, and Medications were all reviewed with the patient today and updated as necessary.     Current Outpatient Medications   Medication Sig Dispense Refill    Continuous Glucose Sensor (DEXCOM G7 SENSOR) MISC Dexcom G7 sensor sample 1 each 0    amiodarone (CORDARONE) 200 MG tablet Take 1 tablet by mouth daily 30 tablet 0    calcitRIOL (ROCALTROL) 0.5 MCG capsule Take 1 capsule by mouth daily      furosemide (LASIX) 40 MG tablet Take 2 tablets by mouth 2 times daily 120 tablet 5    ENTRESTO 24-26 MG per tablet TAKE 1 TABLET TWICE DAILY 180

## 2024-08-26 NOTE — TELEPHONE ENCOUNTER
Pt called, states she needs new Dexcom G7 sensor due to her old one about to go out. States she doesn't get more til next week. States PA from Dr. Lawson is needed for this.    Pt has appt 8/27 w/NP.

## 2024-08-26 NOTE — TELEPHONE ENCOUNTER
Requested Prescriptions     Pending Prescriptions Disp Refills    nitroGLYCERIN (NITROSTAT) 0.4 MG SL tablet [Pharmacy Med Name: NITROGLYCERIN 0.4 MG Tablet Sublingual] 25 tablet 11     Sig: PLACE 1 TABLET UNDER THE TONGUE EVERY 5 MINUTES AS NEEDED FOR CHEST PAIN, MAX 3 DOSES         Last ov 7/25/24 reviewed and medication verified.

## 2024-08-26 NOTE — TELEPHONE ENCOUNTER
Requested Prescriptions     Pending Prescriptions Disp Refills    amiodarone (CORDARONE) 200 MG tablet [Pharmacy Med Name: AMIODARONE HYDROCHLORIDE 200 MG Tablet] 90 tablet 1     Sig: Take 1 tablet by mouth daily    Verified rx in last OV date 6/28/24. Pharmacy confirmed. Erx as requested

## 2024-08-27 ENCOUNTER — HOSPITAL ENCOUNTER (OUTPATIENT)
Dept: LAB | Age: 59
Discharge: HOME | End: 2024-08-30
Payer: MEDICARE

## 2024-08-27 ENCOUNTER — OFFICE VISIT (OUTPATIENT)
Dept: ONCOLOGY | Age: 59
End: 2024-08-27
Payer: MEDICARE

## 2024-08-27 ENCOUNTER — TELEPHONE (OUTPATIENT)
Dept: INTERNAL MEDICINE CLINIC | Facility: CLINIC | Age: 59
End: 2024-08-27

## 2024-08-27 ENCOUNTER — OFFICE VISIT (OUTPATIENT)
Dept: INTERNAL MEDICINE CLINIC | Facility: CLINIC | Age: 59
End: 2024-08-27

## 2024-08-27 ENCOUNTER — CARE COORDINATION (OUTPATIENT)
Dept: CARE COORDINATION | Facility: CLINIC | Age: 59
End: 2024-08-27

## 2024-08-27 ENCOUNTER — HOSPITAL ENCOUNTER (OUTPATIENT)
Dept: INFUSION THERAPY | Age: 59
Setting detail: INFUSION SERIES
Discharge: HOME | End: 2024-08-27

## 2024-08-27 VITALS
HEIGHT: 56 IN | WEIGHT: 203.2 LBS | SYSTOLIC BLOOD PRESSURE: 113 MMHG | OXYGEN SATURATION: 99 % | TEMPERATURE: 98.1 F | BODY MASS INDEX: 45.71 KG/M2 | DIASTOLIC BLOOD PRESSURE: 63 MMHG | HEART RATE: 67 BPM | RESPIRATION RATE: 16 BRPM

## 2024-08-27 VITALS
DIASTOLIC BLOOD PRESSURE: 70 MMHG | HEIGHT: 56 IN | OXYGEN SATURATION: 96 % | BODY MASS INDEX: 42.97 KG/M2 | HEART RATE: 60 BPM | TEMPERATURE: 97.2 F | SYSTOLIC BLOOD PRESSURE: 110 MMHG | WEIGHT: 191 LBS

## 2024-08-27 DIAGNOSIS — D64.9 CHRONIC ANEMIA: ICD-10-CM

## 2024-08-27 DIAGNOSIS — J96.01 ACUTE RESPIRATORY FAILURE WITH HYPOXEMIA (HCC): ICD-10-CM

## 2024-08-27 DIAGNOSIS — N18.30 STAGE 3 CHRONIC KIDNEY DISEASE, UNSPECIFIED WHETHER STAGE 3A OR 3B CKD (HCC): ICD-10-CM

## 2024-08-27 DIAGNOSIS — N18.9 ANEMIA DUE TO CHRONIC RENAL FAILURE TREATED WITH ERYTHROPOIETIN, UNSPECIFIED CKD STAGE: ICD-10-CM

## 2024-08-27 DIAGNOSIS — D50.8 OTHER IRON DEFICIENCY ANEMIA: Primary | ICD-10-CM

## 2024-08-27 DIAGNOSIS — Z09 HOSPITAL DISCHARGE FOLLOW-UP: Primary | ICD-10-CM

## 2024-08-27 DIAGNOSIS — E55.9 VITAMIN D DEFICIENCY: ICD-10-CM

## 2024-08-27 DIAGNOSIS — D63.1 ANEMIA DUE TO CHRONIC RENAL FAILURE TREATED WITH ERYTHROPOIETIN, UNSPECIFIED CKD STAGE: ICD-10-CM

## 2024-08-27 DIAGNOSIS — I50.23 ACUTE ON CHRONIC SYSTOLIC HEART FAILURE (HCC): ICD-10-CM

## 2024-08-27 DIAGNOSIS — E78.49 OTHER HYPERLIPIDEMIA: ICD-10-CM

## 2024-08-27 PROBLEM — N18.6 ESRD (END STAGE RENAL DISEASE) (HCC): Status: ACTIVE | Noted: 2022-09-06

## 2024-08-27 PROBLEM — I48.91 ATRIAL FIBRILLATION (HCC): Status: ACTIVE | Noted: 2024-08-27

## 2024-08-27 PROBLEM — N64.89 BREASTS ASYMMETRICAL: Status: ACTIVE | Noted: 2024-08-27

## 2024-08-27 PROBLEM — N18.31 STAGE 3A CHRONIC KIDNEY DISEASE (HCC): Status: ACTIVE | Noted: 2020-08-24

## 2024-08-27 LAB
ALBUMIN SERPL-MCNC: 3.7 G/DL (ref 3.2–4.6)
ALBUMIN/GLOB SERPL: 1.2 (ref 1–1.9)
ALP SERPL-CCNC: 82 U/L (ref 35–104)
ALT SERPL-CCNC: 30 U/L (ref 12–65)
ANION GAP SERPL CALC-SCNC: 14 MMOL/L (ref 9–18)
AST SERPL-CCNC: 28 U/L (ref 15–37)
BASOPHILS # BLD: 0.1 K/UL (ref 0–0.2)
BASOPHILS NFR BLD: 1 % (ref 0–2)
BILIRUB SERPL-MCNC: 0.3 MG/DL (ref 0–1.2)
BUN SERPL-MCNC: 27 MG/DL (ref 8–23)
CALCIUM SERPL-MCNC: 9.5 MG/DL (ref 8.8–10.2)
CHLORIDE SERPL-SCNC: 104 MMOL/L (ref 98–107)
CO2 SERPL-SCNC: 23 MMOL/L (ref 20–28)
CREAT SERPL-MCNC: 1.63 MG/DL (ref 0.6–1.1)
DIFFERENTIAL METHOD BLD: ABNORMAL
EOSINOPHIL # BLD: 0.2 K/UL (ref 0–0.8)
EOSINOPHIL NFR BLD: 4 % (ref 0.5–7.8)
ERYTHROCYTE [DISTWIDTH] IN BLOOD BY AUTOMATED COUNT: 25.5 % (ref 11.9–14.6)
FERRITIN SERPL-MCNC: 470 NG/ML (ref 8–388)
GLOBULIN SER CALC-MCNC: 3.1 G/DL (ref 2.3–3.5)
GLUCOSE SERPL-MCNC: 180 MG/DL (ref 70–99)
HCT VFR BLD AUTO: 34.9 % (ref 35.8–46.3)
HGB BLD-MCNC: 10.2 G/DL (ref 11.7–15.4)
IMM GRANULOCYTES # BLD AUTO: 0 K/UL (ref 0–0.5)
IMM GRANULOCYTES NFR BLD AUTO: 0 % (ref 0–5)
LYMPHOCYTES # BLD: 1.6 K/UL (ref 0.5–4.6)
LYMPHOCYTES NFR BLD: 24 % (ref 13–44)
MCH RBC QN AUTO: 24.9 PG (ref 26.1–32.9)
MCHC RBC AUTO-ENTMCNC: 29.2 G/DL (ref 31.4–35)
MCV RBC AUTO: 85.1 FL (ref 82–102)
MONOCYTES # BLD: 0.5 K/UL (ref 0.1–1.3)
MONOCYTES NFR BLD: 8 % (ref 4–12)
NEUTS SEG # BLD: 4.5 K/UL (ref 1.7–8.2)
NEUTS SEG NFR BLD: 63 % (ref 43–78)
NRBC # BLD: 0 K/UL (ref 0–0.2)
PLATELET # BLD AUTO: 119 K/UL (ref 150–450)
PMV BLD AUTO: 11.3 FL (ref 9.4–12.3)
POTASSIUM SERPL-SCNC: 4.2 MMOL/L (ref 3.5–5.1)
PROT SERPL-MCNC: 6.8 G/DL (ref 6.3–8.2)
RBC # BLD AUTO: 4.1 M/UL (ref 4.05–5.2)
SODIUM SERPL-SCNC: 141 MMOL/L (ref 136–145)
WBC # BLD AUTO: 6.9 K/UL (ref 4.3–11.1)

## 2024-08-27 PROCEDURE — 80053 COMPREHEN METABOLIC PANEL: CPT

## 2024-08-27 PROCEDURE — G8427 DOCREV CUR MEDS BY ELIG CLIN: HCPCS | Performed by: INTERNAL MEDICINE

## 2024-08-27 PROCEDURE — 3017F COLORECTAL CA SCREEN DOC REV: CPT | Performed by: INTERNAL MEDICINE

## 2024-08-27 PROCEDURE — 4004F PT TOBACCO SCREEN RCVD TLK: CPT | Performed by: INTERNAL MEDICINE

## 2024-08-27 PROCEDURE — 99215 OFFICE O/P EST HI 40 MIN: CPT | Performed by: INTERNAL MEDICINE

## 2024-08-27 PROCEDURE — 36415 COLL VENOUS BLD VENIPUNCTURE: CPT

## 2024-08-27 PROCEDURE — 85025 COMPLETE CBC W/AUTO DIFF WBC: CPT

## 2024-08-27 PROCEDURE — 1111F DSCHRG MED/CURRENT MED MERGE: CPT | Performed by: INTERNAL MEDICINE

## 2024-08-27 PROCEDURE — 82728 ASSAY OF FERRITIN: CPT

## 2024-08-27 PROCEDURE — G8417 CALC BMI ABV UP PARAM F/U: HCPCS | Performed by: INTERNAL MEDICINE

## 2024-08-27 PROCEDURE — 3078F DIAST BP <80 MM HG: CPT | Performed by: INTERNAL MEDICINE

## 2024-08-27 PROCEDURE — 3074F SYST BP LT 130 MM HG: CPT | Performed by: INTERNAL MEDICINE

## 2024-08-27 ASSESSMENT — PATIENT HEALTH QUESTIONNAIRE - PHQ9
SUM OF ALL RESPONSES TO PHQ QUESTIONS 1-9: 0
2. FEELING DOWN, DEPRESSED OR HOPELESS: NOT AT ALL
1. LITTLE INTEREST OR PLEASURE IN DOING THINGS: NOT AT ALL
SUM OF ALL RESPONSES TO PHQ QUESTIONS 1-9: 0
SUM OF ALL RESPONSES TO PHQ QUESTIONS 1-9: 0
SUM OF ALL RESPONSES TO PHQ9 QUESTIONS 1 & 2: 0
SUM OF ALL RESPONSES TO PHQ QUESTIONS 1-9: 0

## 2024-08-27 NOTE — PROGRESS NOTES
[place] [id number]  Phone: [phone]           8/27/2024  Yann Shelton  1965  [id number]        Yann Shelton is a 58 year old -American female who has returned to my clinic for a follow-up visit; she was initially referred to me by Dr. Stuart Schmitt for management of Iron Deficiency Anemia, she received parenteral Iron infusions in 8/2024.        ALLERGIES:     Penicillin.        FAMILY HISTORY:    No hematologic disorders.        SOCIAL HISTORY:      She is single and lives alone. She works at [employer]. She smokes 5 cigarettes every day.        PAST MEDICAL HISTORY:    Hypertension, Coronary Artery Disease, renal insufficiency, Diabetes Mellitus, Hyperlipidemia, Congestive Heart Failure, s/p AICD, Allergic Rhinitis, GERD, Iron Deficiency Anemia, Fibromyalgia, Vitamin D deficiency, Obstructive Sleep Apnea and Iron Deficiency Anemia.         ROS:  The patient complained of fatigue and myalgia; all other systems negative.        PHYSICAL EXAM:   The patient was alert, awake and oriented, no acute distress was noted, a left infra-clavicular AICD was present. Oral examination did not reveal any mucosal lesions. Lymph node examination did not reveal any adenopathy. Neck examination revealed a supple neck, no thyromegaly or masses were noted. Chest examination revealed slightly prolonged expiration bilaterally. Heart examination revealed S-1 and S-2 with a 2/6 systolic murmur. Abdominal examination revealed a non-tender abdomen, bowel sounds were positive, no organomegaly could be appreciated. Examination of the extremities did not reveal any tenderness or erythema, 1+ bilateral pedal edema was present. Examination of the skin did not reveal any lesions.        KPS:    100.        LABORATORY INVESTIGATIONS:  CBC showed a WBC count of 6.9, ANC was 4.5, Hemoglobin was 10.2 and Platelets were 119; her Ferritin level was pending. Medical  problems and test results were reviewed with the patient today.        ASSESSMENT:     Iron Deficiency Anemia; Hyperlipidemia; Vitamin D deficiency. I spent 42 minutes on the day of the visit, managing the care of this patient.        PLAN:     She should continue taking Atorvastatin and supplemental Vitamin D. Later she need to undergo an EGD and a Colonoscopy; at her next clinic visit I will re-check her CBC, CMP, Vitamin D level and Ferritin level.          Sigifredo Luz MD  Hematology/BMT

## 2024-08-27 NOTE — CARE COORDINATION
Care Transitions Note    Follow Up Call     Attempted to reach patient for transitions of care follow up.  Unable to reach patient.      Outreach Attempts:   HIPAA compliant voicemail left for patient.     Care Summary Note: According to Epic notes patient attended Transitional appointment with Cardiology on 8/26/2024    Follow Up Appointment:   Future Appointments         Provider Specialty Dept Phone    8/27/2024 12:00 PM [facility] Infusion Therapy [phone]    8/27/2024 3:30 PM Sophia Hoffman APRN - CNP Internal Medicine [phone]    9/26/2024 8:30 AM Stuart Schmitt DO Cardiology [phone]    11/7/2024 10:20 AM Randell Lawson MD Internal Medicine [phone]    12/10/2024 1:15 PM Michael Tan MD Cardiology [phone]    4/11/2025 3:15 PM Roel Pires APRN - CNP Sleep Medicine [phone]            Plan for follow-up on next business day.  based on severity of symptoms and risk factors. Plan for next call: follow-up appointment-     Heidi Steven LPN

## 2024-08-27 NOTE — TELEPHONE ENCOUNTER
Marcelo from [facility], states that they have a CGM  dispensing order from 2023 from [employer] that is still good, but is missing the second page (signature page). Asks if the CGM can be re-faxed.    P#: [phone]  F#: [phone]

## 2024-08-27 NOTE — PROGRESS NOTES
1 TABLET TWICE DAILY 180 tablet 3    atorvastatin (LIPITOR) 40 MG tablet Take 1 tablet by mouth daily 90 tablet 3    insulin glargine (LANTUS SOLOSTAR) 100 UNIT/ML injection pen Inject 12 Units into the skin nightly 5 Adjustable Dose Pre-filled Pen Syringe 3    empagliflozin (JARDIANCE) 10 MG tablet Take 1 tablet by mouth daily 90 tablet 3    Insulin Pen Needle 32G X 4 MM MISC Use as directed with Lantus pen 100 each 3    carvedilol (COREG) 6.25 MG tablet TAKE 1 TABLET TWICE DAILY WITH MEALS 180 tablet 3    ELIQUIS 5 MG TABS tablet Take 1 tablet by mouth 2 times daily 180 tablet 3    psyllium (METAMUCIL) 28 % packet Take 1 packet by mouth daily Take with full glass of h20 or juice (Patient taking differently: Take 1 packet by mouth daily as needed Take with full glass of h20 or juice) 30 each 2    CPAP Machine MISC by Does not apply route      DUPIXENT 300 MG/2ML SOSY injection Inject 2 mLs into the skin every 14 days      acetaminophen (TYLENOL) 500 MG tablet Take 2 tablets by mouth every 6 hours as needed for Pain (breakthrough pain)      vitamin D 25 MCG (1000 UT) CAPS Take by mouth daily      latanoprost (XALATAN) 0.005 % ophthalmic solution Place 1 drop into both eyes nightly          Medications patient taking as of now reconciled against medications ordered at time of hospital discharge: Yes    Review of Systems   Constitutional:  Negative for chills and fever.   Respiratory:  Negative for cough, shortness of breath and wheezing.    Cardiovascular:  Negative for chest pain, palpitations and leg swelling.   Gastrointestinal:  Negative for abdominal pain, constipation, diarrhea, nausea and vomiting.   Genitourinary:  Negative for dysuria and hematuria.   Neurological:  Negative for dizziness, light-headedness and headaches.       Objective:    /70 (Site: Left Upper Arm, Position: Sitting, Cuff Size: Large Adult)   Pulse 60   Temp 97.2 °F (36.2 °C) (Temporal)   Ht 1.422 m (4' 8\")   Wt 86.6 kg (191 lb)    SpO2 96%   BMI 42.82 kg/m²       Physical Exam  Vitals and nursing note reviewed.   Constitutional:       General: She is not in acute distress.     Appearance: Normal appearance.   Cardiovascular:      Rate and Rhythm: Normal rate and regular rhythm.   Pulmonary:      Effort: Pulmonary effort is normal. No respiratory distress.      Breath sounds: Normal breath sounds.   Abdominal:      General: Bowel sounds are normal.      Palpations: Abdomen is soft.      Tenderness: There is no abdominal tenderness.   Musculoskeletal:      Right lower leg: No edema.      Left lower leg: No edema.   Skin:     General: Skin is warm and dry.   Neurological:      Mental Status: She is alert and oriented to person, place, and time.   Psychiatric:         Mood and Affect: Mood normal.         An electronic signature was used to authenticate this note.  --ARNOLDO Serrano - CNP

## 2024-08-27 NOTE — PATIENT INSTRUCTIONS
Patient Information from Today's Visit    The members of your [facility] are listed below:    Physician Provider: Sigifredo Luz Medical Oncologist  Advanced Practice Clinician: Shahnaz Murillo NP  Registered Nurse: N/A  Navigator: N/A  Medical Assistant: Gladys SMALL MA and Linh CROW MA  : Liliana SMALL   Supportive Care Services: Kishore RICHARDSON LMSW    Diagnosis:   LUCIO      Follow Up Instructions:   Lab work looks stable today  Hemoglobin is above 10, so you don't need a Retacrit injection  We will bring you back in September for a follow up with  and lab work prior.      Treatment Summary has been discussed and given to patient:N/A      Current Labs:   Hospital Outpatient Visit on 08/27/2024   Component Date Value Ref Range Status    WBC 08/27/2024 6.9  4.3 - 11.1 K/uL Final    RBC 08/27/2024 4.10  4.05 - 5.2 M/uL Final    Hemoglobin 08/27/2024 10.2 (L)  11.7 - 15.4 g/dL Final    Hematocrit 08/27/2024 34.9 (L)  35.8 - 46.3 % Final    MCV 08/27/2024 85.1  82.0 - 102.0 FL Final    MCH 08/27/2024 24.9 (L)  26.1 - 32.9 PG Final    MCHC 08/27/2024 29.2 (L)  31.4 - 35.0 g/dL Final    RDW 08/27/2024 25.5 (H)  11.9 - 14.6 % Final    Platelets 08/27/2024 119 (L)  150 - 450 K/uL Final    MPV 08/27/2024 11.3  9.4 - 12.3 FL Final    nRBC 08/27/2024 0.00  0.0 - 0.2 K/uL Final    **Note: Absolute NRBC parameter is now reported with Hemogram**    Neutrophils % 08/27/2024 63  43 - 78 % Final    Lymphocytes % 08/27/2024 24  13 - 44 % Final    Monocytes % 08/27/2024 8  4.0 - 12.0 % Final    Eosinophils % 08/27/2024 4  0.5 - 7.8 % Final    Basophils % 08/27/2024 1  0.0 - 2.0 % Final    Immature Granulocytes % 08/27/2024 0  0.0 - 5.0 % Final    Neutrophils Absolute 08/27/2024 4.5  1.7 - 8.2 K/UL Final    Lymphocytes Absolute 08/27/2024 1.6  0.5 - 4.6 K/UL Final    Monocytes Absolute 08/27/2024 0.5  0.1 - 1.3 K/UL Final    Eosinophils Absolute 08/27/2024 0.2  0.0 - 0.8 K/UL Final    Basophils Absolute 08/27/2024

## 2024-08-28 ENCOUNTER — CARE COORDINATION (OUTPATIENT)
Dept: CARE COORDINATION | Facility: CLINIC | Age: 59
End: 2024-08-28

## 2024-08-28 ASSESSMENT — ENCOUNTER SYMPTOMS
SHORTNESS OF BREATH: 0
VOMITING: 0
NAUSEA: 0
DIARRHEA: 0
COUGH: 0
ABDOMINAL PAIN: 0
CONSTIPATION: 0
WHEEZING: 0

## 2024-08-28 NOTE — CARE COORDINATION
Care Transitions Note    Follow Up Call     2nd Attempted to reach patient for transitions of care follow up call.  Unable to reach patient.  According to Epic notes patient attended hospital follow up with Oncology and PCP.    Outreach Attempts:   HIPAA compliant voicemail left for patient.     Follow Up Appointment:   Future Appointments         Provider Specialty Dept Phone    9/24/2024 10:30 AM PERIPHERAL Lab [phone]    9/24/2024 11:30 AM Sigifredo Luz MD Oncology [phone]    9/26/2024 8:30 AM Stuart Schmitt DO Cardiology [phone]    11/7/2024 10:20 AM Randell Lawson MD Internal Medicine [phone]    12/10/2024 1:15 PM Michael Tan MD Cardiology [phone]    4/11/2025 3:15 PM Roel Pires, APRN - CNP Sleep Medicine [phone]            Plan for follow-up on next business day.  based on severity of symptoms and risk factors. Plan for next call: follow-up appointment-     Heidi Steven LPN

## 2024-08-29 ENCOUNTER — CARE COORDINATION (OUTPATIENT)
Dept: CARE COORDINATION | Facility: CLINIC | Age: 59
End: 2024-08-29

## 2024-08-29 NOTE — CARE COORDINATION
Care Transitions Note    Follow Up Call     Patient Current Location:  [facility] Care Coordinator contacted the patient by telephone. Verified name and  as identifiers.    Additional needs identified to be addressed with provider   No needs identified                 Method of communication with provider: none.    Care Summary Note: Spoke with patient states doing fine. Patient denies any chest pain, increased shortness of breath or decreased energy. Patient states attended transitional care appointment with cardiology, PCP and Oncology . Patient reports lab work of hemoglobin has increase and she didn't require the iron injection. Will continue to follow up with patient for any new changes or concerns.    Plan of care updates since last contact:  Review of patient management of conditions/medications: Diet, hydration, medication compliance and follow up appointment.       Advance Care Planning:   Does patient have an Advance Directive: reviewed and current.    Medication Review:  No changes since last call.     Remote Patient Monitoring:  Offered patient enrollment in the Remote Patient Monitoring (RPM) program for in-home monitoring: Patient is not eligible for RPM program because: patient does not have qualifying diagnosis.    Assessments:  No changes since last call    Follow Up Appointment:   Reviewed upcoming appointment(s).  Future Appointments         Provider Specialty Dept Phone    2024 10:30 AM PERIPHERAL Lab [phone]    2024 11:30 AM Sigifredo Luz MD Oncology [phone]    2024 8:30 AM Stuart Schmitt DO Cardiology [phone]    2024 10:20 AM Randell Lawson MD Internal Medicine [phone]    12/10/2024 1:15 PM Michael Tan MD Cardiology [phone]    2025 3:15 PM Roel Pires, APRN - CNP Sleep Medicine [phone]            [facility] Care Coordinator provided contact information.  Plan for follow-up call in 11-14 days based on  severity of symptoms and risk factors.  Plan for next call: medication management-Patient compliant with medication.      Heidi Steven LPN

## 2024-09-06 ENCOUNTER — TELEPHONE (OUTPATIENT)
Dept: INTERNAL MEDICINE CLINIC | Facility: CLINIC | Age: 59
End: 2024-09-06

## 2024-09-06 NOTE — TELEPHONE ENCOUNTER
----- Message from Le ZITA sent at 9/6/2024  1:38 PM EDT -----  Regarding: E[facility] Appointment Request  E[facility] Appointment Request    Patient needs appointment for ECC Appointment Type: Annual Visit.    Patient Requested Dates(s):11/7/2024  Patient Requested Time:10:20 AM  Provider Name:Randell Lawson MD     Reason for Appointment Request: Other     Pt would like to request to have her Annual wellness visit at the same date and time with her 3 mos follow up appointment  --------------------------------------------------------------------------------------------------------------------------    Relationship to Patient: Self     Call Back Information: OK to leave message on voicemail  Preferred Call Back Number: Phone [phone] (home)

## 2024-09-11 ENCOUNTER — CARE COORDINATION (OUTPATIENT)
Dept: CARE COORDINATION | Facility: CLINIC | Age: 59
End: 2024-09-11

## 2024-09-12 ENCOUNTER — CARE COORDINATION (OUTPATIENT)
Dept: CARE COORDINATION | Facility: CLINIC | Age: 59
End: 2024-09-12

## 2024-09-23 DIAGNOSIS — D63.1 ANEMIA DUE TO CHRONIC RENAL FAILURE TREATED WITH ERYTHROPOIETIN, UNSPECIFIED CKD STAGE: ICD-10-CM

## 2024-09-23 DIAGNOSIS — E55.9 VITAMIN D DEFICIENCY: ICD-10-CM

## 2024-09-23 DIAGNOSIS — D50.8 OTHER IRON DEFICIENCY ANEMIA: Primary | ICD-10-CM

## 2024-09-23 DIAGNOSIS — N18.9 ANEMIA DUE TO CHRONIC RENAL FAILURE TREATED WITH ERYTHROPOIETIN, UNSPECIFIED CKD STAGE: ICD-10-CM

## 2024-09-23 DIAGNOSIS — E78.49 OTHER HYPERLIPIDEMIA: ICD-10-CM

## 2024-09-24 ENCOUNTER — HOSPITAL ENCOUNTER (OUTPATIENT)
Dept: LAB | Age: 59
Discharge: HOME | End: 2024-09-27
Payer: MEDICARE

## 2024-09-24 ENCOUNTER — OFFICE VISIT (OUTPATIENT)
Dept: ONCOLOGY | Age: 59
End: 2024-09-24
Payer: MEDICARE

## 2024-09-24 VITALS
WEIGHT: 191.3 LBS | RESPIRATION RATE: 18 BRPM | HEART RATE: 52 BPM | BODY MASS INDEX: 43.03 KG/M2 | SYSTOLIC BLOOD PRESSURE: 133 MMHG | OXYGEN SATURATION: 100 % | DIASTOLIC BLOOD PRESSURE: 71 MMHG | HEIGHT: 56 IN | TEMPERATURE: 98 F

## 2024-09-24 DIAGNOSIS — E78.49 OTHER HYPERLIPIDEMIA: ICD-10-CM

## 2024-09-24 DIAGNOSIS — D63.1 ANEMIA DUE TO CHRONIC RENAL FAILURE TREATED WITH ERYTHROPOIETIN, UNSPECIFIED CKD STAGE: ICD-10-CM

## 2024-09-24 DIAGNOSIS — E55.9 VITAMIN D DEFICIENCY: ICD-10-CM

## 2024-09-24 DIAGNOSIS — N18.9 ANEMIA DUE TO CHRONIC RENAL FAILURE TREATED WITH ERYTHROPOIETIN, UNSPECIFIED CKD STAGE: ICD-10-CM

## 2024-09-24 DIAGNOSIS — D50.8 OTHER IRON DEFICIENCY ANEMIA: ICD-10-CM

## 2024-09-24 DIAGNOSIS — D50.8 OTHER IRON DEFICIENCY ANEMIA: Primary | ICD-10-CM

## 2024-09-24 LAB
25(OH)D3 SERPL-MCNC: 36.2 NG/ML (ref 30–100)
ALBUMIN SERPL-MCNC: 3.6 G/DL (ref 3.2–4.6)
ALBUMIN/GLOB SERPL: 1.1 (ref 1–1.9)
ALP SERPL-CCNC: 78 U/L (ref 35–104)
ALT SERPL-CCNC: 27 U/L (ref 8–45)
ANION GAP SERPL CALC-SCNC: 10 MMOL/L (ref 9–18)
AST SERPL-CCNC: 29 U/L (ref 15–37)
BASOPHILS # BLD: 0.1 K/UL (ref 0–0.2)
BASOPHILS NFR BLD: 1 % (ref 0–2)
BILIRUB SERPL-MCNC: 0.3 MG/DL (ref 0–1.2)
BUN SERPL-MCNC: 20 MG/DL (ref 8–23)
CALCIUM SERPL-MCNC: 9.5 MG/DL (ref 8.8–10.2)
CHLORIDE SERPL-SCNC: 106 MMOL/L (ref 98–107)
CO2 SERPL-SCNC: 26 MMOL/L (ref 20–28)
CREAT SERPL-MCNC: 1.26 MG/DL (ref 0.6–1.1)
DIFFERENTIAL METHOD BLD: ABNORMAL
EOSINOPHIL # BLD: 0.2 K/UL (ref 0–0.8)
EOSINOPHIL NFR BLD: 3 % (ref 0.5–7.8)
FERRITIN SERPL-MCNC: 72 NG/ML (ref 8–388)
GLOBULIN SER CALC-MCNC: 3.2 G/DL (ref 2.3–3.5)
GLUCOSE SERPL-MCNC: 152 MG/DL (ref 70–99)
HCT VFR BLD AUTO: 39 % (ref 35.8–46.3)
HGB BLD-MCNC: 12.1 G/DL (ref 11.7–15.4)
IMM GRANULOCYTES # BLD AUTO: 0 K/UL (ref 0–0.5)
IMM GRANULOCYTES NFR BLD AUTO: 0 % (ref 0–5)
LYMPHOCYTES # BLD: 1.8 K/UL (ref 0.5–4.6)
LYMPHOCYTES NFR BLD: 25 % (ref 13–44)
MCH RBC QN AUTO: 27.4 PG (ref 26.1–32.9)
MCHC RBC AUTO-ENTMCNC: 31 G/DL (ref 31.4–35)
MCV RBC AUTO: 88.2 FL (ref 82–102)
MONOCYTES # BLD: 0.5 K/UL (ref 0.1–1.3)
MONOCYTES NFR BLD: 7 % (ref 4–12)
NEUTS SEG # BLD: 4.5 K/UL (ref 1.7–8.2)
NEUTS SEG NFR BLD: 64 % (ref 43–78)
NRBC # BLD: 0 K/UL (ref 0–0.2)
PLATELET # BLD AUTO: 135 K/UL (ref 150–450)
PLATELET COMMENT: SLIGHT
PMV BLD AUTO: 10.8 FL (ref 9.4–12.3)
POTASSIUM SERPL-SCNC: 3.6 MMOL/L (ref 3.5–5.1)
PROT SERPL-MCNC: 6.8 G/DL (ref 6.3–8.2)
RBC # BLD AUTO: 4.42 M/UL (ref 4.05–5.2)
RBC MORPH BLD: ABNORMAL
SODIUM SERPL-SCNC: 142 MMOL/L (ref 136–145)
WBC # BLD AUTO: 7.1 K/UL (ref 4.3–11.1)
WBC MORPH BLD: ABNORMAL

## 2024-09-24 PROCEDURE — 3078F DIAST BP <80 MM HG: CPT | Performed by: INTERNAL MEDICINE

## 2024-09-24 PROCEDURE — 82306 VITAMIN D 25 HYDROXY: CPT

## 2024-09-24 PROCEDURE — 3075F SYST BP GE 130 - 139MM HG: CPT | Performed by: INTERNAL MEDICINE

## 2024-09-24 PROCEDURE — G8427 DOCREV CUR MEDS BY ELIG CLIN: HCPCS | Performed by: INTERNAL MEDICINE

## 2024-09-24 PROCEDURE — 82728 ASSAY OF FERRITIN: CPT

## 2024-09-24 PROCEDURE — 99215 OFFICE O/P EST HI 40 MIN: CPT | Performed by: INTERNAL MEDICINE

## 2024-09-24 PROCEDURE — 80053 COMPREHEN METABOLIC PANEL: CPT

## 2024-09-24 PROCEDURE — 36415 COLL VENOUS BLD VENIPUNCTURE: CPT

## 2024-09-24 PROCEDURE — 3017F COLORECTAL CA SCREEN DOC REV: CPT | Performed by: INTERNAL MEDICINE

## 2024-09-24 PROCEDURE — 4004F PT TOBACCO SCREEN RCVD TLK: CPT | Performed by: INTERNAL MEDICINE

## 2024-09-24 PROCEDURE — 85025 COMPLETE CBC W/AUTO DIFF WBC: CPT

## 2024-09-24 PROCEDURE — G8417 CALC BMI ABV UP PARAM F/U: HCPCS | Performed by: INTERNAL MEDICINE

## 2024-09-26 ENCOUNTER — OFFICE VISIT (OUTPATIENT)
Age: 59
End: 2024-09-26
Payer: MEDICARE

## 2024-09-26 VITALS
HEIGHT: 56 IN | BODY MASS INDEX: 42.97 KG/M2 | WEIGHT: 191 LBS | HEART RATE: 68 BPM | SYSTOLIC BLOOD PRESSURE: 130 MMHG | DIASTOLIC BLOOD PRESSURE: 70 MMHG

## 2024-09-26 DIAGNOSIS — I42.8 NICM (NONISCHEMIC CARDIOMYOPATHY) (HCC): Primary | ICD-10-CM

## 2024-09-26 DIAGNOSIS — I48.0 PAROXYSMAL ATRIAL FIBRILLATION (HCC): ICD-10-CM

## 2024-09-26 DIAGNOSIS — I50.22 CHRONIC SYSTOLIC HEART FAILURE (HCC): ICD-10-CM

## 2024-09-26 DIAGNOSIS — I10 ESSENTIAL (PRIMARY) HYPERTENSION: ICD-10-CM

## 2024-09-26 DIAGNOSIS — I25.3 LEFT VENTRICULAR ANEURYSM: ICD-10-CM

## 2024-09-26 DIAGNOSIS — I47.20 VT (VENTRICULAR TACHYCARDIA) (HCC): ICD-10-CM

## 2024-09-26 PROCEDURE — 3075F SYST BP GE 130 - 139MM HG: CPT | Performed by: INTERNAL MEDICINE

## 2024-09-26 PROCEDURE — 3078F DIAST BP <80 MM HG: CPT | Performed by: INTERNAL MEDICINE

## 2024-09-26 PROCEDURE — G8417 CALC BMI ABV UP PARAM F/U: HCPCS | Performed by: INTERNAL MEDICINE

## 2024-09-26 PROCEDURE — 3017F COLORECTAL CA SCREEN DOC REV: CPT | Performed by: INTERNAL MEDICINE

## 2024-09-26 PROCEDURE — 99214 OFFICE O/P EST MOD 30 MIN: CPT | Performed by: INTERNAL MEDICINE

## 2024-09-26 PROCEDURE — G8428 CUR MEDS NOT DOCUMENT: HCPCS | Performed by: INTERNAL MEDICINE

## 2024-09-26 PROCEDURE — 4004F PT TOBACCO SCREEN RCVD TLK: CPT | Performed by: INTERNAL MEDICINE

## 2024-10-15 RX ORDER — APIXABAN 5 MG/1
5 TABLET, FILM COATED ORAL 2 TIMES DAILY
Qty: 180 TABLET | Refills: 3 | Status: SHIPPED | OUTPATIENT
Start: 2024-10-15

## 2024-10-15 RX ORDER — CARVEDILOL 6.25 MG/1
TABLET ORAL
Qty: 180 TABLET | Refills: 3 | Status: SHIPPED | OUTPATIENT
Start: 2024-10-15

## 2024-11-11 DIAGNOSIS — E11.21 TYPE 2 DIABETES WITH NEPHROPATHY (HCC): ICD-10-CM

## 2024-11-11 RX ORDER — PEN NEEDLE, DIABETIC 32GX 5/32"
NEEDLE, DISPOSABLE MISCELLANEOUS
Qty: 100 EACH | Refills: 3 | OUTPATIENT
Start: 2024-11-11

## 2024-11-26 ENCOUNTER — OFFICE VISIT (OUTPATIENT)
Dept: INTERNAL MEDICINE CLINIC | Facility: CLINIC | Age: 59
End: 2024-11-26
Payer: MEDICARE

## 2024-11-26 VITALS
DIASTOLIC BLOOD PRESSURE: 70 MMHG | BODY MASS INDEX: 41.39 KG/M2 | TEMPERATURE: 97.2 F | OXYGEN SATURATION: 97 % | WEIGHT: 184 LBS | HEIGHT: 56 IN | HEART RATE: 62 BPM | SYSTOLIC BLOOD PRESSURE: 126 MMHG

## 2024-11-26 DIAGNOSIS — I25.119 ATHEROSCLEROSIS OF NATIVE CORONARY ARTERY OF NATIVE HEART WITH ANGINA PECTORIS (HCC): ICD-10-CM

## 2024-11-26 DIAGNOSIS — E78.2 MIXED HYPERLIPIDEMIA: ICD-10-CM

## 2024-11-26 DIAGNOSIS — Z95.810 IMPLANTABLE CARDIOVERTER-DEFIBRILLATOR (ICD) IN SITU: ICD-10-CM

## 2024-11-26 DIAGNOSIS — G47.33 OSA ON CPAP: ICD-10-CM

## 2024-11-26 DIAGNOSIS — I48.0 PAROXYSMAL ATRIAL FIBRILLATION (HCC): ICD-10-CM

## 2024-11-26 DIAGNOSIS — Z23 NEED FOR PROPHYLACTIC VACCINATION AND INOCULATION AGAINST VARICELLA: ICD-10-CM

## 2024-11-26 DIAGNOSIS — I42.8 NICM (NONISCHEMIC CARDIOMYOPATHY) (HCC): ICD-10-CM

## 2024-11-26 DIAGNOSIS — I50.22 CHRONIC SYSTOLIC HEART FAILURE (HCC): ICD-10-CM

## 2024-11-26 DIAGNOSIS — Z29.11 NEED FOR RSV VACCINATION: ICD-10-CM

## 2024-11-26 DIAGNOSIS — Z00.00 MEDICARE ANNUAL WELLNESS VISIT, SUBSEQUENT: Primary | ICD-10-CM

## 2024-11-26 DIAGNOSIS — Z23 NEED FOR INFLUENZA VACCINATION: ICD-10-CM

## 2024-11-26 DIAGNOSIS — E11.21 CONTROLLED TYPE 2 DIABETES MELLITUS WITH DIABETIC NEPHROPATHY, WITH LONG-TERM CURRENT USE OF INSULIN (HCC): ICD-10-CM

## 2024-11-26 DIAGNOSIS — N18.31 STAGE 3A CHRONIC KIDNEY DISEASE (HCC): ICD-10-CM

## 2024-11-26 DIAGNOSIS — F17.200 TOBACCO USE DISORDER: ICD-10-CM

## 2024-11-26 DIAGNOSIS — Z79.4 CONTROLLED TYPE 2 DIABETES MELLITUS WITH DIABETIC NEPHROPATHY, WITH LONG-TERM CURRENT USE OF INSULIN (HCC): ICD-10-CM

## 2024-11-26 LAB — HBA1C MFR BLD: 6.9 %

## 2024-11-26 PROCEDURE — 2022F DILAT RTA XM EVC RTNOPTHY: CPT | Performed by: PHYSICIAN ASSISTANT

## 2024-11-26 PROCEDURE — 3017F COLORECTAL CA SCREEN DOC REV: CPT | Performed by: PHYSICIAN ASSISTANT

## 2024-11-26 PROCEDURE — 83036 HEMOGLOBIN GLYCOSYLATED A1C: CPT | Performed by: PHYSICIAN ASSISTANT

## 2024-11-26 RX ORDER — ACYCLOVIR 400 MG/1
TABLET ORAL
Qty: 3 EACH | Refills: 5 | Status: CANCELLED | OUTPATIENT
Start: 2024-11-26

## 2024-11-26 RX ORDER — ATORVASTATIN CALCIUM 40 MG/1
40 TABLET, FILM COATED ORAL DAILY
Qty: 90 TABLET | Refills: 3 | Status: SHIPPED | OUTPATIENT
Start: 2024-11-26

## 2024-11-26 RX ORDER — RESPIRATORY SYNCYTIAL VISUS VACCINE RECOMBINANT, ADJUVANTED 120MCG/0.5
0.5 KIT INTRAMUSCULAR ONCE
Qty: 0.5 ML | Refills: 0 | Status: SHIPPED | OUTPATIENT
Start: 2024-11-26 | End: 2024-11-26

## 2024-11-26 RX ORDER — ACYCLOVIR 400 MG/1
TABLET ORAL
Qty: 9 EACH | Refills: 3 | Status: CANCELLED | OUTPATIENT
Start: 2024-11-26

## 2024-11-26 RX ORDER — INSULIN GLARGINE 100 [IU]/ML
12 INJECTION, SOLUTION SUBCUTANEOUS NIGHTLY
Qty: 5 ADJUSTABLE DOSE PRE-FILLED PEN SYRINGE | Refills: 3 | Status: SHIPPED | OUTPATIENT
Start: 2024-11-26

## 2024-11-26 ASSESSMENT — PATIENT HEALTH QUESTIONNAIRE - PHQ9
8. MOVING OR SPEAKING SO SLOWLY THAT OTHER PEOPLE COULD HAVE NOTICED. OR THE OPPOSITE, BEING SO FIGETY OR RESTLESS THAT YOU HAVE BEEN MOVING AROUND A LOT MORE THAN USUAL: NOT AT ALL
SUM OF ALL RESPONSES TO PHQ QUESTIONS 1-9: 0
9. THOUGHTS THAT YOU WOULD BE BETTER OFF DEAD, OR OF HURTING YOURSELF: NOT AT ALL
1. LITTLE INTEREST OR PLEASURE IN DOING THINGS: NOT AT ALL
SUM OF ALL RESPONSES TO PHQ QUESTIONS 1-9: 0
2. FEELING DOWN, DEPRESSED OR HOPELESS: NOT AT ALL
7. TROUBLE CONCENTRATING ON THINGS, SUCH AS READING THE NEWSPAPER OR WATCHING TELEVISION: NOT AT ALL
4. FEELING TIRED OR HAVING LITTLE ENERGY: NOT AT ALL
6. FEELING BAD ABOUT YOURSELF - OR THAT YOU ARE A FAILURE OR HAVE LET YOURSELF OR YOUR FAMILY DOWN: NOT AT ALL
10. IF YOU CHECKED OFF ANY PROBLEMS, HOW DIFFICULT HAVE THESE PROBLEMS MADE IT FOR YOU TO DO YOUR WORK, TAKE CARE OF THINGS AT HOME, OR GET ALONG WITH OTHER PEOPLE: NOT DIFFICULT AT ALL
3. TROUBLE FALLING OR STAYING ASLEEP: NOT AT ALL
SUM OF ALL RESPONSES TO PHQ9 QUESTIONS 1 & 2: 0
5. POOR APPETITE OR OVEREATING: NOT AT ALL
SUM OF ALL RESPONSES TO PHQ QUESTIONS 1-9: 0
SUM OF ALL RESPONSES TO PHQ QUESTIONS 1-9: 0

## 2024-11-26 ASSESSMENT — LIFESTYLE VARIABLES
HOW OFTEN DO YOU HAVE A DRINK CONTAINING ALCOHOL: NEVER
HOW MANY STANDARD DRINKS CONTAINING ALCOHOL DO YOU HAVE ON A TYPICAL DAY: PATIENT DOES NOT DRINK

## 2024-11-26 NOTE — PROGRESS NOTES
MD   acetaminophen (TYLENOL) 500 MG tablet Take 2 tablets by mouth every 6 hours as needed for Pain (breakthrough pain) Yes Provider, MD Sweta   vitamin D 25 MCG (1000 UT) CAPS Take by mouth daily Yes Automatic Reconciliation, Ar   latanoprost (XALATAN) 0.005 % ophthalmic solution Place 1 drop into both eyes nightly Yes Automatic Reconciliation, Ar   albuterol sulfate  (90 Base) MCG/ACT inhaler 2 puffs qid prn shortness of breath or wheezing  Patient not taking: Reported on 8/10/2022  Automatic Reconciliation, Ar         CareTeam (Including outside providers/suppliers regularly involved in providing care):   Patient Care Team:  Randell Lawson MD as PCP - General  Randell Lawson MD as PCP - Empaneled Provider      Reviewed and updated this visit:  Tobacco  Allergies  Meds  Med Hx  Surg Hx  Soc Hx  Fam Hx

## 2024-11-26 NOTE — PATIENT INSTRUCTIONS

## 2024-12-02 DIAGNOSIS — D50.8 OTHER IRON DEFICIENCY ANEMIA: Primary | ICD-10-CM

## 2024-12-02 DIAGNOSIS — E55.9 VITAMIN D DEFICIENCY: ICD-10-CM

## 2024-12-02 DIAGNOSIS — E78.49 OTHER HYPERLIPIDEMIA: ICD-10-CM

## 2024-12-03 ENCOUNTER — HOSPITAL ENCOUNTER (OUTPATIENT)
Dept: LAB | Age: 59
Discharge: HOME | End: 2024-12-03
Payer: MEDICARE

## 2024-12-03 ENCOUNTER — OFFICE VISIT (OUTPATIENT)
Dept: ONCOLOGY | Age: 59
End: 2024-12-03
Payer: MEDICARE

## 2024-12-03 VITALS
TEMPERATURE: 97.9 F | DIASTOLIC BLOOD PRESSURE: 88 MMHG | OXYGEN SATURATION: 96 % | HEART RATE: 57 BPM | HEIGHT: 56 IN | RESPIRATION RATE: 16 BRPM | WEIGHT: 185.4 LBS | BODY MASS INDEX: 41.71 KG/M2 | SYSTOLIC BLOOD PRESSURE: 138 MMHG

## 2024-12-03 DIAGNOSIS — E78.49 OTHER HYPERLIPIDEMIA: ICD-10-CM

## 2024-12-03 DIAGNOSIS — D50.8 OTHER IRON DEFICIENCY ANEMIA: Primary | ICD-10-CM

## 2024-12-03 DIAGNOSIS — E55.9 VITAMIN D DEFICIENCY: ICD-10-CM

## 2024-12-03 DIAGNOSIS — D50.8 OTHER IRON DEFICIENCY ANEMIA: ICD-10-CM

## 2024-12-03 LAB
25(OH)D3 SERPL-MCNC: 39.4 NG/ML (ref 30–100)
ALBUMIN SERPL-MCNC: 3.5 G/DL (ref 3.2–4.6)
ALBUMIN/GLOB SERPL: 0.9 (ref 1–1.9)
ALP SERPL-CCNC: 78 U/L (ref 35–104)
ALT SERPL-CCNC: 30 U/L (ref 8–45)
ANION GAP SERPL CALC-SCNC: 13 MMOL/L (ref 7–16)
AST SERPL-CCNC: 29 U/L (ref 15–37)
BASOPHILS # BLD: 0 K/UL (ref 0–0.2)
BASOPHILS NFR BLD: 0 % (ref 0–2)
BILIRUB SERPL-MCNC: 0.2 MG/DL (ref 0–1.2)
BUN SERPL-MCNC: 27 MG/DL (ref 8–23)
CALCIUM SERPL-MCNC: 9.2 MG/DL (ref 8.8–10.2)
CHLORIDE SERPL-SCNC: 105 MMOL/L (ref 98–107)
CO2 SERPL-SCNC: 24 MMOL/L (ref 20–29)
CREAT SERPL-MCNC: 1.43 MG/DL (ref 0.6–1.1)
DIFFERENTIAL METHOD BLD: ABNORMAL
EOSINOPHIL # BLD: 0.2 K/UL (ref 0–0.8)
EOSINOPHIL NFR BLD: 3 % (ref 0.5–7.8)
ERYTHROCYTE [DISTWIDTH] IN BLOOD BY AUTOMATED COUNT: 15.2 % (ref 11.9–14.6)
FERRITIN SERPL-MCNC: 27 NG/ML (ref 8–388)
GLOBULIN SER CALC-MCNC: 3.7 G/DL (ref 2.3–3.5)
GLUCOSE SERPL-MCNC: 123 MG/DL (ref 70–99)
HCT VFR BLD AUTO: 42.5 % (ref 35.8–46.3)
HGB BLD-MCNC: 13.7 G/DL (ref 11.7–15.4)
IMM GRANULOCYTES # BLD AUTO: 0 K/UL (ref 0–0.5)
IMM GRANULOCYTES NFR BLD AUTO: 0 % (ref 0–5)
LYMPHOCYTES # BLD: 1.9 K/UL (ref 0.5–4.6)
LYMPHOCYTES NFR BLD: 26 % (ref 13–44)
MCH RBC QN AUTO: 30 PG (ref 26.1–32.9)
MCHC RBC AUTO-ENTMCNC: 32.2 G/DL (ref 31.4–35)
MCV RBC AUTO: 93.2 FL (ref 82–102)
MONOCYTES # BLD: 0.4 K/UL (ref 0.1–1.3)
MONOCYTES NFR BLD: 6 % (ref 4–12)
NEUTS SEG # BLD: 4.7 K/UL (ref 1.7–8.2)
NEUTS SEG NFR BLD: 65 % (ref 43–78)
NRBC # BLD: 0 K/UL (ref 0–0.2)
PLATELET # BLD AUTO: 129 K/UL (ref 150–450)
PMV BLD AUTO: 11.8 FL (ref 9.4–12.3)
POTASSIUM SERPL-SCNC: 3.7 MMOL/L (ref 3.5–5.1)
PROT SERPL-MCNC: 7.3 G/DL (ref 6.3–8.2)
RBC # BLD AUTO: 4.56 M/UL (ref 4.05–5.2)
SODIUM SERPL-SCNC: 142 MMOL/L (ref 136–145)
WBC # BLD AUTO: 7.3 K/UL (ref 4.3–11.1)

## 2024-12-03 PROCEDURE — 82306 VITAMIN D 25 HYDROXY: CPT

## 2024-12-03 PROCEDURE — 3075F SYST BP GE 130 - 139MM HG: CPT | Performed by: INTERNAL MEDICINE

## 2024-12-03 PROCEDURE — 85025 COMPLETE CBC W/AUTO DIFF WBC: CPT

## 2024-12-03 PROCEDURE — 82728 ASSAY OF FERRITIN: CPT

## 2024-12-03 PROCEDURE — 80053 COMPREHEN METABOLIC PANEL: CPT

## 2024-12-03 PROCEDURE — 3079F DIAST BP 80-89 MM HG: CPT | Performed by: INTERNAL MEDICINE

## 2024-12-03 PROCEDURE — G8482 FLU IMMUNIZE ORDER/ADMIN: HCPCS | Performed by: INTERNAL MEDICINE

## 2024-12-03 PROCEDURE — G8427 DOCREV CUR MEDS BY ELIG CLIN: HCPCS | Performed by: INTERNAL MEDICINE

## 2024-12-03 PROCEDURE — G8417 CALC BMI ABV UP PARAM F/U: HCPCS | Performed by: INTERNAL MEDICINE

## 2024-12-03 PROCEDURE — 36415 COLL VENOUS BLD VENIPUNCTURE: CPT

## 2024-12-03 PROCEDURE — 99215 OFFICE O/P EST HI 40 MIN: CPT | Performed by: INTERNAL MEDICINE

## 2024-12-03 PROCEDURE — 3017F COLORECTAL CA SCREEN DOC REV: CPT | Performed by: INTERNAL MEDICINE

## 2024-12-03 PROCEDURE — 4004F PT TOBACCO SCREEN RCVD TLK: CPT | Performed by: INTERNAL MEDICINE

## 2024-12-03 NOTE — PATIENT INSTRUCTIONS
Patient Information from Today's Visit    Diagnosis: LUCIO      Follow Up Instructions:   - Labs reviewed    Follow up in 3 months with labs prior      Treatment Summary has been discussed and given to patient: N/A      Current Labs:   Hospital Outpatient Visit on 12/03/2024   Component Date Value Ref Range Status    WBC 12/03/2024 7.3  4.3 - 11.1 K/uL Final    RBC 12/03/2024 4.56  4.05 - 5.2 M/uL Final    Hemoglobin 12/03/2024 13.7  11.7 - 15.4 g/dL Final    Hematocrit 12/03/2024 42.5  35.8 - 46.3 % Final    MCV 12/03/2024 93.2  82.0 - 102.0 FL Final    MCH 12/03/2024 30.0  26.1 - 32.9 PG Final    MCHC 12/03/2024 32.2  31.4 - 35.0 g/dL Final    RDW 12/03/2024 15.2 (H)  11.9 - 14.6 % Final    Platelets 12/03/2024 129 (L)  150 - 450 K/uL Final    MPV 12/03/2024 11.8  9.4 - 12.3 FL Final    nRBC 12/03/2024 0.00  0.0 - 0.2 K/uL Final    **Note: Absolute NRBC parameter is now reported with Hemogram**    Neutrophils % 12/03/2024 65  43 - 78 % Final    Lymphocytes % 12/03/2024 26  13 - 44 % Final    Monocytes % 12/03/2024 6  4.0 - 12.0 % Final    Eosinophils % 12/03/2024 3  0.5 - 7.8 % Final    Basophils % 12/03/2024 0  0.0 - 2.0 % Final    Immature Granulocytes % 12/03/2024 0  0.0 - 5.0 % Final    Neutrophils Absolute 12/03/2024 4.7  1.7 - 8.2 K/UL Final    Lymphocytes Absolute 12/03/2024 1.9  0.5 - 4.6 K/UL Final    Monocytes Absolute 12/03/2024 0.4  0.1 - 1.3 K/UL Final    Eosinophils Absolute 12/03/2024 0.2  0.0 - 0.8 K/UL Final    Basophils Absolute 12/03/2024 0.0  0.0 - 0.2 K/UL Final    Immature Granulocytes Absolute 12/03/2024 0.0  0.0 - 0.5 K/UL Final    Differential Type 12/03/2024 AUTOMATED    Final    Sodium 12/03/2024 142  136 - 145 mmol/L Final    Potassium 12/03/2024 3.7  3.5 - 5.1 mmol/L Final    Chloride 12/03/2024 105  98 - 107 mmol/L Final    CO2 12/03/2024 24  20 - 29 mmol/L Final    Anion Gap 12/03/2024 13  7 - 16 mmol/L Final    Glucose 12/03/2024 123 (H)  70 - 99 mg/dL Final    Comment: <70 mg/dL

## 2024-12-03 NOTE — PROGRESS NOTES
[place] [id number]  Phone: [phone]           12/3/2024  Yann Shelton  1965  [id number]        Yann Shelton is a 59 year old -American female who has returned to my clinic for a follow-up visit; she was initially referred to me by Dr. Stuart Schmitt for management of Iron Deficiency Anemia, she received parenteral Iron infusions in 8/2024.        ALLERGIES:     Penicillin.        FAMILY HISTORY:    No hematologic disorders.        SOCIAL HISTORY:      She is single and lives alone. She works at [employer]. She smokes 5 cigarettes every day.        PAST MEDICAL HISTORY:    Hypertension, Coronary Artery Disease, renal insufficiency, Diabetes Mellitus, Hyperlipidemia, Congestive Heart Failure, s/p AICD, Allergic Rhinitis, GERD, Iron Deficiency Anemia, Fibromyalgia, Vitamin D deficiency, Obstructive Sleep Apnea and Iron Deficiency Anemia.         ROS:  The patient complained of fatigue and myalgia; all other systems negative.        PHYSICAL EXAM:   The patient was alert, awake and oriented, no acute distress was noted, a left infra-clavicular AICD was present. Oral examination did not reveal any mucosal lesions. Lymph node examination did not reveal any adenopathy. Neck examination revealed a supple neck, no thyromegaly or masses were noted. Chest examination revealed slightly prolonged expiration bilaterally. Heart examination revealed S-1 and S-2 with a 2/6 systolic murmur. Abdominal examination revealed a non-tender abdomen, bowel sounds were positive, no organomegaly could be appreciated. Examination of the extremities did not reveal any tenderness or erythema, 1+ bilateral pedal edema was present. Examination of the skin did not reveal any lesions.        KPS:    100.        LABORATORY INVESTIGATIONS:  CBC showed a WBC count of 7.3, ANC was 4.7, Hemoglobin was 13.7 and Platelets were 129; her Ferritin level was 27. Medical problems

## 2024-12-10 ENCOUNTER — OFFICE VISIT (OUTPATIENT)
Age: 59
End: 2024-12-10
Payer: MEDICARE

## 2024-12-10 VITALS
WEIGHT: 190 LBS | HEART RATE: 60 BPM | SYSTOLIC BLOOD PRESSURE: 120 MMHG | DIASTOLIC BLOOD PRESSURE: 74 MMHG | HEIGHT: 56 IN | BODY MASS INDEX: 42.74 KG/M2

## 2024-12-10 DIAGNOSIS — Z79.899 AT RISK FOR AMIODARONE TOXICITY WITH LONG TERM USE: Primary | ICD-10-CM

## 2024-12-10 DIAGNOSIS — I42.8 NICM (NONISCHEMIC CARDIOMYOPATHY) (HCC): ICD-10-CM

## 2024-12-10 DIAGNOSIS — Z91.89 AT RISK FOR AMIODARONE TOXICITY WITH LONG TERM USE: Primary | ICD-10-CM

## 2024-12-10 DIAGNOSIS — I10 ESSENTIAL (PRIMARY) HYPERTENSION: ICD-10-CM

## 2024-12-10 DIAGNOSIS — I48.0 PAROXYSMAL ATRIAL FIBRILLATION (HCC): ICD-10-CM

## 2024-12-10 DIAGNOSIS — I47.20 VT (VENTRICULAR TACHYCARDIA) (HCC): ICD-10-CM

## 2024-12-10 PROCEDURE — 93000 ELECTROCARDIOGRAM COMPLETE: CPT | Performed by: INTERNAL MEDICINE

## 2024-12-10 PROCEDURE — 3074F SYST BP LT 130 MM HG: CPT | Performed by: INTERNAL MEDICINE

## 2024-12-10 PROCEDURE — 3078F DIAST BP <80 MM HG: CPT | Performed by: INTERNAL MEDICINE

## 2024-12-10 PROCEDURE — 4004F PT TOBACCO SCREEN RCVD TLK: CPT | Performed by: INTERNAL MEDICINE

## 2024-12-10 PROCEDURE — 3017F COLORECTAL CA SCREEN DOC REV: CPT | Performed by: INTERNAL MEDICINE

## 2024-12-10 PROCEDURE — 99214 OFFICE O/P EST MOD 30 MIN: CPT | Performed by: INTERNAL MEDICINE

## 2024-12-10 PROCEDURE — G8482 FLU IMMUNIZE ORDER/ADMIN: HCPCS | Performed by: INTERNAL MEDICINE

## 2024-12-10 PROCEDURE — G8428 CUR MEDS NOT DOCUMENT: HCPCS | Performed by: INTERNAL MEDICINE

## 2024-12-10 PROCEDURE — G8417 CALC BMI ABV UP PARAM F/U: HCPCS | Performed by: INTERNAL MEDICINE

## 2024-12-10 RX ORDER — AMIODARONE HYDROCHLORIDE 100 MG/1
100 TABLET ORAL DAILY
Qty: 90 TABLET | Refills: 3 | Status: SHIPPED | OUTPATIENT
Start: 2024-12-10

## 2024-12-10 ASSESSMENT — ENCOUNTER SYMPTOMS
ALLERGIC/IMMUNOLOGIC NEGATIVE: 1
EYES NEGATIVE: 1
SHORTNESS OF BREATH: 0
GASTROINTESTINAL NEGATIVE: 1

## 2024-12-10 NOTE — PROGRESS NOTES
LAB    GYN  1988    [facility]    HERNIA REPAIR  03/26/2019    mild incarceration, no bowel removal    HYSTERECTOMY (CERVIX STATUS UNKNOWN)      EDITH-Left ovary intact per pt    LEFT HEART CATH,PERCUTANEOUS  2/13/2013    no intervention    PACEMAKER      ICD    ROTATOR CUFF REPAIR Right     TONSILLECTOMY       Family History   Problem Relation Age of Onset    Diabetes Other     Heart Disease Father     Hypertension Father     Stroke Father     Heart Attack Father 50        mi    Breast Cancer Sister 43    Hypertension Brother     Heart Disease Brother     Diabetes Brother     Stroke Mother      Social History     Tobacco Use    Smoking status: Some Days     Current packs/day: 0.25     Types: Cigarettes     Passive exposure: Current    Smokeless tobacco: Never    Tobacco comments:     Quit smoking: \"every now and then\"   Substance Use Topics    Alcohol use: Yes     Alcohol/week: 1.0 standard drink of alcohol       ROS:  A comprehensive review of systems was performed with the pertinent positives and negatives as noted in the HPI in addition to:  Review of Systems   Constitutional: Negative.    HENT: Negative.     Eyes: Negative.    Respiratory:  Negative for shortness of breath.    Cardiovascular:  Negative for chest pain.   Gastrointestinal: Negative.    Endocrine: Negative.    Genitourinary: Negative.    Musculoskeletal: Negative.    Skin: Negative.    Allergic/Immunologic: Negative.    Neurological: Negative.    Hematological: Negative.    Psychiatric/Behavioral: Negative.     All other systems reviewed and are negative.      PHYSICAL EXAM:   /74   Pulse 60   Ht 1.422 m (4' 8\")   Wt 86.2 kg (190 lb)   BMI 42.60 kg/m²      Wt Readings from Last 3 Encounters:   12/10/24 86.2 kg (190 lb)   12/03/24 84.1 kg (185 lb 6.4 oz)   11/26/24 83.5 kg (184 lb)     BP Readings from Last 3 Encounters:   12/10/24 120/74   12/03/24 138/88   11/26/24 126/70     Gen: Well appearing, well developed, no acute distress  Eyes: Pupils

## 2025-01-07 ENCOUNTER — NURSE ONLY (OUTPATIENT)
Dept: PULMONOLOGY | Age: 60
End: 2025-01-07
Payer: MEDICARE

## 2025-01-07 DIAGNOSIS — Z79.899 LONG TERM CURRENT USE OF AMIODARONE: ICD-10-CM

## 2025-01-07 DIAGNOSIS — I48.0 PAROXYSMAL ATRIAL FIBRILLATION (HCC): Primary | ICD-10-CM

## 2025-01-07 LAB
FEF25-27, POC: 1.47 L/S
FET, POC: NORMAL
FEV 1 , POC: 1.09 L
FEV1/FVC, POC: NORMAL
FVC, POC: 1.29
LUNG AGE, POC: NORMAL
PEF, POC: 2.97 L/S

## 2025-01-07 PROCEDURE — 94729 DIFFUSING CAPACITY: CPT | Performed by: INTERNAL MEDICINE

## 2025-01-07 PROCEDURE — 94010 BREATHING CAPACITY TEST: CPT | Performed by: INTERNAL MEDICINE

## 2025-01-07 PROCEDURE — 94726 PLETHYSMOGRAPHY LUNG VOLUMES: CPT | Performed by: INTERNAL MEDICINE

## 2025-01-07 ASSESSMENT — PULMONARY FUNCTION TESTS: FVC_POC: 1.29

## 2025-01-09 ENCOUNTER — TELEPHONE (OUTPATIENT)
Age: 60
End: 2025-01-09

## 2025-01-09 RX ORDER — AMIODARONE HYDROCHLORIDE 100 MG/1
100 TABLET ORAL SEE ADMIN INSTRUCTIONS
Qty: 90 TABLET | Refills: 3
Start: 2025-01-09

## 2025-01-09 NOTE — TELEPHONE ENCOUNTER
Patient notified of PFT results, aware to decrease amiodarone to 100mg twice a week.       Michael Tan MD Harper, Alanna, MA; Lauren Perez RN  She has severe pulmonary restriction. Please change her amiodarone dose to 100 mg every M/Th. Thanks!    JANESSA   62

## 2025-01-21 ENCOUNTER — OFFICE VISIT (OUTPATIENT)
Age: 60
End: 2025-01-21
Payer: MEDICARE

## 2025-01-21 VITALS
HEART RATE: 64 BPM | BODY MASS INDEX: 42.07 KG/M2 | DIASTOLIC BLOOD PRESSURE: 82 MMHG | SYSTOLIC BLOOD PRESSURE: 120 MMHG | HEIGHT: 56 IN | WEIGHT: 187 LBS

## 2025-01-21 DIAGNOSIS — Z95.810 IMPLANTABLE CARDIOVERTER-DEFIBRILLATOR (ICD) IN SITU: Primary | ICD-10-CM

## 2025-01-21 DIAGNOSIS — I25.3 LEFT VENTRICULAR ANEURYSM: ICD-10-CM

## 2025-01-21 DIAGNOSIS — G47.33 OSA ON CPAP: ICD-10-CM

## 2025-01-21 DIAGNOSIS — I50.22 CHRONIC SYSTOLIC HEART FAILURE (HCC): ICD-10-CM

## 2025-01-21 DIAGNOSIS — I10 ESSENTIAL (PRIMARY) HYPERTENSION: ICD-10-CM

## 2025-01-21 DIAGNOSIS — E78.5 DYSLIPIDEMIA: ICD-10-CM

## 2025-01-21 DIAGNOSIS — I42.8 NICM (NONISCHEMIC CARDIOMYOPATHY) (HCC): ICD-10-CM

## 2025-01-21 DIAGNOSIS — I48.0 PAROXYSMAL ATRIAL FIBRILLATION (HCC): ICD-10-CM

## 2025-01-21 DIAGNOSIS — N18.6 ESRD (END STAGE RENAL DISEASE) (HCC): ICD-10-CM

## 2025-01-21 PROCEDURE — 4004F PT TOBACCO SCREEN RCVD TLK: CPT | Performed by: INTERNAL MEDICINE

## 2025-01-21 PROCEDURE — 3079F DIAST BP 80-89 MM HG: CPT | Performed by: INTERNAL MEDICINE

## 2025-01-21 PROCEDURE — G8428 CUR MEDS NOT DOCUMENT: HCPCS | Performed by: INTERNAL MEDICINE

## 2025-01-21 PROCEDURE — 3017F COLORECTAL CA SCREEN DOC REV: CPT | Performed by: INTERNAL MEDICINE

## 2025-01-21 PROCEDURE — G8417 CALC BMI ABV UP PARAM F/U: HCPCS | Performed by: INTERNAL MEDICINE

## 2025-01-21 PROCEDURE — 3074F SYST BP LT 130 MM HG: CPT | Performed by: INTERNAL MEDICINE

## 2025-01-21 PROCEDURE — 99214 OFFICE O/P EST MOD 30 MIN: CPT | Performed by: INTERNAL MEDICINE

## 2025-01-21 NOTE — PROGRESS NOTES
[address] [facility], [place] [address]  PHONE: [phone]     25    NAME:  Yann Shelton  : 1965  MRN: [id number]       SUBJECTIVE:   Yann Shelton is a 59 y.o. female seen for a follow up visit regarding the following:     Chief Complaint   Patient presents with    Congestive Heart Failure    Atrial Fibrillation    Follow-up       HPI:   Here for NICM/cSHF/VT.         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  Echo 2023: EF 30-35%  LHC 2024: Normal epicardial coronary arteries   2024 admission for A/C resp failure, HF, anemia s/p PRBC     Followed by renal now.    She lives by herself, sister here in town.  One son in [place].    Amio on Mon and  now, reduced dose with EP.     No new edema, feeling better.  Hb better now.    No CP. No new DUARTE.   Doing well on anticoagulation treatment as reviewed today, no bleeding issues or excessive bruising noted.          Patient denies recent history of orthopnea, PND, excessive dizziness and/or syncope.      Wearing CPAP every night now.          Past Medical History, Past Surgical History, Family history, Social History, and Medications were all reviewed with the patient today and updated as necessary.     Current Outpatient Medications   Medication Sig Dispense Refill    amiodarone (PACERONE) 100 MG tablet Take 1 tablet by mouth See Admin Instructions 100mg only on Monday and Thursday 90 tablet 3    empagliflozin (JARDIANCE) 10 MG tablet Take 1 tablet by mouth daily 90 tablet 3    atorvastatin (LIPITOR) 40 MG tablet Take 1 tablet by mouth daily 90 tablet 3    insulin glargine (LANTUS SOLOSTAR) 100 UNIT/ML injection pen Inject 12 Units into the skin nightly 5 Adjustable Dose Pre-filled Pen Syringe 3

## 2025-01-26 ENCOUNTER — APPOINTMENT (OUTPATIENT)
Dept: GENERAL RADIOLOGY | Age: 60
DRG: 308 | End: 2025-01-26
Payer: MEDICARE

## 2025-01-26 ENCOUNTER — APPOINTMENT (OUTPATIENT)
Dept: CT IMAGING | Age: 60
DRG: 308 | End: 2025-01-26
Payer: MEDICARE

## 2025-01-26 ENCOUNTER — HOSPITAL ENCOUNTER (INPATIENT)
Age: 60
LOS: 1 days | Discharge: HOME | DRG: 308 | End: 2025-01-30
Attending: STUDENT IN AN ORGANIZED HEALTH CARE EDUCATION/TRAINING PROGRAM | Admitting: FAMILY MEDICINE
Payer: MEDICARE

## 2025-01-26 DIAGNOSIS — I50.22 CHRONIC SYSTOLIC HEART FAILURE (HCC): ICD-10-CM

## 2025-01-26 DIAGNOSIS — N18.4 CKD (CHRONIC KIDNEY DISEASE) STAGE 4, GFR 15-29 ML/MIN (HCC): ICD-10-CM

## 2025-01-26 DIAGNOSIS — U07.1 COVID-19: ICD-10-CM

## 2025-01-26 DIAGNOSIS — R55 SYNCOPE AND COLLAPSE: ICD-10-CM

## 2025-01-26 DIAGNOSIS — Z09 HOSPITAL DISCHARGE FOLLOW-UP: Primary | ICD-10-CM

## 2025-01-26 LAB
ALBUMIN SERPL-MCNC: 3.4 G/DL (ref 3.2–4.6)
ALBUMIN/GLOB SERPL: 1 (ref 1–1.9)
ALP SERPL-CCNC: 65 U/L (ref 35–104)
ALT SERPL-CCNC: 81 U/L (ref 8–45)
ANION GAP SERPL CALC-SCNC: 12 MMOL/L (ref 7–16)
AST SERPL-CCNC: 81 U/L (ref 15–37)
BASOPHILS # BLD: 0.02 K/UL (ref 0–0.2)
BASOPHILS NFR BLD: 0.3 % (ref 0–2)
BILIRUB SERPL-MCNC: 0.3 MG/DL (ref 0–1.2)
BILIRUB UR QL: NEGATIVE
BUN SERPL-MCNC: 21 MG/DL (ref 8–23)
CALCIUM SERPL-MCNC: 9.1 MG/DL (ref 8.8–10.2)
CHLORIDE SERPL-SCNC: 107 MMOL/L (ref 98–107)
CO2 SERPL-SCNC: 24 MMOL/L (ref 20–29)
CREAT SERPL-MCNC: 1.68 MG/DL (ref 0.6–1.1)
DIFFERENTIAL METHOD BLD: ABNORMAL
EOSINOPHIL # BLD: 0.01 K/UL (ref 0–0.8)
EOSINOPHIL NFR BLD: 0.1 % (ref 0.5–7.8)
ERYTHROCYTE [DISTWIDTH] IN BLOOD BY AUTOMATED COUNT: 15.4 % (ref 11.9–14.6)
FLUAV RNA SPEC QL NAA+PROBE: NOT DETECTED
FLUBV RNA SPEC QL NAA+PROBE: NOT DETECTED
GLOBULIN SER CALC-MCNC: 3.4 G/DL (ref 2.3–3.5)
GLUCOSE SERPL-MCNC: 193 MG/DL (ref 70–99)
GLUCOSE UR QL STRIP.AUTO: >1000 MG/DL
HCT VFR BLD AUTO: 37.5 % (ref 35.8–46.3)
HGB BLD-MCNC: 12.3 G/DL (ref 11.7–15.4)
IMM GRANULOCYTES # BLD AUTO: 0.02 K/UL (ref 0–0.5)
IMM GRANULOCYTES NFR BLD AUTO: 0.3 % (ref 0–5)
KETONES UR-MCNC: NEGATIVE MG/DL
LEUKOCYTE ESTERASE UR QL STRIP: NEGATIVE
LIPASE SERPL-CCNC: 39 U/L (ref 13–60)
LYMPHOCYTES # BLD: 1.64 K/UL (ref 0.5–4.6)
LYMPHOCYTES NFR BLD: 24.4 % (ref 13–44)
MAGNESIUM SERPL-MCNC: 2.1 MG/DL (ref 1.8–2.4)
MCH RBC QN AUTO: 30.5 PG (ref 26.1–32.9)
MCHC RBC AUTO-ENTMCNC: 32.8 G/DL (ref 31.4–35)
MCV RBC AUTO: 93.1 FL (ref 82–102)
MONOCYTES # BLD: 0.7 K/UL (ref 0.1–1.3)
MONOCYTES NFR BLD: 10.4 % (ref 4–12)
NEUTS SEG # BLD: 4.33 K/UL (ref 1.7–8.2)
NEUTS SEG NFR BLD: 64.5 % (ref 43–78)
NITRITE UR QL: NEGATIVE
NRBC # BLD: 0 K/UL (ref 0–0.2)
NT PRO BNP: 261 PG/ML (ref 0–125)
PH UR: 6 (ref 5–9)
PLATELET # BLD AUTO: 109 K/UL (ref 150–450)
PMV BLD AUTO: 12.1 FL (ref 9.4–12.3)
POTASSIUM SERPL-SCNC: 3.6 MMOL/L (ref 3.5–5.1)
PROT SERPL-MCNC: 6.7 G/DL (ref 6.3–8.2)
PROT UR QL: 100 MG/DL
RBC # BLD AUTO: 4.03 M/UL (ref 4.05–5.2)
RBC # UR STRIP: ABNORMAL
SARS-COV-2 RDRP RESP QL NAA+PROBE: DETECTED
SERVICE CMNT-IMP: ABNORMAL
SODIUM SERPL-SCNC: 143 MMOL/L (ref 136–145)
SOURCE: ABNORMAL
SP GR UR: 1.02 (ref 1–1.02)
TROPONIN T SERPL HS-MCNC: 20 NG/L (ref 0–14)
TROPONIN T SERPL HS-MCNC: 21 NG/L (ref 0–14)
UROBILINOGEN UR QL: 0.2 EU/DL (ref 0.2–1)
WBC # BLD AUTO: 6.7 K/UL (ref 4.3–11.1)

## 2025-01-26 PROCEDURE — 81003 URINALYSIS AUTO W/O SCOPE: CPT

## 2025-01-26 PROCEDURE — 2580000003 HC RX 258: Performed by: STUDENT IN AN ORGANIZED HEALTH CARE EDUCATION/TRAINING PROGRAM

## 2025-01-26 PROCEDURE — 85025 COMPLETE CBC W/AUTO DIFF WBC: CPT

## 2025-01-26 PROCEDURE — 71046 X-RAY EXAM CHEST 2 VIEWS: CPT

## 2025-01-26 PROCEDURE — 70450 CT HEAD/BRAIN W/O DYE: CPT

## 2025-01-26 PROCEDURE — 6370000000 HC RX 637 (ALT 250 FOR IP): Performed by: STUDENT IN AN ORGANIZED HEALTH CARE EDUCATION/TRAINING PROGRAM

## 2025-01-26 PROCEDURE — 80053 COMPREHEN METABOLIC PANEL: CPT

## 2025-01-26 PROCEDURE — 83735 ASSAY OF MAGNESIUM: CPT

## 2025-01-26 PROCEDURE — 99285 EMERGENCY DEPT VISIT HI MDM: CPT

## 2025-01-26 PROCEDURE — 96374 THER/PROPH/DIAG INJ IV PUSH: CPT

## 2025-01-26 PROCEDURE — G0378 HOSPITAL OBSERVATION PER HR: HCPCS

## 2025-01-26 PROCEDURE — 83880 ASSAY OF NATRIURETIC PEPTIDE: CPT

## 2025-01-26 PROCEDURE — 84484 ASSAY OF TROPONIN QUANT: CPT

## 2025-01-26 PROCEDURE — 87502 INFLUENZA DNA AMP PROBE: CPT

## 2025-01-26 PROCEDURE — 87635 SARS-COV-2 COVID-19 AMP PRB: CPT

## 2025-01-26 PROCEDURE — 93005 ELECTROCARDIOGRAM TRACING: CPT | Performed by: STUDENT IN AN ORGANIZED HEALTH CARE EDUCATION/TRAINING PROGRAM

## 2025-01-26 PROCEDURE — 70486 CT MAXILLOFACIAL W/O DYE: CPT

## 2025-01-26 PROCEDURE — 83690 ASSAY OF LIPASE: CPT

## 2025-01-26 PROCEDURE — 2500000003 HC RX 250 WO HCPCS: Performed by: STUDENT IN AN ORGANIZED HEALTH CARE EDUCATION/TRAINING PROGRAM

## 2025-01-26 RX ORDER — POLYETHYLENE GLYCOL 3350 17 G/17G
17 POWDER, FOR SOLUTION ORAL DAILY PRN
Status: DISCONTINUED | OUTPATIENT
Start: 2025-01-26 | End: 2025-01-30 | Stop reason: HOSPADM

## 2025-01-26 RX ORDER — CALCITRIOL 0.25 UG/1
0.5 CAPSULE, LIQUID FILLED ORAL DAILY
Status: DISCONTINUED | OUTPATIENT
Start: 2025-01-27 | End: 2025-01-30 | Stop reason: HOSPADM

## 2025-01-26 RX ORDER — CARVEDILOL 6.25 MG/1
6.25 TABLET ORAL
Status: COMPLETED | OUTPATIENT
Start: 2025-01-26 | End: 2025-01-26

## 2025-01-26 RX ORDER — HYDROCODONE BITARTRATE AND ACETAMINOPHEN 5; 325 MG/1; MG/1
1 TABLET ORAL
Status: COMPLETED | OUTPATIENT
Start: 2025-01-26 | End: 2025-01-26

## 2025-01-26 RX ORDER — POTASSIUM CHLORIDE 7.45 MG/ML
10 INJECTION INTRAVENOUS PRN
Status: DISCONTINUED | OUTPATIENT
Start: 2025-01-26 | End: 2025-01-30 | Stop reason: HOSPADM

## 2025-01-26 RX ORDER — SODIUM CHLORIDE 9 MG/ML
INJECTION, SOLUTION INTRAVENOUS PRN
Status: DISCONTINUED | OUTPATIENT
Start: 2025-01-26 | End: 2025-01-30 | Stop reason: HOSPADM

## 2025-01-26 RX ORDER — SODIUM CHLORIDE 0.9 % (FLUSH) 0.9 %
5-40 SYRINGE (ML) INJECTION PRN
Status: DISCONTINUED | OUTPATIENT
Start: 2025-01-26 | End: 2025-01-30 | Stop reason: HOSPADM

## 2025-01-26 RX ORDER — ATORVASTATIN CALCIUM 40 MG/1
40 TABLET, FILM COATED ORAL DAILY
Status: DISCONTINUED | OUTPATIENT
Start: 2025-01-27 | End: 2025-01-30 | Stop reason: HOSPADM

## 2025-01-26 RX ORDER — MAGNESIUM SULFATE IN WATER 40 MG/ML
2000 INJECTION, SOLUTION INTRAVENOUS PRN
Status: DISCONTINUED | OUTPATIENT
Start: 2025-01-26 | End: 2025-01-30 | Stop reason: HOSPADM

## 2025-01-26 RX ORDER — ACETAMINOPHEN 325 MG/1
650 TABLET ORAL EVERY 6 HOURS PRN
Status: DISCONTINUED | OUTPATIENT
Start: 2025-01-26 | End: 2025-01-30 | Stop reason: HOSPADM

## 2025-01-26 RX ORDER — INSULIN GLARGINE 100 [IU]/ML
12 INJECTION, SOLUTION SUBCUTANEOUS NIGHTLY
Status: DISCONTINUED | OUTPATIENT
Start: 2025-01-26 | End: 2025-01-30 | Stop reason: HOSPADM

## 2025-01-26 RX ORDER — SODIUM CHLORIDE 0.9 % (FLUSH) 0.9 %
5-40 SYRINGE (ML) INJECTION EVERY 12 HOURS SCHEDULED
Status: DISCONTINUED | OUTPATIENT
Start: 2025-01-26 | End: 2025-01-30 | Stop reason: HOSPADM

## 2025-01-26 RX ORDER — GUAIFENESIN 200 MG/10ML
200 LIQUID ORAL EVERY 4 HOURS PRN
Status: DISCONTINUED | OUTPATIENT
Start: 2025-01-26 | End: 2025-01-30 | Stop reason: HOSPADM

## 2025-01-26 RX ORDER — LATANOPROST 50 UG/ML
1 SOLUTION/ DROPS OPHTHALMIC NIGHTLY
Status: DISCONTINUED | OUTPATIENT
Start: 2025-01-26 | End: 2025-01-30 | Stop reason: HOSPADM

## 2025-01-26 RX ORDER — AMIODARONE HYDROCHLORIDE 200 MG/1
100 TABLET ORAL
Status: DISCONTINUED | OUTPATIENT
Start: 2025-01-27 | End: 2025-01-30 | Stop reason: HOSPADM

## 2025-01-26 RX ORDER — ACETAMINOPHEN 650 MG/1
650 SUPPOSITORY RECTAL EVERY 6 HOURS PRN
Status: DISCONTINUED | OUTPATIENT
Start: 2025-01-26 | End: 2025-01-30 | Stop reason: HOSPADM

## 2025-01-26 RX ORDER — FUROSEMIDE 40 MG/1
40 TABLET ORAL 2 TIMES DAILY
Status: DISCONTINUED | OUTPATIENT
Start: 2025-01-26 | End: 2025-01-30 | Stop reason: HOSPADM

## 2025-01-26 RX ORDER — CARVEDILOL 6.25 MG/1
6.25 TABLET ORAL 2 TIMES DAILY WITH MEALS
Status: DISCONTINUED | OUTPATIENT
Start: 2025-01-27 | End: 2025-01-30 | Stop reason: HOSPADM

## 2025-01-26 RX ORDER — ONDANSETRON 4 MG/1
4 TABLET, ORALLY DISINTEGRATING ORAL EVERY 8 HOURS PRN
Status: DISCONTINUED | OUTPATIENT
Start: 2025-01-26 | End: 2025-01-30 | Stop reason: HOSPADM

## 2025-01-26 RX ORDER — 0.9 % SODIUM CHLORIDE 0.9 %
500 INTRAVENOUS SOLUTION INTRAVENOUS
Status: COMPLETED | OUTPATIENT
Start: 2025-01-26 | End: 2025-01-26

## 2025-01-26 RX ORDER — POTASSIUM CHLORIDE 1500 MG/1
40 TABLET, EXTENDED RELEASE ORAL ONCE
Status: COMPLETED | OUTPATIENT
Start: 2025-01-26 | End: 2025-01-26

## 2025-01-26 RX ORDER — POTASSIUM CHLORIDE 1500 MG/1
40 TABLET, EXTENDED RELEASE ORAL PRN
Status: DISCONTINUED | OUTPATIENT
Start: 2025-01-26 | End: 2025-01-30 | Stop reason: HOSPADM

## 2025-01-26 RX ORDER — ONDANSETRON 2 MG/ML
4 INJECTION INTRAMUSCULAR; INTRAVENOUS EVERY 6 HOURS PRN
Status: DISCONTINUED | OUTPATIENT
Start: 2025-01-26 | End: 2025-01-30 | Stop reason: HOSPADM

## 2025-01-26 RX ADMIN — CARVEDILOL 6.25 MG: 6.25 TABLET, FILM COATED ORAL at 22:39

## 2025-01-26 RX ADMIN — HYDROCODONE BITARTRATE AND ACETAMINOPHEN 1 TABLET: 5; 325 TABLET ORAL at 21:19

## 2025-01-26 RX ADMIN — AMIODARONE HYDROCHLORIDE 150 MG: 1.5 INJECTION, SOLUTION INTRAVENOUS at 22:43

## 2025-01-26 RX ADMIN — SODIUM CHLORIDE 500 ML: 9 INJECTION, SOLUTION INTRAVENOUS at 21:27

## 2025-01-26 RX ADMIN — POTASSIUM CHLORIDE 40 MEQ: 1500 TABLET, EXTENDED RELEASE ORAL at 22:37

## 2025-01-26 ASSESSMENT — PAIN DESCRIPTION - PAIN TYPE: TYPE: ACUTE PAIN

## 2025-01-26 ASSESSMENT — PAIN DESCRIPTION - ORIENTATION: ORIENTATION: LEFT

## 2025-01-26 ASSESSMENT — PAIN DESCRIPTION - LOCATION: LOCATION: SHOULDER

## 2025-01-26 ASSESSMENT — PAIN SCALES - GENERAL
PAINLEVEL_OUTOF10: 8
PAINLEVEL_OUTOF10: 7

## 2025-01-26 ASSESSMENT — PAIN - FUNCTIONAL ASSESSMENT: PAIN_FUNCTIONAL_ASSESSMENT: 0-10

## 2025-01-26 NOTE — ED TRIAGE NOTES
Pt 60 y/o female arrives via [facility] ems from home with a complaint of dizziness, and a syncope episode. Pt states she was feeling dizzy and needed to sit down and then next thing she knows she was on the floor.   
show

## 2025-01-27 ENCOUNTER — APPOINTMENT (OUTPATIENT)
Dept: NON INVASIVE DIAGNOSTICS | Age: 60
DRG: 308 | End: 2025-01-27
Attending: FAMILY MEDICINE
Payer: MEDICARE

## 2025-01-27 LAB
ANION GAP SERPL CALC-SCNC: 11 MMOL/L (ref 7–16)
BASOPHILS # BLD: 0.02 K/UL (ref 0–0.2)
BASOPHILS NFR BLD: 0.4 % (ref 0–2)
BUN SERPL-MCNC: 18 MG/DL (ref 8–23)
CALCIUM SERPL-MCNC: 8.4 MG/DL (ref 8.8–10.2)
CHLORIDE SERPL-SCNC: 109 MMOL/L (ref 98–107)
CO2 SERPL-SCNC: 22 MMOL/L (ref 20–29)
CREAT SERPL-MCNC: 1.24 MG/DL (ref 0.6–1.1)
DIFFERENTIAL METHOD BLD: ABNORMAL
ECHO AO ASC DIAM: 3 CM
ECHO AO ASCENDING AORTA INDEX: 1.73 CM/M2
ECHO AO ROOT DIAM: 3 CM
ECHO AO ROOT INDEX: 1.73 CM/M2
ECHO AV AREA PEAK VELOCITY: 2 CM2
ECHO AV AREA VTI: 2.3 CM2
ECHO AV AREA/BSA PEAK VELOCITY: 1.2 CM2/M2
ECHO AV AREA/BSA VTI: 1.3 CM2/M2
ECHO AV MEAN GRADIENT: 5 MMHG
ECHO AV MEAN GRADIENT: 5 MMHG
ECHO AV MEAN VELOCITY: 1.1 M/S
ECHO AV PEAK GRADIENT: 10 MMHG
ECHO AV PEAK VELOCITY: 1.6 M/S
ECHO AV VELOCITY RATIO: 0.63
ECHO AV VTI: 28.1 CM
ECHO BSA: 1.83 M2
ECHO EST RA PRESSURE: 3 MMHG
ECHO IVC PROX: 1.3 CM
ECHO LA AREA 2C: 14.7 CM2
ECHO LA AREA 4C: 18.5 CM2
ECHO LA DIAMETER INDEX: 2.43 CM/M2
ECHO LA DIAMETER: 4.2 CM
ECHO LA MAJOR AXIS: 4.7 CM
ECHO LA MINOR AXIS: 5.2 CM
ECHO LA TO AORTIC ROOT RATIO: 1.4
ECHO LA VOL BP: 47 ML (ref 22–52)
ECHO LA VOL MOD A2C: 34 ML (ref 22–52)
ECHO LA VOL MOD A4C: 58 ML (ref 22–52)
ECHO LA VOL/BSA BIPLANE: 27 ML/M2 (ref 16–34)
ECHO LA VOLUME INDEX MOD A2C: 20 ML/M2 (ref 16–34)
ECHO LA VOLUME INDEX MOD A4C: 34 ML/M2 (ref 16–34)
ECHO LV E' LATERAL VELOCITY: 5.27 CM/S
ECHO LV E' SEPTAL VELOCITY: 7.3 CM/S
ECHO LV EDV A2C: 160 ML
ECHO LV EDV A4C: 131 ML
ECHO LV EDV INDEX A4C: 76 ML/M2
ECHO LV EDV NDEX A2C: 92 ML/M2
ECHO LV EJECTION FRACTION A2C: 47 %
ECHO LV EJECTION FRACTION A4C: 48 %
ECHO LV EJECTION FRACTION BIPLANE: 45 % (ref 55–100)
ECHO LV ESV A2C: 85 ML
ECHO LV ESV A4C: 68 ML
ECHO LV ESV INDEX A2C: 49 ML/M2
ECHO LV ESV INDEX A4C: 39 ML/M2
ECHO LV FRACTIONAL SHORTENING: 17 % (ref 28–44)
ECHO LV INTERNAL DIMENSION DIASTOLE INDEX: 3.76 CM/M2
ECHO LV INTERNAL DIMENSION DIASTOLIC: 6.5 CM (ref 3.9–5.3)
ECHO LV INTERNAL DIMENSION SYSTOLIC INDEX: 3.12 CM/M2
ECHO LV INTERNAL DIMENSION SYSTOLIC: 5.4 CM
ECHO LV IVSD: 1.3 CM (ref 0.6–0.9)
ECHO LV MASS 2D: 399.1 G (ref 67–162)
ECHO LV MASS INDEX 2D: 230.7 G/M2 (ref 43–95)
ECHO LV POSTERIOR WALL DIASTOLIC: 1.3 CM (ref 0.6–0.9)
ECHO LV RELATIVE WALL THICKNESS RATIO: 0.4
ECHO LVOT AREA: 3.1 CM2
ECHO LVOT AV VTI INDEX: 0.74
ECHO LVOT DIAM: 2 CM
ECHO LVOT MEAN GRADIENT: 2 MMHG
ECHO LVOT PEAK GRADIENT: 4 MMHG
ECHO LVOT PEAK VELOCITY: 1 M/S
ECHO LVOT STROKE VOLUME INDEX: 37.9 ML/M2
ECHO LVOT SV: 65.6 ML
ECHO LVOT VTI: 20.9 CM
ECHO MV A VELOCITY: 1.03 M/S
ECHO MV AREA VTI: 2.1 CM2
ECHO MV E DECELERATION TIME (DT): 309 MS
ECHO MV E VELOCITY: 0.63 M/S
ECHO MV E/A RATIO: 0.61
ECHO MV E/E' LATERAL: 11.95
ECHO MV E/E' RATIO (AVERAGED): 10.29
ECHO MV E/E' SEPTAL: 8.63
ECHO MV LVOT VTI INDEX: 1.52
ECHO MV MAX VELOCITY: 1.1 M/S
ECHO MV MEAN GRADIENT: 2 MMHG
ECHO MV MEAN VELOCITY: 0.7 M/S
ECHO MV PEAK GRADIENT: 4 MMHG
ECHO MV VTI: 31.7 CM
ECHO PV ACCELERATION TIME (AT): 137 MS
ECHO PV MAX VELOCITY: 1.1 M/S
ECHO PV PEAK GRADIENT: 5 MMHG
ECHO RIGHT VENTRICULAR SYSTOLIC PRESSURE (RVSP): 10 MMHG
ECHO RV BASAL DIMENSION: 2.9 CM
ECHO RV FREE WALL PEAK S': 11.3 CM/S
ECHO RV TAPSE: 1.7 CM (ref 1.7–?)
ECHO TV REGURGITANT MAX VELOCITY: 1.29 M/S
ECHO TV REGURGITANT PEAK GRADIENT: 7 MMHG
EKG ATRIAL RATE: 152 BPM
EKG DIAGNOSIS: NORMAL
EKG P-R INTERVAL: 197 MS
EKG Q-T INTERVAL: 667 MS
EKG QRS DURATION: 113 MS
EKG QTC CALCULATION (BAZETT): 667 MS
EKG R AXIS: 49 DEGREES
EKG T AXIS: 21 DEGREES
EKG VENTRICULAR RATE: 60 BPM
EOSINOPHIL # BLD: 0.02 K/UL (ref 0–0.8)
EOSINOPHIL NFR BLD: 0.4 % (ref 0.5–7.8)
ERYTHROCYTE [DISTWIDTH] IN BLOOD BY AUTOMATED COUNT: 15.4 % (ref 11.9–14.6)
GLUCOSE BLD STRIP.AUTO-MCNC: 139 MG/DL (ref 65–100)
GLUCOSE BLD STRIP.AUTO-MCNC: 161 MG/DL (ref 65–100)
GLUCOSE SERPL-MCNC: 143 MG/DL (ref 70–99)
HCT VFR BLD AUTO: 37.5 % (ref 35.8–46.3)
HGB BLD-MCNC: 11.6 G/DL (ref 11.7–15.4)
IMM GRANULOCYTES # BLD AUTO: 0.02 K/UL (ref 0–0.5)
IMM GRANULOCYTES NFR BLD AUTO: 0.4 % (ref 0–5)
LYMPHOCYTES # BLD: 1.48 K/UL (ref 0.5–4.6)
LYMPHOCYTES NFR BLD: 26.2 % (ref 13–44)
MCH RBC QN AUTO: 30.3 PG (ref 26.1–32.9)
MCHC RBC AUTO-ENTMCNC: 30.9 G/DL (ref 31.4–35)
MCV RBC AUTO: 97.9 FL (ref 82–102)
MONOCYTES # BLD: 0.54 K/UL (ref 0.1–1.3)
MONOCYTES NFR BLD: 9.6 % (ref 4–12)
NEUTS SEG # BLD: 3.56 K/UL (ref 1.7–8.2)
NEUTS SEG NFR BLD: 63 % (ref 43–78)
NRBC # BLD: 0 K/UL (ref 0–0.2)
PLATELET # BLD AUTO: 93 K/UL (ref 150–450)
PMV BLD AUTO: 12.2 FL (ref 9.4–12.3)
POTASSIUM SERPL-SCNC: 3.9 MMOL/L (ref 3.5–5.1)
RBC # BLD AUTO: 3.83 M/UL (ref 4.05–5.2)
SERVICE CMNT-IMP: ABNORMAL
SERVICE CMNT-IMP: ABNORMAL
SODIUM SERPL-SCNC: 142 MMOL/L (ref 136–145)
TROPONIN T SERPL HS-MCNC: 18 NG/L (ref 0–14)
WBC # BLD AUTO: 5.6 K/UL (ref 4.3–11.1)

## 2025-01-27 PROCEDURE — 6360000002 HC RX W HCPCS: Performed by: FAMILY MEDICINE

## 2025-01-27 PROCEDURE — 80048 BASIC METABOLIC PNL TOTAL CA: CPT

## 2025-01-27 PROCEDURE — 84484 ASSAY OF TROPONIN QUANT: CPT

## 2025-01-27 PROCEDURE — 6370000000 HC RX 637 (ALT 250 FOR IP): Performed by: FAMILY MEDICINE

## 2025-01-27 PROCEDURE — 2500000003 HC RX 250 WO HCPCS: Performed by: FAMILY MEDICINE

## 2025-01-27 PROCEDURE — 99222 1ST HOSP IP/OBS MODERATE 55: CPT | Performed by: INTERNAL MEDICINE

## 2025-01-27 PROCEDURE — 6370000000 HC RX 637 (ALT 250 FOR IP)

## 2025-01-27 PROCEDURE — 93306 TTE W/DOPPLER COMPLETE: CPT | Performed by: INTERNAL MEDICINE

## 2025-01-27 PROCEDURE — G0378 HOSPITAL OBSERVATION PER HR: HCPCS

## 2025-01-27 PROCEDURE — 6360000004 HC RX CONTRAST MEDICATION: Performed by: FAMILY MEDICINE

## 2025-01-27 PROCEDURE — 82962 GLUCOSE BLOOD TEST: CPT

## 2025-01-27 PROCEDURE — 94760 N-INVAS EAR/PLS OXIMETRY 1: CPT

## 2025-01-27 PROCEDURE — 85025 COMPLETE CBC W/AUTO DIFF WBC: CPT

## 2025-01-27 PROCEDURE — 2700000000 HC OXYGEN THERAPY PER DAY

## 2025-01-27 PROCEDURE — 93010 ELECTROCARDIOGRAM REPORT: CPT | Performed by: INTERNAL MEDICINE

## 2025-01-27 PROCEDURE — C8929 TTE W OR WO FOL WCON,DOPPLER: HCPCS

## 2025-01-27 PROCEDURE — 36415 COLL VENOUS BLD VENIPUNCTURE: CPT

## 2025-01-27 RX ORDER — LIDOCAINE 4 G/G
1 PATCH TOPICAL DAILY PRN
Status: DISCONTINUED | OUTPATIENT
Start: 2025-01-27 | End: 2025-01-30 | Stop reason: HOSPADM

## 2025-01-27 RX ORDER — OXYCODONE HYDROCHLORIDE 5 MG/1
5 TABLET ORAL EVERY 8 HOURS PRN
Status: DISCONTINUED | OUTPATIENT
Start: 2025-01-27 | End: 2025-01-30 | Stop reason: HOSPADM

## 2025-01-27 RX ORDER — TRAMADOL HYDROCHLORIDE 50 MG/1
50 TABLET ORAL EVERY 6 HOURS PRN
Status: DISCONTINUED | OUTPATIENT
Start: 2025-01-27 | End: 2025-01-30 | Stop reason: HOSPADM

## 2025-01-27 RX ADMIN — APIXABAN 5 MG: 5 TABLET, FILM COATED ORAL at 21:38

## 2025-01-27 RX ADMIN — INSULIN GLARGINE 12 UNITS: 100 INJECTION, SOLUTION SUBCUTANEOUS at 00:49

## 2025-01-27 RX ADMIN — CALCITRIOL 0.5 MCG: 0.25 CAPSULE ORAL at 09:06

## 2025-01-27 RX ADMIN — CARVEDILOL 6.25 MG: 6.25 TABLET, FILM COATED ORAL at 17:37

## 2025-01-27 RX ADMIN — ONDANSETRON 4 MG: 4 TABLET, ORALLY DISINTEGRATING ORAL at 05:17

## 2025-01-27 RX ADMIN — FUROSEMIDE 40 MG: 40 TABLET ORAL at 00:47

## 2025-01-27 RX ADMIN — ACETAMINOPHEN 650 MG: 325 TABLET ORAL at 10:15

## 2025-01-27 RX ADMIN — SACUBITRIL AND VALSARTAN 1 TABLET: 24; 26 TABLET, FILM COATED ORAL at 00:46

## 2025-01-27 RX ADMIN — ONDANSETRON 4 MG: 2 INJECTION, SOLUTION INTRAMUSCULAR; INTRAVENOUS at 12:25

## 2025-01-27 RX ADMIN — SODIUM CHLORIDE, PRESERVATIVE FREE 10 ML: 5 INJECTION INTRAVENOUS at 09:24

## 2025-01-27 RX ADMIN — FUROSEMIDE 40 MG: 40 TABLET ORAL at 21:41

## 2025-01-27 RX ADMIN — APIXABAN 5 MG: 5 TABLET, FILM COATED ORAL at 09:06

## 2025-01-27 RX ADMIN — OXYCODONE 5 MG: 5 TABLET ORAL at 17:36

## 2025-01-27 RX ADMIN — AMIODARONE HYDROCHLORIDE 100 MG: 200 TABLET ORAL at 09:06

## 2025-01-27 RX ADMIN — SULFUR HEXAFLUORIDE 5 ML: KIT at 14:11

## 2025-01-27 RX ADMIN — POLYETHYLENE GLYCOL 3350 17 G: 17 POWDER, FOR SOLUTION ORAL at 17:37

## 2025-01-27 RX ADMIN — SACUBITRIL AND VALSARTAN 1 TABLET: 24; 26 TABLET, FILM COATED ORAL at 09:06

## 2025-01-27 RX ADMIN — APIXABAN 5 MG: 5 TABLET, FILM COATED ORAL at 00:47

## 2025-01-27 RX ADMIN — SODIUM CHLORIDE, PRESERVATIVE FREE 5 ML: 5 INJECTION INTRAVENOUS at 21:36

## 2025-01-27 RX ADMIN — LATANOPROST 1 DROP: 50 SOLUTION OPHTHALMIC at 21:35

## 2025-01-27 RX ADMIN — ACETAMINOPHEN 650 MG: 325 TABLET ORAL at 00:47

## 2025-01-27 RX ADMIN — INSULIN GLARGINE 12 UNITS: 100 INJECTION, SOLUTION SUBCUTANEOUS at 21:33

## 2025-01-27 RX ADMIN — FUROSEMIDE 40 MG: 40 TABLET ORAL at 09:06

## 2025-01-27 RX ADMIN — SACUBITRIL AND VALSARTAN 1 TABLET: 24; 26 TABLET, FILM COATED ORAL at 21:42

## 2025-01-27 RX ADMIN — ATORVASTATIN CALCIUM 40 MG: 40 TABLET, FILM COATED ORAL at 00:47

## 2025-01-27 RX ADMIN — CARVEDILOL 6.25 MG: 6.25 TABLET, FILM COATED ORAL at 09:06

## 2025-01-27 RX ADMIN — EMPAGLIFLOZIN 10 MG: 10 TABLET, FILM COATED ORAL at 09:06

## 2025-01-27 ASSESSMENT — PAIN DESCRIPTION - DESCRIPTORS
DESCRIPTORS: ACHING
DESCRIPTORS: ACHING;DISCOMFORT

## 2025-01-27 ASSESSMENT — PAIN SCALES - GENERAL
PAINLEVEL_OUTOF10: 8
PAINLEVEL_OUTOF10: 7
PAINLEVEL_OUTOF10: 10
PAINLEVEL_OUTOF10: 8
PAINLEVEL_OUTOF10: 8

## 2025-01-27 ASSESSMENT — PAIN DESCRIPTION - LOCATION
LOCATION: NECK
LOCATION: SHOULDER;FACE
LOCATION: SHOULDER

## 2025-01-27 ASSESSMENT — PAIN DESCRIPTION - ORIENTATION
ORIENTATION: LEFT

## 2025-01-27 ASSESSMENT — PAIN - FUNCTIONAL ASSESSMENT: PAIN_FUNCTIONAL_ASSESSMENT: ACTIVITIES ARE NOT PREVENTED

## 2025-01-27 NOTE — ASSESSMENT & PLAN NOTE
- Concern for cardiogenic syncope  - Upon interrogation of ICD, patient was in v.tach and underwent shock earlier today  - Cardiology consulted.  Appreciate their recommendations  - K is 3.6, getting replaced so that K is closer to 4  - Monitor on cardiac telemetry  - Check echo  - Additionally, patient tells me that she fell off of her sofa onto her face at home during syncopal event.  Will check CT head and facial bones

## 2025-01-27 NOTE — PROGRESS NOTES
[facility] CARDIOLOGY PROGRESS NOTE           1/27/2025 5:51 AM    Admit Date: 1/26/2025      Subjective:   No cp or sob    Objective:      Vitals:    01/26/25 1945 01/26/25 2142 01/26/25 2239 01/27/25 0215   BP: (!) 110/54 (!) 118/55 (!) 130/117    Pulse: 60  60    Resp: 18      Temp:    98.6 °F (37 °C)   TempSrc:    Oral   SpO2: 95%   97%   Weight:       Height:           Physical Exam:  General-No Acute Distress  Neck- supple, no JVD  CV- regular rate and rhythm no MRG  Lung- clear bilaterally  Abd- soft, nontender, nondistended  Ext- no edema bilaterally.  Skin- warm and dry    Data Review:   Recent Labs     01/26/25  1742      K 3.6   MG 2.1   BUN 21   WBC 6.7   HGB 12.3   HCT 37.5   *       Assessment/Plan:     Syncope 1/26 due to VT  +Covid  Nicm infero-basilar lv aneurysm   HFrEF, hosp. 7/2024  Normal coronary angio 2/2024  Iron def anemia   Paroxysmal VT, icd 2013, [place] [facility] [place], shock 1/26/24  Sven  Ckd  Smoker  Chronic amiodarone rx, recent dose reduction for fear of pulmonary toixicity  ////  Continue current rx  Ask EP to see    BABAK PERKINS MD  1/27/2025 5:51 AM

## 2025-01-27 NOTE — ED PROVIDER NOTES
Emergency Department Provider Note       PCP: Randell Lawson MD   Age: 59 y.o.   Sex: female     DISPOSITION Decision To Admit 01/26/2025 10:22:00 PM    ICD-10-CM    1. COVID-19  U07.1       2. Syncope and collapse  R55           Medical Decision Making     Generally well-appearing 59-year-old female presenting this department via EMS with reports of syncope earlier today.  She describes sitting down first and then waking up in a prone position there after.  She is no evidence of significant trauma about the head or face.  She reports no significant headache at this time, no hemotympanum, Reece sign or raccoon eyes.  I do not think we have to proceed with CT imaging based on the story or patient's exam.  However given patient's significant history of heart disease, will obtain basic blood work and plan to interrogate her device.    Given results of LLLerronik interrogation, I will move to admit patient for observation at least.  I have discussed with cardiology who request that she be given a bolus dose of Amio and her evening Coreg.  These medications have been ordered as well as a bolus dose of oral potassium replacement.  Hospitalist consulted for admission  ED Course as of 01/26/25 2222   Sun Jan 26, 2025   1903 EKG interpretation: Paced rhythm, rate of 60, underlying a flutter, no obvious ischemic change [BR]   2121 Updated patient with results thus far.  She is COVID-positive however cardiac workup appears stable.  Awaiting final report from [employer] rep to assess patient's device.  I suspect if patient's device is functioning normally, she can likely be discharged assuming vitals remained stable in this department.  I will give a small fluid bolus here and reassess orthostatic vitals. [BR]   2122 Patient reporting some shoulder pain which is chronic in nature for her.  Requesting something for pain, 1 dose of Norco has been ordered [BR]      ED Course User Index  [BR] Fidel lCancy, DO

## 2025-01-27 NOTE — ASSESSMENT & PLAN NOTE
- Interrogated.  Underwent 40 joule shock due to ventricular tachycardia earlier today  - Cardiology consulted

## 2025-01-27 NOTE — ED NOTES
Patient states L sided shoulder pain, patient given 650 mg Tylenol at this time.      May Watkins, RN  01/27/25 1014

## 2025-01-27 NOTE — PROGRESS NOTES
4 Eyes Skin Assessment     NAME:  Yann Shelton  YOB: 1965  MEDICAL RECORD NUMBER:  [id number]    The patient is being assessed for  Admission    I agree that at least one RN has performed a thorough Head to Toe Skin Assessment on the patient. ALL assessment sites listed below have been assessed.      Areas assessed by both nurses:    Head, Face, Ears, Shoulders, Back, Chest, Arms, Elbows, Hands, and Sacrum. Buttock, Coccyx, Ischium        Does the Patient have a Wound? No noted wound(s)       Vinnie Prevention initiated by RN: No  Wound Care Orders initiated by RN: No    Pressure Injury (Stage 3,4, Unstageable, DTI, NWPT, and Complex wounds) if present, place Wound referral order by RN under : No    New Ostomies, if present place, Ostomy referral order under : No     Upon admission assessment, no open wounds or pressure injuries present. Scattered scally areas on skin (pt states is psoriasis)    Nurse 1 eSignature: Electronically signed by Santiago Claire RN on 1/27/25 at 3:50 PM EST    **SHARE this note so that the co-signing nurse can place an eSignature**    Nurse 2 eSignature: Electronically signed by SON BLACK RN on 1/27/25 at 4:04 PM EST

## 2025-01-27 NOTE — PROGRESS NOTES
Discussed case with primary cardiologist.  Cardiology team to assume care of patient.    Internal medicine service available if necessary would be happy to assist in further patient care if needed.    Miguel A Fang, DO

## 2025-01-27 NOTE — H&P
Hospitalist History and Physical   Admit Date:  2025  5:20 PM   Name:  Yann Shelton   Age:  59 y.o.  Sex:  female  :  1965   MRN:  [id number]     Presenting Complaint: Syncope  Reason(s) for Admission: Syncope, unspecified syncope type [R55]     History of Present Illness:   Yann Shelton is a 59 y.o. female with medical history of HTN, DM2, sCHF, afib, CKD4 who presented to ED after a syncopal episode earlier today.  Reports recent URI symptoms and associated generalized weakness/fatigue.  Reports some L shoulder pain as well.  Denies overt fevers.  Patient reports feeling lightheaded while walking, so she sat down on the sofa, and the next thing she remembers is waking up on the floor.  She lost consciousness for a few minutes, believes that she fell forwarded and landed on her face.  EMS was called.  Upon ER evaluation, initial troponin was 20, repeat was 21.  pBNP is 261.  K is 3.6, Mg is 2.1.  CXR shows:  FINDINGS: New hazy opacities in the left lower lobe.   The bony thorax is intact. Left pacemaker in place.    Patient is not hypoxic, however COVID is POSITIVE.  Her Biotronik ICD was interrogated.  She was found to have been shocked with 40J due to apparent v.tach.  Cardiology was consulted who recommended giving a dose of amiodarone along with her home coreg.  They recommended Hospitalist admission.    Review of Systems:  10 systems reviewed and negative except as noted in HPI.  Assessment & Plan:   * Syncope, unspecified syncope type  Assessment & Plan  - Concern for cardiogenic syncope  - Upon interrogation of ICD, patient was in v.tach and underwent shock earlier today  - Cardiology consulted.  Appreciate their recommendations  - K is 3.6, getting replaced so that K is closer to 4  - Monitor on cardiac telemetry  - Check echo  - Additionally, patient tells me that she fell off of her sofa onto her face at home during syncopal event.  Will check CT head and facial

## 2025-01-27 NOTE — ED NOTES
TRANSFER - OUT REPORT:    Verbal report given to ORLANDO Ramos on Yann Shelton  being transferred to [facility] for urgent transfer       Report consisted of patient's Situation, Background, Assessment and   Recommendations(SBAR).     Information from the following report(s) Nurse Handoff Report, Index, ED Encounter Summary, ED SBAR, Intake/Output, MAR, and Recent Results was reviewed with the receiving nurse.    [place] Fall Assessment:    Presents to emergency department  because of falls (Syncope, seizure, or loss of consciousness): Yes  Age > 70: No  Altered Mental Status, Intoxication with alcohol or substance confusion (Disorientation, impaired judgment, poor safety awaremess, or inability to follow instructions): No  Impaired Mobility: Ambulates or transfers with assistive devices or assistance; Unable to ambulate or transer.: No             Lines:   Peripheral IV Left Forearm (Active)        Opportunity for questions and clarification was provided.      Patient transported with:  transport          May Watkins RN  01/27/25 6020

## 2025-01-28 LAB
ALBUMIN SERPL-MCNC: 3.3 G/DL (ref 3.2–4.6)
ALBUMIN/GLOB SERPL: 1.1 (ref 1–1.9)
ALP SERPL-CCNC: 57 U/L (ref 35–104)
ALT SERPL-CCNC: 60 U/L (ref 8–45)
ANION GAP SERPL CALC-SCNC: 10 MMOL/L (ref 7–16)
AST SERPL-CCNC: 44 U/L (ref 15–37)
BASOPHILS # BLD: 0.02 K/UL (ref 0–0.2)
BASOPHILS NFR BLD: 0.3 % (ref 0–2)
BILIRUB DIRECT SERPL-MCNC: <0.2 MG/DL (ref 0–0.4)
BILIRUB SERPL-MCNC: 0.4 MG/DL (ref 0–1.2)
BUN SERPL-MCNC: 18 MG/DL (ref 8–23)
CALCIUM SERPL-MCNC: 8.8 MG/DL (ref 8.8–10.2)
CHLORIDE SERPL-SCNC: 107 MMOL/L (ref 98–107)
CO2 SERPL-SCNC: 26 MMOL/L (ref 20–29)
CREAT SERPL-MCNC: 1.24 MG/DL (ref 0.6–1.1)
DIFFERENTIAL METHOD BLD: ABNORMAL
EOSINOPHIL # BLD: 0.08 K/UL (ref 0–0.8)
EOSINOPHIL NFR BLD: 1.4 % (ref 0.5–7.8)
ERYTHROCYTE [DISTWIDTH] IN BLOOD BY AUTOMATED COUNT: 15.6 % (ref 11.9–14.6)
GLOBULIN SER CALC-MCNC: 2.9 G/DL (ref 2.3–3.5)
GLUCOSE BLD STRIP.AUTO-MCNC: 121 MG/DL (ref 65–100)
GLUCOSE SERPL-MCNC: 93 MG/DL (ref 70–99)
HCT VFR BLD AUTO: 37.5 % (ref 35.8–46.3)
HGB BLD-MCNC: 12.2 G/DL (ref 11.7–15.4)
IMM GRANULOCYTES # BLD AUTO: 0.02 K/UL (ref 0–0.5)
IMM GRANULOCYTES NFR BLD AUTO: 0.3 % (ref 0–5)
LYMPHOCYTES # BLD: 1.47 K/UL (ref 0.5–4.6)
LYMPHOCYTES NFR BLD: 25.6 % (ref 13–44)
MAGNESIUM SERPL-MCNC: 1.9 MG/DL (ref 1.8–2.4)
MCH RBC QN AUTO: 31 PG (ref 26.1–32.9)
MCHC RBC AUTO-ENTMCNC: 32.5 G/DL (ref 31.4–35)
MCV RBC AUTO: 95.2 FL (ref 82–102)
MONOCYTES # BLD: 0.55 K/UL (ref 0.1–1.3)
MONOCYTES NFR BLD: 9.6 % (ref 4–12)
NEUTS SEG # BLD: 3.6 K/UL (ref 1.7–8.2)
NEUTS SEG NFR BLD: 62.8 % (ref 43–78)
NRBC # BLD: 0 K/UL (ref 0–0.2)
PLATELET # BLD AUTO: 106 K/UL (ref 150–450)
PMV BLD AUTO: 11.6 FL (ref 9.4–12.3)
POTASSIUM SERPL-SCNC: 3.9 MMOL/L (ref 3.5–5.1)
PROT SERPL-MCNC: 6.2 G/DL (ref 6.3–8.2)
RBC # BLD AUTO: 3.94 M/UL (ref 4.05–5.2)
SERVICE CMNT-IMP: ABNORMAL
SODIUM SERPL-SCNC: 143 MMOL/L (ref 136–145)
WBC # BLD AUTO: 5.7 K/UL (ref 4.3–11.1)

## 2025-01-28 PROCEDURE — 2700000000 HC OXYGEN THERAPY PER DAY

## 2025-01-28 PROCEDURE — 80048 BASIC METABOLIC PNL TOTAL CA: CPT

## 2025-01-28 PROCEDURE — 85025 COMPLETE CBC W/AUTO DIFF WBC: CPT

## 2025-01-28 PROCEDURE — 2500000003 HC RX 250 WO HCPCS: Performed by: FAMILY MEDICINE

## 2025-01-28 PROCEDURE — G0378 HOSPITAL OBSERVATION PER HR: HCPCS

## 2025-01-28 PROCEDURE — 82962 GLUCOSE BLOOD TEST: CPT

## 2025-01-28 PROCEDURE — 83735 ASSAY OF MAGNESIUM: CPT

## 2025-01-28 PROCEDURE — 36415 COLL VENOUS BLD VENIPUNCTURE: CPT

## 2025-01-28 PROCEDURE — 6370000000 HC RX 637 (ALT 250 FOR IP): Performed by: INTERNAL MEDICINE

## 2025-01-28 PROCEDURE — 6370000000 HC RX 637 (ALT 250 FOR IP)

## 2025-01-28 PROCEDURE — 94760 N-INVAS EAR/PLS OXIMETRY 1: CPT

## 2025-01-28 PROCEDURE — 6370000000 HC RX 637 (ALT 250 FOR IP): Performed by: FAMILY MEDICINE

## 2025-01-28 PROCEDURE — 80076 HEPATIC FUNCTION PANEL: CPT

## 2025-01-28 RX ORDER — MEXILETINE HYDROCHLORIDE 150 MG/1
150 CAPSULE ORAL EVERY 8 HOURS SCHEDULED
Status: DISCONTINUED | OUTPATIENT
Start: 2025-01-28 | End: 2025-01-30 | Stop reason: HOSPADM

## 2025-01-28 RX ADMIN — ONDANSETRON 4 MG: 4 TABLET, ORALLY DISINTEGRATING ORAL at 23:09

## 2025-01-28 RX ADMIN — TRAMADOL HYDROCHLORIDE 50 MG: 50 TABLET, COATED ORAL at 20:18

## 2025-01-28 RX ADMIN — APIXABAN 5 MG: 5 TABLET, FILM COATED ORAL at 08:44

## 2025-01-28 RX ADMIN — ONDANSETRON 4 MG: 4 TABLET, ORALLY DISINTEGRATING ORAL at 13:07

## 2025-01-28 RX ADMIN — SODIUM CHLORIDE, PRESERVATIVE FREE 10 ML: 5 INJECTION INTRAVENOUS at 20:25

## 2025-01-28 RX ADMIN — CARVEDILOL 6.25 MG: 6.25 TABLET, FILM COATED ORAL at 16:59

## 2025-01-28 RX ADMIN — LATANOPROST 1 DROP: 50 SOLUTION OPHTHALMIC at 20:17

## 2025-01-28 RX ADMIN — APIXABAN 5 MG: 5 TABLET, FILM COATED ORAL at 20:17

## 2025-01-28 RX ADMIN — CALCITRIOL 0.5 MCG: 0.25 CAPSULE ORAL at 08:43

## 2025-01-28 RX ADMIN — GUAIFENESIN 200 MG: 100 LIQUID ORAL at 20:17

## 2025-01-28 RX ADMIN — INSULIN GLARGINE 12 UNITS: 100 INJECTION, SOLUTION SUBCUTANEOUS at 20:24

## 2025-01-28 RX ADMIN — SODIUM CHLORIDE, PRESERVATIVE FREE 10 ML: 5 INJECTION INTRAVENOUS at 08:46

## 2025-01-28 RX ADMIN — GUAIFENESIN 200 MG: 100 LIQUID ORAL at 15:54

## 2025-01-28 RX ADMIN — OXYCODONE 5 MG: 5 TABLET ORAL at 08:43

## 2025-01-28 RX ADMIN — MEXILETINE HYDROCHLORIDE 150 MG: 150 CAPSULE ORAL at 16:59

## 2025-01-28 RX ADMIN — EMPAGLIFLOZIN 10 MG: 10 TABLET, FILM COATED ORAL at 08:44

## 2025-01-28 RX ADMIN — FUROSEMIDE 40 MG: 40 TABLET ORAL at 20:17

## 2025-01-28 RX ADMIN — MEXILETINE HYDROCHLORIDE 150 MG: 150 CAPSULE ORAL at 20:17

## 2025-01-28 RX ADMIN — SACUBITRIL AND VALSARTAN 1 TABLET: 24; 26 TABLET, FILM COATED ORAL at 20:17

## 2025-01-28 RX ADMIN — OXYCODONE 5 MG: 5 TABLET ORAL at 15:54

## 2025-01-28 RX ADMIN — OXYCODONE 5 MG: 5 TABLET ORAL at 00:17

## 2025-01-28 RX ADMIN — ACETAMINOPHEN 650 MG: 325 TABLET ORAL at 20:17

## 2025-01-28 RX ADMIN — FUROSEMIDE 40 MG: 40 TABLET ORAL at 09:29

## 2025-01-28 ASSESSMENT — PAIN DESCRIPTION - DESCRIPTORS
DESCRIPTORS: ACHING
DESCRIPTORS: ACHING
DESCRIPTORS: DISCOMFORT;ACHING;SORE;THROBBING
DESCRIPTORS: DISCOMFORT;ACHING;THROBBING;SORE

## 2025-01-28 ASSESSMENT — PAIN DESCRIPTION - LOCATION
LOCATION: SHOULDER
LOCATION: NECK;SHOULDER
LOCATION: SHOULDER;NECK
LOCATION: SHOULDER

## 2025-01-28 ASSESSMENT — PAIN SCALES - GENERAL
PAINLEVEL_OUTOF10: 4
PAINLEVEL_OUTOF10: 4
PAINLEVEL_OUTOF10: 5
PAINLEVEL_OUTOF10: 5
PAINLEVEL_OUTOF10: 9
PAINLEVEL_OUTOF10: 3
PAINLEVEL_OUTOF10: 6
PAINLEVEL_OUTOF10: 9
PAINLEVEL_OUTOF10: 8
PAINLEVEL_OUTOF10: 10
PAINLEVEL_OUTOF10: 8

## 2025-01-28 ASSESSMENT — PAIN DESCRIPTION - PAIN TYPE
TYPE: CHRONIC PAIN
TYPE: CHRONIC PAIN

## 2025-01-28 ASSESSMENT — PAIN DESCRIPTION - ONSET
ONSET: ON-GOING
ONSET: GRADUAL

## 2025-01-28 ASSESSMENT — PAIN DESCRIPTION - ORIENTATION
ORIENTATION: LEFT

## 2025-01-28 ASSESSMENT — PAIN DESCRIPTION - FREQUENCY
FREQUENCY: CONTINUOUS
FREQUENCY: CONTINUOUS

## 2025-01-28 NOTE — PROGRESS NOTES
01/27/25 6276   Oxygen Therapy/Pulse Ox   O2 Therapy Oxygen   $Oxygen $Daily Charge   O2 Device Nasal cannula   O2 Flow Rate (L/min) 2 L/min   Pulse 60   Respirations 16   SpO2 94 %   Pulse Oximeter Device Mode Intermittent   Pulse Oximeter Device Location Finger   $Pulse Oximeter $Spot check (single)     Spoke with patient regarding CPAP, patient confirmed she does wear CPAP at home but that she would prefer to wear o2 while at the hospital.  Patient aware of what sleep apnea is.  Patient started on 2L NC.

## 2025-01-28 NOTE — CARE COORDINATION
Patient hospitalized in OP status for syncope. Diagnosed with Covid 19. Dropet isolation.   CM reviewed clinical notes. PCP (Dr Lawson). Dual insurance coverage. Uses medicaid [employer] for transporation. CM will follow to assist with discharge needs.   01/28/25 1344   Service Assessment   Patient Orientation Alert and Oriented   Cognition Alert   History Provided By Medical Record   Primary Caregiver Self   Accompanied By/Relationship Unknown   Support Systems Family Members;Children   Patient's Healthcare Decision Maker is: Legal Next of Kin   PCP Verified by CM Yes   Last Visit to PCP Within last 6 months   Prior Functional Level Independent in ADLs/IADLs   Current Functional Level Independent in ADLs/IADLs   Can patient return to prior living arrangement Yes   Ability to make needs known: Good   Family able to assist with home care needs: Yes   Would you like for me to discuss the discharge plan with any other family members/significant others, and if so, who? No   Financial Resources Medicare;Medicaid   Community Resources None   CM/SW Referral Other (see comment)  (N/A)   Social/Functional History   Type of Home Apartment   Mode of Transportation Car   Occupation On disability   Discharge Planning   Type of Residence Apartment   Living Arrangements Family Members   Current Services Prior To Admission C-pap   Potential Assistance Needed N/A   DME Ordered? No   Potential Assistance Purchasing Medications No   Type of Home Care Services None   Patient expects to be discharged to: Apartment   Services At/After Discharge   Services At/After Discharge None   [place] Resource Information Provided? No   Mode of Transport at Discharge Other (see comment)  (Family member)   Confirm Follow Up Transport Family   Condition of Participation: Discharge Planning   The Plan for Transition of Care is related to the following treatment goals: Home with family assistance   The Patient and/Or Patient Representative agree with the

## 2025-01-28 NOTE — PLAN OF CARE
Problem: Chronic Conditions and Co-morbidities  Goal: Patient's chronic conditions and co-morbidity symptoms are monitored and maintained or improved  Outcome: Progressing  Flowsheets (Taken 1/27/2025 1500 by Santiago Claire RN)  Care Plan - Patient's Chronic Conditions and Co-Morbidity Symptoms are Monitored and Maintained or Improved: Monitor and assess patient's chronic conditions and comorbid symptoms for stability, deterioration, or improvement     Problem: Discharge Planning  Goal: Discharge to home or other facility with appropriate resources  Outcome: Progressing  Flowsheets (Taken 1/27/2025 1500 by Santiago Claire RN)  Discharge to home or other facility with appropriate resources: Identify barriers to discharge with patient and caregiver     Problem: Pain  Goal: Verbalizes/displays adequate comfort level or baseline comfort level  Outcome: Progressing  Flowsheets (Taken 1/27/2025 1500 by Santiago Claire RN)  Verbalizes/displays adequate comfort level or baseline comfort level: Encourage patient to monitor pain and request assistance     Problem: Safety - Adult  Goal: Free from fall injury  Outcome: Progressing

## 2025-01-29 PROBLEM — N17.9 ACUTE KIDNEY INJURY (HCC): Status: ACTIVE | Noted: 2025-01-26

## 2025-01-29 PROBLEM — R74.01 TRANSAMINITIS: Status: ACTIVE | Noted: 2025-01-29

## 2025-01-29 LAB
ANION GAP SERPL CALC-SCNC: 12 MMOL/L (ref 7–16)
BASOPHILS # BLD: 0.02 K/UL (ref 0–0.2)
BASOPHILS NFR BLD: 0.4 % (ref 0–2)
BUN SERPL-MCNC: 18 MG/DL (ref 8–23)
CALCIUM SERPL-MCNC: 9 MG/DL (ref 8.8–10.2)
CHLORIDE SERPL-SCNC: 103 MMOL/L (ref 98–107)
CO2 SERPL-SCNC: 24 MMOL/L (ref 20–29)
CREAT SERPL-MCNC: 1.36 MG/DL (ref 0.6–1.1)
DIFFERENTIAL METHOD BLD: ABNORMAL
EOSINOPHIL # BLD: 0.07 K/UL (ref 0–0.8)
EOSINOPHIL NFR BLD: 1.3 % (ref 0.5–7.8)
ERYTHROCYTE [DISTWIDTH] IN BLOOD BY AUTOMATED COUNT: 15.3 % (ref 11.9–14.6)
GLUCOSE BLD STRIP.AUTO-MCNC: 118 MG/DL (ref 65–100)
GLUCOSE BLD STRIP.AUTO-MCNC: 143 MG/DL (ref 65–100)
GLUCOSE SERPL-MCNC: 99 MG/DL (ref 70–99)
HCT VFR BLD AUTO: 39.7 % (ref 35.8–46.3)
HGB BLD-MCNC: 12.4 G/DL (ref 11.7–15.4)
IMM GRANULOCYTES # BLD AUTO: 0.01 K/UL (ref 0–0.5)
IMM GRANULOCYTES NFR BLD AUTO: 0.2 % (ref 0–5)
LYMPHOCYTES # BLD: 1.54 K/UL (ref 0.5–4.6)
LYMPHOCYTES NFR BLD: 28.4 % (ref 13–44)
MAGNESIUM SERPL-MCNC: 2.2 MG/DL (ref 1.8–2.4)
MCH RBC QN AUTO: 30.4 PG (ref 26.1–32.9)
MCHC RBC AUTO-ENTMCNC: 31.2 G/DL (ref 31.4–35)
MCV RBC AUTO: 97.3 FL (ref 82–102)
MONOCYTES # BLD: 0.53 K/UL (ref 0.1–1.3)
MONOCYTES NFR BLD: 9.8 % (ref 4–12)
NEUTS SEG # BLD: 3.25 K/UL (ref 1.7–8.2)
NEUTS SEG NFR BLD: 59.9 % (ref 43–78)
NRBC # BLD: 0 K/UL (ref 0–0.2)
PLATELET # BLD AUTO: 105 K/UL (ref 150–450)
PMV BLD AUTO: 12.1 FL (ref 9.4–12.3)
POTASSIUM SERPL-SCNC: 4.4 MMOL/L (ref 3.5–5.1)
RBC # BLD AUTO: 4.08 M/UL (ref 4.05–5.2)
SERVICE CMNT-IMP: ABNORMAL
SERVICE CMNT-IMP: ABNORMAL
SODIUM SERPL-SCNC: 139 MMOL/L (ref 136–145)
WBC # BLD AUTO: 5.4 K/UL (ref 4.3–11.1)

## 2025-01-29 PROCEDURE — 85025 COMPLETE CBC W/AUTO DIFF WBC: CPT

## 2025-01-29 PROCEDURE — 6370000000 HC RX 637 (ALT 250 FOR IP): Performed by: FAMILY MEDICINE

## 2025-01-29 PROCEDURE — 80048 BASIC METABOLIC PNL TOTAL CA: CPT

## 2025-01-29 PROCEDURE — 2500000003 HC RX 250 WO HCPCS: Performed by: FAMILY MEDICINE

## 2025-01-29 PROCEDURE — 2700000000 HC OXYGEN THERAPY PER DAY

## 2025-01-29 PROCEDURE — 6370000000 HC RX 637 (ALT 250 FOR IP)

## 2025-01-29 PROCEDURE — 82962 GLUCOSE BLOOD TEST: CPT

## 2025-01-29 PROCEDURE — 99232 SBSQ HOSP IP/OBS MODERATE 35: CPT | Performed by: INTERNAL MEDICINE

## 2025-01-29 PROCEDURE — 2140000000 HC CCU INTERMEDIATE R&B

## 2025-01-29 PROCEDURE — 83735 ASSAY OF MAGNESIUM: CPT

## 2025-01-29 PROCEDURE — 94760 N-INVAS EAR/PLS OXIMETRY 1: CPT

## 2025-01-29 PROCEDURE — 36415 COLL VENOUS BLD VENIPUNCTURE: CPT

## 2025-01-29 PROCEDURE — 6370000000 HC RX 637 (ALT 250 FOR IP): Performed by: INTERNAL MEDICINE

## 2025-01-29 RX ORDER — DEXTROSE MONOHYDRATE 100 MG/ML
INJECTION, SOLUTION INTRAVENOUS CONTINUOUS PRN
Status: DISCONTINUED | OUTPATIENT
Start: 2025-01-29 | End: 2025-01-30 | Stop reason: HOSPADM

## 2025-01-29 RX ORDER — IBUPROFEN 600 MG/1
1 TABLET ORAL PRN
Status: DISCONTINUED | OUTPATIENT
Start: 2025-01-29 | End: 2025-01-30 | Stop reason: HOSPADM

## 2025-01-29 RX ORDER — INSULIN LISPRO 100 [IU]/ML
0-8 INJECTION, SOLUTION INTRAVENOUS; SUBCUTANEOUS
Status: DISCONTINUED | OUTPATIENT
Start: 2025-01-29 | End: 2025-01-30 | Stop reason: HOSPADM

## 2025-01-29 RX ADMIN — ACETAMINOPHEN 650 MG: 325 TABLET ORAL at 14:09

## 2025-01-29 RX ADMIN — GUAIFENESIN 200 MG: 100 LIQUID ORAL at 10:17

## 2025-01-29 RX ADMIN — FUROSEMIDE 40 MG: 40 TABLET ORAL at 08:29

## 2025-01-29 RX ADMIN — FUROSEMIDE 40 MG: 40 TABLET ORAL at 21:35

## 2025-01-29 RX ADMIN — APIXABAN 5 MG: 5 TABLET, FILM COATED ORAL at 08:29

## 2025-01-29 RX ADMIN — CALCITRIOL 0.5 MCG: 0.25 CAPSULE ORAL at 08:29

## 2025-01-29 RX ADMIN — MEXILETINE HYDROCHLORIDE 150 MG: 150 CAPSULE ORAL at 06:02

## 2025-01-29 RX ADMIN — SODIUM CHLORIDE, PRESERVATIVE FREE 10 ML: 5 INJECTION INTRAVENOUS at 08:29

## 2025-01-29 RX ADMIN — OXYCODONE 5 MG: 5 TABLET ORAL at 10:19

## 2025-01-29 RX ADMIN — MEXILETINE HYDROCHLORIDE 150 MG: 150 CAPSULE ORAL at 22:06

## 2025-01-29 RX ADMIN — MEXILETINE HYDROCHLORIDE 150 MG: 150 CAPSULE ORAL at 13:57

## 2025-01-29 RX ADMIN — INSULIN GLARGINE 12 UNITS: 100 INJECTION, SOLUTION SUBCUTANEOUS at 20:45

## 2025-01-29 RX ADMIN — SACUBITRIL AND VALSARTAN 1 TABLET: 24; 26 TABLET, FILM COATED ORAL at 21:35

## 2025-01-29 RX ADMIN — CARVEDILOL 6.25 MG: 6.25 TABLET, FILM COATED ORAL at 08:29

## 2025-01-29 RX ADMIN — CARVEDILOL 6.25 MG: 6.25 TABLET, FILM COATED ORAL at 16:59

## 2025-01-29 RX ADMIN — EMPAGLIFLOZIN 10 MG: 10 TABLET, FILM COATED ORAL at 08:29

## 2025-01-29 RX ADMIN — SACUBITRIL AND VALSARTAN 1 TABLET: 24; 26 TABLET, FILM COATED ORAL at 08:29

## 2025-01-29 RX ADMIN — TRAMADOL HYDROCHLORIDE 50 MG: 50 TABLET, COATED ORAL at 04:18

## 2025-01-29 RX ADMIN — ACETAMINOPHEN 650 MG: 325 TABLET ORAL at 20:51

## 2025-01-29 RX ADMIN — LATANOPROST 1 DROP: 50 SOLUTION OPHTHALMIC at 20:46

## 2025-01-29 RX ADMIN — APIXABAN 5 MG: 5 TABLET, FILM COATED ORAL at 20:45

## 2025-01-29 RX ADMIN — SODIUM CHLORIDE, PRESERVATIVE FREE 10 ML: 5 INJECTION INTRAVENOUS at 20:45

## 2025-01-29 RX ADMIN — OXYCODONE 5 MG: 5 TABLET ORAL at 18:39

## 2025-01-29 RX ADMIN — ACETAMINOPHEN 650 MG: 325 TABLET ORAL at 04:18

## 2025-01-29 RX ADMIN — OXYCODONE 5 MG: 5 TABLET ORAL at 00:50

## 2025-01-29 ASSESSMENT — PAIN DESCRIPTION - DESCRIPTORS
DESCRIPTORS: ACHING
DESCRIPTORS: ACHING
DESCRIPTORS: SORE
DESCRIPTORS: ACHING

## 2025-01-29 ASSESSMENT — PAIN DESCRIPTION - PAIN TYPE
TYPE: CHRONIC PAIN

## 2025-01-29 ASSESSMENT — PAIN SCALES - GENERAL
PAINLEVEL_OUTOF10: 7
PAINLEVEL_OUTOF10: 5
PAINLEVEL_OUTOF10: 5
PAINLEVEL_OUTOF10: 6
PAINLEVEL_OUTOF10: 7
PAINLEVEL_OUTOF10: 5
PAINLEVEL_OUTOF10: 0
PAINLEVEL_OUTOF10: 7
PAINLEVEL_OUTOF10: 0
PAINLEVEL_OUTOF10: 7
PAINLEVEL_OUTOF10: 0
PAINLEVEL_OUTOF10: 6

## 2025-01-29 ASSESSMENT — PAIN DESCRIPTION - LOCATION
LOCATION: SHOULDER

## 2025-01-29 ASSESSMENT — PAIN DESCRIPTION - ORIENTATION
ORIENTATION: LEFT
ORIENTATION: LEFT
ORIENTATION: POSTERIOR;LEFT
ORIENTATION: POSTERIOR;LEFT
ORIENTATION: LEFT

## 2025-01-29 ASSESSMENT — PAIN DESCRIPTION - FREQUENCY
FREQUENCY: CONTINUOUS
FREQUENCY: CONTINUOUS

## 2025-01-29 ASSESSMENT — PAIN DESCRIPTION - ONSET: ONSET: ON-GOING

## 2025-01-29 NOTE — CONSULTS
[facility] Hospitalist Consult   Admit Date:  2025  5:20 PM   Name:  Yann Shelton   Age:  59 y.o.  Sex:  female  :  1965   MRN:  [id number]   Room:  [facility]    Presenting/Chief Complaint: Dizziness and Loss of Consciousness    Reason(s) for Admission: Syncope and collapse [R55]  Syncope, unspecified syncope type [R55]  COVID-19 [U07.1]     Hospitalists consulted by Michael Tan MD for: Covid, general medical care    History of Presenting Illness:   Yann Shelton is a 59 y.o. female who presented to [facility] urgency department on 25 after a syncopal episode earlier in the day during which time she was seated on the couch during which time she was seated on the couch, fell forward and landed on her face.  EMS was called and she was transported to the ER for evaluation.  At the time of presentation she endorsed recent URI symptoms and associated generalized weakness and fatigue.    While in the ER she was noted to be COVID-positive.  ICD was interrogated and she was found to have been defibrillated at 40 J due to.  Ventricular tachycardia.  Cardiology was consulted and she was given a dose of amiodarone.  Hospitalist service was requested to admit by the cardiology service and they would consult.  It appears that cardiology took over as primary on .  That service has requested us to follow along as consultants to manage COVID and other medical comorbidities.    Assessment & Plan:     COVID-19 virus infection  Not hypoxic, and as such no indication for steroids or other therapies.  Supportive care.    Diabetes mellitus type 2 with nephropathy (A1c 6.9; 2024)  Blood glucose levels have been well-controlled.  Continue Lantus,  Jardiance.  Add MDSSI with 4 times daily fingersticks.  Check updated A1c in the AM.    Acute kidney injury on chronic kidney disease stage IIIa  Cr improved versus admission, 1.6 -> 1.2 -> 1.3.  Monitor renal 
      [facility] Cardiology Initial Cardiac Evaluation      Date of  Admission: 1/26/2025  5:20 PM     Primary Care Physician: Randell Lawson MD  Primary Cardiologist: Dr. Schmitt  Referring Physician: Dr. Sorto  Supervising Physician: Dr. Meza    CC/Reason for evaluation: VT with ICD shocks    HPI:  Yann Shelton is a 59 y.o. female with prior history of HFrEF s/p DC Biotronik ICD(denied LVAD), VT s/p VT ablation ([facility] 2022), LV aneurysm(not surgical candidate), hypertension, CKD, paroxysmal atrial fibrillation (on Eliquis), anemia who presents after syncopal episode preceded by lightheadedness and weakness.  ICD interrogation showed VT treated with 40 J of ATP.  She did receive IV amiodarone along with carvedilol. She continues to smoke. She was COVID-19 positive.     Patient recently reduced Amiodarone to twice weekly due to abnormal PFTs    Spirometry  Impression: Moderate restriction with severe reduction in diffusion capacity.  Total lung capacity is lower but other numbers are slightly better than 10/2023. No evidence of ILD on 8/2024 CT scan.       EP cardiology consulted for further evaluation due to her recurrent VT.    01/26/25    ECHO (TTE) COMPLETE (PRN CONTRAST/BUBBLE/STRAIN/3D) 01/27/2025  2:51 PM (Final)    Interpretation Summary    Left Ventricle: Mildly reduced left ventricular systolic function with a visually estimated EF of 40 - 45%. Left ventricle is moderately dilated. Mildly increased wall thickness. The basal inferior/inferolateral wall is akinetic/aneurysmal.    Image quality is adequate. Contrast used: Lumason. Technically difficult study due to patient's body habitus.    Signed by: Marcelo Lawson MD on 1/27/2025  2:51 PM        Past Medical History:   Diagnosis Date    Abdominal wall hernia 10/2/2018    Acute hypoxemic respiratory failure 4/3/2019    Acute on chronic renal failure (HCC) 09/06/2022    Allergic rhinitis     followed by allergist; allergic to grass- has 
replacement.    Plan:  1) Antibiotics for 5 days.  2) Device clinic follow up in 1-2 weeks.  3) Routine cardiology follow up with Dr. Schmitt.    Michael Tan MD, MS  Clinical Cardiac Electrophysiology  [facility]    4/10/2024  2:38 PM  Addendum by: Michael Tan MD on 4/10/2024  7:45 PM    Signed by: Michael Tan MD on 4/10/2024  2:44 PM      Labs:     Recent Labs     01/26/25  1742   WBC 6.7   HGB 12.3   HCT 37.5   MCV 93.1   *     Lab Results   Component Value Date    WBC 6.7 01/26/2025    HGB 12.3 01/26/2025    HCT 37.5 01/26/2025     (L) 01/26/2025    CHOL 178 08/17/2024    TRIG 83 08/17/2024    HDL 68 (H) 08/17/2024    ALT 81 (H) 01/26/2025    AST 81 (H) 01/26/2025     01/26/2025    K 3.6 01/26/2025     01/26/2025    CREATININE 1.68 (H) 01/26/2025    BUN 21 01/26/2025    CO2 24 01/26/2025    TSH 2.120 08/17/2024    INR 1.1 04/02/2024    LABA1C 7.3 (H) 07/24/2024        Pt has been seen and examined by Dr. Sorto. He agrees with the following assessment and plan.     Assessment/Plan:      Principal Problem:  Syncope and collapse  Hx of VT, VT ablation at [facility] as per HPI now here s/p episode of syncopal event at home for which she received 40J of energy for ventricular arrhythmia per interrogation of device.  She has since received amiodarone bolus, followed by home coreg dose, and potassium repletion.  No further episodes of VT since this time.  Monitor for additional episodes overnight. Follow labs/lytes.  Keep K>4, Mg >2.  Echo in am.    Further recommendations pending clinical course.       Active Problems:  CHF  Last EF: 35-40% in August 2024.  ; no indication of exacerbation at this time.  Continue GDMT w/ home entresto, coreg, Jardiance, Lasix 40mg BID.  Not on MRA d/t CKD stage IV.  Monitor labs/lytes/renal function.  Keep K>4, Mg>2.  Echo in am.          ICD  NICM  Amiodarone once per day on Monday's and Thursdays--recently changed in office

## 2025-01-29 NOTE — PLAN OF CARE
Problem: Chronic Conditions and Co-morbidities  Goal: Patient's chronic conditions and co-morbidity symptoms are monitored and maintained or improved  Outcome: Progressing  Flowsheets (Taken 1/28/2025 2018)  Care Plan - Patient's Chronic Conditions and Co-Morbidity Symptoms are Monitored and Maintained or Improved:   Monitor and assess patient's chronic conditions and comorbid symptoms for stability, deterioration, or improvement   Collaborate with multidisciplinary team to address chronic and comorbid conditions and prevent exacerbation or deterioration   Update acute care plan with appropriate goals if chronic or comorbid symptoms are exacerbated and prevent overall improvement and discharge     Problem: Discharge Planning  Goal: Discharge to home or other facility with appropriate resources  Outcome: Progressing  Flowsheets (Taken 1/28/2025 2018)  Discharge to home or other facility with appropriate resources:   Identify barriers to discharge with patient and caregiver   Arrange for needed discharge resources and transportation as appropriate   Identify discharge learning needs (meds, wound care, etc)   Refer to discharge planning if patient needs post-hospital services based on physician order or complex needs related to functional status, cognitive ability or social support system     Problem: Pain  Goal: Verbalizes/displays adequate comfort level or baseline comfort level  Outcome: Progressing  Flowsheets (Taken 1/28/2025 2018)  Verbalizes/displays adequate comfort level or baseline comfort level:   Encourage patient to monitor pain and request assistance   Assess pain using appropriate pain scale   Administer analgesics based on type and severity of pain and evaluate response   Implement non-pharmacological measures as appropriate and evaluate response   Consider cultural and social influences on pain and pain management   Notify Licensed Independent Practitioner if interventions unsuccessful or patient

## 2025-01-29 NOTE — PROGRESS NOTES
[facility] CARDIOLOGY PROGRESS NOTE           1/29/2025 7:27 AM    Admit Date: 1/26/2025    Admit Diagnosis: Syncope and collapse [R55]  Syncope, unspecified syncope type [R55]  COVID-19 [U07.1]    Assessment:   Principal Problem:    Syncope secondary to VT/VF s/p ICD shock  Active Problems:    Essential (primary) hypertension    Type 2 diabetes with nephropathy (HCC)    CKD (chronic kidney disease) stage 4, GFR 15-29 ml/min (HCC)    Chronic systolic heart failure (HCC)    LORI on CPAP    Atrial fibrillation (HCC)    Implantable cardioverter-defibrillator (ICD) in situ    COVID  Resolved Problems:    * No resolved hospital problems. *    Plan:   Syncope/ICD shock  -Device interrogation reviewed. Optimized as able.   -Continue telemetry monitoring. Keep K >4 and mag >2.  -Continue amiodarone and added mexiletine.  -Likely induced by acute COVID infection.   -Unable to increase amiodarone too much given PFT findings.     Covid 19  -On RA, afebrile.     HFrEF  -LVEF at 40-45%.   -Continue GDMT as tolerated with Entresto, SGLT2i, BB.     Hx of LV aneurysm  -Noted.    CKD  -Stable, daily labs.      LORI  -Continue CPAP.      DM2  -SGLT2i and insulin.     HTN  -Same as above.     HLD  -Liver enzymes slightly elevated. Trend liver panel. Statin held for now.     Smoker  -Counseled on cessation    Thank you for allowing me to participate in the electrophysiologic care of this most pleasant patient. Please feel free to contact me if there are any questions or concerns.    Michael Tan MD, MS  Clinical Cardiac Electrophysiology  [facility]    Subjective:   No complaints this AM, no chest pain or shortness of breath    Interval History: (History of pertinent interval events obtained from nursing staff)    ROS:  GEN:  No fever or chills  Cardiovascular:  As noted above  Pulmonary:  As noted above  Neuro:  No new focal motor or sensory loss      Objective:     Vitals:    01/28/25 2349 01/29/25

## 2025-01-29 NOTE — PROGRESS NOTES
01/28/25 2153   Oxygen Therapy/Pulse Ox   O2 Therapy Oxygen   $Oxygen $Daily Charge   O2 Device Nasal cannula   O2 Flow Rate (L/min) 2 L/min   Pulse 61   Respirations 18   SpO2 96 %   Skin Assessment Clean, dry, & intact   Skin Protection for O2 Device No   Pulse Oximeter Device Mode Intermittent   Pulse Oximeter Device Location Finger   $Pulse Oximeter $Spot check (single)      Pt states she doesn't have CPAP and doesn't want to wear CPAP.

## 2025-01-30 ENCOUNTER — TELEPHONE (OUTPATIENT)
Age: 60
End: 2025-01-30

## 2025-01-30 ENCOUNTER — TELEPHONE (OUTPATIENT)
Dept: INTERNAL MEDICINE CLINIC | Facility: CLINIC | Age: 60
End: 2025-01-30

## 2025-01-30 VITALS
TEMPERATURE: 98.6 F | HEART RATE: 60 BPM | DIASTOLIC BLOOD PRESSURE: 76 MMHG | RESPIRATION RATE: 16 BRPM | SYSTOLIC BLOOD PRESSURE: 110 MMHG | WEIGHT: 187 LBS | BODY MASS INDEX: 42.07 KG/M2 | OXYGEN SATURATION: 93 % | HEIGHT: 56 IN

## 2025-01-30 LAB
ALBUMIN SERPL-MCNC: 3.1 G/DL (ref 3.2–4.6)
ALBUMIN/GLOB SERPL: 0.9 (ref 1–1.9)
ALP SERPL-CCNC: 57 U/L (ref 35–104)
ALT SERPL-CCNC: 40 U/L (ref 8–45)
ANION GAP SERPL CALC-SCNC: 11 MMOL/L (ref 7–16)
AST SERPL-CCNC: 35 U/L (ref 15–37)
BILIRUB SERPL-MCNC: 0.4 MG/DL (ref 0–1.2)
BUN SERPL-MCNC: 14 MG/DL (ref 8–23)
CALCIUM SERPL-MCNC: 8.5 MG/DL (ref 8.8–10.2)
CHLORIDE SERPL-SCNC: 102 MMOL/L (ref 98–107)
CO2 SERPL-SCNC: 26 MMOL/L (ref 20–29)
CREAT SERPL-MCNC: 1.26 MG/DL (ref 0.6–1.1)
EST. AVERAGE GLUCOSE BLD GHB EST-MCNC: 157 MG/DL
GLOBULIN SER CALC-MCNC: 3.3 G/DL (ref 2.3–3.5)
GLUCOSE BLD STRIP.AUTO-MCNC: 83 MG/DL (ref 65–100)
GLUCOSE SERPL-MCNC: 90 MG/DL (ref 70–99)
HBA1C MFR BLD: 7.1 % (ref 0–5.6)
MAGNESIUM SERPL-MCNC: 2.2 MG/DL (ref 1.8–2.4)
POTASSIUM SERPL-SCNC: 3.8 MMOL/L (ref 3.5–5.1)
PROT SERPL-MCNC: 6.4 G/DL (ref 6.3–8.2)
SERVICE CMNT-IMP: NORMAL
SODIUM SERPL-SCNC: 139 MMOL/L (ref 136–145)

## 2025-01-30 PROCEDURE — 2700000000 HC OXYGEN THERAPY PER DAY

## 2025-01-30 PROCEDURE — 6370000000 HC RX 637 (ALT 250 FOR IP): Performed by: FAMILY MEDICINE

## 2025-01-30 PROCEDURE — 6360000002 HC RX W HCPCS: Performed by: FAMILY MEDICINE

## 2025-01-30 PROCEDURE — 80053 COMPREHEN METABOLIC PANEL: CPT

## 2025-01-30 PROCEDURE — 6370000000 HC RX 637 (ALT 250 FOR IP): Performed by: INTERNAL MEDICINE

## 2025-01-30 PROCEDURE — 94760 N-INVAS EAR/PLS OXIMETRY 1: CPT

## 2025-01-30 PROCEDURE — 83036 HEMOGLOBIN GLYCOSYLATED A1C: CPT

## 2025-01-30 PROCEDURE — 36415 COLL VENOUS BLD VENIPUNCTURE: CPT

## 2025-01-30 PROCEDURE — 6370000000 HC RX 637 (ALT 250 FOR IP)

## 2025-01-30 PROCEDURE — 83735 ASSAY OF MAGNESIUM: CPT

## 2025-01-30 PROCEDURE — 2500000003 HC RX 250 WO HCPCS: Performed by: FAMILY MEDICINE

## 2025-01-30 PROCEDURE — 82962 GLUCOSE BLOOD TEST: CPT

## 2025-01-30 RX ORDER — FUROSEMIDE 40 MG/1
40 TABLET ORAL 2 TIMES DAILY
Qty: 60 TABLET | Refills: 3 | Status: SHIPPED | OUTPATIENT
Start: 2025-01-30

## 2025-01-30 RX ORDER — SACUBITRIL AND VALSARTAN 24; 26 MG/1; MG/1
0.5 TABLET, FILM COATED ORAL 2 TIMES DAILY
Qty: 180 TABLET | Refills: 3 | Status: SHIPPED | OUTPATIENT
Start: 2025-01-30

## 2025-01-30 RX ORDER — MEXILETINE HYDROCHLORIDE 150 MG/1
150 CAPSULE ORAL EVERY 8 HOURS SCHEDULED
Qty: 90 CAPSULE | Refills: 3 | Status: SHIPPED | OUTPATIENT
Start: 2025-01-30

## 2025-01-30 RX ADMIN — MEXILETINE HYDROCHLORIDE 150 MG: 150 CAPSULE ORAL at 05:43

## 2025-01-30 RX ADMIN — APIXABAN 5 MG: 5 TABLET, FILM COATED ORAL at 10:20

## 2025-01-30 RX ADMIN — OXYCODONE 5 MG: 5 TABLET ORAL at 02:34

## 2025-01-30 RX ADMIN — CALCITRIOL 0.5 MCG: 0.25 CAPSULE ORAL at 10:19

## 2025-01-30 RX ADMIN — CARVEDILOL 6.25 MG: 6.25 TABLET, FILM COATED ORAL at 10:27

## 2025-01-30 RX ADMIN — ONDANSETRON 4 MG: 2 INJECTION, SOLUTION INTRAMUSCULAR; INTRAVENOUS at 08:46

## 2025-01-30 RX ADMIN — EMPAGLIFLOZIN 10 MG: 10 TABLET, FILM COATED ORAL at 10:20

## 2025-01-30 RX ADMIN — FUROSEMIDE 40 MG: 40 TABLET ORAL at 10:19

## 2025-01-30 RX ADMIN — ACETAMINOPHEN 650 MG: 325 TABLET ORAL at 02:34

## 2025-01-30 RX ADMIN — AMIODARONE HYDROCHLORIDE 100 MG: 200 TABLET ORAL at 10:20

## 2025-01-30 RX ADMIN — SACUBITRIL AND VALSARTAN 1 TABLET: 24; 26 TABLET, FILM COATED ORAL at 10:19

## 2025-01-30 RX ADMIN — SODIUM CHLORIDE, PRESERVATIVE FREE 10 ML: 5 INJECTION INTRAVENOUS at 10:20

## 2025-01-30 ASSESSMENT — PAIN SCALES - GENERAL
PAINLEVEL_OUTOF10: 10
PAINLEVEL_OUTOF10: 0
PAINLEVEL_OUTOF10: 6
PAINLEVEL_OUTOF10: 7

## 2025-01-30 ASSESSMENT — PAIN DESCRIPTION - LOCATION: LOCATION: SHOULDER

## 2025-01-30 ASSESSMENT — PAIN DESCRIPTION - DESCRIPTORS: DESCRIPTORS: ACHING;SORE

## 2025-01-30 ASSESSMENT — PAIN DESCRIPTION - ORIENTATION: ORIENTATION: LEFT

## 2025-01-30 ASSESSMENT — PAIN - FUNCTIONAL ASSESSMENT: PAIN_FUNCTIONAL_ASSESSMENT: PREVENTS OR INTERFERES SOME ACTIVE ACTIVITIES AND ADLS

## 2025-01-30 NOTE — PROGRESS NOTES
Pt had episode of nausea and vomiting.  4mg zofran given to pt IV.  Pt blood sugar 83 with morning check.  Pt states that she thinks nausea is result of blood sugar being lower than her normal.  Pt is going to attempt to eat breakfast and see if she feels better.   Will reassess after breakfast before attempting to give oral medications.  Pt agrees with plan

## 2025-01-30 NOTE — TELEPHONE ENCOUNTER
Care Transitions Initial Follow Up Call    Outreach made within 2 business days of discharge: Yes    Patient: Yann Shelton Patient : 1965   MRN: [id number]  Reason for Admission: COVID, Defibrillator shock  Discharge Date: 25       Spoke with: patient    Discharge department/facility: [facility] Interactive Patient Contact:  Was patient able to fill all prescriptions: No: Prior Authorization needed, Cardiologist taking care of this for patient  Was patient instructed to bring all medications to the follow-up visit: Yes  Is patient taking all medications as directed in the discharge summary? No, Cardiologist getting medication approval from Insurance Co.  Does patient understand their discharge instructions: Yes  Does patient have questions or concerns that need addressed prior to 7-14 day follow up office visit: no    Additional needs identified to be addressed with provider  No needs identified             Scheduled appointment with PCP within 7-14 days    Follow Up  Future Appointments   Date Time Provider Department Center   2/3/2025  1:40 PM Delma Vincent PA-C MAT [facility] DEP   2025  1:15 PM Heather Rubalcava PA DG [facility] AMB   2025  8:15 AM MAT LAB MAT [facility] DEP   3/4/2025 10:30 AM PERIPHERAL [facility]   3/4/2025 11:30 AM Sigifredo Luz MD [facility] AMB   3/4/2025  1:20 PM Randell Lawson MD [facility] DEP   4/15/2025 11:00 AM Roel Pires, APRN - CNP [facility] AMB   2025  9:00 AM Stuart Schmitt DO DG [facility] AMB   6/10/2025  2:15 PM Michael Tan MD DG [facility] AMB       John Melendrez MA

## 2025-01-30 NOTE — TELEPHONE ENCOUNTER
TCM for covid and defibrillator shock, d/c 1/30, seen at [facility] (tested positive at hosp 1/26).    [facility] appt made for 2/3/25.

## 2025-01-30 NOTE — PROGRESS NOTES
01/29/25 2017   Oxygen Therapy/Pulse Ox   O2 Therapy Oxygen   $Oxygen $Daily Charge   O2 Device Nasal cannula   O2 Flow Rate (L/min) 2 L/min   Pulse 59   Respirations 16   SpO2 94 %   $Pulse Oximeter $Spot check (single)     Pt refuses CPAP.

## 2025-01-30 NOTE — PROGRESS NOTES
Pt discharged.  Medications and discharge instructions reviewed.   All questions answered.  Ivs removed and telemetry box returned to monitor room.

## 2025-01-30 NOTE — CARE COORDINATION
Discharge order placed.CM reviewed discharge summary. No CM needs identified.  Personally scheduling for transportation home.

## 2025-01-30 NOTE — PROGRESS NOTES
[facility] Hospitalist progress note   Admit Date:  2025  5:20 PM   Name:  Yann Shelton   Age:  59 y.o.  Sex:  female  :  1965   MRN:  [id number]   Room:  [facility]    Presenting/Chief Complaint: Dizziness and Loss of Consciousness    Reason(s) for Admission: Syncope and collapse [R55]  Syncope, unspecified syncope type [R55]  COVID-19 [U07.1]     Hospitalists consulted by Michael Tan MD for: Covid, general medical care    History of Presenting Illness:   Yann Shelton is a 59 y.o. female who presented to [facility] urgency department on 25 after a syncopal episode earlier in the day during which time she was seated on the couch during which time she was seated on the couch, fell forward and landed on her face.  EMS was called and she was transported to the ER for evaluation.  At the time of presentation she endorsed recent URI symptoms and associated generalized weakness and fatigue.    While in the ER she was noted to be COVID-positive.  ICD was interrogated and she was found to have been defibrillated at 40 J due to.  Ventricular tachycardia.  Cardiology was consulted and she was given a dose of amiodarone.  Hospitalist service was requested to admit by the cardiology service and they would consult.  It appears that cardiology took over as primary on .  That service has requested us to follow along as consultants to manage COVID and other medical comorbidities.    Subjective 25:  Patient is seen and examined at the bedside.  On room air.  Reports she is feeling better.  No active complaint.  Denies chest pain, palpitation, nausea, vomiting or abdominal pain.  Denies shortness of breath.    Assessment & Plan:     COVID-19 virus infection  Not hypoxic, and as such no indication for steroids or other therapies.  Supportive care.    Diabetes mellitus type 2 with nephropathy (A1c 6.9; 2024)  Blood glucose levels have been

## 2025-01-30 NOTE — DISCHARGE SUMMARY
[facility] Cardiology Discharge Summary     Patient ID:  Yann Shelton  [id number]  59 y.o.  1965    Admit date: 1/26/2025    Discharge date and time: 01/30/25     Admitting Physician: Alfreda Ellison MD     Discharge Physician: Dr. Meza    Admission Diagnoses: Syncope and collapse [R55]  Syncope, unspecified syncope type [R55]  COVID-19 [U07.1]    Discharge Diagnoses:   Patient Active Problem List    Diagnosis Date Noted    ESRD (end stage renal disease) ([facility]) 09/06/2022    Atherosclerotic heart disease of native coronary artery with unspecified angina pectoris 12/23/2022    Left ventricular aneurysm 07/21/2022    Transaminitis 01/29/2025    Syncope, unspecified syncope type 01/26/2025    COVID-19 virus infection 01/26/2025    Acute kidney injury (HCC) 01/26/2025    Atrial fibrillation ([facility]) 08/27/2024    Breasts asymmetrical 08/27/2024    Anemia due to chronic renal failure treated with erythropoietin 08/05/2024    Other iron deficiency anemias 07/25/2024    Acute on chronic systolic heart failure (HCC) 11/10/2020    Lower abdominal pain 11/10/2020    Stage 3a chronic kidney disease ([facility]) 08/24/2020    Long term current use of amiodarone 06/12/2020    Restrictive lung disease 04/08/2020    Type 2 diabetes with nephropathy ([facility]) 04/02/2018    Chronic right-sided thoracic back pain 05/23/2016    Chronic left-sided low back pain with sciatica 05/23/2016    VT (ventricular tachycardia) ([facility]) 04/18/2016    Morbid obesity with BMI of 40.0-44.9, adult 07/14/2014    LORI on CPAP 07/14/2014    Congestive heart failure ([facility]) 06/02/2014    Implantable cardioverter-defibrillator (ICD) in situ 06/02/2014    Chronic systolic heart failure ([facility]) 07/18/2013    NICM (nonischemic cardiomyopathy) ([facility]) 02/15/2013    Dyslipidemia 02/13/2013    Essential (primary) hypertension 04/12/2010    Long-term insulin use in type 2 diabetes ([facility]) 04/12/2010       Cardiology Procedures this admission:  EchoCardiogram  Consults:

## 2025-01-30 NOTE — PLAN OF CARE
Problem: Chronic Conditions and Co-morbidities  Goal: Patient's chronic conditions and co-morbidity symptoms are monitored and maintained or improved  Outcome: Adequate for Discharge  Flowsheets (Taken 1/29/2025 2037 by Jose Roberto Archuleta RN)  Care Plan - Patient's Chronic Conditions and Co-Morbidity Symptoms are Monitored and Maintained or Improved: Monitor and assess patient's chronic conditions and comorbid symptoms for stability, deterioration, or improvement     Problem: Discharge Planning  Goal: Discharge to home or other facility with appropriate resources  Outcome: Adequate for Discharge  Flowsheets (Taken 1/29/2025 2037 by Jose Roberto Archuleta, RN)  Discharge to home or other facility with appropriate resources: Identify barriers to discharge with patient and caregiver     Problem: Pain  Goal: Verbalizes/displays adequate comfort level or baseline comfort level  Outcome: Adequate for Discharge     Problem: Safety - Adult  Goal: Free from fall injury  Outcome: Adequate for Discharge  Flowsheets (Taken 1/29/2025 2037 by Jose Roberto Archuleta, RN)  Free From Fall Injury: Instruct family/caregiver on patient safety     Problem: ABCDS Injury Assessment  Goal: Absence of physical injury  Outcome: Adequate for Discharge  Flowsheets (Taken 1/29/2025 2037 by Jose Roberto Archuleta, RN)  Absence of Physical Injury: Implement safety measures based on patient assessment

## 2025-01-31 ENCOUNTER — CARE COORDINATION (OUTPATIENT)
Dept: CARE COORDINATION | Facility: CLINIC | Age: 60
End: 2025-01-31

## 2025-01-31 DIAGNOSIS — U07.1 COVID-19 VIRUS INFECTION: Primary | ICD-10-CM

## 2025-01-31 PROCEDURE — 1111F DSCHRG MED/CURRENT MED MERGE: CPT | Performed by: INTERNAL MEDICINE

## 2025-01-31 NOTE — CARE COORDINATION
appropriate site of care based on symptoms and resources available to patient including: PCP  Specialist  Home health  CTN . The patient agrees to contact the primary care provider and/or specialist office for questions related to their healthcare.      Advance Care Planning:   Does patient have an Advance Directive: reviewed and needs to be updated.    Medication Reconciliation:  Medication reconciliation was performed with patient,1111F entered: yes.     Remote Patient Monitoring:  Offered patient enrollment in the Remote Patient Monitoring (RPM) program for in-home monitoring: Yes, but did not enroll at this time: declined to enroll in the program becausenot interested .    Assessments:  Care Transitions 24 Hour Call    Schedule Follow Up Appointment with PCP: Completed  Do you have a copy of your discharge instructions?: Yes  Do you have all of your prescriptions and are they filled?: Yes  Have you been contacted by a [facility] Pharmacist?: No  Have you scheduled your follow up appointment?: Yes  How are you going to get to your appointment?: Car - family or friend to transport  Post Acute Services: Home Health  Do you feel like you have everything you need to keep you well at home?: Yes  Are you an active caregiver in your home?: No  Care Transitions Interventions    Physical Therapy: Completed    Disease Specific Clinic: Completed                Follow Up Appointment:   Discussed follow up appointments. Patient has hospital follow up appointment scheduled within 7 days of discharge.   Future Appointments         Provider Specialty Dept Phone    2/3/2025 1:40 PM Delma Vincent PA-C Internal Medicine [phone]    2/17/2025 1:15 PM Heather Rubalcava PA Cardiology [phone]    2/27/2025 8:15 AM MAT LAB Internal Medicine [phone]    3/4/2025 10:30 AM PERIPHERAL Lab [phone]    3/4/2025 11:30 AM Sigifredo Luz MD Oncology [phone]    3/4/2025 1:20 PM Randell Lawson MD Internal Medicine

## 2025-02-03 ENCOUNTER — OFFICE VISIT (OUTPATIENT)
Dept: INTERNAL MEDICINE CLINIC | Facility: CLINIC | Age: 60
End: 2025-02-03

## 2025-02-03 VITALS
TEMPERATURE: 97.8 F | OXYGEN SATURATION: 95 % | WEIGHT: 182 LBS | HEIGHT: 56 IN | DIASTOLIC BLOOD PRESSURE: 56 MMHG | RESPIRATION RATE: 15 BRPM | BODY MASS INDEX: 40.94 KG/M2 | HEART RATE: 59 BPM | SYSTOLIC BLOOD PRESSURE: 122 MMHG

## 2025-02-03 DIAGNOSIS — R55 SYNCOPE AND COLLAPSE: ICD-10-CM

## 2025-02-03 DIAGNOSIS — U07.1 COVID-19: ICD-10-CM

## 2025-02-03 DIAGNOSIS — Z09 HOSPITAL DISCHARGE FOLLOW-UP: Primary | ICD-10-CM

## 2025-02-03 DIAGNOSIS — I10 ESSENTIAL HYPERTENSION: ICD-10-CM

## 2025-02-03 DIAGNOSIS — Z86.79 HISTORY OF VENTRICULAR TACHYCARDIA: ICD-10-CM

## 2025-02-03 DIAGNOSIS — Z95.810 PRESENCE OF COMBINATION INTERNAL CARDIAC DEFIBRILLATOR (ICD) AND PACEMAKER: ICD-10-CM

## 2025-02-03 NOTE — PROGRESS NOTES
Post-Discharge Transitional Care  Follow Up      Yann Shelton   YOB: 1965    Date of Office Visit:  2/3/2025  Date of Hospital Admission: 1/26/25  Date of Hospital Discharge: 1/30/25  Risk of hospital readmission (high >=14%. Medium >=10%) :Readmission Risk Score: 13.6      Care management risk score Rising risk (score 2-5) and Complex Care (Scores >=6): No Risk Score On File     Non face to face  following discharge, date last encounter closed (first attempt may have been earlier): 01/31/2025    Call initiated 2 business days of discharge: Yes    ASSESSMENT/PLAN:   Hospital discharge follow-up  -     MD DISCHARGE MEDS RECONCILED W/ CURRENT OUTPATIENT MED LIST  COVID-19  History of ventricular tachycardia  Presence of combination internal cardiac defibrillator (ICD) and pacemaker  Syncope and collapse  Essential hypertension    Medical Decision Making: moderate complexity  Follow up with cardiology as scheduled, PCP on 03/04/25 as scheduled.      On this date 2/3/2025 I have spent 34 minutes reviewing previous notes, test results and face to face with the patient discussing the diagnosis and importance of compliance with the treatment plan as well as documenting on the day of the visit.       Subjective:   HPI:  Follow up of Hospital problems/diagnosis(es): Syncope and Collapse, Ventricular Tachycardia, COVID-19    Inpatient course: Discharge summary reviewed- see chart.    Interval history/Current status: improved. She denies experiencing cough, SOB, palpitations, dizziness, syncope, fever/chills. ICD settings were adjusted and scheduled to follow up with cardiology on 02/17/25. She has been compliant with added medication (mexiletine). She has no additional complaints today.       Lab Results   Component Value Date    WBC 5.4 01/29/2025    HGB 12.4 01/29/2025    HCT 39.7 01/29/2025    MCV 97.3 01/29/2025     (L) 01/29/2025     Lab Results   Component Value Date     01/30/2025

## 2025-02-06 NOTE — TELEPHONE ENCOUNTER
Received following message on Cover My Meds:    N/A on January 31 by Jelena [facility] 2017  Unable to process transaction. Please resubmit.    Called Jelena. Spoke with Kelly. Submitted clinical questions via phone. Case reference # [id number]

## 2025-02-07 ENCOUNTER — CARE COORDINATION (OUTPATIENT)
Dept: CARE COORDINATION | Facility: CLINIC | Age: 60
End: 2025-02-07

## 2025-02-07 NOTE — CARE COORDINATION
Care Transitions Note    Follow Up Call     Attempted to reach patient for transitions of care follow up.  Unable to reach patient.      Outreach Attempts:   HIPAA compliant voicemail left for patient.     Care Summary Note: According to Epic notes patient attended hospital follow up with PCP 2/3/2025.     Follow Up Appointment:   Future Appointments         Provider Specialty Dept Phone    2/17/2025 1:30 PM Heather Rubalcava PA Cardiology [phone]    2/27/2025 8:15 AM MAT LAB Internal Medicine [phone]    3/4/2025 10:30 AM PERIPHERAL Lab [phone]    3/4/2025 11:30 AM Sigifredo Luz MD Oncology [phone]    3/4/2025 1:20 PM Randell Lawson MD Internal Medicine [phone]    4/15/2025 11:00 AM (Arrive by 10:45 AM) Roel Pires, APRN - CNP Sleep Medicine [phone]    4/24/2025 9:00 AM Stuart Schmitt DO Cardiology [phone]    6/10/2025 2:15 PM Michael Tan MD Cardiology [phone]            Plan for follow-up call in 11-14 days Plan for follow-up on next business day.  based on severity of symptoms and risk factors. Plan for next call: follow-up appointment-Review follow up appointment.    Heidi Steven LPN

## 2025-02-10 ENCOUNTER — CARE COORDINATION (OUTPATIENT)
Dept: CARE COORDINATION | Facility: CLINIC | Age: 60
End: 2025-02-10

## 2025-02-10 NOTE — CARE COORDINATION
Care Transitions Note    Final Call     Attempted to reach patient for transitions of care follow up.  Unable to reach patient.      Outreach Attempts:   HIPAA compliant voicemail left for patient.     Patient closed (unable to reach patient) from the Care Transitions program on 2/10/2025.  Patient/family  Unable to reach .      Handoff:   Patient was not referred to the ACM team due to unable to contact patient.      Care Summary Note: According to Epic notes patient attended hospital follow up with PCP 2/3/2025    Upcoming Appointments:    Future Appointments         Provider Specialty Dept Phone    2/17/2025 1:30 PM Heather Rubalcava PA Cardiology [phone]    2/27/2025 8:15 AM MAT LAB Internal Medicine [phone]    3/4/2025 10:30 AM PERIPHERAL Lab [phone]    3/4/2025 11:30 AM Sigifredo Luz MD Oncology [phone]    3/4/2025 1:20 PM Randell Lawson MD Internal Medicine [phone]    4/15/2025 11:00 AM (Arrive by 10:45 AM) Roel Pires, APRN - CNP Sleep Medicine [phone]    4/24/2025 9:00 AM Stuart Schmitt DO Cardiology [phone]    6/10/2025 2:15 PM Michael Tan MD Cardiology [phone]            Heidi Steven LPN

## 2025-02-17 ENCOUNTER — OFFICE VISIT (OUTPATIENT)
Age: 60
End: 2025-02-17
Payer: MEDICARE

## 2025-02-17 VITALS
SYSTOLIC BLOOD PRESSURE: 144 MMHG | HEART RATE: 60 BPM | DIASTOLIC BLOOD PRESSURE: 86 MMHG | BODY MASS INDEX: 40.94 KG/M2 | WEIGHT: 182 LBS | HEIGHT: 56 IN

## 2025-02-17 DIAGNOSIS — I47.20 VT (VENTRICULAR TACHYCARDIA) (HCC): Primary | ICD-10-CM

## 2025-02-17 DIAGNOSIS — I42.8 NICM (NONISCHEMIC CARDIOMYOPATHY) (HCC): ICD-10-CM

## 2025-02-17 DIAGNOSIS — I50.22 CHRONIC SYSTOLIC HEART FAILURE (HCC): ICD-10-CM

## 2025-02-17 PROCEDURE — G8417 CALC BMI ABV UP PARAM F/U: HCPCS | Performed by: PHYSICIAN ASSISTANT

## 2025-02-17 PROCEDURE — 3017F COLORECTAL CA SCREEN DOC REV: CPT | Performed by: PHYSICIAN ASSISTANT

## 2025-02-17 PROCEDURE — 99214 OFFICE O/P EST MOD 30 MIN: CPT | Performed by: PHYSICIAN ASSISTANT

## 2025-02-17 PROCEDURE — 1111F DSCHRG MED/CURRENT MED MERGE: CPT | Performed by: PHYSICIAN ASSISTANT

## 2025-02-17 PROCEDURE — 3079F DIAST BP 80-89 MM HG: CPT | Performed by: PHYSICIAN ASSISTANT

## 2025-02-17 PROCEDURE — 3077F SYST BP >= 140 MM HG: CPT | Performed by: PHYSICIAN ASSISTANT

## 2025-02-17 PROCEDURE — 93000 ELECTROCARDIOGRAM COMPLETE: CPT | Performed by: PHYSICIAN ASSISTANT

## 2025-02-17 PROCEDURE — 4004F PT TOBACCO SCREEN RCVD TLK: CPT | Performed by: PHYSICIAN ASSISTANT

## 2025-02-17 PROCEDURE — G8427 DOCREV CUR MEDS BY ELIG CLIN: HCPCS | Performed by: PHYSICIAN ASSISTANT

## 2025-02-17 NOTE — PROGRESS NOTES
[address], [address]  [place] [address]  PHONE: [phone]    Yann Shelton  1965    SUBJECTIVE:   Yann Shelton is a 59 y.o. female seen for a follow up visit regarding the following:     Chief Complaint   Patient presents with    Atrial Fibrillation    Cardiomyopathy    Follow-Up from Hospital      HPI:    Yann Shelton is a very pleasant 59 y.o. female with a past medical and cardiac history significant for nonischemic dilated cardiomyopathy, chronic systolic heart failure EF 30-35%, s/p Biotronik dual chamber ICD implant 7/18/2023 with generator replacement 4/10/2024, LV aneurysm, VT s/p ablation 10/7/2022 at [facility], HTN, hypercholesterolemia, DM2, LORI on CPAP, CKD stage III and obesity who presents for hospital follow up.    Mr Shelton was admitted to [facility] 1/26-1/30/25 after syncopal episode proceeded by lightheadedness with evidence of VT on ICD treated with ATP. She was positive for COVID 19. Her amiodarone was recently reduced due to abnormal PFTs and concern for pulmonary toxicity. During admission Mexiletine was added to her regimen. Her device settings were adjusted. ECHO showed EF mildly reduced with EF 40-45%. Patient states overall she is feeling ok. No recurrent episodes of syncope. Denies any palpitations, dizziness, near syncope. Admits to occasional shortness of breath. Walk 2 miles yesterday without trouble. States blood pressure is well controlled at home. Tolerating medications.      Cardiac PMH: (Old records have been reviewed and summarized below)  Device (7/18/13) Michele: implantation dual chamber ICD    Ablation (10/7/22) [facility]:   > Successful ventricular tachycardia ablation with resolution of clinical VT.  > Successful substrate modification targeting late potentials and fractionated signals  > Normal conduction intervals pre and post ablation     TTE (9/26/23): EF 30-35%, LV- Mid to basal inferolateral and anterolateral walls

## 2025-02-20 NOTE — PROGRESS NOTES
Patient identified as HRRP. In hospital visit with patient this am. Unable to discuss RN CCM program with patient due to her resting in bed. RRAT: 20- 2 ED visits with 1 admit since 1/24/19 PCP: Dr. Sima You PMH: DM, NICM, CHF, CAD, HTN, Morbid Obesity, BA with CPAP, Ventricular Tachycardia Plan: Patient to discharge home when medically stable. Link with RN CCM, Suma Hopson for [facility] and discussion regarding RN CCM services. This note will not be viewable in [address]
spontaneous

## 2025-02-25 DIAGNOSIS — N18.9 ANEMIA DUE TO CHRONIC RENAL FAILURE TREATED WITH ERYTHROPOIETIN, UNSPECIFIED CKD STAGE: ICD-10-CM

## 2025-02-25 DIAGNOSIS — D50.8 OTHER IRON DEFICIENCY ANEMIA: Primary | ICD-10-CM

## 2025-02-25 DIAGNOSIS — D63.1 ANEMIA DUE TO CHRONIC RENAL FAILURE TREATED WITH ERYTHROPOIETIN, UNSPECIFIED CKD STAGE: ICD-10-CM

## 2025-02-25 DIAGNOSIS — E55.9 VITAMIN D DEFICIENCY: ICD-10-CM

## 2025-02-25 DIAGNOSIS — E78.49 OTHER HYPERLIPIDEMIA: ICD-10-CM

## 2025-02-27 ENCOUNTER — LAB (OUTPATIENT)
Dept: INTERNAL MEDICINE CLINIC | Facility: CLINIC | Age: 60
End: 2025-02-27

## 2025-02-27 DIAGNOSIS — N18.4 CKD (CHRONIC KIDNEY DISEASE) STAGE 4, GFR 15-29 ML/MIN (HCC): ICD-10-CM

## 2025-02-27 DIAGNOSIS — E11.69 TYPE 2 DIABETES MELLITUS WITH OTHER SPECIFIED COMPLICATION, WITH LONG-TERM CURRENT USE OF INSULIN (HCC): ICD-10-CM

## 2025-02-27 DIAGNOSIS — I50.22 CHRONIC SYSTOLIC HEART FAILURE (HCC): ICD-10-CM

## 2025-02-27 DIAGNOSIS — Z79.4 TYPE 2 DIABETES MELLITUS WITH OTHER SPECIFIED COMPLICATION, WITH LONG-TERM CURRENT USE OF INSULIN (HCC): ICD-10-CM

## 2025-02-27 DIAGNOSIS — E11.21 TYPE 2 DIABETES WITH NEPHROPATHY (HCC): ICD-10-CM

## 2025-02-27 DIAGNOSIS — I42.8 NICM (NONISCHEMIC CARDIOMYOPATHY) (HCC): ICD-10-CM

## 2025-02-27 DIAGNOSIS — E78.5 DYSLIPIDEMIA: ICD-10-CM

## 2025-02-27 LAB
ANION GAP SERPL CALC-SCNC: 11 MMOL/L (ref 7–16)
BUN SERPL-MCNC: 20 MG/DL (ref 8–23)
CALCIUM SERPL-MCNC: 9.4 MG/DL (ref 8.8–10.2)
CHLORIDE SERPL-SCNC: 105 MMOL/L (ref 98–107)
CO2 SERPL-SCNC: 27 MMOL/L (ref 20–29)
CREAT SERPL-MCNC: 1.34 MG/DL (ref 0.6–1.1)
CREAT UR-MCNC: 54.4 MG/DL (ref 28–217)
ERYTHROCYTE [DISTWIDTH] IN BLOOD BY AUTOMATED COUNT: 15.8 % (ref 11.9–14.6)
GLUCOSE SERPL-MCNC: 110 MG/DL (ref 70–99)
HCT VFR BLD AUTO: 41.5 % (ref 35.8–46.3)
HGB BLD-MCNC: 13 G/DL (ref 11.7–15.4)
MCH RBC QN AUTO: 30.7 PG (ref 26.1–32.9)
MCHC RBC AUTO-ENTMCNC: 31.3 G/DL (ref 31.4–35)
MCV RBC AUTO: 97.9 FL (ref 82–102)
MICROALBUMIN UR-MCNC: 2.23 MG/DL (ref 0–20)
MICROALBUMIN/CREAT UR-RTO: 41 MG/G (ref 0–30)
NRBC # BLD: 0 K/UL (ref 0–0.2)
PLATELET # BLD AUTO: 113 K/UL (ref 150–450)
PMV BLD AUTO: 12.1 FL (ref 9.4–12.3)
POTASSIUM SERPL-SCNC: 4 MMOL/L (ref 3.5–5.1)
RBC # BLD AUTO: 4.24 M/UL (ref 4.05–5.2)
SODIUM SERPL-SCNC: 142 MMOL/L (ref 136–145)
WBC # BLD AUTO: 6.9 K/UL (ref 4.3–11.1)

## 2025-03-04 ENCOUNTER — OFFICE VISIT (OUTPATIENT)
Dept: INTERNAL MEDICINE CLINIC | Facility: CLINIC | Age: 60
End: 2025-03-04
Payer: MEDICARE

## 2025-03-04 ENCOUNTER — OFFICE VISIT (OUTPATIENT)
Dept: ONCOLOGY | Age: 60
End: 2025-03-04
Payer: MEDICARE

## 2025-03-04 ENCOUNTER — HOSPITAL ENCOUNTER (OUTPATIENT)
Dept: LAB | Age: 60
Discharge: HOME | End: 2025-03-04
Payer: MEDICARE

## 2025-03-04 VITALS
HEIGHT: 56 IN | OXYGEN SATURATION: 97 % | WEIGHT: 181.4 LBS | HEART RATE: 57 BPM | SYSTOLIC BLOOD PRESSURE: 112 MMHG | RESPIRATION RATE: 18 BRPM | DIASTOLIC BLOOD PRESSURE: 75 MMHG | BODY MASS INDEX: 40.81 KG/M2 | TEMPERATURE: 97.7 F

## 2025-03-04 VITALS
WEIGHT: 180 LBS | HEIGHT: 56 IN | BODY MASS INDEX: 40.49 KG/M2 | HEART RATE: 66 BPM | DIASTOLIC BLOOD PRESSURE: 68 MMHG | SYSTOLIC BLOOD PRESSURE: 132 MMHG | OXYGEN SATURATION: 98 % | TEMPERATURE: 97.2 F

## 2025-03-04 DIAGNOSIS — E78.2 MIXED HYPERLIPIDEMIA: ICD-10-CM

## 2025-03-04 DIAGNOSIS — E11.69 TYPE 2 DIABETES MELLITUS WITH OTHER SPECIFIED COMPLICATION, WITH LONG-TERM CURRENT USE OF INSULIN (HCC): ICD-10-CM

## 2025-03-04 DIAGNOSIS — Z79.4 CONTROLLED TYPE 2 DIABETES MELLITUS WITH DIABETIC NEPHROPATHY, WITH LONG-TERM CURRENT USE OF INSULIN (HCC): Primary | ICD-10-CM

## 2025-03-04 DIAGNOSIS — I50.22 CHRONIC SYSTOLIC HEART FAILURE (HCC): ICD-10-CM

## 2025-03-04 DIAGNOSIS — D50.8 OTHER IRON DEFICIENCY ANEMIA: Primary | ICD-10-CM

## 2025-03-04 DIAGNOSIS — D63.1 ANEMIA DUE TO CHRONIC RENAL FAILURE TREATED WITH ERYTHROPOIETIN, UNSPECIFIED CKD STAGE: ICD-10-CM

## 2025-03-04 DIAGNOSIS — E78.49 OTHER HYPERLIPIDEMIA: ICD-10-CM

## 2025-03-04 DIAGNOSIS — Z23 NEED FOR PNEUMOCOCCAL 20-VALENT CONJUGATE VACCINATION: ICD-10-CM

## 2025-03-04 DIAGNOSIS — I47.20 VT (VENTRICULAR TACHYCARDIA) (HCC): ICD-10-CM

## 2025-03-04 DIAGNOSIS — D63.1 ANEMIA DUE TO STAGE 3 (MODERATE) CHRONIC RENAL FAILURE TREATED WITH ERYTHROPOIETIN (HCC): ICD-10-CM

## 2025-03-04 DIAGNOSIS — Z79.4 TYPE 2 DIABETES MELLITUS WITH OTHER SPECIFIED COMPLICATION, WITH LONG-TERM CURRENT USE OF INSULIN (HCC): ICD-10-CM

## 2025-03-04 DIAGNOSIS — I48.0 PAROXYSMAL ATRIAL FIBRILLATION (HCC): ICD-10-CM

## 2025-03-04 DIAGNOSIS — E11.21 CONTROLLED TYPE 2 DIABETES MELLITUS WITH DIABETIC NEPHROPATHY, WITH LONG-TERM CURRENT USE OF INSULIN (HCC): Primary | ICD-10-CM

## 2025-03-04 DIAGNOSIS — E55.9 VITAMIN D DEFICIENCY: ICD-10-CM

## 2025-03-04 DIAGNOSIS — E66.01 MORBID OBESITY WITH BMI OF 40.0-44.9, ADULT: Chronic | ICD-10-CM

## 2025-03-04 DIAGNOSIS — N18.30 ANEMIA DUE TO STAGE 3 (MODERATE) CHRONIC RENAL FAILURE TREATED WITH ERYTHROPOIETIN (HCC): ICD-10-CM

## 2025-03-04 DIAGNOSIS — N18.31 STAGE 3A CHRONIC KIDNEY DISEASE (HCC): ICD-10-CM

## 2025-03-04 DIAGNOSIS — D50.8 OTHER IRON DEFICIENCY ANEMIA: ICD-10-CM

## 2025-03-04 DIAGNOSIS — N18.9 ANEMIA DUE TO CHRONIC RENAL FAILURE TREATED WITH ERYTHROPOIETIN, UNSPECIFIED CKD STAGE: ICD-10-CM

## 2025-03-04 DIAGNOSIS — E11.21 TYPE 2 DIABETES WITH NEPHROPATHY (HCC): ICD-10-CM

## 2025-03-04 PROBLEM — N18.6 ESRD (END STAGE RENAL DISEASE) (HCC): Status: RESOLVED | Noted: 2022-09-06 | Resolved: 2025-03-04

## 2025-03-04 PROBLEM — I50.23 ACUTE ON CHRONIC SYSTOLIC HEART FAILURE (HCC): Status: RESOLVED | Noted: 2020-11-10 | Resolved: 2025-03-04

## 2025-03-04 LAB
25(OH)D3 SERPL-MCNC: 39.9 NG/ML (ref 30–100)
ALBUMIN SERPL-MCNC: 3.4 G/DL (ref 3.2–4.6)
ALBUMIN/GLOB SERPL: 1 (ref 1–1.9)
ALP SERPL-CCNC: 78 U/L (ref 35–104)
ALT SERPL-CCNC: 32 U/L (ref 8–45)
ANION GAP SERPL CALC-SCNC: 14 MMOL/L (ref 7–16)
AST SERPL-CCNC: 30 U/L (ref 15–37)
BASOPHILS # BLD: 0.04 K/UL (ref 0–0.2)
BASOPHILS NFR BLD: 0.6 % (ref 0–2)
BILIRUB SERPL-MCNC: 0.3 MG/DL (ref 0–1.2)
BUN SERPL-MCNC: 21 MG/DL (ref 8–23)
CALCIUM SERPL-MCNC: 9.3 MG/DL (ref 8.8–10.2)
CHLORIDE SERPL-SCNC: 106 MMOL/L (ref 98–107)
CO2 SERPL-SCNC: 23 MMOL/L (ref 20–29)
CREAT SERPL-MCNC: 1.34 MG/DL (ref 0.6–1.1)
DIFFERENTIAL METHOD BLD: ABNORMAL
EOSINOPHIL # BLD: 0.2 K/UL (ref 0–0.8)
EOSINOPHIL NFR BLD: 2.8 % (ref 0.5–7.8)
ERYTHROCYTE [DISTWIDTH] IN BLOOD BY AUTOMATED COUNT: 15.7 % (ref 11.9–14.6)
FERRITIN SERPL-MCNC: 25 NG/ML (ref 8–388)
GLOBULIN SER CALC-MCNC: 3.3 G/DL (ref 2.3–3.5)
GLUCOSE SERPL-MCNC: 103 MG/DL (ref 70–99)
HCT VFR BLD AUTO: 40.5 % (ref 35.8–46.3)
HGB BLD-MCNC: 13 G/DL (ref 11.7–15.4)
IMM GRANULOCYTES # BLD AUTO: 0.02 K/UL (ref 0–0.5)
IMM GRANULOCYTES NFR BLD AUTO: 0.3 % (ref 0–5)
LYMPHOCYTES # BLD: 1.91 K/UL (ref 0.5–4.6)
LYMPHOCYTES NFR BLD: 26.5 % (ref 13–44)
MCH RBC QN AUTO: 30.9 PG (ref 26.1–32.9)
MCHC RBC AUTO-ENTMCNC: 32.1 G/DL (ref 31.4–35)
MCV RBC AUTO: 96.2 FL (ref 82–102)
MONOCYTES # BLD: 0.57 K/UL (ref 0.1–1.3)
MONOCYTES NFR BLD: 7.9 % (ref 4–12)
NEUTS SEG # BLD: 4.47 K/UL (ref 1.7–8.2)
NEUTS SEG NFR BLD: 61.9 % (ref 43–78)
NRBC # BLD: 0.03 K/UL (ref 0–0.2)
PLATELET # BLD AUTO: 131 K/UL (ref 150–450)
PMV BLD AUTO: 11.5 FL (ref 9.4–12.3)
POTASSIUM SERPL-SCNC: 3.6 MMOL/L (ref 3.5–5.1)
PROT SERPL-MCNC: 6.7 G/DL (ref 6.3–8.2)
RBC # BLD AUTO: 4.21 M/UL (ref 4.05–5.2)
SODIUM SERPL-SCNC: 143 MMOL/L (ref 136–145)
WBC # BLD AUTO: 7.2 K/UL (ref 4.3–11.1)

## 2025-03-04 PROCEDURE — 4004F PT TOBACCO SCREEN RCVD TLK: CPT | Performed by: INTERNAL MEDICINE

## 2025-03-04 PROCEDURE — G8427 DOCREV CUR MEDS BY ELIG CLIN: HCPCS | Performed by: INTERNAL MEDICINE

## 2025-03-04 PROCEDURE — 82728 ASSAY OF FERRITIN: CPT

## 2025-03-04 PROCEDURE — 82306 VITAMIN D 25 HYDROXY: CPT

## 2025-03-04 PROCEDURE — G8417 CALC BMI ABV UP PARAM F/U: HCPCS | Performed by: INTERNAL MEDICINE

## 2025-03-04 PROCEDURE — 36415 COLL VENOUS BLD VENIPUNCTURE: CPT

## 2025-03-04 PROCEDURE — G0009 ADMIN PNEUMOCOCCAL VACCINE: HCPCS | Performed by: INTERNAL MEDICINE

## 2025-03-04 PROCEDURE — 99214 OFFICE O/P EST MOD 30 MIN: CPT | Performed by: INTERNAL MEDICINE

## 2025-03-04 PROCEDURE — 3078F DIAST BP <80 MM HG: CPT | Performed by: INTERNAL MEDICINE

## 2025-03-04 PROCEDURE — 90677 PCV20 VACCINE IM: CPT | Performed by: INTERNAL MEDICINE

## 2025-03-04 PROCEDURE — 85025 COMPLETE CBC W/AUTO DIFF WBC: CPT

## 2025-03-04 PROCEDURE — 2022F DILAT RTA XM EVC RTNOPTHY: CPT | Performed by: INTERNAL MEDICINE

## 2025-03-04 PROCEDURE — 3074F SYST BP LT 130 MM HG: CPT | Performed by: INTERNAL MEDICINE

## 2025-03-04 PROCEDURE — 3017F COLORECTAL CA SCREEN DOC REV: CPT | Performed by: INTERNAL MEDICINE

## 2025-03-04 PROCEDURE — 80053 COMPREHEN METABOLIC PANEL: CPT

## 2025-03-04 PROCEDURE — 3051F HG A1C>EQUAL 7.0%<8.0%: CPT | Performed by: INTERNAL MEDICINE

## 2025-03-04 PROCEDURE — 3075F SYST BP GE 130 - 139MM HG: CPT | Performed by: INTERNAL MEDICINE

## 2025-03-04 PROCEDURE — G2211 COMPLEX E/M VISIT ADD ON: HCPCS | Performed by: INTERNAL MEDICINE

## 2025-03-04 PROCEDURE — 99215 OFFICE O/P EST HI 40 MIN: CPT | Performed by: INTERNAL MEDICINE

## 2025-03-04 SDOH — ECONOMIC STABILITY: FOOD INSECURITY: WITHIN THE PAST 12 MONTHS, THE FOOD YOU BOUGHT JUST DIDN'T LAST AND YOU DIDN'T HAVE MONEY TO GET MORE.: NEVER TRUE

## 2025-03-04 SDOH — ECONOMIC STABILITY: FOOD INSECURITY: WITHIN THE PAST 12 MONTHS, YOU WORRIED THAT YOUR FOOD WOULD RUN OUT BEFORE YOU GOT MONEY TO BUY MORE.: NEVER TRUE

## 2025-03-04 ASSESSMENT — PATIENT HEALTH QUESTIONNAIRE - PHQ9
2. FEELING DOWN, DEPRESSED OR HOPELESS: NOT AT ALL
SUM OF ALL RESPONSES TO PHQ QUESTIONS 1-9: 0
SUM OF ALL RESPONSES TO PHQ QUESTIONS 1-9: 0
1. LITTLE INTEREST OR PLEASURE IN DOING THINGS: NOT AT ALL
SUM OF ALL RESPONSES TO PHQ QUESTIONS 1-9: 0
SUM OF ALL RESPONSES TO PHQ QUESTIONS 1-9: 0

## 2025-03-04 NOTE — ASSESSMENT & PLAN NOTE
Monitored by specialist- no acute findings meriting change in the plan  Lab Results   Component Value Date    WBC 7.2 03/04/2025    HGB 13.0 03/04/2025    HCT 40.5 03/04/2025    MCV 96.2 03/04/2025     (L) 03/04/2025

## 2025-03-04 NOTE — PATIENT INSTRUCTIONS
Patient Information from Today's Visit    The members of your [facility] are listed below:    Physician Provider: Sigifredo Luz Medical Oncologist  Advanced Practice Clinician: Shahnaz Murillo NP  Registered Nurse: Yadira JEFFRIES   Navigator: N/A  Medical Assistant: Gladys SMALL MA and Linh CROW MA  : Kori CHEUNG   Supportive Care Services: Kishore RICHARDSON LMSW    Diagnosis: LUCIO        Follow Up Instructions:    -Labs reviewed.   -We will recheck you labs and follow up in 6 months.       Treatment Summary has been discussed and given to patient:N/A      Current Labs:   Hospital Outpatient Visit on 03/04/2025   Component Date Value Ref Range Status    WBC 03/04/2025 7.2  4.3 - 11.1 K/uL Final    RBC 03/04/2025 4.21  4.05 - 5.2 M/uL Final    Hemoglobin 03/04/2025 13.0  11.7 - 15.4 g/dL Final    Hematocrit 03/04/2025 40.5  35.8 - 46.3 % Final    MCV 03/04/2025 96.2  82.0 - 102.0 FL Final    MCH 03/04/2025 30.9  26.1 - 32.9 PG Final    MCHC 03/04/2025 32.1  31.4 - 35.0 g/dL Final    RDW 03/04/2025 15.7 (H)  11.9 - 14.6 % Final    Platelets 03/04/2025 131 (L)  150 - 450 K/uL Final    MPV 03/04/2025 11.5  9.4 - 12.3 FL Final    nRBC 03/04/2025 0.03  0.0 - 0.2 K/uL Final    **Note: Absolute NRBC parameter is now reported with Hemogram**    Neutrophils % 03/04/2025 61.9  43.0 - 78.0 % Final    Lymphocytes % 03/04/2025 26.5  13.0 - 44.0 % Final    Monocytes % 03/04/2025 7.9  4.0 - 12.0 % Final    Eosinophils % 03/04/2025 2.8  0.5 - 7.8 % Final    Basophils % 03/04/2025 0.6  0.0 - 2.0 % Final    Immature Granulocytes % 03/04/2025 0.3  0.0 - 5.0 % Final    Neutrophils Absolute 03/04/2025 4.47  1.70 - 8.20 K/UL Final    Lymphocytes Absolute 03/04/2025 1.91  0.50 - 4.60 K/UL Final    Monocytes Absolute 03/04/2025 0.57  0.10 - 1.30 K/UL Final    Eosinophils Absolute 03/04/2025 0.20  0.00 - 0.80 K/UL Final    Basophils Absolute 03/04/2025 0.04  0.00 - 0.20 K/UL Final    Immature Granulocytes Absolute 03/04/2025 0.02  0.00

## 2025-03-04 NOTE — PROGRESS NOTES
- 32.9 PG    MCHC 30.9 (L) 31.4 - 35.0 g/dL    RDW 15.4 (H) 11.9 - 14.6 %    Platelets 93 (L) 150 - 450 K/uL    MPV 12.2 9.4 - 12.3 FL    nRBC 0.00 0.0 - 0.2 K/uL    Differential Type AUTOMATED      Neutrophils % 63.0 43.0 - 78.0 %    Lymphocytes % 26.2 13.0 - 44.0 %    Monocytes % 9.6 4.0 - 12.0 %    Eosinophils % 0.4 (L) 0.5 - 7.8 %    Basophils % 0.4 0.0 - 2.0 %    Immature Granulocytes % 0.4 0.0 - 5.0 %    Neutrophils Absolute 3.56 1.70 - 8.20 K/UL    Lymphocytes Absolute 1.48 0.50 - 4.60 K/UL    Monocytes Absolute 0.54 0.10 - 1.30 K/UL    Eosinophils Absolute 0.02 0.00 - 0.80 K/UL    Basophils Absolute 0.02 0.00 - 0.20 K/UL    Immature Granulocytes Absolute 0.02 0.0 - 0.5 K/UL   Troponin   Result Value Ref Range    Troponin T 18.0 (H) 0 - 14 ng/L   Basic Metabolic Panel w/ Reflex to MG   Result Value Ref Range    Sodium 143 136 - 145 mmol/L    Potassium 3.9 3.5 - 5.1 mmol/L    Chloride 107 98 - 107 mmol/L    CO2 26 20 - 29 mmol/L    Anion Gap 10 7 - 16 mmol/L    Glucose 93 70 - 99 mg/dL    BUN 18 8 - 23 MG/DL    Creatinine 1.24 (H) 0.60 - 1.10 MG/DL    Est, Glom Filt Rate 50 (L) >60 ml/min/1.73m2    Calcium 8.8 8.8 - 10.2 MG/DL   CBC with Auto Differential   Result Value Ref Range    WBC 5.7 4.3 - 11.1 K/uL    RBC 3.94 (L) 4.05 - 5.2 M/uL    Hemoglobin 12.2 11.7 - 15.4 g/dL    Hematocrit 37.5 35.8 - 46.3 %    MCV 95.2 82 - 102 FL    MCH 31.0 26.1 - 32.9 PG    MCHC 32.5 31.4 - 35.0 g/dL    RDW 15.6 (H) 11.9 - 14.6 %    Platelets 106 (L) 150 - 450 K/uL    MPV 11.6 9.4 - 12.3 FL    nRBC 0.00 0.0 - 0.2 K/uL    Differential Type AUTOMATED      Neutrophils % 62.8 43.0 - 78.0 %    Lymphocytes % 25.6 13.0 - 44.0 %    Monocytes % 9.6 4.0 - 12.0 %    Eosinophils % 1.4 0.5 - 7.8 %    Basophils % 0.3 0.0 - 2.0 %    Immature Granulocytes % 0.3 0.0 - 5.0 %    Neutrophils Absolute 3.60 1.70 - 8.20 K/UL    Lymphocytes Absolute 1.47 0.50 - 4.60 K/UL    Monocytes Absolute 0.55 0.10 - 1.30 K/UL    Eosinophils Absolute 0.08 0.00 -

## 2025-03-04 NOTE — ASSESSMENT & PLAN NOTE
Chronic, at goal (stable),    Lab Results   Component Value Date/Time     03/04/2025 10:11 AM    K 3.6 03/04/2025 10:11 AM     03/04/2025 10:11 AM    CO2 23 03/04/2025 10:11 AM    BUN 21 03/04/2025 10:11 AM    CREATININE 1.34 03/04/2025 10:11 AM    GLUCOSE 103 03/04/2025 10:11 AM    CALCIUM 9.3 03/04/2025 10:11 AM    LABGLOM 45 03/04/2025 10:11 AM    LABGLOM 40 04/02/2024 10:10 AM    LABGLOM 32 03/31/2022 08:12 AM      Followed by nephrology

## 2025-03-04 NOTE — ASSESSMENT & PLAN NOTE
Chronic, at goal (stable), continue current treatment plan  Lab Results   Component Value Date    LABA1C 7.1 (H) 01/30/2025    LABA1C 7.3 (H) 07/24/2024    LABA1C 7.0 (H) 10/25/2022     Lab Results   Component Value Date    CREATININE 1.34 (H) 03/04/2025     Diabetic Medications       Insulin       insulin glargine (LANTUS SOLOSTAR) 100 UNIT/ML injection pen Inject 12 Units into the skin nightly       Sodium-Glucose Co-Transporter 2 (SGLT2) Inhibitors       empagliflozin (JARDIANCE) 10 MG tablet Take 1 tablet by mouth daily          Diabetic Medications       Insulin       insulin glargine (LANTUS SOLOSTAR) 100 UNIT/ML injection pen Inject 12 Units into the skin nightly       Sodium-Glucose Co-Transporter 2 (SGLT2) Inhibitors       empagliflozin (JARDIANCE) 10 MG tablet Take 1 tablet by mouth daily          Treatment regimen is effective.

## 2025-03-04 NOTE — PROGRESS NOTES
[place] [id number]  Phone: [phone]           3/4/2025  Yann Shelton  1965  [id number]        Yann Shelton is a 59 year old -American female who has returned to my clinic for a follow-up visit; she was initially referred to me by Dr. Stuart Schmitt for management of Iron Deficiency Anemia, she received parenteral Iron infusions in 8/2024.        ALLERGIES:     Penicillin.        FAMILY HISTORY:    No hematologic disorders.        SOCIAL HISTORY:      She is single and lives alone. She works at [employer]. She smokes 5 cigarettes every day.        PAST MEDICAL HISTORY:    Hypertension, Coronary Artery Disease, renal insufficiency, Diabetes Mellitus, Hyperlipidemia, Congestive Heart Failure, s/p AICD, Allergic Rhinitis, GERD, Iron Deficiency Anemia, Fibromyalgia, Vitamin D deficiency, Obstructive Sleep Apnea and Iron Deficiency Anemia.         ROS:  The patient complained of fatigue and myalgia; all other systems negative.        PHYSICAL EXAM:   The patient was alert, awake and oriented, no acute distress was noted, a left infra-clavicular AICD was present. Oral examination did not reveal any mucosal lesions. Lymph node examination did not reveal any adenopathy. Neck examination revealed a supple neck, no thyromegaly or masses were noted. Chest examination revealed slightly prolonged expiration bilaterally. Heart examination revealed S-1 and S-2 with a 2/6 systolic murmur. Abdominal examination revealed a non-tender abdomen, bowel sounds were positive, no organomegaly could be appreciated. Examination of the extremities did not reveal any tenderness or erythema, 1+ bilateral pedal edema was present. Examination of the skin did not reveal any lesions.        KPS:    100.        LABORATORY INVESTIGATIONS:  CBC showed a WBC count of 7.2, ANC was 4.4, Hemoglobin was 13.1 and Platelets were 131; her Ferritin level was 25. Medical problems

## 2025-03-17 ENCOUNTER — TRANSCRIBE ORDERS (OUTPATIENT)
Dept: SCHEDULING | Age: 60
End: 2025-03-17

## 2025-03-17 DIAGNOSIS — Z12.31 VISIT FOR SCREENING MAMMOGRAM: Primary | ICD-10-CM

## 2025-04-12 ASSESSMENT — SLEEP AND FATIGUE QUESTIONNAIRES
HOW LIKELY ARE YOU TO NOD OFF OR FALL ASLEEP WHILE SITTING AND TALKING TO SOMEONE: WOULD NEVER DOZE
HOW LIKELY ARE YOU TO NOD OFF OR FALL ASLEEP WHILE SITTING AND READING: MODERATE CHANCE OF DOZING
HOW LIKELY ARE YOU TO NOD OFF OR FALL ASLEEP WHILE LYING DOWN TO REST IN THE AFTERNOON WHEN CIRCUMSTANCES PERMIT: MODERATE CHANCE OF DOZING
ESS TOTAL SCORE: 10
HOW LIKELY ARE YOU TO NOD OFF OR FALL ASLEEP IN A CAR, WHILE STOPPED FOR A FEW MINUTES IN TRAFFIC: WOULD NEVER DOZE
HOW LIKELY ARE YOU TO NOD OFF OR FALL ASLEEP WHILE WATCHING TV: MODERATE CHANCE OF DOZING
HOW LIKELY ARE YOU TO NOD OFF OR FALL ASLEEP WHEN YOU ARE A PASSENGER IN A CAR FOR AN HOUR WITHOUT A BREAK: WOULD NEVER DOZE
HOW LIKELY ARE YOU TO NOD OFF OR FALL ASLEEP WHILE SITTING INACTIVE IN A PUBLIC PLACE: MODERATE CHANCE OF DOZING
HOW LIKELY ARE YOU TO NOD OFF OR FALL ASLEEP WHEN YOU ARE A PASSENGER IN A CAR FOR AN HOUR WITHOUT A BREAK: WOULD NEVER DOZE
HOW LIKELY ARE YOU TO NOD OFF OR FALL ASLEEP WHILE LYING DOWN TO REST IN THE AFTERNOON WHEN CIRCUMSTANCES PERMIT: MODERATE CHANCE OF DOZING
HOW LIKELY ARE YOU TO NOD OFF OR FALL ASLEEP IN A CAR, WHILE STOPPED FOR A FEW MINUTES IN TRAFFIC: WOULD NEVER DOZE
HOW LIKELY ARE YOU TO NOD OFF OR FALL ASLEEP WHILE SITTING AND READING: MODERATE CHANCE OF DOZING
HOW LIKELY ARE YOU TO NOD OFF OR FALL ASLEEP WHILE WATCHING TV: MODERATE CHANCE OF DOZING
HOW LIKELY ARE YOU TO NOD OFF OR FALL ASLEEP WHILE SITTING QUIETLY AFTER LUNCH WITHOUT ALCOHOL: MODERATE CHANCE OF DOZING
HOW LIKELY ARE YOU TO NOD OFF OR FALL ASLEEP WHILE SITTING AND TALKING TO SOMEONE: WOULD NEVER DOZE
HOW LIKELY ARE YOU TO NOD OFF OR FALL ASLEEP WHILE SITTING QUIETLY AFTER LUNCH WITHOUT ALCOHOL: MODERATE CHANCE OF DOZING
HOW LIKELY ARE YOU TO NOD OFF OR FALL ASLEEP WHILE SITTING INACTIVE IN A PUBLIC PLACE: MODERATE CHANCE OF DOZING

## 2025-04-15 ENCOUNTER — OFFICE VISIT (OUTPATIENT)
Dept: SLEEP MEDICINE | Age: 60
End: 2025-04-15
Payer: MEDICARE

## 2025-04-15 VITALS
RESPIRATION RATE: 19 BRPM | HEART RATE: 59 BPM | HEIGHT: 58 IN | TEMPERATURE: 97.6 F | SYSTOLIC BLOOD PRESSURE: 128 MMHG | OXYGEN SATURATION: 97 % | DIASTOLIC BLOOD PRESSURE: 83 MMHG | WEIGHT: 180 LBS | BODY MASS INDEX: 37.79 KG/M2

## 2025-04-15 DIAGNOSIS — E66.9 OBESITY (BMI 35.0-39.9 WITHOUT COMORBIDITY): ICD-10-CM

## 2025-04-15 DIAGNOSIS — G47.33 OSA ON CPAP: Primary | ICD-10-CM

## 2025-04-15 PROCEDURE — 3074F SYST BP LT 130 MM HG: CPT | Performed by: NURSE PRACTITIONER

## 2025-04-15 PROCEDURE — 3079F DIAST BP 80-89 MM HG: CPT | Performed by: NURSE PRACTITIONER

## 2025-04-15 PROCEDURE — 3017F COLORECTAL CA SCREEN DOC REV: CPT | Performed by: NURSE PRACTITIONER

## 2025-04-15 PROCEDURE — G2211 COMPLEX E/M VISIT ADD ON: HCPCS | Performed by: NURSE PRACTITIONER

## 2025-04-15 PROCEDURE — 4004F PT TOBACCO SCREEN RCVD TLK: CPT | Performed by: NURSE PRACTITIONER

## 2025-04-15 PROCEDURE — G8427 DOCREV CUR MEDS BY ELIG CLIN: HCPCS | Performed by: NURSE PRACTITIONER

## 2025-04-15 PROCEDURE — G8417 CALC BMI ABV UP PARAM F/U: HCPCS | Performed by: NURSE PRACTITIONER

## 2025-04-15 PROCEDURE — 99213 OFFICE O/P EST LOW 20 MIN: CPT | Performed by: NURSE PRACTITIONER

## 2025-04-15 NOTE — PATIENT INSTRUCTIONS
Continue CPAP 12 cm H2O with nightly compliance  New CPAP supplies ordered  Recommendations as above  Follow-up in 1 year or sooner if needed

## 2025-04-15 NOTE — PROGRESS NOTES
Product type* /   Subscriber ID: [id number] - (Medicare Managed)      AMB Supply Order  Order Details     DME Location: [facility]   Order Date: 4/15/2025   The primary encounter diagnosis was LORI on CPAP. A diagnosis of Obesity (BMI 35.0-39.9 without comorbidity) was also pertinent to this visit.             (  X   )Supplies Needed       CPAP Machine   (     ) CPAP Unit  (     ) Auto CPAP Unit  (     ) BiLevel Unit  (     ) Auto BiLevel Unit  (     ) ASV   (     ) Bilevel ST      Length of need: 12 months    Pressure:  12 cmH20  EPR: 3     Starting Ramp Pressure:   cm H20  Ramp Time: min      Patient had a diagnostic Apnea Hypopnea Index (AHI) of :  92.2  *SUPPLIES* Replace all as needed, or per coverage guidelines     Masks Type:  ( x   ) -Full Face Mask (1 per 3 mon)  (  x  ) -Full Mask (1 per month) Interface/Cushion      (  ) -Nasal Mask (1 per 3 mon)  (  ) - Nasal Mask (1 per month) Interface/Cushion  (     ) -Pillow (2 per mon)  (     ) -Wghlrkdjy (1 per 6 mon)            Other Supplies:    (   X  )-Dlrocoig (1 per 6 mon)  (   X  )-Ahmzrc Tubing (1 per 3 mon)  (   X  )- Disposable Filter (2 per mon)  (   x  )-Piaqki Humidifier (1 per year)     ( x    )-Dhxfsprix (sometimes used with Full Face Mask) (1 per 6 mos)  (    )-Tubing-without heat (1 per 3 mos)  (     )-Non-Disposable Filter (1 per 6 mos)  (  x   )-Water Chamber (1 per 6 mos)  (     )-Humidifier non-heated (1 per 5 yrs)      Signed Date: 4/15/2025  Electronically Signed By: ARNOLDO Pretty - CNP  Electronically Dated:  4/15/2025             Collaborating Physician: Dr. Wu    Today's office visit was spent  reviewing test results, prognosis, importance of compliance, education about disease process, benefits of medications, instructions for management of acute flare-ups, and follow up plans.  Total time spent with patient was 20

## 2025-04-24 ENCOUNTER — HOSPITAL ENCOUNTER (OUTPATIENT)
Dept: MAMMOGRAPHY | Age: 60
Discharge: HOME | End: 2025-04-27
Attending: INTERNAL MEDICINE
Payer: MEDICARE

## 2025-04-24 ENCOUNTER — OFFICE VISIT (OUTPATIENT)
Age: 60
End: 2025-04-24
Payer: MEDICARE

## 2025-04-24 VITALS — WEIGHT: 178 LBS | HEIGHT: 58 IN | BODY MASS INDEX: 37.36 KG/M2

## 2025-04-24 VITALS
SYSTOLIC BLOOD PRESSURE: 128 MMHG | DIASTOLIC BLOOD PRESSURE: 82 MMHG | WEIGHT: 178.4 LBS | HEART RATE: 64 BPM | BODY MASS INDEX: 37.45 KG/M2 | HEIGHT: 58 IN

## 2025-04-24 DIAGNOSIS — Z12.31 VISIT FOR SCREENING MAMMOGRAM: ICD-10-CM

## 2025-04-24 DIAGNOSIS — I50.22 CHRONIC SYSTOLIC HEART FAILURE (HCC): ICD-10-CM

## 2025-04-24 DIAGNOSIS — E78.5 DYSLIPIDEMIA: ICD-10-CM

## 2025-04-24 DIAGNOSIS — I42.8 NICM (NONISCHEMIC CARDIOMYOPATHY) (HCC): Primary | ICD-10-CM

## 2025-04-24 DIAGNOSIS — I25.3 LEFT VENTRICULAR ANEURYSM: ICD-10-CM

## 2025-04-24 DIAGNOSIS — I48.0 PAROXYSMAL ATRIAL FIBRILLATION (HCC): ICD-10-CM

## 2025-04-24 DIAGNOSIS — G47.33 OSA ON CPAP: ICD-10-CM

## 2025-04-24 DIAGNOSIS — I10 ESSENTIAL (PRIMARY) HYPERTENSION: ICD-10-CM

## 2025-04-24 DIAGNOSIS — Z95.810 IMPLANTABLE CARDIOVERTER-DEFIBRILLATOR (ICD) IN SITU: ICD-10-CM

## 2025-04-24 PROCEDURE — 3017F COLORECTAL CA SCREEN DOC REV: CPT | Performed by: INTERNAL MEDICINE

## 2025-04-24 PROCEDURE — G8417 CALC BMI ABV UP PARAM F/U: HCPCS | Performed by: INTERNAL MEDICINE

## 2025-04-24 PROCEDURE — 99214 OFFICE O/P EST MOD 30 MIN: CPT | Performed by: INTERNAL MEDICINE

## 2025-04-24 PROCEDURE — G8428 CUR MEDS NOT DOCUMENT: HCPCS | Performed by: INTERNAL MEDICINE

## 2025-04-24 PROCEDURE — 4004F PT TOBACCO SCREEN RCVD TLK: CPT | Performed by: INTERNAL MEDICINE

## 2025-04-24 PROCEDURE — 3074F SYST BP LT 130 MM HG: CPT | Performed by: INTERNAL MEDICINE

## 2025-04-24 PROCEDURE — 3079F DIAST BP 80-89 MM HG: CPT | Performed by: INTERNAL MEDICINE

## 2025-04-24 PROCEDURE — 77063 BREAST TOMOSYNTHESIS BI: CPT

## 2025-04-24 NOTE — PROGRESS NOTES
[address] [facility], [place] [address]  PHONE: [phone]     25    NAME:  Yann Shelton  : 1965  MRN: [id number]       SUBJECTIVE:   Yann Shelton is a 59 y.o. female seen for a follow up visit regarding the following:     Chief Complaint   Patient presents with    Congestive Heart Failure    Atrial Fibrillation    Follow-up       HPI:   Here for NICM/cSHF/VT.         SHF (EF 30% in )      Echo 2020: EF 30-35%   2020:  a/c SHF   2020: HF admission, diuresed 7L      LHC 2022 and 2024: Angiographically normal coronary arteries  Echo 2022: EF 35%  Ct 2022: large left ventricular aneurysm  VT ablation 10/2022 [facility].  Echo 2023: EF 30-35%  2024 admission for A/C resp failure, HF, anemia s/p PRBC    2025:  VT given ATP, COVID pos-----amio reduced due to pulm toxicity, mexitil started.   Echo 2025: EF of 40 - 45%. Left ventricle is moderately dilated. Mildly increased wall thickness. The basal inferior/inferolateral wall is akinetic/aneurysmal.      She lives by herself, sister here in town.  One son in [place].    Amio on Mon and  now, reduced dose with EP due to pulm toxicity, on mexitil.  Feeling better, no angina, CP, DUARTE, SOB.    No new edema, feeling better.    No CP. No new DUARTE.   Doing well on anticoagulation treatment as reviewed today, no bleeding issues or excessive bruising noted.          Patient denies recent history of orthopnea, PND, excessive dizziness and/or syncope.      Wearing CPAP every night now.             Past Medical History, Past Surgical History, Family history, Social History, and Medications were all reviewed with the patient today and updated as necessary.     Current Outpatient Medications   Medication Sig Dispense Refill    mexiletine (MEXITIL) 150 MG capsule Take 1 capsule by mouth every 8 hours 90 capsule 3    furosemide (LASIX) 40 MG tablet Take 1 tablet by mouth 2 times daily 60 tablet 3

## 2025-05-06 ENCOUNTER — RESULTS FOLLOW-UP (OUTPATIENT)
Dept: INTERNAL MEDICINE CLINIC | Facility: CLINIC | Age: 60
End: 2025-05-06

## 2025-05-06 DIAGNOSIS — Z12.31 VISIT FOR SCREENING MAMMOGRAM: ICD-10-CM

## 2025-05-07 NOTE — TELEPHONE ENCOUNTER
Orders Placed This Encounter    ADRIAN OLVIN DIGITAL SCREEN BILATERAL     Perform bilateral diagnostic Mammogram and/or bilateral Breast US as needed.     Standing Status:   Future     Expected Date:   4/24/2026     Expiration Date:   11/6/2026         Randell Lawson MD

## 2025-06-10 ENCOUNTER — CLINICAL SUPPORT (OUTPATIENT)
Age: 60
End: 2025-06-10

## 2025-06-10 ENCOUNTER — OFFICE VISIT (OUTPATIENT)
Age: 60
End: 2025-06-10
Payer: MEDICARE

## 2025-06-10 VITALS
DIASTOLIC BLOOD PRESSURE: 66 MMHG | HEIGHT: 56 IN | SYSTOLIC BLOOD PRESSURE: 118 MMHG | HEART RATE: 60 BPM | WEIGHT: 177 LBS | BODY MASS INDEX: 39.82 KG/M2

## 2025-06-10 DIAGNOSIS — I47.20 VT (VENTRICULAR TACHYCARDIA) (HCC): Primary | ICD-10-CM

## 2025-06-10 PROCEDURE — 3078F DIAST BP <80 MM HG: CPT | Performed by: INTERNAL MEDICINE

## 2025-06-10 PROCEDURE — 3074F SYST BP LT 130 MM HG: CPT | Performed by: INTERNAL MEDICINE

## 2025-06-10 PROCEDURE — G8428 CUR MEDS NOT DOCUMENT: HCPCS | Performed by: INTERNAL MEDICINE

## 2025-06-10 PROCEDURE — 3017F COLORECTAL CA SCREEN DOC REV: CPT | Performed by: INTERNAL MEDICINE

## 2025-06-10 PROCEDURE — 4004F PT TOBACCO SCREEN RCVD TLK: CPT | Performed by: INTERNAL MEDICINE

## 2025-06-10 PROCEDURE — G8417 CALC BMI ABV UP PARAM F/U: HCPCS | Performed by: INTERNAL MEDICINE

## 2025-06-10 PROCEDURE — 99214 OFFICE O/P EST MOD 30 MIN: CPT | Performed by: INTERNAL MEDICINE

## 2025-06-10 RX ORDER — MEXILETINE HYDROCHLORIDE 150 MG/1
150 CAPSULE ORAL EVERY 8 HOURS SCHEDULED
Qty: 90 CAPSULE | Refills: 3 | Status: CANCELLED | OUTPATIENT
Start: 2025-06-10

## 2025-06-10 RX ORDER — MEXILETINE HYDROCHLORIDE 150 MG/1
150 CAPSULE ORAL EVERY 8 HOURS SCHEDULED
Qty: 90 CAPSULE | Refills: 11 | Status: SHIPPED | OUTPATIENT
Start: 2025-06-10

## 2025-06-10 ASSESSMENT — ENCOUNTER SYMPTOMS
EYES NEGATIVE: 1
SHORTNESS OF BREATH: 0
ALLERGIC/IMMUNOLOGIC NEGATIVE: 1
GASTROINTESTINAL NEGATIVE: 1

## 2025-06-10 NOTE — PROGRESS NOTES
Yes     Alcohol/week: 1.0 standard drink of alcohol     Comment: I don’t drink every day or every week for that matter       ROS:  A comprehensive review of systems was performed with the pertinent positives and negatives as noted in the HPI in addition to:  Review of Systems   Constitutional: Negative.    HENT: Negative.     Eyes: Negative.    Respiratory:  Negative for shortness of breath.    Cardiovascular:  Negative for chest pain.   Gastrointestinal: Negative.    Endocrine: Negative.    Genitourinary: Negative.    Musculoskeletal: Negative.    Skin: Negative.    Allergic/Immunologic: Negative.    Neurological: Negative.    Hematological: Negative.    Psychiatric/Behavioral: Negative.     All other systems reviewed and are negative.      PHYSICAL EXAM:   /66   Pulse 60   Ht 1.422 m (4' 8\")   Wt 80.3 kg (177 lb)   BMI 39.68 kg/m²      Wt Readings from Last 3 Encounters:   06/10/25 80.3 kg (177 lb)   04/24/25 80.7 kg (178 lb)   04/24/25 80.9 kg (178 lb 6.4 oz)     BP Readings from Last 3 Encounters:   06/10/25 118/66   04/24/25 128/82   04/15/25 128/83     Gen: Well appearing, well developed, no acute distress  Eyes: Pupils equal, round. Extraocular movements are intact  ENT: Oropharynx clear, no oral lesions, normal dentition  CV: S1S2, regular rate and rhythm, no murmurs, rubs or gallops, normal JVD, no carotid bruits, normal distal pulses, no TONY, left sided CIED C/D/I.   Pulm: Clear to auscultation bilaterally, no accessory muscle uses, no wheezes or rales  GI: Soft, NT, ND, +BS  Neuro: Alert and oriented, nonfocal  Psych: Appropriate affect  Skin: Normal color and skin turgor  MSK: Normal muscle bulk and tone    Medical problems and test results were reviewed with the patient today.     No results found for any visits on 06/10/25.

## 2025-06-13 ENCOUNTER — TELEPHONE (OUTPATIENT)
Dept: INTERNAL MEDICINE CLINIC | Facility: CLINIC | Age: 60
End: 2025-06-13

## 2025-06-13 DIAGNOSIS — N18.4 CKD (CHRONIC KIDNEY DISEASE) STAGE 4, GFR 15-29 ML/MIN (HCC): ICD-10-CM

## 2025-06-13 DIAGNOSIS — I50.22 CHRONIC SYSTOLIC HEART FAILURE (HCC): ICD-10-CM

## 2025-06-13 NOTE — TELEPHONE ENCOUNTER
Medication Refill Request    Name of Medication : furosemide    Strength of Medication: 40mg    Directions: 2 daily    30 day or 90 day supply: 90    Preferred Pharmacy: [address]    Last Appt. Date: 3/4/25    Next Appt. Date: 7/8/25    Additional Information For Provider:

## 2025-06-16 RX ORDER — FUROSEMIDE 40 MG/1
40 TABLET ORAL 2 TIMES DAILY
Qty: 60 TABLET | Refills: 3 | Status: SHIPPED | OUTPATIENT
Start: 2025-06-16

## 2025-06-16 NOTE — TELEPHONE ENCOUNTER
Orders Placed This Encounter    furosemide (LASIX) 40 MG tablet     Sig: Take 1 tablet by mouth 2 times daily     Dispense:  60 tablet     Refill:  3         Randell Lawson MD

## 2025-06-22 ENCOUNTER — PATIENT MESSAGE (OUTPATIENT)
Age: 60
End: 2025-06-22

## 2025-06-27 NOTE — TELEPHONE ENCOUNTER
Stuart Schmitt, DO to Florinda Matthews MA (Selected Message)      6/26/25  4:11 PM   can request from us if needed.  Can write a more generic letter if needed too.  Thanks

## 2025-07-01 ENCOUNTER — LAB (OUTPATIENT)
Dept: INTERNAL MEDICINE CLINIC | Facility: CLINIC | Age: 60
End: 2025-07-01

## 2025-07-01 DIAGNOSIS — Z79.4 CONTROLLED TYPE 2 DIABETES MELLITUS WITH DIABETIC NEPHROPATHY, WITH LONG-TERM CURRENT USE OF INSULIN (HCC): ICD-10-CM

## 2025-07-01 DIAGNOSIS — I50.22 CHRONIC SYSTOLIC HEART FAILURE (HCC): ICD-10-CM

## 2025-07-01 DIAGNOSIS — I47.20 VT (VENTRICULAR TACHYCARDIA) (HCC): ICD-10-CM

## 2025-07-01 DIAGNOSIS — E11.21 TYPE 2 DIABETES WITH NEPHROPATHY (HCC): ICD-10-CM

## 2025-07-01 DIAGNOSIS — N18.31 STAGE 3A CHRONIC KIDNEY DISEASE (HCC): ICD-10-CM

## 2025-07-01 DIAGNOSIS — Z79.4 TYPE 2 DIABETES MELLITUS WITH OTHER SPECIFIED COMPLICATION, WITH LONG-TERM CURRENT USE OF INSULIN (HCC): ICD-10-CM

## 2025-07-01 DIAGNOSIS — E78.2 MIXED HYPERLIPIDEMIA: ICD-10-CM

## 2025-07-01 DIAGNOSIS — E11.21 CONTROLLED TYPE 2 DIABETES MELLITUS WITH DIABETIC NEPHROPATHY, WITH LONG-TERM CURRENT USE OF INSULIN (HCC): ICD-10-CM

## 2025-07-01 DIAGNOSIS — I48.0 PAROXYSMAL ATRIAL FIBRILLATION (HCC): ICD-10-CM

## 2025-07-01 DIAGNOSIS — E11.69 TYPE 2 DIABETES MELLITUS WITH OTHER SPECIFIED COMPLICATION, WITH LONG-TERM CURRENT USE OF INSULIN (HCC): ICD-10-CM

## 2025-07-01 DIAGNOSIS — Z23 NEED FOR PNEUMOCOCCAL 20-VALENT CONJUGATE VACCINATION: ICD-10-CM

## 2025-07-01 DIAGNOSIS — E66.01 MORBID OBESITY WITH BMI OF 40.0-44.9, ADULT (HCC): Chronic | ICD-10-CM

## 2025-07-01 LAB
ALBUMIN SERPL-MCNC: 3.6 G/DL (ref 3.2–4.6)
ANION GAP SERPL CALC-SCNC: 11 MMOL/L (ref 7–16)
BUN SERPL-MCNC: 20 MG/DL (ref 8–23)
CALCIUM SERPL-MCNC: 9.8 MG/DL (ref 8.8–10.2)
CHLORIDE SERPL-SCNC: 104 MMOL/L (ref 98–107)
CO2 SERPL-SCNC: 27 MMOL/L (ref 20–29)
CREAT SERPL-MCNC: 1.17 MG/DL (ref 0.6–1.1)
EST. AVERAGE GLUCOSE BLD GHB EST-MCNC: 146 MG/DL
GLUCOSE SERPL-MCNC: 105 MG/DL (ref 70–99)
HBA1C MFR BLD: 6.7 % (ref 0–5.6)
PHOSPHATE SERPL-MCNC: 3 MG/DL (ref 2.5–4.5)
POTASSIUM SERPL-SCNC: 3.7 MMOL/L (ref 3.5–5.1)
SODIUM SERPL-SCNC: 142 MMOL/L (ref 136–145)

## 2025-07-05 SDOH — HEALTH STABILITY: PHYSICAL HEALTH: ON AVERAGE, HOW MANY DAYS PER WEEK DO YOU ENGAGE IN MODERATE TO STRENUOUS EXERCISE (LIKE A BRISK WALK)?: 2 DAYS

## 2025-07-05 SDOH — HEALTH STABILITY: PHYSICAL HEALTH: ON AVERAGE, HOW MANY MINUTES DO YOU ENGAGE IN EXERCISE AT THIS LEVEL?: 10 MIN

## 2025-07-05 ASSESSMENT — PATIENT HEALTH QUESTIONNAIRE - PHQ9
1. LITTLE INTEREST OR PLEASURE IN DOING THINGS: NOT AT ALL
2. FEELING DOWN, DEPRESSED OR HOPELESS: NOT AT ALL
SUM OF ALL RESPONSES TO PHQ QUESTIONS 1-9: 0

## 2025-07-05 ASSESSMENT — LIFESTYLE VARIABLES
HOW MANY STANDARD DRINKS CONTAINING ALCOHOL DO YOU HAVE ON A TYPICAL DAY: 1
HOW OFTEN DO YOU HAVE SIX OR MORE DRINKS ON ONE OCCASION: 98
HOW MANY STANDARD DRINKS CONTAINING ALCOHOL DO YOU HAVE ON A TYPICAL DAY: 1 OR 2
HOW OFTEN DO YOU HAVE A DRINK CONTAINING ALCOHOL: 3
HOW OFTEN DO YOU HAVE A DRINK CONTAINING ALCOHOL: 2-4 TIMES A MONTH

## 2025-07-07 RX ORDER — POTASSIUM CHLORIDE 750 MG/1
TABLET, EXTENDED RELEASE ORAL
COMMUNITY
Start: 2025-06-07

## 2025-07-07 NOTE — PROGRESS NOTES
DUPIXENT 300 MG/2ML SOSY injection Inject 2 mLs into the skin every 14 days Yes ProviderSweta MD   acetaminophen (TYLENOL) 500 MG tablet Take 2 tablets by mouth every 6 hours as needed for Pain (breakthrough pain) Yes ProviderSweta MD   vitamin D 25 MCG (1000 UT) CAPS Take by mouth daily Yes Automatic Reconciliation, Ar   latanoprost (XALATAN) 0.005 % ophthalmic solution Place 1 drop into both eyes nightly Yes Automatic Reconciliation, Ar   albuterol sulfate  (90 Base) MCG/ACT inhaler 2 puffs qid prn shortness of breath or wheezing  Patient not taking: Reported on 8/10/2022  Automatic Reconciliation, Ar       CareTeam (Including outside providers/suppliers regularly involved in providing care):   Patient Care Team:  Randell Lawson MD as PCP - General  Randell Lawson MD as PCP - Empaneled Provider     Recommendations for Preventive Services Due: see orders and patient instructions/AVS.  Recommended screening schedule for the next 5-10 years is provided to the patient in written form: see Patient Instructions/AVS.     Reviewed and updated this visit:  Tobacco  Allergies  Meds  Problems  Med Hx  Surg Hx  Fam Hx  Sexual   Hx          On this date, 7/8/25, outside of the AWV, I have spent 30 minutes reviewing previous notes, test results and face to face with the patient discussing the diagnosis and importance of compliance with the treatment plan as well as documenting on the day of the visit.     An electronic signature was used to authenticate this note.  -- Randell Lawson MD           The patient (or guardian, if applicable) and other individuals in attendance with the patient were advised that Artificial Intelligence will be utilized during this visit to record and process the conversation to generate a clinical note. The patient (or guardian, if applicable) and other individuals in attendance at the appointment consented to the use of AI, including the recording.

## 2025-07-07 NOTE — PATIENT INSTRUCTIONS
[place] Transportation Resources*  (Call 211/[employer] if you need more resources.)    Medicaid Van: [employer]   They offer: Non-emergency medical transportation for Medicaid members and some Medicare Advantage plans  Contact: [phone]   Helpful Info: Must call for a ride at least 3 days before your appointment.  Call Monday- Friday 8:00am to 5:00pm. Transportation is available for MD appts, dialysis, x-rays, lab work, drug store, or other medical appointments.     [facility] [employer]  What they offer: Provides assistance and medical transport to adults 60 years and older.  Contact: [phone]    [employer]   What they offer: Charge a fee for transport (not free).  Contact: [phone]  [email or web address]    [employer]   They Offer: Charge a fee for transport (not free).  Contact: [phone]    [place] [employer] and [employer]  Offers 12 fixed routes to destinations all across [facility] (not free)  Route link:  [email or web address]   M-F 8-5: [phone] ([phone])  After Hours/ Weekends: [phone]                               Preventing Falls: Care Instructions  Injuries and health problems such as trouble walking or poor eyesight can increase your risk of falling. So can some medicines. But there are things you can do to help prevent falls. You can exercise to get stronger. You can also arrange your home to make it safer.    Talk to your doctor about the medicines you take. Ask if any of them increase the risk of falls and whether they can be changed or stopped.   Try to exercise regularly. It can help improve your strength and balance. This can help lower your risk of falling.         Practice fall safety and prevention.   Wear low-heeled shoes that fit well and give your feet good support. Talk to your doctor if you have foot problems that make this hard.  Carry a cellphone or wear a

## 2025-07-08 ENCOUNTER — OFFICE VISIT (OUTPATIENT)
Dept: INTERNAL MEDICINE CLINIC | Facility: CLINIC | Age: 60
End: 2025-07-08
Payer: MEDICARE

## 2025-07-08 VITALS
OXYGEN SATURATION: 98 % | WEIGHT: 177 LBS | SYSTOLIC BLOOD PRESSURE: 120 MMHG | HEIGHT: 58 IN | BODY MASS INDEX: 37.16 KG/M2 | HEART RATE: 60 BPM | TEMPERATURE: 97.3 F | DIASTOLIC BLOOD PRESSURE: 72 MMHG

## 2025-07-08 DIAGNOSIS — E66.01 MORBID (SEVERE) OBESITY DUE TO EXCESS CALORIES (HCC): ICD-10-CM

## 2025-07-08 DIAGNOSIS — N18.31 STAGE 3A CHRONIC KIDNEY DISEASE (HCC): ICD-10-CM

## 2025-07-08 DIAGNOSIS — Z00.00 MEDICARE ANNUAL WELLNESS VISIT, SUBSEQUENT: Primary | ICD-10-CM

## 2025-07-08 DIAGNOSIS — E11.21 TYPE 2 DIABETES WITH NEPHROPATHY (HCC): ICD-10-CM

## 2025-07-08 DIAGNOSIS — Z29.11 NEED FOR RSV VACCINATION: ICD-10-CM

## 2025-07-08 DIAGNOSIS — Z23 NEED FOR PROPHYLACTIC VACCINATION AND INOCULATION AGAINST VARICELLA: ICD-10-CM

## 2025-07-08 PROCEDURE — 3074F SYST BP LT 130 MM HG: CPT | Performed by: INTERNAL MEDICINE

## 2025-07-08 PROCEDURE — G8417 CALC BMI ABV UP PARAM F/U: HCPCS | Performed by: INTERNAL MEDICINE

## 2025-07-08 PROCEDURE — 2022F DILAT RTA XM EVC RTNOPTHY: CPT | Performed by: INTERNAL MEDICINE

## 2025-07-08 PROCEDURE — 99214 OFFICE O/P EST MOD 30 MIN: CPT | Performed by: INTERNAL MEDICINE

## 2025-07-08 PROCEDURE — G2211 COMPLEX E/M VISIT ADD ON: HCPCS | Performed by: INTERNAL MEDICINE

## 2025-07-08 PROCEDURE — 3017F COLORECTAL CA SCREEN DOC REV: CPT | Performed by: INTERNAL MEDICINE

## 2025-07-08 PROCEDURE — G8427 DOCREV CUR MEDS BY ELIG CLIN: HCPCS | Performed by: INTERNAL MEDICINE

## 2025-07-08 PROCEDURE — 3078F DIAST BP <80 MM HG: CPT | Performed by: INTERNAL MEDICINE

## 2025-07-08 PROCEDURE — 3044F HG A1C LEVEL LT 7.0%: CPT | Performed by: INTERNAL MEDICINE

## 2025-07-08 PROCEDURE — G0439 PPPS, SUBSEQ VISIT: HCPCS | Performed by: INTERNAL MEDICINE

## 2025-07-08 PROCEDURE — 4004F PT TOBACCO SCREEN RCVD TLK: CPT | Performed by: INTERNAL MEDICINE

## 2025-07-08 RX ORDER — RESPIRATORY SYNCYTIAL VISUS VACCINE RECOMBINANT, ADJUVANTED 120MCG/0.5
0.5 KIT INTRAMUSCULAR ONCE
Qty: 0.5 ML | Refills: 0 | Status: SHIPPED | OUTPATIENT
Start: 2025-07-08 | End: 2025-07-08

## 2025-07-08 SDOH — ECONOMIC STABILITY: FOOD INSECURITY: WITHIN THE PAST 12 MONTHS, YOU WORRIED THAT YOUR FOOD WOULD RUN OUT BEFORE YOU GOT MONEY TO BUY MORE.: NEVER TRUE

## 2025-07-08 SDOH — ECONOMIC STABILITY: FOOD INSECURITY: WITHIN THE PAST 12 MONTHS, THE FOOD YOU BOUGHT JUST DIDN'T LAST AND YOU DIDN'T HAVE MONEY TO GET MORE.: NEVER TRUE

## 2025-07-08 NOTE — ASSESSMENT & PLAN NOTE
Chronic, at goal (stable),    Lab Results   Component Value Date/Time     07/01/2025 08:53 AM    K 3.7 07/01/2025 08:53 AM     07/01/2025 08:53 AM    CO2 27 07/01/2025 08:53 AM    BUN 20 07/01/2025 08:53 AM    CREATININE 1.17 07/01/2025 08:53 AM    GLUCOSE 105 07/01/2025 08:53 AM    CALCIUM 9.8 07/01/2025 08:53 AM    LABGLOM 53 07/01/2025 08:53 AM    LABGLOM 40 04/02/2024 10:10 AM    LABGLOM 32 03/31/2022 08:12 AM      Followed by nephrology

## 2025-07-08 NOTE — ASSESSMENT & PLAN NOTE
Chronic, at goal (stable), continue current treatment plan  Lab Results   Component Value Date    LABA1C 6.7 (H) 07/01/2025    LABA1C 7.1 (H) 01/30/2025    LABA1C 7.3 (H) 07/24/2024     Lab Results   Component Value Date    CREATININE 1.17 (H) 07/01/2025     Diabetic Medications       Insulin       insulin glargine (LANTUS SOLOSTAR) 100 UNIT/ML injection pen Inject 12 Units into the skin nightly       Sodium-Glucose Co-Transporter 2 (SGLT2) Inhibitors       empagliflozin (JARDIANCE) 10 MG tablet Take 1 tablet by mouth daily          Diabetic Medications       Insulin       insulin glargine (LANTUS SOLOSTAR) 100 UNIT/ML injection pen Inject 12 Units into the skin nightly       Sodium-Glucose Co-Transporter 2 (SGLT2) Inhibitors       empagliflozin (JARDIANCE) 10 MG tablet Take 1 tablet by mouth daily          Treatment regimen is effective.

## 2025-08-26 ENCOUNTER — OFFICE VISIT (OUTPATIENT)
Age: 60
End: 2025-08-26
Payer: MEDICARE

## 2025-08-26 VITALS
SYSTOLIC BLOOD PRESSURE: 122 MMHG | BODY MASS INDEX: 36.73 KG/M2 | HEIGHT: 58 IN | DIASTOLIC BLOOD PRESSURE: 68 MMHG | WEIGHT: 175 LBS | HEART RATE: 60 BPM

## 2025-08-26 DIAGNOSIS — I25.3 LEFT VENTRICULAR ANEURYSM: ICD-10-CM

## 2025-08-26 DIAGNOSIS — I42.8 NICM (NONISCHEMIC CARDIOMYOPATHY) (HCC): Primary | ICD-10-CM

## 2025-08-26 DIAGNOSIS — Z95.810 IMPLANTABLE CARDIOVERTER-DEFIBRILLATOR (ICD) IN SITU: ICD-10-CM

## 2025-08-26 DIAGNOSIS — I50.22 CHRONIC SYSTOLIC HEART FAILURE (HCC): ICD-10-CM

## 2025-08-26 DIAGNOSIS — I47.20 VT (VENTRICULAR TACHYCARDIA) (HCC): ICD-10-CM

## 2025-08-26 DIAGNOSIS — I10 ESSENTIAL (PRIMARY) HYPERTENSION: ICD-10-CM

## 2025-08-26 DIAGNOSIS — I48.0 PAROXYSMAL ATRIAL FIBRILLATION (HCC): ICD-10-CM

## 2025-08-26 PROCEDURE — 3074F SYST BP LT 130 MM HG: CPT | Performed by: INTERNAL MEDICINE

## 2025-08-26 PROCEDURE — G8417 CALC BMI ABV UP PARAM F/U: HCPCS | Performed by: INTERNAL MEDICINE

## 2025-08-26 PROCEDURE — G8428 CUR MEDS NOT DOCUMENT: HCPCS | Performed by: INTERNAL MEDICINE

## 2025-08-26 PROCEDURE — 4004F PT TOBACCO SCREEN RCVD TLK: CPT | Performed by: INTERNAL MEDICINE

## 2025-08-26 PROCEDURE — 99214 OFFICE O/P EST MOD 30 MIN: CPT | Performed by: INTERNAL MEDICINE

## 2025-08-26 PROCEDURE — 3078F DIAST BP <80 MM HG: CPT | Performed by: INTERNAL MEDICINE

## 2025-08-26 PROCEDURE — 3017F COLORECTAL CA SCREEN DOC REV: CPT | Performed by: INTERNAL MEDICINE

## (undated) DEVICE — SUTURE ABSORBABLE MONOFILAMENT 3-0 PS 24 CM 26 MM VIO PDS +

## (undated) DEVICE — DEVICE FIX OPN ABSRB STRP SECURESTRP

## (undated) DEVICE — SUTURE VCRL SZ 2-0 L27IN ABSRB VLT L26MM CT-2 1/2 CIR J333H

## (undated) DEVICE — SUTURE VCRL SZ 3-0 L36IN ABSRB UD L36MM CT-1 1/2 CIR J944H

## (undated) DEVICE — DRESSING POSTOP AG PRISMASEAL 3.5X6IN

## (undated) DEVICE — PLASMABLADE X PS210-030S-LIGHT 3.0SL: Brand: PLASMABLADE™ X

## (undated) DEVICE — BUTTON SWITCH PENCIL BLADE ELECTRODE, HOLSTER: Brand: EDGE

## (undated) DEVICE — SUTURE PROL SZ 0 L30IN NONABSORBABLE BLU SH L26MM 1/2 CIR 8834H

## (undated) DEVICE — KENDALL RADIOLUCENT FOAM MONITORING ELECTRODE RECTANGULAR SHAPE: Brand: KENDALL

## (undated) DEVICE — GLIDESHEATH SLENDER STAINLESS STEEL KIT: Brand: GLIDESHEATH SLENDER

## (undated) DEVICE — STAPLER EXT SKIN 35 WIDE S STL STPL SQUEEZE HNDL VISISTAT

## (undated) DEVICE — SOLUTION IRRIG 3000ML 0.9% SOD CHL FLX CONT 0797208] ICU MEDICAL INC]

## (undated) DEVICE — CONTAINER PREFIL FRMLN 40ML --

## (undated) DEVICE — SUT PROL 1 30IN CT1 BLU --

## (undated) DEVICE — SUTURE VCRL SZ 3-0 L27IN ABSRB UD L26MM CT-2 1/2 CIR J232H

## (undated) DEVICE — SPONGE LAP 18X18IN STRL -- 5/PK

## (undated) DEVICE — CATHETER DIAG 5FR L100CM LUMN ID0.047IN JL3.5 CRV 0 SIDE H

## (undated) DEVICE — SUTURE PROL SZ 0 L30IN NONABSORBABLE BLU L26MM CT-2 1/2 CIR 8412H

## (undated) DEVICE — NDL PRT INJ NSAF BLNT 18GX1.5 --

## (undated) DEVICE — LIQUIBAND RAPID ADHESIVE 36/CS 0.8ML: Brand: MEDLINE

## (undated) DEVICE — KIT DSG LRG NEG PRSS WND THER VAC GRANUFOAM

## (undated) DEVICE — (D)STRIP SKN CLSR 0.5X4IN WHT --

## (undated) DEVICE — GUIDEWIRE VASC L260CM DIA0.035IN RAD 3MM J TIP L7CM PTFE

## (undated) DEVICE — RADIFOCUS OPTITORQUE ANGIOGRAPHIC CATHETER: Brand: OPTITORQUE

## (undated) DEVICE — STAPLER ENDOSCP 5MM ABS FIX DEV W/ 30 TACKS DISP

## (undated) DEVICE — SURGICAL PROCEDURE PACK BASIC ST FRANCIS

## (undated) DEVICE — DEVICE COMPR REG 24 CM VASC BND

## (undated) DEVICE — GOWN,REINF,POLY,ECL,PP SLV,XL: Brand: MEDLINE

## (undated) DEVICE — SLIM BODY SKIN STAPLER: Brand: APPOSE ULC

## (undated) DEVICE — CATHETER COR DIAG JUDKINS R 5.0 CRV 5FR 100CM 0 SIDE H

## (undated) DEVICE — Z CONVERTED USE 2421973 CONTAINER SPEC 60/30ML 10% FRMLN POLYPR PREFIL

## (undated) DEVICE — SUTURE ETHLN SZ 2-0 L20IN NONABSORBABLE BLK L75MM LR 3/8 460T

## (undated) DEVICE — GAUZE SPONGES,12 PLY: Brand: CURITY

## (undated) DEVICE — 2000CC GUARDIAN II: Brand: GUARDIAN

## (undated) DEVICE — GEL MEDC ULTRASOUND 5L -- REPLACED BY 326862

## (undated) DEVICE — HANDPIECE SET WITH COAXIAL HIGH FLOW TIP AND SUCTION TUBE: Brand: INTERPULSE

## (undated) DEVICE — MEDI-VAC YANKAUER SUCTION HANDLE W/BULBOUS TIP: Brand: CARDINAL HEALTH

## (undated) DEVICE — DRAPE TWL SURG 16X26IN BLU ORB04] ALLCARE INC]

## (undated) DEVICE — MASTISOL ADHESIVE LIQ 2/3ML

## (undated) DEVICE — REM POLYHESIVE ADULT PATIENT RETURN ELECTRODE: Brand: VALLEYLAB

## (undated) DEVICE — SYR 5ML 1/5 GRAD LL NSAF LF --

## (undated) DEVICE — 3M™ TEGADERM™ TRANSPARENT FILM DRESSING FRAME STYLE, 1626W, 4 IN X 4-3/4 IN (10 CM X 12 CM), 50/CT 4CT/CASE: Brand: 3M™ TEGADERM™

## (undated) DEVICE — FORCEPS BX L240CM JAW DIA2.8MM L CAP W/ NDL MIC MESH TOOTH

## (undated) DEVICE — CANNULA NSL ORAL AD FOR CAPNOFLEX CO2 O2 AIRLFE

## (undated) DEVICE — CARDINAL HEALTH FLEXIBLE LIGHT HANDLE COVER: Brand: CARDINAL HEALTH

## (undated) DEVICE — Device

## (undated) DEVICE — CONNECTOR TBNG OD5-7MM O2 END DISP

## (undated) DEVICE — BINDER ABD M/L H12IN FOR 46-62IN WHT 4 SLD PNL DSGN HOOP

## (undated) DEVICE — SYR 3ML LL TIP 1/10ML GRAD --

## (undated) DEVICE — SUT PROL 0 30IN CT1 BLU --

## (undated) DEVICE — UTILITY MARKER,BLACK WITH LABELS: Brand: DEVON

## (undated) DEVICE — NEEDLE HYPO 21GA L1.5IN INTRAMUSCULAR S STL LATCH BVL UP

## (undated) DEVICE — SUTURE VCRL SZ 3-0 L27IN ABSRB UD L26MM SH 1/2 CIR J416H

## (undated) DEVICE — CATHETER COR DIAG PIGTAILS PIG 145 CRV 5FR 110CM 6 SIDE H

## (undated) DEVICE — TRAY PREP DRY W/ PREM GLV 2 APPL 6 SPNG 2 UNDPD 1 OVERWRAP

## (undated) DEVICE — SUTURE VCRL SZ 4-0 L18IN ABSRB UD L19MM PS-2 3/8 CIR PRIM J496H

## (undated) DEVICE — SYR LR LCK 1ML GRAD NSAF 30ML --

## (undated) DEVICE — SUT ETHLN 3-0 18IN FS1 BLK --

## (undated) DEVICE — GOWN,REINFORCED,POLY,AURORA,XXLARGE,STR: Brand: MEDLINE

## (undated) DEVICE — (D)PREP SKN CHLRAPRP APPL 26ML -- CONVERT TO ITEM 371833

## (undated) DEVICE — SUTURE VICRYL SZ 4-0 L27IN ABSRB UD L26MM SH 1/2 CIR J415H

## (undated) DEVICE — DRAIN SURG 19FR SIL RND HUBLESS RADPQ W/O TRCR BLAK

## (undated) DEVICE — CATHETER DIAG AD 5FR L110CM 145DEG COR GRY HYDRPHLC NYL

## (undated) DEVICE — SHEET, T, LAPAROTOMY, STERILE: Brand: MEDLINE

## (undated) DEVICE — CANISTER WND VAC ASST CLSR --

## (undated) DEVICE — SUTURE V-LOC 180 SZ 2-0 L9IN ABSRB VLT GS-21 L37MM 1/2 CIR VLOCM0345

## (undated) DEVICE — SUT PROL 2-0 30IN CT1 BLU --

## (undated) DEVICE — SUT ETHLN 3-0 18IN PS1 BLK --

## (undated) DEVICE — AMD ANTIMICROBIAL NON-ADHERENT PAD,0.2% POLYHEXAMETHYLENE BIGUANIDE HCI (PHMB): Brand: TELFA

## (undated) DEVICE — SUTURE VCRL SZ 2-0 L27IN ABSRB UD L26MM SH 1/2 CIR J417H

## (undated) DEVICE — GUIDEWIRE VASC L260CM DIA0.035IN TIP L3MM STD EXCHG PTFE J

## (undated) DEVICE — PACKING WND W1INXL6FT VAG WVN COT GZ RADPQ

## (undated) DEVICE — NEEDLE HYPO 23GA L1IN TURQ S STL HUB POLYPR SHLD REG BVL

## (undated) DEVICE — SOLUTION IV 1000ML 0.9% SOD CHL

## (undated) DEVICE — BAND COMPR L24CM REG CLR PLAS HEMSTAT EXT HK AND LOOP RETEN